# Patient Record
Sex: MALE | Race: ASIAN | NOT HISPANIC OR LATINO | Employment: OTHER | URBAN - METROPOLITAN AREA
[De-identification: names, ages, dates, MRNs, and addresses within clinical notes are randomized per-mention and may not be internally consistent; named-entity substitution may affect disease eponyms.]

---

## 2017-01-09 RX ORDER — CILOSTAZOL 100 MG/1
TABLET ORAL
Qty: 60 TAB | Refills: 2 | OUTPATIENT
Start: 2017-01-09

## 2017-01-09 NOTE — TELEPHONE ENCOUNTER
Last seen: 10/24/16 by Dr. Gtz  Next appt: 2/13/17 with Dr. Gtz    Was the patient seen in the last year in this department? Yes   Does patient have an active prescription for medications requested? No   Received Request Via: Pharmacy

## 2017-01-17 DIAGNOSIS — I10 ESSENTIAL HYPERTENSION: ICD-10-CM

## 2017-01-18 RX ORDER — CILOSTAZOL 100 MG/1
TABLET ORAL
Qty: 60 TAB | Refills: 6 | Status: SHIPPED | OUTPATIENT
Start: 2017-01-18 | End: 2017-10-14 | Stop reason: SDUPTHER

## 2017-02-13 ENCOUNTER — APPOINTMENT (OUTPATIENT)
Dept: INTERNAL MEDICINE | Facility: MEDICAL CENTER | Age: 82
End: 2017-02-13
Payer: MEDICARE

## 2017-02-16 ENCOUNTER — HOSPITAL ENCOUNTER (OUTPATIENT)
Dept: RADIOLOGY | Facility: MEDICAL CENTER | Age: 82
End: 2017-02-16
Attending: INTERNAL MEDICINE
Payer: MEDICARE

## 2017-02-16 ENCOUNTER — OFFICE VISIT (OUTPATIENT)
Dept: INTERNAL MEDICINE | Facility: MEDICAL CENTER | Age: 82
End: 2017-02-16
Payer: MEDICARE

## 2017-02-16 ENCOUNTER — TELEPHONE (OUTPATIENT)
Dept: INTERNAL MEDICINE | Facility: MEDICAL CENTER | Age: 82
End: 2017-02-16

## 2017-02-16 VITALS
BODY MASS INDEX: 29.11 KG/M2 | WEIGHT: 158.2 LBS | OXYGEN SATURATION: 94 % | SYSTOLIC BLOOD PRESSURE: 112 MMHG | DIASTOLIC BLOOD PRESSURE: 59 MMHG | HEART RATE: 51 BPM | HEIGHT: 62 IN | TEMPERATURE: 98.1 F

## 2017-02-16 DIAGNOSIS — S09.90XA HEAD TRAUMA, INITIAL ENCOUNTER: ICD-10-CM
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
## 2017-02-16 DIAGNOSIS — R41.82 ALTERED MENTAL STATUS, UNSPECIFIED: ICD-10-CM

## 2017-02-16 PROCEDURE — 99214 OFFICE O/P EST MOD 30 MIN: CPT | Performed by: INTERNAL MEDICINE

## 2017-02-16 PROCEDURE — G8432 DEP SCR NOT DOC, RNG: HCPCS | Performed by: INTERNAL MEDICINE

## 2017-02-16 PROCEDURE — G8598 ASA/ANTIPLAT THER USED: HCPCS | Performed by: INTERNAL MEDICINE

## 2017-02-16 PROCEDURE — 3288F FALL RISK ASSESSMENT DOCD: CPT | Performed by: INTERNAL MEDICINE

## 2017-02-16 PROCEDURE — 0518F FALL PLAN OF CARE DOCD: CPT | Mod: 8P | Performed by: INTERNAL MEDICINE

## 2017-02-16 PROCEDURE — G8420 CALC BMI NORM PARAMETERS: HCPCS | Performed by: INTERNAL MEDICINE

## 2017-02-16 PROCEDURE — G8482 FLU IMMUNIZE ORDER/ADMIN: HCPCS | Performed by: INTERNAL MEDICINE

## 2017-02-16 PROCEDURE — 70450 CT HEAD/BRAIN W/O DYE: CPT

## 2017-02-16 PROCEDURE — 4040F PNEUMOC VAC/ADMIN/RCVD: CPT | Performed by: INTERNAL MEDICINE

## 2017-02-16 PROCEDURE — 1100F PTFALLS ASSESS-DOCD GE2>/YR: CPT | Performed by: INTERNAL MEDICINE

## 2017-02-16 PROCEDURE — 1036F TOBACCO NON-USER: CPT | Performed by: INTERNAL MEDICINE

## 2017-02-16 ASSESSMENT — PAIN SCALES - GENERAL: PAINLEVEL: NO PAIN

## 2017-02-17 ENCOUNTER — TELEPHONE (OUTPATIENT)
Dept: INTERNAL MEDICINE | Facility: MEDICAL CENTER | Age: 82
End: 2017-02-17

## 2017-02-17 ENCOUNTER — OFFICE VISIT (OUTPATIENT)
Dept: INTERNAL MEDICINE | Facility: MEDICAL CENTER | Age: 82
End: 2017-02-17
Payer: MEDICARE

## 2017-02-17 VITALS
BODY MASS INDEX: 27.46 KG/M2 | TEMPERATURE: 97.6 F | RESPIRATION RATE: 18 BRPM | DIASTOLIC BLOOD PRESSURE: 68 MMHG | SYSTOLIC BLOOD PRESSURE: 118 MMHG | HEIGHT: 63 IN | WEIGHT: 155 LBS | OXYGEN SATURATION: 94 % | HEART RATE: 58 BPM

## 2017-02-17 DIAGNOSIS — Z98.890 RELEVANT PRIOR IMAGING: NORMAL EXAMINATION: ICD-10-CM

## 2017-02-17 DIAGNOSIS — R45.4 DIFFICULTY CONTROLLING ANGER: ICD-10-CM

## 2017-02-17 PROCEDURE — 1036F TOBACCO NON-USER: CPT | Mod: GC | Performed by: INTERNAL MEDICINE

## 2017-02-17 PROCEDURE — G8420 CALC BMI NORM PARAMETERS: HCPCS | Mod: GC | Performed by: INTERNAL MEDICINE

## 2017-02-17 PROCEDURE — 3288F FALL RISK ASSESSMENT DOCD: CPT | Mod: GC | Performed by: INTERNAL MEDICINE

## 2017-02-17 PROCEDURE — 4040F PNEUMOC VAC/ADMIN/RCVD: CPT | Mod: GC | Performed by: INTERNAL MEDICINE

## 2017-02-17 PROCEDURE — 99214 OFFICE O/P EST MOD 30 MIN: CPT | Mod: GC | Performed by: INTERNAL MEDICINE

## 2017-02-17 PROCEDURE — G8432 DEP SCR NOT DOC, RNG: HCPCS | Mod: GC | Performed by: INTERNAL MEDICINE

## 2017-02-17 PROCEDURE — 1100F PTFALLS ASSESS-DOCD GE2>/YR: CPT | Mod: GC | Performed by: INTERNAL MEDICINE

## 2017-02-17 PROCEDURE — G8482 FLU IMMUNIZE ORDER/ADMIN: HCPCS | Mod: GC | Performed by: INTERNAL MEDICINE

## 2017-02-17 PROCEDURE — 0518F FALL PLAN OF CARE DOCD: CPT | Mod: 8P,GC | Performed by: INTERNAL MEDICINE

## 2017-02-17 PROCEDURE — G8598 ASA/ANTIPLAT THER USED: HCPCS | Mod: GC | Performed by: INTERNAL MEDICINE

## 2017-02-17 ASSESSMENT — PAIN SCALES - GENERAL: PAINLEVEL: NO PAIN

## 2017-02-17 NOTE — TELEPHONE ENCOUNTER
----- Message from Tori Andino sent at 2/17/2017  7:41 AM PST -----  Regarding: Dr Gtz Pt;  CT Results  Contact: [phone]  1. Caller Name: Rafia                         2. Call Back Number: [phone]    3. Patient PCP: Dr Gtz    4. What is the patient requesting: Pt was seen by Dr Lynch yesterday. He order a head ct which pt had done the same day. He came in for the results. He would like them today. Please call pt.     5. Does patient need immediate contact: No

## 2017-02-17 NOTE — MR AVS SNAPSHOT
"Orlymarkiejazmyne Shaikh   2017 10:15 AM   Office Visit   MRN: [id number]    Department:  [facility]   Dept Phone:  [phone]    Description:  Male : 1933   Provider:  Juarez Helm M.D.           Reason for Visit     Results imaging results      Allergies as of 2017     No Known Allergies      Vital Signs     Blood Pressure Pulse Temperature Respirations Height Weight    118/68 mmHg 58 36.4 °C (97.6 °F) 18 1.588 m (5' 2.52\") 70.308 kg (155 lb)    Body Mass Index Oxygen Saturation Smoking Status             27.88 kg/m2 94% Never Smoker          Basic Information     Date Of Birth Sex Race Ethnicity Preferred Language    1933 Male  Non- English      Your appointments     2017  1:15 PM   FOLLOW UP with Marcelino Wu M.D.   [facility] (--)    [address], [facility] [address]  [facility] [address]   [phone]              Problem List              ICD-10-CM Priority Class Noted - Resolved    Paroxysmal atrial fibrillation (CMS-HCC) I48.0 High  2010 - Present    CAD (coronary artery disease) I25.10 High  2010 - Present    Benign non-nodular prostatic hyperplasia without lower urinary tract symptoms N40.0 High  2010 - Present    PVD (peripheral vascular disease) (CMS-[facility]) I73.9 High  2010 - Present    Gout of multiple sites M10.9 High  2010 - Present    Hyperlipidemia, unspecified E78.5 High  2010 - Present    Aortic stenosis, moderate I35.0 High  2012 - Present    Carotid artery stenosis (Chronic) I65.29   2011 - Present    Acquired hypothyroidism E03.9   2011 - Present    TIA (transient ischemic attack) (Chronic) G45.9   2011 - Present    Abdominal aortic aneurysm (CMS-HCC) I71.4   2013 - Present    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   2/3/2014 - Present    Abnormal liver function tests R79.89   2014 - Present    Chronic fatigue R53.82   " 10/21/2016 - Present    Hypertension, essential I10   10/21/2016 - Present    Chronic kidney disease, stage III (moderate) N18.3   10/21/2016 - Present      Health Maintenance        Date Due Completion Dates    IMM DTaP/Tdap/Td Vaccine (1 - Tdap) [birth year]/16/1952 ---    IMM PNEUMOCOCCAL 65+ (ADULT) LOW/MEDIUM RISK SERIES (2 of 2 - PPSV23) 6/3/2016 6/3/2015    COLONOSCOPY 9/3/2025 9/3/2015            Current Immunizations     13-VALENT PCV PREVNAR 6/3/2015    Influenza TIV (IM) 4/2/2011  1:30 PM    Influenza Vaccine Adult HD 10/24/2016, 11/10/2015    Pneumococcal Vaccine (UF)Historical Data 4/2/2005    SHINGLES VACCINE 3/14/2013      Below and/or attached are the medications your provider expects you to take. Review all of your home medications and newly ordered medications with your provider and/or pharmacist. Follow medication instructions as directed by your provider and/or pharmacist. Please keep your medication list with you and share with your provider. Update the information when medications are discontinued, doses are changed, or new medications (including over-the-counter products) are added; and carry medication information at all times in the event of emergency situations     Allergies:  No Known Allergies          Medications  Valid as of: February 17, 2017 - 11:14 AM    Generic Name Brand Name Tablet Size Instructions for use    Allopurinol (Tab) ZYLOPRIM 300 MG TAKE 1 TABLET ORALLY DAILY        AmLODIPine Besylate (Tab) NORVASC 10 MG TAKE 1 TAB BY MOUTH EVERY DAY.        Aspirin (Tab) aspirin 81 MG Take 81 mg by mouth every bedtime.        Atorvastatin Calcium (Tab) LIPITOR 20 MG TAKE 1 TABLET ORALLY DAILY        B Complex Vitamins   Take  by mouth every day.        Calcium Carbonate-Vit D-Min   Take  by mouth every day.        Cilostazol (Tab) PLETAL 100 MG TAKE 1 TABLET ORALLY TWICE DAILY        Clopidogrel Bisulfate (Tab) PLAVIX 75 MG TAKE 1 TABLET BY MOUTH EVERY DAY.        Levothyroxine Sodium (Tab)  SYNTHROID 50 MCG TAKE 1 TABLET ORALLY DAILY        Metoprolol Tartrate (Tab) LOPRESSOR 100 MG TAKE 1 TABLET BY MOUTH TWICE A DAY        Multiple Vitamins-Minerals (Tab) CENTRUM SILVER  Take 1 Tab by mouth every day.        Omega-3 Fatty Acids (Cap) OMEGA 3 FA 1000 MG Take 1,000 mg by mouth every day.        Spironolactone (Tab) ALDACTONE 25 MG TAKE 1 TABLET BY MOUTH EVERY DAY        .                 Medicines prescribed today were sent to:     [id number] - [place], [place] - [address] [facility]    [address] [place] [address]    Phone: [phone] Fax: [phone]    Open 24 Hours?: No      Medication refill instructions:       If your prescription bottle indicates you have medication refills left, it is not necessary to call your provider’s office. Please contact your pharmacy and they will refill your medication.    If your prescription bottle indicates you do not have any refills left, you may request refills at any time through one of the following ways: The online [employer] system (except Urgent Care), by calling your provider’s office, or by asking your pharmacy to contact your provider’s office with a refill request. Medication refills are processed only during regular business hours and may not be available until the next business day. Your provider may request additional information or to have a follow-up visit with you prior to refilling your medication.   *Please Note: Medication refills are assigned a new Rx number when refilled electronically. Your pharmacy may indicate that no refills were authorized even though a new prescription for the same medication is available at the pharmacy. Please request the medicine by name with the pharmacy before contacting your provider for a refill.           [employer] Access Code: Activation code not generated  Current [employer] Status: Active

## 2017-02-17 NOTE — TELEPHONE ENCOUNTER
CAT scan shows expected age-related changes. But nothing acute. If patient's mental status changes do not improve or if he worsens in any neurological way he should call ASAP.  (Patient's son is a physician, former resident of ours, and patient may therefore wish a copy of this scan)

## 2017-02-17 NOTE — PROGRESS NOTES
Established Patient    Rafia Shaikh is a 83 y.o. male who presents today with the following:    CC: Follow-up for recent fall    HPI:  1. Fall, initial encounter  He reports that he lost his balance a few days ago and fell. He was outside at the time, and fell onto some gravel and hit his head. He does not describe any overt dizziness lightheadedness or palpitations but rather just lost his balance. He reports that this happens from time to time where he falls. He thinks this happened about 5-7 days ago but cannot remember the specifics.    2. Head trauma, initial encounter  As part of the fall, he fell onto his face and his head. He had bleeding associated with this initially and some bruising over his forehead and his left eye. He describes that this is all resolved and he has not experienced any further issues associated with it other than a mild headache for which he has been taking Advil with good relief.    3. Altered mental status, unspecified  He describes that he has not had any changes in his mental status since the fall however his wife was with him today describes that his very much usual state of having a short temper with her has been much worse over this last week. She describes that he has been overall much harder to live with since the fall.      ROS:   Denies any new chest pain or shortness of breath.  No changes to urinary or bowel function. Headache as per history of present illness    Past Medical History   Diagnosis Date   • Hyperlipidemia    • CAD (coronary artery disease)    • Hypertension    • Heart murmur    • Arrhythmia    • Unspecified disorder of thyroid    • PAD (peripheral artery disease)    • CATARACT      surgery   • Myocardial infarct (CMS-HCC) 2008   • High cholesterol    • Stroke (CMS-HCC)      TIA 4/16/10   • Renal disorder 2000     kidney stone   • Dental disorder      cavities, under current care   • Sleep apnea      no cpap   • Carotid artery stenosis 6/13/2011   •  "Dizziness 6/13/2011   • Hypothyroidism 6/13/2011   • TIA (transient ischemic attack) 6/13/2011   • Preoperative examination, unspecified 4/12/2011   • Insomnia    • Right knee pain    • Gout    • Tendonitis        Social History   Substance Use Topics   • Smoking status: Never Smoker    • Smokeless tobacco: Never Used   • Alcohol Use: No       Current Outpatient Prescriptions   Medication Sig Dispense Refill   • cilostazol (PLETAL) 100 MG Tab TAKE 1 TABLET ORALLY TWICE DAILY 60 Tab 6   • metoprolol (LOPRESSOR) 100 MG Tab TAKE 1 TABLET BY MOUTH TWICE A DAY 60 Tab 6   • amlodipine (NORVASC) 10 MG Tab TAKE 1 TAB BY MOUTH EVERY DAY. 30 Tab 6   • levothyroxine (SYNTHROID) 50 MCG Tab TAKE 1 TABLET ORALLY DAILY 90 Tab 1   • clopidogrel (PLAVIX) 75 MG Tab TAKE 1 TABLET BY MOUTH EVERY DAY. 90 Tab 0   • atorvastatin (LIPITOR) 20 MG Tab TAKE 1 TABLET ORALLY DAILY 90 Tab 0   • allopurinol (ZYLOPRIM) 300 MG Tab TAKE 1 TABLET ORALLY DAILY 90 Tab 1   • spironolactone (ALDACTONE) 25 MG Tab TAKE 1 TABLET BY MOUTH EVERY DAY 90 Tab 1   • Calcium Carbonate-Vit D-Min (CALTRATE PLUS PO) Take  by mouth every day.     • docosahexanoic acid (OMEGA 3 FA) 1000 MG CAPS Take 1,000 mg by mouth every day.     • B Complex Vitamins (VITAMIN B COMPLEX PO) Take  by mouth every day.     • aspirin 81 MG tablet Take 81 mg by mouth every bedtime.     • Multiple Vitamins-Minerals (CENTRUM SILVER) TABS Take 1 Tab by mouth every day.       No current facility-administered medications for this visit.       /59 mmHg  Pulse 51  Temp(Src) 36.7 °C (98.1 °F)  Ht 1.575 m (5' 2.01\")  Wt 71.759 kg (158 lb 3.2 oz)  BMI 28.93 kg/m2  SpO2 94%    Physical Exam  General: Well developed, well nourished male, in no distress.  Eyes: Conjuntiva without any obvious injection or erythema.   Cardiovascular: Heart is regular with no murmurs  Lungs: Clear to auscultation bilaterally. No wheezes, rhonci or crackles heard. Respiratory effort is normal.  Abd: Soft, " non-tender  Ext: No edema  Neuro: Cranial nerves II through XII are intact. Gait is normal. Sensation is intact bilateral upper and lower extremities. Strength is 5 out of 5 in his bilateral upper and lower extremities.  Mild bruising over his forehead is noted    Assessment and Plan    1. Fall, initial encounter  It's unclear what's happening with his falls but it sounds like this is a somewhat common occurrence for him where he will be walking lose his balance slightly and fall. He describes 5-6 falls over the last few months. Of note, his wife was with him today was unaware of all of these falls and was only where the one which resulted in head trauma. He reports that he has a cane at home but does not use it.  Given his increased history of falls, I did think it's reasonable to have him have some sort of mobility device. We talked about the possibility of him using walker such as a 4 wheeled walker with a seat however he was uninterested in that and rather would like to start using his cane more regularly.    2. Head trauma, initial encounter/Altered mental status, unspecified  Given the history of head trauma with this questionable altered mental status with his generalized decreased reaction time, his increased aggressions with his wife, I think is reasonable to CT scan his head. Additionally, with his history of coronary disease he is on Plavix and aspirin. CT scan was ordered to be done later in the day.  Of note, at the time of this dictation that CT scan has come back and shown no acute abnormalities.    The patient should follow-up with his primary care doctor soon.      No Follow-up on file.      Signed by: Dusty Lynch M.D.    This note was created using voice recognition software.  While every attempt is made to ensure accuracy of transcription, occasionally errors occur.

## 2017-02-17 NOTE — TELEPHONE ENCOUNTER
Called by radiology that CT head did not show any acute abnormality. Attempted to call patient to get more clear information of clinical picture. Patient did not answer call and voicemail was left for patient to call back.

## 2017-02-17 NOTE — PROGRESS NOTES
Established Patient    Rafia presents today with the following:    CC: Follow-up on NORMAL CT head results    HPI: Pt is a 84 y/o M with a PMHx of CAD, TIA, PAF, PVD, HLD, Aortic stenosis, HTN who presents s/p GLF roughly 1 week prior to discuss CT head results. The patient is a poor historian, but report he lost his balance and fell with impact to his face/head. He denied LOC/dizziness/lightheadedness or other focal neurological symptoms before falling. He reports being at his usual baseline currently, but does state that he has been more angry with his wife. Otherwise no major complaints at the moment.    Patient Active Problem List    Diagnosis Date Noted   • Aortic stenosis, moderate 06/25/2012     Priority: High   • Paroxysmal atrial fibrillation (CMS-HCC) 09/18/2010     Priority: High   • CAD (coronary artery disease) 09/18/2010     Priority: High   • Benign non-nodular prostatic hyperplasia without lower urinary tract symptoms 09/18/2010     Priority: High   • PVD (peripheral vascular disease) (CMS-HCC) 09/18/2010     Priority: High   • Gout of multiple sites 09/18/2010     Priority: High   • Hyperlipidemia, unspecified 09/18/2010     Priority: High   • Chronic fatigue 10/21/2016   • Hypertension, essential 10/21/2016   • Chronic kidney disease, stage III (moderate) 10/21/2016   • Abnormal liver function tests 05/07/2014   • IHSS (idiopathic hypertrophic subaortic stenosis) (CMS-HCC) 02/03/2014   • Abdominal aortic aneurysm (CMS-HCC) 01/17/2013   • Carotid artery stenosis 06/13/2011   • Acquired hypothyroidism 06/13/2011   • TIA (transient ischemic attack) 06/13/2011       Current Outpatient Prescriptions   Medication Sig Dispense Refill   • cilostazol (PLETAL) 100 MG Tab TAKE 1 TABLET ORALLY TWICE DAILY 60 Tab 6   • metoprolol (LOPRESSOR) 100 MG Tab TAKE 1 TABLET BY MOUTH TWICE A DAY 60 Tab 6   • amlodipine (NORVASC) 10 MG Tab TAKE 1 TAB BY MOUTH EVERY DAY. 30 Tab 6   • levothyroxine (SYNTHROID) 50 MCG Tab  "TAKE 1 TABLET ORALLY DAILY 90 Tab 1   • clopidogrel (PLAVIX) 75 MG Tab TAKE 1 TABLET BY MOUTH EVERY DAY. 90 Tab 0   • atorvastatin (LIPITOR) 20 MG Tab TAKE 1 TABLET ORALLY DAILY 90 Tab 0   • allopurinol (ZYLOPRIM) 300 MG Tab TAKE 1 TABLET ORALLY DAILY 90 Tab 1   • spironolactone (ALDACTONE) 25 MG Tab TAKE 1 TABLET BY MOUTH EVERY DAY 90 Tab 1   • Calcium Carbonate-Vit D-Min (CALTRATE PLUS PO) Take  by mouth every day.     • docosahexanoic acid (OMEGA 3 FA) 1000 MG CAPS Take 1,000 mg by mouth every day.     • B Complex Vitamins (VITAMIN B COMPLEX PO) Take  by mouth every day.     • aspirin 81 MG tablet Take 81 mg by mouth every bedtime.     • Multiple Vitamins-Minerals (CENTRUM SILVER) TABS Take 1 Tab by mouth every day.       No current facility-administered medications for this visit.       ROS: As per HPI. Additional pertinent symptoms as noted below.      General: Denies weight changes, malaise, night sweats or fever  GI: Denies abdominal pain and black stool nausea and vomiting  Genitourinary: Denies suprapubic pain and tenderness burning with urination  Musculoskeletal: Denies any joint pain arthritis swelling  Lung: Denies cough shortness of breath exertional dyspnea  Cardiovascular: Denies chest pain or exertional dyspnea orthopnea PND  Neurology: Denies any focal deficit numbness and tingling  Psychiatric: +Increased anger with wife; denies any suicidal or homicidal ideation, feeling anxious      /68 mmHg  Pulse 58  Temp(Src) 36.4 °C (97.6 °F)  Resp 18  Ht 1.588 m (5' 2.52\")  Wt 70.308 kg (155 lb)  BMI 27.88 kg/m2  SpO2 94%    Physical Exam   Constitutional:  oriented to person, place, and time. No distress.   Eyes: Pupils are equal, round, and reactive to light. No scleral icterus.  Neck: Neck supple. No thyromegaly present.   Cardiovascular: Normal rate, regular rhythm and normal heart sounds.  Exam reveals no gallop and no friction rub.  No murmur heard.  Pulmonary/Chest: Breath sounds normal. " "Chest wall is not dull to percussion.   Musculoskeletal:   no edema.   Lymphadenopathy: no cervical adenopathy  Neurological: alert and oriented to person, place, and time.   Skin: No cyanosis. Nails show no clubbing.      Assessment and Plan    Follow up of CT head results  - CT head WO: No acute intracranial abnormality is identified. Atrophy. There are mild periventricular and subcortical white matter changes present.  This finding is nonspecific and could be from previous small vessel ischemia, demyelination, or gliosis.  - Patient denied any acute/worsening symptoms; denies recurrent fall as well as syncopal episodes; Denies focal neuro symptoms  - Prior fall was likely mechanical in nature and NOT related to cardiac/neuro etiologies    Anger Management  - Pt reports \"always mean to her\" regarding his wife, but does not provide justification for his temperament. Pt ok with being evaluated  - Referral placed to Psychiatry (geriatric if available) to evaluate pt's anger issue with his wife    Signed by: Juarez Helm M.D.      "

## 2017-03-21 ENCOUNTER — RESOLUTE PROFESSIONAL BILLING HOSPITAL PROF FEE (OUTPATIENT)
Dept: HOSPITALIST | Facility: MEDICAL CENTER | Age: 82
End: 2017-03-21
Payer: MEDICARE

## 2017-03-21 ENCOUNTER — APPOINTMENT (OUTPATIENT)
Dept: RADIOLOGY | Facility: MEDICAL CENTER | Age: 82
DRG: 356 | End: 2017-03-21
Attending: EMERGENCY MEDICINE
Payer: MEDICARE

## 2017-03-21 ENCOUNTER — APPOINTMENT (OUTPATIENT)
Dept: RADIOLOGY | Facility: MEDICAL CENTER | Age: 82
DRG: 356 | End: 2017-03-21
Attending: RADIOLOGY
Payer: MEDICARE

## 2017-03-21 ENCOUNTER — HOSPITAL ENCOUNTER (INPATIENT)
Facility: MEDICAL CENTER | Age: 82
LOS: 4 days | DRG: 356 | End: 2017-03-25
Attending: EMERGENCY MEDICINE | Admitting: HOSPITALIST
Payer: MEDICARE

## 2017-03-21 DIAGNOSIS — I95.9 HYPOTENSION, UNSPECIFIED HYPOTENSION TYPE: ICD-10-CM

## 2017-03-21 DIAGNOSIS — K62.5 GASTROINTESTINAL HEMORRHAGE ASSOCIATED WITH ANORECTAL SOURCE: ICD-10-CM

## 2017-03-21 PROBLEM — I35.0 MODERATE AORTIC STENOSIS: Status: ACTIVE | Noted: 2017-03-21

## 2017-03-21 PROBLEM — I48.0 PAF (PAROXYSMAL ATRIAL FIBRILLATION) (HCC): Status: ACTIVE | Noted: 2017-03-21

## 2017-03-21 PROBLEM — I25.810 CAD (CORONARY ARTERY DISEASE) OF ARTERY BYPASS GRAFT: Status: ACTIVE | Noted: 2017-03-21

## 2017-03-21 PROBLEM — K92.2 LOWER GI BLEED: Status: ACTIVE | Noted: 2017-03-21

## 2017-03-21 LAB
ABO GROUP BLD: NORMAL
ALBUMIN SERPL BCP-MCNC: 2.3 G/DL (ref 3.2–4.9)
ALBUMIN/GLOB SERPL: 1.6 G/DL
ALP SERPL-CCNC: 38 U/L (ref 30–99)
ALT SERPL-CCNC: 20 U/L (ref 2–50)
ANION GAP SERPL CALC-SCNC: 5 MMOL/L (ref 0–11.9)
APPEARANCE UR: CLEAR
AST SERPL-CCNC: 17 U/L (ref 12–45)
BARCODED ABORH UBTYP: 5100
BARCODED ABORH UBTYP: 9500
BARCODED PRD CODE UBPRD: NORMAL
BARCODED UNIT NUM UBUNT: NORMAL
BASOPHILS # BLD AUTO: 0.9 % (ref 0–1.8)
BASOPHILS # BLD: 0.1 K/UL (ref 0–0.12)
BILIRUB SERPL-MCNC: 0.7 MG/DL (ref 0.1–1.5)
BILIRUB UR QL STRIP.AUTO: NEGATIVE
BLD GP AB SCN SERPL QL: NORMAL
BUN SERPL-MCNC: 20 MG/DL (ref 8–22)
CALCIUM SERPL-MCNC: 6.8 MG/DL (ref 8.5–10.5)
CFT BLD TEG: 4.2 MIN (ref 5–10)
CHLORIDE SERPL-SCNC: 114 MMOL/L (ref 96–112)
CLOT ANGLE BLD TEG: 72.9 DEGREES (ref 53–72)
CLOT LYSIS 30M P MA LENFR BLD TEG: 0 % (ref 0–8)
CO2 SERPL-SCNC: 17 MMOL/L (ref 20–33)
COLOR UR: COLORLESS
COMPONENT FT 8504FT: NORMAL
COMPONENT P 8504P: NORMAL
COMPONENT R 8504R: NORMAL
CREAT SERPL-MCNC: 1.29 MG/DL (ref 0.5–1.4)
CT.EXTRINSIC BLD ROTEM: 1.2 MIN (ref 1–3)
EOSINOPHIL # BLD AUTO: 0 K/UL (ref 0–0.51)
EOSINOPHIL NFR BLD: 0 % (ref 0–6.9)
ERYTHROCYTE [DISTWIDTH] IN BLOOD BY AUTOMATED COUNT: 52.4 FL (ref 35.9–50)
GFR SERPL CREATININE-BSD FRML MDRD: 53 ML/MIN/1.73 M 2
GLOBULIN SER CALC-MCNC: 1.4 G/DL (ref 1.9–3.5)
GLUCOSE BLD-MCNC: 138 MG/DL (ref 65–99)
GLUCOSE SERPL-MCNC: 166 MG/DL (ref 65–99)
GLUCOSE UR STRIP.AUTO-MCNC: NEGATIVE MG/DL
HCT VFR BLD AUTO: 30.3 % (ref 42–52)
HGB BLD-MCNC: 7.8 G/DL (ref 14–18)
HGB BLD-MCNC: 8.3 G/DL (ref 14–18)
HGB BLD-MCNC: 9.9 G/DL (ref 14–18)
INR PPP: 1.49 (ref 0.87–1.13)
KETONES UR STRIP.AUTO-MCNC: NEGATIVE MG/DL
LEUKOCYTE ESTERASE UR QL STRIP.AUTO: NEGATIVE
LIPASE SERPL-CCNC: 22 U/L (ref 11–82)
LYMPHOCYTES # BLD AUTO: 1.88 K/UL (ref 1–4.8)
LYMPHOCYTES NFR BLD: 16.5 % (ref 22–41)
MAGNESIUM SERPL-MCNC: 1.5 MG/DL (ref 1.5–2.5)
MANUAL DIFF BLD: NORMAL
MCF BLD TEG: 70 MM (ref 50–70)
MCH RBC QN AUTO: 32 PG (ref 27–33)
MCHC RBC AUTO-ENTMCNC: 32.7 G/DL (ref 33.7–35.3)
MCV RBC AUTO: 98.1 FL (ref 81.4–97.8)
MICRO URNS: NORMAL
MONOCYTES # BLD AUTO: 0.79 K/UL (ref 0–0.85)
MONOCYTES NFR BLD AUTO: 6.9 % (ref 0–13.4)
MORPHOLOGY BLD-IMP: NORMAL
NEUTROPHILS # BLD AUTO: 8.63 K/UL (ref 1.82–7.42)
NEUTROPHILS NFR BLD: 74.8 % (ref 44–72)
NEUTS BAND NFR BLD MANUAL: 0.9 % (ref 0–10)
NITRITE UR QL STRIP.AUTO: NEGATIVE
NRBC # BLD AUTO: 0 K/UL
NRBC BLD AUTO-RTO: 0 /100 WBC
OVALOCYTES BLD QL SMEAR: NORMAL
PA AA BLD-ACNC: 94.1 %
PA ADP BLD-ACNC: 43.5 %
PH UR STRIP.AUTO: 6 [PH]
PLATELET # BLD AUTO: 127 K/UL (ref 164–446)
PLATELET BLD QL SMEAR: NORMAL
PMV BLD AUTO: 9.1 FL (ref 9–12.9)
POIKILOCYTOSIS BLD QL SMEAR: NORMAL
POTASSIUM SERPL-SCNC: 3.9 MMOL/L (ref 3.6–5.5)
PRODUCT TYPE UPROD: NORMAL
PROT SERPL-MCNC: 3.7 G/DL (ref 6–8.2)
PROT UR QL STRIP: NEGATIVE MG/DL
PROTHROMBIN TIME: 18.5 SEC (ref 12–14.6)
RBC # BLD AUTO: 3.09 M/UL (ref 4.7–6.1)
RBC BLD AUTO: PRESENT
RBC UR QL AUTO: NEGATIVE
RH BLD: NORMAL
SODIUM SERPL-SCNC: 136 MMOL/L (ref 135–145)
SP GR UR STRIP.AUTO: 1.02
TEG ALGORITHM TGALG: ABNORMAL
TOXIC GRANULES BLD QL SMEAR: SLIGHT
TROPONIN I SERPL-MCNC: 0.05 NG/ML (ref 0–0.04)
UNIT STATUS USTAT: NORMAL
WBC # BLD AUTO: 11.4 K/UL (ref 4.8–10.8)

## 2017-03-21 PROCEDURE — 75736 ARTERY X-RAYS PELVIS: CPT

## 2017-03-21 PROCEDURE — 85018 HEMOGLOBIN: CPT

## 2017-03-21 PROCEDURE — 37221 HCHG STENT PLACE W/WO ANGIO ILIAC ARTERY: CPT

## 2017-03-21 PROCEDURE — 93005 ELECTROCARDIOGRAM TRACING: CPT | Performed by: EMERGENCY MEDICINE

## 2017-03-21 PROCEDURE — 36556 INSERT NON-TUNNEL CV CATH: CPT

## 2017-03-21 PROCEDURE — 36247 INS CATH ABD/L-EXT ART 3RD: CPT

## 2017-03-21 PROCEDURE — 85007 BL SMEAR W/DIFF WBC COUNT: CPT

## 2017-03-21 PROCEDURE — 83735 ASSAY OF MAGNESIUM: CPT

## 2017-03-21 PROCEDURE — 81003 URINALYSIS AUTO W/O SCOPE: CPT

## 2017-03-21 PROCEDURE — 36246 INS CATH ABD/L-EXT ART 2ND: CPT

## 2017-03-21 PROCEDURE — 85576 BLOOD PLATELET AGGREGATION: CPT

## 2017-03-21 PROCEDURE — 84484 ASSAY OF TROPONIN QUANT: CPT

## 2017-03-21 PROCEDURE — 30243R1 TRANSFUSION OF NONAUTOLOGOUS PLATELETS INTO CENTRAL VEIN, PERCUTANEOUS APPROACH: ICD-10-PCS | Performed by: EMERGENCY MEDICINE

## 2017-03-21 PROCEDURE — 02H633Z INSERTION OF INFUSION DEVICE INTO RIGHT ATRIUM, PERCUTANEOUS APPROACH: ICD-10-PCS | Performed by: EMERGENCY MEDICINE

## 2017-03-21 PROCEDURE — 85384 FIBRINOGEN ACTIVITY: CPT

## 2017-03-21 PROCEDURE — P9034 PLATELETS, PHERESIS: HCPCS

## 2017-03-21 PROCEDURE — 76937 US GUIDE VASCULAR ACCESS: CPT

## 2017-03-21 PROCEDURE — 04L53DZ OCCLUSION OF SUPERIOR MESENTERIC ARTERY WITH INTRALUMINAL DEVICE, PERCUTANEOUS APPROACH: ICD-10-PCS | Performed by: RADIOLOGY

## 2017-03-21 PROCEDURE — 85610 PROTHROMBIN TIME: CPT

## 2017-03-21 PROCEDURE — 4410404 IR-VISCERAL ANGIOGRAM - SELECTIVE

## 2017-03-21 PROCEDURE — 700117 HCHG RX CONTRAST REV CODE 255: Performed by: EMERGENCY MEDICINE

## 2017-03-21 PROCEDURE — 36245 INS CATH ABD/L-EXT ART 1ST: CPT

## 2017-03-21 PROCEDURE — 700117 HCHG RX CONTRAST REV CODE 255: Performed by: RADIOLOGY

## 2017-03-21 PROCEDURE — 94760 N-INVAS EAR/PLS OXIMETRY 1: CPT

## 2017-03-21 PROCEDURE — 37244 VASC EMBOLIZE/OCCLUDE BLEED: CPT

## 2017-03-21 PROCEDURE — 74174 CTA ABD&PLVS W/CONTRAST: CPT

## 2017-03-21 PROCEDURE — C1751 CATH, INF, PER/CENT/MIDLINE: HCPCS

## 2017-03-21 PROCEDURE — 36430 TRANSFUSION BLD/BLD COMPNT: CPT

## 2017-03-21 PROCEDURE — 85347 COAGULATION TIME ACTIVATED: CPT

## 2017-03-21 PROCEDURE — 86901 BLOOD TYPING SEROLOGIC RH(D): CPT

## 2017-03-21 PROCEDURE — 700111 HCHG RX REV CODE 636 W/ 250 OVERRIDE (IP): Performed by: HOSPITALIST

## 2017-03-21 PROCEDURE — 86900 BLOOD TYPING SEROLOGIC ABO: CPT

## 2017-03-21 PROCEDURE — 85027 COMPLETE CBC AUTOMATED: CPT

## 2017-03-21 PROCEDURE — 86850 RBC ANTIBODY SCREEN: CPT

## 2017-03-21 PROCEDURE — 770022 HCHG ROOM/CARE - ICU (200)

## 2017-03-21 PROCEDURE — 700105 HCHG RX REV CODE 258: Performed by: HOSPITALIST

## 2017-03-21 PROCEDURE — 86923 COMPATIBILITY TEST ELECTRIC: CPT

## 2017-03-21 PROCEDURE — 30243N1 TRANSFUSION OF NONAUTOLOGOUS RED BLOOD CELLS INTO CENTRAL VEIN, PERCUTANEOUS APPROACH: ICD-10-PCS | Performed by: EMERGENCY MEDICINE

## 2017-03-21 PROCEDURE — C9113 INJ PANTOPRAZOLE SODIUM, VIA: HCPCS | Performed by: HOSPITALIST

## 2017-03-21 PROCEDURE — 80053 COMPREHEN METABOLIC PANEL: CPT

## 2017-03-21 PROCEDURE — 75726 ARTERY X-RAYS ABDOMEN: CPT

## 2017-03-21 PROCEDURE — 83690 ASSAY OF LIPASE: CPT

## 2017-03-21 PROCEDURE — 99291 CRITICAL CARE FIRST HOUR: CPT

## 2017-03-21 PROCEDURE — 96360 HYDRATION IV INFUSION INIT: CPT

## 2017-03-21 PROCEDURE — 700111 HCHG RX REV CODE 636 W/ 250 OVERRIDE (IP)

## 2017-03-21 PROCEDURE — 700105 HCHG RX REV CODE 258: Performed by: EMERGENCY MEDICINE

## 2017-03-21 PROCEDURE — B4141ZZ FLUOROSCOPY OF SUPERIOR MESENTERIC ARTERY USING LOW OSMOLAR CONTRAST: ICD-10-PCS | Performed by: RADIOLOGY

## 2017-03-21 PROCEDURE — 82962 GLUCOSE BLOOD TEST: CPT

## 2017-03-21 PROCEDURE — P9016 RBC LEUKOCYTES REDUCED: HCPCS | Mod: 91

## 2017-03-21 PROCEDURE — 99291 CRITICAL CARE FIRST HOUR: CPT | Performed by: HOSPITALIST

## 2017-03-21 PROCEDURE — 71010 DX-CHEST-PORTABLE (1 VIEW): CPT

## 2017-03-21 RX ORDER — SODIUM CHLORIDE 9 MG/ML
INJECTION, SOLUTION INTRAVENOUS CONTINUOUS
Status: DISCONTINUED | OUTPATIENT
Start: 2017-03-21 | End: 2017-03-23

## 2017-03-21 RX ORDER — SODIUM CHLORIDE 9 MG/ML
1000 INJECTION, SOLUTION INTRAVENOUS ONCE
Status: COMPLETED | OUTPATIENT
Start: 2017-03-21 | End: 2017-03-21

## 2017-03-21 RX ORDER — ATORVASTATIN CALCIUM 20 MG/1
20 TABLET, FILM COATED ORAL
Status: DISCONTINUED | OUTPATIENT
Start: 2017-03-21 | End: 2017-03-25 | Stop reason: HOSPADM

## 2017-03-21 RX ORDER — LEVOTHYROXINE SODIUM 0.05 MG/1
50 TABLET ORAL
Status: DISCONTINUED | OUTPATIENT
Start: 2017-03-22 | End: 2017-03-25 | Stop reason: HOSPADM

## 2017-03-21 RX ORDER — BISACODYL 10 MG
10 SUPPOSITORY, RECTAL RECTAL
Status: DISCONTINUED | OUTPATIENT
Start: 2017-03-21 | End: 2017-03-21 | Stop reason: SINTOL

## 2017-03-21 RX ORDER — CILOSTAZOL 100 MG/1
100 TABLET ORAL 2 TIMES DAILY
Status: DISCONTINUED | OUTPATIENT
Start: 2017-03-21 | End: 2017-03-21

## 2017-03-21 RX ORDER — LORAZEPAM 2 MG/ML
0.5 INJECTION INTRAMUSCULAR EVERY 4 HOURS PRN
Status: DISCONTINUED | OUTPATIENT
Start: 2017-03-21 | End: 2017-03-22

## 2017-03-21 RX ORDER — METOPROLOL TARTRATE 100 MG/1
100 TABLET ORAL TWICE DAILY
Status: DISCONTINUED | OUTPATIENT
Start: 2017-03-22 | End: 2017-03-24

## 2017-03-21 RX ORDER — MIDAZOLAM HYDROCHLORIDE 1 MG/ML
INJECTION INTRAMUSCULAR; INTRAVENOUS
Status: DISPENSED
Start: 2017-03-21 | End: 2017-03-22

## 2017-03-21 RX ORDER — ONDANSETRON 4 MG/1
4 TABLET, ORALLY DISINTEGRATING ORAL EVERY 4 HOURS PRN
Status: DISCONTINUED | OUTPATIENT
Start: 2017-03-21 | End: 2017-03-25 | Stop reason: HOSPADM

## 2017-03-21 RX ORDER — ONDANSETRON 2 MG/ML
4 INJECTION INTRAMUSCULAR; INTRAVENOUS PRN
Status: ACTIVE | OUTPATIENT
Start: 2017-03-21 | End: 2017-03-21

## 2017-03-21 RX ORDER — ONDANSETRON 2 MG/ML
4 INJECTION INTRAMUSCULAR; INTRAVENOUS EVERY 4 HOURS PRN
Status: DISCONTINUED | OUTPATIENT
Start: 2017-03-21 | End: 2017-03-25 | Stop reason: HOSPADM

## 2017-03-21 RX ORDER — MIDAZOLAM HYDROCHLORIDE 1 MG/ML
.5-2 INJECTION INTRAMUSCULAR; INTRAVENOUS PRN
Status: ACTIVE | OUTPATIENT
Start: 2017-03-21 | End: 2017-03-21

## 2017-03-21 RX ORDER — NOREPINEPHRINE BITARTRATE 1 MG/ML
INJECTION, SOLUTION INTRAVENOUS
Status: DISPENSED
Start: 2017-03-21 | End: 2017-03-22

## 2017-03-21 RX ORDER — POLYETHYLENE GLYCOL 3350 17 G/17G
1 POWDER, FOR SOLUTION ORAL
Status: DISCONTINUED | OUTPATIENT
Start: 2017-03-21 | End: 2017-03-21 | Stop reason: SINTOL

## 2017-03-21 RX ORDER — SODIUM CHLORIDE 9 MG/ML
INJECTION, SOLUTION INTRAVENOUS CONTINUOUS
Status: DISCONTINUED | OUTPATIENT
Start: 2017-03-21 | End: 2017-03-21

## 2017-03-21 RX ORDER — AMOXICILLIN 250 MG
2 CAPSULE ORAL 2 TIMES DAILY
Status: DISCONTINUED | OUTPATIENT
Start: 2017-03-21 | End: 2017-03-21 | Stop reason: SINTOL

## 2017-03-21 RX ORDER — SODIUM CHLORIDE 9 MG/ML
500 INJECTION, SOLUTION INTRAVENOUS PRN
Status: DISCONTINUED | OUTPATIENT
Start: 2017-03-21 | End: 2017-03-25 | Stop reason: HOSPADM

## 2017-03-21 RX ORDER — DEXTROSE MONOHYDRATE 25 G/50ML
25 INJECTION, SOLUTION INTRAVENOUS
Status: DISCONTINUED | OUTPATIENT
Start: 2017-03-21 | End: 2017-03-25 | Stop reason: HOSPADM

## 2017-03-21 RX ADMIN — SODIUM CHLORIDE 8 MG/HR: 9 INJECTION, SOLUTION INTRAVENOUS at 15:23

## 2017-03-21 RX ADMIN — IOHEXOL 50 ML: 300 INJECTION, SOLUTION INTRAVENOUS at 14:00

## 2017-03-21 RX ADMIN — SODIUM CHLORIDE: 9 INJECTION, SOLUTION INTRAVENOUS at 12:30

## 2017-03-21 RX ADMIN — SODIUM CHLORIDE: 9 INJECTION, SOLUTION INTRAVENOUS at 14:32

## 2017-03-21 RX ADMIN — CEFTRIAXONE 2 G: 2 INJECTION, POWDER, FOR SOLUTION INTRAMUSCULAR; INTRAVENOUS at 17:02

## 2017-03-21 RX ADMIN — IOHEXOL 100 ML: 350 INJECTION, SOLUTION INTRAVENOUS at 12:38

## 2017-03-21 RX ADMIN — SODIUM CHLORIDE 80 MG: 9 INJECTION, SOLUTION INTRAVENOUS at 14:49

## 2017-03-21 RX ADMIN — SODIUM CHLORIDE 1000 ML: 9 INJECTION, SOLUTION INTRAVENOUS at 11:45

## 2017-03-21 ASSESSMENT — PAIN SCALES - GENERAL
PAINLEVEL_OUTOF10: 0
PAINLEVEL_OUTOF10: 0

## 2017-03-21 ASSESSMENT — LIFESTYLE VARIABLES
ALCOHOL_USE: NO
EVER_SMOKED: NEVER

## 2017-03-21 NOTE — OR SURGEON
Immediate Post- Operative Note        PostOp Diagnosis: GI bleeding      Procedure(s): embolization bleeding right iliocecal artery branch    Estimated Blood Loss: Less than 5 ml        Complications: None            3/21/2017     1:46 PM     Carlos Hart

## 2017-03-21 NOTE — IP AVS SNAPSHOT
3/25/2017          Rafia Shaikh  [address] [place] [address]    Dear Rafia:    [facility] wants to ensure your discharge home is safe and you or your loved ones have had all your questions answered regarding your care after you leave the hospital.    You may receive a telephone call within two days of your discharge.  This call is to make certain you understand your discharge instructions as well as ensure we provided you with the best care possible during your stay with us.     The call will only last approximately 3-5 minutes and will be done by a nurse.    Once again, we want to ensure your discharge home is safe and that you have a clear understanding of any next steps in your care.  If you have any questions or concerns, please do not hesitate to contact us, we are here for you.  Thank you for choosing [facility] for your healthcare needs.    Sincerely,    Skyler Marcus    [facility]

## 2017-03-21 NOTE — ED PROVIDER NOTES
ED Provider Note  CHIEF COMPLAINT  Chief Complaint   Patient presents with   • Rectal Bleeding       HPI  Rafia Shaikh is a 83 y.o. male who presents with some clear bleeding from his rectum. The patient has a history of coronary disease, hypertension, dizziness, thyroid disease, TIA. Medications include aspirin, Plavix. He's also on Aldactone and Norvasc. Patient is awake and cannot give us any significant history. His wife is at the bedside she is nervous and upset crying and cannot give us any significant history other than he started bleeding this morning.    REVIEW OF SYSTEMS  No headache, no chest pain, no abdominal pain. Otherwise difficult to obtain.  ALL OTHER SYSTEMS NEGATIVE    ALLERGIES  No Known Allergies    CURRENT MEDICATIONS  Aspirin and Plavix.    PAST MEDICAL HISTORY  Past Medical History   Diagnosis Date   • Hyperlipidemia    • CAD (coronary artery disease)    • Hypertension    • Heart murmur    • Arrhythmia    • Unspecified disorder of thyroid    • PAD (peripheral artery disease)    • CATARACT      surgery   • Myocardial infarct (CMS-HCC) 2008   • High cholesterol    • Stroke (CMS-HCC)      TIA 4/16/10   • Renal disorder 2000     kidney stone   • Dental disorder      cavities, under current care   • Sleep apnea      no cpap   • Carotid artery stenosis 6/13/2011   • Dizziness 6/13/2011   • Hypothyroidism 6/13/2011   • TIA (transient ischemic attack) 6/13/2011   • Preoperative examination, unspecified 4/12/2011   • Insomnia    • Right knee pain    • Gout    • Tendonitis        SURGICAL HISTORY  Past Surgical History   Procedure Laterality Date   • Multiple coronary artery bypass endo vein harvest  3/20/08     Performed by AMANDA CRYSTAL at SURGERY [facility] ORS   • Other cardiac surgery       3 vessel bypass   • Other       nasal   • Recovery  3/25/2011     Performed by SURGERY, CATH-RECOVERY at SURGERY SAME DAY [facility] ORS   • Recovery  4/1/2011     Performed by SURGERY, CATH-RECOVERY at  "SURGERY SAME DAY [facility] ORS   • Cataract extraction with iol       bilat   • Carotid endarterectomy  5/3/2011     Performed by ERASMO NAVARRETE at SURGERY [facility] ORS   • Recovery  8/22/2012     Performed by MARLON MEZA at SURGERY [facility]       FAMILY HISTORY  Family History   Problem Relation Age of Onset   • Hypertension     • Colon Cancer Brother    • Breast Cancer Sister    • Other       GOUT   • Heart Disease Father    • Other Mother      TB       SOCIAL HISTORY  Negative    PHYSICAL EXAM  GENERAL: A lethargic thin elderly  male  VITAL SIGNS: BP 55/38 mmHg  Pulse 65  Temp(Src) 36.9 °C (98.4 °F)  Resp 18  Ht 1.6 m (5' 3\")  Wt 69.4 kg (153 lb)  BMI 27.11 kg/m2   Constitutional: Pale and lethargic elderly  male adult HENT: Scalp is normal size and nontender.   Eyes: Pupils equal round and reactive to light, extraocular motor fall. There is no scleral icterus.  Neck: Neck is supple and nontender. There is no meningismus. No adenitis. No thyromegaly.  Lymphatic: No adenopathy.   Cardiovascular: Heart regular rhythm without murmurs or gallops   Thorax & Lungs: No chest wall tenderness. Lungs are clear. Patient has good breath sounds bilateral. No rales, no rhonchi, no wheezes.  Abdomen: Abdomen is soft, nontender, not rigid, no guarding, and no organomegaly. There is no palpable hernia   Skin: Pale and a little diaphoretic.  Back: Nontender, no midline bony tenderness, no flank tenderness.  Rectal examination: He has bright red blood in blood clots pouring out of his rectum.  Extremities: Full range of motion  No tenderness to palpation and no deformities noted. No calf or thigh swelling. No calf or thigh tenderness. No clinical DVT.  Neurologic: Alert & oriented . Cranial nerves are grossly intact as tested. Patient moves all 4 extremities well. Patient has good strong flexion and extension of the ankle joints knee joints hip joints and elbow joints. Sensation is normal and " symmetrical in the upper and lower extremities.   Psychiatric: Patient is alert oriented coherent and rational.       COURSE & MEDICAL DECISION MAKING  She arrives with a blood pressure of 40 very pale with severe rectal bleeding most likely lower GI bleeding kids as bright red. Could be from diverticulosis versus some other cause. He is on aspirin and Plavix. Patient arrived by ambulance I discussed case with prehospital personnel.    Plan: #1 IV #2 cardiac monitor, pulse ox monitor, blood pressure monitor. #3 set up for central line #4. Laboratory including CBC, CMP, pro time, type and cross for 2 units of blood, an order of one unit of platelets, etc. #5. Review previous medical records    Review of previous medical records: Medications include Plavix and aspirin. Upper lipidemia, coronary disease, hypertension, cataracts, MI, hypothyroid disease, TIA. Medications include Aldactone, Zyloprim, Plavix, Norvasc, Lipitor.    Procedure: The patient was placed in the Trendelenburg position. The left subclavian area was cleaned and prepped thoroughly. I set up for sterile placement of a central line in the left subclavian area. I wore mask and gown and gloves. Left subclavian area was cleaned and prepped thoroughly. I anesthetized the area with lidocaine. I inserted a needle and had good blood return. I then introduced the guidewire using Seldinger technique. I removed the needle. I made a small incision. Dilator was used. Dilator was removed. Triple lumen catheter was then inserted without difficulty. Good blood return after placement. The central line was then secured in place with sutures. Patient was then given intravenous platelets and blood. Patient's blood pressure went from 40 systolic up to 100 systolic. Chest x-ray was then obtained.    Consultation: I discussed case with Dr. Darren Bronw the hospitalist. Patient be admitted to the intensive care unit.    Consultation #2 I discussed case with the GI on-call and  he will evaluate the patient and follow the patient. Dr. Graves the GI specialist is here talking to the wife and his evaluating the patient.    Consultation #3. I discussed the case and consulted interventional radiology and set up for the patient have a CT angiogram with embolization.    Critical care time 55 minutes    FINAL IMPRESSION  1. Severe lower GI bleed  2. Hypotension with hypovolemic shock          Electronically signed by: Gary Gansert, 3/21/2017 12:30 PM

## 2017-03-21 NOTE — DISCHARGE PLANNING
Received return phone call from daughter, Alondra Shoemaker. Daughter explained that she is not in contact with her parents and DOES NOT want her mother to have access to her phone number. Daughter explained that son (Lopez Shaikh) is more involved with parents. She attempted to reach him and his spouse who are both MDs and are still involved with pt and spouse.     Alondra provided her phone number for hospital staff use only. [phone]. Son is waiting for MD to call back. He has been provided with phone number to ICU and ER SW. Daughter stated spouse stopped taking her own medication the last time pt was hospitalized and ended up being hospitalized herself. Daughter also stated spouse talked about suicide on a daily basis when daughter was a child. Staff should be aware of spouse and request assistance with EPS report if needed.    Contacted CM in ICU for f/u.

## 2017-03-21 NOTE — PROGRESS NOTES
Impression:  1. Significant hematochezia  2. moderate AS with mean gradient in 20s in 12/2014  3. paroxysmal atrial fibrillation  4. Hypotension, hx of hypertension  5. coronary arterial disease,  6. peripheral vascular disease  7. idiopathic hypertrophic subaortic stenosis  8. TIA    Recommendations:  1. Stat CT Angiography  2. IR embolization for bleeding control  3. Will likely need colonoscopy during this hospitalization after acute bleeding addressed  4. Will need ICU level of care    Critically ill patient (hypotension, massive hematochezia) guarded prognosis.    Dictation: [id number]

## 2017-03-21 NOTE — ED NOTES
Med rec complete per S/O and med list at bedside  Allergies reviewed    Patient did not take his medications this morning

## 2017-03-21 NOTE — DIETARY
Nutrition: new admit today.  82 yo female admitted with lower GI bleed and hypotension. She is noted to have had poor po intake PTA without any associated weight change.  She has been started on a Cardiac diet.  BMI 28.01 and albumin 2.3 on admit.  Albumin likely decreased secondary to GI bleed.    Recommendations:  1) encourage intake of meals   2) document intake of all meals and supplements as % taken in ADL's to provide interdisciplinary communication across all shifts   3) monitor daily weights  4) consider ordering supplements with meals if intake is less than 50%

## 2017-03-21 NOTE — IP AVS SNAPSHOT
[employer] Access Code: Activation code not generated  Current [employer] Status: Active    [employer]  A secure, online tool to manage your health information     [employer]® is a secure, online tool that connects you to your personalized health information from the privacy of your home -- day or night - making it very easy for you to manage your healthcare. Once the activation process is completed, you can even access your medical information using the [employer] davidson, which is available for free in the [employer] store or Google Play store.     [employer] provides the following levels of access (as shown below):   My Chart Features   [facility] Primary Care Doctor [facility]  Specialists [facility]  Urgent  Care Non-[facility]  Primary Care  Doctor   Email your healthcare team securely and privately 24/7 X X X X   Manage appointments: schedule your next appointment; view details of past/upcoming appointments X      Request prescription refills. X      View recent personal medical records, including lab and immunizations X X X X   View health record, including health history, allergies, medications X X X X   Read reports about your outpatient visits, procedures, consult and ER notes X X X X   See your discharge summary, which is a recap of your hospital and/or ER visit that includes your diagnosis, lab results, and care plan. X X       How to register for [employer]:  1. Go to  [email or web address].  2. Click on the Sign Up Now box, which takes you to the New Member Sign Up page. You will need to provide the following information:  a. Enter your [employer] Access Code exactly as it appears at the top of this page. (You will not need to use this code after you’ve completed the sign-up process. If you do not sign up before the expiration date, you must request a new code.)   b. Enter your date of birth.   c. Enter your home email address.   d. Click Submit, and follow the next screen’s instructions.  3. Create a [employer] ID. This will  be your [employer] login ID and cannot be changed, so think of one that is secure and easy to remember.  4. Create a [employer] password. You can change your password at any time.  5. Enter your Password Reset Question and Answer. This can be used at a later time if you forget your password.   6. Enter your e-mail address. This allows you to receive e-mail notifications when new information is available in [employer].  7. Click Sign Up. You can now view your health information.    For assistance activating your [employer] account, call [phone]

## 2017-03-21 NOTE — DISCHARGE PLANNING
Received request from RN to speak with spouse. Spouse is Darlyn who is at bedside and is concerned about tearful about pt. Pt arrived with GI Bleed. Pt and spouse have two children but spouse does not have her cell phone. Located phone numbers. Daughter is Alondra Shoemaker. Contacted number listed, which is her work number ([phone]). Left message for her to return call to ER SW. Also located son, Lopez Shaikh who is anesthesiologist in [place], [place]. Work number ([phone]). Provided phone number for son to MD.    Awaiting calls from family for update.

## 2017-03-21 NOTE — ED NOTES
2 untis PRB's a 1 unit platelets reapidly infused, /51.  Pt A&O x4, central line in place.  Xray complete.

## 2017-03-21 NOTE — IP AVS SNAPSHOT
" <p align=\"LEFT\"><IMG SRC=\"//[email or web address]\" alt=\"Image\" WIDTH=\"50%\" HEIGHT=\"200\" BORDER=\"\"></p>                   Name:Rafia Shaikh  Medical Record Number:[id number]  CSN: [id number]    YOB: 1933   Age: 83 y.o.  Sex: male  HT:1.6 m (5' 3\") WT: 68.1 kg (150 lb 2.1 oz)          Admit Date: 3/21/2017     Discharge Date:   Today's Date: 3/25/2017  Attending Doctor:  Mercedez Nolasco M.D.                  Allergies:  Review of patient's allergies indicates no known allergies.          Your appointments     Apr 20, 2017  1:15 PM   FOLLOW UP with Marcelino Wu M.D.   [facility] (--)    [address] [place] [address] [address]  [facility] [id number] [phone]            May 02, 2017  2:20 PM   Established Patient with Yarelis Gtz M.D.   [facility] (--)    [address] [place] [address] [address]  [facility] [id number] [phone]           You will be receiving a confirmation call a few days before your appointment from our automated call confirmation system.                 Medication List      Take these Medications        Instructions    allopurinol 300 MG Tabs   Commonly known as:  ZYLOPRIM    TAKE 1 TABLET ORALLY DAILY       amlodipine 10 MG Tabs   Commonly known as:  NORVASC    TAKE 1 TAB BY MOUTH EVERY DAY.       atorvastatin 20 MG Tabs   Commonly known as:  LIPITOR    TAKE 1 TABLET ORALLY DAILY       CALTRATE PLUS PO    Take 1 Tab by mouth every day.   Dose:  1 Tab       CENTRUM SILVER Tabs    Take 1 Tab by mouth every day.   Dose:  1 Tab       cilostazol 100 MG Tabs   Commonly known as:  PLETAL    TAKE 1 TABLET ORALLY TWICE DAILY       clopidogrel 75 MG Tabs   Commonly known as:  PLAVIX    Doctor's comments:  Future refills from cardiology please.   TAKE 1 TABLET BY MOUTH EVERY DAY.       docosahexanoic acid 1000 MG Caps   Commonly known as:  OMEGA 3 FA    Take 1,000 mg by mouth every day.   Dose:  1000 mg       levothyroxine " 50 MCG Tabs   Commonly known as:  SYNTHROID    TAKE 1 TABLET ORALLY DAILY       metoprolol 100 MG Tabs   Commonly known as:  LOPRESSOR    TAKE 1 TABLET BY MOUTH TWICE A DAY       omeprazole 20 MG delayed-release capsule   Commonly known as:  PRILOSEC    Take 1 Cap by mouth every day.   Dose:  20 mg       spironolactone 25 MG Tabs   Commonly known as:  ALDACTONE    TAKE 1 TABLET BY MOUTH EVERY DAY       VITAMIN B COMPLEX PO    Take 1 Tab by mouth every day.   Dose:  1 Tab

## 2017-03-21 NOTE — ED NOTES
Lab called with critical result of Calcium 6.8 at 1235. Critical lab result read back.   Dr. Gansert notified of critical lab result.

## 2017-03-21 NOTE — IP AVS SNAPSHOT
" Home Care Instructions                                                                                                                  Name:Rafia Shaikh  Medical Record Number:[id number]  CSN: [id number]    YOB: 1933   Age: 83 y.o.  Sex: male  HT:1.6 m (5' 3\") WT: 68.1 kg (150 lb 2.1 oz)          Admit Date: 3/21/2017     Discharge Date:   Today's Date: 3/25/2017  Attending Doctor:  Mercedez Nolasco M.D.                  Allergies:  Review of patient's allergies indicates no known allergies.            Discharge Instructions       Discharge Instructions    Discharged to home by taxi with relative. Discharged via wheelchair, hospital escort: Yes.  Special equipment needed: Not Applicable    Be sure to schedule a follow-up appointment with your primary care doctor or any specialists as instructed.     Discharge Plan:   Diet Plan: Discussed  Activity Level: Discussed  Confirmed Follow up Appointment: Patient to Call and Schedule Appointment  Confirmed Symptoms Management: Discussed  Medication Reconciliation Updated: Yes  Influenza Vaccine Indication: Not indicated: Previously immunized this influenza season and > 8 years of age    I understand that a diet low in cholesterol, fat, and sodium is recommended for good health. Unless I have been given specific instructions below for another diet, I accept this instruction as my diet prescription.   Other diet: Regular    Special Instructions:   Coronary Artery Bypass Grafting  Coronary artery bypass grafting (CABG) is a procedure done to bypass or fix arteries of the heart (coronary arteries) that have become narrow or blocked. This narrowing is usually the result of plaque that has built up in the walls of the vessels. The coronary arteries supply the heart with the oxygen and nutrients it needs to pump blood to your body. In the CABG procedure, a section of blood vessel from another part of the body (usually the leg, arm, or chest wall) is removed and then " inserted where it will allow blood to bypass the damaged part of the coronary artery.   LET YOUR HEALTH CARE PROVIDER KNOW ABOUT:  · Any allergies you have.    · All medicines you are taking, including blood thinners, vitamins, herbs, eye drops, creams, and over-the-counter medicines.    · Use of steroids (by mouth or creams).    · Previous problems you or members of your family have had with the use of anesthetics.    · Any blood disorders you have.    · Previous surgeries you have had.    · Medical conditions you have.    RISKS AND COMPLICATIONS  Generally, this is a safe procedure. However, problems can occur and include:   · Blood loss.    · Stroke.    · Infection.    · Pain at the surgical site.    · Heart attack during or after surgery.    · Kidney failure.    BEFORE THE PROCEDURE  · Take medicines only as directed by your health care provider. You may be asked to start new medicines and stop taking others. Do not stop medicines or adjust dosages on your own.  · Do not eat or drink anything after midnight the night before the procedure or as directed by your health care provider. Ask your health care provider if it is okay to take a sip of water with any needed medicines.  PROCEDURE  The surgeon may use either an open technique or a minimally invasive technique for this surgery.  Traditional open surgery  · You will be given medicine to make you sleep through the procedure (general anesthetic).  · Once you are asleep, a cut (incision) will be made down the front of the chest through the breastbone (sternum). The sternum will be spread open so your heart can be seen.  · You will then be placed on a heart-lung bypass machine. This machine will provide oxygen to your blood while the heart is undergoing surgery.  · Your heart will then be temporarily stopped so that the surgeon can do the next steps.  · A section of vein will likely be taken from your leg and used to bypass the blocked arteries of your heart.  Sometimes parts of an artery from inside your chest wall or from your arm will be used, either alone or in combination with leg veins.  · When the bypasses are done, you will be taken off the machine.  · Your heart will be restarted and will take over again normally.  · The sac around the heart will be closed.  · Your chest will then be closed with stitches or staples.  · Tubes will remain in your chest and will be connected to a suction device in order to help drain fluid and reinflate the lungs.  Minimally invasive surgery  This technique is done from an incision over your left chest area. If appropriate, your surgeon may not have to slow or stop your heart. If your condition allows for this procedure, there will often be less blood loss, less pain, a shorter hospital stay, and faster recovery compared to traditional open surgery.  AFTER THE PROCEDURE  · You will be taken to a recovery area to be monitored.  · You may wake up with a tube in your throat to help your breathing. You may be connected to a breathing machine. You will not be able to talk while the tube is in place. The tube will be taken out as soon as it is safe to do so.  · You will be groggy and may have some pain. You will be given pain medicine to help control the pain.     This information is not intended to replace advice given to you by your health care provider. Make sure you discuss any questions you have with your health care provider.     Document Released: 09/27/2006 Document Revised: 01/08/2016 Document Reviewed: 05/27/2014  [employer] Interactive Patient Education ©2016 [employer]    Blood Transfusion   A blood transfusion is a procedure in which you receive donated blood through an IV tube. You may need a blood transfusion because of illness, surgery, or injury. The blood may come from a donor, or it may be your own blood that you donated previously.  The blood given in a transfusion is made up of different types of cells. You may  receive:  · Red blood cells. These carry oxygen and replace lost blood.  · Platelets. These control bleeding.  · Plasma. This helps blood to clot.  If you have hemophilia or another clotting disorder, you may also receive other types of blood products.  LET YOUR HEALTH CARE PROVIDER KNOW ABOUT:  · Any allergies you have.  · All medicines you are taking, including vitamins, herbs, eye drops, creams, and over-the-counter medicines.  · Previous problems you or members of your family have had with the use of anesthetics.  · Any blood disorders you have.  · Previous surgeries you have had.  · Any medical conditions you may have.  · Any previous reactions you have had during a blood transfusion.    RISKS AND COMPLICATIONS  Generally, this is a safe procedure. However, problems may occur, including:  · Having an allergic reaction to something in the donated blood.  · Fever. This may be a reaction to the white blood cells in the transfused blood.  · Iron overload. This can happen from having many transfusions.  · Transfusion-related acute lung injury (TRALI). This is a rare reaction that causes lung damage. The cause is not known. TRALI can occur within hours of a transfusion or several days later.  · Sudden (acute) or delayed hemolytic reactions. This happens if your blood does not match the cells in your transfusion. Your body's defense system (immune system) may try to attack the new cells. This complication is rare.  · Infection. This is rare.  BEFORE THE PROCEDURE  · You may have a blood test to determine your blood type. This is necessary to know what kind of blood your body will accept.  · If you are going to have a planned surgery, you may donate your own blood. This may be done in case you need to have a transfusion.  · If you have had an allergic reaction to a transfusion in the past, you may be given medicine to help prevent a reaction. Take this medicine only as directed by your health care provider.  · You will  have your temperature, blood pressure, and pulse monitored before the transfusion.  PROCEDURE   · An IV will be started in your hand or arm.  · The bag of donated blood will be attached to your IV tube and given into your vein.  · Your temperature, blood pressure, and pulse will be monitored regularly during the transfusion. This monitoring is done to detect early signs of a transfusion reaction.  · If you have any signs or symptoms of a reaction, your transfusion will be stopped and you may be given medicine.  · When the transfusion is over, your IV will be removed.  · Pressure may be applied to the IV site for a few minutes.  · A bandage (dressing) will be applied.  The procedure may vary among health care providers and hospitals.  AFTER THE PROCEDURE  · Your blood pressure, temperature, and pulse will be monitored regularly.     This information is not intended to replace advice given to you by your health care provider. Make sure you discuss any questions you have with your health care provider.     Document Released: 12/15/2001 Document Revised: 01/08/2016 Document Reviewed: 10/28/2015  [employer] Interactive Patient Education ©2016 [employer]                                                                                               · Is patient discharged on Warfarin / Coumadin?   No     · Is patient Post Blood Transfusion?  · Yes    Depression / Suicide Risk    As you are discharged from this [facility] facility, it is important to learn how to keep safe from harming yourself.    Recognize the warning signs:  · Abrupt changes in personality, positive or negative- including increase in energy   · Giving away possessions  · Change in eating patterns- significant weight changes-  positive or negative  · Change in sleeping patterns- unable to sleep or sleeping all the time   · Unwillingness or inability to communicate  · Depression  · Unusual sadness, discouragement and loneliness  · Talk of wanting to  die  · Neglect of personal appearance   · Rebelliousness- reckless behavior  · Withdrawal from people/activities they love  · Confusion- inability to concentrate     If you or a loved one observes any of these behaviors or has concerns about self-harm, here's what you can do:  · Talk about it- your feelings and reasons for harming yourself  · Remove any means that you might use to hurt yourself (examples: pills, rope, extension cords, firearm)  · Get professional help from the community (Mental Health, Substance Abuse, psychological counseling)  · Do not be alone:Call your Safe Contact- someone whom you trust who will be there for you.  · Call your local CRISIS HOTLINE 391-7599 or 328-938-3782  · Call your local Children's Mobile Crisis Response Team [place] (107) 711-5945 or www.[employer]  · Call the toll free National Suicide Prevention Hotlines   · National Suicide Prevention Lifeline 976-779-LVZW (7685)  · [employer] Line Network 800-SUICIDE (515-3387)        Your appointments     Apr 20, 2017  1:15 PM   FOLLOW UP with Marcelino Wu M.D.   [facility]--[facility]    [address] [place] [address] [address]  [facility] [id number] [phone]            May 02, 2017  2:20 PM   Established Patient with [facility] (--)    [address] [place] [address]  [address]  [facility] [id number] [phone]           You will be receiving a confirmation call a few days before your appointment from our automated call confirmation system.                 Discharge Medication Instructions:    Below are the medications your physician expects you to take upon discharge:    Review all your home medications and newly ordered medications with your doctor and/or pharmacist. Follow medication instructions as directed by your doctor and/or pharmacist.    Please keep your medication list with you and share with your physician.                Medication List      START taking these medications        Instructions    omeprazole 20 MG delayed-release capsule   Commonly known as:  PRILOSEC    Take 1 Cap by mouth every day.   Dose:  20 mg         CONTINUE taking these medications        Instructions    allopurinol 300 MG Tabs   Commonly known as:  ZYLOPRIM    TAKE 1 TABLET ORALLY DAILY       amlodipine 10 MG Tabs   Commonly known as:  NORVASC    TAKE 1 TAB BY MOUTH EVERY DAY.       atorvastatin 20 MG Tabs   Last time this was given:  20 mg on 3/24/2017  9:16 PM   Commonly known as:  LIPITOR    TAKE 1 TABLET ORALLY DAILY       CALTRATE PLUS PO    Take 1 Tab by mouth every day.   Dose:  1 Tab       CENTRUM SILVER Tabs    Take 1 Tab by mouth every day.   Dose:  1 Tab       cilostazol 100 MG Tabs   Commonly known as:  PLETAL    TAKE 1 TABLET ORALLY TWICE DAILY       clopidogrel 75 MG Tabs   Commonly known as:  PLAVIX    Doctor's comments:  Future refills from cardiology please.   TAKE 1 TABLET BY MOUTH EVERY DAY.       docosahexanoic acid 1000 MG Caps   Commonly known as:  OMEGA 3 FA    Take 1,000 mg by mouth every day.   Dose:  1000 mg       levothyroxine 50 MCG Tabs   Last time this was given:  50 mcg on 3/25/2017  7:17 AM   Commonly known as:  SYNTHROID    TAKE 1 TABLET ORALLY DAILY       metoprolol 100 MG Tabs   Last time this was given:  50 mg on 3/25/2017  8:06 AM   Commonly known as:  LOPRESSOR    TAKE 1 TABLET BY MOUTH TWICE A DAY       spironolactone 25 MG Tabs   Commonly known as:  ALDACTONE    TAKE 1 TABLET BY MOUTH EVERY DAY       VITAMIN B COMPLEX PO    Take 1 Tab by mouth every day.   Dose:  1 Tab         STOP taking these medications     aspirin 81 MG tablet               Instructions           Diet / Nutrition:    Follow any diet instructions given to you by your doctor or the dietician, including how much salt (sodium) you are allowed each day.    If you are overweight, talk to your doctor about a weight reduction plan.    Activity:    Remain  physically active following your doctor's instructions about exercise and activity.    Rest often.     Any time you become even a little tired or short of breath, SIT DOWN and rest.    Worsening Symptoms:    Report any of the following signs and symptoms to the doctor's office immediately:    *Pain of jaw, arm, or neck  *Chest pain not relieved by medication                               *Dizziness or loss of consciousness  *Difficulty breathing even when at rest   *More tired than usual                                       *Bleeding drainage or swelling of surgical site  *Swelling of feet, ankles, legs or stomach                 *Fever (>100ºF)  *Pink or blood tinged sputum  *Weight gain (3lbs/day or 5lbs /week)           *Shock from internal defibrillator (if applicable)  *Palpitations or irregular heartbeats                *Cool and/or numb extremities    Stroke Awareness    Common Risk Factors for Stroke include:    Age  Atrial Fibrillation  Carotid Artery Stenosis  Diabetes Mellitus  Excessive alcohol consumption  High blood pressure  Overweight   Physical inactivity  Smoking    Warning signs and symptoms of a stroke include:    *Sudden numbness or weakness of the face, arm or leg (especially on one side of the body).  *Sudden confusion, trouble speaking or understanding.  *Sudden trouble seeing in one or both eyes.  *Sudden trouble walking, dizziness, loss of balance or coordination.Sudden severe headache with no known cause.    It is very important to get treatment quickly when a stroke occurs. If you experience any of the above warning signs, call 911 immediately.                   Disclaimer         Quit Smoking / Tobacco Use:    I understand the use of any tobacco products increases my chance of suffering from future heart disease or stroke and could cause other illnesses which may shorten my life. Quitting the use of tobacco products is the single most important thing I can do to improve my health. For  further information on smoking / tobacco cessation call a Toll Free Quit Line at 1-993.618.1431 (*National [employer]) or 1-516.821.1610 (American Lung Association) or you can access the web based program at www.lungusa.org.    [place] Tobacco Users Help Line:  (816) 883-9372       Toll Free: 1-658.917.5064  Quit Tobacco Program [facility] (119)718-2008    Crisis Hotline:    [place] Crisis Hotline:  8-273-WUUZOGR or 1-802.347.5106    [place] Crisis Hotline:    1-388.431.9965 or 851-453-5617    Discharge Survey:   Thank you for choosing [facility]. We hope we did everything we could to make your hospital stay a pleasant one. You may be receiving a phone survey and we would appreciate your time and participation in answering the questions. Your input is very valuable to us in our efforts to improve our service to our patients and their families.        My signature on this form indicates that:    1. I have reviewed and understand the above information.  2. My questions regarding this information have been answered to my satisfaction.  3. I have formulated a plan with my discharge nurse to obtain my prescribed medications for home.                  Disclaimer         __________________________________                     __________       ________                       Patient Signature                                                 Date                    Time

## 2017-03-21 NOTE — PROGRESS NOTES
IR Progress Note:    Visceral angiogram with coil and gel foam embolization of SMA by Dr. Hart. Pt arrived from ED hypotensive with active bright red rectal bleeding after blood administration in the ED. Administration of NS - 1500, 1 unit of packed red cells, and 1 unit platelets during procedure. No sedation secondary to hypotension. Arterial pressure monitoring through Right femoral access sheath. Pt's BP improving throughout procedure. Levophed ordered not initiated.Pt transport with two nurses to S123. Report to RANDY Abrams.[employer]     Interlock 2D, Fibered IDC Occlusion System, 2 mm X 4 cm, REF # [id number], LOT # [id number]  Interlock 2D, Fibered IDC Occlusion System, 2 mm X 4 cm, REF # [id number], LOT # [id number]

## 2017-03-21 NOTE — ED NOTES
Pt BIB active bleeding, erp at bedside, bp in the 50's/30.  Rapid infuser with uncross matched PRBC at bedside

## 2017-03-22 ENCOUNTER — APPOINTMENT (OUTPATIENT)
Dept: RADIOLOGY | Facility: MEDICAL CENTER | Age: 82
DRG: 356 | End: 2017-03-22
Attending: RADIOLOGY
Payer: MEDICARE

## 2017-03-22 ENCOUNTER — APPOINTMENT (OUTPATIENT)
Dept: RADIOLOGY | Facility: MEDICAL CENTER | Age: 82
DRG: 356 | End: 2017-03-22
Attending: HOSPITALIST
Payer: MEDICARE

## 2017-03-22 LAB
ALBUMIN SERPL BCP-MCNC: 3.1 G/DL (ref 3.2–4.9)
ALBUMIN/GLOB SERPL: 1.7 G/DL
ALP SERPL-CCNC: 42 U/L (ref 30–99)
ALT SERPL-CCNC: 22 U/L (ref 2–50)
ANION GAP SERPL CALC-SCNC: 6 MMOL/L (ref 0–11.9)
AST SERPL-CCNC: 24 U/L (ref 12–45)
BASOPHILS # BLD AUTO: 0.3 % (ref 0–1.8)
BASOPHILS # BLD: 0.03 K/UL (ref 0–0.12)
BILIRUB SERPL-MCNC: 1.4 MG/DL (ref 0.1–1.5)
BUN SERPL-MCNC: 11 MG/DL (ref 8–22)
CALCIUM SERPL-MCNC: 7.9 MG/DL (ref 8.5–10.5)
CHLORIDE SERPL-SCNC: 113 MMOL/L (ref 96–112)
CO2 SERPL-SCNC: 20 MMOL/L (ref 20–33)
CREAT SERPL-MCNC: 0.9 MG/DL (ref 0.5–1.4)
EOSINOPHIL # BLD AUTO: 0.15 K/UL (ref 0–0.51)
EOSINOPHIL NFR BLD: 1.5 % (ref 0–6.9)
ERYTHROCYTE [DISTWIDTH] IN BLOOD BY AUTOMATED COUNT: 55.5 FL (ref 35.9–50)
GFR SERPL CREATININE-BSD FRML MDRD: >60 ML/MIN/1.73 M 2
GLOBULIN SER CALC-MCNC: 1.8 G/DL (ref 1.9–3.5)
GLUCOSE BLD-MCNC: 102 MG/DL (ref 65–99)
GLUCOSE BLD-MCNC: 112 MG/DL (ref 65–99)
GLUCOSE SERPL-MCNC: 110 MG/DL (ref 65–99)
HCT VFR BLD AUTO: 24.3 % (ref 42–52)
HGB BLD-MCNC: 8.2 G/DL (ref 14–18)
HGB BLD-MCNC: 8.4 G/DL (ref 14–18)
HGB BLD-MCNC: 8.7 G/DL (ref 14–18)
IMM GRANULOCYTES # BLD AUTO: 0.2 K/UL (ref 0–0.11)
IMM GRANULOCYTES NFR BLD AUTO: 2 % (ref 0–0.9)
LYMPHOCYTES # BLD AUTO: 1.96 K/UL (ref 1–4.8)
LYMPHOCYTES NFR BLD: 19.7 % (ref 22–41)
MCH RBC QN AUTO: 30.8 PG (ref 27–33)
MCHC RBC AUTO-ENTMCNC: 33.7 G/DL (ref 33.7–35.3)
MCV RBC AUTO: 91.4 FL (ref 81.4–97.8)
MONOCYTES # BLD AUTO: 1.15 K/UL (ref 0–0.85)
MONOCYTES NFR BLD AUTO: 11.5 % (ref 0–13.4)
NEUTROPHILS # BLD AUTO: 6.48 K/UL (ref 1.82–7.42)
NEUTROPHILS NFR BLD: 65 % (ref 44–72)
NRBC # BLD AUTO: 0 K/UL
NRBC BLD AUTO-RTO: 0 /100 WBC
PLATELET # BLD AUTO: 233 K/UL (ref 164–446)
PMV BLD AUTO: 9.7 FL (ref 9–12.9)
POTASSIUM SERPL-SCNC: 3.6 MMOL/L (ref 3.6–5.5)
PROT SERPL-MCNC: 4.9 G/DL (ref 6–8.2)
RBC # BLD AUTO: 2.66 M/UL (ref 4.7–6.1)
SODIUM SERPL-SCNC: 139 MMOL/L (ref 135–145)
WBC # BLD AUTO: 10 K/UL (ref 4.8–10.8)

## 2017-03-22 PROCEDURE — 700111 HCHG RX REV CODE 636 W/ 250 OVERRIDE (IP): Performed by: HOSPITALIST

## 2017-03-22 PROCEDURE — 85018 HEMOGLOBIN: CPT | Mod: 91

## 2017-03-22 PROCEDURE — 99233 SBSQ HOSP IP/OBS HIGH 50: CPT | Performed by: HOSPITALIST

## 2017-03-22 PROCEDURE — 700111 HCHG RX REV CODE 636 W/ 250 OVERRIDE (IP): Performed by: INTERNAL MEDICINE

## 2017-03-22 PROCEDURE — 85025 COMPLETE CBC W/AUTO DIFF WBC: CPT

## 2017-03-22 PROCEDURE — 93926 LOWER EXTREMITY STUDY: CPT | Mod: 26 | Performed by: SURGERY

## 2017-03-22 PROCEDURE — 82962 GLUCOSE BLOOD TEST: CPT | Mod: 91

## 2017-03-22 PROCEDURE — 306565 RIGID MIT RESTRAINT(PAIR): Performed by: HOSPITALIST

## 2017-03-22 PROCEDURE — A9270 NON-COVERED ITEM OR SERVICE: HCPCS | Performed by: HOSPITALIST

## 2017-03-22 PROCEDURE — C9113 INJ PANTOPRAZOLE SODIUM, VIA: HCPCS | Performed by: HOSPITALIST

## 2017-03-22 PROCEDURE — 700102 HCHG RX REV CODE 250 W/ 637 OVERRIDE(OP): Performed by: HOSPITALIST

## 2017-03-22 PROCEDURE — 70450 CT HEAD/BRAIN W/O DYE: CPT

## 2017-03-22 PROCEDURE — 75820 VEIN X-RAY ARM/LEG: CPT

## 2017-03-22 PROCEDURE — 700105 HCHG RX REV CODE 258: Performed by: HOSPITALIST

## 2017-03-22 PROCEDURE — 04UK3JZ SUPPLEMENT RIGHT FEMORAL ARTERY WITH SYNTHETIC SUBSTITUTE, PERCUTANEOUS APPROACH: ICD-10-PCS | Performed by: RADIOLOGY

## 2017-03-22 PROCEDURE — C1760 CLOSURE DEV, VASC: HCPCS

## 2017-03-22 PROCEDURE — 80053 COMPREHEN METABOLIC PANEL: CPT

## 2017-03-22 PROCEDURE — 770022 HCHG ROOM/CARE - ICU (200)

## 2017-03-22 PROCEDURE — 93926 LOWER EXTREMITY STUDY: CPT

## 2017-03-22 PROCEDURE — B41F1ZZ FLUOROSCOPY OF RIGHT LOWER EXTREMITY ARTERIES USING LOW OSMOLAR CONTRAST: ICD-10-PCS | Performed by: RADIOLOGY

## 2017-03-22 RX ORDER — HALOPERIDOL 5 MG/ML
5 INJECTION INTRAMUSCULAR ONCE
Status: COMPLETED | OUTPATIENT
Start: 2017-03-22 | End: 2017-03-22

## 2017-03-22 RX ORDER — HALOPERIDOL 5 MG/ML
2 INJECTION INTRAMUSCULAR
Status: DISCONTINUED | OUTPATIENT
Start: 2017-03-22 | End: 2017-03-25 | Stop reason: HOSPADM

## 2017-03-22 RX ORDER — SODIUM CHLORIDE 9 MG/ML
500 INJECTION, SOLUTION INTRAVENOUS PRN
Status: DISCONTINUED | OUTPATIENT
Start: 2017-03-22 | End: 2017-03-25 | Stop reason: HOSPADM

## 2017-03-22 RX ORDER — MAGNESIUM SULFATE HEPTAHYDRATE 40 MG/ML
4 INJECTION, SOLUTION INTRAVENOUS ONCE
Status: COMPLETED | OUTPATIENT
Start: 2017-03-22 | End: 2017-03-22

## 2017-03-22 RX ADMIN — HALOPERIDOL LACTATE 2 MG: 5 INJECTION, SOLUTION INTRAMUSCULAR at 17:31

## 2017-03-22 RX ADMIN — LORAZEPAM 0.5 MG: 2 INJECTION INTRAMUSCULAR; INTRAVENOUS at 02:53

## 2017-03-22 RX ADMIN — SODIUM CHLORIDE 8 MG/HR: 9 INJECTION, SOLUTION INTRAVENOUS at 07:17

## 2017-03-22 RX ADMIN — SODIUM CHLORIDE: 9 INJECTION, SOLUTION INTRAVENOUS at 11:58

## 2017-03-22 RX ADMIN — LORAZEPAM 0.5 MG: 2 INJECTION INTRAMUSCULAR; INTRAVENOUS at 00:00

## 2017-03-22 RX ADMIN — HALOPERIDOL LACTATE 5 MG: 5 INJECTION, SOLUTION INTRAMUSCULAR at 19:25

## 2017-03-22 RX ADMIN — MAGNESIUM SULFATE IN WATER 4 G: 40 INJECTION, SOLUTION INTRAVENOUS at 11:54

## 2017-03-22 RX ADMIN — SODIUM CHLORIDE 8 MG/HR: 9 INJECTION, SOLUTION INTRAVENOUS at 17:30

## 2017-03-22 RX ADMIN — METOPROLOL TARTRATE 100 MG: 100 TABLET, FILM COATED ORAL at 11:54

## 2017-03-22 ASSESSMENT — LIFESTYLE VARIABLES: EVER_SMOKED: YES

## 2017-03-22 ASSESSMENT — PAIN SCALES - GENERAL
PAINLEVEL_OUTOF10: 0
PAINLEVEL_OUTOF10: 0

## 2017-03-22 NOTE — OR SURGEON
Immediate Post- Operative Note        PostOp Diagnosis: atherosclerosis, confusion      Procedure(s): R CFA arteriogram through sheath, starclose deployment      Estimated Blood Loss: Less than 5 ml        Complications: None            3/22/2017     3:13 PM     Mp [facility]
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
## 2017-03-22 NOTE — PROGRESS NOTES
Late Entry:    1415 Pt brought from IR to room S-123, attached to ICU monitors, hypotensive, labs drawn and levophed started.  Dr. Nolasco made aware pt arrived.  Discussed 2 mesfin red BMs, blood products ordered.     1700 1 unit PRBC, 1 unit platelets given to pt. Wife at bedside and son in [place] updated on POC.  VSS, remains on minimal levophed support.

## 2017-03-22 NOTE — CONSULTS
DATE OF CONSULTATION: 3/22/2017    REASON FOR CONSULTATION:  Left ureteral stone.     HISTORY OF PRESENT ILLNESS: 83 y.o gentleman presented with hematochezia.  CT angiogram was done that shows a 1.1cm proximal left ureteral stone with moderate pelviectasis.   Pt underwent embolization today.  Pt is very confused and history is obtained from his wife.  Pt had gross hematuria 2 days ago, per his wife.  No history of stones.  No urinary infections.       PAST MEDICAL HISTORY: Based on review of charts, moderate aortic stenosis,    paroxysmal atrial fibrillation, hypotension, history of hypertension, coronary   artery disease, peripheral vascular disease, idiopathic hypertrophic    subaortic stenosis, and TIA.      MEDICATIONS: Reviewed.     ALLERGIES: No known drug allergies.    FAMILY HISTORY: not obtainable    SOCIAL HISTORY: not obtainable    REVIEW OF SYSTEMS: unable to perform ROS     PHYSICAL EXAMINATION:   GENERAL:  Well developed male  HEAD:  Normocephalic, atraumatic  NECK: Supple  ABDOMEN: Soft non tender, no distention  NEUROLOGIC: Alert, but confused.    LABORATORY DATA: His recent labs were reviewed.     Imaging:   3/21/2017 12:20 PM    HISTORY/REASON FOR EXAM:  Active bleeding. Evaluate for GI bleed.      TECHNIQUE/EXAM DESCRIPTION:  CT angiogram of the abdomen and pelvis without and with contrast, with reconstructions.    Initial precontrast helical sections were obtained from the diaphragmatic domes to the lesser trochanters. Following this, 100 mL of Omnipaque 350 nonionic contrast was administered at 5.0 mL/sec and helical scanning was obtained from the diaphragmatic   domes through the lesser trochanters. Thin section overlapping reconstruction interval was utilized and multiplanar volume reformatted were generated in the sagittal and coronal planes.    3D angiographic images were reviewed on PACS. Maximum intensity projection (MIP) images were generated and reviewed.    Low dose optimization  technique was utilized for this CT exam including automated exposure control and adjustment of the mA and/or kV according to patient size.    COMPARISON:  None.    FINDINGS:    Aorta and vasculature:  Noncontrast images: There is no intramural hematoma.  Contrast images:  There is acute bleed with layering of IV contrast in the cecum (image 289 series 3)    Moderate atherosclerotic disease of the abdominal aorta and its branches.    Moderate atherosclerotic disease of the celiac trunk, SMA and ROCKY. Moderate atherosclerotic disease of the bilateral renal arteries and bilateral iliac arteries.    Lung bases:    Minimal bibasilar atelectasis.    Abdomen:    Liver: Unremarkable. There are several right hepatic cysts.    Spleen: Unremarkable.    Pancreas: Unremarkable.    Gallbladder: No stones. No cholecystitis.    Adrenal glands: normal in size.    Kidneys: There is a 1.1 cm obstructing stone in the proximal left ureter with moderate pelviectasis.    There is no lymphadenopathy.    There are multiple sigmoid diverticula. Large amount of fluid in the rectum.    No obvious abnormality seen in the pelvic structures but evaluation limited on CT.    No lymph node enlargement, free fluid, or free air in the abdomen or pelvis.    No aggressive osseous lesion.         Impression          1. Acute GI bleed with layering IV contrast in the cecum. The area is supplied by a ileocecal branch of the SMA.    2. A 1.1 cm obstructing ureteral calculus in the proximal left ureter with moderate upstream pelviectasis.      CRITICAL RESULT : Preliminary findings discussed with Dr. JOESPH BINGHAM on 3/21/2017 12:44 PM         Reading Provider Reading Date     Montana Scherer M.D. Mar 21, 2017         Signing Provider Signing Date Signing Time     Montana Scherer M.D. Mar 21, 2017 12:48 PM       [facility]   LUZ IYER  MRN: [id number]  : 1933, Sex: M  Adm: 3/21/2017, D/C:        IMPRESSION:   1.  Significant hematochezia,  likely from cecal bleeding as being seen on CT    Angio, s/p embolization by IR on 3/21/2017  2. Moderate aortic stenosis with mean gradient of 20s in 2014,    3. paroxysmal atrial fibrillation    4. 1.1 cm asymptomatic proximal left ureteral stone with moderate pelviectasis.    PLAN:  1.  Nothing emergent for urology to do at this time.  We will work on getting him scheduled for cysto left stent placement Thursday or Friday.  He will need stone treatment at a later date when he is stable.    Dr Coates is aware of this consult and has directed the POC.

## 2017-03-22 NOTE — CONSULTS
DATE OF SERVICE:  03/21/2017    REFERRING PHYSICIAN:  Gary Gansert, MD    REASON FOR CONSULTATION:  Hematochezia.    HISTORY OF PRESENT ILLNESS:  This is an 83-year-old gentleman with past   medical history significant for moderate aortic stenosis, paroxysmal atrial   fibrillation on chronic Plavix, who is being seen today for significant   hematochezia.  Patient's history was obtained mainly from discussion with   wife.  Patient reports no issues this morning on waking, was making some   breakfast and then had a sudden urgency to go the bathroom with significant   release of bloody stool per rectum.  Patient was thus brought to the emergency   room and upon arrival into the emergency room, was noted to have a blood   pressure of 55/38 with significant amount of bright red blood per rectum.  As   such, GI was consulted.  Patient denies having had a colonoscopy, never had   any aortic stent placement.  No hematemesis, or hemoptysis.  No melena.  Wife   does not report of any constipation issues.    Patient was rapidly infused with 2 units of packed RBC with placement of   central line.    PAST MEDICAL HISTORY:  Based on review of charts, moderate aortic stenosis,   paroxysmal atrial fibrillation, hypotension, history of hypertension, coronary   artery disease, peripheral vascular disease, idiopathic hypertrophic   subaortic stenosis, and TIA.    PAST SURGICAL HISTORY:  The patient reports history of coronary artery disease   and CABG.  No other abdominal surgery per patient.    SOCIAL HISTORY:  At this point noncontributory.    OUTPATIENT MEDICATIONS:  Reviewed.    PHYSICAL EXAMINATION:  VITAL SIGNS:  Blood pressure now 110/31, pulse is 67, pulse ox is 98 on 4   liters, and temperature is 36.9.  GENERAL:  Physical examination shows semi-lethargic male, 83-year-old   appropriate for stated age, lying in bed with blood per rectum.  HEENT:  Pupils equal, round, and reactive to light.  No scleral icterus.  NECK:  Supple.   No lymphadenopathy.  CARDIOVASCULAR:  Systolic murmur left lower quadrant.  Regular rate.  ABDOMEN:  Soft, nontender, and nondistended.  Positive bowel sounds.  RECTAL EXAMINATION:  Deferred as patient has significant blood per rectum   already.  EXTREMITIES:  Strength is 4/5 bilateral upper and lower.  No significant   edema.    IMAGING:  CT angio report pending.    Preliminary report shows acute gastrointestinal bleed with layering IV   contrast in the cecum.  Areas supplied by ileocecal branch of the SMA.    IMPRESSION:  1.  Significant hematochezia, likely from cecal bleeding as being seen on CT   angio.  2.  Moderate aortic stenosis with mean gradient of 20s in 2014, paroxysmal   atrial fibrillation, hypotension, history of hypertension, coronary artery   disease, peripheral vascular disease, idiopathic hypertrophic aortic stenosis,   and transient ischemic attack.    An 83-year-old gentleman with significant hematochezia with hypotension on   arrival, requiring rapid blood transfusion.  CT angio positive for cecal   bleeding, likely diverticular bleed versus other cause.  At this point, I   recommended:  1.  Stat CT angiogram and t IR embolization for bleeding control.  1.  ICU level care.  2.  He will likely need colonoscopy during this hospitalization after acute   bleeding was addressed.       ____________________________________     Pradip North Conklin M.D.    ALEXANDRE GOOD    DD:  03/21/2017 12:54:27  DT:  03/21/2017 17:46:12    D#:  [id number]  Job#:  [id number]

## 2017-03-22 NOTE — PROGRESS NOTES
Gastroenterology Progress Note     Author: Pradip Conklin   Date & Time Created: 3/22/2017 9:34 AM    Interval History:  3/22/2017: no issues overnight in the ICU. No further blood per rectum. Agitation, confusion, pulling at lines. H/H stable.    Review of Systems:  Review of Systems   Unable to perform ROS: mental acuity       Physical Exam:  Physical Exam   Constitutional: He appears well-developed and well-nourished.   HENT:   Head: Normocephalic and atraumatic.   Eyes: Conjunctivae are normal. Pupils are equal, round, and reactive to light.   Neck: Normal range of motion. Neck supple. No JVD present. No tracheal deviation present. No thyromegaly present.   Cardiovascular: Exam reveals no gallop and no friction rub.    No murmur heard.  Pulmonary/Chest: No respiratory distress. He has no wheezes. He has no rales. He exhibits no tenderness.   Abdominal: He exhibits no distension and no mass. There is no tenderness. There is no rebound and no guarding.   Musculoskeletal: He exhibits no tenderness.   Lymphadenopathy:     He has no cervical adenopathy.   Neurological: He is alert.   Skin: Skin is warm and dry.       Labs:        Invalid input(s): [id number]  Recent Labs      03/21/17   1146   TROPONINI  0.05*     Recent Labs      03/21/17   1146  03/22/17   0452   SODIUM  136  139   POTASSIUM  3.9  3.6   CHLORIDE  114*  113*   CO2  17*  20   BUN  20  11   CREATININE  1.29  0.90   MAGNESIUM  1.5   --    CALCIUM  6.8*  7.9*     Recent Labs      03/21/17   1146  03/22/17   0452   ALTSGPT  20  22   ASTSGOT  17  24   ALKPHOSPHAT  38  42   TBILIRUBIN  0.7  1.4   LIPASE  22   --    GLUCOSE  166*  110*     Recent Labs      03/21/17   1146  03/21/17   1437  03/21/17   2107  03/22/17   0452   RBC  3.09*   --    --   2.66*   HEMOGLOBIN  9.9*  7.8*  8.3*  8.2*   HEMATOCRIT  30.3*   --    --   24.3*   PLATELETCT  127*   --    --   233   PROTHROMBTM  18.5*   --    --    --    INR  1.49*   --    --    --      Recent Labs       17   1146  17   0452   WBC  11.4*  10.0   NEUTSPOLYS  74.80*  65.00   LYMPHOCYTES  16.50*  19.70*   MONOCYTES  6.90  11.50   EOSINOPHILS  0.00  1.50   BASOPHILS  0.90  0.30   ASTSGOT  17  24   ALTSGPT  20  22   ALKPHOSPHAT  38  42   TBILIRUBIN  0.7  1.4     Hemodynamics:  Temp (24hrs), Av.5 °C (97.7 °F), Min:35.9 °C (96.7 °F), Max:36.9 °C (98.4 °F)  Temperature: 36.9 °C (98.4 °F)  Pulse  Av.9  Min: 50  Max: 94Heart Rate (Monitored): 73  Blood Pressure : 112/42 mmHg, Arterial BP: 145/58 mmHg, NIBP: 116/58 mmHg  CVP (mm Hg): (!) 291 MM HG  Respiratory:    Respiration: (!) 22, Pulse Oximetry: 97 %, O2 Daily Delivery Respiratory : Silicone Nasal Cannula     Work Of Breathing / Effort: Mild  RUL Breath Sounds: Clear, RML Breath Sounds: Clear, RLL Breath Sounds: Clear, WHIT Breath Sounds: Clear, LLL Breath Sounds: Clear  Fluids:    Intake/Output Summary (Last 24 hours) at 17 0934  Last data filed at 17 0600   Gross per 24 hour   Intake 4970.34 ml   Output   1300 ml   Net 3670.34 ml     Weight: 73.3 kg (161 lb 9.6 oz)  GI/Nutrition:  Orders Placed This Encounter   Procedures   • Diet Order     Standing Status: Standing      Number of Occurrences: 1      Standing Expiration Date:      Order Specific Question:  Diet:     Answer:  Cardiac [6]     Medical Decision Making, by Problem:  Active Hospital Problems    Diagnosis   • PAF (paroxysmal atrial fibrillation) (CMS-HCC) [I48.0]   • Lower GI bleed [K92.2]   • CAD (coronary artery disease) of artery bypass graft [I25.810]   • Moderate aortic stenosis [I35.0]   • Hypotension [I95.9]     IMPRESSION:  1.  Significant hematochezia, likely from cecal bleeding as being seen on CT    Angio, s/p embolization by IR on 3/21/2017  2.  Moderate aortic stenosis with mean gradient of 20s in ,   3. paroxysmal atrial fibrillation  4. history of hypertension  5. coronary artery disease  6. peripheral vascular disease  7. idiopathic hypertrophic aortic  stenosis,  8. transient ischemic attack.      Plan:  1. Continue supportive care as per team  2. Can discontinue ceftriaxone given no liver disease (cirrhosis) per CT scan  3. Would benefit from colonoscopy once mental acuity improved to tolerate prep  4. Dr. Osmany Smart to assume care in AM.    Core Measures

## 2017-03-22 NOTE — PROGRESS NOTES
IR Note: Dr Amaya completed angiogram with removal of right femoral sheath and insertion of starclose closure device.  Manual pressure held for 15 minutes, no bleeding seen.  Patient taken back to S123 and bedside report given to RANDY Kee.  Patient repeatedly reminded to lay flat, patient needs frequent reminding.  Report to bedside RN to monitor access site for signs of bleeding and hematoma formation.      [employer] [address] [place] [id number]  LOT [id number]

## 2017-03-22 NOTE — DISCHARGE PLANNING
Medical Social Work    This  received a call from RANDY Carlos who states that pt's family member is being asked to leave [facility] but needs a cab voucher to do so.  This  wrote cab voucher (#[phone]) and tubed it to tube station #[phone] so that pt's family member can leave [facility] safely.

## 2017-03-22 NOTE — H&P
CHIEF COMPLAINT:  Acute lower gastrointestinal bleeding x1 day.    HISTORY OF PRESENT ILLNESS:  The patient is a pleasant 83-year-old male with   past medical history of coronary arterial disease, history of 3-vessel   coronary bypass in 2008, history of peripheral artery disease with history of   left leg stents, follows with Dr. Parra as an outpatient, hyperlipidemia,   hypertension, essentially presents to the ER after he had significant bright   red blood per rectum.  He says that he has had no discomfort in his abdomen,   no pain upon defecation, has not had any bloody bowel movements in the past or   any dark tarry stools.  Hence, on admission, patient's hemoglobin was noted   to be at 9.9.  At this point, Dr. Conklin, GI was consulted.  In addition, an   emergent CT angio of his abdomen was done, which showed an acute GI bleed in   the area of the ileocecal branch of the SMA, status post embolization per IR.    At this time, patient will be admitted to the ICU for higher level of care.    REVIEW OF SYSTEMS:  Please see HPI, otherwise all review of systems are   negative per AMA/CMS criteria.    ALLERGIES:  No known drug allergies.    PAST MEDICAL HISTORY:  1.  History of coronary artery disease status post 3-vessel bypass in 2008.  2.  Peripheral artery disease, history of leg stent placed, Dr. Parra.  3.  Hypertension.  4.  Hyperlipidemia.  5.  History of paroxysmal atrial fibrillation, not on any anticoagulation at   this time.    SOCIAL HISTORY:  Denies using tobacco, alcohol, or illicit drugs.  The patient   was a ____ smoker with a 40-year pack history.    FAMILY HISTORY:  Brother had colon cancer and sister had breast cancer.    HOME MEDICATIONS:  Include allopurinol 300 mg daily, amlodipine 10 mg daily,   aspirin 81 mg daily, atorvastatin 20 mg daily, B vitamin complex, Pletal 100   mg twice a day, Plavix 75 mg daily, omega-3 fatty acids, Synthroid 50 mcg   daily, metoprolol 100 mg twice a day,  multivitamins, and Aldactone 25 mg   daily.    PHYSICAL EXAMINATION:  VITAL SIGNS:  Temperature 98.4, pulse 65, blood pressure 55/38, respirations   18, SpO2 96% on 2 L nasal cannula.  CONSTITUTIONAL:  Patient appears ill appearing, otherwise nontoxic.  HEENT:  Normocephalic, atraumatic.  Pupils are equal, round and reactive to   light, nonicteric.  Mucous membranes dry.  NECK:  Supple.  No thyromegaly, no JVD, no stridor.  CARDIOVASCULAR:  Tachycardic, normal rhythm.  No gallops, rubs or murmurs   appreciated.  LUNGS:  Clear bilaterally.  No wheezes, rales or rhonchi.  ABDOMEN:  Soft, nontender, nondistended.  Positive bowel sounds.  No   hepatosplenomegaly or pulsatile masses noted.  SKIN:  Warm, dry.  No erythema or rashes.  EXTREMITIES:  Distal pulses intact, +2.  No cyanosis, clubbing or edema.  No   joint deformities.  Range of motion in all 4 extremities normal, capillary   refill intact.  Lower extremities, +2 seconds.  NEUROLOGIC:  Alert and oriented x3.  Cranial nerves II-XII grossly intact.  No   focal deficits.  PSYCHIATRIC:  Affect, judgment, mood is normal.    LABORATORY DATA:  CBC:  WBC count 11.4, hemoglobin 9.9, hematocrit 30.3,   platelet count 127.  Chemistry:  Sodium 136, potassium 3.9, chloride 114, CO2   of 17, BUN 20, creatinine 1.28, magnesium 1.5, lipase 22, troponin 0.05.    IMAGING:  CTA abdomen and pelvis, acute gastrointestinal bleed with layering   IV contrast in the cecum supplied by the ileocecal branch of the SMA, a 1.1 cm   obstructing ureteral calculus in the proximal left ureter with moderate   upstream pelvic stasis.    ASSESSMENT AND PLAN:  1.  Severe acute bleeding noted to be in the ileocecal branch of the SMA,   status post embolization per IR.  At this time, patient is not having any   further GI bleeds.  In addition, Dr. Conklin GI was consulted who will perform a   colonoscopy once his acute GI bleeding has been addressed.  At this time,   hemoglobin and hematocrit will be  performed q. 6 to q. 8 hours.  If his   hemoglobin does drop below 7, we will go ahead and transfuse him.  2.  Hypotension, most likely due to hypovolemia and at this time aggressive IV   fluids will be provided.  Transfusion will be provided as needed.  His blood   pressure did respond to IV fluid challenge, is currently above 95 systolic.    We will continue to monitor his progress closely.  3.  History of coronary arterial disease, history of coronary arterial bypass   in 2008.  At this time, we will go ahead and hold his Plavix and aspirin at   this time.  Of note, there was a note provided by Dr. Wu in an office visit   for which he was instructed to stop the Plavix.  Nevertheless, patient is   unclear and he says he has taken his Plavix. This concern will be addressed at   a later time when patient is clinically stable.  4.  History of peripheral vascular disease, history of stents placed in the   past by Dr. Parra, last one being done in 2012.  We will continue aspirin and   Plavix at a later time when clinically stable.  4.  History of persistent atrial fibrillation.  His heart rate is currently   controlled.  We will continue metoprolol at this time with holding parameters.  5.  Hypertension.  We will proceed with metoprolol.  If his blood pressure is   greater than 95 systolic, we will hold his amlodipine at this time as well as   Aldactone.  6.  Hypothyroidism.  Continue Synthroid.  7.  Hypomagnesemia.  Continue to replenish magnesium with magnesium sulfate.    Recheck magnesium levels in the morning.    At this time, patient will be placed on SCDs for DVT prophylaxis, Protonix for   gastrointestinal prophylaxis.  The patient's code status is a full code.    I have spent a total of 35 minutes providing direct critical care services at the   bedside. There has been no time overlap. The time spent excludes the time   spent performing procedures.     The case has been reviewed with Dr. Conklin, nursing staff,  and respiratory   therapy.         ____________________________________     MD GUSTAVO BRANDON    DD:  03/21/2017 16:58:43  DT:  03/21/2017 18:22:20    D#:  [id number]  Job#:  [id number]

## 2017-03-22 NOTE — PROGRESS NOTES
Dr. Amaya updated to pt's status including unable to lie flat due to agitation and impulsiveness. Repeated reorientation provided, no evidence of learning. Posterior tibial pulse auscultated with doppler on RLE, unable to auscultate dorsalis pedal pulse on RLE. Right foot is cold with capillary refill greater than three seconds. New orders implemented.

## 2017-03-22 NOTE — PROGRESS NOTES
Hospital Medicine ICU Interval Note    Date of Service: 3/22/2017    Chief Complaint  83 y.o.-year-old male admitted 3/21/2017 with acute gi bleed, s/p IR embolization right ileocecal artery branch    Interval Problem Update  Agitated confused today, worse than yesterday  CT head negative    Consultants/Specialty  IR  GI    Disposition  Keep in ICU 2/2 mental status    FC  D/W tx team on rounds, wife at bedside        Review of Systems   Unable to perform ROS: mental acuity      Physical Exam  Laboratory/Imaging   Hemodynamics  Temp (24hrs), Av.5 °C (97.7 °F), Min:35.9 °C (96.7 °F), Max:36.9 °C (98.4 °F)   Temperature: 36.9 °C (98.4 °F)  Pulse  Av.9  Min: 50  Max: 94 Heart Rate (Monitored): 73  Blood Pressure : 112/42 mmHg, Arterial BP: 145/58 mmHg, NIBP: 116/58 mmHg CVP (mm Hg): (!) 291 MM HG    Respiratory      Respiration: (!) 22, Pulse Oximetry: 97 %, O2 Daily Delivery Respiratory : Silicone Nasal Cannula     Work Of Breathing / Effort: Mild  RUL Breath Sounds: Clear, RML Breath Sounds: Clear, RLL Breath Sounds: Clear, WHIT Breath Sounds: Clear, LLL Breath Sounds: Clear    Fluids       Nutrition  Orders Placed This Encounter   Procedures   • Diet Order     Standing Status: Standing      Number of Occurrences: 1      Standing Expiration Date:      Order Specific Question:  Diet:     Answer:  Cardiac [6]     Physical Exam   Constitutional: He appears well-developed and well-nourished.   Cardiovascular: Normal rate, regular rhythm and normal heart sounds.    No murmur heard.  Pulmonary/Chest: Effort normal and breath sounds normal. No respiratory distress. He has no wheezes. He has no rales.   Abdominal: Soft. Bowel sounds are normal. He exhibits no distension. There is no tenderness.   Musculoskeletal: Normal range of motion. He exhibits no edema.   Neurological: He is alert.   Very confused   Skin: Skin is warm and dry. No erythema.   Nursing note and vitals reviewed.      Recent Labs      17   1146   03/21/17   1437  03/21/17   2107  03/22/17   0452   WBC  11.4*   --    --   10.0   RBC  3.09*   --    --   2.66*   HEMOGLOBIN  9.9*  7.8*  8.3*  8.2*   HEMATOCRIT  30.3*   --    --   24.3*   MCV  98.1*   --    --   91.4   MCH  32.0   --    --   30.8   MCHC  32.7*   --    --   33.7   RDW  52.4*   --    --   55.5*   PLATELETCT  127*   --    --   233   MPV  9.1   --    --   9.7     Recent Labs      03/21/17   1146  03/22/17   0452   SODIUM  136  139   POTASSIUM  3.9  3.6   CHLORIDE  114*  113*   CO2  17*  20   GLUCOSE  166*  110*   BUN  20  11   CREATININE  1.29  0.90   CALCIUM  6.8*  7.9*     Recent Labs      03/21/17   1146   INR  1.49*                  Assessment/Plan     Lower GI bleed  Assessment & Plan  -GI following, IR embolized bleeding ileocecal artery branch and has remained stable      Acute encephalopathy  Assessment & Plan  -CT head negative  -?baseline underlying dementia    PAF (paroxysmal atrial fibrillation) (CMS-[facility], located [facility])  Assessment & Plan  -metoprolol    CAD (coronary artery disease) of artery bypass graft  Assessment & Plan  -lipitor, metoprolol    Moderate aortic stenosis  Assessment & Plan  -no issues currently    Hypotension  Assessment & Plan  -resolved with blood, fluid      Labs reviewed, Medications reviewed and Radiology images reviewed  Baldwin catheter: No Baldwin  : femoral sheath.    DVT Prophylaxis: Contraindicated - High bleeding risk  DVT prophylaxis - mechanical: SCDs  Ulcer prophylaxis: Yes

## 2017-03-22 NOTE — PROGRESS NOTES
Dr. Gaurav palafox, updated to pt's ongoing agitation and impulsiveness. Per Dr. Amaya, pt is to remain flat after removal of RLE sheath and placement of angioseal. Pt requires repeated reorientation. Pt repeatedly attempting to get out of bed and bending RLE. RLE posterior tibial pulse auscultated by doppler only, unable to auscultate RLE dorsalis pedis pulse. RLE foot cold, capillary refill greater than three seconds. Dr. Amaya aware.

## 2017-03-22 NOTE — CARE PLAN
Problem: Safety  Goal: Will remain free from falls  Pt will remain free from falls.  Intervention: Assess risk factors for falls  Treaded slipper socks on, bed alarm on, room close to nursing station, bed in lowest position.       Problem: Infection  Goal: Will remain free from infection  Pt will remain free from s/s of infection.  Intervention: Assess signs and symptoms of infection  Ongoing assessment of s/s of infection.  Intervention: Implement standard precautions and perform hand washing before and after patient contact  Standard precautions in place including adequate hand hygiene.

## 2017-03-22 NOTE — CARE PLAN
Problem: Safety  Goal: Free from accidental injury  Bed alarm on, bed rails up x3.     Problem: Hemodynamic Status  Goal: Vital Signs and Fluid Balance Management  Q8H hgb in place.

## 2017-03-23 PROBLEM — G93.40 ACUTE ENCEPHALOPATHY: Status: ACTIVE | Noted: 2017-03-23

## 2017-03-23 LAB
ALBUMIN SERPL BCP-MCNC: 3.3 G/DL (ref 3.2–4.9)
ALBUMIN/GLOB SERPL: 1.6 G/DL
ALP SERPL-CCNC: 49 U/L (ref 30–99)
ALT SERPL-CCNC: 42 U/L (ref 2–50)
ANION GAP SERPL CALC-SCNC: 8 MMOL/L (ref 0–11.9)
AST SERPL-CCNC: 140 U/L (ref 12–45)
BASOPHILS # BLD AUTO: 0.5 % (ref 0–1.8)
BASOPHILS # BLD: 0.05 K/UL (ref 0–0.12)
BILIRUB SERPL-MCNC: 1 MG/DL (ref 0.1–1.5)
BUN SERPL-MCNC: 9 MG/DL (ref 8–22)
CALCIUM SERPL-MCNC: 8.1 MG/DL (ref 8.5–10.5)
CHLORIDE SERPL-SCNC: 109 MMOL/L (ref 96–112)
CO2 SERPL-SCNC: 22 MMOL/L (ref 20–33)
CREAT SERPL-MCNC: 0.8 MG/DL (ref 0.5–1.4)
EOSINOPHIL # BLD AUTO: 0.24 K/UL (ref 0–0.51)
EOSINOPHIL NFR BLD: 2.3 % (ref 0–6.9)
ERYTHROCYTE [DISTWIDTH] IN BLOOD BY AUTOMATED COUNT: 52 FL (ref 35.9–50)
GFR SERPL CREATININE-BSD FRML MDRD: >60 ML/MIN/1.73 M 2
GLOBULIN SER CALC-MCNC: 2.1 G/DL (ref 1.9–3.5)
GLUCOSE BLD-MCNC: 108 MG/DL (ref 65–99)
GLUCOSE BLD-MCNC: 125 MG/DL (ref 65–99)
GLUCOSE SERPL-MCNC: 100 MG/DL (ref 65–99)
HCT VFR BLD AUTO: 25.2 % (ref 42–52)
HGB BLD-MCNC: 8.6 G/DL (ref 14–18)
IMM GRANULOCYTES # BLD AUTO: 0.14 K/UL (ref 0–0.11)
IMM GRANULOCYTES NFR BLD AUTO: 1.4 % (ref 0–0.9)
LYMPHOCYTES # BLD AUTO: 1.41 K/UL (ref 1–4.8)
LYMPHOCYTES NFR BLD: 13.6 % (ref 22–41)
MCH RBC QN AUTO: 30.8 PG (ref 27–33)
MCHC RBC AUTO-ENTMCNC: 34.1 G/DL (ref 33.7–35.3)
MCV RBC AUTO: 90.3 FL (ref 81.4–97.8)
MONOCYTES # BLD AUTO: 0.92 K/UL (ref 0–0.85)
MONOCYTES NFR BLD AUTO: 8.9 % (ref 0–13.4)
NEUTROPHILS # BLD AUTO: 7.58 K/UL (ref 1.82–7.42)
NEUTROPHILS NFR BLD: 73.3 % (ref 44–72)
NRBC # BLD AUTO: 0 K/UL
NRBC BLD AUTO-RTO: 0 /100 WBC
PLATELET # BLD AUTO: 237 K/UL (ref 164–446)
PMV BLD AUTO: 9.4 FL (ref 9–12.9)
POTASSIUM SERPL-SCNC: 3.3 MMOL/L (ref 3.6–5.5)
PROT SERPL-MCNC: 5.4 G/DL (ref 6–8.2)
RBC # BLD AUTO: 2.79 M/UL (ref 4.7–6.1)
SODIUM SERPL-SCNC: 139 MMOL/L (ref 135–145)
WBC # BLD AUTO: 10.3 K/UL (ref 4.8–10.8)

## 2017-03-23 PROCEDURE — 770001 HCHG ROOM/CARE - MED/SURG/GYN PRIV*

## 2017-03-23 PROCEDURE — 700102 HCHG RX REV CODE 250 W/ 637 OVERRIDE(OP): Performed by: HOSPITALIST

## 2017-03-23 PROCEDURE — 85025 COMPLETE CBC W/AUTO DIFF WBC: CPT

## 2017-03-23 PROCEDURE — 700105 HCHG RX REV CODE 258: Performed by: HOSPITALIST

## 2017-03-23 PROCEDURE — C9113 INJ PANTOPRAZOLE SODIUM, VIA: HCPCS | Performed by: HOSPITALIST

## 2017-03-23 PROCEDURE — A9270 NON-COVERED ITEM OR SERVICE: HCPCS | Performed by: HOSPITALIST

## 2017-03-23 PROCEDURE — 700111 HCHG RX REV CODE 636 W/ 250 OVERRIDE (IP): Performed by: HOSPITALIST

## 2017-03-23 PROCEDURE — 82962 GLUCOSE BLOOD TEST: CPT | Mod: 91

## 2017-03-23 PROCEDURE — 80053 COMPREHEN METABOLIC PANEL: CPT

## 2017-03-23 PROCEDURE — 99232 SBSQ HOSP IP/OBS MODERATE 35: CPT | Performed by: HOSPITALIST

## 2017-03-23 RX ORDER — POTASSIUM CHLORIDE 1.5 G/1.58G
40 POWDER, FOR SOLUTION ORAL 2 TIMES DAILY
Status: COMPLETED | OUTPATIENT
Start: 2017-03-23 | End: 2017-03-23

## 2017-03-23 RX ADMIN — SODIUM CHLORIDE: 9 INJECTION, SOLUTION INTRAVENOUS at 00:21

## 2017-03-23 RX ADMIN — METOPROLOL TARTRATE 100 MG: 100 TABLET, FILM COATED ORAL at 09:54

## 2017-03-23 RX ADMIN — SODIUM CHLORIDE 8 MG/HR: 9 INJECTION, SOLUTION INTRAVENOUS at 03:40

## 2017-03-23 RX ADMIN — POTASSIUM CHLORIDE 40 MEQ: 1.5 POWDER, FOR SOLUTION ORAL at 21:41

## 2017-03-23 RX ADMIN — ATORVASTATIN CALCIUM 20 MG: 20 TABLET, FILM COATED ORAL at 21:41

## 2017-03-23 RX ADMIN — POTASSIUM CHLORIDE 40 MEQ: 1.5 POWDER, FOR SOLUTION ORAL at 09:55

## 2017-03-23 RX ADMIN — METOPROLOL TARTRATE 100 MG: 100 TABLET, FILM COATED ORAL at 21:41

## 2017-03-23 RX ADMIN — SODIUM CHLORIDE 8 MG/HR: 9 INJECTION, SOLUTION INTRAVENOUS at 20:08

## 2017-03-23 ASSESSMENT — PAIN SCALES - GENERAL
PAINLEVEL_OUTOF10: 0

## 2017-03-23 NOTE — CARE PLAN
Problem: Psychosocial needs  Goal: Anxiety reduction  Pt very restless in bed.  Not compliant with keeping leg straight.  Minimal usage of sedation due to decrease in LOC and following.  Restraints in place to keep pt safe from accidental harm.    Problem: Skin Integrity  Goal: Skin Integrity is maintained or improved  Pt being turned every 2 hours.  Pt does move frequently in bed and does not stay in turned position.  Pt linens changed every 2 hours due to incontinence.  Condom catheter does not stay on.

## 2017-03-23 NOTE — CARE PLAN
Problem: Safety  Goal: Will remain free from falls  PT will remain free from falls.   Intervention: Implement fall precautions  Fall risk assessed and documented.     Fall prevention precautions in place:   Treaded socks on pt  Personal belongings, call light in place  Report given to other health care providers regarding fall risk           Problem: Infection  Goal: Will remain free from infection  PT will remain free from infection.   Intervention: Implement standard precautions and perform hand washing before and after patient contact  Standard precautions in place including hand hygiene.

## 2017-03-23 NOTE — PROGRESS NOTES
Pt to CT with transport.  Pt aox1, aragon and follows.  Haldol 5 mg given for agitation with no affect.  Some movement seen on Ct scan.

## 2017-03-23 NOTE — PROGRESS NOTES
Son Lopez updated on pt's status.  Made aware of head CT and restlessness.  No other questions at this time.

## 2017-03-23 NOTE — CONSULTS
Received phone calls from ICU nurse concerned about patient's BILATERAL lower extremity pulses after [place]. Pt has severe atherosclerotic disease as seen on our imaging. Per report of patients nurse, this is unchanged from pre-procedural pulses. Per report, pt has dopplerable PT bilaterally. Feet are cool but not miscolored. Has cap refill. Has RIGHT CFA pulse and no e/o hematoma. Ordered US to double check and prelim report indicates flow in vessel and no suggestion of dissection (I can't view these images from home). This makes acute vessel injury extremely unlikely, though continued observation is advised in this agitated patient who is having difficulty remaining still.    Appreciate careful attention by nursing staff. Please contact me if there is further concern.

## 2017-03-23 NOTE — PROGRESS NOTES
Bedside report provided to RANDY Pickard. Pt taken to CT on the monitor with RANDY Pickard and transport.

## 2017-03-23 NOTE — PROGRESS NOTES
Hospital Medicine ICU Interval Note    Date of Service: 3/23/2017    Chief Complaint  83 y.o.-year-old male admitted 3/21/2017 with acute gi bleed, s/p IR embolization right ileocecal artery branch    Interval Problem Update  More awake today; asked for urinal, sitting up in chair    Consultants/Specialty  IR  GI    Disposition  Transfer to surgical floor    FC  D/W tx team on rounds, wife at bedside        Review of Systems   Constitutional: Negative for fever and chills.   Eyes: Negative for blurred vision.   Respiratory: Negative for cough and shortness of breath.    Cardiovascular: Negative for chest pain and palpitations.   Gastrointestinal: Negative for nausea, vomiting and abdominal pain.   Genitourinary: Negative for dysuria.   Musculoskeletal: Negative for myalgias.   Neurological: Negative for dizziness and headaches.      Physical Exam  Laboratory/Imaging   Hemodynamics  Temp (24hrs), Av.9 °C (98.5 °F), Min:36.5 °C (97.7 °F), Max:37.6 °C (99.6 °F)   Temperature: 36.5 °C (97.7 °F)  Pulse  Av.5  Min: 50  Max: 116 Heart Rate (Monitored): 88  Blood Pressure : 118/58 mmHg, Arterial BP: 126/49 mmHg, NIBP: 118/58 mmHg     Respiratory      Respiration: 20, Pulse Oximetry: 96 %     Work Of Breathing / Effort: Mild  RUL Breath Sounds: Diminished, RML Breath Sounds: Diminished, RLL Breath Sounds: Diminished, WHIT Breath Sounds: Diminished, LLL Breath Sounds: Diminished    Fluids       Nutrition  Orders Placed This Encounter   Procedures   • DIET ORDER     Standing Status: Standing      Number of Occurrences: 1      Standing Expiration Date:      Order Specific Question:  Diet:     Answer:  Cardiac [6]     Physical Exam   Constitutional: He is oriented to person, place, and time.   Cardiovascular: Normal rate, regular rhythm and normal heart sounds.    No murmur heard.  Pulmonary/Chest: Effort normal and breath sounds normal. No respiratory distress. He has no wheezes. He has no rales.   Abdominal: Soft.  Bowel sounds are normal. He exhibits no distension. There is no tenderness.   Musculoskeletal: Normal range of motion. He exhibits no edema.   Neurological: He is alert and oriented to person, place, and time.   Knows he's in the ICU, and what meds he is on, much moreawake, alert   Skin: Skin is warm and dry. No erythema.   Nursing note and vitals reviewed.      Recent Labs      03/21/17   1146   03/22/17   0452  03/22/17   1330  03/22/17   2040  03/23/17   0425   WBC  11.4*   --   10.0   --    --   10.3   RBC  3.09*   --   2.66*   --    --   2.79*   HEMOGLOBIN  9.9*   < >  8.2*  8.4*  8.7*  8.6*   HEMATOCRIT  30.3*   --   24.3*   --    --   25.2*   MCV  98.1*   --   91.4   --    --   90.3   MCH  32.0   --   30.8   --    --   30.8   MCHC  32.7*   --   33.7   --    --   34.1   RDW  52.4*   --   55.5*   --    --   52.0*   PLATELETCT  127*   --   233   --    --   237   MPV  9.1   --   9.7   --    --   9.4    < > = values in this interval not displayed.     Recent Labs      03/21/17   1146  03/22/17   0452  03/23/17   0425   SODIUM  136  139  139   POTASSIUM  3.9  3.6  3.3*   CHLORIDE  114*  113*  109   CO2  17*  20  22   GLUCOSE  166*  110*  100*   BUN  20  11  9   CREATININE  1.29  0.90  0.80   CALCIUM  6.8*  7.9*  8.1*     Recent Labs      03/21/17   1146   INR  1.49*                  Assessment/Plan     Lower GI bleed  Assessment & Plan  -GI following, IR embolized bleeding ileocecal artery branch and has remained stable      Acute encephalopathy  Assessment & Plan  -CT head negative  -?baseline underlying dementia; significantly clearer today    Acute blood loss anemia  Assessment & Plan  -s/p transfusions, resolving    Ureteral stone  Assessment & Plan  -incidental finding; no infection, no sx  -urology will see as outpt    PAF (paroxysmal atrial fibrillation) (CMS-[facility])  Assessment & Plan  -metoprolol    CAD (coronary artery disease) of artery bypass graft  Assessment & Plan  -lipitor, metoprolol    Moderate  aortic stenosis  Assessment & Plan  -no issues currently    Hypotension  Assessment & Plan  -resolved with blood, fluid      Labs reviewed, Medications reviewed and Radiology images reviewed  Baldwin catheter: No Baldwin      DVT Prophylaxis: Contraindicated - High bleeding risk  DVT prophylaxis - mechanical: SCDs  Ulcer prophylaxis: Yes

## 2017-03-23 NOTE — PROGRESS NOTES
Pt returned from IR on the monitor with IR RN, Nell. Bedside report received. Unable to palpate RLE pulse, pulse auscultated by doppler, foot cold with capillary refill greater than three seconds. Pt unable to lay flat despite repeated reorientation. Pt's wife at the bedside.

## 2017-03-23 NOTE — PROGRESS NOTES
OSIEL Camejo RN at the bedside to take pt to IR. Bedside report provided. Consents signed with Dr. Amaya by the pt's wife, Louann Lew.

## 2017-03-23 NOTE — PROGRESS NOTES
GI Progress Note:    Osmany Smart  Date & Time note created:    3/23/2017   2:46 PM       Patient ID:   Name:             Rafia Shaikh     YOB: 1933  Age:                 83 y.o.  male   MRN:               [id number]                                                               Subjective:   No further evidence of active bleeding        Past Medical History:   Past Medical History   Diagnosis Date   • Hyperlipidemia    • CAD (coronary artery disease)    • Hypertension    • Heart murmur    • Arrhythmia    • Unspecified disorder of thyroid    • PAD (peripheral artery disease)    • CATARACT      surgery   • Myocardial infarct (CMS-[facility]) 2008   • High cholesterol    • Stroke (CMS-[facility])      TIA 4/16/10   • Renal disorder 2000     kidney stone   • Dental disorder      cavities, under current care   • Sleep apnea      no cpap   • Carotid artery stenosis 6/13/2011   • Dizziness 6/13/2011   • Hypothyroidism 6/13/2011   • TIA (transient ischemic attack) 6/13/2011   • Preoperative examination, unspecified 4/12/2011   • Insomnia    • Right knee pain    • Gout    • Tendonitis        Past Surgical History:  Past Surgical History   Procedure Laterality Date   • Multiple coronary artery bypass endo vein harvest  3/20/08     Performed by AMANDA CRYSTAL at [facility]   • Other cardiac surgery       3 vessel bypass   • Other       nasal   • Recovery  3/25/2011     Performed by SURGERY, CATH-RECOVERY at [facility]   • Recovery  4/1/2011     Performed by SURGERY, CATH-RECOVERY at [facility]   • Cataract extraction with iol       bilat   • Carotid endarterectomy  5/3/2011     Performed by ERASMO NAVARRETE at [facility]   • Recovery  8/22/2012     Performed by SURGERY, IR-RECOVERY at [facility]       Hospital Medications:    Current facility-administered medications:   •  potassium chloride (KLOR-CON) 20 MEQ packet 40 mEq, 40 mEq, Oral, BID, Mercedez  SUMA Nolasco M.D., 40 mEq at 03/23/17 0955  •  haloperidol lactate (HALDOL) injection 2 mg, 2 mg, Intravenous, Q2HRS PRN, Mercedez Nolasco M.D., 2 mg at 03/22/17 1731  •  NS (BOLUS) infusion 500 mL, 500 mL, Intravenous, PRN, Mp Amaya M.D.  •  NS (BOLUS) infusion 500 mL, 500 mL, Intravenous, PRN, Carlos Hart M.D.  •  Respiratory Care per Protocol, , Nebulization, Continuous RT, Elza Ratliff M.D.  •  NS infusion, , Intravenous, Continuous, Elza Ratliff M.D., Stopped at 03/23/17 0900  •  ondansetron (ZOFRAN) syringe/vial injection 4 mg, 4 mg, Intravenous, Q4HRS PRN, Elza Ratliff M.D.  •  ondansetron (ZOFRAN ODT) dispertab 4 mg, 4 mg, Oral, Q4HRS PRN, Elza Ratliff M.D.  •  Action is required: Protocol 1073 Hypoglycemia has been implemented, , , Once **AND** Protocol 1073 Inclusion Criteria, , , CONTINUOUS **AND** Protocol 1073 NOTIFY, , , Once **AND** Protocol 1073 Initiate protocol immediately if FSBG is less than or equal to 70 mg/dL, , , CONTINUOUS **AND** glucose 4 g chewable tablet 16 g, 16 g, Oral, Q15 MIN PRN **AND** dextrose 50% (D50W) injection 25 mL, 25 mL, Intravenous, Q15 MIN PRN, Elza Ratliff M.D.  •  pantoprazole (PROTONIX) 80 mg in  mL Infusion, 8 mg/hr, Intravenous, Continuous, Elza Ratliff M.D., Last Rate: 25 mL/hr at 03/23/17 0340, 8 mg/hr at 03/23/17 0340  •  atorvastatin (LIPITOR) tablet 20 mg, 20 mg, Oral, QHS, Elza Ratliff M.D., Stopped at 03/21/17 2100  •  levothyroxine (SYNTHROID) tablet 50 mcg, 50 mcg, Oral, AM ES, Elza Ratliff M.D., Stopped at 03/22/17 0700  •  metoprolol (LOPRESSOR) tablet 100 mg, 100 mg, Oral, TWICE DAILY, Elza Ratliff M.D., 100 mg at 03/23/17 0954  •  pneumococcal vaccine (PNEUMOVAX-23) injection 25 mcg, 0.5 mL, Intramuscular, Once PRN, Mercedez Nolasco M.D.    Medication Allergy:  No Known Allergies      Physical Exam:  Vitals/ General Appearance:   Weight/BMI: Body mass index is 26.6 kg/(m^2).  Blood  "pressure 118/58, pulse 61, temperature 36.7 °C (98 °F), resp. rate 20, height 1.6 m (5' 3\"), weight 68.1 kg (150 lb 2.1 oz), SpO2 99 %.  Filed Vitals:    03/23/17 1100 03/23/17 1200 03/23/17 1300 03/23/17 1400   BP:       Pulse: 74 64 69 61   Temp:  36.7 °C (98 °F)     Resp: 22 42 23 20   Height:       Weight:       SpO2: 96% 97% 94% 99%       Heart: RRR  Lungs: Clear  Abdomen: non tender      Lab Data Review:  Recent Labs      03/21/17   1146   03/22/17   0452  03/22/17   1330  03/22/17 2040 03/23/17   0425   WBC  11.4*   --   10.0   --    --   10.3   RBC  3.09*   --   2.66*   --    --   2.79*   HEMOGLOBIN  9.9*   < >  8.2*  8.4*  8.7*  8.6*   HEMATOCRIT  30.3*   --   24.3*   --    --   25.2*   MCV  98.1*   --   91.4   --    --   90.3   MCH  32.0   --   30.8   --    --   30.8   MCHC  32.7*   --   33.7   --    --   34.1   RDW  52.4*   --   55.5*   --    --   52.0*   PLATELETCT  127*   --   233   --    --   237   MPV  9.1   --   9.7   --    --   9.4    < > = values in this interval not displayed.     Recent Labs      03/21/17   1146  03/22/17   0452 03/23/17   0425   SODIUM  136  139  139   POTASSIUM  3.9  3.6  3.3*   CHLORIDE  114*  113*  109   CO2  17*  20  22   GLUCOSE  166*  110*  100*   BUN  20  11  9   CREATININE  1.29  0.90  0.80   CALCIUM  6.8*  7.9*  8.1*     Recent Labs      03/21/17   1146   INR  1.49*         Recent Labs      03/21/17   1146   TROPONINI  0.05*     Recent Labs      03/21/17   1146  03/22/17   0452  03/23/17   0425   SODIUM  136  139  139   POTASSIUM  3.9  3.6  3.3*   CHLORIDE  114*  113*  109   CO2  17*  20  22   GLUCOSE  166*  110*  100*   BUN  20  11  9     Recent Labs      03/21/17   1146  03/22/17   0452  03/23/17   0425   SODIUM  136  139  139   POTASSIUM  3.9  3.6  3.3*   CHLORIDE  114*  113*  109   CO2  17*  20  22   BUN  20  11  9   CREATININE  1.29  0.90  0.80   MAGNESIUM  1.5   --    --    CALCIUM  6.8*  7.9*  8.1*     Recent Labs      03/21/17   1146   INR  1.49*      "     Imaging/Procedures Review:        Assessment and plan:    Doing well since embolization done and no recurrence of bleeding. Continue current care. A colonoscopy later is an option but this patient will be high risk to do one.

## 2017-03-23 NOTE — ASSESSMENT & PLAN NOTE
-GI following, IR embolized bleeding ileocecal artery branch and has remained stable  No bleeding today  Continue protonix drip today and if not bleeding will d/c tomorrow

## 2017-03-23 NOTE — DOCUMENTATION QUERY
DOCUMENTATION QUERY    PROVIDERS: Please select “Cosign w/ note”to reply to query.    To better represent the severity of illness of your patient, please review the following information and exercise your independent professional judgment in responding to this query.     Hemoglobin ranges from 9.9-->7.8 with documented rectal bleed and blood transfusions are  noted in the History and Physical, Progress Notes and Lab Results. Based upon the clinical findings, risk factors, and treatment, can a diagnosis be provided to support this finding?    • Acute blood loss anemia  • Chronic blood loss anemia  • Other type of anemia (please specify type)  • Other explanation of clinical findings  • Unable to determine    The medical record reflects the following:   Clinical Findings Lab result ranges noted between 3/21-->3/23:  Hgb 9.9-7.8    3/21 ED MD note: Severe GI bleed, hypotension, hypovolemic shock, patient given IV platelets and blood is documented.   Treatment Blood and platelet transfusions, embolization of arterial bleed, serial labs   Risk Factors Age, ASA, Plavix, GI bleed   Location within medical record  History and Physical, Progress Notes and Lab Results      Thank you,   Lanre Puente RN  Clinical Documentation Improvement  [phone]

## 2017-03-24 PROBLEM — N20.1 URETERAL STONE: Status: ACTIVE | Noted: 2017-03-24

## 2017-03-24 PROBLEM — D62 ACUTE BLOOD LOSS ANEMIA: Status: ACTIVE | Noted: 2017-03-24

## 2017-03-24 LAB
ANION GAP SERPL CALC-SCNC: 9 MMOL/L (ref 0–11.9)
BUN SERPL-MCNC: 14 MG/DL (ref 8–22)
CALCIUM SERPL-MCNC: 8.6 MG/DL (ref 8.5–10.5)
CHLORIDE SERPL-SCNC: 110 MMOL/L (ref 96–112)
CO2 SERPL-SCNC: 20 MMOL/L (ref 20–33)
CREAT SERPL-MCNC: 1.03 MG/DL (ref 0.5–1.4)
ERYTHROCYTE [DISTWIDTH] IN BLOOD BY AUTOMATED COUNT: 54 FL (ref 35.9–50)
GFR SERPL CREATININE-BSD FRML MDRD: >60 ML/MIN/1.73 M 2
GLUCOSE BLD-MCNC: 102 MG/DL (ref 65–99)
GLUCOSE BLD-MCNC: 106 MG/DL (ref 65–99)
GLUCOSE BLD-MCNC: 119 MG/DL (ref 65–99)
GLUCOSE SERPL-MCNC: 110 MG/DL (ref 65–99)
HCT VFR BLD AUTO: 27 % (ref 42–52)
HGB BLD-MCNC: 8.9 G/DL (ref 14–18)
MCH RBC QN AUTO: 31.1 PG (ref 27–33)
MCHC RBC AUTO-ENTMCNC: 33 G/DL (ref 33.7–35.3)
MCV RBC AUTO: 94.4 FL (ref 81.4–97.8)
PLATELET # BLD AUTO: 230 K/UL (ref 164–446)
PMV BLD AUTO: 9.8 FL (ref 9–12.9)
POTASSIUM SERPL-SCNC: 4.2 MMOL/L (ref 3.6–5.5)
RBC # BLD AUTO: 2.86 M/UL (ref 4.7–6.1)
SODIUM SERPL-SCNC: 139 MMOL/L (ref 135–145)
WBC # BLD AUTO: 11.7 K/UL (ref 4.8–10.8)

## 2017-03-24 PROCEDURE — 700111 HCHG RX REV CODE 636 W/ 250 OVERRIDE (IP): Performed by: HOSPITALIST

## 2017-03-24 PROCEDURE — 36415 COLL VENOUS BLD VENIPUNCTURE: CPT

## 2017-03-24 PROCEDURE — 700102 HCHG RX REV CODE 250 W/ 637 OVERRIDE(OP): Performed by: HOSPITALIST

## 2017-03-24 PROCEDURE — 99233 SBSQ HOSP IP/OBS HIGH 50: CPT | Performed by: HOSPITALIST

## 2017-03-24 PROCEDURE — 770001 HCHG ROOM/CARE - MED/SURG/GYN PRIV*

## 2017-03-24 PROCEDURE — A9270 NON-COVERED ITEM OR SERVICE: HCPCS | Performed by: HOSPITALIST

## 2017-03-24 PROCEDURE — 700105 HCHG RX REV CODE 258: Performed by: HOSPITALIST

## 2017-03-24 PROCEDURE — 85027 COMPLETE CBC AUTOMATED: CPT

## 2017-03-24 PROCEDURE — C9113 INJ PANTOPRAZOLE SODIUM, VIA: HCPCS | Performed by: HOSPITALIST

## 2017-03-24 PROCEDURE — 82962 GLUCOSE BLOOD TEST: CPT

## 2017-03-24 PROCEDURE — 80048 BASIC METABOLIC PNL TOTAL CA: CPT

## 2017-03-24 RX ORDER — METOPROLOL TARTRATE 50 MG/1
50 TABLET, FILM COATED ORAL TWICE DAILY
Status: DISCONTINUED | OUTPATIENT
Start: 2017-03-24 | End: 2017-03-25 | Stop reason: HOSPADM

## 2017-03-24 RX ADMIN — SODIUM CHLORIDE 8 MG/HR: 9 INJECTION, SOLUTION INTRAVENOUS at 09:11

## 2017-03-24 RX ADMIN — LEVOTHYROXINE SODIUM 50 MCG: 50 TABLET ORAL at 05:42

## 2017-03-24 RX ADMIN — METOPROLOL TARTRATE 50 MG: 50 TABLET, FILM COATED ORAL at 21:16

## 2017-03-24 RX ADMIN — ATORVASTATIN CALCIUM 20 MG: 20 TABLET, FILM COATED ORAL at 21:16

## 2017-03-24 ASSESSMENT — ENCOUNTER SYMPTOMS
NAUSEA: 0
COUGH: 0
MUSCULOSKELETAL NEGATIVE: 1
VOMITING: 0
MYALGIAS: 0
CHILLS: 0
BLOOD IN STOOL: 0
PALPITATIONS: 0
DIZZINESS: 0
ABDOMINAL PAIN: 0
SHORTNESS OF BREATH: 0
FEVER: 0
TREMORS: 0
BLURRED VISION: 0
TINGLING: 0
HEADACHES: 0

## 2017-03-24 ASSESSMENT — PAIN SCALES - GENERAL
PAINLEVEL_OUTOF10: 0

## 2017-03-24 NOTE — CARE PLAN
Problem: Safety  Goal: Will remain free from injury  Outcome: PROGRESSING AS EXPECTED  Pt instructed to use call light before getting up. Bed alarm is on.

## 2017-03-24 NOTE — PROGRESS NOTES
Called for Brianna  RN taking room T406 twice with no answer.  Transport at bedside to take pt to new room.  ICU charge RN made aware.

## 2017-03-24 NOTE — PROGRESS NOTES
Gave report to Brianna PADILLA.  Attempted to call wife to make her aware of transfer but number was out of service.

## 2017-03-24 NOTE — PROGRESS NOTES
Pt arrived to floor via wheelchair. Pt is alert and oriented, does not c/o pain. Report was called shortly after pt arrived to room. Pt instructed to call for help if needing to get up and call light placed within reach.

## 2017-03-24 NOTE — PROGRESS NOTES
Pt getting out of bed without calling. Urinated and had a bowel movement on the floor. BM is brown with a red tinge. Bed alarm placed. Pt is very unsteady to the bathroom and requires at least a 1 person assist. Pt acts confused about the situation but knows he is in the hospital. Will monitor closely.

## 2017-03-24 NOTE — PROGRESS NOTES
"Urology Progress Note    S: No fevers, chills, nausea or vomiting.  Pt doing better today.  No abdominal pain.  Incontinence resolved.      O:   Blood pressure 118/58, pulse 61, temperature 36.6 °C (97.8 °F), resp. rate 34, height 1.6 m (5' 3\"), weight 68.1 kg (150 lb 2.1 oz), SpO2 100 %.  Recent Labs      03/21/17   1146  03/22/17   0452  03/23/17   0425   SODIUM  136  139  139   POTASSIUM  3.9  3.6  3.3*   CHLORIDE  114*  113*  109   CO2  17*  20  22   GLUCOSE  166*  110*  100*   BUN  20  11  9   CREATININE  1.29  0.90  0.80   CALCIUM  6.8*  7.9*  8.1*     Recent Labs      03/21/17   1146   03/22/17   0452  03/22/17   1330  03/22/17   2040  03/23/17   0425   WBC  11.4*   --   10.0   --    --   10.3   RBC  3.09*   --   2.66*   --    --   2.79*   HEMOGLOBIN  9.9*   < >  8.2*  8.4*  8.7*  8.6*   HEMATOCRIT  30.3*   --   24.3*   --    --   25.2*   MCV  98.1*   --   91.4   --    --   90.3   MCH  32.0   --   30.8   --    --   30.8   MCHC  32.7*   --   33.7   --    --   34.1   RDW  52.4*   --   55.5*   --    --   52.0*   PLATELETCT  127*   --   233   --    --   237   MPV  9.1   --   9.7   --    --   9.4    < > = values in this interval not displayed.         Intake/Output Summary (Last 24 hours) at 03/23/17 1841  Last data filed at 03/23/17 1800   Gross per 24 hour   Intake   3320 ml   Output   1650 ml   Net   1670 ml       Exam:  Consitutional: OOB to chair.  No distress   Abdomen soft, benign.  NTTP   Neurologic: A&Ox3     A/P:    Active Hospital Problems    Diagnosis   • Acute encephalopathy [G93.40]     Priority: High   • Lower GI bleed [K92.2]     Priority: High   • PAF (paroxysmal atrial fibrillation) (CMS-HCC) [I48.0]   • CAD (coronary artery disease) of artery bypass graft [I25.810]   • Moderate aortic stenosis [I35.0]   • Hypotension [I95.9]       PLAN:  Pt is asymptomatic.  No evidence of urosepsis.  Voiding without difficulty.  At this time, noting emergent to do.  Urology will sign off and plan for stone " treatment outpatient with Dr Coates.

## 2017-03-24 NOTE — PROGRESS NOTES
Hospital Medicine ICU Interval Note    Date of Service: 3/24/2017    Chief Complaint  83 y.o.-year-old male admitted 3/21/2017 with acute gi bleed, s/p IR embolization right ileocecal artery branch    Interval Problem Update  Alert and awake  Feels better  No blood in stool    Consultants/Specialty  IR  GI    Disposition  PT/OT evaluation if he does poorly with nursing mobilizing him  Otherwise home with wife        Review of Systems   Constitutional: Negative for fever and chills.   Eyes: Negative for blurred vision.   Respiratory: Negative for cough and shortness of breath.    Cardiovascular: Negative for chest pain and palpitations.   Gastrointestinal: Negative for nausea, vomiting, abdominal pain, blood in stool and melena.   Genitourinary: Negative for dysuria.   Musculoskeletal: Negative.    Neurological: Negative for dizziness, tingling, tremors and headaches.      Physical Exam  Laboratory/Imaging   Hemodynamics  Temp (24hrs), Av.6 °C (97.8 °F), Min:36.2 °C (97.2 °F), Max:36.9 °C (98.4 °F)   Temperature: 36.3 °C (97.3 °F)  Pulse  Av.4  Min: 49  Max: 116 Heart Rate (Monitored): 93  Blood Pressure : 104/51 mmHg, NIBP: 122/56 mmHg     Respiratory      Respiration: 16, Pulse Oximetry: 91 %     Work Of Breathing / Effort: Mild  RUL Breath Sounds: Diminished, RML Breath Sounds: Diminished, RLL Breath Sounds: Diminished, WHIT Breath Sounds: Diminished, LLL Breath Sounds: Diminished    Fluids       Nutrition  Orders Placed This Encounter   Procedures   • DIET ORDER     Standing Status: Standing      Number of Occurrences: 1      Standing Expiration Date:      Order Specific Question:  Diet:     Answer:  Cardiac [6]     Physical Exam   Constitutional: He appears well-developed and well-nourished. No distress.   HENT:   Mouth/Throat: Oropharynx is clear and moist. No oropharyngeal exudate.   Eyes: Conjunctivae are normal. Right eye exhibits no discharge. Left eye exhibits no discharge. No scleral icterus.    Neck: No JVD present.   Cardiovascular: Normal rate and regular rhythm.    Murmur heard.  Pulmonary/Chest: Effort normal and breath sounds normal. No stridor. No respiratory distress. He has no wheezes. He has no rales.   Abdominal: Soft. Bowel sounds are normal. He exhibits no distension. There is no tenderness.   Musculoskeletal: Normal range of motion. He exhibits no edema.   Neurological: He is alert. Coordination normal.   Knows he's in the ICU, and what meds he is on, much moreawake, alert   Skin: Skin is warm and dry. He is not diaphoretic. No erythema.   Psychiatric: He has a normal mood and affect. His behavior is normal.   Nursing note and vitals reviewed.      Recent Labs      03/22/17 0452 03/22/17 2040 03/23/17 0425 03/24/17   0232   WBC  10.0   --    --   10.3  11.7*   RBC  2.66*   --    --   2.79*  2.86*   HEMOGLOBIN  8.2*   < >  8.7*  8.6*  8.9*   HEMATOCRIT  24.3*   --    --   25.2*  27.0*   MCV  91.4   --    --   90.3  94.4   MCH  30.8   --    --   30.8  31.1   MCHC  33.7   --    --   34.1  33.0*   RDW  55.5*   --    --   52.0*  54.0*   PLATELETCT  233   --    --   237  230   MPV  9.7   --    --   9.4  9.8    < > = values in this interval not displayed.     Recent Labs      03/22/17 0452 03/23/17 0425 03/24/17   0232   SODIUM  139  139  139   POTASSIUM  3.6  3.3*  4.2   CHLORIDE  113*  109  110   CO2  20  22  20   GLUCOSE  110*  100*  110*   BUN  11  9  14   CREATININE  0.90  0.80  1.03   CALCIUM  7.9*  8.1*  8.6                      Assessment/Plan     Lower GI bleed  Assessment & Plan  -GI following, IR embolized bleeding ileocecal artery branch and has remained stable  No bleeding today  Continue protonix drip today and if not bleeding will d/c tomorrow      Acute encephalopathy  Assessment & Plan  -CT head negative  -baseline dementia  -improving     Acute blood loss anemia  Assessment & Plan  -s/p transfusions, resolving    Ureteral stone  Assessment & Plan  -incidental  finding; no infection, no sx  -urology will see as outpt    PAF (paroxysmal atrial fibrillation) (CMS-HCC)  Assessment & Plan  -metoprolol    CAD (coronary artery disease) of artery bypass graft  Assessment & Plan  -lipitor, metoprolol    Moderate aortic stenosis  Assessment & Plan  -no issues currently    Hypotension  Assessment & Plan  -resolved with blood, fluid      Labs reviewed, Medications reviewed and Radiology images reviewed  Baldwin catheter: No Baldwin      DVT Prophylaxis: Contraindicated - High bleeding risk  DVT prophylaxis - mechanical: SCDs  Ulcer prophylaxis: Yes

## 2017-03-24 NOTE — PROGRESS NOTES
GI Progress Note:    Osmany Smart  Date & Time note created:    3/24/2017   2:05 PM       Patient ID:   Name:             Rafia Shaikh     YOB: 1933  Age:                 83 y.o.  male   MRN:               [id number]                                                               Subjective:   No evidence of recurrent bleeding        Past Medical History:   Past Medical History   Diagnosis Date   • Hyperlipidemia    • CAD (coronary artery disease)    • Hypertension    • Heart murmur    • Arrhythmia    • Unspecified disorder of thyroid    • PAD (peripheral artery disease)    • CATARACT      surgery   • Myocardial infarct (CMS-HCC) 2008   • High cholesterol    • Stroke (CMS-HCC)      TIA 4/16/10   • Renal disorder 2000     kidney stone   • Dental disorder      cavities, under current care   • Sleep apnea      no cpap   • Carotid artery stenosis 6/13/2011   • Dizziness 6/13/2011   • Hypothyroidism 6/13/2011   • TIA (transient ischemic attack) 6/13/2011   • Preoperative examination, unspecified 4/12/2011   • Insomnia    • Right knee pain    • Gout    • Tendonitis        Past Surgical History:  Past Surgical History   Procedure Laterality Date   • Multiple coronary artery bypass endo vein harvest  3/20/08     Performed by AMANDA CRYSTAL at [facility]   • Other cardiac surgery       3 vessel bypass   • Other       nasal   • Recovery  3/25/2011     Performed by SURGERY, CATH-RECOVERY at [facility]   • Recovery  4/1/2011     Performed by SURGERY, CATH-RECOVERY at [facility]   • Cataract extraction with iol       bilat   • Carotid endarterectomy  5/3/2011     Performed by ERASMO NAVARRETE at [facility]   • Recovery  8/22/2012     Performed by SURGERY, IR-RECOVERY at [facility]       Hospital Medications:    Current facility-administered medications:   •  metoprolol (LOPRESSOR) tablet 50 mg, 50 mg, Oral, TWICE DAILY, Jazmin Pillai  "M.D.  •  haloperidol lactate (HALDOL) injection 2 mg, 2 mg, Intravenous, Q2HRS PRN, Mercedez Nolasco M.D., 2 mg at 03/22/17 1731  •  NS (BOLUS) infusion 500 mL, 500 mL, Intravenous, PRN, Mp Amaya M.D.  •  NS (BOLUS) infusion 500 mL, 500 mL, Intravenous, PRN, Carlos Hart M.D.  •  Respiratory Care per Protocol, , Nebulization, Continuous RT, Elza Ratliff M.D.  •  ondansetron (ZOFRAN) syringe/vial injection 4 mg, 4 mg, Intravenous, Q4HRS PRN, Elza Ratliff M.D.  •  ondansetron (ZOFRAN ODT) dispertab 4 mg, 4 mg, Oral, Q4HRS PRN, Elza Ratliff M.D.  •  Action is required: Protocol 1073 Hypoglycemia has been implemented, , , Once **AND** Protocol 1073 Inclusion Criteria, , , CONTINUOUS **AND** Protocol 1073 NOTIFY, , , Once **AND** Protocol 1073 Initiate protocol immediately if FSBG is less than or equal to 70 mg/dL, , , CONTINUOUS **AND** glucose 4 g chewable tablet 16 g, 16 g, Oral, Q15 MIN PRN **AND** dextrose 50% (D50W) injection 25 mL, 25 mL, Intravenous, Q15 MIN PRN, Elza Ratliff M.D.  •  pantoprazole (PROTONIX) 80 mg in  mL Infusion, 8 mg/hr, Intravenous, Continuous, Elza Ratliff M.D., Last Rate: 25 mL/hr at 03/24/17 0911, 8 mg/hr at 03/24/17 0911  •  atorvastatin (LIPITOR) tablet 20 mg, 20 mg, Oral, QHS, Elza Ratliff M.D., 20 mg at 03/23/17 2141  •  levothyroxine (SYNTHROID) tablet 50 mcg, 50 mcg, Oral, AM ES, Elza Ratliff M.D., 50 mcg at 03/24/17 0542  •  pneumococcal vaccine (PNEUMOVAX-23) injection 25 mcg, 0.5 mL, Intramuscular, Once PRN, Mercedez Nolasco M.D.    Medication Allergy:  No Known Allergies      Physical Exam:  Vitals/ General Appearance:   Weight/BMI: Body mass index is 26.6 kg/(m^2).  Blood pressure 104/51, pulse 55, temperature 36.3 °C (97.3 °F), resp. rate 16, height 1.6 m (5' 3\"), weight 68.1 kg (150 lb 2.1 oz), SpO2 91 %.  Filed Vitals:    03/23/17 2213 03/24/17 0400 03/24/17 0727 03/24/17 1124   BP: 113/58 107/60 127/50 104/51 "   Pulse: 63 98 50 55   Temp: 36.8 °C (98.2 °F) 36.9 °C (98.4 °F) 36.2 °C (97.2 °F) 36.3 °C (97.3 °F)   Resp: 18 16 16 16   Height:       Weight:       SpO2: 92% 94% 91% 91%       Heart: RRR  Lungs: Clear  Abdomen: non tender      Lab Data Review:  Recent Labs      03/22/17 0452 03/22/17 2040 03/23/17 0425 03/24/17 0232   WBC  10.0   --    --   10.3  11.7*   RBC  2.66*   --    --   2.79*  2.86*   HEMOGLOBIN  8.2*   < >  8.7*  8.6*  8.9*   HEMATOCRIT  24.3*   --    --   25.2*  27.0*   MCV  91.4   --    --   90.3  94.4   MCH  30.8   --    --   30.8  31.1   MCHC  33.7   --    --   34.1  33.0*   RDW  55.5*   --    --   52.0*  54.0*   PLATELETCT  233   --    --   237  230   MPV  9.7   --    --   9.4  9.8    < > = values in this interval not displayed.     Recent Labs      03/22/17 0452 03/23/17 0425 03/24/17 0232   SODIUM  139  139  139   POTASSIUM  3.6  3.3*  4.2   CHLORIDE  113*  109  110   CO2  20  22  20   GLUCOSE  110*  100*  110*   BUN  11  9  14   CREATININE  0.90  0.80  1.03   CALCIUM  7.9*  8.1*  8.6                 Recent Labs      03/22/17 0452 03/23/17 0425 03/24/17   0232   SODIUM  139  139  139   POTASSIUM  3.6  3.3*  4.2   CHLORIDE  113*  109  110   CO2  20  22  20   GLUCOSE  110*  100*  110*   BUN  11  9  14     Recent Labs      03/22/17 0452 03/23/17 0425 03/24/17   0232   SODIUM  139  139  139   POTASSIUM  3.6  3.3*  4.2   CHLORIDE  113*  109  110   CO2  20  22  20   BUN  11  9  14   CREATININE  0.90  0.80  1.03   CALCIUM  7.9*  8.1*  8.6              Imaging/Procedures Review:        Assessment and plan:    Blood count is stable and no evidence of recurrent bleeding since embolization.Doing well since transfer from ICU. In discussion with wife and patient they would like to do a colonoscopy and if a cancer was discovered they would want surgical intervention. Just had significant arythmia with tachycardia when in ICU and just had starclose deployment 2 days ago and would  defer colonoscopy at present given patient's high risk status for the procedure as he continues to recover.

## 2017-03-24 NOTE — PROGRESS NOTES
Assumed care @ 0715. Bedside report from RANDY Reed. Pt awake, resting in bed and in no distress. Bed in low position, call light w/in reach. Strip bed alarm on. Pt not on restraints. Calls for assistance.

## 2017-03-24 NOTE — PROGRESS NOTES
C/O bed not comfortable and fatigue. Otherwise no c/o pain. Assisted oob to bathroom sba, strip bed alarm on, pt forgets to call for assistance. Gait slightly undsteady. Sultana po's, consumed >90% of breakfast, voids. Pt had bm before change of shift. Right groin puncture site w/ orig drsg cdi. No hematoma noted. BP 50-55. Dr Pillai aware w/ orders. Will continue to monitor.

## 2017-03-25 VITALS
HEART RATE: 60 BPM | OXYGEN SATURATION: 95 % | SYSTOLIC BLOOD PRESSURE: 123 MMHG | RESPIRATION RATE: 18 BRPM | WEIGHT: 150.13 LBS | DIASTOLIC BLOOD PRESSURE: 43 MMHG | HEIGHT: 63 IN | TEMPERATURE: 97.8 F | BODY MASS INDEX: 26.6 KG/M2

## 2017-03-25 LAB
BASOPHILS # BLD AUTO: 0.6 % (ref 0–1.8)
BASOPHILS # BLD: 0.07 K/UL (ref 0–0.12)
EOSINOPHIL # BLD AUTO: 0.59 K/UL (ref 0–0.51)
EOSINOPHIL NFR BLD: 4.9 % (ref 0–6.9)
ERYTHROCYTE [DISTWIDTH] IN BLOOD BY AUTOMATED COUNT: 52.6 FL (ref 35.9–50)
GLUCOSE BLD-MCNC: 97 MG/DL (ref 65–99)
HCT VFR BLD AUTO: 28.8 % (ref 42–52)
HGB BLD-MCNC: 9.2 G/DL (ref 14–18)
IMM GRANULOCYTES # BLD AUTO: 0.37 K/UL (ref 0–0.11)
IMM GRANULOCYTES NFR BLD AUTO: 3.1 % (ref 0–0.9)
LYMPHOCYTES # BLD AUTO: 1.34 K/UL (ref 1–4.8)
LYMPHOCYTES NFR BLD: 11.2 % (ref 22–41)
MCH RBC QN AUTO: 31.1 PG (ref 27–33)
MCHC RBC AUTO-ENTMCNC: 31.9 G/DL (ref 33.7–35.3)
MCV RBC AUTO: 97.3 FL (ref 81.4–97.8)
MONOCYTES # BLD AUTO: 1.06 K/UL (ref 0–0.85)
MONOCYTES NFR BLD AUTO: 8.8 % (ref 0–13.4)
NEUTROPHILS # BLD AUTO: 8.57 K/UL (ref 1.82–7.42)
NEUTROPHILS NFR BLD: 71.4 % (ref 44–72)
NRBC # BLD AUTO: 0 K/UL
NRBC BLD AUTO-RTO: 0 /100 WBC
PLATELET # BLD AUTO: 226 K/UL (ref 164–446)
PMV BLD AUTO: 9.6 FL (ref 9–12.9)
RBC # BLD AUTO: 2.96 M/UL (ref 4.7–6.1)
WBC # BLD AUTO: 12 K/UL (ref 4.8–10.8)

## 2017-03-25 PROCEDURE — 82962 GLUCOSE BLOOD TEST: CPT

## 2017-03-25 PROCEDURE — 700102 HCHG RX REV CODE 250 W/ 637 OVERRIDE(OP): Performed by: HOSPITALIST

## 2017-03-25 PROCEDURE — A9270 NON-COVERED ITEM OR SERVICE: HCPCS | Performed by: HOSPITALIST

## 2017-03-25 PROCEDURE — 36415 COLL VENOUS BLD VENIPUNCTURE: CPT

## 2017-03-25 PROCEDURE — 99239 HOSP IP/OBS DSCHRG MGMT >30: CPT | Performed by: HOSPITALIST

## 2017-03-25 PROCEDURE — 85025 COMPLETE CBC W/AUTO DIFF WBC: CPT

## 2017-03-25 PROCEDURE — 700111 HCHG RX REV CODE 636 W/ 250 OVERRIDE (IP): Performed by: HOSPITALIST

## 2017-03-25 PROCEDURE — C9113 INJ PANTOPRAZOLE SODIUM, VIA: HCPCS | Performed by: HOSPITALIST

## 2017-03-25 PROCEDURE — 700105 HCHG RX REV CODE 258: Performed by: HOSPITALIST

## 2017-03-25 RX ORDER — OMEPRAZOLE 20 MG/1
20 CAPSULE, DELAYED RELEASE ORAL DAILY
Qty: 30 CAP | Refills: 1 | Status: SHIPPED | OUTPATIENT
Start: 2017-03-25 | End: 2017-05-02

## 2017-03-25 RX ADMIN — SODIUM CHLORIDE 8 MG/HR: 9 INJECTION, SOLUTION INTRAVENOUS at 05:30

## 2017-03-25 RX ADMIN — METOPROLOL TARTRATE 50 MG: 50 TABLET, FILM COATED ORAL at 08:06

## 2017-03-25 RX ADMIN — LEVOTHYROXINE SODIUM 50 MCG: 50 TABLET ORAL at 07:17

## 2017-03-25 ASSESSMENT — PAIN SCALES - GENERAL: PAINLEVEL_OUTOF10: 0

## 2017-03-25 NOTE — PROGRESS NOTES
Received bedside report and assumed care of patient.  Assessment complete; discussed POC with patient.  AO x3, patient not calling for assistance.  Patient appears calm and exhibits no signs of distress.  Patient reports pain of 0/10, declines intervention.  Patient tolerating current diet.  BS normo x 4, LBM 3/24/17, patient voids ambulating with assist to BR.  Patient is 1x assist, gait steady.  Call light within reach, bed in lowest position, SCDs refused, bed alarm in use.  Will continue to monitor pt for changes in status and provide care.

## 2017-03-25 NOTE — CARE PLAN
Problem: Safety  Goal: Will remain free from falls  Outcome: PROGRESSING AS EXPECTED  Reminded patient to use call light when ambulating in order to prevent falls.  Treaded socks on, bed alarm in use, call light within reach, bed in lowest position, upper bed rails up.    Problem: Infection  Goal: Will remain free from infection  Outcome: PROGRESSING AS EXPECTED  Reminded patient of the signs and symptoms of infection and encouraged patient to report any of these findings in order to promote best outcomes.

## 2017-03-25 NOTE — PROGRESS NOTES
"Late entry. Called in to pt's room @ 1620 w/ report of pt found by RANDY Cuellar kneeling on the floor. Per pt needs to go to the bathroom to \"pee\". Strip bed alarm went off but by the time staff arrived pt already oob. Post fall protocol completed.   "

## 2017-03-25 NOTE — PROGRESS NOTES
Assumed care of patient at 1845 on 3/24, patient oriented X3 with intermittent confusion throughout the night, possible owning..patient able to tell me name//place/month and year, does not call for assistance, continually getting oob to bathroom this shift w/o calling first, bed alarm on for safety, refuses to use urinal at bedside..will only ambulate to bathroom, uses staff/furniture for support, demanding to go home today with wife, wife upset because she feels there is nothing we are doing, explained monitoring of labs, protonix gtt, and PT will need to work with patient this AM because he is very unsteady on his feet, had fall yesterday, and would definitely fall if unattended, wife understands, will pass along in AM to make sure pt/ot order in, patient denies pain this shift, WCTM...ramone rn

## 2017-03-25 NOTE — DISCHARGE INSTRUCTIONS
Discharge Instructions    Discharged to home by taxi with relative. Discharged via wheelchair, hospital escort: Yes.  Special equipment needed: Not Applicable    Be sure to schedule a follow-up appointment with your primary care doctor or any specialists as instructed.     Discharge Plan:   Diet Plan: Discussed  Activity Level: Discussed  Confirmed Follow up Appointment: Patient to Call and Schedule Appointment  Confirmed Symptoms Management: Discussed  Medication Reconciliation Updated: Yes  Influenza Vaccine Indication: Not indicated: Previously immunized this influenza season and > 8 years of age    I understand that a diet low in cholesterol, fat, and sodium is recommended for good health. Unless I have been given specific instructions below for another diet, I accept this instruction as my diet prescription.   Other diet: Regular    Special Instructions:   Coronary Artery Bypass Grafting  Coronary artery bypass grafting (CABG) is a procedure done to bypass or fix arteries of the heart (coronary arteries) that have become narrow or blocked. This narrowing is usually the result of plaque that has built up in the walls of the vessels. The coronary arteries supply the heart with the oxygen and nutrients it needs to pump blood to your body. In the CABG procedure, a section of blood vessel from another part of the body (usually the leg, arm, or chest wall) is removed and then inserted where it will allow blood to bypass the damaged part of the coronary artery.   LET YOUR HEALTH CARE PROVIDER KNOW ABOUT:  · Any allergies you have.    · All medicines you are taking, including blood thinners, vitamins, herbs, eye drops, creams, and over-the-counter medicines.    · Use of steroids (by mouth or creams).    · Previous problems you or members of your family have had with the use of anesthetics.    · Any blood disorders you have.    · Previous surgeries you have had.    · Medical conditions you have.    RISKS AND  COMPLICATIONS  Generally, this is a safe procedure. However, problems can occur and include:   · Blood loss.    · Stroke.    · Infection.    · Pain at the surgical site.    · Heart attack during or after surgery.    · Kidney failure.    BEFORE THE PROCEDURE  · Take medicines only as directed by your health care provider. You may be asked to start new medicines and stop taking others. Do not stop medicines or adjust dosages on your own.  · Do not eat or drink anything after midnight the night before the procedure or as directed by your health care provider. Ask your health care provider if it is okay to take a sip of water with any needed medicines.  PROCEDURE  The surgeon may use either an open technique or a minimally invasive technique for this surgery.  Traditional open surgery  · You will be given medicine to make you sleep through the procedure (general anesthetic).  · Once you are asleep, a cut (incision) will be made down the front of the chest through the breastbone (sternum). The sternum will be spread open so your heart can be seen.  · You will then be placed on a heart-lung bypass machine. This machine will provide oxygen to your blood while the heart is undergoing surgery.  · Your heart will then be temporarily stopped so that the surgeon can do the next steps.  · A section of vein will likely be taken from your leg and used to bypass the blocked arteries of your heart. Sometimes parts of an artery from inside your chest wall or from your arm will be used, either alone or in combination with leg veins.  · When the bypasses are done, you will be taken off the machine.  · Your heart will be restarted and will take over again normally.  · The sac around the heart will be closed.  · Your chest will then be closed with stitches or staples.  · Tubes will remain in your chest and will be connected to a suction device in order to help drain fluid and reinflate the lungs.  Minimally invasive surgery  This  technique is done from an incision over your left chest area. If appropriate, your surgeon may not have to slow or stop your heart. If your condition allows for this procedure, there will often be less blood loss, less pain, a shorter hospital stay, and faster recovery compared to traditional open surgery.  AFTER THE PROCEDURE  · You will be taken to a recovery area to be monitored.  · You may wake up with a tube in your throat to help your breathing. You may be connected to a breathing machine. You will not be able to talk while the tube is in place. The tube will be taken out as soon as it is safe to do so.  · You will be groggy and may have some pain. You will be given pain medicine to help control the pain.     This information is not intended to replace advice given to you by your health care provider. Make sure you discuss any questions you have with your health care provider.     Document Released: 09/27/2006 Document Revised: 01/08/2016 Document Reviewed: 05/27/2014  [employer] Interactive Patient Education ©2016 [employer]    Blood Transfusion   A blood transfusion is a procedure in which you receive donated blood through an IV tube. You may need a blood transfusion because of illness, surgery, or injury. The blood may come from a donor, or it may be your own blood that you donated previously.  The blood given in a transfusion is made up of different types of cells. You may receive:  · Red blood cells. These carry oxygen and replace lost blood.  · Platelets. These control bleeding.  · Plasma. This helps blood to clot.  If you have hemophilia or another clotting disorder, you may also receive other types of blood products.  LET YOUR HEALTH CARE PROVIDER KNOW ABOUT:  · Any allergies you have.  · All medicines you are taking, including vitamins, herbs, eye drops, creams, and over-the-counter medicines.  · Previous problems you or members of your family have had with the use of anesthetics.  · Any blood  disorders you have.  · Previous surgeries you have had.  · Any medical conditions you may have.  · Any previous reactions you have had during a blood transfusion.    RISKS AND COMPLICATIONS  Generally, this is a safe procedure. However, problems may occur, including:  · Having an allergic reaction to something in the donated blood.  · Fever. This may be a reaction to the white blood cells in the transfused blood.  · Iron overload. This can happen from having many transfusions.  · Transfusion-related acute lung injury (TRALI). This is a rare reaction that causes lung damage. The cause is not known. TRALI can occur within hours of a transfusion or several days later.  · Sudden (acute) or delayed hemolytic reactions. This happens if your blood does not match the cells in your transfusion. Your body's defense system (immune system) may try to attack the new cells. This complication is rare.  · Infection. This is rare.  BEFORE THE PROCEDURE  · You may have a blood test to determine your blood type. This is necessary to know what kind of blood your body will accept.  · If you are going to have a planned surgery, you may donate your own blood. This may be done in case you need to have a transfusion.  · If you have had an allergic reaction to a transfusion in the past, you may be given medicine to help prevent a reaction. Take this medicine only as directed by your health care provider.  · You will have your temperature, blood pressure, and pulse monitored before the transfusion.  PROCEDURE   · An IV will be started in your hand or arm.  · The bag of donated blood will be attached to your IV tube and given into your vein.  · Your temperature, blood pressure, and pulse will be monitored regularly during the transfusion. This monitoring is done to detect early signs of a transfusion reaction.  · If you have any signs or symptoms of a reaction, your transfusion will be stopped and you may be given medicine.  · When the  transfusion is over, your IV will be removed.  · Pressure may be applied to the IV site for a few minutes.  · A bandage (dressing) will be applied.  The procedure may vary among health care providers and hospitals.  AFTER THE PROCEDURE  · Your blood pressure, temperature, and pulse will be monitored regularly.     This information is not intended to replace advice given to you by your health care provider. Make sure you discuss any questions you have with your health care provider.     Document Released: 12/15/2001 Document Revised: 01/08/2016 Document Reviewed: 10/28/2015  [employer] Interactive Patient Education ©2016 [employer]                                                                                               · Is patient discharged on Warfarin / Coumadin?   No     · Is patient Post Blood Transfusion?  · Yes    Depression / Suicide Risk    As you are discharged from this [facility], it is important to learn how to keep safe from harming yourself.    Recognize the warning signs:  · Abrupt changes in personality, positive or negative- including increase in energy   · Giving away possessions  · Change in eating patterns- significant weight changes-  positive or negative  · Change in sleeping patterns- unable to sleep or sleeping all the time   · Unwillingness or inability to communicate  · Depression  · Unusual sadness, discouragement and loneliness  · Talk of wanting to die  · Neglect of personal appearance   · Rebelliousness- reckless behavior  · Withdrawal from people/activities they love  · Confusion- inability to concentrate     If you or a loved one observes any of these behaviors or has concerns about self-harm, here's what you can do:  · Talk about it- your feelings and reasons for harming yourself  · Remove any means that you might use to hurt yourself (examples: pills, rope, extension cords, firearm)  · Get professional help from the community (Mental Health, Substance Abuse,  psychological counseling)  · Do not be alone:Call your Safe Contact- someone whom you trust who will be there for you.  · Call your local CRISIS HOTLINE 810-6868 or 615-320-1115  · Call your local Children's Mobile Crisis Response Team [place] (293) 888-3829 or www.Logly  · Call the toll free National Suicide Prevention Hotlines   · National Suicide Prevention Lifeline 584-081-RDJC (2339)  · National Hope Line Network 800-SUICIDE (089-1008)

## 2017-03-26 NOTE — DISCHARGE SUMMARY
CHIEF COMPLAINT ON ADMISSION  Chief Complaint   Patient presents with   • Rectal Bleeding       CODE STATUS  FULL    HPI & HOSPITAL COURSE  HISTORY OF PRESENT ILLNESS:  The patient is a pleasant 83-year-old male with    past medical history of coronary arterial disease, history of 3-vessel    coronary bypass in 2008, history of peripheral artery disease with history of    left leg stents, follows with Dr. Parra as an outpatient, hyperlipidemia,    hypertension, essentially presents to the ER after he had significant bright    red blood per rectum.  He says that he has had no discomfort in his abdomen,    no pain upon defecation, has not had any bloody bowel movements in the past or   any dark tarry stools.  Hence, on admission, patient's hemoglobin was noted    to be at 9.9.  At this point, Dr. Conklin, GI was consulted.  In addition, an    emergent CT angio of his abdomen was done, which showed an acute GI bleed in    the area of the ileocecal branch of the SMA, status post embolization per IR.     At this time, patient will be admitted to the ICU for higher level of care.    HOSPITAL COURSE: The patient did well post operatively, he had a small amount of residual bleeding but no further large bleeding. He had some acute metabolic encephalopathy on top of his underlying dementia due to the GI bleeding. He improved back to his baseline of mentation per his wife's estimation. He was observed on the general surgical unit for two days and was completely stable. The case was discussed with Dr. [facility] from [facility] prior to discharge, GI would like to follow up with the patient for an outpatient colonoscopy when patient is clinically stable. Patient and wife are in agreement with this plan. I instructed him to hold his aspirin at discharge but to continue pletal and plavix for his peripheral vascular disease.       Therefore, he is discharged in good and stable condition with close outpatient follow-up.    SPECIFIC  OUTPATIENT FOLLOW-UP  GI, primary care.    DISCHARGE PROBLEM LIST  Active Problems:    Lower GI bleed POA: Yes    Acute encephalopathy, metabolic (acute delirium) POA: Yes    Acute blood loss anemia POA: Yes    Ureteral stone POA: Yes    PAF (paroxysmal atrial fibrillation) (CMS-HCC) POA: Yes    CAD (coronary artery disease) of artery bypass graft POA: Yes    Moderate aortic stenosis POA: Yes    Hypotension POA: Yes        FOLLOW UP  Future Appointments  Date Time Provider Department Center   4/20/2017 1:15 PM Marcelino Wu M.D. [facility] None   5/2/2017 2:20 PM Yarelis Gtz M.D. [facility] None     Yarelis Gtz M.D.  [address]  [facility] [address]  [facility] [address]  [phone]            MEDICATIONS ON DISCHARGE   Rafia Shaikh   Home Medication Instructions MERI:[id number]    Printed on:03/26/17 0737   Medication Information                      allopurinol (ZYLOPRIM) 300 MG Tab  TAKE 1 TABLET ORALLY DAILY             amlodipine (NORVASC) 10 MG Tab  TAKE 1 TAB BY MOUTH EVERY DAY.             atorvastatin (LIPITOR) 20 MG Tab  TAKE 1 TABLET ORALLY DAILY             B Complex Vitamins (VITAMIN B COMPLEX PO)  Take 1 Tab by mouth every day.             Calcium Carbonate-Vit D-Min (CALTRATE PLUS PO)  Take 1 Tab by mouth every day.             cilostazol (PLETAL) 100 MG Tab  TAKE 1 TABLET ORALLY TWICE DAILY             clopidogrel (PLAVIX) 75 MG Tab  TAKE 1 TABLET BY MOUTH EVERY DAY.             docosahexanoic acid (OMEGA 3 FA) 1000 MG CAPS  Take 1,000 mg by mouth every day.             levothyroxine (SYNTHROID) 50 MCG Tab  TAKE 1 TABLET ORALLY DAILY             metoprolol (LOPRESSOR) 100 MG Tab  TAKE 1 TABLET BY MOUTH TWICE A DAY             Multiple Vitamins-Minerals (CENTRUM SILVER) TABS  Take 1 Tab by mouth every day.             omeprazole (PRILOSEC) 20 MG delayed-release capsule  Take 1 Cap by mouth every day.             spironolactone (ALDACTONE) 25 MG Tab  TAKE 1 TABLET BY MOUTH EVERY DAY                  DIET  No orders of the defined types were placed in this encounter.       ACTIVITY  As tolerated.  Class 1 - no symptoms of any kind, and for whom ordinary physical activity does not cause fatigue, palpitations, dyspnea, or anginal pain.    CONSULTATIONS  GI, Dr. [facility].    PROCEDURES  1. PostOp Diagnosis: GI bleeding  Procedure(s): embolization bleeding right iliocecal artery branch  Estimated Blood Loss: Less than 5 ml  Complications: None  3/21/2017     1:46 PM     Carlos Hart      2. PostOp Diagnosis: atherosclerosis, confusion  Procedure(s): R CFA arteriogram through sheath, starclose deployment  Estimated Blood Loss: Less than 5 ml  Complications: None  3/22/2017     3:13 PM     Mp Amaya        LABORATORY  Lab Results   Component Value Date/Time    SODIUM 139 03/24/2017 02:32 AM    POTASSIUM 4.2 03/24/2017 02:32 AM    CHLORIDE 110 03/24/2017 02:32 AM    CO2 20 03/24/2017 02:32 AM    GLUCOSE 110* 03/24/2017 02:32 AM    BUN 14 03/24/2017 02:32 AM    CREATININE 1.03 03/24/2017 02:32 AM        Lab Results   Component Value Date/Time    WBC 12.0* 03/25/2017 09:52 AM    HEMOGLOBIN 9.2* 03/25/2017 09:52 AM    HEMATOCRIT 28.8* 03/25/2017 09:52 AM    PLATELET COUNT 226 03/25/2017 09:52 AM        Total time of the discharge process exceeds 31 minutes.

## 2017-04-18 ENCOUNTER — OFFICE VISIT (OUTPATIENT)
Dept: INTERNAL MEDICINE | Facility: MEDICAL CENTER | Age: 82
End: 2017-04-18
Payer: MEDICARE

## 2017-04-18 VITALS
TEMPERATURE: 98.7 F | RESPIRATION RATE: 14 BRPM | HEIGHT: 63 IN | BODY MASS INDEX: 27.11 KG/M2 | SYSTOLIC BLOOD PRESSURE: 126 MMHG | OXYGEN SATURATION: 92 % | DIASTOLIC BLOOD PRESSURE: 60 MMHG | WEIGHT: 153 LBS | HEART RATE: 58 BPM

## 2017-04-18 DIAGNOSIS — I25.10 CORONARY ARTERY DISEASE INVOLVING NATIVE HEART, ANGINA PRESENCE UNSPECIFIED, UNSPECIFIED VESSEL OR LESION TYPE: ICD-10-CM

## 2017-04-18 DIAGNOSIS — I73.9 PVD (PERIPHERAL VASCULAR DISEASE) (HCC): ICD-10-CM

## 2017-04-18 DIAGNOSIS — K92.2 LOWER GI BLEED: ICD-10-CM

## 2017-04-18 PROCEDURE — 3288F FALL RISK ASSESSMENT DOCD: CPT | Mod: GC | Performed by: INTERNAL MEDICINE

## 2017-04-18 PROCEDURE — 0518F FALL PLAN OF CARE DOCD: CPT | Mod: 8P,GC | Performed by: INTERNAL MEDICINE

## 2017-04-18 PROCEDURE — 4040F PNEUMOC VAC/ADMIN/RCVD: CPT | Mod: GC | Performed by: INTERNAL MEDICINE

## 2017-04-18 PROCEDURE — 99213 OFFICE O/P EST LOW 20 MIN: CPT | Mod: GE | Performed by: INTERNAL MEDICINE

## 2017-04-18 PROCEDURE — G8432 DEP SCR NOT DOC, RNG: HCPCS | Mod: GC | Performed by: INTERNAL MEDICINE

## 2017-04-18 PROCEDURE — G8419 CALC BMI OUT NRM PARAM NOF/U: HCPCS | Mod: GC | Performed by: INTERNAL MEDICINE

## 2017-04-18 PROCEDURE — G8598 ASA/ANTIPLAT THER USED: HCPCS | Mod: GC | Performed by: INTERNAL MEDICINE

## 2017-04-18 PROCEDURE — 1036F TOBACCO NON-USER: CPT | Mod: GC | Performed by: INTERNAL MEDICINE

## 2017-04-18 PROCEDURE — 1100F PTFALLS ASSESS-DOCD GE2>/YR: CPT | Mod: GC | Performed by: INTERNAL MEDICINE

## 2017-04-18 PROCEDURE — 1111F DSCHRG MED/CURRENT MED MERGE: CPT | Mod: GC | Performed by: INTERNAL MEDICINE

## 2017-04-18 NOTE — MR AVS SNAPSHOT
"        Rafia Shaikh   2017 9:00 AM   Office Visit   MRN: [id number]    Department:  [facility] - Internal Med   Dept Phone:  [phone]    Description:  Male : 1933   Provider:  Javier Medellin M.D.           Reason for Visit     Other med check/ emergency discharge questions/blood in stool      Allergies as of 2017     No Known Allergies      You were diagnosed with     Lower GI bleed   [[id number]]         Vital Signs     Blood Pressure Pulse Temperature Respirations Height Weight    126/60 mmHg 58 37.1 °C (98.7 °F) 14 1.588 m (5' 2.5\") 69.4 kg (153 lb)    Body Mass Index Oxygen Saturation Smoking Status             27.52 kg/m2 92% Never Smoker          Basic Information     Date Of Birth Sex Race Ethnicity Preferred Language    1933 Male  Non- English      Your appointments     2017  1:15 PM   FOLLOW UP with Marcelino Wu M.D.   [facility] (--)    [address] [place] [address]  [facility] [address] [phone]            May 02, 2017  2:20 PM   Established Patient with Yarelis Gtz M.D.   [facility] - Internal Medicine (--)    [address] [place] [address]  [facility] [address] [phone]           You will be receiving a confirmation call a few days before your appointment from our automated call confirmation system.              Problem List              ICD-10-CM Priority Class Noted - Resolved    Paroxysmal atrial fibrillation (CMS-HCC) I48.0 High  2010 - Present    CAD (coronary artery disease) I25.10 High  2010 - Present    Benign non-nodular prostatic hyperplasia without lower urinary tract symptoms N40.0 High  2010 - Present    PVD (peripheral vascular disease) (CMS-HCC) I73.9 High  2010 - Present    Gout of multiple sites M10.9 High  2010 - Present    Hyperlipidemia, unspecified E78.5 High  2010 - Present    Aortic stenosis, moderate I35.0 High  2012 - Present   " Carotid artery stenosis (Chronic) I65.29   6/13/2011 - Present    Acquired hypothyroidism E03.9   6/13/2011 - Present    TIA (transient ischemic attack) (Chronic) G45.9   6/13/2011 - Present    Abdominal aortic aneurysm (CMS-HCC) I71.4   1/17/2013 - Present    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   2/3/2014 - Present    Abnormal liver function tests R79.89   5/7/2014 - Present    Chronic fatigue R53.82   10/21/2016 - Present    Hypertension, essential I10   10/21/2016 - Present    Chronic kidney disease, stage III (moderate) N18.3   10/21/2016 - Present    PAF (paroxysmal atrial fibrillation) (CMS-HCC) I48.0   3/21/2017 - Present    Lower GI bleed K92.2 High  3/21/2017 - Present    CAD (coronary artery disease) of artery bypass graft I25.810   3/21/2017 - Present    Moderate aortic stenosis I35.0   3/21/2017 - Present    Hypotension I95.9   3/21/2017 - Present    Acute encephalopathy G93.40 High  3/23/2017 - Present    Ureteral stone N20.1 Medium  3/24/2017 - Present    Acute blood loss anemia D62 High  3/24/2017 - Present      Health Maintenance        Date Due Completion Dates    IMM DTaP/Tdap/Td Vaccine (1 - Tdap) [birth year]/16/1952 ---    IMM PNEUMOCOCCAL 65+ (ADULT) LOW/MEDIUM RISK SERIES (2 of 2 - PPSV23) 6/3/2016 6/3/2015    COLONOSCOPY 9/3/2025 9/3/2015            Current Immunizations     13-VALENT PCV PREVNAR 6/3/2015    Influenza TIV (IM) 4/2/2011  1:30 PM    Influenza Vaccine Adult HD 10/24/2016, 11/10/2015    Pneumococcal Vaccine (UF)Historical Data 4/2/2005    SHINGLES VACCINE 3/14/2013      Below and/or attached are the medications your provider expects you to take. Review all of your home medications and newly ordered medications with your provider and/or pharmacist. Follow medication instructions as directed by your provider and/or pharmacist. Please keep your medication list with you and share with your provider. Update the information when medications are discontinued, doses  are changed, or new medications (including over-the-counter products) are added; and carry medication information at all times in the event of emergency situations     Allergies:  No Known Allergies          Medications  Valid as of: April 18, 2017 -  9:29 AM    Generic Name Brand Name Tablet Size Instructions for use    Allopurinol (Tab) ZYLOPRIM 300 MG TAKE 1 TABLET ORALLY DAILY        AmLODIPine Besylate (Tab) NORVASC 10 MG TAKE 1 TAB BY MOUTH EVERY DAY.        Atorvastatin Calcium (Tab) LIPITOR 20 MG TAKE 1 TABLET ORALLY DAILY        B Complex Vitamins   Take 1 Tab by mouth every day.        Calcium Carbonate-Vit D-Min   Take 1 Tab by mouth every day.        Cilostazol (Tab) PLETAL 100 MG TAKE 1 TABLET ORALLY TWICE DAILY        Clopidogrel Bisulfate (Tab) PLAVIX 75 MG TAKE 1 TABLET BY MOUTH EVERY DAY.        Levothyroxine Sodium (Tab) SYNTHROID 50 MCG TAKE 1 TABLET ORALLY DAILY        Metoprolol Tartrate (Tab) LOPRESSOR 100 MG TAKE 1 TABLET BY MOUTH TWICE A DAY        Multiple Vitamins-Minerals (Tab) CENTRUM SILVER  Take 1 Tab by mouth every day.        Omega-3 Fatty Acids (Cap) OMEGA 3 FA 1000 MG Take 1,000 mg by mouth every day.        Omeprazole (CAPSULE DELAYED RELEASE) PRILOSEC 20 MG Take 1 Cap by mouth every day.        Spironolactone (Tab) ALDACTONE 25 MG TAKE 1 TABLET BY MOUTH EVERY DAY        .                 Medicines prescribed today were sent to:     [id number] - [place] - [address] [facility]    [address] [place] [address]    Phone: [phone] Fax: [phone]    Open 24 Hours?: No      Medication refill instructions:       If your prescription bottle indicates you have medication refills left, it is not necessary to call your provider’s office. Please contact your pharmacy and they will refill your medication.    If your prescription bottle indicates you do not have any refills left, you may request refills at any time through one of the following ways: The online [employer] system  (except Urgent Care), by calling your provider’s office, or by asking your pharmacy to contact your provider’s office with a refill request. Medication refills are processed only during regular business hours and may not be available until the next business day. Your provider may request additional information or to have a follow-up visit with you prior to refilling your medication.   *Please Note: Medication refills are assigned a new Rx number when refilled electronically. Your pharmacy may indicate that no refills were authorized even though a new prescription for the same medication is available at the pharmacy. Please request the medicine by name with the pharmacy before contacting your provider for a refill.        Your To Do List     Future Labs/Procedures Complete By Expires    CBC WITHOUT DIFFERENTIAL  As directed 10/18/2017      Referral     A referral request has been sent to our patient care coordination department. Please allow 3-5 business days for us to process this request and contact you either by phone or mail. If you do not hear from us by the 5th business day, please call us at [phone].           [employer] Access Code: Activation code not generated  Current 140 Proof Status: Active

## 2017-04-18 NOTE — PROGRESS NOTES
Established Patient    Rafia presents today with the following:    CC:   Chief Complaint   Patient presents with   • Other     med check/ emergency discharge questions/blood in stool       ID:  Mr. Shaikh is a 84 y/o M with PMHx significant for PVD, CAD, PAF presents to the clinic for a post-hospital visit.   PCP: Dr. Gtz.     Acute Issues:  1. Lower GI bleed, recent hospitalization   He was recently admitted after presenting to the ED with complaints of BRBPR with CTA showing acute GI bleed with layering of contact in the cecum consistent with bleed in the area of the ileocecal brach of the SMA. He underwent IR guided embolectomy (3/21/2017) and was observed in the hospital for 2 days post OP.  He was seen by GI ([facility], Dr. Smart) with recommendations for outpatient colonoscopy. Today, he reports minimal dark red colored blood in the stool. He denies any chest pain, shortness of breath, lightheadedness, presyncope, syncopal, abdominal pain, nausea, vomiting or black tarry stools.     2. PVD/CAD: Medication change (?stop plavix, asa and fish oil)   He will like to know if he can stop taking aspirin, Plavix and fish oil. He has extensive coronary artery disease status post CABG in 2008. He also has peripheral vascular disease with stent in the left lower extremity proximally 5 years ago. He denies any active symptoms of claudication. He was started on Plavix by vascular surgery after the stent. Review of his last cardiology note (Dr. Wu) in July 2016 shows that he was okay to stop Plavix after the visit. He was discharged from the hospital after GI bleed with recommendations to stop aspirin but continue Plavix--of note, he was not seen by cardiology or vascular surgery while he was in the hospital.    Patient Active Problem List    Diagnosis Date Noted   • Acute blood loss anemia 03/24/2017     Priority: High   • Acute encephalopathy 03/23/2017     Priority: High   • Lower GI bleed 03/21/2017      Priority: High   • Aortic stenosis, moderate 06/25/2012     Priority: High   • Paroxysmal atrial fibrillation (CMS-HCC) 09/18/2010     Priority: High   • CAD (coronary artery disease) 09/18/2010     Priority: High   • Benign non-nodular prostatic hyperplasia without lower urinary tract symptoms 09/18/2010     Priority: High   • PVD (peripheral vascular disease) (CMS-HCC) 09/18/2010     Priority: High   • Gout of multiple sites 09/18/2010     Priority: High   • Hyperlipidemia, unspecified 09/18/2010     Priority: High   • Ureteral stone 03/24/2017     Priority: Medium   • PAF (paroxysmal atrial fibrillation) (CMS-HCC) 03/21/2017   • CAD (coronary artery disease) of artery bypass graft 03/21/2017   • Moderate aortic stenosis 03/21/2017   • Hypotension 03/21/2017   • Chronic fatigue 10/21/2016   • Hypertension, essential 10/21/2016   • Chronic kidney disease, stage III (moderate) 10/21/2016   • Abnormal liver function tests 05/07/2014   • IHSS (idiopathic hypertrophic subaortic stenosis) (CMS-HCC) 02/03/2014   • Abdominal aortic aneurysm (CMS-HCC) 01/17/2013   • Carotid artery stenosis 06/13/2011   • Acquired hypothyroidism 06/13/2011   • TIA (transient ischemic attack) 06/13/2011       Current Outpatient Prescriptions   Medication Sig Dispense Refill   • atorvastatin (LIPITOR) 20 MG Tab TAKE 1 TABLET ORALLY DAILY 90 Tab 1   • cilostazol (PLETAL) 100 MG Tab TAKE 1 TABLET ORALLY TWICE DAILY 60 Tab 6   • metoprolol (LOPRESSOR) 100 MG Tab TAKE 1 TABLET BY MOUTH TWICE A DAY 60 Tab 6   • amlodipine (NORVASC) 10 MG Tab TAKE 1 TAB BY MOUTH EVERY DAY. 30 Tab 6   • levothyroxine (SYNTHROID) 50 MCG Tab TAKE 1 TABLET ORALLY DAILY 90 Tab 1   • allopurinol (ZYLOPRIM) 300 MG Tab TAKE 1 TABLET ORALLY DAILY 90 Tab 1   • spironolactone (ALDACTONE) 25 MG Tab TAKE 1 TABLET BY MOUTH EVERY DAY 90 Tab 1   • Calcium Carbonate-Vit D-Min (CALTRATE PLUS PO) Take 1 Tab by mouth every day.     • B Complex Vitamins (VITAMIN B COMPLEX PO) Take 1  "Tab by mouth every day.     • Multiple Vitamins-Minerals (CENTRUM SILVER) TABS Take 1 Tab by mouth every day.     • omeprazole (PRILOSEC) 20 MG delayed-release capsule Take 1 Cap by mouth every day. 30 Cap 1   • clopidogrel (PLAVIX) 75 MG Tab TAKE 1 TABLET BY MOUTH EVERY DAY. 90 Tab 0   • docosahexanoic acid (OMEGA 3 FA) 1000 MG CAPS Take 1,000 mg by mouth every day.       No current facility-administered medications for this visit.       Allergies:No Known Allergies    ROS  Constitutional: Denies fevers or chills  Ears/Nose/Throat/Mouth: Denies nasal congestion or sore throat   Cardiovascular: Denies chest pain or palpitations   Respiratory: Denies shortness of breath, Denies cough  Gastrointestinal/Hepatic: Denies abd pain, nausea, vomiting   Endocrine: History of thyroid dysfunction  Heme/Oncology/Lymph Nodes: Denies weight changes    /60 mmHg  Pulse 58  Temp(Src) 37.1 °C (98.7 °F)  Resp 14  Ht 1.588 m (5' 2.5\")  Wt 69.4 kg (153 lb)  BMI 27.52 kg/m2  SpO2 92%    Physical Exam  General: non-toxic apeparnce. NAD.   HEENT: EOMI. Scleral clear.  CVS: normal S1, S2. NSR. Grade 3 crescendo decrescendo systolic murmur heard with radiation to the carotids.   PULM: CTABL . No w/r/r. No basilar crackles.   ABD: soft, NT, ND. +BS.   EXT: no LE edema b/l.   Neuro: No focal deficits.   Pysch: AAOx4  Skin: no rashes.     Assessment and Plan  1. Lower GI bleed  Recent hospitalization for lower GI bleed with embolization of the ileocecal branch of the SMA. He was seen in the hospital by Dr. Smart (gastroenterology) with recommendations for outpatient colonoscopy. He reports minimal dark red colored blood with bowel movements but denies any other symptoms. Repeat CBC to ensure stable hemoglobin. [facility] to [facility] for outpatient follow-up for diagnostic colonoscopy.  - REFERRAL TO GASTROENTEROLOGY  - CBC WITHOUT DIFFERENTIAL; Future    2. PVD (peripheral vascular disease) (CMS-HCC)  3. Coronary artery " disease   The patient has coronary artery disease status post CABG and peripheral vascular disease status post stent over 5 years ago. At this time, we do not see an indication for dual antiplatelet therapy especially considering recent GI bleed. In addition, review of the patient's last cardiology note (July 2016, Dr. Wu): Shows that he was okay to stop Plavix after that visit. Regarding the fish oil: The patient is already on statin and there is no significant literature to support beneficial use of fish oil. Okay for the patient to stop taking fish oil.  -Continue aspirin daily  -Stop Plavix and fish oil    Follow-up:Return in about 4 weeks (around 5/16/2017).    Signed by: Javier Medellin M.D.

## 2017-04-20 ENCOUNTER — TELEPHONE (OUTPATIENT)
Dept: INTERNAL MEDICINE | Facility: MEDICAL CENTER | Age: 82
End: 2017-04-20

## 2017-04-20 ENCOUNTER — OFFICE VISIT (OUTPATIENT)
Dept: CARDIOLOGY | Facility: MEDICAL CENTER | Age: 82
End: 2017-04-20
Payer: MEDICARE

## 2017-04-20 VITALS
BODY MASS INDEX: 27.29 KG/M2 | DIASTOLIC BLOOD PRESSURE: 68 MMHG | HEIGHT: 63 IN | WEIGHT: 154 LBS | HEART RATE: 60 BPM | OXYGEN SATURATION: 95 % | SYSTOLIC BLOOD PRESSURE: 140 MMHG

## 2017-04-20 DIAGNOSIS — I48.0 PAROXYSMAL ATRIAL FIBRILLATION (HCC): ICD-10-CM

## 2017-04-20 DIAGNOSIS — G45.9 TRANSIENT CEREBRAL ISCHEMIA, UNSPECIFIED TYPE: Chronic | ICD-10-CM

## 2017-04-20 DIAGNOSIS — I35.0 MODERATE AORTIC STENOSIS: ICD-10-CM

## 2017-04-20 DIAGNOSIS — I42.1 IHSS (IDIOPATHIC HYPERTROPHIC SUBAORTIC STENOSIS) (HCC): ICD-10-CM

## 2017-04-20 DIAGNOSIS — I10 HYPERTENSION, ESSENTIAL: ICD-10-CM

## 2017-04-20 DIAGNOSIS — E78.5 HYPERLIPIDEMIA, UNSPECIFIED: ICD-10-CM

## 2017-04-20 PROCEDURE — G8598 ASA/ANTIPLAT THER USED: HCPCS | Performed by: INTERNAL MEDICINE

## 2017-04-20 PROCEDURE — G8419 CALC BMI OUT NRM PARAM NOF/U: HCPCS | Performed by: INTERNAL MEDICINE

## 2017-04-20 PROCEDURE — 4040F PNEUMOC VAC/ADMIN/RCVD: CPT | Performed by: INTERNAL MEDICINE

## 2017-04-20 PROCEDURE — 99214 OFFICE O/P EST MOD 30 MIN: CPT | Performed by: INTERNAL MEDICINE

## 2017-04-20 PROCEDURE — 1036F TOBACCO NON-USER: CPT | Performed by: INTERNAL MEDICINE

## 2017-04-20 PROCEDURE — G8432 DEP SCR NOT DOC, RNG: HCPCS | Performed by: INTERNAL MEDICINE

## 2017-04-20 PROCEDURE — 1100F PTFALLS ASSESS-DOCD GE2>/YR: CPT | Performed by: INTERNAL MEDICINE

## 2017-04-20 PROCEDURE — 0518F FALL PLAN OF CARE DOCD: CPT | Mod: 8P | Performed by: INTERNAL MEDICINE

## 2017-04-20 PROCEDURE — 3288F FALL RISK ASSESSMENT DOCD: CPT | Performed by: INTERNAL MEDICINE

## 2017-04-20 PROCEDURE — 1111F DSCHRG MED/CURRENT MED MERGE: CPT | Performed by: INTERNAL MEDICINE

## 2017-04-20 RX ORDER — ALLOPURINOL 300 MG/1
TABLET ORAL
Qty: 90 TAB | Refills: 0 | Status: SHIPPED | OUTPATIENT
Start: 2017-04-20 | End: 2017-07-15 | Stop reason: SDUPTHER

## 2017-04-20 RX ORDER — SPIRONOLACTONE 25 MG/1
TABLET ORAL
Qty: 90 TAB | Refills: 0 | Status: SHIPPED | OUTPATIENT
Start: 2017-04-20 | End: 2017-07-15 | Stop reason: SDUPTHER

## 2017-04-20 ASSESSMENT — ENCOUNTER SYMPTOMS
MYALGIAS: 0
FEVER: 0
CHILLS: 0
EYE DISCHARGE: 0
SHORTNESS OF BREATH: 0
FALLS: 0
HALLUCINATIONS: 0
PND: 0
WEIGHT LOSS: 0
BLURRED VISION: 0
BRUISES/BLEEDS EASILY: 0
DEPRESSION: 0
LOSS OF CONSCIOUSNESS: 0
ABDOMINAL PAIN: 0
SENSORY CHANGE: 0
HEADACHES: 0
BLOOD IN STOOL: 0
COUGH: 0
CLAUDICATION: 0
NAUSEA: 0
SPEECH CHANGE: 0
DIZZINESS: 0
PALPITATIONS: 0
ORTHOPNEA: 0
EYE PAIN: 0
VOMITING: 0
DOUBLE VISION: 0

## 2017-04-20 NOTE — TELEPHONE ENCOUNTER
Patient was recently seen in clinic for post-discharge follow up after a gastrointestinal bleed requiring embolization. Repeat CBC was completed, showing a hemoglobin of 12 (discharge hemoglobin: 9). Patient called to provide results but he was not available. Please let him know that his blood count has improved as we had expected (unlikely that he is still actively bleeding). Please remind him to schedule follow up with GI for outpatient diagnostic colonoscopy.

## 2017-04-20 NOTE — Clinical Note
[facility]   [address] [facility] [facility] [address]  [place] [address]  Phone: [phone]  Fax: [phone]              Rafia Shaikh  12/16/1933    Encounter Date: 4/20/2017    Marcelino Wu M.D.          PROGRESS NOTE:  Subjective:   Rafia Sahikh is a 83 y.o. male who presents today cardiac care of moderate AS with mean gradient in 20s in 12/2014, paroxysmal atrial fibrillation, hypertension, coronary arterial disease, peripheral vascular disease, idiopathic hypertrophic subaortic stenosis, TIA.     In the interim, patient was found to have GI bleeding. His anticoagulation and plavix were stopped.     Past Medical History   Diagnosis Date   • Hyperlipidemia    • CAD (coronary artery disease)    • Hypertension    • Heart murmur    • Arrhythmia    • Unspecified disorder of thyroid    • PAD (peripheral artery disease)    • CATARACT      surgery   • Myocardial infarct (CMS-HCC) 2008   • High cholesterol    • Stroke (CMS-HCC)      TIA 4/16/10   • Renal disorder 2000     kidney stone   • Dental disorder      cavities, under current care   • Sleep apnea      no cpap   • Carotid artery stenosis 6/13/2011   • Dizziness 6/13/2011   • Hypothyroidism 6/13/2011   • TIA (transient ischemic attack) 6/13/2011   • Preoperative examination, unspecified 4/12/2011   • Insomnia    • Right knee pain    • Gout    • Tendonitis      Past Surgical History   Procedure Laterality Date   • Multiple coronary artery bypass endo vein harvest  3/20/08     Performed by AMANDA CRYSTAL at SURGERY [facility] ORS   • Other cardiac surgery       3 vessel bypass   • Other       nasal   • Recovery  3/25/2011     [facility]   • Recovery  4/1/2011     Performed by SURGERY, CATH-RECOVERY at SURGERY SAME DAY [facility] ORS   • Cataract extraction with iol       bilat   • Carotid endarterectomy  5/3/2011     Performed by ERASMO NAVARRETE at SURGERY [facility] ORS      • Recovery  8/22/2012     Performed by SURGERY, IR[facility]RECOVERY [facility]     Family History   Problem Relation Age of Onset   • Hypertension     • Colon Cancer Brother    • Breast Cancer Sister    • Other       GOUT   • Heart Disease Father    • Other Mother      TB     History   Smoking status   • Never Smoker    Smokeless tobacco   • Never Used     No Known Allergies  Outpatient Encounter Prescriptions as of 4/20/2017   Medication Sig Dispense Refill   • omeprazole (PRILOSEC) 20 MG delayed-release capsule Take 1 Cap by mouth every day. 30 Cap 1   • atorvastatin (LIPITOR) 20 MG Tab TAKE 1 TABLET ORALLY DAILY 90 Tab 1   • cilostazol (PLETAL) 100 MG Tab TAKE 1 TABLET ORALLY TWICE DAILY 60 Tab 6   • metoprolol (LOPRESSOR) 100 MG Tab TAKE 1 TABLET BY MOUTH TWICE A DAY 60 Tab 6   • amlodipine (NORVASC) 10 MG Tab TAKE 1 TAB BY MOUTH EVERY DAY. 30 Tab 6   • levothyroxine (SYNTHROID) 50 MCG Tab TAKE 1 TABLET ORALLY DAILY 90 Tab 1   • allopurinol (ZYLOPRIM) 300 MG Tab TAKE 1 TABLET ORALLY DAILY 90 Tab 1   • spironolactone (ALDACTONE) 25 MG Tab TAKE 1 TABLET BY MOUTH EVERY DAY 90 Tab 1   • Calcium Carbonate-Vit D-Min (CALTRATE PLUS PO) Take 1 Tab by mouth every day.     • B Complex Vitamins (VITAMIN B COMPLEX PO) Take 1 Tab by mouth every day.     • Multiple Vitamins-Minerals (CENTRUM SILVER) TABS Take 1 Tab by mouth every day.     • docosahexanoic acid (OMEGA 3 FA) 1000 MG CAPS Take 1,000 mg by mouth every day.       No facility-administered encounter medications on file as of 4/20/2017.     Review of Systems   Constitutional: Negative for fever, chills, weight loss and malaise/fatigue.   HENT: Negative for ear discharge, ear pain, hearing loss and nosebleeds.    Eyes: Negative for blurred vision, double vision, pain and discharge.   Respiratory: Negative for cough and shortness of breath.    Cardiovascular: Negative for chest pain, palpitations, orthopnea, claudication, leg swelling and PND.  "  Gastrointestinal: Negative for nausea, vomiting, abdominal pain, blood in stool and melena.   Genitourinary: Negative for dysuria and hematuria.   Musculoskeletal: Negative for myalgias, joint pain and falls.   Skin: Negative for itching and rash.   Neurological: Negative for dizziness, sensory change, speech change, loss of consciousness and headaches.   Endo/Heme/Allergies: Negative for environmental allergies. Does not bruise/bleed easily.   Psychiatric/Behavioral: Negative for depression, suicidal ideas and hallucinations.        Objective:   /68 mmHg  Pulse 60  Ht 1.6 m (5' 2.99\")  Wt 69.854 kg (154 lb)  BMI 27.29 kg/m2  SpO2 95%    Physical Exam   Constitutional: He is oriented to person, place, and time. He appears well-developed and well-nourished.   HENT:   Head: Normocephalic and atraumatic.   Eyes: EOM are normal.   Neck: Normal range of motion. No JVD present.   Cardiovascular: Normal rate, regular rhythm, normal heart sounds and intact distal pulses.  Exam reveals no gallop and no friction rub.    No murmur heard.  Bilateral femoral pulses are 2+, bilateral dorsalis pedis pulses are 2+, bilateral posterior tibialis pulses are 2+.   Pulmonary/Chest: No respiratory distress. He has no wheezes. He has no rales. He exhibits no tenderness.   Abdominal: Soft. Bowel sounds are normal. There is no tenderness. There is no rebound and no guarding.   The is no presence of abdominal bruits   Musculoskeletal: Normal range of motion.   Neurological: He is alert and oriented to person, place, and time.   Skin: Skin is warm and dry.   Psychiatric: He has a normal mood and affect.   Nursing note and vitals reviewed.      Assessment:     1. Hypertension, essential     2. Paroxysmal atrial fibrillation (CMS-[facility])     3. Moderate aortic stenosis     4. Transient cerebral ischemia, unspecified type     5. IHSS (idiopathic hypertrophic subaortic stenosis) (CMS-[facility])     6. Hyperlipidemia, unspecified   "       Medical Decision Making:  Today's Assessment / Status / Plan:     At this time patient is clinically stable in terms of his cardiac standpoint.  No further cardiac work up at this time.    Needs to see GI.    Ok to be off of anticoagulation and DAP for now.    I will see patient back in clinic with lab tests and studies results in 6 months.    I thank you Dr. Gtz for referring patient to our Cardiology Clinic today.        Yarelis Gtz M.D.  [address] [place] [id number]  VIA In Basket

## 2017-04-20 NOTE — MR AVS SNAPSHOT
"        Rafia Kayo   2017 1:15 PM   Office Visit   MRN: [id number]    Department:  [facility]   Dept Phone:  [phone]    Description:  Male : 1933   Provider:  Marcelino Wu M.D.           Reason for Visit     Follow-Up           Allergies as of 2017     No Known Allergies      You were diagnosed with     Hypertension, essential   [262739]       Paroxysmal atrial fibrillation (CMS-HCC)   [520732]       Moderate aortic stenosis   [179744]       Transient cerebral ischemia, unspecified type   [1021296]       IHSS (idiopathic hypertrophic subaortic stenosis) (CMS-[facility])   [837619]       Hyperlipidemia, unspecified   [8015750]         Vital Signs     Blood Pressure Pulse Height Weight Body Mass Index Oxygen Saturation    140/68 mmHg 60 1.6 m (5' 2.99\") 69.854 kg (154 lb) 27.29 kg/m2 95%    Smoking Status                   Never Smoker            Basic Information     Date Of Birth Sex Race Ethnicity Preferred Language    1933 Male  Non- English      Your appointments     2017  1:15 PM   FOLLOW UP with Marcelino Wu M.D.   [facility] (--)    [address] [place] [address] [address]  [facility] [address] [phone]            May 02, 2017  2:20 PM   [facility] Patient with [facility] (--)    [address] [place] [address]  [address]  [facility] [id number] [phone]           You will be receiving a confirmation call a few days before your appointment from our automated call confirmation system.              Problem List              ICD-10-CM Priority Class Noted - Resolved    Paroxysmal atrial fibrillation (CMS-HCC) I48.0 High  2010 - Present    CAD (coronary artery disease) I25.10 High  2010 - Present    Benign non-nodular prostatic hyperplasia without lower urinary tract symptoms N40.0 High  2010 - Present    PVD (peripheral vascular disease) (CMS-HCC) I73.9 " High  9/18/2010 - Present    Gout of multiple sites M10.9 High  9/18/2010 - Present    Hyperlipidemia, unspecified E78.5 High  9/18/2010 - Present    Aortic stenosis, moderate I35.0 High  6/25/2012 - Present    Carotid artery stenosis (Chronic) I65.29   6/13/2011 - Present    Acquired hypothyroidism E03.9   6/13/2011 - Present    TIA (transient ischemic attack) (Chronic) G45.9   6/13/2011 - Present    Abdominal aortic aneurysm (CMS-HCC) I71.4   1/17/2013 - Present    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   2/3/2014 - Present    Abnormal liver function tests R79.89   5/7/2014 - Present    Chronic fatigue R53.82   10/21/2016 - Present    Hypertension, essential I10   10/21/2016 - Present    Chronic kidney disease, stage III (moderate) N18.3   10/21/2016 - Present    PAF (paroxysmal atrial fibrillation) (CMS-HCC) I48.0   3/21/2017 - Present    Lower GI bleed K92.2 High  3/21/2017 - Present    CAD (coronary artery disease) of artery bypass graft I25.810   3/21/2017 - Present    Moderate aortic stenosis I35.0   3/21/2017 - Present    Hypotension I95.9   3/21/2017 - Present    Acute encephalopathy G93.40 High  3/23/2017 - Present    Ureteral stone N20.1 Medium  3/24/2017 - Present    Acute blood loss anemia D62 High  3/24/2017 - Present      Health Maintenance        Date Due Completion Dates    IMM DTaP/Tdap/Td Vaccine (1 - Tdap) 12/16/1952 ---    IMM PNEUMOCOCCAL 65+ (ADULT) LOW/MEDIUM RISK SERIES (2 of 2 - PPSV23) 6/3/2016 6/3/2015    COLONOSCOPY 9/3/2025 9/3/2015            Current Immunizations     13-VALENT PCV PREVNAR 6/3/2015    Influenza TIV (IM) 4/2/2011  1:30 PM    Influenza Vaccine Adult HD 10/24/2016, 11/10/2015    Pneumococcal Vaccine (UF)Historical Data 4/2/2005    SHINGLES VACCINE 3/14/2013      Below and/or attached are the medications your provider expects you to take. Review all of your home medications and newly ordered medications with your provider and/or pharmacist.  Follow medication instructions as directed by your provider and/or pharmacist. Please keep your medication list with you and share with your provider. Update the information when medications are discontinued, doses are changed, or new medications (including over-the-counter products) are added; and carry medication information at all times in the event of emergency situations     Allergies:  No Known Allergies          Medications  Valid as of: April 20, 2017 -  9:18 AM    Generic Name Brand Name Tablet Size Instructions for use    Allopurinol (Tab) ZYLOPRIM 300 MG TAKE 1 TABLET ORALLY DAILY        AmLODIPine Besylate (Tab) NORVASC 10 MG TAKE 1 TAB BY MOUTH EVERY DAY.        Atorvastatin Calcium (Tab) LIPITOR 20 MG TAKE 1 TABLET ORALLY DAILY        B Complex Vitamins   Take 1 Tab by mouth every day.        Calcium Carbonate-Vit D-Min   Take 1 Tab by mouth every day.        Cilostazol (Tab) PLETAL 100 MG TAKE 1 TABLET ORALLY TWICE DAILY        Levothyroxine Sodium (Tab) SYNTHROID 50 MCG TAKE 1 TABLET ORALLY DAILY        Metoprolol Tartrate (Tab) LOPRESSOR 100 MG TAKE 1 TABLET BY MOUTH TWICE A DAY        Multiple Vitamins-Minerals (Tab) CENTRUM SILVER  Take 1 Tab by mouth every day.        Omega-3 Fatty Acids (Cap) OMEGA 3 FA 1000 MG Take 1,000 mg by mouth every day.        Omeprazole (CAPSULE DELAYED RELEASE) PRILOSEC 20 MG Take 1 Cap by mouth every day.        Spironolactone (Tab) ALDACTONE 25 MG TAKE 1 TABLET BY MOUTH EVERY DAY        .                 Medicines prescribed today were sent to:     [id number] - [place] - [address]    [address] [place] [address]    Phone: [phone] Fax: [phone]    Open 24 Hours?: No      Medication refill instructions:       If your prescription bottle indicates you have medication refills left, it is not necessary to call your provider’s office. Please contact your pharmacy and they will refill your medication.    If your prescription bottle  indicates you do not have any refills left, you may request refills at any time through one of the following ways: The online [employer] system (except Urgent Care), by calling your provider’s office, or by asking your pharmacy to contact your provider’s office with a refill request. Medication refills are processed only during regular business hours and may not be available until the next business day. Your provider may request additional information or to have a follow-up visit with you prior to refilling your medication.   *Please Note: Medication refills are assigned a new Rx number when refilled electronically. Your pharmacy may indicate that no refills were authorized even though a new prescription for the same medication is available at the pharmacy. Please request the medicine by name with the pharmacy before contacting your provider for a refill.           [employer] Access Code: Activation code not generated  Current [employer] Status: Active

## 2017-04-20 NOTE — PROGRESS NOTES
Subjective:   Rafia Shaikh is a 83 y.o. male who presents today cardiac care of moderate AS with mean gradient in 20s in 12/2014, paroxysmal atrial fibrillation, hypertension, coronary arterial disease, peripheral vascular disease, idiopathic hypertrophic subaortic stenosis, TIA.     In the interim, patient was found to have GI bleeding. His anticoagulation and plavix were stopped.     Past Medical History   Diagnosis Date   • Hyperlipidemia    • CAD (coronary artery disease)    • Hypertension    • Heart murmur    • Arrhythmia    • Unspecified disorder of thyroid    • PAD (peripheral artery disease)    • CATARACT      surgery   • Myocardial infarct (CMS-HCC) 2008   • High cholesterol    • Stroke (CMS-HCC)      TIA 4/16/10   • Renal disorder 2000     kidney stone   • Dental disorder      cavities, under current care   • Sleep apnea      no cpap   • Carotid artery stenosis 6/13/2011   • Dizziness 6/13/2011   • Hypothyroidism 6/13/2011   • TIA (transient ischemic attack) 6/13/2011   • Preoperative examination, unspecified 4/12/2011   • Insomnia    • Right knee pain    • Gout    • Tendonitis      Past Surgical History   Procedure Laterality Date   • Multiple coronary artery bypass endo vein harvest  3/20/08     Performed by AMADNA CRYSTAL at SURGERY [facility] ORS   • Other cardiac surgery       3 vessel bypass   • Other       nasal   • Recovery  3/25/2011     [facility]   • Recovery  4/1/2011     Performed [facility]   • Cataract extraction with iol       bilat   • Carotid endarterectomy  5/3/2011     Performed by ERASMO NAVARRETE at SURGERY [facility] ORS   • Recovery  8/22/2012     Performed by SURGERY, IR-RECOVERY at SURGERY [facility] ORS     Family History   Problem Relation Age of Onset   • Hypertension     • Colon Cancer Brother    • Breast Cancer Sister    • Other       GOUT   • Heart Disease Father    •  Other Mother      TB     History   Smoking status   • Never Smoker    Smokeless tobacco   • Never Used     No Known Allergies  Outpatient Encounter Prescriptions as of 4/20/2017   Medication Sig Dispense Refill   • omeprazole (PRILOSEC) 20 MG delayed-release capsule Take 1 Cap by mouth every day. 30 Cap 1   • atorvastatin (LIPITOR) 20 MG Tab TAKE 1 TABLET ORALLY DAILY 90 Tab 1   • cilostazol (PLETAL) 100 MG Tab TAKE 1 TABLET ORALLY TWICE DAILY 60 Tab 6   • metoprolol (LOPRESSOR) 100 MG Tab TAKE 1 TABLET BY MOUTH TWICE A DAY 60 Tab 6   • amlodipine (NORVASC) 10 MG Tab TAKE 1 TAB BY MOUTH EVERY DAY. 30 Tab 6   • levothyroxine (SYNTHROID) 50 MCG Tab TAKE 1 TABLET ORALLY DAILY 90 Tab 1   • allopurinol (ZYLOPRIM) 300 MG Tab TAKE 1 TABLET ORALLY DAILY 90 Tab 1   • spironolactone (ALDACTONE) 25 MG Tab TAKE 1 TABLET BY MOUTH EVERY DAY 90 Tab 1   • Calcium Carbonate-Vit D-Min (CALTRATE PLUS PO) Take 1 Tab by mouth every day.     • B Complex Vitamins (VITAMIN B COMPLEX PO) Take 1 Tab by mouth every day.     • Multiple Vitamins-Minerals (CENTRUM SILVER) TABS Take 1 Tab by mouth every day.     • docosahexanoic acid (OMEGA 3 FA) 1000 MG CAPS Take 1,000 mg by mouth every day.       No facility-administered encounter medications on file as of 4/20/2017.     Review of Systems   Constitutional: Negative for fever, chills, weight loss and malaise/fatigue.   HENT: Negative for ear discharge, ear pain, hearing loss and nosebleeds.    Eyes: Negative for blurred vision, double vision, pain and discharge.   Respiratory: Negative for cough and shortness of breath.    Cardiovascular: Negative for chest pain, palpitations, orthopnea, claudication, leg swelling and PND.   Gastrointestinal: Negative for nausea, vomiting, abdominal pain, blood in stool and melena.   Genitourinary: Negative for dysuria and hematuria.   Musculoskeletal: Negative for myalgias, joint pain and falls.   Skin: Negative for itching and rash.   Neurological: Negative  "for dizziness, sensory change, speech change, loss of consciousness and headaches.   Endo/Heme/Allergies: Negative for environmental allergies. Does not bruise/bleed easily.   Psychiatric/Behavioral: Negative for depression, suicidal ideas and hallucinations.        Objective:   /68 mmHg  Pulse 60  Ht 1.6 m (5' 2.99\")  Wt 69.854 kg (154 lb)  BMI 27.29 kg/m2  SpO2 95%    Physical Exam   Constitutional: He is oriented to person, place, and time. He appears well-developed and well-nourished.   HENT:   Head: Normocephalic and atraumatic.   Eyes: EOM are normal.   Neck: Normal range of motion. No JVD present.   Cardiovascular: Normal rate, regular rhythm, normal heart sounds and intact distal pulses.  Exam reveals no gallop and no friction rub.    No murmur heard.  Bilateral femoral pulses are 2+, bilateral dorsalis pedis pulses are 2+, bilateral posterior tibialis pulses are 2+.   Pulmonary/Chest: No respiratory distress. He has no wheezes. He has no rales. He exhibits no tenderness.   Abdominal: Soft. Bowel sounds are normal. There is no tenderness. There is no rebound and no guarding.   The is no presence of abdominal bruits   Musculoskeletal: Normal range of motion.   Neurological: He is alert and oriented to person, place, and time.   Skin: Skin is warm and dry.   Psychiatric: He has a normal mood and affect.   Nursing note and vitals reviewed.      Assessment:     1. Hypertension, essential     2. Paroxysmal atrial fibrillation (CMS-[facility])     3. Moderate aortic stenosis     4. Transient cerebral ischemia, unspecified type     5. IHSS (idiopathic hypertrophic subaortic stenosis) (CMS-[facility])     6. Hyperlipidemia, unspecified         Medical Decision Making:  Today's Assessment / Status / Plan:     At this time patient is clinically stable in terms of his cardiac standpoint.  No further cardiac work up at this time.    Needs to see GI.    Ok to be off of anticoagulation and DAP for now.    I will see patient " back in clinic with lab tests and studies results in 6 months.    I thank you Dr. Gtz for referring patient to our Cardiology Clinic today.

## 2017-04-20 NOTE — TELEPHONE ENCOUNTER
Last seen: 04/18/17 by Dr. Medellin  Next appt: 05/02/17 with Dr. Gtz    Was the patient seen in the last year in this department? Yes   Does patient have an active prescription for medications requested? No   Received Request Via: Pharmacy

## 2017-05-01 PROBLEM — I95.9 HYPOTENSION: Status: RESOLVED | Noted: 2017-03-21 | Resolved: 2017-05-01

## 2017-05-01 PROBLEM — I48.0 PAF (PAROXYSMAL ATRIAL FIBRILLATION) (HCC): Status: RESOLVED | Noted: 2017-03-21 | Resolved: 2017-05-01

## 2017-05-01 PROBLEM — I35.0 MODERATE AORTIC STENOSIS: Status: RESOLVED | Noted: 2017-03-21 | Resolved: 2017-05-01

## 2017-05-01 PROBLEM — I25.810 CAD (CORONARY ARTERY DISEASE) OF ARTERY BYPASS GRAFT: Status: RESOLVED | Noted: 2017-03-21 | Resolved: 2017-05-01

## 2017-05-01 PROBLEM — D62 ACUTE BLOOD LOSS ANEMIA: Status: RESOLVED | Noted: 2017-03-24 | Resolved: 2017-05-01

## 2017-05-01 PROBLEM — G93.40 ACUTE ENCEPHALOPATHY: Status: RESOLVED | Noted: 2017-03-23 | Resolved: 2017-05-01

## 2017-05-02 ENCOUNTER — OFFICE VISIT (OUTPATIENT)
Dept: INTERNAL MEDICINE | Facility: MEDICAL CENTER | Age: 82
End: 2017-05-02
Payer: MEDICARE

## 2017-05-02 VITALS
HEIGHT: 63 IN | DIASTOLIC BLOOD PRESSURE: 74 MMHG | TEMPERATURE: 98.4 F | HEART RATE: 61 BPM | BODY MASS INDEX: 27 KG/M2 | WEIGHT: 152.4 LBS | SYSTOLIC BLOOD PRESSURE: 118 MMHG | OXYGEN SATURATION: 93 %

## 2017-05-02 DIAGNOSIS — G45.9 TRANSIENT CEREBRAL ISCHEMIA, UNSPECIFIED TYPE: Chronic | ICD-10-CM

## 2017-05-02 DIAGNOSIS — N40.0 BENIGN NON-NODULAR PROSTATIC HYPERPLASIA WITHOUT LOWER URINARY TRACT SYMPTOMS: ICD-10-CM

## 2017-05-02 DIAGNOSIS — E78.5 DYSLIPIDEMIA: ICD-10-CM

## 2017-05-02 DIAGNOSIS — N20.1 URETERAL STONE: ICD-10-CM

## 2017-05-02 DIAGNOSIS — Z91.81 RISK FOR FALLS: ICD-10-CM

## 2017-05-02 DIAGNOSIS — I25.10 CORONARY ARTERY DISEASE INVOLVING NATIVE HEART, ANGINA PRESENCE UNSPECIFIED, UNSPECIFIED VESSEL OR LESION TYPE: ICD-10-CM

## 2017-05-02 DIAGNOSIS — M10.9 GOUT OF MULTIPLE SITES, UNSPECIFIED CAUSE, UNSPECIFIED CHRONICITY: ICD-10-CM

## 2017-05-02 DIAGNOSIS — E03.9 ACQUIRED HYPOTHYROIDISM: ICD-10-CM

## 2017-05-02 DIAGNOSIS — I73.9 PVD (PERIPHERAL VASCULAR DISEASE) (HCC): ICD-10-CM

## 2017-05-02 DIAGNOSIS — Z00.00 HEALTHCARE MAINTENANCE: ICD-10-CM

## 2017-05-02 DIAGNOSIS — I10 HYPERTENSION, ESSENTIAL: ICD-10-CM

## 2017-05-02 DIAGNOSIS — I35.0 AORTIC STENOSIS, MODERATE: ICD-10-CM

## 2017-05-02 DIAGNOSIS — N18.30 CHRONIC KIDNEY DISEASE, STAGE III (MODERATE) (HCC): ICD-10-CM

## 2017-05-02 DIAGNOSIS — K92.2 LOWER GI BLEED: ICD-10-CM

## 2017-05-02 DIAGNOSIS — R53.82 CHRONIC FATIGUE: ICD-10-CM

## 2017-05-02 PROCEDURE — 99215 OFFICE O/P EST HI 40 MIN: CPT | Performed by: INTERNAL MEDICINE

## 2017-05-02 ASSESSMENT — PATIENT HEALTH QUESTIONNAIRE - PHQ9
5. POOR APPETITE OR OVEREATING: 2 - MORE THAN HALF THE DAYS
CLINICAL INTERPRETATION OF PHQ2 SCORE: 5
SUM OF ALL RESPONSES TO PHQ QUESTIONS 1-9: 18

## 2017-05-02 NOTE — MR AVS SNAPSHOT
"        Rafia Kayo   2017 2:20 PM   Office Visit   MRN: [id number]    Department:  [facility] - Internal Med   Dept Phone:  [phone]    Description:  Male : 1933   Provider:  Yarelis Gtz M.D.           Reason for Visit     GI Bleeding F/u       Allergies as of 2017     No Known Allergies      You were diagnosed with     Risk for falls   [944003]       Lower GI bleed   [643652]       Transient cerebral ischemia, unspecified type   [7272663]       Coronary artery disease involving native heart, angina presence unspecified, unspecified vessel or lesion type   [8473303]       PVD (peripheral vascular disease) (CMS-[facility])   [630758]       Gout of multiple sites, unspecified cause, unspecified chronicity   [9684917]       Benign non-nodular prostatic hyperplasia without lower urinary tract symptoms   [2068591]       Aortic stenosis, moderate   [854908]       Acquired hypothyroidism   [2137341]       Ureteral stone   [253862]       Hypertension, essential   [144001]       Chronic kidney disease, stage III (moderate)   [585.3.ICD-9-CM]       Dyslipidemia   [820683]       Chronic fatigue   [747694]         Vital Signs     Blood Pressure Pulse Temperature Height Weight Body Mass Index    118/74 mmHg 61 36.9 °C (98.4 °F) 1.588 m (5' 2.52\") 69.128 kg (152 lb 6.4 oz) 27.41 kg/m2    Oxygen Saturation Smoking Status                93% Never Smoker           Basic Information     Date Of Birth Sex Race Ethnicity Preferred Language    1933 Male  Non- English      Your appointments     May 12, 2017  1:15 PM   Scheduled Pre Admission with PREADMIT  1   PREADMIT TESTS [facility] (--)    [address]  [facility] [address]   [phone]            Sep 14, 2017  9:20 AM   Established Patient with Yarelis Gtz M.D.   [facility] - Internal Medicine (--)    [address]  [address]  [facility] [address] [phone]           You will be receiving a confirmation call a few " days before your appointment from our automated call confirmation system.              Problem List              ICD-10-CM Priority Class Noted - Resolved    Paroxysmal atrial fibrillation (CMS-HCC) I48.0 High  9/18/2010 - Present    CAD (coronary artery disease) I25.10 High  9/18/2010 - Present    Benign non-nodular prostatic hyperplasia without lower urinary tract symptoms N40.0 High  9/18/2010 - Present    PVD (peripheral vascular disease) (CMS-HCC) I73.9 High  9/18/2010 - Present    Gout of multiple sites M10.9 High  9/18/2010 - Present    Dyslipidemia E78.5 High  9/18/2010 - Present    Aortic stenosis, moderate I35.0 High  6/25/2012 - Present    Carotid artery stenosis (Chronic) I65.29   6/13/2011 - Present    Acquired hypothyroidism E03.9   6/13/2011 - Present    TIA (transient ischemic attack) (Chronic) G45.9   6/13/2011 - Present    Abdominal aortic aneurysm (CMS-HCC) I71.4   1/17/2013 - Present    Abnormal liver function tests R79.89   5/7/2014 - Present    Chronic fatigue R53.82   10/21/2016 - Present    Hypertension, essential I10   10/21/2016 - Present    Chronic kidney disease, stage III (moderate) N18.3   10/21/2016 - Present    Lower GI bleed K92.2 High  3/21/2017 - Present    Ureteral stone N20.1 Medium  3/24/2017 - Present    Risk for falls Z91.81   5/2/2017 - Present      Health Maintenance        Date Due Completion Dates    IMM DTaP/Tdap/Td Vaccine (1 - Tdap) [birth year]/16/1952 ---    IMM PNEUMOCOCCAL 65+ (ADULT) LOW/MEDIUM RISK SERIES (2 of 2 - PPSV23) 6/3/2016 6/3/2015    COLONOSCOPY 9/3/2025 9/3/2015            Current Immunizations     13-VALENT PCV PREVNAR 6/3/2015    Influenza TIV (IM) 4/2/2011  1:30 PM    Influenza Vaccine Adult HD 10/24/2016, 11/10/2015    Pneumococcal Vaccine (UF)Historical Data 4/2/2005    SHINGLES VACCINE 3/14/2013      Below and/or attached are the medications your provider expects you to take. Review all of your home medications and newly ordered medications with your  provider and/or pharmacist. Follow medication instructions as directed by your provider and/or pharmacist. Please keep your medication list with you and share with your provider. Update the information when medications are discontinued, doses are changed, or new medications (including over-the-counter products) are added; and carry medication information at all times in the event of emergency situations     Allergies:  No Known Allergies          Medications  Valid as of: May 02, 2017 -  3:54 PM    Generic Name Brand Name Tablet Size Instructions for use    Allopurinol (Tab) ZYLOPRIM 300 MG TAKE 1 TABLET ORALLY DAILY        AmLODIPine Besylate (Tab) NORVASC 10 MG TAKE 1 TAB BY MOUTH EVERY DAY.        Aspirin (Tablet Delayed Response) ECOTRIN 81 MG Take 81 mg by mouth every day.        Atorvastatin Calcium (Tab) LIPITOR 20 MG TAKE 1 TABLET ORALLY DAILY        B Complex Vitamins   Take 1 Tab by mouth every day.        Calcium Carbonate-Vit D-Min   Take 1 Tab by mouth every day.        Cilostazol (Tab) PLETAL 100 MG TAKE 1 TABLET ORALLY TWICE DAILY        Levothyroxine Sodium (Tab) SYNTHROID 50 MCG TAKE 1 TABLET ORALLY DAILY        Metoprolol Tartrate (Tab) LOPRESSOR 100 MG TAKE 1 TABLET BY MOUTH TWICE A DAY        Multiple Vitamins-Minerals (Tab) CENTRUM SILVER  Take 1 Tab by mouth every day.        Spironolactone (Tab) ALDACTONE 25 MG TAKE 1 TABLET BY MOUTH EVERY DAY        .                 Medicines prescribed today were sent to:     [id number] - [place] - [address]    [address] [place] [address]    Phone: [phone] Fax: [phone]    Open 24 Hours?: No      Medication refill instructions:       If your prescription bottle indicates you have medication refills left, it is not necessary to call your provider’s office. Please contact your pharmacy and they will refill your medication.    If your prescription bottle indicates you do not have any refills left, you may request refills at  any time through one of the following ways: The online [employer] system (except Urgent Care), by calling your provider’s office, or by asking your pharmacy to contact your provider’s office with a refill request. Medication refills are processed only during regular business hours and may not be available until the next business day. Your provider may request additional information or to have a follow-up visit with you prior to refilling your medication.   *Please Note: Medication refills are assigned a new Rx number when refilled electronically. Your pharmacy may indicate that no refills were authorized even though a new prescription for the same medication is available at the pharmacy. Please request the medicine by name with the pharmacy before contacting your provider for a refill.        Your To Do List     Future Labs/Procedures Complete By Expires    IRON/TOTAL IRON BIND  As directed 5/3/2018    TSH WITH REFLEX TO FT4  As directed 5/2/2018    VITAMIN B12  As directed 5/3/2018      Referral     A referral request has been sent to our patient care coordination department. Please allow 3-5 business days for us to process this request and contact you either by phone or mail. If you do not hear from us by the 5th business day, please call us at [phone].           [employer] Access Code: Activation code not generated  Current [employer] Status: Active

## 2017-05-02 NOTE — ASSESSMENT & PLAN NOTE
Patient denies chest pain/pressure with exertion. Denies palpitations, edema, orthopnea, PND, AYALA. Told OK to be off anticoag for now.   Pt planning eye surgery- will do while off anticoag

## 2017-05-02 NOTE — ASSESSMENT & PLAN NOTE
Frequent falls d/t weak legs and loss of balance. Quit PT- no help. No stairs in home. Except two steps to garage- no rail. R leg stronger. Many chairs at home without arm rests.  Whole L leg is weak. No back pain. X 6 - 12 mths.

## 2017-05-02 NOTE — ASSESSMENT & PLAN NOTE
"Taking FE and B12 bid-taking thyroid as prescribed. Wakes rested. But not \"so fully great\". Tired \"all the time\".  Never emotional- all life- \"wife says I am mean\".   "

## 2017-05-03 ENCOUNTER — HOME HEALTH ADMISSION (OUTPATIENT)
Dept: HOME HEALTH SERVICES | Facility: HOME HEALTHCARE | Age: 82
End: 2017-05-03
Payer: MEDICARE

## 2017-05-03 ENCOUNTER — TELEPHONE (OUTPATIENT)
Dept: CARDIOLOGY | Facility: MEDICAL CENTER | Age: 82
End: 2017-05-03

## 2017-05-03 ENCOUNTER — TELEPHONE (OUTPATIENT)
Dept: INTERNAL MEDICINE | Facility: MEDICAL CENTER | Age: 82
End: 2017-05-03

## 2017-05-03 NOTE — ASSESSMENT & PLAN NOTE
"Patient asks when he will die and what he will die. He is asked this many times over the years. It is more insistent today. He states that his wife says he is mean. When I ask him if he thinks he is he says he does not know. He states that he does not have much emotions and never has been there 15 years together. He does not say things such as \"I love you\". he states that he is close to his daughter but that his wife is not at they are enemies.  A neighbor drove him here today and is concerned for their independence, states they rely on neighbors a great deal  "

## 2017-05-03 NOTE — TELEPHONE ENCOUNTER
Pt was in for appt with Dr. Gtz and had his neighbor with him (she waited in lobby). The neighbor brought the following concerns to my attention: Says Rafia comes over to her house and is confused by date and time, that he sleeps ~ 20 hrs a day and that she is often in the middle of aguements between him and his wife. Feels that he needs to be in assisted living.

## 2017-05-03 NOTE — PROGRESS NOTES
Established Patient    Rafia Shaikh is a 83 y.o. male who presents today with the following:    CC: Here for hospital follow-up. He has lots of questions.    HPI:    Risk for falls  Frequent falls d/t weak legs and loss of balance. Quit PT- no help. No stairs in home. Except two steps to garage- no rail. R leg stronger. Many chairs at home without arm rests.  Whole L leg is weak. No back pain. X 6 - 12 mths.     Lower GI bleed  Colonoscopy scheduled- has not had any blood.    TIA (transient ischemic attack)  Patient denies any new neurological symptoms no numbness, weakness, vision, swallow, or speech problems. And is taking meds as prescribed.      CAD (coronary artery disease)  Patient denies chest pain/pressure with exertion. Denies palpitations, edema, orthopnea, PND, AYALA. Told OK to be off anticoag for now.   Pt planning eye surgery- will do while off anticoag          PVD (peripheral vascular disease) (CMS-[facility])  No pain in legs- just tired-    Gout of multiple sites  No probs on meds.    Benign non-nodular prostatic hyperplasia without lower urinary tract symptoms  Peeing OK    Aortic stenosis, moderate  No pre-syncope    Acquired hypothyroidism  Patient is feeling well on their current thyroid dose. The most recent thyroid function tests were normal. No unusual fatigue, skin changes, depression, constipation. Patient is compliant with medication. Takes medication as directed on an empty stomach.        Ureteral stone  No flank pain, no hematuria.  Hx kidney stone 8-9 yrs ago. S/p lithotrypsy    Hypertension, essential  The patient denies any symptoms referable to her elevated blood pressure. Specifically denies chest pain, palpitations, dyspnea, orthopnea, PND or peripheral edema. Current medication regimen is as listed below. Patient denies any side effects of medication.        Chronic kidney disease, stage III (moderate)  Patient is aware of decreased renal function. And is being cautious about  "NSAIDs.      Dyslipidemia  Patient denies any signs/symptoms of cardiovascular disease. Denies chest pain/pressure; denies numbness/weakness or difficulty with speech/swallowing or sudden change in vision.       Chronic fatigue  Taking FE and B12 bid-taking thyroid as prescribed. Wakes rested. But not \"so fully great\". Tired \"all the time\".  Never emotional- all life- \"wife says I am mean\".     Healthcare maintenance  Patient asks when he will die and what he will die. He is asked this many times over the years. It is more insistent today. He states that his wife says he is mean. When I ask him if he thinks he is he says he does not know. He states that he does not have much emotions and never has been there 15 years together. He does not say things such as \"I love you\". he states that he is close to his daughter but that his wife is not at they are enemies.  A neighbor drove him here today and is concerned for their independence, states they rely on neighbors a great deal      Patient Active Problem List    Diagnosis Date Noted   • Lower GI bleed 03/21/2017     Priority: High   • Aortic stenosis, moderate 06/25/2012     Priority: High   • Paroxysmal atrial fibrillation (CMS-HCC) 09/18/2010     Priority: High   • CAD (coronary artery disease) 09/18/2010     Priority: High   • Benign non-nodular prostatic hyperplasia without lower urinary tract symptoms 09/18/2010     Priority: High   • PVD (peripheral vascular disease) (CMS-HCC) 09/18/2010     Priority: High   • Gout of multiple sites 09/18/2010     Priority: High   • Dyslipidemia 09/18/2010     Priority: High   • Ureteral stone 03/24/2017     Priority: Medium   • Risk for falls 05/02/2017   • Healthcare maintenance 05/02/2017   • Chronic fatigue 10/21/2016   • Hypertension, essential 10/21/2016   • Chronic kidney disease, stage III (moderate) 10/21/2016   • Abnormal liver function tests 05/07/2014   • Abdominal aortic aneurysm (CMS-HCC) 01/17/2013   • Carotid " artery stenosis 06/13/2011   • Acquired hypothyroidism 06/13/2011   • TIA (transient ischemic attack) 06/13/2011       Current Outpatient Prescriptions   Medication Sig Dispense Refill   • aspirin EC (ECOTRIN) 81 MG Tablet Delayed Response Take 81 mg by mouth every day.     • allopurinol (ZYLOPRIM) 300 MG Tab TAKE 1 TABLET ORALLY DAILY 90 Tab 0   • spironolactone (ALDACTONE) 25 MG Tab TAKE 1 TABLET BY MOUTH EVERY DAY 90 Tab 0   • atorvastatin (LIPITOR) 20 MG Tab TAKE 1 TABLET ORALLY DAILY 90 Tab 1   • cilostazol (PLETAL) 100 MG Tab TAKE 1 TABLET ORALLY TWICE DAILY 60 Tab 6   • metoprolol (LOPRESSOR) 100 MG Tab TAKE 1 TABLET BY MOUTH TWICE A DAY 60 Tab 6   • amlodipine (NORVASC) 10 MG Tab TAKE 1 TAB BY MOUTH EVERY DAY. 30 Tab 6   • levothyroxine (SYNTHROID) 50 MCG Tab TAKE 1 TABLET ORALLY DAILY 90 Tab 1   • Calcium Carbonate-Vit D-Min (CALTRATE PLUS PO) Take 1 Tab by mouth every day.     • B Complex Vitamins (VITAMIN B COMPLEX PO) Take 1 Tab by mouth every day.     • Multiple Vitamins-Minerals (CENTRUM SILVER) TABS Take 1 Tab by mouth every day.       No current facility-administered medications for this visit.       Social History     Social History   • Marital Status:      Spouse Name: N/A   • Number of Children: N/A   • Years of Education: N/A     Occupational History   • Not on file.     Social History Main Topics   • Smoking status: Never Smoker    • Smokeless tobacco: Never Used   • Alcohol Use: No   • Drug Use: No   • Sexual Activity: Not on file     Other Topics Concern   • Not on file     Social History Narrative       Family History   Problem Relation Age of Onset   • Hypertension     • Colon Cancer Brother    • Breast Cancer Sister    • Other       GOUT   • Heart Disease Father    • Other Mother      TB       ROS: As per HPI. Additional pertinent symptoms as noted below.    ROS:  Constitutional ROS: No unexpected change in weight, Positive for fatigue , weakness   Eye ROS: No recent significant  "change in vision, Positive for fleshy growths lateral eyes that he wishes have taken off  Pulmonary ROS: No shortness of breath, No recent change in breathing  Cardiovascular ROS: No chest pain, No edema, No palpitations, No syncope  Gastrointestinal ROS: No abdominal pain, No nausea, vomiting, diarrhea, or constipation  Psychiatric ROS: No anxiety, when asked about depression the patient says I don't know    /74 mmHg  Pulse 61  Temp(Src) 36.9 °C (98.4 °F)  Ht 1.588 m (5' 2.52\")  Wt 69.128 kg (152 lb 6.4 oz)  BMI 27.41 kg/m2  SpO2 93%    Physical Exam  General:  Alert and oriented, No apparent distress.    Eyes: Pupils equal and reactive. No scleral icterus.    Throat: Clear no erythema or exudates noted.    Neck: Supple. No lymphadenopathy noted. Thyroid not enlarged.    Lungs: Clear to auscultation and percussion bilaterally.    Cardiovascular: Regular rate and rhythm. No murmurs, rubs or gallops.    Abdomen:  Benign. No rebound or guarding noted.    Extremities: No clubbing, cyanosis, edema.    Skin: Clear. No rash or suspicious skin lesions noted.    Other: Patient in and out of chair without armrests, on and off exam table without problem or assistance.          Assessment and Plan    1. Risk for falls  Patient denies syncope, dizziness, or episodes of stumbling or tripping. He just notes that he is tired and feels weak. He notes this is been going on for years but is progressively getting worse. On exam he is quick getting in and out of chair and on and off exam table  - Patient identified as fall risk.  Appropriate orders and counseling given.  - REFERRAL TO HOME HEALTH    2. Lower GI bleed  Hemoglobin is back to normal. He has a colonoscopy scheduled. He has had some elevated transaminases in the reevaluation as well    3. Transient cerebral ischemia, unspecified type  Patient has no signs of recent neurovascular event. Risk factors are controlled.      - REFERRAL TO HOME HEALTH    4. Coronary " artery disease involving native heart, angina presence unspecified, unspecified vessel or lesion type  No signs/symptoms of CAD. Taking medications as prescribed.   History/seen his cardiologist and was told he had 6 months for follow-up      5. PVD (peripheral vascular disease) (CMS-HCC)  Denies leg pain, or pain with walking. Has regular follow-up with vascular    6. Gout of multiple sites, unspecified cause, unspecified chronicity  Denies any recent symptoms    7. Benign non-nodular prostatic hyperplasia without lower urinary tract symptoms  Denies any difficulty urinating, or urinary retention.    8. Aortic stenosis, moderate  Stable per cardiology note    9. Acquired hypothyroidism  Thyroid labs are within range. Patient is taking meds as prescribed first thing in morning without food. And has no signs or symptoms of lower hypothyroid.  Has been sometime since he's had labs and repeat them at this time      10. Ureteral stone  Patient has had a kidney stone in the past and required lithotripsy. We'll refer to urology for this stone seen on CT scan  - REFERRAL TO UROLOGY    11. Hypertension, essential  Near/at goal No change in treatment. Discussed diet, exercise and salt restriction.          12. Chronic kidney disease, stage III (moderate)  Reinforced avoiding nephrotoxic medications, especially NSAIDs, and ensuring enough daily fluids        13. Dyslipidemia  Lipids are in reasonable range. The patient to continue paying attention to diet and exercise. Patient aware to go to emergency department or call 911 if any signs of cardiovascular disease- chest pain or pressure, sudden numbness or weakness in an arm or leg, sudden loss of ability to talk or swallow.        14. Chronic fatigue  We'll do some routine labs. Patient did not feel stronger with physical therapy. We'll do a home health assessment  - TSH WITH REFLEX TO FT4; Future  - VITAMIN B12; Future  - IRON/TOTAL IRON BIND; Future  - REFERRAL TO HOME  "HEALTH    15. Healthcare maintenance  The overall picture is somewhat confusing to me. Seems there is not much of her relationship with the children, not even the son who is a very successful physician. Patient did say he will give us a sense number so we could contact him but that he did not have that number with him. When he discussed the strain in his marital relationship. I had recommended for some counseling. He notes that he is aware of some family counselors near his home but that he did not think he would want to go there. I described all the services available at the [facility]. Both to discuss some of his end-of-life questions. Consider filling out a POLST form. And then working with both the psychologist and the  in regards to his marriage and home situation. It is not clear the neighbors will be able to continue to help. I have expressed my concern that he and his wife may not be able to live alone in the long-term, they are radiate depending on the neighbors for driving. I spent some time explaining his medical problems to him as well as that I am unable to predict what the cause of death will be or when that will occur. He has just lost his brother a few days ago from \"old age\" and that may be heavy on his mind today as well. He does not feel he is really depressed. States he has always had a flat affect and not been very emotional. He is slow to respond and thoughtful, which has always been his pattern although seems more so at this time.       No Follow-up on file.    Signed by: Yarelis Gtz M.D.      This note was created using voice recognition software. There may be unintended errors in spelling, grammar or content.      "

## 2017-05-03 NOTE — TELEPHONE ENCOUNTER
PROCEDURE CLEARANCE:  [facility]; Dr. Osmany Smart  Planning Colonoscopy on 5/16/2017  Telephone: [phone]   Fax: [phone]  Faxed form to Dr. Wu for clearance and ok off Pletal 2 days prior to procedure.  Pt is not on anticoagulation currently.  Spoke with Estela MIDDLETON and notified that Dr. Wu will be back in the office next week.

## 2017-05-03 NOTE — TELEPHONE ENCOUNTER
Have recommended patient have an assessment at the [facility]. He states that we can talk to his son and will provide us with a phone number for him. Son was a former resident here and is now living out of state. He also agrees to home health. Referral has been placed. Discussed the concern about him living alone at home. He states he and his wife do not wish to make any changes

## 2017-05-05 ENCOUNTER — HOME CARE VISIT (OUTPATIENT)
Dept: HOME HEALTH SERVICES | Facility: HOME HEALTHCARE | Age: 82
End: 2017-05-05
Payer: MEDICARE

## 2017-05-05 PROCEDURE — 665997 HH PPS REVENUE ADJ

## 2017-05-05 PROCEDURE — 665999 HH PPS REVENUE DEBIT

## 2017-05-05 PROCEDURE — G0162 HHC RN E&M PLAN SVS, 15 MIN: HCPCS

## 2017-05-05 PROCEDURE — 665001 SOC-HOME HEALTH

## 2017-05-05 PROCEDURE — 665998 HH PPS REVENUE CREDIT

## 2017-05-05 SDOH — ECONOMIC STABILITY: HOUSING INSECURITY: UNSAFE COOKING RANGE AREA: 0

## 2017-05-05 SDOH — ECONOMIC STABILITY: HOUSING INSECURITY: UNSAFE APPLIANCES: 0

## 2017-05-05 ASSESSMENT — PATIENT HEALTH QUESTIONNAIRE - PHQ9
1. LITTLE INTEREST OR PLEASURE IN DOING THINGS: 00
2. FEELING DOWN, DEPRESSED, IRRITABLE, OR HOPELESS: 01

## 2017-05-05 ASSESSMENT — ENCOUNTER SYMPTOMS
NAUSEA: NO
VOMITING: NO
SHORTNESS OF BREATH: T

## 2017-05-06 ENCOUNTER — HOME CARE VISIT (OUTPATIENT)
Dept: HOME HEALTH SERVICES | Facility: HOME HEALTHCARE | Age: 82
End: 2017-05-06
Payer: MEDICARE

## 2017-05-06 VITALS
TEMPERATURE: 98.6 F | DIASTOLIC BLOOD PRESSURE: 64 MMHG | RESPIRATION RATE: 18 BRPM | SYSTOLIC BLOOD PRESSURE: 134 MMHG | HEIGHT: 62 IN

## 2017-05-06 PROCEDURE — 665999 HH PPS REVENUE DEBIT

## 2017-05-06 PROCEDURE — 665998 HH PPS REVENUE CREDIT

## 2017-05-06 ASSESSMENT — ACTIVITIES OF DAILY LIVING (ADL)
HOME_HEALTH_OASIS: 01
OASIS_M1830: 02
HOME_HEALTH_OASIS: 02

## 2017-05-07 PROCEDURE — 665998 HH PPS REVENUE CREDIT

## 2017-05-07 PROCEDURE — 665999 HH PPS REVENUE DEBIT

## 2017-05-08 PROCEDURE — 665998 HH PPS REVENUE CREDIT

## 2017-05-08 PROCEDURE — 665999 HH PPS REVENUE DEBIT

## 2017-05-08 NOTE — TELEPHONE ENCOUNTER
Clearance faxed back.  Cleared from cardiac standpoint and ok to hold Pletal 2 days prior to procedure  Form to Scan

## 2017-05-09 ENCOUNTER — HOME CARE VISIT (OUTPATIENT)
Dept: HOME HEALTH SERVICES | Facility: HOME HEALTHCARE | Age: 82
End: 2017-05-09
Payer: MEDICARE

## 2017-05-09 PROCEDURE — 665999 HH PPS REVENUE DEBIT

## 2017-05-09 PROCEDURE — 665998 HH PPS REVENUE CREDIT

## 2017-05-10 ENCOUNTER — HOME CARE VISIT (OUTPATIENT)
Dept: HOME HEALTH SERVICES | Facility: HOME HEALTHCARE | Age: 82
End: 2017-05-10
Payer: MEDICARE

## 2017-05-10 ENCOUNTER — TELEPHONE (OUTPATIENT)
Dept: INTERNAL MEDICINE | Facility: MEDICAL CENTER | Age: 82
End: 2017-05-10

## 2017-05-10 PROCEDURE — 665999 HH PPS REVENUE DEBIT

## 2017-05-10 PROCEDURE — 665998 HH PPS REVENUE CREDIT

## 2017-05-10 ASSESSMENT — ACTIVITIES OF DAILY LIVING (ADL)
HOME_HEALTH_OASIS: 01
OASIS_M1830: 02
HOME_HEALTH_OASIS: 02

## 2017-05-11 PROCEDURE — 665999 HH PPS REVENUE DEBIT

## 2017-05-11 PROCEDURE — 665998 HH PPS REVENUE CREDIT

## 2017-05-12 DIAGNOSIS — Z01.810 PRE-OPERATIVE CARDIOVASCULAR EXAMINATION: ICD-10-CM

## 2017-05-12 DIAGNOSIS — Z01.812 PRE-OPERATIVE LABORATORY EXAMINATION: ICD-10-CM

## 2017-05-12 PROCEDURE — 85027 COMPLETE CBC AUTOMATED: CPT

## 2017-05-12 PROCEDURE — 665998 HH PPS REVENUE CREDIT

## 2017-05-12 PROCEDURE — 93005 ELECTROCARDIOGRAM TRACING: CPT

## 2017-05-12 PROCEDURE — 36415 COLL VENOUS BLD VENIPUNCTURE: CPT

## 2017-05-12 PROCEDURE — 80048 BASIC METABOLIC PNL TOTAL CA: CPT

## 2017-05-12 PROCEDURE — 93010 ELECTROCARDIOGRAM REPORT: CPT | Performed by: INTERNAL MEDICINE

## 2017-05-12 PROCEDURE — 665999 HH PPS REVENUE DEBIT

## 2017-05-12 NOTE — OR NURSING
Pre-admit appointment completed. Pt instructed to continue regularly prescribed medications through the day before surgery. Pt instructed to take the following medications the day of surgery with a sip of water, per anesthesia protocol; norvasc, synthroid, lopressor.     Note pt not clear on holding pletal, but states was instructed to continue his aspirin. Call placed to Dr Smart's office because pt states his instructions came from that office.   Note cardiology note from last visit of 4/20/17 in [facility].     Spoke with Kianna at Dr Smrat's office to clarify above. She states she will fax clearance from Dr Wu for procedure, and pt is to hold Pletal starting Sunday 5/14/17. Kianna will verify with the doctors about pt continuing or holding aspirin and then she will contact pt's neighbor(Rani) who is helping pt and wife. Explained such to pt and Rani and wife, and all voice understanding. Pt will ask Dr Smart DOS about when to restart meds.  Long time spent to make sure pt understand instructions.     Rani spoke with Dr Smart office and pt is NOT to stop aspirin.

## 2017-05-13 LAB
ANION GAP SERPL CALC-SCNC: 7 MMOL/L (ref 0–11.9)
BUN SERPL-MCNC: 22 MG/DL (ref 8–22)
CALCIUM SERPL-MCNC: 10.2 MG/DL (ref 8.5–10.5)
CHLORIDE SERPL-SCNC: 107 MMOL/L (ref 96–112)
CO2 SERPL-SCNC: 24 MMOL/L (ref 20–33)
CREAT SERPL-MCNC: 1.31 MG/DL (ref 0.5–1.4)
ERYTHROCYTE [DISTWIDTH] IN BLOOD BY AUTOMATED COUNT: 50.1 FL (ref 35.9–50)
GFR SERPL CREATININE-BSD FRML MDRD: 52 ML/MIN/1.73 M 2
GLUCOSE SERPL-MCNC: 115 MG/DL (ref 65–99)
HCT VFR BLD AUTO: 41.5 % (ref 42–52)
HGB BLD-MCNC: 13.5 G/DL (ref 14–18)
MCH RBC QN AUTO: 31.6 PG (ref 27–33)
MCHC RBC AUTO-ENTMCNC: 32.5 G/DL (ref 33.7–35.3)
MCV RBC AUTO: 97.2 FL (ref 81.4–97.8)
PLATELET # BLD AUTO: 240 K/UL (ref 164–446)
PMV BLD AUTO: 9.1 FL (ref 9–12.9)
POTASSIUM SERPL-SCNC: 3.6 MMOL/L (ref 3.6–5.5)
RBC # BLD AUTO: 4.27 M/UL (ref 4.7–6.1)
SODIUM SERPL-SCNC: 138 MMOL/L (ref 135–145)
WBC # BLD AUTO: 8.7 K/UL (ref 4.8–10.8)

## 2017-05-13 PROCEDURE — 665998 HH PPS REVENUE CREDIT

## 2017-05-13 PROCEDURE — 665999 HH PPS REVENUE DEBIT

## 2017-05-14 LAB — EKG IMPRESSION: NORMAL

## 2017-05-14 PROCEDURE — 665999 HH PPS REVENUE DEBIT

## 2017-05-14 PROCEDURE — 665998 HH PPS REVENUE CREDIT

## 2017-05-15 PROCEDURE — 665998 HH PPS REVENUE CREDIT

## 2017-05-15 PROCEDURE — 665999 HH PPS REVENUE DEBIT

## 2017-05-16 ENCOUNTER — HOSPITAL ENCOUNTER (OUTPATIENT)
Facility: MEDICAL CENTER | Age: 82
End: 2017-05-16
Attending: INTERNAL MEDICINE | Admitting: INTERNAL MEDICINE
Payer: MEDICARE

## 2017-05-16 VITALS
OXYGEN SATURATION: 92 % | RESPIRATION RATE: 12 BRPM | HEIGHT: 62 IN | HEART RATE: 51 BPM | SYSTOLIC BLOOD PRESSURE: 139 MMHG | WEIGHT: 146.16 LBS | BODY MASS INDEX: 26.9 KG/M2 | DIASTOLIC BLOOD PRESSURE: 69 MMHG | TEMPERATURE: 97.7 F

## 2017-05-16 PROBLEM — K92.1 BLOOD IN STOOL: Status: ACTIVE | Noted: 2017-05-16

## 2017-05-16 LAB — PATHOLOGY CONSULT NOTE: NORMAL

## 2017-05-16 PROCEDURE — 700101 HCHG RX REV CODE 250

## 2017-05-16 PROCEDURE — 160025 RECOVERY II MINUTES (STATS): Performed by: INTERNAL MEDICINE

## 2017-05-16 PROCEDURE — 700111 HCHG RX REV CODE 636 W/ 250 OVERRIDE (IP)

## 2017-05-16 PROCEDURE — 160204 HCHG ENDO MINUTES - 1ST 30 MINS LEVEL 5: Performed by: INTERNAL MEDICINE

## 2017-05-16 PROCEDURE — 502240 HCHG MISC OR SUPPLY RC 0272: Performed by: INTERNAL MEDICINE

## 2017-05-16 PROCEDURE — 665999 HH PPS REVENUE DEBIT

## 2017-05-16 PROCEDURE — 503105 HCHG CLIP FIXING DEVICE ENDO: Performed by: INTERNAL MEDICINE

## 2017-05-16 PROCEDURE — 160002 HCHG RECOVERY MINUTES (STAT): Performed by: INTERNAL MEDICINE

## 2017-05-16 PROCEDURE — 160046 HCHG PACU - 1ST 60 MINS PHASE II: Performed by: INTERNAL MEDICINE

## 2017-05-16 PROCEDURE — 160035 HCHG PACU - 1ST 60 MINS PHASE I: Performed by: INTERNAL MEDICINE

## 2017-05-16 PROCEDURE — 665998 HH PPS REVENUE CREDIT

## 2017-05-16 PROCEDURE — 160209 HCHG ENDO MINUTES - EA ADDL 1 MIN LEVEL 5: Performed by: INTERNAL MEDICINE

## 2017-05-16 PROCEDURE — G0180 MD CERTIFICATION HHA PATIENT: HCPCS | Performed by: INTERNAL MEDICINE

## 2017-05-16 PROCEDURE — 160048 HCHG OR STATISTICAL LEVEL 1-5: Performed by: INTERNAL MEDICINE

## 2017-05-16 PROCEDURE — 88305 TISSUE EXAM BY PATHOLOGIST: CPT

## 2017-05-16 PROCEDURE — 160009 HCHG ANES TIME/MIN: Performed by: INTERNAL MEDICINE

## 2017-05-16 PROCEDURE — 700105 HCHG RX REV CODE 258: Performed by: INTERNAL MEDICINE

## 2017-05-16 RX ORDER — SODIUM CHLORIDE, SODIUM LACTATE, POTASSIUM CHLORIDE, CALCIUM CHLORIDE 600; 310; 30; 20 MG/100ML; MG/100ML; MG/100ML; MG/100ML
INJECTION, SOLUTION INTRAVENOUS CONTINUOUS
Status: DISCONTINUED | OUTPATIENT
Start: 2017-05-16 | End: 2017-05-16 | Stop reason: HOSPADM

## 2017-05-16 RX ADMIN — SODIUM CHLORIDE, POTASSIUM CHLORIDE, SODIUM LACTATE AND CALCIUM CHLORIDE: 600; 310; 30; 20 INJECTION, SOLUTION INTRAVENOUS at 11:45

## 2017-05-16 ASSESSMENT — PAIN SCALES - GENERAL
PAINLEVEL_OUTOF10: 0

## 2017-05-16 NOTE — OR SURGEON
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    Anesthesiologist/Type of Anesthesia:  Anesthesiologist: Washington Moncada M.D./JALEN    Surgical Staff:  Circulator: Girma Pathak R.N.  Endoscopy Technician: Edgar Man    Specimen: hepatic flexure and desc colon polyps    Estimated Blood Loss: 4-5cc    Findings: see above    Complications: none        5/16/2017 12:40 PM Osmany Smart

## 2017-05-16 NOTE — IP AVS SNAPSHOT
" Home Care Instructions                                                                                                                Name:Rafia Shaikh  Medical Record Number:[id number]  CSN: [id number]    YOB: 1933   Age: 83 y.o.  Sex: male  HT:1.575 m (5' 2.01\") WT: 66.3 kg (146 lb 2.6 oz)          Admit Date: 5/16/2017     Discharge Date:   Today's Date: 5/16/2017  Attending Doctor:  Osmany Smart M.D.                  Allergies:  Review of patient's allergies indicates no known allergies.                Discharge Instructions         COLONOSCOPY   1. If you received a barium enema, take a mild laxative such as dulcolax to clean out the barium.   2. Drink plenty of fluids. Eat a diet high in fiber; such foods as whole-grain breads, fresh fruit and vegetables, nuts are recommended.  3. You may notice a few drops of blood with your first bowel movement. If you develop any large amount of bleeding, black stools, a fever, or abdominal pain, call your doctor right away.   4. Call your doctor for test results in 10-14 days.  5. Don't drive or drink alcohol for 24 hours. The medication you received will make you too drowsy.   6. No heavy lifting, ASA products or ASA x 5 days   7. Additional instructions: NONE  8. Prescriptions: NONE    Discharge time: ***    You should call 911 if you develop problems with breathing or chest pain.    Nurse's Signature: ___________________________________    I acknowledge receipt and understanding of these Home Care instructions.       Medication List      ASK your doctor about these medications        Instructions    Morning Afternoon Evening Bedtime    allopurinol 300 MG Tabs   Commonly known as:  ZYLOPRIM        TAKE 1 TABLET ORALLY DAILY                        amlodipine 10 MG Tabs   Commonly known as:  NORVASC        TAKE 1 TAB BY MOUTH EVERY DAY.                        aspirin EC 81 MG Tbec   Commonly known as:  ECOTRIN        Take 81 mg by mouth every day.   Dose:  81 mg "                        atorvastatin 20 MG Tabs   Commonly known as:  LIPITOR        TAKE 1 TABLET ORALLY DAILY                        CALTRATE PLUS PO        Take 1 Tab by mouth every day.   Dose:  1 Tab                        CENTRUM SILVER Tabs        Take 1 Tab by mouth every day.   Dose:  1 Tab                        cilostazol 100 MG Tabs   Commonly known as:  PLETAL        TAKE 1 TABLET ORALLY TWICE DAILY                        levothyroxine 50 MCG Tabs   Commonly known as:  SYNTHROID        TAKE 1 TABLET ORALLY DAILY                        metoprolol 100 MG Tabs   Commonly known as:  LOPRESSOR        TAKE 1 TABLET BY MOUTH TWICE A DAY                        spironolactone 25 MG Tabs   Commonly known as:  ALDACTONE        TAKE 1 TABLET BY MOUTH EVERY DAY                        VITAMIN B COMPLEX PO        Take 1 Tab by mouth every day.   Dose:  1 Tab                                Medication Information     Above and/or attached are the medications your physician expects you to take upon discharge. Review all of your home medications and newly ordered medications with your doctor and/or pharmacist. Follow medication instructions as directed by your doctor and/or pharmacist. Please keep your medication list with you and share with your physician. Update the information when medications are discontinued, doses are changed, or new medications (including over-the-counter products) are added; and carry medication information at all times in the event of emergency situations.        Resources     Quit Smoking / Tobacco Use:    I understand the use of any tobacco products increases my chance of suffering from future heart disease or stroke and could cause other illnesses which may shorten my life. Quitting the use of tobacco products is the single most important thing I can do to improve my health. For further information on smoking / tobacco cessation call a Toll Free Quit Line at 1-432.144.5144 (*National Cancer  [place]) or 1-284.352.2673 (American Lung Association) or you can access the web based program at www.lungusa.org.    [place] Tobacco Users Help Line:  (318) 876-5795       Toll Free: 1-419.739.2145  Quit Tobacco Program [facility] (343)246-8980    Crisis Hotline:    [place] Crisis Hotline:  6-983-OBXHGCI or 1-278.825.8746    [place] Crisis Hotline:    1-398.970.8729 or 632-140-2215    Discharge Survey:   Thank you for choosing [facility]. We hope we did everything we could to make your hospital stay a pleasant one. You may be receiving a survey and we would appreciate your time and participation in answering the questions. Your input is very valuable to us in our efforts to improve our service to our patients and their families.            Signatures     My signature on this form indicates that:    1. I acknowledge receipt and understanding of these Home Care Instruction.  2. My questions regarding this information have been answered to my satisfaction.  3. I have formulated a plan with my discharge nurse to obtain my prescribed medications for home.    __________________________________      __________________________________                   Patient Signature                                 Guardian/Responsible Adult Signature      __________________________________                 __________       ________                       Nurse Signature                                               Date                 Time

## 2017-05-16 NOTE — OR NURSING
1327- Pt to stage 2, dressed with assist, up to recliner, tolerated well.  Denies pain/nausea.  VSS.  1354- Pt DC'd home with family via w/c.  Pt had uneventful stay in stage 2.

## 2017-05-16 NOTE — PROCEDURES
DATE OF SERVICE:  05/16/2017    DATE OF PROCEDURE:  05/16/2017    PROCEDURE:  Colonoscopy with biopsy forceps removal of colon polyps,   submucosal injection of epinephrine, and Endoclip placement.    PREOPERATIVE DIAGNOSIS:  Patient with a history of rectal bleeding, which   resolved when interventional radiology placed to coil in the ileo-colic branch   of the superior mesenteric artery approximately 6 weeks ago.    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  Eventually this area was Endoclipped,   moderate left-sided diverticulosis.    ANESTHESIA:  The patient was sedated by anesthesiology, please see their   report.    PROCEDURE IN DETAIL:  After informed consent was obtained, the patient was   placed in the left lateral decubitus position and a forward viewing flexible   pediatric colonoscope was placed in the rectum and was advanced into the cecum   where the typical geographic features of the cecum as well as the light in   the right lower quadrant was noted.  We then intubated the terminal ileum and   approximately 35 cm from the ileocecal valve, a cluster of arteriovenous   malformations were noted.  The scope was then slowly withdrawn.  The patient   had a 3 mm polyp in his hepatic flexure area removed with the biopsy forceps   without any bleeding.  In the descending colon, there was another 3 mm polyp   which was removed with the biopsy forceps and this has bled and continued to   bleed with a total of approximately 4 mL of blood loss.  We then injected   epinephrine in a concentration of 1-10,000 submucosally.  A total of 3 mL  were   injected with resolution of the bleeding.  Given the patient will go back on   oral anticoagulant.  Eventually, we elected to Endoclip this as well and an   Endoclip was successfully deployed.  The patient on further removal of the   endoscope was noted to have moderate left-sided diverticulosis.  I saw no   other significant findings upon retroflexion in the rectum and the procedure   was then terminated.  The patient apparently tolerated the procedure well.       ____________________________________     Osmany Smart MD    JTD / NTS    DD:  05/16/2017 12:51:07  DT:  05/16/2017 16:20:13    D#:  [id number]  Job#:  [id number]

## 2017-05-16 NOTE — OR NURSING
Patient allergies and NPO status verified, home medication reconciliation completed, belongings secured. Patient verbalizes understanding of pain scale, expected course of stay and plan of care. Surgical site verified with patient, IV access established.

## 2017-05-16 NOTE — OR NURSING
1242 PT RECEIVED IN PACU, REPORT RECEIVED.  VSS, RESP SPONT, EVEN, NON LABORED. 1257 PT DENIES PAIN, NAUSEA. TOLERATING PO FLUIDS. VSS. 1321 PT MEETS CRITERIA FOR TRANSFER TO STAGE 2.

## 2017-05-16 NOTE — IP AVS SNAPSHOT
5/16/2017    Rafia Shaikh  [address] [place] [address]    Dear Rafia:    [facility] wants to ensure your discharge home is safe and you or your loved ones have had all of your questions answered regarding your care after you leave the hospital.    Below is a list of resources and contact information should you have any questions regarding your hospital stay, follow-up instructions, or active medical symptoms.    Questions or Concerns Regarding… Contact   Medical Questions Related to Your Discharge  (7 days a week, 8am-5pm) Contact a Nurse Care Coordinator   [phone]   Medical Questions Not Related to Your Discharge  (24 hours a day / 7 days a week)  Contact the Nurse Health Line   [phone]    Medications or Discharge Instructions Refer to your discharge packet   or contact your [facility] Primary Care Provider   [phone]   Follow-up Appointment(s) Schedule your appointment via [employer]   or contact Scheduling [phone]   Billing Review your statement via [employer]  or contact Billing [phone]   Medical Records Review your records via [employer]   or contact Medical Records [phone]     You may receive a telephone call within two days of discharge. This call is to make certain you understand your discharge instructions and have the opportunity to have any questions answered. You can also easily access your medical information, test results and upcoming appointments via the [employer] free online health management tool. You can learn more and sign up at [employer]. For assistance setting up your [employer] account, please call [phone].    Once again, we want to ensure your discharge home is safe and that you have a clear understanding of any next steps in your care. If you have any questions or concerns, please do not hesitate to contact us, we are here for you. Thank you for choosing [facility] for your healthcare needs.    Sincerely,    Your [facility] Healthcare Team

## 2017-05-16 NOTE — OR SURGEON
Immediate Post-Operative Note      PreOp Diagnosis: BRBPR    PostOp Diagnosis: AVM's termonal ileum, 2 small polyps removed, one in left colon bled and injected submucosal epi and placed endoclip    Procedure(s):  COLONOSCOPY - Wound Class: Clean Contaminated    Surgeon(s):  Osmany Smart M.D.    Anesthesiologist/Type of Anesthesia:  Anesthesiologist: Washington Moncada M.D./MAC    Surgical Staff:  Circulator: Girma Pathak R.N.  Endoscopy Technician: Edgar Man    Specimen: #2 polyps    Estimated Blood Loss: 5cc    Findings: see above    Complications: none        5/16/2017 12:12 PM Osmany Smart

## 2017-05-16 NOTE — DISCHARGE INSTRUCTIONS
COLONOSCOPY   1. If you received a barium enema, take a mild laxative such as dulcolax to clean out the barium.   2. Drink plenty of fluids. Eat a diet high in fiber; such foods as whole-grain breads, fresh fruit and vegetables, nuts are recommended.  3. You may notice a few drops of blood with your first bowel movement. If you develop any large amount of bleeding, black stools, a fever, or abdominal pain, call your doctor right away.   4. Call your doctor for test results in 10-14 days.  5. Don't drive or drink alcohol for 24 hours. The medication you received will make you too drowsy.   6. No heavy lifting, ASA products or ASA x 5 days   7. Additional instructions: NONE  8. Prescriptions: NONE    Discharge time: ***    You should call 911 if you develop problems with breathing or chest pain.    Nurse's Signature: ___________________________________    I acknowledge receipt and understanding of these Home Care instructions.

## 2017-05-17 PROCEDURE — 665999 HH PPS REVENUE DEBIT

## 2017-05-17 PROCEDURE — 665998 HH PPS REVENUE CREDIT

## 2017-05-18 PROCEDURE — 665999 HH PPS REVENUE DEBIT

## 2017-05-18 PROCEDURE — 665998 HH PPS REVENUE CREDIT

## 2017-05-19 PROCEDURE — 665999 HH PPS REVENUE DEBIT

## 2017-05-19 PROCEDURE — 665998 HH PPS REVENUE CREDIT

## 2017-05-20 PROCEDURE — 665998 HH PPS REVENUE CREDIT

## 2017-05-20 PROCEDURE — 665999 HH PPS REVENUE DEBIT

## 2017-05-21 PROCEDURE — 665998 HH PPS REVENUE CREDIT

## 2017-05-21 PROCEDURE — 665999 HH PPS REVENUE DEBIT

## 2017-07-12 DIAGNOSIS — I10 ESSENTIAL HYPERTENSION: ICD-10-CM

## 2017-07-12 RX ORDER — METOPROLOL TARTRATE 100 MG/1
100 TABLET ORAL 2 TIMES DAILY
Qty: 60 TAB | Refills: 6 | Status: SHIPPED | OUTPATIENT
Start: 2017-07-12 | End: 2018-01-05 | Stop reason: SDUPTHER

## 2017-07-12 RX ORDER — AMLODIPINE BESYLATE 10 MG/1
10 TABLET ORAL
Qty: 30 TAB | Refills: 6 | Status: SHIPPED | OUTPATIENT
Start: 2017-07-12 | End: 2018-01-05 | Stop reason: SDUPTHER

## 2017-07-17 ENCOUNTER — TELEPHONE (OUTPATIENT)
Dept: INTERNAL MEDICINE | Facility: MEDICAL CENTER | Age: 82
End: 2017-07-17

## 2017-07-17 NOTE — TELEPHONE ENCOUNTER
This patient would be a good candidate to change to our geriatrician. Please explain that I am doing more teaching and have less availability in my private clinics. Also that geriatric physicians are best suited to deal with older patients and the complications that may arise over time.  Please recommend that this patient schedule their next routine appointment with a geriatrician.

## 2017-08-02 ENCOUNTER — HOSPITAL ENCOUNTER (EMERGENCY)
Facility: MEDICAL CENTER | Age: 82
End: 2017-08-02
Attending: EMERGENCY MEDICINE
Payer: MEDICARE

## 2017-08-02 ENCOUNTER — APPOINTMENT (OUTPATIENT)
Dept: RADIOLOGY | Facility: MEDICAL CENTER | Age: 82
End: 2017-08-02
Attending: EMERGENCY MEDICINE
Payer: MEDICARE

## 2017-08-02 VITALS
OXYGEN SATURATION: 96 % | RESPIRATION RATE: 16 BRPM | TEMPERATURE: 98.4 F | BODY MASS INDEX: 27.41 KG/M2 | DIASTOLIC BLOOD PRESSURE: 56 MMHG | WEIGHT: 149.91 LBS | SYSTOLIC BLOOD PRESSURE: 136 MMHG | HEART RATE: 74 BPM

## 2017-08-02 DIAGNOSIS — Z79.01 ANTICOAGULATED: ICD-10-CM

## 2017-08-02 DIAGNOSIS — S60.222A CONTUSION OF LEFT HAND, INITIAL ENCOUNTER: Primary | ICD-10-CM

## 2017-08-02 DIAGNOSIS — S60.222A TRAUMATIC HEMATOMA OF LEFT HAND, INITIAL ENCOUNTER: ICD-10-CM

## 2017-08-02 LAB
APTT PPP: 21.8 SEC (ref 24.7–36)
BASOPHILS # BLD AUTO: 0.8 % (ref 0–1.8)
BASOPHILS # BLD: 0.08 K/UL (ref 0–0.12)
EOSINOPHIL # BLD AUTO: 0.25 K/UL (ref 0–0.51)
EOSINOPHIL NFR BLD: 2.5 % (ref 0–6.9)
ERYTHROCYTE [DISTWIDTH] IN BLOOD BY AUTOMATED COUNT: 50.4 FL (ref 35.9–50)
HCT VFR BLD AUTO: 40.6 % (ref 42–52)
HGB BLD-MCNC: 13.3 G/DL (ref 14–18)
IMM GRANULOCYTES # BLD AUTO: 0.13 K/UL (ref 0–0.11)
IMM GRANULOCYTES NFR BLD AUTO: 1.3 % (ref 0–0.9)
INR PPP: 1 (ref 0.87–1.13)
LYMPHOCYTES # BLD AUTO: 2.95 K/UL (ref 1–4.8)
LYMPHOCYTES NFR BLD: 29.7 % (ref 22–41)
MCH RBC QN AUTO: 31.4 PG (ref 27–33)
MCHC RBC AUTO-ENTMCNC: 32.8 G/DL (ref 33.7–35.3)
MCV RBC AUTO: 96 FL (ref 81.4–97.8)
MONOCYTES # BLD AUTO: 1.01 K/UL (ref 0–0.85)
MONOCYTES NFR BLD AUTO: 10.2 % (ref 0–13.4)
NEUTROPHILS # BLD AUTO: 5.51 K/UL (ref 1.82–7.42)
NEUTROPHILS NFR BLD: 55.5 % (ref 44–72)
NRBC # BLD AUTO: 0 K/UL
NRBC BLD AUTO-RTO: 0 /100 WBC
PLATELET # BLD AUTO: 203 K/UL (ref 164–446)
PMV BLD AUTO: 9.3 FL (ref 9–12.9)
PROTHROMBIN TIME: 13.5 SEC (ref 12–14.6)
RBC # BLD AUTO: 4.23 M/UL (ref 4.7–6.1)
WBC # BLD AUTO: 9.9 K/UL (ref 4.8–10.8)

## 2017-08-02 PROCEDURE — 73130 X-RAY EXAM OF HAND: CPT | Mod: RT

## 2017-08-02 PROCEDURE — 99283 EMERGENCY DEPT VISIT LOW MDM: CPT

## 2017-08-02 PROCEDURE — 85610 PROTHROMBIN TIME: CPT

## 2017-08-02 PROCEDURE — 85730 THROMBOPLASTIN TIME PARTIAL: CPT

## 2017-08-02 PROCEDURE — 85025 COMPLETE CBC W/AUTO DIFF WBC: CPT

## 2017-08-02 NOTE — ED AVS SNAPSHOT
Resource Data Access Code: Activation code not generated  Current Resource Data Status: Active    [employer]  A secure, online tool to manage your health information     [employer]’s Resource Data® is a secure, online tool that connects you to your personalized health information from the privacy of your home -- day or night - making it very easy for you to manage your healthcare. Once the activation process is completed, you can even access your medical information using the Resource Data davidson, which is available for free in the [employer] store or Google Play store.     Resource Data provides the following levels of access (as shown below):   My Chart Features   [facility] Primary Care Doctor [facility]  Specialists [facility]  Urgent  Care Non-[facility]  Primary Care  Doctor   Email your healthcare team securely and privately 24/7 X X X X   Manage appointments: schedule your next appointment; view details of past/upcoming appointments X      Request prescription refills. X      View recent personal medical records, including lab and immunizations X X X X   View health record, including health history, allergies, medications X X X X   Read reports about your outpatient visits, procedures, consult and ER notes X X X X   See your discharge summary, which is a recap of your hospital and/or ER visit that includes your diagnosis, lab results, and care plan. X X       How to register for Resource Data:  1. Go to  [email or web address].  2. Click on the Sign Up Now box, which takes you to the New Member Sign Up page. You will need to provide the following information:  a. Enter your Resource Data Access Code exactly as it appears at the top of this page. (You will not need to use this code after you’ve completed the sign-up process. If you do not sign up before the expiration date, you must request a new code.)   b. Enter your date of birth.   c. Enter your home email address.   d. Click Submit, and follow the next screen’s instructions.  3. Create a Resource Data ID. This will  be your [employer] login ID and cannot be changed, so think of one that is secure and easy to remember.  4. Create a [employer] password. You can change your password at any time.  5. Enter your Password Reset Question and Answer. This can be used at a later time if you forget your password.   6. Enter your e-mail address. This allows you to receive e-mail notifications when new information is available in [employer].  7. Click Sign Up. You can now view your health information.    For assistance activating your [employer] account, call [phone]

## 2017-08-02 NOTE — ED AVS SNAPSHOT
Home Care Instructions                                                                                                                Rafia Shaikh   MRN: [id number]    Department:  [facility], Emergency Dept   Date of Visit:  8/2/2017            [facility], Emergency Dept    [address]    [facility] [address]    Phone:  [phone]      You were seen by     Kaitlynn May D.O.      Your Diagnosis Was     Contusion of left hand, initial encounter     S60.222A       Follow-up Information     1. Follow up with Yarelis Gtz M.D. In 1 day.    Specialty:  Internal Medicine    Contact information    [address]  [address]  [facility] [address] [phone]        Medication Information     Review all of your home medications and newly ordered medications with your primary doctor and/or pharmacist as soon as possible. Follow medication instructions as directed by your doctor and/or pharmacist.     Please keep your complete medication list with you and share with your physician. Update the information when medications are discontinued, doses are changed, or new medications (including over-the-counter products) are added; and carry medication information at all times in the event of emergency situations.               Medication List      ASK your doctor about these medications        Instructions    Morning Afternoon Evening Bedtime    allopurinol 300 MG Tabs   Commonly known as:  ZYLOPRIM        TAKE 1 TABLET ORALLY DAILY                        amlodipine 10 MG Tabs   Commonly known as:  NORVASC        Take 1 Tab by mouth every day.   Dose:  10 mg                        aspirin EC 81 MG Tbec   Commonly known as:  ECOTRIN        Take 81 mg by mouth every day.   Dose:  81 mg                        atorvastatin 20 MG Tabs   Commonly known as:  LIPITOR        TAKE 1 TABLET ORALLY DAILY                        CALTRATE PLUS PO        Take 1 Tab by mouth every day.   Dose:  1 Tab                          CENTRUM SILVER Tabs        Take 1 Tab by mouth every day.   Dose:  1 Tab                        cilostazol 100 MG Tabs   Commonly known as:  PLETAL        TAKE 1 TABLET ORALLY TWICE DAILY                        levothyroxine 50 MCG Tabs   Commonly known as:  SYNTHROID        TAKE 1 TABLET ORALLY DAILY                        metoprolol 100 MG Tabs   Commonly known as:  LOPRESSOR        Take 1 Tab by mouth 2 times a day.   Dose:  100 mg                        spironolactone 25 MG Tabs   Commonly known as:  ALDACTONE        TAKE 1 TABLET BY MOUTH EVERY DAY                        VITAMIN B COMPLEX PO        Take 1 Tab by mouth every day.   Dose:  1 Tab                                Procedures and tests performed during your visit     CBC WITH DIFFERENTIAL    DX-HAND 3+ RIGHT    PT/INR    PTT        Discharge Instructions       Follow-up with primary care tomorrow for reevaluation and medication management.    Tylenol every 6 hours as needed for discomfort.  Rest and elevation as needed for swelling. Apply ice, 20 minutes of every hour, as needed for swelling or discomfort.  Ace wrap, compression dressing as tolerated for swelling. Remove dressing hourly and do range of motion exercises.    Return to the emergency department for increased pain, swelling, paresthesias or other new concerns.    Hand Contusion   A hand contusion is a deep bruise to the hand. Contusions happen when an injury causes bleeding under the skin. Signs of bruising include pain, puffiness (swelling), and discolored skin. The contusion may turn blue, purple, or yellow.  HOME CARE  · Put ice on the injured area.  ¨ Put ice in a plastic bag.  ¨ Place a towel between your skin and the bag.  ¨ Leave the ice on for 15-20 minutes, 03-04 times a day.  · Only take medicines as told by your doctor.  · Use an elastic wrap only as told. You may remove the wrap for sleeping, showering, and bathing. Take the wrap off if you lose feeling  (have numbness) in your fingers, or they turn blue or cold. Put the wrap on more loosely.  · Keep the hand raised (elevated) with pillows.  · Avoid using your hand too much if it is painful.  GET HELP RIGHT AWAY IF:   · You have more redness, puffiness, or pain in your hand.  · Your puffiness or pain does not get better with medicine.  · You lose feeling in your hand, or you cannot move your fingers.  · Your hand turns cold or blue.  · You have pain when you move your fingers.  · Your hand feels warm.  · Your contusion does not get better in 2 days.  MAKE SURE YOU:   · Understand these instructions.  · Will watch this condition.  · Will get help right away if you are not doing well or you get worse.     This information is not intended to replace advice given to you by your health care provider. Make sure you discuss any questions you have with your health care provider.     Document Released: 06/05/2009 Document Revised: 01/08/2016 Document Reviewed: 06/10/2013  [employer] Interactive Patient Education ©2016 [employer]  Hematoma  A hematoma is a collection of blood. The collection of blood can turn into a hard, painful lump under the skin. Your skin may turn blue or yellow if the hematoma is close to the surface of the skin. Most hematomas get better in a few days to weeks. Some hematomas are serious and need medical care. Hematomas can be very small or very big.  HOME CARE  · Apply ice to the injured area:  ¨ Put ice in a plastic bag.  ¨ Place a towel between your skin and the bag.  ¨ Leave the ice on for 20 minutes, 2-3 times a day for the first 1 to 2 days.  · After the first 2 days, switch to using warm packs on the injured area.  · Raise (elevate) the injured area to lessen pain and puffiness (swelling). You may also wrap the area with an elastic bandage. Make sure the bandage is not wrapped too tight.  · If you have a painful hematoma on your leg or foot, you may use crutches for a couple days.  · Only take  medicines as told by your doctor.  GET HELP RIGHT AWAY IF:   · Your pain gets worse.  · Your pain is not controlled with medicine.  · You have a fever.  · Your puffiness gets worse.  · Your skin turns more blue or yellow.  · Your skin over the hematoma breaks or starts bleeding.  · Your hematoma is in your chest or belly (abdomen) and you are short of breath, feel weak, or have a change in consciousness.  · Your hematoma is on your scalp and you have a headache that gets worse or a change in alertness or consciousness.  MAKE SURE YOU:   · Understand these instructions.  · Will watch your condition.  · Will get help right away if you are not doing well or get worse.     This information is not intended to replace advice given to you by your health care provider. Make sure you discuss any questions you have with your health care provider.     Document Released: 01/25/2006 Document Revised: 08/20/2014 Document Reviewed: 05/28/2014  [employer] Interactive Patient Education ©2016 [employer]            Patient Information     Patient Information    Following emergency treatment: all patient requiring follow-up care must return either to a private physician or a clinic if your condition worsens before you are able to obtain further medical attention, please return to the emergency room.     Billing Information    At [facility], we work to make the billing process streamlined for our patients.  Our Representatives are here to answer any questions you may have regarding your hospital bill.  If you have insurance coverage and have supplied your insurance information to us, we will submit a claim to your insurer on your behalf.  Should you have any questions regarding your bill, we can be reached online or by phone as follows:  Online: You are able pay your bills online or live chat with our representatives about any billing questions you may have. We are here to help Monday - Friday from 8:00am to 7:30pm and 9:00am -  12:00pm on Saturdays.  Please visit [email or web address]  for more information.   Phone:  [phone] or [phone]    Please note that your emergency physician, surgeon, pathologist, radiologist, anesthesiologist, and other specialists are not employed by [facility] and will therefore bill separately for their services.  Please contact them directly for any questions concerning their bills at the numbers below:     Emergency Physician Services:  [phone]  [facility]:  [phone]  [facility]:  [phone]  [facility]:  [phone]    1. Your final bill may vary from the amount quoted upon discharge if all procedures are not complete at that time, or if your doctor has additional procedures of which we are not aware. You will receive an additional bill if you return to the Emergency Department at [facility] for suture removal regardless of the facility of which the sutures were placed.     2. Please arrange for settlement of this account at the emergency registration.    3. All self-pay accounts are due in full at the time of treatment.  If you are unable to meet this obligation then payment is expected within 4-5 days.     4. If you have had radiology studies (CT, X-ray, Ultrasound, MRI), you have received a preliminary result during your emergency department visit. Please contact the radiology department [phone] to receive a copy of your final result. Please discuss the Final result with your primary physician or with the follow up physician provided.     Crisis Hotline:  [place] Crisis Hotline:  1-435-JIVJNNA or 1-723.582.6164  [place] Crisis Hotline:    1-491.196.4712 or 736-015-3566         ED Discharge Follow Up Questions    1. In order to provide you with very good care, we would like to follow up with a phone call in the next few days.  May we have your permission to contact you?     YES /  NO    2. What is  the best phone number to call you? (       )_____-__________    3. What is the best time to call you?      Morning  /  Afternoon  /  Evening                   Patient Signature:  ____________________________________________________________    Date:  ____________________________________________________________      Your appointments     Oct 12, 2017  1:15 PM   FOLLOW UP with [facility] (--)    [address] [place] [address] [address]  [place] [address]   [phone]            Nov 07, 2017  1:20 PM   Established Patient with [facility] (--)    [address] [place] [address]  [address]  [place] [address]   [phone]           You will be receiving a confirmation call a few days before your appointment from our automated call confirmation system.

## 2017-08-02 NOTE — ED AVS SNAPSHOT
8/2/2017    Rafia Shaikh  [address] [place] [address]    Dear Rfaia:    [facility] wants to ensure your discharge home is safe and you or your loved ones have had all of your questions answered regarding your care after you leave the hospital.    Below is a list of resources and contact information should you have any questions regarding your hospital stay, follow-up instructions, or active medical symptoms.    Questions or Concerns Regarding… Contact   Medical Questions Related to Your Discharge  (7 days a week, 8am-5pm) Contact a Nurse Care Coordinator   [phone]   Medical Questions Not Related to Your Discharge  (24 hours a day / 7 days a week)  Contact the Nurse Health Line   [phone]    Medications or Discharge Instructions Refer to your discharge packet   or contact your [facility] Primary Care Provider   [phone]   Follow-up Appointment(s) Schedule your appointment via Fingo   or contact Scheduling [phone]   Billing Review your statement via Fingo  or contact Billing [phone]   Medical Records Review your records via Fingo   or contact Medical Records [phone]     You may receive a telephone call within two days of discharge. This call is to make certain you understand your discharge instructions and have the opportunity to have any questions answered. You can also easily access your medical information, test results and upcoming appointments via the Fingo free online health management tool. You can learn more and sign up at [employer]/Fingo. For assistance setting up your Fingo account, please call [phone].    Once again, we want to ensure your discharge home is safe and that you have a clear understanding of any next steps in your care. If you have any questions or concerns, please do not hesitate to contact us, we are here for you. Thank you for choosing [facility] for your healthcare needs.    Sincerely,    Your [facility] Healthcare Team

## 2017-08-03 NOTE — ED NOTES
Pt ambulated to yellow 55.  Agree with triage note.  Pt reports initial fall 2 days ago and swelling and pain has been getting worse.  ERP to see.

## 2017-08-03 NOTE — DISCHARGE INSTRUCTIONS
Follow-up with primary care tomorrow for reevaluation and medication management.    Tylenol every 6 hours as needed for discomfort.  Rest and elevation as needed for swelling. Apply ice, 20 minutes of every hour, as needed for swelling or discomfort.  Ace wrap, compression dressing as tolerated for swelling. Remove dressing hourly and do range of motion exercises.    Return to the emergency department for increased pain, swelling, paresthesias or other new concerns.    Hand Contusion   A hand contusion is a deep bruise to the hand. Contusions happen when an injury causes bleeding under the skin. Signs of bruising include pain, puffiness (swelling), and discolored skin. The contusion may turn blue, purple, or yellow.  HOME CARE  · Put ice on the injured area.  ¨ Put ice in a plastic bag.  ¨ Place a towel between your skin and the bag.  ¨ Leave the ice on for 15-20 minutes, 03-04 times a day.  · Only take medicines as told by your doctor.  · Use an elastic wrap only as told. You may remove the wrap for sleeping, showering, and bathing. Take the wrap off if you lose feeling (have numbness) in your fingers, or they turn blue or cold. Put the wrap on more loosely.  · Keep the hand raised (elevated) with pillows.  · Avoid using your hand too much if it is painful.  GET HELP RIGHT AWAY IF:   · You have more redness, puffiness, or pain in your hand.  · Your puffiness or pain does not get better with medicine.  · You lose feeling in your hand, or you cannot move your fingers.  · Your hand turns cold or blue.  · You have pain when you move your fingers.  · Your hand feels warm.  · Your contusion does not get better in 2 days.  MAKE SURE YOU:   · Understand these instructions.  · Will watch this condition.  · Will get help right away if you are not doing well or you get worse.     This information is not intended to replace advice given to you by your health care provider. Make sure you discuss any questions you have with your  health care provider.     Document Released: 06/05/2009 Document Revised: 01/08/2016 Document Reviewed: 06/10/2013  [employer] Interactive Patient Education ©2016 [employer]  Hematoma  A hematoma is a collection of blood. The collection of blood can turn into a hard, painful lump under the skin. Your skin may turn blue or yellow if the hematoma is close to the surface of the skin. Most hematomas get better in a few days to weeks. Some hematomas are serious and need medical care. Hematomas can be very small or very big.  HOME CARE  · Apply ice to the injured area:  ¨ Put ice in a plastic bag.  ¨ Place a towel between your skin and the bag.  ¨ Leave the ice on for 20 minutes, 2-3 times a day for the first 1 to 2 days.  · After the first 2 days, switch to using warm packs on the injured area.  · Raise (elevate) the injured area to lessen pain and puffiness (swelling). You may also wrap the area with an elastic bandage. Make sure the bandage is not wrapped too tight.  · If you have a painful hematoma on your leg or foot, you may use crutches for a couple days.  · Only take medicines as told by your doctor.  GET HELP RIGHT AWAY IF:   · Your pain gets worse.  · Your pain is not controlled with medicine.  · You have a fever.  · Your puffiness gets worse.  · Your skin turns more blue or yellow.  · Your skin over the hematoma breaks or starts bleeding.  · Your hematoma is in your chest or belly (abdomen) and you are short of breath, feel weak, or have a change in consciousness.  · Your hematoma is on your scalp and you have a headache that gets worse or a change in alertness or consciousness.  MAKE SURE YOU:   · Understand these instructions.  · Will watch your condition.  · Will get help right away if you are not doing well or get worse.     This information is not intended to replace advice given to you by your health care provider. Make sure you discuss any questions you have with your health care provider.     Document  Released: 01/25/2006 Document Revised: 08/20/2014 Document Reviewed: 05/28/2014  [employer] Interactive Patient Education ©2016 [employer] Inc.

## 2017-08-03 NOTE — ED PROVIDER NOTES
ED Provider Note    CHIEF COMPLAINT  Chief Complaint   Patient presents with   • Hand Pain     Pt states he fell and hit his right hand. + swelling. Moves fingers ok, sensation intact. cap refill <3 seconds.        HPI  Rafia Shaikh is a 83 y.o. male ambulates to the emergency department with his wife complaining of right hand pain and swelling. Patient states that he fell yesterday and had a small abrasion over the dorsal aspect of the right thumb and some discomfort after trying to break his fall, but then fell again today doing the same and has had increasing swelling of the dorsal hand as well. No paresthesias. No active bleeding. Patient denies other injury, head injury, loss of consciousness, neck pain, back pain, hip pain. Patient took aspirin for discomfort.    REVIEW OF SYSTEMS  See HPI for further details. All other systems are negative.     PAST MEDICAL HISTORY   has a past medical history of Hyperlipidemia; CAD (coronary artery disease); Hypertension; Unspecified disorder of thyroid; PAD (peripheral artery disease); High cholesterol; Carotid artery stenosis (6/13/2011); Dizziness (6/13/2011); Hypothyroidism (6/13/2011); TIA (transient ischemic attack) (6/13/2011); Preoperative examination, unspecified (4/12/2011); Insomnia; Gout; Tendonitis; Stroke (CMS-HCC) (4/16/2010); Renal disorder (2000); Myocardial infarct (CMS-HCC) (2008); Arrhythmia (5/12/17); Heart murmur; Dental disorder; Dental disorder (5/12/17); Sleep apnea (2014); CATARACT (1990's); Rectal bleed (4/4/2016); and Anemia (4/2016).    SOCIAL HISTORY  Social History     Social History Main Topics   • Smoking status: Never Smoker    • Smokeless tobacco: Never Used   • Alcohol Use: No   • Drug Use: No   • Sexual Activity: Not on file       SURGICAL HISTORY   has past surgical history that includes multiple coronary artery bypass endo vein harvest (3/20/08); septoplasty (1995); recovery (3/25/2011); recovery (4/1/2011); cataract extraction with iol  (Bilateral, early 1990's); carotid endarterectomy (5/3/2011); recovery (8/22/2012); colonoscopy (2015); lithotripsy (2000); laparoscopy (4/2/2016); and colonoscopy (N/A, 5/16/2017).    CURRENT MEDICATIONS  Home Medications     **Home medications have not yet been reviewed for this encounter**          ALLERGIES  No Known Allergies    PHYSICAL EXAM  VITAL SIGNS: /40 mmHg  Pulse 69  Temp(Src) 36.6 °C (97.9 °F) (Temporal)  Resp 18  Wt 68 kg (149 lb 14.6 oz)  SpO2 93%  Pulse ox interpretation:  I interpret this pulse ox as normal.  Constitutional: Alert in no apparent distress.  HENT: Normocephalic, atraumatic, no cephalohematoma. Bilateral external ears normal, Nose normal. Moist mucous membranes.  No oral trauma.  Eyes: Pupils are equal and reactive, Conjunctiva normal. No subconjunctival hemorrhage.   Neck: No midline tenderness palpation, no step-offs. Full range of motion without pain resistance.  Cardiovascular: Normal peripheral perfusion.  Thorax & Lungs: Nonlabored respirations. No chest wall tenderness, hematoma.  Abdomen: Soft, non-distended, non-tender to palpation.   Skin: Warm, Dry.  Musculoskeletal: Significant swelling, hematoma, to the dorsal right hand with superficial abrasion. No crepitus.Fingers without difficulty,  limited by swelling. Thumbs up without difficulty. Less than 2 2nd capillary refill, 2+ radial pulse. Sensation intact to light touch. No tenderness at wrist, elbow, shoulder or clavicle.  Neurologic: Alert , no gross focal deficit noted.  Psychiatric: Affect normal, Judgment normal, Mood normal.       DIAGNOSTIC STUDIES / PROCEDURES    LABS  Results for orders placed or performed during the hospital encounter of 08/02/17   PT/INR   Result Value Ref Range    PT 13.5 12.0 - 14.6 sec    INR 1.00 0.87 - 1.13   PTT   Result Value Ref Range    APTT 21.8 (L) 24.7 - 36.0 sec   CBC WITH DIFFERENTIAL   Result Value Ref Range    WBC 9.9 4.8 - 10.8 K/uL    RBC 4.23 (L) 4.70 - 6.10  M/uL    Hemoglobin 13.3 (L) 14.0 - 18.0 g/dL    Hematocrit 40.6 (L) 42.0 - 52.0 %    MCV 96.0 81.4 - 97.8 fL    MCH 31.4 27.0 - 33.0 pg    MCHC 32.8 (L) 33.7 - 35.3 g/dL    RDW 50.4 (H) 35.9 - 50.0 fL    Platelet Count 203 164 - 446 K/uL    MPV 9.3 9.0 - 12.9 fL    Neutrophils-Polys 55.50 44.00 - 72.00 %    Lymphocytes 29.70 22.00 - 41.00 %    Monocytes 10.20 0.00 - 13.40 %    Eosinophils 2.50 0.00 - 6.90 %    Basophils 0.80 0.00 - 1.80 %    Immature Granulocytes 1.30 (H) 0.00 - 0.90 %    Nucleated RBC 0.00 /100 WBC    Neutrophils (Absolute) 5.51 1.82 - 7.42 K/uL    Lymphs (Absolute) 2.95 1.00 - 4.80 K/uL    Monos (Absolute) 1.01 (H) 0.00 - 0.85 K/uL    Eos (Absolute) 0.25 0.00 - 0.51 K/uL    Baso (Absolute) 0.08 0.00 - 0.12 K/uL    Immature Granulocytes (abs) 0.13 (H) 0.00 - 0.11 K/uL    NRBC (Absolute) 0.00 K/uL     RADIOLOGY  DX-HAND 3+ RIGHT   Final Result      1.  Marked soft tissue swelling over the dorsum of the hand and wrist.   2.  No acute or subacute fracture or dislocation is evident.   3.  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        COURSE & MEDICAL DECISION MAKING  Nursing notes and vital signs were reviewed. (See chart for details)  The patients records were reviewed, history was obtained from the patient and his wife;     ED evaluation was consistent with left hand contusion with a dorsal hematoma. There is no radiographic evidence for fracture or dislocation. CMS is intact distally. Hematoma is not expanding pulsatile in the emergency department. Pain controlled without medications in the emergency department. A light Ace wrap pressure dressing has been applied around the hematoma.    Patient is stable for discharge at this time, anticipatory guidance provided,,continue home medications, Tylenol for discomfort, ice/rest/elevation/Ace wrap for comfort, close follow-up is encouraged, and strict ED return instructions have been detailed. Patient ag and his wife are eeable to the  disposition and plan.    Patient's blood pressure was elevated in the emergency department, and has been referred to primary care for close monitoring.    FINAL IMPRESSION  (S60.222A) Contusion of left hand, initial encounter  (primary encounter diagnosis)  (S60.222A) Traumatic hematoma of left hand, initial encounter  (Z79.01) Anticoagulated, on Pletal      Electronically signed by: Kaitlynn May, 8/2/2017 6:42 PM      This dictation was created using voice recognition software. The accuracy of the dictation is limited to the abilities of the software. I expect there may be some errors of grammar and possibly content. The nursing notes were reviewed and certain aspects of this information were incorporated into this note.

## 2017-08-03 NOTE — ED NOTES
Chief Complaint   Patient presents with   • Hand Pain     Pt states he fell and hit his right hand. + swelling. Moves fingers ok, sensation intact. cap refill <3 seconds.      /40 mmHg  Pulse 69  Temp(Src) 36.6 °C (97.9 °F) (Temporal)  Resp 18  Wt 68 kg (149 lb 14.6 oz)  SpO2 93%  Pt placed back in lobby, educated on triage process, and told to inform staff of any change in condition.

## 2017-08-11 ENCOUNTER — OFFICE VISIT (OUTPATIENT)
Dept: INTERNAL MEDICINE | Facility: MEDICAL CENTER | Age: 82
End: 2017-08-11
Payer: MEDICARE

## 2017-08-11 VITALS
SYSTOLIC BLOOD PRESSURE: 120 MMHG | DIASTOLIC BLOOD PRESSURE: 46 MMHG | BODY MASS INDEX: 27.05 KG/M2 | HEART RATE: 56 BPM | WEIGHT: 147 LBS | HEIGHT: 62 IN | TEMPERATURE: 98.5 F | OXYGEN SATURATION: 95 %

## 2017-08-11 DIAGNOSIS — T14.8XXA HEMATOMA: Primary | ICD-10-CM

## 2017-08-11 PROCEDURE — 99213 OFFICE O/P EST LOW 20 MIN: CPT | Mod: GE | Performed by: INTERNAL MEDICINE

## 2017-08-11 ASSESSMENT — PATIENT HEALTH QUESTIONNAIRE - PHQ9: CLINICAL INTERPRETATION OF PHQ2 SCORE: 0

## 2017-08-11 ASSESSMENT — PAIN SCALES - GENERAL: PAINLEVEL: NO PAIN

## 2017-08-11 NOTE — MR AVS SNAPSHOT
"        Orylmarkiejazmyne Shaikh   2017 2:30 PM   Office Visit   MRN: [id number]    Department:  [facility] Med - Internal Med   Dept Phone:  [phone]    Description:  Male : 1933   Provider:  Naty Lilly M.D.           Reason for Visit     Follow-Up emergency room hand injury      Allergies as of 2017     No Known Allergies      Vital Signs     Blood Pressure Pulse Temperature Height Weight Body Mass Index    120/46 mmHg 56 36.9 °C (98.5 °F) 1.575 m (5' 2.01\") 66.679 kg (147 lb) 26.88 kg/m2    Oxygen Saturation Smoking Status                95% Never Smoker           Basic Information     Date Of Birth Sex Race Ethnicity Preferred Language    1933 Male  Non- English      Your appointments     Oct 12, 2017  1:15 PM   FOLLOW UP with Marcelino Wu M.D.   [facility] (--)    [address] [place] [address] [address]  [facility] [id number] [phone]            2017  1:20 PM   Established Patient with [facility] - Internal Medicine (--)    [address] [place] [address]  [address]  [facility] [id number] [phone]           You will be receiving a confirmation call a few days before your appointment from our automated call confirmation system.              Problem List              ICD-10-CM Priority Class Noted - Resolved    Paroxysmal atrial fibrillation (CMS-HCC) I48.0 High  2010 - Present    CAD (coronary artery disease) I25.10 High  2010 - Present    Benign non-nodular prostatic hyperplasia without lower urinary tract symptoms N40.0 High  2010 - Present    PVD (peripheral vascular disease) (CMS-[facility]) I73.9 High  2010 - Present    Gout of multiple sites M10.9 High  2010 - Present    Dyslipidemia E78.5 High  2010 - Present    Aortic stenosis, moderate I35.0 High  2012 - Present    Carotid artery stenosis (Chronic) I65.29   2011 - Present    Acquired hypothyroidism E03.9   2011 - " Present    TIA (transient ischemic attack) (Chronic) G45.9   6/13/2011 - Present    Abdominal aortic aneurysm (CMS-HCC) I71.4   1/17/2013 - Present    Abnormal liver function tests R79.89   5/7/2014 - Present    Chronic fatigue R53.82   10/21/2016 - Present    Hypertension, essential I10   10/21/2016 - Present    Chronic kidney disease, stage III (moderate) N18.3   10/21/2016 - Present    Lower GI bleed K92.2 High  3/21/2017 - Present    Ureteral stone N20.1 Medium  3/24/2017 - Present    Risk for falls Z91.81   5/2/2017 - Present    Healthcare maintenance Z00.00   5/2/2017 - Present    Blood in stool K92.1   5/16/2017 - Present      Health Maintenance        Date Due Completion Dates    IMM DTaP/Tdap/Td Vaccine (1 - Tdap) 12/16/1952 ---    IMM PNEUMOCOCCAL 65+ (ADULT) LOW/MEDIUM RISK SERIES (2 of 2 - PPSV23) 6/3/2016 6/3/2015    IMM INFLUENZA (1) 9/1/2017 10/24/2016, 11/10/2015, 4/2/2011    COLONOSCOPY 5/16/2027 5/16/2017, 9/3/2015            Current Immunizations     13-VALENT PCV PREVNAR 6/3/2015    Influenza TIV (IM) 4/2/2011  1:30 PM    Influenza Vaccine Adult HD 10/24/2016, 11/10/2015    Pneumococcal Vaccine (UF)Historical Data 4/2/2005    SHINGLES VACCINE 3/14/2013      Below and/or attached are the medications your provider expects you to take. Review all of your home medications and newly ordered medications with your provider and/or pharmacist. Follow medication instructions as directed by your provider and/or pharmacist. Please keep your medication list with you and share with your provider. Update the information when medications are discontinued, doses are changed, or new medications (including over-the-counter products) are added; and carry medication information at all times in the event of emergency situations     Allergies:  No Known Allergies          Medications  Valid as of: August 11, 2017 -  2:30 PM    Generic Name Brand Name Tablet Size Instructions for use    Allopurinol (Tab) ZYLOPRIM 300 MG  TAKE 1 TABLET ORALLY DAILY        AmLODIPine Besylate (Tab) NORVASC 10 MG Take 1 Tab by mouth every day.        Aspirin (Tablet Delayed Response) ECOTRIN 81 MG Take 81 mg by mouth every day.        Atorvastatin Calcium (Tab) LIPITOR 20 MG TAKE 1 TABLET ORALLY DAILY        B Complex Vitamins   Take 1 Tab by mouth every day.        Calcium Carbonate-Vit D-Min   Take 1 Tab by mouth every day.        Cilostazol (Tab) PLETAL 100 MG TAKE 1 TABLET ORALLY TWICE DAILY        Levothyroxine Sodium (Tab) SYNTHROID 50 MCG TAKE 1 TABLET ORALLY DAILY        Metoprolol Tartrate (Tab) LOPRESSOR 100 MG Take 1 Tab by mouth 2 times a day.        Multiple Vitamins-Minerals (Tab) CENTRUM SILVER  Take 1 Tab by mouth every day.        Spironolactone (Tab) ALDACTONE 25 MG TAKE 1 TABLET BY MOUTH EVERY DAY        .                 Medicines prescribed today were sent to:     [id number] - [place], [place] - [address]    [address] [place] [address]    Phone: [phone] Fax: [phone]    Open 24 Hours?: No      Medication refill instructions:       If your prescription bottle indicates you have medication refills left, it is not necessary to call your provider’s office. Please contact your pharmacy and they will refill your medication.    If your prescription bottle indicates you do not have any refills left, you may request refills at any time through one of the following ways: The online [employer] system (except Urgent Care), by calling your provider’s office, or by asking your pharmacy to contact your provider’s office with a refill request. Medication refills are processed only during regular business hours and may not be available until the next business day. Your provider may request additional information or to have a follow-up visit with you prior to refilling your medication.   *Please Note: Medication refills are assigned a new Rx number when refilled electronically. Your pharmacy may indicate that no refills were  authorized even though a new prescription for the same medication is available at the pharmacy. Please request the medicine by name with the pharmacy before contacting your provider for a refill.           [employer] Access Code: Activation code not generated  Current Remind Status: Active

## 2017-08-11 NOTE — PROGRESS NOTES
"      Established Patient    Rafia presents today with the following:    CC: Follow up after fall.    HPI: Pt is a 83 y.o. Male with extensive known medical history (listed below) here for follow up for dorsal hematoma following a fall. The fall occurred on  8/01/2017 and the pt went into the ED the next day with c/o right hand pain and swelling. He was found to have a small abrasion over the dorsal aspect of the right thumb and swelling of the dorsal hand. No fractures or dislocations on XR, no paresthesias, and no active bleeding was discovered. Pt was sent home after conservative management. Currently the pt reports improvement in his hand - states it doesn't feel as \"tight,\" and the bruising is improving. Denies pain, tightness, changes in sensation, other injury.      Patient Active Problem List    Diagnosis Date Noted   • Lower GI bleed 03/21/2017     Priority: High   • Aortic stenosis, moderate 06/25/2012     Priority: High   • Paroxysmal atrial fibrillation (CMS-HCC) 09/18/2010     Priority: High   • CAD (coronary artery disease) 09/18/2010     Priority: High   • Benign non-nodular prostatic hyperplasia without lower urinary tract symptoms 09/18/2010     Priority: High   • PVD (peripheral vascular disease) (CMS-HCC) 09/18/2010     Priority: High   • Gout of multiple sites 09/18/2010     Priority: High   • Dyslipidemia 09/18/2010     Priority: High   • Ureteral stone 03/24/2017     Priority: Medium   • Blood in stool 05/16/2017   • Risk for falls 05/02/2017   • Healthcare maintenance 05/02/2017   • Chronic fatigue 10/21/2016   • Hypertension, essential 10/21/2016   • Chronic kidney disease, stage III (moderate) 10/21/2016   • Abnormal liver function tests 05/07/2014   • Abdominal aortic aneurysm (CMS-HCC) 01/17/2013   • Carotid artery stenosis 06/13/2011   • Acquired hypothyroidism 06/13/2011   • TIA (transient ischemic attack) 06/13/2011       Current Outpatient Prescriptions   Medication Sig Dispense " "Refill   • allopurinol (ZYLOPRIM) 300 MG Tab TAKE 1 TABLET ORALLY DAILY 90 Tab 1   • spironolactone (ALDACTONE) 25 MG Tab TAKE 1 TABLET BY MOUTH EVERY DAY 90 Tab 1   • metoprolol (LOPRESSOR) 100 MG Tab Take 1 Tab by mouth 2 times a day. 60 Tab 6   • amlodipine (NORVASC) 10 MG Tab Take 1 Tab by mouth every day. 30 Tab 6   • aspirin EC (ECOTRIN) 81 MG Tablet Delayed Response Take 81 mg by mouth every day.     • atorvastatin (LIPITOR) 20 MG Tab TAKE 1 TABLET ORALLY DAILY 90 Tab 1   • cilostazol (PLETAL) 100 MG Tab TAKE 1 TABLET ORALLY TWICE DAILY 60 Tab 6   • levothyroxine (SYNTHROID) 50 MCG Tab TAKE 1 TABLET ORALLY DAILY 90 Tab 1   • Calcium Carbonate-Vit D-Min (CALTRATE PLUS PO) Take 1 Tab by mouth every day.     • B Complex Vitamins (VITAMIN B COMPLEX PO) Take 1 Tab by mouth every day.     • Multiple Vitamins-Minerals (CENTRUM SILVER) TABS Take 1 Tab by mouth every day.       No current facility-administered medications for this visit.       ROS: As per HPI. Additional pertinent symptoms as noted below.    All others negative     Physical Exam   /46 mmHg  Pulse 56  Temp(Src) 36.9 °C (98.5 °F)  Ht 1.575 m (5' 2.01\")  Wt 66.679 kg (147 lb)  BMI 26.88 kg/m2  SpO2 95%  Constitutional:  oriented to person, place, and time. No distress.   Cardiovascular: Normal rate, regular rhythm and normal heart sounds.  Exam reveals no gallop and no friction rub.  No murmur heard.  Pulmonary/Chest: Breath sounds normal. Chest wall is not dull to percussion.   Musculoskeletal: moderate swelling, hematoma to the dorsal right hand as well as lateral wrist. No crepitus. Full ROM wrist. Thumbs up without difficulty. 2+ radial pulse. Sensation intact and symmetrical. No tenderness at wrist, elbow, shoulder or clavicle.  Neurological: alert and oriented to person, place, and time.     Note: I have reviewed all pertinent labs and diagnostic tests associated with this visit with specific comments listed under the assessment and " plan below    Assessment and Plan    Traumatic hematoma on dorsal aspect of right hand s/p fall  - improving with conservative management  - no acute intervention necessary at this time  - pt instructed to return or go to ED in case of another fall or if his hand gets worse        Followup: Follow up scheduled in November with Dr. Gtz.    Signed by: Naty Lilly M.D.

## 2017-09-06 ENCOUNTER — TELEPHONE (OUTPATIENT)
Dept: INTERNAL MEDICINE | Facility: MEDICAL CENTER | Age: 82
End: 2017-09-06

## 2017-09-06 DIAGNOSIS — E78.5 DYSLIPIDEMIA: ICD-10-CM

## 2017-09-06 RX ORDER — ATORVASTATIN CALCIUM 20 MG/1
TABLET, FILM COATED ORAL
Qty: 90 TAB | Refills: 0 | Status: SHIPPED | OUTPATIENT
Start: 2017-09-06 | End: 2017-12-03 | Stop reason: SDUPTHER

## 2017-09-06 NOTE — TELEPHONE ENCOUNTER
Last seen: 08/11/17 by Dr. Lilly  Next appt: 11/07/17 with Dr. Gtz    Was the patient seen in the last year in this department? Yes   Does patient have an active prescription for medications requested? No   Received Request Via: Pharmacy

## 2017-09-13 LAB
ALBUMIN SERPL-MCNC: 4.4 G/DL (ref 3.5–4.7)
ALBUMIN/GLOB SERPL: 1.7 {RATIO} (ref 1.2–2.2)
ALP SERPL-CCNC: 83 IU/L (ref 39–117)
ALT SERPL-CCNC: 55 IU/L (ref 0–44)
AST SERPL-CCNC: 50 IU/L (ref 0–40)
BILIRUB SERPL-MCNC: 0.5 MG/DL (ref 0–1.2)
BUN SERPL-MCNC: 18 MG/DL (ref 8–27)
BUN/CREAT SERPL: 15 (ref 10–24)
CALCIUM SERPL-MCNC: 9.8 MG/DL (ref 8.6–10.2)
CHLORIDE SERPL-SCNC: 103 MMOL/L (ref 96–106)
CHOLEST SERPL-MCNC: 140 MG/DL (ref 100–199)
CO2 SERPL-SCNC: 21 MMOL/L (ref 18–29)
COMMENT 011824: ABNORMAL
CREAT SERPL-MCNC: 1.22 MG/DL (ref 0.76–1.27)
GLOBULIN SER CALC-MCNC: 2.6 G/DL (ref 1.5–4.5)
GLUCOSE SERPL-MCNC: 105 MG/DL (ref 65–99)
HDLC SERPL-MCNC: 48 MG/DL
LDLC SERPL CALC-MCNC: 59 MG/DL (ref 0–99)
POTASSIUM SERPL-SCNC: 4.1 MMOL/L (ref 3.5–5.2)
PROT SERPL-MCNC: 7 G/DL (ref 6–8.5)
SODIUM SERPL-SCNC: 140 MMOL/L (ref 134–144)
TRIGL SERPL-MCNC: 165 MG/DL (ref 0–149)
VLDLC SERPL CALC-MCNC: 33 MG/DL (ref 5–40)

## 2017-09-14 DIAGNOSIS — E03.9 ACQUIRED HYPOTHYROIDISM: ICD-10-CM

## 2017-09-26 LAB — TSH SERPL DL<=0.005 MIU/L-ACNC: 1.84 UIU/ML (ref 0.45–4.5)

## 2017-10-12 ENCOUNTER — OFFICE VISIT (OUTPATIENT)
Dept: CARDIOLOGY | Facility: MEDICAL CENTER | Age: 82
End: 2017-10-12
Payer: MEDICARE

## 2017-10-12 VITALS
WEIGHT: 148 LBS | HEART RATE: 50 BPM | SYSTOLIC BLOOD PRESSURE: 130 MMHG | HEIGHT: 63 IN | OXYGEN SATURATION: 95 % | BODY MASS INDEX: 26.22 KG/M2 | DIASTOLIC BLOOD PRESSURE: 60 MMHG

## 2017-10-12 DIAGNOSIS — I35.0 AORTIC STENOSIS, MODERATE: ICD-10-CM

## 2017-10-12 DIAGNOSIS — I48.0 PAROXYSMAL ATRIAL FIBRILLATION (HCC): ICD-10-CM

## 2017-10-12 DIAGNOSIS — R42 LIGHTHEADEDNESS: ICD-10-CM

## 2017-10-12 DIAGNOSIS — G45.9 TRANSIENT CEREBRAL ISCHEMIA, UNSPECIFIED TYPE: Chronic | ICD-10-CM

## 2017-10-12 DIAGNOSIS — E78.5 DYSLIPIDEMIA: ICD-10-CM

## 2017-10-12 PROCEDURE — 99214 OFFICE O/P EST MOD 30 MIN: CPT | Performed by: INTERNAL MEDICINE

## 2017-10-12 ASSESSMENT — ENCOUNTER SYMPTOMS
DEPRESSION: 0
DIZZINESS: 1
CLAUDICATION: 0
HEADACHES: 0
SHORTNESS OF BREATH: 1
PALPITATIONS: 0
SENSORY CHANGE: 0
CHILLS: 0
SPEECH CHANGE: 0
MYALGIAS: 0
ABDOMINAL PAIN: 0
VOMITING: 0
EYE DISCHARGE: 0
DOUBLE VISION: 0
LOSS OF CONSCIOUSNESS: 0
PND: 0
WEIGHT LOSS: 0
NAUSEA: 0
BRUISES/BLEEDS EASILY: 0
EYE PAIN: 0
ORTHOPNEA: 0
BLURRED VISION: 0
FALLS: 0
COUGH: 0
FEVER: 0
BLOOD IN STOOL: 0
HALLUCINATIONS: 0

## 2017-10-12 NOTE — LETTER
[facility]   [address] [facility] [address]  [place] [address]  Phone: [phone]  Fax: [phone]              Rafia Shaikh  12/16/1933    Encounter Date: 10/12/2017    Marcelino Wu M.D.          PROGRESS NOTE:  Subjective:   Rafia Shaikh is a 83 y.o. male who presents today cardiac care of moderate AS with mean gradient in 20s in 12/2014, paroxysmal atrial fibrillation, hypertension, coronary arterial disease, peripheral vascular disease, idiopathic hypertrophic subaortic stenosis, TIA.      In the past, patient was found to have GI bleeding. His anticoagulation and plavix were stopped.     In the interim, patient has been feeling weaker and less energetic.    Past Medical History:   Diagnosis Date   • Arrhythmia 5/12/17    Hx A fib. On only aspirin.    • Dental disorder 5/12/17    permanent bridge lower   • Rectal bleed 4/4/2016    Cauterized and received blood transfusions   • Anemia 4/2016    due to rectal bleed   • Sleep apnea 2014    States recommended CPAP but never did get it.   • Carotid artery stenosis 6/13/2011   • Dizziness 6/13/2011   • Hypothyroidism 6/13/2011   • TIA (transient ischemic attack) 6/13/2011   • Preoperative examination, unspecified 4/12/2011   • Stroke (CMS-HCC) 4/16/2010    TIA    • Myocardial infarct 2008 Stent 2011.  5/12/17-Cardiologist is DR Wu ([place])   • Renal disorder 2000    kidney stone   • CATARACT 1990's    Bilateral phaco with IOL   • CAD (coronary artery disease)    • Dental disorder     cavities, under current care   • Gout    • Heart murmur    • High cholesterol    • Hyperlipidemia    • Hypertension    • Insomnia    • PAD (peripheral artery disease)    • Tendonitis    • Unspecified disorder of thyroid      Past Surgical History:   Procedure Laterality Date   • COLONOSCOPY N/A 5/16/2017    Procedure: COLONOSCOPY;  Surgeon: Osmany Smart M.D.;  Location: SURGERY [facility];  Service:    • LAPAROSCOPY  4/2/2016    exp for rectal bleed and cautery   • COLONOSCOPY  2015   • RECOVERY  8/22/2012    aortogram-Performed by SURGERY, IR-RECOVERY at SURGERY [facility] ORS   • CAROTID ENDARTERECTOMY  5/3/2011    Performed by ERASMO NAVARRETE at SURGERY [facility] ORS   • RECOVERY  4/1/2011    Performed by SURGERY, CATH-RECOVERY at SURGERY SAME DAY [facility] ORS   • RECOVERY  3/25/2011    Performed by SURGERY, CATH-RECOVERY at SURGERY SAME DAY [facility] ORS   • MULTIPLE CORONARY ARTERY BYPASS ENDO VEIN HARVEST  3/20/08    Performed by AMANDA CRYSTAL at SURGERY [facility] ORS   • LITHOTRIPSY  2000   • SEPTOPLASTY  1995   • CATARACT EXTRACTION WITH IOL Bilateral early 1990's     Family History   Problem Relation Age of Onset   • Hypertension     • Colon Cancer Brother    • Breast Cancer Sister    • Other       GOUT   • Heart Disease Father    • Other Mother      TB     History   Smoking Status   • Never Smoker   Smokeless Tobacco   • Never Used     No Known Allergies  Outpatient Encounter Prescriptions as of 10/12/2017   Medication Sig Dispense Refill   • levothyroxine (SYNTHROID) 50 MCG Tab TAKE 1 TABLET BY MOUTH DAILY 30 Tab 0   • atorvastatin (LIPITOR) 20 MG Tab TAKE 1 TABLET ORALLY DAILY 90 Tab 0   • allopurinol (ZYLOPRIM) 300 MG Tab TAKE 1 TABLET ORALLY DAILY 90 Tab 1   • spironolactone (ALDACTONE) 25 MG Tab TAKE 1 TABLET BY MOUTH EVERY DAY 90 Tab 1   • metoprolol (LOPRESSOR) 100 MG Tab Take 1 Tab by mouth 2 times a day. 60 Tab 6   • amlodipine (NORVASC) 10 MG Tab Take 1 Tab by mouth every day. 30 Tab 6   • aspirin EC (ECOTRIN) 81 MG Tablet Delayed Response Take 81 mg by mouth every day.     • cilostazol (PLETAL) 100 MG Tab TAKE 1 TABLET ORALLY TWICE DAILY 60 Tab 6   • Calcium Carbonate-Vit D-Min (CALTRATE PLUS PO) Take 1 Tab by mouth every day.     • B Complex Vitamins (VITAMIN B COMPLEX PO) Take 1 Tab by mouth every day.     • Multiple Vitamins-Minerals (CENTRUM SILVER) TABS Take 1 Tab by mouth every day.       No  "facility-administered encounter medications on file as of 10/12/2017.      Review of Systems   Constitutional: Positive for malaise/fatigue. Negative for chills, fever and weight loss.   HENT: Negative for ear discharge, ear pain, hearing loss and nosebleeds.    Eyes: Negative for blurred vision, double vision, pain and discharge.   Respiratory: Positive for shortness of breath. Negative for cough.    Cardiovascular: Negative for chest pain, palpitations, orthopnea, claudication, leg swelling and PND.   Gastrointestinal: Negative for abdominal pain, blood in stool, melena, nausea and vomiting.   Genitourinary: Negative for dysuria and hematuria.   Musculoskeletal: Negative for falls, joint pain and myalgias.   Skin: Negative for itching and rash.   Neurological: Positive for dizziness. Negative for sensory change, speech change, loss of consciousness and headaches.   Endo/Heme/Allergies: Negative for environmental allergies. Does not bruise/bleed easily.   Psychiatric/Behavioral: Negative for depression, hallucinations and suicidal ideas.        Objective:   /60   Pulse (!) 50   Ht 1.6 m (5' 3\")   Wt 67.1 kg (148 lb)   SpO2 95%   BMI 26.22 kg/m²      Physical Exam   Constitutional: He is oriented to person, place, and time. No distress.   HENT:   Head: Atraumatic.   Eyes: EOM are normal. Right eye exhibits no discharge. Left eye exhibits no discharge.   Neck: Neck supple. No JVD present.   Cardiovascular: Normal rate and regular rhythm.    Murmur heard.  there is 2-3/6 systolic murmur heard best at the right border of the aortic valve area. The murmur does not radiate to the carotids.     Pulmonary/Chest: No respiratory distress. He has no rales.   Abdominal: There is no rebound and no guarding.   Musculoskeletal: He exhibits no edema or tenderness.   Neurological: He is alert and oriented to person, place, and time.   Skin: Skin is warm and dry.   Psychiatric: He has a normal mood and affect.       "       Assessment:     1. Aortic stenosis, moderate     2. Transient cerebral ischemia, unspecified type     3. Paroxysmal atrial fibrillation (CMS-HCC)     4. Dyslipidemia         Medical Decision Making:  Today's Assessment / Status / Plan:   Will obtain TTE to assess the aortic valve. Last one was in 2014.    I will see patient back in clinic with lab tests and studies results in 1 months.    I thank you Dr. Gtz for referring patient to our Cardiology Clinic today.        Yarelis Gtz M.D.  [address] [place] [id number]  VIA In Basket

## 2017-10-12 NOTE — PROGRESS NOTES
Subjective:   Rafia Shaikh is a 83 y.o. male who presents today cardiac care of moderate AS with mean gradient in 20s in 12/2014, paroxysmal atrial fibrillation, hypertension, coronary arterial disease, peripheral vascular disease, idiopathic hypertrophic subaortic stenosis, TIA.      In the past, patient was found to have GI bleeding. His anticoagulation and plavix were stopped.     In the interim, patient has been feeling weaker and less energetic.    Past Medical History:   Diagnosis Date   • Arrhythmia 5/12/17    Hx A fib. On only aspirin.    • Dental disorder 5/12/17    permanent bridge lower   • Rectal bleed 4/4/2016    Cauterized and received blood transfusions   • Anemia 4/2016    due to rectal bleed   • Sleep apnea 2014    States recommended CPAP but never did get it.   • Carotid artery stenosis 6/13/2011   • Dizziness 6/13/2011   • Hypothyroidism 6/13/2011   • TIA (transient ischemic attack) 6/13/2011   • Preoperative examination, unspecified 4/12/2011   • Stroke (CMS-[facility]) 4/16/2010    TIA    • Myocardial infarct 2008 Stent 2011.  5/12/17-Cardiologist is DR Wu ([facility])   • Renal disorder 2000    kidney stone   • CATARACT 1990's    Bilateral phaco with IOL   • CAD (coronary artery disease)    • Dental disorder     cavities, under current care   • Gout    • Heart murmur    • High cholesterol    • Hyperlipidemia    • Hypertension    • Insomnia    • PAD (peripheral artery disease)    • Tendonitis    • Unspecified disorder of thyroid      Past Surgical History:   Procedure Laterality Date   • COLONOSCOPY N/A 5/16/2017    Procedure: COLONOSCOPY;  Surgeon: Osmany Smart M.D.;  Location: SURGERY [facility];  Service:    • LAPAROSCOPY  4/2/2016    exp for rectal bleed and cautery   • COLONOSCOPY  2015   • RECOVERY  8/22/2012    aortogram-Performed by SURGERY, IR-RECOVERY at [facility]   • CAROTID ENDARTERECTOMY  5/3/2011    Performed by ERASMO NAVARRETE at [facility]   • RECOVERY   4/1/2011    [facility] SURG[facility]   • RECOVERY  3/25/2011    Performed by SURGERY[facility]   • MULTIPLE CORONARY ARTERY BYPASS ENDO VEIN HARVEST  3/20/08    Performed by AMANDA CRYSTAL at SURGERY [facility] ORS   • LITHOTRIPSY  2000   • SEPTOPLASTY  1995   • CATARACT EXTRACTION WITH IOL Bilateral early 1990's     Family History   Problem Relation Age of Onset   • Hypertension     • Colon Cancer Brother    • Breast Cancer Sister    • Other       GOUT   • Heart Disease Father    • Other Mother      TB     History   Smoking Status   • Never Smoker   Smokeless Tobacco   • Never Used     No Known Allergies  Outpatient Encounter Prescriptions as of 10/12/2017   Medication Sig Dispense Refill   • levothyroxine (SYNTHROID) 50 MCG Tab TAKE 1 TABLET BY MOUTH DAILY 30 Tab 0   • atorvastatin (LIPITOR) 20 MG Tab TAKE 1 TABLET ORALLY DAILY 90 Tab 0   • allopurinol (ZYLOPRIM) 300 MG Tab TAKE 1 TABLET ORALLY DAILY 90 Tab 1   • spironolactone (ALDACTONE) 25 MG Tab TAKE 1 TABLET BY MOUTH EVERY DAY 90 Tab 1   • metoprolol (LOPRESSOR) 100 MG Tab Take 1 Tab by mouth 2 times a day. 60 Tab 6   • amlodipine (NORVASC) 10 MG Tab Take 1 Tab by mouth every day. 30 Tab 6   • aspirin EC (ECOTRIN) 81 MG Tablet Delayed Response Take 81 mg by mouth every day.     • cilostazol (PLETAL) 100 MG Tab TAKE 1 TABLET ORALLY TWICE DAILY 60 Tab 6   • Calcium Carbonate-Vit D-Min (CALTRATE PLUS PO) Take 1 Tab by mouth every day.     • B Complex Vitamins (VITAMIN B COMPLEX PO) Take 1 Tab by mouth every day.     • Multiple Vitamins-Minerals (CENTRUM SILVER) TABS Take 1 Tab by mouth every day.       No facility-administered encounter medications on file as of 10/12/2017.      Review of Systems   Constitutional: Positive for malaise/fatigue. Negative for chills, fever and weight loss.   HENT: Negative for ear discharge, ear pain, hearing loss and nosebleeds.    Eyes: Negative  "for blurred vision, double vision, pain and discharge.   Respiratory: Positive for shortness of breath. Negative for cough.    Cardiovascular: Negative for chest pain, palpitations, orthopnea, claudication, leg swelling and PND.   Gastrointestinal: Negative for abdominal pain, blood in stool, melena, nausea and vomiting.   Genitourinary: Negative for dysuria and hematuria.   Musculoskeletal: Negative for falls, joint pain and myalgias.   Skin: Negative for itching and rash.   Neurological: Positive for dizziness. Negative for sensory change, speech change, loss of consciousness and headaches.   Endo/Heme/Allergies: Negative for environmental allergies. Does not bruise/bleed easily.   Psychiatric/Behavioral: Negative for depression, hallucinations and suicidal ideas.        Objective:   /60   Pulse (!) 50   Ht 1.6 m (5' 3\")   Wt 67.1 kg (148 lb)   SpO2 95%   BMI 26.22 kg/m²     Physical Exam   Constitutional: He is oriented to person, place, and time. No distress.   HENT:   Head: Atraumatic.   Eyes: EOM are normal. Right eye exhibits no discharge. Left eye exhibits no discharge.   Neck: Neck supple. No JVD present.   Cardiovascular: Normal rate and regular rhythm.    Murmur heard.  there is 2-3/6 systolic murmur heard best at the right border of the aortic valve area. The murmur does not radiate to the carotids.     Pulmonary/Chest: No respiratory distress. He has no rales.   Abdominal: There is no rebound and no guarding.   Musculoskeletal: He exhibits no edema or tenderness.   Neurological: He is alert and oriented to person, place, and time.   Skin: Skin is warm and dry.   Psychiatric: He has a normal mood and affect.       Assessment:     1. Aortic stenosis, moderate     2. Transient cerebral ischemia, unspecified type     3. Paroxysmal atrial fibrillation (CMS-HCC)     4. Dyslipidemia         Medical Decision Making:  Today's Assessment / Status / Plan:   Will obtain TTE to assess the aortic valve. " Last one was in 2014.    I will see patient back in clinic with lab tests and studies results in 1 months.    I thank you Dr. Gtz for referring patient to our Cardiology Clinic today.

## 2017-10-26 ENCOUNTER — HOSPITAL ENCOUNTER (OUTPATIENT)
Dept: CARDIOLOGY | Facility: MEDICAL CENTER | Age: 82
End: 2017-10-26
Attending: INTERNAL MEDICINE
Payer: MEDICARE

## 2017-10-26 DIAGNOSIS — I48.0 PAROXYSMAL ATRIAL FIBRILLATION (HCC): ICD-10-CM

## 2017-10-26 DIAGNOSIS — E78.5 DYSLIPIDEMIA: ICD-10-CM

## 2017-10-26 DIAGNOSIS — R42 LIGHTHEADEDNESS: ICD-10-CM

## 2017-10-26 DIAGNOSIS — I35.0 AORTIC STENOSIS, MODERATE: ICD-10-CM

## 2017-10-26 DIAGNOSIS — G45.9 TRANSIENT CEREBRAL ISCHEMIA, UNSPECIFIED TYPE: Chronic | ICD-10-CM

## 2017-10-26 LAB
LV EJECT FRACT  99904: 70
LV EJECT FRACT MOD 2C 99903: 81.16
LV EJECT FRACT MOD 4C 99902: 60.6
LV EJECT FRACT MOD BP 99901: 75.52

## 2017-10-26 PROCEDURE — 93306 TTE W/DOPPLER COMPLETE: CPT | Mod: 26 | Performed by: INTERNAL MEDICINE

## 2017-10-26 PROCEDURE — 93306 TTE W/DOPPLER COMPLETE: CPT

## 2017-10-30 ENCOUNTER — TELEPHONE (OUTPATIENT)
Dept: CARDIOLOGY | Facility: MEDICAL CENTER | Age: 82
End: 2017-10-30

## 2017-10-30 NOTE — TELEPHONE ENCOUNTER
Message   Received: 4 days ago   Message Contents   BOO Lorenzana R.N. Hi Denise,   Just talked to Dr. Waters and He said he would like to see the pt for a TAVR consult if pt is agreeable. He said he didn't know who Dr. Wu's nurse was so sent it to me. I told him it was the one and only PEÑA :).  LUCIE said he would see her pt on Monday afternoon if he wants. Let me know    Previous Messages      ----- Message -----   From: Jez Waters M.D.   Sent: 10/26/2017   4:26 PM   To: Mercedez Carballo R.N., *     Please call patient with this patient of Dr. Karimi with echocardiogram results showing severe aortic stenosis. Please schedule follow up with me for TAVR.     Thanks.  LUCIE.         FU with TT. Please schedule pt to see JI. Pt called with JANETH results. No answer. [facility] with contact information and date/time 11/17 at 4:20 for possible JI appt available.

## 2017-11-02 PROBLEM — K92.1 BLOOD IN STOOL: Status: RESOLVED | Noted: 2017-05-16 | Resolved: 2017-11-02

## 2017-11-02 PROBLEM — I35.0 MODERATE AORTIC STENOSIS: Status: RESOLVED | Noted: 2017-03-21 | Resolved: 2017-11-02

## 2017-11-02 PROBLEM — T14.8XXA HEMATOMA: Status: RESOLVED | Noted: 2017-08-11 | Resolved: 2017-11-02

## 2017-11-07 ENCOUNTER — OFFICE VISIT (OUTPATIENT)
Dept: INTERNAL MEDICINE | Facility: MEDICAL CENTER | Age: 82
End: 2017-11-07
Payer: MEDICARE

## 2017-11-07 VITALS
HEART RATE: 48 BPM | WEIGHT: 144 LBS | OXYGEN SATURATION: 95 % | TEMPERATURE: 97.8 F | SYSTOLIC BLOOD PRESSURE: 107 MMHG | DIASTOLIC BLOOD PRESSURE: 52 MMHG | HEIGHT: 63 IN | BODY MASS INDEX: 25.52 KG/M2

## 2017-11-07 DIAGNOSIS — R53.82 CHRONIC FATIGUE: ICD-10-CM

## 2017-11-07 DIAGNOSIS — K92.2 LOWER GI BLEED: ICD-10-CM

## 2017-11-07 DIAGNOSIS — Z91.81 RISK FOR FALLS: ICD-10-CM

## 2017-11-07 DIAGNOSIS — R79.89 ABNORMAL LIVER FUNCTION TESTS: ICD-10-CM

## 2017-11-07 DIAGNOSIS — E78.5 DYSLIPIDEMIA: ICD-10-CM

## 2017-11-07 DIAGNOSIS — N18.30 CHRONIC KIDNEY DISEASE, STAGE III (MODERATE) (HCC): ICD-10-CM

## 2017-11-07 DIAGNOSIS — I25.10 CORONARY ARTERY DISEASE INVOLVING NATIVE HEART, ANGINA PRESENCE UNSPECIFIED, UNSPECIFIED VESSEL OR LESION TYPE: ICD-10-CM

## 2017-11-07 DIAGNOSIS — N40.0 BENIGN NON-NODULAR PROSTATIC HYPERPLASIA WITHOUT LOWER URINARY TRACT SYMPTOMS: ICD-10-CM

## 2017-11-07 DIAGNOSIS — M10.9 GOUT OF MULTIPLE SITES, UNSPECIFIED CAUSE, UNSPECIFIED CHRONICITY: ICD-10-CM

## 2017-11-07 DIAGNOSIS — I10 HYPERTENSION, ESSENTIAL: ICD-10-CM

## 2017-11-07 DIAGNOSIS — G45.9 TRANSIENT CEREBRAL ISCHEMIA, UNSPECIFIED TYPE: Chronic | ICD-10-CM

## 2017-11-07 DIAGNOSIS — Z23 NEEDS FLU SHOT: ICD-10-CM

## 2017-11-07 DIAGNOSIS — E03.9 ACQUIRED HYPOTHYROIDISM: ICD-10-CM

## 2017-11-07 DIAGNOSIS — I73.9 PVD (PERIPHERAL VASCULAR DISEASE) (HCC): ICD-10-CM

## 2017-11-07 DIAGNOSIS — Z00.00 HEALTHCARE MAINTENANCE: ICD-10-CM

## 2017-11-07 PROCEDURE — 90662 IIV NO PRSV INCREASED AG IM: CPT | Performed by: INTERNAL MEDICINE

## 2017-11-07 PROCEDURE — 99214 OFFICE O/P EST MOD 30 MIN: CPT | Mod: 25 | Performed by: INTERNAL MEDICINE

## 2017-11-07 PROCEDURE — G0008 ADMIN INFLUENZA VIRUS VAC: HCPCS | Performed by: INTERNAL MEDICINE

## 2017-11-07 NOTE — ASSESSMENT & PLAN NOTE
Patient denies chest pain/pressure with exertion. Denies palpitations, edema, orthopnea, PND, AYALA.

## 2017-11-07 NOTE — ASSESSMENT & PLAN NOTE
Patient denies any new neurological symptoms no numbness, weakness, vision, swallow, or speech problems. And is taking meds as prescribed.

## 2017-11-07 NOTE — ASSESSMENT & PLAN NOTE
Re check 100/60    The patient denies any symptoms referable to her elevated blood pressure. Specifically denies chest pain, palpitations, dyspnea, orthopnea, PND or peripheral edema. Current medication regimen is as listed below. Patient denies any side effects of medication.

## 2017-11-07 NOTE — PROGRESS NOTES
Established Patient    Rafia Shaikh is a 83 y.o. male who presents today with the following:    CC: Routine follow-up visit. Patient complains about being weak. Wishes some help at home for ADLs.    HPI: Seen with his wife history is from both    CAD (coronary artery disease)  Patient denies chest pain/pressure with exertion. Denies palpitations, edema, orthopnea, PND, AYALA.        TIA (transient ischemic attack)  Patient denies any new neurological symptoms no numbness, weakness, vision, swallow, or speech problems. And is taking meds as prescribed.        Dyslipidemia  Patient denies any signs/symptoms of cardiovascular disease. Denies chest pain/pressure; denies numbness/weakness or difficulty with speech/swallowing or sudden change in vision.         Acquired hypothyroidism  Patient is feeling well on their current thyroid dose. The most recent thyroid function tests were normal. No unusual fatigue, skin changes, depression, constipation. Patient is compliant with medication. Takes medication as directed on an empty stomach.          Abnormal liver function tests  Denies jaundice- recent labs down.     Hypertension, essential  Re check 100/60    The patient denies any symptoms referable to her elevated blood pressure. Specifically denies chest pain, palpitations, dyspnea, orthopnea, PND or peripheral edema. Current medication regimen is as listed below. Patient denies any side effects of medication.          Chronic kidney disease, stage III (moderate)  Patient is aware of decreased renal function. And is being cautious about NSAIDs.        Risk for falls  Recent fall- bruise R hand- feels weak in legs. No cane or walker- has- does not use.  Wife helping with ADLs.     Healthcare maintenance  Sees eye doc/dentist  Eats well. No formal exercise.   Requests nursing assistance for ADLs.     Chronic fatigue  ongoing- was worse with GIB- and has no fully recovered.    Benign non-nodular prostatic hyperplasia  without lower urinary tract symptoms  Denies and probs with urinating.    PVD (peripheral vascular disease) (CMS-HCC)  Denies pain in legs/feel.    Gout of multiple sites  Denies probs.         ROS: As per HPI. Additional pertinent symptoms as noted below. All other systems reviewed and are negative.    ROS:  Constitutional ROS: No unexpected change in weight, Positive for fatigue , weakness   Eye ROS: No recent significant change in vision  Pulmonary ROS: No shortness of breath, No recent change in breathing  Cardiovascular ROS: No chest pain, No edema, No palpitations, No syncope  Gastrointestinal ROS: No abdominal pain, No nausea, vomiting, diarrhea, or constipation  Psychiatric ROS: No depression, No anxiety    Patient Active Problem List    Diagnosis Date Noted   • Lower GI bleed 03/21/2017     Priority: High   • Aortic stenosis, severe 06/25/2012     Priority: High   • Paroxysmal atrial fibrillation (CMS-HCC) 09/18/2010     Priority: High   • CAD (coronary artery disease) 09/18/2010     Priority: High   • Benign non-nodular prostatic hyperplasia without lower urinary tract symptoms 09/18/2010     Priority: High   • PVD (peripheral vascular disease) (CMS-HCC) 09/18/2010     Priority: High   • Gout of multiple sites 09/18/2010     Priority: High   • Dyslipidemia 09/18/2010     Priority: High   • Ureteral stone 03/24/2017     Priority: Medium   • Risk for falls 05/02/2017   • Healthcare maintenance 05/02/2017   • Chronic fatigue 10/21/2016   • Hypertension, essential 10/21/2016   • Chronic kidney disease, stage III (moderate) 10/21/2016   • Abnormal liver function tests 05/07/2014   • Abdominal aortic aneurysm (CMS-HCC) 01/17/2013   • Carotid artery stenosis 06/13/2011   • Acquired hypothyroidism 06/13/2011   • TIA (transient ischemic attack) 06/13/2011       Social History   Substance Use Topics   • Smoking status: Never Smoker   • Smokeless tobacco: Never Used   • Alcohol use No       Current Outpatient  "Prescriptions   Medication Sig Dispense Refill   • levothyroxine (SYNTHROID) 50 MCG Tab TAKE 1 TABLET BY MOUTH DAILY 30 Tab 0   • atorvastatin (LIPITOR) 20 MG Tab TAKE 1 TABLET ORALLY DAILY 90 Tab 0   • allopurinol (ZYLOPRIM) 300 MG Tab TAKE 1 TABLET ORALLY DAILY 90 Tab 1   • spironolactone (ALDACTONE) 25 MG Tab TAKE 1 TABLET BY MOUTH EVERY DAY 90 Tab 1   • metoprolol (LOPRESSOR) 100 MG Tab Take 1 Tab by mouth 2 times a day. 60 Tab 6   • amlodipine (NORVASC) 10 MG Tab Take 1 Tab by mouth every day. 30 Tab 6   • aspirin EC (ECOTRIN) 81 MG Tablet Delayed Response Take 81 mg by mouth every day.     • Calcium Carbonate-Vit D-Min (CALTRATE PLUS PO) Take 1 Tab by mouth every day.     • B Complex Vitamins (VITAMIN B COMPLEX PO) Take 1 Tab by mouth every day.     • Multiple Vitamins-Minerals (CENTRUM SILVER) TABS Take 1 Tab by mouth every day.     • cilostazol (PLETAL) 100 MG Tab Take 1 Tab by mouth 2 times a day. 180 Tab 3     No current facility-administered medications for this visit.        /52   Pulse (!) 48   Temp 36.6 °C (97.8 °F)   Ht 1.6 m (5' 3\")   Wt 65.3 kg (144 lb)   SpO2 95%   BMI 25.51 kg/m²     Physical Exam  General: Well developed, well nourished male, in no distress.  Eyes: Conjuntiva without any obvious injection or erythema.   Ears- canals and TMs clear  Neck- no significant adenopathy  Mouth- mucous membranes moist, no erythema  Cardiovascular: Heart is regular withMachinery-like murmur across the precordium especially at the right second intercostal space radiates into the upper chest. There are bilateral mild bruits in the carotids as well  Lungs: Clear to auscultation bilaterally. No wheezes, rhonci or crackles heard. Respiratory effort is normal.  Neuro- A & O, grossly non-focal, CN II-XII grossly in tact   Abd: Soft, non-tender  Ext: No edema  Other:In and out of chair without use of arms without any difficulty      Assessment and Plan    Recent labs were reviewed.  Recent imaging was " reviewed  Consultant notes since last visit here were reviewed.       1. Coronary artery disease involving native heart, angina presence unspecified, unspecified vessel or lesion type  Regular follow-up with Dr. canseco.    2. Transient cerebral ischemia, unspecified type  Patient has no signs of recent neurovascular event. Risk factors are controlled.        3. Dyslipidemia  Lipids are in reasonable range. The patient to continue paying attention to diet and exercise. Patient aware to go to emergency department or call 911 if any signs of cardiovascular disease- chest pain or pressure, sudden numbness or weakness in an arm or leg, sudden loss of ability to talk or swallow.        4. Lower GI bleed  Explained to patient what an AVM is and what was done to control his bleeding. He denies any further blood. And CBC has near normal hemoglobin at this time    5. Acquired hypothyroidism  Thyroid labs are within range. Patient is taking meds as prescribed first thing in morning without food. And has no signs or symptoms of lower hypothyroid.        6. Abnormal liver function tests  LFTs are decreased from previous. Will need to be followed going forward.    7. Hypertension, essential  BP is quite low today recheck 100/60. Patient denies lightheadedness with standing. Encourage fluids    8. Chronic kidney disease, stage III (moderate)  Reinforced avoiding nephrotoxic medications, especially NSAIDs, and ensuring enough daily fluids        9. Risk for falls  No recent falls. But I do worry about his subjective weakness. They do not have bars or other assisted equipment in the home. We'll do a home health eval to see if he could benefit from some such. We'll also recommend PT and see if they can help with gait imbalance and strengthening  - REFERRAL TO HOME HEALTH    10. Healthcare maintenance  Patient reports eye doctor and dentist up-to-date  No formal exercise, eats well    11. Needs flu shot    - INFLUENZA VACCINE, HIGH DOSE  (65+ ONLY)    12. Chronic fatigue  Patient does not sleep well at night but sleeps during the day. Again recommended trying to stay occupied during the day so that he can change his sleep cycle of round    13. Benign non-nodular prostatic hyperplasia without lower urinary tract symptoms  Patient denies problems with urinating    14. PVD (peripheral vascular disease) (CMS-[facility])  Patient has bilateral carotid bruits. He has not seen Dr. Parra in a year he believes. Recommend he make a follow-up visit    15. Gout of multiple sites, unspecified cause, unspecified chronicity  Patient denies any problems    Patient notes that he pays all the bills at home. He does them on the computer often. Y states that there is no problem coping with finances and getting bills paid. She states they do well at home together.      Return in about 3 months (around 2/7/2018), or Kristopher please.      Signed by:       Yarelis Gtz M.D.    This note was created using voice recognition software. There may be unintended errors in spelling, grammar or content.

## 2017-11-07 NOTE — ASSESSMENT & PLAN NOTE
Recent fall- bruise R hand- feels weak in legs. No cane or walker- has- does not use.  Wife helping with ADLs.

## 2017-11-08 ENCOUNTER — HOME HEALTH ADMISSION (OUTPATIENT)
Dept: HOME HEALTH SERVICES | Facility: HOME HEALTHCARE | Age: 82
End: 2017-11-08
Payer: MEDICARE

## 2017-11-12 ENCOUNTER — HOME CARE VISIT (OUTPATIENT)
Dept: HOME HEALTH SERVICES | Facility: HOME HEALTHCARE | Age: 82
End: 2017-11-12
Payer: MEDICARE

## 2017-11-12 ENCOUNTER — HOME CARE VISIT (OUTPATIENT)
Dept: HOME HEALTH SERVICES | Facility: HOME HEALTHCARE | Age: 82
End: 2017-11-12

## 2017-11-12 PROCEDURE — 665999 HH PPS REVENUE DEBIT

## 2017-11-12 PROCEDURE — 665998 HH PPS REVENUE CREDIT

## 2017-11-12 PROCEDURE — 665001 SOC-HOME HEALTH

## 2017-11-12 PROCEDURE — G0162 HHC RN E&M PLAN SVS, 15 MIN: HCPCS

## 2017-11-13 ENCOUNTER — PATIENT OUTREACH (OUTPATIENT)
Dept: HEALTH INFORMATION MANAGEMENT | Facility: OTHER | Age: 82
End: 2017-11-13

## 2017-11-13 ENCOUNTER — TELEPHONE (OUTPATIENT)
Dept: HEALTH INFORMATION MANAGEMENT | Facility: OTHER | Age: 82
End: 2017-11-13

## 2017-11-13 VITALS
BODY MASS INDEX: 25.69 KG/M2 | HEIGHT: 63 IN | DIASTOLIC BLOOD PRESSURE: 64 MMHG | SYSTOLIC BLOOD PRESSURE: 116 MMHG | OXYGEN SATURATION: 93 % | HEART RATE: 64 BPM | RESPIRATION RATE: 20 BRPM | TEMPERATURE: 98.7 F | WEIGHT: 145 LBS

## 2017-11-13 PROCEDURE — 665999 HH PPS REVENUE DEBIT

## 2017-11-13 PROCEDURE — 665998 HH PPS REVENUE CREDIT

## 2017-11-13 RX ORDER — LEVOTHYROXINE SODIUM 0.05 MG/1
TABLET ORAL
Qty: 90 TAB | Refills: 0 | Status: SHIPPED | OUTPATIENT
Start: 2017-11-13 | End: 2018-05-09 | Stop reason: SDUPTHER

## 2017-11-13 SDOH — ECONOMIC STABILITY: HOUSING INSECURITY: UNSAFE COOKING RANGE AREA: 0

## 2017-11-13 SDOH — ECONOMIC STABILITY: HOUSING INSECURITY: UNSAFE APPLIANCES: 0

## 2017-11-13 ASSESSMENT — PATIENT HEALTH QUESTIONNAIRE - PHQ9
1. LITTLE INTEREST OR PLEASURE IN DOING THINGS: 00
2. FEELING DOWN, DEPRESSED, IRRITABLE, OR HOPELESS: 00

## 2017-11-13 ASSESSMENT — ACTIVITIES OF DAILY LIVING (ADL)
HOME_HEALTH_OASIS: 01
TRANSPORTATION COMMENTS: POTENTIAL FOR FALLS

## 2017-11-13 NOTE — TELEPHONE ENCOUNTER
Last seen: 11/07/17 by Dr. Gtz  Next appt: None     Was the patient seen in the last year in this department? Yes   Does patient have an active prescription for medications requested? No   Received Request Via: Pharmacy

## 2017-11-14 PROCEDURE — 665999 HH PPS REVENUE DEBIT

## 2017-11-14 PROCEDURE — 665998 HH PPS REVENUE CREDIT

## 2017-11-14 NOTE — PROGRESS NOTES
Referral from [facility]. [facility] rec completed. No clinically significant medication issues noted.

## 2017-11-15 ENCOUNTER — HOME CARE VISIT (OUTPATIENT)
Dept: HOME HEALTH SERVICES | Facility: HOME HEALTHCARE | Age: 82
End: 2017-11-15
Payer: MEDICARE

## 2017-11-15 VITALS
RESPIRATION RATE: 16 BRPM | HEART RATE: 54 BPM | DIASTOLIC BLOOD PRESSURE: 52 MMHG | OXYGEN SATURATION: 96 % | TEMPERATURE: 99 F | SYSTOLIC BLOOD PRESSURE: 100 MMHG

## 2017-11-15 VITALS
TEMPERATURE: 98.8 F | OXYGEN SATURATION: 96 % | DIASTOLIC BLOOD PRESSURE: 58 MMHG | RESPIRATION RATE: 18 BRPM | HEART RATE: 51 BPM | SYSTOLIC BLOOD PRESSURE: 120 MMHG

## 2017-11-15 PROCEDURE — G0151 HHCP-SERV OF PT,EA 15 MIN: HCPCS

## 2017-11-15 PROCEDURE — G0152 HHCP-SERV OF OT,EA 15 MIN: HCPCS

## 2017-11-15 PROCEDURE — 665998 HH PPS REVENUE CREDIT

## 2017-11-15 PROCEDURE — 665999 HH PPS REVENUE DEBIT

## 2017-11-15 ASSESSMENT — ACTIVITIES OF DAILY LIVING (ADL): IADLS_COMMENTS: <!--EPICS-->SEE OT EVAL<!--EPICE-->

## 2017-11-15 ASSESSMENT — ENCOUNTER SYMPTOMS: DIFFICULTY THINKING: 1

## 2017-11-16 ENCOUNTER — TELEPHONE (OUTPATIENT)
Dept: INTERNAL MEDICINE | Facility: MEDICAL CENTER | Age: 82
End: 2017-11-16

## 2017-11-16 PROCEDURE — 665998 HH PPS REVENUE CREDIT

## 2017-11-16 PROCEDURE — 665999 HH PPS REVENUE DEBIT

## 2017-11-16 ASSESSMENT — ACTIVITIES OF DAILY LIVING (ADL): BATHING_ASSISTANCE: 4

## 2017-11-16 ASSESSMENT — ENCOUNTER SYMPTOMS: POOR JUDGMENT: 1

## 2017-11-16 NOTE — TELEPHONE ENCOUNTER
Please assure patient has and keeps upcoming appointment with cardiology. They are managing his blood pressure medications. Please sure he is hydrated as well. Thank you for continued monitoring

## 2017-11-16 NOTE — TELEPHONE ENCOUNTER
----- Message from Nimco Verma, PT sent at 11/15/2017  7:57 PM PST -----  Pt had somewhat low BP today initiaslly 100/52 and HR 50. He hydrated + moved around and BP became 112/54 but HR remained 50. Pt denies light headed but is sleepy.   125

## 2017-11-17 ENCOUNTER — HOME CARE VISIT (OUTPATIENT)
Dept: HOME HEALTH SERVICES | Facility: HOME HEALTHCARE | Age: 82
End: 2017-11-17
Payer: MEDICARE

## 2017-11-17 PROCEDURE — 665997 HH PPS REVENUE ADJ

## 2017-11-17 PROCEDURE — G0162 HHC RN E&M PLAN SVS, 15 MIN: HCPCS

## 2017-11-17 PROCEDURE — G0151 HHCP-SERV OF PT,EA 15 MIN: HCPCS

## 2017-11-17 PROCEDURE — 665998 HH PPS REVENUE CREDIT

## 2017-11-17 PROCEDURE — 665999 HH PPS REVENUE DEBIT

## 2017-11-18 VITALS
HEART RATE: 46 BPM | SYSTOLIC BLOOD PRESSURE: 120 MMHG | DIASTOLIC BLOOD PRESSURE: 54 MMHG | OXYGEN SATURATION: 97 % | RESPIRATION RATE: 16 BRPM | TEMPERATURE: 99.1 F

## 2017-11-18 PROCEDURE — 665998 HH PPS REVENUE CREDIT

## 2017-11-18 PROCEDURE — 665999 HH PPS REVENUE DEBIT

## 2017-11-18 ASSESSMENT — ENCOUNTER SYMPTOMS
DIFFICULTY THINKING: 1
POOR JUDGMENT: 1

## 2017-11-19 ENCOUNTER — HOME CARE VISIT (OUTPATIENT)
Dept: HOME HEALTH SERVICES | Facility: HOME HEALTHCARE | Age: 82
End: 2017-11-19
Payer: MEDICARE

## 2017-11-19 VITALS
DIASTOLIC BLOOD PRESSURE: 70 MMHG | OXYGEN SATURATION: 97 % | RESPIRATION RATE: 18 BRPM | TEMPERATURE: 98.4 F | SYSTOLIC BLOOD PRESSURE: 130 MMHG | HEART RATE: 50 BPM

## 2017-11-19 PROCEDURE — 665999 HH PPS REVENUE DEBIT

## 2017-11-19 PROCEDURE — 665998 HH PPS REVENUE CREDIT

## 2017-11-19 ASSESSMENT — ENCOUNTER SYMPTOMS
VOMITING: DENIES
NAUSEA: DENIES

## 2017-11-19 ASSESSMENT — ACTIVITIES OF DAILY LIVING (ADL): OASIS_M1830: 03

## 2017-11-20 ENCOUNTER — OFFICE VISIT (OUTPATIENT)
Dept: CARDIOLOGY | Facility: MEDICAL CENTER | Age: 82
End: 2017-11-20
Payer: MEDICARE

## 2017-11-20 VITALS
SYSTOLIC BLOOD PRESSURE: 124 MMHG | DIASTOLIC BLOOD PRESSURE: 64 MMHG | HEIGHT: 63 IN | WEIGHT: 151 LBS | HEART RATE: 59 BPM | BODY MASS INDEX: 26.75 KG/M2 | OXYGEN SATURATION: 96 %

## 2017-11-20 DIAGNOSIS — E78.5 DYSLIPIDEMIA: ICD-10-CM

## 2017-11-20 DIAGNOSIS — I35.0 AORTIC STENOSIS, SEVERE: ICD-10-CM

## 2017-11-20 DIAGNOSIS — I10 ESSENTIAL HYPERTENSION: ICD-10-CM

## 2017-11-20 DIAGNOSIS — G45.9 TRANSIENT CEREBRAL ISCHEMIA, UNSPECIFIED TYPE: ICD-10-CM

## 2017-11-20 PROCEDURE — 665999 HH PPS REVENUE DEBIT

## 2017-11-20 PROCEDURE — 99215 OFFICE O/P EST HI 40 MIN: CPT | Performed by: INTERNAL MEDICINE

## 2017-11-20 PROCEDURE — 665998 HH PPS REVENUE CREDIT

## 2017-11-20 ASSESSMENT — ENCOUNTER SYMPTOMS
LOSS OF CONSCIOUSNESS: 0
COUGH: 0
FEVER: 0
WEIGHT LOSS: 0
VOMITING: 0
SENSORY CHANGE: 0
DOUBLE VISION: 0
EYE DISCHARGE: 0
ABDOMINAL PAIN: 0
DEPRESSION: 0
PALPITATIONS: 0
DIZZINESS: 0
HALLUCINATIONS: 0
NAUSEA: 0
BRUISES/BLEEDS EASILY: 0
HEADACHES: 0
SHORTNESS OF BREATH: 0
CHILLS: 0
BLOOD IN STOOL: 0
PND: 0
EYE PAIN: 0
BLURRED VISION: 0
FALLS: 0
MYALGIAS: 0
SPEECH CHANGE: 0
ORTHOPNEA: 0
CLAUDICATION: 0

## 2017-11-20 NOTE — PROGRESS NOTES
Subjective:   Rafia Shaikh is a 83 y.o. male who presents today cardiac care of severe AS with mean gradient in 50s now, paroxysmal atrial fibrillation, hypertension, coronary arterial disease, peripheral vascular disease, idiopathic hypertrophic subaortic stenosis, TIA.      In the past, patient was found to have GI bleeding. His anticoagulation and plavix were stopped.      In the interim, patient has been feeling weaker and less energetic.    History and gradient across the aortic valve has significantly increased.    Past Medical History:   Diagnosis Date   • Anemia 4/2016    due to rectal bleed   • Arrhythmia 5/12/17    Hx A fib. On only aspirin.    • CAD (coronary artery disease)    • Carotid artery stenosis 6/13/2011   • CATARACT 1990's    Bilateral phaco with IOL   • Dental disorder     cavities, under current care   • Dental disorder 5/12/17    permanent bridge lower   • Dizziness 6/13/2011   • Dyslipidemia 9/18/2010   • Gout    • Heart murmur    • High cholesterol    • Hyperlipidemia    • Hypertension    • Hypothyroidism 6/13/2011   • Insomnia    • Myocardial infarct 2008    Stent 2011.  5/12/17-Cardiologist is DR Wu ([facility])   • PAD (peripheral artery disease)    • Preoperative examination, unspecified 4/12/2011   • Rectal bleed 4/4/2016    Cauterized and received blood transfusions   • Renal disorder 2000    kidney stone   • Sleep apnea 2014    States recommended CPAP but never did get it.   • Stroke (CMS-[facility]) 4/16/2010    TIA    • Tendonitis    • TIA (transient ischemic attack) 6/13/2011   • Unspecified disorder of thyroid      Past Surgical History:   Procedure Laterality Date   • COLONOSCOPY N/A 5/16/2017    Procedure: COLONOSCOPY;  Surgeon: Osmany Smart M.D.;  Location: SURGERY [facility];  Service:    • LAPAROSCOPY  4/2/2016    exp for rectal bleed and cautery   • COLONOSCOPY  2015   • RECOVERY  8/22/2012    aortogram-Performed by SURGERY, IR-RECOVERY at [facility]   • CAROTID  ENDARTERECTOMY  5/3/2011    Performed by ERASMO NAVARRETE at SURGERY [facility] ORS   • RECOVERY  4/1/2011    Performed by SURGERY, CATH-RECOVERY at SURGERY SAME DAY [facility] ORS   • RECOVERY  3/25/2011    Performed by SURGERY, CATH-RECOVERY at SURGERY SAME DAY [facility] ORS   • MULTIPLE CORONARY ARTERY BYPASS ENDO VEIN HARVEST  3/20/08    Performed by AMANDA CRYSTAL at SURGERY [facility] ORS   • LITHOTRIPSY  2000   • SEPTOPLASTY  1995   • CATARACT EXTRACTION WITH IOL Bilateral early 1990's     Family History   Problem Relation Age of Onset   • Heart Disease Father    • Other Mother      TB   • Hypertension     • Colon Cancer Brother    • Breast Cancer Sister    • Other       GOUT     History   Smoking Status   • Never Smoker   Smokeless Tobacco   • Never Used     No Known Allergies  Outpatient Encounter Prescriptions as of 11/20/2017   Medication Sig Dispense Refill   • levothyroxine (SYNTHROID) 50 MCG Tab TAKE 1 TABLET BY MOUTH DAILY 90 Tab 0   • cilostazol (PLETAL) 100 MG Tab Take 1 Tab by mouth 2 times a day. 180 Tab 3   • atorvastatin (LIPITOR) 20 MG Tab TAKE 1 TABLET ORALLY DAILY 90 Tab 0   • allopurinol (ZYLOPRIM) 300 MG Tab TAKE 1 TABLET ORALLY DAILY 90 Tab 1   • spironolactone (ALDACTONE) 25 MG Tab TAKE 1 TABLET BY MOUTH EVERY DAY 90 Tab 1   • metoprolol (LOPRESSOR) 100 MG Tab Take 1 Tab by mouth 2 times a day. 60 Tab 6   • amlodipine (NORVASC) 10 MG Tab Take 1 Tab by mouth every day. 30 Tab 6   • aspirin EC (ECOTRIN) 81 MG Tablet Delayed Response Take 81 mg by mouth every day.     • Calcium Carbonate-Vit D-Min (CALTRATE PLUS PO) Take 1 Tab by mouth every day.     • B Complex Vitamins (VITAMIN B COMPLEX PO) Take 1 Tab by mouth every day.     • Multiple Vitamins-Minerals (CENTRUM SILVER) TABS Take 1 Tab by mouth every day.       No facility-administered encounter medications on file as of 11/20/2017.      Review of Systems   Constitutional: Negative for chills, fever, malaise/fatigue and weight loss.   HENT:  "Negative for ear discharge, ear pain, hearing loss and nosebleeds.    Eyes: Negative for blurred vision, double vision, pain and discharge.   Respiratory: Negative for cough and shortness of breath.    Cardiovascular: Negative for chest pain, palpitations, orthopnea, claudication, leg swelling and PND.   Gastrointestinal: Negative for abdominal pain, blood in stool, melena, nausea and vomiting.   Genitourinary: Negative for dysuria and hematuria.   Musculoskeletal: Negative for falls, joint pain and myalgias.   Skin: Negative for itching and rash.   Neurological: Negative for dizziness, sensory change, speech change, loss of consciousness and headaches.   Endo/Heme/Allergies: Negative for environmental allergies. Does not bruise/bleed easily.   Psychiatric/Behavioral: Negative for depression, hallucinations and suicidal ideas.        Objective:   /64   Pulse (!) 59   Ht 1.6 m (5' 3\")   Wt 68.5 kg (151 lb)   SpO2 96%   BMI 26.75 kg/m²     Physical Exam   Constitutional: He is oriented to person, place, and time. No distress.   HENT:   Head: Atraumatic.   Eyes: EOM are normal. Right eye exhibits no discharge. Left eye exhibits no discharge.   Neck: Neck supple. No JVD present.   Cardiovascular: Normal rate and regular rhythm.    Murmur heard.  there is 4/6 systolic murmur heard best at the right border of the aortic valve area. The murmur does not radiate to the carotids.     Pulmonary/Chest: No respiratory distress. He has no rales.   Abdominal: There is no rebound and no guarding.   Musculoskeletal: He exhibits no edema or tenderness.   Neurological: He is alert and oriented to person, place, and time.   Skin: Skin is warm and dry.   Psychiatric: He has a normal mood and affect.       Assessment:     1. Aortic stenosis, severe  REFERRAL TO CARDIOLOGY   2. Transient cerebral ischemia, unspecified type     3. Dyslipidemia     4. Essential hypertension         Medical Decision Making:  Today's Assessment / " Status / Plan:   His aortic valve is now severe symptomatic.  I will refer patient to valve clinic for further evaluation.  In the meantime, continue current medical therapy without change.

## 2017-11-20 NOTE — LETTER
[facility]   [address]  [place] [address]  Phone: [phone]  Fax: [phone]              Rafia Shaikh  12/16/1933    Encounter Date: 11/20/2017    Marcelino Wu M.D.          PROGRESS NOTE:  Subjective:   Rafia Shaikh is a 83 y.o. male who presents today cardiac care of severe AS with mean gradient in 50s now, paroxysmal atrial fibrillation, hypertension, coronary arterial disease, peripheral vascular disease, idiopathic hypertrophic subaortic stenosis, TIA.      In the past, patient was found to have GI bleeding. His anticoagulation and plavix were stopped.      In the interim, patient has been feeling weaker and less energetic.    History and gradient across the aortic valve has significantly increased.    Past Medical History:   Diagnosis Date   • Anemia 4/2016    due to rectal bleed   • Arrhythmia 5/12/17    Hx A fib. On only aspirin.    • CAD (coronary artery disease)    • Carotid artery stenosis 6/13/2011   • CATARACT 1990's    Bilateral phaco with IOL   • Dental disorder     cavities, under current care   • Dental disorder 5/12/17    permanent bridge lower   • Dizziness 6/13/2011   • Dyslipidemia 9/18/2010   • Gout    • Heart murmur    • High cholesterol    • Hyperlipidemia    • Hypertension    • Hypothyroidism 6/13/2011   • Insomnia    • Myocardial infarct 2008    Stent 2011.  5/12/17-Cardiologist is DR Wu ([facility])   • PAD (peripheral artery disease)    • Preoperative examination, unspecified 4/12/2011   • Rectal bleed 4/4/2016    Cauterized and received blood transfusions   • Renal disorder 2000    kidney stone   • Sleep apnea 2014    States recommended CPAP but never did get it.   • Stroke (CMS-HCC) 4/16/2010    TIA    • Tendonitis    • TIA (transient ischemic attack) 6/13/2011   • Unspecified disorder of thyroid      Past Surgical History:   Procedure Laterality Date   • COLONOSCOPY N/A 5/16/2017    Procedure: COLONOSCOPY;  Surgeon:  Osmany Smart M.D.;  Location: SURGERY [facility];  Service:    • LAPAROSCOPY  4/2/2016    exp for rectal bleed and cautery   • COLONOSCOPY  2015   • RECOVERY  8/22/2012    aortogram-Performed by SURGERY, IR-RECOVERY at SURGERY [facility] ORS   • CAROTID ENDARTERECTOMY  5/3/2011    Performed by ERASMO NAVARRETE at SURGERY [facility] ORS   • RECOVERY  4/1/2011    Performed by SURGERY, CATH-RECOVERY at SURGERY SAME DAY [facility] ORS   • RECOVERY  3/25/2011    Performed by SURGERY, CATH-RECOVERY at SURGERY SAME DAY [facility] ORS   • MULTIPLE CORONARY ARTERY BYPASS ENDO VEIN HARVEST  3/20/08    Performed by AMANDA CRYSTAL at SURGERY [facility]   • LITHOTRIPSY  2000   • SEPTOPLASTY  1995   • CATARACT EXTRACTION WITH IOL Bilateral early 1990's     Family History   Problem Relation Age of Onset   • Heart Disease Father    • Other Mother      TB   • Hypertension     • Colon Cancer Brother    • Breast Cancer Sister    • Other       GOUT     History   Smoking Status   • Never Smoker   Smokeless Tobacco   • Never Used     No Known Allergies  Outpatient Encounter Prescriptions as of 11/20/2017   Medication Sig Dispense Refill   • levothyroxine (SYNTHROID) 50 MCG Tab TAKE 1 TABLET BY MOUTH DAILY 90 Tab 0   • cilostazol (PLETAL) 100 MG Tab Take 1 Tab by mouth 2 times a day. 180 Tab 3   • atorvastatin (LIPITOR) 20 MG Tab TAKE 1 TABLET ORALLY DAILY 90 Tab 0   • allopurinol (ZYLOPRIM) 300 MG Tab TAKE 1 TABLET ORALLY DAILY 90 Tab 1   • spironolactone (ALDACTONE) 25 MG Tab TAKE 1 TABLET BY MOUTH EVERY DAY 90 Tab 1   • metoprolol (LOPRESSOR) 100 MG Tab Take 1 Tab by mouth 2 times a day. 60 Tab 6   • amlodipine (NORVASC) 10 MG Tab Take 1 Tab by mouth every day. 30 Tab 6   • aspirin EC (ECOTRIN) 81 MG Tablet Delayed Response Take 81 mg by mouth every day.     • Calcium Carbonate-Vit D-Min (CALTRATE PLUS PO) Take 1 Tab by mouth every day.     • B Complex Vitamins (VITAMIN B COMPLEX PO) Take 1 Tab by mouth every day.     •  "Multiple Vitamins-Minerals (CENTRUM SILVER) TABS Take 1 Tab by mouth every day.       No facility-administered encounter medications on file as of 11/20/2017.      Review of Systems   Constitutional: Negative for chills, fever, malaise/fatigue and weight loss.   HENT: Negative for ear discharge, ear pain, hearing loss and nosebleeds.    Eyes: Negative for blurred vision, double vision, pain and discharge.   Respiratory: Negative for cough and shortness of breath.    Cardiovascular: Negative for chest pain, palpitations, orthopnea, claudication, leg swelling and PND.   Gastrointestinal: Negative for abdominal pain, blood in stool, melena, nausea and vomiting.   Genitourinary: Negative for dysuria and hematuria.   Musculoskeletal: Negative for falls, joint pain and myalgias.   Skin: Negative for itching and rash.   Neurological: Negative for dizziness, sensory change, speech change, loss of consciousness and headaches.   Endo/Heme/Allergies: Negative for environmental allergies. Does not bruise/bleed easily.   Psychiatric/Behavioral: Negative for depression, hallucinations and suicidal ideas.        Objective:   /64   Pulse (!) 59   Ht 1.6 m (5' 3\")   Wt 68.5 kg (151 lb)   SpO2 96%   BMI 26.75 kg/m²      Physical Exam   Constitutional: He is oriented to person, place, and time. No distress.   HENT:   Head: Atraumatic.   Eyes: EOM are normal. Right eye exhibits no discharge. Left eye exhibits no discharge.   Neck: Neck supple. No JVD present.   Cardiovascular: Normal rate and regular rhythm.    Murmur heard.  there is 4/6 systolic murmur heard best at the right border of the aortic valve area. The murmur does not radiate to the carotids.     Pulmonary/Chest: No respiratory distress. He has no rales.   Abdominal: There is no rebound and no guarding.   Musculoskeletal: He exhibits no edema or tenderness.   Neurological: He is alert and oriented to person, place, and time.   Skin: Skin is warm and dry.   "   Psychiatric: He has a normal mood and affect.       Assessment:     1. Aortic stenosis, severe  REFERRAL TO CARDIOLOGY   2. Transient cerebral ischemia, unspecified type     3. Dyslipidemia     4. Essential hypertension         Medical Decision Making:  Today's Assessment / Status / Plan:   His aortic valve is now severe symptomatic.  I will refer patient to valve clinic for further evaluation.  In the meantime, continue current medical therapy without change.          Yarelis Gtz M.D.  [address] [address]  VIA In Basket

## 2017-11-21 ENCOUNTER — HOME CARE VISIT (OUTPATIENT)
Dept: HOME HEALTH SERVICES | Facility: HOME HEALTHCARE | Age: 82
End: 2017-11-21
Payer: MEDICARE

## 2017-11-21 ENCOUNTER — OFFICE VISIT (OUTPATIENT)
Dept: CARDIOLOGY | Facility: MEDICAL CENTER | Age: 82
End: 2017-11-21
Payer: MEDICARE

## 2017-11-21 ENCOUNTER — TELEPHONE (OUTPATIENT)
Dept: CARDIOLOGY | Facility: MEDICAL CENTER | Age: 82
End: 2017-11-21

## 2017-11-21 VITALS
BODY MASS INDEX: 26.58 KG/M2 | OXYGEN SATURATION: 95 % | SYSTOLIC BLOOD PRESSURE: 122 MMHG | WEIGHT: 150 LBS | HEART RATE: 55 BPM | DIASTOLIC BLOOD PRESSURE: 58 MMHG | HEIGHT: 63 IN

## 2017-11-21 DIAGNOSIS — I65.23 BILATERAL CAROTID ARTERY STENOSIS: Chronic | ICD-10-CM

## 2017-11-21 DIAGNOSIS — I48.0 PAROXYSMAL ATRIAL FIBRILLATION (HCC): ICD-10-CM

## 2017-11-21 DIAGNOSIS — I73.9 PVD (PERIPHERAL VASCULAR DISEASE) (HCC): ICD-10-CM

## 2017-11-21 DIAGNOSIS — I25.10 CORONARY ARTERY DISEASE INVOLVING NATIVE CORONARY ARTERY OF NATIVE HEART WITHOUT ANGINA PECTORIS: ICD-10-CM

## 2017-11-21 DIAGNOSIS — I45.10 RBBB (RIGHT BUNDLE BRANCH BLOCK): ICD-10-CM

## 2017-11-21 DIAGNOSIS — I35.0 AORTIC STENOSIS, SEVERE: ICD-10-CM

## 2017-11-21 DIAGNOSIS — K92.2 LOWER GI BLEED: ICD-10-CM

## 2017-11-21 DIAGNOSIS — Z95.1 HX OF CABG: ICD-10-CM

## 2017-11-21 DIAGNOSIS — G45.8 OTHER SPECIFIED TRANSIENT CEREBRAL ISCHEMIAS: Chronic | ICD-10-CM

## 2017-11-21 DIAGNOSIS — I65.29 STENOSIS OF CAROTID ARTERY, UNSPECIFIED LATERALITY: Chronic | ICD-10-CM

## 2017-11-21 DIAGNOSIS — R42 DIZZINESS: ICD-10-CM

## 2017-11-21 DIAGNOSIS — I71.40 ABDOMINAL AORTIC ANEURYSM (AAA) WITHOUT RUPTURE (HCC): ICD-10-CM

## 2017-11-21 PROCEDURE — 99205 OFFICE O/P NEW HI 60 MIN: CPT | Performed by: INTERNAL MEDICINE

## 2017-11-21 PROCEDURE — 665999 HH PPS REVENUE DEBIT

## 2017-11-21 PROCEDURE — 665998 HH PPS REVENUE CREDIT

## 2017-11-21 ASSESSMENT — ENCOUNTER SYMPTOMS
EYES NEGATIVE: 1
WEAKNESS: 1
GASTROINTESTINAL NEGATIVE: 1
NERVOUS/ANXIOUS: 0
SHORTNESS OF BREATH: 0
PSYCHIATRIC NEGATIVE: 1
WEIGHT LOSS: 0
DIZZINESS: 0
FEVER: 0
CARDIOVASCULAR NEGATIVE: 1
FOCAL WEAKNESS: 0
BRUISES/BLEEDS EASILY: 0
CHILLS: 0
HEADACHES: 0
VOMITING: 0
BLURRED VISION: 0
MUSCULOSKELETAL NEGATIVE: 1
PALPITATIONS: 0
CLAUDICATION: 0
COUGH: 0
RESPIRATORY NEGATIVE: 1
DOUBLE VISION: 0
DEPRESSION: 0
NAUSEA: 0
ABDOMINAL PAIN: 0
MYALGIAS: 0

## 2017-11-21 NOTE — LETTER
2017        Patient: Rafia Shaikh  : 1933          Dear Dr. Smart,         Dr. Jez Waters is requesting clearance to use of high dose heparin and DAPT or warfarin during and after transcatheter aortic valve replacement. Patient has history of gastrointestinal bleeding with embolization of ileocecal branch by Dr. Hart (17) and AVM (managed by Osmany Pat).    Please send clearance to: [phone]  If questions call: [phone]        Thank you,    Manuelito PADILLA

## 2017-11-21 NOTE — PROGRESS NOTES
Subjective:   Rafia Shaikh is a 83 y.o. male who presents today for interventional consult/TAVR evaluation requested by José Taylor for severe symptomatic aortic stenosis.    Thank you for allowing me to evaluate Mr. Shaikh, who as you know is a 83 year old male with complex cardiovascular and medical  History including severe aortic stenosis, coronary artery disease with prior coronary artery bypass graft surgery, atrial fibrillation, peripheral vascular disease, carotid artery stenosis, transient ischemic attack and history of gastrointestinal bleeding. He was well until his hospitalization in March when he began to experience progression to severe fatigue. He shortness of breath, dyspnea on exertion, chest pain, dizziness or syncope. He denies recurrence of hematochezia since the embolization.    Past Medical History:   Diagnosis Date   • Anemia 4/2016    due to rectal bleed   • Arrhythmia 5/12/17    Hx A fib. On only aspirin.    • CAD (coronary artery disease)    • Carotid artery stenosis 6/13/2011   • CATARACT 1990's    Bilateral phaco with IOL   • Dental disorder     cavities, under current care   • Dental disorder 5/12/17    permanent bridge lower   • Dizziness 6/13/2011   • Dyslipidemia 9/18/2010   • Gout    • Heart murmur    • High cholesterol    • Hyperlipidemia    • Hypertension    • Hypothyroidism 6/13/2011   • Insomnia    • Myocardial infarct 2008    Stent 2011.  5/12/17-Cardiologist is DR Wu ([facility])   • PAD (peripheral artery disease)    • Preoperative examination, unspecified 4/12/2011   • Rectal bleed 4/4/2016    Cauterized and received blood transfusions   • Renal disorder 2000    kidney stone   • Sleep apnea 2014    States recommended CPAP but never did get it.   • Stroke (CMS-HCC) 4/16/2010    TIA    • Tendonitis    • TIA (transient ischemic attack) 6/13/2011   • Unspecified disorder of thyroid      Past Surgical History:   Procedure Laterality Date   • COLONOSCOPY N/A 5/16/2017    Procedure:  COLONOSCOPY;  Surgeon: Osmany Smart M.D.;  Location: SURGERY [facility] ORS;  Service:    • LAPAROSCOPY  4/2/2016    exp for rectal bleed and cautery   • COLONOSCOPY  2015   • RECOVERY  8/22/2012    aortogram-Performed by SURGERY, IR-RECOVERY at SURGERY [facility] ORS   • CAROTID ENDARTERECTOMY  5/3/2011    Performed by ERASMO NAVARRETE at SURGERY [facility] ORS   • RECOVERY  4/1/2011    Performed by SURGERY, CATH-RECOVERY at SURGERY SAME DAY [facility] ORS   • RECOVERY  3/25/2011    Performed by SURGERY, CATH-RECOVERY at SURGERY SAME DAY [facility] ORS   • MULTIPLE CORONARY ARTERY BYPASS ENDO VEIN HARVEST  3/20/08    Performed by AMANDA CRYSTAL at SURGERY [facility] ORS   • LITHOTRIPSY  2000   • SEPTOPLASTY  1995   • CATARACT EXTRACTION WITH IOL Bilateral early 1990's     Family History   Problem Relation Age of Onset   • Heart Disease Father    • Other Mother      TB   • Hypertension     • Colon Cancer Brother    • Breast Cancer Sister    • Other       GOUT     History   Smoking Status   • Never Smoker   Smokeless Tobacco   • Never Used     No Known Allergies     Medications reviewed.    Outpatient Encounter Prescriptions as of 11/21/2017   Medication Sig Dispense Refill   • levothyroxine (SYNTHROID) 50 MCG Tab TAKE 1 TABLET BY MOUTH DAILY 90 Tab 0   • cilostazol (PLETAL) 100 MG Tab Take 1 Tab by mouth 2 times a day. 180 Tab 3   • atorvastatin (LIPITOR) 20 MG Tab TAKE 1 TABLET ORALLY DAILY 90 Tab 0   • allopurinol (ZYLOPRIM) 300 MG Tab TAKE 1 TABLET ORALLY DAILY 90 Tab 1   • spironolactone (ALDACTONE) 25 MG Tab TAKE 1 TABLET BY MOUTH EVERY DAY 90 Tab 1   • metoprolol (LOPRESSOR) 100 MG Tab Take 1 Tab by mouth 2 times a day. 60 Tab 6   • amlodipine (NORVASC) 10 MG Tab Take 1 Tab by mouth every day. 30 Tab 6   • aspirin EC (ECOTRIN) 81 MG Tablet Delayed Response Take 81 mg by mouth every day.     • Calcium Carbonate-Vit D-Min (CALTRATE PLUS PO) Take 1 Tab by mouth every day.     • B Complex Vitamins (VITAMIN B  "COMPLEX PO) Take 1 Tab by mouth every day.     • Multiple Vitamins-Minerals (CENTRUM SILVER) TABS Take 1 Tab by mouth every day.       No facility-administered encounter medications on file as of 11/21/2017.      Review of Systems   Constitutional: Positive for malaise/fatigue. Negative for chills, fever and weight loss.   HENT: Negative.  Negative for hearing loss.    Eyes: Negative.  Negative for blurred vision and double vision.   Respiratory: Negative.  Negative for cough and shortness of breath.    Cardiovascular: Negative.  Negative for chest pain, palpitations, claudication and leg swelling.   Gastrointestinal: Negative.  Negative for abdominal pain, nausea and vomiting.   Genitourinary: Negative.  Negative for dysuria and urgency.   Musculoskeletal: Negative.  Negative for joint pain and myalgias.   Skin: Negative.  Negative for itching and rash.   Neurological: Positive for weakness. Negative for dizziness, focal weakness and headaches.   Endo/Heme/Allergies: Negative.  Does not bruise/bleed easily.   Psychiatric/Behavioral: Negative.  Negative for depression. The patient is not nervous/anxious.         Objective:   /58   Pulse (!) 55   Ht 1.6 m (5' 3\")   Wt 68 kg (150 lb)   SpO2 95%   BMI 26.57 kg/m²     Physical Exam   Constitutional: He is oriented to person, place, and time. He appears well-developed and well-nourished.   HENT:   Mouth/Throat: Oropharynx is clear and moist.   Eyes: Conjunctivae and EOM are normal.   Neck: No JVD present. No thyroid mass present.   Cardiovascular: Normal rate, regular rhythm, S1 normal, S2 normal and normal pulses.  PMI is not displaced.  Exam reveals no gallop.    No murmur heard.  Pulses:       Carotid pulses are 2+ on the right side, and 2+ on the left side.       Radial pulses are 2+ on the right side, and 2+ on the left side.        Femoral pulses are 2+ on the right side, and 2+ on the left side.       Dorsalis pedis pulses are 2+ on the right side, and " 2+ on the left side.   No peripheral edema.   Pulmonary/Chest: Effort normal and breath sounds normal.   Abdominal: Soft. Normal appearance. He exhibits no abdominal bruit. There is no hepatosplenomegaly. There is no tenderness.   Musculoskeletal: Normal range of motion. He exhibits no edema.        Thoracic back: He exhibits no tenderness and no spasm.   Neurological: He is alert and oriented to person, place, and time.   Skin: No rash noted. No cyanosis. Nails show no clubbing.   Psychiatric: He has a normal mood and affect.     CARDIAC STUDIES/PROCEDURES:    COLONOSCOPE by Dr. 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  Eventually this area was Endoclipped,   moderate left-sided diverticulosis.    ECHOCARDIOGRAM CONCLUSIONS (10/026/17)  Prior echo 12-23-14. Compared to the images of the study done - there   has been progression of aortic stenosis and regurgitation.   Normal left ventricular systolic function.  Left ventricular ejection fraction is visually estimated to be 70%.  Severe aortic stenosis.  AV Area Cont Eq vti 0.93 cm²  Vmax is 5.0 m/s.  Transvalvular gradients are - Peak: 100 mmHg, Mean: 53 mmHg.   Moderate aortic insufficiency.  Mild tricuspid regurgitation.  Estimated right ventricular systolic pressure is 45 mmHg.  Moderate pulmonic insufficiency.    EKG performed on (05/12/17) was reviewed: EKG shows sinus rhythm with right bundle branch block.    Laboratory results of (09/12/17) were reviewed. Bun of 13 mg/dl, creatinine levels of 40 mg/dl noted.    MRI OF BRAIN (02/13/11)  1. Moderate cerebral atrophy.  2. Minimal supratentorial  white matter disease with two or 3 rare   punctate foci of bright FLAIR signal in the deep white matter consistent   with small vessel ischemic change versus demyelination or gliosis.  3. No evidence of acute cerebral infarction, hemorrhage, or mass lesion.    PERIPHERAL INTERVENTION by Dr. Amaya (03/22/17)  1.  RIGHT common femoral sheath arteriogram demonstrates atherosclerosis with estimated 50-75% stenosis of the proximal femoral artery  2.  Deployment Starclose SE vascular closure device; due to the patient's agitation and altered mental status I recommend a further 6 hours of groin precautions    PERIPHERAL INTERVENTION by Dr. Hart (03/21/17)  1.  Active extravasation from the ileocecal branch of the superior mesenteric artery.  2.  Successful embolization of bleeding cecal branch.    PERIPHERAL INTERVENTION by Dr. Parra (08/22/12)  1.  Right superficial femoral artery occlusion with occlusion of the tibial peroneal trunk.    2.  Widely patent left superficial femoral artery stent with some disease in   the tibial vessels as well.  Although, the superficial femoral artery is   amenable to percutaneous intervention, I am reluctant to open the artery to a   distal occlusion.  The tibioperoneal trunk is heavily diseased and   intervention in the tibioperoneal trunk for a patient with claudication is not   advisable due to the risk of failure and complication as well as the poor   durability of below knee tibial work.  I will follow up with the patient in   the office and discuss with him the findings of the angiogram.  If he   progresses to rest pain or tissue loss, will consider percutaneous   intervention of both lesions.    Assessment:     1. Aortic stenosis, severe     2. Coronary artery disease involving native coronary artery of native heart without angina pectoris     3. Hx of CABG     4. Paroxysmal atrial fibrillation (CMS-[facility])     5. PVD (peripheral vascular disease) (CMS-[facility])     6. Abdominal  aortic aneurysm (AAA) without rupture (CMS-[facility])     7. Bilateral carotid artery stenosis     8. Other specified transient cerebral ischemias     9. Lower GI bleed     10. RBBB (right bundle branch block)       Medical Decision Making:  Today's Assessment / Status / Plan:     1. Aortic stenosis: His aortic stenosis has reached severe status on his recent echocardiogram and he is symptomatic, NYHA class II. We will schedule CTA to asses for his peripheral vascular disease status. He understands the risks and benefits and agrees with plan.  2. Coronary artery disease with prior 3 vessel coronary bypass graft with left internal mammary artery graft to left anterior descending artery, saphenous vein graft to obtuse marginal branch and saphenous vein graft to posterior descending artery by Dr. Coates (03/20/08): He is clinically doing well. We will continue with current medical care.  3. Peripheral vascular disease with prior left superficial femoral artery stent, known high grade stenosis of right common femoral artery closed with Starclose closure by Dr. Amaya on (03/22/17): Stable on medical therapy. We will perform CTA for evaluation.  4. Carotid artery stenosis with left carotid enterectomy by Dr. Parra (05/03/11) (managed by Dr. Parra): Clinically stable on medical therapy. We will repeat a carotid ultrasound.  5. History of trans-ischemic attack: He remains clinically stable without any new neurological symptoms.  6. History of gastrointestinal bleeding with embolization of ileocecal branch by Dr. Hart (03/21/17) and AVM (managed by Osmany Pat): He remains clinically stable without recurrence of gastrointestinal bleeding. He has been off of Plavix since March.  7. Right bundle branch block  8. Additional information: He and his wife is from [place], however, they speaks Greek.  More than 45 minutes of time was spent to review all above information, discuss the option of cardiac catheterization and  TAVR including, alternative options, risk and benefits.    We will follow up after his tests (CTA of chest and abdomen for access assessment and carotid ultrasound) andclearance from gastroenterology to reassess his symptoms.    Thank you for this consult.    CC Yarelis Lee

## 2017-11-21 NOTE — TELEPHONE ENCOUNTER
Dr. Waters verbally ordered carotid duplex and CT scans for TAVR. Dr. Waters would like these tests done and the clearance and then f/u with the pt. Orders placed and pt scheduled. Pt needs CMP/CBC as well. Called pt to notify but no answer and no voicemail.     Regarding [facility] clearance with [facility], letter composed and sent to the nurses fax at: [phone] ATN: nursing

## 2017-11-21 NOTE — TELEPHONE ENCOUNTER
----- Message from Jez Waters M.D. sent at 11/21/2017  9:02 AM PST -----  Please get clearance from his gastroenterologist Dr. Osmany Smart for use of high dose heparin and DAPT or warfarin.    Thanks.  JESENIA

## 2017-11-21 NOTE — LETTER
[facility]   [address], [address]  [place] [address]  Phone: [phone]  Fax: [phone]              Rafia Shaikh  12/16/1933    Encounter Date: 11/21/2017    Jez Waters M.D.          PROGRESS NOTE:  Subjective:   Rafia Shaikh is a 83 y.o. male who presents today for interventional consult/TAVR evaluation requested by José Taylor for severe symptomatic aortic stenosis.    Thank you for allowing me to evaluate Mr. Shaikh, who as you know is a 83 year old male with complex cardiovascular and medical  History including severe aortic stenosis, coronary artery disease with prior coronary artery bypass graft surgery, atrial fibrillation, peripheral vascular disease, carotid artery stenosis, transient ischemic attack and history of gastrointestinal bleeding.    Past Medical History:   Diagnosis Date   • Anemia 4/2016    due to rectal bleed   • Arrhythmia 5/12/17    Hx A fib. On only aspirin.    • CAD (coronary artery disease)    • Carotid artery stenosis 6/13/2011   • CATARACT 1990's    Bilateral phaco with IOL   • Dental disorder     cavities, under current care   • Dental disorder 5/12/17    permanent bridge lower   • Dizziness 6/13/2011   • Dyslipidemia 9/18/2010   • Gout    • Heart murmur    • High cholesterol    • Hyperlipidemia    • Hypertension    • Hypothyroidism 6/13/2011   • Insomnia    • Myocardial infarct 2008    Stent 2011.  5/12/17-Cardiologist is DR Wu ([facility])   • PAD (peripheral artery disease)    • Preoperative examination, unspecified 4/12/2011   • Rectal bleed 4/4/2016    Cauterized and received blood transfusions   • Renal disorder 2000    kidney stone   • Sleep apnea 2014    States recommended CPAP but never did get it.   • Stroke (CMS-[facility]) 4/16/2010    TIA    • Tendonitis    • TIA (transient ischemic attack) 6/13/2011   • Unspecified disorder of thyroid      Past Surgical History:   Procedure Laterality Date   • COLONOSCOPY N/A 5/16/2017    Procedure: COLONOSCOPY;  Surgeon: Osmany Smart M.D.;  Location: SURGERY [facility];  Service:    • LAPAROSCOPY  4/2/2016    exp for rectal bleed and cautery   • COLONOSCOPY  2015   • RECOVERY  8/22/2012    aortogram-Performed by SURGERY, IR-RECOVERY at SURGERY [facility] ORS   • CAROTID ENDARTERECTOMY  5/3/2011    Performed by ERASMO NAVARRETE at SURGERY [facility] ORS   • RECOVERY  4/1/2011    Performed by SURGERY, CATH-RECOVERY at SURGERY SAME DAY [facility] ORS   • RECOVERY  3/25/2011    Performed by SURGERY, CATH-RECOVERY at SURGERY SAME DAY [facility] ORS   • MULTIPLE CORONARY ARTERY BYPASS ENDO VEIN HARVEST  3/20/08    Performed by AMANDA CRYSTAL at SURGERY [facility] ORS   • LITHOTRIPSY  2000   • SEPTOPLASTY  1995   • CATARACT EXTRACTION WITH IOL Bilateral early 1990's     Family History   Problem Relation Age of Onset   • Heart Disease Father    • Other Mother      TB   • Hypertension     • Colon Cancer Brother    • Breast Cancer Sister    • Other       GOUT     History   Smoking Status   • Never Smoker   Smokeless Tobacco   • Never Used     No Known Allergies     Medications reviewed.    Outpatient Encounter Prescriptions as of 11/21/2017   Medication Sig Dispense Refill   • levothyroxine (SYNTHROID) 50 MCG Tab TAKE 1 TABLET BY MOUTH DAILY 90 Tab 0   • cilostazol (PLETAL) 100 MG Tab Take 1 Tab by mouth 2 times a day. 180 Tab 3   • atorvastatin (LIPITOR) 20 MG Tab TAKE 1 TABLET ORALLY DAILY 90 Tab 0   • allopurinol (ZYLOPRIM) 300 MG Tab TAKE 1 TABLET ORALLY DAILY 90 Tab 1   • spironolactone (ALDACTONE) 25 MG Tab TAKE 1 TABLET BY MOUTH EVERY DAY 90 Tab 1   • metoprolol (LOPRESSOR) 100 MG Tab Take 1 Tab by mouth 2 times a day. 60 Tab 6   • amlodipine (NORVASC) 10 MG Tab Take 1 Tab by mouth every day. 30 Tab 6   • aspirin EC (ECOTRIN) 81 MG Tablet Delayed Response Take 81 mg by mouth every day.     • Calcium Carbonate-Vit D-Min (CALTRATE PLUS PO) Take 1 Tab by mouth every day.     • B Complex Vitamins  "(VITAMIN B COMPLEX PO) Take 1 Tab by mouth every day.     • Multiple Vitamins-Minerals (CENTRUM SILVER) TABS Take 1 Tab by mouth every day.       No facility-administered encounter medications on file as of 11/21/2017.      Review of Systems   Constitutional: Negative.  Negative for chills, fever, malaise/fatigue and weight loss.   HENT: Negative.  Negative for hearing loss.    Eyes: Negative.  Negative for blurred vision and double vision.   Respiratory: Negative.  Negative for cough and shortness of breath.    Cardiovascular: Negative.  Negative for chest pain, palpitations, claudication and leg swelling.   Gastrointestinal: Negative.  Negative for abdominal pain, nausea and vomiting.   Genitourinary: Negative.  Negative for dysuria and urgency.   Musculoskeletal: Negative.  Negative for joint pain and myalgias.   Skin: Negative.  Negative for itching and rash.   Neurological: Negative.  Negative for dizziness, focal weakness, weakness and headaches.   Endo/Heme/Allergies: Negative.  Does not bruise/bleed easily.   Psychiatric/Behavioral: Negative.  Negative for depression. The patient is not nervous/anxious.         Objective:   /58   Pulse (!) 55   Ht 1.6 m (5' 3\")   Wt 68 kg (150 lb)   SpO2 95%   BMI 26.57 kg/m²      Physical Exam   Constitutional: He is oriented to person, place, and time. He appears well-developed and well-nourished.   HENT:   Mouth/Throat: Oropharynx is clear and moist.   Eyes: Conjunctivae and EOM are normal.   Neck: No JVD present. No thyroid mass present.   Cardiovascular: Normal rate, regular rhythm, S1 normal, S2 normal and normal pulses.  PMI is not displaced.  Exam reveals no gallop.    No murmur heard.  Pulses:       Carotid pulses are 2+ on the right side, and 2+ on the left side.       Radial pulses are 2+ on the right side, and 2+ on the left side.        Femoral pulses are 2+ on the right side, and 2+ on the left side.       Dorsalis pedis pulses are 2+ on the right " side, and 2+ on the left side.   No peripheral edema.   Pulmonary/Chest: Effort normal and breath sounds normal.   Abdominal: Soft. Normal appearance. He exhibits no abdominal bruit. There is no hepatosplenomegaly. There is no tenderness.   Musculoskeletal: Normal range of motion. He exhibits no edema.        Thoracic back: He exhibits no tenderness and no spasm.   Neurological: He is alert and oriented to person, place, and time.   Skin: No rash noted. No cyanosis. Nails show no clubbing.   Psychiatric: He has a normal mood and affect.     CARDIAC STUDIES/PROCEDURES:    ECHOCARDIOGRAM CONCLUSIONS (10/026/17)  Prior echo 12-23-14. Compared to the images of the study done - there   has been progression of aortic stenosis and regurgitation.   Normal left ventricular systolic function.  Left ventricular ejection fraction is visually estimated to be 70%.  Severe aortic stenosis.  AV Area Cont Eq vti 0.93 cm²  Vmax is 5.0 m/s.  Transvalvular gradients are - Peak: 100 mmHg, Mean: 53 mmHg.   Moderate aortic insufficiency.  Mild tricuspid regurgitation.  Estimated right ventricular systolic pressure is 45 mmHg.  Moderate pulmonic insufficiency.    EKG performed on (05/12/17) was reviewed: EKG shows sinus rhythm with right bundle branch block.    Laboratory results of (09/12/17) were reviewed. Bun of 13 mg/dl, creatinine levels of 40 mg/dl noted.    MRI OF BRAIN (02/13/11)  1. Moderate cerebral atrophy.  2. Minimal supratentorial white matter disease with two or 3 rare   punctate foci of bright FLAIR signal in the deep white matter consistent   with small vessel ischemic change versus demyelination or gliosis.  3. No evidence of acute cerebral infarction, hemorrhage, or mass lesion.    PERIPHERAL INTERVENTION by Dr. Amaya (03/22/17)  1.  RIGHT common femoral sheath arteriogram demonstrates atherosclerosis with estimated 50-75% stenosis of the proximal femoral artery  2.  Deployment Starclose SE vascular closure device; due  to the patient's agitation and altered mental status I recommend a further 6 hours of groin precautions    PERIPHERAL INTERVENTION by Dr. Hart (03/21/17)  1.  Active extravasation from the ileocecal branch of the superior mesenteric artery.  2.  Successful embolization of bleeding cecal branch.    PERIPHERAL INTERVENTION by Dr. Parra (08/22/12)  1.  Right superficial femoral artery occlusion with occlusion of the tibial peroneal trunk.    2.  Widely patent left superficial femoral artery stent with some disease in   the tibial vessels as well.  Although, the superficial femoral artery is   amenable to percutaneous intervention, I am reluctant to open the artery to a   distal occlusion.  The tibioperoneal trunk is heavily diseased and   intervention in the tibioperoneal trunk for a patient with claudication is not   advisable due to the risk of failure and complication as well as the poor   durability of below knee tibial work.  I will follow up with the patient in   the office and discuss with him the findings of the angiogram.  If he   progresses to rest pain or tissue loss, will consider percutaneous   intervention of both lesions.    Assessment:     1. Aortic stenosis, severe     2. Coronary artery disease involving native coronary artery of native heart without angina pectoris     3. Hx of CABG     4. Paroxysmal atrial fibrillation (CMS-[facility])     5. PVD (peripheral vascular disease) (CMS-[facility])     6. Abdominal aortic aneurysm (AAA) without rupture (CMS-[facility])     7. Bilateral carotid artery stenosis     8. Other specified transient cerebral ischemias     9. Lower GI bleed     10. RBBB (right bundle branch block)         Medical Decision Making:  Today's Assessment / Status / Plan:     1.   2. Coronary artery disease with prior 3V coronary bypass graft with left internal mammary artery graft to left anterior descending artery, saphenous vein graft to obtuse marginal branch and saphenous vein graft to posterior  descending artery by Dr. Coates (03/20/08): He is clinically doing well. We will continue with current medical care.  3. Peripheral vascular disease with prior left superficial femoral artery stent, known high grade stenosis of right common femoral artery closed with Starclose closure by Dr. Amaya on (03/22/17): Stable on medical therapy. We will perform CTA for evaluation.  4. Carotid artery stenosis with left carotid enterectomy by Dr. Parra (05/03/11): Clinically stable on medical therapy. We will repeat a carotid ultrasound.  5. History of trans-ischemic attack: He remains clinically stable without any new neurological symptoms.  6. History of gastrointestinal bleeding with embolization of ileocecal branch by Dr. Hart (03/21/17): He remains clinically stable without recurrence of gastrointestinal bleeding.  7. Right bundle branch block  More than 45 minutes of time was spent to review all above information, discuss the option of cardiac catheterization and TAVR including, alternative options, risk and benefits.    We will follow up after his tests to reassess his symptoms.    Thank you for this consult.    CC Yarelis Lee         No Recipients

## 2017-11-22 PROCEDURE — 665998 HH PPS REVENUE CREDIT

## 2017-11-22 PROCEDURE — 665999 HH PPS REVENUE DEBIT

## 2017-11-22 SDOH — ECONOMIC STABILITY: HOUSING INSECURITY: UNSAFE APPLIANCES: 0

## 2017-11-22 SDOH — ECONOMIC STABILITY: HOUSING INSECURITY: UNSAFE COOKING RANGE AREA: 0

## 2017-11-22 ASSESSMENT — ACTIVITIES OF DAILY LIVING (ADL)
HOME_HEALTH_OASIS: 01
OASIS_M1830: 03

## 2017-11-23 PROCEDURE — 665998 HH PPS REVENUE CREDIT

## 2017-11-23 PROCEDURE — 665999 HH PPS REVENUE DEBIT

## 2017-11-24 PROCEDURE — 665998 HH PPS REVENUE CREDIT

## 2017-11-24 PROCEDURE — 665999 HH PPS REVENUE DEBIT

## 2017-11-25 PROCEDURE — 665999 HH PPS REVENUE DEBIT

## 2017-11-25 PROCEDURE — 665998 HH PPS REVENUE CREDIT

## 2017-11-26 PROCEDURE — 665998 HH PPS REVENUE CREDIT

## 2017-11-26 PROCEDURE — 665999 HH PPS REVENUE DEBIT

## 2017-11-27 ENCOUNTER — TELEPHONE (OUTPATIENT)
Dept: CARDIOLOGY | Facility: MEDICAL CENTER | Age: 82
End: 2017-11-27

## 2017-11-27 ENCOUNTER — HOSPITAL ENCOUNTER (OUTPATIENT)
Dept: RADIOLOGY | Facility: MEDICAL CENTER | Age: 82
End: 2017-11-27
Attending: INTERNAL MEDICINE
Payer: MEDICARE

## 2017-11-27 DIAGNOSIS — R42 DIZZINESS: ICD-10-CM

## 2017-11-27 DIAGNOSIS — I48.0 PAROXYSMAL ATRIAL FIBRILLATION (HCC): ICD-10-CM

## 2017-11-27 DIAGNOSIS — I73.9 PVD (PERIPHERAL VASCULAR DISEASE) (HCC): ICD-10-CM

## 2017-11-27 DIAGNOSIS — I65.29 STENOSIS OF CAROTID ARTERY, UNSPECIFIED LATERALITY: Chronic | ICD-10-CM

## 2017-11-27 DIAGNOSIS — I35.0 AORTIC STENOSIS, SEVERE: ICD-10-CM

## 2017-11-27 PROCEDURE — 665998 HH PPS REVENUE CREDIT

## 2017-11-27 PROCEDURE — 93880 EXTRACRANIAL BILAT STUDY: CPT

## 2017-11-27 PROCEDURE — 93880 EXTRACRANIAL BILAT STUDY: CPT | Mod: 26 | Performed by: INTERNAL MEDICINE

## 2017-11-27 PROCEDURE — 665999 HH PPS REVENUE DEBIT

## 2017-11-27 NOTE — TELEPHONE ENCOUNTER
Left voicemail explaining that per Dr. Waters patient will need to complete some blood work ( CMP & CBC). Encouraged patient to return call to confirm which laboratory he prefers to use so that our office can assure orders are sent to correct lab for such. Awaiting return call. Will continue attempts to contact patient regarding such.

## 2017-11-27 NOTE — TELEPHONE ENCOUNTER
Spoke with patient informing that Dr. Waters will like him to complete some blood work prior to his appointments (for CTAs) scheduled 12/14/17. Patient states that he will be here on campus today and can complete the labs after his carotid ultrasound. Patient thankful for call.

## 2017-11-28 PROCEDURE — 665999 HH PPS REVENUE DEBIT

## 2017-11-28 PROCEDURE — 665998 HH PPS REVENUE CREDIT

## 2017-11-29 PROCEDURE — 665999 HH PPS REVENUE DEBIT

## 2017-11-29 PROCEDURE — 665998 HH PPS REVENUE CREDIT

## 2017-11-30 PROCEDURE — 665999 HH PPS REVENUE DEBIT

## 2017-11-30 PROCEDURE — G0180 MD CERTIFICATION HHA PATIENT: HCPCS | Performed by: INTERNAL MEDICINE

## 2017-11-30 PROCEDURE — 665998 HH PPS REVENUE CREDIT

## 2017-12-01 PROCEDURE — 665998 HH PPS REVENUE CREDIT

## 2017-12-01 PROCEDURE — 665999 HH PPS REVENUE DEBIT

## 2017-12-02 PROCEDURE — 665999 HH PPS REVENUE DEBIT

## 2017-12-02 PROCEDURE — 665998 HH PPS REVENUE CREDIT

## 2017-12-03 PROCEDURE — 665999 HH PPS REVENUE DEBIT

## 2017-12-03 PROCEDURE — 665998 HH PPS REVENUE CREDIT

## 2017-12-04 PROCEDURE — 665998 HH PPS REVENUE CREDIT

## 2017-12-04 PROCEDURE — 665999 HH PPS REVENUE DEBIT

## 2017-12-04 NOTE — TELEPHONE ENCOUNTER
Clearance received from [facility]. He is cleared for TAVR from a GI prospective. Clearance letter received and placed in scan.     POWER Newsome RN

## 2017-12-05 PROCEDURE — 665998 HH PPS REVENUE CREDIT

## 2017-12-05 PROCEDURE — 665999 HH PPS REVENUE DEBIT

## 2017-12-08 ENCOUNTER — HOSPITAL ENCOUNTER (OUTPATIENT)
Dept: LAB | Facility: MEDICAL CENTER | Age: 82
End: 2017-12-08
Attending: INTERNAL MEDICINE
Payer: MEDICARE

## 2017-12-08 DIAGNOSIS — I35.0 AORTIC STENOSIS, SEVERE: ICD-10-CM

## 2017-12-08 LAB
ALBUMIN SERPL BCP-MCNC: 4.3 G/DL (ref 3.2–4.9)
ALBUMIN/GLOB SERPL: 1.7 G/DL
ALP SERPL-CCNC: 67 U/L (ref 30–99)
ALT SERPL-CCNC: 49 U/L (ref 2–50)
ANION GAP SERPL CALC-SCNC: 10 MMOL/L (ref 0–11.9)
AST SERPL-CCNC: 38 U/L (ref 12–45)
BILIRUB SERPL-MCNC: 0.7 MG/DL (ref 0.1–1.5)
BUN SERPL-MCNC: 15 MG/DL (ref 8–22)
CALCIUM SERPL-MCNC: 9.9 MG/DL (ref 8.5–10.5)
CHLORIDE SERPL-SCNC: 104 MMOL/L (ref 96–112)
CO2 SERPL-SCNC: 24 MMOL/L (ref 20–33)
CREAT SERPL-MCNC: 1.09 MG/DL (ref 0.5–1.4)
ERYTHROCYTE [DISTWIDTH] IN BLOOD BY AUTOMATED COUNT: 50.5 FL (ref 35.9–50)
GFR SERPL CREATININE-BSD FRML MDRD: >60 ML/MIN/1.73 M 2
GLOBULIN SER CALC-MCNC: 2.6 G/DL (ref 1.9–3.5)
GLUCOSE SERPL-MCNC: 94 MG/DL (ref 65–99)
HCT VFR BLD AUTO: 43.5 % (ref 42–52)
HGB BLD-MCNC: 14.4 G/DL (ref 14–18)
MCH RBC QN AUTO: 33.3 PG (ref 27–33)
MCHC RBC AUTO-ENTMCNC: 33.1 G/DL (ref 33.7–35.3)
MCV RBC AUTO: 100.5 FL (ref 81.4–97.8)
PLATELET # BLD AUTO: 223 K/UL (ref 164–446)
PMV BLD AUTO: 9.3 FL (ref 9–12.9)
POTASSIUM SERPL-SCNC: 3.6 MMOL/L (ref 3.6–5.5)
PROT SERPL-MCNC: 6.9 G/DL (ref 6–8.2)
RBC # BLD AUTO: 4.33 M/UL (ref 4.7–6.1)
SODIUM SERPL-SCNC: 138 MMOL/L (ref 135–145)
WBC # BLD AUTO: 10.3 K/UL (ref 4.8–10.8)

## 2017-12-08 PROCEDURE — 85027 COMPLETE CBC AUTOMATED: CPT

## 2017-12-08 PROCEDURE — 36415 COLL VENOUS BLD VENIPUNCTURE: CPT

## 2017-12-08 PROCEDURE — 80053 COMPREHEN METABOLIC PANEL: CPT

## 2017-12-09 LAB
ERYTHROCYTE [DISTWIDTH] IN BLOOD BY AUTOMATED COUNT: 14.2 % (ref 12.3–15.4)
HCT VFR BLD AUTO: 40.3 % (ref 37.5–51)
HGB BLD-MCNC: 13.6 G/DL (ref 13–17.7)
MCH RBC QN AUTO: 32.5 PG (ref 26.6–33)
MCHC RBC AUTO-ENTMCNC: 33.7 G/DL (ref 31.5–35.7)
MCV RBC AUTO: 96 FL (ref 79–97)
NRBC BLD AUTO-RTO: NORMAL %
PLATELET # BLD AUTO: 213 X10E3/UL (ref 150–379)
RBC # BLD AUTO: 4.18 X10E6/UL (ref 4.14–5.8)
WBC # BLD AUTO: 8.7 X10E3/UL (ref 3.4–10.8)

## 2017-12-14 ENCOUNTER — HOSPITAL ENCOUNTER (OUTPATIENT)
Dept: RADIOLOGY | Facility: MEDICAL CENTER | Age: 82
End: 2017-12-14
Attending: INTERNAL MEDICINE
Payer: MEDICARE

## 2017-12-14 ENCOUNTER — TELEPHONE (OUTPATIENT)
Dept: CARDIOLOGY | Facility: MEDICAL CENTER | Age: 82
End: 2017-12-14

## 2017-12-14 DIAGNOSIS — I65.29 STENOSIS OF CAROTID ARTERY, UNSPECIFIED LATERALITY: Chronic | ICD-10-CM

## 2017-12-14 DIAGNOSIS — I48.0 PAROXYSMAL ATRIAL FIBRILLATION (HCC): ICD-10-CM

## 2017-12-14 DIAGNOSIS — I73.9 PVD (PERIPHERAL VASCULAR DISEASE) (HCC): ICD-10-CM

## 2017-12-14 DIAGNOSIS — I35.0 AORTIC STENOSIS, SEVERE: ICD-10-CM

## 2017-12-14 DIAGNOSIS — R42 DIZZINESS: ICD-10-CM

## 2017-12-14 PROCEDURE — 71275 CT ANGIOGRAPHY CHEST: CPT

## 2017-12-14 PROCEDURE — 700117 HCHG RX CONTRAST REV CODE 255: Performed by: INTERNAL MEDICINE

## 2017-12-14 PROCEDURE — 74174 CTA ABD&PLVS W/CONTRAST: CPT

## 2017-12-14 RX ADMIN — IOHEXOL 100 ML: 350 INJECTION, SOLUTION INTRAVENOUS at 11:06

## 2017-12-14 NOTE — TELEPHONE ENCOUNTER
Received call from patient who is wanting to know results of CTA completed this morning. Patient having difficulty with language barrier. Pt's wife takes over phone call explaining that she has tried to explain to the patient that results will not yet be available since he only just completed CTAs this AM. Confirmed with wife that is correct, results are not yet available. Explained that as soon as results are available it will be sent to myself and Dr. Waters, and we will assure that Mr. Morataya is made aware of results as well. Mrs. Morataya states understanding and is very thankful for assistance.

## 2017-12-19 ENCOUNTER — TELEPHONE (OUTPATIENT)
Dept: CARDIOLOGY | Facility: MEDICAL CENTER | Age: 82
End: 2017-12-19

## 2017-12-21 ENCOUNTER — TELEPHONE (OUTPATIENT)
Dept: CARDIOLOGY | Facility: MEDICAL CENTER | Age: 82
End: 2017-12-21

## 2017-12-21 PROBLEM — N20.0 KIDNEY STONE: Status: ACTIVE | Noted: 2017-12-21

## 2017-12-21 NOTE — TELEPHONE ENCOUNTER
Pt can discuss results with Dr. Waters at follow up 12/29.     Message sent to PCP, Dr. Gaston.     ELZA Gregg R.N.             Asymptomatic kidney stone does not need to be treated. We will eval at visit- thanks          ----- Message from CLIFFORD Guerrero sent at 12/19/2017  1:04 PM PST -----  Annulus 326. Kidney stone on L side-FU with patient and PCP on this. FU in conference for valve. SC

## 2017-12-26 ENCOUNTER — TELEPHONE (OUTPATIENT)
Dept: CARDIOLOGY | Facility: MEDICAL CENTER | Age: 82
End: 2017-12-26

## 2017-12-29 ENCOUNTER — TELEPHONE (OUTPATIENT)
Dept: CARDIOLOGY | Facility: MEDICAL CENTER | Age: 82
End: 2017-12-29

## 2017-12-29 ENCOUNTER — OFFICE VISIT (OUTPATIENT)
Dept: CARDIOLOGY | Facility: MEDICAL CENTER | Age: 82
End: 2017-12-29
Payer: MEDICARE

## 2017-12-29 VITALS
WEIGHT: 145 LBS | HEART RATE: 48 BPM | DIASTOLIC BLOOD PRESSURE: 52 MMHG | BODY MASS INDEX: 25.69 KG/M2 | SYSTOLIC BLOOD PRESSURE: 110 MMHG | OXYGEN SATURATION: 91 % | HEIGHT: 63 IN

## 2017-12-29 DIAGNOSIS — G45.8 OTHER SPECIFIED TRANSIENT CEREBRAL ISCHEMIAS: Chronic | ICD-10-CM

## 2017-12-29 DIAGNOSIS — I48.0 PAROXYSMAL ATRIAL FIBRILLATION (HCC): ICD-10-CM

## 2017-12-29 DIAGNOSIS — I35.0 AORTIC STENOSIS, SEVERE: ICD-10-CM

## 2017-12-29 DIAGNOSIS — Z95.1 HX OF CABG: ICD-10-CM

## 2017-12-29 DIAGNOSIS — I73.9 PVD (PERIPHERAL VASCULAR DISEASE) (HCC): ICD-10-CM

## 2017-12-29 DIAGNOSIS — I25.10 CORONARY ARTERY DISEASE INVOLVING NATIVE CORONARY ARTERY OF NATIVE HEART WITHOUT ANGINA PECTORIS: ICD-10-CM

## 2017-12-29 DIAGNOSIS — I65.23 BILATERAL CAROTID ARTERY STENOSIS: Chronic | ICD-10-CM

## 2017-12-29 DIAGNOSIS — K92.2 LOWER GI BLEED: ICD-10-CM

## 2017-12-29 DIAGNOSIS — I45.10 RBBB (RIGHT BUNDLE BRANCH BLOCK): ICD-10-CM

## 2017-12-29 PROCEDURE — 99215 OFFICE O/P EST HI 40 MIN: CPT | Performed by: INTERNAL MEDICINE

## 2017-12-29 ASSESSMENT — ENCOUNTER SYMPTOMS
WEAKNESS: 1
ABDOMINAL PAIN: 0
BRUISES/BLEEDS EASILY: 0
MYALGIAS: 0
HEADACHES: 0
GASTROINTESTINAL NEGATIVE: 1
MUSCULOSKELETAL NEGATIVE: 1
NERVOUS/ANXIOUS: 0
NAUSEA: 0
SHORTNESS OF BREATH: 0
CARDIOVASCULAR NEGATIVE: 1
CHILLS: 0
RESPIRATORY NEGATIVE: 1
PSYCHIATRIC NEGATIVE: 1
PALPITATIONS: 0
DEPRESSION: 0
EYES NEGATIVE: 1
WEIGHT LOSS: 0
VOMITING: 0
DOUBLE VISION: 0
CLAUDICATION: 0
COUGH: 0
FEVER: 0
BLURRED VISION: 0
DIZZINESS: 0
FOCAL WEAKNESS: 0

## 2017-12-29 NOTE — TELEPHONE ENCOUNTER
Patient scheduled for C abd C w/poss (pre TAVR) on 1-4-18 at [facility] with Dr. Nieves. POWER to Mercedez Carballo.

## 2017-12-29 NOTE — LETTER
[facility]   [address]  [place] [address]  Phone: [phone]  Fax: [phone]              Rafia Shaikh  12/16/1933    Encounter Date: 12/29/2017    Jez Waters M.D.          PROGRESS NOTE:  Subjective:   Rafia Shaikh is a 83 y.o. male who presents today for follow up of severe symptomatic aortic stenosis.    Since the patient's last visit on 11/21/17, he has continued to experience progression to severe fatigue. He shortness of breath, dyspnea on exertion, chest pain, dizziness or syncope. He denies recurrence of hematochezia since the embolization.    Past Medical History:   Diagnosis Date   • Anemia 4/2016    due to rectal bleed   • Arrhythmia 5/12/17    Hx A fib. On only aspirin.    • CAD (coronary artery disease)    • Carotid artery stenosis 6/13/2011   • CATARACT 1990's    Bilateral phaco with IOL   • Dental disorder     cavities, under current care   • Dental disorder 5/12/17    permanent bridge lower   • Dizziness 6/13/2011   • Dyslipidemia 9/18/2010   • Gout    • Heart murmur    • High cholesterol    • Hyperlipidemia    • Hypertension    • Hypothyroidism 6/13/2011   • Insomnia    • Myocardial infarct 2008    Stent 2011.  5/12/17-Cardiologist is DR Wu ([facility])   • PAD (peripheral artery disease)    • Preoperative examination, unspecified 4/12/2011   • Rectal bleed 4/4/2016    Cauterized and received blood transfusions   • Renal disorder 2000    kidney stone   • Sleep apnea 2014    States recommended CPAP but never did get it.   • Stroke (CMS-HCC) 4/16/2010    TIA    • Tendonitis    • TIA (transient ischemic attack) 6/13/2011   • Unspecified disorder of thyroid      Past Surgical History:   Procedure Laterality Date   • COLONOSCOPY N/A 5/16/2017    Procedure: COLONOSCOPY;  Surgeon: Osmany Smart M.D.;  Location: SURGERY [facility];  Service:    • LAPAROSCOPY  4/2/2016    exp for rectal bleed and cautery   • COLONOSCOPY  2015   •  RECOVERY  8/22/2012    aortogram-Performed by SURGERY, IR-RECOVERY at SURGERY [facility] ORS   • CAROTID ENDARTERECTOMY  5/3/2011    Performed by ERASMO NAVARRETE at SURGERY [facility] ORS   • RECOVERY  4/1/2011    Performed by SURGERY, CATH-RECOVERY at SURGERY SAME DAY [facility] ORS   • RECOVERY  3/25/2011    Performed by SURGERY, CATH-RECOVERY at SURGERY SAME DAY [facility] ORS   • MULTIPLE CORONARY ARTERY BYPASS ENDO VEIN HARVEST  3/20/08    Performed by AMANDA CRYSTAL at SURGERY [facility] ORS   • LITHOTRIPSY  2000   • SEPTOPLASTY  1995   • CATARACT EXTRACTION WITH IOL Bilateral early 1990's     Family History   Problem Relation Age of Onset   • Heart Disease Father    • Other Mother      TB   • Hypertension     • Colon Cancer Brother    • Breast Cancer Sister    • Other       GOUT     History   Smoking Status   • Never Smoker   Smokeless Tobacco   • Never Used     No Known Allergies     Medications reviewed.    Outpatient Encounter Prescriptions as of 12/29/2017   Medication Sig Dispense Refill   • atorvastatin (LIPITOR) 20 MG Tab TAKE 1 TABLET ORALLY DAILY 90 Tab 1   • levothyroxine (SYNTHROID) 50 MCG Tab TAKE 1 TABLET BY MOUTH DAILY 90 Tab 0   • cilostazol (PLETAL) 100 MG Tab Take 1 Tab by mouth 2 times a day. 180 Tab 3   • allopurinol (ZYLOPRIM) 300 MG Tab TAKE 1 TABLET ORALLY DAILY 90 Tab 1   • spironolactone (ALDACTONE) 25 MG Tab TAKE 1 TABLET BY MOUTH EVERY DAY 90 Tab 1   • metoprolol (LOPRESSOR) 100 MG Tab Take 1 Tab by mouth 2 times a day. 60 Tab 6   • amlodipine (NORVASC) 10 MG Tab Take 1 Tab by mouth every day. 30 Tab 6   • aspirin EC (ECOTRIN) 81 MG Tablet Delayed Response Take 81 mg by mouth every day.     • Calcium Carbonate-Vit D-Min (CALTRATE PLUS PO) Take 1 Tab by mouth every day.     • B Complex Vitamins (VITAMIN B COMPLEX PO) Take 1 Tab by mouth every day.     • Multiple Vitamins-Minerals (CENTRUM SILVER) TABS Take 1 Tab by mouth every day.       No facility-administered encounter medications  on file as of 12/29/2017.      Review of Systems   Constitutional: Positive for malaise/fatigue. Negative for chills, fever and weight loss.   HENT: Negative.  Negative for hearing loss.    Eyes: Negative.  Negative for blurred vision and double vision.   Respiratory: Negative.  Negative for cough and shortness of breath.    Cardiovascular: Negative.  Negative for chest pain, palpitations, claudication and leg swelling.   Gastrointestinal: Negative.  Negative for abdominal pain, nausea and vomiting.   Genitourinary: Negative.  Negative for dysuria and urgency.   Musculoskeletal: Negative.  Negative for joint pain and myalgias.   Skin: Negative.  Negative for itching and rash.   Neurological: Positive for weakness. Negative for dizziness, focal weakness and headaches.   Endo/Heme/Allergies: Negative.  Does not bruise/bleed easily.   Psychiatric/Behavioral: Negative.  Negative for depression. The patient is not nervous/anxious.         Objective:   There were no vitals taken for this visit.    Physical Exam   Constitutional: He is oriented to person, place, and time. He appears well-developed and well-nourished.   HENT:   Mouth/Throat: Oropharynx is clear and moist.   Eyes: Conjunctivae and EOM are normal.   Neck: No JVD present. No thyroid mass present.   Cardiovascular: Normal rate, regular rhythm, S1 normal, S2 normal and normal pulses.  PMI is not displaced.  Exam reveals no gallop.    No murmur heard.  Pulses:       Carotid pulses are 2+ on the right side, and 2+ on the left side.       Radial pulses are 2+ on the right side, and 2+ on the left side.        Femoral pulses are 2+ on the right side, and 2+ on the left side.       Dorsalis pedis pulses are 2+ on the right side, and 2+ on the left side.   No peripheral edema.   Pulmonary/Chest: Effort normal and breath sounds normal.   Abdominal: Soft. Normal appearance. He exhibits no abdominal bruit. There is no hepatosplenomegaly. There is no tenderness.      Musculoskeletal: Normal range of motion. He exhibits no edema.        Thoracic back: He exhibits no tenderness and no spasm.   Neurological: He is alert and oriented to person, place, and time.   Skin: No rash noted. No cyanosis. Nails show no clubbing.   Psychiatric: He has a normal mood and affect.     CARDIAC STUDIES/PROCEDURES:    CAROTID ULTRASOUND (11/27/17)   Moderate stenosis of the right internal carotid (50-69%).    Left carotid.    CEA is widely patent without evidence of restinosis.     Flow within both subclavian arteries appears to be within normal limits.    Antegrade flow, bilateral vertebral arteries.     CT OF CHEST AND ABDOMEN (12/14/17)  1.  Aortic annulus area of 326 sq mm.  2.  Coronary ostia are both greater than 10 mm from the annulus.  3.  Minimum diameter of the iliofemoral arterial system of 5.9 mm on the RIGHT and 8 mm on the LEFT, with moderate atherosclerotic calcification and tortuosity bilaterally.  4.  Interval distal migration of large LEFT kidney stone to the distal ureter, without significant obstructive changes.    COLONOSCOPE by Dr. 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  Eventually this area was Endoclipped,   moderate left-sided diverticulosis.    ECHOCARDIOGRAM CONCLUSIONS (10/026/17)  Prior echo 12-23-14. Compared to the images of the study done - there   has been progression of aortic stenosis and regurgitation.   Normal left ventricular systolic function.  Left ventricular ejection fraction is visually estimated to be 70%.  Severe aortic stenosis.  AV Area Cont Eq vti 0.93 cm²  Vmax is 5.0  m/s.  Transvalvular gradients are - Peak: 100 mmHg, Mean: 53 mmHg.   Moderate aortic insufficiency.  Mild tricuspid regurgitation.  Estimated right ventricular systolic pressure is 45 mmHg.  Moderate pulmonic insufficiency.    EKG performed on (05/12/17) was reviewed: EKG shows sinus rhythm with right bundle branch block.    Laboratory results of (12/08/17) were reviewed. Bun of 15 mg/dl, creatinine levels of 1.09 mg/dl noted.    MRI OF BRAIN (02/13/11)  1. Moderate cerebral atrophy.  2. Minimal supratentorial white matter disease with two or 3 rare   punctate foci of bright FLAIR signal in the deep white matter consistent   with small vessel ischemic change versus demyelination or gliosis.  3. No evidence of acute cerebral infarction, hemorrhage, or mass lesion.    PERIPHERAL INTERVENTION by Dr. Amaya (03/22/17)  1.  RIGHT common femoral sheath arteriogram demonstrates atherosclerosis with estimated 50-75% stenosis of the proximal femoral artery  2.  Deployment Starclose SE vascular closure device; due to the patient's agitation and altered mental status I recommend a further 6 hours of groin precautions    PERIPHERAL INTERVENTION by Dr. Hart (03/21/17)  1.  Active extravasation from the ileocecal branch of the superior mesenteric artery.  2.  Successful embolization of bleeding cecal branch.    PERIPHERAL INTERVENTION by Dr. Parra (08/22/12)  1.  Right superficial femoral artery occlusion with occlusion of the tibial peroneal trunk.    2.  Widely patent left superficial femoral artery stent with some disease in   the tibial vessels as well.  Although, the superficial femoral artery is   amenable to percutaneous intervention, I am reluctant to open the artery to a   distal occlusion.  The tibioperoneal trunk is heavily diseased and   intervention in the tibioperoneal trunk for a patient with claudication is not   advisable due to the risk of failure and complication as well as the poor   durability of below  knee tibial work.  I will follow up with the patient in   the office and discuss with him the findings of the angiogram.  If he   progresses to rest pain or tissue loss, will consider percutaneous   intervention of both lesions.    Assessment:     1. Aortic stenosis, severe     2. Coronary artery disease involving native coronary artery of native heart without angina pectoris     3. Hx of CABG     4. Paroxysmal atrial fibrillation (CMS-[facility])       Medical Decision Making:  Today's Assessment / Status / Plan:     1. Aortic stenosis: His aortic stenosis has reached severe status on his recent echocardiogram and he is symptomatic, NYHA class II. We will schedule CTA to asses for his peripheral vascular disease status. He understands the risks and benefits and agrees with plan.  2. Coronary artery disease with prior 3 vessel coronary bypass graft with left internal mammary artery graft to left anterior descending artery, saphenous vein graft to obtuse marginal branch and saphenous vein graft to posterior descending artery by Dr. Coates (03/20/08): He is clinically doing well. We will continue with current medical care.  3. Peripheral vascular disease with prior left superficial femoral artery stent, known high grade stenosis of right common femoral artery closed with Starclose closure by Dr. Amaya on (03/22/17): Stable on medical therapy. We will perform CTA for evaluation.  4. Carotid artery stenosis with left carotid enterectomy by Dr. Parra (05/03/11) (managed by Dr. Parra): Clinically stable on medical therapy.  5. History of trans-ischemic attack: He remains clinically stable without any new neurological symptoms.  6. History of gastrointestinal bleeding with embolization of ileocecal branch by Dr. Hart (03/21/17) and AVM (managed by Osmany Pat): He remains clinically stable without recurrence of gastrointestinal bleeding. He has been off of Plavix since March. He has been cleared by GI.  7. Right  bundle branch block  8. Additional information: He and his wife is from [place], however, they speaks [place].  More than 45 minutes of time was spent to review all above information, discuss the option of cardiac catheterization and TAVR including, alternative options, risk and benefits.    We will follow up after his tests (CTA of chest and abdomen for access assessment and carotid ultrasound) andclearance from gastroenterology to reassess his symptoms.    Thank you for this consult.    CC Yarelis Lee         No Recipients

## 2017-12-29 NOTE — PROGRESS NOTES
Subjective:   Rafia Shaikh is a 84 y.o. male who presents today for follow up of severe symptomatic aortic stenosis.    Since the patient's last visit on 11/21/17, he has continued to experience progression to severe fatigue. He shortness of breath, dyspnea on exertion, chest pain, dizziness or syncope. He denies recurrence of hematochezia since the embolization.    Past Medical History:   Diagnosis Date   • Anemia 4/2016    due to rectal bleed   • Arrhythmia 5/12/17    Hx A fib. On only aspirin.    • CAD (coronary artery disease)    • Carotid artery stenosis 6/13/2011   • CATARACT 1990's    Bilateral phaco with IOL   • Dental disorder     cavities, under current care   • Dental disorder 5/12/17    permanent bridge lower   • Dizziness 6/13/2011   • Dyslipidemia 9/18/2010   • Gout    • Heart murmur    • High cholesterol    • Hyperlipidemia    • Hypertension    • Hypothyroidism 6/13/2011   • Insomnia    • Myocardial infarct 2008    Stent 2011.  5/12/17-Cardiologist is DR Wu ([facility])   • PAD (peripheral artery disease)    • Preoperative examination, unspecified 4/12/2011   • Rectal bleed 4/4/2016    Cauterized and received blood transfusions   • Renal disorder 2000    kidney stone   • Sleep apnea 2014    States recommended CPAP but never did get it.   • Stroke (CMS-HCC) 4/16/2010    TIA    • Tendonitis    • TIA (transient ischemic attack) 6/13/2011   • Unspecified disorder of thyroid      Past Surgical History:   Procedure Laterality Date   • COLONOSCOPY N/A 5/16/2017    Procedure: COLONOSCOPY;  Surgeon: Osmany Smart M.D.;  Location: SURGERY [facility];  Service:    • LAPAROSCOPY  4/2/2016    exp for rectal bleed and cautery   • COLONOSCOPY  2015   • RECOVERY  8/22/2012    aortogram-Performed by SURGERY, IR-RECOVERY at [facility]   • CAROTID ENDARTERECTOMY  5/3/2011    Performed by ERASMO NAVARRETE at [facility]   • RECOVERY  4/1/2011    Performed by SURGERY, CATH-RECOVERY at SURGERY  [facility]AME [facility]   • RECOVERY  3/25/2011    Performed by SURGERY, CATH-RECOVERY at SURGERY [facility]   • MULTIPLE CORONARY ARTERY BYPASS ENDO VEIN HARVEST  3/20/08    Performed by AMANDA CRYSTAL at SURGERY [facility]S   • LITHOTRIPSY  2000   • SEPTOPLASTY  1995   • CATARACT EXTRACTION WITH IOL Bilateral early 1990's     Family History   Problem Relation Age of Onset   • Heart Disease Father    • Other Mother      TB   • Hypertension     • Colon Cancer Brother    • Breast Cancer Sister    • Other       GOUT     History   Smoking Status   • Never Smoker   Smokeless Tobacco   • Never Used     No Known Allergies     Medications reviewed.    Outpatient Encounter Prescriptions as of 12/29/2017   Medication Sig Dispense Refill   • levothyroxine (SYNTHROID) 50 MCG Tab TAKE 1 TABLET BY MOUTH DAILY 90 Tab 0   • cilostazol (PLETAL) 100 MG Tab Take 1 Tab by mouth 2 times a day. 180 Tab 3   • allopurinol (ZYLOPRIM) 300 MG Tab TAKE 1 TABLET ORALLY DAILY 90 Tab 1   • spironolactone (ALDACTONE) 25 MG Tab TAKE 1 TABLET BY MOUTH EVERY DAY 90 Tab 1   • metoprolol (LOPRESSOR) 100 MG Tab Take 1 Tab by mouth 2 times a day. 60 Tab 6   • amlodipine (NORVASC) 10 MG Tab Take 1 Tab by mouth every day. 30 Tab 6   • aspirin EC (ECOTRIN) 81 MG Tablet Delayed Response Take 81 mg by mouth every day.     • Calcium Carbonate-Vit D-Min (CALTRATE PLUS PO) Take 1 Tab by mouth every day.     • B Complex Vitamins (VITAMIN B COMPLEX PO) Take 1 Tab by mouth every day.     • Multiple Vitamins-Minerals (CENTRUM SILVER) TABS Take 1 Tab by mouth every day.     • [DISCONTINUED] atorvastatin (LIPITOR) 20 MG Tab TAKE 1 TABLET ORALLY DAILY 90 Tab 1     No facility-administered encounter medications on file as of 12/29/2017.      Review of Systems   Constitutional: Positive for malaise/fatigue. Negative for chills, fever and weight loss.   HENT: Negative.  Negative for hearing loss.    Eyes: Negative.  Negative for blurred vision and double  "vision.   Respiratory: Negative.  Negative for cough and shortness of breath.    Cardiovascular: Negative.  Negative for chest pain, palpitations, claudication and leg swelling.   Gastrointestinal: Negative.  Negative for abdominal pain, nausea and vomiting.   Genitourinary: Negative.  Negative for dysuria and urgency.   Musculoskeletal: Negative.  Negative for joint pain and myalgias.   Skin: Negative.  Negative for itching and rash.   Neurological: Positive for weakness. Negative for dizziness, focal weakness and headaches.   Endo/Heme/Allergies: Negative.  Does not bruise/bleed easily.   Psychiatric/Behavioral: Negative.  Negative for depression. The patient is not nervous/anxious.         Objective:   /52   Pulse (!) 48   Ht 1.6 m (5' 3\")   Wt 65.8 kg (145 lb)   SpO2 91%   BMI 25.69 kg/m²     Physical Exam   Constitutional: He is oriented to person, place, and time. He appears well-developed and well-nourished.   HENT:   Mouth/Throat: Oropharynx is clear and moist.   Eyes: Conjunctivae and EOM are normal.   Neck: No JVD present. No thyroid mass present.   Cardiovascular: Normal rate, regular rhythm, S1 normal, S2 normal and normal pulses.  PMI is not displaced.  Exam reveals no gallop.    No murmur heard.  Pulses:       Carotid pulses are 2+ on the right side, and 2+ on the left side.       Radial pulses are 2+ on the right side, and 2+ on the left side.        Femoral pulses are 2+ on the right side, and 2+ on the left side.       Dorsalis pedis pulses are 2+ on the right side, and 2+ on the left side.   No peripheral edema.   Pulmonary/Chest: Effort normal and breath sounds normal.   Abdominal: Soft. Normal appearance. He exhibits no abdominal bruit. There is no hepatosplenomegaly. There is no tenderness.   Musculoskeletal: Normal range of motion. He exhibits no edema.        Thoracic back: He exhibits no tenderness and no spasm.   Neurological: He is alert and oriented to person, place, and time. "   Skin: No rash noted. No cyanosis. Nails show no clubbing.   Psychiatric: He has a normal mood and affect.     CARDIAC STUDIES/PROCEDURES:    CAROTID ULTRASOUND (11/27/17)   Moderate stenosis of the right internal carotid (50-69%).    Left carotid.    CEA is widely patent without evidence of restinosis.     Flow within both subclavian arteries appears to be within normal limits.    Antegrade flow, bilateral vertebral arteries.     CT OF CHEST AND ABDOMEN (12/14/17)  1.  Aortic annulus area of 326 sq mm.  2.  Coronary ostia are both greater than 10 mm from the annulus.  3.  Minimum diameter of the iliofemoral arterial system of 5.9 mm on the RIGHT and 8 mm on the LEFT, with moderate atherosclerotic calcification and tortuosity bilaterally.  4.  Interval distal migration of large LEFT kidney stone to the distal ureter, without significant obstructive changes.    COLONOSCOPE by Dr. 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  Eventually this area was Endoclipped,   moderate left-sided diverticulosis.    ECHOCARDIOGRAM CONCLUSIONS (10/026/17)  Prior echo 12-23-14. Compared to the images of the study done - there   has been progression of aortic stenosis and regurgitation.   Normal left ventricular systolic function.  Left ventricular ejection fraction is visually estimated to be 70%.  Severe aortic stenosis.  AV Area Cont Eq vti 0.93 cm²  Vmax is 5.0 m/s.  Transvalvular gradients are - Peak: 100 mmHg, Mean: 53 mmHg.   Moderate aortic insufficiency.  Mild tricuspid regurgitation.  Estimated right ventricular systolic pressure is 45 mmHg.  Moderate  pulmonic insufficiency.    EKG performed on (05/12/17) was reviewed: EKG shows sinus rhythm with right bundle branch block.    Laboratory results of (12/08/17) were reviewed. Bun of 15 mg/dl, creatinine levels of 1.09 mg/dl noted.    MRI OF BRAIN (02/13/11)  1. Moderate cerebral atrophy.  2. Minimal supratentorial white matter disease with two or 3 rare   punctate foci of bright FLAIR signal in the deep white matter consistent   with small vessel ischemic change versus demyelination or gliosis.  3. No evidence of acute cerebral infarction, hemorrhage, or mass lesion.    PERIPHERAL INTERVENTION by Dr. Amaya (03/22/17)  1.  RIGHT common femoral sheath arteriogram demonstrates atherosclerosis with estimated 50-75% stenosis of the proximal femoral artery  2.  Deployment Starclose SE vascular closure device; due to the patient's agitation and altered mental status I recommend a further 6 hours of groin precautions    PERIPHERAL INTERVENTION by Dr. Hart (03/21/17)  1.  Active extravasation from the ileocecal branch of the superior mesenteric artery.  2.  Successful embolization of bleeding cecal branch.    PERIPHERAL INTERVENTION by Dr. Parra (08/22/12)  1.  Right superficial femoral artery occlusion with occlusion of the tibial peroneal trunk.    2.  Widely patent left superficial femoral artery stent with some disease in   the tibial vessels as well.  Although, the superficial femoral artery is   amenable to percutaneous intervention, I am reluctant to open the artery to a   distal occlusion.  The tibioperoneal trunk is heavily diseased and   intervention in the tibioperoneal trunk for a patient with claudication is not   advisable due to the risk of failure and complication as well as the poor   durability of below knee tibial work.  I will follow up with the patient in   the office and discuss with him the findings of the angiogram.  If he   progresses to rest pain or tissue loss, will consider percutaneous    intervention of both lesions.    Assessment:     1. Aortic stenosis, severe     2. Coronary artery disease involving native coronary artery of native heart without angina pectoris     3. Hx of CABG     4. Paroxysmal atrial fibrillation (CMS-HCC)     5. PVD (peripheral vascular disease) (CMS-[facility])     6. Bilateral carotid artery stenosis     7. Other specified transient cerebral ischemias     8. Lower GI bleed     9. RBBB (right bundle branch block)       Medical Decision Making:  Today's Assessment / Status / Plan:     1. Aortic stenosis: His aortic stenosis has reached severe status on his recent echocardiogram and he is symptomatic, NYHA class II. He is not a surgical candidate due to excessive risk. We will refer him to Dr. James for TAVR. He understands the risks and benefits and agrees with plan.  2. Coronary artery disease with prior 3 vessel coronary bypass graft with left internal mammary artery graft to left anterior descending artery, saphenous vein graft to obtuse marginal branch and saphenous vein graft to posterior descending artery by Dr. Coates (03/20/08): He is clinically doing well. We will continue with current medical care.  3. Peripheral vascular disease with prior left superficial femoral artery stent, known high grade stenosis of right common femoral artery closed with Starclose closure by Dr. Amaya on (03/22/17): Stable on medical therapy.   4. Carotid artery stenosis with left carotid enterectomy by Dr. Parra (05/03/11) (managed by Dr. Parra): Clinically stable on medical therapy.  5. History of trans-ischemic attack: He remains clinically stable without any new neurological symptoms.  6. History of gastrointestinal bleeding with embolization of ileocecal branch by Dr. Hart (03/21/17) and AVM (managed by Osmany Pat): He remains clinically stable without recurrence of gastrointestinal bleeding. He has been off of Plavix since March. He has been cleared by GI for TAVR.  7.  Right bundle branch block  8. Additional information: He and his wife is from [place], however, they speaks Thai.  More than 45 minutes of time was spent to review all above information, discuss the option of cardiac catheterization and TAVR including, alternative options, risk and benefits.    The risks, benefits, and alternatives to coronary angiography with IV sedation were discussed in great detail. Specific risks mentioned include bleeding, infection, kidney damage, allergic reaction, cardiac perforation with possible tamponade requiring nathan-cardiocentesis or possible open heart surgery. In addition, we discussed that 10% of patients will experience small to moderate bruising at the side of the arterial puncture. Lastly the risks of heart attack, stroke, and death were discussed; the risks of major complications such as heart attack or stroke caused by the angiogram is less than 1%; the risk of death is approximately 1 in 1000. The patient verbalized understanding of these potential complications and wishes to proceed with this procedure.    The risks, benefits, and alternatives to TAVR, general anesthesia and transesophageal echocardiogram were discussed in great detail. Specific risks mentioned   include bleeding, infection, kidney damage, allergic reaction, cardiac perforation with possible tamponade requiring nathan-cardiocentesis or possible open heart surgery.   Lastly the risks of heart attack, stroke, and death were discussed; the risks of major complications includingall cause mortality of 2.2%, disabling stroke of 1.1%, the risk on new pacemaker of 12% and the risk of vascular complications of 4.1%. The patient verbalized understanding of these potential complications and wishes to proceed with this procedure. ([facility]).  The procedure will be performed completely with cardiac surgery.    CC José Taylor and Yarelis Lee

## 2018-01-02 DIAGNOSIS — Z01.812 PRE-OPERATIVE LABORATORY EXAMINATION: ICD-10-CM

## 2018-01-02 LAB
ANION GAP SERPL CALC-SCNC: 6 MMOL/L (ref 0–11.9)
BUN SERPL-MCNC: 21 MG/DL (ref 8–22)
CALCIUM SERPL-MCNC: 9.8 MG/DL (ref 8.5–10.5)
CHLORIDE SERPL-SCNC: 107 MMOL/L (ref 96–112)
CO2 SERPL-SCNC: 24 MMOL/L (ref 20–33)
CREAT SERPL-MCNC: 1.16 MG/DL (ref 0.5–1.4)
ERYTHROCYTE [DISTWIDTH] IN BLOOD BY AUTOMATED COUNT: 49.3 FL (ref 35.9–50)
GFR SERPL CREATININE-BSD FRML MDRD: 60 ML/MIN/1.73 M 2
GLUCOSE SERPL-MCNC: 136 MG/DL (ref 65–99)
HCT VFR BLD AUTO: 40.1 % (ref 42–52)
HGB BLD-MCNC: 13.2 G/DL (ref 14–18)
INR PPP: 1.13 (ref 0.87–1.13)
MCH RBC QN AUTO: 32.9 PG (ref 27–33)
MCHC RBC AUTO-ENTMCNC: 32.9 G/DL (ref 33.7–35.3)
MCV RBC AUTO: 100 FL (ref 81.4–97.8)
PLATELET # BLD AUTO: 199 K/UL (ref 164–446)
PMV BLD AUTO: 9.6 FL (ref 9–12.9)
POTASSIUM SERPL-SCNC: 3.5 MMOL/L (ref 3.6–5.5)
PROTHROMBIN TIME: 14.2 SEC (ref 12–14.6)
RBC # BLD AUTO: 4.01 M/UL (ref 4.7–6.1)
SODIUM SERPL-SCNC: 137 MMOL/L (ref 135–145)
WBC # BLD AUTO: 7.9 K/UL (ref 4.8–10.8)

## 2018-01-02 PROCEDURE — 36415 COLL VENOUS BLD VENIPUNCTURE: CPT

## 2018-01-02 PROCEDURE — 85610 PROTHROMBIN TIME: CPT

## 2018-01-02 PROCEDURE — 80048 BASIC METABOLIC PNL TOTAL CA: CPT

## 2018-01-02 PROCEDURE — 85027 COMPLETE CBC AUTOMATED: CPT

## 2018-01-02 RX ORDER — ATORVASTATIN CALCIUM 20 MG/1
20 TABLET, FILM COATED ORAL DAILY
COMMUNITY
End: 2018-08-31 | Stop reason: SDUPTHER

## 2018-01-04 ENCOUNTER — HOSPITAL ENCOUNTER (OUTPATIENT)
Facility: MEDICAL CENTER | Age: 83
End: 2018-01-04
Attending: INTERNAL MEDICINE | Admitting: INTERNAL MEDICINE
Payer: MEDICARE

## 2018-01-04 VITALS
HEART RATE: 51 BPM | HEIGHT: 63 IN | RESPIRATION RATE: 22 BRPM | WEIGHT: 148.81 LBS | DIASTOLIC BLOOD PRESSURE: 56 MMHG | OXYGEN SATURATION: 87 % | TEMPERATURE: 97.2 F | SYSTOLIC BLOOD PRESSURE: 122 MMHG | BODY MASS INDEX: 26.37 KG/M2

## 2018-01-04 PROCEDURE — 93567 NJX CAR CTH SPRVLV AORTGRPHY: CPT

## 2018-01-04 PROCEDURE — 99152 MOD SED SAME PHYS/QHP 5/>YRS: CPT

## 2018-01-04 PROCEDURE — 700101 HCHG RX REV CODE 250

## 2018-01-04 PROCEDURE — C1894 INTRO/SHEATH, NON-LASER: HCPCS

## 2018-01-04 PROCEDURE — 360979 HCHG DIAGNOSTIC CATH

## 2018-01-04 PROCEDURE — C1769 GUIDE WIRE: HCPCS

## 2018-01-04 PROCEDURE — 305478 HCHG 7.5FR EDWARDS SWAN/THERMO

## 2018-01-04 PROCEDURE — 93457 R HRT ART/GRFT ANGIO: CPT

## 2018-01-04 PROCEDURE — 99153 MOD SED SAME PHYS/QHP EA: CPT

## 2018-01-04 PROCEDURE — 160002 HCHG RECOVERY MINUTES (STAT)

## 2018-01-04 PROCEDURE — 304952 HCHG R 2 PADS

## 2018-01-04 PROCEDURE — 700111 HCHG RX REV CODE 636 W/ 250 OVERRIDE (IP)

## 2018-01-04 PROCEDURE — G0278 ILIAC ART ANGIO,CARDIAC CATH: HCPCS

## 2018-01-04 RX ORDER — MIDAZOLAM HYDROCHLORIDE 1 MG/ML
INJECTION INTRAMUSCULAR; INTRAVENOUS
Status: COMPLETED
Start: 2018-01-04 | End: 2018-01-04

## 2018-01-04 RX ORDER — HEPARIN SODIUM,PORCINE 1000/ML
VIAL (ML) INJECTION
Status: COMPLETED
Start: 2018-01-04 | End: 2018-01-04

## 2018-01-04 RX ORDER — VERAPAMIL HYDROCHLORIDE 2.5 MG/ML
INJECTION, SOLUTION INTRAVENOUS
Status: COMPLETED
Start: 2018-01-04 | End: 2018-01-04

## 2018-01-04 RX ORDER — ONDANSETRON 2 MG/ML
4 INJECTION INTRAMUSCULAR; INTRAVENOUS EVERY 4 HOURS PRN
Status: DISCONTINUED | OUTPATIENT
Start: 2018-01-04 | End: 2018-01-04 | Stop reason: HOSPADM

## 2018-01-04 RX ORDER — LIDOCAINE HYDROCHLORIDE 20 MG/ML
INJECTION, SOLUTION INFILTRATION; PERINEURAL
Status: COMPLETED
Start: 2018-01-04 | End: 2018-01-04

## 2018-01-04 RX ADMIN — HEPARIN SODIUM 2000 UNITS: 200 INJECTION, SOLUTION INTRAVENOUS at 09:51

## 2018-01-04 RX ADMIN — NITROGLYCERIN 10 ML: 20 INJECTION INTRAVENOUS at 09:50

## 2018-01-04 RX ADMIN — MIDAZOLAM 2 MG: 1 INJECTION INTRAMUSCULAR; INTRAVENOUS at 10:17

## 2018-01-04 RX ADMIN — FENTANYL CITRATE 50 MCG: 50 INJECTION, SOLUTION INTRAMUSCULAR; INTRAVENOUS at 10:19

## 2018-01-04 RX ADMIN — MIDAZOLAM 0.5 MG: 1 INJECTION INTRAMUSCULAR; INTRAVENOUS at 10:19

## 2018-01-04 RX ADMIN — LIDOCAINE HYDROCHLORIDE: 20 INJECTION, SOLUTION INFILTRATION; PERINEURAL at 09:50

## 2018-01-04 RX ADMIN — HEPARIN SODIUM: 1000 INJECTION, SOLUTION INTRAVENOUS; SUBCUTANEOUS at 09:50

## 2018-01-04 ASSESSMENT — PAIN SCALES - GENERAL
PAINLEVEL_OUTOF10: 0

## 2018-01-04 NOTE — DISCHARGE INSTRUCTIONS
ACTIVITY: Rest and take it easy for the first 24 hours.  A responsible adult is recommended to remain with you during that time.  It is normal to feel sleepy.  We encourage you to not do anything that requires balance, judgment or coordination.    MILD FLU-LIKE SYMPTOMS ARE NORMAL. YOU MAY EXPERIENCE GENERALIZED MUSCLE ACHES, THROAT IRRITATION, HEADACHE AND/OR SOME NAUSEA.    FOR 24 HOURS DO NOT:  Drive, operate machinery or run household appliances.  Drink beer or alcoholic beverages.   Make important decisions or sign legal documents.    SPECIAL INSTRUCTIONS: Discharge the patient on their pre admission medications.   Follow up with their cardiologist. Call today to find out when to follow up. [phone]  Follow up with their PCP. Call today to find out when to follow up.  If you experience chest pain, s.o.b, call 911 return to ER       DIET: To avoid nausea, slowly advance diet as tolerated, avoiding spicy or greasy foods for the first day.  Add more substantial food to your diet according to your physician's instructions.  Babies can be fed formula or breast milk as soon as they are hungry.  INCREASE FLUIDS AND FIBER TO AVOID CONSTIPATION.    SURGICAL DRESSING/BATHING: keep dressing clean dry intact for 24 hours, you may remove dressing after 24 hours.    FOLLOW-UP APPOINTMENT:  A follow-up appointment should be arranged with your doctor in [phone]; call to schedule.    You should CALL YOUR PHYSICIAN if you develop:  Fever greater than 101 degrees F.  Pain not relieved by medication, or persistent nausea or vomiting.  Excessive bleeding (blood soaking through dressing) or unexpected drainage from the wound.  Extreme redness or swelling around the incision site, drainage of pus or foul smelling drainage.  Inability to urinate or empty your bladder within 8 hours.  Problems with breathing or chest pain.    You should call 911 if you develop problems with breathing or chest pain.  If you are unable to contact your  doctor or surgical center, you should go to the nearest emergency room or urgent care center.  Physician's telephone #: [phone]    If any questions arise, call your doctor.  If your doctor is not available, please feel free to call the Surgical Center at [phone].  The Center is open Monday through Friday from 7AM to 7PM.  You can also call the HEALTH HOTLINE open 24 hours/day, 7 days/week and speak to a nurse at [phone], or toll free at [phone].    A registered nurse may call you a few days after your surgery to see how you are doing after your procedure.    MEDICATIONS: Resume taking daily medication.  Take prescribed pain medication with food.  If no medication is prescribed, you may take non-aspirin pain medication if needed.  PAIN MEDICATION CAN BE VERY CONSTIPATING.  Take a stool softener or laxative such as senokot, pericolace, or milk of magnesia if needed.    If your physician has prescribed pain medication that includes Acetaminophen (Tylenol), do not take additional Acetaminophen (Tylenol) while taking the prescribed medication.    Depression / Suicide Risk    As you are discharged from this [facility] facility, it is important to learn how to keep safe from harming yourself.    Recognize the warning signs:  · Abrupt changes in personality, positive or negative- including increase in energy   · Giving away possessions  · Change in eating patterns- significant weight changes-  positive or negative  · Change in sleeping patterns- unable to sleep or sleeping all the time   · Unwillingness or inability to communicate  · Depression  · Unusual sadness, discouragement and loneliness  · Talk of wanting to die  · Neglect of personal appearance   · Rebelliousness- reckless behavior  · Withdrawal from people/activities they love  · Confusion- inability to concentrate     If you or a loved one observes any of these behaviors or has concerns about self-harm, here's what you can do:  · Talk about  "it- your feelings and reasons for harming yourself  · Remove any means that you might use to hurt yourself (examples: pills, rope, extension cords, firearm)  · Get professional help from the community (Mental Health, Substance Abuse, psychological counseling)  · Do not be alone:Call your Safe Contact- someone whom you trust who will be there for you.  · Call your local CRISIS HOTLINE 300-7530 or 735-433-9390  · Call your local Children's Mobile Crisis Response Team [place] (246) 898-6490 or wwwKongZhong  · Call the toll free National Suicide Prevention Hotlines   · National Suicide Prevention Lifeline 597-431-OMQV (4349)  National Hope Line Network 800-SUICIDE (654-3453)  Post Angiogram Groin Care Instructions     INSTRUCTIONS  2. Examine (look and feel) the site of your incision site TODAY so you can recognize changes that should be called to your doctor (see below).  3. Avoid straining either by lifting or pulling objects for 4-5 days. Avoid lifting over 5 pounds.   4. For at least 72 hours, if you should sneeze or cough, please hold pressure over your groin area.  5. If you should begin to have oozing from the catheterization site, please hold firm pressure and call your doctor's office immediately.  6. If profuse bleeding occurs from the catheterization site, hold firm pressure and call \"[phone]\" immediately for assistance.  7. Remove bandage after 24 hours.     ACTIVITY  2. Limit activity as instructed by your doctor.  3. No driving or very limited driving with frequent stops for one week.   4. If you must take a long car ride, stop every hour and walk around the car.   5. Warm showers or baths are permitted after the bandage is removed. Avoid hot showers, baths, hot tubs, and swimming for one week.    PLEASE CALL YOUR DOCTOR IF:  1. Temperature elevation occurs.  2. Catheterization site becomes reddened or begins to drain.   3. Bruising appears to be new or not resolving. The bruise may move down " your leg. This is normal.  4. The small round lump in the groin increases in size.  5. Any leg numbness, aching, or discomfort (immediately).  6. Increasing discomfort in the leg at the insertion site.  7. Chest pains, even if relieved by Nitroglycerin.    MISCELLANEOUS INSTRUCTIONS  1. Bruising may occur as a result of heart catheterization. Some of the discoloration may travel down the leg, going from blue to green in color.  2. A small round lump under the catheterization site will remain for up to six weeks.  3. If any questions arise call your physician's office. You can also call the [place] HOTLINE open 24 hours/day, 7 days/week and speak to a nurse at [phone], or toll free at [phone].   4. You should call 911 if you develop problems with breathing or chest pain.    FOR PROBLEMS CALL DR: *** AT: ***    I acknowledge receipt and understanding of these Home Care instructions.  ·

## 2018-01-04 NOTE — PROCEDURES
DATE OF PROCEDURE:  01/04/2018    PROCEDURE:  Cardiac catheterization.  A.  Right heart catheterization.  B.  Selective coronary angiography.  C.  Left internal mammary artery bypass graft angiography.  D.  Saphenous vein bypass graft angiography.  E.  Ascending aortography.  F.  Distal abdominal aortogram with bilateral iliofemoral artery angiography.  G.  Left femoral vein and artery approach.  H.  Right femoral vein approach.    PREPROCEDURE DIAGNOSES:  1.  Severe aortic stenosis.  2.  Coronary artery bypass surgery on 03/18/2008 with LIMA bypass graft to the left   anterior descending artery and separate saphenous vein grafts to the circumflex marginal   branch and to the posterior descending artery of the right coronary artery.  3.  Coronary stent placement 04/01/2011, ostium and proximal first circumflex obtuse   marginal branch, 2.5 x 24 mm [place] drug-eluting stent  4.  Peripheral vascular disease with reported 50-75% proximal right femoral artery and   previous left superficial femoral artery stent.    POSTPROCEDURE DIAGNOSIS:  1.  Coronary artery disease, three-vessel including left anterior descending artery ostial 100%   occlusion, mid right coronary artery 100% occlusion, a distal left anterior descending artery   95% stenosis, second circumflex marginal branch 30% stenosis  2.  Patent coronary bypass grafts to the left anterior descending artery, first circumflex marginal  branch and distal right coronary artery.  3.  Patent coronary stent of the ostium and proximal first circumflex marginal branch.  4.  Normal ascending aorta.  5.  Aortic regurgitation, mild to moderate.  6.  Patent distal abdominal aortogram and bilateral iliofemoral arteries.  7.  Essentially normal right heart pressures and left ventricular filling pressure.    PHYSICIAN:  Wayne Nieves MD    REFERRING PHYSICIAN:  Jez Waters MD    COMPLICATIONS:  None.    MEDICATIONS:  1.  Versed 2.5 mg IV.  2.  Fentanyl 50 mcg IV.  3.   Lidocaine 2% subcutaneous.  4.  Heparin 2000 units IV.    INDICATIONS: The patient is an 84-year-old [place] male with history of coronary artery   disease, myocardial infarction, three-vessel coronary artery bypass grafting 2008, coronary   stent placement 2011, ostium and proximal first circumflex marginal with a single 2.5 x 24 mm   drug-eluting stent, right bundle branch block, peripheral vascular disease with previous left superficial   femoral artery stent placement, paroxysmal atrial fibrillation, history of stroke 2010 and TIA,   hypertension, hyperlipidemia, sleep apnea, carotid artery disease with left carotid endarterectomy   2011 and lower GI bleed requiring catheter-based intervention with right ileocecal artery branch   embolization who has developed severe aortic stenosis with an echocardiogram on 10/26/2017   showed a left ventricular ejection fraction of 70%, a peak aortic valve gradient of 100 mmHg,   mean gradient of 53 mmHg and moderate aortic regurgitation, and a right ventricular pressure   of 45 mmHg.   The patient is referred for a pre-TAVR cardiac catheterization.    DESCRIPTION OF PROCEDURE:  After informed consent was obtained, the patient   was brought to the cardiac catheterization laboratory.    The patient was prepped, draped and anesthetized in the usual manner.    Using ultrasound guidance and a modified Seldinger technique, 8 and 4-Icelandic   introducer sheaths were inserted in the left femoral vein and artery   respectively.  Aspiration of the left femoral vein catheter could not be   performed and therefore the left femoral vein approach was abandoned for the   right heart catheterization portion of the procedure.    Next, a 4-Icelandic JL 4.0 left coronary catheter was inserted in the ostium of   the left coronary artery and left coronary angiograms were obtained in various   projections.    Next, a 4-Icelandic 3D right coronary catheter was inserted in the ostium of the   right  coronary artery and right coronary angiograms were obtained in various   projections.    Next, using the same catheter, the saphenous vein graft to the circumflex   obtuse marginal branch was cannulated and angiograms were obtained.    Next, using the same catheter, the saphenous vein graft to the right coronary   artery was cannulated and angiograms were obtained.    Next, using an exchange length wire, a 4-Qatari LIMA catheter was used to   cannulate the left internal mammary artery bypass graft and angiograms were   obtained in various projections.    Next, a 4-Qatari pigtail catheter was inserted into the ascending aorta and an   ascending aortogram was obtained.    Next, the pigtail catheter was retracted back to the distal abdominal aorta   and distal abdominal aortogram and bilateral femoral artery angiography was   obtained.  There was some suggestion of possible narrowing of the left   external iliac artery and therefore the 4-Qatari 3D catheter was inserted into the left   common iliac artery and pullback pressures were obtained, which showed   no pressure gradient change across the area of concern.    Next, using modified Seldinger technique, an 8-Qatari introducer sheath was   inserted into the right femoral vein.    Next, a 7.5-Qatari balloon tip [place]-Yocasta catheter was inserted into the   pulmonary artery and pulmonary capillary wedge position.  Right heart   pressures and thermodilution cardiac outputs were obtained.    At the end of the procedure, catheters were removed.  Hemostasis was achieved   with manual compression at both the left and right groin sites.  The patient tolerated the procedure well.    FINDINGS:  HEMODYNAMICS:  Right heart pressures:  1.  Mean right atrial pressure 3 mmHg.  2.  Right ventricular pressure 32/8.  3.  Pulmonary artery pressure 32/9, mean of 17 mmHg.  4.  Pulmonary capillary wedge pressure 9 mmHg.    Cardiac output thermodilution method 4.0 liters per minute.  Cardiac  index 2.4   liters per minute per meter squared.     Pulmonary vascular resistance 2.0 Wood units.    Left heart pressures:  Central aortic pressure systolic 107, diastolic 36,   mean of 56.    CORONARY ARTERIOGRAPHY:  1.  Left main artery:  The left main artery has a distal eccentric 30-40%   stenosis related to the previous stent.  The left main artery bifurcates to   the left anterior descending artery and circumflex artery. The previous  stent is identified extending from thedistal left main artery into the proximal  segment of the first circumflex marginalbranch.  2.  Left anterior descending artery:  The left anterior descending artery has   a 100% ostial occlusion.  3.  Circumflex artery:  The circumflex gives rise to 2 marginal branches.    The previous stent covering the ostium and proximal segment of the first   marginal branch is patent and the remainder of the vessel is patent with   some retrograde filling of the distal saphenous vein graft.  The second obtuse   marginal branch, which is jailed by the previous stent has an estimated proximal   smooth 30% stenosis.  4.  Right coronary artery:  The right coronary artery has a 100% mid vessel   occlusion after the right ventricular branch.    CORONARY BYPASS GRAFT ANGIOGRAPHY:  1.  Left internal mammary artery bypass graft to the left anterior descending   artery is widely patent.  The distal anastomosis is patent.  There is   antegrade and retrograde filling of the native left anterior descending   artery.  The terminal native left anterior descending artery has a focal 95%   stenosis at the apex.  2.  Saphenous vein bypass graft to the circumflex marginal branch is widely   patent.  The distal anastomosis is patent.  There is antegrade and retrograde   filling of the marginal branch with no additional stenotic lesions.  3.  Saphenous vein bypass graft to the right coronary artery:  The saphenous   vein is widely patent.  The distal anastomosis is patent.   There is antegrade   and retrograde filling of the native right coronary artery, posterior   descending artery with no additional significant stenosis.    ASCENDING AORTOGRAPHY:  The ascending aorta is not dilated measuring  3.2 cm.  The aortic valve is trileaflet. There is calcification of the aortic valve leaflets  with some restricted motion.  There is mild to moderate aortic regurgitation.    DISTAL ABDOMINAL AORTOGRAPHY with bilateral iliofemoral angiography   demonstrates a patent nondilated distal aorta and patent bilateral common   iliac, external iliac and bilateral femoral arteries.    PLAN:  Proceed with further TAVR evaluation.       ____________________________________     MD JUAN LUIS ERWIN / NTS    DD:  01/04/2018 14:33:19  DT:  01/04/2018 15:16:41    D#:  [id number]  Job#:  [id number]

## 2018-01-05 ENCOUNTER — TELEPHONE (OUTPATIENT)
Dept: CARDIOLOGY | Facility: MEDICAL CENTER | Age: 83
End: 2018-01-05

## 2018-01-05 DIAGNOSIS — I10 ESSENTIAL HYPERTENSION: ICD-10-CM

## 2018-01-05 RX ORDER — SPIRONOLACTONE 25 MG/1
25 TABLET ORAL
Qty: 90 TAB | Refills: 0 | Status: SHIPPED | OUTPATIENT
Start: 2018-01-05 | End: 2018-01-08

## 2018-01-05 RX ORDER — METOPROLOL TARTRATE 100 MG/1
100 TABLET ORAL 2 TIMES DAILY
Qty: 180 TAB | Refills: 0 | Status: ON HOLD | OUTPATIENT
Start: 2018-01-05 | End: 2018-02-13

## 2018-01-05 RX ORDER — ALLOPURINOL 300 MG/1
300 TABLET ORAL DAILY
Qty: 90 TAB | Refills: 0 | Status: SHIPPED | OUTPATIENT
Start: 2018-01-05 | End: 2018-01-08

## 2018-01-05 RX ORDER — AMLODIPINE BESYLATE 10 MG/1
10 TABLET ORAL
Qty: 90 TAB | Refills: 0 | Status: SHIPPED | OUTPATIENT
Start: 2018-01-05 | End: 2018-01-08

## 2018-01-05 NOTE — TELEPHONE ENCOUNTER
"Pt walked in the office with wife and wanted to know the \"next step for his heart surgery\". Consulted with Lilliam about his case and she said the next step would be an appt with . Pt and wife updated and agreeable with plan.   Chelsea SOMMER RN   "

## 2018-01-08 ENCOUNTER — OFFICE VISIT (OUTPATIENT)
Dept: CARDIOLOGY | Facility: MEDICAL CENTER | Age: 83
End: 2018-01-08
Payer: MEDICARE

## 2018-01-08 VITALS
DIASTOLIC BLOOD PRESSURE: 70 MMHG | HEART RATE: 64 BPM | OXYGEN SATURATION: 95 % | BODY MASS INDEX: 24.98 KG/M2 | WEIGHT: 141 LBS | HEIGHT: 63 IN | SYSTOLIC BLOOD PRESSURE: 130 MMHG

## 2018-01-08 DIAGNOSIS — G45.8 OTHER SPECIFIED TRANSIENT CEREBRAL ISCHEMIAS: Chronic | ICD-10-CM

## 2018-01-08 DIAGNOSIS — I73.9 PVD (PERIPHERAL VASCULAR DISEASE) (HCC): ICD-10-CM

## 2018-01-08 DIAGNOSIS — I25.10 CORONARY ARTERY DISEASE INVOLVING NATIVE CORONARY ARTERY OF NATIVE HEART WITHOUT ANGINA PECTORIS: ICD-10-CM

## 2018-01-08 DIAGNOSIS — I45.10 RBBB (RIGHT BUNDLE BRANCH BLOCK): ICD-10-CM

## 2018-01-08 DIAGNOSIS — I65.23 BILATERAL CAROTID ARTERY STENOSIS: Chronic | ICD-10-CM

## 2018-01-08 DIAGNOSIS — I35.0 AORTIC STENOSIS, SEVERE: ICD-10-CM

## 2018-01-08 DIAGNOSIS — Z95.1 HX OF CABG: ICD-10-CM

## 2018-01-08 PROCEDURE — 99215 OFFICE O/P EST HI 40 MIN: CPT | Performed by: INTERNAL MEDICINE

## 2018-01-08 ASSESSMENT — ENCOUNTER SYMPTOMS
FEVER: 0
FOCAL WEAKNESS: 0
DEPRESSION: 0
NAUSEA: 0
RESPIRATORY NEGATIVE: 1
COUGH: 0
CARDIOVASCULAR NEGATIVE: 1
BLURRED VISION: 0
VOMITING: 0
SHORTNESS OF BREATH: 0
EYES NEGATIVE: 1
HEADACHES: 0
CHILLS: 0
CLAUDICATION: 0
PALPITATIONS: 0
BRUISES/BLEEDS EASILY: 0
DIZZINESS: 0
WEIGHT LOSS: 0
PSYCHIATRIC NEGATIVE: 1
NERVOUS/ANXIOUS: 0
WEAKNESS: 1
ABDOMINAL PAIN: 0
DOUBLE VISION: 0
MYALGIAS: 0
MUSCULOSKELETAL NEGATIVE: 1
GASTROINTESTINAL NEGATIVE: 1

## 2018-01-08 NOTE — PROGRESS NOTES
Subjective:   Rafia Shaikh is a 84 y.o. male who presents today for hospital follow up of severe symptomatic aortic stenosis.    Since the discharge from [facility] on 01/04/17 status post cardiac catheterization, she has continued to experience to severe fatigue. He shortness of breath, dyspnea on exertion, chest pain, dizziness or syncope. He denies recurrence of hematochezia since the embolization.    Past Medical History:   Diagnosis Date   • Anemia 4/2016    due to rectal bleed   • Arrhythmia 5/12/17    Hx A fib. On only aspirin.    • CAD (coronary artery disease)    • Carotid artery stenosis 6/13/2011   • CATARACT 1990's    Bilateral phaco with IOL   • Dental disorder     cavities, under current care   • Dental disorder 5/12/17    permanent bridge lower   • Dizziness 6/13/2011   • Dyslipidemia 9/18/2010   • Gout    • Heart murmur    • High cholesterol    • Hyperlipidemia    • Hypertension    • Hypothyroidism 6/13/2011   • Insomnia    • Myocardial infarct 2008    Stent 2011.  5/12/17-Cardiologist is DR Wu ([facility])   • PAD (peripheral artery disease)    • Preoperative examination, unspecified 4/12/2011   • Rectal bleed 4/4/2016    Cauterized and received blood transfusions   • Renal disorder 2000    kidney stone   • Sleep apnea 2014    States recommended CPAP but never did get it.   • Stroke (CMS-HCC) 4/16/2010    TIA    • Tendonitis    • TIA (transient ischemic attack) 6/13/2011   • Unspecified disorder of thyroid    • Urinary incontinence      Past Surgical History:   Procedure Laterality Date   • COLONOSCOPY N/A 5/16/2017    Procedure: COLONOSCOPY;  Surgeon: Osmany Smart M.D.;  Location: SURGERY [facility];  Service:    • LAPAROSCOPY  4/2/2016    exp for rectal bleed and cautery   • COLONOSCOPY  2015   • RECOVERY  8/22/2012    aortogram-Performed by SURGERY, IR-RECOVERY at [facility]   • CAROTID ENDARTERECTOMY  5/3/2011    Performed by ERASMO NAVARRETE at [facility] ORS    • RECOVERY  4/1/2011    Performed by SURGERY, CATH-RECOVERY at [facility]   • RECOVERY  3/25/2011    Performed by SURGERY, CATH-RECOVERY at [facility]   • MULTIPLE CORONARY ARTERY BYPASS ENDO VEIN HARVEST  3/20/08    Performed by AMANDA CRYSTAL at [facility]GERY [facility] ORS   • LITHOTRIPSY  2000   • SEPTOPLASTY  1995   • CATARACT EXTRACTION WITH IOL Bilateral early 1990's     Family History   Problem Relation Age of Onset   • Heart Disease Father    • Other Mother      TB   • Hypertension     • Colon Cancer Brother    • Breast Cancer Sister    • Other       GOUT     History   Smoking Status   • Never Smoker   Smokeless Tobacco   • Never Used     No Known Allergies     Medications reviewed.    Outpatient Encounter Prescriptions as of 1/8/2018   Medication Sig Dispense Refill   • atorvastatin (LIPITOR) 20 MG Tab Take 20 mg by mouth every day.     • levothyroxine (SYNTHROID) 50 MCG Tab TAKE 1 TABLET BY MOUTH DAILY 90 Tab 0   • cilostazol (PLETAL) 100 MG Tab Take 1 Tab by mouth 2 times a day. 180 Tab 3   • aspirin EC (ECOTRIN) 81 MG Tablet Delayed Response Take 81 mg by mouth every evening.     • Calcium Carbonate-Vit D-Min (CALTRATE PLUS PO) Take 1 Tab by mouth every day.     • B Complex Vitamins (VITAMIN B COMPLEX PO) Take 1 Tab by mouth every day.     • Multiple Vitamins-Minerals (CENTRUM SILVER) TABS Take 1 Tab by mouth every day.     • allopurinol (ZYLOPRIM) 300 MG Tab Take 1 Tab by mouth every day. 90 Tab 0   • spironolactone (ALDACTONE) 25 MG Tab Take 1 Tab by mouth every day. 90 Tab 0   • amlodipine (NORVASC) 10 MG Tab Take 1 Tab by mouth every day. 90 Tab 0   • metoprolol (LOPRESSOR) 100 MG Tab Take 1 Tab by mouth 2 times a day. 180 Tab 0     No facility-administered encounter medications on file as of 1/8/2018.      Review of Systems   Constitutional: Positive for malaise/fatigue. Negative for chills, fever and weight loss.   HENT: Negative.  Negative for hearing loss.   "  Eyes: Negative.  Negative for blurred vision and double vision.   Respiratory: Negative.  Negative for cough and shortness of breath.    Cardiovascular: Negative.  Negative for chest pain, palpitations, claudication and leg swelling.   Gastrointestinal: Negative.  Negative for abdominal pain, nausea and vomiting.   Genitourinary: Negative.  Negative for dysuria and urgency.   Musculoskeletal: Negative.  Negative for joint pain and myalgias.   Skin: Negative.  Negative for itching and rash.   Neurological: Positive for weakness. Negative for dizziness, focal weakness and headaches.   Endo/Heme/Allergies: Negative.  Does not bruise/bleed easily.   Psychiatric/Behavioral: Negative.  Negative for depression. The patient is not nervous/anxious.         Objective:   /70   Pulse 64   Ht 1.6 m (5' 3\")   Wt 64 kg (141 lb)   SpO2 95%   BMI 24.98 kg/m²     Physical Exam   Constitutional: He is oriented to person, place, and time. He appears well-developed and well-nourished.   HENT:   Mouth/Throat: Oropharynx is clear and moist.   Eyes: Conjunctivae and EOM are normal.   Neck: No JVD present. No thyroid mass present.   Cardiovascular: Normal rate, regular rhythm, S1 normal, S2 normal and normal pulses.  PMI is not displaced.  Exam reveals no gallop.    No murmur heard.  Pulses:       Carotid pulses are 2+ on the right side, and 2+ on the left side.       Radial pulses are 2+ on the right side, and 2+ on the left side.        Femoral pulses are 2+ on the right side, and 2+ on the left side.       Dorsalis pedis pulses are 2+ on the right side, and 2+ on the left side.   No peripheral edema.   Pulmonary/Chest: Effort normal and breath sounds normal.   Abdominal: Soft. Normal appearance. He exhibits no abdominal bruit. There is no hepatosplenomegaly. There is no tenderness.   Musculoskeletal: Normal range of motion. He exhibits no edema.        Thoracic back: He exhibits no tenderness and no spasm.   Neurological: He " is alert and oriented to person, place, and time.   Skin: No rash noted. No cyanosis. Nails show no clubbing.   Psychiatric: He has a normal mood and affect.     CARDIAC STUDIES/PROCEDURES:    CARDIAC CATHETERIZATION CONCLUSIONS by Dr. Nieves (01/04/18)  1.  Coronary artery disease, three-vessel including left anterior descending artery ostial 100%   occlusion, mid right coronary artery 100% occlusion, a distal left anterior descending artery   95% stenosis, second circumflex marginal branch 30% stenosis  2.  Patent coronary bypass grafts to the left anterior descending artery, first circumflex marginal  branch and distal right coronary artery.  3.  Patent coronary stent of the ostium and proximal first circumflex marginal branch.  4.  Normal ascending aorta.  5.  Aortic regurgitation, mild to moderate.  6.  Patent distal abdominal aortogram and bilateral iliofemoral arteries.  7.  Essentially normal right heart pressures and left ventricular filling pressure.    CAROTID ULTRASOUND (11/27/17)  Moderate stenosis of the right internal carotid (50-69%).   Left carotid.   CEA is widely patent without evidence of restinosis.    Flow within both subclavian arteries appears to be within normal limits.   Antegrade flow, bilateral vertebral arteries.     CT OF CHEST AND ABDOMEN (12/14/17)  1.  Aortic annulus area of 326 sq mm.  2.  Coronary ostia are both greater than 10 mm from the annulus.  3.  Minimum diameter of the iliofemoral arterial system of 5.9 mm on the RIGHT and 8 mm on the LEFT, with moderate atherosclerotic calcification and tortuosity bilaterally.  4.  Interval distal migration of large LEFT kidney stone to the distal ureter, without significant obstructive changes.    COLONOSCOPE by Dr. 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  Eventually this area was Endoclipped,   moderate left-sided diverticulosis.    ECHOCARDIOGRAM CONCLUSIONS (10/026/17)  Prior echo 12-23-14. Compared to the images of the study done - there   has been progression of aortic stenosis and regurgitation.   Normal left ventricular systolic function.  Left ventricular ejection fraction is visually estimated to be 70%.  Severe aortic stenosis.  AV Area Cont Eq vti 0.93 cm²  Vmax is 5.0 m/s.  Transvalvular gradients are - Peak: 100 mmHg, Mean: 53 mmHg.   Moderate aortic insufficiency.  Mild tricuspid regurgitation.  Estimated right ventricular systolic pressure is 45 mmHg.  Moderate pulmonic insufficiency.    EKG performed on (05/12/17) was reviewed: EKG shows sinus rhythm with right bundle branch block.    Laboratory results of (01/08/18) were reviewed. Bun of 21 mg/dl, creatinine levels of 1.16 mg/dl noted.    MRI OF BRAIN (02/13/11)  1. Moderate cerebral atrophy.  2. Minimal supratentorial white matter disease with two or 3 rare   punctate foci of bright FLAIR signal in the deep white matter consistent   with small vessel ischemic change versus demyelination or gliosis.  3. No evidence of acute cerebral infarction, hemorrhage, or mass lesion.    PERIPHERAL INTERVENTION by Dr. Amaya (03/22/17)  1.  RIGHT common femoral sheath arteriogram demonstrates atherosclerosis with estimated 50-75% stenosis of the proximal femoral artery  2.  Deployment Starclose SE vascular closure device; due to the patient's agitation and altered mental status I recommend a further 6 hours of groin precautions    PERIPHERAL INTERVENTION by Dr. Hart (03/21/17)  1.  Active extravasation from the ileocecal branch of the superior mesenteric artery.  2.  Successful embolization of bleeding cecal  branch.    PERIPHERAL INTERVENTION by Dr. Parra (08/22/12)  1.  Right superficial femoral artery occlusion with occlusion of the tibial peroneal trunk.    2.  Widely patent left superficial femoral artery stent with some disease in   the tibial vessels as well.  Although, the superficial femoral artery is   amenable to percutaneous intervention, I am reluctant to open the artery to a   distal occlusion.  The tibioperoneal trunk is heavily diseased and   intervention in the tibioperoneal trunk for a patient with claudication is not   advisable due to the risk of failure and complication as well as the poor   durability of below knee tibial work.  I will follow up with the patient in   the office and discuss with him the findings of the angiogram.  If he   progresses to rest pain or tissue loss, will consider percutaneous   intervention of both lesions.    Assessment:     1. Aortic stenosis, severe     2. Coronary artery disease involving native coronary artery of native heart without angina pectoris     3. Hx of CABG     4. PVD (peripheral vascular disease) (CMS-[facility])     5. Bilateral carotid artery stenosis     6. Other specified transient cerebral ischemias     7. RBBB (right bundle branch block)       Medical Decision Making:  Today's Assessment / Status / Plan:     1. Aortic stenosis: His aortic stenosis has reached severe status on his recent echocardiogram and he is symptomatic, NYHA class II. He is not a surgical candidate due to excessive risk. We will refer him to Dr. James for TAVR. He understands the risks and benefits and agrees with plan.  2. Coronary artery disease with prior 3 vessel coronary bypass graft with left internal mammary artery graft to left anterior descending artery, saphenous vein graft to obtuse marginal branch and saphenous vein graft to posterior descending artery by Dr. Coates (03/20/08): He is clinically doing well. We will continue with current medical care.  3. Peripheral  vascular disease with prior left superficial femoral artery stent, known high grade stenosis of right common femoral artery closed with Starclose closure by Dr. Amaya on (03/22/17): Stable on medical therapy.   4. Carotid artery stenosis with left carotid enterectomy by Dr. Parra (05/03/11) (managed by Dr. Parra): Clinically stable on medical therapy.  5. History of trans-ischemic attack: He remains clinically stable without any new neurological symptoms.  6. History of gastrointestinal bleeding with embolization of ileocecal branch by Dr. Hart (03/21/17) and AVM (managed by Osmany Pat): He remains clinically stable without recurrence of gastrointestinal bleeding. He has been off of Plavix since March. He has been cleared by GI for TAVR.  7. Right bundle branch block  8. Additional information: He and his wife is from [facility], however, they speaks Slovak.  More than 45 minutes of time was spent to review all above information, discuss the option of TAVR including, alternative options, risk and benefits.    The risks, benefits, and alternatives to TAVR, general anesthesia and transesophageal echocardiogram were discussed in great detail. Specific risks mentioned include bleeding, infection, kidney damage, allergic reaction, cardiac perforation with possible tamponade requiring nathan-cardiocentesis or possible open heart surgery.   Lastly the risks of heart attack, stroke, and death were discussed; the risks of major complications includingall cause mortality of 2.2%, disabling stroke of 1.1%, the risk on new pacemaker of 12% and the risk of vascular complications of 4.1%. The patient verbalized understanding of these potential complications and wishes to proceed with this procedure. ([facility]).  The procedure will be performed completely with cardiac surgery.    CC José Taylor and Yarelis Lee

## 2018-01-08 NOTE — LETTER
[facility]   [address]  [place] [address]  Phone: [phone]  Fax: [phone]              Rafia Shaikh  12/16/1933    Encounter Date: 1/8/2018    Jez Waters M.D.          PROGRESS NOTE:  Subjective:   Rafia Shaikh is a 84 y.o. male who presents today for hospital follow up of severe symptomatic aortic stenosis.    Since the discharge from [facility] on 01/04/17 status post cardiac catheterization, she has continued to experience to severe fatigue. He shortness of breath, dyspnea on exertion, chest pain, dizziness or syncope. He denies recurrence of hematochezia since the embolization.    Past Medical History:   Diagnosis Date   • Anemia 4/2016    due to rectal bleed   • Arrhythmia 5/12/17    Hx A fib. On only aspirin.    • CAD (coronary artery disease)    • Carotid artery stenosis 6/13/2011   • CATARACT 1990's    Bilateral phaco with IOL   • Dental disorder     cavities, under current care   • Dental disorder 5/12/17    permanent bridge lower   • Dizziness 6/13/2011   • Dyslipidemia 9/18/2010   • Gout    • Heart murmur    • High cholesterol    • Hyperlipidemia    • Hypertension    • Hypothyroidism 6/13/2011   • Insomnia    • Myocardial infarct 2008    Stent 2011.  5/12/17-Cardiologist is DR Wu ([facility])   • PAD (peripheral artery disease)    • Preoperative examination, unspecified 4/12/2011   • Rectal bleed 4/4/2016    Cauterized and received blood transfusions   • Renal disorder 2000    kidney stone   • Sleep apnea 2014    States recommended CPAP but never did get it.   • Stroke (CMS-HCC) 4/16/2010    TIA    • Tendonitis    • TIA (transient ischemic attack) 6/13/2011   • Unspecified disorder of thyroid    • Urinary incontinence      Past Surgical History:   Procedure Laterality Date   • COLONOSCOPY N/A 5/16/2017    Procedure: COLONOSCOPY;  Surgeon: Osmany Smart M.D.;  Location: SURGERY [facility];  Service:    • LAPAROSCOPY   4/2/2016    exp for rectal bleed and cautery   • COLONOSCOPY  2015   • RECOVERY  8/22/2012    aortogram-Performed by SURGERY, IR-RECOVERY at SURGERY [facility] ORS   • CAROTID ENDARTERECTOMY  5/3/2011    Performed by ERASMO NAVARRETE at SURGERY [facility] ORS   • RECOVERY  4/1/2011    Performed by SURGERY, CATH-RECOVERY at SURGERY [facility]   • RECOVERY  3/25/2011    Performed by SURGERY, CATH-RECOVERY at SURGERY [facility]   • MULTIPLE CORONARY ARTERY BYPASS ENDO VEIN HARVEST  3/20/08    Performed by AMANDA CRYSTAL at SURGERY [facility] ORS   • LITHOTRIPSY  2000   • SEPTOPLASTY  1995   • CATARACT EXTRACTION WITH IOL Bilateral early 1990's     Family History   Problem Relation Age of Onset   • Heart Disease Father    • Other Mother      TB   • Hypertension     • Colon Cancer Brother    • Breast Cancer Sister    • Other       GOUT     History   Smoking Status   • Never Smoker   Smokeless Tobacco   • Never Used     No Known Allergies     Medications reviewed.    Outpatient Encounter Prescriptions as of 1/8/2018   Medication Sig Dispense Refill   • atorvastatin (LIPITOR) 20 MG Tab Take 20 mg by mouth every day.     • levothyroxine (SYNTHROID) 50 MCG Tab TAKE 1 TABLET BY MOUTH DAILY 90 Tab 0   • cilostazol (PLETAL) 100 MG Tab Take 1 Tab by mouth 2 times a day. 180 Tab 3   • aspirin EC (ECOTRIN) 81 MG Tablet Delayed Response Take 81 mg by mouth every evening.     • Calcium Carbonate-Vit D-Min (CALTRATE PLUS PO) Take 1 Tab by mouth every day.     • B Complex Vitamins (VITAMIN B COMPLEX PO) Take 1 Tab by mouth every day.     • Multiple Vitamins-Minerals (CENTRUM SILVER) TABS Take 1 Tab by mouth every day.     • allopurinol (ZYLOPRIM) 300 MG Tab Take 1 Tab by mouth every day. 90 Tab 0   • spironolactone (ALDACTONE) 25 MG Tab Take 1 Tab by mouth every day. 90 Tab 0   • amlodipine (NORVASC) 10 MG Tab Take 1 Tab by mouth every day. 90 Tab 0   • metoprolol (LOPRESSOR) 100 MG Tab Take 1 Tab by mouth 2  "times a day. 180 Tab 0     No facility-administered encounter medications on file as of 1/8/2018.      Review of Systems   Constitutional: Positive for malaise/fatigue. Negative for chills, fever and weight loss.   HENT: Negative.  Negative for hearing loss.    Eyes: Negative.  Negative for blurred vision and double vision.   Respiratory: Negative.  Negative for cough and shortness of breath.    Cardiovascular: Negative.  Negative for chest pain, palpitations, claudication and leg swelling.   Gastrointestinal: Negative.  Negative for abdominal pain, nausea and vomiting.   Genitourinary: Negative.  Negative for dysuria and urgency.   Musculoskeletal: Negative.  Negative for joint pain and myalgias.   Skin: Negative.  Negative for itching and rash.   Neurological: Positive for weakness. Negative for dizziness, focal weakness and headaches.   Endo/Heme/Allergies: Negative.  Does not bruise/bleed easily.   Psychiatric/Behavioral: Negative.  Negative for depression. The patient is not nervous/anxious.         Objective:   /70   Pulse 64   Ht 1.6 m (5' 3\")   Wt 64 kg (141 lb)   SpO2 95%   BMI 24.98 kg/m²      Physical Exam   Constitutional: He is oriented to person, place, and time. He appears well-developed and well-nourished.   HENT:   Mouth/Throat: Oropharynx is clear and moist.   Eyes: Conjunctivae and EOM are normal.   Neck: No JVD present. No thyroid mass present.   Cardiovascular: Normal rate, regular rhythm, S1 normal, S2 normal and normal pulses.  PMI is not displaced.  Exam reveals no gallop.    No murmur heard.  Pulses:       Carotid pulses are 2+ on the right side, and 2+ on the left side.       Radial pulses are 2+ on the right side, and 2+ on the left side.        Femoral pulses are 2+ on the right side, and 2+ on the left side.       Dorsalis pedis pulses are 2+ on the right side, and 2+ on the left side.   No peripheral edema.   Pulmonary/Chest: Effort normal and breath sounds normal.   "   Abdominal: Soft. Normal appearance. He exhibits no abdominal bruit. There is no hepatosplenomegaly. There is no tenderness.   Musculoskeletal: Normal range of motion. He exhibits no edema.        Thoracic back: He exhibits no tenderness and no spasm.   Neurological: He is alert and oriented to person, place, and time.   Skin: No rash noted. No cyanosis. Nails show no clubbing.   Psychiatric: He has a normal mood and affect.     CARDIAC STUDIES/PROCEDURES:    CARDIAC CATHETERIZATION CONCLUSIONS by Dr. Nieves (01/04/18)  1.  Coronary artery disease, three-vessel including left anterior descending artery ostial 100%   occlusion, mid right coronary artery 100% occlusion, a distal left anterior descending artery   95% stenosis, second circumflex marginal branch 30% stenosis  2.  Patent coronary bypass grafts to the left anterior descending artery, first circumflex marginal  branch and distal right coronary artery.  3.  Patent coronary stent of the ostium and proximal first circumflex marginal branch.  4.  Normal ascending aorta.  5.  Aortic regurgitation, mild to moderate.  6.  Patent distal abdominal aortogram and bilateral iliofemoral arteries.  7.  Essentially normal right heart pressures and left ventricular filling pressure.    CAROTID ULTRASOUND (11/27/17)  Moderate stenosis of the right internal carotid (50-69%).   Left carotid.   CEA is widely patent without evidence of restinosis.    Flow within both subclavian arteries appears to be within normal limits.   Antegrade flow, bilateral vertebral arteries.     CT OF CHEST AND ABDOMEN (12/14/17)  1.  Aortic annulus area of 326 sq mm.  2.  Coronary ostia are both greater than 10 mm from the annulus.  3.  Minimum diameter of the iliofemoral arterial system of 5.9 mm on the RIGHT and 8 mm on the LEFT, with moderate atherosclerotic calcification and tortuosity bilaterally.  4.  Interval distal migration of large LEFT kidney stone to the distal ureter, without  significant obstructive changes.    COLONOSCOPE by Dr. 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  Eventually this area was Endoclipped,   moderate left-sided diverticulosis.    ECHOCARDIOGRAM CONCLUSIONS (10/026/17)  Prior echo 12-23-14. Compared to the images of the study done - there   has been progression of aortic stenosis and regurgitation.   Normal left ventricular systolic function.  Left ventricular ejection fraction is visually estimated to be 70%.  Severe aortic stenosis.  AV Area Cont Eq vti 0.93 cm²  Vmax is 5.0 m/s.  Transvalvular gradients are - Peak: 100 mmHg, Mean: 53 mmHg.   Moderate aortic insufficiency.  Mild tricuspid regurgitation.  Estimated right ventricular systolic pressure is 45 mmHg.  Moderate pulmonic insufficiency.    EKG performed on (05/12/17) was reviewed: EKG shows sinus rhythm with right bundle branch block.    Laboratory results of (01/08/18) were reviewed. Bun of 21 mg/dl, creatinine levels of 1.16 mg/dl noted.    MRI OF BRAIN (02/13/11)  1. Moderate cerebral atrophy.  2. Minimal supratentorial white matter disease with two or 3 rare   punctate foci of bright FLAIR signal in the deep white matter consistent   with small vessel ischemic change versus demyelination or gliosis.  3. No evidence of acute cerebral infarction, hemorrhage, or mass lesion.    PERIPHERAL INTERVENTION by Dr. Amaya (03/22/17)  1.  RIGHT common femoral sheath arteriogram demonstrates atherosclerosis with estimated 50-75% stenosis of the proximal femoral artery  2.  Deployment Starclose SE vascular closure device; due to the patient's agitation  and altered mental status I recommend a further 6 hours of groin precautions    PERIPHERAL INTERVENTION by Dr. Hart (03/21/17)  1.  Active extravasation from the ileocecal branch of the superior mesenteric artery.  2.  Successful embolization of bleeding cecal branch.    PERIPHERAL INTERVENTION by Dr. Parra (08/22/12)  1.  Right superficial femoral artery occlusion with occlusion of the tibial peroneal trunk.    2.  Widely patent left superficial femoral artery stent with some disease in   the tibial vessels as well.  Although, the superficial femoral artery is   amenable to percutaneous intervention, I am reluctant to open the artery to a   distal occlusion.  The tibioperoneal trunk is heavily diseased and   intervention in the tibioperoneal trunk for a patient with claudication is not   advisable due to the risk of failure and complication as well as the poor   durability of below knee tibial work.  I will follow up with the patient in   the office and discuss with him the findings of the angiogram.  If he   progresses to rest pain or tissue loss, will consider percutaneous   intervention of both lesions.    Assessment:     1. Aortic stenosis, severe     2. Coronary artery disease involving native coronary artery of native heart without angina pectoris     3. Hx of CABG     4. PVD (peripheral vascular disease) (CMS-HCC)     5. Bilateral carotid artery stenosis     6. Other specified transient cerebral ischemias     7. RBBB (right bundle branch block)       Medical Decision Making:  Today's Assessment / Status / Plan:     1. Aortic stenosis: His aortic stenosis has reached severe status on his recent echocardiogram and he is symptomatic, NYHA class II. He is not a surgical candidate due to excessive risk. We will refer him to Dr. James for TAVR. He understands the risks and benefits and agrees with plan.  2. Coronary artery disease with prior 3 vessel coronary bypass graft with left internal mammary  artery graft to left anterior descending artery, saphenous vein graft to obtuse marginal branch and saphenous vein graft to posterior descending artery by Dr. Coates (03/20/08): He is clinically doing well. We will continue with current medical care.  3. Peripheral vascular disease with prior left superficial femoral artery stent, known high grade stenosis of right common femoral artery closed with Starclose closure by Dr. Amaya on (03/22/17): Stable on medical therapy.   4. Carotid artery stenosis with left carotid enterectomy by Dr. Parra (05/03/11) (managed by Dr. Parra): Clinically stable on medical therapy.  5. History of trans-ischemic attack: He remains clinically stable without any new neurological symptoms.  6. History of gastrointestinal bleeding with embolization of ileocecal branch by Dr. Hart (03/21/17) and AVM (managed by Osmany Pat): He remains clinically stable without recurrence of gastrointestinal bleeding. He has been off of Plavix since March. He has been cleared by GI for TAVR.  7. Right bundle branch block  8. Additional information: He and his wife is from [place], however, they speaks [place].  More than 45 minutes of time was spent to review all above information, discuss the option of TAVR including, alternative options, risk and benefits.    The risks, benefits, and alternatives to TAVR, general anesthesia and transesophageal echocardiogram were discussed in great detail. Specific risks mentioned include bleeding, infection, kidney damage, allergic reaction, cardiac perforation with possible tamponade requiring nathan-cardiocentesis or possible open heart surgery.   Lastly the risks of heart attack, stroke, and death were discussed; the risks of major complications includingall cause mortality of 2.2%, disabling stroke of 1.1%, the risk on new pacemaker of 12% and the risk of vascular complications of 4.1%. The patient verbalized understanding of these potential complications  and wishes to proceed with this procedure. ([facility]).  The procedure will be performed completely with cardiac surgery.    CC José Taylor and Yarelis Lee      No Recipients

## 2018-01-31 ENCOUNTER — TELEPHONE (OUTPATIENT)
Dept: CARDIOLOGY | Facility: MEDICAL CENTER | Age: 83
End: 2018-01-31

## 2018-01-31 NOTE — TELEPHONE ENCOUNTER
Pt came into the office with wife asking if he would need surgery to remove the kidney stone prior to TAVR. Discussed with JI and okay to wait until after TAVR to address kidney stone.     Pt and wife notified. Appt w/ Dr. Thayer for 2nd option is tomorrow 2/1.

## 2018-02-02 NOTE — TELEPHONE ENCOUNTER
Pt came into office again this morning asking about the date of his TAVR. Pt states he would like to have the procedure as soon as possible. When explaining to him that Mercedez would call him next week he states he cannot hear well on the phone. Explained to pt that his possible surgery date is 2/12 but that there will be a conference on 2/7 to discuss his case and know for sure. Pt stated understanding. Pt will come in at 9am on Wednesday morning to see if he will have procedeure on 2/12.     POWER Newsome

## 2018-02-05 ENCOUNTER — TELEPHONE (OUTPATIENT)
Dept: CARDIOLOGY | Facility: MEDICAL CENTER | Age: 83
End: 2018-02-05

## 2018-02-05 NOTE — TELEPHONE ENCOUNTER
Pt was in the office this morning as well as Friday morning 2/02. Repeated the same information regarding treating the Kidney stone after TAVR and coming in on Wednesday 2/7 for updates. Pt appears to have some memory issues as evident by the frequent visits asking the same questions. .

## 2018-02-07 ENCOUNTER — TELEPHONE (OUTPATIENT)
Dept: CARDIOLOGY | Facility: MEDICAL CENTER | Age: 83
End: 2018-02-07

## 2018-02-07 ENCOUNTER — HOSPITAL ENCOUNTER (OUTPATIENT)
Dept: CARDIOLOGY | Facility: MEDICAL CENTER | Age: 83
DRG: 267 | End: 2018-02-07
Attending: THORACIC SURGERY (CARDIOTHORACIC VASCULAR SURGERY)
Payer: MEDICARE

## 2018-02-07 ENCOUNTER — HOSPITAL ENCOUNTER (OUTPATIENT)
Dept: RADIOLOGY | Facility: MEDICAL CENTER | Age: 83
DRG: 267 | End: 2018-02-07
Attending: INTERNAL MEDICINE | Admitting: INTERNAL MEDICINE
Payer: MEDICARE

## 2018-02-07 LAB
ABO GROUP BLD: NORMAL
ALBUMIN SERPL BCP-MCNC: 4.6 G/DL (ref 3.2–4.9)
ALBUMIN/GLOB SERPL: 1.6 G/DL
ALP SERPL-CCNC: 72 U/L (ref 30–99)
ALT SERPL-CCNC: 58 U/L (ref 2–50)
ANION GAP SERPL CALC-SCNC: 8 MMOL/L (ref 0–11.9)
APPEARANCE UR: CLEAR
AST SERPL-CCNC: 46 U/L (ref 12–45)
BILIRUB SERPL-MCNC: 0.8 MG/DL (ref 0.1–1.5)
BILIRUB UR QL STRIP.AUTO: NEGATIVE
BLD GP AB SCN SERPL QL: NORMAL
BNP SERPL-MCNC: 70 PG/ML (ref 0–100)
BUN SERPL-MCNC: 28 MG/DL (ref 8–22)
CALCIUM SERPL-MCNC: 10.6 MG/DL (ref 8.5–10.5)
CHLORIDE SERPL-SCNC: 104 MMOL/L (ref 96–112)
CO2 SERPL-SCNC: 23 MMOL/L (ref 20–33)
COLOR UR: NORMAL
CREAT SERPL-MCNC: 1.39 MG/DL (ref 0.5–1.4)
EKG IMPRESSION: NORMAL
ERYTHROCYTE [DISTWIDTH] IN BLOOD BY AUTOMATED COUNT: 47.6 FL (ref 35.9–50)
GLOBULIN SER CALC-MCNC: 2.9 G/DL (ref 1.9–3.5)
GLUCOSE SERPL-MCNC: 119 MG/DL (ref 65–99)
GLUCOSE UR STRIP.AUTO-MCNC: NEGATIVE MG/DL
HCT VFR BLD AUTO: 45.4 % (ref 42–52)
HGB BLD-MCNC: 14.9 G/DL (ref 14–18)
INR PPP: 1.03 (ref 0.87–1.13)
KETONES UR STRIP.AUTO-MCNC: NEGATIVE MG/DL
LEUKOCYTE ESTERASE UR QL STRIP.AUTO: NEGATIVE
MCH RBC QN AUTO: 32.4 PG (ref 27–33)
MCHC RBC AUTO-ENTMCNC: 32.8 G/DL (ref 33.7–35.3)
MCV RBC AUTO: 98.7 FL (ref 81.4–97.8)
MICRO URNS: NORMAL
NITRITE UR QL STRIP.AUTO: NEGATIVE
PH UR STRIP.AUTO: 6.5 [PH]
PLATELET # BLD AUTO: 227 K/UL (ref 164–446)
PMV BLD AUTO: 9.2 FL (ref 9–12.9)
POTASSIUM SERPL-SCNC: 4 MMOL/L (ref 3.6–5.5)
PROT SERPL-MCNC: 7.5 G/DL (ref 6–8.2)
PROT UR QL STRIP: NEGATIVE MG/DL
PROTHROMBIN TIME: 13.2 SEC (ref 12–14.6)
RBC # BLD AUTO: 4.6 M/UL (ref 4.7–6.1)
RBC UR QL AUTO: NEGATIVE
RH BLD: NORMAL
SODIUM SERPL-SCNC: 135 MMOL/L (ref 135–145)
SP GR UR STRIP.AUTO: 1.02
UROBILINOGEN UR STRIP.AUTO-MCNC: 0.2 MG/DL
WBC # BLD AUTO: 7.5 K/UL (ref 4.8–10.8)

## 2018-02-07 PROCEDURE — 36415 COLL VENOUS BLD VENIPUNCTURE: CPT

## 2018-02-07 PROCEDURE — 93010 ELECTROCARDIOGRAM REPORT: CPT | Performed by: INTERNAL MEDICINE

## 2018-02-07 PROCEDURE — 81003 URINALYSIS AUTO W/O SCOPE: CPT

## 2018-02-07 PROCEDURE — 93005 ELECTROCARDIOGRAM TRACING: CPT | Performed by: INTERNAL MEDICINE

## 2018-02-07 PROCEDURE — 71048 X-RAY EXAM CHEST 4+ VIEWS: CPT

## 2018-02-07 PROCEDURE — 86900 BLOOD TYPING SEROLOGIC ABO: CPT

## 2018-02-07 PROCEDURE — 86850 RBC ANTIBODY SCREEN: CPT

## 2018-02-07 PROCEDURE — 80053 COMPREHEN METABOLIC PANEL: CPT

## 2018-02-07 PROCEDURE — 86901 BLOOD TYPING SEROLOGIC RH(D): CPT

## 2018-02-07 PROCEDURE — 83880 ASSAY OF NATRIURETIC PEPTIDE: CPT

## 2018-02-07 PROCEDURE — 85027 COMPLETE CBC AUTOMATED: CPT

## 2018-02-07 PROCEDURE — 85610 PROTHROMBIN TIME: CPT

## 2018-02-07 NOTE — TELEPHONE ENCOUNTER
Met with patient for preadmission appointment in preparation for TAVR. Provided patient education regarding use of CHG wipes Sunday night prior to bedtime, stressing the importance not rinsing or drying, allowing skin to air dry naturally. Explained that patient should not eat or drink anything past midnight Sunday. Specific medication instructions per Dr. Waters include taking only Levothyroxine 2/12/18 @ 5am with just enough water to take pill.Encouraged patient to wear something clean and comfortable, easy to get on/off to check in Monday morning at 0530. Patient may have friends and family in the pre-operational area until patient is taken to the operating room, at which time family and friends will be asked to wait on floor 1 [facility] surgical waiting area. On completion of TAVR Dr. James and Dr. Waters will update family. Once update is given there is approximately 45 minutes before family/friends may visit patient in their assigned room. Length of stay on average is one night, however patient may stay a couple days depending on specific needs at that time Patient given printed instructions sheet with all above stated instructions.Patient also given campus map with printed location address, date, and time for procedure. Patient states understanding and no further questions at this time.

## 2018-02-08 ENCOUNTER — TELEPHONE (OUTPATIENT)
Dept: CARDIOLOGY | Facility: MEDICAL CENTER | Age: 83
End: 2018-02-08

## 2018-02-09 NOTE — TELEPHONE ENCOUNTER
Spoke with patient who has multiple questions regarding his procedure 2/12/18. Had patient get his printed instructions sheet from yesterday's pre-admit to refer back to during our conversation. Re-educated patient regarding the use of CHG wipes. Reiterated that patient will be NPO at midnight, and will need to check in to [facility] surgery check in at 0530 Monday 2/12/18. Reminded patient that I walked him to the surgery check in yesterday to see the location of surgery check in and that is where he will go. Also discussed that he will NOT take any medication on Monday morning. Explained he will be staying at least one night in hospital, possibly longer determined by medical necessity. Patient states understanding and thankful for re-discussing this information with him.

## 2018-02-12 ENCOUNTER — TELEPHONE (OUTPATIENT)
Dept: CARDIOLOGY | Facility: MEDICAL CENTER | Age: 83
End: 2018-02-12

## 2018-02-12 ENCOUNTER — HOSPITAL ENCOUNTER (INPATIENT)
Facility: MEDICAL CENTER | Age: 83
LOS: 1 days | DRG: 267 | End: 2018-02-13
Attending: INTERNAL MEDICINE | Admitting: INTERNAL MEDICINE
Payer: MEDICARE

## 2018-02-12 ENCOUNTER — APPOINTMENT (OUTPATIENT)
Dept: RADIOLOGY | Facility: MEDICAL CENTER | Age: 83
DRG: 267 | End: 2018-02-12
Attending: NURSE PRACTITIONER
Payer: MEDICARE

## 2018-02-12 PROBLEM — Z95.2 S/P TAVR (TRANSCATHETER AORTIC VALVE REPLACEMENT): Status: ACTIVE | Noted: 2018-02-12

## 2018-02-12 LAB
ABO GROUP BLD: NORMAL
ACT BLD: 114 SEC (ref 74–137)
ACT BLD: 197 SEC (ref 74–137)
ACTION RANGE TRIGGERED IACRT: YES
ALBUMIN SERPL BCP-MCNC: 3.9 G/DL (ref 3.2–4.9)
ALBUMIN/GLOB SERPL: 1.4 G/DL
ALP SERPL-CCNC: 82 U/L (ref 30–99)
ALT SERPL-CCNC: 42 U/L (ref 2–50)
ANION GAP SERPL CALC-SCNC: 10 MMOL/L (ref 0–11.9)
AST SERPL-CCNC: 35 U/L (ref 12–45)
BASE EXCESS BLDA CALC-SCNC: -2 MMOL/L (ref -4–3)
BILIRUB SERPL-MCNC: 1.1 MG/DL (ref 0.1–1.5)
BLD GP AB SCN SERPL QL: NORMAL
BNP SERPL-MCNC: 126 PG/ML (ref 0–100)
BODY TEMPERATURE: ABNORMAL DEGREES
BUN SERPL-MCNC: 17 MG/DL (ref 8–22)
CA-I BLD ISE-SCNC: 1.34 MMOL/L (ref 1.1–1.3)
CALCIUM SERPL-MCNC: 10.2 MG/DL (ref 8.5–10.5)
CHLORIDE SERPL-SCNC: 103 MMOL/L (ref 96–112)
CO2 BLDA-SCNC: 27 MMOL/L (ref 20–33)
CO2 SERPL-SCNC: 23 MMOL/L (ref 20–33)
CREAT SERPL-MCNC: 1.14 MG/DL (ref 0.5–1.4)
EKG IMPRESSION: NORMAL
ERYTHROCYTE [DISTWIDTH] IN BLOOD BY AUTOMATED COUNT: 45.6 FL (ref 35.9–50)
GLOBULIN SER CALC-MCNC: 2.8 G/DL (ref 1.9–3.5)
GLUCOSE BLD-MCNC: 112 MG/DL (ref 65–99)
GLUCOSE SERPL-MCNC: 122 MG/DL (ref 65–99)
HCO3 BLDA-SCNC: 25.1 MMOL/L (ref 17–25)
HCT VFR BLD AUTO: 41.1 % (ref 42–52)
HCT VFR BLD CALC: 39 % (ref 42–52)
HGB BLD-MCNC: 13.3 G/DL (ref 14–18)
HGB BLD-MCNC: 14.2 G/DL (ref 14–18)
INST. QUALIFIED PATIENT IIQPT: YES
MCH RBC QN AUTO: 33.5 PG (ref 27–33)
MCHC RBC AUTO-ENTMCNC: 34.5 G/DL (ref 33.7–35.3)
MCV RBC AUTO: 96.9 FL (ref 81.4–97.8)
O2/TOTAL GAS SETTING VFR VENT: 100 %
PCO2 BLDA: 50.1 MMHG (ref 26–37)
PCO2 TEMP ADJ BLDA: 50.1 MMHG (ref 26–37)
PH BLDA: 7.31 [PH] (ref 7.4–7.5)
PH TEMP ADJ BLDA: 7.31 [PH] (ref 7.4–7.5)
PLATELET # BLD AUTO: 193 K/UL (ref 164–446)
PMV BLD AUTO: 9.1 FL (ref 9–12.9)
PO2 BLDA: 105 MMHG (ref 64–87)
PO2 TEMP ADJ BLDA: 105 MMHG (ref 64–87)
POTASSIUM BLD-SCNC: 4.1 MMOL/L (ref 3.6–5.5)
POTASSIUM SERPL-SCNC: 4.2 MMOL/L (ref 3.6–5.5)
PROT SERPL-MCNC: 6.7 G/DL (ref 6–8.2)
RBC # BLD AUTO: 4.24 M/UL (ref 4.7–6.1)
RH BLD: NORMAL
SAO2 % BLDA: 97 % (ref 93–99)
SODIUM BLD-SCNC: 137 MMOL/L (ref 135–145)
SODIUM SERPL-SCNC: 136 MMOL/L (ref 135–145)
SPECIMEN DRAWN FROM PATIENT: ABNORMAL
WBC # BLD AUTO: 14.2 K/UL (ref 4.8–10.8)

## 2018-02-12 PROCEDURE — 86901 BLOOD TYPING SEROLOGIC RH(D): CPT

## 2018-02-12 PROCEDURE — C1760 CLOSURE DEV, VASC: HCPCS | Performed by: INTERNAL MEDICINE

## 2018-02-12 PROCEDURE — 80053 COMPREHEN METABOLIC PANEL: CPT

## 2018-02-12 PROCEDURE — C1725 CATH, TRANSLUMIN NON-LASER: HCPCS | Performed by: INTERNAL MEDICINE

## 2018-02-12 PROCEDURE — 82803 BLOOD GASES ANY COMBINATION: CPT

## 2018-02-12 PROCEDURE — 84132 ASSAY OF SERUM POTASSIUM: CPT

## 2018-02-12 PROCEDURE — 82330 ASSAY OF CALCIUM: CPT

## 2018-02-12 PROCEDURE — 82947 ASSAY GLUCOSE BLOOD QUANT: CPT

## 2018-02-12 PROCEDURE — 700105 HCHG RX REV CODE 258: Performed by: NURSE PRACTITIONER

## 2018-02-12 PROCEDURE — 160042 HCHG SURGERY MINUTES - EA ADDL 1 MIN LEVEL 5: Performed by: INTERNAL MEDICINE

## 2018-02-12 PROCEDURE — 502240 HCHG MISC OR SUPPLY RC 0272: Performed by: INTERNAL MEDICINE

## 2018-02-12 PROCEDURE — 160009 HCHG ANES TIME/MIN: Performed by: INTERNAL MEDICINE

## 2018-02-12 PROCEDURE — 84295 ASSAY OF SERUM SODIUM: CPT

## 2018-02-12 PROCEDURE — 5A1223Z PERFORMANCE OF CARDIAC PACING, CONTINUOUS: ICD-10-PCS | Performed by: ANESTHESIOLOGY

## 2018-02-12 PROCEDURE — 700102 HCHG RX REV CODE 250 W/ 637 OVERRIDE(OP): Performed by: NURSE PRACTITIONER

## 2018-02-12 PROCEDURE — 503000 HCHG SUTURE, OHS: Performed by: INTERNAL MEDICINE

## 2018-02-12 PROCEDURE — 160048 HCHG OR STATISTICAL LEVEL 1-5: Performed by: INTERNAL MEDICINE

## 2018-02-12 PROCEDURE — C1769 GUIDE WIRE: HCPCS | Performed by: INTERNAL MEDICINE

## 2018-02-12 PROCEDURE — 160031 HCHG SURGERY MINUTES - 1ST 30 MINS LEVEL 5: Performed by: INTERNAL MEDICINE

## 2018-02-12 PROCEDURE — A6402 STERILE GAUZE <= 16 SQ IN: HCPCS | Performed by: INTERNAL MEDICINE

## 2018-02-12 PROCEDURE — 700101 HCHG RX REV CODE 250

## 2018-02-12 PROCEDURE — 86850 RBC ANTIBODY SCREEN: CPT

## 2018-02-12 PROCEDURE — 503001 HCHG PERFUSION: Performed by: INTERNAL MEDICINE

## 2018-02-12 PROCEDURE — 5A1221Z PERFORMANCE OF CARDIAC OUTPUT, CONTINUOUS: ICD-10-PCS | Performed by: ANESTHESIOLOGY

## 2018-02-12 PROCEDURE — 770022 HCHG ROOM/CARE - ICU (200)

## 2018-02-12 PROCEDURE — B24BZZ4 ULTRASONOGRAPHY OF HEART WITH AORTA, TRANSESOPHAGEAL: ICD-10-PCS | Performed by: ANESTHESIOLOGY

## 2018-02-12 PROCEDURE — 85027 COMPLETE CBC AUTOMATED: CPT

## 2018-02-12 PROCEDURE — 71045 X-RAY EXAM CHEST 1 VIEW: CPT

## 2018-02-12 PROCEDURE — 85347 COAGULATION TIME ACTIVATED: CPT | Mod: 91

## 2018-02-12 PROCEDURE — 93321 DOPPLER ECHO F-UP/LMTD STD: CPT

## 2018-02-12 PROCEDURE — 83880 ASSAY OF NATRIURETIC PEPTIDE: CPT

## 2018-02-12 PROCEDURE — 85014 HEMATOCRIT: CPT

## 2018-02-12 PROCEDURE — 93010 ELECTROCARDIOGRAM REPORT: CPT | Performed by: INTERNAL MEDICINE

## 2018-02-12 PROCEDURE — 86900 BLOOD TYPING SEROLOGIC ABO: CPT

## 2018-02-12 PROCEDURE — 500002 HCHG ADHESIVE, DERMABOND: Performed by: INTERNAL MEDICINE

## 2018-02-12 PROCEDURE — C1883 ADAPT/EXT, PACING/NEURO LEAD: HCPCS | Performed by: INTERNAL MEDICINE

## 2018-02-12 PROCEDURE — 02RF38Z REPLACEMENT OF AORTIC VALVE WITH ZOOPLASTIC TISSUE, PERCUTANEOUS APPROACH: ICD-10-PCS | Performed by: INTERNAL MEDICINE

## 2018-02-12 PROCEDURE — 93312 ECHO TRANSESOPHAGEAL: CPT

## 2018-02-12 PROCEDURE — 93005 ELECTROCARDIOGRAM TRACING: CPT | Performed by: NURSE PRACTITIONER

## 2018-02-12 PROCEDURE — A9270 NON-COVERED ITEM OR SERVICE: HCPCS | Performed by: NURSE PRACTITIONER

## 2018-02-12 PROCEDURE — 700105 HCHG RX REV CODE 258

## 2018-02-12 PROCEDURE — 110372 HCHG SHELL REV 278: Performed by: INTERNAL MEDICINE

## 2018-02-12 PROCEDURE — 93325 DOPPLER ECHO COLOR FLOW MAPG: CPT

## 2018-02-12 PROCEDURE — 700111 HCHG RX REV CODE 636 W/ 250 OVERRIDE (IP)

## 2018-02-12 DEVICE — KIT TRANSCATHETER HEART VALVE SAPIEN-3 23MM: Type: IMPLANTABLE DEVICE | Status: FUNCTIONAL

## 2018-02-12 DEVICE — DEVICE CLSR 6FR HMST IMPL SLF STS PLUS ANGIOSEAL (10EA/CA): Type: IMPLANTABLE DEVICE | Site: GROIN | Status: FUNCTIONAL

## 2018-02-12 RX ORDER — LIDOCAINE HYDROCHLORIDE 10 MG/ML
INJECTION, SOLUTION INFILTRATION; PERINEURAL
Status: COMPLETED
Start: 2018-02-12 | End: 2018-02-12

## 2018-02-12 RX ORDER — SODIUM CHLORIDE 9 MG/ML
INJECTION, SOLUTION INTRAVENOUS
Status: ACTIVE
Start: 2018-02-12 | End: 2018-02-12

## 2018-02-12 RX ORDER — LIDOCAINE HYDROCHLORIDE 20 MG/ML
INJECTION, SOLUTION INFILTRATION; PERINEURAL
Status: DISCONTINUED | OUTPATIENT
Start: 2018-02-12 | End: 2018-02-12 | Stop reason: HOSPADM

## 2018-02-12 RX ORDER — ATORVASTATIN CALCIUM 20 MG/1
20 TABLET, FILM COATED ORAL
Status: DISCONTINUED | OUTPATIENT
Start: 2018-02-12 | End: 2018-02-13 | Stop reason: HOSPADM

## 2018-02-12 RX ORDER — ACETAMINOPHEN 325 MG/1
650 TABLET ORAL EVERY 6 HOURS PRN
Status: DISCONTINUED | OUTPATIENT
Start: 2018-02-12 | End: 2018-02-13 | Stop reason: HOSPADM

## 2018-02-12 RX ORDER — BUPIVACAINE HYDROCHLORIDE 2.5 MG/ML
INJECTION, SOLUTION EPIDURAL; INFILTRATION; INTRACAUDAL
Status: DISCONTINUED | OUTPATIENT
Start: 2018-02-12 | End: 2018-02-12 | Stop reason: HOSPADM

## 2018-02-12 RX ORDER — DIPHENHYDRAMINE HCL 25 MG
25 TABLET ORAL NIGHTLY PRN
Status: DISCONTINUED | OUTPATIENT
Start: 2018-02-12 | End: 2018-02-13 | Stop reason: HOSPADM

## 2018-02-12 RX ORDER — HYDRALAZINE HYDROCHLORIDE 20 MG/ML
10 INJECTION INTRAMUSCULAR; INTRAVENOUS
Status: DISCONTINUED | OUTPATIENT
Start: 2018-02-12 | End: 2018-02-13 | Stop reason: HOSPADM

## 2018-02-12 RX ORDER — CLOPIDOGREL BISULFATE 75 MG/1
300 TABLET ORAL ONCE
Status: COMPLETED | OUTPATIENT
Start: 2018-02-12 | End: 2018-02-12

## 2018-02-12 RX ORDER — CLOPIDOGREL BISULFATE 75 MG/1
75 TABLET ORAL DAILY
Status: DISCONTINUED | OUTPATIENT
Start: 2018-02-13 | End: 2018-02-13 | Stop reason: HOSPADM

## 2018-02-12 RX ORDER — SODIUM CHLORIDE 9 MG/ML
INJECTION, SOLUTION INTRAVENOUS CONTINUOUS
Status: DISCONTINUED | OUTPATIENT
Start: 2018-02-12 | End: 2018-02-13 | Stop reason: HOSPADM

## 2018-02-12 RX ORDER — ONDANSETRON 2 MG/ML
4 INJECTION INTRAMUSCULAR; INTRAVENOUS EVERY 4 HOURS PRN
Status: DISCONTINUED | OUTPATIENT
Start: 2018-02-12 | End: 2018-02-13 | Stop reason: HOSPADM

## 2018-02-12 RX ORDER — DIPHENHYDRAMINE HYDROCHLORIDE 50 MG/ML
25 INJECTION INTRAMUSCULAR; INTRAVENOUS EVERY 6 HOURS PRN
Status: DISCONTINUED | OUTPATIENT
Start: 2018-02-12 | End: 2018-02-13 | Stop reason: HOSPADM

## 2018-02-12 RX ORDER — LEVOTHYROXINE SODIUM 0.05 MG/1
50 TABLET ORAL
Status: DISCONTINUED | OUTPATIENT
Start: 2018-02-13 | End: 2018-02-13 | Stop reason: HOSPADM

## 2018-02-12 RX ORDER — ATORVASTATIN CALCIUM 20 MG/1
20 TABLET, FILM COATED ORAL DAILY
Status: DISCONTINUED | OUTPATIENT
Start: 2018-02-12 | End: 2018-02-12

## 2018-02-12 RX ADMIN — CLOPIDOGREL 300 MG: 75 TABLET, FILM COATED ORAL at 21:05

## 2018-02-12 RX ADMIN — LIDOCAINE HYDROCHLORIDE: 10 INJECTION, SOLUTION INFILTRATION; PERINEURAL at 06:35

## 2018-02-12 RX ADMIN — SODIUM CHLORIDE: 9 INJECTION, SOLUTION INTRAVENOUS at 11:04

## 2018-02-12 RX ADMIN — ATORVASTATIN CALCIUM 20 MG: 20 TABLET, FILM COATED ORAL at 21:05

## 2018-02-12 ASSESSMENT — ENCOUNTER SYMPTOMS
SHORTNESS OF BREATH: 1
PSYCHIATRIC NEGATIVE: 1
COUGH: 0
DOUBLE VISION: 0
CONSTITUTIONAL NEGATIVE: 1
MUSCULOSKELETAL NEGATIVE: 1
WEIGHT LOSS: 0
CLAUDICATION: 0
NAUSEA: 0
ABDOMINAL PAIN: 0
VOMITING: 0
WEAKNESS: 1
CHILLS: 0
FEVER: 0
BRUISES/BLEEDS EASILY: 0
GASTROINTESTINAL NEGATIVE: 1
MYALGIAS: 0
FOCAL WEAKNESS: 0
CARDIOVASCULAR NEGATIVE: 1
WEAKNESS: 0
NEUROLOGICAL NEGATIVE: 1
NERVOUS/ANXIOUS: 0
RESPIRATORY NEGATIVE: 1
DIZZINESS: 0
PALPITATIONS: 0
DEPRESSION: 0
EYES NEGATIVE: 1
SHORTNESS OF BREATH: 0
BLURRED VISION: 0
HEADACHES: 0

## 2018-02-12 ASSESSMENT — PAIN SCALES - GENERAL
PAINLEVEL_OUTOF10: 0

## 2018-02-12 ASSESSMENT — COPD QUESTIONNAIRES
DO YOU EVER COUGH UP ANY MUCUS OR PHLEGM?: NO/ONLY WITH OCCASIONAL COLDS OR INFECTIONS
HAVE YOU SMOKED AT LEAST 100 CIGARETTES IN YOUR ENTIRE LIFE: NO/DON'T KNOW
COPD SCREENING SCORE: 2
DURING THE PAST 4 WEEKS HOW MUCH DID YOU FEEL SHORT OF BREATH: NONE/LITTLE OF THE TIME

## 2018-02-12 ASSESSMENT — LIFESTYLE VARIABLES: EVER_SMOKED: NEVER

## 2018-02-12 NOTE — OP REPORT
DATE OF SERVICE:  2/12/18     REFERRING PHYSICIAN:  José Wu MD     PREOPERATIVE DIAGNOSES:  Severe aortic stenosis (calcific or degenerative),      POSTOPERATIVE DIAGNOSES:  Severe aortic stenosis (calcific or degenerative),      PROCEDURES:  Transcatheter aortic valve replacement (TAVR 23 mm S3 Muñiz   pericardial valve), and intraoperative transesophageal echocardiography.     SURGEON:  Sergey Thayer MD and eJz Waters MD     ANESTHESIOLOGIST:  Homer Leslie MD.     ANESTHESIA:  General endotracheal.     ESTIMATED BLOOD LOSS:  Minimal.     COMPLICATIONS:  None.     INDICATIONS:  The patient is a pleasant 84-year-old Irish man with severe   symptomatic aortic stenosis.  Echocardiography showed a peak and mean   transvalvular gradients of 100 and 53 mmHg respectively.    The patient was thus referred for transcatheter aortic valve replacement.     DESCRIPTION OF PROCEDURE:  The patient was brought to the operating room and   placed on the operating room table in the supine position.  After successful   induction of general anesthesia and endotracheal intubation, the patient was   prepped and draped in the usual sterile fashion.  Dr. Leslie positioned a   temporary transvenous pacemaker via the right internal jugular vein due to the presence of a right bundle branch block.  In collaboration with Dr. Waters, bilateral femoral   ultrasound-guided arterial access was obtained.    The deployment   angle was determined.  A small 1 cm incision was made in the left groin.  Two   Perclose sutures were deployed.  A 14-Portuguese eSheath was then placed in the   left common femoral artery and its tip was positioned in the abdominal aorta.    The aortic valve was crossed with a wire.  Aortic balloon valvuloplasty was   performed first.  I positioned the balloon across the aortic valve   and Dr. Waters inflated it during valvuloplasty.  This catheter was exchanged for a   valve deployment catheter with a 23 mm S3  Muñiz pericardial valve at its   tip.  I positioned the valve across the annulus and Dr. Waters inflated the   balloon during valve implantation.  Wires and catheters were removed across the valve.    Intraoperative transesophageal echocardiography demonstrated a moderate perivalvular leak along the right coronary cusp. This was confirmed by a root aortogram. Therefore the deployment system was passed back across the valve and a balloon valvuloplasty was performed with a 23 mm balloon with 2 additional cc of volume. JANETH then confirmed resolution of the perivalvular leak. The left femoral eSheath was removed and the Perclose   sutures were tightened down.  When adequate hemostasis had been obtained, the   small left groin incision using a single vicryl sututre.  The   remainder of the operation will be dictated by Dr. Waters.  There were no   apparent complications.  The patient tolerated the procedure well and left the   operating room in stable condition.        ____________________________________  ANASTACIA MCDERMOTT MD

## 2018-02-12 NOTE — H&P
Physician H&P    Patient ID:  Rafia Shaikh  [id number]  84 y.o. male  12/16/1933    History:  Primary Diagnosis: Outpatient TAVR.    HPI:  84 with complex cardiovascular and medical  History including severe aortic stenosis, coronary artery disease with prior coronary artery bypass graft surgery, atrial fibrillation, peripheral vascular disease, carotid artery stenosis, transient ischemic attack and history of gastrointestinal bleeding.    Past Medical History:  has a past medical history of Anemia (4/2016); Arrhythmia (5/12/17); CAD (coronary artery disease); Carotid artery stenosis (6/13/2011); CATARACT (1990's); Dental disorder; Dental disorder (5/12/17); Dizziness (6/13/2011); Dyslipidemia (9/18/2010); Gout; Heart murmur; High cholesterol; Hyperlipidemia; Hypertension; Hypothyroidism (6/13/2011); Insomnia; Myocardial infarct (2008); PAD (peripheral artery disease); Preoperative examination, unspecified (4/12/2011); Rectal bleed (4/4/2016); Renal disorder (2000); Sleep apnea (2014); Stroke (CMS-HCC) (4/16/2010); Tendonitis; TIA (transient ischemic attack) (6/13/2011); Unspecified disorder of thyroid; and Urinary incontinence.  Past Surgical History:  has a past surgical history that includes multiple coronary artery bypass endo vein harvest (3/20/08); septoplasty (1995); recovery (3/25/2011); recovery (4/1/2011); cataract extraction with iol (Bilateral, early 1990's); carotid endarterectomy (5/3/2011); recovery (8/22/2012); colonoscopy (2015); lithotripsy (2000); laparoscopy (4/2/2016); and colonoscopy (N/A, 5/16/2017).  Past Social History:  reports that he has never smoked. He has never used smokeless tobacco. He reports that he does not drink alcohol or use drugs.  Past Family History:   Family History   Problem Relation Age of Onset   • Heart Disease Father    • Other Mother      TB   • Hypertension     • Colon Cancer Brother    • Breast Cancer Sister    • Other       GOUT     Allergies: Patient has no known  allergies.  Medications reviewed.    Current Medications:  Prior to Admission medications    Medication Sig Start Date End Date Taking? Authorizing Provider   metoprolol (LOPRESSOR) 100 MG Tab Take 1 Tab by mouth 2 times a day. 1/5/18   Yarelis Gtz M.D.   atorvastatin (LIPITOR) 20 MG Tab Take 20 mg by mouth every day.    Physician Outpatient   levothyroxine (SYNTHROID) 50 MCG Tab TAKE 1 TABLET BY MOUTH DAILY 11/13/17   Yarelis Gtz M.D.   cilostazol (PLETAL) 100 MG Tab Take 1 Tab by mouth 2 times a day. 10/16/17   Marcelino Wu M.D.   aspirin EC (ECOTRIN) 81 MG Tablet Delayed Response Take 81 mg by mouth every evening.    Physician Outpatient   Calcium Carbonate-Vit D-Min (CALTRATE PLUS PO) Take 1 Tab by mouth every day.    Physician Outpatient   B Complex Vitamins (VITAMIN B COMPLEX PO) Take 1 Tab by mouth 2 Times a Day. 5/2/11   Srg Physician   Multiple Vitamins-Minerals (CENTRUM SILVER) TABS Take 2 Tabs by mouth 2 Times a Day. 9/17/10   Nn Emergency Md Per Protocol [id number]       Review of Systems:  Review of Systems   Constitutional: Positive for malaise/fatigue. Negative for chills, fever and weight loss.   HENT: Negative.  Negative for hearing loss.    Eyes: Negative.  Negative for blurred vision and double vision.   Respiratory: Positive for shortness of breath. Negative for cough.    Cardiovascular: Negative.  Negative for chest pain, palpitations, claudication and leg swelling.   Gastrointestinal: Negative.  Negative for abdominal pain, nausea and vomiting.   Genitourinary: Negative.  Negative for dysuria and urgency.   Musculoskeletal: Negative.  Negative for joint pain and myalgias.   Skin: Negative.  Negative for itching and rash.   Neurological: Positive for weakness. Negative for dizziness, focal weakness and headaches.   Endo/Heme/Allergies: Negative.  Does not bruise/bleed easily.   Psychiatric/Behavioral: Negative.  Negative for depression. The patient is not nervous/anxious.   "    Blood pressure 104/43, pulse (!) 51, temperature 36.7 °C (98 °F), resp. rate 16, height 1.6 m (5' 3\"), weight 64.3 kg (141 lb 12.1 oz), SpO2 95 %.    Physical Examination:  Physical Exam   Constitutional: He is oriented to person, place, and time. He appears well-developed and well-nourished.   HENT:   Head: Normocephalic and atraumatic.   Eyes: Conjunctivae are normal. Pupils are equal, round, and reactive to light.   Neck: Normal range of motion. Neck supple.   Cardiovascular: Normal rate and regular rhythm.    Murmur heard.  Pulmonary/Chest: Effort normal and breath sounds normal.   Abdominal: Soft. Bowel sounds are normal.   Musculoskeletal: Normal range of motion. He exhibits no edema.   Neurological: He is alert and oriented to person, place, and time.   Skin: Skin is warm and dry.   Psychiatric: He has a normal mood and affect.     CARDIAC STUDIES/PROCEDURES:     CARDIAC CATHETERIZATION CONCLUSIONS by Dr. Nieves (01/04/18)  1.  Coronary artery disease, three-vessel including left anterior descending artery ostial 100%   occlusion, mid right coronary artery 100% occlusion, a distal left anterior descending artery   95% stenosis, second circumflex marginal branch 30% stenosis  2.  Patent coronary bypass grafts to the left anterior descending artery, first circumflex marginal  branch and distal right coronary artery.  3.  Patent coronary stent of the ostium and proximal first circumflex marginal branch.  4.  Normal ascending aorta.  5.  Aortic regurgitation, mild to moderate.  6.  Patent distal abdominal aortogram and bilateral iliofemoral arteries.  7.  Essentially normal right heart pressures and left ventricular filling pressure.     CAROTID ULTRASOUND (11/27/17)  Moderate stenosis of the right internal carotid (50-69%).   Left carotid.   CEA is widely patent without evidence of restinosis.    Flow within both subclavian arteries appears to be within normal limits.   Antegrade flow, bilateral " vertebral arteries.      CT OF CHEST AND ABDOMEN (12/14/17)  1.  Aortic annulus area of 326 sq mm.  2.  Coronary ostia are both greater than 10 mm from the annulus.  3.  Minimum diameter of the iliofemoral arterial system of 5.9 mm on the RIGHT and 8 mm on the LEFT, with moderate atherosclerotic calcification and tortuosity bilaterally.  4.  Interval distal migration of large LEFT kidney stone to the distal ureter, without significant obstructive changes.     COLONOSCOPE by Dr. 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  Eventually this area was Endoclipped,   moderate left-sided diverticulosis.     ECHOCARDIOGRAM CONCLUSIONS (10/026/17)  Prior echo 12-23-14. Compared to the images of the study done - there   has been progression of aortic stenosis and regurgitation.   Normal left ventricular systolic function.  Left ventricular ejection fraction is visually estimated to be 70%.  Severe aortic stenosis.  AV Area Cont Eq vti 0.93 cm²  Vmax is 5.0 m/s.  Transvalvular gradients are - Peak: 100 mmHg, Mean: 53 mmHg.   Moderate aortic insufficiency.  Mild tricuspid regurgitation.  Estimated right ventricular systolic pressure is 45 mmHg.  Moderate pulmonic insufficiency.     EKG performed on (02/07/18) was reviewed: EKG shows sinus patricia with right bundle branch block.  EKG performed on (05/12/17) EKG shows sinus rhythm with right bundle branch block.     Laboratory results of (02/07/18) were reviewed. Bun of 28 mg/dl, creatinine levels of 1.39 mg/dl noted.  Laboratory results of (01/08/18) Bun of 21 mg/dl, creatinine levels of 1.16 mg/dl noted.     MRI OF  BRAIN (02/13/11)  1. Moderate cerebral atrophy.  2. Minimal supratentorial white matter disease with two or 3 rare   punctate foci of bright FLAIR signal in the deep white matter consistent   with small vessel ischemic change versus demyelination or gliosis.  3. No evidence of acute cerebral infarction, hemorrhage, or mass lesion.     PERIPHERAL INTERVENTION by Dr. Amaya (03/22/17)  1.  RIGHT common femoral sheath arteriogram demonstrates atherosclerosis with estimated 50-75% stenosis of the proximal femoral artery  2.  Deployment Starclose SE vascular closure device; due to the patient's agitation and altered mental status I recommend a further 6 hours of groin precautions     PERIPHERAL INTERVENTION by Dr. Hart (03/21/17)  1.  Active extravasation from the ileocecal branch of the superior mesenteric artery.  2.  Successful embolization of bleeding cecal branch.     PERIPHERAL INTERVENTION by Dr. Parra (08/22/12)  1.  Right superficial femoral artery occlusion with occlusion of the tibial peroneal trunk.    2.  Widely patent left superficial femoral artery stent with some disease in   the tibial vessels as well.  Although, the superficial femoral artery is   amenable to percutaneous intervention, I am reluctant to open the artery to a   distal occlusion.  The tibioperoneal trunk is heavily diseased and   intervention in the tibioperoneal trunk for a patient with claudication is not   advisable due to the risk of failure and complication as well as the poor   durability of below knee tibial work.  I will follow up with the patient in   the office and discuss with him the findings of the angiogram.  If he   progresses to rest pain or tissue loss, will consider percutaneous   intervention of both lesions.    Impression/Plan:    1. Aortic stenosis: He is symptomatic with his severe aortic stenosis, NYHA class II. We will proceed with TAVR. He understands the risks and benefits and agrees with plan.  2. Coronary  artery disease with prior 3 vessel coronary bypass graft with left internal mammary artery graft to left anterior descending artery, saphenous vein graft to obtuse marginal branch and saphenous vein graft to posterior descending artery by Dr. Coates (03/20/08): He is clinically doing well. We will continue with current medical care.  3. Peripheral vascular disease with prior left superficial femoral artery stent, known high grade stenosis of right common femoral artery closed with Starclose closure by Dr. Amaya on (03/22/17): Stable on medical therapy.   4. Carotid artery stenosis with left carotid enterectomy by Dr. Parra (05/03/11) (managed by Dr. Parra): Clinically stable on medical therapy.  5. History of trans-ischemic attack: He remains clinically stable without any new neurological symptoms.  6. History of gastrointestinal bleeding with embolization of ileocecal branch by Dr. Hart (03/21/17) and AVM (managed by Osmany Pat): He remains clinically stable without recurrence of gastrointestinal bleeding. He has been off of Plavix since March. He has been cleared by GI for TAVR.  7. Right bundle branch block  8. Additional information: He and his wife is from [place], however, they speaks Russian.    CC José Taylor and Yarelis Lee    Pre Procedure Assessment:  <EXAMSHORT2>      Pre Procedure update:   No changes.    Jez Waters  2/12/2018

## 2018-02-12 NOTE — LETTER
PROCEDURE/SURGERY CLEARANCE FORM      Encounter Date: 2/12/2018    Patient: Rafia Shaikh  YOB: 1933    CARDIOLOGIST:  Jez Waters MD    REFERRING DOCTOR: Dr. Coates      The above patient is NOT cleared to have the following urology procedure at this time as he had a transcather aortic valve replacement today, 2/12 and will need to be on DAPT after procedure                                           Additional comments: This surgery clearance will be reassessed at pts 1 month follow-up appointment with Dr. Waters.

## 2018-02-12 NOTE — PROGRESS NOTES
Pt to [facility]. Pt placed on monitor showing SB. 's Pt is awake and nodding. Pt has +PP and equal  bilaterally. Bilateral groin sites are clean, dry and intact. No hematoma noted at this time. Bed alarm on and instructed patient how to use call light. All needs met at this time. Educated on POC and unit routine.

## 2018-02-12 NOTE — OP REPORT
DATE OF PROCEDURE: 02/12/18    REFERRING PHYSICIAN: José Taylor    PROCEDURES:  1. Transcatheter aortic valve replacement.  2. Preclose closure.  3. Angio-Seal closure.  4. Ultrasound guided femoral artery and femoral vein access.    PRE PROCEDURAL DIAGNOSIS:  1. Severe symptomatic aortic stenosis, NYHA II.    POST PROCEDURAL DIAGNOSIS:  1. Successful transcatheter aortic valve replacement (TAVR) with # 23 with 2 additional mls of volume Muñiz Denia S3 valve, transfemoral approach, under general anesthesia.  2. Successful Preclose closure.    INDICATION:    84 with complex cardiovascular and medical  History including severe aortic stenosis, coronary artery disease with prior coronary artery bypass graft surgery, peripheral vascular disease, carotid artery stenosis, transient ischemic attack and history of gastrointestinal bleeding. Due to his symptoms he was scheduled for TAVR.    DESCRIPTION OF PROCEDURE:  After informed consent was signed by the   patient, the patient was brought into the OR 19.  He was prepped and draped in   usual sterile manner.  Prior to the procedure, right radial arterial insertion, intubation,   temporary pacemaker was positioned at the right ventricle and transesophageal  echocardiogram were performed by Homer Redmond.    Pacing was confirmed.   The initial timeout was performed.     A 6-Sammarinese sheath was placed in the right femoral artery by myself. A 6-Sammarinese arterial  sheath was placed in the right femoral artery by Sergey Figueroa.  Of note, all sheaths were placed under ultrasound guidance.     Through the right arterial sheath, a pigtail catheter was positioned in the distal aorta and   aortogram was performed. This catheter was advanced to the ascending aorta at the   alve level and aortogram was repeated. Over the left femoral sheath PreClose closure   was performed with 2 devices. An 8 -Sammarinese Sheath was placed. Through this 8-Sammarinese   sheath, an AL1 was positioned  into the ascending aorta. This catheter and sheath were   removed after the insertion of a Lunderquist wire.  Over the Lunderquist wire a 14-Albanian   expandable-sheath was advanced by Dr. Thayer.  IV heparin was given. Through the   expandable sheath,  an AL1 catheter was positioned at the ascending aorta.  The   Lunderquist wire was removed and exchanged for a straightwire. The AL1 catheter was   used to cross the aortic valve with this wire.  The AL1 catheter was exchanged for an   exchange length J wire.  Over this wire, a 6-Albanian pigtail catheter was positioned into   the left ventricle. Through the pigtail catheter an Amplatz extra stiff wire was positioned   across  the aortic valve. Over the Amplatz wire a 20x40 mm balloon was positioned   across the aortic valve. Rapid pacing was initiated and valvuloplasty was performed.   The balloon was removed and the Commander delivery system was inserted.  The   Denia S3 valve stent was loaded onto the balloon and positioned across the aortic   valve.  Rapid pacing wasperformed again and the stent was deployed. Due to mild   paravalvular leak 2 additional mls of volume was added and post dilation was performed.   The delivery system was removedThe delivery system was removed.   The valve was deployed by Dr. Thayer.    The patient tolerated the procedure well. At the end of procedure pigtail catheters and   sheaths were removed. The left femoral arteriotomy site was closed via the PreClose   system.  The right femoral arteriotomy site was closed via Angio-Seal. The pace maker   was secured into place.  The patient was then extubated and transferred to CICU in stable condition.    IMPRESSION:  1. Successful transcatheter aortic valve replacement (TAVR) with # 23 with 2 additional mls of volume Muñiz Denia S3 valve, transfemoral approach, under general anesthesia.  2. Successful Preclose closure.    RECOMMENDATION: Medical therapy with addition of Plavix for 3  months.

## 2018-02-12 NOTE — TELEPHONE ENCOUNTER
Received surgery clearance request from [facility]rology [place] for cystoscopy, left ureteroscopy with ureteral stent. Pt will need to hold anticoagulation for 7 days.     Since pt had TAVR procedure TODAY sending note to urology [place] notifying them of this and this clearance will pend the 1 month TAVR f/u with Dr. Waters.

## 2018-02-13 ENCOUNTER — TELEPHONE (OUTPATIENT)
Dept: CARDIOLOGY | Facility: MEDICAL CENTER | Age: 83
End: 2018-02-13

## 2018-02-13 ENCOUNTER — APPOINTMENT (OUTPATIENT)
Dept: RADIOLOGY | Facility: MEDICAL CENTER | Age: 83
DRG: 267 | End: 2018-02-13
Attending: NURSE PRACTITIONER
Payer: MEDICARE

## 2018-02-13 VITALS
OXYGEN SATURATION: 98 % | SYSTOLIC BLOOD PRESSURE: 104 MMHG | HEIGHT: 63 IN | WEIGHT: 141.76 LBS | RESPIRATION RATE: 17 BRPM | TEMPERATURE: 98.5 F | DIASTOLIC BLOOD PRESSURE: 43 MMHG | BODY MASS INDEX: 25.12 KG/M2 | HEART RATE: 72 BPM

## 2018-02-13 PROBLEM — K92.2 LOWER GI BLEED: Status: RESOLVED | Noted: 2017-03-21 | Resolved: 2018-02-13

## 2018-02-13 PROBLEM — N20.0 KIDNEY STONE: Status: RESOLVED | Noted: 2017-12-21 | Resolved: 2018-02-13

## 2018-02-13 LAB
ALBUMIN SERPL BCP-MCNC: 3.7 G/DL (ref 3.2–4.9)
ALBUMIN/GLOB SERPL: 1.2 G/DL
ALP SERPL-CCNC: 80 U/L (ref 30–99)
ALT SERPL-CCNC: 30 U/L (ref 2–50)
ANION GAP SERPL CALC-SCNC: 2 MMOL/L (ref 0–11.9)
AST SERPL-CCNC: 33 U/L (ref 12–45)
BILIRUB SERPL-MCNC: 1.3 MG/DL (ref 0.1–1.5)
BNP SERPL-MCNC: 164 PG/ML (ref 0–100)
BUN SERPL-MCNC: 21 MG/DL (ref 8–22)
CALCIUM SERPL-MCNC: 9.8 MG/DL (ref 8.5–10.5)
CHLORIDE SERPL-SCNC: 103 MMOL/L (ref 96–112)
CO2 SERPL-SCNC: 29 MMOL/L (ref 20–33)
CREAT SERPL-MCNC: 1.1 MG/DL (ref 0.5–1.4)
EKG IMPRESSION: NORMAL
ERYTHROCYTE [DISTWIDTH] IN BLOOD BY AUTOMATED COUNT: 45.3 FL (ref 35.9–50)
GLOBULIN SER CALC-MCNC: 3 G/DL (ref 1.9–3.5)
GLUCOSE SERPL-MCNC: 129 MG/DL (ref 65–99)
HCT VFR BLD AUTO: 41.3 % (ref 42–52)
HGB BLD-MCNC: 13.9 G/DL (ref 14–18)
LV EJECT FRACT  99904: 65
LV EJECT FRACT MOD 2C 99903: 68.68
LV EJECT FRACT MOD 4C 99902: 61.61
LV EJECT FRACT MOD BP 99901: 66.46
MCH RBC QN AUTO: 32.8 PG (ref 27–33)
MCHC RBC AUTO-ENTMCNC: 33.7 G/DL (ref 33.7–35.3)
MCV RBC AUTO: 97.4 FL (ref 81.4–97.8)
PLATELET # BLD AUTO: 158 K/UL (ref 164–446)
PMV BLD AUTO: 9 FL (ref 9–12.9)
POTASSIUM SERPL-SCNC: 3.9 MMOL/L (ref 3.6–5.5)
PROT SERPL-MCNC: 6.7 G/DL (ref 6–8.2)
RBC # BLD AUTO: 4.24 M/UL (ref 4.7–6.1)
SODIUM SERPL-SCNC: 134 MMOL/L (ref 135–145)
WBC # BLD AUTO: 16 K/UL (ref 4.8–10.8)

## 2018-02-13 PROCEDURE — 83880 ASSAY OF NATRIURETIC PEPTIDE: CPT

## 2018-02-13 PROCEDURE — 93306 TTE W/DOPPLER COMPLETE: CPT | Mod: 26 | Performed by: INTERNAL MEDICINE

## 2018-02-13 PROCEDURE — A9270 NON-COVERED ITEM OR SERVICE: HCPCS | Performed by: NURSE PRACTITIONER

## 2018-02-13 PROCEDURE — 80053 COMPREHEN METABOLIC PANEL: CPT

## 2018-02-13 PROCEDURE — 93005 ELECTROCARDIOGRAM TRACING: CPT | Performed by: NURSE PRACTITIONER

## 2018-02-13 PROCEDURE — 93306 TTE W/DOPPLER COMPLETE: CPT

## 2018-02-13 PROCEDURE — 85027 COMPLETE CBC AUTOMATED: CPT

## 2018-02-13 PROCEDURE — 71045 X-RAY EXAM CHEST 1 VIEW: CPT

## 2018-02-13 PROCEDURE — 93010 ELECTROCARDIOGRAM REPORT: CPT | Performed by: INTERNAL MEDICINE

## 2018-02-13 PROCEDURE — 700102 HCHG RX REV CODE 250 W/ 637 OVERRIDE(OP): Performed by: NURSE PRACTITIONER

## 2018-02-13 RX ORDER — CLOPIDOGREL BISULFATE 75 MG/1
75 TABLET ORAL DAILY
Qty: 90 TAB | Refills: 0 | Status: SHIPPED | OUTPATIENT
Start: 2018-02-14 | End: 2018-02-14

## 2018-02-13 RX ADMIN — ASPIRIN 81 MG: 81 TABLET, COATED ORAL at 08:01

## 2018-02-13 RX ADMIN — CLOPIDOGREL 75 MG: 75 TABLET, FILM COATED ORAL at 08:01

## 2018-02-13 RX ADMIN — LEVOTHYROXINE SODIUM 50 MCG: 50 TABLET ORAL at 08:01

## 2018-02-13 ASSESSMENT — ENCOUNTER SYMPTOMS
FOCAL WEAKNESS: 0
BRUISES/BLEEDS EASILY: 0
CHILLS: 0
DEPRESSION: 0
GASTROINTESTINAL NEGATIVE: 1
VOMITING: 0
COUGH: 0
EYES NEGATIVE: 1
RESPIRATORY NEGATIVE: 1
SHORTNESS OF BREATH: 0
NERVOUS/ANXIOUS: 0
WEAKNESS: 0
PALPITATIONS: 0
NEUROLOGICAL NEGATIVE: 1
PSYCHIATRIC NEGATIVE: 1
MUSCULOSKELETAL NEGATIVE: 1
DOUBLE VISION: 0
DIZZINESS: 0
FEVER: 0
CONSTITUTIONAL NEGATIVE: 1
CARDIOVASCULAR NEGATIVE: 1
CLAUDICATION: 0
WEIGHT LOSS: 0
ABDOMINAL PAIN: 0
BLURRED VISION: 0
NAUSEA: 0
MYALGIAS: 0
HEADACHES: 0

## 2018-02-13 ASSESSMENT — PAIN SCALES - GENERAL
PAINLEVEL_OUTOF10: 0

## 2018-02-13 NOTE — PROGRESS NOTES
Cardiology Progress Note               Author: Jezadelia Blakepaulette Date & Time created: 2018  4:23 PM     Interval History/Chief Complaint:    .84 year old male status post TAVR.    Review of Systems   Constitutional: Negative.  Negative for chills, fever, malaise/fatigue and weight loss.   HENT: Negative.  Negative for hearing loss.    Eyes: Negative.  Negative for blurred vision and double vision.   Respiratory: Negative.  Negative for cough and shortness of breath.    Cardiovascular: Negative.  Negative for chest pain, palpitations, claudication and leg swelling.   Gastrointestinal: Negative.  Negative for abdominal pain, nausea and vomiting.   Genitourinary: Negative.  Negative for dysuria and urgency.   Musculoskeletal: Negative.  Negative for joint pain and myalgias.   Skin: Negative.  Negative for itching and rash.   Neurological: Negative.  Negative for dizziness, focal weakness, weakness and headaches.   Endo/Heme/Allergies: Negative.  Does not bruise/bleed easily.   Psychiatric/Behavioral: Negative.  Negative for depression. The patient is not nervous/anxious.        Physical Exam   Constitutional: He is oriented to person, place, and time. He appears well-developed and well-nourished.   HENT:   Head: Normocephalic and atraumatic.   Eyes: Conjunctivae are normal. Pupils are equal, round, and reactive to light.   Neck: Normal range of motion. Neck supple.   Temporary pacemaker in place.   Cardiovascular: Normal rate and regular rhythm.    Pulmonary/Chest: Effort normal and breath sounds normal.   Abdominal: Soft. Bowel sounds are normal.   Musculoskeletal: Normal range of motion. He exhibits no edema.   Neurological: He is alert and oriented to person, place, and time.   Skin: Skin is warm and dry.   Psychiatric: He has a normal mood and affect.       Hemodynamics:  Temp (24hrs), Av.3 °C (97.4 °F), Min:35.8 °C (96.4 °F), Max:37 °C (98.6 °F)  Temperature: 37 °C (98.6 °F)  Pulse  Av.1  Min: 44  Max:  57Heart Rate (Monitored): (!) 55  Blood Pressure : 104/43, Arterial BP: 122/43, NIBP: 115/45     Respiratory:    Respiration: 20, Pulse Oximetry: 96 %     Work Of Breathing / Effort: Mild  RUL Breath Sounds: Diminished, RML Breath Sounds: Diminished, RLL Breath Sounds: Diminished, WHIT Breath Sounds: Diminished, LLL Breath Sounds: Diminished  Fluids:  Date 02/12/18 0700 - 02/13/18 0659   Shift 6729-6986 [id number] 0164-2277 24 Hour Total   I  N  T  A  K  E   P.O.  50  50    Shift Total  50  50   O  U  T  P  U  T   Urine  300  300    Shift Total  300  300   Weight (kg) 64.3 64.3 64.3 64.3       Weight: 64.3 kg (141 lb 12.1 oz)  GI/Nutrition:  Orders Placed This Encounter   Procedures   • DIET ORDER     Standing Status:   Standing     Number of Occurrences:   1     Order Specific Question:   Diet:     Answer:   Cardiac [6]     Lab Results:  Recent Labs      02/12/18   0955   WBC  14.2*   RBC  4.24*   HEMOGLOBIN  14.2   HEMATOCRIT  41.1*   MCV  96.9   MCH  33.5*   MCHC  34.5   RDW  45.6   PLATELETCT  193   MPV  9.1     Recent Labs      02/12/18   0955   SODIUM  136   POTASSIUM  4.2   CHLORIDE  103   CO2  23   GLUCOSE  122*   BUN  17   CREATININE  1.14   CALCIUM  10.2         Recent Labs      02/12/18   0955   BNPBTYPENAT  126*     Recent Labs      02/12/18   0955   BNPBTYPENAT  126*         Medications reviewed.      Current Facility-Administered Medications:   •  SODIUM CHLORIDE 0.9 % IV SOLN, , , ,   •  [START ON 2/13/2018] levothyroxine (SYNTHROID) tablet 50 mcg, 50 mcg, Oral, AM ES, Christine Powers, A.P.R.N.  •  Respiratory Care per Protocol, , Nebulization, Continuous RT, Christine Powers, A.P.R.N.  •  NS infusion, , Intravenous, Continuous, Christine Powers, A.P.R.N., Last Rate: 75 mL/hr at 02/12/18 1104  •  hydrALAZINE (APRESOLINE) injection 10 mg, 10 mg, Intravenous, Q30 MIN PRN, Christine Powers A.P.R.N.  •  acetaminophen (TYLENOL) tablet 650 mg, 650 mg, Oral, Q6HRS PRN, Christine Powers,  A.P.R.N.  •  diphenhydrAMINE (BENADRYL) tablet/capsule 25 mg, 25 mg, Oral, HS PRN, LILA GuerreroP.R.N.  •  ondansetron (ZOFRAN) syringe/vial injection 4 mg, 4 mg, Intravenous, Q4HRS PRN, CHINO Guerrero.P.R.N.  •  diphenhydrAMINE (BENADRYL) injection 25 mg, 25 mg, Intravenous, Q6HRS PRN, CHINO Guerrero.P.R.N.  •  [START ON 2/13/2018] aspirin EC (ECOTRIN) tablet 81 mg, 81 mg, Oral, DAILY, CHINO Guerrero.P.R.N.  •  clopidogrel (PLAVIX) tablet 300 mg, 300 mg, Oral, Once **AND** [START ON 2/13/2018] clopidogrel (PLAVIX) tablet 75 mg, 75 mg, Oral, DAILY, CHINO Guerrero.P.R.N.  •  atorvastatin (LIPITOR) tablet 20 mg, 20 mg, Oral, QHS, Christine Powers A.P.R.N.    CARDIAC STUDIES/PROCEDURES:     CARDIAC CATHETERIZATION CONCLUSIONS by Dr. Nieves (01/04/18)  1.  Coronary artery disease, three-vessel including left anterior descending artery ostial 100%   occlusion, mid right coronary artery 100% occlusion, a distal left anterior descending artery   95% stenosis, second circumflex marginal branch 30% stenosis  2.  Patent coronary bypass grafts to the left anterior descending artery, first circumflex marginal  branch and distal right coronary artery.  3.  Patent coronary stent of the ostium and proximal first circumflex marginal branch.  4.  Normal ascending aorta.  5.  Aortic regurgitation, mild to moderate.  6.  Patent distal abdominal aortogram and bilateral iliofemoral arteries.  7.  Essentially normal right heart pressures and left ventricular filling pressure.     CAROTID ULTRASOUND (11/27/17)  Moderate stenosis of the right internal carotid (50-69%).   Left carotid.   CEA is widely patent without evidence of restinosis.    Flow within both subclavian arteries appears to be within normal limits.   Antegrade flow, bilateral vertebral arteries.      CT OF CHEST AND ABDOMEN (12/14/17)  1.  Aortic annulus area of 326 sq mm.  2.  Coronary ostia are both greater than 10 mm from  the annulus.  3.  Minimum diameter of the iliofemoral arterial system of 5.9 mm on the RIGHT and 8 mm on the LEFT, with moderate atherosclerotic calcification and tortuosity bilaterally.  4.  Interval distal migration of large LEFT kidney stone to the distal ureter, without significant obstructive changes.     COLONOSCOPE by Dr. 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  Eventually this area was Endoclipped,   moderate left-sided diverticulosis.     ECHOCARDIOGRAM CONCLUSIONS (10/026/17)  Prior echo 12-23-14. Compared to the images of the study done - there   has been progression of aortic stenosis and regurgitation.   Normal left ventricular systolic function.  Left ventricular ejection fraction is visually estimated to be 70%.  Severe aortic stenosis.  AV Area Cont Eq vti 0.93 cm²  Vmax is 5.0 m/s.  Transvalvular gradients are - Peak: 100 mmHg, Mean: 53 mmHg.   Moderate aortic insufficiency.  Mild tricuspid regurgitation.  Estimated right ventricular systolic pressure is 45 mmHg.  Moderate pulmonic insufficiency.     EKG performed on (02/12/18) was reviewed: EKG shows sinus patricia with right bundle branch block.  EKG performed on (02/07/18) EKG shows sinus patricia with right bundle branch block.  EKG performed on (05/12/17) EKG shows sinus rhythm with right bundle branch block.     Laboratory results of (02/12/18) were reviewed. Bun of 17 mg/dl, creatinine levels of 1.14 mg/dl noted.  Laboratory results of (02/07/18) Bun of 28 mg/dl, creatinine levels of 1.39 mg/dl noted.     MRI OF BRAIN (02/13/11)  1. Moderate cerebral atrophy.  2. Minimal  supratentorial white matter disease with two or 3 rare   punctate foci of bright FLAIR signal in the deep white matter consistent   with small vessel ischemic change versus demyelination or gliosis.  3. No evidence of acute cerebral infarction, hemorrhage, or mass lesion.     PERIPHERAL INTERVENTION by Dr. Amaya (03/22/17)  1.  RIGHT common femoral sheath arteriogram demonstrates atherosclerosis with estimated 50-75% stenosis of the proximal femoral artery  2.  Deployment Starclose SE vascular closure device; due to the patient's agitation and altered mental status I recommend a further 6 hours of groin precautions     PERIPHERAL INTERVENTION by Dr. Hart (03/21/17)  1.  Active extravasation from the ileocecal branch of the superior mesenteric artery.  2.  Successful embolization of bleeding cecal branch.     PERIPHERAL INTERVENTION by Dr. Parra (08/22/12)  1.  Right superficial femoral artery occlusion with occlusion of the tibial peroneal trunk.    2.  Widely patent left superficial femoral artery stent with some disease in   the tibial vessels as well.  Although, the superficial femoral artery is   amenable to percutaneous intervention, I am reluctant to open the artery to a   distal occlusion.  The tibioperoneal trunk is heavily diseased and   intervention in the tibioperoneal trunk for a patient with claudication is not   advisable due to the risk of failure and complication as well as the poor   durability of below knee tibial work.  I will follow up with the patient in   the office and discuss with him the findings of the angiogram.  If he   progresses to rest pain or tissue loss, will consider percutaneous   intervention of both lesions.    TRANSESOPHAGEAL ECHOCARDIOGRAM CONCLUSIONS by Dr. Leslie (02/12/18)  Results pending.    Medical Decision Making, by Problem:  Active Hospital Problems    Diagnosis   • *S/P TAVR (transcatheter aortic valve replacement) [Z95.2]   • Hx of CABG [Z95.1]   • CAD  (coronary artery disease) [I25.10]   • RBBB (right bundle branch block) [I45.10]   • Paroxysmal atrial fibrillation (CMS-HCC) [I48.0]   • TIA (transient ischemic attack) [G45.9]   • PVD (peripheral vascular disease) (CMS-HCC) [I73.9]       Plan:    1. Successful transcatheter aortic valve replacement (TAVR) with # 23 with 2 additional mls of volume Muñiz Denia S3 valve, transfemoral approach, under general anesthesia (02/12/18): He is clinically doing well.  2. Coronary artery disease with prior 3 vessel coronary bypass graft with left internal mammary artery graft to left anterior descending artery, saphenous vein graft to obtuse marginal branch and saphenous vein graft to posterior descending artery by Dr. Coates (03/20/08): He is clinically doing well. We will continue with current medical care.  3. Peripheral vascular disease with prior left superficial femoral artery stent, known high grade stenosis of right common femoral artery closed with Starclose closure by Dr. Amaya on (03/22/17): Stable on medical therapy.   4. Carotid artery stenosis with left carotid enterectomy by Dr. Prara (05/03/11) (managed by Dr. Parra): Clinically stable on medical therapy.  5. History of trans-ischemic attack: He remains clinically stable without any new neurological symptoms.  6. History of gastrointestinal bleeding with embolization of ileocecal branch by Dr. Hart (03/21/17) and AVM (managed by Osmany Pat): He remains clinically stable without recurrence of gastrointestinal bleeding. He has been off of Plavix since March. He was cleared by GI for TAVR.  7. Right bundle branch block: Temporary pacemaker in place. We will observe him overnight on telemetry.  8. Additional information: He and his wife is from [place], however, they speaks Occitan.     CC José Taylor and Yarelis Lee      Quality-Core Measures

## 2018-02-13 NOTE — DISCHARGE INSTRUCTIONS
Discharge Instructions    Discharged to home by taxi with relative. Discharged via wheelchair, hospital escort: Yes.  Special equipment needed: Not Applicable    Be sure to schedule a follow-up appointment with your primary care doctor or any specialists as instructed.     Discharge Plan:        I understand that a diet low in cholesterol, fat, and sodium is recommended for good health. Unless I have been given specific instructions below for another diet, I accept this instruction as my diet prescription.     Special Instructions: None    · Is patient discharged on Warfarin / Coumadin?   No   Transcatheter Aortic Valve Replacement (TAVR)    General Instructions:  1. Take Medication as directed.  You will likely need to take aspirin and another blood thinner (antiplatelet medication) for a period.  You'll need to take the aspirin for the rest of your life.  Be sure your doctor knows about all other medicines you take, including over-the-counter medicines and dietary supplements.    Incision Care Instructions:  1. Look and feel the site(s) of your TAVR TODAY so you can recognize changes that should be called to your doctor (see below).  2. It's normal for your incision to be bruised, itchy, or sore while it's healing.  Your incision may take a week or more to heal.  3. Bruising may occur.  Some of the discoloration may travel down the leg.  As it resolves, the color should change from blue to green to yellow-brown.  4. A small, round lump under the TAVR site may remain for up to 6 weeks.  5. Wash your incision site every day with warm water and soap.  Gently pat it dry.  Don't put powder, lotion, or ointment on the incision until it's healed.    Activity:  1. No driving for one week  2. If you must take a long car ride (not as a ), stop every hour and walk around the car.  3. No lifting or pulling objects over 5 pounds for 4-5 days.  4. Warm showers or baths are permitted after the bandage is removed.  5. Walk  regularly.  One of the best ways to get stronger is to walk.  Walk a little more each day.  Take someone with you until you feel OK to walk alone.    When to call your health care provider:  1. You develop a fever.  2. Redness, swelling, bleeding, warmth, or fluid draining at the incision site.  3. Bruising appears to be new or does not look like it is getting better.  4. The small, round lump in the groin in the area of the TAVR site increases in size.    Seek immediate medical help if you have any of the following symptoms:  1. You experience any leg numbness, aching, or discomfort  2. Chest pain or trouble breathing (call 911)  3. Sudden numbness or weakness in your face, arms, or legs (call 911)  4. Bowel movement that is bright red  5. Dizziness or fainting  6. Weight gain of more than 2 pounds in 24 hours or more than 5 pounds in 1 week  7. Swelling in your hands, feet, or ankles  8. Shortness of breath that doesn't get better when you rest  9. Pain that gets worse or doesn't go away  10. Fast or irregular pulse       Depression / Suicide Risk    As you are discharged from this [facility] facility, it is important to learn how to keep safe from harming yourself.    Recognize the warning signs:  · Abrupt changes in personality, positive or negative- including increase in energy   · Giving away possessions  · Change in eating patterns- significant weight changes-  positive or negative  · Change in sleeping patterns- unable to sleep or sleeping all the time   · Unwillingness or inability to communicate  · Depression  · Unusual sadness, discouragement and loneliness  · Talk of wanting to die  · Neglect of personal appearance   · Rebelliousness- reckless behavior  · Withdrawal from people/activities they love  · Confusion- inability to concentrate     If you or a loved one observes any of these behaviors or has concerns about self-harm, here's what you can do:  · Talk about it- your feelings and reasons for  harming yourself  · Remove any means that you might use to hurt yourself (examples: pills, rope, extension cords, firearm)  · Get professional help from the community (Mental Health, Substance Abuse, psychological counseling)  · Do not be alone:Call your Safe Contact- someone whom you trust who will be there for you.  · Call your local CRISIS HOTLINE 481-9948 or 403-869-8132  · Call your local Children's Mobile Crisis Response Team [place] (058) 189-7037 or www.[employer]ze  · Call the toll free National Suicide Prevention Hotlines   · National Suicide Prevention Lifeline 333-609-MDFI (8453)  · National Hope Line Network 800-SUICIDE (138-2710)

## 2018-02-13 NOTE — PROGRESS NOTES
Monitor Summary: SB/SR 40-66 with intermittent pacing noted.       Monitor strips placed in the chart.     .14/.12/.44

## 2018-02-13 NOTE — PROGRESS NOTES
Cardiology Progress Note               Author: Jez Waters Date & Time created: 2018  7:32 AM     Interval History/Chief Complaint:    .84 year old male status post TAVR. He is clinically doing well.    Review of Systems   Constitutional: Negative.  Negative for chills, fever, malaise/fatigue and weight loss.   HENT: Negative.  Negative for hearing loss.    Eyes: Negative.  Negative for blurred vision and double vision.   Respiratory: Negative.  Negative for cough and shortness of breath.    Cardiovascular: Negative.  Negative for chest pain, palpitations, claudication and leg swelling.   Gastrointestinal: Negative.  Negative for abdominal pain, nausea and vomiting.   Genitourinary: Negative.  Negative for dysuria and urgency.   Musculoskeletal: Negative.  Negative for joint pain and myalgias.   Skin: Negative.  Negative for itching and rash.   Neurological: Negative.  Negative for dizziness, focal weakness, weakness and headaches.   Endo/Heme/Allergies: Negative.  Does not bruise/bleed easily.   Psychiatric/Behavioral: Negative.  Negative for depression. The patient is not nervous/anxious.        Physical Exam   Constitutional: He is oriented to person, place, and time. He appears well-developed and well-nourished.   HENT:   Head: Normocephalic and atraumatic.   Eyes: Conjunctivae are normal. Pupils are equal, round, and reactive to light.   Neck: Normal range of motion. Neck supple.   Temporary pacemaker in place.   Cardiovascular: Normal rate and regular rhythm.    Pulmonary/Chest: Effort normal and breath sounds normal.   Abdominal: Soft. Bowel sounds are normal.   Musculoskeletal: Normal range of motion. He exhibits no edema.   Neurological: He is alert and oriented to person, place, and time.   Skin: Skin is warm and dry.   Psychiatric: He has a normal mood and affect.       Hemodynamics:  Temp (24hrs), Av.4 °C (97.5 °F), Min:35.8 °C (96.4 °F), Max:37 °C (98.6 °F)  Temperature: 36.7 °C (98  °F)  Pulse  Av  Min: 44  Max: 74Heart Rate (Monitored): (!) 54  Arterial BP: 122/43, NIBP: 134/47     Respiratory:    Respiration: (!) 21, Pulse Oximetry: 98 %     Work Of Breathing / Effort: Mild  RUL Breath Sounds: Diminished, RML Breath Sounds: Diminished, RLL Breath Sounds: Diminished, WHIT Breath Sounds: Diminished, LLL Breath Sounds: Diminished  Fluids:  Date 18 0700 - 18 0659   Shift 6524-0790 3444-9706 7644-2655 24 Hour Total   I  N  T  A  K  E   P.O.  50  50    Shift Total  50  50   O  U  T  P  U  T   Urine  300  300    Shift Total  300  300   Weight (kg) 64.3 64.3 64.3 64.3          GI/Nutrition:  Orders Placed This Encounter   Procedures   • DIET ORDER     Standing Status:   Standing     Number of Occurrences:   1     Order Specific Question:   Diet:     Answer:   Cardiac [6]     Lab Results:  Recent Labs      18   0955  18   0400   WBC  14.2*  16.0*   RBC  4.24*  4.24*   HEMOGLOBIN  14.2  13.9*   HEMATOCRIT  41.1*  41.3*   MCV  96.9  97.4   MCH  33.5*  32.8   MCHC  34.5  33.7   RDW  45.6  45.3   PLATELETCT  193  158*   MPV  9.1  9.0     Recent Labs      18   0955  18   0400   SODIUM  136  134*   POTASSIUM  4.2  3.9   CHLORIDE  103  103   CO2  23  29   GLUCOSE  122*  129*   BUN  17  21   CREATININE  1.14  1.10   CALCIUM  10.2  9.8         Recent Labs      18   0955  18   0400   BNPBTYPENAT  126*  164*     Recent Labs      18   0955  18   0400   BNPBTYPENAT  126*  164*         Medications reviewed.      Current Facility-Administered Medications:   •  levothyroxine (SYNTHROID) tablet 50 mcg, 50 mcg, Oral, AM ES, Christine Powers, A.P.R.N.  •  Respiratory Care per Protocol, , Nebulization, Continuous RT, Christine Powers, A.P.R.N.  •  NS infusion, , Intravenous, Continuous, Christine Powers, A.P.R.N., Stopped at 18 909  •  hydrALAZINE (APRESOLINE) injection 10 mg, 10 mg, Intravenous, Q30 MIN PRN, CHINO Guerrero.P.R.N.  •   acetaminophen (TYLENOL) tablet 650 mg, 650 mg, Oral, Q6HRS PRN, Christine Powers, A.P.R.N.  •  diphenhydrAMINE (BENADRYL) tablet/capsule 25 mg, 25 mg, Oral, HS PRN, Christine Powers, A.P.R.N.  •  ondansetron (ZOFRAN) syringe/vial injection 4 mg, 4 mg, Intravenous, Q4HRS PRN, Christine Powers, A.P.R.N.  •  diphenhydrAMINE (BENADRYL) injection 25 mg, 25 mg, Intravenous, Q6HRS PRN, Christine Powers, A.P.R.N.  •  aspirin EC (ECOTRIN) tablet 81 mg, 81 mg, Oral, DAILY, Christine Powers, A.P.R.N.  •  [COMPLETED] clopidogrel (PLAVIX) tablet 300 mg, 300 mg, Oral, Once, 300 mg at 02/12/18 2105 **AND** clopidogrel (PLAVIX) tablet 75 mg, 75 mg, Oral, DAILY, Christine Powers, A.P.R.N.  •  atorvastatin (LIPITOR) tablet 20 mg, 20 mg, Oral, QHS, Christine Powers, A.P.R.N., 20 mg at 02/12/18 2105    CARDIAC STUDIES/PROCEDURES:     CARDIAC CATHETERIZATION CONCLUSIONS by Dr. Nieves (01/04/18)  1.  Coronary artery disease, three-vessel including left anterior descending artery ostial 100%   occlusion, mid right coronary artery 100% occlusion, a distal left anterior descending artery   95% stenosis, second circumflex marginal branch 30% stenosis  2.  Patent coronary bypass grafts to the left anterior descending artery, first circumflex marginal  branch and distal right coronary artery.  3.  Patent coronary stent of the ostium and proximal first circumflex marginal branch.  4.  Normal ascending aorta.  5.  Aortic regurgitation, mild to moderate.  6.  Patent distal abdominal aortogram and bilateral iliofemoral arteries.  7.  Essentially normal right heart pressures and left ventricular filling pressure.     CAROTID ULTRASOUND (11/27/17)  Moderate stenosis of the right internal carotid (50-69%).   Left carotid.   CEA is widely patent without evidence of restinosis.    Flow within both subclavian arteries appears to be within normal limits.   Antegrade flow, bilateral vertebral arteries.      CT OF CHEST AND ABDOMEN  (12/14/17)  1.  Aortic annulus area of 326 sq mm.  2.  Coronary ostia are both greater than 10 mm from the annulus.  3.  Minimum diameter of the iliofemoral arterial system of 5.9 mm on the RIGHT and 8 mm on the LEFT, with moderate atherosclerotic calcification and tortuosity bilaterally.  4.  Interval distal migration of large LEFT kidney stone to the distal ureter, without significant obstructive changes.     COLONOSCOPE by Dr. 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  Eventually this area was Endoclipped,   moderate left-sided diverticulosis.     ECHOCARDIOGRAM CONCLUSIONS (10/026/17)  Prior echo 12-23-14. Compared to the images of the study done - there   has been progression of aortic stenosis and regurgitation.   Normal left ventricular systolic function.  Left ventricular ejection fraction is visually estimated to be 70%.  Severe aortic stenosis.  AV Area Cont Eq vti 0.93 cm²  Vmax is 5.0 m/s. Transvalvular gradients are - Peak: 100 mmHg, Mean: 53 mmHg.   Moderate aortic insufficiency.  Mild tricuspid regurgitation.  Estimated right ventricular systolic pressure is 45 mmHg.  Moderate pulmonic insufficiency.     EKG performed on (02/12/18) was reviewed: EKG shows sinus patricia with right bundle branch block.  EKG performed on (02/12/18) EKG shows sinus patricia with right bundle branch block.  EKG performed on (02/07/18) EKG shows sinus patricia with right bundle branch block.  EKG performed on (05/12/17) EKG shows sinus rhythm with right bundle branch block.     Laboratory results of (02/12/18) were reviewed. Bun of 17 mg/dl, creatinine levels of  1.14 mg/dl noted.  Laboratory results of (02/07/18) Bun of 28 mg/dl, creatinine levels of 1.39 mg/dl noted.     MRI OF BRAIN (02/13/11)  1. Moderate cerebral atrophy.  2. Minimal supratentorial white matter disease with two or 3 rare   punctate foci of bright FLAIR signal in the deep white matter consistent   with small vessel ischemic change versus demyelination or gliosis.  3. No evidence of acute cerebral infarction, hemorrhage, or mass lesion.     PERIPHERAL INTERVENTION by Dr. Amaya (03/22/17)  1.  RIGHT common femoral sheath arteriogram demonstrates atherosclerosis with estimated 50-75% stenosis of the proximal femoral artery  2.  Deployment Starclose SE vascular closure device; due to the patient's agitation and altered mental status I recommend a further 6 hours of groin precautions     PERIPHERAL INTERVENTION by Dr. Hart (03/21/17)  1.  Active extravasation from the ileocecal branch of the superior mesenteric artery.  2.  Successful embolization of bleeding cecal branch.     PERIPHERAL INTERVENTION by Dr. Parra (08/22/12)  1.  Right superficial femoral artery occlusion with occlusion of the tibial peroneal trunk.    2.  Widely patent left superficial femoral artery stent with some disease in   the tibial vessels as well.  Although, the superficial femoral artery is   amenable to percutaneous intervention, I am reluctant to open the artery to a   distal occlusion.  The tibioperoneal trunk is heavily diseased and   intervention in the tibioperoneal trunk for a patient with claudication is not   advisable due to the risk of failure and complication as well as the poor   durability of below knee tibial work.  I will follow up with the patient in   the office and discuss with him the findings of the angiogram.  If he   progresses to rest pain or tissue loss, will consider percutaneous   intervention of both lesions.    TRANSESOPHAGEAL ECHOCARDIOGRAM CONCLUSIONS by Dr. Leslie (02/12/18)  Results  pending.    Medical Decision Making, by Problem:  Active Hospital Problems    Diagnosis   • *S/P TAVR (transcatheter aortic valve replacement) [Z95.2]   • Hx of CABG [Z95.1]   • CAD (coronary artery disease) [I25.10]   • RBBB (right bundle branch block) [I45.10]   • Paroxysmal atrial fibrillation (CMS-HCC) [I48.0]   • TIA (transient ischemic attack) [G45.9]   • PVD (peripheral vascular disease) (CMS-[facility]) [I73.9]       Plan:    1. Successful transcatheter aortic valve replacement (TAVR) with # 23 with 2 additional mls of volume Muñiz Denia S3 valve, transfemoral approach, under general anesthesia (02/12/18): He is clinically doing well.  2. Coronary artery disease with prior 3 vessel coronary bypass graft with left internal mammary artery graft to left anterior descending artery, saphenous vein graft to obtuse marginal branch and saphenous vein graft to posterior descending artery by Dr. Coates (03/20/08): He is clinically doing well. We will continue with current medical care.  3. Peripheral vascular disease with prior left superficial femoral artery stent, known high grade stenosis of right common femoral artery closed with Starclose closure by Dr. Amaya on (03/22/17): Stable on medical therapy.   4. Carotid artery stenosis with left carotid enterectomy by Dr. Parra (05/03/11) (managed by Dr. Parra): Clinically stable on medical therapy.  5. History of trans-ischemic attack: He remains clinically stable without any new neurological symptoms.  6. History of gastrointestinal bleeding with embolization of ileocecal branch by Dr. Hart (03/21/17) and AVM (managed by Osmany Pat): He remains clinically stable without recurrence of gastrointestinal bleeding. He has been off of Plavix since March. He was cleared by GI for TAVR.  7. Right bundle branch block: Temporary pacemaker in place. No significant bradycardia noted within the past 24 hours. We will remove temporary pacemaker.  8. Additional  information: He and his wife is from [place], however, they speaks [place].  9. Disposition: We will plan for discharge today.     CC José Taylor and Yarelis Lee      Quality-Core Measures

## 2018-02-13 NOTE — PROGRESS NOTES
Per night nurse patient acting confused since around midnight, possible history of dementia. Chair alarm on, lap belt on, patient demonstrates how to unclip, patient demonstrates how to use call light. Day RN confirmed dementia with Dr Waters and Christine FLOWER.

## 2018-02-13 NOTE — CARE PLAN
Problem: Fluid Volume:  Goal: Will maintain balanced intake and output    Intervention: Monitor, educate, and encourage compliance with therapeutic intake of liquids  Pt tolerating is TAVR procedure.    Pt resting comfortably.    All needs met.      Problem: Skin Integrity  Goal: Risk for impaired skin integrity will decrease    Intervention: Assess and monitor skin integrity, appearance and/or temperature  Pt turned q2 hours while in bed.

## 2018-02-13 NOTE — PROGRESS NOTES
Social work cleared patient to go home with wife via taxi. Christine thakur updated, pending results of echo to discharge.

## 2018-02-13 NOTE — DISCHARGE SUMMARY
PRIMARY DISCHARGE DIAGNOSIS: Status post transcatheter aortic valve replacement.    PROCEDURES:    1. Successful transcatheter aortic valve replacement (TAVR) with #23+2 Edward Denia 3 valve, transfemoral approach under general anesthesia on 2/12/18  2. Intraoperative transesophageal echocardiogram showing results pending  3. Echocardiogram on 2/13/18 showing CONCLUSIONS  Left ventricular ejection fraction is visually estimated to be 65%.  Known TAVR aortic valve that is functioning normally with normal   transvalvular gradients.  Mean gradient 18 mmHg.  Mild paravalvular   leak is noted.  4. CXR on 2/13/18 showing   1. No significant interval change.  5. EKG on 2/13/18 showing:  SINUS BRADYCARDIA   RIGHT BUNDLE BRANCH BLOCK   ANTEROLATERAL INFARCT, AGE INDETERMINATE   Compared to ECG 02/12/2018 09:51:31   No significant changes     HOSPITAL COURSE: The patient is a pleasant 84 year old male with severe symptomatic aortic stenosis, coronary artery disease with prior CABG X3, PVD with prior left superficial femoral artery stent, carotid artery disease with L CEA, TIA, GIB with embolization, RBBB, and mild-moderate confusion. Due to the patient's symptoms, the patient underwent successful TAVR described as above. Post-procedure, the patient did well. They didn't require IV diuresis during their stay. He was fairly confused pre-operatively and post-operatively with wanting to get up frequently. His wife was in the room with him during his stay, which helped with his confusion. He had moderate amount of confusion prior to TAVR with coming in to our office frequently to ask questions that were already previously discussed in great detail. He was able to ambulate without difficulty. Physical therapy is to see the patient prior to discharge for evaluation. He is now off oxygen and  is to be discharged to home with his wife by taxi, as they both don't drive.  consulted and found no needs for the patient or  family prior to discharge.    DISCHARGE MEDICATIONS: The patient is to be discharged on new medications including plavix 75 mg QD for 3 month period and then transition back to pletal. They will continue their prior home medications of ASA 81 mg, lipitor 20 mg QPM, multivitamin 1 tab QD, vitamin B, D, and C as previously prescribed, and levothyroxine 5 0 mcg QD. They are to stop metoprolol 100 mg QD.     DISCHARGE INSTRUCTIONS: They are given discharge instructions on potential post-operative complications and symptoms to watch out for. Their groin sites were checked and were clean, dry, and intact. Patient or family to notify us for any complications noted on the discharge instructions. They will follow up with myself, Christine FLOWER, on Tuesday in our cardiology office. They will not get labs before their follow up appointment. They will then follow up with Dr. Waters with a repeat echocardiogram in one month for post TAVR assessment.      FOLLOW UP  Future Appointments  Date Time Provider Department Center   2/20/2018 1:20 PM KEVIN Guerrero. [facility] None   2/21/2018 1:15 PM Shlomo Muñiz M.D. [facility]  None

## 2018-02-13 NOTE — CARE PLAN
Problem: Safety  Goal: Will remain free from falls    Intervention: Implement fall precautions  Bed alarm on, wife at bedside, hourly rounding in place, treaded slipper socks on      Problem: Urinary Elimination:  Goal: Ability to reestablish a normal urinary elimination pattern will improve    Intervention: Assist patient to sit on commode or toilet for voiding  Assisted patient to standing position to use urinal

## 2018-02-13 NOTE — PROGRESS NOTES
Bedside report received from Echo RN. Patient connected to monitor with transvenous pacer in place but not pacing at this time. Wife is bedside providing assistance with ADLs. Bilateral groin sites are clean, dry and intact with tegaderm. No hematomas noted. Bed alarm on.

## 2018-02-13 NOTE — DISCHARGE PLANNING
Per RANDY Hall, she has concerns on whether or not the spouse wants to take the pt home.  AMADOU spoke to pt's spouse, Prisca she stated that she does want to take the pt home.  She does not have concerns about not being able to meet his needs. Prisca stated that they do not have any caregivers or HH.  Prisca stated that they are financially capable to hire help if needed, but at this time are fine without.  Prisca stated that the two have a lot of friends in the area.  They have a son and dtr, however they do not live locally.  AMADOU offered to provide them with resource, however Prisca declined.  Pt will dc home with no needs. Per Prisca, she does not drive and they take taxis for transportation. AMADOU updated RANDY Hall.

## 2018-02-14 ENCOUNTER — APPOINTMENT (OUTPATIENT)
Dept: RADIOLOGY | Facility: MEDICAL CENTER | Age: 83
DRG: 242 | End: 2018-02-14
Attending: EMERGENCY MEDICINE
Payer: MEDICARE

## 2018-02-14 ENCOUNTER — PATIENT OUTREACH (OUTPATIENT)
Dept: HEALTH INFORMATION MANAGEMENT | Facility: OTHER | Age: 83
End: 2018-02-14

## 2018-02-14 ENCOUNTER — RESOLUTE PROFESSIONAL BILLING HOSPITAL PROF FEE (OUTPATIENT)
Dept: HOSPITALIST | Facility: MEDICAL CENTER | Age: 83
End: 2018-02-14
Payer: MEDICARE

## 2018-02-14 ENCOUNTER — HOSPITAL ENCOUNTER (INPATIENT)
Facility: MEDICAL CENTER | Age: 83
LOS: 27 days | DRG: 242 | End: 2018-03-13
Attending: EMERGENCY MEDICINE | Admitting: INTERNAL MEDICINE
Payer: MEDICARE

## 2018-02-14 ENCOUNTER — APPOINTMENT (OUTPATIENT)
Dept: RADIOLOGY | Facility: MEDICAL CENTER | Age: 83
DRG: 242 | End: 2018-02-14
Attending: INTERNAL MEDICINE
Payer: MEDICARE

## 2018-02-14 DIAGNOSIS — J96.01 ACUTE RESPIRATORY FAILURE WITH HYPOXIA (HCC): ICD-10-CM

## 2018-02-14 DIAGNOSIS — I49.5 SSS (SICK SINUS SYNDROME) (HCC): ICD-10-CM

## 2018-02-14 PROBLEM — R00.1 BRADYCARDIA: Status: ACTIVE | Noted: 2018-02-14

## 2018-02-14 PROBLEM — R41.82 ALTERED MENTAL STATUS: Status: ACTIVE | Noted: 2018-02-14

## 2018-02-14 PROBLEM — R57.0 CARDIOGENIC SHOCK (HCC): Status: ACTIVE | Noted: 2018-02-14

## 2018-02-14 PROBLEM — I46.9 CARDIAC ARREST (HCC): Status: ACTIVE | Noted: 2018-02-14

## 2018-02-14 LAB
ACTION RANGE TRIGGERED IACRT: NO
ALBUMIN SERPL BCP-MCNC: 2.6 G/DL (ref 3.2–4.9)
ALBUMIN/GLOB SERPL: 1.3 G/DL
ALP SERPL-CCNC: 48 U/L (ref 30–99)
ALT SERPL-CCNC: 18 U/L (ref 2–50)
ANION GAP SERPL CALC-SCNC: 4 MMOL/L (ref 0–11.9)
AST SERPL-CCNC: 45 U/L (ref 12–45)
BASE EXCESS BLDA CALC-SCNC: -3 MMOL/L (ref -4–3)
BASOPHILS # BLD AUTO: 0.4 % (ref 0–1.8)
BASOPHILS # BLD: 0.04 K/UL (ref 0–0.12)
BILIRUB SERPL-MCNC: 0.8 MG/DL (ref 0.1–1.5)
BNP SERPL-MCNC: 88 PG/ML (ref 0–100)
BODY TEMPERATURE: ABNORMAL DEGREES
BUN SERPL-MCNC: 18 MG/DL (ref 8–22)
CALCIUM SERPL-MCNC: 6.1 MG/DL (ref 8.5–10.5)
CHLORIDE SERPL-SCNC: 116 MMOL/L (ref 96–112)
CO2 BLDA-SCNC: 23 MMOL/L (ref 20–33)
CO2 SERPL-SCNC: 19 MMOL/L (ref 20–33)
COMMENT 1642: NORMAL
CREAT SERPL-MCNC: 0.82 MG/DL (ref 0.5–1.4)
EKG IMPRESSION: NORMAL
EOSINOPHIL # BLD AUTO: 0 K/UL (ref 0–0.51)
EOSINOPHIL NFR BLD: 0 % (ref 0–6.9)
ERYTHROCYTE [DISTWIDTH] IN BLOOD BY AUTOMATED COUNT: 49.1 FL (ref 35.9–50)
GLOBULIN SER CALC-MCNC: 2 G/DL (ref 1.9–3.5)
GLUCOSE BLD-MCNC: 135 MG/DL (ref 65–99)
GLUCOSE SERPL-MCNC: 158 MG/DL (ref 65–99)
HCO3 BLDA-SCNC: 22.2 MMOL/L (ref 17–25)
HCT VFR BLD AUTO: 36.1 % (ref 42–52)
HGB BLD-MCNC: 12.1 G/DL (ref 14–18)
IMM GRANULOCYTES # BLD AUTO: 0.09 K/UL (ref 0–0.11)
IMM GRANULOCYTES NFR BLD AUTO: 0.9 % (ref 0–0.9)
INST. QUALIFIED PATIENT IIQPT: YES
LACTATE BLD-SCNC: 2.3 MMOL/L (ref 0.5–2)
LV EJECT FRACT  99904: 40
LYMPHOCYTES # BLD AUTO: 0.39 K/UL (ref 1–4.8)
LYMPHOCYTES NFR BLD: 3.9 % (ref 22–41)
MAGNESIUM SERPL-MCNC: 1.3 MG/DL (ref 1.5–2.5)
MCH RBC QN AUTO: 34 PG (ref 27–33)
MCHC RBC AUTO-ENTMCNC: 33.5 G/DL (ref 33.7–35.3)
MCV RBC AUTO: 101.4 FL (ref 81.4–97.8)
MONOCYTES # BLD AUTO: 0.76 K/UL (ref 0–0.85)
MONOCYTES NFR BLD AUTO: 7.6 % (ref 0–13.4)
MORPHOLOGY BLD-IMP: NORMAL
NEUTROPHILS # BLD AUTO: 8.67 K/UL (ref 1.82–7.42)
NEUTROPHILS NFR BLD: 87.2 % (ref 44–72)
NRBC # BLD AUTO: 0 K/UL
NRBC BLD-RTO: 0 /100 WBC
O2/TOTAL GAS SETTING VFR VENT: 100 %
PCO2 BLDA: 37.7 MMHG (ref 26–37)
PH BLDA: 7.38 [PH] (ref 7.4–7.5)
PLATELET # BLD AUTO: 80 K/UL (ref 164–446)
PLATELET BLD QL SMEAR: NORMAL
PMV BLD AUTO: 10.1 FL (ref 9–12.9)
PO2 BLDA: 179 MMHG (ref 64–87)
POTASSIUM SERPL-SCNC: 4.1 MMOL/L (ref 3.6–5.5)
PROT SERPL-MCNC: 4.6 G/DL (ref 6–8.2)
RBC # BLD AUTO: 3.56 M/UL (ref 4.7–6.1)
RBC BLD AUTO: NORMAL
SAO2 % BLDA: 100 % (ref 93–99)
SODIUM SERPL-SCNC: 139 MMOL/L (ref 135–145)
SPECIMEN DRAWN FROM PATIENT: ABNORMAL
TRIGL SERPL-MCNC: 58 MG/DL (ref 0–149)
TROPONIN I SERPL-MCNC: 0.28 NG/ML (ref 0–0.04)
TSH SERPL DL<=0.005 MIU/L-ACNC: 0.74 UIU/ML (ref 0.38–5.33)
WBC # BLD AUTO: 10 K/UL (ref 4.8–10.8)

## 2018-02-14 PROCEDURE — 80053 COMPREHEN METABOLIC PANEL: CPT

## 2018-02-14 PROCEDURE — 5A1945Z RESPIRATORY VENTILATION, 24-96 CONSECUTIVE HOURS: ICD-10-PCS | Performed by: EMERGENCY MEDICINE

## 2018-02-14 PROCEDURE — 83880 ASSAY OF NATRIURETIC PEPTIDE: CPT

## 2018-02-14 PROCEDURE — 700105 HCHG RX REV CODE 258: Performed by: EMERGENCY MEDICINE

## 2018-02-14 PROCEDURE — 304853 HCHG PPM TEST CABLE

## 2018-02-14 PROCEDURE — 36620 INSERTION CATHETER ARTERY: CPT

## 2018-02-14 PROCEDURE — 700111 HCHG RX REV CODE 636 W/ 250 OVERRIDE (IP): Performed by: INTERNAL MEDICINE

## 2018-02-14 PROCEDURE — A9270 NON-COVERED ITEM OR SERVICE: HCPCS | Performed by: INTERNAL MEDICINE

## 2018-02-14 PROCEDURE — 700101 HCHG RX REV CODE 250

## 2018-02-14 PROCEDURE — 93325 DOPPLER ECHO COLOR FLOW MAPG: CPT

## 2018-02-14 PROCEDURE — 700105 HCHG RX REV CODE 258: Performed by: INTERNAL MEDICINE

## 2018-02-14 PROCEDURE — 302214 INTUBATION BOX: Performed by: EMERGENCY MEDICINE

## 2018-02-14 PROCEDURE — C1894 INTRO/SHEATH, NON-LASER: HCPCS

## 2018-02-14 PROCEDURE — 82803 BLOOD GASES ANY COMBINATION: CPT

## 2018-02-14 PROCEDURE — 700111 HCHG RX REV CODE 636 W/ 250 OVERRIDE (IP): Performed by: EMERGENCY MEDICINE

## 2018-02-14 PROCEDURE — 83605 ASSAY OF LACTIC ACID: CPT

## 2018-02-14 PROCEDURE — 93308 TTE F-UP OR LMTD: CPT

## 2018-02-14 PROCEDURE — 82962 GLUCOSE BLOOD TEST: CPT

## 2018-02-14 PROCEDURE — 33210 INSERT ELECTRD/PM CATH SNGL: CPT

## 2018-02-14 PROCEDURE — 84478 ASSAY OF TRIGLYCERIDES: CPT

## 2018-02-14 PROCEDURE — 31500 INSERT EMERGENCY AIRWAY: CPT

## 2018-02-14 PROCEDURE — 36600 WITHDRAWAL OF ARTERIAL BLOOD: CPT

## 2018-02-14 PROCEDURE — 5A12012 PERFORMANCE OF CARDIAC OUTPUT, SINGLE, MANUAL: ICD-10-PCS | Performed by: EMERGENCY MEDICINE

## 2018-02-14 PROCEDURE — 70498 CT ANGIOGRAPHY NECK: CPT

## 2018-02-14 PROCEDURE — 84443 ASSAY THYROID STIM HORMONE: CPT

## 2018-02-14 PROCEDURE — 99152 MOD SED SAME PHYS/QHP 5/>YRS: CPT

## 2018-02-14 PROCEDURE — G0378 HOSPITAL OBSERVATION PER HR: HCPCS

## 2018-02-14 PROCEDURE — 700111 HCHG RX REV CODE 636 W/ 250 OVERRIDE (IP)

## 2018-02-14 PROCEDURE — 0BH17EZ INSERTION OF ENDOTRACHEAL AIRWAY INTO TRACHEA, VIA NATURAL OR ARTIFICIAL OPENING: ICD-10-PCS | Performed by: EMERGENCY MEDICINE

## 2018-02-14 PROCEDURE — 99223 1ST HOSP IP/OBS HIGH 75: CPT | Mod: AI | Performed by: INTERNAL MEDICINE

## 2018-02-14 PROCEDURE — 94002 VENT MGMT INPAT INIT DAY: CPT

## 2018-02-14 PROCEDURE — 99291 CRITICAL CARE FIRST HOUR: CPT

## 2018-02-14 PROCEDURE — 305387 HCHG SUTURES

## 2018-02-14 PROCEDURE — 36556 INSERT NON-TUNNEL CV CATH: CPT

## 2018-02-14 PROCEDURE — 71045 X-RAY EXAM CHEST 1 VIEW: CPT

## 2018-02-14 PROCEDURE — 85025 COMPLETE CBC W/AUTO DIFF WBC: CPT

## 2018-02-14 PROCEDURE — 5A2204Z RESTORATION OF CARDIAC RHYTHM, SINGLE: ICD-10-PCS | Performed by: INTERNAL MEDICINE

## 2018-02-14 PROCEDURE — 84484 ASSAY OF TROPONIN QUANT: CPT

## 2018-02-14 PROCEDURE — 96368 THER/DIAG CONCURRENT INF: CPT

## 2018-02-14 PROCEDURE — 96366 THER/PROPH/DIAG IV INF ADDON: CPT

## 2018-02-14 PROCEDURE — 302128 INFUSION PUMP: Performed by: EMERGENCY MEDICINE

## 2018-02-14 PROCEDURE — B548ZZA ULTRASONOGRAPHY OF SUPERIOR VENA CAVA, GUIDANCE: ICD-10-PCS | Performed by: EMERGENCY MEDICINE

## 2018-02-14 PROCEDURE — 03HY32Z INSERTION OF MONITORING DEVICE INTO UPPER ARTERY, PERCUTANEOUS APPROACH: ICD-10-PCS | Performed by: INTERNAL MEDICINE

## 2018-02-14 PROCEDURE — 83735 ASSAY OF MAGNESIUM: CPT

## 2018-02-14 PROCEDURE — C1898 LEAD, PMKR, OTHER THAN TRANS: HCPCS

## 2018-02-14 PROCEDURE — C1751 CATH, INF, PER/CENT/MIDLINE: HCPCS

## 2018-02-14 PROCEDURE — 93005 ELECTROCARDIOGRAM TRACING: CPT | Performed by: EMERGENCY MEDICINE

## 2018-02-14 PROCEDURE — 770022 HCHG ROOM/CARE - ICU (200)

## 2018-02-14 PROCEDURE — 93308 TTE F-UP OR LMTD: CPT | Mod: 26 | Performed by: INTERNAL MEDICINE

## 2018-02-14 PROCEDURE — 96365 THER/PROPH/DIAG IV INF INIT: CPT

## 2018-02-14 PROCEDURE — 700117 HCHG RX CONTRAST REV CODE 255: Performed by: EMERGENCY MEDICINE

## 2018-02-14 PROCEDURE — 5A1223Z PERFORMANCE OF CARDIAC PACING, CONTINUOUS: ICD-10-PCS | Performed by: INTERNAL MEDICINE

## 2018-02-14 PROCEDURE — 70496 CT ANGIOGRAPHY HEAD: CPT

## 2018-02-14 PROCEDURE — 93321 DOPPLER ECHO F-UP/LMTD STD: CPT

## 2018-02-14 PROCEDURE — 02HV33Z INSERTION OF INFUSION DEVICE INTO SUPERIOR VENA CAVA, PERCUTANEOUS APPROACH: ICD-10-PCS | Performed by: EMERGENCY MEDICINE

## 2018-02-14 PROCEDURE — 700101 HCHG RX REV CODE 250: Performed by: EMERGENCY MEDICINE

## 2018-02-14 PROCEDURE — 700102 HCHG RX REV CODE 250 W/ 637 OVERRIDE(OP): Performed by: INTERNAL MEDICINE

## 2018-02-14 PROCEDURE — 96375 TX/PRO/DX INJ NEW DRUG ADDON: CPT

## 2018-02-14 RX ORDER — FAMOTIDINE 20 MG/1
20 TABLET, FILM COATED ORAL EVERY 12 HOURS
Status: DISCONTINUED | OUTPATIENT
Start: 2018-02-14 | End: 2018-02-16

## 2018-02-14 RX ORDER — POLYETHYLENE GLYCOL 3350 17 G/17G
1 POWDER, FOR SOLUTION ORAL
Status: DISCONTINUED | OUTPATIENT
Start: 2018-02-14 | End: 2018-03-13 | Stop reason: HOSPADM

## 2018-02-14 RX ORDER — AMIODARONE HYDROCHLORIDE 150 MG/3ML
INJECTION, SOLUTION INTRAVENOUS
Status: COMPLETED | OUTPATIENT
Start: 2018-02-14 | End: 2018-02-14

## 2018-02-14 RX ORDER — ATORVASTATIN CALCIUM 20 MG/1
20 TABLET, FILM COATED ORAL
Status: DISCONTINUED | OUTPATIENT
Start: 2018-02-14 | End: 2018-03-13 | Stop reason: HOSPADM

## 2018-02-14 RX ORDER — SODIUM CHLORIDE 9 MG/ML
INJECTION, SOLUTION INTRAVENOUS ONCE
Status: COMPLETED | OUTPATIENT
Start: 2018-02-14 | End: 2018-02-14

## 2018-02-14 RX ORDER — SUCCINYLCHOLINE CHLORIDE 20 MG/ML
10 INJECTION INTRAMUSCULAR; INTRAVENOUS ONCE
Status: DISCONTINUED | OUTPATIENT
Start: 2018-02-14 | End: 2018-02-14

## 2018-02-14 RX ORDER — ONDANSETRON 2 MG/ML
4 INJECTION INTRAMUSCULAR; INTRAVENOUS EVERY 4 HOURS PRN
Status: DISCONTINUED | OUTPATIENT
Start: 2018-02-14 | End: 2018-02-14

## 2018-02-14 RX ORDER — NOREPINEPHRINE BITARTRATE 1 MG/ML
INJECTION, SOLUTION INTRAVENOUS
Status: DISCONTINUED
Start: 2018-02-14 | End: 2018-02-14

## 2018-02-14 RX ORDER — IPRATROPIUM BROMIDE AND ALBUTEROL SULFATE 2.5; .5 MG/3ML; MG/3ML
3 SOLUTION RESPIRATORY (INHALATION)
Status: DISCONTINUED | OUTPATIENT
Start: 2018-02-14 | End: 2018-03-13 | Stop reason: HOSPADM

## 2018-02-14 RX ORDER — EPINEPHRINE 0.1 MG/ML
SYRINGE (ML) INJECTION
Status: COMPLETED | OUTPATIENT
Start: 2018-02-14 | End: 2018-02-14

## 2018-02-14 RX ORDER — LEVOTHYROXINE SODIUM 0.05 MG/1
50 TABLET ORAL
Status: DISCONTINUED | OUTPATIENT
Start: 2018-02-15 | End: 2018-03-13 | Stop reason: HOSPADM

## 2018-02-14 RX ORDER — LIDOCAINE HYDROCHLORIDE 20 MG/ML
INJECTION, SOLUTION INFILTRATION; PERINEURAL
Status: COMPLETED
Start: 2018-02-14 | End: 2018-02-14

## 2018-02-14 RX ORDER — BISACODYL 10 MG
10 SUPPOSITORY, RECTAL RECTAL
Status: DISCONTINUED | OUTPATIENT
Start: 2018-02-14 | End: 2018-03-13 | Stop reason: HOSPADM

## 2018-02-14 RX ORDER — SODIUM CHLORIDE 9 MG/ML
INJECTION, SOLUTION INTRAVENOUS CONTINUOUS
Status: DISCONTINUED | OUTPATIENT
Start: 2018-02-14 | End: 2018-02-17

## 2018-02-14 RX ORDER — SUCCINYLCHOLINE CHLORIDE 20 MG/ML
100 INJECTION INTRAMUSCULAR; INTRAVENOUS ONCE
Status: COMPLETED | OUTPATIENT
Start: 2018-02-14 | End: 2018-02-14

## 2018-02-14 RX ORDER — ONDANSETRON 4 MG/1
4 TABLET, ORALLY DISINTEGRATING ORAL EVERY 4 HOURS PRN
Status: DISCONTINUED | OUTPATIENT
Start: 2018-02-14 | End: 2018-02-14

## 2018-02-14 RX ORDER — ETOMIDATE 2 MG/ML
10 INJECTION INTRAVENOUS ONCE
Status: COMPLETED | OUTPATIENT
Start: 2018-02-14 | End: 2018-02-14

## 2018-02-14 RX ORDER — DEXTROSE MONOHYDRATE 25 G/50ML
25 INJECTION, SOLUTION INTRAVENOUS
Status: DISCONTINUED | OUTPATIENT
Start: 2018-02-14 | End: 2018-02-21

## 2018-02-14 RX ORDER — METOPROLOL TARTRATE 1 MG/ML
5 INJECTION, SOLUTION INTRAVENOUS ONCE
Status: COMPLETED | OUTPATIENT
Start: 2018-02-14 | End: 2018-02-14

## 2018-02-14 RX ORDER — LIDOCAINE HYDROCHLORIDE 10 MG/ML
1-2 INJECTION, SOLUTION INFILTRATION; PERINEURAL
Status: DISCONTINUED | OUTPATIENT
Start: 2018-02-14 | End: 2018-02-17

## 2018-02-14 RX ORDER — AMOXICILLIN 250 MG
2 CAPSULE ORAL 2 TIMES DAILY
Status: DISCONTINUED | OUTPATIENT
Start: 2018-02-14 | End: 2018-02-27

## 2018-02-14 RX ORDER — CHLORHEXIDINE GLUCONATE ORAL RINSE 1.2 MG/ML
15 SOLUTION DENTAL 2 TIMES DAILY
Status: DISCONTINUED | OUTPATIENT
Start: 2018-02-14 | End: 2018-02-16

## 2018-02-14 RX ORDER — CLOPIDOGREL BISULFATE 75 MG/1
75 TABLET ORAL DAILY
Status: ON HOLD | COMMUNITY
End: 2018-03-13

## 2018-02-14 RX ORDER — ACETAMINOPHEN 325 MG/1
650 TABLET ORAL EVERY 6 HOURS PRN
Status: DISCONTINUED | OUTPATIENT
Start: 2018-02-14 | End: 2018-03-13 | Stop reason: HOSPADM

## 2018-02-14 RX ADMIN — LIDOCAINE HYDROCHLORIDE: 20 INJECTION, SOLUTION INFILTRATION; PERINEURAL at 17:00

## 2018-02-14 RX ADMIN — SODIUM CHLORIDE: 9 INJECTION, SOLUTION INTRAVENOUS at 16:40

## 2018-02-14 RX ADMIN — SUCCINYLCHOLINE CHLORIDE 100 MG: 20 INJECTION, SOLUTION INTRAMUSCULAR; INTRAVENOUS at 14:39

## 2018-02-14 RX ADMIN — NOREPINEPHRINE BITARTRATE 10 MCG/MIN: 1 INJECTION INTRAVENOUS at 16:15

## 2018-02-14 RX ADMIN — IOHEXOL 100 ML: 350 INJECTION, SOLUTION INTRAVENOUS at 15:21

## 2018-02-14 RX ADMIN — ATROPINE SULFATE 0.5 MG: 0.1 INJECTION, SOLUTION INTRAVENOUS at 16:06

## 2018-02-14 RX ADMIN — EPINEPHRINE 20 MCG: 0.1 INJECTION, SOLUTION ENDOTRACHEAL; INTRACARDIAC; INTRAVENOUS at 16:27

## 2018-02-14 RX ADMIN — PROPOFOL 60 MCG/KG/MIN: 10 INJECTION, EMULSION INTRAVENOUS at 20:00

## 2018-02-14 RX ADMIN — IOHEXOL 100 ML: 350 INJECTION, SOLUTION INTRAVENOUS at 15:31

## 2018-02-14 RX ADMIN — ETOMIDATE 10 MG: 2 INJECTION INTRAVENOUS at 14:39

## 2018-02-14 RX ADMIN — CHLORHEXIDINE GLUCONATE 15 ML: 1.2 RINSE ORAL at 20:04

## 2018-02-14 RX ADMIN — AMIODARONE HYDROCHLORIDE 300 MG: 50 INJECTION, SOLUTION INTRAVENOUS at 16:30

## 2018-02-14 RX ADMIN — METOPROLOL TARTRATE 5 MG: 5 INJECTION INTRAVENOUS at 15:46

## 2018-02-14 RX ADMIN — PROPOFOL 5 MCG/KG/MIN: 10 INJECTION, EMULSION INTRAVENOUS at 15:00

## 2018-02-14 RX ADMIN — SODIUM CHLORIDE: 9 INJECTION, SOLUTION INTRAVENOUS at 22:32

## 2018-02-14 RX ADMIN — FAMOTIDINE 20 MG: 10 INJECTION, SOLUTION INTRAVENOUS at 20:04

## 2018-02-14 RX ADMIN — AMIODARONE HYDROCHLORIDE 1 MG/MIN: 50 INJECTION, SOLUTION INTRAVENOUS at 18:22

## 2018-02-14 RX ADMIN — EPINEPHRINE 10 MCG: 0.1 INJECTION, SOLUTION ENDOTRACHEAL; INTRACARDIAC; INTRAVENOUS at 16:04

## 2018-02-14 RX ADMIN — FENTANYL CITRATE 50 MCG: 50 INJECTION, SOLUTION INTRAMUSCULAR; INTRAVENOUS at 20:00

## 2018-02-14 NOTE — ED PROVIDER NOTES
ED Provider Note    CHIEF COMPLAINT  Chief Complaint   Patient presents with   • Unresponsive       HPI  Rafia Shaikh is a 84 y.o. male who presents for evaluation of altered mental status possible seizure activity. The patient was just discharged yesterday from the facility. He underwent T aVR with Dr. Franz. He was actually went home and his wife reported that he has some confusion and slurred speech and then became obtunded. Paramedics arrived and they noted that he had some shaking-like behavior possible seizure-like activity. Blood sugar was normal. They brought the monitor and he had atrial fibrillation with rapid ventricular response but then had a long sinus polyps for about 10-12 seconds with no pulse and then had return of spontaneous circulation. No interventions such as epinephrine or chest compressions were performed he is full code. History is obtained from his wife, paramedics and chart review    REVIEW OF SYSTEMS  See HPI for further details. Unavailable All other systems are negative.     PAST MEDICAL HISTORY  Past Medical History:   Diagnosis Date   • Arrhythmia 5/12/17    Hx A fib. On only aspirin.    • Dental disorder 5/12/17    permanent bridge lower   • Rectal bleed 4/4/2016    Cauterized and received blood transfusions   • Anemia 4/2016    due to rectal bleed   • Sleep apnea 2014    States recommended CPAP but never did get it.   • Carotid artery stenosis 6/13/2011   • Dizziness 6/13/2011   • Hypothyroidism 6/13/2011   • TIA (transient ischemic attack) 6/13/2011   • Preoperative examination, unspecified 4/12/2011   • Dyslipidemia 9/18/2010   • Stroke ([facility]) 4/16/2010    TIA    • Myocardial infarct 2008    Stent 2011.  5/12/17-Cardiologist is DR Wu ([facility])   • Renal disorder 2000    kidney stone   • CAD (coronary artery disease)    • CATARACT 1990's    Bilateral phaco with IOL   • Dental disorder     cavities, under current care   • Gout    • Heart murmur    • High cholesterol    •  Hyperlipidemia    • Hypertension    • Insomnia    • PAD (peripheral artery disease)    • Tendonitis    • Unspecified disorder of thyroid    • Urinary incontinence        FAMILY HISTORY  Unknown    SOCIAL HISTORY  Social History     Social History   • Marital status:      Spouse name: N/A   • Number of children: N/A   • Years of education: N/A     Social History Main Topics   • Smoking status: Never Smoker   • Smokeless tobacco: Never Used   • Alcohol use No   • Drug use: No   • Sexual activity: Not on file     Other Topics Concern   • Not on file     Social History Narrative   • No narrative on file      no drugs or alcohol  SURGICAL HISTORY  Past Surgical History:   Procedure Laterality Date   • TRANSCATHETER AORTIC VALVE REPLACEMENT N/A 2/12/2018    Procedure: TRANSCATHETER AORTIC VALVE REPLACEMENT;  Surgeon: Jez Waters M.D.;  Location: SURGERY [facility];  Service: Cardiac   • JANETH  2/12/2018    Procedure: JANETH;  Surgeon: Jez Waters M.D.;  Location: SURGERY [facility];  Service: Cardiac   • COLONOSCOPY N/A 5/16/2017    Procedure: COLONOSCOPY;  Surgeon: Osmany Smart M.D.;  Location: SURGERY [facility];  Service:    • LAPAROSCOPY  4/2/2016    exp for rectal bleed and cautery   • COLONOSCOPY  2015   • RECOVERY  8/22/2012    aortogram-Performed by SURGERY, IR-RECOVERY at SURGERY [facility]   • CAROTID ENDARTERECTOMY  5/3/2011    Performed by ERASMO NAVARRETE at [facility]   • RECOVERY  4/1/2011    Performed by SURGERY, CATH-RECOVERY at SURGERY [facility]   • RECOVERY  3/25/2011    Performed by SURGERY, CATH-RECOVERY at [facility]ERY [facility]   • MULTIPLE CORONARY ARTERY BYPASS ENDO VEIN HARVEST  3/20/08    Performed by AMANDA CRYSTAL at SURGERY [facility]   • LITHOTRIPSY  2000   • SEPTOPLASTY  1995   • CATARACT EXTRACTION WITH IOL Bilateral early 1990's       CURRENT MEDICATIONS    Current Facility-Administered Medications:   •  Action is  required: Protocol 452 Propofol Critical Care has been implemented, , , CONTINUOUS **AND** propofol (DIPRIVAN) injection, 0-80 mcg/kg/min, Intravenous, Continuous, Last Rate: 3.8 mL/hr at 02/14/18 1600, 10 mcg/kg/min at 02/14/18 1600 **AND** Propofol Critical Care Protocol - Patient Must Have an Advanced Airway with Mechanical Ventilation in Use., , , CONTINUOUS **AND** Propofol Critical Care Protocol - Spontaneous breathing trials may be attempted while receiving propofol, , , CONTINUOUS **AND** Propofol Critical Care Protocol - If patient meets extubation criteria, propofol MUST be discontinued prior to extubation, , , CONTINUOUS **AND** Propofol Critical Care Protocol - No allergy to propofol, soybean oil, or eggs, , , CONTINUOUS **AND** Propofol Critical Care Protocol - Do not administer this medication to children under the age of 12, , , CONTINUOUS **AND** Propofol Critical Care Protocol - Dedicated IV line for administration (vehicle supports rapid growth of microorganisms and incompatibilities cannot be detected), , , CONTINUOUS **AND** Propofol Critical Care Protocol - Administration tubing and any unused emulsion must be changed out and discarded after 12 hours from spiking vial, , , CONTINUOUS **AND** Propofol Critical Care Protocol - RASS guildelines, , , CONTINUOUS **AND** Propofol Critical Care Protocol - Wake-up assessment as ordered by MD, , , CONTINUOUS **AND** Propofol Critical Care Protocol - Propofol is not an analgesic, concurrent analgesia (if patient experiencing pain) should continue while patient is on propofol, , , CONTINUOUS **AND** Triglycerides Starting now and then Every 3 Days, , , Every 3 Days (0300) **AND** Propofol Critical Care Protocol - Notify MD prior to exceeding 80 mcg/kg/min and obtain new order for higher limit, , , CONTINUOUS, Danish Cui M.D.  •  norepinephrine (LEVOPHED) 8 mg in  mL Infusion, 0-30 mcg/min, Intravenous, Continuous, Danish Cui M.D., Stopped  at 02/14/18 1700  •  NOREPINEPHRINE BITARTRATE 1 MG/ML IV SOLN, , , ,   •  LIDOCAINE HCL 2 % INJ SOLN, , , ,   •  HEPARIN (PORCINE) IN NACL 2-0.9 UNIT/ML-% INJ SOLN, , , ,   •  NS infusion, , Intravenous, Once, Alan Deng M.D.    Current Outpatient Prescriptions:   •  atorvastatin (LIPITOR) 20 MG Tab, Take 20 mg by mouth every day., Disp: , Rfl:   •  levothyroxine (SYNTHROID) 50 MCG Tab, TAKE 1 TABLET BY MOUTH DAILY, Disp: 90 Tab, Rfl: 0  •  aspirin EC (ECOTRIN) 81 MG Tablet Delayed Response, Take 81 mg by mouth every evening., Disp: , Rfl:   •  Calcium Carbonate-Vit D-Min (CALTRATE PLUS PO), Take 1 Tab by mouth every day., Disp: , Rfl:   •  B Complex Vitamins (VITAMIN B COMPLEX PO), Take 1 Tab by mouth 2 Times a Day., Disp: , Rfl:   •  Multiple Vitamins-Minerals (CENTRUM SILVER) TABS, Take 2 Tabs by mouth 2 Times a Day., Disp: , Rfl:       ALLERGIES  No Known Allergies    PHYSICAL EXAM  VITAL SIGNS: /88   Pulse (!) 101   Resp 20   Wt 63.5 kg (140 lb)   SpO2 100%   BMI 24.80 kg/m²                    BP Method  Manual           Pulse  101    52  58     Resp  20    19  21     SpO2  100 %    99 %  100 %     Monitored Temp      37.3 °C (99.1 °F)  37.3 °C (99.1 °F)     Heart Rate (Monitored)      159  128     NIBP      101/59  118/56     NIBP MAP (Calculated)      74  63     NIBP MAP (Calculated)      70  72     Acuity     Acuity    1         Row Name  02/14/18  1527  02/14/18  1524           Constitutional: Ill-appearing obtunded  HENT: Normocephalic, Atraumatic, Bilateral external ears normal, Oropharynx moist, No oral exudates, Nose normal.   Eyes: PERRLA, pupils 2 mm and sluggish EOMI, Conjunctiva normal, No discharge.   Neck: Normal range of motion, No tenderness, Supple, No stridor. No JVD  Cardiovascular: Tachycardic irregularly irregular No murmurs, No rubs, No gallops.   Thorax & Lungs: Agonal respirations  Abdomen: Bowel sounds normal, Soft, No tenderness, No masses, No pulsatile masses.    Skin: Mottled skin   Extremities: Intact distal pulses, No edema, No tenderness, No cyanosis, No clubbing.   Musculoskeletal: Good range of motion in all major joints. No tenderness to palpation or major deformities noted.   Neurologic: Minimally responsive to pain, no seizure activity obtunded    EKG  Interpretation by me rate 130 atrial fibrillation with rapid ventricular response right bundle branch block is noted. This is a new finding compared to EKG yesterday. No evidence of ischemia    RADIOLOGY/PROCEDURES  ECHOCARDIOGRAM LTD W/O CONT   Final Result      CT-CTA NECK WITH & W/O-POST PROCESSING   Final Result      Marked partially calcified and ulcerated plaques located within the right carotid artery bifurcation. Between 50% to 70% stenosis at the origin right internal carotid artery is demonstrated.   Mild partially calcified plaque left carotid artery bifurcation. No significant stenosis.   Moderate/marked partially calcified plaque origin right vertebral artery resulting in between 50% and 70% stenosis.   Focal plaque or thrombus within the right vertebral artery at the C1 level.   Moderate partially calcified plaque within the proximal segment of the left vertebral artery resulting in 50% stenosis.      DX-CHEST-PORTABLE (1 VIEW)   Final Result      1.  Nasogastric tube tip located just above the gastroesophageal junction. Advancement is recommended.      2.  Endotracheal tube tip located below the level of the clavicles.      3.  Cardiomegaly.      CT-CTA HEAD WITH & W/O-POST PROCESS   Final Result      1.  No evidence of intracranial vascular occlusion or aneurysm.      2.  Extensive atherosclerotic plaque involving the intracranial vascular structures with multiple areas of atherosclerotic narrowing.      3.  Periventricular chronic small vessel ischemic change.        Results for orders placed or performed during the hospital encounter of 02/14/18   TROPONIN   Result Value Ref Range    Troponin I  0.28 (H) 0.00 - 0.04 ng/mL   LACTIC ACID   Result Value Ref Range    Lactic Acid 2.3 (H) 0.5 - 2.0 mmol/L   CBC WITH DIFFERENTIAL   Result Value Ref Range    WBC 10.0 4.8 - 10.8 K/uL    RBC 3.56 (L) 4.70 - 6.10 M/uL    Hemoglobin 12.1 (L) 14.0 - 18.0 g/dL    Hematocrit 36.1 (L) 42.0 - 52.0 %    .4 (H) 81.4 - 97.8 fL    MCH 34.0 (H) 27.0 - 33.0 pg    MCHC 33.5 (L) 33.7 - 35.3 g/dL    RDW 49.1 35.9 - 50.0 fL    Platelet Count 80 (L) 164 - 446 K/uL    MPV 10.1 9.0 - 12.9 fL    Neutrophils-Polys 87.20 (H) 44.00 - 72.00 %    Lymphocytes 3.90 (L) 22.00 - 41.00 %    Monocytes 7.60 0.00 - 13.40 %    Eosinophils 0.00 0.00 - 6.90 %    Basophils 0.40 0.00 - 1.80 %    Immature Granulocytes 0.90 0.00 - 0.90 %    Nucleated RBC 0.00 /100 WBC    Lymphs (Absolute) 0.39 (L) 1.00 - 4.80 K/uL    Monos (Absolute) 0.76 0.00 - 0.85 K/uL    Eos (Absolute) 0.00 0.00 - 0.51 K/uL    Baso (Absolute) 0.04 0.00 - 0.12 K/uL    Immature Granulocytes (abs) 0.09 0.00 - 0.11 K/uL    NRBC (Absolute) 0.00 K/uL    Neutrophils (Absolute) 8.67 (H) 1.82 - 7.42 K/uL   BTYPE NATRIURETIC PEPTIDE   Result Value Ref Range    B Natriuretic Peptide 88 0 - 100 pg/mL   PERIPHERAL SMEAR REVIEW   Result Value Ref Range    Peripheral Smear Review see below    PLATELET ESTIMATE   Result Value Ref Range    Plt Estimation Decreased    MORPHOLOGY   Result Value Ref Range    RBC Morphology Normal    DIFFERENTIAL COMMENT   Result Value Ref Range    Comments-Diff see below    EKG (ER)   Result Value Ref Range    Report       [facility] Emergency [facility].    Test Date:  2018  Pt Name:    LUZ IYER                  Department: ER  MRN:        [id number]                      Room:       [facility]  Gender:     Male                         Technician: [id number]  :        1933                   Requested By:QUINTIN DE LA CURZ  Order #:    [id number]                    Reading MD:    Measurements  Intervals                                Axis  Rate:        130                          P:  OR:                                      QRS:        74  QRSD:       140                          T:          33  QT:         372  QTc:        547    Interpretive Statements  ATRIAL FIBRILLATION  RIGHT BUNDLE BRANCH BLOCK  Compared to ECG 02/13/2018 04:17:59  Sinus bradycardia no longer present  Myocardial infarct finding no longer present     ECHOCARDIOGRAM LTD W/O CONT   Result Value Ref Range    Left Ventrical Ejection Fraction 40    ISTAT ARTERIAL BLOOD GAS   Result Value Ref Range    Ph 7.378 (L) 7.400 - 7.500    Pco2 37.7 (H) 26.0 - 37.0 mmHg    Po2 179 (H) 64 - 87 mmHg    Tco2 23 20 - 33 mmol/L    S02 100 (H) 93 - 99 %    Hco3 22.2 17.0 - 25.0 mmol/L    BE -3 -4 - 3 mmol/L    Body Temp see below degrees    O2 Therapy 100 %    Specimen Arterial     Action Range Triggered NO     Inst. Qualified Patient YES       Physician procedure: Endotracheal intubation indication patient is obtunded and requires airway management. The patient was placed on continuous pulse oximetry some low oxygen. He was given 100 mg of succinylcholine and 10 mg of etomidate. A curved #4 blade was inserted into the oropharynx and 8.0 cuffed tube was passed to 24 cm at the lips. Good CO2 return if cuff was inflated and chest x-ray confirmed placement no complications    Physician procedure: Central venous catheter indication patient is obtunded and hypotensive and requires vasopressors. Emergent consent was obtained. The right anterior neck was prepped with hexedine 3 times. Sterile gown and gloves and universal precautions were used. Dynamic real-time vascular ultrasounds used to visualize the right internal jugular vein. 5 mL of lidocaine without epinephrine was infiltrated into the skin for anesthesia. Final needle was inserted on the 1st attempt under direct ultrasound guidance start nonpulsatile blood is aspirated. A Seldinger wire was advanced without resistance, the tract was dilated and the catheter  was placed over the wire. All ports were flushed the line was sewn in place sterile dressing is applied. No complications    COURSE & MEDICAL DECISION MAKING  Pertinent Labs & Imaging studies reviewed. (See chart for details)  Patient was critically ill on arrival. He was immediately intubated. He had symptoms or concerning for possible cerebrovascular accident and therefore was intubated and sedated and went immediately down to CAT scan. His CTA of the head and neck did not demonstrate a large embolic stroke. The stat echocardiogram did not demonstrate any acute abnormality with his new aortic valve. He was quite tachycardic with rapid A. fib. He is given a small push of metoprolol and the patient subsequently he came profoundly bradycardic. He had there been runs of atrial fibrillation with long sinus positive. He had a prolonged sinus pause around 15 seconds and we administered an ampule of epinephrine and atropine and started chest compressions. He had immediate return of spontaneous circulation back to a rapid A. fib but he did have intermittent and sustained sinus positive. We subsequently gave several pushes of micro-doses of epinephrine as well as an additional dose of atropine. He was started on Levaquin for drip. Cardiology came to the bedside at this point and we elected to cardiovert him. He was cardioverted twice initially with 150 J and then with 200 J. This never consistently restored sinus rhythm but he became stable with a perfusing rapid A. fib rhythm. At that point cardiology and I agreed that he will require emergent temporary pacemaker.    FINAL IMPRESSION  1. Respiratory arrest  2. Symptomatic tachybradycardia syndrome with cardiogenic shock    CRITICAL CARE TIME:    The patient required approximately 60 minutes worth of critical care time. This excludes any procedures. This includes time spent directly at caring for the patient, making critical medical decisions, involving consultants and  speaking with the family.      Electronically signed by: Danish Cui, 2/14/2018 3:05 PM

## 2018-02-14 NOTE — CONSULTS
Cardiology New Consult Note    Date of note:    2/14/2018      Consulting Physician: Danish Colón M.D.    Patient ID:    Name:   Rafia Shaikh     YOB: 1933  Age:   84 y.o.  male   MRN:   [id number]      Consult question: Evaluation of new rapid atrial fibrillation    HPI:  Rafia Shaikh is an 84-year-old man with a history of severe aortic stenosis status post transcatheter aortic valve replacement done on 2/12/2018 who presents with altered mental status (obtunded), and rapid atrial fibrillation.    The patient is intubated and sedated at the time of my examination. In speaking with his wife and staff in the emergency room he had become obtunded acutely at home about 2 hours prior to presentation. She called 911 immediately. While in the emergency room, he was in very rapid atrial fibrillation at around 180 bpm at which time he was given 5 mg of IV metoprolol. He is down and had several pauses. He was having about 15 second pauses when I saw him several minutes after that metoprolol bolus. Noting that he had a very low blood pressure not obtainable by cuff high discussed with the emergency room physician electrical cardioversion given his rapid atrial fibrillation and sick sinus syndrome. He had cardioversion ×1 with 150 J, and returns to rapid atrial fibrillation. I then gave him 300 mg of IV amiodarone followed by an additional 200 J ×1 cardioversion. He remained in rapid atrial fibrillation with periodic longer pauses. At that time, I called electrophysiology for assistance.    ROS: Per the wife prior to becoming obtunded he was doing well. Between his discharge and readmission all others reviewed and negative.    PMH (problem list reviewed, pertinent to this consultation):     1. Severe aortic stenosis: Status post bioprosthetic transcatheter aortic valve (23 mm Muñiz Denia 3)  2. Coronary artery disease: Status post three-vessel bypass on 3/18/2008 (LIMA-LAD, SVG-OM, SVG-RCA). Also with PCI to  the circumflex on 4/1/2011. Grafts were patent by recent cardiac catheterization 1/4/2018 at which time the patient had repeat PCI to the circumflex with a 2.5 x 24 mm drug-eluting stent.  3. Peripheral artery disease: 50-75% stenosis of the right femoral artery at the time of cardiac catheterization on again 1/4/2018  4. History of GI bleed: Bright red blood per rectum secondary to distal ileal AVMs noted on colonoscopy 5/16/2017  5. History of nephrolithiasis  6. Paroxysmal atrial fibrillation: Not on anticoagulation related to bleeding complications as above  7. Hypertension  8. Dyslipidemia  9. Hypothyroidism    Outpatient Medications:     Aspirin 81 mg by mouth daily  Atorvastatin 20 mg by mouth daily at bedtime  Vitamin B  Calcium/vitamin D  Plavix 75 mg by mouth daily  Levothyroxine 50 µg by mouth daily  Multivitamin    Allergies: Patient has no known allergies.    Family History: family history includes Breast Cancer in his sister; Colon Cancer in his brother; Heart Disease in his father; Other in his mother.    Social History:  reports that he has never smoked. He has never used smokeless tobacco. He reports that he does not drink alcohol or use drugs.    Physical Exam    Body mass index is 24.8 kg/m².  Weight 63.5 kg (140 lb).  Vitals:    02/14/18 1446   Weight: 63.5 kg (140 lb)     Oxygen Therapy:       General: Critically ill, thin, frail, elderly man in the emergency room  HEENT: Endotracheal tube noted  Heart: Tachycardic rate, irregularly irregular rhythm, grade 2-3/6 systolic ejection murmur, no rubs or gallops  Lungs: Coarse bilaterally, mechanically ventilated  Abdomen: Scaphoid, Bowel sounds positive, non distended   Extremities: 1+ bilateral lower extremity edema, no clubbing, no cyanosis  Neurological: Intubated, and sedated. Full exam deferred  Skin: Small left groin hematoma noted, otherwise no rashes    Labs (reviewed and notable for):     Labs from today are still pending. Lactate is back at  "2.3, elevated. Troponin is 0.28 ng/mL.  EKG (tracings reviewed, my interpretation, 2018): Atrial fibrillation with rapid ventricular response, right bundle branch block, no ischemic changes    Echocardiogram, 2/3/2018:  \"CONCLUSIONS  Left ventricular ejection fraction is visually estimated to be 65%.  Known TAVR aortic valve that is functioning normally with normal   transvalvular gradients.  Mean gradient 18 mmHg.  Mild paravalvular   leak is noted\"    CXR (images reviewed, my interpretation): Admission chest x-ray shows sternotomy wires, bioprosthetic aortic valve, and no other acute cardiopulmonary process    CT head, 2018:  \"1.  No evidence of intracranial vascular occlusion or aneurysm.  2.  Extensive atherosclerotic plaque involving the intracranial vascular structures with multiple areas of atherosclerotic narrowing.  3.  Periventricular chronic small vessel ischemic change\"    Impression and Medical Decision Makin. Bradycardic arrest: I have arranged for temporary pacemaker with electrophysiology as assistance I greatly appreciate.    2. Sick sinus syndrome: After placement of a temporary pacemaker I will plan for IV amiodarone to maintain sinus rhythm. I will also plan IV heparin drip for stroke prevention.    3. Multivessel coronary artery disease: Continue statin. Resume aspirin probably tomorrow. ACE inhibitor, and beta blocker contraindicated in the setting of shock.    4. Severe aortic stenosis status post transcatheter aortic valve: Repeat echocardiogram ordered      This note was dictated using Dragon speech recognition software.    Thank you for allowing me to participate in the care of this patient.  Please call if any clarification will be helpful.      Alan Deng MD  Cardiologist, [facility] [place]     Cardiology Critical Care Documentation    This patient is critically ill.  I have spent a total of 30 minutes examining this patient, all lab data, x-ray, and " discussion with nursing and respiratory therapy as well as performing procedures.    Alan Deng MD  Cardiologist, [facility] [place]

## 2018-02-14 NOTE — TELEPHONE ENCOUNTER
"Patient walked in with wife after being discharged from TAVR procedure yesterday. Patient wanting to know if medications are okay.  Patient presents with historical confusion and hard to understand.    Printed out medication list and discussed the following from discharge summary note:    DISCHARGE MEDICATIONS: The patient is to be discharged on new medications including plavix 75 mg QD for 3 month period and then transition back to pletal. They will continue their prior home medications of ASA 81 mg, lipitor 20 mg QPM, multivitamin 1 tab QD, vitamin B, D, and C as previously prescribed, and levothyroxine 5 0 mcg QD. They are to stop metoprolol 100 mg QD.     Confirmed patient's follow up appointments.    Patient still seem confused asking, \"So this procedure was done for nothing\".  Explained he still needs to take medications post procedure.    Patient's wife was trying to help him understand.      Patient agreeable to wait until F/V on 2/20.       "

## 2018-02-14 NOTE — ED TRIAGE NOTES
1430: pt arrived er, bib ems. Per report pt became unresponsive and couple episode of seizure like activity. He was given Versed 2mg pta .  1439: erp at bedside for intubation

## 2018-02-14 NOTE — ED NOTES
Late entry -   After metoprolol, HR decreased to 18-20s. ERP bedside.  1mg atropine 1552   1 mg epi 1552

## 2018-02-15 ENCOUNTER — APPOINTMENT (OUTPATIENT)
Dept: RADIOLOGY | Facility: MEDICAL CENTER | Age: 83
DRG: 242 | End: 2018-02-15
Attending: INTERNAL MEDICINE
Payer: MEDICARE

## 2018-02-15 PROBLEM — J96.01 ACUTE RESPIRATORY FAILURE WITH HYPOXIA (HCC): Status: ACTIVE | Noted: 2018-02-15

## 2018-02-15 LAB
ACTION RANGE TRIGGERED IACRT: NO
ANION GAP SERPL CALC-SCNC: 10 MMOL/L (ref 0–11.9)
APTT PPP: 203.2 SEC (ref 24.7–36)
APTT PPP: 28.1 SEC (ref 24.7–36)
BASE EXCESS BLDA CALC-SCNC: -3 MMOL/L (ref -4–3)
BASOPHILS # BLD AUTO: 0.6 % (ref 0–1.8)
BASOPHILS # BLD: 0.07 K/UL (ref 0–0.12)
BODY TEMPERATURE: ABNORMAL DEGREES
BUN SERPL-MCNC: 16 MG/DL (ref 8–22)
CALCIUM SERPL-MCNC: 8.8 MG/DL (ref 8.5–10.5)
CHLORIDE SERPL-SCNC: 111 MMOL/L (ref 96–112)
CO2 BLDA-SCNC: 23 MMOL/L (ref 20–33)
CO2 SERPL-SCNC: 19 MMOL/L (ref 20–33)
CREAT SERPL-MCNC: 0.85 MG/DL (ref 0.5–1.4)
EOSINOPHIL # BLD AUTO: 0.09 K/UL (ref 0–0.51)
EOSINOPHIL NFR BLD: 0.8 % (ref 0–6.9)
ERYTHROCYTE [DISTWIDTH] IN BLOOD BY AUTOMATED COUNT: 48.1 FL (ref 35.9–50)
GLUCOSE BLD-MCNC: 122 MG/DL (ref 65–99)
GLUCOSE BLD-MCNC: 122 MG/DL (ref 65–99)
GLUCOSE BLD-MCNC: 136 MG/DL (ref 65–99)
GLUCOSE BLD-MCNC: 139 MG/DL (ref 65–99)
GLUCOSE BLD-MCNC: 148 MG/DL (ref 65–99)
GLUCOSE SERPL-MCNC: 143 MG/DL (ref 65–99)
HCO3 BLDA-SCNC: 21.7 MMOL/L (ref 17–25)
HCT VFR BLD AUTO: 39.8 % (ref 42–52)
HGB BLD-MCNC: 13 G/DL (ref 14–18)
IMM GRANULOCYTES # BLD AUTO: 0.13 K/UL (ref 0–0.11)
IMM GRANULOCYTES NFR BLD AUTO: 1.1 % (ref 0–0.9)
INR PPP: 1.12 (ref 0.87–1.13)
INST. QUALIFIED PATIENT IIQPT: YES
LYMPHOCYTES # BLD AUTO: 1.81 K/UL (ref 1–4.8)
LYMPHOCYTES NFR BLD: 15.8 % (ref 22–41)
MAGNESIUM SERPL-MCNC: 2.1 MG/DL (ref 1.5–2.5)
MCH RBC QN AUTO: 32.5 PG (ref 27–33)
MCHC RBC AUTO-ENTMCNC: 32.7 G/DL (ref 33.7–35.3)
MCV RBC AUTO: 99.5 FL (ref 81.4–97.8)
MONOCYTES # BLD AUTO: 1.44 K/UL (ref 0–0.85)
MONOCYTES NFR BLD AUTO: 12.6 % (ref 0–13.4)
NEUTROPHILS # BLD AUTO: 7.93 K/UL (ref 1.82–7.42)
NEUTROPHILS NFR BLD: 69.1 % (ref 44–72)
NRBC # BLD AUTO: 0 K/UL
NRBC BLD-RTO: 0 /100 WBC
O2/TOTAL GAS SETTING VFR VENT: 60 %
PCO2 BLDA: 37.4 MMHG (ref 26–37)
PCO2 TEMP ADJ BLDA: 35.9 MMHG (ref 26–37)
PH BLDA: 7.37 [PH] (ref 7.4–7.5)
PH TEMP ADJ BLDA: 7.38 [PH] (ref 7.4–7.5)
PHOSPHATE SERPL-MCNC: 1.9 MG/DL (ref 2.5–4.5)
PLATELET # BLD AUTO: 84 K/UL (ref 164–446)
PMV BLD AUTO: 10 FL (ref 9–12.9)
PO2 BLDA: 193 MMHG (ref 64–87)
PO2 TEMP ADJ BLDA: 189 MMHG (ref 64–87)
POTASSIUM SERPL-SCNC: 3.4 MMOL/L (ref 3.6–5.5)
PROTHROMBIN TIME: 14.1 SEC (ref 12–14.6)
RBC # BLD AUTO: 4 M/UL (ref 4.7–6.1)
SAO2 % BLDA: 100 % (ref 93–99)
SODIUM SERPL-SCNC: 140 MMOL/L (ref 135–145)
SPECIMEN DRAWN FROM PATIENT: ABNORMAL
WBC # BLD AUTO: 11.5 K/UL (ref 4.8–10.8)

## 2018-02-15 PROCEDURE — A9270 NON-COVERED ITEM OR SERVICE: HCPCS | Performed by: INTERNAL MEDICINE

## 2018-02-15 PROCEDURE — 4A10X4Z MONITORING OF CENTRAL NERVOUS ELECTRICAL ACTIVITY, EXTERNAL APPROACH: ICD-10-PCS | Performed by: PSYCHIATRY & NEUROLOGY

## 2018-02-15 PROCEDURE — 82962 GLUCOSE BLOOD TEST: CPT | Mod: 91

## 2018-02-15 PROCEDURE — 700105 HCHG RX REV CODE 258: Performed by: INTERNAL MEDICINE

## 2018-02-15 PROCEDURE — 94003 VENT MGMT INPAT SUBQ DAY: CPT

## 2018-02-15 PROCEDURE — 700102 HCHG RX REV CODE 250 W/ 637 OVERRIDE(OP): Performed by: INTERNAL MEDICINE

## 2018-02-15 PROCEDURE — 700111 HCHG RX REV CODE 636 W/ 250 OVERRIDE (IP): Performed by: NURSE PRACTITIONER

## 2018-02-15 PROCEDURE — 700101 HCHG RX REV CODE 250: Performed by: INTERNAL MEDICINE

## 2018-02-15 PROCEDURE — 85025 COMPLETE CBC W/AUTO DIFF WBC: CPT

## 2018-02-15 PROCEDURE — 700111 HCHG RX REV CODE 636 W/ 250 OVERRIDE (IP): Performed by: INTERNAL MEDICINE

## 2018-02-15 PROCEDURE — 99233 SBSQ HOSP IP/OBS HIGH 50: CPT | Performed by: HOSPITALIST

## 2018-02-15 PROCEDURE — 80048 BASIC METABOLIC PNL TOTAL CA: CPT

## 2018-02-15 PROCEDURE — 82803 BLOOD GASES ANY COMBINATION: CPT

## 2018-02-15 PROCEDURE — 85730 THROMBOPLASTIN TIME PARTIAL: CPT

## 2018-02-15 PROCEDURE — 85610 PROTHROMBIN TIME: CPT

## 2018-02-15 PROCEDURE — 71045 X-RAY EXAM CHEST 1 VIEW: CPT

## 2018-02-15 PROCEDURE — 37799 UNLISTED PX VASCULAR SURGERY: CPT

## 2018-02-15 PROCEDURE — 95951 EEG: CPT | Mod: 52

## 2018-02-15 PROCEDURE — 83735 ASSAY OF MAGNESIUM: CPT

## 2018-02-15 PROCEDURE — 770022 HCHG ROOM/CARE - ICU (200)

## 2018-02-15 PROCEDURE — 84100 ASSAY OF PHOSPHORUS: CPT

## 2018-02-15 RX ORDER — HEPARIN SODIUM 1000 [USP'U]/ML
5000 INJECTION, SOLUTION INTRAVENOUS; SUBCUTANEOUS ONCE
Status: COMPLETED | OUTPATIENT
Start: 2018-02-15 | End: 2018-02-15

## 2018-02-15 RX ORDER — HEPARIN SODIUM 1000 [USP'U]/ML
2600 INJECTION, SOLUTION INTRAVENOUS; SUBCUTANEOUS PRN
Status: DISCONTINUED | OUTPATIENT
Start: 2018-02-15 | End: 2018-02-19

## 2018-02-15 RX ADMIN — FENTANYL CITRATE 50 MCG: 50 INJECTION, SOLUTION INTRAMUSCULAR; INTRAVENOUS at 03:30

## 2018-02-15 RX ADMIN — CHLORHEXIDINE GLUCONATE 15 ML: 1.2 RINSE ORAL at 20:41

## 2018-02-15 RX ADMIN — FENTANYL CITRATE 50 MCG: 50 INJECTION, SOLUTION INTRAMUSCULAR; INTRAVENOUS at 21:00

## 2018-02-15 RX ADMIN — ATORVASTATIN CALCIUM 20 MG: 20 TABLET, FILM COATED ORAL at 20:41

## 2018-02-15 RX ADMIN — FAMOTIDINE 20 MG: 20 TABLET, FILM COATED ORAL at 20:41

## 2018-02-15 RX ADMIN — NOREPINEPHRINE BITARTRATE 8 MCG/MIN: 1 INJECTION INTRAVENOUS at 04:51

## 2018-02-15 RX ADMIN — HEPARIN SODIUM 1050 UNITS/HR: 5000 INJECTION, SOLUTION INTRAVENOUS at 10:27

## 2018-02-15 RX ADMIN — STANDARDIZED SENNA CONCENTRATE AND DOCUSATE SODIUM 2 TABLET: 8.6; 5 TABLET, FILM COATED ORAL at 20:41

## 2018-02-15 RX ADMIN — ACETAMINOPHEN 650 MG: 325 TABLET, FILM COATED ORAL at 16:31

## 2018-02-15 RX ADMIN — POTASSIUM PHOSPHATE, MONOBASIC AND POTASSIUM PHOSPHATE, DIBASIC 30 MMOL: 224; 236 INJECTION, SOLUTION INTRAVENOUS at 11:39

## 2018-02-15 RX ADMIN — PROPOFOL 20 MCG/KG/MIN: 10 INJECTION, EMULSION INTRAVENOUS at 20:41

## 2018-02-15 RX ADMIN — CHLORHEXIDINE GLUCONATE 15 ML: 1.2 RINSE ORAL at 09:44

## 2018-02-15 RX ADMIN — ASPIRIN 81 MG: 81 TABLET, COATED ORAL at 20:41

## 2018-02-15 RX ADMIN — HEPARIN SODIUM 5000 UNITS: 1000 INJECTION, SOLUTION INTRAVENOUS; SUBCUTANEOUS at 10:27

## 2018-02-15 RX ADMIN — FENTANYL CITRATE 50 MCG: 50 INJECTION, SOLUTION INTRAMUSCULAR; INTRAVENOUS at 00:00

## 2018-02-15 RX ADMIN — AMIODARONE HYDROCHLORIDE 0.5 MG/MIN: 50 INJECTION, SOLUTION INTRAVENOUS at 02:49

## 2018-02-15 RX ADMIN — SODIUM CHLORIDE: 9 INJECTION, SOLUTION INTRAVENOUS at 16:29

## 2018-02-15 RX ADMIN — FAMOTIDINE 20 MG: 10 INJECTION, SOLUTION INTRAVENOUS at 09:44

## 2018-02-15 RX ADMIN — PROPOFOL 50 MCG/KG/MIN: 10 INJECTION, EMULSION INTRAVENOUS at 06:12

## 2018-02-15 RX ADMIN — PROPOFOL 50 MCG/KG/MIN: 10 INJECTION, EMULSION INTRAVENOUS at 01:40

## 2018-02-15 RX ADMIN — AMIODARONE HYDROCHLORIDE 0.5 MG/MIN: 50 INJECTION, SOLUTION INTRAVENOUS at 17:57

## 2018-02-15 RX ADMIN — PROPOFOL 20 MCG/KG/MIN: 10 INJECTION, EMULSION INTRAVENOUS at 16:29

## 2018-02-15 ASSESSMENT — LIFESTYLE VARIABLES: DO YOU DRINK ALCOHOL: NO

## 2018-02-15 NOTE — OR SURGEON
Immediate Post-Operative Note      PreOp Diagnosis: sinus arrest    PostOp Diagnosis: same    Procedure(s) :  Temporary pacemaker    Surgeon(s):  Usman Stephens M.D.    Type of Anesthesia: Moderate Sedation    Specimen: None    Estimated Blood Loss: 5 cc's    Contrast Media:  0 cc's    Fluoro Time: 1 min        Findings: r subclavian line with temp pacer    Complications: none apparent      Usman Stephens M.D.  2/14/2018 5:40 PM

## 2018-02-15 NOTE — DISCHARGE PLANNING
"Medical Social Work     MSW responded to critical pt in [address]06. MSW met with pt's  Darlyn. Per pt's wife they were just discharged from [facility]. Pt's wife stated their children live in [place]. Her son is a doctor. Pt's wife would like them contacted but doesn't know their contact information.     MSW did chart reivew and found pt's daughter Ray ([phone]). Ray she stated she will contact her brother Lopez Shaikh who is an anesthesiologist in [place]. MSW to await call from Lopez.     MSW met with pt's wife Ulices again. Pt's wife stated that her children \"abondonded\" them and she doesn't want to involve them at this time. MSW asked again if she would like pt's children involved, she stated no.    MSW to remain available for support.   "

## 2018-02-15 NOTE — EEG PROGRESS NOTE
EEG 02/15/18 12:50 PM    ROUTINE ELECTROENCEPHALOGRAM REPORT      NAME: Rafia Shaikh    REFERRING Dr:    INDICATION: Unresponsive      TECHNIQUE: 30 channel routine electroencephalogram (EEG) was performed in accordance with the international 10-20 system. The study was reviewed in bipolar and referential montages.       DESCRIPTION OF THE RECORD:        Background rhythm during awake stage shows average voltage 6 to 7 hertz alpha activity in the posterior regions. No spike-and-wave discharges or any lateralizing delta abnormalities are seen. There are short runs of nonspecific non-localizing delta over both hemisphere. Stage I sleep was achieved.      ACTIVATION PROCEDURES:          ICTAL AND/OR INTERICTAL FINDINGS:    No focal or generalized epileptiform activity noted. No regional delta slowing was seen during this routine study.  No clinical events or seizures were reported or recorded during the study.      EKG: sampling of the EKG recording demonstrated sinus rhythm.        INTERPRETATION:      ________________________________________________________________________    This scalp EEG is consistent with diffuse nonspecific cortical dysfunction - moderate    This nonspecific abnormalities could be secondary to metabolic, toxic, polypharmacy or even post-ictal    There are no epileptiform discharges in this record    Of note, unremarkable EEG does not completely exclude the diagnosis  of seizures since seizure is an episodic phenomena.  Clinical correlation may help     If clinical suspicion of seizure remains high.  Prolonged outpatient EEG   monitoring may be of help.    EEG 02/15/18 1:17 PM    ________________________________________________________________________

## 2018-02-15 NOTE — CARE PLAN
Problem: Infection  Goal: Will remain free from infection    Intervention: Assess signs and symptoms of infection  Patients individual infection risk assessed. Monitored for signs and symptoms of infection throughout shift. Washed hands or foamed in and out every encounter. Utilized universal precautions. Scrubbed hub before access to IV lines per protocol.         Problem: Skin Integrity  Goal: Risk for impaired skin integrity will decrease    Intervention: Assess risk factors for impaired skin integrity and/or pressure ulcers  Skin assessed at start of shift. Nutrition, moisture, sensory, activity, mobility, friction and shear assessed and addressed with patient and family. Lalit fiore.

## 2018-02-15 NOTE — PROGRESS NOTES
Cardiology Progress Note               Author: Lotus CHINO Giancarlo Date & Time created: 2/15/2018  7:53 AM     Interval History:  Patient seen on EPS rounds:  HPI:  Per DR Deng:  Rafia Shaikh is an 84-year-old man with a history of severe aortic stenosis status post transcatheter aortic valve replacement done on 2018 who presents with altered mental status (obtunded), and rapid atrial fibrillation.     The patient is intubated and sedated at the time of my examination. In speaking with his wife and staff in the emergency room he had become obtunded acutely at home about 2 hours prior to presentation. She called 911 immediately. While in the emergency room, he was in very rapid atrial fibrillation at around 180 bpm at which time he was given 5 mg of IV metoprolol. He is down and had several pauses. He was having about 15 second pauses when I saw him several minutes after that metoprolol bolus. Noting that he had a very low blood pressure not obtainable by cuff high discussed with the emergency room physician electrical cardioversion given his rapid atrial fibrillation and sick sinus syndrome. He had cardioversion ×1 with 150 J, and returns to rapid atrial fibrillation. I then gave him 300 mg of IV amiodarone followed by an additional 200 J ×1 cardioversion. He remained in rapid atrial fibrillation with periodic longer pauses. At that time, I called electrophysiology for assistance.     Impression and Medical Decision Makin. Bradycardic arrest: I have arranged for temporary pacemaker with electrophysiology as assistance I greatly appreciate.   2. Sick sinus syndrome: After placement of a temporary pacemaker I will plan for IV amiodarone to maintain sinus rhythm. I will also plan IV heparin drip for stroke prevention.   3. Multivessel coronary artery disease: Continue statin. Resume aspirin probably tomorrow. ACE inhibitor, and beta blocker contraindicated in the setting of shock.   4. Severe aortic stenosis  status post transcatheter aortic valve: Repeat echocardiogram ordered        Temp pacemaker insertion 2/14/18 Dr Stephens:  Immediate Post-Operative Note   PreOp Diagnosis: sinus arrest   PostOp Diagnosis: same   Procedure(s) :  Temporary pacemaker   Surgeon(s):  Usman Stephens M.D.   Type of Anesthesia: Moderate Sedation   Specimen: None   Estimated Blood Loss: 5 cc's   Contrast Media:  0 cc's   Fluoro Time: 1 min    Findings: r subclavian line with temp pacer   Complications: none apparent    CXR FINDINGS:  Cardiomediastinal contour is unchanged.  Previously described pulmonary parenchymal opacities persist.  No pneumothorax identified.  Supportive tubing is unchanged.  Postoperative change from prior open heart surgery.  CT Head   Impression     1.  No evidence of intracranial vascular occlusion or aneurysm.  2.  Extensive atherosclerotic plaque involving the intracranial vascular structures with multiple areas of atherosclerotic narrowing.  3.  Periventricular chronic small vessel ischemic change.   CT Neck  Impression     Marked partially calcified and ulcerated plaques located within the right carotid artery bifurcation. Between 50% to 70% stenosis at the origin right internal carotid artery is demonstrated.  Mild partially calcified plaque left carotid artery bifurcation. No significant stenosis.  Moderate/marked partially calcified plaque origin right vertebral artery resulting in between 50% and 70% stenosis.  Focal plaque or thrombus within the right vertebral artery at the C1 level.  Moderate partially calcified plaque within the proximal segment of the left vertebral artery resulting in 50% stenosis       Chief Complaint:  Obtundation  AF with RVR  Sinus arrest    Review of Systems   Unable to perform ROS: Critical illness       Physical Exam   Constitutional: He appears well-developed.   thin   HENT:   Head: Normocephalic.   Cardiovascular: Normal rate.    Paced with occasional VPC   Pulmonary/Chest:    Intubated   Abdominal: Soft.   Genitourinary:   Genitourinary Comments: ty   Neurological:   Sedated   Skin: Skin is warm and dry.       Hemodynamics:  Temp (24hrs), Av.5 °C (97.7 °F), Min:35.9 °C (96.6 °F), Max:37.5 °C (99.5 °F)  Temperature: 35.9 °C (96.6 °F), Monitored Temp: 35.9 °C (96.6 °F)  Pulse  Av.7  Min: 44  Max: 153Heart Rate (Monitored): (!) 59  Blood Pressure : 130/88, Arterial BP: 123/46, NIBP: 111/50     Respiratory:  Courtney Vent Mode: APVCMV, Rate (breaths/min): 18, PEEP/CPAP: 8, FiO2: 40, P Peak (PIP): 17, P MEAN: 10 Respiration: 14, Pulse Oximetry: 100 %     Work Of Breathing / Effort: Vented  RUL Breath Sounds: Diminished, RML Breath Sounds: Diminished, RLL Breath Sounds: Diminished, WHIT Breath Sounds: Diminished, LLL Breath Sounds: Diminished  Fluids:     Weight: 63.2 kg (139 lb 5.3 oz)  GI/Nutrition:  Orders Placed This Encounter   Procedures   • Diet NPO     Standing Status:   Standing     Number of Occurrences:   1     Order Specific Question:   Type:     Answer:   Now [1]     Order Specific Question:   Restrict to:     Answer:   Strict [1]     Lab Results:  Recent Labs      18   0400  18   1519  02/15/18   0520   WBC  16.0*  10.0  11.5*   RBC  4.24*  3.56*  4.00*   HEMOGLOBIN  13.9*  12.1*  13.0*   HEMATOCRIT  41.3*  36.1*  39.8*   MCV  97.4  101.4*  99.5*   MCH  32.8  34.0*  32.5   MCHC  33.7  33.5*  32.7*   RDW  45.3  49.1  48.1   PLATELETCT  158*  80*  84*   MPV  9.0  10.1  10.0     Recent Labs      18   0400  18   1444  02/15/18   0520   SODIUM  134*  139  140   POTASSIUM  3.9  4.1  3.4*   CHLORIDE  103  116*  111   CO2  29  19*  19*   GLUCOSE  129*  158*  143*   BUN  21  18  16   CREATININE  1.10  0.82  0.85   CALCIUM  9.8  6.1*  8.8         Recent Labs      18   0955  18   0400  18   1519   BNPBTYPENAT  126*  164*  88     Recent Labs      18   0955  18   0400  18   1444  18   1519   TROPONINI   --    --    0.28*   --    BNPBTYPENAT  126*  164*   --   88     Recent Labs      02/14/18   1444   TRIGLYCERIDE  58         Medical Decision Making, by Problem:  Active Hospital Problems    Diagnosis   • *Cardiac arrest (CMS-HCC) [I46.9]   • Acute respiratory failure with hypoxia (CMS-HCC) [J96.01]   • Cardiogenic shock (CMS-HCC) [R57.0]   • Bradycardia [R00.1]   • S/P TAVR (transcatheter aortic valve replacement) [Z95.2]   • Hx of CABG [Z95.1]   • Paroxysmal atrial fibrillation (CMS-HCC) [I48.0]   • Carotid artery stenosis [I65.29]   • TIA (transient ischemic attack) [G45.9]   • CAD (coronary artery disease) [I25.10]       Plan:  Continue to monitor.  Temp pacemaker appropriate function.  Site right SC uncomplicated.  Waiting to see neurological progress before implantation of permanent pacemaker.    Quality-Core Measures

## 2018-02-15 NOTE — DIETARY
"Nutrition Support Assessment      Nutrition services: Consult metabolic cart study, TF  Day 1 of admit.  83 yo male with admitting diagnosis: Bradycardia     Current problem list:  1. VDRF, intubated in ED 2/14.  2. Recent admit for TAVR, D/c'd home 2/13, and his wife reported that he has some confusion and slurred speech and then became obtunded. Paramedics arrived and they noted that he had some shaking-like behavior possible seizure-like activity.   3. Temporary PM placed. ?PPM.    Assessment:  Estimated Nutritional Needs based on: height 63\", weight 63.2 kg,  lb (56.4 kg), BMI 24.68     Calculation/Equation: REE per MSJ x1.2 = 1462 kcal/day; RMR per [facility] (vent L/min 6.8, T max/24 hours 37.8) = 1480 kcal/day  Calories/day: 1400 - 1600 (22-25 kcal/kg)  Grams Protein/day: 76 - 95 (1.2-1.5 grams/kg)     Evaluation:   1. Pt is intubated and unable to take PO diet.  2. Cortrak placed, await confirmation.  3. PMH, labs, and meds reviewed. Levophed @ 3 mcg/min. Propofol @ 30 mcg/kg/min = 11.4 ml/hr = 301 kcal/day.  4. Standard TF formula is appropriate.   5. Metabolic cart study not needed @ this time.      Recommendations/Plan:   1. Start Replete with Fiber @ 25 ml/hr and advance per protocol to goal rate with propofol 50 ml/hr to provide 1200 kcal (+Kcal from propofol), 75 grams protein, and 1008 ml free water per day.  2. When propofol D/c'd, increase TF to final goal rate 60 ml/hr to provide 1440 kcal, 90 grams protein, and 1210 ml free water per day.    RD following.           "

## 2018-02-15 NOTE — PROCEDURES
DATE OF SERVICE:  02/15/2018    This is an inpatient EEG, was done on 02/015/2018.    ROUTINE ELECTROENCEPHALOGRAM REPORT        NAME: Rafia Shaikh     REFERRING Dr:     INDICATION: Unresponsive        TECHNIQUE: 30 channel routine electroencephalogram (EEG) was performed in accordance with the international 10-20 system. The study was reviewed in bipolar and referential montages.         DESCRIPTION OF THE RECORD:           Background rhythm during awake stage shows average voltage 6 to 7 hertz alpha activity in the posterior regions. No spike-and-wave discharges or any lateralizing delta abnormalities are seen. There are short runs of nonspecific non-localizing delta over both hemisphere. Stage I sleep was achieved.        ACTIVATION PROCEDURES:             ICTAL AND/OR INTERICTAL FINDINGS:    No focal or generalized epileptiform activity noted. No regional delta slowing was seen during this routine study.  No clinical events or seizures were reported or recorded during the study.       EKG: sampling of the EKG recording demonstrated sinus rhythm.          INTERPRETATION:        ________________________________________________________________________     This scalp EEG is consistent with diffuse nonspecific cortical dysfunction - moderate     This nonspecific abnormalities could be secondary to metabolic, toxic, polypharmacy or even post-ictal     There are no epileptiform discharges in this record     Of note, unremarkable EEG does not completely exclude the diagnosis  of seizures since seizure is an episodic phenomena.  Clinical correlation may help     If clinical suspicion of seizure remains high.  Prolonged outpatient EEG   monitoring may be of help.     EEG 02/15/18 1:17 PM     ________________________________________________________________________                           ____________________________________     MD DANIELE PONCE / LATISHA    DD:  02/15/2018 13:19:27  DT:  02/15/2018 13:50:54    D#:   [id number]  Job#:  [id number]

## 2018-02-15 NOTE — H&P
Hospital Medicine History and Physical    Date of Service  2/14/2018    Chief Complaint  Chief Complaint   Patient presents with   • Unresponsive       History of Presenting Illness  84 y.o. male who presented 2/14/2018 with a PMH of severe symptomatic aortic stenosis s/p TAVR on 2/12/18, coronary artery disease with prior CABG X3, PVD with prior left superficial femoral artery stent, carotid artery disease with L CEA, paroxysmal atrial fibrillation, TIA, GIB with embolization, RBBB who was discharged yesterday after undergoing TAVR. The wife states that the patient had been acting confused today with slurring of his speech then developed sudden onset of difficulty and became obtunded. She also reported some shaking and seizure-like activity. EMS was called and he was found to be in atrial fibrillation with RVR and developed a 10 2nd sinus pause with no pulse with return of spontaneous circulation. The patient was emergently intubated in the ER to protect his airways. A stat CTA of the head and neck did not reveal any bleed or large embolic stroke. Stat echo revealed normal functioning TAVR aortic valve, normal pericardium without effusion and LVEF of 40% with global hypokinesis. In the ER the patient was found to be in atrial fibrillation with rapid ventricular response and received IV metoprolol after which the patient came bradycardic and had long sinus pauses. Patient had a sinus pause of 15 seconds after which he was given epinephrine, atropine and CPR was started. He had immediate return of spontaneous circulation. He was started on IV levophed drip. Cardiology was consulted and patient underwent electrical cardioversion ×2. An emergent temporary pacemaker was also placed and patient was admitted to the ICU. The wife denies any report of chest pain, fever, abdominal pain, nausea, vomiting or diarrhea prior to this episode.      Primary Care Physician  Yarelis Gtz M.D.    Consultants  Cardiology   Ish Quinones  Critical care Dr Seth     Code Status  Full Code    Review of Systems  Review of Systems   Unable to perform ROS: Mental acuity        Past Medical History  Past Medical History:   Diagnosis Date   • Arrhythmia 5/12/17    Hx A fib. On only aspirin.    • Dental disorder 5/12/17    permanent bridge lower   • Rectal bleed 4/4/2016    Cauterized and received blood transfusions   • Anemia 4/2016    due to rectal bleed   • Sleep apnea 2014    States recommended CPAP but never did get it.   • Carotid artery stenosis 6/13/2011   • Dizziness 6/13/2011   • Hypothyroidism 6/13/2011   • TIA (transient ischemic attack) 6/13/2011   • Preoperative examination, unspecified 4/12/2011   • Dyslipidemia 9/18/2010   • Stroke (CMS-HCC) 4/16/2010    TIA    • Myocardial infarct 2008 Stent 2011.  5/12/17-Cardiologist is DR Wu ([facility])   • Renal disorder 2000    kidney stone   • CAD (coronary artery disease)    • CATARACT 1990's    Bilateral phaco with IOL   • Dental disorder     cavities, under current care   • Gout    • Heart murmur    • High cholesterol    • Hyperlipidemia    • Hypertension    • Insomnia    • PAD (peripheral artery disease)    • Tendonitis    • Unspecified disorder of thyroid    • Urinary incontinence        Surgical History  Past Surgical History:   Procedure Laterality Date   • TRANSCATHETER AORTIC VALVE REPLACEMENT N/A 2/12/2018    Procedure: TRANSCATHETER AORTIC VALVE REPLACEMENT;  Surgeon: Jez Waters M.D.;  Location: [facility];  Service: Cardiac   • JANETH  2/12/2018    Procedure: JANETH;  Surgeon: Jez Waters M.D.;  Location: [facility];  Service: Cardiac   • COLONOSCOPY N/A 5/16/2017    Procedure: COLONOSCOPY;  Surgeon: Osmany Smart M.D.;  Location: [facility];  Service:    • LAPAROSCOPY  4/2/2016    exp for rectal bleed and cautery   • COLONOSCOPY  2015   • RECOVERY  8/22/2012    aortogram-Performed by SURGERY, IR-RECOVERY at [facility]  [place]   • CAROTID ENDARTERECTOMY  5/3/2011    Performed by ERASMO NAVARRETE at SURGERY [facility] ORS   • RECOVERY  2011    Performed by SURGERY, CATH-RECOVERY at SURGERY SAME DAY [facility] ORS   • RECOVERY  3/25/2011    Performed by SURGERY, CATH-RECOVERY at SURGERY SAME DAY [facility] ORS   • MULTIPLE CORONARY ARTERY BYPASS ENDO VEIN HARVEST  3/20/08    Performed by AMANDA CRYSTAL at SURGERY [facility] ORS   • LITHOTRIPSY     • SEPTOPLASTY     • CATARACT EXTRACTION WITH IOL Bilateral early        Medications  No current facility-administered medications on file prior to encounter.      Current Outpatient Prescriptions on File Prior to Encounter   Medication Sig Dispense Refill   • atorvastatin (LIPITOR) 20 MG Tab Take 20 mg by mouth every day.     • levothyroxine (SYNTHROID) 50 MCG Tab TAKE 1 TABLET BY MOUTH DAILY 90 Tab 0   • aspirin EC (ECOTRIN) 81 MG Tablet Delayed Response Take 81 mg by mouth every evening.     • Calcium Carbonate-Vit D-Min (CALTRATE PLUS PO) Take 1 Tab by mouth every day.     • B Complex Vitamins (VITAMIN B COMPLEX PO) Take 1 Tab by mouth 2 Times a Day.     • Multiple Vitamins-Minerals (CENTRUM SILVER) TABS Take 2 Tabs by mouth 2 Times a Day.         Family History  Family History   Problem Relation Age of Onset   • Heart Disease Father    • Other Mother      TB   • Hypertension     • Colon Cancer Brother    • Breast Cancer Sister    • Other       GOUT       Social History  Social History   Substance Use Topics   • Smoking status: Never Smoker   • Smokeless tobacco: Never Used   • Alcohol use No       Allergies  No Known Allergies     Physical Exam  Laboratory   Hemodynamics  Temp (24hrs), Av.3 °C (99.2 °F), Min:37.1 °C (98.8 °F), Max:37.5 °C (99.5 °F)   Temperature: 37.1 °C (98.8 °F), Monitored Temp: 37.3 °C (99.1 °F)  Pulse  Av.8  Min: 46  Max: 153 Heart Rate (Monitored): (!) 59  Blood Pressure : 130/88, Arterial BP: (!) 93/42, NIBP: 112/47       Respiratory  Courtney Vent Mode: APVCMV, Rate (breaths/min): 18, PEEP/CPAP: 8, PEEP/CPAP: 8, FiO2: 60, P Peak (PIP): 12, P MEAN: 10   Respiration: (!) 25, Pulse Oximetry: 92 %     Work Of Breathing / Effort: Vented  RUL Breath Sounds: Diminished, RML Breath Sounds: Diminished, RLL Breath Sounds: Diminished, WHIT Breath Sounds: Diminished, LLL Breath Sounds: Diminished    Physical Exam   Constitutional: He appears well-developed and well-nourished.   HENT:   Head: Normocephalic and atraumatic.   Eyes: Conjunctivae are normal.   Neck: Neck supple.   Cardiovascular:   bradycardic   Pulmonary/Chest: Effort normal and breath sounds normal. He has no wheezes. He has no rales.   Abdominal: Soft. Bowel sounds are normal. He exhibits no distension. There is no tenderness.   Musculoskeletal: He exhibits no edema.   Neurological:   Intubated and sedated   Skin: Skin is warm.   Nursing note and vitals reviewed.      Recent Labs      02/12/18 0955 02/13/18   0400  02/14/18   1519   WBC  14.2*  16.0*  10.0   RBC  4.24*  4.24*  3.56*   HEMOGLOBIN  14.2  13.9*  12.1*   HEMATOCRIT  41.1*  41.3*  36.1*   MCV  96.9  97.4  101.4*   MCH  33.5*  32.8  34.0*   MCHC  34.5  33.7  33.5*   RDW  45.6  45.3  49.1   PLATELETCT  193  158*  80*   MPV  9.1  9.0  10.1     Recent Labs      02/12/18 0955 02/13/18   0400  02/14/18   1444   SODIUM  136  134*  139   POTASSIUM  4.2  3.9  4.1   CHLORIDE  103  103  116*   CO2  23  29  19*   GLUCOSE  122*  129*  158*   BUN  17  21  18   CREATININE  1.14  1.10  0.82   CALCIUM  10.2  9.8  6.1*     Recent Labs      02/12/18   0955  02/13/18   0400  02/14/18   1444   ALTSGPT  42  30  18   ASTSGOT  35  33  45   ALKPHOSPHAT  82  80  48   TBILIRUBIN  1.1  1.3  0.8   GLUCOSE  122*  129*  158*         Recent Labs      02/12/18   0955  02/13/18   0400  02/14/18   1519   BNPBTYPENAT  126*  164*  88     Recent Labs      02/14/18   1444   TRIGLYCERIDE  58     Lab Results   Component Value Date    TROPONINI 0.28  (H) 02/14/2018     Urinalysis:    Lab Results  Component Value Date/Time   SPECGRAVITY 1.022 02/07/2018 1123   GLUCOSEUR Negative 02/07/2018 1123   KETONES Negative 02/07/2018 1123   NITRITE Negative 02/07/2018 1123   WBCURINE 0-2 (A) 03/24/2008 2015   RBCURINE Rare 03/24/2008 2015   BACTERIA Rare (A) 03/24/2008 2015   EPITHELCELL Rare 03/24/2008 2015        Imaging  DX-CHEST-PORTABLE (1 VIEW)   Final Result    Central line placement with tips superimposed on the expected location of superior vena cava.   Left lower lobe opacities which likely represent subsegmental atelectasis.      ECHOCARDIOGRAM LTD W/O CONT   Final Result      CT-CTA NECK WITH & W/O-POST PROCESSING   Final Result      Marked partially calcified and ulcerated plaques located within the right carotid artery bifurcation. Between 50% to 70% stenosis at the origin right internal carotid artery is demonstrated.   Mild partially calcified plaque left carotid artery bifurcation. No significant stenosis.   Moderate/marked partially calcified plaque origin right vertebral artery resulting in between 50% and 70% stenosis.   Focal plaque or thrombus within the right vertebral artery at the C1 level.   Moderate partially calcified plaque within the proximal segment of the left vertebral artery resulting in 50% stenosis.      DX-CHEST-PORTABLE (1 VIEW)   Final Result      1.  Nasogastric tube tip located just above the gastroesophageal junction. Advancement is recommended.      2.  Endotracheal tube tip located below the level of the clavicles.      3.  Cardiomegaly.      CT-CTA HEAD WITH & W/O-POST PROCESS   Final Result      1.  No evidence of intracranial vascular occlusion or aneurysm.      2.  Extensive atherosclerotic plaque involving the intracranial vascular structures with multiple areas of atherosclerotic narrowing.      3.  Periventricular chronic small vessel ischemic change.      DX-CHEST-PORTABLE (1 VIEW)    (Results Pending)   MR-BRAIN-W/O     (Results Pending)   MR-MRA HEAD-W/O    (Results Pending)        Assessment/Plan     I anticipate this patient will require at least two midnights for appropriate medical management, necessitating inpatient admission.    Cardiac arrest (CMS-[facility])- (present on admission)   Assessment & Plan    Sinus pause for 15 seconds, given epi, atropine and CPR with immediate ROSC  Continue IV levophed and titrate to MAP of 65  S/p temporary pacemaker placement         S/P TAVR (transcatheter aortic valve replacement)- (present on admission)   Assessment & Plan    2D Echo reveals normal functioning TAVR aortic valve, normal pericardium without effusion and LVEF of 40% with global hypokinesis.          Cardiogenic shock (CMS-HCC)- (present on admission)   Assessment & Plan    Continue IV levophed drip titrate to MAP of 65  Continue IVF  S/p temporary pacemaker placement           Altered mental status- (present on admission)   Assessment & Plan    Concern for stroke and seizure   Will order MRI brain and MRA  Order EEG   Seizure precautions           Bradycardia- (present on admission)   Assessment & Plan    S/p epi, atropine and levophed drip with persistent bradycardia  Status post temporary pacemaker placement  Cardiology and EP on board  Continue cardiac monitoring          Carotid artery stenosis- (present on admission)   Assessment & Plan    S/p L CEA, follows   Continue statin and aspirin           CAD (coronary artery disease)- (present on admission)   Assessment & Plan    Status post CABG ×3  Continues statin  Continue aspirin         Acquired hypothyroidism- (present on admission)   Assessment & Plan    Check TSH  Continue synthroid         Paroxysmal atrial fibrillation (CMS-HCC)- (present on admission)   Assessment & Plan    Started on IV amiodarone  Defer anticoagulation to cardiology with history of GI bleed                VTE prophylaxis: SCD.

## 2018-02-15 NOTE — PROGRESS NOTES
Pulmonary Critical Care Progress Note        Date of admission: 2/14/2018  Date of service: 2/15/2018    Chief Complaint: AMS    History of Present Illness: 84 y.o. male pmhx of Paroxysmal A-fib, CAD s/p CABG, AS s/p TAVR 2/12/18, PAD, Carotid artery disease, TIA, GIB. Presents to the ER today with AMS (obtundation) 1 day after discharge from hospital for TAVR, he was found by EMS in A-fib with RVR. In the ER he was intubated for airway protection and given metoprolol 5 mg for RVR. He then began to have significant pauses and severe hypotension during pauses. Cards was consulted and attempted cardioversion x1 then 300mg of amiodarone followed by another attempted electrocardioversion. EP saw the patient for placement of TPM for SSS and A-fib, he was taken emergently to the cath lab for TPM.    ROS:  Respiratory: unable to perform due to the patient's inability to effectively communicate, Cardiac: unable to perform due to the patient's inability to effectively communicate, GI: unable to perform due to the patient's inability to effectively communicate.  All other systems negative.    Interval Events:  24 hour interval history reviewed    - patricia with 15 s pause --> temp pacer placed and now 100% paced (mA2)   - follows on sedation vacation   - NE gtt @ 8   - change to coretrak, start TFs   - amio 0.5 --> start heparin gtt per cards   - low K/phos    PFSH:  No change.    Respiratory:  Courtney Vent Mode: APVCMV, Rate (breaths/min): 18, Vt Target (mL): 340, PEEP/CPAP: 8, FiO2: 40, Static Compliance (ml / cm H2O): 47, Control VTE (exp VT): 340  Pulse Oximetry: 100 % PIP 18          Exam: unlabored respirations, no intercostal retractions or accessory muscle use and rales bibasilar  ImagingAvailable data reviewed (personally reviewed and compared to prior film): No acute cardiopulmonary process with endotracheal tube/gastric tube/right IJ CVL/right subclavian CVL through which transvenous pacing wire in good  position  Recent Labs      02/12/18   0926  02/14/18   1526  02/15/18   0424   ISTATAPH  7.308*  7.378*  7.371*   ISTATAPCO2  50.1*  37.7*  37.4*   ISTATAPO2  105*  179*  193*   ISTATATCO2  27  23  23   [id number]  97  100*  100*   ISTATARTHCO3  25.1*  22.2  21.7   ISTATARTBE  -2  -3  -3   ISTATTEMP  37.0 C  see below  36.1 C   ISTATFIO2  100  100  60   ISTATSPEC  Arterial  Arterial  Arterial   ISTATAPHTC  7.308*   --   7.384*   [id number]  105*   --   189*   ABG: Partially compensated metabolic acidosis with adequate oxygenation    HemoDynamics:  Pulse: (!) 51, Heart Rate (Monitored): (!) 59  Blood Pressure : 130/88, Arterial BP: 123/46, NIBP: 111/50   the Levophed, amiodarone, heparin drips    Exam: 100% paced at 3 mAmp, (+) murmur, distant heart sounds, lat displaced PMI  Imaging: Available data reviewed    Echocardiogram 2/14:    This was a limited study. Technically difficult study incomplete   information is obtained.    1. Known TAVR aortic valve that is functioning normally with normal   transvalvular gradients. Transvalvular gradients are - Peak: 15  mmHg,    Mean: 9  mmHg. Trivial paravalvular leak is noted.  2. Mitral regurgitation was not evaluated during this study, however   there appeared to be no impingment or restriction of the mitral valve   leaflets by the bioprosthetic aortic valve.  3. Normal pericardium without effusion.  4. Mild to moderately decreased left ventricular systolic function.   Estimated LVEF 40%, however this estimate was difficult due to severe   tachycardia.  5. Global hypokinesis.    Compared to the previous study performed on 2/13/2018:  There appears   to be less perivalvular leak.  The TAVR valve might be slightly farther   into the ventricle, however there does not appear to be any   obstruction.  Recent Labs      02/12/18   0955  02/13/18   0400  02/14/18   1444  02/14/18   1519   TROPONINI   --    --   0.28*   --    BNPBTYPENAT  126*  164*   --   88       Neuro:  GCS  Total Sin Coma Score: 8 propofol drip, when necessary fentanyl       Exam: Heavily sedated but per RN staff follows commands moving all extremities with sedation medication  Imaging: Available data reviewed    EEG 2/15: This scalp EEG is consistent with diffuse nonspecific cortical dysfunction - moderate     This nonspecific abnormalities could be secondary to metabolic, toxic, polypharmacy or even post-ictal     There are no epileptiform discharges in this record    Fluids:  Intake/Output       02/13/18 0700 - 02/14/18 0659 (Not Admitted) 02/14/18 0700 - 02/15/18 0659 02/15/18 0700 - 02/16/18 0659      [id number] 4558-2449 Total 6709-5328 5669-3042 Total 3686-1628 5558-9175 Total       Intake    P.O.  --  -- --  --  0 0  --  -- --    P.O. -- -- -- -- 0 0 -- -- --    I.V.  --  -- --  50  680.3 730.3  --  -- --    Amiodarone Volume -- -- -- -- 207.9 207.9 -- -- --    Propofol Volume -- -- -- 13.4 241.8 255.2 -- -- --    Norepinephrine Volume -- -- -- 36.6 230.6 267.2 -- -- --    Total Intake -- -- -- 50 680.3 730.3 -- -- --       Output    Urine  --  -- --  1000  1397 2397  --  -- --    Indwelling Cathether -- -- -- 1000 1397 2397 -- -- --    Stool  --  -- --  --  -- --  --  -- --    Number of Times Stooled -- -- -- -- 0 x 0 x -- -- --    Total Output -- -- -- 1000 1397 2397 -- -- --       Net I/O     -- -- -- -950.1 -716.7 -1666.7 -- -- --        Weight: 63.2 kg (139 lb 5.3 oz)  Recent Labs      02/13/18   0400  02/14/18   1444  02/15/18   0520   SODIUM  134*  139  140   POTASSIUM  3.9  4.1  3.4*   CHLORIDE  103  116*  111   CO2  29  19*  19*   BUN  21  18  16   CREATININE  1.10  0.82  0.85   MAGNESIUM   --   1.3*  2.1   PHOSPHORUS   --    --   1.9*   CALCIUM  9.8  6.1*  8.8       GI/Nutrition:  Exam: abdomen is soft and non-tender, normal active bowel sounds  Imaging: Available data reviewed  NPO  Liver Function  Recent Labs      02/12/18   0955  02/13/18   0400  02/14/18   1444  02/15/18   0520   ALTSGPT  42   30  18   --    ASTSGOT  35  33  45   --    ALKPHOSPHAT  82  80  48   --    TBILIRUBIN  1.1  1.3  0.8   --    GLUCOSE  122*  129*  158*  143*       Heme:  Recent Labs      18   0400  18   1519  02/15/18   0520   RBC  4.24*  3.56*  4.00*   HEMOGLOBIN  13.9*  12.1*  13.0*   HEMATOCRIT  41.3*  36.1*  39.8*   PLATELETCT  158*  80*  84*       Infectious Disease:  Monitored Temp  Av.6 °C (97.8 °F)  Min: 35.8 °C (96.4 °F)  Max: 37.5 °C (99.5 °F)  Temp  Av.5 °C (97.7 °F)  Min: 35.9 °C (96.6 °F)  Max: 37.5 °C (99.5 °F)  Micro: reviewed  Recent Labs      18   0955  180  18   1444  02/14/18   1519  02/15/18   0520   WBC  14.2*  16.0*   --   10.0  11.5*   NEUTSPOLYS   --    --    --   87.20*  69.10   LYMPHOCYTES   --    --    --   3.90*  15.80*   MONOCYTES   --    --    --   7.60  12.60   EOSINOPHILS   --    --    --   0.00  0.80   BASOPHILS   --    --    --   0.40  0.60   ASTSGOT  35  33  45   --    --    ALTSGPT  42  30  18   --    --    ALKPHOSPHAT  82  80  48   --    --    TBILIRUBIN  1.1  1.3  0.8   --    --      Current Facility-Administered Medications   Medication Dose Frequency Provider Last Rate Last Dose   • MD ALERT...HEPARIN WEIGHT BASED PROTOCOL Pharmacist to implement   PRN Christine Powers, CHINO.P.R.N.       • norepinephrine (LEVOPHED) 8 mg in  mL Infusion  0-30 mcg/min Continuous Kraig Seth Jr., D.O. 15 mL/hr at 02/15/18 0451 8 mcg/min at 02/15/18 0451   • amiodarone (CORDARONE) 450 mg in D5W 250 mL Infusion  0.5 mg/min Continuous Godwin Wilson M.D. 17 mL/hr at 02/15/18 0249 0.5 mg/min at 02/15/18 0249   • Respiratory Care per Protocol   Continuous RT Kraig Seth Jr., DODIE.O.       • senna-docusate (PERICOLACE or SENOKOT S) 8.6-50 MG per tablet 2 Tab  2 Tab BID Kraig Seth Jr., D.O.   Stopped at 18 2100    And   • polyethylene glycol/lytes (MIRALAX) PACKET 1 Packet  1 Packet QDAY PRN Kraig Seth Jr., D.O.        And   • magnesium  hydroxide (MILK OF MAGNESIA) suspension 30 mL  30 mL QDAY PRN LAURA Watters Jr.OPawel        And   • bisacodyl (DULCOLAX) suppository 10 mg  10 mg QDAY PRN DODIE Watters Jr..O.       • chlorhexidine (PERIDEX) 0.12 % solution 15 mL  15 mL BID DODIE Watters Jr..OPawel   15 mL at 02/14/18 2004   • lidocaine (XYLOCAINE) 1%  injection  1-2 mL Q30 MIN PRN DODIE Watters Jr..KEITH.       • MD ALERT...Adult ICU Electrolyte Replacement per Pharmacy Protocol   pharmacy to dose ODDIE Watters Jr..KEITH.       • fentaNYL (SUBLIMAZE) injection 25 mcg  25 mcg Q HOUR PRN Kraig Seth Jr., D.O.        Or   • fentaNYL (SUBLIMAZE) injection 50 mcg  50 mcg Q HOUR PRN DODIE Watters Jr..OPawel   50 mcg at 02/15/18 0330    Or   • fentaNYL (SUBLIMAZE) injection 100 mcg  100 mcg Q HOUR PRN DODIE Watters Jr..O.       • insulin regular (HUMULIN R) injection 1-6 Units  1-6 Units Q6HRS DODIE Watters Jr..O.   Stopped at 02/14/18 1915    And   • glucose 4 g chewable tablet 16 g  16 g Q15 MIN PRN DODIE Watters Jr..OPawel        And   • dextrose 50% (D50W) injection 25 mL  25 mL Q15 MIN PRN DODIE Watters Jr..O.       • famotidine (PEPCID) tablet 20 mg  20 mg Q12HRS DODIE Watters Jr..O.        Or   • famotidine (PEPCID) injection 20 mg  20 mg Q12HRS DODIE Watters Jr..O.   20 mg at 02/14/18 2004   • ipratropium-albuterol (DUONEB) nebulizer solution 3 mL  3 mL Q2HRS PRN (RT) DODIE Watters Jr..O.       • phenylephrine (JOHN-SYNEPHRINE) 40,000 mcg in  mL Infusion  0-300 mcg/min Continuous DODIE Watters Jr..O.   Stopped at 02/14/18 1915   • propofol (DIPRIVAN) injection  0-80 mcg/kg/min Continuous Kraig Seth Jr., D.O. 19.1 mL/hr at 02/15/18 0612 50 mcg/kg/min at 02/15/18 0612   • acetaminophen (TYLENOL) tablet 650 mg  650 mg Q6HRS PRN Gavin Mackenzie M.D.       • atorvastatin (LIPITOR) tablet 20 mg  20 mg QHS Gavin Mackenzie M.D.   Stopped at 02/14/18 2100   • levothyroxine (SYNTHROID) tablet 50 mcg   50 mcg AM ES Gavin Mackenzie M.D.   Stopped at 02/15/18 0700   • NS infusion   Continuous Gavin Mackenzie M.D. 75 mL/hr at 02/14/18 2232     • aspirin EC (ECOTRIN) tablet 81 mg  81 mg Q EVENING Gavin Mackenzie M.D.   Stopped at 02/14/18 2130     Last reviewed on 2/14/2018  5:15 PM by Luh Kirkland, [facility]    Quality  Measures:  Radiology images reviewed, Labs reviewed and Medications reviewed  Baldwin catheter: Critically Ill - Requiring Accurate Measurement of Urinary Output and Unconscious / Sedated Patient on a Ventilator  Central line in place: Shock and Vasopressors    DVT Prophylaxis: Heparin  DVT prophylaxis - mechanical: SCDs  Ulcer prophylaxis: Yes    Assessed for rehab: Patient unable to tolerate rehabilitation therapeutic regimen      Assessment/Plan:  Acute hypoxic respiratory failure - intubated 2/14/18              - continue full vent support, O2 protocol   - start SBT but no extubation yet pending possible pacemaker placement   - Sedation with propofol drip, sedation vacations  Bradycardic arrest followed by evidence of sick sinus syndrome/PAF requiring 100% TV pacer              - continue pacing for now, EP and cardiology following   - Optimize electrolytes    - Probable permanent pacemaker placement in near future    - Continue IV amiodarone, heparin drip    Cardiogenic shock              - continue norepinephrine, goal MAP >65    - monitor CVP with goal 8-10  S/P TAVR   HFrEF   - Diuresis, holding beta-blockade  Acute undifferentiated encephalopathy - improving, likely secondary to cardiogenic shock              - monitor, avoid sedatives, frequent neurologic checks  CAD              - ASA, statin  Hypothyroidism              - synthroid  Hypophosphatemia/kalemia - repletion  Prophylaxis, start enteral nutrition via core tract      Discussed patient condition and risk of morbidity and/or mortality with RN, RT, Pharmacy, Charge nurse / hot rounds and cardiology and hospitalist.    The patient remains  critically ill.  Critical care time = 34 minutes in directly providing and coordinating critical care and extensive data review.  No time overlap and excludes procedures.

## 2018-02-15 NOTE — CONSULTS
Pulmonary & Critical Care Consult Note    DATE OF CONSULTATION:  2/14/2018     REFERRING PHYSICIAN:  Danish Colón M.D.     CONSULTANT:  Kraig Seth DO     REASON FOR CONSULTATION:  VDRF     HISTORY OF PRESENT ILLNESS:  84 yom pmhx of Paroxysmal A-fib, CAD s/p CABG, AS s/p TAVR 2/12/18, PAD, Carotid artery disease, TIA, GIB. Presents to the ER today with AMS (obtundation) 1 day after discharge from hospital for TAVR, he was found by EMS in A-fib with RVR. In the ER he was intubated for airway protection and given metoprolol 5 mg for RVR. He then began to have significant pauses and severe hypotension during pauses. Cards was consulted and attempted cardioversion x1 then 300mg of amiodarone followed by another attempted electrocardioversion. EP saw the patient for placement of TPM for SSS and A-fib, he was taken emergently to the cath lab for TPM.     PAST MEDICAL HISTORY:  Past Medical History:   Diagnosis Date   • Anemia 4/2016    due to rectal bleed   • Arrhythmia 5/12/17    Hx A fib. On only aspirin.    • CAD (coronary artery disease)    • Carotid artery stenosis 6/13/2011   • CATARACT 1990's    Bilateral phaco with IOL   • Dental disorder     cavities, under current care   • Dental disorder 5/12/17    permanent bridge lower   • Dizziness 6/13/2011   • Dyslipidemia 9/18/2010   • Gout    • Heart murmur    • High cholesterol    • Hyperlipidemia    • Hypertension    • Hypothyroidism 6/13/2011   • Insomnia    • Myocardial infarct 2008    Stent 2011.  5/12/17-Cardiologist is DR Wu ([facility])   • PAD (peripheral artery disease)    • Preoperative examination, unspecified 4/12/2011   • Rectal bleed 4/4/2016    Cauterized and received blood transfusions   • Renal disorder 2000    kidney stone   • Sleep apnea 2014    States recommended CPAP but never did get it.   • Stroke (CMS-HCC) 4/16/2010    TIA    • Tendonitis    • TIA (transient ischemic attack) 6/13/2011   • Unspecified disorder of thyroid    • Urinary incontinence          PAST SURGICAL HISTORY:   Past Surgical History:   Procedure Laterality Date   • TRANSCATHETER AORTIC VALVE REPLACEMENT N/A 2/12/2018    Procedure: TRANSCATHETER AORTIC VALVE REPLACEMENT;  Surgeon: Jez Waters M.D.;  Location: SURGERY [facility];  Service: Cardiac   • JANETH  2/12/2018    Procedure: JANETH;  Surgeon: Jez Waters M.D.;  Location: [facility];  Service: Cardiac   • COLONOSCOPY N/A 5/16/2017    Procedure: COLONOSCOPY;  Surgeon: Osmany Smart M.D.;  Location: [facility];  Service:    • LAPAROSCOPY  4/2/2016    exp for rectal bleed and cautery   • COLONOSCOPY  2015   • RECOVERY  8/22/2012    aortogram-Performed by SURGERY, IR-RECOVERY at [facility]   • CAROTID ENDARTERECTOMY  5/3/2011    Performed by ERASMO NAVARRETE at [facility]   • RECOVERY  4/1/2011    Performed by SURGERY, CATH-RECOVERY at SURGERY SAME DAY [facility] ORS   • RECOVERY  3/25/2011    Performed by SURGERY, CATH-RECOVERY at SURGERY SAME DAY [facility] ORS   • MULTIPLE CORONARY ARTERY BYPASS ENDO VEIN HARVEST  3/20/08    Performed by AMANDA CRYSTAL at SURGERY [facility]   • LITHOTRIPSY  2000   • SEPTOPLASTY  1995   • CATARACT EXTRACTION WITH IOL Bilateral early 1990's        ALLERGIES:   Patient has no known allergies.     MEDICATIONS PRIOR TO ADMISSION:  No current facility-administered medications on file prior to encounter.      Current Outpatient Prescriptions on File Prior to Encounter   Medication Sig Dispense Refill   • clopidogrel (PLAVIX) 75 MG Tab Take 1 Tab by mouth every day. 90 Tab 0   • atorvastatin (LIPITOR) 20 MG Tab Take 20 mg by mouth every day.     • levothyroxine (SYNTHROID) 50 MCG Tab TAKE 1 TABLET BY MOUTH DAILY 90 Tab 0   • aspirin EC (ECOTRIN) 81 MG Tablet Delayed Response Take 81 mg by mouth every evening.     • Calcium Carbonate-Vit D-Min (CALTRATE PLUS PO) Take 1 Tab by mouth every day.     • B Complex Vitamins (VITAMIN B COMPLEX PO) Take 1 Tab by  mouth 2 Times a Day.     • Multiple Vitamins-Minerals (CENTRUM SILVER) TABS Take 2 Tabs by mouth 2 Times a Day.         SOCIAL HISTORY:   Social History     Social History   • Marital status:      Spouse name: N/A   • Number of children: N/A   • Years of education: N/A     Occupational History   • Not on file.     Social History Main Topics   • Smoking status: Never Smoker   • Smokeless tobacco: Never Used   • Alcohol use No   • Drug use: No   • Sexual activity: Not on file     Other Topics Concern   • Not on file     Social History Narrative   • No narrative on file       FAMILY HISTORY:  Family History   Problem Relation Age of Onset   • Heart Disease Father    • Other Mother      TB   • Hypertension     • Colon Cancer Brother    • Breast Cancer Sister    • Other       GOUT        REVIEW OF SYSTEMS:  Unable to assess secondary to intubated and sedated status    PHYSICAL EXAMINATION:  /88   Pulse (!) 101   Resp 20   Wt 63.5 kg (140 lb)   SpO2 100%   BMI 24.80 kg/m²   GENERAL:  Frail, chronically ill appearing, in severe distress  HEENT:  NC, AT, PERRL, EOMI, external ears and nose normal, ETT in place  NECK:  Supple, CVC in place  PULM:   CTAB   CVS:  Irregularly, irregular rhythm, tachycardia  ABDOMEN:  Soft, NT, ND, BS present  EXTREMITIES:  Right thigh ecchymosis 2/2 catheterization  SKIN:  Warm, pink, dry  NEURO: sedated, GCS 8 (E2, V1, M5)    LABORATORY DATA:    Lab Results   Component Value Date/Time    WBC 10.0 02/14/2018 03:19 PM    WBC 7.2 04/05/2013 10:05 AM    RBC 3.56 (L) 02/14/2018 03:19 PM    RBC 3.96 (L) 04/05/2013 10:05 AM    HEMOGLOBIN 12.1 (L) 02/14/2018 03:19 PM    HEMATOCRIT 36.1 (L) 02/14/2018 03:19 PM    .4 (H) 02/14/2018 03:19 PM     (H) 04/05/2013 10:05 AM    MCH 34.0 (H) 02/14/2018 03:19 PM    MCH 33.3 (H) 04/05/2013 10:05 AM    MCHC 33.5 (L) 02/14/2018 03:19 PM    MPV 10.1 02/14/2018 03:19 PM    NEUTSPOLYS 87.20 (H) 02/14/2018 03:19 PM    LYMPHOCYTES 3.90  (L) 02/14/2018 03:19 PM    MONOCYTES 7.60 02/14/2018 03:19 PM    EOSINOPHILS 0.00 02/14/2018 03:19 PM    BASOPHILS 0.40 02/14/2018 03:19 PM      Lab Results   Component Value Date/Time    SODIUM 134 (L) 02/13/2018 04:00 AM    POTASSIUM 3.9 02/13/2018 04:00 AM    CHLORIDE 103 02/13/2018 04:00 AM    CO2 29 02/13/2018 04:00 AM    GLUCOSE 129 (H) 02/13/2018 04:00 AM    BUN 21 02/13/2018 04:00 AM    CREATININE 1.10 02/13/2018 04:00 AM    CREATININE 1.80 (H) 04/05/2013 10:05 AM    BUNCREATRAT 15 09/12/2017 11:47 AM    BUNCREATRAT 16 04/05/2013 10:05 AM      Lab Results   Component Value Date/Time    PROTHROMBTM 13.2 02/07/2018 11:19 AM    INR 1.03 02/07/2018 11:19 AM         IMAGING:   CXR (personally reviewed)  ECHOCARDIOGRAM LTD W/O CONT   Final Result      CT-CTA NECK WITH & W/O-POST PROCESSING   Final Result      Marked partially calcified and ulcerated plaques located within the right carotid artery bifurcation. Between 50% to 70% stenosis at the origin right internal carotid artery is demonstrated.   Mild partially calcified plaque left carotid artery bifurcation. No significant stenosis.   Moderate/marked partially calcified plaque origin right vertebral artery resulting in between 50% and 70% stenosis.   Focal plaque or thrombus within the right vertebral artery at the C1 level.   Moderate partially calcified plaque within the proximal segment of the left vertebral artery resulting in 50% stenosis.      DX-CHEST-PORTABLE (1 VIEW)   Final Result      1.  Nasogastric tube tip located just above the gastroesophageal junction. Advancement is recommended.      2.  Endotracheal tube tip located below the level of the clavicles.      3.  Cardiomegaly.      CT-CTA HEAD WITH & W/O-POST PROCESS   Final Result      1.  No evidence of intracranial vascular occlusion or aneurysm.      2.  Extensive atherosclerotic plaque involving the intracranial vascular structures with multiple areas of atherosclerotic narrowing.      3.   Periventricular chronic small vessel ischemic change.           ASSESSMENT/PLAN:  Acute hypoxic respiratory failure   - intubated 2/14/18   - RT/O2 protocols   - Daily and PRN ABGs   - Titration of ventilator therapy based on ABGs and patient's status   - Sedation as tolerated/indicated   - Daily CXR   - HOB >30 degrees and peridex for VAP prevention   - Pepcid for GI prophylaxis   - SAT/SBT when able (ABCDEF Bundle)   - Early mobility    Bradycardic arrest   - transient, GCS 8-10   - TPM   - EP consult    Sick sinus syndrome/PAF   - TPM   - heparin gtt   - EP consult   - amiodarone    Cardiogenic shock   - levophed, norepinephrine, goal MAP >65, SBP >90   - Echo    S/P TAVR   - Echo    HFrEF   - LVEF 40% on post TAVR echo    Acute undifferentiated encephalopathy   - likely secondary to cardiogenic shock   - MRI, EEG ordered    CAD   - ASA, statin    Hypothyroidism   - TSH, synthroid    Prophylaxis: pepcid, SCDs - heparin per cards    Patient is critically ill at this time.  I have spent 55 minutes examining this patient, all lab data, x-ray, and discussion with RN, RT, ED physician, cardiology. Critical care time: 55 min. No time overlap. Procedures not included in time. Thank you for asking me to consult on the patient.  I appreciate the opportunity to assist in their care and will follow along closely with you.    Kraig Seth, DO  Critical Care Medicine    This dictation was created using voice recognition software. The accuracy of the dictation is limited to the abilities of the software. Errors of grammar and possibly content are to be expected.

## 2018-02-15 NOTE — PROGRESS NOTES
Cortrak Placement    Tube Team verified patient name and medical record number prior to tube placement.  Cortrak tube (43 inches, 10 Slovenian) placed at 85 cm in right nare.  Per Cortrak picture, tube appears to be in the stomach.  Nursing Instructions: Awaiting KUB to confirm placement before use for medications or feeding. Once placement confirmed, flush tube with 30 ml of water, and then remove and save stylet, in patient medication drawer.

## 2018-02-15 NOTE — ED NOTES
from Lab called with critical result of ca 6.1 at 1717. Critical lab result read back to .   Dr. French notified of critical lab result at 1717.  Critical lab result read back by Dr. french.

## 2018-02-15 NOTE — ED NOTES
Med rec updated and complete, per historical history  Allergies reviewed per historical history  Pt is intubated  Pts wife is not sure if when or what he takes.  Pts wife is not sure if pt took his PLAVIX 75MG, pt was give an Rx yesterday 2/13/2018.   Pt was just here on 2/12/2018

## 2018-02-15 NOTE — DISCHARGE PLANNING
MSW received call from pt's son Lopez Monzon. Lopez stated this is relationship is strained with his parents. MSW explained that pt's mother would not like him involved at this time.  Lopez will respect his mother's wishes unless he would like him involved. If he is needed he can be paged at ([phone]).

## 2018-02-15 NOTE — PROGRESS NOTES
[facility] Hospitalist Progress Note    Date of Service: 2/15/2018    Chief Complaint  84 y.o. male admitted 2018 with altered mental status.    Interval Problem Update  Mr. Shaikh has a history of CABG with recent TAVR on  that was found to be altered thus was brought to the ER where he was found to be in atrial fibrillation with RVR requiring cardioversion as well as sinus pauses up to 15 seconds. He was given a bolus of amiodarone, intubated, and a temporary pacemaker was placed.   He is in a paced rhythm today. His family has visited.  Consultants/Specialty  Cardiology  Critical Care. I discussed his condition with Dr. Gonda on ICU Hot Rounds    Disposition  ICU        Review of Systems   Unable to perform ROS: Intubated      Physical Exam  Laboratory/Imaging   Hemodynamics  Temp (24hrs), Av.5 °C (97.7 °F), Min:35.9 °C (96.6 °F), Max:37.5 °C (99.5 °F)   Temperature: 35.9 °C (96.6 °F), Monitored Temp: 35.9 °C (96.6 °F)  Pulse  Av.6  Min: 46  Max: 153 Heart Rate (Monitored): (!) 59  Blood Pressure : 130/88, Arterial BP: 123/46, NIBP: 111/50      Respiratory  Courtney Vent Mode: APVCMV, Rate (breaths/min): 18, PEEP/CPAP: 8, PEEP/CPAP: 8, FiO2: 40, P Peak (PIP): 17, P MEAN: 10   Respiration: 14, Pulse Oximetry: 100 %     Work Of Breathing / Effort: Vented  RUL Breath Sounds: Diminished, RML Breath Sounds: Diminished, RLL Breath Sounds: Diminished, WHIT Breath Sounds: Diminished, LLL Breath Sounds: Diminished    Fluids    Intake/Output Summary (Last 24 hours) at 02/15/18 0705  Last data filed at 02/15/18 0600   Gross per 24 hour   Intake           730.28 ml   Output             2397 ml   Net         -1666.72 ml       Nutrition  Orders Placed This Encounter   Procedures   • Diet NPO     Standing Status:   Standing     Number of Occurrences:   1     Order Specific Question:   Type:     Answer:   Now [1]     Order Specific Question:   Restrict to:     Answer:   Strict [1]     Physical Exam   Constitutional: No  distress.   HENT:   Head: Normocephalic and atraumatic.   cortrak right nares.   Neck:   Right IJ central line   Cardiovascular:   Paced rhythm  Systolic murmur.   Pulmonary/Chest:   Right upper pacemaker  Few crackles, good air movement on the vent.  No wheezing.   Abdominal: Soft. He exhibits no distension.   Genitourinary:   Genitourinary Comments: Baldwin catheter   Musculoskeletal: He exhibits no edema or tenderness.   Neurological:   Sedated   He does not open his eyes nor follow commands.    Skin: Skin is warm and dry. He is not diaphoretic.   Nursing note and vitals reviewed.      Recent Labs      02/13/18   0400  02/14/18   1519  02/15/18   0520   WBC  16.0*  10.0  11.5*   RBC  4.24*  3.56*  4.00*   HEMOGLOBIN  13.9*  12.1*  13.0*   HEMATOCRIT  41.3*  36.1*  39.8*   MCV  97.4  101.4*  99.5*   MCH  32.8  34.0*  32.5   MCHC  33.7  33.5*  32.7*   RDW  45.3  49.1  48.1   PLATELETCT  158*  80*  84*   MPV  9.0  10.1  10.0     Recent Labs      02/13/18   0400  02/14/18   1444  02/15/18   0520   SODIUM  134*  139  140   POTASSIUM  3.9  4.1  3.4*   CHLORIDE  103  116*  111   CO2  29  19*  19*   GLUCOSE  129*  158*  143*   BUN  21  18  16   CREATININE  1.10  0.82  0.85   CALCIUM  9.8  6.1*  8.8         Recent Labs      02/12/18   0955  02/13/18   0400  02/14/18   1519   BNPBTYPENAT  126*  164*  88     Recent Labs      02/14/18   1444   TRIGLYCERIDE  58          Assessment/Plan     * Cardiac arrest (CMS-[facility])- (present on admission)   Assessment & Plan    Sinus pauses up to 15 seconds with ROSC  Continue IV levophed   S/p temporary pacemaker placement         Acute respiratory failure with hypoxia (CMS-[facility])- (present on admission)   Assessment & Plan    Intubated on 2/14  Critical Care consulted.        Cardiogenic shock (CMS-[facility])- (present on admission)   Assessment & Plan    Requiring IV pressors.           Altered mental status   Assessment & Plan    CT head negative  EEG ordered  Seizure precautions            Bradycardia- (present on admission)   Assessment & Plan    With cardiac pauses up to 15 seconds.  S/p epinephrine, atropine, and levophed drip with persistent bradycardia  Status post temporary pacemaker placement by Dr. Stephens on 2/14  ICU admission.  Continue continuous cardiac monitoring          S/P TAVR (transcatheter aortic valve replacement)- (present on admission)   Assessment & Plan    S/p TAVR on 2/12  2D Echo reveals normal functioning TAVR aortic valve, normal pericardium without effusion and LVEF of 40% with global hypokinesis.          Carotid artery stenosis- (present on admission)   Assessment & Plan    S/p L CEA, follows   Continue statin and aspirin           CAD (coronary artery disease)- (present on admission)   Assessment & Plan    History of CABG ×3  Continue statin and aspirin         Acquired hypothyroidism   Assessment & Plan    TSH 0.74  Continue synthroid         Paroxysmal atrial fibrillation (CMS-HCC)- (present on admission)   Assessment & Plan    Started on IV amiodarone  Consider anticoagulation when stable              Quality-Core Measures   Reviewed items::  Labs reviewed and Medications reviewed  Baldwin catheter::  Unconscious / Sedated Patient on a Ventilator  DVT prophylaxis pharmacological::  Heparin

## 2018-02-15 NOTE — PROGRESS NOTES
Cardiology Progress Note               Author: Jez Blakepaulette Date & Time created: 2/15/2018  7:37 AM     Interval History/Chief Complaint:    84 year old male status post patricia arrest following TAVR, respiratory failure on ventilator support. He remains sedated.  Review of Systems   Unable to perform ROS: Intubated   All other systems reviewed and are negative.    Physical Exam   Constitutional: He appears well-developed and well-nourished. He is sedated and intubated.   HENT:   Head: Normocephalic and atraumatic.   Eyes:   Closed.   Neck: Normal range of motion. Neck supple.   Cardiovascular: Normal rate and regular rhythm.    Pulmonary/Chest: Effort normal and breath sounds normal. He is intubated.   Abdominal: Soft. Bowel sounds are normal.   Musculoskeletal: Normal range of motion. He exhibits no edema.   Skin: Skin is warm and dry.       Hemodynamics:  Temp (24hrs), Av.5 °C (97.7 °F), Min:35.9 °C (96.6 °F), Max:37.5 °C (99.5 °F)  Temperature: 35.9 °C (96.6 °F), Monitored Temp: 35.9 °C (96.6 °F)  Pulse  Av.7  Min: 44  Max: 153Heart Rate (Monitored): (!) 59  Blood Pressure : 130/88, Arterial BP: 123/46, NIBP: 111/50     Respiratory:  Courtney Vent Mode: APVCMV, Rate (breaths/min): 18, PEEP/CPAP: 8, FiO2: 40, P Peak (PIP): 17, P MEAN: 10 Respiration: 14, Pulse Oximetry: 100 %     Work Of Breathing / Effort: Vented  RUL Breath Sounds: Diminished, RML Breath Sounds: Diminished, RLL Breath Sounds: Diminished, WHIT Breath Sounds: Diminished, LLL Breath Sounds: Diminished  Fluids:     Weight: 63.2 kg (139 lb 5.3 oz)  GI/Nutrition:  Orders Placed This Encounter   Procedures   • Diet NPO     Standing Status:   Standing     Number of Occurrences:   1     Order Specific Question:   Type:     Answer:   Now [1]     Order Specific Question:   Restrict to:     Answer:   Strict [1]     Lab Results:  Recent Labs      18   0400  18   1519  02/15/18   0520   WBC  16.0*  10.0  11.5*   RBC  4.24*  3.56*  4.00*    HEMOGLOBIN  13.9*  12.1*  13.0*   HEMATOCRIT  41.3*  36.1*  39.8*   MCV  97.4  101.4*  99.5*   MCH  32.8  34.0*  32.5   MCHC  33.7  33.5*  32.7*   RDW  45.3  49.1  48.1   PLATELETCT  158*  80*  84*   MPV  9.0  10.1  10.0     Recent Labs      02/13/18   0400  02/14/18   1444  02/15/18   0520   SODIUM  134*  139  140   POTASSIUM  3.9  4.1  3.4*   CHLORIDE  103  116*  111   CO2  29  19*  19*   GLUCOSE  129*  158*  143*   BUN  21  18  16   CREATININE  1.10  0.82  0.85   CALCIUM  9.8  6.1*  8.8         Recent Labs      02/12/18   0955  02/13/18   0400  02/14/18   1519   BNPBTYPENAT  126*  164*  88     Recent Labs      02/12/18   0955  02/13/18   0400  02/14/18   1444  02/14/18   1519   TROPONINI   --    --   0.28*   --    BNPBTYPENAT  126*  164*   --   88     Recent Labs      02/14/18   1444   TRIGLYCERIDE  58     CARDIAC STUDIES/PROCEDURES:     CARDIAC CATHETERIZATION CONCLUSIONS by Dr. Nieves (01/04/18)  1.  Coronary artery disease, three-vessel including left anterior descending artery ostial 100%   occlusion, mid right coronary artery 100% occlusion, a distal left anterior descending artery   95% stenosis, second circumflex marginal branch 30% stenosis  2.  Patent coronary bypass grafts to the left anterior descending artery, first circumflex marginal  branch and distal right coronary artery.  3.  Patent coronary stent of the ostium and proximal first circumflex marginal branch.  4.  Normal ascending aorta.  5.  Aortic regurgitation, mild to moderate.  6.  Patent distal abdominal aortogram and bilateral iliofemoral arteries.  7.  Essentially normal right heart pressures and left ventricular filling pressure.     CAROTID ULTRASOUND (11/27/17)  Moderate stenosis of the right internal carotid (50-69%).   Left carotid.   CEA is widely patent without evidence of restinosis.    Flow within both subclavian arteries appears to be within normal limits.   Antegrade flow, bilateral vertebral arteries.     CTA OF HEAD/NECK  (02/14/18)  1.  No evidence of intracranial vascular occlusion or aneurysm.  2.  Extensive atherosclerotic plaque involving the intracranial vascular structures with multiple areas of atherosclerotic narrowing.  3.  Periventricular chronic small vessel ischemic change.  4.  Marked partially calcified and ulcerated plaques located within the right carotid artery bifurcation. Between 50% to 70% stenosis at the origin right internal carotid artery is demonstrated.  5.  Mild partially calcified plaque left carotid artery bifurcation. No significant stenosis.  6.  Moderate/marked partially calcified plaque origin right vertebral artery resulting in between 50% and 70% stenosis.  7.  Focal plaque or thrombus within the right vertebral artery at the C1 level.  8.  Moderate partially calcified plaque within the proximal segment of the left vertebral artery resulting in 50% stenosis.    CT OF CHEST AND ABDOMEN (12/14/17)  1.  Aortic annulus area of 326 sq mm.  2.  Coronary ostia are both greater than 10 mm from the annulus.  3.  Minimum diameter of the iliofemoral arterial system of 5.9 mm on the RIGHT and 8 mm on the LEFT, with moderate atherosclerotic calcification and tortuosity bilaterally.  4.  Interval distal migration of large LEFT kidney stone to the distal ureter, without significant obstructive changes.     COLONOSCOPE by Dr. 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  Eventually this area was Endoclipped,   moderate left-sided diverticulosis.     ECHOCARDIOGRAM CONCLUSIONS (02/14/18)  1. Known TAVR aortic valve that is functioning normally with  normal   transvalvular gradients. Transvalvular gradients are - Peak: 15  mmHg,    Mean: 9  mmHg. Trivial paravalvular leak is noted.  2. Mitral regurgitation was not evaluated during this study, however   there appeared to be no impingment or restriction of the mitral valve   leaflets by the bioprosthetic aortic valve.  3. Normal pericardium without effusion.  4. Mild to moderately decreased left ventricular systolic function.   Estimated LVEF 40%, however this estimate was difficult due to severe tachycardia.  5. Global hypokinesis.  Compared to the previous study performed on 2/13/2018:  There appears   to be less perivalvular leak.  The TAVR valve might be slightly farther   into the ventricle, however there does not appear to be any obstruction.    ECHOCARDIOGRAM CONCLUSIONS (10/026/17)  Prior echo 12-23-14. Compared to the images of the study done - there   has been progression of aortic stenosis and regurgitation.   Normal left ventricular systolic function.  Left ventricular ejection fraction is visually estimated to be 70%.  Severe aortic stenosis.  AV Area Cont Eq vti 0.93 cm²  Vmax is 5.0 m/s. Transvalvular gradients are - Peak: 100 mmHg, Mean: 53 mmHg.   Moderate aortic insufficiency.  Mild tricuspid regurgitation.  Estimated right ventricular systolic pressure is 45 mmHg.  Moderate pulmonic insufficiency.     EKG performed on (02/12/18) was reviewed: EKG shows sinus patricia with right bundle branch block.  EKG performed on (02/12/18) EKG shows sinus patricia with right bundle branch block.  EKG performed on (02/07/18) EKG shows sinus patricia with right bundle branch block.  EKG performed on (05/12/17) EKG shows sinus rhythm with right bundle branch block.     Laboratory results of (02/12/18) were reviewed. Bun of 17 mg/dl, creatinine levels of 1.14 mg/dl noted.  Laboratory results of (02/07/18) Bun of 28 mg/dl, creatinine levels of 1.39 mg/dl noted.     MRI OF BRAIN (02/13/11)  1. Moderate cerebral  atrophy.  2. Minimal supratentorial white matter disease with two or 3 rare   punctate foci of bright FLAIR signal in the deep white matter consistent   with small vessel ischemic change versus demyelination or gliosis.  3. No evidence of acute cerebral infarction, hemorrhage, or mass lesion.     PERIPHERAL INTERVENTION by Dr. Amaya (03/22/17)  1.  RIGHT common femoral sheath arteriogram demonstrates atherosclerosis with estimated 50-75% stenosis of the proximal femoral artery  2.  Deployment Starclose SE vascular closure device; due to the patient's agitation and altered mental status I recommend a further 6 hours of groin precautions     PERIPHERAL INTERVENTION by Dr. Hart (03/21/17)  1.  Active extravasation from the ileocecal branch of the superior mesenteric artery.  2.  Successful embolization of bleeding cecal branch.     PERIPHERAL INTERVENTION by Dr. Parra (08/22/12)  1.  Right superficial femoral artery occlusion with occlusion of the tibial peroneal trunk.    2.  Widely patent left superficial femoral artery stent with some disease in   the tibial vessels as well.  Although, the superficial femoral artery is   amenable to percutaneous intervention, I am reluctant to open the artery to a   distal occlusion.  The tibioperoneal trunk is heavily diseased and   intervention in the tibioperoneal trunk for a patient with claudication is not   advisable due to the risk of failure and complication as well as the poor   durability of below knee tibial work.  I will follow up with the patient in   the office and discuss with him the findings of the angiogram.  If he   progresses to rest pain or tissue loss, will consider percutaneous   intervention of both lesions.     TRANSESOPHAGEAL ECHOCARDIOGRAM CONCLUSIONS by Dr. Leslie (02/12/18)  Results pending.    Medical Decision Making, by Problem:  Active Hospital Problems    Diagnosis   • *Cardiac arrest (CMS-[facility]) [I46.9]   • Acute respiratory failure with  hypoxia (CMS-[facility]) [J96.01]   • Cardiogenic shock (CMS-[facility]) [R57.0]   • Bradycardia [R00.1]   • S/P TAVR (transcatheter aortic valve replacement) [Z95.2]   • Hx of CABG [Z95.1]   • Paroxysmal atrial fibrillation (CMS-[facility]) [I48.0]   • Carotid artery stenosis [I65.29]   • TIA (transient ischemic attack) [G45.9]   • CAD (coronary artery disease) [I25.10]       Plan:    1. Bradycardia, status post temporary pacemaker placement, atrial fibrillation with rapid ventricular response, status post arrest, respiratory failure on ventilator support: We will continue amiodarone, start IV heparin and follow him clinically.  2. Successful transcatheter aortic valve replacement (TAVR) with # 23 with 2 additional mls of volume Muñiz Denia S3 valve, transfemoral approach, under general anesthesia (02/12/18)  3. Coronary artery disease with prior 3 vessel coronary bypass graft with left internal mammary artery graft to left anterior descending artery, saphenous vein graft to obtuse marginal branch and saphenous vein graft to posterior descending artery by Dr. Coates (03/20/08)  4. Peripheral vascular disease with prior left superficial femoral artery stent, known high grade stenosis of right common femoral artery closed with Starclose closure by Dr. Amaya on (03/22/17): Stable.  5. Carotid artery stenosis with left carotid enterectomy by Dr. Parra (05/03/11) (managed by Dr. Parra)  6. History of trans-ischemic attack  7. History of gastrointestinal bleeding with embolization of ileocecal branch by Dr. Hart (03/21/17) and AVM (managed by Osmany Pat): He remains clinically stable without recurrence of gastrointestinal bleeding. He was cleared by GI for TAVR.  7. Right bundle branch block: As above.  9. Additional information: He and his wife is from [place], however, they speaks [place]ian.     CC José Taylor and Yarelis Lee         Quality-Core Measures

## 2018-02-15 NOTE — CARE PLAN
Problem: Ventilation Defect:  Goal: Ability to achieve and maintain unassisted ventilation or tolerate decreased levels of ventilator support  Outcome: PROGRESSING AS EXPECTED  Adult Ventilation Update    Total Vent Days: 2    Patient Lines/Drains/Airways Status    Active Airway     Name: Placement date: Placement time: Site: Days:    Airway Group ET Tube Oral 8.0 02/14/18   1442   Oral   2              Vent settings: 18 340 +8 40%    Sputum/Suction   Cough: Non Productive (02/15/18 0400)  Sputum Amount: Small (02/14/18 2226)        Events/Summary/Plan: Decreased fio2 from 60 to 40% (02/15/18 1841)

## 2018-02-15 NOTE — PROCEDURES
Procedure Note    Date: 2/14/2018  Time: 4:30 PM    Procedure: Arterial Line placement  Site: left radial artery    Indication: Shock requiring continuous BP monitoring, frequent ABG monitoring  Consent: Informed consent obtained from patient or designated decision maker after explaining the benefits/risks of the procedure including but not limited to bleeding, infection, nerve or other deep structure injury, or failure of placement. Patient or surrogate expressed understanding and agreement and signed consent which can be found in the patient's chart.    Procedure: After obtaining consent, a time-out was performed. An Gabe's test was performed to test for adequate collateral circulation. Patient positioned, prepped, and draped in sterile fashion.  0 mL of local anesthetic injected (1% lidocaine without epinephrine) achieving appropriate comfort level for patient. Artery was located using physical exam. A 20 gauge catheter was placed using Seldinger technique. Appropriate arterial blood visualized from the catheter and the arterial waveform confirmed on the monitor. Line secured and dressed in place. Patient tolerated procedure well without any difficulties and left in care of bedside nurse.     EBL: minimal  Complications: none    Kraig Seth DO  Critical Care Medicine

## 2018-02-15 NOTE — CARE PLAN
Problem: Safety  Goal: Will remain free from falls    Intervention: Assess risk factors for falls  Rene George Fall scale used.       Problem: Venous Thromboembolism (VTW)/Deep Vein Thrombosis (DVT) Prevention:  Goal: Patient will participate in Venous Thrombosis (VTE)/Deep Vein Thrombosis (DVT)Prevention Measures    Intervention: Assess and monitor for anticoagulation complications  Progressing as expected.

## 2018-02-16 ENCOUNTER — APPOINTMENT (OUTPATIENT)
Dept: RADIOLOGY | Facility: MEDICAL CENTER | Age: 83
DRG: 242 | End: 2018-02-16
Attending: INTERNAL MEDICINE
Payer: MEDICARE

## 2018-02-16 LAB
ACTION RANGE TRIGGERED IACRT: NO
ANION GAP SERPL CALC-SCNC: 6 MMOL/L (ref 0–11.9)
APTT PPP: 76.4 SEC (ref 24.7–36)
APTT PPP: 82.3 SEC (ref 24.7–36)
APTT PPP: >240 SEC (ref 24.7–36)
BASE EXCESS BLDA CALC-SCNC: -4 MMOL/L (ref -4–3)
BASOPHILS # BLD AUTO: 0.7 % (ref 0–1.8)
BASOPHILS # BLD: 0.08 K/UL (ref 0–0.12)
BODY TEMPERATURE: ABNORMAL DEGREES
BUN SERPL-MCNC: 11 MG/DL (ref 8–22)
CALCIUM SERPL-MCNC: 8.2 MG/DL (ref 8.5–10.5)
CHLORIDE SERPL-SCNC: 110 MMOL/L (ref 96–112)
CO2 BLDA-SCNC: 21 MMOL/L (ref 20–33)
CO2 SERPL-SCNC: 21 MMOL/L (ref 20–33)
CREAT SERPL-MCNC: 0.92 MG/DL (ref 0.5–1.4)
EOSINOPHIL # BLD AUTO: 0.2 K/UL (ref 0–0.51)
EOSINOPHIL NFR BLD: 1.7 % (ref 0–6.9)
ERYTHROCYTE [DISTWIDTH] IN BLOOD BY AUTOMATED COUNT: 48.8 FL (ref 35.9–50)
GLUCOSE BLD-MCNC: 121 MG/DL (ref 65–99)
GLUCOSE BLD-MCNC: 129 MG/DL (ref 65–99)
GLUCOSE BLD-MCNC: 144 MG/DL (ref 65–99)
GLUCOSE SERPL-MCNC: 152 MG/DL (ref 65–99)
HCO3 BLDA-SCNC: 20.2 MMOL/L (ref 17–25)
HCT VFR BLD AUTO: 37.8 % (ref 42–52)
HGB BLD-MCNC: 12.6 G/DL (ref 14–18)
IMM GRANULOCYTES # BLD AUTO: 0.13 K/UL (ref 0–0.11)
IMM GRANULOCYTES NFR BLD AUTO: 1.1 % (ref 0–0.9)
INST. QUALIFIED PATIENT IIQPT: YES
LV EJECT FRACT  99904: 60
LYMPHOCYTES # BLD AUTO: 1.47 K/UL (ref 1–4.8)
LYMPHOCYTES NFR BLD: 12.7 % (ref 22–41)
MAGNESIUM SERPL-MCNC: 1.8 MG/DL (ref 1.5–2.5)
MCH RBC QN AUTO: 33.3 PG (ref 27–33)
MCHC RBC AUTO-ENTMCNC: 33.3 G/DL (ref 33.7–35.3)
MCV RBC AUTO: 100 FL (ref 81.4–97.8)
MONOCYTES # BLD AUTO: 1.13 K/UL (ref 0–0.85)
MONOCYTES NFR BLD AUTO: 9.8 % (ref 0–13.4)
NEUTROPHILS # BLD AUTO: 8.55 K/UL (ref 1.82–7.42)
NEUTROPHILS NFR BLD: 74 % (ref 44–72)
NRBC # BLD AUTO: 0 K/UL
NRBC BLD-RTO: 0 /100 WBC
O2/TOTAL GAS SETTING VFR VENT: 40 %
PCO2 BLDA: 34.7 MMHG (ref 26–37)
PCO2 TEMP ADJ BLDA: 35.3 MMHG (ref 26–37)
PH BLDA: 7.37 [PH] (ref 7.4–7.5)
PH TEMP ADJ BLDA: 7.37 [PH] (ref 7.4–7.5)
PHOSPHATE SERPL-MCNC: 2 MG/DL (ref 2.5–4.5)
PLATELET # BLD AUTO: 72 K/UL (ref 164–446)
PMV BLD AUTO: 10.9 FL (ref 9–12.9)
PO2 BLDA: 93 MMHG (ref 64–87)
PO2 TEMP ADJ BLDA: 95 MMHG (ref 64–87)
POTASSIUM SERPL-SCNC: 3.2 MMOL/L (ref 3.6–5.5)
RBC # BLD AUTO: 3.78 M/UL (ref 4.7–6.1)
SAO2 % BLDA: 97 % (ref 93–99)
SODIUM SERPL-SCNC: 137 MMOL/L (ref 135–145)
SPECIMEN DRAWN FROM PATIENT: ABNORMAL
WBC # BLD AUTO: 11.6 K/UL (ref 4.8–10.8)

## 2018-02-16 PROCEDURE — 700102 HCHG RX REV CODE 250 W/ 637 OVERRIDE(OP): Performed by: INTERNAL MEDICINE

## 2018-02-16 PROCEDURE — 700105 HCHG RX REV CODE 258: Performed by: INTERNAL MEDICINE

## 2018-02-16 PROCEDURE — 700111 HCHG RX REV CODE 636 W/ 250 OVERRIDE (IP): Performed by: INTERNAL MEDICINE

## 2018-02-16 PROCEDURE — 700111 HCHG RX REV CODE 636 W/ 250 OVERRIDE (IP): Performed by: NURSE PRACTITIONER

## 2018-02-16 PROCEDURE — 82803 BLOOD GASES ANY COMBINATION: CPT

## 2018-02-16 PROCEDURE — 85025 COMPLETE CBC W/AUTO DIFF WBC: CPT

## 2018-02-16 PROCEDURE — 700101 HCHG RX REV CODE 250: Performed by: INTERNAL MEDICINE

## 2018-02-16 PROCEDURE — 770022 HCHG ROOM/CARE - ICU (200)

## 2018-02-16 PROCEDURE — 83735 ASSAY OF MAGNESIUM: CPT

## 2018-02-16 PROCEDURE — 84100 ASSAY OF PHOSPHORUS: CPT

## 2018-02-16 PROCEDURE — 94150 VITAL CAPACITY TEST: CPT

## 2018-02-16 PROCEDURE — 82962 GLUCOSE BLOOD TEST: CPT | Mod: 91

## 2018-02-16 PROCEDURE — 81001 URINALYSIS AUTO W/SCOPE: CPT

## 2018-02-16 PROCEDURE — A9270 NON-COVERED ITEM OR SERVICE: HCPCS | Performed by: INTERNAL MEDICINE

## 2018-02-16 PROCEDURE — 94003 VENT MGMT INPAT SUBQ DAY: CPT

## 2018-02-16 PROCEDURE — 87040 BLOOD CULTURE FOR BACTERIA: CPT

## 2018-02-16 PROCEDURE — 71045 X-RAY EXAM CHEST 1 VIEW: CPT

## 2018-02-16 PROCEDURE — 85730 THROMBOPLASTIN TIME PARTIAL: CPT

## 2018-02-16 PROCEDURE — 80048 BASIC METABOLIC PNL TOTAL CA: CPT

## 2018-02-16 PROCEDURE — 99233 SBSQ HOSP IP/OBS HIGH 50: CPT | Performed by: HOSPITALIST

## 2018-02-16 PROCEDURE — 37799 UNLISTED PX VASCULAR SURGERY: CPT

## 2018-02-16 RX ORDER — MAGNESIUM SULFATE HEPTAHYDRATE 40 MG/ML
2 INJECTION, SOLUTION INTRAVENOUS ONCE
Status: COMPLETED | OUTPATIENT
Start: 2018-02-16 | End: 2018-02-16

## 2018-02-16 RX ORDER — SODIUM CHLORIDE 9 MG/ML
INJECTION, SOLUTION INTRAVENOUS
Status: ACTIVE
Start: 2018-02-16 | End: 2018-02-17

## 2018-02-16 RX ORDER — CEFAZOLIN SODIUM 2 G/100ML
2 INJECTION, SOLUTION INTRAVENOUS EVERY 8 HOURS
Status: COMPLETED | OUTPATIENT
Start: 2018-02-16 | End: 2018-02-19

## 2018-02-16 RX ORDER — FAMOTIDINE 20 MG/1
20 TABLET, FILM COATED ORAL DAILY
Status: DISCONTINUED | OUTPATIENT
Start: 2018-02-16 | End: 2018-02-17

## 2018-02-16 RX ADMIN — AMIODARONE HYDROCHLORIDE 0.5 MG/MIN: 50 INJECTION, SOLUTION INTRAVENOUS at 07:15

## 2018-02-16 RX ADMIN — HEPARIN SODIUM 850 UNITS/HR: 5000 INJECTION, SOLUTION INTRAVENOUS at 16:43

## 2018-02-16 RX ADMIN — AMIODARONE HYDROCHLORIDE 0.5 MG/MIN: 50 INJECTION, SOLUTION INTRAVENOUS at 20:26

## 2018-02-16 RX ADMIN — CHLORHEXIDINE GLUCONATE 15 ML: 1.2 RINSE ORAL at 10:10

## 2018-02-16 RX ADMIN — FENTANYL CITRATE 50 MCG: 50 INJECTION, SOLUTION INTRAMUSCULAR; INTRAVENOUS at 02:43

## 2018-02-16 RX ADMIN — ATORVASTATIN CALCIUM 20 MG: 20 TABLET, FILM COATED ORAL at 20:26

## 2018-02-16 RX ADMIN — CEFAZOLIN SODIUM 2 G: 2 INJECTION, SOLUTION INTRAVENOUS at 23:50

## 2018-02-16 RX ADMIN — LEVOTHYROXINE SODIUM 50 MCG: 50 TABLET ORAL at 06:00

## 2018-02-16 RX ADMIN — FAMOTIDINE 20 MG: 20 TABLET, FILM COATED ORAL at 10:10

## 2018-02-16 RX ADMIN — POTASSIUM PHOSPHATE, MONOBASIC AND POTASSIUM PHOSPHATE, DIBASIC 30 MMOL: 224; 236 INJECTION, SOLUTION INTRAVENOUS at 13:15

## 2018-02-16 RX ADMIN — ACETAMINOPHEN 650 MG: 325 TABLET, FILM COATED ORAL at 16:43

## 2018-02-16 RX ADMIN — SODIUM CHLORIDE: 9 INJECTION, SOLUTION INTRAVENOUS at 10:10

## 2018-02-16 RX ADMIN — STANDARDIZED SENNA CONCENTRATE AND DOCUSATE SODIUM 2 TABLET: 8.6; 5 TABLET, FILM COATED ORAL at 20:26

## 2018-02-16 RX ADMIN — MAGNESIUM SULFATE IN WATER 2 G: 40 INJECTION, SOLUTION INTRAVENOUS at 10:10

## 2018-02-16 RX ADMIN — ASPIRIN 81 MG: 81 TABLET, COATED ORAL at 20:26

## 2018-02-16 ASSESSMENT — PAIN SCALES - GENERAL
PAINLEVEL_OUTOF10: 0

## 2018-02-16 ASSESSMENT — PULMONARY FUNCTION TESTS
FVC: 1.1
FVC: 1.1

## 2018-02-16 NOTE — DISCHARGE PLANNING
AMADOU met with pt's spouse, Prisca and pt's neighbor, Jens Carroll Back #[phone].  Prisca appeared to be confused and did not remember SW from earlier.  Pt appears to not really understand what is going on.  Per Jens, Prisca has not eaten or slept very much in the past few days.     AMADOU attempted to page pt's son, Lopez #[phone] and is waiting for a call back.

## 2018-02-16 NOTE — RESPIRATORY CARE
Extubation    Events/Summary/Plan: pt extubated per Dr. Gonda, cuff leak present, no stridor noted, pt placed on 3LNC and tolerating well  (02/16/18 3411)

## 2018-02-16 NOTE — CARE PLAN
Problem: Communication  Goal: The ability to communicate needs accurately and effectively will improve    Intervention: Mullen patient and significant other/support system to call light to alert staff of needs  POC discussed at bedside. Patient verbalizes understanding. Education on medications provided. White board updated, patient encouraged to call for all needs. Calls appropriately. No immediate concerns at this time.        Problem: Pain Management  Goal: Pain level will decrease to patient's comfort goal    Intervention: Follow pain managment plan developed in collaboration with patient and Interdisciplinary Team  Pain assessed and documented per unit protocol and appropriate pharmacological and non-pharmacological interventions implemented. Reviewed pain goals with patient and family.

## 2018-02-16 NOTE — PROGRESS NOTES
"Pulmonary Critical Care Progress Note        Date of admission: 2/14/2018  Date of service: 2/16/2018    Chief Complaint: AMS    History of Present Illness: 84 y.o. male pmhx of Paroxysmal A-fib, CAD s/p CABG, AS s/p TAVR 2/12/18, PAD, Carotid artery disease, TIA, GIB. Presents to the ER today with AMS (obtundation) 1 day after discharge from hospital for TAVR, he was found by EMS in A-fib with RVR. In the ER he was intubated for airway protection and given metoprolol 5 mg for RVR. He then began to have significant pauses and severe hypotension during pauses. Cards was consulted and attempted cardioversion x1 then 300mg of amiodarone followed by another attempted electrocardioversion. EP saw the patient for placement of TPM for SSS and A-fib, he was taken emergently to the cath lab for TPM.    ROS:  Respiratory: unable to perform due to the patient's inability to effectively communicate, Cardiac: unable to perform due to the patient's inability to effectively communicate, GI: unable to perform due to the patient's inability to effectively communicate.  All other systems negative. Unchanged today    Interval Events:  24 hour interval history reviewed    - NE @ 4, amio @0.5   - pacing at 60 bpm on 2 mAmp   - awakens and follows briskly   - wife very demented and \"attempted CPR on him\" last night requiring security call   - Tm 38.3 overnight, WBC remains elevated   - no BM yet   - low K/phos/Mag   - RSBI 66, FVC 1.1, NIF -15, minimal secretions    PFSH:  No change.    Respiratory:  Courtney Vent Mode: Spont, Rate (breaths/min): 18, Vt Target (mL): 340, PEEP/CPAP: 8, FiO2: 30, P Support: 5, Static Compliance (ml / cm H2O): 77, Control VTE (exp VT): 373  Pulse Oximetry: 97 % PIP 18          Exam: unlabored respirations, no intercostal retractions or accessory muscle use and clear to auscultation without rales or wheezes  ImagingAvailable data reviewed (personally reviewed and compared to prior film): unchanged No acute " cardiopulmonary process with endotracheal tube/gastric tube/right IJ CVL/right subclavian CVL through which transvenous pacing wire in good position  Recent Labs      02/14/18   1526  02/15/18   0424  02/16/18   0512   ISTATAPH  7.378*  7.371*  7.373*   ISTATAPCO2  37.7*  37.4*  34.7   ISTATAPO2  179*  193*  93*   ISTATATCO2  23  23  21   [id number]  100*  100*  97   ISTATARTHCO3  22.2  21.7  20.2   ISTATARTBE  -3  -3  -4   ISTATTEMP  see below  36.1 C  37.4 C   ISTATFIO2  100  60  40   ISTATSPEC  Arterial  Arterial  Arterial   ISTATAPHTC   --   7.384*  7.368*   [id number]   --   189*  95*   ABG: Improving partially compensated metabolic acidosis with adequate oxygenation    HemoDynamics:  Pulse: 62, Heart Rate (Monitored): 60  Arterial BP: 133/39, NIBP: 110/49   Levophed @ 4, amiodarone, heparin drips    Exam: 100% paced at 2 mAmp, (+) murmur, distant heart sounds, lat displaced PMI  Imaging: Available data reviewed    Echocardiogram 2/14:    This was a limited study. Technically difficult study incomplete   information is obtained.    1. Known TAVR aortic valve that is functioning normally with normal   transvalvular gradients. Transvalvular gradients are - Peak: 15  mmHg,    Mean: 9  mmHg. Trivial paravalvular leak is noted.  2. Mitral regurgitation was not evaluated during this study, however   there appeared to be no impingment or restriction of the mitral valve   leaflets by the bioprosthetic aortic valve.  3. Normal pericardium without effusion.  4. Mild to moderately decreased left ventricular systolic function.   Estimated LVEF 40%, however this estimate was difficult due to severe   tachycardia.  5. Global hypokinesis.    Compared to the previous study performed on 2/13/2018:  There appears   to be less perivalvular leak.  The TAVR valve might be slightly farther   into the ventricle, however there does not appear to be any   obstruction.  Recent Labs      02/14/18   1444  02/14/18   1519   TROPONINI   0.28*   --    BNPBTYPENAT   --   88       Neuro:  GCS Total Sin Coma Score: 10 propofol drip, when necessary fentanyl       Exam: no focal deficits noted Motor and sensory exam grossly intact follows commands, raises two fingers  Imaging: Available data reviewed    EEG 2/15: This scalp EEG is consistent with diffuse nonspecific cortical dysfunction - moderate     This nonspecific abnormalities could be secondary to metabolic, toxic, polypharmacy or even post-ictal     There are no epileptiform discharges in this record    Fluids:  Intake/Output       02/14/18 0700 - 02/15/18 0659 02/15/18 0700 - 02/16/18 0659 02/16/18 0700 - 02/17/18 0659      [id number] 4074-7641 Total [id number] 1900-0659 Total [id number] 1900-0659 Total       Intake    P.O.  --  0 0  --  -- --  --  -- --    P.O. -- 0 0 -- -- -- -- -- --    I.V.  50  680.3 730.3  1843.9  1469.4 3313.3  --  -- --    Amiodarone Volume -- 207.9 207.9 204 204 408 -- -- --    Propofol Volume 13.4 241.8 255.2 118.9 102.7 221.6 -- -- --    Norepinephrine Volume 36.6 230.6 267.2 107.5 67.2 174.7 -- -- --    Heparin Volume -- -- -- 163.6 195.5 359.1 -- -- --    IV Piggyback Volume (IV Piggyback) -- -- -- 500 -- 500 -- -- --    IV Volume (NS) -- -- --  -- -- --    Enteral  --  -- --  50  440 490  --  -- --    Enteral Volume -- -- -- 50 350 400 -- -- --    Free Water / Tube Flush -- -- -- -- 90 90 -- -- --    Total Intake 50 680.3 730.3 1893.9 1909.4 3803.3 -- -- --       Output    Urine  1000  1397 2397  850  500 1350  --  -- --    Indwelling Cathether 1000 1397 2397  -- -- --    Stool  --  -- --  --  -- --  --  -- --    Number of Times Stooled -- 0 x 0 x -- 0 x 0 x -- -- --    Total Output 1000 1397 2397  -- -- --       Net I/O     -950.1 -716.7 -1666.7 1043.9 1409.4 2453.3 -- -- --        Weight: 64 kg (141 lb 1.5 oz)  Recent Labs      02/14/18   1444  02/15/18   0520  02/16/18   0350   SODIUM  139  140  137   POTASSIUM  4.1  3.4*   3.2*   CHLORIDE  116*  111  110   CO2  19*  19*  21   BUN  18  16  11   CREATININE  0.82  0.85  0.92   MAGNESIUM  1.3*  2.1  1.8   PHOSPHORUS   --   1.9*  2.0*   CALCIUM  6.1*  8.8  8.2*       GI/Nutrition:  Exam: abdomen is soft and non-tender, normal active bowel sounds  Imaging: Available data reviewed  NPO and tube feed Tolerated  Liver Function  Recent Labs      18   1444  02/15/18   0520  18   0350   ALTSGPT  18   --    --    ASTSGOT  45   --    --    ALKPHOSPHAT  48   --    --    TBILIRUBIN  0.8   --    --    GLUCOSE  158*  143*  152*       Heme:  Recent Labs      18   1519  02/15/18   0520  02/15/18   0943  02/15/18   1638  02/15/18   2330  18   0350   RBC  3.56*  4.00*   --    --    --   3.78*   HEMOGLOBIN  12.1*  13.0*   --    --    --   12.6*   HEMATOCRIT  36.1*  39.8*   --    --    --   37.8*   PLATELETCT  80*  84*   --    --    --   72*   PROTHROMBTM   --    --   14.1   --    --    --    APTT   --    --   28.1  203.2*  >240.0*   --    INR   --    --   1.12   --    --    --        Infectious Disease:  Monitored Temp  Av.6 °C (99.6 °F)  Min: 36.1 °C (97 °F)  Max: 38.3 °C (100.9 °F)  Temp  Av.5 °C (99.5 °F)  Min: 36.1 °C (97 °F)  Max: 38.2 °C (100.8 °F)  Micro: reviewed. None  Recent Labs      18   14418   1519  02/15/18   0518   0350   WBC   --   10.0  11.5*  11.6*   NEUTSPOLYS   --   87.20*  69.10  74.00*   LYMPHOCYTES   --   3.90*  15.80*  12.70*   MONOCYTES   --   7.60  12.60  9.80   EOSINOPHILS   --   0.00  0.80  1.70   BASOPHILS   --   0.40  0.60  0.70   ASTSGOT  45   --    --    --    ALTSGPT  18   --    --    --    ALKPHOSPHAT  48   --    --    --    TBILIRUBIN  0.8   --    --    --      Current Facility-Administered Medications   Medication Dose Frequency Provider Last Rate Last Dose   • heparin injection 2,600 Units  2,600 Units PRN Christine Powers, A.P.R.N.        And   • heparin infusion 25,000 units in 500 ml 0.45% nacl    Continuous Christine Powers A.P.R.N. 17 mL/hr at 02/16/18 0207 850 Units/hr at 02/16/18 0207   • norepinephrine (LEVOPHED) 8 mg in  mL Infusion  0-30 mcg/min Continuous DODIE Watters Jr..OPawel 7.5 mL/hr at 02/16/18 0602 4 mcg/min at 02/16/18 0602   • amiodarone (CORDARONE) 450 mg in D5W 250 mL Infusion  0.5 mg/min Continuous Godwin Wilson M.D. 17 mL/hr at 02/15/18 1757 0.5 mg/min at 02/15/18 1757   • Respiratory Care per Protocol   Continuous RT DODIE Watters Jr..O.       • senna-docusate (PERICOLACE or SENOKOT S) 8.6-50 MG per tablet 2 Tab  2 Tab BID DODIE Watters Jr..O.   2 Tab at 02/15/18 2041    And   • polyethylene glycol/lytes (MIRALAX) PACKET 1 Packet  1 Packet QDAY PRN DODIE Watters Jr..O.        And   • magnesium hydroxide (MILK OF MAGNESIA) suspension 30 mL  30 mL QDAY PRN DODIE Watters Jr..O.        And   • bisacodyl (DULCOLAX) suppository 10 mg  10 mg QDAY PRN DODIE Watters Jr..O.       • chlorhexidine (PERIDEX) 0.12 % solution 15 mL  15 mL BID DODIE Watters Jr..O.   15 mL at 02/15/18 2041   • lidocaine (XYLOCAINE) 1%  injection  1-2 mL Q30 MIN PRN DODIE Watters Jr..O.       • fentaNYL (SUBLIMAZE) injection 25 mcg  25 mcg Q HOUR PRN DODIE Watters Jr..O.        Or   • fentaNYL (SUBLIMAZE) injection 50 mcg  50 mcg Q HOUR PRN DODIE Watters Jr..O.   50 mcg at 02/16/18 0243    Or   • fentaNYL (SUBLIMAZE) injection 100 mcg  100 mcg Q HOUR PRN DODIE Watters Jr..O.       • insulin regular (HUMULIN R) injection 1-6 Units  1-6 Units Q6HRS Kraig Seth Jr., D.O.   Stopped at 02/14/18 1915    And   • glucose 4 g chewable tablet 16 g  16 g Q15 MIN PRN Kraig Seth Jr., D.O.        And   • dextrose 50% (D50W) injection 25 mL  25 mL Q15 MIN PRN Kraig Seth Jr., D.O.       • famotidine (PEPCID) tablet 20 mg  20 mg Q12HRS Kraig Seth Jr., D.O.   20 mg at 02/15/18 2041    Or   • famotidine (PEPCID) injection 20 mg  20 mg Q12HRS Kraig MOISE  LAURA Seth Jr.OPawel   20 mg at 02/15/18 0944   • ipratropium-albuterol (DUONEB) nebulizer solution 3 mL  3 mL Q2HRS PRN (RT) Kraig Seth Jr., D.O.       • phenylephrine (JOHN-SYNEPHRINE) 40,000 mcg in  mL Infusion  0-300 mcg/min Continuous DODIE Watters Jr..O.   Stopped at 02/14/18 1915   • propofol (DIPRIVAN) injection  0-80 mcg/kg/min Continuous DODIE Watters Jr..OPawel 7.6 mL/hr at 02/16/18 0602 20 mcg/kg/min at 02/16/18 0602   • acetaminophen (TYLENOL) tablet 650 mg  650 mg Q6HRS PRN Gavin Mackenzie M.D.   650 mg at 02/15/18 1631   • atorvastatin (LIPITOR) tablet 20 mg  20 mg QHS Gavin Mackenzie M.D.   20 mg at 02/15/18 2041   • levothyroxine (SYNTHROID) tablet 50 mcg  50 mcg AM ES Gavin Mackenzie M.D.   50 mcg at 02/16/18 0600   • NS infusion   Continuous Gavin Mackenzie M.D. 75 mL/hr at 02/15/18 1629     • aspirin EC (ECOTRIN) tablet 81 mg  81 mg Q EVENING Gavin Mackenzie M.D.   81 mg at 02/15/18 2041     Last reviewed on 2/14/2018  5:15 PM by Luh [facility]    Quality  Measures:   Radiology images reviewed, Labs reviewed and Medications reviewed   Baldwin catheter: Critically Ill - Requiring Accurate Measurement of Urinary Output and Unconscious / Sedated Patient on a Ventilator   Central line in place: Shock and Vasopressors     DVT Prophylaxis: Heparin   DVT prophylaxis - mechanical: SCDs   Ulcer prophylaxis: Yes     Assessed for rehab: Patient unable to tolerate rehabilitation therapeutic regimen      Assessment/Plan:  Acute hypoxic respiratory failure - intubated 2/14/18              - extubation trial, encourage IS, aspiration precautions   - limit sedatives   - RT/O2 protocols  Bradycardic arrest followed by evidence of sick sinus syndrome/PAF requiring 100% TV pacer              - continue pacing for now, EP and cardiology following   - Optimize electrolytes    - Probable permanent pacemaker placement in near future once AF/improved leukocytosis    - Continue IV amiodarone, heparin drip     Cardiogenic shock              - continue norepinephrine, goal MAP >65    - monitor CVP with goal 8-10  S/P TAVR   HFrEF   - Diuresis, holding beta-blockade given shock  Acute undifferentiated encephalopathy - improved              - monitor, avoid sedatives, frequent neurologic checks  CAD              - ASA, statin  Hypothyroidism              - synthroid  Hypophosphatemia/kalemia - repletion  Fevers and leukocytosis - ?etiology   - Bcx x 2, Ua, start ancef to prophylaxis given indwelling lines   - check LFTs/lipase  Prophylaxis, enteral nutrition    Patient is critically ill requiring my active management of his VDRF, shock, fever work-up and treatment.    Discussed patient condition and risk of morbidity and/or mortality with RN, RT, Pharmacy, Charge nurse / hot rounds and cardiology and hospitalist, EP    The patient remains critically ill.  Critical care time = 38 minutes in directly providing and coordinating critical care and extensive data review.  No time overlap and excludes procedures.

## 2018-02-16 NOTE — PROGRESS NOTES
[facility] Hospitalist Progress Note    Date of Service: 2018    Chief Complaint  84 y.o. male admitted 2018 with altered mental status.    Interval Problem Update  Mr. Shaikh has a history of CABG with recent TAVR on  that was found to be altered thus was brought to the ER where he was found to be in atrial fibrillation with RVR requiring cardioversion as well as sinus pauses up to 15 seconds. He was given a bolus of amiodarone, intubated, and a temporary pacemaker was placed.   He is in a paced rhythm again today. His wife is at bedside and she remains confused. He was successfully extubated on  though remains quite encephalopathic.  Consultants/Specialty  Cardiology  Critical Care. I discussed his condition with Dr. Gonda on ICU Hot Rounds    Disposition  ICU        Review of Systems   Unable to perform ROS: Mental acuity      Physical Exam  Laboratory/Imaging   Hemodynamics  Temp (24hrs), Av.7 °C (99.8 °F), Min:36.9 °C (98.4 °F), Max:38.2 °C (100.8 °F)   Temperature: 37.6 °C (99.7 °F), Monitored Temp: 37.7 °C (99.9 °F)  Pulse  Av.3  Min: 45  Max: 153 Heart Rate (Monitored): 60  Arterial BP: 133/39, NIBP: 110/49      Respiratory  Courtney Vent Mode: Spont, Rate (breaths/min): 18, PEEP/CPAP: 8, PEEP/CPAP: 8, FiO2: 30, P Peak (PIP): 15, P MEAN: 10   Respiration: 20, Pulse Oximetry: 97 %     Work Of Breathing / Effort: Vented  RUL Breath Sounds: Clear, RML Breath Sounds: Diminished, RLL Breath Sounds: Diminished, WHIT Breath Sounds: Clear, LLL Breath Sounds: Diminished    Fluids    Intake/Output Summary (Last 24 hours) at 18 0837  Last data filed at 18 0600   Gross per 24 hour   Intake          3551.07 ml   Output             1150 ml   Net          2401.07 ml       Nutrition  Orders Placed This Encounter   Procedures   • Diet NPO     Standing Status:   Standing     Number of Occurrences:   1     Order Specific Question:   Type:     Answer:   Now [1]     Order Specific Question:    Restrict to:     Answer:   Strict [1]     Physical Exam   Constitutional: No distress.   HENT:   Head: Normocephalic and atraumatic.   cortrak right nares.   Neck:   Right IJ central line   Cardiovascular:   Paced rhythm  Systolic murmur.   Pulmonary/Chest:   Right upper pacemaker  Few crackles, moderate air movement on nasal cannula oxygen  No wheezing.   Abdominal: Soft. He exhibits no distension.   Genitourinary:   Genitourinary Comments: Baldwin catheter   Musculoskeletal: He exhibits no edema or tenderness.   Left radial arterial line.   Neurological:   Awake, very encephalopathic.  He moves extremities.    Skin: Skin is warm and dry. He is not diaphoretic.   Nursing note and vitals reviewed.      Recent Labs      02/14/18   1519  02/15/18   0520  02/16/18   0350   WBC  10.0  11.5*  11.6*   RBC  3.56*  4.00*  3.78*   HEMOGLOBIN  12.1*  13.0*  12.6*   HEMATOCRIT  36.1*  39.8*  37.8*   MCV  101.4*  99.5*  100.0*   MCH  34.0*  32.5  33.3*   MCHC  33.5*  32.7*  33.3*   RDW  49.1  48.1  48.8   PLATELETCT  80*  84*  72*   MPV  10.1  10.0  10.9     Recent Labs      02/14/18   1444  02/15/18   0520  02/16/18   0350   SODIUM  139  140  137   POTASSIUM  4.1  3.4*  3.2*   CHLORIDE  116*  111  110   CO2  19*  19*  21   GLUCOSE  158*  143*  152*   BUN  18  16  11   CREATININE  0.82  0.85  0.92   CALCIUM  6.1*  8.8  8.2*     Recent Labs      02/15/18   0943  02/15/18   1638  02/15/18   2330   APTT  28.1  203.2*  >240.0*   INR  1.12   --    --      Recent Labs      02/14/18   1519   BNPBTYPENAT  88     Recent Labs      02/14/18   1444   TRIGLYCERIDE  58          Assessment/Plan     * Cardiac arrest (CMS-HCC)- (present on admission)   Assessment & Plan    Sinus pauses up to 15 seconds with ROSC  Continue IV levophed   S/p temporary pacemaker placement         Acute respiratory failure with hypoxia (CMS-HCC)- (present on admission)   Assessment & Plan    Intubated on 2/14 and extubated on 2/16.  Critical Care consulted.         Cardiogenic shock (CMS-HCC)- (present on admission)   Assessment & Plan    Requiring IV pressors.           Bradycardia- (present on admission)   Assessment & Plan    With cardiac pauses up to 15 seconds.  S/p epinephrine, atropine, and levophed drip with persistent bradycardia  Status post temporary pacemaker placement by Dr. Stephens on 2/14  ICU admission.  Continue continuous cardiac monitoring          Altered mental status   Assessment & Plan    CT head negative  EEG ordered  Seizure precautions           S/P TAVR (transcatheter aortic valve replacement)- (present on admission)   Assessment & Plan    S/p TAVR on 2/12  2D Echo reveals normal functioning TAVR aortic valve, normal pericardium without effusion and LVEF of 40% with global hypokinesis.          Hx of CABG   Assessment & Plan    History of        Carotid artery stenosis- (present on admission)   Assessment & Plan    S/p L CEA, follows   Continue statin and aspirin           CAD (coronary artery disease)- (present on admission)   Assessment & Plan    History of CABG ×3  Continue statin and aspirin         Acquired hypothyroidism   Assessment & Plan    TSH 0.74  Continue synthroid         Paroxysmal atrial fibrillation (CMS-[facility])- (present on admission)   Assessment & Plan    Started on IV amiodarone  IV heparin and transition to oral anticoagulation when stable              Quality-Core Measures   Reviewed items::  Labs reviewed and Medications reviewed  Baldwin catheter::  Critically Ill - Requiring Accurate Measurement of Urinary Output  DVT prophylaxis pharmacological::  Heparin

## 2018-02-16 NOTE — DISCHARGE PLANNING
"Pt was discussed in IDT rounds re: wife being inappropriate at bedside.  SW paged son, Lopez, who responded back with a TC.  Per Lopez, his relationship with his parents is estranged and he does not plan on becoming involved with them at this time.  Lopez reports his mother is \"stubborn\" and continues to decline to provide support.     SW contacted EPS regarding possible confusion of pt's wife as well as concerns stated above.  SW to continue to monitor.    "

## 2018-02-16 NOTE — PROGRESS NOTES
Cardiology Progress Note               Author: Lotus Mondragon Date & Time created: 2018  9:32 AM     Interval History:  HPI:  Rafia Shaikh is an 84-year-old man with a history of severe aortic stenosis status post transcatheter aortic valve replacement done on 2018 who presents with altered mental status (obtunded), and rapid atrial fibrillation.     The patient is intubated and sedated at the time of my examination. In speaking with his wife and staff in the emergency room he had become obtunded acutely at home about 2 hours prior to presentation. She called 911 immediately. While in the emergency room, he was in very rapid atrial fibrillation at around 180 bpm at which time he was given 5 mg of IV metoprolol. He is down and had several pauses. He was having about 15 second pauses when I saw him several minutes after that metoprolol bolus. Noting that he had a very low blood pressure not obtainable by cuff high discussed with the emergency room physician electrical cardioversion given his rapid atrial fibrillation and sick sinus syndrome. He had cardioversion ×1 with 150 J, and returns to rapid atrial fibrillation. I then gave him 300 mg of IV amiodarone followed by an additional 200 J ×1 cardioversion. He remained in rapid atrial fibrillation with periodic longer pauses. At that time, I called electrophysiology for assistance.     Impression and Medical Decision Makin. Bradycardic arrest: I have arranged for temporary pacemaker with electrophysiology as assistance I greatly appreciate.     2. Sick sinus syndrome: After placement of a temporary pacemaker I will plan for IV amiodarone to maintain sinus rhythm. I will also plan IV heparin drip for stroke prevention.     3. Multivessel coronary artery disease: Continue statin. Resume aspirin probably tomorrow. ACE inhibitor, and beta blocker contraindicated in the setting of shock.     4. Severe aortic stenosis status post transcatheter aortic valve:  Repeat echocardiogram ordered        This note was dictated using Dragon speech recognition software.     Thank you for allowing me to participate in the care of this patient.  Please call if any clarification will be helpful.     Immediate Post-Operative Note        PreOp Diagnosis: sinus arrest     PostOp Diagnosis: same     Procedure(s) :  Temporary pacemaker     Surgeon(s):  Usman Stephens M.D.     Type of Anesthesia: Moderate Sedation     Specimen: None     Estimated Blood Loss: 5 cc's     Contrast Media:  0 cc's     Fluoro Time: 1 min           Findings: r subclavian line with temp pacer     Complications: none apparent  FINDINGS:  Cardiomediastinal contour is unchanged.  Previously described pulmonary parenchymal opacities persist.  No pneumothorax identified.  Supportive tubing is unchanged.  Postoperative change from prior open heart surgery.  Impression     1.  No evidence of intracranial vascular occlusion or aneurysm.    2.  Extensive atherosclerotic plaque involving the intracranial vascular structures with multiple areas of atherosclerotic narrowing.    3.  Periventricular chronic small vessel ischemic change.     Impression     Marked partially calcified and ulcerated plaques located within the right carotid artery bifurcation. Between 50% to 70% stenosis at the origin right internal carotid artery is demonstrated.  Mild partially calcified plaque left carotid artery bifurcation. No significant stenosis.  Moderate/marked partially calcified plaque origin right vertebral artery resulting in between 50% and 70% stenosis.  Focal plaque or thrombus within the right vertebral artery at the C1 level.  Moderate partially calcified plaque within the proximal segment of the left vertebral artery resulting in 50% stenosis     Temporary pacemaker some non-capture intermittent after wife was shaking patient.    Chief Complaint:  Obtundation  Asystole    Review of Systems   Unable to perform ROS: Critical illness        Physical Exam   Constitutional: He appears well-developed.   thin   Cardiovascular:   Murmur heard.  Paced rhythm with isolated VPC   Pulmonary/Chest:   On ventilator.  Temporary device in right SC position. MA 2 HR 60 bpm.   Abdominal: Soft.   Genitourinary:   Genitourinary Comments: crawford   Neurological:   sedated   Skin: Skin is warm and dry.       Hemodynamics:  Temp (24hrs), Av.7 °C (99.8 °F), Min:36.9 °C (98.4 °F), Max:38.2 °C (100.8 °F)  Temperature: 37.6 °C (99.7 °F), Monitored Temp: 37.7 °C (99.9 °F)  Pulse  Av  Min: 44  Max: 153Heart Rate (Monitored): 64  Arterial BP: 133/39, NIBP: 110/49     Respiratory:  Courtney Vent Mode: APVCMV, Rate (breaths/min): 18, PEEP/CPAP: 8, FiO2: 30, P Peak (PIP): 21, P MEAN: 12 Respiration: 20, Pulse Oximetry: 96 %     Work Of Breathing / Effort: Vented  RUL Breath Sounds: Clear, RML Breath Sounds: Diminished, RLL Breath Sounds: Diminished, WHIT Breath Sounds: Clear, LLL Breath Sounds: Diminished  Fluids:     Weight: 64 kg (141 lb 1.5 oz)  GI/Nutrition:  Orders Placed This Encounter   Procedures   • Diet NPO     Standing Status:   Standing     Number of Occurrences:   1     Order Specific Question:   Type:     Answer:   Now [1]     Order Specific Question:   Restrict to:     Answer:   Strict [1]     Lab Results:  Recent Labs      18   1519  02/15/18   0520  18   0350   WBC  10.0  11.5*  11.6*   RBC  3.56*  4.00*  3.78*   HEMOGLOBIN  12.1*  13.0*  12.6*   HEMATOCRIT  36.1*  39.8*  37.8*   MCV  101.4*  99.5*  100.0*   MCH  34.0*  32.5  33.3*   MCHC  33.5*  32.7*  33.3*   RDW  49.1  48.1  48.8   PLATELETCT  80*  84*  72*   MPV  10.1  10.0  10.9     Recent Labs      18   1444  02/15/18   0520  18   0350   SODIUM  139  140  137   POTASSIUM  4.1  3.4*  3.2*   CHLORIDE  116*  111  110   CO2  19*  19*  21   GLUCOSE  158*  143*  152*   BUN  18  16  11   CREATININE  0.82  0.85  0.92   CALCIUM  6.1*  8.8  8.2*     Recent Labs      02/15/18   0943   02/15/18   1638  02/15/18   2330   APTT  28.1  203.2*  >240.0*   INR  1.12   --    --      Recent Labs      02/14/18   1519   BNPBTYPENAT  88     Recent Labs      02/14/18   1444  02/14/18   1519   TROPONINI  0.28*   --    BNPBTYPENAT   --   88     Recent Labs      02/14/18   1444   TRIGLYCERIDE  58         Medical Decision Making, by Problem:  Active Hospital Problems    Diagnosis   • *Cardiac arrest (CMS-HCC) [I46.9]   • Acute respiratory failure with hypoxia (CMS-HCC) [J96.01]   • Cardiogenic shock (CMS-HCC) [R57.0]   • Bradycardia [R00.1]   • S/P TAVR (transcatheter aortic valve replacement) [Z95.2]   • Hx of CABG [Z95.1]   • Paroxysmal atrial fibrillation (CMS-HCC) [I48.0]   • Carotid artery stenosis [I65.29]   • TIA (transient ischemic attack) [G45.9]   • CAD (coronary artery disease) [I25.10]       Plan:  Per nursing staff pulmonary requesting device in before extubation. Risk of lead dislodgement with coughing and suctioning increased.  Will discuss with Dr Stephens who is on call this weekend.  Fever and WBC increased today.  Remains pacemaker dependent.  Will need to wait until infection risk reduced to implant permanent device.    Quality-Core Measures

## 2018-02-16 NOTE — CARE PLAN
Problem: Ventilation Defect:  Goal: Ability to achieve and maintain unassisted ventilation or tolerate decreased levels of ventilator support    Intervention: Support and monitor invasive and noninvasive mechanical ventilation  Adult Ventilation Update    Total Vent Days: 2    Patient Lines/Drains/Airways Status    Active Airway     Name: Placement date: Placement time: Site: Days:    Airway Group ET Tube Oral 8.0 02/14/18   1442   Oral   1              In the last 24 hours, the patient tolerated SBT for 1 on settings of 5/8.    #FVC / Vital Capacity (liters) :  (Unable to perform) (02/15/18 1511)  NIF (cm H2O) :  (Unable perform) (02/15/18 1511)  Rapid Shallow Breathing Index (RR/VT): 86 (02/15/18 1511)     Static Compliance (ml / cm H2O): 46 (02/15/18 1511)    Patient failed trials because of    Barriers to SBT Weaning Trial Stopped due to:: Pt weaned for 1 hour and returned to rest settings per protocol (02/15/18 1511)  Length of Weaning Trial Length of Weaning Trial (Hours): 1 (02/15/18 1511)      Cough: Productive (02/15/18 1200)  Sputum Amount: Small (02/15/18 1200)  Sputum Color: White (02/15/18 1200)  Sputum Consistency: Thin (02/15/18 1200)    Mobility Group  Activity Performed: Unable to mobilize (02/14/18 2000)  Reason Not Mobilized: Bed rest;Unstable condition (02/15/18 0800)  Mobilization Comments:  (levo@8, Temp pacer) (02/15/18 0800)    Events/Summary/Plan: SBT Ended (02/15/18 1511)

## 2018-02-17 ENCOUNTER — APPOINTMENT (OUTPATIENT)
Dept: RADIOLOGY | Facility: MEDICAL CENTER | Age: 83
DRG: 242 | End: 2018-02-17
Attending: INTERNAL MEDICINE
Payer: MEDICARE

## 2018-02-17 LAB
ALBUMIN SERPL BCP-MCNC: 2.9 G/DL (ref 3.2–4.9)
ALBUMIN/GLOB SERPL: 1.1 G/DL
ALP SERPL-CCNC: 69 U/L (ref 30–99)
ALT SERPL-CCNC: 16 U/L (ref 2–50)
ANION GAP SERPL CALC-SCNC: 5 MMOL/L (ref 0–11.9)
APPEARANCE UR: CLEAR
APTT PPP: 71.1 SEC (ref 24.7–36)
AST SERPL-CCNC: 25 U/L (ref 12–45)
BACTERIA #/AREA URNS HPF: NEGATIVE /HPF
BASOPHILS # BLD AUTO: 0.2 % (ref 0–1.8)
BASOPHILS # BLD: 0.03 K/UL (ref 0–0.12)
BILIRUB SERPL-MCNC: 0.9 MG/DL (ref 0.1–1.5)
BILIRUB UR QL STRIP.AUTO: NEGATIVE
BUN SERPL-MCNC: 10 MG/DL (ref 8–22)
CALCIUM SERPL-MCNC: 8.1 MG/DL (ref 8.5–10.5)
CHLORIDE SERPL-SCNC: 108 MMOL/L (ref 96–112)
CO2 SERPL-SCNC: 26 MMOL/L (ref 20–33)
COLOR UR: YELLOW
CREAT SERPL-MCNC: 0.82 MG/DL (ref 0.5–1.4)
EOSINOPHIL # BLD AUTO: 0.26 K/UL (ref 0–0.51)
EOSINOPHIL NFR BLD: 2.1 % (ref 0–6.9)
EPI CELLS #/AREA URNS HPF: NEGATIVE /HPF
ERYTHROCYTE [DISTWIDTH] IN BLOOD BY AUTOMATED COUNT: 47.1 FL (ref 35.9–50)
GLOBULIN SER CALC-MCNC: 2.7 G/DL (ref 1.9–3.5)
GLUCOSE BLD-MCNC: 119 MG/DL (ref 65–99)
GLUCOSE BLD-MCNC: 120 MG/DL (ref 65–99)
GLUCOSE BLD-MCNC: 121 MG/DL (ref 65–99)
GLUCOSE BLD-MCNC: 149 MG/DL (ref 65–99)
GLUCOSE SERPL-MCNC: 142 MG/DL (ref 65–99)
GLUCOSE UR STRIP.AUTO-MCNC: NEGATIVE MG/DL
HCT VFR BLD AUTO: 37.9 % (ref 42–52)
HGB BLD-MCNC: 12.6 G/DL (ref 14–18)
HYALINE CASTS #/AREA URNS LPF: ABNORMAL /LPF
IMM GRANULOCYTES # BLD AUTO: 0.16 K/UL (ref 0–0.11)
IMM GRANULOCYTES NFR BLD AUTO: 1.3 % (ref 0–0.9)
KETONES UR STRIP.AUTO-MCNC: NEGATIVE MG/DL
LEUKOCYTE ESTERASE UR QL STRIP.AUTO: ABNORMAL
LIPASE SERPL-CCNC: 59 U/L (ref 11–82)
LYMPHOCYTES # BLD AUTO: 1.43 K/UL (ref 1–4.8)
LYMPHOCYTES NFR BLD: 11.6 % (ref 22–41)
MAGNESIUM SERPL-MCNC: 1.8 MG/DL (ref 1.5–2.5)
MCH RBC QN AUTO: 32.8 PG (ref 27–33)
MCHC RBC AUTO-ENTMCNC: 33.2 G/DL (ref 33.7–35.3)
MCV RBC AUTO: 98.7 FL (ref 81.4–97.8)
MICRO URNS: ABNORMAL
MONOCYTES # BLD AUTO: 1.09 K/UL (ref 0–0.85)
MONOCYTES NFR BLD AUTO: 8.8 % (ref 0–13.4)
NEUTROPHILS # BLD AUTO: 9.36 K/UL (ref 1.82–7.42)
NEUTROPHILS NFR BLD: 76 % (ref 44–72)
NITRITE UR QL STRIP.AUTO: NEGATIVE
NRBC # BLD AUTO: 0 K/UL
NRBC BLD-RTO: 0 /100 WBC
PH UR STRIP.AUTO: 7.5 [PH]
PHOSPHATE SERPL-MCNC: 1.5 MG/DL (ref 2.5–4.5)
PLATELET # BLD AUTO: 66 K/UL (ref 164–446)
PMV BLD AUTO: 10.7 FL (ref 9–12.9)
POTASSIUM SERPL-SCNC: 3 MMOL/L (ref 3.6–5.5)
PROT SERPL-MCNC: 5.6 G/DL (ref 6–8.2)
PROT UR QL STRIP: NEGATIVE MG/DL
RBC # BLD AUTO: 3.84 M/UL (ref 4.7–6.1)
RBC # URNS HPF: ABNORMAL /HPF
RBC UR QL AUTO: ABNORMAL
SODIUM SERPL-SCNC: 139 MMOL/L (ref 135–145)
SP GR UR STRIP.AUTO: 1.01
UROBILINOGEN UR STRIP.AUTO-MCNC: 1 MG/DL
WBC # BLD AUTO: 12.3 K/UL (ref 4.8–10.8)
WBC #/AREA URNS HPF: ABNORMAL /HPF

## 2018-02-17 PROCEDURE — 700102 HCHG RX REV CODE 250 W/ 637 OVERRIDE(OP): Performed by: INTERNAL MEDICINE

## 2018-02-17 PROCEDURE — 83690 ASSAY OF LIPASE: CPT

## 2018-02-17 PROCEDURE — 700105 HCHG RX REV CODE 258: Performed by: INTERNAL MEDICINE

## 2018-02-17 PROCEDURE — 71045 X-RAY EXAM CHEST 1 VIEW: CPT

## 2018-02-17 PROCEDURE — 82962 GLUCOSE BLOOD TEST: CPT

## 2018-02-17 PROCEDURE — 700111 HCHG RX REV CODE 636 W/ 250 OVERRIDE (IP): Performed by: INTERNAL MEDICINE

## 2018-02-17 PROCEDURE — A9270 NON-COVERED ITEM OR SERVICE: HCPCS | Performed by: INTERNAL MEDICINE

## 2018-02-17 PROCEDURE — 99233 SBSQ HOSP IP/OBS HIGH 50: CPT | Performed by: HOSPITALIST

## 2018-02-17 PROCEDURE — 80053 COMPREHEN METABOLIC PANEL: CPT

## 2018-02-17 PROCEDURE — 84100 ASSAY OF PHOSPHORUS: CPT

## 2018-02-17 PROCEDURE — 85730 THROMBOPLASTIN TIME PARTIAL: CPT

## 2018-02-17 PROCEDURE — 770022 HCHG ROOM/CARE - ICU (200)

## 2018-02-17 PROCEDURE — 700111 HCHG RX REV CODE 636 W/ 250 OVERRIDE (IP)

## 2018-02-17 PROCEDURE — 700111 HCHG RX REV CODE 636 W/ 250 OVERRIDE (IP): Performed by: NURSE PRACTITIONER

## 2018-02-17 PROCEDURE — 85025 COMPLETE CBC W/AUTO DIFF WBC: CPT

## 2018-02-17 PROCEDURE — 83735 ASSAY OF MAGNESIUM: CPT

## 2018-02-17 PROCEDURE — 700101 HCHG RX REV CODE 250: Performed by: INTERNAL MEDICINE

## 2018-02-17 RX ORDER — FAMOTIDINE 20 MG/1
20 TABLET, FILM COATED ORAL 2 TIMES DAILY
Status: DISCONTINUED | OUTPATIENT
Start: 2018-02-17 | End: 2018-02-18

## 2018-02-17 RX ORDER — BUPIVACAINE HYDROCHLORIDE 2.5 MG/ML
INJECTION, SOLUTION EPIDURAL; INFILTRATION; INTRACAUDAL
Status: COMPLETED
Start: 2018-02-17 | End: 2018-02-17

## 2018-02-17 RX ORDER — MAGNESIUM SULFATE HEPTAHYDRATE 40 MG/ML
2 INJECTION, SOLUTION INTRAVENOUS ONCE
Status: COMPLETED | OUTPATIENT
Start: 2018-02-17 | End: 2018-02-17

## 2018-02-17 RX ORDER — FAMOTIDINE 20 MG/1
20 TABLET, FILM COATED ORAL 2 TIMES DAILY
Status: DISCONTINUED | OUTPATIENT
Start: 2018-02-17 | End: 2018-02-17

## 2018-02-17 RX ADMIN — POTASSIUM PHOSPHATE, MONOBASIC AND POTASSIUM PHOSPHATE, DIBASIC 30 MMOL: 224; 236 INJECTION, SOLUTION INTRAVENOUS at 11:05

## 2018-02-17 RX ADMIN — AMIODARONE HYDROCHLORIDE 0.5 MG/MIN: 50 INJECTION, SOLUTION INTRAVENOUS at 09:01

## 2018-02-17 RX ADMIN — ASPIRIN 81 MG: 81 TABLET, COATED ORAL at 21:13

## 2018-02-17 RX ADMIN — CEFAZOLIN SODIUM 2 G: 2 INJECTION, SOLUTION INTRAVENOUS at 05:37

## 2018-02-17 RX ADMIN — STANDARDIZED SENNA CONCENTRATE AND DOCUSATE SODIUM 2 TABLET: 8.6; 5 TABLET, FILM COATED ORAL at 21:13

## 2018-02-17 RX ADMIN — ATORVASTATIN CALCIUM 20 MG: 20 TABLET, FILM COATED ORAL at 21:13

## 2018-02-17 RX ADMIN — MAGNESIUM SULFATE IN WATER 2 G: 40 INJECTION, SOLUTION INTRAVENOUS at 11:05

## 2018-02-17 RX ADMIN — CEFAZOLIN SODIUM 2 G: 2 INJECTION, SOLUTION INTRAVENOUS at 14:09

## 2018-02-17 RX ADMIN — SODIUM CHLORIDE: 9 INJECTION, SOLUTION INTRAVENOUS at 05:39

## 2018-02-17 RX ADMIN — HEPARIN SODIUM 850 UNITS/HR: 5000 INJECTION, SOLUTION INTRAVENOUS at 22:50

## 2018-02-17 RX ADMIN — CEFAZOLIN SODIUM 2 G: 2 INJECTION, SOLUTION INTRAVENOUS at 21:13

## 2018-02-17 RX ADMIN — LEVOTHYROXINE SODIUM 50 MCG: 50 TABLET ORAL at 06:00

## 2018-02-17 RX ADMIN — FENTANYL CITRATE 50 MCG: 50 INJECTION, SOLUTION INTRAMUSCULAR; INTRAVENOUS at 18:00

## 2018-02-17 RX ADMIN — FAMOTIDINE 20 MG: 20 TABLET ORAL at 09:00

## 2018-02-17 RX ADMIN — FAMOTIDINE 20 MG: 20 TABLET, FILM COATED ORAL at 21:13

## 2018-02-17 ASSESSMENT — PAIN SCALES - GENERAL
PAINLEVEL_OUTOF10: 0
PAINLEVEL_OUTOF10: 0
PAINLEVEL_OUTOF10: 5
PAINLEVEL_OUTOF10: 0
PAINLEVEL_OUTOF10: 3
PAINLEVEL_OUTOF10: 0

## 2018-02-17 ASSESSMENT — ENCOUNTER SYMPTOMS
FEVER: 1
CHILLS: 0

## 2018-02-17 NOTE — PROGRESS NOTES
Monitor Summary:    Partially Paced at rate of 60, otherwise periods of SR 70 to 90 and A. Fib 90 to 120    .20/.16/.44

## 2018-02-17 NOTE — PROGRESS NOTES
[facility] Hospitalist Progress Note    Date of Service: 2018    Chief Complaint  84 y.o. male admitted 2018 with altered mental status.    Interval Problem Update  Mr. Shaikh has a history of CABG with recent TAVR on  that was found to be altered thus was brought to the ER where he was found to be in atrial fibrillation with RVR requiring cardioversion as well as sinus pauses up to 15 seconds. He was given a bolus of amiodarone and remains on an amiodarone drip, intubated, and a temporary pacemaker was placed.   He is in a paced rhythm again today though not 100% capture with episodes of afib. His wife is at bedside and she appears more lucid today with better insight as to her 's condition. He was successfully extubated on  though remains quite encephalopathic. On nasal cannula oxygen. RT notes 1,500 on incentive spirometry.  Consultants/Specialty  Cardiology. I discussed with Dr. Waters, cardiology, this morning.  Critical Care. I discussed his condition with Dr. Gonda on ICU Hot Rounds    Disposition  ICU        Review of Systems   Unable to perform ROS: Mental acuity      Physical Exam  Laboratory/Imaging   Hemodynamics  Temp (24hrs), Av.1 °C (100.5 °F), Min:37.6 °C (99.7 °F), Max:38.6 °C (101.5 °F)   Temperature: 37.6 °C (99.7 °F), Monitored Temp: 37.7 °C (99.9 °F)  Pulse  Av.7  Min: 45  Max: 153 Heart Rate (Monitored): (!) 101  Arterial BP: 135/49, NIBP: (!) 129/37      Respiratory  Courtney Vent Mode: Spont, Rate (breaths/min): 18, PEEP/CPAP: 8, PEEP/CPAP: 8, FiO2: 30, P Peak (PIP): 16, P MEAN: 6   Respiration: (!) 25, Pulse Oximetry: 95 %, O2 Daily Delivery Respiratory : Silicone Nasal Cannula     Work Of Breathing / Effort: Mild  RUL Breath Sounds: Clear, RML Breath Sounds: Diminished, RLL Breath Sounds: Diminished, WHIT Breath Sounds: Clear, LLL Breath Sounds: Diminished    Fluids    Intake/Output Summary (Last 24 hours) at 18 1021  Last data filed at 18 1000   Gross  per 24 hour   Intake          3937.41 ml   Output             5250 ml   Net         -1312.59 ml       Nutrition  Orders Placed This Encounter   Procedures   • DIET NPO     Standing Status:   Standing     Number of Occurrences:   1     Order Specific Question:   Restrict to:     Answer:   Sips with Medications [3]     Physical Exam   Constitutional: No distress.   HENT:   Head: Atraumatic.   cortrak right nares.  Dry mucous membranes.    Neck:   Right IJ central line   Cardiovascular:   Paced rhythm  Systolic murmur.   Pulmonary/Chest:   Right upper temporary pacemaker  Good air movement on nasal cannula oxygen  No rhonchi   Abdominal: Soft. He exhibits no distension.   Genitourinary:   Genitourinary Comments: Baldwin catheter   Musculoskeletal: He exhibits no edema or tenderness.   Left radial arterial line.   Neurological:   Awake, quite encephalopathic.  He moves extremities equally and follows commands with a delay.    Skin: Skin is warm and dry. He is not diaphoretic. There is pallor.   Nursing note and vitals reviewed.      Recent Labs      02/15/18   0520  02/16/18   0350  02/17/18   0515   WBC  11.5*  11.6*  12.3*   RBC  4.00*  3.78*  3.84*   HEMOGLOBIN  13.0*  12.6*  12.6*   HEMATOCRIT  39.8*  37.8*  37.9*   MCV  99.5*  100.0*  98.7*   MCH  32.5  33.3*  32.8   MCHC  32.7*  33.3*  33.2*   RDW  48.1  48.8  47.1   PLATELETCT  84*  72*  66*   MPV  10.0  10.9  10.7     Recent Labs      02/15/18   0520  02/16/18   0350  02/17/18   0515   SODIUM  140  137  139   POTASSIUM  3.4*  3.2*  3.0*   CHLORIDE  111  110  108   CO2  19*  21  26   GLUCOSE  143*  152*  142*   BUN  16  11  10   CREATININE  0.85  0.92  0.82   CALCIUM  8.8  8.2*  8.1*     Recent Labs      02/15/18   0943   02/16/18   0930  02/16/18   1622  02/17/18   0515   APTT  28.1   < >  76.4*  82.3*  71.1*   INR  1.12   --    --    --    --     < > = values in this interval not displayed.     Recent Labs      02/14/18   1519   BNPBTYPENAT  88     Recent Labs       02/14/18   1444   TRIGLYCERIDE  58          Assessment/Plan     * Cardiac arrest (CMS-[facility])- (present on admission)   Assessment & Plan    Sinus pauses up to 15 seconds with ROSC  Requiring IV pressors.  S/p temporary pacemaker placement   He will have a permanent pacemaker on 2/18.        Acute respiratory failure with hypoxia (CMS-[facility])- (present on admission)   Assessment & Plan    Intubated on 2/14 and extubated on 2/16.  Critical Care consulted.        Cardiogenic shock (CMS-[facility])- (present on admission)   Assessment & Plan    Requiring IV pressors.           Bradycardia- (present on admission)   Assessment & Plan    With cardiac pauses up to 15 seconds.  S/p epinephrine, atropine, and levophed drip with persistent bradycardia  Status post temporary pacemaker placement by Dr. Stephens on 2/14  ICU admission.  Continue continuous cardiac monitoring          Altered mental status   Assessment & Plan    CT head negative  EEG ordered  Seizure precautions           S/P TAVR (transcatheter aortic valve replacement)- (present on admission)   Assessment & Plan    S/p TAVR on 2/12  2D Echo reveals normal functioning TAVR aortic valve, normal pericardium without effusion and LVEF of 40% with global hypokinesis.  Dr. Waters is aware.        Hx of CABG   Assessment & Plan    History of        CAD (coronary artery disease)- (present on admission)   Assessment & Plan    History of CABG ×3  Continue statin and aspirin         Encephalopathy acute- (present on admission)   Assessment & Plan    Consistent with acute delirium in the setting of post-cardiac arrest.         Acquired hypothyroidism   Assessment & Plan    TSH 0.74  Continue synthroid         Carotid artery stenosis- (present on admission)   Assessment & Plan    S/p L CEA, follows   Continue statin and aspirin           Paroxysmal atrial fibrillation (CMS-HCC)- (present on admission)   Assessment & Plan    Started on IV amiodarone  IV heparin and transition to  oral anticoagulation when stable              Quality-Core Measures   Reviewed items::  Labs reviewed and Medications reviewed  Baldwin catheter::  Critically Ill - Requiring Accurate Measurement of Urinary Output  DVT prophylaxis pharmacological::  Heparin

## 2018-02-17 NOTE — CARE PLAN
Problem: Bowel/Gastric:  Goal: Will not experience complications related to bowel motility    Intervention: Assess baseline bowel pattern  Progressing as expected.      Problem: Skin Integrity  Goal: Risk for impaired skin integrity will decrease    Intervention: Assess risk factors for impaired skin integrity and/or pressure ulcers  Progressing as expected

## 2018-02-17 NOTE — PROGRESS NOTES
Patient awake/alert/following commands.  Temporary pacer in place.  Paced in the 60s.  Wife and sitter at bedside.  No distress noted.

## 2018-02-17 NOTE — CARE PLAN
Problem: Infection  Goal: Will remain free from infection    Intervention: Assess signs and symptoms of infection  Patients individual infection risk assessed. Monitored for signs and symptoms of infection throughout shift. Washed hands or foamed in and out every encounter. Utilized universal precautions. Scrubbed hub before access to IV lines per protocol. Blood cultures x 2 drawn, UA sent to lab, CBC completed in AM. Fever up to 100.0           Problem: Skin Integrity  Goal: Risk for impaired skin integrity will decrease    Intervention: Assess risk factors for impaired skin integrity and/or pressure ulcers  Skin assessed at start of shift. Nutrition, moisture, sensory, activity, mobility, friction and shear assessed and addressed with patient and family. Lalit documented.

## 2018-02-17 NOTE — PROGRESS NOTES
Cardiology Progress Note               Author: Jez Waters Date & Time created: 2018  6:05 PM     Interval History/Chief Complaint:    84 year old male status post patricia arrest following TAVR, status post respiratory failure off of ventilator support. He is more awake.    Review of Systems   Unable to perform ROS: Other   All other systems reviewed and are negative.    Physical Exam   Constitutional: He appears well-developed and well-nourished.   HENT:   Head: Normocephalic and atraumatic.   Eyes: Conjunctivae are normal. Pupils are equal, round, and reactive to light.   Neck: Normal range of motion. Neck supple.   Cardiovascular: Normal rate and regular rhythm.    Pulmonary/Chest: Effort normal and breath sounds normal.   Temporary pacemaker in place.   Abdominal: Soft. Bowel sounds are normal.   Musculoskeletal: Normal range of motion. He exhibits no edema.   Skin: Skin is warm and dry.       Hemodynamics:  Temp (24hrs), Av.7 °C (99.8 °F), Min:37.2 °C (99 °F), Max:38 °C (100.4 °F)  Temperature: 38 °C (100.4 °F), Monitored Temp: 38 °C (100.4 °F)  Pulse  Av  Min: 44  Max: 153Heart Rate (Monitored): 64  Arterial BP: (!) 125/38, NIBP: 105/44     Respiratory:  [place] Vent Mode: Spont, Rate (breaths/min): 18, PEEP/CPAP: 8, FiO2: 30, P Peak (PIP): 16, P MEAN: 6 Respiration: (!) 24, Pulse Oximetry: 95 %, O2 Daily Delivery Respiratory : Silicone Nasal Cannula     Work Of Breathing / Effort: Mild  RUL Breath Sounds: Diminished, RML Breath Sounds: Diminished, RLL Breath Sounds: Diminished, WHIT Breath Sounds: Diminished, LLL Breath Sounds: Diminished  Fluids:     Weight: 64 kg (141 lb 1.5 oz)  GI/Nutrition:  Orders Placed This Encounter   Procedures   • Diet NPO     Standing Status:   Standing     Number of Occurrences:   1     Order Specific Question:   Type:     Answer:   Now [1]     Order Specific Question:   Restrict to:     Answer:   Strict [1]     Lab Results:  Recent Labs      18   7826   02/15/18   0520  02/16/18   0350   WBC  10.0  11.5*  11.6*   RBC  3.56*  4.00*  3.78*   HEMOGLOBIN  12.1*  13.0*  12.6*   HEMATOCRIT  36.1*  39.8*  37.8*   MCV  101.4*  99.5*  100.0*   MCH  34.0*  32.5  33.3*   MCHC  33.5*  32.7*  33.3*   RDW  49.1  48.1  48.8   PLATELETCT  80*  84*  72*   MPV  10.1  10.0  10.9     Recent Labs      02/14/18   1444  02/15/18   0520  02/16/18   0350   SODIUM  139  140  137   POTASSIUM  4.1  3.4*  3.2*   CHLORIDE  116*  111  110   CO2  19*  19*  21   GLUCOSE  158*  143*  152*   BUN  18  16  11   CREATININE  0.82  0.85  0.92   CALCIUM  6.1*  8.8  8.2*     Recent Labs      02/15/18   0943   02/15/18   2330  02/16/18   0930  02/16/18   1622   APTT  28.1   < >  >240.0*  76.4*  82.3*   INR  1.12   --    --    --    --     < > = values in this interval not displayed.     Recent Labs      02/14/18   1519   BNPBTYPENAT  88     Recent Labs      02/14/18   1444  02/14/18   1519   TROPONINI  0.28*   --    BNPBTYPENAT   --   88     Recent Labs      02/14/18   1444   TRIGLYCERIDE  58     CARDIAC STUDIES/PROCEDURES:     CARDIAC CATHETERIZATION CONCLUSIONS by Dr. Nieves (01/04/18)  1.  Coronary artery disease, three-vessel including left anterior descending artery ostial 100%   occlusion, mid right coronary artery 100% occlusion, a distal left anterior descending artery   95% stenosis, second circumflex marginal branch 30% stenosis  2.  Patent coronary bypass grafts to the left anterior descending artery, first circumflex marginal  branch and distal right coronary artery.  3.  Patent coronary stent of the ostium and proximal first circumflex marginal branch.  4.  Normal ascending aorta.  5.  Aortic regurgitation, mild to moderate.  6.  Patent distal abdominal aortogram and bilateral iliofemoral arteries.  7.  Essentially normal right heart pressures and left ventricular filling pressure.     CAROTID ULTRASOUND (11/27/17)  Moderate stenosis of the right internal carotid (50-69%).   Left carotid.    CEA is widely patent without evidence of restinosis.    Flow within both subclavian arteries appears to be within normal limits.   Antegrade flow, bilateral vertebral arteries.     CTA OF HEAD/NECK (02/14/18)  1.  No evidence of intracranial vascular occlusion or aneurysm.  2.  Extensive atherosclerotic plaque involving the intracranial vascular structures with multiple areas of atherosclerotic narrowing.  3.  Periventricular chronic small vessel ischemic change.  4.  Marked partially calcified and ulcerated plaques located within the right carotid artery bifurcation. Between 50% to 70% stenosis at the origin right internal carotid artery is demonstrated.  5.  Mild partially calcified plaque left carotid artery bifurcation. No significant stenosis.  6.  Moderate/marked partially calcified plaque origin right vertebral artery resulting in between 50% and 70% stenosis.  7.  Focal plaque or thrombus within the right vertebral artery at the C1 level.  8.  Moderate partially calcified plaque within the proximal segment of the left vertebral artery resulting in 50% stenosis.    CT OF CHEST AND ABDOMEN (12/14/17)  1.  Aortic annulus area of 326 sq mm.  2.  Coronary ostia are both greater than 10 mm from the annulus.  3.  Minimum diameter of the iliofemoral arterial system of 5.9 mm on the RIGHT and 8 mm on the LEFT, with moderate atherosclerotic calcification and tortuosity bilaterally.  4.  Interval distal migration of large LEFT kidney stone to the distal ureter, without significant obstructive changes.     COLONOSCOPE by Dr. 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  Eventually this area was Endoclipped,   moderate left-sided diverticulosis.     ECHOCARDIOGRAM CONCLUSIONS (02/14/18)  1. Known TAVR aortic valve that is functioning normally with normal   transvalvular gradients. Transvalvular gradients are - Peak: 15  mmHg,    Mean: 9  mmHg. Trivial paravalvular leak is noted.  2. Mitral regurgitation was not evaluated during this study, however   there appeared to be no impingment or restriction of the mitral valve   leaflets by the bioprosthetic aortic valve.  3. Normal pericardium without effusion.  4. Mild to moderately decreased left ventricular systolic function.   Estimated LVEF 40%, however this estimate was difficult due to severe tachycardia.  5. Global hypokinesis.  Compared to the previous study performed on 2/13/2018:  There appears   to be less perivalvular leak.  The TAVR valve might be slightly farther   into the ventricle, however there does not appear to be any obstruction.    ECHOCARDIOGRAM CONCLUSIONS (10/026/17)  Prior echo 12-23-14. Compared to the images of the study done - there   has been progression of aortic stenosis and regurgitation.   Normal left ventricular systolic function.  Left ventricular ejection fraction is visually estimated to be 70%.  Severe aortic stenosis.  AV Area Cont Eq vti 0.93 cm²  Vmax is 5.0 m/s. Transvalvular gradients are - Peak: 100 mmHg, Mean: 53 mmHg.   Moderate aortic insufficiency.  Mild tricuspid regurgitation.  Estimated right ventricular systolic pressure is 45 mmHg.  Moderate pulmonic insufficiency.     EKG performed on (02/12/18) was reviewed: EKG shows sinus patricia with right bundle branch block.  EKG performed on (02/12/18) EKG shows sinus patricia with right bundle branch block.  EKG performed on (02/07/18) EKG shows sinus patricia with right bundle branch block.  EKG performed on (05/12/17) EKG shows sinus rhythm with right bundle branch block.     Laboratory results of (02/12/18) were reviewed. Bun of 17  mg/dl, creatinine levels of 1.14 mg/dl noted.  Laboratory results of (02/07/18) Bun of 28 mg/dl, creatinine levels of 1.39 mg/dl noted.     MRI OF BRAIN (02/13/11)  1. Moderate cerebral atrophy.  2. Minimal supratentorial white matter disease with two or 3 rare   punctate foci of bright FLAIR signal in the deep white matter consistent   with small vessel ischemic change versus demyelination or gliosis.  3. No evidence of acute cerebral infarction, hemorrhage, or mass lesion.     PERIPHERAL INTERVENTION by Dr. Amaya (03/22/17)  1.  RIGHT common femoral sheath arteriogram demonstrates atherosclerosis with estimated 50-75% stenosis of the proximal femoral artery  2.  Deployment Starclose SE vascular closure device; due to the patient's agitation and altered mental status I recommend a further 6 hours of groin precautions     PERIPHERAL INTERVENTION by Dr. Hart (03/21/17)  1.  Active extravasation from the ileocecal branch of the superior mesenteric artery.  2.  Successful embolization of bleeding cecal branch.     PERIPHERAL INTERVENTION by Dr. Parra (08/22/12)  1.  Right superficial femoral artery occlusion with occlusion of the tibial peroneal trunk.    2.  Widely patent left superficial femoral artery stent with some disease in   the tibial vessels as well.  Although, the superficial femoral artery is   amenable to percutaneous intervention, I am reluctant to open the artery to a   distal occlusion.  The tibioperoneal trunk is heavily diseased and   intervention in the tibioperoneal trunk for a patient with claudication is not   advisable due to the risk of failure and complication as well as the poor   durability of below knee tibial work.  I will follow up with the patient in   the office and discuss with him the findings of the angiogram.  If he   progresses to rest pain or tissue loss, will consider percutaneous   intervention of both lesions.     TRANSESOPHAGEAL ECHOCARDIOGRAM CONCLUSIONS by Dr. 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 Bradycardia with right bundle branch block, temporary pacemaker placement, atrial fibrillation with rapid ventricular response, status post arrest, status post respiratory failure off ventilator support: He is clinically improving.  2. Successful transcatheter aortic valve replacement (TAVR) with # 23 with 2 additional mls of volume Muñiz Denia S3 valve, transfemoral approach, under general anesthesia (02/12/18)  3. Coronary artery disease with prior 3 vessel coronary bypass graft with left internal mammary artery graft to left anterior descending artery, saphenous vein graft to obtuse marginal branch and saphenous vein graft to posterior descending artery by Dr. Coates (03/20/08)  4. Peripheral vascular disease with prior left superficial femoral artery stent, known high grade stenosis of right common femoral artery closed with Starclose closure by Dr. Amaya on (03/22/17): Stable.  5. Carotid artery stenosis with left carotid enterectomy by Dr. Parra (05/03/11) (managed by Dr. Parra)  6. History of trans-ischemic attack  7. History of gastrointestinal bleeding with embolization of ileocecal branch by Dr. Hart (03/21/17) and AVM (managed by Osmany Pat): He remains clinically stable without recurrence of gastrointestinal bleeding. He was cleared by GI for TAVR.  8. Additional  information: He and his wife is from [place], however, they speaks [place].     CC José Taylor and Yarelis Lee         Quality-Core Measures

## 2018-02-17 NOTE — PROGRESS NOTES
Cardiology Progress Note               Author: Jez Waters Date & Time created: 2018  7:36 AM     Interval History/Chief Complaint:    84 year old male status post patricia arrest following TAVR, status post respiratory failure off of ventilator support. He is more awake.    Review of Systems   Unable to perform ROS: Other   All other systems reviewed and are negative.    Physical Exam   Constitutional: He appears well-developed and well-nourished.   HENT:   Head: Normocephalic and atraumatic.   Eyes: Conjunctivae are normal. Pupils are equal, round, and reactive to light.   Neck: Normal range of motion. Neck supple.   Cardiovascular: Normal rate and regular rhythm.    Pulmonary/Chest: Effort normal and breath sounds normal.   Temporary pacemaker in place.   Abdominal: Soft. Bowel sounds are normal.   Musculoskeletal: Normal range of motion. He exhibits no edema.   Skin: Skin is warm and dry.       Hemodynamics:  Temp (24hrs), Av.1 °C (100.5 °F), Min:37.6 °C (99.7 °F), Max:38.6 °C (101.5 °F)  Temperature: 37.6 °C (99.7 °F), Monitored Temp: 37.7 °C (99.9 °F)  Pulse  Av.7  Min: 44  Max: 153Heart Rate (Monitored): (!) 129  Arterial BP: 159/54, NIBP: 123/49     Respiratory:  [place] Vent Mode: Spont, Rate (breaths/min): 18, PEEP/CPAP: 8, FiO2: 30, P Peak (PIP): 16, P MEAN: 6 Respiration: (!) 21, Pulse Oximetry: 95 %, O2 Daily Delivery Respiratory : Silicone Nasal Cannula     Work Of Breathing / Effort: Mild  RUL Breath Sounds: Clear, RML Breath Sounds: Diminished, RLL Breath Sounds: Diminished, WHIT Breath Sounds: Clear, LLL Breath Sounds: Diminished  Fluids:     Weight: 63.2 kg (139 lb 5.3 oz)  GI/Nutrition:  Orders Placed This Encounter   Procedures   • DIET NPO     Standing Status:   Standing     Number of Occurrences:   1     Order Specific Question:   Restrict to:     Answer:   Sips with Medications [3]     Lab Results:  Recent Labs      02/15/18   0520  18   0350  18   0515   WBC  11.5*   11.6*  12.3*   RBC  4.00*  3.78*  3.84*   HEMOGLOBIN  13.0*  12.6*  12.6*   HEMATOCRIT  39.8*  37.8*  37.9*   MCV  99.5*  100.0*  98.7*   MCH  32.5  33.3*  32.8   MCHC  32.7*  33.3*  33.2*   RDW  48.1  48.8  47.1   PLATELETCT  84*  72*  66*   MPV  10.0  10.9  10.7     Recent Labs      02/15/18   0520  02/16/18   0350  02/17/18   0515   SODIUM  140  137  139   POTASSIUM  3.4*  3.2*  3.0*   CHLORIDE  111  110  108   CO2  19*  21  26   GLUCOSE  143*  152*  142*   BUN  16  11  10   CREATININE  0.85  0.92  0.82   CALCIUM  8.8  8.2*  8.1*     Recent Labs      02/15/18   0943   02/16/18   0930  02/16/18   1622  02/17/18   0515   APTT  28.1   < >  76.4*  82.3*  71.1*   INR  1.12   --    --    --    --     < > = values in this interval not displayed.     Recent Labs      02/14/18   1519   BNPBTYPENAT  88     Recent Labs      02/14/18   1444  02/14/18   1519   TROPONINI  0.28*   --    BNPBTYPENAT   --   88     Recent Labs      02/14/18   1444   TRIGLYCERIDE  58     CARDIAC STUDIES/PROCEDURES:     CARDIAC CATHETERIZATION CONCLUSIONS by Dr. Nieves (01/04/18)  1.  Coronary artery disease, three-vessel including left anterior descending artery ostial 100%   occlusion, mid right coronary artery 100% occlusion, a distal left anterior descending artery   95% stenosis, second circumflex marginal branch 30% stenosis  2.  Patent coronary bypass grafts to the left anterior descending artery, first circumflex marginal  branch and distal right coronary artery.  3.  Patent coronary stent of the ostium and proximal first circumflex marginal branch.  4.  Normal ascending aorta.  5.  Aortic regurgitation, mild to moderate.  6.  Patent distal abdominal aortogram and bilateral iliofemoral arteries.  7.  Essentially normal right heart pressures and left ventricular filling pressure.     CAROTID ULTRASOUND (11/27/17)  Moderate stenosis of the right internal carotid (50-69%).   Left carotid.   CEA is widely patent without evidence of  restinosis.    Flow within both subclavian arteries appears to be within normal limits.   Antegrade flow, bilateral vertebral arteries.     CTA OF HEAD/NECK (02/14/18)  1.  No evidence of intracranial vascular occlusion or aneurysm.  2.  Extensive atherosclerotic plaque involving the intracranial vascular structures with multiple areas of atherosclerotic narrowing.  3.  Periventricular chronic small vessel ischemic change.  4.  Marked partially calcified and ulcerated plaques located within the right carotid artery bifurcation. Between 50% to 70% stenosis at the origin right internal carotid artery is demonstrated.  5.  Mild partially calcified plaque left carotid artery bifurcation. No significant stenosis.  6.  Moderate/marked partially calcified plaque origin right vertebral artery resulting in between 50% and 70% stenosis.  7.  Focal plaque or thrombus within the right vertebral artery at the C1 level.  8.  Moderate partially calcified plaque within the proximal segment of the left vertebral artery resulting in 50% stenosis.    CT OF CHEST AND ABDOMEN (12/14/17)  1.  Aortic annulus area of 326 sq mm.  2.  Coronary ostia are both greater than 10 mm from the annulus.  3.  Minimum diameter of the iliofemoral arterial system of 5.9 mm on the RIGHT and 8 mm on the LEFT, with moderate atherosclerotic calcification and tortuosity bilaterally.  4.  Interval distal migration of large LEFT kidney stone to the distal ureter, without significant obstructive changes.     COLONOSCOPE by Dr. 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  Eventually this area was  Endoclipped,   moderate left-sided diverticulosis.     ECHOCARDIOGRAM CONCLUSIONS (02/14/18)  1. Known TAVR aortic valve that is functioning normally with normal   transvalvular gradients. Transvalvular gradients are - Peak: 15  mmHg,    Mean: 9  mmHg. Trivial paravalvular leak is noted.  2. Mitral regurgitation was not evaluated during this study, however   there appeared to be no impingment or restriction of the mitral valve   leaflets by the bioprosthetic aortic valve.  3. Normal pericardium without effusion.  4. Mild to moderately decreased left ventricular systolic function.   Estimated LVEF 40%, however this estimate was difficult due to severe tachycardia.  5. Global hypokinesis.  Compared to the previous study performed on 2/13/2018:  There appears   to be less perivalvular leak.  The TAVR valve might be slightly farther   into the ventricle, however there does not appear to be any obstruction.    ECHOCARDIOGRAM CONCLUSIONS (10/026/17)  Prior echo 12-23-14. Compared to the images of the study done - there   has been progression of aortic stenosis and regurgitation.   Normal left ventricular systolic function.  Left ventricular ejection fraction is visually estimated to be 70%.  Severe aortic stenosis.  AV Area Cont Eq vti 0.93 cm²  Vmax is 5.0 m/s. Transvalvular gradients are - Peak: 100 mmHg, Mean: 53 mmHg.   Moderate aortic insufficiency.  Mild tricuspid regurgitation.  Estimated right ventricular systolic pressure is 45 mmHg.  Moderate pulmonic insufficiency.     EKG performed on (02/12/18) was reviewed: EKG shows sinus patricia with right bundle branch block.  EKG performed on (02/12/18) EKG shows sinus patricia with right bundle branch block.  EKG performed on (02/07/18) EKG shows sinus patricia with right bundle branch block.  EKG performed on (05/12/17) EKG shows sinus rhythm with right bundle branch block.     Laboratory results of (02/12/18) were reviewed. Bun of 17 mg/dl, creatinine levels of 1.14 mg/dl  noted.  Laboratory results of (02/07/18) Bun of 28 mg/dl, creatinine levels of 1.39 mg/dl noted.     MRI OF BRAIN (02/13/11)  1. Moderate cerebral atrophy.  2. Minimal supratentorial white matter disease with two or 3 rare   punctate foci of bright FLAIR signal in the deep white matter consistent   with small vessel ischemic change versus demyelination or gliosis.  3. No evidence of acute cerebral infarction, hemorrhage, or mass lesion.     PERIPHERAL INTERVENTION by Dr. Amaya (03/22/17)  1.  RIGHT common femoral sheath arteriogram demonstrates atherosclerosis with estimated 50-75% stenosis of the proximal femoral artery  2.  Deployment Starclose SE vascular closure device; due to the patient's agitation and altered mental status I recommend a further 6 hours of groin precautions     PERIPHERAL INTERVENTION by Dr. Hart (03/21/17)  1.  Active extravasation from the ileocecal branch of the superior mesenteric artery.  2.  Successful embolization of bleeding cecal branch.     PERIPHERAL INTERVENTION by Dr. Parra (08/22/12)  1.  Right superficial femoral artery occlusion with occlusion of the tibial peroneal trunk.    2.  Widely patent left superficial femoral artery stent with some disease in   the tibial vessels as well.  Although, the superficial femoral artery is   amenable to percutaneous intervention, I am reluctant to open the artery to a   distal occlusion.  The tibioperoneal trunk is heavily diseased and   intervention in the tibioperoneal trunk for a patient with claudication is not   advisable due to the risk of failure and complication as well as the poor   durability of below knee tibial work.  I will follow up with the patient in   the office and discuss with him the findings of the angiogram.  If he   progresses to rest pain or tissue loss, will consider percutaneous   intervention of both lesions.     TRANSESOPHAGEAL ECHOCARDIOGRAM CONCLUSIONS by Dr. 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 Bradycardia with right bundle branch block, temporary pacemaker placement, atrial fibrillation with rapid ventricular response, status post arrest, status post respiratory failure off ventilator support: He is clinically improving. He is likely scheduled for pacemaker placement today.  2. Successful transcatheter aortic valve replacement (TAVR) with # 23 with 2 additional mls of volume Muñiz Denia S3 valve, transfemoral approach, under general anesthesia (02/12/18)  3. Coronary artery disease with prior 3 vessel coronary bypass graft with left internal mammary artery graft to left anterior descending artery, saphenous vein graft to obtuse marginal branch and saphenous vein graft to posterior descending artery by Dr. Coates (03/20/08)  4. Peripheral vascular disease with prior left superficial femoral artery stent, known high grade stenosis of right common femoral artery closed with Starclose closure by Dr. Amaya on (03/22/17): Stable.  5. Carotid artery stenosis with left carotid enterectomy by Dr. Parra (05/03/11) (managed by Dr. Parra)  6. History of trans-ischemic attack  7. History of gastrointestinal bleeding with embolization of ileocecal branch by Dr. Hart (03/21/17) and AVM (managed by Osmany Pat): He remains clinically stable without recurrence of gastrointestinal bleeding. He was cleared by GI for TAVR.  8.  Additional information: He and his wife is from [place], however, they speaks [place]ian.     CC José Taylor and Yarelis Lee         Quality-Core Measures

## 2018-02-17 NOTE — PROGRESS NOTES
Pulmonary Critical Care Progress Note        Date of admission: 2/14/2018  Date of service: 2/17/2018    Chief Complaint: AMS    History of Present Illness: 84 y.o. male pmhx of Paroxysmal A-fib, CAD s/p CABG, AS s/p TAVR 2/12/18, PAD, Carotid artery disease, TIA, GIB. Presents to the ER today with AMS (obtundation) 1 day after discharge from hospital for TAVR, he was found by EMS in A-fib with RVR. In the ER he was intubated for airway protection and given metoprolol 5 mg for RVR. He then began to have significant pauses and severe hypotension during pauses. Cards was consulted and attempted cardioversion x1 then 300mg of amiodarone followed by another attempted electrocardioversion. EP saw the patient for placement of TPM for SSS and A-fib, he was taken emergently to the cath lab for TPM.    ROS:  Respiratory: positive cough, negative shortness of breath and negative sputum production, Cardiac: negative chest pain, positive palpitations and positive leg swelling, GI: negative nausea, negative vomiting and negative abdominal pain.  All other systems negative.     Interval Events:  24 hour interval history reviewed    - confused but calm and follows   - no PPM yet due to temps    - paced in 60s then increases to 130s on amio gtt   - amio @ 0.5, off levophed gtt   - increased WBC, TM 38.6, UA ok   - low plts  on heparin gtt   - low K/phos/Mag   - ancef day 2    PFSH:  No change.    Respiratory:   1 lpm n/c, IS 1500mL/PEP  Pulse Oximetry: 93 %          Exam: unlabored respirations, no intercostal retractions or accessory muscle use and rhonchi bibasilar  ImagingAvailable data reviewed (personally reviewed CXR and compared to prior film): Mild increase in bibasilar atelectasis, low lung volumes, enlarged cardiac silhouette, right IJ/right subclavian CVL with transvenous pacer seen, gastric tube in good position  Recent Labs      02/14/18   1526  02/15/18   0424  02/16/18   0512   ISTATAPH  7.378*  7.371*  7.373*    ISTATAPCO2  37.7*  37.4*  34.7   ISTATAPO2  179*  193*  93*   ISTATATCO2  23  23  21   [id number]  100*  100*  97   ISTATARTHCO3  22.2  21.7  20.2   ISTATARTBE  -3  -3  -4   ISTATTEMP  see below  36.1 C  37.4 C   ISTATFIO2  100  60  40   ISTATSPEC  Arterial  Arterial  Arterial   ISTATAPHTC   --   7.384*  7.368*   [id number]   --   189*  95*   ABG: None today    HemoDynamics:  Pulse: 82, Heart Rate (Monitored): 82  Arterial BP: 137/43, NIBP: 116/42   Levophed gtt of, amiodarone at 0.5, heparin drips    Exam: 100% paced at 2 mAmp, (+) murmur, distant heart sounds, lat displaced PMI, asystolic underlying pacemaker  Imaging: Available data reviewed    Echocardiogram 2/14:    This was a limited study. Technically difficult study incomplete   information is obtained.    1. Known TAVR aortic valve that is functioning normally with normal   transvalvular gradients. Transvalvular gradients are - Peak: 15  mmHg,    Mean: 9  mmHg. Trivial paravalvular leak is noted.  2. Mitral regurgitation was not evaluated during this study, however   there appeared to be no impingment or restriction of the mitral valve   leaflets by the bioprosthetic aortic valve.  3. Normal pericardium without effusion.  4. Mild to moderately decreased left ventricular systolic function.   Estimated LVEF 40%, however this estimate was difficult due to severe   tachycardia.  5. Global hypokinesis.    Compared to the previous study performed on 2/13/2018:  There appears   to be less perivalvular leak.  The TAVR valve might be slightly farther   into the ventricle, however there does not appear to be any   obstruction.  Recent Labs      02/14/18   1444  02/14/18   1519   TROPONINI  0.28*   --    BNPBTYPENAT   --   88       Neuro:  GCS Total Sin Coma Score: 14       Exam: some memory deficits, generalized weakness no focal deficits noted oriented for age x3 Motor and sensory exam grossly intact  Imaging: Available data reviewed    EEG 2/15: This scalp  EEG is consistent with diffuse nonspecific cortical dysfunction - moderate     This nonspecific abnormalities could be secondary to metabolic, toxic, polypharmacy or even post-ictal     There are no epileptiform discharges in this record    Fluids:  Intake/Output       02/15/18 0700 - 02/16/18 0659 02/16/18 0700 - 02/17/18 0659 02/17/18 0700 - 02/18/18 0659      0700-1859 1900-0659 Total 0700-1859 1900-0659 Total 0700-1859 1900-0659 Total       Intake    I.V.  1843.9  1469.4 3313.3  945.8  1542 2487.8  218  -- 218    Magnesium Sulfate Volume -- -- -- 50 0 50 -- -- --    Amiodarone Volume 204 204 408 170 204 374 34 -- 34    Propofol Volume 118.9 102.7 221.6 51.9 0 51.9 -- -- --    Norepinephrine Volume 107.5 67.2 174.7 53.9 0 53.9 -- -- --    Heparin Volume 163.6 195.5 359.1 170 238 408 34 -- 34    IV Piggyback Volume (IV Piggyback) 500 -- 500 -- 200 200 -- -- --    IV Volume (NS)   150 -- 150    Other  --  -- --  60  60 120  --  -- --    Medications (P.O./ Enteral Liquids) -- -- -- 60 60 120 -- -- --    Enteral  50  440 490  630  840 1470  60  -- 60    Enteral Volume 50 350 400  60 -- 60    Free Water / Tube Flush -- 90 90 30 120 150 -- -- --    Total Intake 1893.9 1909.4 3803.3 1635.8 2442 4077.8 278 -- 278       Output    Urine  850  500 1350  1950  2450 4400  600  -- 600    Indwelling Cathether  1950 2450 4400 600 -- 600    Drains  --  -- --  --  -- --  0  -- 0    Residual Amount (ml) (Discarded) -- -- -- -- -- -- 0 -- 0    Stool  --  -- --  --  -- --  --  -- --    Number of Times Stooled -- 0 x 0 x 0 x 1 x 1 x 1 x -- 1 x    Total Output  1950 2450 4400 600 -- 600       Net I/O     1043.9 1409.4 2453.3 -314.2 -8 -322.2 -322 -- -322        Weight: 63.2 kg (139 lb 5.3 oz)  Recent Labs      02/15/18   0520  02/16/18   0350  02/17/18   0515   SODIUM  140  137  139   POTASSIUM  3.4*  3.2*  3.0*   CHLORIDE  111  110  108   CO2  19*  21  26   BUN  16  11  10    CREATININE  0.85  0.92  0.82   MAGNESIUM  2.1  1.8  1.8   PHOSPHORUS  1.9*  2.0*  1.5*   CALCIUM  8.8  8.2*  8.1*       GI/Nutrition:  Exam: abdomen is soft and non-tender, normal active bowel sounds  Imaging: Available data reviewed  NPO and tube feed Tolerated  Liver Function  Recent Labs      18   1444  02/15/18   0520  18   0350  18   0515   ALTSGPT  18   --    --   16   ASTSGOT  45   --    --   25   ALKPHOSPHAT  48   --    --   69   TBILIRUBIN  0.8   --    --   0.9   LIPASE   --    --    --   59   GLUCOSE  158*  143*  152*  142*       Heme:  Recent Labs      02/15/18   0520  02/15/18   0943   18   0930  18   1622  18   0515   RBC  4.00*   --    --   3.78*   --    --   3.84*   HEMOGLOBIN  13.0*   --    --   12.6*   --    --   12.6*   HEMATOCRIT  39.8*   --    --   37.8*   --    --   37.9*   PLATELETCT  84*   --    --   72*   --    --   66*   PROTHROMBTM   --   14.1   --    --    --    --    --    APTT   --   28.1   < >   --   76.4*  82.3*  71.1*   INR   --   1.12   --    --    --    --    --     < > = values in this interval not displayed.       Infectious Disease:  Monitored Temp  Av.9 °C (100.2 °F)  Min: 37.5 °C (99.5 °F)  Max: 38.6 °C (101.5 °F)  Temp  Av.1 °C (100.5 °F)  Min: 37.6 °C (99.7 °F)  Max: 38.6 °C (101.5 °F)  Micro: reviewed. None  Recent Labs      18   1444   02/15/18   0520  18   0350  18   0515   WBC   --    < >  11.5*  11.6*  12.3*   NEUTSPOLYS   --    < >  69.10  74.00*  76.00*   LYMPHOCYTES   --    < >  15.80*  12.70*  11.60*   MONOCYTES   --    < >  12.60  9.80  8.80   EOSINOPHILS   --    < >  0.80  1.70  2.10   BASOPHILS   --    < >  0.60  0.70  0.20   ASTSGOT  45   --    --    --   25   ALTSGPT  18   --    --    --   16   ALKPHOSPHAT  48   --    --    --   69   TBILIRUBIN  0.8   --    --    --   0.9    < > = values in this interval not displayed.     Current Facility-Administered Medications   Medication  Dose Frequency Provider Last Rate Last Dose   • famotidine (PEPCID) tablet 20 mg  20 mg BID Jeremy M Gonda, M.D.       • ceFAZolin (ANCEF) IVPB 2 g  2 g Q8HRS Jeremy M Gonda, M.D.   2 g at 02/17/18 0537   • heparin injection 2,600 Units  2,600 Units PRN Christine Powers, A.P.R.N.        And   • heparin infusion 25,000 units in 500 ml 0.45% nacl   Continuous Christine Powers, A.P.R.N. 17 mL/hr at 02/17/18 0711 850 Units/hr at 02/17/18 0711   • norepinephrine (LEVOPHED) 8 mg in  mL Infusion  0-30 mcg/min Continuous Kraig Seth Jr., D.O.   Stopped at 02/16/18 1312   • amiodarone (CORDARONE) 450 mg in D5W 250 mL Infusion  0.5 mg/min Continuous Godwin Wilson M.D. 17 mL/hr at 02/16/18 2026 0.5 mg/min at 02/16/18 2026   • Respiratory Care per Protocol   Continuous RT DODIE Watters Jr..O.       • senna-docusate (PERICOLACE or SENOKOT S) 8.6-50 MG per tablet 2 Tab  2 Tab BID DODIE Watters Jr..O.   Stopped at 02/17/18 0900    And   • polyethylene glycol/lytes (MIRALAX) PACKET 1 Packet  1 Packet QDAY PRN DODIE Watters Jr..O.        And   • magnesium hydroxide (MILK OF MAGNESIA) suspension 30 mL  30 mL QDAY PRN Kraig Seth Jr., DODIE.O.        And   • bisacodyl (DULCOLAX) suppository 10 mg  10 mg QDAY PRN Kraig Seth Jr., DODIE.O.       • lidocaine (XYLOCAINE) 1%  injection  1-2 mL Q30 MIN PRN Kraig Seth Jr., DODIE.O.       • fentaNYL (SUBLIMAZE) injection 25 mcg  25 mcg Q HOUR PRN DODIE Watters Jr..O.        Or   • fentaNYL (SUBLIMAZE) injection 50 mcg  50 mcg Q HOUR PRN Kraig Seth Jr., D.O.   50 mcg at 02/16/18 0243    Or   • fentaNYL (SUBLIMAZE) injection 100 mcg  100 mcg Q HOUR PRN DODIE Watters Jr..O.       • insulin regular (HUMULIN R) injection 1-6 Units  1-6 Units Q6HRS DODIE Watters Jr..O.   Stopped at 02/14/18 1915    And   • glucose 4 g chewable tablet 16 g  16 g Q15 MIN PRN DODIE Watters Jr..O.        And   • dextrose 50% (D50W) injection 25 mL  25 mL  Q15 MIN PRN DODIE Watters Jr..YUDITH       • ipratropium-albuterol (DUONEB) nebulizer solution 3 mL  3 mL Q2HRS PRN (RT) Kraig Seth Jr., D.O.       • phenylephrine (JOHN-SYNEPHRINE) 40,000 mcg in  mL Infusion  0-300 mcg/min Continuous DODIE Watters Jr..OPawel   Stopped at 02/14/18 1915   • propofol (DIPRIVAN) injection  0-80 mcg/kg/min Continuous DODIE Watters Jr..OPawel   Stopped at 02/16/18 1230   • acetaminophen (TYLENOL) tablet 650 mg  650 mg Q6HRS PRN Gavin Mackenzie M.D.   650 mg at 02/16/18 1643   • atorvastatin (LIPITOR) tablet 20 mg  20 mg QHS Gavin Mackenzie M.D.   20 mg at 02/16/18 2026   • levothyroxine (SYNTHROID) tablet 50 mcg  50 mcg AM ES Gavin Mackenzie M.D.   50 mcg at 02/17/18 0600   • NS infusion   Continuous Gavin Mackenzie M.D. 75 mL/hr at 02/17/18 0539     • aspirin EC (ECOTRIN) tablet 81 mg  81 mg Q EVENING Gavin Mackenzie M.D.   81 mg at 02/16/18 2026     Last reviewed on 2/14/2018  5:15 PM by Shanna Nash    Quality  Measures:  Radiology images reviewed, Labs reviewed and Medications reviewed  Baldwin catheter: Critically Ill - Requiring Accurate Measurement of Urinary Output  Central line in place: Shock and Need for access    DVT Prophylaxis: Heparin  DVT prophylaxis - mechanical: SCDs  Ulcer prophylaxis: Yes  Antibiotics: Treating active infection/contamination beyond 24 hours perioperative coverage  Assessed for rehab: Patient unable to tolerate rehabilitation therapeutic regimen      Assessment/Plan:  Acute hypoxic respiratory failure - intubated 2/14-2/16              - strongly encourage IS/PEP, aspiration precautions   - limit sedatives   - RT/O2 protocols   - Limited mobilization for now given temporary pacemaker  Bradycardic arrest followed by evidence of sick sinus syndrome/PAF requiring 100% TV pacer              - continue pacing for now, EP and cardiology following   - Optimize electrolytes    - Probable permanent pacemaker placement tomorrow if remains AF/improved  leukocytosis    - Continue IV amiodarone, heparin drip    Cardiogenic shock - improved              - goal MAP >65  S/P TAVR   HFrEF   - Diuresis, holding beta-blockade given shock  Acute undifferentiated encephalopathy - improved              - monitor, avoid sedatives, frequent neurologic checks  CAD              - ASA, statin  Hypothyroidism              - synthroid  Hypophosphatemia/kalemia/Mag - repletion with daily monitoring  Fevers and leukocytosis - ?etiology   - Follow-up on cultures,    - continue Ancef for now although can likely discontinue in 1-2 days if improves  Thrombocytopenia - monitor  Prophylaxis, enteral nutrition (nothing by mouth at midnight for possible procedure)    Patient remains critically ill today requiring my active management of his hypoxia, infection rule out, tenuous cardiac/respiratory status.    Discussed patient condition and risk of morbidity and/or mortality with RN, RT, Pharmacy, Charge nurse / hot rounds and cardiology and hospitalist, EP    The patient remains critically ill.  Critical care time = 32 minutes in directly providing and coordinating critical care and extensive data review.  No time overlap and excludes procedures.

## 2018-02-18 ENCOUNTER — APPOINTMENT (OUTPATIENT)
Dept: RADIOLOGY | Facility: MEDICAL CENTER | Age: 83
DRG: 242 | End: 2018-02-18
Attending: INTERNAL MEDICINE
Payer: MEDICARE

## 2018-02-18 LAB
ALBUMIN SERPL BCP-MCNC: 3.3 G/DL (ref 3.2–4.9)
ALBUMIN/GLOB SERPL: 1.2 G/DL
ALP SERPL-CCNC: 84 U/L (ref 30–99)
ALT SERPL-CCNC: 17 U/L (ref 2–50)
ANION GAP SERPL CALC-SCNC: 5 MMOL/L (ref 0–11.9)
APTT PPP: 81.6 SEC (ref 24.7–36)
AST SERPL-CCNC: 32 U/L (ref 12–45)
BASOPHILS # BLD AUTO: 0.4 % (ref 0–1.8)
BASOPHILS # BLD: 0.05 K/UL (ref 0–0.12)
BILIRUB SERPL-MCNC: 0.9 MG/DL (ref 0.1–1.5)
BUN SERPL-MCNC: 12 MG/DL (ref 8–22)
CALCIUM SERPL-MCNC: 9 MG/DL (ref 8.5–10.5)
CHLORIDE SERPL-SCNC: 103 MMOL/L (ref 96–112)
CO2 SERPL-SCNC: 29 MMOL/L (ref 20–33)
CREAT SERPL-MCNC: 0.77 MG/DL (ref 0.5–1.4)
EOSINOPHIL # BLD AUTO: 0.4 K/UL (ref 0–0.51)
EOSINOPHIL NFR BLD: 3.5 % (ref 0–6.9)
ERYTHROCYTE [DISTWIDTH] IN BLOOD BY AUTOMATED COUNT: 46.5 FL (ref 35.9–50)
GLOBULIN SER CALC-MCNC: 2.7 G/DL (ref 1.9–3.5)
GLUCOSE BLD-MCNC: 110 MG/DL (ref 65–99)
GLUCOSE BLD-MCNC: 123 MG/DL (ref 65–99)
GLUCOSE BLD-MCNC: 134 MG/DL (ref 65–99)
GLUCOSE BLD-MCNC: 93 MG/DL (ref 65–99)
GLUCOSE SERPL-MCNC: 143 MG/DL (ref 65–99)
HCT VFR BLD AUTO: 38.5 % (ref 42–52)
HGB BLD-MCNC: 13.1 G/DL (ref 14–18)
IMM GRANULOCYTES # BLD AUTO: 0.17 K/UL (ref 0–0.11)
IMM GRANULOCYTES NFR BLD AUTO: 1.5 % (ref 0–0.9)
LYMPHOCYTES # BLD AUTO: 1.56 K/UL (ref 1–4.8)
LYMPHOCYTES NFR BLD: 13.8 % (ref 22–41)
MCH RBC QN AUTO: 33.3 PG (ref 27–33)
MCHC RBC AUTO-ENTMCNC: 34 G/DL (ref 33.7–35.3)
MCV RBC AUTO: 98 FL (ref 81.4–97.8)
MONOCYTES # BLD AUTO: 1.18 K/UL (ref 0–0.85)
MONOCYTES NFR BLD AUTO: 10.5 % (ref 0–13.4)
NEUTROPHILS # BLD AUTO: 7.92 K/UL (ref 1.82–7.42)
NEUTROPHILS NFR BLD: 70.3 % (ref 44–72)
NRBC # BLD AUTO: 0 K/UL
NRBC BLD-RTO: 0 /100 WBC
PLATELET # BLD AUTO: 66 K/UL (ref 164–446)
PMV BLD AUTO: 11.2 FL (ref 9–12.9)
POTASSIUM SERPL-SCNC: 3.3 MMOL/L (ref 3.6–5.5)
PROT SERPL-MCNC: 6 G/DL (ref 6–8.2)
RBC # BLD AUTO: 3.93 M/UL (ref 4.7–6.1)
SODIUM SERPL-SCNC: 137 MMOL/L (ref 135–145)
WBC # BLD AUTO: 11.3 K/UL (ref 4.8–10.8)

## 2018-02-18 PROCEDURE — 700102 HCHG RX REV CODE 250 W/ 637 OVERRIDE(OP): Performed by: INTERNAL MEDICINE

## 2018-02-18 PROCEDURE — 02H63JZ INSERTION OF PACEMAKER LEAD INTO RIGHT ATRIUM, PERCUTANEOUS APPROACH: ICD-10-PCS | Performed by: INTERNAL MEDICINE

## 2018-02-18 PROCEDURE — 305387 HCHG SUTURES

## 2018-02-18 PROCEDURE — 770022 HCHG ROOM/CARE - ICU (200)

## 2018-02-18 PROCEDURE — A9270 NON-COVERED ITEM OR SERVICE: HCPCS | Performed by: INTERNAL MEDICINE

## 2018-02-18 PROCEDURE — 85730 THROMBOPLASTIN TIME PARTIAL: CPT

## 2018-02-18 PROCEDURE — C1892 INTRO/SHEATH,FIXED,PEEL-AWAY: HCPCS

## 2018-02-18 PROCEDURE — 700111 HCHG RX REV CODE 636 W/ 250 OVERRIDE (IP)

## 2018-02-18 PROCEDURE — 93010 ELECTROCARDIOGRAM REPORT: CPT | Performed by: INTERNAL MEDICINE

## 2018-02-18 PROCEDURE — 02HK3JZ INSERTION OF PACEMAKER LEAD INTO RIGHT VENTRICLE, PERCUTANEOUS APPROACH: ICD-10-PCS | Performed by: INTERNAL MEDICINE

## 2018-02-18 PROCEDURE — 33208 INSRT HEART PM ATRIAL & VENT: CPT

## 2018-02-18 PROCEDURE — 99152 MOD SED SAME PHYS/QHP 5/>YRS: CPT

## 2018-02-18 PROCEDURE — 700111 HCHG RX REV CODE 636 W/ 250 OVERRIDE (IP): Performed by: INTERNAL MEDICINE

## 2018-02-18 PROCEDURE — C1894 INTRO/SHEATH, NON-LASER: HCPCS

## 2018-02-18 PROCEDURE — 82962 GLUCOSE BLOOD TEST: CPT | Mod: 91

## 2018-02-18 PROCEDURE — 70450 CT HEAD/BRAIN W/O DYE: CPT

## 2018-02-18 PROCEDURE — 99153 MOD SED SAME PHYS/QHP EA: CPT

## 2018-02-18 PROCEDURE — 700105 HCHG RX REV CODE 258

## 2018-02-18 PROCEDURE — 80053 COMPREHEN METABOLIC PANEL: CPT

## 2018-02-18 PROCEDURE — C1785 PMKR, DUAL, RATE-RESP: HCPCS

## 2018-02-18 PROCEDURE — 93005 ELECTROCARDIOGRAM TRACING: CPT | Performed by: INTERNAL MEDICINE

## 2018-02-18 PROCEDURE — 71045 X-RAY EXAM CHEST 1 VIEW: CPT

## 2018-02-18 PROCEDURE — 700105 HCHG RX REV CODE 258: Performed by: INTERNAL MEDICINE

## 2018-02-18 PROCEDURE — 304952 HCHG R 2 PADS

## 2018-02-18 PROCEDURE — C1898 LEAD, PMKR, OTHER THAN TRANS: HCPCS

## 2018-02-18 PROCEDURE — 99233 SBSQ HOSP IP/OBS HIGH 50: CPT | Performed by: HOSPITALIST

## 2018-02-18 PROCEDURE — 700101 HCHG RX REV CODE 250

## 2018-02-18 PROCEDURE — 0JH606Z INSERTION OF PACEMAKER, DUAL CHAMBER INTO CHEST SUBCUTANEOUS TISSUE AND FASCIA, OPEN APPROACH: ICD-10-PCS | Performed by: INTERNAL MEDICINE

## 2018-02-18 PROCEDURE — 85025 COMPLETE CBC W/AUTO DIFF WBC: CPT

## 2018-02-18 PROCEDURE — 304853 HCHG PPM TEST CABLE

## 2018-02-18 RX ORDER — DEXTROSE MONOHYDRATE 50 MG/ML
INJECTION, SOLUTION INTRAVENOUS
Status: COMPLETED
Start: 2018-02-18 | End: 2018-02-18

## 2018-02-18 RX ORDER — OXYCODONE HYDROCHLORIDE 10 MG/1
10 TABLET ORAL EVERY 4 HOURS PRN
Status: DISCONTINUED | OUTPATIENT
Start: 2018-02-18 | End: 2018-02-22

## 2018-02-18 RX ORDER — LIDOCAINE HYDROCHLORIDE 20 MG/ML
INJECTION, SOLUTION INFILTRATION; PERINEURAL
Status: COMPLETED
Start: 2018-02-18 | End: 2018-02-18

## 2018-02-18 RX ORDER — HYDRALAZINE HYDROCHLORIDE 20 MG/ML
10-20 INJECTION INTRAMUSCULAR; INTRAVENOUS EVERY 4 HOURS PRN
Status: DISCONTINUED | OUTPATIENT
Start: 2018-02-18 | End: 2018-03-13 | Stop reason: HOSPADM

## 2018-02-18 RX ORDER — BUPIVACAINE HYDROCHLORIDE 2.5 MG/ML
INJECTION, SOLUTION EPIDURAL; INFILTRATION; INTRACAUDAL
Status: COMPLETED
Start: 2018-02-18 | End: 2018-02-18

## 2018-02-18 RX ORDER — OXYCODONE HYDROCHLORIDE 5 MG/1
5 TABLET ORAL EVERY 4 HOURS PRN
Status: DISCONTINUED | OUTPATIENT
Start: 2018-02-18 | End: 2018-02-22

## 2018-02-18 RX ORDER — MIDAZOLAM HYDROCHLORIDE 1 MG/ML
INJECTION INTRAMUSCULAR; INTRAVENOUS
Status: COMPLETED
Start: 2018-02-18 | End: 2018-02-18

## 2018-02-18 RX ORDER — AMIODARONE HYDROCHLORIDE 200 MG/1
400 TABLET ORAL 3 TIMES DAILY
Status: DISCONTINUED | OUTPATIENT
Start: 2018-02-18 | End: 2018-02-22

## 2018-02-18 RX ADMIN — CEFAZOLIN SODIUM 2 G: 2 INJECTION, SOLUTION INTRAVENOUS at 14:36

## 2018-02-18 RX ADMIN — AMIODARONE HYDROCHLORIDE 400 MG: 200 TABLET ORAL at 20:55

## 2018-02-18 RX ADMIN — FENTANYL CITRATE 50 MCG: 50 INJECTION, SOLUTION INTRAMUSCULAR; INTRAVENOUS at 08:00

## 2018-02-18 RX ADMIN — OXYCODONE HYDROCHLORIDE 10 MG: 10 TABLET ORAL at 12:30

## 2018-02-18 RX ADMIN — LIDOCAINE HYDROCHLORIDE: 20 INJECTION, SOLUTION INFILTRATION; PERINEURAL at 15:30

## 2018-02-18 RX ADMIN — LEVOTHYROXINE SODIUM 50 MCG: 50 TABLET ORAL at 06:17

## 2018-02-18 RX ADMIN — DEXTROSE MONOHYDRATE 500 ML: 50 INJECTION, SOLUTION INTRAVENOUS at 20:00

## 2018-02-18 RX ADMIN — CEFAZOLIN SODIUM 2 G: 2 INJECTION, SOLUTION INTRAVENOUS at 06:17

## 2018-02-18 RX ADMIN — STANDARDIZED SENNA CONCENTRATE AND DOCUSATE SODIUM 2 TABLET: 8.6; 5 TABLET, FILM COATED ORAL at 20:19

## 2018-02-18 RX ADMIN — HYDRALAZINE HYDROCHLORIDE 20 MG: 20 INJECTION INTRAMUSCULAR; INTRAVENOUS at 14:03

## 2018-02-18 RX ADMIN — OXYCODONE HYDROCHLORIDE 10 MG: 10 TABLET ORAL at 20:19

## 2018-02-18 RX ADMIN — AMIODARONE HYDROCHLORIDE 0.5 MG/MIN: 50 INJECTION, SOLUTION INTRAVENOUS at 14:03

## 2018-02-18 RX ADMIN — MIDAZOLAM 2 MG: 1 INJECTION INTRAMUSCULAR; INTRAVENOUS at 15:30

## 2018-02-18 RX ADMIN — POTASSIUM BICARBONATE 50 MEQ: 25 TABLET, EFFERVESCENT ORAL at 11:04

## 2018-02-18 RX ADMIN — ASPIRIN 81 MG: 81 TABLET, COATED ORAL at 20:19

## 2018-02-18 RX ADMIN — ATORVASTATIN CALCIUM 20 MG: 20 TABLET, FILM COATED ORAL at 20:19

## 2018-02-18 RX ADMIN — FAMOTIDINE 20 MG: 20 TABLET, FILM COATED ORAL at 07:49

## 2018-02-18 RX ADMIN — AMIODARONE HYDROCHLORIDE 0.5 MG/MIN: 50 INJECTION, SOLUTION INTRAVENOUS at 00:28

## 2018-02-18 RX ADMIN — FENTANYL CITRATE 75 MCG: 50 INJECTION, SOLUTION INTRAMUSCULAR; INTRAVENOUS at 15:30

## 2018-02-18 RX ADMIN — VANCOMYCIN HYDROCHLORIDE 800 MG: 1 INJECTION, POWDER, LYOPHILIZED, FOR SOLUTION INTRAVENOUS at 16:11

## 2018-02-18 RX ADMIN — BUPIVACAINE HYDROCHLORIDE: 2.5 INJECTION, SOLUTION EPIDURAL; INFILTRATION; INTRACAUDAL; PERINEURAL at 15:30

## 2018-02-18 RX ADMIN — CEFAZOLIN SODIUM 2 G: 2 INJECTION, SOLUTION INTRAVENOUS at 22:47

## 2018-02-18 ASSESSMENT — PAIN SCALES - GENERAL
PAINLEVEL_OUTOF10: 0
PAINLEVEL_OUTOF10: 4
PAINLEVEL_OUTOF10: 0
PAINLEVEL_OUTOF10: 5
PAINLEVEL_OUTOF10: 0
PAINLEVEL_OUTOF10: 0
PAINLEVEL_OUTOF10: 5
PAINLEVEL_OUTOF10: 4
PAINLEVEL_OUTOF10: 0
PAINLEVEL_OUTOF10: 0
PAINLEVEL_OUTOF10: 5
PAINLEVEL_OUTOF10: 0

## 2018-02-18 ASSESSMENT — ENCOUNTER SYMPTOMS
COUGH: 1
WEAKNESS: 1
MEMORY LOSS: 1
CARDIOVASCULAR NEGATIVE: 1
GASTROINTESTINAL NEGATIVE: 1
BRUISES/BLEEDS EASILY: 1

## 2018-02-18 NOTE — PROGRESS NOTES
Cardiology Progress Note               Author: Usman Stephens Date & Time created: 2018  11:08 PM     Interval History:  Appears temp pacer captures whe slower  Max temp 100F and wbc still up    Chief Complaint:  Waiting for ppm    Review of Systems   Constitutional: Positive for fever. Negative for chills.   Cardiovascular: Negative for chest pain.       Physical Exam   Constitutional: He is oriented to person, place, and time. He appears well-developed and well-nourished. No distress.   HENT:   Head: Normocephalic and atraumatic.   Right Ear: External ear normal.   Left Ear: External ear normal.   Nose: Nose normal.   Mouth/Throat: Oropharynx is clear and moist.   Eyes: Conjunctivae and EOM are normal. Pupils are equal, round, and reactive to light. Right eye exhibits no discharge. Left eye exhibits no discharge. No scleral icterus.   Neck: Normal range of motion. Neck supple. No JVD present. No tracheal deviation present.   Cardiovascular: Intact distal pulses.  An irregularly irregular rhythm present. Tachycardia present.  Exam reveals no gallop and no friction rub.    Murmur heard.  Subclavian access site clean   Pulmonary/Chest: Effort normal and breath sounds normal. No stridor. No respiratory distress. He has no wheezes. He has no rales. He exhibits no tenderness.   Abdominal: Soft. He exhibits no distension. There is no tenderness.   Musculoskeletal: He exhibits no edema or tenderness.   Neurological: He is alert and oriented to person, place, and time. No cranial nerve deficit.   Skin: Skin is warm and dry. No rash noted. He is not diaphoretic. No erythema. No pallor.   Vitals reviewed.      Hemodynamics:  Temp (24hrs), Av.7 °C (99.8 °F), Min:37.6 °C (99.7 °F), Max:37.7 °C (99.9 °F)  Temperature: 37.6 °C (99.7 °F), Monitored Temp: 37.9 °C (100.2 °F)  Pulse  Av.7  Min: 44  Max: 153Heart Rate (Monitored): 74  Arterial BP: 153/46, NIBP: (!) 161/50     Respiratory:    Respiration: (!) 24, Pulse  Oximetry: 93 %     Work Of Breathing / Effort: Mild  RUL Breath Sounds: Clear, RML Breath Sounds: Diminished, RLL Breath Sounds: Diminished, WHIT Breath Sounds: Clear, LLL Breath Sounds: Diminished  Fluids:  Date 02/17/18 0700 - 02/18/18 0659   Shift 1669-6018 6259-3185 4912-0166 24 Hour Total   I  N  T  A  K  E   I.V. 1088 202.8  1290.8    Other  60  60    Enteral 360 500  860    Shift Total 1448 762.8  2210.8   O  U  T  P  U  T   Urine 1700 875  2575    Drains 0   0    Shift Total 1700 875  2575   Weight (kg) 63.2 63.2 63.2 63.2       Weight: 63.2 kg (139 lb 5.3 oz)  GI/Nutrition:  Orders Placed This Encounter   Procedures   • DIET NPO     Standing Status:   Standing     Number of Occurrences:   1     Order Specific Question:   Restrict to:     Answer:   Sips with Medications [3]     Lab Results:  Recent Labs      02/15/18   0520  02/16/18   0350  02/17/18   0515   WBC  11.5*  11.6*  12.3*   RBC  4.00*  3.78*  3.84*   HEMOGLOBIN  13.0*  12.6*  12.6*   HEMATOCRIT  39.8*  37.8*  37.9*   MCV  99.5*  100.0*  98.7*   MCH  32.5  33.3*  32.8   MCHC  32.7*  33.3*  33.2*   RDW  48.1  48.8  47.1   PLATELETCT  84*  72*  66*   MPV  10.0  10.9  10.7     Recent Labs      02/15/18   0520  02/16/18   0350  02/17/18   0515   SODIUM  140  137  139   POTASSIUM  3.4*  3.2*  3.0*   CHLORIDE  111  110  108   CO2  19*  21  26   GLUCOSE  143*  152*  142*   BUN  16  11  10   CREATININE  0.85  0.92  0.82   CALCIUM  8.8  8.2*  8.1*     Recent Labs      02/15/18   0943   02/16/18   0930  02/16/18   1622  02/17/18   0515   APTT  28.1   < >  76.4*  82.3*  71.1*   INR  1.12   --    --    --    --     < > = values in this interval not displayed.                     Medical Decision Making, by Problem:  Active Hospital Problems    Diagnosis   • *Cardiac arrest (CMS-[facility]) [I46.9]   • Acute respiratory failure with hypoxia (CMS-[facility]) [J96.01]   • Bradycardia [R00.1]   • Cardiogenic shock (CMS-[facility]) [R57.0]   • S/P TAVR (transcatheter aortic valve  replacement) [Z95.2]   • Encephalopathy acute [G93.40]   • Carotid artery stenosis [I65.29]   • Paroxysmal atrial fibrillation (CMS-HCC) [I48.0]   • TIA (transient ischemic attack) [G45.9]   • CAD (coronary artery disease) [I25.10]       Plan:  Will place ppm when wbc trending down and fevers resolved.  Likely tomorrow    Quality-Core Measures   Reviewed items::  EKG reviewed, Labs reviewed, Medications reviewed and Radiology images reviewed

## 2018-02-18 NOTE — PROGRESS NOTES
Cardiology Progress Note               Author: Lotus Mondragon Date & Time created: 2018  10:13 AM     Interval History:  HPI:  Rafia Shaikh is an 84-year-old man with a history of severe aortic stenosis status post transcatheter aortic valve replacement done on 2018 who presents with altered mental status (obtunded), and rapid atrial fibrillation.     The patient is intubated and sedated at the time of my examination. In speaking with his wife and staff in the emergency room he had become obtunded acutely at home about 2 hours prior to presentation. She called 911 immediately. While in the emergency room, he was in very rapid atrial fibrillation at around 180 bpm at which time he was given 5 mg of IV metoprolol. He is down and had several pauses. He was having about 15 second pauses when I saw him several minutes after that metoprolol bolus. Noting that he had a very low blood pressure not obtainable by cuff high discussed with the emergency room physician electrical cardioversion given his rapid atrial fibrillation and sick sinus syndrome. He had cardioversion ×1 with 150 J, and returns to rapid atrial fibrillation. I then gave him 300 mg of IV amiodarone followed by an additional 200 J ×1 cardioversion. He remained in rapid atrial fibrillation with periodic longer pauses. At that time, I called electrophysiology for assistance.     Impression and Medical Decision Makin. Bradycardic arrest: I have arranged for temporary pacemaker with electrophysiology as assistance I greatly appreciate.     2. Sick sinus syndrome: After placement of a temporary pacemaker I will plan for IV amiodarone to maintain sinus rhythm. I will also plan IV heparin drip for stroke prevention.     3. Multivessel coronary artery disease: Continue statin. Resume aspirin probably tomorrow. ACE inhibitor, and beta blocker contraindicated in the setting of shock.     4. Severe aortic stenosis status post transcatheter aortic valve:  Repeat echocardiogram ordered        This note was dictated using Dragon speech recognition software.     Thank you for allowing me to participate in the care of this patient.  Please call if any clarification will be helpful.     Immediate Post-Operative Note        PreOp Diagnosis: sinus arrest     PostOp Diagnosis: same     Procedure(s) :  Temporary pacemaker     Surgeon(s):  Usman Stephens M.D.     Type of Anesthesia: Moderate Sedation     Specimen: None     Estimated Blood Loss: 5 cc's     Contrast Media:  0 cc's     Fluoro Time: 1 min           Findings: r subclavian line with temp pacer     Complications: none apparent  FINDINGS:  Cardiomediastinal contour is unchanged.  Previously described pulmonary parenchymal opacities persist.  No pneumothorax identified.  Supportive tubing is unchanged.  Postoperative change from prior open heart surgery.  Impression     1.  No evidence of intracranial vascular occlusion or aneurysm.    2.  Extensive atherosclerotic plaque involving the intracranial vascular structures with multiple areas of atherosclerotic narrowing.    3.  Periventricular chronic small vessel ischemic change.     Impression     Marked partially calcified and ulcerated plaques located within the right carotid artery bifurcation. Between 50% to 70% stenosis at the origin right internal carotid artery is demonstrated.  Mild partially calcified plaque left carotid artery bifurcation. No significant stenosis.  Moderate/marked partially calcified plaque origin right vertebral artery resulting in between 50% and 70% stenosis.  Focal plaque or thrombus within the right vertebral artery at the C1 level.  Moderate partially calcified plaque within the proximal segment of the left vertebral artery resulting in 50% stenosis     WBC down to 11.3  Cultures negative  On AB.  Low grade temp.  Temp pacer right SC   Permanent pacemaker insertion today or tomorrow with Angelo.    Chief Complaint:      Review of Systems    Respiratory: Positive for cough.    Cardiovascular: Negative.    Gastrointestinal: Negative.    Genitourinary: Negative.    Neurological: Positive for weakness.   Endo/Heme/Allergies: Bruises/bleeds easily.   Psychiatric/Behavioral: Positive for memory loss.       Physical Exam   Constitutional: He appears well-developed and well-nourished.   HENT:   Head: Normocephalic and atraumatic.   Eyes: Pupils are equal, round, and reactive to light.   Neck: Normal range of motion. Neck supple. No thyromegaly present.   Cardiovascular: Normal rate and regular rhythm.  Exam reveals no gallop and no friction rub.    Murmur heard.  paced   Pulmonary/Chest: Effort normal and breath sounds normal. No respiratory distress. He has no wheezes. He has no rales.   Abdominal: Soft. Bowel sounds are normal. He exhibits no distension. There is no tenderness. There is no guarding.   Musculoskeletal: Normal range of motion. He exhibits no edema.   Neurological: He is alert.   Skin: Skin is warm and dry.   Psychiatric: He has a normal mood and affect.       Hemodynamics:  No data recorded.  Monitored Temp: 37.6 °C (99.7 °F)  Pulse  Av.6  Min: 44  Max: 153Heart Rate (Monitored): 62  Arterial BP: 150/48, NIBP: 137/48     Respiratory:    Respiration: (!) 23, Pulse Oximetry: 92 %     Work Of Breathing / Effort: Mild  RUL Breath Sounds: Clear, RML Breath Sounds: Diminished, RLL Breath Sounds: Diminished, WHIT Breath Sounds: Clear, LLL Breath Sounds: Diminished  Fluids:  Date 18 0700 - 18 0659   Shift 3036-1714 2562-0202 7306-7276 24 Hour Total   I  N  T  A  K  E   I.V. 136   136    Enteral 240   240    Shift Total 376   376   O  U  T  P  U  T   Urine 200   200    Drains 0   0    Shift Total 200   200   Weight (kg) 64 64 64 64       Weight: 64 kg (141 lb 1.5 oz)  GI/Nutrition:  Orders Placed This Encounter   Procedures   • DIET NPO     Standing Status:   Standing     Number of Occurrences:   1     Order Specific Question:    Restrict to:     Answer:   Sips with Medications [3]     Lab Results:  Recent Labs      02/16/18   0350  02/17/18   0515  02/18/18   0415   WBC  11.6*  12.3*  11.3*   RBC  3.78*  3.84*  3.93*   HEMOGLOBIN  12.6*  12.6*  13.1*   HEMATOCRIT  37.8*  37.9*  38.5*   MCV  100.0*  98.7*  98.0*   MCH  33.3*  32.8  33.3*   MCHC  33.3*  33.2*  34.0   RDW  48.8  47.1  46.5   PLATELETCT  72*  66*  66*   MPV  10.9  10.7  11.2     Recent Labs      02/16/18   0350  02/17/18   0515  02/18/18   0415   SODIUM  137  139  137   POTASSIUM  3.2*  3.0*  3.3*   CHLORIDE  110  108  103   CO2  21  26  29   GLUCOSE  152*  142*  143*   BUN  11  10  12   CREATININE  0.92  0.82  0.77   CALCIUM  8.2*  8.1*  9.0     Recent Labs      02/16/18   0930  02/16/18   1622  02/17/18   0515   APTT  76.4*  82.3*  71.1*                     Medical Decision Making, by Problem:  Active Hospital Problems    Diagnosis   • *Cardiac arrest (CMS-HCC) [I46.9]   • Acute respiratory failure with hypoxia (CMS-HCC) [J96.01]   • Bradycardia [R00.1]   • Cardiogenic shock (CMS-HCC) [R57.0]   • S/P TAVR (transcatheter aortic valve replacement) [Z95.2]   • Encephalopathy acute [G93.40]   • Carotid artery stenosis [I65.29]   • Paroxysmal atrial fibrillation (CMS-[facility]) [I48.0]   • TIA (transient ischemic attack) [G45.9]   • CAD (coronary artery disease) [I25.10]       Plan:  Permanent pacemaker today or tomorrow.  Temporary device in place and functioning.  WBC slowly coming down 11.3.  Low grade temp  Cultures negative.    Quality-Core Measures

## 2018-02-18 NOTE — PROGRESS NOTES
Patient taken for a stat head CT at 1415.  Patient became agitated and blood pressure increased to 170-180 systolic.  Pt appeared confused and to be having neuro changes.  Dr. Gonda notified.  Hydralazine given per md order.     Pt back to room at 1430.  Resting quietly.  Blood pressure 112/38 HR 63.  No distress noted.  Patient able to answer all questions appropriately.  Wife present at bedside.  Sitter at bedside.

## 2018-02-18 NOTE — PROGRESS NOTES
Pulmonary Critical Care Progress Note        Date of admission: 2/14/2018  Date of service: 2/18/2018    Chief Complaint: AMS    History of Present Illness: 84 y.o. male pmhx of Paroxysmal A-fib, CAD s/p CABG, AS s/p TAVR 2/12/18, PAD, Carotid artery disease, TIA, GIB. Presents to the ER today with AMS (obtundation) 1 day after discharge from hospital for TAVR, he was found by EMS in A-fib with RVR. In the ER he was intubated for airway protection and given metoprolol 5 mg for RVR. He then began to have significant pauses and severe hypotension during pauses. Cards was consulted and attempted cardioversion x1 then 300mg of amiodarone followed by another attempted electrocardioversion. EP saw the patient for placement of TPM for SSS and A-fib, he was taken emergently to the cath lab for TPM.    ROS:  Respiratory: unable to perform due to the patient's inability to effectively communicate, Cardiac: unable to perform due to the patient's inability to effectively communicate, GI: unable to perform due to the patient's inability to effectively communicate.  All other systems negative.     Interval Events:  24 hour interval history reviewed    - Very confused at the time of my evaluation, trying to climb out of bed, arguing with wife, nystagmus on eye exam --> stat head CT without acute abnormalities    - Worsening HTN   - TV PM in place and working well   - crawford with good UOP   - heparin gtts and amio @ 0.5   - plts remain low   - WBC improving, temp improving   - low K    PFSH:  No change.    Respiratory:   2 lpm n/c, IS 1500mL/PEP  Pulse Oximetry: 96 %          Exam: unlabored respirations, no intercostal retractions or accessory muscle use and rhonchi bibasilar - unchanged  ImagingAvailable data reviewed (personally reviewed CXR and compared to prior film): No film today  Recent Labs      02/16/18   0512   ISTATAPH  7.373*   ISTATAPCO2  34.7   ISTATAPO2  93*   ISTATATCO2  21   [id number]  97   ISTATARTHCO3  20.2    ISTATARTBE  -4   ISTATTEMP  37.4 C   ISTATFIO2  40   ISTATSPEC  Arterial   ISTATAPHTC  7.368*   [id number]  95*       HemoDynamics:  Pulse: (!) 57, Heart Rate (Monitored): 60  Arterial BP: 128/41, NIBP: 129/47    amiodarone at 0.5, heparin drips    Exam: 100% paced at 2 mAmp, (+) murmur, distant heart sounds, lat displaced PMI, junctional escape underlying pacemaker  Imaging: Available data reviewed    Echocardiogram 2/14:    This was a limited study. Technically difficult study incomplete   information is obtained.    1. Known TAVR aortic valve that is functioning normally with normal   transvalvular gradients. Transvalvular gradients are - Peak: 15  mmHg,    Mean: 9  mmHg. Trivial paravalvular leak is noted.  2. Mitral regurgitation was not evaluated during this study, however   there appeared to be no impingment or restriction of the mitral valve   leaflets by the bioprosthetic aortic valve.  3. Normal pericardium without effusion.  4. Mild to moderately decreased left ventricular systolic function.   Estimated LVEF 40%, however this estimate was difficult due to severe   tachycardia.  5. Global hypokinesis.    Compared to the previous study performed on 2/13/2018:  There appears   to be less perivalvular leak.  The TAVR valve might be slightly farther   into the ventricle, however there does not appear to be any   obstruction.        Neuro:  GCS Total Sin Coma Score: 14       Exam: occasional blank stares, confusion and attempting to climb out of bed while at other times is oriented and answering questions appropriately other than some memory difficulties  Imaging: Available data reviewed    EEG 2/15: This scalp EEG is consistent with diffuse nonspecific cortical dysfunction - moderate     This nonspecific abnormalities could be secondary to metabolic, toxic, polypharmacy or even post-ictal     There are no epileptiform discharges in this record   Head CT 2/18: No acute intracranial  process    Fluids:  Intake/Output       02/16/18 0700 - 02/17/18 0659 02/17/18 0700 - 02/18/18 0659 02/18/18 0700 - 02/19/18 0659      0700-1859 1900-0659 Total 0700-1859 1900-0659 Total 0700-1859 1900-0659 Total       Intake    I.V.  945.8  1542 2487.8  1156  404.4 1560.4  --  -- --    Magnesium Sulfate Volume 50 0 50 50 -- 50 -- -- --    Amiodarone Volume 170 204 374 102 200.4 302.4 -- -- --    Propofol Volume 51.9 0 51.9 -- -- -- -- -- --    Norepinephrine Volume 53.9 0 53.9 -- -- -- -- -- --    Heparin Volume 170 238 408 204 204 408 -- -- --    IV Piggyback Volume (IV Piggyback) -- 200 200 500 -- 500 -- -- --    IV Volume (NS)  300 -- 300 -- -- --    Other  60  60 120  --  60 60  --  -- --    Medications (P.O./ Enteral Liquids) 60 60 120 -- 60 60 -- -- --    Enteral  630  840 1470  600  740 1340  --  -- --    Enteral Volume   -- -- --    Free Water / Tube Flush 30 120 150 -- 20 20 -- -- --    Total Intake 1635.8 2442 4077.8 1756 1204.4 2960.4 -- -- --       Output    Urine  1950  2450 4400  2200  1350 3550  --  -- --    Indwelling Cathether 1950 2450 4400 2200 1350 3550 -- -- --    Drains  --  -- --  0  -- 0  --  -- --    Residual Amount (ml) (Discarded) -- -- -- 0 -- 0 -- -- --    Stool  --  -- --  --  -- --  --  -- --    Number of Times Stooled 0 x 1 x 1 x 1 x -- 1 x -- -- --    Total Output 1950 2450 4400 2200 1350 3550 -- -- --       Net I/O     -314.2 -8 -322.2 -444 -145.6 -589.6 -- -- --        Weight: 64 kg (141 lb 1.5 oz)  Recent Labs      02/16/18   0350  02/17/18   0515  02/18/18   0415   SODIUM  137  139  137   POTASSIUM  3.2*  3.0*  3.3*   CHLORIDE  110  108  103   CO2  21  26  29   BUN  11  10  12   CREATININE  0.92  0.82  0.77   MAGNESIUM  1.8  1.8   --    PHOSPHORUS  2.0*  1.5*   --    CALCIUM  8.2*  8.1*  9.0       GI/Nutrition:  Exam: abdomen is soft and non-tender, normal active bowel sounds  Imaging: Available data reviewed  NPO and tube feed  Tolerated  Liver Function  Recent Labs      18   0350  18   0515  18   0415   ALTSGPT   --   16  17   ASTSGOT   --   25  32   ALKPHOSPHAT   --   69  84   TBILIRUBIN   --   0.9  0.9   LIPASE   --   59   --    GLUCOSE  152*  142*  143*       Heme:  Recent Labs      02/15/18   0943   18   0350  18   0930  18   1622  18   0515  18   0415   RBC   --    --   3.78*   --    --   3.84*  3.93*   HEMOGLOBIN   --    --   12.6*   --    --   12.6*  13.1*   HEMATOCRIT   --    --   37.8*   --    --   37.9*  38.5*   PLATELETCT   --    --   72*   --    --   66*  66*   PROTHROMBTM  14.1   --    --    --    --    --    --    APTT  28.1   < >   --   76.4*  82.3*  71.1*   --    INR  1.12   --    --    --    --    --    --     < > = values in this interval not displayed.       Infectious Disease:  Monitored Temp  Av.8 °C (100 °F)  Min: 37.7 °C (99.9 °F)  Max: 38 °C (100.4 °F)  Micro: reviewed. None  Recent Labs      18   0350  18   0515  185   WBC  11.6*  12.3*  11.3*   NEUTSPOLYS  74.00*  76.00*  70.30   LYMPHOCYTES  12.70*  11.60*  13.80*   MONOCYTES  9.80  8.80  10.50   EOSINOPHILS  1.70  2.10  3.50   BASOPHILS  0.70  0.20  0.40   ASTSGOT   --   25  32   ALTSGPT   --   16  17   ALKPHOSPHAT   --   69  84   TBILIRUBIN   --   0.9  0.9     Current Facility-Administered Medications   Medication Dose Frequency Provider Last Rate Last Dose   • famotidine (PEPCID) tablet 20 mg  20 mg BID Jeremy M Gonda, M.D.   20 mg at 18 0749   • ceFAZolin (ANCEF) IVPB 2 g  2 g Q8HRS Jeremy M Gonda, M.D.   2 g at 18 0617   • heparin injection 2,600 Units  2,600 Units PRN Christine Powers, A.P.R.N.        And   • heparin infusion 25,000 units in 500 ml 0.45% nacl   Continuous Christine Powers, A.P.R.N. 17 mL/hr at 18 2250 850 Units/hr at 18 2250   • amiodarone (CORDARONE) 450 mg in D5W 250 mL Infusion  0.5 mg/min Continuous Godwin Wilson M.D. 17  mL/hr at 02/18/18 0028 0.5 mg/min at 02/18/18 0028   • Respiratory Care per Protocol   Continuous RT DODIE Watters Jr..O.       • senna-docusate (PERICOLACE or SENOKOT S) 8.6-50 MG per tablet 2 Tab  2 Tab BID DODIE Watters Jr..OPawel   Stopped at 02/18/18 0900    And   • polyethylene glycol/lytes (MIRALAX) PACKET 1 Packet  1 Packet QDAY PRN DODIE Watters Jr..O.        And   • magnesium hydroxide (MILK OF MAGNESIA) suspension 30 mL  30 mL QDAY PRN DODIE Watters Jr..O.        And   • bisacodyl (DULCOLAX) suppository 10 mg  10 mg QDAY PRN DODIE Watters Jr..O.       • fentaNYL (SUBLIMAZE) injection 25 mcg  25 mcg Q HOUR PRN DODIE Watters Jr..O.        Or   • fentaNYL (SUBLIMAZE) injection 50 mcg  50 mcg Q HOUR PRN DODIE Watters Jr..O.   50 mcg at 02/18/18 0800    Or   • fentaNYL (SUBLIMAZE) injection 100 mcg  100 mcg Q HOUR PRN DODIE Watters Jr..O.       • insulin regular (HUMULIN R) injection 1-6 Units  1-6 Units Q6HRS DODIE Watters Jr..O.   Stopped at 02/14/18 1915    And   • glucose 4 g chewable tablet 16 g  16 g Q15 MIN PRN DODIE Watters Jr..OPawel        And   • dextrose 50% (D50W) injection 25 mL  25 mL Q15 MIN PRN DODIE Watters Jr..O.       • ipratropium-albuterol (DUONEB) nebulizer solution 3 mL  3 mL Q2HRS PRN (RT) DODIE Watters Jr..O.       • acetaminophen (TYLENOL) tablet 650 mg  650 mg Q6HRS PRN Gavin Mackenzie M.D.   650 mg at 02/16/18 1643   • atorvastatin (LIPITOR) tablet 20 mg  20 mg QHS Gavin Mackenzie M.D.   20 mg at 02/17/18 2113   • levothyroxine (SYNTHROID) tablet 50 mcg  50 mcg AM ES Gavin Mackenzie M.D.   50 mcg at 02/18/18 0617   • aspirin EC (ECOTRIN) tablet 81 mg  81 mg Q EVENING Gavin Mackenzie M.D.   81 mg at 02/17/18 2113     Last reviewed on 2/14/2018  5:15 PM by Luh Kirkland JACQUELYN    Quality  Measures:   Radiology images reviewed, Labs reviewed and Medications reviewed   Baldwin catheter: Critically Ill - Requiring Accurate Measurement of Urinary  Output   Central line in place: Shock and Need for access     DVT Prophylaxis: Heparin   DVT prophylaxis - mechanical: SCDs   Ulcer prophylaxis: Yes   Antibiotics: Treating active infection/contamination beyond 24 hours perioperative coverage   Assessed for rehab: Patient unable to tolerate rehabilitation therapeutic regimen      Assessment/Plan:  Acute hypoxic respiratory failure - intubated 2/14-2/16              - continue to encourage IS/PEP, aspiration precautions   - RT/O2 protocols   - Limited mobilization for now given temporary pacemaker  Bradycardic arrest followed by evidence of sick sinus syndrome/PAF requiring 100% TV pacer              - continue transvenous pacing for now, EP and cardiology following   - Optimize electrolytes    - Hopeful permanent pacemaker placement today    - Continue IV amiodarone, heparin drip    Cardiogenic shock - improved              - goal MAP >65  S/P TAVR   HFrEF   - holding beta-blockade given shock/heart block  Acute undifferentiated encephalopathy - fluctuates, negative head CT and EEG, query hypertension related              - monitor, avoid sedatives, frequent neurologic checks  CAD              - ASA, statin  Hypothyroidism              - synthroid  Hypophosphatemia/kalemia/Mag - repletion with daily monitoring  Fevers and leukocytosis - doubt infection at this time   - Follow-up on final cultures   - continue Ancef for now empirically until placement of pacemaker  Thrombocytopenia - monitor  Systemic arterial hypertension - when necessary hydralazine for systolic blood pressure goal < 160  Prophylaxis, nothing by mouth for possible procedure    Patient is critically ill today requiring my active management of his persistent cardiac arrhythmia requiring transvenous pacemaker, acute encephalopathy, monitoring leukocytosis.    Discussed patient condition and risk of morbidity and/or mortality with RN, RT, Pharmacy, Charge nurse / hot rounds and cardiology and  hospitalist, NINFA    The patient remains critically ill.  Critical care time = 33 minutes in directly providing and coordinating critical care and extensive data review.  No time overlap and excludes procedures.

## 2018-02-18 NOTE — PROGRESS NOTES
Cardiology Progress Note               Author: Jez Waters Date & Time created: 2018  8:57 AM     Interval History/Chief Complaint:    84 year old male status post patricia arrest following TAVR, status post respiratory failure off of ventilator support. He is more awake.    Review of Systems   Unable to perform ROS: Other   All other systems reviewed and are negative.    Physical Exam   Constitutional: He appears well-developed and well-nourished.   HENT:   Head: Normocephalic and atraumatic.   Eyes: Conjunctivae are normal. Pupils are equal, round, and reactive to light.   Neck: Normal range of motion. Neck supple.   Cardiovascular: Normal rate and regular rhythm.    Pulmonary/Chest: Effort normal and breath sounds normal.   Temporary pacemaker in place.   Abdominal: Soft. Bowel sounds are normal.   Musculoskeletal: Normal range of motion. He exhibits no edema.   Skin: Skin is warm and dry.       Hemodynamics:  No data recorded.  Monitored Temp: 37.9 °C (100.2 °F)  Pulse  Av.6  Min: 44  Max: 153Heart Rate (Monitored): 62  Arterial BP: 150/42, NIBP: 128/57     Respiratory:    Respiration: 19, Pulse Oximetry: 96 %     Work Of Breathing / Effort: Mild  RUL Breath Sounds: Clear, RML Breath Sounds: Diminished, RLL Breath Sounds: Diminished, WHIT Breath Sounds: Clear, LLL Breath Sounds: Diminished  Fluids:     Weight: 64 kg (141 lb 1.5 oz)  GI/Nutrition:  Orders Placed This Encounter   Procedures   • DIET NPO     Standing Status:   Standing     Number of Occurrences:   1     Order Specific Question:   Restrict to:     Answer:   Sips with Medications [3]     Lab Results:  Recent Labs      18   0350  18   0515  18   0415   WBC  11.6*  12.3*  11.3*   RBC  3.78*  3.84*  3.93*   HEMOGLOBIN  12.6*  12.6*  13.1*   HEMATOCRIT  37.8*  37.9*  38.5*   MCV  100.0*  98.7*  98.0*   MCH  33.3*  32.8  33.3*   MCHC  33.3*  33.2*  34.0   RDW  48.8  47.1  46.5   PLATELETCT  72*  66*  66*   MPV  10.9  10.7  11.2      Recent Labs      02/16/18   0350  02/17/18   0515  02/18/18   0415   SODIUM  137  139  137   POTASSIUM  3.2*  3.0*  3.3*   CHLORIDE  110  108  103   CO2  21  26  29   GLUCOSE  152*  142*  143*   BUN  11  10  12   CREATININE  0.92  0.82  0.77   CALCIUM  8.2*  8.1*  9.0     Recent Labs      02/15/18   0943   02/16/18   0930  02/16/18   1622  02/17/18   0515   APTT  28.1   < >  76.4*  82.3*  71.1*   INR  1.12   --    --    --    --     < > = values in this interval not displayed.                 CARDIAC STUDIES/PROCEDURES:     CARDIAC CATHETERIZATION CONCLUSIONS by Dr. Nieves (01/04/18)  1.  Coronary artery disease, three-vessel including left anterior descending artery ostial 100%   occlusion, mid right coronary artery 100% occlusion, a distal left anterior descending artery   95% stenosis, second circumflex marginal branch 30% stenosis  2.  Patent coronary bypass grafts to the left anterior descending artery, first circumflex marginal  branch and distal right coronary artery.  3.  Patent coronary stent of the ostium and proximal first circumflex marginal branch.  4.  Normal ascending aorta.  5.  Aortic regurgitation, mild to moderate.  6.  Patent distal abdominal aortogram and bilateral iliofemoral arteries.  7.  Essentially normal right heart pressures and left ventricular filling pressure.     CAROTID ULTRASOUND (11/27/17)  Moderate stenosis of the right internal carotid (50-69%).   Left carotid.   CEA is widely patent without evidence of restinosis.    Flow within both subclavian arteries appears to be within normal limits.   Antegrade flow, bilateral vertebral arteries.     CTA OF HEAD/NECK (02/14/18)  1.  No evidence of intracranial vascular occlusion or aneurysm.  2.  Extensive atherosclerotic plaque involving the intracranial vascular structures with multiple areas of atherosclerotic narrowing.  3.  Periventricular chronic small vessel ischemic change.  4.  Marked partially calcified and ulcerated  plaques located within the right carotid artery bifurcation. Between 50% to 70% stenosis at the origin right internal carotid artery is demonstrated.  5.  Mild partially calcified plaque left carotid artery bifurcation. No significant stenosis.  6.  Moderate/marked partially calcified plaque origin right vertebral artery resulting in between 50% and 70% stenosis.  7.  Focal plaque or thrombus within the right vertebral artery at the C1 level.  8.  Moderate partially calcified plaque within the proximal segment of the left vertebral artery resulting in 50% stenosis.    CT OF CHEST AND ABDOMEN (12/14/17)  1.  Aortic annulus area of 326 sq mm.  2.  Coronary ostia are both greater than 10 mm from the annulus.  3.  Minimum diameter of the iliofemoral arterial system of 5.9 mm on the RIGHT and 8 mm on the LEFT, with moderate atherosclerotic calcification and tortuosity bilaterally.  4.  Interval distal migration of large LEFT kidney stone to the distal ureter, without significant obstructive changes.     COLONOSCOPE by Dr. 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  Eventually this area was Endoclipped,   moderate left-sided diverticulosis.     ECHOCARDIOGRAM CONCLUSIONS (02/14/18)  1. Known TAVR aortic valve that is functioning normally with normal   transvalvular gradients. Transvalvular gradients are - Peak: 15  mmHg,    Mean: 9  mmHg. Trivial paravalvular leak is noted.  2. Mitral regurgitation was not evaluated during this study, however   there appeared to be no impingment or restriction of the mitral valve   leaflets by the bioprosthetic aortic  valve.  3. Normal pericardium without effusion.  4. Mild to moderately decreased left ventricular systolic function.   Estimated LVEF 40%, however this estimate was difficult due to severe tachycardia.  5. Global hypokinesis.  Compared to the previous study performed on 2/13/2018:  There appears   to be less perivalvular leak.  The TAVR valve might be slightly farther   into the ventricle, however there does not appear to be any obstruction.    ECHOCARDIOGRAM CONCLUSIONS (10/026/17)  Prior echo 12-23-14. Compared to the images of the study done - there   has been progression of aortic stenosis and regurgitation.   Normal left ventricular systolic function.  Left ventricular ejection fraction is visually estimated to be 70%.  Severe aortic stenosis.  AV Area Cont Eq vti 0.93 cm²  Vmax is 5.0 m/s. Transvalvular gradients are - Peak: 100 mmHg, Mean: 53 mmHg.   Moderate aortic insufficiency.  Mild tricuspid regurgitation.  Estimated right ventricular systolic pressure is 45 mmHg.  Moderate pulmonic insufficiency.     EKG performed on (02/12/18) was reviewed: EKG shows sinus patricia with right bundle branch block.  EKG performed on (02/12/18) EKG shows sinus patricia with right bundle branch block.  EKG performed on (02/07/18) EKG shows sinus patricia with right bundle branch block.  EKG performed on (05/12/17) EKG shows sinus rhythm with right bundle branch block.     Laboratory results of (02/12/18) were reviewed. Bun of 17 mg/dl, creatinine levels of 1.14 mg/dl noted.  Laboratory results of (02/07/18) Bun of 28 mg/dl, creatinine levels of 1.39 mg/dl noted.     MRI OF BRAIN (02/13/11)  1. Moderate cerebral atrophy.  2. Minimal supratentorial white matter disease with two or 3 rare   punctate foci of bright FLAIR signal in the deep white matter consistent   with small vessel ischemic change versus demyelination or gliosis.  3. No evidence of acute cerebral infarction, hemorrhage, or mass lesion.     PERIPHERAL INTERVENTION by  Dr. Amaya (03/22/17)  1.  RIGHT common femoral sheath arteriogram demonstrates atherosclerosis with estimated 50-75% stenosis of the proximal femoral artery  2.  Deployment Starclose SE vascular closure device; due to the patient's agitation and altered mental status I recommend a further 6 hours of groin precautions     PERIPHERAL INTERVENTION by Dr. Hart (03/21/17)  1.  Active extravasation from the ileocecal branch of the superior mesenteric artery.  2.  Successful embolization of bleeding cecal branch.     PERIPHERAL INTERVENTION by Dr. Parra (08/22/12)  1.  Right superficial femoral artery occlusion with occlusion of the tibial peroneal trunk.    2.  Widely patent left superficial femoral artery stent with some disease in   the tibial vessels as well.  Although, the superficial femoral artery is   amenable to percutaneous intervention, I am reluctant to open the artery to a   distal occlusion.  The tibioperoneal trunk is heavily diseased and   intervention in the tibioperoneal trunk for a patient with claudication is not   advisable due to the risk of failure and complication as well as the poor   durability of below knee tibial work.  I will follow up with the patient in   the office and discuss with him the findings of the angiogram.  If he   progresses to rest pain or tissue loss, will consider percutaneous   intervention of both lesions.     TRANSESOPHAGEAL ECHOCARDIOGRAM CONCLUSIONS by Dr. Leslie (02/12/18)  1)  Severe AS/AI  2)  Denia Valve:  EOA:  1.94  cm ^2  Mean Gradiet:  6 mmhg  3)  No PVL    Medical Decision Making, by Problem:  Active Hospital Problems    Diagnosis   • *Cardiac arrest (CMS-[facility]) [I46.9]   • Acute respiratory failure with hypoxia (CMS-[facility]) [J96.01]   • Cardiogenic shock (CMS-[facility]) [R57.0]   • Bradycardia [R00.1]   • S/P TAVR (transcatheter aortic valve replacement) [Z95.2]   • Hx of CABG [Z95.1]   • Paroxysmal atrial fibrillation (CMS-[facility]) [I48.0]   • Carotid artery stenosis  [I65.29]   • TIA (transient ischemic attack) [G45.9]   • CAD (coronary artery disease) [I25.10]       Plan:    1. Bradycardia with right bundle branch block, temporary pacemaker placement, atrial fibrillation with rapid ventricular response, status post arrest, status post respiratory failure off ventilator support: He is clinically improving. He is likely scheduled for pacemaker placement today.  2. Successful transcatheter aortic valve replacement (TAVR) with # 23 with 2 additional mls of volume Muñiz Denia S3 valve, transfemoral approach, under general anesthesia (02/12/18)  3. Coronary artery disease with prior 3 vessel coronary bypass graft with left internal mammary artery graft to left anterior descending artery, saphenous vein graft to obtuse marginal branch and saphenous vein graft to posterior descending artery by Dr. Coates (03/20/08)  4. Peripheral vascular disease with prior left superficial femoral artery stent, known high grade stenosis of right common femoral artery closed with Starclose closure by Dr. Amaya on (03/22/17): Stable.  5. Carotid artery stenosis with left carotid enterectomy by Dr. Parra (05/03/11) (managed by Dr. Parra)  6. History of trans-ischemic attack  7. History of gastrointestinal bleeding with embolization of ileocecal branch by Dr. Hart (03/21/17) and AVM (managed by Osmany Pat): He remains clinically stable without recurrence of gastrointestinal bleeding. He was cleared by GI for TAVR.  8. Additional information: He and his wife is from [place], however, they speaks [place]uvian.     CC José Taylor and Yarelis Lee         Quality-Core Measures

## 2018-02-18 NOTE — PROGRESS NOTES
[facility] Hospitalist Progress Note    Date of Service: 2018    Chief Complaint  84 y.o. male admitted 2018 with altered mental status.    Interval Problem Update  Mr. Shaikh has a history of CABG with recent TAVR on  that was found to be altered thus was brought to the ER where he was found to be in atrial fibrillation with RVR requiring cardioversion as well as sinus pauses up to 15 seconds. He was given a bolus of amiodarone and placed on an amiodarone drip, intubated, and a temporary pacemaker was placed.   He is on 2 liters nasal cannula oxygen. Platelets are low at 66. He is had a permanent  Pacemaker today. He remains confused and cannot give a history. His wife is at bedside and there is a sitter at bedside to keep an eye on her.  Consultants/Specialty  Cardiology. I discussed with Dr. Waters, cardiology, this morning.  Critical Care. I discussed his condition with Dr. Gonda on ICU Hot Rounds    Disposition  ICU        Review of Systems   Unable to perform ROS: Mental acuity      Physical Exam  Laboratory/Imaging   Hemodynamics  No data recorded.   Monitored Temp: 37.6 °C (99.7 °F)  Pulse  Av.5  Min: 45  Max: 153 Heart Rate (Monitored): 62  Arterial BP: 150/48, NIBP: 137/48      Respiratory      Respiration: (!) 23, Pulse Oximetry: 92 %     Work Of Breathing / Effort: Mild  RUL Breath Sounds: Clear, RML Breath Sounds: Diminished, RLL Breath Sounds: Diminished, WHIT Breath Sounds: Clear, LLL Breath Sounds: Diminished    Fluids    Intake/Output Summary (Last 24 hours) at 18 1009  Last data filed at 18 1000   Gross per 24 hour   Intake           2780.4 ml   Output             2550 ml   Net            230.4 ml       Nutrition  Orders Placed This Encounter   Procedures   • DIET NPO     Standing Status:   Standing     Number of Occurrences:   1     Order Specific Question:   Restrict to:     Answer:   Sips with Medications [3]     Physical Exam   Constitutional: No distress.   HENT:    cortrak right nares.      Eyes: No scleral icterus.   Neck:   Right IJ central line   Cardiovascular:   Distant heart sounds  Systolic murmur.   Pulmonary/Chest:   Left upper chest pacemaker  Normal work of breathing   Abdominal: Soft. He exhibits no distension. There is no tenderness.   Genitourinary:   Genitourinary Comments: Baldwin catheter   Musculoskeletal: He exhibits no edema.   Left radial arterial line.   Neurological:   Awake, he is a non-historian   Skin: Skin is warm and dry. He is not diaphoretic. There is pallor.   Nursing note and vitals reviewed.      Recent Labs      02/16/18   0350  02/17/18   0515  02/18/18   0415   WBC  11.6*  12.3*  11.3*   RBC  3.78*  3.84*  3.93*   HEMOGLOBIN  12.6*  12.6*  13.1*   HEMATOCRIT  37.8*  37.9*  38.5*   MCV  100.0*  98.7*  98.0*   MCH  33.3*  32.8  33.3*   MCHC  33.3*  33.2*  34.0   RDW  48.8  47.1  46.5   PLATELETCT  72*  66*  66*   MPV  10.9  10.7  11.2     Recent Labs      02/16/18   0350  02/17/18   0515  02/18/18   0415   SODIUM  137  139  137   POTASSIUM  3.2*  3.0*  3.3*   CHLORIDE  110  108  103   CO2  21  26  29   GLUCOSE  152*  142*  143*   BUN  11  10  12   CREATININE  0.92  0.82  0.77   CALCIUM  8.2*  8.1*  9.0     Recent Labs      02/16/18   0930  02/16/18   1622  02/17/18   0515   APTT  76.4*  82.3*  71.1*                  Assessment/Plan     * Cardiac arrest (CMS-HCC)- (present on admission)   Assessment & Plan    Sinus pauses up to 15 seconds with ROSC  s/p IV pressors.  S/p temporary pacemaker placement followed by a permanent pacemaker on 2/18.        Acute respiratory failure with hypoxia (CMS-[facility])- (present on admission)   Assessment & Plan    Intubated on 2/14 and extubated on 2/16.  Critical Care consulted.        Cardiogenic shock (CMS-[facility])- (present on admission)   Assessment & Plan    Required IV pressors.           Bradycardia- (present on admission)   Assessment & Plan    With cardiac pauses up to 15 seconds.  S/p epinephrine, atropine,  and levophed drip with persistent bradycardia  Status post temporary pacemaker placement by Dr. Stephens on 2/14 followed by permanent pacemaker on 2/18  ICU admission.  Continue continuous cardiac monitoring          Altered mental status   Assessment & Plan    CT head negative  EEG ordered  Seizure precautions           S/P TAVR (transcatheter aortic valve replacement)- (present on admission)   Assessment & Plan    S/p TAVR on 2/12  2D Echo reveals normal functioning TAVR aortic valve, normal pericardium without effusion and LVEF of 40% with global hypokinesis.  Dr. Waters has consulted        Hx of CABG   Assessment & Plan    History of        CAD (coronary artery disease)- (present on admission)   Assessment & Plan    History of CABG ×3  Continue statin and aspirin         Encephalopathy acute- (present on admission)   Assessment & Plan    Consistent with acute delirium in the setting of post-cardiac arrest and persists.         Acquired hypothyroidism   Assessment & Plan    TSH 0.74  Continue synthroid         Carotid artery stenosis- (present on admission)   Assessment & Plan    S/p L CEA, follows   Continue statin and aspirin           Paroxysmal atrial fibrillation (CMS-HCC)- (present on admission)   Assessment & Plan    oral anticoagulation when okay s/p pacemaker  Oral amiodarone              Quality-Core Measures   Reviewed items::  Labs reviewed and Medications reviewed  Baldwin catheter::  Critically Ill - Requiring Accurate Measurement of Urinary Output  DVT prophylaxis pharmacological::  Heparin

## 2018-02-18 NOTE — ASSESSMENT & PLAN NOTE
- improving; Geriatrics recs appreciated  -Evaluated by psychiatry earlier, found to be incapacitated for decisions  - cont qHS prn Depakote per recs    For ethics consult, follow up with recommendations  Encourage activity    3/7: Last seen by therapy on March 5, max assistance  Accepted at [facility], to transfer to skilled nursing facility if patient has a medical decision maker  Discussed with ethics consult, Wayne, will reach out to the patient's son, who has an estranged for  relationship with parents  Patient's wife does have a history of dementia however appears to be alert and oriented ×3  Appears to understand her underlying condition and reports adequate support with neighbors and friends in the community  Appears to be making appropriate decisions regarding daily life activities  At this point appears to have medical decision making capacity, will follow up with ethics    3/8:  Poor short term memory, giving items to recall  Using a writing aid, will cont to eval  D/w psychiatry to eval Monday    At this point, not capacitated  D/w ethics, f/u with son    3/9: No new events  Will need skilled placing    3/10: Encourage activity  Need skilled nursing facility placement    3/11:  No new events

## 2018-02-18 NOTE — PROGRESS NOTES
Monitor summary: paced 60s, NSR with frequent PVCs 12/08/36, afib rate 110-120s,  Temp pacer in  Place    Sitter at the bedside    12 hour chart check

## 2018-02-19 ENCOUNTER — APPOINTMENT (OUTPATIENT)
Dept: RADIOLOGY | Facility: MEDICAL CENTER | Age: 83
DRG: 242 | End: 2018-02-19
Attending: INTERNAL MEDICINE
Payer: MEDICARE

## 2018-02-19 LAB
ALBUMIN SERPL BCP-MCNC: 3 G/DL (ref 3.2–4.9)
ALBUMIN/GLOB SERPL: 1.2 G/DL
ALP SERPL-CCNC: 71 U/L (ref 30–99)
ALT SERPL-CCNC: 15 U/L (ref 2–50)
ANION GAP SERPL CALC-SCNC: 6 MMOL/L (ref 0–11.9)
APTT PPP: 68.4 SEC (ref 24.7–36)
AST SERPL-CCNC: 32 U/L (ref 12–45)
BASOPHILS # BLD AUTO: 0.5 % (ref 0–1.8)
BASOPHILS # BLD: 0.05 K/UL (ref 0–0.12)
BILIRUB SERPL-MCNC: 0.7 MG/DL (ref 0.1–1.5)
BUN SERPL-MCNC: 17 MG/DL (ref 8–22)
CALCIUM SERPL-MCNC: 9 MG/DL (ref 8.5–10.5)
CHLORIDE SERPL-SCNC: 102 MMOL/L (ref 96–112)
CO2 SERPL-SCNC: 27 MMOL/L (ref 20–33)
CREAT SERPL-MCNC: 0.79 MG/DL (ref 0.5–1.4)
CRP SERPL HS-MCNC: 5.83 MG/DL (ref 0–0.75)
EKG IMPRESSION: NORMAL
EKG IMPRESSION: NORMAL
EOSINOPHIL # BLD AUTO: 0.45 K/UL (ref 0–0.51)
EOSINOPHIL NFR BLD: 4.9 % (ref 0–6.9)
ERYTHROCYTE [DISTWIDTH] IN BLOOD BY AUTOMATED COUNT: 47.3 FL (ref 35.9–50)
GLOBULIN SER CALC-MCNC: 2.6 G/DL (ref 1.9–3.5)
GLUCOSE BLD-MCNC: 114 MG/DL (ref 65–99)
GLUCOSE BLD-MCNC: 120 MG/DL (ref 65–99)
GLUCOSE BLD-MCNC: 126 MG/DL (ref 65–99)
GLUCOSE BLD-MCNC: 130 MG/DL (ref 65–99)
GLUCOSE SERPL-MCNC: 123 MG/DL (ref 65–99)
HCT VFR BLD AUTO: 35.2 % (ref 42–52)
HGB BLD-MCNC: 11.9 G/DL (ref 14–18)
IMM GRANULOCYTES # BLD AUTO: 0.21 K/UL (ref 0–0.11)
IMM GRANULOCYTES NFR BLD AUTO: 2.3 % (ref 0–0.9)
LYMPHOCYTES # BLD AUTO: 1.37 K/UL (ref 1–4.8)
LYMPHOCYTES NFR BLD: 14.8 % (ref 22–41)
MCH RBC QN AUTO: 33.6 PG (ref 27–33)
MCHC RBC AUTO-ENTMCNC: 33.8 G/DL (ref 33.7–35.3)
MCV RBC AUTO: 99.4 FL (ref 81.4–97.8)
MONOCYTES # BLD AUTO: 1.02 K/UL (ref 0–0.85)
MONOCYTES NFR BLD AUTO: 11 % (ref 0–13.4)
NEUTROPHILS # BLD AUTO: 6.16 K/UL (ref 1.82–7.42)
NEUTROPHILS NFR BLD: 66.5 % (ref 44–72)
NRBC # BLD AUTO: 0 K/UL
NRBC BLD-RTO: 0 /100 WBC
PLATELET # BLD AUTO: 71 K/UL (ref 164–446)
PMV BLD AUTO: 11.2 FL (ref 9–12.9)
POTASSIUM SERPL-SCNC: 4.1 MMOL/L (ref 3.6–5.5)
PREALB SERPL-MCNC: 13 MG/DL (ref 18–38)
PROT SERPL-MCNC: 5.6 G/DL (ref 6–8.2)
RBC # BLD AUTO: 3.54 M/UL (ref 4.7–6.1)
SODIUM SERPL-SCNC: 135 MMOL/L (ref 135–145)
WBC # BLD AUTO: 9.3 K/UL (ref 4.8–10.8)

## 2018-02-19 PROCEDURE — 700111 HCHG RX REV CODE 636 W/ 250 OVERRIDE (IP): Performed by: NURSE PRACTITIONER

## 2018-02-19 PROCEDURE — A9270 NON-COVERED ITEM OR SERVICE: HCPCS | Performed by: INTERNAL MEDICINE

## 2018-02-19 PROCEDURE — 71045 X-RAY EXAM CHEST 1 VIEW: CPT

## 2018-02-19 PROCEDURE — 700102 HCHG RX REV CODE 250 W/ 637 OVERRIDE(OP): Performed by: HOSPITALIST

## 2018-02-19 PROCEDURE — 99232 SBSQ HOSP IP/OBS MODERATE 35: CPT | Performed by: HOSPITALIST

## 2018-02-19 PROCEDURE — 93005 ELECTROCARDIOGRAM TRACING: CPT | Performed by: INTERNAL MEDICINE

## 2018-02-19 PROCEDURE — 80053 COMPREHEN METABOLIC PANEL: CPT

## 2018-02-19 PROCEDURE — 700102 HCHG RX REV CODE 250 W/ 637 OVERRIDE(OP): Performed by: INTERNAL MEDICINE

## 2018-02-19 PROCEDURE — 82962 GLUCOSE BLOOD TEST: CPT | Mod: 91

## 2018-02-19 PROCEDURE — 84134 ASSAY OF PREALBUMIN: CPT

## 2018-02-19 PROCEDURE — A9270 NON-COVERED ITEM OR SERVICE: HCPCS | Performed by: HOSPITALIST

## 2018-02-19 PROCEDURE — 700111 HCHG RX REV CODE 636 W/ 250 OVERRIDE (IP): Performed by: INTERNAL MEDICINE

## 2018-02-19 PROCEDURE — 85025 COMPLETE CBC W/AUTO DIFF WBC: CPT

## 2018-02-19 PROCEDURE — 86140 C-REACTIVE PROTEIN: CPT

## 2018-02-19 PROCEDURE — 93010 ELECTROCARDIOGRAM REPORT: CPT | Performed by: INTERNAL MEDICINE

## 2018-02-19 PROCEDURE — 770020 HCHG ROOM/CARE - TELE (206)

## 2018-02-19 PROCEDURE — 85730 THROMBOPLASTIN TIME PARTIAL: CPT

## 2018-02-19 RX ADMIN — AMIODARONE HYDROCHLORIDE 400 MG: 200 TABLET ORAL at 21:20

## 2018-02-19 RX ADMIN — STANDARDIZED SENNA CONCENTRATE AND DOCUSATE SODIUM 2 TABLET: 8.6; 5 TABLET, FILM COATED ORAL at 08:27

## 2018-02-19 RX ADMIN — RIVAROXABAN 20 MG: 20 TABLET, FILM COATED ORAL at 17:59

## 2018-02-19 RX ADMIN — AMIODARONE HYDROCHLORIDE 400 MG: 200 TABLET ORAL at 15:05

## 2018-02-19 RX ADMIN — ATORVASTATIN CALCIUM 20 MG: 20 TABLET, FILM COATED ORAL at 21:20

## 2018-02-19 RX ADMIN — STANDARDIZED SENNA CONCENTRATE AND DOCUSATE SODIUM 2 TABLET: 8.6; 5 TABLET, FILM COATED ORAL at 21:20

## 2018-02-19 RX ADMIN — LEVOTHYROXINE SODIUM 50 MCG: 50 TABLET ORAL at 06:14

## 2018-02-19 RX ADMIN — AMIODARONE HYDROCHLORIDE 400 MG: 200 TABLET ORAL at 08:27

## 2018-02-19 RX ADMIN — CEFAZOLIN SODIUM 2 G: 2 INJECTION, SOLUTION INTRAVENOUS at 12:59

## 2018-02-19 RX ADMIN — HEPARIN SODIUM 850 UNITS/HR: 5000 INJECTION, SOLUTION INTRAVENOUS at 08:26

## 2018-02-19 RX ADMIN — CEFAZOLIN SODIUM 2 G: 2 INJECTION, SOLUTION INTRAVENOUS at 06:15

## 2018-02-19 ASSESSMENT — PAIN SCALES - GENERAL
PAINLEVEL_OUTOF10: 0

## 2018-02-19 ASSESSMENT — ENCOUNTER SYMPTOMS
CARDIOVASCULAR NEGATIVE: 1
COUGH: 1
EYES NEGATIVE: 1
CONSTIPATION: 1

## 2018-02-19 NOTE — PROGRESS NOTES
Cardiology Progress Note                Date & Time created: 2/19/2018  9:01 AM     Interval History/Chief Complaint:    84 year old male status post patricia arrest following TAVR, status post respiratory failure off of ventilator support. Now s/p PPM.   2/19: PPM uncomplicated. AMS improving slightly. Amiodarone.    Review of Systems   Unable to perform ROS: Mental status change     Physical Exam   Constitutional: He is oriented to person, place, and time. He appears well-developed and well-nourished. No distress.   HENT:   Head: Normocephalic and atraumatic.   Mouth/Throat: Oropharynx is clear and moist.   Eyes: Conjunctivae are normal. Pupils are equal, round, and reactive to light. No scleral icterus.   Neck: Normal range of motion. Neck supple. No JVD present. No tracheal deviation present. No thyromegaly present.   Cardiovascular: Normal rate, regular rhythm, S1 normal, normal heart sounds and intact distal pulses.  PMI is not displaced.  Exam reveals no gallop and no friction rub.    No murmur heard.  Pulses:       Carotid pulses are 2+ on the right side, and 2+ on the left side.       Radial pulses are 2+ on the right side, and 2+ on the left side.        Dorsalis pedis pulses are 2+ on the right side, and 2+ on the left side.        Posterior tibial pulses are 2+ on the right side, and 2+ on the left side.   Pulmonary/Chest: Effort normal and breath sounds normal. He has no wheezes. He has no rales.   PPM site c/d/i   Abdominal: Soft. Bowel sounds are normal. He exhibits no distension and no pulsatile midline mass. There is no tenderness. There is no guarding.   Musculoskeletal: Normal range of motion. He exhibits no edema.   Neurological: He is alert and oriented to person, place, and time. No cranial nerve deficit (cranial nerves II through XII grossly intact).   Skin: Skin is warm and dry. No rash noted. He is not diaphoretic. No erythema.   Psychiatric: He has a normal mood and affect. His behavior is  normal. Thought content normal.       Hemodynamics:  Temp (24hrs), Av.4 °C (99.4 °F), Min:37.3 °C (99.1 °F), Max:37.6 °C (99.7 °F)  Temperature: 37.3 °C (99.1 °F), Monitored Temp: 37.1 °C (98.8 °F)  Pulse  Av.8  Min: 44  Max: 153Heart Rate (Monitored): 61  Arterial BP: 143/42, NIBP: 125/39     Respiratory:    Respiration: 19, Pulse Oximetry: 94 %     Work Of Breathing / Effort: Mild  RUL Breath Sounds: Clear, RML Breath Sounds: Diminished, RLL Breath Sounds: Diminished, WHIT Breath Sounds: Clear, LLL Breath Sounds: Diminished  Fluids:        GI/Nutrition:  Orders Placed This Encounter   Procedures   • Diet Order     Standing Status:   Standing     Number of Occurrences:   1     Order Specific Question:   Diet:     Answer:   Cardiac [6]     Lab Results:  Recent Labs      18   0515  18   0415  18   034   WBC  12.3*  11.3*  9.3   RBC  3.84*  3.93*  3.54*   HEMOGLOBIN  12.6*  13.1*  11.9*   HEMATOCRIT  37.9*  38.5*  35.2*   MCV  98.7*  98.0*  99.4*   MCH  32.8  33.3*  33.6*   MCHC  33.2*  34.0  33.8   RDW  47.1  46.5  47.3   PLATELETCT  66*  66*  71*   MPV  10.7  11.2  11.2     Recent Labs      18   0515  18   0415  18   0347   SODIUM  139  137  135   POTASSIUM  3.0*  3.3*  4.1   CHLORIDE  108  103  102   CO2  26  29  27   GLUCOSE  142*  143*  123*   BUN  10  12  17   CREATININE  0.82  0.77  0.79   CALCIUM  8.1*  9.0  9.0     Recent Labs      18   0515  18   1130  18   0347   APTT  71.1*  81.6*  68.4*                 CARDIAC STUDIES/PROCEDURES:     CARDIAC CATHETERIZATION CONCLUSIONS by Dr. Nieves (18)  1.  Coronary artery disease, three-vessel including left anterior descending artery ostial 100%   occlusion, mid right coronary artery 100% occlusion, a distal left anterior descending artery   95% stenosis, second circumflex marginal branch 30% stenosis  2.  Patent coronary bypass grafts to the left anterior descending artery, first circumflex  marginal  branch and distal right coronary artery.  3.  Patent coronary stent of the ostium and proximal first circumflex marginal branch.  4.  Normal ascending aorta.  5.  Aortic regurgitation, mild to moderate.  6.  Patent distal abdominal aortogram and bilateral iliofemoral arteries.  7.  Essentially normal right heart pressures and left ventricular filling pressure.     CAROTID ULTRASOUND (11/27/17)  Moderate stenosis of the right internal carotid (50-69%).   Left carotid.   CEA is widely patent without evidence of restinosis.    Flow within both subclavian arteries appears to be within normal limits.   Antegrade flow, bilateral vertebral arteries.     CTA OF HEAD/NECK (02/14/18)  1.  No evidence of intracranial vascular occlusion or aneurysm.  2.  Extensive atherosclerotic plaque involving the intracranial vascular structures with multiple areas of atherosclerotic narrowing.  3.  Periventricular chronic small vessel ischemic change.  4.  Marked partially calcified and ulcerated plaques located within the right carotid artery bifurcation. Between 50% to 70% stenosis at the origin right internal carotid artery is demonstrated.  5.  Mild partially calcified plaque left carotid artery bifurcation. No significant stenosis.  6.  Moderate/marked partially calcified plaque origin right vertebral artery resulting in between 50% and 70% stenosis.  7.  Focal plaque or thrombus within the right vertebral artery at the C1 level.  8.  Moderate partially calcified plaque within the proximal segment of the left vertebral artery resulting in 50% stenosis.    CT OF CHEST AND ABDOMEN (12/14/17)  1.  Aortic annulus area of 326 sq mm.  2.  Coronary ostia are both greater than 10 mm from the annulus.  3.  Minimum diameter of the iliofemoral arterial system of 5.9 mm on the RIGHT and 8 mm on the LEFT, with moderate atherosclerotic calcification and tortuosity bilaterally.  4.  Interval distal migration of large LEFT kidney stone to  the distal ureter, without significant obstructive changes.     COLONOSCOPE by Dr. 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  Eventually this area was Endoclipped,   moderate left-sided diverticulosis.     ECHOCARDIOGRAM CONCLUSIONS (02/14/18)  1. Known TAVR aortic valve that is functioning normally with normal   transvalvular gradients. Transvalvular gradients are - Peak: 15  mmHg,    Mean: 9  mmHg. Trivial paravalvular leak is noted.  2. Mitral regurgitation was not evaluated during this study, however   there appeared to be no impingment or restriction of the mitral valve   leaflets by the bioprosthetic aortic valve.  3. Normal pericardium without effusion.  4. Mild to moderately decreased left ventricular systolic function.   Estimated LVEF 40%, however this estimate was difficult due to severe tachycardia.  5. Global hypokinesis.  Compared to the previous study performed on 2/13/2018:  There appears   to be less perivalvular leak.  The TAVR valve might be slightly farther   into the ventricle, however there does not appear to be any obstruction.    ECHOCARDIOGRAM CONCLUSIONS (10/026/17)  Prior echo 12-23-14. Compared to the images of the study done - there   has been progression of aortic stenosis and regurgitation.   Normal left ventricular systolic function.  Left ventricular ejection fraction is visually estimated to be 70%.  Severe aortic stenosis.  AV Area Cont Eq vti 0.93 cm²  Vmax is 5.0 m/s. Transvalvular gradients are - Peak: 100 mmHg, Mean: 53 mmHg.   Moderate aortic insufficiency.  Mild tricuspid regurgitation.  Estimated right  ventricular systolic pressure is 45 mmHg.  Moderate pulmonic insufficiency.     EKG performed on (02/12/18) was reviewed: EKG shows sinus patricia with right bundle branch block.  EKG performed on (02/12/18) EKG shows sinus patricia with right bundle branch block.  EKG performed on (02/07/18) EKG shows sinus patricia with right bundle branch block.  EKG performed on (05/12/17) EKG shows sinus rhythm with right bundle branch block.     Laboratory results of (02/12/18) were reviewed. Bun of 17 mg/dl, creatinine levels of 1.14 mg/dl noted.  Laboratory results of (02/07/18) Bun of 28 mg/dl, creatinine levels of 1.39 mg/dl noted.     MRI OF BRAIN (02/13/11)  1. Moderate cerebral atrophy.  2. Minimal supratentorial white matter disease with two or 3 rare   punctate foci of bright FLAIR signal in the deep white matter consistent   with small vessel ischemic change versus demyelination or gliosis.  3. No evidence of acute cerebral infarction, hemorrhage, or mass lesion.     PERIPHERAL INTERVENTION by Dr. Amaya (03/22/17)  1.  RIGHT common femoral sheath arteriogram demonstrates atherosclerosis with estimated 50-75% stenosis of the proximal femoral artery  2.  Deployment Starclose SE vascular closure device; due to the patient's agitation and altered mental status I recommend a further 6 hours of groin precautions     PERIPHERAL INTERVENTION by Dr. Hart (03/21/17)  1.  Active extravasation from the ileocecal branch of the superior mesenteric artery.  2.  Successful embolization of bleeding cecal branch.     PERIPHERAL INTERVENTION by Dr. Parra (08/22/12)  1.  Right superficial femoral artery occlusion with occlusion of the tibial peroneal trunk.    2.  Widely patent left superficial femoral artery stent with some disease in   the tibial vessels as well.  Although, the superficial femoral artery is   amenable to percutaneous intervention, I am reluctant to open the artery to a   distal occlusion.  The tibioperoneal trunk is  heavily diseased and   intervention in the tibioperoneal trunk for a patient with claudication is not   advisable due to the risk of failure and complication as well as the poor   durability of below knee tibial work.  I will follow up with the patient in   the office and discuss with him the findings of the angiogram.  If he   progresses to rest pain or tissue loss, will consider percutaneous   intervention of both lesions.     TRANSESOPHAGEAL ECHOCARDIOGRAM CONCLUSIONS by Dr. Leslie (02/12/18)  1)  Severe AS/AI  2)  Denia Valve:  EOA:  1.94  cm ^2  Mean Gradiet:  6 mmhg  3)  No PVL    Medical Decision Making, by Problem:  Active Hospital Problems    Diagnosis   • *Cardiac arrest (CMS-[facility]) [I46.9]   • Acute respiratory failure with hypoxia (CMS-[facility]) [J96.01]   • Cardiogenic shock (CMS-[facility]) [R57.0]   • Bradycardia [R00.1]   • S/P TAVR (transcatheter aortic valve replacement) [Z95.2]   • Hx of CABG [Z95.1]   • Paroxysmal atrial fibrillation (CMS-[facility]) [I48.0]   • Carotid artery stenosis [I65.29]   • TIA (transient ischemic attack) [G45.9]   • CAD (coronary artery disease) [I25.10]       Plan:    1. Bradycardic arrest s/p PPM  2. Successful transcatheter aortic valve replacement (TAVR) with # 23 with 2 additional mls of volume Muñiz Denia S3 valve, transfemoral approach, under general anesthesia (02/12/18)  3. Coronary artery disease with prior 3 vessel coronary bypass graft with left internal mammary artery graft to left anterior descending artery, saphenous vein graft to obtuse marginal branch and saphenous vein graft to posterior descending artery by Dr. Coates (03/20/08)  4. Peripheral vascular disease with prior left superficial femoral artery stent, known high grade stenosis of right common femoral artery closed with Starclose closure by Dr. Amaya on (03/22/17): Stable.  5. Carotid artery stenosis with left carotid enterectomy by Dr. Parra (05/03/11) (managed by Dr. Parra)  6. History of trans-ischemic  attack  7. SSS, now paced on amiodarone, no recurrent AF, transition to coumadin  8. Encephalopathy, not at baseline but slow improvement per wife      Quality-Core Measures

## 2018-02-19 NOTE — DISCHARGE PLANNING
Medical SW    Referral: Sw to f/u w/ pt and any services which can be put into place since his wife is demented and appears unable to leave pt's bedside.       Intervention: Later charge indicates pt's neighbor took pt's wife home.    RN indicates pt/ was taking care of wife. Wife not able to take care of self. Neighbor Jair Carroll [phone] p/u pt today. He stated he helps because the kids are not involved. He reported he will bring pt's wife home to sleep. RN has seen wife sleep 20 mins max since they have both been in the hospital. Jair believes wife will most likley clean the house first. He will have his dtr help wife in the home.    Sw called Jair and left .      Sw called other neighbor Jens ([phone]). He is not aware pt was brought home today and will go check her home.    Sw called EPS. Sw left VM w/ sw contact information. Sw requested both elderly individuals receive EPS referrals and Sw f/u w/ EPS report for wife completed Friday by previous Sw, Carmina.    Sw completed difficult d/c criteria in flowsheet as we are not aware if pt is at baseline and is sick, vented, elderly, and sometimes forgetful/confused per bedside RN. Sw requested SLP conduct mini mental w/ swallow evaluation to see if pt is at baseline.    Sw emailed supervisor to advise of difficult d/c and safety concerns for both pt and wife. Sw will f/u and case manage the situation. Sw will wait for supervisor in put regarding d/c plan etc.              Plan: Sw to assist w/ d/c planning as needed.

## 2018-02-19 NOTE — PROGRESS NOTES
Patient back from cath lab at 1715.  Drowsy but follows commands and answers questions appropriately.  Permanent pacer to left chest wall.  Sheath to right subclavian removed.  Pressure held, no bleeding or hematoma noted.  Dressing placed.  HR mid 60s-70s.  No distress noted. Wife at bedside.

## 2018-02-19 NOTE — PROGRESS NOTES
Cardiology Progress Note               Author: Lotus Mondragon Date & Time created: 2018  10:31 AM     Interval History:  Patient seen on EPS rounds.  HPI:  Rafia Shaikh is an 84-year-old man with a history of severe aortic stenosis status post transcatheter aortic valve replacement done on 2018 who presents with altered mental status (obtunded), and rapid atrial fibrillation.     The patient is intubated and sedated at the time of my examination. In speaking with his wife and staff in the emergency room he had become obtunded acutely at home about 2 hours prior to presentation. She called 911 immediately. While in the emergency room, he was in very rapid atrial fibrillation at around 180 bpm at which time he was given 5 mg of IV metoprolol. He is down and had several pauses. He was having about 15 second pauses when I saw him several minutes after that metoprolol bolus. Noting that he had a very low blood pressure not obtainable by cuff high discussed with the emergency room physician electrical cardioversion given his rapid atrial fibrillation and sick sinus syndrome. He had cardioversion ×1 with 150 J, and returns to rapid atrial fibrillation. I then gave him 300 mg of IV amiodarone followed by an additional 200 J ×1 cardioversion. He remained in rapid atrial fibrillation with periodic longer pauses. At that time, I called electrophysiology for assistance.     Impression and Medical Decision Makin. Bradycardic arrest: I have arranged for temporary pacemaker with electrophysiology as assistance I greatly appreciate.     2. Sick sinus syndrome: After placement of a temporary pacemaker I will plan for IV amiodarone to maintain sinus rhythm. I will also plan IV heparin drip for stroke prevention.     3. Multivessel coronary artery disease: Continue statin. Resume aspirin probably tomorrow. ACE inhibitor, and beta blocker contraindicated in the setting of shock.     4. Severe aortic stenosis status  post transcatheter aortic valve: Repeat echocardiogram ordered        This note was dictated using Dragon speech recognition software.     Thank you for allowing me to participate in the care of this patient.  Please call if any clarification will be helpful.     Immediate Post-Operative Note        PreOp Diagnosis: sinus arrest     PostOp Diagnosis: same     Procedure(s) :  Temporary pacemaker     Surgeon(s):  Usman Stephens M.D.     Type of Anesthesia: Moderate Sedation     Specimen: None     Estimated Blood Loss: 5 cc's     Contrast Media:  0 cc's     Fluoro Time: 1 min           Findings: r subclavian line with temp pacer     Complications: none apparent  FINDINGS:  Cardiomediastinal contour is unchanged.  Previously described pulmonary parenchymal opacities persist.  No pneumothorax identified.  Supportive tubing is unchanged.  Postoperative change from prior open heart surgery.  Impression     1.  No evidence of intracranial vascular occlusion or aneurysm.    2.  Extensive atherosclerotic plaque involving the intracranial vascular structures with multiple areas of atherosclerotic narrowing.    3.  Periventricular chronic small vessel ischemic change.     Impression     Marked partially calcified and ulcerated plaques located within the right carotid artery bifurcation. Between 50% to 70% stenosis at the origin right internal carotid artery is demonstrated.  Mild partially calcified plaque left carotid artery bifurcation. No significant stenosis.  Moderate/marked partially calcified plaque origin right vertebral artery resulting in between 50% and 70% stenosis.  Focal plaque or thrombus within the right vertebral artery at the C1 level.  Moderate partially calcified plaque within the proximal segment of the left vertebral artery resulting in 50% stenosis   CXR Impression     1.  Hypoinflation with bilateral atelectasis.  2.  There is mild vascular congestion.       Chief Complaint:  syncope    Review of Systems    Eyes: Negative.    Respiratory: Positive for cough.    Cardiovascular: Negative.    Gastrointestinal: Positive for constipation.       Physical Exam   Constitutional: He appears well-developed and well-nourished.   HENT:   Head: Normocephalic and atraumatic.   Eyes: Pupils are equal, round, and reactive to light.   Neck: Normal range of motion. Neck supple. No thyromegaly present.   Cardiovascular: Normal rate and regular rhythm.  Exam reveals no gallop and no friction rub.    No murmur heard.  Pulmonary/Chest: Effort normal and breath sounds normal. No respiratory distress. He has no wheezes. He has no rales.   Device site uncomplicated.   Abdominal: Soft. Bowel sounds are normal. He exhibits no distension. There is no tenderness. There is no guarding.   Musculoskeletal: Normal range of motion. He exhibits no edema.   Neurological: He is alert.   Sleepy this AM   Skin: Skin is warm and dry.   Psychiatric: He has a normal mood and affect.       Hemodynamics:  Temp (24hrs), Av.4 °C (99.4 °F), Min:37.3 °C (99.1 °F), Max:37.6 °C (99.7 °F)  Temperature: 37.3 °C (99.1 °F), Monitored Temp: 37.3 °C (99.1 °F)  Pulse  Av.7  Min: 44  Max: 153Heart Rate (Monitored): 60  Arterial BP: 143/42, NIBP: 139/52     Respiratory:    Respiration: 19, Pulse Oximetry: 94 %     Work Of Breathing / Effort: Mild  RUL Breath Sounds: Clear, RML Breath Sounds: Diminished, RLL Breath Sounds: Diminished, WHIT Breath Sounds: Clear, LLL Breath Sounds: Diminished  Fluids:  Date 18 0700 - 18 0659   Shift 4964-0576 7401-7018 6442-2665 24 Hour Total   I  N  T  A  K  E   I.V. 68   68    Enteral 240   240    Shift Total 308   308   O  U  T  P  U  T   Urine 200   200    Drains 0   0    Shift Total 200   200   Weight (kg) 64 64 64 64          GI/Nutrition:  Orders Placed This Encounter   Procedures   • Diet Order     Standing Status:   Standing     Number of Occurrences:   1     Order Specific Question:   Diet:     Answer:   Cardiac  [6]     Lab Results:  Recent Labs      02/17/18   0515  02/18/18   0415  02/19/18   0347   WBC  12.3*  11.3*  9.3   RBC  3.84*  3.93*  3.54*   HEMOGLOBIN  12.6*  13.1*  11.9*   HEMATOCRIT  37.9*  38.5*  35.2*   MCV  98.7*  98.0*  99.4*   MCH  32.8  33.3*  33.6*   MCHC  33.2*  34.0  33.8   RDW  47.1  46.5  47.3   PLATELETCT  66*  66*  71*   MPV  10.7  11.2  11.2     Recent Labs      02/17/18   0515  02/18/18   0415  02/19/18   0347   SODIUM  139  137  135   POTASSIUM  3.0*  3.3*  4.1   CHLORIDE  108  103  102   CO2  26  29  27   GLUCOSE  142*  143*  123*   BUN  10  12  17   CREATININE  0.82  0.77  0.79   CALCIUM  8.1*  9.0  9.0     Recent Labs      02/17/18   0515  02/18/18   1130  02/19/18   0347   APTT  71.1*  81.6*  68.4*                     Medical Decision Making, by Problem:  Active Hospital Problems    Diagnosis   • *Cardiac arrest (CMS-[facility]) [I46.9]   • Acute respiratory failure with hypoxia (CMS-[facility]) [J96.01]   • Bradycardia [R00.1]   • Cardiogenic shock (CMS-[facility]) [R57.0]   • S/P TAVR (transcatheter aortic valve replacement) [Z95.2]   • Encephalopathy acute [G93.40]   • Carotid artery stenosis [I65.29]   • Paroxysmal atrial fibrillation (CMS-[facility]) [I48.0]   • TIA (transient ischemic attack) [G45.9]   • CAD (coronary artery disease) [I25.10]       Plan:  Device interrogation intact.  CXR no late Ptx.  Will arrange for one week follow up.  Arm immobilizer ordered.  EPS will sign off thank you for this consultation.    Quality-Core Measures

## 2018-02-19 NOTE — OR SURGEON
Immediate Post-Operative Note      PreOp Diagnosis: intermittent heart block with no escape    PostOp Diagnosis: heart blcok    Procedure(s) :  Dual chamber ppm  Surgeon(s):  Usman Stephens M.D.    Type of Anesthesia: Moderate Sedation    Specimen: None    Estimated Blood Loss: 20 cc's    Contrast Media:  0 cc's        Findings: [employer] dual chamber ppm  Difficult access for second wire so rv placed via cephalic cutdown    Complications: none apparent      Usman Stephens M.D.  2/18/2018 5:57 PM

## 2018-02-19 NOTE — CARE PLAN
Problem: Nutritional:  Goal: Nutrition support tolerated and meeting greater than 85% of estimated needs  Outcome: MET Date Met: 02/19/18  Replete w/Fiber @ goal rate of 60 mL/hr.

## 2018-02-19 NOTE — RESPIRATORY CARE
COPD EDUCATION by COPD CLINICAL EDUCATOR  2/19/2018 at 3:42 PM by Chandni Hawkins     Patient reviewed by COPD education team. Patient does not qualify for COPD program.

## 2018-02-19 NOTE — PROGRESS NOTES
Neighbor, Jair came and took the pt's wife home for the night.  She will return tomorrow during the day.

## 2018-02-19 NOTE — PROGRESS NOTES
Pulmonary Critical Care Progress Note        Date of admission: 2/14/2018  Date of service: 2/19/2018    Chief Complaint: AMS    History of Present Illness: 84 y.o. male pmhx of Paroxysmal A-fib, CAD s/p CABG, AS s/p TAVR 2/12/18, PAD, Carotid artery disease, TIA, GIB. Presents to the ER today with AMS (obtundation) 1 day after discharge from hospital for TAVR, he was found by EMS in A-fib with RVR. In the ER he was intubated for airway protection and given metoprolol 5 mg for RVR. He then began to have significant pauses and severe hypotension during pauses. Cards was consulted and attempted cardioversion x1 then 300mg of amiodarone followed by another attempted electrocardioversion. EP saw the patient for placement of TPM for SSS and A-fib, he was taken emergently to the cath lab for TPM.    ROS: no complaints this morning.     Interval Events:  24 hour interval history reviewed    - s/p pacer yesterday   - following commands, but very sleepy   - 100% paced in 60s   - -130s   - heparin drip   - amio drip at 0.5   - central line removed   - adequate UOP with crawford   - CXR(reviewed): low lung fields, bilateral pulm edema   - day #3/3 on ancef    Yesterday's events:   - Very confused at the time of my evaluation, trying to climb out of bed, arguing with wife, nystagmus on eye exam --> stat head CT without acute abnormalities    - Worsening HTN   - TV PM in place and working well   - crawford with good UOP   - heparin gtts and amio @ 0.5   - plts remain low   - WBC improving, temp improving   - low K    PFSH:  No change.    Respiratory:   2 lpm n/c, IS 1500mL/PEP  Pulse Oximetry: 97 %          Exam: breathing comfortably, diminished throughout, no wheezing  ImagingAvailable data reviewed    HemoDynamics:  Pulse: 62, Heart Rate (Monitored): 60  Arterial BP: (!) 119/37, NIBP: 107/44       Exam: 100% paced, no obvious murmur, distant heart sounds, good cap refill, no pedal edema  Imaging: Available data reviewed     Echocardiogram 2/14:    This was a limited study. Technically difficult study incomplete   information is obtained.    1. Known TAVR aortic valve that is functioning normally with normal   transvalvular gradients. Transvalvular gradients are - Peak: 15  mmHg,    Mean: 9  mmHg. Trivial paravalvular leak is noted.  2. Mitral regurgitation was not evaluated during this study, however   there appeared to be no impingment or restriction of the mitral valve   leaflets by the bioprosthetic aortic valve.  3. Normal pericardium without effusion.  4. Mild to moderately decreased left ventricular systolic function.   Estimated LVEF 40%, however this estimate was difficult due to severe   tachycardia.  5. Global hypokinesis.    Compared to the previous study performed on 2/13/2018:  There appears   to be less perivalvular leak.  The TAVR valve might be slightly farther   into the ventricle, however there does not appear to be any   obstruction.        Neuro:  GCS Total Sin Coma Score: 14       Exam:sleepy this morning, but not agitated/restless, answering questions calmly, cooperative, symmetrical facial expressions, motor intact  Imaging: Available data reviewed    EEG 2/15: This scalp EEG is consistent with diffuse nonspecific cortical dysfunction - moderate     This nonspecific abnormalities could be secondary to metabolic, toxic, polypharmacy or even post-ictal     There are no epileptiform discharges in this record   Head CT 2/18: No acute intracranial process    Fluids:  Intake/Output       02/17/18 0700 - 02/18/18 0659 02/18/18 0700 - 02/19/18 0659 02/19/18 0700 - 02/20/18 0659      1775-4694 5462-3486 Total 9898-8915 2940-7066 Total 1186-2177 7750-9592 Total       Intake    I.V.  1156  404.4 1560.4  408  102 510  --  -- --    Magnesium Sulfate Volume 50 -- 50 -- -- -- -- -- --    Amiodarone Volume 102 200.4 302.4 204 -- 204 -- -- --    Heparin Volume 204 204 408 204 102 306 -- -- --    IV Piggyback Volume (IV  Piggyback) 500 -- 500 -- -- -- -- -- --    IV Volume (NS) 300 -- 300 -- -- -- -- -- --    Other  --  60 60  --  -- --  --  -- --    Medications (P.O./ Enteral Liquids) -- 60 60 -- -- -- -- -- --    Enteral  600  740 1340  360  -- 360  --  -- --    Enteral Volume  360 -- 360 -- -- --    Free Water / Tube Flush -- 20 20 -- -- -- -- -- --    Total Intake 1756 1204.4 2960.4 768 102 870 -- -- --       Output    Urine  2200  1350 3550  520  160 680  --  -- --    Indwelling Cathether 2200 1350 3550 520 160 680 -- -- --    Drains  0  -- 0  0  -- 0  --  -- --    Residual Amount (ml) (Discarded) 0 -- 0 0 -- 0 -- -- --    Stool  --  -- --  --  -- --  --  -- --    Number of Times Stooled 1 x -- 1 x 1 x -- 1 x -- -- --    Total Output 2200 1350 3550 520 160 680 -- -- --       Net I/O     -444 -145.6 -589.6 248 -58 190 -- -- --           Recent Labs      18   0347   SODIUM  139  137  135   POTASSIUM  3.0*  3.3*  4.1   CHLORIDE  108  103  102   CO2  26  29  27   BUN  10  12  17   CREATININE  0.82  0.77  0.79   MAGNESIUM  1.8   --    --    PHOSPHORUS  1.5*   --    --    CALCIUM  8.1*  9.0  9.0       GI/Nutrition:  Exam: abdomen is soft and non-tender, normal active bowel sounds (no changes)  Imaging: Available data reviewed  NPO and tube feed Tolerated  Liver Function  Recent Labs      18   0347   ALTSGPT  16  17  15   ASTSGOT  25  32  32   ALKPHOSPHAT  69  84  71   TBILIRUBIN  0.9  0.9  0.7   LIPASE  59   --    --    PREALBUMIN   --    --   13.0*   GLUCOSE  142*  143*  123*       Heme:  Recent Labs      18   0515  18   0415  18   1130  18   0347   RBC  3.84*  3.93*   --   3.54*   HEMOGLOBIN  12.6*  13.1*   --   11.9*   HEMATOCRIT  37.9*  38.5*   --   35.2*   PLATELETCT  66*  66*   --   71*   APTT  71.1*   --   81.6*  68.4*       Infectious Disease:  Monitored Temp  Av.6 °C (99.6 °F)  Min: 37.2 °C (98.96 °F)   Max: 38 °C (100.4 °F)  Temp  Av.4 °C (99.4 °F)  Min: 37.3 °C (99.1 °F)  Max: 37.6 °C (99.7 °F)  Micro: reviewed. None  Recent Labs      18   0515  18   0415  18   0347   WBC  12.3*  11.3*  9.3   NEUTSPOLYS  76.00*  70.30  66.50   LYMPHOCYTES  11.60*  13.80*  14.80*   MONOCYTES  8.80  10.50  11.00   EOSINOPHILS  2.10  3.50  4.90   BASOPHILS  0.20  0.40  0.50   ASTSGOT  25  32  32   ALTSGPT  16  17  15   ALKPHOSPHAT  69  84  71   TBILIRUBIN  0.9  0.9  0.7     Current Facility-Administered Medications   Medication Dose Frequency Provider Last Rate Last Dose   • oxyCODONE immediate-release (ROXICODONE) tablet 5 mg  5 mg Q4HRS PRN Jeremy M Gonda, M.D.        Or   • oxyCODONE immediate release (ROXICODONE) tablet 10 mg  10 mg Q4HRS PRN Jeremy M Gonda, M.D.   10 mg at 18 2019   • hydrALAZINE (APRESOLINE) injection 10-20 mg  10-20 mg Q4HRS PRN Jeremy M Gonda, M.D.   20 mg at 18 1403   • Pharmacy Consult Request ...Pain Management Review 1 Each  1 Each PRN Usman Stephens M.D.       • amiodarone (CORDARONE) tablet 400 mg  400 mg TID Usman Stephens M.D.   400 mg at 18 205   • ceFAZolin (ANCEF) IVPB 2 g  2 g Q8HRS Jeremy M Gonda, M.D.   2 g at 18 2247   • heparin injection 2,600 Units  2,600 Units PRN Christine Powers, A.P.R.N.        And   • heparin infusion 25,000 units in 500 ml 0.45% nacl   Continuous Christine Powers, A.P.R.N. 17 mL/hr at 18 1220 850 Units/hr at 18 1220   • Respiratory Care per Protocol   Continuous RT DODIE Watters Jr..O.       • senna-docusate (PERICOLACE or SENOKOT S) 8.6-50 MG per tablet 2 Tab  2 Tab BID DODIE Watters Jr..O.   2 Tab at 18    And   • polyethylene glycol/lytes (MIRALAX) PACKET 1 Packet  1 Packet QDAY PRN Kraig Seth Jr., D.O.        And   • magnesium hydroxide (MILK OF MAGNESIA) suspension 30 mL  30 mL QDAY PRN DODIE Watters Jr..O.        And   • bisacodyl (DULCOLAX) suppository 10 mg  10 mg  QDAY PRN DODIE Watters Jr..O.       • insulin regular (HUMULIN R) injection 1-6 Units  1-6 Units Q6HRS DODIE Watters Jr..O.   Stopped at 02/14/18 1915    And   • glucose 4 g chewable tablet 16 g  16 g Q15 MIN PRN DODIE Watters Jr..OPawel        And   • dextrose 50% (D50W) injection 25 mL  25 mL Q15 MIN PRN DODIE Watters Jr..O.       • ipratropium-albuterol (DUONEB) nebulizer solution 3 mL  3 mL Q2HRS PRN (RT) DODIE Watters Jr..O.       • acetaminophen (TYLENOL) tablet 650 mg  650 mg Q6HRS PRN Gavin Mackenzie M.D.   650 mg at 02/16/18 1643   • atorvastatin (LIPITOR) tablet 20 mg  20 mg QHS Gavin Mackenzie M.D.   20 mg at 02/18/18 2019   • levothyroxine (SYNTHROID) tablet 50 mcg  50 mcg AM ES Gavin Mackenzie M.D.   50 mcg at 02/18/18 0617   • aspirin EC (ECOTRIN) tablet 81 mg  81 mg Q EVENING Gavin Mackenzie M.D.   81 mg at 02/18/18 2019     Last reviewed on 2/14/2018  5:15 PM by Shanna Nash    Quality  Measures:  Radiology images reviewed, Labs reviewed and Medications reviewed  Baldwin catheter: Critically Ill - Requiring Accurate Measurement of Urinary Output  Central line in place: Shock and Need for access    DVT Prophylaxis: Heparin  DVT prophylaxis - mechanical: SCDs  Ulcer prophylaxis: Yes  Antibiotics: Treating active infection/contamination beyond 24 hours perioperative coverage  Assessed for rehab: Patient unable to tolerate rehabilitation therapeutic regimen      Assessment/Plan:  Acute hypoxic respiratory failure - intubated 2/14-2/16              - continue to encourage IS/PEP, aspiration precautions   - RT/O2 protocols   - arm in sling s/p permanent pacemaker   - will start chair sitting later today  Bradycardic arrest followed by evidence of sick sinus syndrome/PAF requiring 100% TV pacer              - EP and cardiology following   - Optimize electrolytes    - s/p permanent pacemaker placement 2/18   - Continue IV amiodarone, heparin drip    Cardiogenic shock - improved               - goal MAP >65  S/P TAVR   HFrEF   - holding beta-blockade given shock/heart block  Acute undifferentiated encephalopathy - fluctuates, negative head CT and EEG, query hypertension related              - monitor, avoid sedatives, frequent neurologic checks  CAD              - ASA, statin  Hypothyroidism              - synthroid  Hypophosphatemia/kalemia/Mag - repletion with daily monitoring  Fevers and leukocytosis - doubt infection at this time   - Follow-up on final cultures   - continue Ancef for now empirically until placement of pacemaker  Thrombocytopenia - monitor  Systemic arterial hypertension - when necessary hydralazine for systolic blood pressure goal < 160  Prophylaxis, nothing by mouth for possible procedure    Pt remains critically ill with unstable hemodynamics still requiring full dose heparin drip and amiodarone drip.  His mental status continues to wax and wane which makes him high risk for clinical deterioration requiring intubation.  He still remains at high risk of overall clinical deterioration, worsening vital organ dysfunction, and death without the above critical care interventions.    Discussed patient condition and risk of morbidity and/or mortality with RN, RT, Pharmacy, Charge nurse / hot rounds and cardiology and hospitalist, EP    The patient remains critically ill.  Critical care time = 38 minutes in directly providing and coordinating critical care and extensive data review.  No time overlap and excludes procedures.

## 2018-02-19 NOTE — DISCHARGE PLANNING
Medical SW    Referral: Sw attended IDT rounds.     Intervention: pace maker placed yesterday, BM on 17th, crawford in place, wife at bedside w/ sitter, will mobilize up to chair today,        Plan: Sw to assist w/ d/c planning as needed.

## 2018-02-19 NOTE — PROGRESS NOTES
[facility] Hospitalist Progress Note    Date of Service: 2018    Chief Complaint  84 y.o. male admitted 2018 with altered mental status.    Interval Problem Update  Mr. Shaikh has a history of CABG with recent TAVR on  that was found to be altered thus was brought to the ER where he was found to be in atrial fibrillation with RVR requiring cardioversion as well as sinus pauses up to 15 seconds. He was given a bolus of amiodarone and placed on an amiodarone drip, intubated, and a temporary pacemaker was placed.   He is on 2 liters nasal cannula oxygen. Platelets are low at 66. He is had a permanent  Pacemaker  by Dr. Stephens. He is more lucid today. He is paced rhythm. His wife is at bedside and there is a sitter at bedside to keep an eye on her. 750 ml urine over night.   Consultants/Specialty  Cardiology.   Critical Care. I discussed his condition with Dr. Deng on ICU Hot Rounds    Disposition  Tele  PT/OT/ST with SNF referral placed.        Review of Systems   Unable to perform ROS: Mental acuity      Physical Exam  Laboratory/Imaging   Hemodynamics  Temp (24hrs), Av.4 °C (99.4 °F), Min:37.3 °C (99.1 °F), Max:37.6 °C (99.7 °F)   Temperature: 37.3 °C (99.1 °F), Monitored Temp: 37.1 °C (98.8 °F)  Pulse  Av.6  Min: 45  Max: 153 Heart Rate (Monitored): 61  Arterial BP: 143/42, NIBP: 125/39      Respiratory      Respiration: 19, Pulse Oximetry: 97 %     Work Of Breathing / Effort: Mild  RUL Breath Sounds: Clear, RML Breath Sounds: Diminished, RLL Breath Sounds: Diminished, WHIT Breath Sounds: Clear, LLL Breath Sounds: Diminished    Fluids    Intake/Output Summary (Last 24 hours) at 18 0826  Last data filed at 18 0600   Gross per 24 hour   Intake              784 ml   Output             1105 ml   Net             -321 ml       Nutrition  Orders Placed This Encounter   Procedures   • Diet Order     Standing Status:   Standing     Number of Occurrences:   1     Order Specific Question:    Diet:     Answer:   Cardiac [6]     Physical Exam   Constitutional: No distress.   HENT:   cortrak right nares.      Eyes: No scleral icterus.   Neck:   Right IJ central line   Cardiovascular:   Distant heart sounds  Systolic murmur.   Pulmonary/Chest: Effort normal and breath sounds normal.   Left upper chest pacemaker  Right upper chest site without bleeding   Abdominal: Soft. He exhibits no distension.   Genitourinary:   Genitourinary Comments: Baldwin catheter   Musculoskeletal: He exhibits no edema.   Neurological:   Awake, he is oriented to year and place   Skin: Skin is warm and dry. He is not diaphoretic. There is pallor.   Nursing note and vitals reviewed.      Recent Labs      02/17/18   0515  02/18/18   0415  02/19/18   0347   WBC  12.3*  11.3*  9.3   RBC  3.84*  3.93*  3.54*   HEMOGLOBIN  12.6*  13.1*  11.9*   HEMATOCRIT  37.9*  38.5*  35.2*   MCV  98.7*  98.0*  99.4*   MCH  32.8  33.3*  33.6*   MCHC  33.2*  34.0  33.8   RDW  47.1  46.5  47.3   PLATELETCT  66*  66*  71*   MPV  10.7  11.2  11.2     Recent Labs      02/17/18   0515  02/18/18   0415  02/19/18   0347   SODIUM  139  137  135   POTASSIUM  3.0*  3.3*  4.1   CHLORIDE  108  103  102   CO2  26  29  27   GLUCOSE  142*  143*  123*   BUN  10  12  17   CREATININE  0.82  0.77  0.79   CALCIUM  8.1*  9.0  9.0     Recent Labs      02/17/18   0515  02/18/18   1130  02/19/18   0347   APTT  71.1*  81.6*  68.4*                  Assessment/Plan     * Cardiac arrest (CMS-HCC)- (present on admission)   Assessment & Plan    Sinus pauses up to 15 seconds with ROSC  s/p IV pressors.  S/p temporary pacemaker placement followed by a permanent pacemaker on 2/18.        Acute respiratory failure with hypoxia (CMS-[facility])- (present on admission)   Assessment & Plan    Intubated on 2/14 and extubated on 2/16.  Critical Care consulted.        Cardiogenic shock (CMS-[facility])- (present on admission)   Assessment & Plan    Required IV pressors.           Bradycardia- (present on  admission)   Assessment & Plan    With cardiac pauses up to 15 seconds.  S/p epinephrine, atropine, and levophed drip with persistent bradycardia  Status post temporary pacemaker placement by Dr. Stephens on 2/14 followed by permanent pacemaker on 2/18  ICU admission.  Continue continuous cardiac monitoring          Altered mental status   Assessment & Plan    CT head negative  EEG ordered  Seizure precautions           S/P TAVR (transcatheter aortic valve replacement)- (present on admission)   Assessment & Plan    S/p TAVR on 2/12  2D Echo reveals normal functioning TAVR aortic valve, normal pericardium without effusion and LVEF of 40% with global hypokinesis.  Dr. Waters has consulted        Hx of CABG   Assessment & Plan    History of        CAD (coronary artery disease)- (present on admission)   Assessment & Plan    History of CABG ×3  Continue statin and aspirin         Encephalopathy acute- (present on admission)   Assessment & Plan    Consistent with acute delirium in the setting of post-cardiac arrest   This is improving.  PT/OT/ST for SNF eval placed        Acquired hypothyroidism   Assessment & Plan    TSH 0.74  Continue synthroid         Carotid artery stenosis- (present on admission)   Assessment & Plan    S/p L CEA, follows   Continue statin and aspirin           Paroxysmal atrial fibrillation (CMS-HCC)- (present on admission)   Assessment & Plan    Xarelto will be started   Oral amiodarone              Quality-Core Measures   Reviewed items::  Labs reviewed and Medications reviewed  Baldwin catheter::  Critically Ill - Requiring Accurate Measurement of Urinary Output  DVT: Xarelto.

## 2018-02-20 LAB
ALBUMIN SERPL BCP-MCNC: 3.6 G/DL (ref 3.2–4.9)
ALBUMIN/GLOB SERPL: 1.2 G/DL
ALP SERPL-CCNC: 97 U/L (ref 30–99)
ALT SERPL-CCNC: 20 U/L (ref 2–50)
ANION GAP SERPL CALC-SCNC: 8 MMOL/L (ref 0–11.9)
AST SERPL-CCNC: 45 U/L (ref 12–45)
BILIRUB SERPL-MCNC: 1 MG/DL (ref 0.1–1.5)
BUN SERPL-MCNC: 17 MG/DL (ref 8–22)
CALCIUM SERPL-MCNC: 9.7 MG/DL (ref 8.5–10.5)
CHLORIDE SERPL-SCNC: 100 MMOL/L (ref 96–112)
CO2 SERPL-SCNC: 26 MMOL/L (ref 20–33)
CREAT SERPL-MCNC: 0.74 MG/DL (ref 0.5–1.4)
GLOBULIN SER CALC-MCNC: 2.9 G/DL (ref 1.9–3.5)
GLUCOSE BLD-MCNC: 117 MG/DL (ref 65–99)
GLUCOSE BLD-MCNC: 129 MG/DL (ref 65–99)
GLUCOSE BLD-MCNC: 134 MG/DL (ref 65–99)
GLUCOSE BLD-MCNC: 149 MG/DL (ref 65–99)
GLUCOSE SERPL-MCNC: 133 MG/DL (ref 65–99)
POTASSIUM SERPL-SCNC: 3.9 MMOL/L (ref 3.6–5.5)
PROT SERPL-MCNC: 6.5 G/DL (ref 6–8.2)
SODIUM SERPL-SCNC: 134 MMOL/L (ref 135–145)

## 2018-02-20 PROCEDURE — 82962 GLUCOSE BLOOD TEST: CPT

## 2018-02-20 PROCEDURE — 700102 HCHG RX REV CODE 250 W/ 637 OVERRIDE(OP): Performed by: HOSPITALIST

## 2018-02-20 PROCEDURE — 92610 EVALUATE SWALLOWING FUNCTION: CPT

## 2018-02-20 PROCEDURE — 700102 HCHG RX REV CODE 250 W/ 637 OVERRIDE(OP): Performed by: INTERNAL MEDICINE

## 2018-02-20 PROCEDURE — 770020 HCHG ROOM/CARE - TELE (206)

## 2018-02-20 PROCEDURE — G8978 MOBILITY CURRENT STATUS: HCPCS | Mod: CJ

## 2018-02-20 PROCEDURE — 80053 COMPREHEN METABOLIC PANEL: CPT

## 2018-02-20 PROCEDURE — G8979 MOBILITY GOAL STATUS: HCPCS | Mod: CI

## 2018-02-20 PROCEDURE — A9270 NON-COVERED ITEM OR SERVICE: HCPCS | Performed by: INTERNAL MEDICINE

## 2018-02-20 PROCEDURE — A9270 NON-COVERED ITEM OR SERVICE: HCPCS | Performed by: HOSPITALIST

## 2018-02-20 PROCEDURE — 99233 SBSQ HOSP IP/OBS HIGH 50: CPT | Performed by: HOSPITALIST

## 2018-02-20 PROCEDURE — 700111 HCHG RX REV CODE 636 W/ 250 OVERRIDE (IP): Performed by: INTERNAL MEDICINE

## 2018-02-20 PROCEDURE — 97162 PT EVAL MOD COMPLEX 30 MIN: CPT

## 2018-02-20 PROCEDURE — 700111 HCHG RX REV CODE 636 W/ 250 OVERRIDE (IP): Performed by: HOSPITALIST

## 2018-02-20 PROCEDURE — G8997 SWALLOW GOAL STATUS: HCPCS | Mod: CH

## 2018-02-20 PROCEDURE — G8996 SWALLOW CURRENT STATUS: HCPCS | Mod: CJ

## 2018-02-20 RX ORDER — ONDANSETRON 2 MG/ML
4 INJECTION INTRAMUSCULAR; INTRAVENOUS EVERY 4 HOURS PRN
Status: DISCONTINUED | OUTPATIENT
Start: 2018-02-20 | End: 2018-03-13 | Stop reason: HOSPADM

## 2018-02-20 RX ORDER — AMIODARONE HYDROCHLORIDE 200 MG/1
200 TABLET ORAL ONCE
Status: DISPENSED | OUTPATIENT
Start: 2018-02-20 | End: 2018-02-21

## 2018-02-20 RX ORDER — ASPIRIN 81 MG/1
81 TABLET, CHEWABLE ORAL DAILY
Status: DISCONTINUED | OUTPATIENT
Start: 2018-02-20 | End: 2018-03-13 | Stop reason: HOSPADM

## 2018-02-20 RX ORDER — LISINOPRIL 5 MG/1
5 TABLET ORAL
Status: DISCONTINUED | OUTPATIENT
Start: 2018-02-20 | End: 2018-02-22

## 2018-02-20 RX ADMIN — AMIODARONE HYDROCHLORIDE 400 MG: 200 TABLET ORAL at 15:33

## 2018-02-20 RX ADMIN — STANDARDIZED SENNA CONCENTRATE AND DOCUSATE SODIUM 2 TABLET: 8.6; 5 TABLET, FILM COATED ORAL at 22:10

## 2018-02-20 RX ADMIN — ASPIRIN 81 MG: 81 TABLET, CHEWABLE ORAL at 17:53

## 2018-02-20 RX ADMIN — ONDANSETRON 4 MG: 2 INJECTION INTRAMUSCULAR; INTRAVENOUS at 15:33

## 2018-02-20 RX ADMIN — HYDRALAZINE HYDROCHLORIDE 10 MG: 20 INJECTION INTRAMUSCULAR; INTRAVENOUS at 12:19

## 2018-02-20 RX ADMIN — LEVOTHYROXINE SODIUM 50 MCG: 50 TABLET ORAL at 09:01

## 2018-02-20 RX ADMIN — RIVAROXABAN 20 MG: 20 TABLET, FILM COATED ORAL at 17:53

## 2018-02-20 RX ADMIN — AMIODARONE HYDROCHLORIDE 400 MG: 200 TABLET ORAL at 22:11

## 2018-02-20 RX ADMIN — AMIODARONE HYDROCHLORIDE 400 MG: 200 TABLET ORAL at 09:01

## 2018-02-20 RX ADMIN — STANDARDIZED SENNA CONCENTRATE AND DOCUSATE SODIUM 2 TABLET: 8.6; 5 TABLET, FILM COATED ORAL at 09:02

## 2018-02-20 RX ADMIN — ATORVASTATIN CALCIUM 20 MG: 20 TABLET, FILM COATED ORAL at 22:11

## 2018-02-20 RX ADMIN — LISINOPRIL 5 MG: 5 TABLET ORAL at 17:53

## 2018-02-20 ASSESSMENT — COGNITIVE AND FUNCTIONAL STATUS - GENERAL
STANDING UP FROM CHAIR USING ARMS: A LITTLE
SUGGESTED CMS G CODE MODIFIER MOBILITY: CK
CLIMB 3 TO 5 STEPS WITH RAILING: A LITTLE
STANDING UP FROM CHAIR USING ARMS: A LITTLE
MOBILITY SCORE: 18
MOVING TO AND FROM BED TO CHAIR: A LITTLE
MOVING TO AND FROM BED TO CHAIR: A LITTLE
SUGGESTED CMS G CODE MODIFIER MOBILITY: CK
WALKING IN HOSPITAL ROOM: A LITTLE
SUGGESTED CMS G CODE MODIFIER MOBILITY: CK
MOVING FROM LYING ON BACK TO SITTING ON SIDE OF FLAT BED: A LITTLE
TURNING FROM BACK TO SIDE WHILE IN FLAT BAD: A LITTLE
MOVING TO AND FROM BED TO CHAIR: A LITTLE
MOVING FROM LYING ON BACK TO SITTING ON SIDE OF FLAT BED: A LITTLE
STANDING UP FROM CHAIR USING ARMS: A LITTLE
MOBILITY SCORE: 18
MOVING FROM LYING ON BACK TO SITTING ON SIDE OF FLAT BED: A LITTLE
WALKING IN HOSPITAL ROOM: A LITTLE
CLIMB 3 TO 5 STEPS WITH RAILING: A LITTLE
CLIMB 3 TO 5 STEPS WITH RAILING: A LITTLE
WALKING IN HOSPITAL ROOM: A LITTLE
TURNING FROM BACK TO SIDE WHILE IN FLAT BAD: A LITTLE
TURNING FROM BACK TO SIDE WHILE IN FLAT BAD: A LITTLE
MOBILITY SCORE: 18

## 2018-02-20 ASSESSMENT — PAIN SCALES - GENERAL
PAINLEVEL_OUTOF10: 0

## 2018-02-20 ASSESSMENT — GAIT ASSESSMENTS
GAIT LEVEL OF ASSIST: MINIMAL ASSIST
DISTANCE (FEET): 5
ASSISTIVE DEVICE: FRONT WHEEL WALKER

## 2018-02-20 ASSESSMENT — LIFESTYLE VARIABLES: DO YOU DRINK ALCOHOL: NO

## 2018-02-20 NOTE — PROGRESS NOTES
"At approximately 0130 RN heard patient's wife yelling in room, found wife performing one-handed CPR weakly on patient.  Patient awake and alert, no s/sx of distress noted, VS stable.  RN instructed wife to cease attempting to perform compressions, explained that the patient was in stable condition.  Over next half hour the wife's agitation escalated, wife yelling at staff members, threatening to kill herself, stating that she will call 911 to get her  to the hospital.  States, \"only my Qigong can save him, I will kill myself if you don't let me save him\".  Charge RN notified of events, sitter to remain in room for rest of shift.    "

## 2018-02-20 NOTE — THERAPY
"Physical Therapy Evaluation completed.   Bed Mobility:  Supine to Sit: Minimal Assist  Transfers: Sit to Stand: Minimal Assist  Gait: Level Of Assist: Minimal Assist with Front-Wheel Walker       Plan of Care: Will benefit from Physical Therapy 3 times per week  Discharge Recommendations: Equipment: Will Continue to Assess for Equipment Needs. Post-acute therapy Discharge to a transitional care facility for continued skilled therapy services.    See \"Rehab Therapy-Acute\" Patient Summary Report for complete documentation.     "

## 2018-02-20 NOTE — PROGRESS NOTES
Monitor summary: paced, rate 60s    12 hour chart check    Neighbor dropped patient's wife back off at hospital.  Wife at the bedside.  Observation maintained.

## 2018-02-20 NOTE — PROGRESS NOTES
Pulmonary Critical Care Progress Note        Date of admission: 2/14/2018  Date of service: 2/20/2018    Chief Complaint: AMS    History of Present Illness: 84 y.o. male pmhx of Paroxysmal A-fib, CAD s/p CABG, AS s/p TAVR 2/12/18, PAD, Carotid artery disease, TIA, GIB. Presents to the ER today with AMS (obtundation) 1 day after discharge from hospital for TAVR, he was found by EMS in A-fib with RVR. In the ER he was intubated for airway protection and given metoprolol 5 mg for RVR. He then began to have significant pauses and severe hypotension during pauses. Cards was consulted and attempted cardioversion x1 then 300mg of amiodarone followed by another attempted electrocardioversion. EP saw the patient for placement of TPM for SSS and A-fib, he was taken emergently to the cath lab for TPM.    ROS: feeling better today.  No pain or shortness of breath     Interval Events:  24 hour interval history reviewed    - no events overnight   - more wake/alert this morning   - 100% in 60s   - SBPs 120s   - last BM this morning   - tolerating diet   - edge of bed/chair   - no CXR today   - O2 at 2 lpm NC   - IS around 1000cc    Yesterday's Events:   - s/p pacer yesterday   - following commands, but very sleepy   - 100% paced in 60s   - -130s   - heparin drip   - amio drip at 0.5   - central line removed   - adequate UOP with crawford   - CXR(reviewed): low lung fields, bilateral pulm edema   - day #3/3 on ancef      PFSH:  No change.  Gen:  Younger than stated age, no acute distress noted, answering all appropriately, alert, pleasant  Skin: warm/dry, no rashes    Respiratory:    Pulse Oximetry: 90 %          Exam:breathing comfortably, diminished bases, but clear.  No wheezing  ImagingAvailable data reviewed    HemoDynamics:  Pulse: 82, Heart Rate (Monitored): 87  Arterial BP: 143/42, NIBP: (!) 171/60       Exam: 100% paced, no obvious murmur, distant heart sounds, good cap refill, no pedal edema (no changes)  Imaging:  Available data reviewed    Echocardiogram 2/14:    This was a limited study. Technically difficult study incomplete   information is obtained.    1. Known TAVR aortic valve that is functioning normally with normal   transvalvular gradients. Transvalvular gradients are - Peak: 15  mmHg,    Mean: 9  mmHg. Trivial paravalvular leak is noted.  2. Mitral regurgitation was not evaluated during this study, however   there appeared to be no impingment or restriction of the mitral valve   leaflets by the bioprosthetic aortic valve.  3. Normal pericardium without effusion.  4. Mild to moderately decreased left ventricular systolic function.   Estimated LVEF 40%, however this estimate was difficult due to severe   tachycardia.  5. Global hypokinesis.    Compared to the previous study performed on 2/13/2018:  There appears   to be less perivalvular leak.  The TAVR valve might be slightly farther   into the ventricle, however there does not appear to be any   obstruction.        Neuro:  GCS Total Sin Coma Score: 14       Exam: awake/alert, answering all questions appropriately, calm, symmetrical facial expressions, motor/sensory intact  Imaging: Available data reviewed    EEG 2/15: This scalp EEG is consistent with diffuse nonspecific cortical dysfunction - moderate     This nonspecific abnormalities could be secondary to metabolic, toxic, polypharmacy or even post-ictal     There are no epileptiform discharges in this record   Head CT 2/18: No acute intracranial process    Fluids:  Intake/Output       02/18/18 0700 - 02/19/18 0659 02/19/18 0700 - 02/20/18 0659 02/20/18 0700 - 02/21/18 0659      9852-1977 5784-9440 Total 2693-8041 1239-4670 Total 4998-9381 3277-5976 Total       Intake    I.V.  408  204 612  187  -- 187  --  -- --    Amiodarone Volume 204 -- 204 -- -- -- -- -- --    Heparin Volume 204 204 408 187 -- 187 -- -- --    Enteral  360  -- 360  720  -- 720  --  -- --    Enteral Volume 360 -- 360 720 -- 720 -- -- --     Total Intake 768 204 972 907 -- 907 -- -- --       Output    Urine  520  685 1205  1200  -- 1200  --  -- --    Indwelling Cathether  1200 -- 1200 -- -- --    Drains  0  -- 0  0  -- 0  --  -- --    Residual Amount (ml) (Discarded) 0 -- 0 0 -- 0 -- -- --    Stool  --  -- --  --  -- --  --  -- --    Number of Times Stooled 1 x -- 1 x -- -- -- -- -- --    Total Output  1200 -- 1200 -- -- --       Net I/O     248 -481 -233 -293 -- -293 -- -- --           Recent Labs      18   SODIUM  139  137  135   POTASSIUM  3.0*  3.3*  4.1   CHLORIDE  108  103  102   CO2  26  29  27   BUN  10  12  17   CREATININE  0.82  0.77  0.79   MAGNESIUM  1.8   --    --    PHOSPHORUS  1.5*   --    --    CALCIUM  8.1*  9.0  9.0       GI/Nutrition:  Exam: (+)bs, soft, NT/ND, no masses  Imaging: Available data reviewed  Tolerating TFs  Liver Function  Recent Labs      18   ALTSGPT  16  17  15   ASTSGOT  25  32  32   ALKPHOSPHAT  69  84  71   TBILIRUBIN  0.9  0.9  0.7   LIPASE  59   --    --    PREALBUMIN   --    --   13.0*   GLUCOSE  142*  143*  123*       Heme:  Recent Labs      18   0515  02/18/18   0415  18   1130  18   034   RBC  3.84*  3.93*   --   3.54*   HEMOGLOBIN  12.6*  13.1*   --   11.9*   HEMATOCRIT  37.9*  38.5*   --   35.2*   PLATELETCT  66*  66*   --   71*   APTT  71.1*   --   81.6*  68.4*       Infectious Disease:  Monitored Temp  Av.2 °C (99 °F)  Min: 37 °C (98.6 °F)  Max: 37.4 °C (99.32 °F)  Temp  Av.2 °C (99 °F)  Min: 37.2 °C (98.9 °F)  Max: 37.3 °C (99.1 °F)  Micro: reviewed. None  Recent Labs      18   0515  18   0415  18   0347   WBC  12.3*  11.3*  9.3   NEUTSPOLYS  76.00*  70.30  66.50   LYMPHOCYTES  11.60*  13.80*  14.80*   MONOCYTES  8.80  10.50  11.00   EOSINOPHILS  2.10  3.50  4.90   BASOPHILS  0.20  0.40  0.50   ASTSGOT  25  32  32   ALTSGPT  16  17  15    ALKPHOSPHAT  69  84  71   TBILIRUBIN  0.9  0.9  0.7     Current Facility-Administered Medications   Medication Dose Frequency Provider Last Rate Last Dose   • rivaroxaban (XARELTO) tablet 20 mg  20 mg PM MEAL Pradip Charlton M.D.   20 mg at 02/19/18 1759   • oxyCODONE immediate-release (ROXICODONE) tablet 5 mg  5 mg Q4HRS PRN Jeremy M Gonda, M.D.        Or   • oxyCODONE immediate release (ROXICODONE) tablet 10 mg  10 mg Q4HRS PRN Jeremy M Gonda, M.D.   10 mg at 02/18/18 2019   • hydrALAZINE (APRESOLINE) injection 10-20 mg  10-20 mg Q4HRS PRN Jeremy M Gonda, M.D.   20 mg at 02/18/18 1403   • Pharmacy Consult Request ...Pain Management Review 1 Each  1 Each PRN Usman Stephens M.D.       • amiodarone (CORDARONE) tablet 400 mg  400 mg TID Usman Stephens M.D.   400 mg at 02/19/18 2120   • Respiratory Care per Protocol   Continuous RT Kraig Seth Jr., D.O.       • senna-docusate (PERICOLACE or SENOKOT S) 8.6-50 MG per tablet 2 Tab  2 Tab BID Kraig Seth Jr., D.O.   2 Tab at 02/19/18 2120    And   • polyethylene glycol/lytes (MIRALAX) PACKET 1 Packet  1 Packet QDAY PRN Kraig Seth Jr., D.O.        And   • magnesium hydroxide (MILK OF MAGNESIA) suspension 30 mL  30 mL QDAY PRN Kraig Seth Jr., D.O.        And   • bisacodyl (DULCOLAX) suppository 10 mg  10 mg QDAY PRN Kraig Seth Jr. D.O.       • insulin regular (HUMULIN R) injection 1-6 Units  1-6 Units Q6HRS Kraig Seth Jr., D.O.   Stopped at 02/14/18 1915    And   • glucose 4 g chewable tablet 16 g  16 g Q15 MIN PRN Kraig Seth Jr., D.O.        And   • dextrose 50% (D50W) injection 25 mL  25 mL Q15 MIN PRN DODIE Watters Jr..O.       • ipratropium-albuterol (DUONEB) nebulizer solution 3 mL  3 mL Q2HRS PRN (RT) LAURA Watters Jr.O.       • acetaminophen (TYLENOL) tablet 650 mg  650 mg Q6HRS PRN Gavin Mackenzie M.D.   650 mg at 02/16/18 1643   • atorvastatin (LIPITOR) tablet 20 mg  20 mg QHS Gavin Mackenzie M.D.   20 mg at 02/19/18 2120   •  levothyroxine (SYNTHROID) tablet 50 mcg  50 mcg AM EARLE Mackenzie M.D.   50 mcg at 02/19/18 0614     Last reviewed on 2/14/2018  5:15 PM by Shanna Nash    Quality  Measures:  Radiology images reviewed, Labs reviewed and Medications reviewed  Baldwin catheter: Critically Ill - Requiring Accurate Measurement of Urinary Output  Central line in place: Shock and Need for access    DVT Prophylaxis: Heparin  DVT prophylaxis - mechanical: SCDs  Ulcer prophylaxis: Yes  Antibiotics: Treating active infection/contamination beyond 24 hours perioperative coverage  Assessed for rehab: Patient unable to tolerate rehabilitation therapeutic regimen      Assessment/Plan:  Acute hypoxic respiratory failure - intubated 2/14-2/16              - continue to encourage IS/PEP, aspiration precautions   - RT/O2 protocols   - arm in sling s/p permanent pacemaker   - start mobilizing  Bradycardic arrest followed by evidence of sick sinus syndrome/PAF requiring 100% TV pacer              - EP and cardiology following   - Optimize electrolytes    - s/p permanent pacemaker placement 2/18   - amio po   - xarelto  Cardiogenic shock   - resolved  S/P TAVR   HFrEF   - holding beta-blockade given shock/heart block  Acute undifferentiated encephalopathy   - resolving    - negative head CT and EEG, query hypertension related              - monitor, avoid sedatives, frequent neurologic checks  CAD              - ASA, statin  Hypothyroidism              - synthroid  Hypophosphatemia/kalemia/Mag - repletion with daily monitoring  Fevers and leukocytosis - doubt infection at this time   - improving   - cultures negative   - s/p ANcef for 3 days  Thrombocytopenia - monitor  Systemic arterial hypertension - when necessary hydralazine for systolic blood pressure goal < 160  Prophylaxis    Discussed patient condition and risk of morbidity and/or mortality with RN, RT, Pharmacy, Charge nurse / hot rounds and cardiology and hospitalist, EP    [id number]

## 2018-02-20 NOTE — PROCEDURES
DATE OF SERVICE:  02/18/2018    INDICATION FOR PROCEDURE:  Sick sinus syndrome and intermittent heart block.    PROCEDURE:  Dual chamber pacemaker implantation with conscious sedation.    PROCEDURE IN DETAIL:  After obtaining informed consent, the patient was   brought to the cardiac catheterization laboratory in the fasted state.  He was   prepped and draped in the usual sterile fashion.  He received IV antibiotic   prior to any incision.  He received conscious sedation with midazolam and   fentanyl administered by Marli Pimentel RN and supervised by me with the   start time of 1614 hours and an end time of 1651 hours.  After the patient was   prepped and draped in the usual sterile fashion, we began by anesthetizing   the left deltopectoral area with a mix of lidocaine and bupivacaine; we made a   4 cm incision, dissected down the prepectoral fascia.  On the prepectoral   fascia, I used a micropuncture to access the medial left axillary vein and a   wire was placed down the IVC.  We tried getting a second access, but had   difficulty, so I then turned my attention to the cephalic vein and   deltopectoral groove and I placed a wire directly in the cephalic vein by cut   down the cephalic vein, wire was used for a sheath for right ventricular lead,   this lead was manufactured by [employer], serial number is [id number].  This   was placed in the RV apex where there were R waves of 5.7 and a pacing   impedance of 1382 and threshold of 0.9 volts at 0.5 msec.  The wire in the   axillary vein was used for a sheath for a [employer] lead serial #[id number].    This is a 4076 lead.  This was placed in the right atrial appendage where   there were P waves of 3.9 and a pacing impedance of 881 and a threshold of 1.5   volts at 0.5 milliseconds with good pacing and sensing parameters.  Both   leads were sutured down to the prepectoral fascia.  A pocket was made and   rinsed with copious amount of antibiotic solution.  Two leads  were then   connected to a [employer] pacemaker generator, serial #[id number].  This   generator was placed in the pocket with the leads underneath.  The pocket was   closed in 3 layers.  Device is programmed DDD .    CONCLUSION:  Successful implantation of a dual chamber pacemaker with   uneventful conscious sedation.       ____________________________________     MD ОЛЬГА Frazier / LATISHA    DD:  02/19/2018 21:40:21  DT:  02/19/2018 22:44:52    D#:  [id number]  Job#:  [id number]

## 2018-02-20 NOTE — PROCEDURES
DATE OF PROCEDURE:  2/14/2018.     INDICATION FOR PROCEDURE:  heart block and sick sinus   PROCEDURE:  Temproray ventricular pacmeaker.     PROCEDURE IN DETAIL:  After 2 physician consent, the patient brought to   cardiac catheterization laboratory in a fasted state.  He was prepped and    draped in the usual sterile fashion about the chest.  I used fluoroscopy for    guidance to use a micropuncture to access the medial right axillary vein and a   wire was placed on the IVC.  This wire was used for a sheath and I placed a    Temporary balloon tip ventricualr lead This    had good pacing parameters.  It    was sutured to the skin and then the lead was placed with a sterile dressing.       CONCLUSION:  Successful implantation of permanent ventricular pacemaker lead      DISCUSSION: will need permanent device when more stable and demonstrates good change of long term survival

## 2018-02-20 NOTE — PROGRESS NOTES
Pt placed on diet, TF stopped at 1400. Residual check 0cc. Pt encouraged to eat, CNA active in helping feed pt. No issues chewing or swallowing observed. NG tube left in place at this time.

## 2018-02-20 NOTE — THERAPY
"Speech Language Therapy Clinical Swallow Evaluation completed.  Functional Status: Patient was seen for a clinical swallow examination on this day. Patient was in bed with wife at bedside. Both were pleasantly confused. Patient followed directives to the oral [facility] exam with no gross oral motor deficits noted. PO trials consisted of ice chips, nectars, purees, soft solids, mixed consistencies, thin liquids, and dry solids. Patient demonstrated no overt s/sx of aspiration with the ice chips, nectars, purees, soft solids or thin liquids. He coughed x1 each with nectars via straw, mixed consistencies and the dry solid which is concerning for penetration/aspiration. Moderate oral residue was noted with the dry solid which he attempted to clear with a tongue sweep, but was eventually cleared with multiple liquid washes. Patient was impulsive and took large/multiple bites, therefore, feeding was completed by SLP.  At this time, the patient appears to be at the level for a Dysphagia II/Thin liquid diet. OK to pull the Cortrak once the patient has consumed a meal without difficulty or s/sx of aspiration.  SLP following.  Per MD RANDY to cancel cognitive evaluation.    Recommendations - Diet:                            Strategies: Direct supervision during meals, feeding assistance as needed, No Straws and Head of Bed at 90 Degrees                          Medication Administration:  Float whole in puree  Plan of Care: Will benefit from Speech Therapy 3 times per week  Post-Acute Therapy: Discharge to a transitional care facility for continued skilled therapy services.     See \"Rehab Therapy-Acute\" Patient Summary Report for complete documentation.   "

## 2018-02-20 NOTE — PROGRESS NOTES
Monitor Summary    Occasionally paced, sinus rhythm with a bundle branch block when not paced    0.10 / 0.12 / 0.44

## 2018-02-20 NOTE — PSYCHIATRY
PSYCHOLOGICAL CONSULTATION:  Reason for admission: Bradycardia  Cardiac arrest (CMS-HCC)  Reason for consult: Medical decision making capacity evaluation   Requesting Physician: John Ray MD  Supervising Physician: Rosa Marvin MD      Legal status: Voluntary    Chief Complaint: Capacity evaluation    HPI: Met with the patient to assess capacity to engage in medical decision making. Patient was interviewed by psychiatry resident Dr. Chang and this writer. Patient was only oriented to the city and his name. He was not oriented to the date, place, or reason for being in the hospital. He stated his belief that he was home even after he was reminded that he was currently in a hospital. The patient was unable to state why he was in the hospital and stated that there was nothing wrong with him when asked what his medical conditions are. He also was unable to explain what precipitated his current hospital stay.      Medical Hx: Chart reviewed. Only those findings of potential interest to psychiatry are noted below:  Past Medical History:   Diagnosis Date   • Anemia 4/2016    due to rectal bleed   • Arrhythmia 5/12/17    Hx A fib. On only aspirin.    • CAD (coronary artery disease)    • Carotid artery stenosis 6/13/2011   • CATARACT 1990's    Bilateral phaco with IOL   • Dental disorder     cavities, under current care   • Dental disorder 5/12/17    permanent bridge lower   • Dizziness 6/13/2011   • Dyslipidemia 9/18/2010   • Gout    • Heart murmur    • High cholesterol    • Hyperlipidemia    • Hypertension    • Hypothyroidism 6/13/2011   • Insomnia    • Myocardial infarct 2008    Stent 2011.  5/12/17-Cardiologist is DR Wu ([facility])   • PAD (peripheral artery disease)    • Preoperative examination, unspecified 4/12/2011   • Rectal bleed 4/4/2016    Cauterized and received blood transfusions   • Renal disorder 2000    kidney stone   • Sleep apnea 2014    States recommended CPAP but never did get it.   • Stroke  (CMS-[facility]) 4/16/2010    TIA    • Tendonitis    • TIA (transient ischemic attack) 6/13/2011   • Unspecified disorder of thyroid    • Urinary incontinence      Medical Conditions:     Allergies: Patient has no known allergies.  Medications (currently prescribed at [facility]):    Current Facility-Administered Medications:   •  amiodarone (CORDARONE) tablet 200 mg, 200 mg, Per NG Tube, Once, Usman Stephens M.D.  •  rivaroxaban (XARELTO) tablet 20 mg, 20 mg, Oral, PM MEAL, Pradip Charlton M.D., 20 mg at 02/19/18 1759  •  oxyCODONE immediate-release (ROXICODONE) tablet 5 mg, 5 mg, Oral, Q4HRS PRN **OR** oxyCODONE immediate release (ROXICODONE) tablet 10 mg, 10 mg, Oral, Q4HRS PRN, Jeremy M Gonda, M.D., 10 mg at 02/18/18 2019  •  hydrALAZINE (APRESOLINE) injection 10-20 mg, 10-20 mg, Intravenous, Q4HRS PRN, Jeremy M Gonda, M.D., 10 mg at 02/20/18 1219  •  Pharmacy Consult Request ...Pain Management Review 1 Each, 1 Each, Other, PRN, Usman Stephens M.D.  •  amiodarone (CORDARONE) tablet 400 mg, 400 mg, Per NG Tube, TID, Usman Stephens M.D., 400 mg at 02/20/18 0901  •  Respiratory Care per Protocol, , Nebulization, Continuous RT, Kraig Seth Jr., D.O.  •  senna-docusate (PERICOLACE or SENOKOT S) 8.6-50 MG per tablet 2 Tab, 2 Tab, Oral, BID, 2 Tab at 02/20/18 0902 **AND** polyethylene glycol/lytes (MIRALAX) PACKET 1 Packet, 1 Packet, Oral, QDAY PRN **AND** magnesium hydroxide (MILK OF MAGNESIA) suspension 30 mL, 30 mL, Oral, QDAY PRN **AND** bisacodyl (DULCOLAX) suppository 10 mg, 10 mg, Rectal, QDAY PRN, Kraig Seth Jr., D.O.  •  insulin regular (HUMULIN R) injection 1-6 Units, 1-6 Units, Subcutaneous, Q6HRS, Stopped at 02/14/18 1915 **AND** Accu-Chek Q6 if NPO, , , Q6H **AND** NOTIFY MD and PharmD, , , Once **AND** glucose 4 g chewable tablet 16 g, 16 g, Oral, Q15 MIN PRN **AND** dextrose 50% (D50W) injection 25 mL, 25 mL, Intravenous, Q15 MIN PRN, Kraig Seth Jr., D.O.  •  ipratropium-albuterol (DUONEB) nebulizer  "solution 3 mL, 3 mL, Nebulization, Q2HRS PRN (RT), Kraig Seth Jr., D.O.  •  acetaminophen (TYLENOL) tablet 650 mg, 650 mg, Oral, Q6HRS PRN, Gavin Mackenzie M.D., 650 mg at 02/16/18 1643  •  atorvastatin (LIPITOR) tablet 20 mg, 20 mg, Oral, QHS, Gavin Mackenzie M.D., 20 mg at 02/19/18 2120  •  levothyroxine (SYNTHROID) tablet 50 mcg, 50 mcg, Oral, AM ES, Gavin Mackenzie M.D., 50 mcg at 02/20/18 0901  Labs:  Recent Results (from the past 24 hour(s))   ACCU-CHEK GLUCOSE    Collection Time: 02/19/18  5:46 PM   Result Value Ref Range    Glucose - Accu-Ck 130 (H) 65 - 99 mg/dL   ACCU-CHEK GLUCOSE    Collection Time: 02/20/18 12:20 AM   Result Value Ref Range    Glucose - Accu-Ck 134 (H) 65 - 99 mg/dL   Comp Metabolic Panel (CMP) - Every Monday    Collection Time: 02/20/18  5:37 AM   Result Value Ref Range    Sodium 134 (L) 135 - 145 mmol/L    Potassium 3.9 3.6 - 5.5 mmol/L    Chloride 100 96 - 112 mmol/L    Co2 26 20 - 33 mmol/L    Anion Gap 8.0 0.0 - 11.9    Glucose 133 (H) 65 - 99 mg/dL    Bun 17 8 - 22 mg/dL    Creatinine 0.74 0.50 - 1.40 mg/dL    Calcium 9.7 8.5 - 10.5 mg/dL    AST(SGOT) 45 12 - 45 U/L    ALT(SGPT) 20 2 - 50 U/L    Alkaline Phosphatase 97 30 - 99 U/L    Total Bilirubin 1.0 0.1 - 1.5 mg/dL    Albumin 3.6 3.2 - 4.9 g/dL    Total Protein 6.5 6.0 - 8.2 g/dL    Globulin 2.9 1.9 - 3.5 g/dL    A-G Ratio 1.2 g/dL   ESTIMATED GFR    Collection Time: 02/20/18  5:37 AM   Result Value Ref Range    GFR If African American >60 >60 mL/min/1.73 m 2    GFR If Non African American >60 >60 mL/min/1.73 m 2   ACCU-CHEK GLUCOSE    Collection Time: 02/20/18  5:40 AM   Result Value Ref Range    Glucose - Accu-Ck 129 (H) 65 - 99 mg/dL   ACCU-CHEK GLUCOSE    Collection Time: 02/20/18 12:08 PM   Result Value Ref Range    Glucose - Accu-Ck 149 (H) 65 - 99 mg/dL          Cranial Imaging Hx: CT scan of the head on 2/18/18 showed:     \"FINDINGS:    There is no evidence of extra-axial hemorrhage. No intra-axial hemorrhage is noted. " "There is no brain swelling or edema. No mass effect or midline shift is noted.  Ventricles and sulci appear prominent.  There is decreased attenuation in the periventricular white matter. Mucosal thickening in the paranasal sinuses is again noted.\"    Psychiatric Examination:  Vitals: Blood pressure 130/88, pulse 95, temperature 37.3 °C (99.1 °F), resp. rate (!) 35, height 1.6 m (5' 3\"), weight 64 kg (141 lb 1.5 oz), SpO2 90 %.  Musculoskeletal: normal psychomotor activity, no tics or unusual mannerisms noted  Appearance and Eye Contact: appropriate dress and grooming. Behavior is calm, cooperative,  appropriate eye contact  Attention/Alertness: Alert  Thought Process: Linear    Thought Content: Not significant for psychotic processes  Speech: Clear with normal rate and rhythm  Mood: \"ok\"            Affect: flat         SI/HI: Did not report either    Memory: Recent and remote memory appear impaired   Orientation: alert, only oriented to:, person, and city  Insight into symptoms: Poor  Judgement into symptoms:Poor    Neuropsychological Testing:   Not formally tested.          ASSESSMENT: Pt is NOT capacitated to make medical decisions and/or leave AMA. In order to hold an  incapacitated pt on a medical hold, there must ALSO be an imminent  risk of death and/or disability as well. The final decision to hold or not hold is up to the treatment team.     If there are any questions please contact Risk Management [phone].    DSM5 Diagnostic Considerations:   Unspecified Delirium     Rule Out:  Unspecified Neurocognitive Disorder      PLAN:  Legal status: Voluntary  Capacity: Incapacitated   Anticipate F/U within 48 hours.   Records reviewed: yes  Discussed patient with other provider: yes  Signing off  Thank you for the consult.    Cyndi Jovel PhD  "

## 2018-02-20 NOTE — PROGRESS NOTES
Pt had no issues chewing or swallowing while eating PO lunch. However c/o nausea post meal. No antiemetics on MAR. MD made aware, awaiting orders to medicate pt.

## 2018-02-20 NOTE — CARE PLAN
Problem: Safety  Goal: Will remain free from injury  Outcome: PROGRESSING AS EXPECTED  Patient has remained free from injury during this admission.     Problem: Psychosocial Needs:  Goal: Level of anxiety will decrease  Outcome: PROGRESSING SLOWER THAN EXPECTED  Patient's level of anxiety remains high, family interaction is a factor.

## 2018-02-20 NOTE — PROGRESS NOTES
Cardiology Progress Note                Date & Time created: 2/20/2018  8:22 AM     Interval History/Chief Complaint:    84 year old male status post patricia arrest following TAVR, status post respiratory failure off of ventilator support. Now s/p PPM.   2/19: PPM uncomplicated. AMS improving slightly. Amiodarone.  2/20: No CV events. Family agitated overnight.    Review of Systems   Unable to perform ROS: Mental status change     Physical Exam   Constitutional: He is oriented to person, place, and time. He appears well-developed and well-nourished. No distress.   HENT:   Head: Normocephalic and atraumatic.   Mouth/Throat: Oropharynx is clear and moist.   Eyes: Conjunctivae are normal. Pupils are equal, round, and reactive to light. No scleral icterus.   Neck: Normal range of motion. Neck supple. No JVD present. No tracheal deviation present. No thyromegaly present.   Cardiovascular: Normal rate, regular rhythm, S1 normal, normal heart sounds and intact distal pulses.  PMI is not displaced.  Exam reveals no gallop and no friction rub.    No murmur heard.  Pulses:       Carotid pulses are 2+ on the right side, and 2+ on the left side.       Radial pulses are 2+ on the right side, and 2+ on the left side.        Dorsalis pedis pulses are 2+ on the right side, and 2+ on the left side.        Posterior tibial pulses are 2+ on the right side, and 2+ on the left side.   Pulmonary/Chest: Effort normal and breath sounds normal. He has no wheezes. He has no rales.   PPM site c/d/i   Abdominal: Soft. Bowel sounds are normal. He exhibits no distension and no pulsatile midline mass. There is no tenderness. There is no guarding.   Musculoskeletal: Normal range of motion. He exhibits no edema.   Neurological: He is alert and oriented to person, place, and time. No cranial nerve deficit (cranial nerves II through XII grossly intact).   Skin: Skin is warm and dry. No rash noted. He is not diaphoretic. No erythema.   Psychiatric: He  has a normal mood and affect. His behavior is normal. Thought content normal.       Hemodynamics:  Temp (24hrs), Av.2 °C (99 °F), Min:37.2 °C (98.9 °F), Max:37.3 °C (99.1 °F)  Temperature: 37.3 °C (99.1 °F), Monitored Temp: 37.4 °C (99.32 °F)  Pulse  Av.9  Min: 44  Max: 153Heart Rate (Monitored): 87  NIBP: (!) 171/60     Respiratory:    Respiration: (!) 26, Pulse Oximetry: 90 %     Work Of Breathing / Effort: Mild  RUL Breath Sounds: Clear, RML Breath Sounds: Diminished, RLL Breath Sounds: Diminished, WHIT Breath Sounds: Clear, LLL Breath Sounds: Diminished  Fluids:        GI/Nutrition:  Orders Placed This Encounter   Procedures   • Diet Order     Standing Status:   Standing     Number of Occurrences:   1     Order Specific Question:   Diet:     Answer:   Cardiac [6]     Lab Results:  Recent Labs      18   0415  18   0347   WBC  11.3*  9.3   RBC  3.93*  3.54*   HEMOGLOBIN  13.1*  11.9*   HEMATOCRIT  38.5*  35.2*   MCV  98.0*  99.4*   MCH  33.3*  33.6*   MCHC  34.0  33.8   RDW  46.5  47.3   PLATELETCT  66*  71*   MPV  11.2  11.2     Recent Labs      18   0415  18   0347  18   0537   SODIUM  137  135  134*   POTASSIUM  3.3*  4.1  3.9   CHLORIDE  103  102  100   CO2  29  27  26   GLUCOSE  143*  123*  133*   BUN  12  17  17   CREATININE  0.77  0.79  0.74   CALCIUM  9.0  9.0  9.7     Recent Labs      18   1130  18   0347   APTT  81.6*  68.4*                 CARDIAC STUDIES/PROCEDURES:     CARDIAC CATHETERIZATION CONCLUSIONS by Dr. Nieves (18)  1.  Coronary artery disease, three-vessel including left anterior descending artery ostial 100%   occlusion, mid right coronary artery 100% occlusion, a distal left anterior descending artery   95% stenosis, second circumflex marginal branch 30% stenosis  2.  Patent coronary bypass grafts to the left anterior descending artery, first circumflex marginal  branch and distal right coronary artery.  3.  Patent coronary stent  of the ostium and proximal first circumflex marginal branch.  4.  Normal ascending aorta.  5.  Aortic regurgitation, mild to moderate.  6.  Patent distal abdominal aortogram and bilateral iliofemoral arteries.  7.  Essentially normal right heart pressures and left ventricular filling pressure.     CAROTID ULTRASOUND (11/27/17)  Moderate stenosis of the right internal carotid (50-69%).   Left carotid.   CEA is widely patent without evidence of restinosis.    Flow within both subclavian arteries appears to be within normal limits.   Antegrade flow, bilateral vertebral arteries.     CTA OF HEAD/NECK (02/14/18)  1.  No evidence of intracranial vascular occlusion or aneurysm.  2.  Extensive atherosclerotic plaque involving the intracranial vascular structures with multiple areas of atherosclerotic narrowing.  3.  Periventricular chronic small vessel ischemic change.  4.  Marked partially calcified and ulcerated plaques located within the right carotid artery bifurcation. Between 50% to 70% stenosis at the origin right internal carotid artery is demonstrated.  5.  Mild partially calcified plaque left carotid artery bifurcation. No significant stenosis.  6.  Moderate/marked partially calcified plaque origin right vertebral artery resulting in between 50% and 70% stenosis.  7.  Focal plaque or thrombus within the right vertebral artery at the C1 level.  8.  Moderate partially calcified plaque within the proximal segment of the left vertebral artery resulting in 50% stenosis.    CT OF CHEST AND ABDOMEN (12/14/17)  1.  Aortic annulus area of 326 sq mm.  2.  Coronary ostia are both greater than 10 mm from the annulus.  3.  Minimum diameter of the iliofemoral arterial system of 5.9 mm on the RIGHT and 8 mm on the LEFT, with moderate atherosclerotic calcification and tortuosity bilaterally.  4.  Interval distal migration of large LEFT kidney stone to the distal ureter, without significant obstructive changes.     COLONOSCOPE by  Dr. 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  Eventually this area was Endoclipped,   moderate left-sided diverticulosis.     ECHOCARDIOGRAM CONCLUSIONS (02/14/18)  1. Known TAVR aortic valve that is functioning normally with normal   transvalvular gradients. Transvalvular gradients are - Peak: 15  mmHg,    Mean: 9  mmHg. Trivial paravalvular leak is noted.  2. Mitral regurgitation was not evaluated during this study, however   there appeared to be no impingment or restriction of the mitral valve   leaflets by the bioprosthetic aortic valve.  3. Normal pericardium without effusion.  4. Mild to moderately decreased left ventricular systolic function.   Estimated LVEF 40%, however this estimate was difficult due to severe tachycardia.  5. Global hypokinesis.  Compared to the previous study performed on 2/13/2018:  There appears   to be less perivalvular leak.  The TAVR valve might be slightly farther   into the ventricle, however there does not appear to be any obstruction.    ECHOCARDIOGRAM CONCLUSIONS (10/026/17)  Prior echo 12-23-14. Compared to the images of the study done - there   has been progression of aortic stenosis and regurgitation.   Normal left ventricular systolic function.  Left ventricular ejection fraction is visually estimated to be 70%.  Severe aortic stenosis.  AV Area Cont Eq vti 0.93 cm²  Vmax is 5.0 m/s. Transvalvular gradients are - Peak: 100 mmHg, Mean: 53 mmHg.   Moderate aortic insufficiency.  Mild tricuspid regurgitation.  Estimated right ventricular systolic pressure is 45 mmHg.  Moderate pulmonic insufficiency.     EKG  performed on (02/12/18) was reviewed: EKG shows sinus patricia with right bundle branch block.  EKG performed on (02/12/18) EKG shows sinus patricia with right bundle branch block.  EKG performed on (02/07/18) EKG shows sinus patricai with right bundle branch block.  EKG performed on (05/12/17) EKG shows sinus rhythm with right bundle branch block.     Laboratory results of (02/12/18) were reviewed. Bun of 17 mg/dl, creatinine levels of 1.14 mg/dl noted.  Laboratory results of (02/07/18) Bun of 28 mg/dl, creatinine levels of 1.39 mg/dl noted.     MRI OF BRAIN (02/13/11)  1. Moderate cerebral atrophy.  2. Minimal supratentorial white matter disease with two or 3 rare   punctate foci of bright FLAIR signal in the deep white matter consistent   with small vessel ischemic change versus demyelination or gliosis.  3. No evidence of acute cerebral infarction, hemorrhage, or mass lesion.     PERIPHERAL INTERVENTION by Dr. Amaya (03/22/17)  1.  RIGHT common femoral sheath arteriogram demonstrates atherosclerosis with estimated 50-75% stenosis of the proximal femoral artery  2.  Deployment Starclose SE vascular closure device; due to the patient's agitation and altered mental status I recommend a further 6 hours of groin precautions     PERIPHERAL INTERVENTION by Dr. Hart (03/21/17)  1.  Active extravasation from the ileocecal branch of the superior mesenteric artery.  2.  Successful embolization of bleeding cecal branch.     PERIPHERAL INTERVENTION by Dr. Parra (08/22/12)  1.  Right superficial femoral artery occlusion with occlusion of the tibial peroneal trunk.    2.  Widely patent left superficial femoral artery stent with some disease in   the tibial vessels as well.  Although, the superficial femoral artery is   amenable to percutaneous intervention, I am reluctant to open the artery to a   distal occlusion.  The tibioperoneal trunk is heavily diseased and   intervention in the tibioperoneal trunk for a patient with  claudication is not   advisable due to the risk of failure and complication as well as the poor   durability of below knee tibial work.  I will follow up with the patient in   the office and discuss with him the findings of the angiogram.  If he   progresses to rest pain or tissue loss, will consider percutaneous   intervention of both lesions.     TRANSESOPHAGEAL ECHOCARDIOGRAM CONCLUSIONS by Dr. Leslie (02/12/18)  1)  Severe AS/AI  2)  Denia Valve:  EOA:  1.94  cm ^2  Mean Gradiet:  6 mmhg  3)  No PVL    Medical Decision Making, by Problem:  Active Hospital Problems    Diagnosis   • *Cardiac arrest (CMS-[facility]) [I46.9]   • Acute respiratory failure with hypoxia (CMS-[facility]) [J96.01]   • Cardiogenic shock (CMS-[facility]) [R57.0]   • Bradycardia [R00.1]   • S/P TAVR (transcatheter aortic valve replacement) [Z95.2]   • Hx of CABG [Z95.1]   • Paroxysmal atrial fibrillation (CMS-[facility]) [I48.0]   • Carotid artery stenosis [I65.29]   • TIA (transient ischemic attack) [G45.9]   • CAD (coronary artery disease) [I25.10]       Plan:    1. Bradycardic arrest s/p PPM  2. Successful transcatheter aortic valve replacement (TAVR) with # 23 with 2 additional mls of volume Muñiz Denia S3 valve, transfemoral approach, under general anesthesia (02/12/18)  3. Coronary artery disease with prior 3 vessel coronary bypass graft with left internal mammary artery graft to left anterior descending artery, saphenous vein graft to obtuse marginal branch and saphenous vein graft to posterior descending artery by Dr. Coates (03/20/08)  4. Peripheral vascular disease with prior left superficial femoral artery stent, known high grade stenosis of right common femoral artery closed with Starclose closure by Dr. Amaya on (03/22/17): Stable.  5. Carotid artery stenosis with left carotid enterectomy by Dr. Parra (05/03/11) (managed by Dr. Parra)  6. History of trans-ischemic attack  7. SSS, now paced on amiodarone taper per Leigh Mondragon/Usman Stephens EPS, no  recurrent AF, transition to coumadin once able to swallow  8. Encephalopathy, not at baseline but slow improvement per wife    Thank you for the consultation. Will sign off. Please call with any questions.    Bill Cortez MD, FACC, [facility]  Division of Interventional Cardiology  [facility]      Quality-Core Measures

## 2018-02-20 NOTE — DISCHARGE PLANNING
Medical Social Work    Referral: F/U     Intervention: Discussed pt's case in MDT rounds, pt has been extubated and doing a little better. cortrack placed for tube feeds, SLP ordered for swallow eval. Psych eval ordered and MD ordered SNF placement. Concern for pt's wife Darlyn (possible dementia and unsafe at home alone). Pt's wife is currently at bedside and EPS report has been made by prior SW.     Attempted to meet with pt at bedside, pt needing to go to the bathroom. Attempted to meet with pt again at bedside but psych is currently at bedside evaluating.     Called [facility], spoke to EPS worker and was able to obtain assigned EPS worker Kimberli Abrams #[phone] but she is currently out in the field. EPS worker will take down this writer's contact information and have assigned worker call back when she is back in the office.    Needs:   Psych currently evaluating pt.     Discuss SNF placement.      Awaiting EPS worker to call back to discuss concerns about pt and wife.

## 2018-02-21 ENCOUNTER — APPOINTMENT (OUTPATIENT)
Dept: RADIOLOGY | Facility: MEDICAL CENTER | Age: 83
DRG: 242 | End: 2018-02-21
Attending: INTERNAL MEDICINE
Payer: MEDICARE

## 2018-02-21 ENCOUNTER — APPOINTMENT (OUTPATIENT)
Dept: INTERNAL MEDICINE | Facility: MEDICAL CENTER | Age: 83
End: 2018-02-21
Payer: MEDICARE

## 2018-02-21 LAB
ALBUMIN SERPL BCP-MCNC: 4 G/DL (ref 3.2–4.9)
ALBUMIN/GLOB SERPL: 1.3 G/DL
ALP SERPL-CCNC: 91 U/L (ref 30–99)
ALT SERPL-CCNC: 34 U/L (ref 2–50)
ANION GAP SERPL CALC-SCNC: 9 MMOL/L (ref 0–11.9)
ANISOCYTOSIS BLD QL SMEAR: ABNORMAL
AST SERPL-CCNC: 64 U/L (ref 12–45)
BASOPHILS # BLD AUTO: 0 % (ref 0–1.8)
BASOPHILS # BLD: 0 K/UL (ref 0–0.12)
BILIRUB SERPL-MCNC: 1.6 MG/DL (ref 0.1–1.5)
BUN SERPL-MCNC: 24 MG/DL (ref 8–22)
CALCIUM SERPL-MCNC: 10.2 MG/DL (ref 8.5–10.5)
CHLORIDE SERPL-SCNC: 100 MMOL/L (ref 96–112)
CO2 SERPL-SCNC: 28 MMOL/L (ref 20–33)
CREAT SERPL-MCNC: 1.17 MG/DL (ref 0.5–1.4)
EOSINOPHIL # BLD AUTO: 0 K/UL (ref 0–0.51)
EOSINOPHIL NFR BLD: 0 % (ref 0–6.9)
ERYTHROCYTE [DISTWIDTH] IN BLOOD BY AUTOMATED COUNT: 47.8 FL (ref 35.9–50)
GLOBULIN SER CALC-MCNC: 3.2 G/DL (ref 1.9–3.5)
GLUCOSE SERPL-MCNC: 109 MG/DL (ref 65–99)
HCT VFR BLD AUTO: 41.8 % (ref 42–52)
HGB BLD-MCNC: 13.9 G/DL (ref 14–18)
LYMPHOCYTES # BLD AUTO: 1.24 K/UL (ref 1–4.8)
LYMPHOCYTES NFR BLD: 10.6 % (ref 22–41)
MACROCYTES BLD QL SMEAR: ABNORMAL
MANUAL DIFF BLD: NORMAL
MCH RBC QN AUTO: 33.4 PG (ref 27–33)
MCHC RBC AUTO-ENTMCNC: 33.3 G/DL (ref 33.7–35.3)
MCV RBC AUTO: 100.5 FL (ref 81.4–97.8)
METAMYELOCYTES NFR BLD MANUAL: 0.9 %
MONOCYTES # BLD AUTO: 0.73 K/UL (ref 0–0.85)
MONOCYTES NFR BLD AUTO: 6.2 % (ref 0–13.4)
MORPHOLOGY BLD-IMP: NORMAL
MYELOCYTES NFR BLD MANUAL: 0.9 %
NEUTROPHILS # BLD AUTO: 9.52 K/UL (ref 1.82–7.42)
NEUTROPHILS NFR BLD: 79.6 % (ref 44–72)
NEUTS BAND NFR BLD MANUAL: 1.8 % (ref 0–10)
NRBC # BLD AUTO: 0 K/UL
NRBC BLD-RTO: 0 /100 WBC
PLATELET # BLD AUTO: 83 K/UL (ref 164–446)
PLATELET BLD QL SMEAR: NORMAL
PMV BLD AUTO: 11 FL (ref 9–12.9)
POTASSIUM SERPL-SCNC: 4.8 MMOL/L (ref 3.6–5.5)
PROT SERPL-MCNC: 7.2 G/DL (ref 6–8.2)
RBC # BLD AUTO: 4.16 M/UL (ref 4.7–6.1)
RBC BLD AUTO: PRESENT
SODIUM SERPL-SCNC: 137 MMOL/L (ref 135–145)
WBC # BLD AUTO: 11.7 K/UL (ref 4.8–10.8)

## 2018-02-21 PROCEDURE — 700102 HCHG RX REV CODE 250 W/ 637 OVERRIDE(OP): Performed by: HOSPITALIST

## 2018-02-21 PROCEDURE — A9270 NON-COVERED ITEM OR SERVICE: HCPCS | Performed by: HOSPITALIST

## 2018-02-21 PROCEDURE — 700102 HCHG RX REV CODE 250 W/ 637 OVERRIDE(OP): Performed by: INTERNAL MEDICINE

## 2018-02-21 PROCEDURE — A9270 NON-COVERED ITEM OR SERVICE: HCPCS | Performed by: INTERNAL MEDICINE

## 2018-02-21 PROCEDURE — 97167 OT EVAL HIGH COMPLEX 60 MIN: CPT

## 2018-02-21 PROCEDURE — 85027 COMPLETE CBC AUTOMATED: CPT

## 2018-02-21 PROCEDURE — 80053 COMPREHEN METABOLIC PANEL: CPT

## 2018-02-21 PROCEDURE — G8987 SELF CARE CURRENT STATUS: HCPCS | Mod: CM

## 2018-02-21 PROCEDURE — G8988 SELF CARE GOAL STATUS: HCPCS | Mod: CK

## 2018-02-21 PROCEDURE — 700105 HCHG RX REV CODE 258: Performed by: HOSPITALIST

## 2018-02-21 PROCEDURE — 85007 BL SMEAR W/DIFF WBC COUNT: CPT

## 2018-02-21 PROCEDURE — 770020 HCHG ROOM/CARE - TELE (206)

## 2018-02-21 PROCEDURE — 71045 X-RAY EXAM CHEST 1 VIEW: CPT

## 2018-02-21 PROCEDURE — 99233 SBSQ HOSP IP/OBS HIGH 50: CPT | Performed by: HOSPITALIST

## 2018-02-21 RX ORDER — SODIUM CHLORIDE 9 MG/ML
500 INJECTION, SOLUTION INTRAVENOUS ONCE
Status: COMPLETED | OUTPATIENT
Start: 2018-02-21 | End: 2018-02-21

## 2018-02-21 RX ORDER — QUETIAPINE FUMARATE 25 MG/1
25 TABLET, FILM COATED ORAL NIGHTLY PRN
Status: DISCONTINUED | OUTPATIENT
Start: 2018-02-21 | End: 2018-02-24

## 2018-02-21 RX ADMIN — LISINOPRIL 5 MG: 5 TABLET ORAL at 08:29

## 2018-02-21 RX ADMIN — LEVOTHYROXINE SODIUM 50 MCG: 50 TABLET ORAL at 08:29

## 2018-02-21 RX ADMIN — AMIODARONE HYDROCHLORIDE 400 MG: 200 TABLET ORAL at 08:29

## 2018-02-21 RX ADMIN — SODIUM CHLORIDE 500 ML: 9 INJECTION, SOLUTION INTRAVENOUS at 10:42

## 2018-02-21 RX ADMIN — ASPIRIN 81 MG: 81 TABLET, CHEWABLE ORAL at 08:29

## 2018-02-21 RX ADMIN — AMIODARONE HYDROCHLORIDE 400 MG: 200 TABLET ORAL at 21:09

## 2018-02-21 RX ADMIN — RIVAROXABAN 15 MG: 15 TABLET, FILM COATED ORAL at 19:42

## 2018-02-21 RX ADMIN — AMIODARONE HYDROCHLORIDE 400 MG: 200 TABLET ORAL at 15:00

## 2018-02-21 RX ADMIN — ATORVASTATIN CALCIUM 20 MG: 20 TABLET, FILM COATED ORAL at 21:09

## 2018-02-21 ASSESSMENT — PAIN SCALES - GENERAL
PAINLEVEL_OUTOF10: 0

## 2018-02-21 ASSESSMENT — COGNITIVE AND FUNCTIONAL STATUS - GENERAL
DRESSING REGULAR UPPER BODY CLOTHING: A LOT
DRESSING REGULAR LOWER BODY CLOTHING: TOTAL
TOILETING: TOTAL
HELP NEEDED FOR BATHING: TOTAL
DAILY ACTIVITIY SCORE: 8
EATING MEALS: TOTAL
SUGGESTED CMS G CODE MODIFIER DAILY ACTIVITY: CM
PERSONAL GROOMING: A LOT

## 2018-02-21 ASSESSMENT — ACTIVITIES OF DAILY LIVING (ADL): TOILETING: INDEPENDENT

## 2018-02-21 NOTE — DISCHARGE PLANNING
Medical SW    Referral: Sw spoke to ER Sw.    Intervention: Pt's wife has now been admitted w/ MRN [id number].    Plan: Sw to assist w/ d/c planning as needed.

## 2018-02-21 NOTE — PSYCHIATRY
"PSYCHIATRIC CONSULTATION:  Reason for admission: Cardiac arrest; acute respiratory failure; altered mental status   Reason for consult: capacity   Requesting Physician:  John Ray MD  Supervising Physician:  Rosa Marvin MD   Legal status:  Voluntary   Chief Complaint:  None     HPI:   Pt was hospitalized after a cardiac arrest.  He received electrical cardioversion and chest compression in Ed.  He continues to present with altered mental status after event, and psychiatry was consulted for capacity.    Pt was seen with Dr. Jovel.  He was clearly cognitively impaired and unable to carry a coherent conversation.  He was oriented to person and the [facility].  He repeatedly stated he was at \"home\" despite being reminded he was in the hospital.  He believes the date is 1918.  When asked how he came to the hospital he stated \"I was at the [facility] ER.  He believes he has no medical illness and requires no medications.      Psychiatric Review of Systems:current symptoms as reported by pt.  Depression:  Unable to obtain   Vanessa:  Unable to obtain   Anxiety/Panic Attacks   Unable to obtain   PTSD symptom:  Unable to obtain   Psychosis:  Unable to obtain     Medical Review of Systems: as reported by pt. All systems reviewed. Only those found to be + are noted below. All others are negative.   Neurological:    TBIs:  Unable to obtain       Szs:  Unable to obtain    Strokes:  Positive per chart    Other medical symptoms:   Thyroid:  Positive per chart    Diabetes:  Unable to obtain     Cardiovascular disease:  Extensive per chart     Psychiatric Examination:  Vitals: Blood pressure 130/88, pulse 95, temperature 37.3 °C (99.1 °F), resp. rate (!) 35, height 1.6 m (5' 3\"), weight 64 kg (141 lb 1.5 oz), SpO2 90 %.  Musculoskeletal: normal psychomotor activity, no tics or unusual mannerisms noted  Appearance: WDWN, appropriate dress and grooming. Behavior is calm, cooperative,  appropriate eye " contact  Thoughts:  Disconnected from reality and unable to carry a coherent conversation; not obviously responding to internal stimuli.  Speech:  Terse responses; normal rate and alfa  Mood:  Did not obtain            Affect:  Flat        SI/HI: Did not obtain        Attention/Alertness:  Impaired   Memory:  Impaired     Orientation: AOx1  Fund of Knowledge:  Impaired   Insight/Judgement into symptoms:  Impaired   Neurological Testing: Not formally tested    Past Psychiatric Hx:   Unable to obtain        Family Psychiatric Hx:  Unable to obtain        Social Hx:  Pt is  with 2 grown children.  His wife has dementia.      Drug/Alcohol/Tobacco Hx:  Unable to obtain      Medical Hx: labs, MARS, medications, etc were reviewed. Only those findings of potential interest to psychiatry are noted below:  Past Medical History:   Diagnosis Date   • Anemia 4/2016    due to rectal bleed   • Arrhythmia 5/12/17    Hx A fib. On only aspirin.    • CAD (coronary artery disease)    • Carotid artery stenosis 6/13/2011   • CATARACT 1990's    Bilateral phaco with IOL   • Dental disorder     cavities, under current care   • Dental disorder 5/12/17    permanent bridge lower   • Dizziness 6/13/2011   • Dyslipidemia 9/18/2010   • Gout    • Heart murmur    • High cholesterol    • Hyperlipidemia    • Hypertension    • Hypothyroidism 6/13/2011   • Insomnia    • Myocardial infarct 2008    Stent 2011.  5/12/17-Cardiologist is DR Wu ([facility])   • PAD (peripheral artery disease)    • Preoperative examination, unspecified 4/12/2011   • Rectal bleed 4/4/2016    Cauterized and received blood transfusions   • Renal disorder 2000    kidney stone   • Sleep apnea 2014    States recommended CPAP but never did get it.   • Stroke (CMS-[facility]) 4/16/2010    TIA    • Tendonitis    • TIA (transient ischemic attack) 6/13/2011   • Unspecified disorder of thyroid    • Urinary incontinence      Allergies: Patient has no known allergies.     Medications  (currently prescribed at [facility]):  Reviewed     Labs:   Sodium 134 (L) mmol/L     Potassium 3.9 mmol/L     Chloride 100 mmol/L     Co2 26 mmol/L     Anion Gap 8.0    Glucose 133 (H) mg/dL     Bun 17 mg/dL     Creatinine 0.74 mg/dL     Calcium 9.7 mg/dL     AST(SGOT) 45 U/L     ALT(SGPT) 20 U/L     Alkaline Phosphatase 97 U/L     Total Bilirubin 1.0 mg/dL     Albumin 3.6 g/dL     Total Protein 6.5 g/dL     Globulin 2.9 g/dL     A-G Ratio 1.2 g/dL       WBC 9.3 K/uL     RBC 3.54 (L) M/uL     Hemoglobin 11.9 (L) g/dL     Hematocrit 35.2 (L) %     MCV 99.4 (H) fL     MCH 33.6 (H) pg     MCHC 33.8 g/dL     RDW 47.3 fL     Platelet Count 71 (L) K/uL     MPV 11.2 fL     Neutrophils-Polys 66.50 %     Lymphocytes 14.80 (L) %     Monocytes 11.00 %     Eosinophils 4.90 %     Basophils 0.50 %     Immature Granulocytes 2.30 (H) %     Nucleated RBC 0.00 /100 WBC     Neutrophils (Absolute) 6.16 K/uL     Lymphs (Absolute) 1.37 K/uL     Monos (Absolute) 1.02 (H) K/uL     Eos (Absolute) 0.45 K/uL     Baso (Absolute) 0.05 K/uL     Immature Granulocytes (abs) 0.21 (H) K/uL     NRBC (Absolute) 0.00 K/uL      ECG:   Qtc 504    Cranial Imaging:   NO ACUTE ABNORMALITIES ARE NOTED ON CT SCAN OF THE HEAD.  Findings are consistent with atrophy.  Decreased attenuation in the periventricular white matter likely indicates microvascular ischemic disease.    ASSESSMENT:  Mr. Shaikh is an 83 y/o who was hospitalized after cardiac arrest.  His cognition is impaired and he has little to no insight into his current situation.  For example, he believes he is at home and has no medical problems.  He cannot communicate an understanding of his medical circumstances or consequences, and is incapacitated to make decisions.      #encephalopathy     PLAN:   Pt is encephalopathic and incapacitated to make decisions.     Legal status: voluntary   Signing off   Thank you for the consult.

## 2018-02-21 NOTE — DISCHARGE PLANNING
Medical SW    Referral: Sw left  w/ ER Sw.     Intervention: This Sw indicated pt's wife was wheeled to [facility] in ER, and we are requesting a cognitive assessment.     Plan: Sw to assist w/ d/c planning as needed.

## 2018-02-21 NOTE — PROGRESS NOTES
Patient's wife expressed desire to return home.  Concern over wife returning home alone at night given severity of dementia.  Charge RN contacted,  contacted.  SW obtained taxi voucher to transport wife home and contacted patient's neighbor.  Patient's neighbor will check on wife when she arrives home.  RN transported patient's wife to ER lobby, informed ER  regarding situation and wife's dementia,  to contact taxi service and ensure that wife utilizes taxi service to return home.  Wife left sitting in chair in front of ER .

## 2018-02-21 NOTE — THERAPY
"Occupational Therapy Evaluation completed.   Functional Status:  Pt s/p pacemaker placement, recently d/c following OHS 2/12?, pt unable to provide PLOF and is pleasantly confused during session, able to follow 1-step commands intermittently with delays. Pt required max a for bed mobility, max a for handwashing sitting, physical and verbal cues to initiate, assist some but max a required for thoroughness, F- sitting eob balance, STS from eob with min a and max a to side step. Pt with impaired cognition, balance, endurance and limited knowledge of post op precautions. Pt would benefit from acute skilled services and likely anticipate the need for post acute therapy prior to session.   Plan of Care: Will benefit from Occupational Therapy 3 times per week  Discharge Recommendations:  Equipment: Will Continue to Assess for Equipment Needs. Post-acute therapy Discharge to a transitional care facility for continued skilled therapy services.    See \"Rehab Therapy-Acute\" Patient Summary Report for complete documentation.    "

## 2018-02-21 NOTE — DISCHARGE PLANNING
Updated pts RN, she stated another neighbor was not happy about pts wife going home and did not want to  pts wife. RN stated it is possible that a neighbor will send them back. SW's impression of Jens was not a disagreeable position about pts wife returning home. Asked RN what the plan was otherwise, had floor been housing and taking care of pts wife? Unsure.

## 2018-02-21 NOTE — DISCHARGE PLANNING
Received call from pts RN, concern over pts wife returning home. SW offered cab voucher to get wife home safely. Pts wife confirmed home address on facesheet.    Cab voucher #[id number] tubed to [phone]. RN will bring pts wife to ER entrance to wait for cab.

## 2018-02-21 NOTE — DISCHARGE PLANNING
Medical SW    Referral: Sw received report from Antoine Saravia.    Intervention: Pt has been assessed by psych and not able to make own medical decisions at this time.    Sw spoke to CONCHIS Bowden. Sw to call pt's son, Lopez, and update the medical sx. Pt will be assessed again and most likely need SNF therapy post d/c.    Sw paged pt's son, Lopez. He states he agrees it is important both pt and wife are assessed cognitively. He states he understands sometimes mom acts helpless to get control over a situation. Sw explained EPS has been referred to and this Sw hopes they will assess pt's wife as hospital does not have ability to do this. Son will step in and support the couple if it is determined they both lack capacity and need additional community supports.       Plan: Sw to assist w/ d/c planning as needed.

## 2018-02-21 NOTE — DISCHARGE PLANNING
Medical SW    Referral: Sw spoke to charge RN.    Intervention: Pt's wife brought back by neighbor, Melvi (name?). Wife reported pt had been hauled away in handcuffs and neighbor went to D w/ wife. They came to hospital as pt was reported to have been transported by [facility].    Wife is at bedside and pulling on pt's cords. A sitter will be placed w/ the wife.    Sw spoke to EPS assigned investigator, Kimberli Abrams. She has cases open for both the pt and his wife. Sw shared all contact names and numbers for all people collected including neighbors and pt's son and dtr.  EPS will meet w/ this Sw this afternoon or tomorrow AM. She needs to see pt's wife in the home so she can collect medication bottles and wife's PCP information.    Sw spoke to ER SW who suggests [facility] charge discuss pt's wife w/ ER charge. Possibly wife can be assessed in ER by [facility] which will help support the next step in the process of keeping everyone safe. Wife will be taken to [facility] in ER.      Plan: Sw to assist w/ d/c planning as needed.

## 2018-02-21 NOTE — PROGRESS NOTES
Pulmonary Critical Care Progress Note        Date of admission: 2/14/2018  Date of service: 2/21/2018    Chief Complaint: AMS    History of Present Illness: 84 y.o. male pmhx of Paroxysmal A-fib, CAD s/p CABG, AS s/p TAVR 2/12/18, PAD, Carotid artery disease, TIA, GIB. Presents to the ER today with AMS (obtundation) 1 day after discharge from hospital for TAVR, he was found by EMS in A-fib with RVR. In the ER he was intubated for airway protection and given metoprolol 5 mg for RVR. He then began to have significant pauses and severe hypotension during pauses. Cards was consulted and attempted cardioversion x1 then 300mg of amiodarone followed by another attempted electrocardioversion. EP saw the patient for placement of TPM for SSS and A-fib, he was taken emergently to the cath lab for TPM.    ROS: feeling better today.  No pain or shortness of breath     Interval Events:  24 hour interval history reviewed    - got agitated last night and pulled cortrak and IVs   - now sleepy this morning and did not receive anything   - 100% paced at 60s   - SBPs at 100s   - not eating or drinking due to sleeping   - PO amiodarone   - CXR(reviewed): clear lung fields, no pulmonary edema   - BUN/cr doubled today    Yesterday's Events:   - no events overnight   - more wake/alert this morning   - 100% in 60s   - SBPs 120s   - last BM this morning   - tolerating diet   - edge of bed/chair   - no CXR today   - O2 at 2 lpm NC   - IS around 1000cc    PFSH:  No change.  Gen: lethargic today, difficult to arouse, no distress, younger than stated age  Skin: warm/dry, no rashes (no changes)    Respiratory:    Pulse Oximetry: 98 %          Exam:breathing comfortably, diminished bases, but clear.  No wheezing (no changes)  ImagingAvailable data reviewed    HemoDynamics:  Pulse: 65, Heart Rate (Monitored): 65  NIBP: (!) 92/45       Exam: 100% paced, no obvious murmur, distant heart sounds, good cap refill, no pedal edema (no change)  Imaging:  Available data reviewed    Echocardiogram 2/14:    This was a limited study. Technically difficult study incomplete   information is obtained.    1. Known TAVR aortic valve that is functioning normally with normal   transvalvular gradients. Transvalvular gradients are - Peak: 15  mmHg,    Mean: 9  mmHg. Trivial paravalvular leak is noted.  2. Mitral regurgitation was not evaluated during this study, however   there appeared to be no impingment or restriction of the mitral valve   leaflets by the bioprosthetic aortic valve.  3. Normal pericardium without effusion.  4. Mild to moderately decreased left ventricular systolic function.   Estimated LVEF 40%, however this estimate was difficult due to severe   tachycardia.  5. Global hypokinesis.    Compared to the previous study performed on 2/13/2018:  There appears   to be less perivalvular leak.  The TAVR valve might be slightly farther   into the ventricle, however there does not appear to be any   obstruction.        Neuro:  GCS Total Sin Coma Score: 14       Exam:lethargic today, difficulty arousing, following commands intermittently, symmetrical facial expressions, moving all x 4  Imaging: Available data reviewed    EEG 2/15: This scalp EEG is consistent with diffuse nonspecific cortical dysfunction - moderate     This nonspecific abnormalities could be secondary to metabolic, toxic, polypharmacy or even post-ictal     There are no epileptiform discharges in this record   Head CT 2/18: No acute intracranial process    Fluids:  Intake/Output       02/19/18 0700 - 02/20/18 0659 02/20/18 0700 - 02/21/18 0659 02/21/18 0700 - 02/22/18 0659      2341-8004 1647-5397 Total 2098-6322 3668-1600 Total 4753-1553 0831-9532 Total       Intake    I.V.  187  -- 187  --  -- --  --  -- --    Heparin Volume 187 -- 187 -- -- -- -- -- --    Enteral  720  -- 720  380  60 440  --  -- --    Enteral Volume 720 -- 720 300 -- 300 -- -- --    Free Water / Tube Flush -- -- -- 80 60 140 --  -- --    Total Intake 907 -- 907 380 60 440 -- -- --       Output    Urine  1200  1200 2400  1250  -- 1250  --  -- --    Indwelling Cathether 1200 1200 2400 1250 -- 1250 -- -- --    Drains  0  -- 0  --  -- --  --  -- --    Residual Amount (ml) (Discarded) 0 -- 0 -- -- -- -- -- --    Stool  --  -- --  --  -- --  --  -- --    Number of Times Stooled -- -- -- -- 2 x 2 x -- -- --    Total Output 1200 1200 2400 1250 -- 1250 -- -- --       Net I/O     -293 -1200 -1493 -240 60 -810 -- -- --           Recent Labs      18   0537   SODIUM  135  134*   POTASSIUM  4.1  3.9   CHLORIDE  102  100   CO2  27  26   BUN  17  17   CREATININE  0.79  0.74   CALCIUM  9.0  9.7       GI/Nutrition:  Exam: (+)bs, soft, NT/ND, no masses (no changes)  Imaging: Available data reviewed  Tolerating TFs  Liver Function  Recent Labs      18   0537   ALTSGPT  15  20   ASTSGOT  32  45   ALKPHOSPHAT  71  97   TBILIRUBIN  0.7  1.0   PREALBUMIN  13.0*   --    GLUCOSE  123*  133*       Heme:  Recent Labs      18   1130  18   034   RBC   --   3.54*   HEMOGLOBIN   --   11.9*   HEMATOCRIT   --   35.2*   PLATELETCT   --   71*   APTT  81.6*  68.4*       Infectious Disease:  Monitored Temp  Av.8 °C (100 °F)  Min: 37.3 °C (99.14 °F)  Max: 38.3 °C (100.9 °F)  Temp  Av.7 °C (99.8 °F)  Min: 37.5 °C (99.5 °F)  Max: 37.8 °C (100 °F)  Micro: reviewed. None  Recent Labs      18   03418   0537   WBC  9.3   --    NEUTSPOLYS  66.50   --    LYMPHOCYTES  14.80*   --    MONOCYTES  11.00   --    EOSINOPHILS  4.90   --    BASOPHILS  0.50   --    ASTSGOT  32  45   ALTSGPT  15  20   ALKPHOSPHAT  71  97   TBILIRUBIN  0.7  1.0     Current Facility-Administered Medications   Medication Dose Frequency Provider Last Rate Last Dose   • amiodarone (CORDARONE) tablet 200 mg  200 mg Once Usman Stephens M.D.   Stopped at 18 1500   • ondansetron (ZOFRAN) syringe/vial injection 4 mg  4 mg Q4HRS PRN  John Ray M.D.   4 mg at 02/20/18 1533   • aspirin (ASA) chewable tab 81 mg  81 mg DAILY John Ray M.D.   81 mg at 02/20/18 1753   • lisinopril (PRINIVIL) tablet 5 mg  5 mg Q DAY John Ray M.D.   5 mg at 02/20/18 1753   • rivaroxaban (XARELTO) tablet 20 mg  20 mg PM MEAL Prdaip Charlton M.D.   20 mg at 02/20/18 1753   • oxyCODONE immediate-release (ROXICODONE) tablet 5 mg  5 mg Q4HRS PRN Jeremy M Gonda, M.D.        Or   • oxyCODONE immediate release (ROXICODONE) tablet 10 mg  10 mg Q4HRS PRN Jeremy M Gonda, M.D.   10 mg at 02/18/18 2019   • hydrALAZINE (APRESOLINE) injection 10-20 mg  10-20 mg Q4HRS PRN Jeremy M Gonda, M.D.   10 mg at 02/20/18 1219   • Pharmacy Consult Request ...Pain Management Review 1 Each  1 Each PRN Usman Stephens M.D.       • amiodarone (CORDARONE) tablet 400 mg  400 mg TID Usman Stephens M.D.   400 mg at 02/20/18 2211   • Respiratory Care per Protocol   Continuous RT DODIE Watters Jr..O.       • senna-docusate (PERICOLACE or SENOKOT S) 8.6-50 MG per tablet 2 Tab  2 Tab BID Kraig Seth Jr. D.O.   2 Tab at 02/20/18 2210    And   • polyethylene glycol/lytes (MIRALAX) PACKET 1 Packet  1 Packet QDAY PRN Kraig Seth Jr., D.O.        And   • magnesium hydroxide (MILK OF MAGNESIA) suspension 30 mL  30 mL QDAY PRN Kraig Seth Jr., D.O.        And   • bisacodyl (DULCOLAX) suppository 10 mg  10 mg QDAY PRN Kraig Seth Jr., D.O.       • ipratropium-albuterol (DUONEB) nebulizer solution 3 mL  3 mL Q2HRS PRN (RT) Kraig Seth Jr., D.O.       • acetaminophen (TYLENOL) tablet 650 mg  650 mg Q6HRS PRN Gavin Mackenzie M.D.   650 mg at 02/16/18 1643   • atorvastatin (LIPITOR) tablet 20 mg  20 mg QHS Gavin Mackenzie M.D.   20 mg at 02/20/18 2211   • levothyroxine (SYNTHROID) tablet 50 mcg  50 mcg AM ES Gavin Mackenzie M.D.   50 mcg at 02/20/18 0901     Last reviewed on 2/14/2018  5:15 PM by Luh Kirkland PhT    Quality  Measures:  Radiology images reviewed, Labs  reviewed and Medications reviewed  Baldwin catheter: Critically Ill - Requiring Accurate Measurement of Urinary Output  Central line in place: Shock and Need for access    DVT Prophylaxis: Heparin  DVT prophylaxis - mechanical: SCDs  Ulcer prophylaxis: Yes  Antibiotics: Treating active infection/contamination beyond 24 hours perioperative coverage  Assessed for rehab: Patient unable to tolerate rehabilitation therapeutic regimen      Assessment/Plan:  Acute hypoxic respiratory failure - intubated 2/14-2/16              - continue to encourage IS/PEP, aspiration precautions   - RT/O2 protocols   - arm in sling s/p permanent pacemaker   - mobilizing to chair  Bradycardic arrest followed by evidence of sick sinus syndrome/PAF requiring 100% TV pacer              - EP and cardiology following   - Optimize electrolytes    - s/p permanent pacemaker placement 2/18   - amio po   - xarelto  Cardiogenic shock   - resolved  S/P TAVR   HFrEF   - holding beta-blockade given shock/heart block  Acute undifferentiated encephalopathy   - worsened today: following    - negative head CT and EEG, query hypertension related              - monitor, avoid sedatives, frequent neurologic checks  CAD              - ASA, statin  Hypothyroidism              - synthroid  Hypophosphatemia/kalemia/Mag - repletion with daily monitoring  Fevers and leukocytosis - doubt infection at this time   - improving   - cultures negative   - s/p Ancef for 3 days  Thrombocytopenia - monitor  Systemic arterial hypertension - when necessary hydralazine for systolic blood pressure goal < 160  Prophylaxis: PT/OT, SW consult  Psych:  Pt incompetent to care for self and will need SNF    Pt is stable from both a pulmonary and critical care perspective.  The [facility] team will sign off.      Discussed patient condition and risk of morbidity and/or mortality with RN, RT, Pharmacy, Charge nurse / hot rounds and cardiology and hospitalist, EP    [phone]

## 2018-02-21 NOTE — DISCHARGE PLANNING
Medical SW    Referral: Sw received call from EPS Antoine Ag.    Intervention: She is aware pt's wife will most likely be in [facility] ER for some time today.    Plan: Sw to assist w/ d/c planning as needed.

## 2018-02-21 NOTE — PROGRESS NOTES
Pt O2 saturations down to mid 80's. Pt placed on 6L NC. Pt has been very lethargic this shift. Per night shift pt has not been sleeping since confused wife has been at bedside. Dr. Deng notified of changes. Chest x-ray ordered. Will hold off on feeding lunch until chest x-ray resulted.

## 2018-02-21 NOTE — PROGRESS NOTES
Late entry (22:30 2/21):    This RN (Charge Nurse) was contacted by patient's primary RN, Bebeto, regarding the patient's wife (Prisca) wanting to go home. There are concerns of Prisca being able to safely care for herself if she returns home. Spoke with , Kaitlynn, who states that we are not able to hold the patient's wife since she is not a patient. An EPS consult has already been placed. Kaitlynn arranged for a taxi service to take Prisca home. Prisca was taken down to the ER to await a taxi. A few minutes later Kaitlynn called this RN back to state that Prisca's neighbor will be waiting for her when she arrives home. Per patient's primary RN, Bebeto, he had spoken to a neighbor earlier (not sure of the name) and the neighbor did not want the responsibility of Prisca and would not pick her up from the hospital. Discussed this with Kaitlynn. It sounds like she spoke with another neighbor who will be waiting for Prisca when she arrives home.

## 2018-02-21 NOTE — PROGRESS NOTES
[facility] Hospitalist Progress Note    Date of Service: 2018    Chief Complaint  84 y.o. male admitted 2018 with altered mental status.  Mr. Shaikh has a history of CABG with recent TAVR on  that was found to be altered thus was brought to the ER where he was found to be in atrial fibrillation with RVR requiring cardioversion as well as sinus pauses up to 15 seconds. He was given a bolus of amiodarone and placed on an amiodarone drip, intubated, and a temporary pacemaker was placed.       Interval Problem Update    PPM placed good urine output  Passed swallow eval  Wife at bedside with poor insight    Consultants/Specialty  Cardiology.   Critical Care. I discussed his condition with Dr. Deng on ICU Hot Rounds    Disposition  SNF referral        Review of Systems   Unable to perform ROS: Mental acuity      Physical Exam  Laboratory/Imaging   Hemodynamics  Temp (24hrs), Av.2 °C (99 °F), Min:37.2 °C (98.9 °F), Max:37.3 °C (99.1 °F)   Temperature: 37.3 °C (99.1 °F), Monitored Temp: 38.3 °C (100.9 °F)  Pulse  Av.8  Min: 45  Max: 153 Heart Rate (Monitored): 96  NIBP: 141/56      Respiratory      Respiration: (!) 35, Pulse Oximetry: 90 %     Work Of Breathing / Effort: Mild  RUL Breath Sounds: Clear, RML Breath Sounds: Diminished, RLL Breath Sounds: Diminished, WHIT Breath Sounds: Clear, LLL Breath Sounds: Diminished    Fluids    Intake/Output Summary (Last 24 hours) at 18 1625  Last data filed at 18 1400   Gross per 24 hour   Intake              517 ml   Output             3270 ml   Net            -2753 ml       Nutrition  Orders Placed This Encounter   Procedures   • Diet Order     Standing Status:   Standing     Number of Occurrences:   1     Order Specific Question:   Diet:     Answer:   Cardiac [6]     Order Specific Question:   Texture/Fiber modifications:     Answer:   Dysphagia 2(Pureed/Chopped)specify fluid consistency(question 6) [2]     Order Specific Question:   Consistency/Fluid  modifications:     Answer:   Thin Liquids [3]     Physical Exam   Constitutional: He appears well-developed. No distress.   HENT:   Head: Normocephalic.   Right Ear: External ear normal.   Left Ear: External ear normal.   cortrak right nares.      Eyes: Conjunctivae are normal. Right eye exhibits no discharge. Left eye exhibits no discharge. No scleral icterus.   Neck: No tracheal deviation present.   Cardiovascular: Normal rate and regular rhythm.  Exam reveals no gallop and no friction rub.    Murmur heard.  Pulmonary/Chest: Effort normal. He has decreased breath sounds. He has rales. He exhibits no tenderness.   Pacemaker site dressed with clean dressing   Abdominal: Soft. He exhibits no distension. There is no tenderness. There is no rebound.   Musculoskeletal: He exhibits edema.   Neurological: He is alert.   Oriented x2  Dysarthria  Generalized weakness   Skin: Skin is warm and dry. He is not diaphoretic. No cyanosis. There is pallor. Nails show no clubbing.   Psychiatric: His speech is slurred. He exhibits abnormal recent memory.   Nursing note and vitals reviewed.      Recent Labs      02/18/18   0415  02/19/18   0347   WBC  11.3*  9.3   RBC  3.93*  3.54*   HEMOGLOBIN  13.1*  11.9*   HEMATOCRIT  38.5*  35.2*   MCV  98.0*  99.4*   MCH  33.3*  33.6*   MCHC  34.0  33.8   RDW  46.5  47.3   PLATELETCT  66*  71*   MPV  11.2  11.2     Recent Labs      02/18/18   0415  02/19/18   0347  02/20/18   0537   SODIUM  137  135  134*   POTASSIUM  3.3*  4.1  3.9   CHLORIDE  103  102  100   CO2  29  27  26   GLUCOSE  143*  123*  133*   BUN  12  17  17   CREATININE  0.77  0.79  0.74   CALCIUM  9.0  9.0  9.7     Recent Labs      02/18/18   1130  02/19/18   0347   APTT  81.6*  68.4*                  Assessment/Plan     * Cardiac arrest (CMS-[facility])- (present on admission)   Assessment & Plan    Sinus pauses up to 15 seconds with ROSC  s/p IV pressors.  S/p temporary pacemaker placement followed by a permanent pacemaker on 2/18.         Acute respiratory failure with hypoxia (CMS-HCC)- (present on admission)   Assessment & Plan    Intubated on 2/14 and extubated on 2/16.  Improved encourage IS use and mobilize as tolerated   discussed with Dr Deng        Cardiogenic shock (CMS-HCC)- (present on admission)   Assessment & Plan    resolved        Bradycardia- (present on admission)   Assessment & Plan    With cardiac pauses up to 15 seconds.  Status post temporary pacemaker placement by Dr. Stephens on 2/14 followed by permanent pacemaker on 2/18  Discussed with cardiology          Altered mental status   Assessment & Plan    CT head negative  EEG ordered  Seizure precautions           S/P TAVR (transcatheter aortic valve replacement)- (present on admission)   Assessment & Plan    S/p TAVR on 2/12  2D Echo reveals normal functioning TAVR aortic valve, normal pericardium without effusion and LVEF of 40% with global hypokinesis.  Start  low dose ACEI          Hx of CABG   Assessment & Plan    History of        CAD (coronary artery disease)- (present on admission)   Assessment & Plan    History of CABG ×3  Continue lipitor        Encephalopathy acute- (present on admission)   Assessment & Plan    Consistent with acute delirium in the setting of post-cardiac arrest   Improving  Minimize sedating agents  Frequent orientation  Psych eval for capacity        Acquired hypothyroidism   Assessment & Plan    TSH 0.74  Continue synthroid         Carotid artery stenosis- (present on admission)   Assessment & Plan    S/p L CEA, follows   Continue statin  Low dose  aspirin           Paroxysmal atrial fibrillation (CMS-HCC)- (present on admission)   Assessment & Plan    Xarelto and  amiodarone              Quality-Core Measures   Reviewed items::  Labs reviewed and Medications reviewed  Baldwin catheter::  Critically Ill - Requiring Accurate Measurement of Urinary Output  DVT: Xarelto.

## 2018-02-21 NOTE — CARE PLAN
Problem: Safety  Goal: Will remain free from injury  Outcome: PROGRESSING AS EXPECTED  Pt has remained free from injury this shift. Bed locked and in low position with bed alarm on. Pt confused at this time and is in restraints r/t pulling at lines. (see flowsheet charting)     Problem: Pain Management  Goal: Pain level will decrease to patient's comfort goal  Outcome: PROGRESSING AS EXPECTED  Pt has denied pain this shift.

## 2018-02-21 NOTE — DISCHARGE PLANNING
TC to pts neighbor Jens ([phone]), he will be waiting for pts wife at the home when she gets there via taxi.

## 2018-02-22 LAB
ALBUMIN SERPL BCP-MCNC: 3.9 G/DL (ref 3.2–4.9)
ALBUMIN/GLOB SERPL: 1.3 G/DL
ALP SERPL-CCNC: 90 U/L (ref 30–99)
ALT SERPL-CCNC: 41 U/L (ref 2–50)
ANION GAP SERPL CALC-SCNC: 6 MMOL/L (ref 0–11.9)
ANISOCYTOSIS BLD QL SMEAR: ABNORMAL
AST SERPL-CCNC: 54 U/L (ref 12–45)
BACTERIA BLD CULT: NORMAL
BACTERIA BLD CULT: NORMAL
BASOPHILS # BLD AUTO: 0 % (ref 0–1.8)
BASOPHILS # BLD: 0 K/UL (ref 0–0.12)
BILIRUB SERPL-MCNC: 1.7 MG/DL (ref 0.1–1.5)
BUN SERPL-MCNC: 24 MG/DL (ref 8–22)
CALCIUM SERPL-MCNC: 9.6 MG/DL (ref 8.5–10.5)
CHLORIDE SERPL-SCNC: 102 MMOL/L (ref 96–112)
CO2 SERPL-SCNC: 27 MMOL/L (ref 20–33)
CREAT SERPL-MCNC: 1.02 MG/DL (ref 0.5–1.4)
EOSINOPHIL # BLD AUTO: 0.11 K/UL (ref 0–0.51)
EOSINOPHIL NFR BLD: 0.9 % (ref 0–6.9)
ERYTHROCYTE [DISTWIDTH] IN BLOOD BY AUTOMATED COUNT: 48.5 FL (ref 35.9–50)
GLOBULIN SER CALC-MCNC: 3.1 G/DL (ref 1.9–3.5)
GLUCOSE SERPL-MCNC: 100 MG/DL (ref 65–99)
HCT VFR BLD AUTO: 40.4 % (ref 42–52)
HGB BLD-MCNC: 13.3 G/DL (ref 14–18)
LYMPHOCYTES # BLD AUTO: 1.3 K/UL (ref 1–4.8)
LYMPHOCYTES NFR BLD: 10.6 % (ref 22–41)
MACROCYTES BLD QL SMEAR: ABNORMAL
MANUAL DIFF BLD: NORMAL
MCH RBC QN AUTO: 33.3 PG (ref 27–33)
MCHC RBC AUTO-ENTMCNC: 32.9 G/DL (ref 33.7–35.3)
MCV RBC AUTO: 101.3 FL (ref 81.4–97.8)
METAMYELOCYTES NFR BLD MANUAL: 1.8 %
MONOCYTES # BLD AUTO: 1.41 K/UL (ref 0–0.85)
MONOCYTES NFR BLD AUTO: 11.5 % (ref 0–13.4)
MORPHOLOGY BLD-IMP: NORMAL
MYELOCYTES NFR BLD MANUAL: 2.6 %
NEUTROPHILS # BLD AUTO: 8.93 K/UL (ref 1.82–7.42)
NEUTROPHILS NFR BLD: 72.6 % (ref 44–72)
NRBC # BLD AUTO: 0 K/UL
NRBC BLD-RTO: 0 /100 WBC
PLATELET # BLD AUTO: 99 K/UL (ref 164–446)
PLATELET BLD QL SMEAR: NORMAL
PMV BLD AUTO: 10.7 FL (ref 9–12.9)
POTASSIUM SERPL-SCNC: 4.3 MMOL/L (ref 3.6–5.5)
PROT SERPL-MCNC: 7 G/DL (ref 6–8.2)
RBC # BLD AUTO: 3.99 M/UL (ref 4.7–6.1)
RBC BLD AUTO: PRESENT
SIGNIFICANT IND 70042: NORMAL
SIGNIFICANT IND 70042: NORMAL
SITE SITE: NORMAL
SITE SITE: NORMAL
SODIUM SERPL-SCNC: 135 MMOL/L (ref 135–145)
SOURCE SOURCE: NORMAL
SOURCE SOURCE: NORMAL
WBC # BLD AUTO: 12.3 K/UL (ref 4.8–10.8)

## 2018-02-22 PROCEDURE — 770022 HCHG ROOM/CARE - ICU (200)

## 2018-02-22 PROCEDURE — 700102 HCHG RX REV CODE 250 W/ 637 OVERRIDE(OP): Performed by: INTERNAL MEDICINE

## 2018-02-22 PROCEDURE — A9270 NON-COVERED ITEM OR SERVICE: HCPCS | Performed by: INTERNAL MEDICINE

## 2018-02-22 PROCEDURE — A9270 NON-COVERED ITEM OR SERVICE: HCPCS | Performed by: HOSPITALIST

## 2018-02-22 PROCEDURE — 99232 SBSQ HOSP IP/OBS MODERATE 35: CPT | Performed by: HOSPITALIST

## 2018-02-22 PROCEDURE — 700102 HCHG RX REV CODE 250 W/ 637 OVERRIDE(OP): Performed by: HOSPITALIST

## 2018-02-22 PROCEDURE — 85007 BL SMEAR W/DIFF WBC COUNT: CPT

## 2018-02-22 PROCEDURE — 85027 COMPLETE CBC AUTOMATED: CPT

## 2018-02-22 PROCEDURE — 80053 COMPREHEN METABOLIC PANEL: CPT

## 2018-02-22 RX ORDER — LISINOPRIL 10 MG/1
10 TABLET ORAL
Status: DISCONTINUED | OUTPATIENT
Start: 2018-02-23 | End: 2018-02-27

## 2018-02-22 RX ORDER — AMIODARONE HYDROCHLORIDE 200 MG/1
400 TABLET ORAL TWICE DAILY
Status: COMPLETED | OUTPATIENT
Start: 2018-02-22 | End: 2018-02-23

## 2018-02-22 RX ADMIN — RIVAROXABAN 15 MG: 15 TABLET, FILM COATED ORAL at 17:18

## 2018-02-22 RX ADMIN — AMIODARONE HYDROCHLORIDE 400 MG: 200 TABLET ORAL at 08:54

## 2018-02-22 RX ADMIN — ATORVASTATIN CALCIUM 20 MG: 20 TABLET, FILM COATED ORAL at 21:37

## 2018-02-22 RX ADMIN — ASPIRIN 81 MG: 81 TABLET, CHEWABLE ORAL at 08:54

## 2018-02-22 RX ADMIN — STANDARDIZED SENNA CONCENTRATE AND DOCUSATE SODIUM 2 TABLET: 8.6; 5 TABLET, FILM COATED ORAL at 08:54

## 2018-02-22 RX ADMIN — LEVOTHYROXINE SODIUM 50 MCG: 50 TABLET ORAL at 06:35

## 2018-02-22 RX ADMIN — AMIODARONE HYDROCHLORIDE 400 MG: 200 TABLET ORAL at 21:00

## 2018-02-22 RX ADMIN — LISINOPRIL 5 MG: 5 TABLET ORAL at 08:54

## 2018-02-22 RX ADMIN — QUETIAPINE FUMARATE 25 MG: 25 TABLET ORAL at 21:37

## 2018-02-22 ASSESSMENT — PAIN SCALES - GENERAL
PAINLEVEL_OUTOF10: 0

## 2018-02-22 NOTE — DISCHARGE PLANNING
Medical SW    Referral: Sw received call from ER Sw.    Intervention: Pt's wife has been admitted to S519.01. She will have a cognitive evaluation today w/ psych team. ER Sw will advise [facility] of wife's hx at [facility]. , who is pt's caregiver at home, is admitted to [facility].    Plan: Sw to assist w/ d/c planning as needed.

## 2018-02-22 NOTE — DIETARY
Nutrition support weekly update:  Day 8 of admit.  Rafia Shaikh is a 84 y.o. male with admitting DX of Bradycardia, Cardiac arrest (CMS-HCC)    Tube feeding initiated on 2/15. Cortrak in place (distal stomach/proximal duodenum); current TF is Replete with Fiber @ 25 ml/hr. Goal rate for TF is 60 ml/hr, which was last documented as running on 2/20. TF held since pt started PO diet per SLP (cardiac, dysphagia 2, thin liquids). Per discussion in Rounds, pt has not been taking adequate PO, so TF resumed today.     Assessment:  Weight 64 kg on 2/18, not recorded since that date. Weight consistent with weight on admit. Estimated needs are unchanged, ~1400 kcal/day.       Evaluation:   1. Pt is A/O to self/hsopital but remains confused; pt has dementia @ baseline.   2. Pre-albumin 13.0, CRP 5.83.  3. Current TF meets 100% of pt's estimated needs.     Recommendations/Plan:  1. Advance TF to goal: Replete with Fiber @ 60 ml/hr to provide 1440 kcal, 90 grams protein, and 1210 ml free water per day.  2. Encourage PO intake.  3. When PO intake is consistently >/=50% of meals, then run TF @ nighttime only (60 ml/hr x12 hours).  4. When PO intake is consistently ~100% of meals, then D/c TF.     RD following.

## 2018-02-22 NOTE — PROGRESS NOTES
[facility] Hospitalist Progress Note    Date of Service: 2018    Chief Complaint  84 y.o. male admitted 2018 with altered mental status.  Mr. Shaikh has a history of CABG with recent TAVR on  that was found to be altered thus was brought to the ER where he was found to be in atrial fibrillation with RVR requiring cardioversion as well as sinus pauses up to 15 seconds. He was given a bolus of amiodarone and placed on an amiodarone drip, intubated, and a temporary pacemaker was placed.       Interval Problem Update    Patient confused and agitated overnight requiring restraints  Evaluated by psychiatry and incapacitated to make decision at this time  Consultants/Specialty  Cardiology.   Critical Care. I discussed his condition with Dr. Deng on ICU Hot Rounds    Disposition  SNF referral        Review of Systems   Unable to perform ROS: Mental acuity      Physical Exam  Laboratory/Imaging   Hemodynamics  Temp (24hrs), Av.4 °C (99.3 °F), Min:36.4 °C (97.5 °F), Max:37.8 °C (100 °F)   Temperature: 36.4 °C (97.5 °F), Monitored Temp: 37.2 °C (98.96 °F)  Pulse  Av.8  Min: 45  Max: 153 Heart Rate (Monitored): 62  NIBP: (!) 99/43      Respiratory      Respiration: (!) 24, Pulse Oximetry: 100 %     Work Of Breathing / Effort: Mild  RUL Breath Sounds: Clear, RML Breath Sounds: Diminished, RLL Breath Sounds: Diminished, WHIT Breath Sounds: Clear, LLL Breath Sounds: Diminished    Fluids    Intake/Output Summary (Last 24 hours) at 18 1602  Last data filed at 18 1600   Gross per 24 hour   Intake              570 ml   Output              450 ml   Net              120 ml       Nutrition  Orders Placed This Encounter   Procedures   • Diet Order     Standing Status:   Standing     Number of Occurrences:   1     Order Specific Question:   Diet:     Answer:   Cardiac [6]     Order Specific Question:   Texture/Fiber modifications:     Answer:   Dysphagia 2(Pureed/Chopped)specify fluid consistency(question 6)  [2]     Order Specific Question:   Consistency/Fluid modifications:     Answer:   Thin Liquids [3]     Physical Exam   Constitutional: He appears well-developed. No distress.   HENT:   Head: Normocephalic.   Mouth/Throat: No oropharyngeal exudate.   cortrak right nares.      Eyes: Conjunctivae are normal. Right eye exhibits no discharge. Left eye exhibits no discharge. No scleral icterus.   Neck: No JVD present. No tracheal deviation present.   Cardiovascular: Normal rate and regular rhythm.  Exam reveals no gallop and no friction rub.    Murmur heard.  Pulmonary/Chest: Effort normal. No stridor. He has decreased breath sounds. He has no wheezes. He has rales. He exhibits no tenderness.   Pacemaker site left upper chest dressed with hematoma noted   Abdominal: Soft. Bowel sounds are normal. He exhibits no distension. There is no tenderness. There is no rebound and no guarding.   Musculoskeletal: He exhibits edema. He exhibits no tenderness.   Neurological: He is alert.   Oriented to self  Dysarthria  Generalized weakness   Skin: Skin is warm and dry. He is not diaphoretic. No cyanosis or erythema. There is pallor. Nails show no clubbing.   Psychiatric: His speech is slurred. Cognition and memory are impaired. He expresses impulsivity. He exhibits abnormal recent memory.   Nursing note and vitals reviewed.      Recent Labs      02/19/18   0347  02/21/18   0525   WBC  9.3  11.7*   RBC  3.54*  4.16*   HEMOGLOBIN  11.9*  13.9*   HEMATOCRIT  35.2*  41.8*   MCV  99.4*  100.5*   MCH  33.6*  33.4*   MCHC  33.8  33.3*   RDW  47.3  47.8   PLATELETCT  71*  83*   MPV  11.2  11.0     Recent Labs      02/19/18   0347  02/20/18   0537  02/21/18   0525   SODIUM  135  134*  137   POTASSIUM  4.1  3.9  4.8   CHLORIDE  102  100  100   CO2  27  26  28   GLUCOSE  123*  133*  109*   BUN  17  17  24*   CREATININE  0.79  0.74  1.17   CALCIUM  9.0  9.7  10.2     Recent Labs      02/19/18   0347   APTT  68.4*                  Assessment/Plan      * Cardiac arrest (CMS-[facility])- (present on admission)   Assessment & Plan    Sinus pauses up to 15 seconds with ROSC  s/p IV pressors.  S/p temporary pacemaker placement followed by a permanent pacemaker on 2/18.          Acute respiratory failure with hypoxia (CMS-[facility])- (present on admission)   Assessment & Plan    Intubated on 2/14 and extubated on 2/16.  encourage IS use and mobilize as tolerated  RT protcol   discussed with Dr Deng        Cardiogenic shock (CMS-[facility])- (present on admission)   Assessment & Plan    resolved        Bradycardia- (present on admission)   Assessment & Plan    With cardiac pauses up to 15 seconds.  Status post temporary pacemaker placement by Dr. Stephens on 2/14 followed by permanent pacemaker on 2/18  Continue telemetry monitoring and monitor pacemaker site          Altered mental status   Assessment & Plan    CT head negative  EEG ordered  Seizure precautions           S/P TAVR (transcatheter aortic valve replacement)- (present on admission)   Assessment & Plan    S/p TAVR on 2/12  2D Echo reveals normal functioning TAVR aortic valve, normal pericardium without effusion and LVEF of 40% with global hypokinesis.  Continue lisinopril hold off on beta blocker at this time given soft blood pressure          Hx of CABG   Assessment & Plan    History of        CAD (coronary artery disease)- (present on admission)   Assessment & Plan    History of CABG ×3  Continue lipitor and lisinopril        Encephalopathy acute- (present on admission)   Assessment & Plan    Consistent with acute delirium in the setting of post-cardiac arrest   Given nighttime agitation will add low-dose Seroquel as needed at bedtime  Minimize sedating agents  Frequent orientation  CT head negative for acute pathology  Psych eval done on 2/20/2018 and patient noted to be lacking capacity   Would ask for  to assist with discharge planning as patient's wife has dementia and he would likely need SNF after  discharge        Acquired hypothyroidism   Assessment & Plan    TSH 0.74  Continue synthroid         Carotid artery stenosis- (present on admission)   Assessment & Plan    S/p L CEA, follows   Continue statin  Low dose  aspirin           Paroxysmal atrial fibrillation (CMS-HCC)- (present on admission)   Assessment & Plan    Continue Xarelto and  amiodarone              Quality-Core Measures   Reviewed items::  Labs reviewed and Medications reviewed  Baldwin catheter::  Critically Ill - Requiring Accurate Measurement of Urinary Output  DVT: Xarelto.

## 2018-02-23 ENCOUNTER — PATIENT OUTREACH (OUTPATIENT)
Dept: HEALTH INFORMATION MANAGEMENT | Facility: OTHER | Age: 83
End: 2018-02-23

## 2018-02-23 PROBLEM — R57.0 CARDIOGENIC SHOCK (HCC): Status: RESOLVED | Noted: 2018-02-14 | Resolved: 2018-02-23

## 2018-02-23 LAB
ALBUMIN SERPL BCP-MCNC: 3.7 G/DL (ref 3.2–4.9)
ALBUMIN/GLOB SERPL: 1.2 G/DL
ALP SERPL-CCNC: 90 U/L (ref 30–99)
ALT SERPL-CCNC: 40 U/L (ref 2–50)
ANION GAP SERPL CALC-SCNC: 5 MMOL/L (ref 0–11.9)
AST SERPL-CCNC: 53 U/L (ref 12–45)
BILIRUB SERPL-MCNC: 1.4 MG/DL (ref 0.1–1.5)
BUN SERPL-MCNC: 28 MG/DL (ref 8–22)
CALCIUM SERPL-MCNC: 9.6 MG/DL (ref 8.5–10.5)
CHLORIDE SERPL-SCNC: 102 MMOL/L (ref 96–112)
CO2 SERPL-SCNC: 27 MMOL/L (ref 20–33)
CREAT SERPL-MCNC: 0.87 MG/DL (ref 0.5–1.4)
ERYTHROCYTE [DISTWIDTH] IN BLOOD BY AUTOMATED COUNT: 48.6 FL (ref 35.9–50)
GLOBULIN SER CALC-MCNC: 3.1 G/DL (ref 1.9–3.5)
GLUCOSE SERPL-MCNC: 120 MG/DL (ref 65–99)
HCT VFR BLD AUTO: 41.2 % (ref 42–52)
HGB BLD-MCNC: 13.2 G/DL (ref 14–18)
MCH RBC QN AUTO: 32.9 PG (ref 27–33)
MCHC RBC AUTO-ENTMCNC: 32 G/DL (ref 33.7–35.3)
MCV RBC AUTO: 102.7 FL (ref 81.4–97.8)
PLATELET # BLD AUTO: 128 K/UL (ref 164–446)
PMV BLD AUTO: 10.4 FL (ref 9–12.9)
POTASSIUM SERPL-SCNC: 4.3 MMOL/L (ref 3.6–5.5)
PROT SERPL-MCNC: 6.8 G/DL (ref 6–8.2)
RBC # BLD AUTO: 4.01 M/UL (ref 4.7–6.1)
SODIUM SERPL-SCNC: 134 MMOL/L (ref 135–145)
WBC # BLD AUTO: 13.9 K/UL (ref 4.8–10.8)

## 2018-02-23 PROCEDURE — 51798 US URINE CAPACITY MEASURE: CPT

## 2018-02-23 PROCEDURE — 700102 HCHG RX REV CODE 250 W/ 637 OVERRIDE(OP): Performed by: HOSPITALIST

## 2018-02-23 PROCEDURE — 85027 COMPLETE CBC AUTOMATED: CPT

## 2018-02-23 PROCEDURE — A9270 NON-COVERED ITEM OR SERVICE: HCPCS | Performed by: INTERNAL MEDICINE

## 2018-02-23 PROCEDURE — 700102 HCHG RX REV CODE 250 W/ 637 OVERRIDE(OP): Performed by: INTERNAL MEDICINE

## 2018-02-23 PROCEDURE — 770022 HCHG ROOM/CARE - ICU (200)

## 2018-02-23 PROCEDURE — 99232 SBSQ HOSP IP/OBS MODERATE 35: CPT | Performed by: HOSPITALIST

## 2018-02-23 PROCEDURE — 80053 COMPREHEN METABOLIC PANEL: CPT

## 2018-02-23 PROCEDURE — A9270 NON-COVERED ITEM OR SERVICE: HCPCS | Performed by: HOSPITALIST

## 2018-02-23 RX ORDER — AMIODARONE HYDROCHLORIDE 200 MG/1
200 TABLET ORAL
Status: DISCONTINUED | OUTPATIENT
Start: 2018-02-24 | End: 2018-03-13 | Stop reason: HOSPADM

## 2018-02-23 RX ADMIN — STANDARDIZED SENNA CONCENTRATE AND DOCUSATE SODIUM 2 TABLET: 8.6; 5 TABLET, FILM COATED ORAL at 21:08

## 2018-02-23 RX ADMIN — AMIODARONE HYDROCHLORIDE 400 MG: 200 TABLET ORAL at 21:08

## 2018-02-23 RX ADMIN — ATORVASTATIN CALCIUM 20 MG: 20 TABLET, FILM COATED ORAL at 21:08

## 2018-02-23 RX ADMIN — RIVAROXABAN 15 MG: 15 TABLET, FILM COATED ORAL at 18:12

## 2018-02-23 RX ADMIN — STANDARDIZED SENNA CONCENTRATE AND DOCUSATE SODIUM 2 TABLET: 8.6; 5 TABLET, FILM COATED ORAL at 09:42

## 2018-02-23 RX ADMIN — QUETIAPINE FUMARATE 25 MG: 25 TABLET ORAL at 21:09

## 2018-02-23 RX ADMIN — LEVOTHYROXINE SODIUM 50 MCG: 50 TABLET ORAL at 05:37

## 2018-02-23 RX ADMIN — LISINOPRIL 10 MG: 10 TABLET ORAL at 09:41

## 2018-02-23 RX ADMIN — AMIODARONE HYDROCHLORIDE 400 MG: 200 TABLET ORAL at 09:41

## 2018-02-23 RX ADMIN — ASPIRIN 81 MG: 81 TABLET, CHEWABLE ORAL at 09:41

## 2018-02-23 ASSESSMENT — PAIN SCALES - GENERAL
PAINLEVEL_OUTOF10: 0

## 2018-02-23 ASSESSMENT — LIFESTYLE VARIABLES: DO YOU DRINK ALCOHOL: NO

## 2018-02-23 NOTE — PROGRESS NOTES
[facility] Hospitalist Progress Note    Date of Service: 2018    Chief Complaint  84 y.o. male admitted 2018 with altered mental status.  Mr. Shaikh has a history of CABG with recent TAVR on  that was found to be altered thus was brought to the ER where he was found to be in atrial fibrillation with RVR requiring cardioversion as well as sinus pauses up to 15 seconds. He was given a bolus of amiodarone and placed on an amiodarone drip, intubated, and a temporary pacemaker was placed.       Interval Problem Update  Confused, lethargic  Paced on 2l NC    Consultants/Specialty  Cardiology.   Critical Care.      Disposition  SNF referral        Review of Systems   Unable to perform ROS: Mental acuity      Physical Exam  Laboratory/Imaging   Hemodynamics  Temp (24hrs), Av.2 °C (97.1 °F), Min:36 °C (96.8 °F), Max:36.3 °C (97.3 °F)   Temperature: 36.2 °C (97.2 °F)  Pulse  Av.7  Min: 45  Max: 153 Heart Rate (Monitored): 60  NIBP: 115/43      Respiratory      Respiration: 18, Pulse Oximetry: 96 %     Work Of Breathing / Effort: Mild  RUL Breath Sounds: Clear, RML Breath Sounds: Diminished, RLL Breath Sounds: Diminished, WHIT Breath Sounds: Clear, LLL Breath Sounds: Diminished    Fluids    Intake/Output Summary (Last 24 hours) at 18 1656  Last data filed at 18 1200   Gross per 24 hour   Intake            257.5 ml   Output              350 ml   Net            -92.5 ml       Nutrition  Orders Placed This Encounter   Procedures   • Diet Order     Standing Status:   Standing     Number of Occurrences:   1     Order Specific Question:   Diet:     Answer:   Cardiac [6]     Order Specific Question:   Texture/Fiber modifications:     Answer:   Dysphagia 2(Pureed/Chopped)specify fluid consistency(question 6) [2]     Order Specific Question:   Consistency/Fluid modifications:     Answer:   Thin Liquids [3]     Physical Exam   Constitutional: He appears well-developed.   HENT:   Head: Normocephalic.   Right  Ear: External ear normal.   Left Ear: External ear normal.   Mouth/Throat: No oropharyngeal exudate.   cortrak in place      Eyes: Conjunctivae are normal. Right eye exhibits no discharge. Left eye exhibits no discharge. No scleral icterus.   Neck: Neck supple. No JVD present. No tracheal deviation present.   Cardiovascular: Normal rate and regular rhythm.    Murmur heard.  Pulmonary/Chest: Effort normal. No stridor. He has decreased breath sounds. He has no wheezes. He has no rhonchi. He exhibits no tenderness.   Pacemaker site left upper chest dressed with stable hematoma    Abdominal: Soft. Bowel sounds are normal. He exhibits no distension. There is no tenderness. There is no rebound.   Musculoskeletal: He exhibits edema. He exhibits no tenderness.   Neurological: He is alert.   Oriented to self and place   Generalized weakness   Skin: Skin is warm and dry. No rash noted. He is not diaphoretic. No cyanosis or erythema. Nails show no clubbing.   Psychiatric: His speech is slurred. He is slowed. Cognition and memory are impaired. He expresses impulsivity.   Nursing note and vitals reviewed.      Recent Labs      02/21/18   0525  02/22/18   0418   WBC  11.7*  12.3*   RBC  4.16*  3.99*   HEMOGLOBIN  13.9*  13.3*   HEMATOCRIT  41.8*  40.4*   MCV  100.5*  101.3*   MCH  33.4*  33.3*   MCHC  33.3*  32.9*   RDW  47.8  48.5   PLATELETCT  83*  99*   MPV  11.0  10.7     Recent Labs      02/20/18   0537  02/21/18   0525  02/22/18   0418   SODIUM  134*  137  135   POTASSIUM  3.9  4.8  4.3   CHLORIDE  100  100  102   CO2  26  28  27   GLUCOSE  133*  109*  100*   BUN  17  24*  24*   CREATININE  0.74  1.17  1.02   CALCIUM  9.7  10.2  9.6                      Assessment/Plan     * Cardiac arrest (CMS-[facility])- (present on admission)   Assessment & Plan    Sinus pauses up to 15 seconds with ROSC  s/p IV pressors.  S/p temporary pacemaker placement followed by a permanent pacemaker on 2/18.          Acute respiratory failure with  hypoxia (CMS-HCC)- (present on admission)   Assessment & Plan    Intubated on 2/14 and extubated on 2/16.  RT protcol mobilized as tolerated  Discussed with critical care        Cardiogenic shock (CMS-HCC)- (present on admission)   Assessment & Plan    resolved        Bradycardia- (present on admission)   Assessment & Plan    With cardiac pauses up to 15 seconds.  Status post temporary pacemaker placement by Dr. Stephens on 2/14 followed by permanent pacemaker on 2/18  Continue telemetry monitoring and monitor pacemaker site          Altered mental status   Assessment & Plan    CT head negative  EEG ordered  Seizure precautions           S/P TAVR (transcatheter aortic valve replacement)- (present on admission)   Assessment & Plan    S/p TAVR on 2/12  2D Echo reveals normal functioning TAVR aortic valve, normal pericardium without effusion and LVEF of 40% with global hypokinesis.  Titrated lisinopril   hold off on beta blocker at this time given soft blood pressure          Hx of CABG   Assessment & Plan    History of        CAD (coronary artery disease)- (present on admission)   Assessment & Plan    History of CABG ×3  Continue lipitor and lisinopril        Encephalopathy acute- (present on admission)   Assessment & Plan    Consistent with acute delirium in the setting of post-cardiac arrest   low-dose Seroquel as needed at bedtime otherwise avoid any other sedating agents    Frequent orientation  CT head negative for acute pathology  Psych eval done on 2/20/2018 and patient noted to be lacking capacity       The patient's wife was admitted to the hospital  to assist with discharge planning as patient's wife has dementia and he would likely need SNF after discharge  Will also ask for palliative care evaluation        Acquired hypothyroidism   Assessment & Plan    TSH 0.74  Continue synthroid         Carotid artery stenosis- (present on admission)   Assessment & Plan    S/p L CEA, follows    Continue statin             Paroxysmal atrial fibrillation (CMS-[facility])- (present on admission)   Assessment & Plan    Continue Xarelto and  amiodarone              Quality-Core Measures   Reviewed items::  Labs reviewed and Medications reviewed  Baldwin catheter::  Critically Ill - Requiring Accurate Measurement of Urinary Output  DVT: Xarelto.

## 2018-02-23 NOTE — DISCHARGE PLANNING
Medical SW    Referral: Sw attended IDT rounds.    Intervention: Sw left report for Sw on weekend. Sw requested son be contacted to acquire SNF choice as pt is A/O but not sure why in hospital and repeatedly asking for his wife. Pt needs SNF d/c per hospitalist.    Plan: Sw to assist w/ d/c planning as needed.

## 2018-02-23 NOTE — CARE PLAN
Problem: Respiratory:  Goal: Respiratory status will improve  Outcome: PROGRESSING AS EXPECTED  bilat lung sounds assessed. O2 titrated down.     Problem: Mobility  Goal: Risk for activity intolerance will decrease  Outcome: PROGRESSING AS EXPECTED  Pt edge of bed  For 10 mins. Pt stand at edge of bed.

## 2018-02-23 NOTE — CARE PLAN
Problem: Nutritional:  Goal: Achieve adequate nutritional intake  Patient will consume 50% of meals and supplements.   Outcome: NOT MET

## 2018-02-23 NOTE — PALLIATIVE CARE
Palliative Care   PC RN discussed with bedside RN, Dr. Darrell Ray and Shayla TOBAR. No current need for PC consult, will cancel order. Pt lacks capacity, pt has son who is willing to participate in decision making. Please re-consult if any further needs arise.         Thank you for allowing Palliative Care to participate in this patient's care. Please feel free to call [phone] with any questions or concerns.  
No

## 2018-02-23 NOTE — DIETARY
Nutrition Services: Bren now pulled. Pt to meet nutritional needs via adequate PO intake. Nutrition supplements added. Pt must consume ~50% of meals and supplements to meet estimated caloric needs. RD will monitor.

## 2018-02-24 PROCEDURE — 700102 HCHG RX REV CODE 250 W/ 637 OVERRIDE(OP): Performed by: INTERNAL MEDICINE

## 2018-02-24 PROCEDURE — 99232 SBSQ HOSP IP/OBS MODERATE 35: CPT | Performed by: HOSPITALIST

## 2018-02-24 PROCEDURE — A9270 NON-COVERED ITEM OR SERVICE: HCPCS | Performed by: INTERNAL MEDICINE

## 2018-02-24 PROCEDURE — 770020 HCHG ROOM/CARE - TELE (206)

## 2018-02-24 PROCEDURE — A9270 NON-COVERED ITEM OR SERVICE: HCPCS | Performed by: HOSPITALIST

## 2018-02-24 PROCEDURE — 700102 HCHG RX REV CODE 250 W/ 637 OVERRIDE(OP): Performed by: HOSPITALIST

## 2018-02-24 RX ORDER — RISPERIDONE 1 MG/1
0.5 TABLET ORAL EVERY EVENING
Status: DISCONTINUED | OUTPATIENT
Start: 2018-02-24 | End: 2018-02-25

## 2018-02-24 RX ADMIN — ATORVASTATIN CALCIUM 20 MG: 20 TABLET, FILM COATED ORAL at 20:20

## 2018-02-24 RX ADMIN — STANDARDIZED SENNA CONCENTRATE AND DOCUSATE SODIUM 2 TABLET: 8.6; 5 TABLET, FILM COATED ORAL at 20:20

## 2018-02-24 RX ADMIN — LISINOPRIL 10 MG: 10 TABLET ORAL at 08:56

## 2018-02-24 RX ADMIN — RISPERIDONE 0.5 MG: 1 TABLET, FILM COATED ORAL at 20:20

## 2018-02-24 RX ADMIN — ASPIRIN 81 MG: 81 TABLET, CHEWABLE ORAL at 08:56

## 2018-02-24 RX ADMIN — AMIODARONE HYDROCHLORIDE 200 MG: 200 TABLET ORAL at 08:56

## 2018-02-24 RX ADMIN — STANDARDIZED SENNA CONCENTRATE AND DOCUSATE SODIUM 2 TABLET: 8.6; 5 TABLET, FILM COATED ORAL at 08:56

## 2018-02-24 RX ADMIN — RIVAROXABAN 15 MG: 15 TABLET, FILM COATED ORAL at 18:16

## 2018-02-24 RX ADMIN — LEVOTHYROXINE SODIUM 50 MCG: 50 TABLET ORAL at 06:26

## 2018-02-24 ASSESSMENT — PAIN SCALES - GENERAL
PAINLEVEL_OUTOF10: 0

## 2018-02-24 NOTE — PROGRESS NOTES
Pt is restless and pulling off leads and taking off BP cuff and trying to remove his gown. IV site covered with kerlix so pt will not take out. Pt has been up to the bathroom twice and denies any needs at this time. Pt is very confused. VS stable.

## 2018-02-24 NOTE — PROGRESS NOTES
Received report from Day shift RN   Plan discussed. Pt A/O x 2, confused but pleasant. Bed alarm on. Call light within reach of pt. Instructions given to use call light. VS stable.

## 2018-02-24 NOTE — PROGRESS NOTES
[facility] Hospitalist Progress Note    Date of Service: 2018    Chief Complaint  84 y.o. male admitted 2018 with altered mental status.  Mr. Shaikh has a history of CABG with recent TAVR on  that was found to be altered thus was brought to the ER where he was found to be in atrial fibrillation with RVR requiring cardioversion as well as sinus pauses up to 15 seconds. He was given a bolus of amiodarone and placed on an amiodarone drip, intubated, and a temporary pacemaker was placed.       Interval Problem Update  Remains confused more alert this morning  Pulled adam    Consultants/Specialty  Cardiology.   Critical Care.      Disposition  SNF referral        Review of Systems   Unable to perform ROS: Mental acuity      Physical Exam  Laboratory/Imaging   Hemodynamics  Temp (24hrs), Av.4 °C (97.5 °F), Min:36.2 °C (97.1 °F), Max:36.5 °C (97.7 °F)   Temperature: 36.3 °C (97.4 °F)  Pulse  Av.7  Min: 45  Max: 153 Heart Rate (Monitored): 60  NIBP: 106/42      Respiratory      Respiration: 20, Pulse Oximetry: 95 %        RUL Breath Sounds: Clear, RML Breath Sounds: Clear, RLL Breath Sounds: Diminished, WHIT Breath Sounds: Clear, LLL Breath Sounds: Diminished    Fluids    Intake/Output Summary (Last 24 hours) at 18 1705  Last data filed at 18 1000   Gross per 24 hour   Intake              670 ml   Output              940 ml   Net             -270 ml       Nutrition  Orders Placed This Encounter   Procedures   • Diet Order     Standing Status:   Standing     Number of Occurrences:   1     Order Specific Question:   Diet:     Answer:   Cardiac [6]     Order Specific Question:   Texture/Fiber modifications:     Answer:   Dysphagia 2(Pureed/Chopped)specify fluid consistency(question 6) [2]     Order Specific Question:   Consistency/Fluid modifications:     Answer:   Thin Liquids [3]     Physical Exam   Constitutional: He appears well-developed.   HENT:   Head: Normocephalic.   Mouth/Throat: No  oropharyngeal exudate.         Eyes: Right eye exhibits no discharge. Left eye exhibits no discharge. No scleral icterus.   Neck: Neck supple. No JVD present. No tracheal deviation present.   Cardiovascular: Normal rate and regular rhythm.  Exam reveals no gallop and no friction rub.    Murmur heard.  Pulmonary/Chest: Effort normal. He has decreased breath sounds. He has no wheezes. He has rhonchi. He has no rales. He exhibits no tenderness.    left upper chest pacemaker with stable overlying hematoma    Abdominal: Soft. Bowel sounds are normal. He exhibits no distension. There is no tenderness. There is no rebound and no guarding.   Musculoskeletal: He exhibits edema. He exhibits no tenderness.   Neurological: He is alert.   Oriented x2  No focal deficits noted   Skin: Skin is warm and dry. No rash noted. He is not diaphoretic. No cyanosis or erythema. Nails show no clubbing.   Psychiatric: He is slowed. He expresses impulsivity. He exhibits abnormal recent memory.   Nursing note and vitals reviewed.      Recent Labs      02/21/18   0525 02/22/18 0418 02/23/18   0529   WBC  11.7*  12.3*  13.9*   RBC  4.16*  3.99*  4.01*   HEMOGLOBIN  13.9*  13.3*  13.2*   HEMATOCRIT  41.8*  40.4*  41.2*   MCV  100.5*  101.3*  102.7*   MCH  33.4*  33.3*  32.9   MCHC  33.3*  32.9*  32.0*   RDW  47.8  48.5  48.6   PLATELETCT  83*  99*  128*   MPV  11.0  10.7  10.4     Recent Labs      02/21/18   0525  02/22/18   0418  02/23/18   0529   SODIUM  137  135  134*   POTASSIUM  4.8  4.3  4.3   CHLORIDE  100  102  102   CO2  28  27  27   GLUCOSE  109*  100*  120*   BUN  24*  24*  28*   CREATININE  1.17  1.02  0.87   CALCIUM  10.2  9.6  9.6                      Assessment/Plan     * Cardiac arrest (CMS-[facility])- (present on admission)   Assessment & Plan    Sinus pauses up to 15 seconds with ROSC  s/p IV pressors.  S/p temporary pacemaker placement followed by a permanent pacemaker on 2/18.          Acute respiratory failure with hypoxia  (CMS-[facility])- (present on admission)   Assessment & Plan    Intubated on 2/14 and extubated on 2/16.  RT protcol mobilized as tolerated          Bradycardia- (present on admission)   Assessment & Plan    With cardiac pauses up to 15 seconds.  Status post temporary pacemaker placement by Dr. Stephens on 2/14 followed by permanent pacemaker on 2/18  Continue telemetry monitoring and monitor pacemaker site with hematoma stable at this time and no signs of infection          Altered mental status   Assessment & Plan    CT head negative  EEG ordered  Seizure precautions           S/P TAVR (transcatheter aortic valve replacement)- (present on admission)   Assessment & Plan    S/p TAVR on 2/12  2D Echo reveals normal functioning TAVR aortic valve, normal pericardium without effusion and LVEF of 40% with global hypokinesis.  Continue lisinopril marginal blood pressure hold off on beta blocker            Hx of CABG   Assessment & Plan    History of        CAD (coronary artery disease)- (present on admission)   Assessment & Plan    History of CABG ×3  Stable continue medical therapy        Encephalopathy acute- (present on admission)   Assessment & Plan    Consistent with acute delirium in the setting of post-cardiac arrest   Continue low-dose Seroquel as needed at bedtime   Minimize  sedating agents  DC Baldwin  Encourage oral intake and do not replace feeding tube as long as tolerating oral intake    Frequent orientation  CT head negative for acute pathology  Psych eval done on 2/20/2018 and patient noted to be lacking capacity       Discussed with  she'll be in touch with patient's son for SNF referral          Acquired hypothyroidism   Assessment & Plan    TSH 0.74  Continue synthroid         Carotid artery stenosis- (present on admission)   Assessment & Plan    S/p L CEA, follows   Continue statin             Paroxysmal atrial fibrillation (CMS-[facility])- (present on admission)   Assessment & Plan    Continue  Xarelto and  amiodarone will decrease dose discussed with pharmacist              Quality-Core Measures   Reviewed items::  Labs reviewed and Medications reviewed  Baldwin catheter::  Critically Ill - Requiring Accurate Measurement of Urinary Output  DVT: Xarelto.

## 2018-02-24 NOTE — PROGRESS NOTES
Reported bladder scan results to Dr Darrell Yen of 100mL. Orders received that if no urine output in 6 hours, redo bladder scan. Will continue to monitor.

## 2018-02-25 PROBLEM — E83.39 HYPOPHOSPHATEMIA: Status: ACTIVE | Noted: 2018-02-25

## 2018-02-25 PROBLEM — I49.5 SSS (SICK SINUS SYNDROME) (HCC): Status: ACTIVE | Noted: 2018-02-25

## 2018-02-25 PROBLEM — D72.829 LEUKOCYTOSIS: Status: ACTIVE | Noted: 2018-02-25

## 2018-02-25 PROBLEM — D75.89 MACROCYTOSIS: Status: ACTIVE | Noted: 2018-02-25

## 2018-02-25 PROBLEM — E83.42 HYPOMAGNESEMIA: Status: ACTIVE | Noted: 2018-02-25

## 2018-02-25 LAB
ALBUMIN SERPL BCP-MCNC: 4 G/DL (ref 3.2–4.9)
ALBUMIN/GLOB SERPL: 1.5 G/DL
ALP SERPL-CCNC: 88 U/L (ref 30–99)
ALT SERPL-CCNC: 38 U/L (ref 2–50)
ANION GAP SERPL CALC-SCNC: 8 MMOL/L (ref 0–11.9)
AST SERPL-CCNC: 42 U/L (ref 12–45)
BILIRUB SERPL-MCNC: 1.3 MG/DL (ref 0.1–1.5)
BUN SERPL-MCNC: 27 MG/DL (ref 8–22)
CALCIUM SERPL-MCNC: 10.1 MG/DL (ref 8.5–10.5)
CHLORIDE SERPL-SCNC: 103 MMOL/L (ref 96–112)
CO2 SERPL-SCNC: 23 MMOL/L (ref 20–33)
CREAT SERPL-MCNC: 0.84 MG/DL (ref 0.5–1.4)
GLOBULIN SER CALC-MCNC: 2.6 G/DL (ref 1.9–3.5)
GLUCOSE SERPL-MCNC: 87 MG/DL (ref 65–99)
POTASSIUM SERPL-SCNC: 4.5 MMOL/L (ref 3.6–5.5)
PROT SERPL-MCNC: 6.6 G/DL (ref 6–8.2)
SODIUM SERPL-SCNC: 134 MMOL/L (ref 135–145)

## 2018-02-25 PROCEDURE — 700102 HCHG RX REV CODE 250 W/ 637 OVERRIDE(OP): Performed by: INTERNAL MEDICINE

## 2018-02-25 PROCEDURE — A9270 NON-COVERED ITEM OR SERVICE: HCPCS | Performed by: INTERNAL MEDICINE

## 2018-02-25 PROCEDURE — 99233 SBSQ HOSP IP/OBS HIGH 50: CPT | Performed by: FAMILY MEDICINE

## 2018-02-25 PROCEDURE — 36415 COLL VENOUS BLD VENIPUNCTURE: CPT

## 2018-02-25 PROCEDURE — 770020 HCHG ROOM/CARE - TELE (206)

## 2018-02-25 PROCEDURE — A9270 NON-COVERED ITEM OR SERVICE: HCPCS | Performed by: FAMILY MEDICINE

## 2018-02-25 PROCEDURE — 80053 COMPREHEN METABOLIC PANEL: CPT

## 2018-02-25 PROCEDURE — 700102 HCHG RX REV CODE 250 W/ 637 OVERRIDE(OP): Performed by: HOSPITALIST

## 2018-02-25 PROCEDURE — 700102 HCHG RX REV CODE 250 W/ 637 OVERRIDE(OP): Performed by: FAMILY MEDICINE

## 2018-02-25 PROCEDURE — A9270 NON-COVERED ITEM OR SERVICE: HCPCS | Performed by: HOSPITALIST

## 2018-02-25 RX ORDER — RISPERIDONE 1 MG/1
0.5 TABLET ORAL
Status: DISCONTINUED | OUTPATIENT
Start: 2018-02-25 | End: 2018-02-27

## 2018-02-25 RX ADMIN — ACETAMINOPHEN 650 MG: 325 TABLET, FILM COATED ORAL at 09:15

## 2018-02-25 RX ADMIN — STANDARDIZED SENNA CONCENTRATE AND DOCUSATE SODIUM 2 TABLET: 8.6; 5 TABLET, FILM COATED ORAL at 09:14

## 2018-02-25 RX ADMIN — RISPERIDONE 0.5 MG: 1 TABLET, FILM COATED ORAL at 17:44

## 2018-02-25 RX ADMIN — ATORVASTATIN CALCIUM 20 MG: 20 TABLET, FILM COATED ORAL at 20:19

## 2018-02-25 RX ADMIN — AMIODARONE HYDROCHLORIDE 200 MG: 200 TABLET ORAL at 09:15

## 2018-02-25 RX ADMIN — ASPIRIN 81 MG: 81 TABLET, CHEWABLE ORAL at 09:14

## 2018-02-25 RX ADMIN — LEVOTHYROXINE SODIUM 50 MCG: 50 TABLET ORAL at 07:00

## 2018-02-25 RX ADMIN — LISINOPRIL 10 MG: 10 TABLET ORAL at 09:15

## 2018-02-25 RX ADMIN — RIVAROXABAN 20 MG: 15 TABLET, FILM COATED ORAL at 17:44

## 2018-02-25 ASSESSMENT — ENCOUNTER SYMPTOMS
NAUSEA: 0
FOCAL WEAKNESS: 0
SEIZURES: 0
HEADACHES: 0
ABDOMINAL PAIN: 0
COUGH: 0
BLURRED VISION: 0
FEVER: 0
VOMITING: 0
WHEEZING: 0
HEARTBURN: 0
SORE THROAT: 0
DIZZINESS: 0
CHILLS: 0
DIARRHEA: 0
SENSORY CHANGE: 0
SHORTNESS OF BREATH: 0

## 2018-02-25 ASSESSMENT — PAIN SCALES - GENERAL
PAINLEVEL_OUTOF10: 10
PAINLEVEL_OUTOF10: 10
PAINLEVEL_OUTOF10: 4
PAINLEVEL_OUTOF10: 4
PAINLEVEL_OUTOF10: 0
PAINLEVEL_OUTOF10: 4
PAINLEVEL_OUTOF10: 0

## 2018-02-25 ASSESSMENT — LIFESTYLE VARIABLES: DO YOU DRINK ALCOHOL: NO

## 2018-02-25 NOTE — CARE PLAN
Problem: Safety  Goal: Will remain free from injury  Outcome: PROGRESSING AS EXPECTED  Patient educated on importance of calling nurses before getting out of bed, verbalizes understanding. Fall precautions in place. Treaded socks on pt. Appropriate signs on doorway. Bed in lowest position and locked.  Call light and phone within reach.  Bed alarm on.     Problem: Knowledge Deficit  Goal: Knowledge of disease process/condition, treatment plan, diagnostic tests, and medications will improve  Outcome: PROGRESSING AS EXPECTED  Patient educated about POC.  All questions answered in regards to disease process, and treatment plan during hospital stay.  Patient verbalized understanding and voiced no further questions at this time.

## 2018-02-25 NOTE — PROGRESS NOTES
Report received by NOC RN. Assumed care of pt. Assessment complete. Pt A&Ox3- to time. Pt in no apparent signs of distress. Plan of care discussed. Call light within reach, bed in lowest position, and pt has no further questions at this time. Will continue to monitor.

## 2018-02-25 NOTE — PROGRESS NOTES
2 RN skin check completed with RANDY Vee   Left subclavian/chest and right subclavian/chest pacemaker site dressing clean dry intact with busing around dressing. Scattered busing noted. Red blanchable bottom with small skin tear. Picture uploaded on EPIC.

## 2018-02-25 NOTE — DISCHARGE PLANNING
Medical Social Work    SW attempted to contact pt's son, Lopez, for SNF choice. No answer and VM has not been set up. SW will attempt to call son again later.

## 2018-02-25 NOTE — PROGRESS NOTES
Received report from Christine PADILLA from [facility]. Pt arrived on floor. Pt resting in bed. A&Ox2, disoriented to time, and situation. Tele monitor on, and, pacing 100% HR 60. Bed in lowest position, call light in place, bed alarm on. Hourly rounding in place.

## 2018-02-25 NOTE — PROGRESS NOTES
Bedside report with Vineet RN and Girma, student RN .  Patient sitting up in bed. A+Ox3, per day RN patient has been A+Ox2-3 since ICU transfer. Per ICU nurse report patient is confused and violent at night. Patient calm and pleasant currently. Room air. Paced on monitor. Patient requesting to have BM and urinate. Place don bedpan and urinal within reach. Plan of care discussed. Patient reoriented. Patient verbalizes understanding of situation. Educated on how to use call light. Verbalizes understanding. Call bell and personal belongings within reach. Bed alarm in place. Will continue to monitor.

## 2018-02-25 NOTE — PROGRESS NOTES
[facility]ist Progress Note    Date of Service: 2018    Chief Complaint  84 y.o. male admitted 2018 with Encephalopathy, Atrial fibrillation with RVR, with sinus pause, Respiratory failure.    Interval Problem Update  Afib - currently paced  SSS - pacemaker placed  HTN - controlled  Enceph - more alert and oriented, still with periods of confusion mostly early evening and at night  Resp failure - off O2    Consultants/Specialty  Cardio - signed off    Disposition  SNF        Review of Systems   Constitutional: Negative for chills and fever.   HENT: Negative for hearing loss and sore throat.    Eyes: Negative for blurred vision.   Respiratory: Negative for cough, shortness of breath and wheezing.    Cardiovascular: Negative for chest pain and leg swelling.   Gastrointestinal: Negative for abdominal pain, diarrhea, heartburn, nausea and vomiting.   Genitourinary: Negative for dysuria.   Musculoskeletal: Positive for joint pain.   Neurological: Negative for dizziness, sensory change, focal weakness, seizures and headaches.      Physical Exam  Laboratory/Imaging   Hemodynamics  Temp (24hrs), Av.4 °C (97.6 °F), Min:36.1 °C (97 °F), Max:36.8 °C (98.2 °F)   Temperature: 36.1 °C (97 °F)  Pulse  Av.8  Min: 45  Max: 153 Heart Rate (Monitored): 60  Blood Pressure : 142/58, NIBP: (!) 95/43      Respiratory      Respiration: 18, Pulse Oximetry: 95 %     Work Of Breathing / Effort: Mild  RUL Breath Sounds: Clear, RML Breath Sounds: Diminished, RLL Breath Sounds: Diminished, WHIT Breath Sounds: Clear, LLL Breath Sounds: Diminished    Fluids    Intake/Output Summary (Last 24 hours) at 18 1142  Last data filed at 18 0400   Gross per 24 hour   Intake              300 ml   Output              500 ml   Net             -200 ml       Nutrition  Orders Placed This Encounter   Procedures   • Diet Order     Standing Status:   Standing     Number of Occurrences:   1     Order Specific Question:   Diet:      Answer:   Cardiac [6]     Order Specific Question:   Texture/Fiber modifications:     Answer:   Dysphagia 2(Pureed/Chopped)specify fluid consistency(question 6) [2]     Order Specific Question:   Consistency/Fluid modifications:     Answer:   Thin Liquids [3]     Physical Exam   Constitutional: He is oriented to person, place, and time. He appears well-developed and well-nourished.   HENT:   Head: Normocephalic and atraumatic.   Eyes: Conjunctivae are normal. Pupils are equal, round, and reactive to light.   Neck: No tracheal deviation present. No thyromegaly present.   Cardiovascular: Normal rate and regular rhythm.    Pulmonary/Chest: Effort normal and breath sounds normal.   Abdominal: Soft. Bowel sounds are normal. He exhibits no distension. There is no tenderness.   Musculoskeletal: He exhibits no edema.   Lymphadenopathy:     He has no cervical adenopathy.   Neurological: He is alert and oriented to person, place, and time. No cranial nerve deficit.   MMT 5/5   Skin: Skin is warm and dry.   Nursing note and vitals reviewed.      Recent Labs      02/23/18   0529   WBC  13.9*   RBC  4.01*   HEMOGLOBIN  13.2*   HEMATOCRIT  41.2*   MCV  102.7*   MCH  32.9   MCHC  32.0*   RDW  48.6   PLATELETCT  128*   MPV  10.4     Recent Labs      02/23/18   0529  02/25/18   0501   SODIUM  134*  134*   POTASSIUM  4.3  4.5   CHLORIDE  102  103   CO2  27  23   GLUCOSE  120*  87   BUN  28*  27*   CREATININE  0.87  0.84   CALCIUM  9.6  10.1                      Assessment/Plan     * Cardiac arrest (CMS-[facility])- (present on admission)   Assessment & Plan    Secondary to SSS          SSS (sick sinus syndrome) (CMS-[facility])- (present on admission)   Assessment & Plan    Temporary pacemaker initially, permanent pacemaker placement         Acute respiratory failure with hypoxia (CMS-[facility])- (present on admission)   Assessment & Plan    Intubated 2/14, Extubated 2/16.  Encourage IS, RT protocol        Bradycardia- (present on admission)    Assessment & Plan    secondary to SSS        Encephalopathy acute- (present on admission)   Assessment & Plan    with acute delirium  Change Risperdal to 3 pm dosing            Leukocytosis- (present on admission)   Assessment & Plan    Reactive?  follow cbc        Hypomagnesemia- (present on admission)   Assessment & Plan    Check level        Hypophosphatemia- (present on admission)   Assessment & Plan    Check level        Macrocytosis- (present on admission)   Assessment & Plan    Check b12, folic        S/P TAVR (transcatheter aortic valve replacement)- (present on admission)   Assessment & Plan    TAVR on 2/12  stable            Hypertension, essential- (present on admission)   Assessment & Plan    Lisinopril        Hypokalemia- (present on admission)   Assessment & Plan    Follow bmp        CAD (coronary artery disease)- (present on admission)   Assessment & Plan    History of CABG ×3  ASA, Lipitor        Paroxysmal atrial fibrillation (CMS-HCC)- (present on admission)   Assessment & Plan    Amiodarone, Xarelto          Acquired hypothyroidism- (present on admission)   Assessment & Plan    Synthroid         Carotid artery stenosis- (present on admission)   Assessment & Plan    s/p L CEA  ASA, Lipitor               Quality-Core Measures   Reviewed items::  Radiology images reviewed, EKG reviewed, Labs reviewed and Medications reviewed  Baldwin catheter::  No Baldwin  DVT: Xarelto.  Ulcer Prophylaxis::  No

## 2018-02-25 NOTE — PROGRESS NOTES
[facility] Hospitalist Progress Note    Date of Service: 2018    Chief Complaint  84 y.o. male admitted 2018 with altered mental status.  Mr. Shaikh has a history of CABG with recent TAVR on  that was found to be altered thus was brought to the ER where he was found to be in atrial fibrillation with RVR requiring cardioversion as well as sinus pauses up to 15 seconds. He was given a bolus of amiodarone and placed on an amiodarone drip, intubated, and a temporary pacemaker was placed.       Interval Problem Update    Agitated last night did not respond to Seroquel  Calm this morning oriented to self and place    Consultants/Specialty  Cardiology.   Critical Care.      Disposition  SNF referral        Review of Systems   Unable to perform ROS: Mental acuity      Physical Exam  Laboratory/Imaging   Hemodynamics  Temp (24hrs), Av.7 °C (98 °F), Min:36.3 °C (97.4 °F), Max:37.1 °C (98.8 °F)   Temperature: 36.8 °C (98.2 °F)  Pulse  Av.7  Min: 45  Max: 153 Heart Rate (Monitored): 62  Blood Pressure : 142/49, NIBP: (!) 115/36      Respiratory      Respiration: (!) 23, Pulse Oximetry: 100 %     Work Of Breathing / Effort: Mild  RUL Breath Sounds: Clear, RML Breath Sounds: Clear, RLL Breath Sounds: Diminished, WHIT Breath Sounds: Clear, LLL Breath Sounds: Diminished    Fluids    Intake/Output Summary (Last 24 hours) at 18 1656  Last data filed at 18 0900   Gross per 24 hour   Intake              310 ml   Output                0 ml   Net              310 ml       Nutrition  Orders Placed This Encounter   Procedures   • Diet Order     Standing Status:   Standing     Number of Occurrences:   1     Order Specific Question:   Diet:     Answer:   Cardiac [6]     Order Specific Question:   Texture/Fiber modifications:     Answer:   Dysphagia 2(Pureed/Chopped)specify fluid consistency(question 6) [2]     Order Specific Question:   Consistency/Fluid modifications:     Answer:   Thin Liquids [3]     Physical  Exam   Constitutional: He appears well-developed.   HENT:   Head: Normocephalic.   Right Ear: External ear normal.   Left Ear: External ear normal.   Mouth/Throat: No oropharyngeal exudate.         Eyes: Conjunctivae are normal. Right eye exhibits no discharge. Left eye exhibits no discharge. No scleral icterus.   Neck: Neck supple. No JVD present. No tracheal deviation present.   Cardiovascular: Normal rate and regular rhythm.  Exam reveals no gallop and no friction rub.    Murmur heard.  Pulmonary/Chest: Effort normal. No stridor. He has decreased breath sounds. He has no wheezes. He has rhonchi. He exhibits no tenderness.    left upper chest pacemaker in place with stable overlying hematoma no surrounding erythema   Abdominal: Soft. Bowel sounds are normal. He exhibits no distension. There is no tenderness. There is no rebound.   Musculoskeletal: He exhibits edema. He exhibits no tenderness.   Neurological: He is alert.   Oriented to self and place  No focal deficits noted  Following commands   Skin: Skin is warm and dry. No rash noted. He is not diaphoretic. No cyanosis or erythema. Nails show no clubbing.   Psychiatric: His speech is delayed. He is slowed. He expresses impulsivity. He exhibits abnormal recent memory.   Nursing note and vitals reviewed.      Recent Labs      02/22/18   0418  02/23/18   0529   WBC  12.3*  13.9*   RBC  3.99*  4.01*   HEMOGLOBIN  13.3*  13.2*   HEMATOCRIT  40.4*  41.2*   MCV  101.3*  102.7*   MCH  33.3*  32.9   MCHC  32.9*  32.0*   RDW  48.5  48.6   PLATELETCT  99*  128*   MPV  10.7  10.4     Recent Labs      02/22/18   0418  02/23/18   0529   SODIUM  135  134*   POTASSIUM  4.3  4.3   CHLORIDE  102  102   CO2  27  27   GLUCOSE  100*  120*   BUN  24*  28*   CREATININE  1.02  0.87   CALCIUM  9.6  9.6                      Assessment/Plan     * Cardiac arrest (CMS-[facility])- (present on admission)   Assessment & Plan    Sinus pauses up to 15 seconds with ROSC  s/p IV pressors.  S/p  temporary pacemaker placement followed by a permanent pacemaker on 2/18.          Acute respiratory failure with hypoxia (CMS-[facility])- (present on admission)   Assessment & Plan    Intubated on 2/14 and extubated on 2/16.  RT protcol mobilized as tolerated          Bradycardia- (present on admission)   Assessment & Plan    With cardiac pauses up to 15 seconds.  Status post temporary pacemaker placement by Dr. Stephens on 2/14 followed by permanent pacemaker on 2/18  Stable,  monitor pacemaker site with hematoma         Altered mental status   Assessment & Plan    CT head negative  EEG ordered  Seizure precautions           S/P TAVR (transcatheter aortic valve replacement)- (present on admission)   Assessment & Plan    S/p TAVR on 2/12  2D Echo reveals normal functioning TAVR aortic valve, normal pericardium without effusion and LVEF of 40% with global hypokinesis.  Continue lisinopril given low blood pressure hold off on adding beta blocker            Hx of CABG   Assessment & Plan    History of        CAD (coronary artery disease)- (present on admission)   Assessment & Plan    History of CABG ×3  Stable continue medical therapy        Encephalopathy acute- (present on admission)   Assessment & Plan    Consistent with acute delirium in the setting of post-cardiac arrest   Will change Seroquel to low-dose Risperdal  at bedtime   Minimize  sedating agents, frequent orientation    Encourage oral intake and do not replace feeding tube as long as tolerating oral intake    CT head negative for acute pathology  Psych eval done on 2/20/2018 and patient noted to be lacking capacity       Discussed with  she'll be in touch with patient's son for SNF referral          Acquired hypothyroidism   Assessment & Plan    TSH 0.74  Continue synthroid         Carotid artery stenosis- (present on admission)   Assessment & Plan    S/p L CEA, follows   Continue statin             Paroxysmal atrial fibrillation (CMS-HCC)-  (present on admission)   Assessment & Plan    Continue Xarelto and  amiodarone             Quality-Core Measures   Reviewed items::  Labs reviewed and Medications reviewed  Baldwin catheter::  Critically Ill - Requiring Accurate Measurement of Urinary Output  DVT: Xarelto.

## 2018-02-25 NOTE — PROGRESS NOTES
Report called to Girma HIGH on T7, all questions answered. Belongings gathered and sent with pt via wheelchair with transport and RN to T701.

## 2018-02-26 LAB
ANION GAP SERPL CALC-SCNC: 7 MMOL/L (ref 0–11.9)
ANISOCYTOSIS BLD QL SMEAR: ABNORMAL
BASOPHILS # BLD AUTO: 0 % (ref 0–1.8)
BASOPHILS # BLD: 0 K/UL (ref 0–0.12)
BUN SERPL-MCNC: 28 MG/DL (ref 8–22)
CALCIUM SERPL-MCNC: 9.8 MG/DL (ref 8.5–10.5)
CHLORIDE SERPL-SCNC: 103 MMOL/L (ref 96–112)
CO2 SERPL-SCNC: 24 MMOL/L (ref 20–33)
CREAT SERPL-MCNC: 1.18 MG/DL (ref 0.5–1.4)
CRP SERPL HS-MCNC: 0.89 MG/DL (ref 0–0.75)
EOSINOPHIL # BLD AUTO: 0.37 K/UL (ref 0–0.51)
EOSINOPHIL NFR BLD: 3.5 % (ref 0–6.9)
ERYTHROCYTE [DISTWIDTH] IN BLOOD BY AUTOMATED COUNT: 47.3 FL (ref 35.9–50)
FOLATE SERPL-MCNC: >23.5 NG/ML
GLUCOSE SERPL-MCNC: 108 MG/DL (ref 65–99)
HCT VFR BLD AUTO: 34.5 % (ref 42–52)
HGB BLD-MCNC: 11.4 G/DL (ref 14–18)
LYMPHOCYTES # BLD AUTO: 1.95 K/UL (ref 1–4.8)
LYMPHOCYTES NFR BLD: 18.6 % (ref 22–41)
MACROCYTES BLD QL SMEAR: ABNORMAL
MAGNESIUM SERPL-MCNC: 2.3 MG/DL (ref 1.5–2.5)
MANUAL DIFF BLD: NORMAL
MCH RBC QN AUTO: 33.1 PG (ref 27–33)
MCHC RBC AUTO-ENTMCNC: 33 G/DL (ref 33.7–35.3)
MCV RBC AUTO: 100.3 FL (ref 81.4–97.8)
METAMYELOCYTES NFR BLD MANUAL: 0.9 %
MONOCYTES # BLD AUTO: 0.56 K/UL (ref 0–0.85)
MONOCYTES NFR BLD AUTO: 5.3 % (ref 0–13.4)
MORPHOLOGY BLD-IMP: NORMAL
MYELOCYTES NFR BLD MANUAL: 0.9 %
NEUTROPHILS # BLD AUTO: 7.43 K/UL (ref 1.82–7.42)
NEUTROPHILS NFR BLD: 70.8 % (ref 44–72)
NRBC # BLD AUTO: 0 K/UL
NRBC BLD-RTO: 0 /100 WBC
PHOSPHATE SERPL-MCNC: 3.3 MG/DL (ref 2.5–4.5)
PLATELET # BLD AUTO: 174 K/UL (ref 164–446)
PLATELET BLD QL SMEAR: NORMAL
PMV BLD AUTO: 10 FL (ref 9–12.9)
POTASSIUM SERPL-SCNC: 4.3 MMOL/L (ref 3.6–5.5)
PREALB SERPL-MCNC: 27 MG/DL (ref 18–38)
RBC # BLD AUTO: 3.44 M/UL (ref 4.7–6.1)
RBC BLD AUTO: PRESENT
SODIUM SERPL-SCNC: 134 MMOL/L (ref 135–145)
VIT B12 SERPL-MCNC: 1327 PG/ML (ref 211–911)
WBC # BLD AUTO: 10.5 K/UL (ref 4.8–10.8)

## 2018-02-26 PROCEDURE — 84100 ASSAY OF PHOSPHORUS: CPT

## 2018-02-26 PROCEDURE — A9270 NON-COVERED ITEM OR SERVICE: HCPCS | Performed by: INTERNAL MEDICINE

## 2018-02-26 PROCEDURE — 700102 HCHG RX REV CODE 250 W/ 637 OVERRIDE(OP): Performed by: HOSPITALIST

## 2018-02-26 PROCEDURE — 770020 HCHG ROOM/CARE - TELE (206)

## 2018-02-26 PROCEDURE — 99232 SBSQ HOSP IP/OBS MODERATE 35: CPT | Performed by: FAMILY MEDICINE

## 2018-02-26 PROCEDURE — 85027 COMPLETE CBC AUTOMATED: CPT

## 2018-02-26 PROCEDURE — 83735 ASSAY OF MAGNESIUM: CPT

## 2018-02-26 PROCEDURE — 80048 BASIC METABOLIC PNL TOTAL CA: CPT

## 2018-02-26 PROCEDURE — 700102 HCHG RX REV CODE 250 W/ 637 OVERRIDE(OP): Performed by: INTERNAL MEDICINE

## 2018-02-26 PROCEDURE — 82607 VITAMIN B-12: CPT

## 2018-02-26 PROCEDURE — 36415 COLL VENOUS BLD VENIPUNCTURE: CPT

## 2018-02-26 PROCEDURE — 86140 C-REACTIVE PROTEIN: CPT

## 2018-02-26 PROCEDURE — A9270 NON-COVERED ITEM OR SERVICE: HCPCS | Performed by: FAMILY MEDICINE

## 2018-02-26 PROCEDURE — 82746 ASSAY OF FOLIC ACID SERUM: CPT

## 2018-02-26 PROCEDURE — 700102 HCHG RX REV CODE 250 W/ 637 OVERRIDE(OP): Performed by: FAMILY MEDICINE

## 2018-02-26 PROCEDURE — A9270 NON-COVERED ITEM OR SERVICE: HCPCS | Performed by: HOSPITALIST

## 2018-02-26 PROCEDURE — 85007 BL SMEAR W/DIFF WBC COUNT: CPT

## 2018-02-26 PROCEDURE — 84134 ASSAY OF PREALBUMIN: CPT

## 2018-02-26 RX ADMIN — ATORVASTATIN CALCIUM 20 MG: 20 TABLET, FILM COATED ORAL at 20:13

## 2018-02-26 RX ADMIN — STANDARDIZED SENNA CONCENTRATE AND DOCUSATE SODIUM 2 TABLET: 8.6; 5 TABLET, FILM COATED ORAL at 09:00

## 2018-02-26 RX ADMIN — LISINOPRIL 10 MG: 10 TABLET ORAL at 08:59

## 2018-02-26 RX ADMIN — AMIODARONE HYDROCHLORIDE 200 MG: 200 TABLET ORAL at 08:59

## 2018-02-26 RX ADMIN — ASPIRIN 81 MG: 81 TABLET, CHEWABLE ORAL at 08:59

## 2018-02-26 RX ADMIN — RISPERIDONE 0.5 MG: 1 TABLET, FILM COATED ORAL at 15:22

## 2018-02-26 RX ADMIN — LEVOTHYROXINE SODIUM 50 MCG: 50 TABLET ORAL at 06:47

## 2018-02-26 ASSESSMENT — ENCOUNTER SYMPTOMS
ABDOMINAL PAIN: 0
NAUSEA: 0
SEIZURES: 0
COUGH: 0
HEARTBURN: 0
DIARRHEA: 0
DIZZINESS: 0
FEVER: 0
BLURRED VISION: 0
SENSORY CHANGE: 0
WHEEZING: 0
CHILLS: 0
VOMITING: 0
HEADACHES: 0
SORE THROAT: 0
SHORTNESS OF BREATH: 0
FOCAL WEAKNESS: 0

## 2018-02-26 ASSESSMENT — PAIN SCALES - GENERAL
PAINLEVEL_OUTOF10: 0

## 2018-02-26 ASSESSMENT — LIFESTYLE VARIABLES
DO YOU DRINK ALCOHOL: NO
DO YOU DRINK ALCOHOL: NO

## 2018-02-26 NOTE — DISCHARGE PLANNING
Medical AMADOU TOBAR met with pt and pt wife bedside. Pt wife concerned that she can not drive and not being able to stay at facility. Pt agreeable to SNF but pt wife not. Pt requested SNF choice be sent to [facility] and the one closes to his home, which is [facility]. SW faxed choice to [facility].    AMADOU also spoke with Kalie from [facility] who will do evaluation of pt for caregiver services. Pt and family feel they can afford this but SW will f/u after assessment.     Plan; f/u for SNF acceptance. Possible home with caregiver/HH.

## 2018-02-26 NOTE — CARE PLAN
Problem: Communication  Goal: The ability to communicate needs accurately and effectively will improve  Outcome: PROGRESSING AS EXPECTED  Patient and wife educated about POC.  All questions answered with MD and . Patient and wife verbalized understanding and voiced no further questions at this time.    Problem: Safety  Goal: Will remain free from injury  Outcome: PROGRESSING AS EXPECTED  Educated patient and wife about using call light to alert nursing staff when needing assistance with using the commode. Pt and wife both verbalized understanding.

## 2018-02-26 NOTE — PROGRESS NOTES
Report received by NOC RN. Assumed care of pt. Assessment complete. Wife at bedside. Pt A&Ox3- to time. Pt in no apparent signs of distress. Plan of care discussed. Call light within reach, bed in lowest position, and pt has no further questions at this time.

## 2018-02-26 NOTE — DISCHARGE PLANNING
[facility] received a  SNF choice form. Per the choice form the referral has been sent to [facility] and [facility]

## 2018-02-26 NOTE — PROGRESS NOTES
[facility] Hospitalist Progress Note    Date of Service: 2018    Chief Complaint  84 y.o. male admitted 2018 with Encephalopathy, Atrial fibrillation with RVR, with sinus pause, Respiratory failure.    Interval Problem Update  Afib - currently paced  SSS - pacemaker placed  HTN - controlled  Enceph - more alert and oriented, less periods of confusion   Resp failure - off O2    Consultants/Specialty  Cardio - signed off    Disposition  SNF vs Home health        Review of Systems   Constitutional: Negative for chills and fever.   HENT: Negative for hearing loss and sore throat.    Eyes: Negative for blurred vision.   Respiratory: Negative for cough, shortness of breath and wheezing.    Cardiovascular: Negative for chest pain and leg swelling.   Gastrointestinal: Negative for abdominal pain, diarrhea, heartburn, nausea and vomiting.   Genitourinary: Negative for dysuria.   Musculoskeletal: Positive for joint pain.   Neurological: Negative for dizziness, sensory change, focal weakness, seizures and headaches.      Physical Exam  Laboratory/Imaging   Hemodynamics  Temp (24hrs), Av.2 °C (97.2 °F), Min:35.7 °C (96.3 °F), Max:36.7 °C (98 °F)   Temperature: 36.2 °C (97.2 °F)  Pulse  Av.8  Min: 45  Max: 153    Blood Pressure : 100/42      Respiratory      Respiration: 16, Pulse Oximetry: 93 %     Work Of Breathing / Effort: Mild  RUL Breath Sounds: Clear, RML Breath Sounds: Diminished, RLL Breath Sounds: Diminished, WHIT Breath Sounds: Clear, LLL Breath Sounds: Diminished    Fluids    Intake/Output Summary (Last 24 hours) at 18 1407  Last data filed at 18 1300   Gross per 24 hour   Intake              420 ml   Output             1000 ml   Net             -580 ml       Nutrition  Orders Placed This Encounter   Procedures   • Diet Order     Standing Status:   Standing     Number of Occurrences:   1     Order Specific Question:   Diet:     Answer:   Cardiac [6]     Order Specific Question:    Texture/Fiber modifications:     Answer:   Dysphagia 2(Pureed/Chopped)specify fluid consistency(question 6) [2]     Order Specific Question:   Consistency/Fluid modifications:     Answer:   Thin Liquids [3]     Physical Exam   Constitutional: He is oriented to person, place, and time. He appears well-developed and well-nourished.   HENT:   Head: Normocephalic and atraumatic.   Eyes: Conjunctivae are normal. Pupils are equal, round, and reactive to light.   Neck: No tracheal deviation present. No thyromegaly present.   Cardiovascular: Normal rate and regular rhythm.    Pulmonary/Chest: Effort normal and breath sounds normal.   Abdominal: Soft. Bowel sounds are normal. He exhibits no distension. There is no tenderness.   Musculoskeletal: He exhibits no edema.   Lymphadenopathy:     He has no cervical adenopathy.   Neurological: He is alert and oriented to person, place, and time. No cranial nerve deficit.   MMT 5/5   Skin: Skin is warm and dry.   Nursing note and vitals reviewed.      Recent Labs      02/26/18   0303   WBC  10.5   RBC  3.44*   HEMOGLOBIN  11.4*   HEMATOCRIT  34.5*   MCV  100.3*   MCH  33.1*   MCHC  33.0*   RDW  47.3   PLATELETCT  174   MPV  10.0     Recent Labs      02/25/18   0501  02/26/18   0303   SODIUM  134*  134*   POTASSIUM  4.5  4.3   CHLORIDE  103  103   CO2  23  24   GLUCOSE  87  108*   BUN  27*  28*   CREATININE  0.84  1.18   CALCIUM  10.1  9.8                      Assessment/Plan     * Cardiac arrest (CMS-HCC)- (present on admission)   Assessment & Plan    Secondary to SSS          SSS (sick sinus syndrome) (CMS-HCC)- (present on admission)   Assessment & Plan    Temporary pacemaker initially, permanent pacemaker placement         Acute respiratory failure with hypoxia (CMS-HCC)- (present on admission)   Assessment & Plan    Intubated 2/14, Extubated 2/16.  Encourage IS, RT protocol        Bradycardia- (present on admission)   Assessment & Plan    secondary to SSS        Encephalopathy  acute- (present on admission)   Assessment & Plan    with acute delirium  Risperdal            Leukocytosis- (present on admission)   Assessment & Plan    Reactive?  follow cbc        Hypomagnesemia- (present on admission)   Assessment & Plan    resolved        Hypophosphatemia- (present on admission)   Assessment & Plan    resolved        Macrocytosis- (present on admission)   Assessment & Plan    b12, folic wnl        S/P TAVR (transcatheter aortic valve replacement)- (present on admission)   Assessment & Plan    TAVR on 2/12  stable            Hypertension, essential- (present on admission)   Assessment & Plan    Lisinopril        Hypokalemia- (present on admission)   Assessment & Plan    Follow bmp        CAD (coronary artery disease)- (present on admission)   Assessment & Plan    History of CABG ×3  ASA, Lipitor        Paroxysmal atrial fibrillation (CMS-HCC)- (present on admission)   Assessment & Plan    Amiodarone, Xarelto          Acquired hypothyroidism- (present on admission)   Assessment & Plan    Synthroid         Carotid artery stenosis- (present on admission)   Assessment & Plan    s/p L CEA  ASA, Lipitor               Quality-Core Measures   Reviewed items::  Radiology images reviewed, EKG reviewed, Labs reviewed and Medications reviewed  Baldwin catheter::  No Baldwin  DVT: Xarelto.  Ulcer Prophylaxis::  No

## 2018-02-26 NOTE — FACE TO FACE
Face to Face Supporting Documentation - Home Health    The encounter with this patient was in whole or in part the primary reason for home health admission.    Date of encounter:   Patient:                    MRN:                       YOB: 2018  Rafia Shaikh  [id number]  12/16/1933     Home health to see patient for:  Skilled Nursing care for assessment, interventions & education    Skilled need for:  New Onset Medical Diagnosis Sick Sinus syndrome    Skilled nursing interventions to include:  Comment: PT/OT/Nursing    Homebound status evidenced by:  Needs the assistance of another person in order to leave the home. Leaving home requires a considerable and taxing effort. There is a normal inability to leave the home.    Community Physician to provide follow up care: Yarelis Gtz M.D.     Optional Interventions? No      I certify the face to face encounter for this home health care referral meets the CMS requirements and the encounter/clinical assessment with the patient was, in whole, or in part, for the medical condition(s) listed above, which is the primary reason for home health care. Based on my clinical findings: the service(s) are medically necessary, support the need for home health care, and the homebound criteria are met.  I certify that this patient has had a face to face encounter by myself.  Alexys Gray M.D. - NPI: [id number]

## 2018-02-26 NOTE — PROGRESS NOTES
Bedside report with RANDY Manley   Patient laying in bed resting. A+Ox3, RA , paced on tele. No s/s of distress. Plan of care discussed. Plan for SNF, awaiting placement. Social work on the case. Call bell and personal belongings within reach. Bed alarm in place. Will continue to monitor.

## 2018-02-26 NOTE — CARE PLAN
Problem: Nutritional:  Goal: Achieve adequate nutritional intake  Patient will consume 50% of meals and supplements.    Outcome: MET Date Met: 02/26/18  PO variable, but avg 50%.

## 2018-02-27 LAB
ANION GAP SERPL CALC-SCNC: 9 MMOL/L (ref 0–11.9)
ANISOCYTOSIS BLD QL SMEAR: ABNORMAL
BASOPHILS # BLD AUTO: 0 % (ref 0–1.8)
BASOPHILS # BLD: 0 K/UL (ref 0–0.12)
BUN SERPL-MCNC: 26 MG/DL (ref 8–22)
CALCIUM SERPL-MCNC: 10 MG/DL (ref 8.5–10.5)
CHLORIDE SERPL-SCNC: 105 MMOL/L (ref 96–112)
CO2 SERPL-SCNC: 22 MMOL/L (ref 20–33)
CREAT SERPL-MCNC: 1.23 MG/DL (ref 0.5–1.4)
EOSINOPHIL # BLD AUTO: 0 K/UL (ref 0–0.51)
EOSINOPHIL NFR BLD: 0 % (ref 0–6.9)
ERYTHROCYTE [DISTWIDTH] IN BLOOD BY AUTOMATED COUNT: 50.5 FL (ref 35.9–50)
GLUCOSE SERPL-MCNC: 106 MG/DL (ref 65–99)
HCT VFR BLD AUTO: 36.7 % (ref 42–52)
HGB BLD-MCNC: 11.8 G/DL (ref 14–18)
LYMPHOCYTES # BLD AUTO: 1.95 K/UL (ref 1–4.8)
LYMPHOCYTES NFR BLD: 18.9 % (ref 22–41)
MACROCYTES BLD QL SMEAR: ABNORMAL
MANUAL DIFF BLD: NORMAL
MCH RBC QN AUTO: 33.2 PG (ref 27–33)
MCHC RBC AUTO-ENTMCNC: 32.2 G/DL (ref 33.7–35.3)
MCV RBC AUTO: 103.4 FL (ref 81.4–97.8)
METAMYELOCYTES NFR BLD MANUAL: 1.8 %
MONOCYTES # BLD AUTO: 0.56 K/UL (ref 0–0.85)
MONOCYTES NFR BLD AUTO: 5.4 % (ref 0–13.4)
MORPHOLOGY BLD-IMP: NORMAL
MYELOCYTES NFR BLD MANUAL: 1.8 %
NEUTROPHILS # BLD AUTO: 7.43 K/UL (ref 1.82–7.42)
NEUTROPHILS NFR BLD: 71.2 % (ref 44–72)
NEUTS BAND NFR BLD MANUAL: 0.9 % (ref 0–10)
NRBC # BLD AUTO: 0 K/UL
NRBC BLD-RTO: 0 /100 WBC
PLATELET # BLD AUTO: 162 K/UL (ref 164–446)
PLATELET BLD QL SMEAR: NORMAL
PMV BLD AUTO: 9.7 FL (ref 9–12.9)
POTASSIUM SERPL-SCNC: 4.3 MMOL/L (ref 3.6–5.5)
RBC # BLD AUTO: 3.55 M/UL (ref 4.7–6.1)
RBC BLD AUTO: PRESENT
SODIUM SERPL-SCNC: 136 MMOL/L (ref 135–145)
WBC # BLD AUTO: 10.3 K/UL (ref 4.8–10.8)

## 2018-02-27 PROCEDURE — 97116 GAIT TRAINING THERAPY: CPT

## 2018-02-27 PROCEDURE — 85007 BL SMEAR W/DIFF WBC COUNT: CPT

## 2018-02-27 PROCEDURE — A9270 NON-COVERED ITEM OR SERVICE: HCPCS | Performed by: INTERNAL MEDICINE

## 2018-02-27 PROCEDURE — 700102 HCHG RX REV CODE 250 W/ 637 OVERRIDE(OP): Performed by: INTERNAL MEDICINE

## 2018-02-27 PROCEDURE — 700102 HCHG RX REV CODE 250 W/ 637 OVERRIDE(OP): Performed by: FAMILY MEDICINE

## 2018-02-27 PROCEDURE — 80048 BASIC METABOLIC PNL TOTAL CA: CPT

## 2018-02-27 PROCEDURE — A9270 NON-COVERED ITEM OR SERVICE: HCPCS | Performed by: HOSPITALIST

## 2018-02-27 PROCEDURE — 97530 THERAPEUTIC ACTIVITIES: CPT

## 2018-02-27 PROCEDURE — 770020 HCHG ROOM/CARE - TELE (206)

## 2018-02-27 PROCEDURE — 700102 HCHG RX REV CODE 250 W/ 637 OVERRIDE(OP): Performed by: HOSPITALIST

## 2018-02-27 PROCEDURE — A9270 NON-COVERED ITEM OR SERVICE: HCPCS | Performed by: FAMILY MEDICINE

## 2018-02-27 PROCEDURE — 36415 COLL VENOUS BLD VENIPUNCTURE: CPT

## 2018-02-27 PROCEDURE — 97535 SELF CARE MNGMENT TRAINING: CPT

## 2018-02-27 PROCEDURE — 85027 COMPLETE CBC AUTOMATED: CPT

## 2018-02-27 PROCEDURE — 99233 SBSQ HOSP IP/OBS HIGH 50: CPT | Performed by: INTERNAL MEDICINE

## 2018-02-27 PROCEDURE — 99223 1ST HOSP IP/OBS HIGH 75: CPT | Performed by: INTERNAL MEDICINE

## 2018-02-27 RX ORDER — POLYETHYLENE GLYCOL 3350 17 G/17G
1 POWDER, FOR SOLUTION ORAL DAILY
Status: DISCONTINUED | OUTPATIENT
Start: 2018-02-27 | End: 2018-03-13 | Stop reason: HOSPADM

## 2018-02-27 RX ORDER — DIVALPROEX SODIUM 125 MG/1
125 CAPSULE, COATED PELLETS ORAL NIGHTLY
Status: DISCONTINUED | OUTPATIENT
Start: 2018-02-27 | End: 2018-03-02

## 2018-02-27 RX ORDER — LISINOPRIL 5 MG/1
5 TABLET ORAL
Status: DISCONTINUED | OUTPATIENT
Start: 2018-02-28 | End: 2018-02-28

## 2018-02-27 RX ADMIN — LISINOPRIL 10 MG: 10 TABLET ORAL at 09:18

## 2018-02-27 RX ADMIN — STANDARDIZED SENNA CONCENTRATE AND DOCUSATE SODIUM 2 TABLET: 8.6; 5 TABLET, FILM COATED ORAL at 09:18

## 2018-02-27 RX ADMIN — ATORVASTATIN CALCIUM 20 MG: 20 TABLET, FILM COATED ORAL at 20:34

## 2018-02-27 RX ADMIN — DIVALPROEX SODIUM 125 MG: 125 CAPSULE, COATED PELLETS ORAL at 20:34

## 2018-02-27 RX ADMIN — AMIODARONE HYDROCHLORIDE 200 MG: 200 TABLET ORAL at 09:19

## 2018-02-27 RX ADMIN — ASPIRIN 81 MG: 81 TABLET, CHEWABLE ORAL at 09:15

## 2018-02-27 RX ADMIN — LEVOTHYROXINE SODIUM 50 MCG: 50 TABLET ORAL at 05:49

## 2018-02-27 RX ADMIN — RIVAROXABAN 15 MG: 15 TABLET, FILM COATED ORAL at 17:51

## 2018-02-27 ASSESSMENT — PAIN SCALES - GENERAL
PAINLEVEL_OUTOF10: 0

## 2018-02-27 ASSESSMENT — ENCOUNTER SYMPTOMS
FEVER: 0
DIZZINESS: 0
SEIZURES: 0
CHILLS: 0
HEADACHES: 0
VOMITING: 0
WHEEZING: 0
ABDOMINAL PAIN: 0
NAUSEA: 0
FOCAL WEAKNESS: 0
SENSORY CHANGE: 0
HEARTBURN: 0
SORE THROAT: 0
BLURRED VISION: 0
SHORTNESS OF BREATH: 0
DIARRHEA: 0
COUGH: 0

## 2018-02-27 ASSESSMENT — GAIT ASSESSMENTS
GAIT LEVEL OF ASSIST: MINIMAL ASSIST
DISTANCE (FEET): 30
ASSISTIVE DEVICE: FRONT WHEEL WALKER
DEVIATION: BRADYKINETIC;SHUFFLED GAIT

## 2018-02-27 ASSESSMENT — COGNITIVE AND FUNCTIONAL STATUS - GENERAL
DRESSING REGULAR LOWER BODY CLOTHING: A LOT
SUGGESTED CMS G CODE MODIFIER DAILY ACTIVITY: CL
PERSONAL GROOMING: A LOT
MOVING TO AND FROM BED TO CHAIR: UNABLE
EATING MEALS: TOTAL
TOILETING: TOTAL
CLIMB 3 TO 5 STEPS WITH RAILING: TOTAL
WALKING IN HOSPITAL ROOM: A LITTLE
TURNING FROM BACK TO SIDE WHILE IN FLAT BAD: UNABLE
MOBILITY SCORE: 10
STANDING UP FROM CHAIR USING ARMS: A LITTLE
MOVING FROM LYING ON BACK TO SITTING ON SIDE OF FLAT BED: UNABLE
DRESSING REGULAR UPPER BODY CLOTHING: A LOT
HELP NEEDED FOR BATHING: A LOT
SUGGESTED CMS G CODE MODIFIER MOBILITY: CL
DAILY ACTIVITIY SCORE: 10

## 2018-02-27 NOTE — CONSULTS
[facility] Consult.   Patient Name: Rafia Shaikh  Patient Age, Gender: 84 y.o., male  Date of Consult:2/27/2018      Name of Requesting Consultant(s): Keven Adrian M.D.  Consultant: Jayce Gavin M.D.  Reason for Consult: Cognitive Impairment, med management    Chief Complaint:   Chief Complaint   Patient presents with   • Unresponsive       History of Present Illness:  Rafia is a 84 y.o. male here awaiting transfer to SNF after a prolong hospital stay. PMH Aortic stenosis, CAD s/p CABG, PVD, TIA.   History provided by the patient, medical staff and chart. The patient is a limited historian.     The patient initially had a TAVR on 2/12 and discharged shortly thereafter. He was readmitted 2/14 with AMS with concern for seizure based on EMS report. He was found to be in a fib with RVR, after treatment with metoprolol he had multiple sinus pauses, cardiology attempted to cardiovert without improvemetn in his symptoms so a temporary pacer was initially placed on admission. He continued to receive ICU care, for temporarily pacing, ventilator support and pressor support. A dual chamber ppm was placed on 2/19/19 for sick sinus syndrome. Since that time it is reported that he is getting more confused. Per MAR history it appears low dose Seroquel was attempted and a few days ago Risperdal 0.5 mg BID was started, without obvious effect.     Neurology, cardiology and psychiatry have consulted on the patient. The patient had an EEG preformed with diffuse cortical dysfunction. MRI of the brain and MRA of the head and neck suggest significant vascular disease, but no acute processes.   Patient with previous cortrak that has been removed. Per RN intake has been subsequently removed and is oral intake is good.   Condom cath is in place due to incontinence overnight per RN report.    The RN remarks he is impulsive and likes to get up, but is otherwise pleasant.     I spoke with the patient and has very delayed responses  and at times the response does not match the question. He is able to tell me some of what brought him here. He has no complaints, but did use the bedside commode during our visit. We stood together and upon standing he got very light headed and was unstable on his feet, especially with any forward movement. He says he lives with his wife and that his wife needs help. We discussed how he may need to some help in the future and that his wife would not be able to help. He said his family (son and daughter) could help, but when asked about their relationship he acknowledged that they did not communicate much. Chart review suggests the children feel abandoned by their parents.     ROS:A 14 sytem ROS is negative other than as stated above in HPI, as was able to be completed based on patient's mental status.     Past Medical History:   Diagnosis Date   • Anemia 4/2016    due to rectal bleed   • Arrhythmia 5/12/17    Hx A fib. On only aspirin.    • CAD (coronary artery disease)    • Carotid artery stenosis 6/13/2011   • CATARACT 1990's    Bilateral phaco with IOL   • Dental disorder     cavities, under current care   • Dental disorder 5/12/17    permanent bridge lower   • Dizziness 6/13/2011   • Dyslipidemia 9/18/2010   • Gout    • Heart murmur    • High cholesterol    • Hyperlipidemia    • Hypertension    • Hypothyroidism 6/13/2011   • Insomnia    • Myocardial infarct 2008    Stent 2011.  5/12/17-Cardiologist is DR Wu ([facility])   • PAD (peripheral artery disease)    • Preoperative examination, unspecified 4/12/2011   • Rectal bleed 4/4/2016    Cauterized and received blood transfusions   • Renal disorder 2000    kidney stone   • Sleep apnea 2014    States recommended CPAP but never did get it.   • Stroke (CMS-HCC) 4/16/2010    TIA    • Tendonitis    • TIA (transient ischemic attack) 6/13/2011   • Unspecified disorder of thyroid    • Urinary incontinence        Current Facility-Administered Medications:   •  rivaroxaban  (XARELTO) tablet 15 mg, 15 mg, Oral, PM MEAL, Alexys Gray M.D.  •  risperiDONE (RISPERDAL) tablet 0.5 mg, 0.5 mg, Oral, Daily-1500, Alexys Gray M.D., 0.5 mg at 02/26/18 1522  •  pneumococcal vaccine (PNEUMOVAX-23) injection 25 mcg, 0.5 mL, Intramuscular, Once PRN, John Ray M.D.  •  amiodarone (CORDARONE) tablet 200 mg, 200 mg, Oral, Q DAY, John Ray M.D., 200 mg at 02/27/18 0919  •  lisinopril (PRINIVIL) 10 MG tablet 10 mg, 10 mg, Oral, Q DAY, John Ray M.D., 10 mg at 02/27/18 0918  •  ondansetron (ZOFRAN) syringe/vial injection 4 mg, 4 mg, Intravenous, Q4HRS PRN, John Ray M.D., 4 mg at 02/20/18 1533  •  aspirin (ASA) chewable tab 81 mg, 81 mg, Oral, DAILY, John Ray M.D., 81 mg at 02/27/18 0915  •  hydrALAZINE (APRESOLINE) injection 10-20 mg, 10-20 mg, Intravenous, Q4HRS PRN, Jeremy M Gonda, M.D., 10 mg at 02/20/18 1219  •  Pharmacy Consult Request ...Pain Management Review 1 Each, 1 Each, Other, PRN, Usman Stephens M.D.  •  Respiratory Care per Protocol, , Nebulization, Continuous RT, Kraig Seth Jr., D.O.  •  senna-docusate (PERICOLACE or SENOKOT S) 8.6-50 MG per tablet 2 Tab, 2 Tab, Oral, BID, 2 Tab at 02/27/18 0918 **AND** polyethylene glycol/lytes (MIRALAX) PACKET 1 Packet, 1 Packet, Oral, QDAY PRN **AND** magnesium hydroxide (MILK OF MAGNESIA) suspension 30 mL, 30 mL, Oral, QDAY PRN **AND** bisacodyl (DULCOLAX) suppository 10 mg, 10 mg, Rectal, QDAY PRN, Kraig Seth Jr., D.O.  •  ipratropium-albuterol (DUONEB) nebulizer solution 3 mL, 3 mL, Nebulization, Q2HRS PRN (RT), Kraig Seth Jr., D.O.  •  acetaminophen (TYLENOL) tablet 650 mg, 650 mg, Oral, Q6HRS PRN, Gavin Mackenzie M.D., 650 mg at 02/25/18 0915  •  atorvastatin (LIPITOR) tablet 20 mg, 20 mg, Oral, QHS, Gavin Mackenzie M.D., 20 mg at 02/26/18 2013  •  levothyroxine (SYNTHROID) tablet 50 mcg, 50 mcg, Oral, AM ES, Gavin Mackenzie M.D., 50 mcg at 02/27/18 0549  Social History  "    Social History   • Marital status:      Spouse name: N/A   • Number of children: N/A   • Years of education: N/A     Occupational History   • Not on file.     Social History Main Topics   • Smoking status: Never Smoker   • Smokeless tobacco: Never Used   • Alcohol use No   • Drug use: No   • Sexual activity: Not on file     Other Topics Concern   • Not on file     Social History Narrative   • No narrative on file     Family History   Problem Relation Age of Onset   • Heart Disease Father    • Other Mother      TB   • Hypertension     • Colon Cancer Brother    • Breast Cancer Sister    • Other       GOUT     Past Surgical History:   Procedure Laterality Date   • TRANSCATHETER AORTIC VALVE REPLACEMENT N/A 2/12/2018    Procedure: TRANSCATHETER AORTIC VALVE REPLACEMENT;  Surgeon: Jez Waters M.D.;  Location: SURGERY [facility];  Service: Cardiac   • JANETH  2/12/2018    Procedure: JANETH;  Surgeon: Jez Waters M.D.;  Location: [facility];  Service: Cardiac   • COLONOSCOPY N/A 5/16/2017    Procedure: COLONOSCOPY;  Surgeon: Osmany Smart M.D.;  Location: [facility];  Service:    • LAPAROSCOPY  4/2/2016    exp for rectal bleed and cautery   • COLONOSCOPY  2015   • RECOVERY  8/22/2012    aortogram-Performed by SURGERY, IR-RECOVERY at [facility]   • CAROTID ENDARTERECTOMY  5/3/2011    Performed by ERASMO NAVARRETE at [facility]   • RECOVERY  4/1/2011    Performed by SURGERY, CATH-RECOVERY at SURGERY SAME DAY [facility] ORS   • RECOVERY  3/25/2011    Performed by SURGERY, CATH-RECOVERY at SURGERY SAME DAY [facility]   • MULTIPLE CORONARY ARTERY BYPASS ENDO VEIN HARVEST  3/20/08    Performed by AMANDA CRYSTAL at SURGERY [facility]   • LITHOTRIPSY  2000   • SEPTOPLASTY  1995   • CATARACT EXTRACTION WITH IOL Bilateral early 1990's         Physical Exam:  BP (!) 99/57   Pulse 74   Temp 36.4 °C (97.5 °F)   Resp 17   Ht 1.6 m (5' 2.99\")   Wt 61.8 kg " (136 lb 3.9 oz)   SpO2 97%   BMI 24.14 kg/m²   Gen: NAD, sitting pleasantly in bed, alert, oriented to hospital, situation and person. Thought it was 2020.  Neck: Supple  Chest: Left upper chest with pacer in place, some surrounding bruising.   Lungs: Decrease air movement throughout all lung fields  CV: 1+ right radial pulses 2+ DP pulses bilaterally and 2+ left radial pulses, Loud s2  Abd: s/nt/nd  Back: kyphosis, no CVA tenderness  Feet: well groomed, no deformity  Gait: unable to ambulate due to unsteadiness upon standing. And prefers to hold onto something to keep balance. (described as lightheadedness upon standing, denied vertigo)  Delirium Screen: positive, able to say days of week forward (but would often skip Tuesday) unable to say days of week backwards    Labs/Imaging/EEG/EKG reviewed    Assessment: 85 y/o male w/ a PMH of AS and PVD s/p TAVR on 2/12 and subsequent SSS requiring pacer placement. Course complicated by delirium.       Plan:  Delirium  -no current signs of infection based on labs, vitals and exam.  -having multiple BM and urinating well  -no pain noted  -is having some issues with polypharmacy due to multiple new medical problems. See HTN/bowel movements below  -started on Risperdal a few days ago without obvious improvement and on exam he has delayed responses which could be a result of this  -given his impulsiveness I recommend to d/c Risperdal and start Depakote sprinkles at night 125 mg QHS. Could repeat dose X1 if needed. May also schedule during the day if needed for impulsiveness, if needed.   -no need for telemetry, recommend to d/c  -prompted toileting- ask patient if he needs to use the commode - avoid condom cath as that increases fall risk due to environmental hazards.   -up for meals, as nursing is already doing  -stand up as much as possible, decrease HTN med as below.   -follow below precautions  -if patient does not improve in next few days or BP remains low consider  rechecking UA to see if condom cath has introduced an infection.     Interventions to be considered in all patients in order to minimize the risk of delirium.   -do not disturb patient (vitals or lab draws) between the hours of 10 PM and 6 AM.  -ideally the patient should not sleep during the day and we should avoid day time naps.   -up in chair for meals  -ambulate at least three times daily, as able  -watch for constipation  -timed voiding - ask patient is she would like to go to the bathroom q 2-3 hours, except during the do not disturb hours.   -remove all necessary lines (central lines, peripheral IVs, feeding tubes, crawford catheters)  -unless patient has shown harm to self or others I would recommend against use of restraints - either chemical or physical (antipsychotics)   -minimize polypharmacy, do not dose medication during sleep hours   Recommendations with regard to using pharmacological restraints:   -please assess for effectiveness of antipsychotic/chemical restraint and use alternate therapy if not effective.   -Please try and use one antipsychotic/chemical restraint at a time.   -Please give it either to PO or IM route avoid IV.   -Please use the lowest effective dose for the shortest period of time.    HTN - low BP, symptomatic when standing. Recommend to half dose of lisinopril and follow. Not on BP meds PTA.    Bowel Movements - having two per day, not on narcotic, Recommend to schedule PEG and make other stool softners PRN.     SSS s/p pacer  AS s/p TAVR  -rate controlled. On amiodarone. Now decreased to 200 mg per day. Unclear indication to continue now that he is paced. Consider to d/c, but defer to primary team and cardiology.   -on xarelto    Macrocytic anemia  -anemia can play a role in fatigue.  -b12, b9 and TSH WNL  -recommend ferritin and iron studies with the infusion of IV iron if he is found to be deplete given better tolerability compared to oral iron. (ordered for tomorrow AM)    Fall  risk  -recommend to minimize environmental and pharmacological hazards  -recommend to check vitamin D level with next set of labs. (ordered for tomorrow AM)    CKD III  -some increase in creatinine since starting ACE-I. Stable K thus far, decreasing ACE-I as above, monitor.      Disposition - I had the opportunity to consult on patient's wife earlier in the week. She was discharged home, but is now readmitted as she was confused when seeing her . Given patient's current incapacitated state and lack of social supports, I recommend they need a higher level of care, Location TBDby patient's improvement with rehab, however a group home is likley the best way to keep them together.   This patient was the dominant force in the relationship, per PCP report, and now that he is debilitated his wife can no longer rely on him. Much more he does not have th insight that he cannot rely on her as she is physically she is much smaller than him and will not likely be able to care for any ADL needs. He also lacks insight into his children's willingness to care for him.     Discussed with SW, this couple has a very challenging disposition and may require ethics consult to help decide where the responsibility of safe discharge lays as neither patient has the ability to obtain and retain information at this point and both would like to d/c home, at last discussion.     Thank you for this interesting consult. We will continue to follow this patient along with you.       Jayce Gavin M.D.  Geriatric Attending  [facility]  Available Monday-Friday 8:00-5:00 (excudling holidays)        Direct Links to Patient Handouts:    CDC Healthy Aging  NIH National [place] on Aging  [facility] Patient Resources    Links that can be Cut and Paste into your browser:    Healthy Aging  www.healthinaging.org  Staying Active  www.activeandhealthy.n[place].gov.au  Geriatrics Online    [email or web address]

## 2018-02-27 NOTE — PROGRESS NOTES
[facility] Hospitalist Progress Note    Date of Service: 2018    Chief Complaint  84 y.o. male admitted 2018 with Encephalopathy, Atrial fibrillation with RVR, with sinus pause, Respiratory failure.    Interval Problem Update  Afib - currently paced  SSS - pacemaker placed  HTN - borderline low and clinically orthostatic positive  Enceph - still with delirious episodes  Resp failure - off O2    Consultants/Specialty  Cardio - signed off  Geriatrics    Disposition  SNF vs Home health      Review of Systems   Constitutional: Negative for chills and fever.   HENT: Negative for hearing loss and sore throat.    Eyes: Negative for blurred vision.   Respiratory: Negative for cough, shortness of breath and wheezing.    Cardiovascular: Negative for chest pain and leg swelling.   Gastrointestinal: Negative for abdominal pain, diarrhea, heartburn, nausea and vomiting.   Genitourinary: Negative for dysuria.   Musculoskeletal: Positive for joint pain.   Neurological: Negative for dizziness, sensory change, focal weakness, seizures and headaches.      Physical Exam  Laboratory/Imaging   Hemodynamics  Temp (24hrs), Av.4 °C (97.5 °F), Min:36 °C (96.8 °F), Max:36.6 °C (97.8 °F)   Temperature: 36.4 °C (97.5 °F)  Pulse  Av.9  Min: 45  Max: 153    Blood Pressure : (!) 99/57      Respiratory      Respiration: 17, Pulse Oximetry: 97 %     Work Of Breathing / Effort: Mild  RUL Breath Sounds: Clear, RML Breath Sounds: Diminished, RLL Breath Sounds: Diminished, WHIT Breath Sounds: Clear, LLL Breath Sounds: Diminished    Fluids    Intake/Output Summary (Last 24 hours) at 18 1452  Last data filed at 18 1200   Gross per 24 hour   Intake              340 ml   Output              200 ml   Net              140 ml       Nutrition  Orders Placed This Encounter   Procedures   • Diet Order     Standing Status:   Standing     Number of Occurrences:   1     Order Specific Question:   Diet:     Answer:   Cardiac [6]     Order  Specific Question:   Texture/Fiber modifications:     Answer:   Dysphagia 2(Pureed/Chopped)specify fluid consistency(question 6) [2]     Order Specific Question:   Consistency/Fluid modifications:     Answer:   Thin Liquids [3]     Physical Exam   Constitutional: He is oriented to person, place, and time. He appears well-developed and well-nourished.   HENT:   Head: Normocephalic and atraumatic.   Eyes: Conjunctivae are normal. Pupils are equal, round, and reactive to light.   Neck: No tracheal deviation present. No thyromegaly present.   Cardiovascular: Normal rate and regular rhythm.    Pulmonary/Chest: Effort normal and breath sounds normal.   Abdominal: Soft. Bowel sounds are normal. He exhibits no distension. There is no tenderness.   Musculoskeletal: He exhibits no edema.   Lymphadenopathy:     He has no cervical adenopathy.   Neurological: He is alert and oriented to person, place, and time. No cranial nerve deficit.   MMT 5/5   Skin: Skin is warm and dry.   Nursing note and vitals reviewed.      Recent Labs      02/26/18   0303  02/27/18   0328   WBC  10.5  10.3   RBC  3.44*  3.55*   HEMOGLOBIN  11.4*  11.8*   HEMATOCRIT  34.5*  36.7*   MCV  100.3*  103.4*   MCH  33.1*  33.2*   MCHC  33.0*  32.2*   RDW  47.3  50.5*   PLATELETCT  174  162*   MPV  10.0  9.7     Recent Labs      02/25/18   0501  02/26/18   0303  02/27/18   0328   SODIUM  134*  134*  136   POTASSIUM  4.5  4.3  4.3   CHLORIDE  103  103  105   CO2  23  24  22   GLUCOSE  87  108*  106*   BUN  27*  28*  26*   CREATININE  0.84  1.18  1.23   CALCIUM  10.1  9.8  10.0                      Assessment/Plan     * Cardiac arrest (CMS-HCC)- (present on admission)   Assessment & Plan    Secondary to SSS        SSS (sick sinus syndrome) (CMS-[facility])- (present on admission)   Assessment & Plan    Temporary pacemaker initially, permanent pacemaker placement   - cont Amiodarone per cards recs        Acute respiratory failure with hypoxia (CMS-[facility])- (present on  admission)   Assessment & Plan    Intubated 2/14, Extubated 2/16.  Encourage IS, RT protocol        Bradycardia- (present on admission)   Assessment & Plan    secondary to SSS        Encephalopathy acute- (present on admission)   Assessment & Plan    - still with delirium; Geriatrics consulted and recs appreciated  - d/c'ed Risperdal and starting qHS Depakote per recs        Leukocytosis- (present on admission)   Assessment & Plan    Most likely reactive  - now resolved        Hypomagnesemia- (present on admission)   Assessment & Plan    resolved        Hypophosphatemia- (present on admission)   Assessment & Plan    resolved        Macrocytosis- (present on admission)   Assessment & Plan    b12, folic wnl        S/P TAVR (transcatheter aortic valve replacement)- (present on admission)   Assessment & Plan    TAVR on 2/12  stable        Hypertension, essential- (present on admission)   Assessment & Plan    - BP borderline low and clinically orthostatic per Geriatrics  - decreasing Lisinopril; monitoring  - d/c'ing sched Docu-senna due to frequent BMs; cont prns        Hypokalemia- (present on admission)   Assessment & Plan    Stable; monitoring        CAD (coronary artery disease)- (present on admission)   Assessment & Plan    History of CABG ×3  ASA, Lipitor        Paroxysmal atrial fibrillation (CMS-HCC)- (present on admission)   Assessment & Plan    Amiodarone, Xarelto        Acquired hypothyroidism- (present on admission)   Assessment & Plan    Synthroid         Carotid artery stenosis- (present on admission)   Assessment & Plan    s/p L CEA  ASA, Lipitor          Quality-Core Measures   Reviewed items::  Radiology images reviewed, EKG reviewed, Labs reviewed and Medications reviewed  Baldwin catheter::  No Baldwin  DVT: Xarelto.  Ulcer Prophylaxis::  No

## 2018-02-27 NOTE — DISCHARGE PLANNING
"Medical SW    Pt wife in [place]. Not being admitted but down there hanging out. SW had Kalie from [facility] come evaluate for 24 hour care giving services. Pt and wife took prices and services from [facility] Pt and wife report they can afford 600$ a week in care giving services. This SW unsure if this is true. Pt wife kept asking  about funds but he kept replying , \"ya, we can\". Pt son and daughter report not wanting anything to do with family. No family support at this time. Pt appears to be disoriented and agreeing with SW but wife seemed more with it. Wife has dementia and it doesn't appear she can take care of pt at home without support. Pt would like to go to SNF to get stronger. This SW unsure if pt is aware of what SNF is after multiple times explaining. Pt speaks, chinese, japenese and mandarin. When this SW asked about a  pt wife insisted no that they speak english with there speaking better than the pt.     *SW to ask for consult with geriatrics.     At this time SW feels that SNF would be  A safer option for pt considering social situation but SW will consult hospital team.   "

## 2018-02-27 NOTE — THERAPY
"Occupational Therapy Treatment completed with focus on ADLs, ADL transfers, patient education and upper extremity function.  Functional Status:  Pt seen for OT tx today, making steady progress with POC. Pt requires max a for bed mobility, STS from eob with min a, min/mod a for h/g in standing sinkside; cues needed for sequencing, task progression, at times perseverating with certain movements, performed functional mobility with min a and requires physical assist during transitional movements. Pt would continue to benefit from acute skilled OT services while in house.   Plan of Care: Will benefit from Occupational Therapy 3 times per week  Discharge Recommendations:  Equipment Will Continue to Assess for Equipment Needs. Post-acute therapy Discharge to a transitional care facility for continued skilled therapy services.    See \"Rehab Therapy-Acute\" Patient Summary Report for complete documentation.   "

## 2018-02-27 NOTE — CARE PLAN
Problem: Safety  Goal: Will remain free from injury  Outcome: PROGRESSING AS EXPECTED  RN provided assistance with mobility and collaborated with other members of the Interdisciplinary Team for safe transfer techniques.     Problem: Mobility  Goal: Risk for activity intolerance will decrease  Outcome: PROGRESSING AS EXPECTED  Pt assessed and monitored for signs of activity intolerance. RN encouraged and provided rest periods for the patient when getting up to go to the bathroom and up to chair.

## 2018-02-27 NOTE — CARE PLAN
Problem: Safety  Goal: Will remain free from injury    Intervention: Provide assistance with mobility   02/27/18 0030   OTHER   Assistance / Tolerance Assistance of One;Tires quickly;General Weakness     RN educated patient regarding the importance of calling for assistance.  Patient was unable to verbalize understanding of education.  Reinforcement needed.        Problem: Infection  Goal: Will remain free from infection    Intervention: Assess signs and symptoms of infection  RN educated patient regarding the signs and symptoms of infection.  Patient was unable to recount information provided.  Reinforcement needed.

## 2018-02-27 NOTE — THERAPY
"Physical Therapy Treatment completed.   Bed Mobility:  Supine to Sit: Maximal Assist  Transfers: Sit to Stand: Minimal Assist  Gait: Level Of Assist: Minimal Assist with Front-Wheel Walker       Plan of Care: Will benefit from Physical Therapy 3 times per week  Discharge Recommendations: Equipment: Will Continue to Assess for Equipment Needs. Post-acute therapy Discharge to a transitional care facility for continued skilled therapy services.     See \"Rehab Therapy-Acute\" Patient Summary Report for complete documentation.       "

## 2018-02-27 NOTE — PROGRESS NOTES
Bedside report completed, assumed pt care w/ RN Helena. Pt lying in bed comfortably. A/O x 2, pain 0/10, bed alarm is on. Safety precautions in place. Call light and personal belongings within reach. Educated patient on calling for assistance when needed. Pt has lap belt and can demonstrate that he can remove it. All pt needs are met at this time.

## 2018-02-27 NOTE — PROGRESS NOTES
Bedside report completed.  Assumed pt care.  Pt AAOx3, resting in bed comfortably with no signs of labored breathing.  Pt tele monitor in place, cardiac rhythm being monitored.  Pt call light within reach, bed in low position, non skid socks in place.  Pt denies any pain or other distress at this time.

## 2018-02-27 NOTE — PROGRESS NOTES
Monitor Summary: 0.20/0.12/0.42 SR/Paced with BBB 65-74 with Rare PVCs and 2nd degree type 2- always paced out

## 2018-02-27 NOTE — DISCHARGE PLANNING
Medical SW    [facility] will accept pt  for SNF and pt wife private pay. Currently both pt and pt wife have been deemed unable to care for self or make decisions. AMADOU left  for pt son Lopez at [phone]. AMADOU attempted to call phone listed for pt daughter Ray ([phone]) but phone number went to a home care place and no one answered.     AMADOU spoke with ER AMADOU ortega who reports she has spoke to Lopez today and he reports that himself and sister want nothing to do with his mother and father and have continually tried to help for years but they will not listen. Lopez reports himself and sister have washed their hands of them at this time. AMADOU reached out to supervisor howard for Support.     Plan: AMADOU will continue to attempt to reach out to family for support on placement for pt.

## 2018-02-27 NOTE — DISCHARGE PLANNING
[facility] received an E-mail from [facility]. The referral has been denied due to no DC Plan and sitter at bed side.

## 2018-02-27 NOTE — PROGRESS NOTES
Monitor summary:  % 61-70   Rare couplets  Down to 40, unsustained  1.8 second pause   Periodic beats of second degree type two that pace out.

## 2018-02-28 LAB
25(OH)D3 SERPL-MCNC: 32 NG/ML (ref 30–100)
ANION GAP SERPL CALC-SCNC: 3 MMOL/L (ref 0–11.9)
ANISOCYTOSIS BLD QL SMEAR: ABNORMAL
BASOPHILS # BLD AUTO: 0 % (ref 0–1.8)
BASOPHILS # BLD: 0 K/UL (ref 0–0.12)
BUN SERPL-MCNC: 39 MG/DL (ref 8–22)
BURR CELLS BLD QL SMEAR: NORMAL
CALCIUM SERPL-MCNC: 10.2 MG/DL (ref 8.5–10.5)
CHLORIDE SERPL-SCNC: 106 MMOL/L (ref 96–112)
CO2 SERPL-SCNC: 25 MMOL/L (ref 20–33)
CREAT SERPL-MCNC: 1.42 MG/DL (ref 0.5–1.4)
EOSINOPHIL # BLD AUTO: 0.19 K/UL (ref 0–0.51)
EOSINOPHIL NFR BLD: 1.7 % (ref 0–6.9)
ERYTHROCYTE [DISTWIDTH] IN BLOOD BY AUTOMATED COUNT: 50.8 FL (ref 35.9–50)
FERRITIN SERPL-MCNC: 354.8 NG/ML (ref 22–322)
GLUCOSE SERPL-MCNC: 98 MG/DL (ref 65–99)
HCT VFR BLD AUTO: 40.6 % (ref 42–52)
HGB BLD-MCNC: 12.9 G/DL (ref 14–18)
IRON SATN MFR SERPL: 31 % (ref 15–55)
IRON SERPL-MCNC: 105 UG/DL (ref 50–180)
LYMPHOCYTES # BLD AUTO: 1.66 K/UL (ref 1–4.8)
LYMPHOCYTES NFR BLD: 14.8 % (ref 22–41)
MACROCYTES BLD QL SMEAR: ABNORMAL
MANUAL DIFF BLD: NORMAL
MCH RBC QN AUTO: 32.6 PG (ref 27–33)
MCHC RBC AUTO-ENTMCNC: 31.8 G/DL (ref 33.7–35.3)
MCV RBC AUTO: 102.5 FL (ref 81.4–97.8)
MONOCYTES # BLD AUTO: 0.49 K/UL (ref 0–0.85)
MONOCYTES NFR BLD AUTO: 4.4 % (ref 0–13.4)
MORPHOLOGY BLD-IMP: NORMAL
MYELOCYTES NFR BLD MANUAL: 1.7 %
NEUTROPHILS # BLD AUTO: 8.67 K/UL (ref 1.82–7.42)
NEUTROPHILS NFR BLD: 77.4 % (ref 44–72)
NRBC # BLD AUTO: 0 K/UL
NRBC BLD-RTO: 0 /100 WBC
PLATELET # BLD AUTO: 187 K/UL (ref 164–446)
PLATELET BLD QL SMEAR: NORMAL
PMV BLD AUTO: 9.8 FL (ref 9–12.9)
POIKILOCYTOSIS BLD QL SMEAR: NORMAL
POTASSIUM SERPL-SCNC: 4.6 MMOL/L (ref 3.6–5.5)
RBC # BLD AUTO: 3.96 M/UL (ref 4.7–6.1)
RBC BLD AUTO: PRESENT
SODIUM SERPL-SCNC: 134 MMOL/L (ref 135–145)
TIBC SERPL-MCNC: 344 UG/DL (ref 250–450)
WBC # BLD AUTO: 11.2 K/UL (ref 4.8–10.8)

## 2018-02-28 PROCEDURE — 83550 IRON BINDING TEST: CPT

## 2018-02-28 PROCEDURE — 36415 COLL VENOUS BLD VENIPUNCTURE: CPT

## 2018-02-28 PROCEDURE — 700102 HCHG RX REV CODE 250 W/ 637 OVERRIDE(OP): Performed by: INTERNAL MEDICINE

## 2018-02-28 PROCEDURE — 700102 HCHG RX REV CODE 250 W/ 637 OVERRIDE(OP): Performed by: HOSPITALIST

## 2018-02-28 PROCEDURE — 83540 ASSAY OF IRON: CPT

## 2018-02-28 PROCEDURE — 80048 BASIC METABOLIC PNL TOTAL CA: CPT

## 2018-02-28 PROCEDURE — 700102 HCHG RX REV CODE 250 W/ 637 OVERRIDE(OP): Performed by: FAMILY MEDICINE

## 2018-02-28 PROCEDURE — 99233 SBSQ HOSP IP/OBS HIGH 50: CPT | Performed by: INTERNAL MEDICINE

## 2018-02-28 PROCEDURE — 770006 HCHG ROOM/CARE - MED/SURG/GYN SEMI*

## 2018-02-28 PROCEDURE — 99232 SBSQ HOSP IP/OBS MODERATE 35: CPT | Performed by: INTERNAL MEDICINE

## 2018-02-28 PROCEDURE — A9270 NON-COVERED ITEM OR SERVICE: HCPCS | Performed by: INTERNAL MEDICINE

## 2018-02-28 PROCEDURE — A9270 NON-COVERED ITEM OR SERVICE: HCPCS | Performed by: HOSPITALIST

## 2018-02-28 PROCEDURE — 82306 VITAMIN D 25 HYDROXY: CPT

## 2018-02-28 PROCEDURE — 82728 ASSAY OF FERRITIN: CPT

## 2018-02-28 PROCEDURE — 85027 COMPLETE CBC AUTOMATED: CPT

## 2018-02-28 PROCEDURE — 85007 BL SMEAR W/DIFF WBC COUNT: CPT

## 2018-02-28 PROCEDURE — A9270 NON-COVERED ITEM OR SERVICE: HCPCS | Performed by: FAMILY MEDICINE

## 2018-02-28 RX ADMIN — POLYETHYLENE GLYCOL 3350 1 PACKET: 17 POWDER, FOR SOLUTION ORAL at 08:23

## 2018-02-28 RX ADMIN — LEVOTHYROXINE SODIUM 50 MCG: 50 TABLET ORAL at 06:36

## 2018-02-28 RX ADMIN — ATORVASTATIN CALCIUM 20 MG: 20 TABLET, FILM COATED ORAL at 22:40

## 2018-02-28 RX ADMIN — LISINOPRIL 5 MG: 5 TABLET ORAL at 08:23

## 2018-02-28 RX ADMIN — AMIODARONE HYDROCHLORIDE 200 MG: 200 TABLET ORAL at 08:23

## 2018-02-28 RX ADMIN — DIVALPROEX SODIUM 125 MG: 125 CAPSULE, COATED PELLETS ORAL at 22:40

## 2018-02-28 RX ADMIN — RIVAROXABAN 15 MG: 15 TABLET, FILM COATED ORAL at 18:46

## 2018-02-28 RX ADMIN — ASPIRIN 81 MG: 81 TABLET, CHEWABLE ORAL at 08:23

## 2018-02-28 ASSESSMENT — COGNITIVE AND FUNCTIONAL STATUS - GENERAL
DRESSING REGULAR LOWER BODY CLOTHING: A LITTLE
WALKING IN HOSPITAL ROOM: A LITTLE
TOILETING: A LITTLE
DAILY ACTIVITIY SCORE: 20
STANDING UP FROM CHAIR USING ARMS: A LITTLE
MOVING TO AND FROM BED TO CHAIR: A LITTLE
SUGGESTED CMS G CODE MODIFIER MOBILITY: CK
CLIMB 3 TO 5 STEPS WITH RAILING: A LITTLE
MOBILITY SCORE: 19
DRESSING REGULAR UPPER BODY CLOTHING: A LITTLE
HELP NEEDED FOR BATHING: A LITTLE
MOVING FROM LYING ON BACK TO SITTING ON SIDE OF FLAT BED: A LITTLE
SUGGESTED CMS G CODE MODIFIER DAILY ACTIVITY: CJ

## 2018-02-28 ASSESSMENT — PAIN SCALES - GENERAL
PAINLEVEL_OUTOF10: 0

## 2018-02-28 ASSESSMENT — ENCOUNTER SYMPTOMS
NAUSEA: 0
WHEEZING: 0
DIARRHEA: 0
BLURRED VISION: 0
FOCAL WEAKNESS: 0
SHORTNESS OF BREATH: 0
SORE THROAT: 0
HEADACHES: 0
ABDOMINAL PAIN: 0
CHILLS: 0
DIZZINESS: 0
COUGH: 0
VOMITING: 0
FEVER: 0
HEARTBURN: 0
SEIZURES: 0
SENSORY CHANGE: 0

## 2018-02-28 NOTE — DISCHARGE PLANNING
Medical SW    AMADOU spoke with pt son dom by phone. Pt son reports he will be involved if both of his parents can be placed in the same facility. AMADOU reported that [facility] can take both pt but his mother does not have a SNF need at this time and it would be private pay. Dom reported he did not have any idea of his parents finances and can not pay. Dom reports his wife does not want him to be involved but if this SW can fax paperwork to [phone] . AMADOU reached out to supervision about transferring pt and wife today with JACE. Supervisor reported for SW to reach out to [facility] to find out financial resources for pt and wife.     SW left VM for eps  Kimberli Abrasm at [phone]    Plan:AMADOU to reach out to [facility] for financial resources.

## 2018-02-28 NOTE — PROGRESS NOTES
[facility] Hospitalist Progress Note    Date of Service: 2018    Chief Complaint  84 y.o. male admitted 2018 with Encephalopathy, Atrial fibrillation with RVR, with sinus pause, Respiratory failure.    Interval Problem Update  Delirium improving after med adjustments made.      Consultants/Specialty  Cardio - signed off  Geriatrics    Disposition  SNF vs Home health      Review of Systems   Constitutional: Negative for chills and fever.   HENT: Negative for hearing loss and sore throat.    Eyes: Negative for blurred vision.   Respiratory: Negative for cough, shortness of breath and wheezing.    Cardiovascular: Negative for chest pain and leg swelling.   Gastrointestinal: Negative for abdominal pain, diarrhea, heartburn, nausea and vomiting.   Genitourinary: Negative for dysuria.   Musculoskeletal: Positive for joint pain.   Neurological: Negative for dizziness, sensory change, focal weakness, seizures and headaches.      Physical Exam  Laboratory/Imaging   Hemodynamics  Temp (24hrs), Av.4 °C (97.6 °F), Min:35.6 °C (96 °F), Max:37.3 °C (99.2 °F)   Temperature: (!) 35.6 °C (96.1 °F)  Pulse  Av.9  Min: 45  Max: 153    Blood Pressure : (!) 97/44      Respiratory      Respiration: 17, Pulse Oximetry: 96 %     Work Of Breathing / Effort: Mild  RUL Breath Sounds: Clear, RML Breath Sounds: Diminished, RLL Breath Sounds: Diminished, WHIT Breath Sounds: Clear, LLL Breath Sounds: Diminished    Fluids    Intake/Output Summary (Last 24 hours) at 18 1443  Last data filed at 18   Gross per 24 hour   Intake              240 ml   Output                0 ml   Net              240 ml       Nutrition  Orders Placed This Encounter   Procedures   • Diet Order     Standing Status:   Standing     Number of Occurrences:   1     Order Specific Question:   Diet:     Answer:   Cardiac [6]     Order Specific Question:   Texture/Fiber modifications:     Answer:   Dysphagia 2(Pureed/Chopped)specify fluid  consistency(question 6) [2]     Order Specific Question:   Consistency/Fluid modifications:     Answer:   Thin Liquids [3]     Physical Exam   Constitutional: He is oriented to person, place, and time. He appears well-developed and well-nourished.   HENT:   Head: Normocephalic and atraumatic.   Eyes: Conjunctivae are normal. Pupils are equal, round, and reactive to light.   Neck: No tracheal deviation present. No thyromegaly present.   Cardiovascular: Normal rate and regular rhythm.    Pulmonary/Chest: Effort normal and breath sounds normal.   Abdominal: Soft. Bowel sounds are normal. He exhibits no distension. There is no tenderness.   Musculoskeletal: He exhibits no edema.   Lymphadenopathy:     He has no cervical adenopathy.   Neurological: He is alert and oriented to person, place, and time. No cranial nerve deficit.   MMT 5/5   Skin: Skin is warm and dry.   Nursing note and vitals reviewed.      Recent Labs      02/26/18   0303  02/27/18 0328  02/28/18   0321   WBC  10.5  10.3  11.2*   RBC  3.44*  3.55*  3.96*   HEMOGLOBIN  11.4*  11.8*  12.9*   HEMATOCRIT  34.5*  36.7*  40.6*   MCV  100.3*  103.4*  102.5*   MCH  33.1*  33.2*  32.6   MCHC  33.0*  32.2*  31.8*   RDW  47.3  50.5*  50.8*   PLATELETCT  174  162*  187   MPV  10.0  9.7  9.8     Recent Labs      02/26/18   0303  02/27/18 0328  02/28/18   0321   SODIUM  134*  136  134*   POTASSIUM  4.3  4.3  4.6   CHLORIDE  103  105  106   CO2  24  22  25   GLUCOSE  108*  106*  98   BUN  28*  26*  39*   CREATININE  1.18  1.23  1.42*   CALCIUM  9.8  10.0  10.2                      Assessment/Plan     * Cardiac arrest (CMS-HCC)- (present on admission)   Assessment & Plan    Secondary to SSS        SSS (sick sinus syndrome) (CMS-HCC)- (present on admission)   Assessment & Plan    Temporary pacemaker initially, permanent pacemaker placement   - cont Amiodarone per cards recs        Acute respiratory failure with hypoxia (CMS-HCC)- (present on admission)   Assessment &  Plan    Intubated 2/14, Extubated 2/16.  Encourage IS, RT protocol        Bradycardia- (present on admission)   Assessment & Plan    secondary to SSS        Encephalopathy acute- (present on admission)   Assessment & Plan    - still with delirium but improving; Geriatrics recs appreciated  - cont qHS Depakote per recs        Leukocytosis- (present on admission)   Assessment & Plan    Most likely reactive  - now resolved        Hypomagnesemia- (present on admission)   Assessment & Plan    resolved        Hypophosphatemia- (present on admission)   Assessment & Plan    resolved        Macrocytosis- (present on admission)   Assessment & Plan    b12, folic wnl        S/P TAVR (transcatheter aortic valve replacement)- (present on admission)   Assessment & Plan    TAVR on 2/12  stable        Hypertension, essential- (present on admission)   Assessment & Plan    - BP still borderline low despite decreasing Lisinopril dose and worsening Cr noted  - will d/c Lisinopril and observe        Hypokalemia- (present on admission)   Assessment & Plan    Stable; monitoring        CAD (coronary artery disease)- (present on admission)   Assessment & Plan    History of CABG ×3  ASA, Lipitor        Paroxysmal atrial fibrillation (CMS-HCC)- (present on admission)   Assessment & Plan    Amiodarone, Xarelto        Acquired hypothyroidism- (present on admission)   Assessment & Plan    Synthroid         Carotid artery stenosis- (present on admission)   Assessment & Plan    s/p L CEA  ASA, Lipitor          Quality-Core Measures   Reviewed items::  Radiology images reviewed, EKG reviewed, Labs reviewed and Medications reviewed  Baldwin catheter::  No Baldwin  DVT: Xarelto.  Ulcer Prophylaxis::  No

## 2018-02-28 NOTE — CONSULTS
ETHICS CASE DISCUSSION  Patient Name: Rafia hSaikh  MRN: [id number]  DATE OF SERVICE: 2/28/18    ETHICAL ISSUE:   An ethics consultation was requested for this patient.  The ethical issues are around discharge planning for a potentially unrepresented patient.      DISCUSSIONS WITH MEDICAL/CARE TEAM:   Ethics spoke with Nanette Hinkle, Social Work, on 2/28/18.  Per Social Work, the medical team believes the patient requires discharge to SNF and plan of care following SNF, likely group home.  The patient has been deemed to lack capacity by the medical team, and the patient’s wife, who was recently admitted to [facility], also likely lacks capacity due to advanced dementia.  The patient has children, however, the children have previously stated they would not be involved in medical decision making.  Per Social Work, even if children agree to sign patient into SNF, the SNF may not accept as there will not be a discharge plan after SNF.  Given these facts, per Social Work, it appears likely guardianship will have to be pursued.       PATIENT’S DECISION MAKING CAPACITY:  Per the medical team, the patient lacks decision making capacity.    RECOMMENDATIONS:   1) Recommend contacting the patient’s children to clarify their willingness to be involved, including signing the patient into SNF, completing a POLST form, or identifying other family members who may be willing to act as surrogate.    2) Recommend, if the patient’s children refuse to be involved in the patient’s care and provide no additional family members who may be willing to act as surrogate, pursuing Guardianship.    Thank you for involving us in the care of this patient. Please let me know if you have any other questions or concerns.      Respectfully submitted,    Wayne Frye JD, MA  Bioethicist  [phone]

## 2018-02-28 NOTE — DISCHARGE PLANNING
Medical SW    AMADOU faxed bioethics referral for pt. SW to meet with supervisor about plan of care for pt.    Plan: await bioethics recommendations.

## 2018-02-28 NOTE — PROGRESS NOTES
Received report on patient. He is sleeping in bed no indications of pain or distress. Bed alarm is on, bed in the lowest position and call light and personal belongings within reach.

## 2018-02-28 NOTE — CARE PLAN
Problem: Infection  Goal: Will remain free from infection  Outcome: PROGRESSING AS EXPECTED  Patient has no signs or symptoms of an infection and WBCs are within normal limits.    Problem: Venous Thromboembolism (VTW)/Deep Vein Thrombosis (DVT) Prevention:  Goal: Patient will participate in Venous Thrombosis (VTE)/Deep Vein Thrombosis (DVT)Prevention Measures  Outcome: PROGRESSING AS EXPECTED  Patient is able to ambulate and receives xarelto.

## 2018-03-01 LAB
ANION GAP SERPL CALC-SCNC: 7 MMOL/L (ref 0–11.9)
ANISOCYTOSIS BLD QL SMEAR: ABNORMAL
APPEARANCE UR: CLEAR
BACTERIA #/AREA URNS HPF: NEGATIVE /HPF
BASOPHILS # BLD AUTO: 0 % (ref 0–1.8)
BASOPHILS # BLD: 0 K/UL (ref 0–0.12)
BILIRUB UR QL STRIP.AUTO: NEGATIVE
BUN SERPL-MCNC: 42 MG/DL (ref 8–22)
CALCIUM SERPL-MCNC: 10.4 MG/DL (ref 8.5–10.5)
CHLORIDE SERPL-SCNC: 103 MMOL/L (ref 96–112)
CHLORIDE UR-SCNC: 70 MMOL/L
CO2 SERPL-SCNC: 24 MMOL/L (ref 20–33)
COLOR UR: YELLOW
CREAT SERPL-MCNC: 1.45 MG/DL (ref 0.5–1.4)
CREAT UR-MCNC: 124.8 MG/DL
EOSINOPHIL # BLD AUTO: 0 K/UL (ref 0–0.51)
EOSINOPHIL NFR BLD: 0 % (ref 0–6.9)
EPI CELLS #/AREA URNS HPF: NEGATIVE /HPF
ERYTHROCYTE [DISTWIDTH] IN BLOOD BY AUTOMATED COUNT: 49.2 FL (ref 35.9–50)
GLUCOSE SERPL-MCNC: 103 MG/DL (ref 65–99)
GLUCOSE UR STRIP.AUTO-MCNC: NEGATIVE MG/DL
HCT VFR BLD AUTO: 35.7 % (ref 42–52)
HGB BLD-MCNC: 11.8 G/DL (ref 14–18)
HYALINE CASTS #/AREA URNS LPF: ABNORMAL /LPF
KETONES UR STRIP.AUTO-MCNC: NEGATIVE MG/DL
LEUKOCYTE ESTERASE UR QL STRIP.AUTO: NEGATIVE
LYMPHOCYTES # BLD AUTO: 1.25 K/UL (ref 1–4.8)
LYMPHOCYTES NFR BLD: 10.4 % (ref 22–41)
MACROCYTES BLD QL SMEAR: ABNORMAL
MANUAL DIFF BLD: NORMAL
MCH RBC QN AUTO: 33.5 PG (ref 27–33)
MCHC RBC AUTO-ENTMCNC: 33.1 G/DL (ref 33.7–35.3)
MCV RBC AUTO: 101.4 FL (ref 81.4–97.8)
MICRO URNS: ABNORMAL
MONOCYTES # BLD AUTO: 0.84 K/UL (ref 0–0.85)
MONOCYTES NFR BLD AUTO: 7 % (ref 0–13.4)
MORPHOLOGY BLD-IMP: NORMAL
MYELOCYTES NFR BLD MANUAL: 3.5 %
NEUTROPHILS # BLD AUTO: 9.49 K/UL (ref 1.82–7.42)
NEUTROPHILS NFR BLD: 79.1 % (ref 44–72)
NITRITE UR QL STRIP.AUTO: NEGATIVE
NRBC # BLD AUTO: 0 K/UL
NRBC BLD-RTO: 0 /100 WBC
PH UR STRIP.AUTO: 6 [PH]
PLATELET # BLD AUTO: 171 K/UL (ref 164–446)
PLATELET BLD QL SMEAR: NORMAL
PMV BLD AUTO: 9.7 FL (ref 9–12.9)
POTASSIUM SERPL-SCNC: 4.6 MMOL/L (ref 3.6–5.5)
POTASSIUM UR-SCNC: 66.7 MMOL/L
PROT UR QL STRIP: NEGATIVE MG/DL
PROT UR-MCNC: 15.8 MG/DL (ref 0–15)
RBC # BLD AUTO: 3.52 M/UL (ref 4.7–6.1)
RBC # URNS HPF: ABNORMAL /HPF
RBC BLD AUTO: PRESENT
RBC UR QL AUTO: ABNORMAL
SODIUM SERPL-SCNC: 134 MMOL/L (ref 135–145)
SODIUM UR-SCNC: 51 MMOL/L
SP GR UR STRIP.AUTO: 1.02
UROBILINOGEN UR STRIP.AUTO-MCNC: 0.2 MG/DL
WBC # BLD AUTO: 12 K/UL (ref 4.8–10.8)
WBC #/AREA URNS HPF: ABNORMAL /HPF

## 2018-03-01 PROCEDURE — A9270 NON-COVERED ITEM OR SERVICE: HCPCS | Performed by: INTERNAL MEDICINE

## 2018-03-01 PROCEDURE — 700102 HCHG RX REV CODE 250 W/ 637 OVERRIDE(OP): Performed by: INTERNAL MEDICINE

## 2018-03-01 PROCEDURE — 770020 HCHG ROOM/CARE - TELE (206)

## 2018-03-01 PROCEDURE — A9270 NON-COVERED ITEM OR SERVICE: HCPCS | Performed by: FAMILY MEDICINE

## 2018-03-01 PROCEDURE — 84300 ASSAY OF URINE SODIUM: CPT

## 2018-03-01 PROCEDURE — 84156 ASSAY OF PROTEIN URINE: CPT

## 2018-03-01 PROCEDURE — 99232 SBSQ HOSP IP/OBS MODERATE 35: CPT | Performed by: INTERNAL MEDICINE

## 2018-03-01 PROCEDURE — 84133 ASSAY OF URINE POTASSIUM: CPT

## 2018-03-01 PROCEDURE — 81001 URINALYSIS AUTO W/SCOPE: CPT

## 2018-03-01 PROCEDURE — 36415 COLL VENOUS BLD VENIPUNCTURE: CPT

## 2018-03-01 PROCEDURE — A9270 NON-COVERED ITEM OR SERVICE: HCPCS | Performed by: HOSPITALIST

## 2018-03-01 PROCEDURE — 85007 BL SMEAR W/DIFF WBC COUNT: CPT

## 2018-03-01 PROCEDURE — 85027 COMPLETE CBC AUTOMATED: CPT

## 2018-03-01 PROCEDURE — 82570 ASSAY OF URINE CREATININE: CPT

## 2018-03-01 PROCEDURE — 700102 HCHG RX REV CODE 250 W/ 637 OVERRIDE(OP): Performed by: HOSPITALIST

## 2018-03-01 PROCEDURE — 700102 HCHG RX REV CODE 250 W/ 637 OVERRIDE(OP): Performed by: FAMILY MEDICINE

## 2018-03-01 PROCEDURE — 82436 ASSAY OF URINE CHLORIDE: CPT

## 2018-03-01 PROCEDURE — 99231 SBSQ HOSP IP/OBS SF/LOW 25: CPT | Performed by: INTERNAL MEDICINE

## 2018-03-01 PROCEDURE — 80048 BASIC METABOLIC PNL TOTAL CA: CPT

## 2018-03-01 RX ADMIN — POLYETHYLENE GLYCOL 3350 1 PACKET: 17 POWDER, FOR SOLUTION ORAL at 14:40

## 2018-03-01 RX ADMIN — RIVAROXABAN 15 MG: 15 TABLET, FILM COATED ORAL at 17:36

## 2018-03-01 RX ADMIN — ATORVASTATIN CALCIUM 20 MG: 20 TABLET, FILM COATED ORAL at 21:14

## 2018-03-01 RX ADMIN — LEVOTHYROXINE SODIUM 50 MCG: 50 TABLET ORAL at 05:52

## 2018-03-01 RX ADMIN — ASPIRIN 81 MG: 81 TABLET, CHEWABLE ORAL at 08:16

## 2018-03-01 RX ADMIN — AMIODARONE HYDROCHLORIDE 200 MG: 200 TABLET ORAL at 08:16

## 2018-03-01 RX ADMIN — DIVALPROEX SODIUM 125 MG: 125 CAPSULE, COATED PELLETS ORAL at 21:14

## 2018-03-01 ASSESSMENT — ENCOUNTER SYMPTOMS
NAUSEA: 0
CHILLS: 0
DIARRHEA: 0
BLURRED VISION: 0
SHORTNESS OF BREATH: 0
HEARTBURN: 0
WHEEZING: 0
SENSORY CHANGE: 0
SEIZURES: 0
ABDOMINAL PAIN: 0
DIZZINESS: 0
FOCAL WEAKNESS: 0
VOMITING: 0
HEADACHES: 0
FEVER: 0
SORE THROAT: 0
COUGH: 0

## 2018-03-01 ASSESSMENT — PAIN SCALES - GENERAL
PAINLEVEL_OUTOF10: 0
PAINLEVEL_OUTOF10: 0

## 2018-03-01 NOTE — PROGRESS NOTES
Bedside report completed with noc RN. Pt resting, denies pain. Bed locked in low position, call light within reach. Bed alarm on. Reviewed plan of care with noc RN and pt.

## 2018-03-01 NOTE — PROGRESS NOTES
"Geriatric Progress Note    Chief Complaint   Patient presents with   • Unresponsive     Today's Date: 2/28/2018  Patient Name: Rafia Shaikh  Current Attending: Keven Adrian M.D.    Subjective:  24 hour Events: not iron deficient. Increase in creatinine, decreased polypharmacy. Ethics consulted. It appears family may be open to helping.     Nursing Report: calling appropriately, eating okay, no BM today. Is orientation is stable. She will assess pacer site and change strips to see if helps his discomfort.     Patient Report: he tells me more about his family with relation to dynamics with his daughter in law and children. He says he has plenty of money. He says he would like to stay with his wife as he does not think she can make it without him. He was eating lunch while I visited with him. He is itchy by his pacer site    Team Discussion: discussed with MD and AMADOU.     ROS: The items below were reported to be negative, unless otherwise noted above or is in bold type.   Constipation  Diarrhea  Urination  Pain  SOB  Fluctuation of mental status   Vision   Hearing  Nausea  Vomiting  Motor issues  Sensory issues  Memory issues      Objective:  Physical Exam:   BP (!) 97/42   Pulse 61   Temp 36.2 °C (97.2 °F)   Resp 19   Ht 1.6 m (5' 2.99\")   Wt 58.7 kg (129 lb 6.6 oz)   SpO2 96%   BMI 22.93 kg/m²     Intake/Output Summary (Last 24 hours) at 02/28/18 1607  Last data filed at 02/27/18 2000   Gross per 24 hour   Intake              240 ml   Output                0 ml   Net              240 ml     Gen: NAD, sitting pleasantly in bed, alert, oriented to hospital, situation and person, year and month.  Neck: Supple, did not appear to choke on food.   Chest: Left upper chest with pacer in place, some surrounding bruising and steri stirps on top. So obvious redness or swelling or warmth to the site.   Lungs: normal effort  CV: 1+ right radial pulses 2+ DP pulses bilaterally and 2+ left radial pulses, Loud s2  Abd: " s/nt/nd  Back: kyphosis, no CVA tenderness  Gait: not assessed as patient was eating.   Delirium Screen: positive, had difficulty with days of week forward today      Labs: not iron def, small increase in WBC (his CBC appears hemoconcentrated compared to previous days), creatinine is increasing.       Assessment: 83 y/o male w/ a PMH of AS and PVD s/p TAVR on 2/12 and subsequent SSS requiring pacer placement. Course complicated by delirium.         Plan:  Delirium - less fluctuation, but still with inattention, suggesting the delirium is improving. It can take months for delirium to completely clear.   -his WBC is mildly elevated, his creatinine is going up and his BP is low, Will stop ACE and monitor as he is clinically having less fluctuation in his mental status. He has no other signs that he is clinically infected and the started of the ACE_I correlates with increase in creatinine.   -having BM and urinating well, CTM  -no pain noted  -continue to address polypharmacy, will consider making depakote PRN tomorrow if he continues to improve  -no need for telemetry, recommend to d/c  -prompted toileting- ask patient if he needs to use the commode - avoid condom cath as that increases fall risk due to environmental hazards.   -up for meals, as nursing is already doing  -stand up as much as possible, decrease HTN med as below.   -follow below precautions  -if patient does not improve in next few days or BP remains low consider rechecking UA to see if condom cath has introduced an infection.      Interventions to be considered in all patients in order to minimize the risk of delirium.   -do not disturb patient (vitals or lab draws) between the hours of 10 PM and 6 AM.  -ideally the patient should not sleep during the day and we should avoid day time naps.   -up in chair for meals  -ambulate at least three times daily, as able  -watch for constipation  -timed voiding - ask patient is she would like to go to the bathroom  q 2-3 hours, except during the do not disturb hours.   -remove all necessary lines (central lines, peripheral IVs, feeding tubes, crawford catheters)  -unless patient has shown harm to self or others I would recommend against use of restraints - either chemical or physical (antipsychotics)   -minimize polypharmacy, do not dose medication during sleep hours   Recommendations with regard to using pharmacological restraints:   -please assess for effectiveness of antipsychotic/chemical restraint and use alternate therapy if not effective.   -Please try and use one antipsychotic/chemical restraint at a time.   -Please give it either to PO or IM route avoid IV.   -Please use the lowest effective dose for the shortest period of time.     HTN - low BP, stopping ACE-I today.      Bowel Movements - Recommend to schedule PEG and make other stool softners PRN to modify as needed.      AS s/p TAVR  SSS s/p pacer - possible complication of TAVR  -rate controlled. On amiodarone. Now decreased to 200 mg per day. Unclear indication to continue now that he is paced. Consider to d/c, but defer to primary team and cardiology.   -on xarelto     Macrocytic anemia  -anemia can play a role in fatigue.  -b12, b9, iron studies and TSH WNL  -monitor  -minimize blood draws    Fall risk  -recommend to minimize environmental and pharmacological hazards  -vitamin D level of 32, no clear indication to supplement and avoiding polypharmacy     BETTY on CKD II  -some increase in creatinine since starting ACE-I. Stable K thus far, stopping ACE-I as above, monitor.       Disposition - see previous notes, but he appears to be improving and his family may start to participate in their parent's care, ethics has weighed in and has suggested a very clear plan of care involving family or guardianship if capacity is not regained by the . I am hopeful for his continued improvement, but given continued delirium he still does not have capacity. Thankfully the  patient appears open to SNF and new living situation, as long as he is with his wife.     We will continue to follow that patient along with you  Jayce Gavin M.D.  Geriatrics- [employer]  Please feel free to contact me with any questions.  Extension [phone]   8:00-5:00 Monday - Friday (excluding holidays)

## 2018-03-01 NOTE — PROGRESS NOTES
2 RN skin check completed on patient's transfer from telemetry. Extensive bruising to left chest and arm, left chest incision salvador with steri strips intact, no drainage. Scattered bruises bilateral upper extremities. Small red skin tear noted to coccyx, SALVADOR. Skin intact over bony prominences, skin folds, ears.

## 2018-03-01 NOTE — PROGRESS NOTES
Alert and cooperative. Follows some commands, but confused on his birth year and current year. Wife is at bedside and both pt and wife educated on using the call bell for assistance. Bed alarms are activated, call reinforced and within reach.

## 2018-03-01 NOTE — PROGRESS NOTES
Received report on patient. He is sitting up in bed eating dinner, has no complaints of pain and no indications of distress. Bed alarm is on, bed in the lowest position, and call light and personal belongings within reach.

## 2018-03-01 NOTE — PROGRESS NOTES
Patient transferred from telemetry. Resting in bed, denies pain. Alert, oriented to person place situation and month. Wife is patient in the same room. Patient oriented to room and equipment. Call light is in reach. Bed strip alarm in place. Transfers with one person assist. VSS.

## 2018-03-01 NOTE — PROGRESS NOTES
Geriatric Progress Note    Chief Complaint   Patient presents with   • Unresponsive     Today's Date: 2/28/2018  Patient Name: Rafia Shaikh  Current Attending: Keven Adrian M.D.    Brief History: Mr. Shaikh is an 83 y/o male who presented to ED on 02/14/2018, the day after his discharge post TAVR with altered mental status, SSS, required emergent pacemaker placement, was placed on vasopressor, intubated for airway protection and successfully extubated on 02/26/2018.    He has been evaluated by psychiatry and neurology team for confusion, an EEG showed diffuse cortical dysfunction and brain MRI and MRA suggested no acute pathologies.Geriatrics medicine was consulted for progressive confusion and delirium which did not respond to Seroquel and Risperdal.  At baseline per his pre&post-op notes on his admission for TAVR, it appears that he has had mild confusion even prior to this admission. He lived with his wife who also has cognitive impairment. In his PCP visit in Nov/2017 he asked for more help with ADLs but felt comfortable taking care of their bills using a computer.     Subjective:  24 hour Events: Patient and wife have been moved to a new room that they now share.  His creatinine has increased only slightly; continues to have leukocytosis with WBC: 12,000 which has increased slightly, [facility] in [place] accepts both patient and his wife but wife needs to privately pay since she does not meet criteria for SNF; psych has been consulted to evaluate the wife for decision making capacity.    Nursing Report: He has been pleasant. He has had a bowel movement this morning which was not documented in flowsheet; he is continent of bowel and bladder and has reported no dysuria or urinary symptoms; there has been no fever. He has been ambulating with supervision with a slow but steady gait.    Patient Report: His wife is at his bedside. They seem happy to be in the same room. He is oriented to self and city but not to  "year or month. He denies any pain. The pacemaker site itch is his only complaint.   He was able to stand up and walk using his walker; he walked slowly but with steady gait and denies any SOB or dizziness with ambulating.      Team Discussion: discussed with MD and AMADOU.     ROS: The items below were reported to be negative, unless otherwise noted above or is in bold type.   Constipation  Diarrhea  Urination  Pain  SOB  Fluctuation of mental status   Vision   Hearing  Nausea  Vomiting  Motor issues  Sensory issues  Memory issues  Itching on pacemaker site      Objective:  Physical Exam:   /53   Pulse 65   Temp 36.4 °C (97.5 °F)   Resp 16   Ht 1.6 m (5' 2.99\")   Wt 58.7 kg (129 lb 6.6 oz)   SpO2 96%   BMI 22.93 kg/m²       Gen: NAD, laying pleasantly in bed, alert, oriented to hospital, situation and person,  Not to year and month.  Neck: Supple, did not appear to choke on apple juice.   Chest: Left upper chest with pacer in place, no tenderness.  Lungs: normal effort, clear breath sounds  CV: regular rhythm, normal rate  Abd: Soft, BS+, no tenderness, no gaurding  Back: kyphosis, no CVA tenderness  Gait: Patient was able to stand slowly, he lost balanced with resting his arms to his sides; he was able to walk slowly with his walker, his gait was slow  Delirium Screen: positive, had difficulty with days of week forward today      Labs: Creatinine slightly increased to 1.45; leukocytosis also slightly increased and now is 12,000      Assessment: 83 y/o male w/ a PMH of AS and PVD s/p TAVR on 2/12 and subsequent SSS requiring pacer placement. Course complicated by delirium.         Plan:  Delirium -  He still demonstrate inattention; he has had no behavioral issues and was able to sleep. The above suggest slowly improving delirium. It can take months for delirium to completely clear.   -his WBC is mildly elevated and if his confusion gets worse or fluctuates more, may consider an infectious source, most " likely a UTI ( With h/oprevious condom cath) ; a UA maybe helpful if he does not improve or develops new symptoms.  His creatinine has slightly gone up but close to yesterday.   -Will continue to hold off ACE-I   -having BM and urinating well, CTM  -no pain noted  -continue to address polypharmacy, will continue scheduled Depakote for now due to continued delirium and, will consider making depakote PRN if he remains stable in the next few days.  -prompted toileting- ask patient if he needs to use the commode - avoid condom cath as that increases fall risk due to environmental hazards.   -up for meals, as nursing is already doing  -stand up as much as possible  -follow below precautions     Interventions to be considered in all patients in order to minimize the risk of delirium.   -do not disturb patient (vitals or lab draws) between the hours of 10 PM and 6 AM.  -ideally the patient should not sleep during the day and we should avoid day time naps.   -up in chair for meals  -ambulate at least three times daily, as able  -watch for constipation  -timed voiding - ask patient is she would like to go to the bathroom q 2-3 hours, except during the do not disturb hours.   -remove all necessary lines (central lines, peripheral IVs, feeding tubes, crawford catheters)  -unless patient has shown harm to self or others I would recommend against use of restraints - either chemical or physical (antipsychotics)   -minimize polypharmacy, do not dose medication during sleep hours   Recommendations with regard to using pharmacological restraints:   -please assess for effectiveness of antipsychotic/chemical restraint and use alternate therapy if not effective.   -Please try and use one antipsychotic/chemical restraint at a time.   -Please give it either to PO or IM route avoid IV.   -Please use the lowest effective dose for the shortest period of time.     HTN - Stable BP after discontinuation of ACE-I     Bowel Movements - He had a  bowel movement today which is not noted in flow sheet; he refused his PEG today; would recommend to encourage to take bowel regimen to avoid constipation.     AS s/p TAVR  SSS s/p pacer - possible complication of TAVR  -rate controlled. On amiodarone. Now decreased to 200 mg per day. Unclear indication to continue now that he is paced. Consider to d/c, but defer to primary team and cardiology.   -on xarelto     Macrocytic anemia  -anemia can play a role in fatigue.  -b12, b9, iron studies and TSH WNL  -monitor  -minimize blood draws    Fall risk  -recommend to minimize environmental and pharmacological hazards  -vitamin D level of 32, no clear indication to supplement and avoiding polypharmacy     BETTY on CKD II  - Only mildly increased to 1.45 since yesterday; CTM while off ACEI       Disposition -  is working on patient's placement in SNF. [facility] has accepted the patient and his wife but wife needs to privately pay since she does not meet criteria for SNF; psych has been consulted to evaluate the wife for decision making capacity.  After nursing home they will need placement in a .    We will continue to follow that patient along with you  Coby Francis M.D.  Geriatrics fellow- [employer]  Please feel free to contact geriatrics team with any questions.  Extension [phone]   8:00-5:00 Monday - Friday (excluding holidays)

## 2018-03-02 ENCOUNTER — APPOINTMENT (OUTPATIENT)
Dept: RADIOLOGY | Facility: MEDICAL CENTER | Age: 83
DRG: 242 | End: 2018-03-02
Attending: INTERNAL MEDICINE
Payer: MEDICARE

## 2018-03-02 LAB
ANION GAP SERPL CALC-SCNC: 9 MMOL/L (ref 0–11.9)
BASOPHILS # BLD AUTO: 0.5 % (ref 0–1.8)
BASOPHILS # BLD: 0.07 K/UL (ref 0–0.12)
BUN SERPL-MCNC: 31 MG/DL (ref 8–22)
CALCIUM SERPL-MCNC: 9.8 MG/DL (ref 8.5–10.5)
CHLORIDE SERPL-SCNC: 102 MMOL/L (ref 96–112)
CO2 SERPL-SCNC: 24 MMOL/L (ref 20–33)
CREAT SERPL-MCNC: 1.16 MG/DL (ref 0.5–1.4)
EOSINOPHIL # BLD AUTO: 0.33 K/UL (ref 0–0.51)
EOSINOPHIL NFR BLD: 2.5 % (ref 0–6.9)
ERYTHROCYTE [DISTWIDTH] IN BLOOD BY AUTOMATED COUNT: 50.2 FL (ref 35.9–50)
GLUCOSE SERPL-MCNC: 92 MG/DL (ref 65–99)
HCT VFR BLD AUTO: 37.2 % (ref 42–52)
HGB BLD-MCNC: 12 G/DL (ref 14–18)
IMM GRANULOCYTES # BLD AUTO: 0.48 K/UL (ref 0–0.11)
IMM GRANULOCYTES NFR BLD AUTO: 3.6 % (ref 0–0.9)
LYMPHOCYTES # BLD AUTO: 1.8 K/UL (ref 1–4.8)
LYMPHOCYTES NFR BLD: 13.6 % (ref 22–41)
MCH RBC QN AUTO: 32.7 PG (ref 27–33)
MCHC RBC AUTO-ENTMCNC: 32.3 G/DL (ref 33.7–35.3)
MCV RBC AUTO: 101.4 FL (ref 81.4–97.8)
MONOCYTES # BLD AUTO: 1.11 K/UL (ref 0–0.85)
MONOCYTES NFR BLD AUTO: 8.4 % (ref 0–13.4)
NEUTROPHILS # BLD AUTO: 9.42 K/UL (ref 1.82–7.42)
NEUTROPHILS NFR BLD: 71.4 % (ref 44–72)
NRBC # BLD AUTO: 0 K/UL
NRBC BLD-RTO: 0 /100 WBC
PLATELET # BLD AUTO: 166 K/UL (ref 164–446)
PMV BLD AUTO: 9.7 FL (ref 9–12.9)
POTASSIUM SERPL-SCNC: 4.4 MMOL/L (ref 3.6–5.5)
RBC # BLD AUTO: 3.67 M/UL (ref 4.7–6.1)
SODIUM SERPL-SCNC: 135 MMOL/L (ref 135–145)
WBC # BLD AUTO: 13.2 K/UL (ref 4.8–10.8)

## 2018-03-02 PROCEDURE — 770006 HCHG ROOM/CARE - MED/SURG/GYN SEMI*

## 2018-03-02 PROCEDURE — 87040 BLOOD CULTURE FOR BACTERIA: CPT

## 2018-03-02 PROCEDURE — A9270 NON-COVERED ITEM OR SERVICE: HCPCS | Performed by: INTERNAL MEDICINE

## 2018-03-02 PROCEDURE — 71045 X-RAY EXAM CHEST 1 VIEW: CPT

## 2018-03-02 PROCEDURE — 51798 US URINE CAPACITY MEASURE: CPT

## 2018-03-02 PROCEDURE — 80048 BASIC METABOLIC PNL TOTAL CA: CPT

## 2018-03-02 PROCEDURE — 700102 HCHG RX REV CODE 250 W/ 637 OVERRIDE(OP): Performed by: INTERNAL MEDICINE

## 2018-03-02 PROCEDURE — 700102 HCHG RX REV CODE 250 W/ 637 OVERRIDE(OP): Performed by: HOSPITALIST

## 2018-03-02 PROCEDURE — A9270 NON-COVERED ITEM OR SERVICE: HCPCS | Performed by: HOSPITALIST

## 2018-03-02 PROCEDURE — A9270 NON-COVERED ITEM OR SERVICE: HCPCS | Performed by: FAMILY MEDICINE

## 2018-03-02 PROCEDURE — 700102 HCHG RX REV CODE 250 W/ 637 OVERRIDE(OP): Performed by: FAMILY MEDICINE

## 2018-03-02 PROCEDURE — 99232 SBSQ HOSP IP/OBS MODERATE 35: CPT | Performed by: INTERNAL MEDICINE

## 2018-03-02 PROCEDURE — 85025 COMPLETE CBC W/AUTO DIFF WBC: CPT

## 2018-03-02 PROCEDURE — 99231 SBSQ HOSP IP/OBS SF/LOW 25: CPT | Performed by: INTERNAL MEDICINE

## 2018-03-02 PROCEDURE — 36415 COLL VENOUS BLD VENIPUNCTURE: CPT

## 2018-03-02 RX ORDER — DIVALPROEX SODIUM 125 MG/1
125 CAPSULE, COATED PELLETS ORAL NIGHTLY PRN
Status: DISCONTINUED | OUTPATIENT
Start: 2018-03-02 | End: 2018-03-11

## 2018-03-02 RX ADMIN — LEVOTHYROXINE SODIUM 50 MCG: 50 TABLET ORAL at 05:15

## 2018-03-02 RX ADMIN — RIVAROXABAN 15 MG: 15 TABLET, FILM COATED ORAL at 17:09

## 2018-03-02 RX ADMIN — ASPIRIN 81 MG: 81 TABLET, CHEWABLE ORAL at 08:09

## 2018-03-02 RX ADMIN — AMIODARONE HYDROCHLORIDE 200 MG: 200 TABLET ORAL at 08:09

## 2018-03-02 RX ADMIN — ATORVASTATIN CALCIUM 20 MG: 20 TABLET, FILM COATED ORAL at 19:15

## 2018-03-02 RX ADMIN — POLYETHYLENE GLYCOL 3350 1 PACKET: 17 POWDER, FOR SOLUTION ORAL at 08:09

## 2018-03-02 ASSESSMENT — ENCOUNTER SYMPTOMS
FOCAL WEAKNESS: 0
FEVER: 0
CHILLS: 0
DIZZINESS: 0
ABDOMINAL PAIN: 0
SEIZURES: 0
SHORTNESS OF BREATH: 0
DIARRHEA: 0
WHEEZING: 0
HEADACHES: 0
SORE THROAT: 0
NAUSEA: 0
VOMITING: 0
BLURRED VISION: 0
SENSORY CHANGE: 0
COUGH: 0
HEARTBURN: 0

## 2018-03-02 ASSESSMENT — PAIN SCALES - GENERAL
PAINLEVEL_OUTOF10: 0

## 2018-03-02 NOTE — PROGRESS NOTES
Geriatric progress note     Today's Date: 3/2/2018  Patient Name: Rafia Shaikh  Current Attending: Keven Adrian M.D.    Mr. Shaikh is an 85 y/o male who presented to ED on 02/14/2018, the day after his discharge post TAVR with altered mental status, SSS, required emergent pacemaker placement, was placed on vasopressor, intubated for airway protection and successfully extubated on 02/26/2018.      Psychiatry and Neurology teams were consulted for confusion, an EEG showed diffuse cortical dysfunction and brain MRI and MRA suggested no acute pathologies.    Geriatrics medicine was consulted for progressive confusion and delirium which did not respond to Seroquel and Risperdal.  At baseline per his pre&post-op notes on his admission for TAVR, it appears that he has had mild confusion even prior to this admission.    Patient does live with his wife who also may have cognitive impairment. He visited his PCP on Nov 2017 asking for more help with ADLs,     Subjective:  24 hour Events:  Patient WBCs count has gone up to 13.2 from 12.0 yesterday, Absolute Neutrophils count has increased from 8.6 to 9.4 however still having no localizing source of  Infection. BUN still high but trending down. UA on 3/1/2018 negative for LE and Nitrites. We walked the patient today around his room, he has slow but steady gait     Nursing Report: He has been pleasant had bowel movement yesterday. He is continent of bowel and bladder and has reported no dysuria or urinary symptoms; there has been no fever. No impulsivity issues.     Patient Report:  He is oriented to self and city but not to year or month. He denies any pain. The pacemaker site itch is his only complaint. We walked the patient today around his room using the 2 wheels walker, he has slow but steady gait     Team Discussion: discussed with MD and AMADOU.      ROS: The items below were reported to be negative, unless otherwise noted above or is in bold type.  "  Constipation  Diarrhea  Urination  Pain  SOB  Fluctuation of mental status   Vision   Hearing  Nausea  Vomiting  Motor issues  Sensory issues  Memory issues  Itching on pacemaker site, better then yesterday         Objective:  Physical Exam:   BP 11/44   Pulse 60   Temp 36.6 °C (97.5 °F)   Resp 12   Ht 1.6 m (5' 2.99\")   Wt 58.7 kg (129 lb 6.6 oz)   SpO2 90% room air  BMI 22.93 kg/m²         Gen: NAD, laying pleasantly in bed, alert, oriented to hospital, situation and person,  Not to year and month.  Neck: Supple, did not appear to choke on apple juice.   Chest: Left upper chest with pacer in place, no tenderness.  Lungs: normal effort, clear breath sounds b/l  CV: regular rhythm, normal rate  Abd: Soft, BS+, no tenderness, no gaurding  Back: kyphosis, no CVA tenderness  Gait: Patient was able to stand slowly, he was able to walk slowly with his walker, his gait was slow, no dizziness or unsteadiness   Delirium Screen: positive, had difficulty with days of week forward today        Labs: Creatinine normalized down to 1.16 ; leukocytosis also slightly increased and now is 13.2        Assessment: 85 y/o male w/ a PMH of AS and PVD s/p TAVR on 2/12 and subsequent SSS requiring pacer placement. Course complicated by delirium.         Plan:  Delirium -  He still demonstrate inattention; he has had no behavioral issues and was able to sleep. The above suggest slowly improving delirium. It can take months for delirium to completely clear.   -his WBC is mildly elevated and continue to elevate, Urine analysis negative LE and Nitrites, not hemoconcentration given other cell lines. If his delirium worsens or fluctuates more, consider to repeat UA.and treat for UTI if positive. Also avoid Condom catheter as increase fall risk and UTI  -Will continue to hold off ACE-I, improved serum creatinine, continue to monitor   -having BM and urinating well, CTM  -no pain noted  -continue to address polypharmacy, Change Depakote " to PRN as patient delirium is stable   -prompted toileting  - ask patient if he needs to use the commode - avoid condom cath as that increases fall risk due to environmental hazards.   -up for meals, as nursing is already doing  -stand up as much as possible  -follow below precautions     Interventions to be considered in all patients in order to minimize the risk of delirium.   -do not disturb patient (vitals or lab draws) between the hours of 10 PM and 6 AM.  -ideally the patient should not sleep during the day and we should avoid day time naps.   -up in chair for meals  -ambulate at least three times daily, as able  -watch for constipation  -timed voiding - ask patient is she would like to go to the bathroom q 2-3 hours, except during the do not disturb hours.   -remove all necessary lines (central lines, peripheral IVs, feeding tubes, crawford catheters)  -unless patient has shown harm to self or others I would recommend against use of restraints - either chemical or physical (antipsychotics)   -minimize polypharmacy, do not dose medication during sleep hours   Recommendations with regard to using pharmacological restraints:   -please assess for effectiveness of antipsychotic/chemical restraint and use alternate therapy if not effective.   -Please try and use one antipsychotic/chemical restraint at a time.   -Please give it either to PO or IM route avoid IV.   -Please use the lowest effective dose for the shortest period of time.     HTN - Stable BP after discontinuation of ACE-I     Bowel Movements - He had a bowel movement yesterday which is not noted in flow sheet; Please encourage bowel regimen to avoid constipation as it may worsen Delirium    AS s/p TAVR  SSS s/p pacer - possible complication of TAVR  -rate controlled. On amiodarone. Now decreased to 200 mg per day. Unclear indication to continue now that he is paced. Consider to d/c, but defer to primary team and cardiology.   -on xarelto     Macrocytic  anemia  -anemia can play a role in fatigue.  -b12, b9, iron studies and TSH WNL  - Stable, continue to monitor  -minimize blood draws     Fall risk  -recommend to minimize environmental and pharmacological hazards  -vitamin D level of 32, no clear indication to supplement and avoiding polypharmacy     BETTY on CKD II  - Normalized after initial mild increase,  CTM while off ACEI   - Continue to monitor       Disposition -  is working on patient's placement in SNF. [facility] in [place] has accepted the patient and his wife but wife needs to privately pay since she does not meet criteria for SNF; psych has been consulted to evaluate the wife for decision making capacity. After placement in SNF and according to his level of function, he may need higher level of care like GH if needed.     We will continue to follow that patient along with you  Bay Richter.  Geriatrics fellow- [employer]  Please feel free to contact geriatrics team with any questions.  Extension [phone]   8:00-5:00 Monday - Friday (excluding holidays)

## 2018-03-02 NOTE — PROGRESS NOTES
Received pt report from night RN.  Assumed pt care.  A/Ox3- however confused at times.  Denies pain, n/v, n/t  Patient is SBA.  Medications given in MAR.  VSS, No s/sx of infection noted on assessment.  Updated pt on plan of care.  Answered all questions at this time.

## 2018-03-02 NOTE — CARE PLAN
Problem: Safety  Goal: Will remain free from injury  Safety precautions in place.  Bed locked and in low position. Call light within reach.     Problem: Infection  Goal: Will remain free from infection  No s/sx of infection noted upon assessment.

## 2018-03-02 NOTE — PROGRESS NOTES
aaox2-3.confused but pleasant.needs frequent reorientation.oob with assist.denies any pain.on telemonitor.patient able to sleep.continuing hourly rounding.

## 2018-03-02 NOTE — PROGRESS NOTES
[facility] Hospitalist Progress Note    Date of Service: 3/1/2018    Chief Complaint  84 y.o. male admitted 2018 with Encephalopathy, Atrial fibrillation with RVR, with sinus pause, Respiratory failure.    Interval Problem Update  Patient does admit being confused. He is disoriented to time. Currently, his confusion is improving as days go.  Creatinine is slightly worse, will check bladder scan.  White blood cells 12.0, compare to  11.2. Yesterday. Will check urinalysis  Plan for discharge for him and his wife is likely SNF, followed by a group home. Currently, the decision capacity of his wife is being evaluated.    Consultants/Specialty  Cardio - signed off  Geriatrics    Disposition  SNF vs Home health      Review of Systems   Constitutional: Negative for chills and fever.   HENT: Negative for hearing loss and sore throat.    Eyes: Negative for blurred vision.   Respiratory: Negative for cough, shortness of breath and wheezing.    Cardiovascular: Negative for chest pain and leg swelling.   Gastrointestinal: Negative for abdominal pain, diarrhea, heartburn, nausea and vomiting.   Genitourinary: Negative for dysuria.   Musculoskeletal: Positive for joint pain.   Neurological: Negative for dizziness, sensory change, focal weakness, seizures and headaches.      Physical Exam  Laboratory/Imaging   Hemodynamics  Temp (24hrs), Av.8 °C (98.2 °F), Min:36.1 °C (97 °F), Max:37.6 °C (99.6 °F)   Temperature: 36.6 °C (97.9 °F)  Pulse  Av.9  Min: 45  Max: 153    Blood Pressure : 110/57      Respiratory      Respiration: 16, Pulse Oximetry: 95 %     Work Of Breathing / Effort: Mild  RUL Breath Sounds: Clear, RML Breath Sounds: Diminished, RLL Breath Sounds: Diminished, WHIT Breath Sounds: Clear, LLL Breath Sounds: Diminished    Fluids    Intake/Output Summary (Last 24 hours) at 18 1700  Last data filed at 18 0900   Gross per 24 hour   Intake              480 ml   Output              450 ml   Net                30 ml       Nutrition  Orders Placed This Encounter   Procedures   • Diet Order     Standing Status:   Standing     Number of Occurrences:   1     Order Specific Question:   Diet:     Answer:   Cardiac [6]     Order Specific Question:   Texture/Fiber modifications:     Answer:   Dysphagia 2(Pureed/Chopped)specify fluid consistency(question 6) [2]     Order Specific Question:   Consistency/Fluid modifications:     Answer:   Thin Liquids [3]     Physical Exam   Constitutional: He is oriented to person, place, and time. He appears well-developed and well-nourished.   HENT:   Head: Normocephalic and atraumatic.   Eyes: Conjunctivae are normal. Pupils are equal, round, and reactive to light.   Neck: No tracheal deviation present. No thyromegaly present.   Cardiovascular: Normal rate and regular rhythm.    Pulmonary/Chest: Effort normal and breath sounds normal.   Abdominal: Soft. Bowel sounds are normal. He exhibits no distension. There is no tenderness.   Musculoskeletal: He exhibits no edema.   Lymphadenopathy:     He has no cervical adenopathy.   Neurological: He is alert and oriented to person, place, and time. No cranial nerve deficit.   MMT 5/5   Skin: Skin is warm and dry.   Nursing note and vitals reviewed.      Recent Labs      02/27/18   0328  02/28/18   0321  03/01/18   0322   WBC  10.3  11.2*  12.0*   RBC  3.55*  3.96*  3.52*   HEMOGLOBIN  11.8*  12.9*  11.8*   HEMATOCRIT  36.7*  40.6*  35.7*   MCV  103.4*  102.5*  101.4*   MCH  33.2*  32.6  33.5*   MCHC  32.2*  31.8*  33.1*   RDW  50.5*  50.8*  49.2   PLATELETCT  162*  187  171   MPV  9.7  9.8  9.7     Recent Labs      02/27/18   0328  02/28/18   0321  03/01/18   0322   SODIUM  136  134*  134*   POTASSIUM  4.3  4.6  4.6   CHLORIDE  105  106  103   CO2  22  25  24   GLUCOSE  106*  98  103*   BUN  26*  39*  42*   CREATININE  1.23  1.42*  1.45*   CALCIUM  10.0  10.2  10.4                      Assessment/Plan     * Cardiac arrest (CMS-[facility])- (present on  admission)   Assessment & Plan    History of. Secondary to SSS. S/P pacemaker        SSS (sick sinus syndrome) (CMS-Former[facility])- (present on admission)   Assessment & Plan    Temporary pacemaker initially, permanent pacemaker placement   - cont Amiodarone per cards recs.        Acute respiratory failure with hypoxia (CMS-[facility])- (present on admission)   Assessment & Plan    Intubated 2/14, Extubated 2/16.  No issue, on RA  Encourage IS, RT protocol        Bradycardia- (present on admission)   Assessment & Plan    secondary to SSS        Encephalopathy acute- (present on admission)   Assessment & Plan    - improving; Geriatrics recs appreciated  -Evaluated by psychiatry earlier, found to be incapacitated for decisions  - cont qHS Depakote per recs        Leukocytosis- (present on admission)   Assessment & Plan    ? Reactive  Will check UA        Hypomagnesemia- (present on admission)   Assessment & Plan    resolved        Hypophosphatemia- (present on admission)   Assessment & Plan    resolved        Macrocytosis- (present on admission)   Assessment & Plan    b12, folic wnl        S/P TAVR (transcatheter aortic valve replacement)- (present on admission)   Assessment & Plan    TAVR on 2/12  stable        Hypertension, essential- (present on admission)   Assessment & Plan    Discontinued Lisinopril   Creatinine is still worsening. Will check bladder scan.        Hypokalemia- (present on admission)   Assessment & Plan    Stable; monitoring        CAD (coronary artery disease)- (present on admission)   Assessment & Plan    History of CABG ×3  ASA, Lipitor.  Stable, no chest pain        Paroxysmal atrial fibrillation (CMS-[facility])- (present on admission)   Assessment & Plan    Amiodarone, Xarelto        Acquired hypothyroidism- (present on admission)   Assessment & Plan    Continue Synthroid   TSH 0.74      2 weeks ago.        Carotid artery stenosis- (present on admission)   Assessment & Plan    s/p L CEA  ASA, Lipitor           Quality-Core Measures   Reviewed items::  Radiology images reviewed, EKG reviewed, Labs reviewed and Medications reviewed  Baldwin catheter::  No Baldwin  DVT: Xarelto.  Ulcer Prophylaxis::  No

## 2018-03-02 NOTE — DISCHARGE PLANNING
Medical Social Worker    Pt has been accepted by [facility]. Pt is to DC there once pt's wife is also cleared to DC to [facility].     Add:    Pt discussed in rounds. Pt is not medically cleared today.

## 2018-03-03 LAB
ANION GAP SERPL CALC-SCNC: 8 MMOL/L (ref 0–11.9)
BASOPHILS # BLD AUTO: 0.6 % (ref 0–1.8)
BASOPHILS # BLD: 0.07 K/UL (ref 0–0.12)
BUN SERPL-MCNC: 29 MG/DL (ref 8–22)
CALCIUM SERPL-MCNC: 10 MG/DL (ref 8.5–10.5)
CHLORIDE SERPL-SCNC: 104 MMOL/L (ref 96–112)
CO2 SERPL-SCNC: 23 MMOL/L (ref 20–33)
CREAT SERPL-MCNC: 1.26 MG/DL (ref 0.5–1.4)
EOSINOPHIL # BLD AUTO: 0.33 K/UL (ref 0–0.51)
EOSINOPHIL NFR BLD: 3 % (ref 0–6.9)
ERYTHROCYTE [DISTWIDTH] IN BLOOD BY AUTOMATED COUNT: 49.5 FL (ref 35.9–50)
GLUCOSE SERPL-MCNC: 92 MG/DL (ref 65–99)
HCT VFR BLD AUTO: 38.8 % (ref 42–52)
HGB BLD-MCNC: 12.4 G/DL (ref 14–18)
IMM GRANULOCYTES # BLD AUTO: 0.37 K/UL (ref 0–0.11)
IMM GRANULOCYTES NFR BLD AUTO: 3.3 % (ref 0–0.9)
LYMPHOCYTES # BLD AUTO: 2.32 K/UL (ref 1–4.8)
LYMPHOCYTES NFR BLD: 20.8 % (ref 22–41)
MCH RBC QN AUTO: 32.2 PG (ref 27–33)
MCHC RBC AUTO-ENTMCNC: 32 G/DL (ref 33.7–35.3)
MCV RBC AUTO: 100.8 FL (ref 81.4–97.8)
MONOCYTES # BLD AUTO: 1.04 K/UL (ref 0–0.85)
MONOCYTES NFR BLD AUTO: 9.3 % (ref 0–13.4)
NEUTROPHILS # BLD AUTO: 7.01 K/UL (ref 1.82–7.42)
NEUTROPHILS NFR BLD: 63 % (ref 44–72)
NRBC # BLD AUTO: 0 K/UL
NRBC BLD-RTO: 0 /100 WBC
PLATELET # BLD AUTO: 172 K/UL (ref 164–446)
PMV BLD AUTO: 9.9 FL (ref 9–12.9)
POTASSIUM SERPL-SCNC: 4.3 MMOL/L (ref 3.6–5.5)
PROCALCITONIN SERPL-MCNC: 0.06 NG/ML
RBC # BLD AUTO: 3.85 M/UL (ref 4.7–6.1)
SODIUM SERPL-SCNC: 135 MMOL/L (ref 135–145)
WBC # BLD AUTO: 11.1 K/UL (ref 4.8–10.8)

## 2018-03-03 PROCEDURE — A9270 NON-COVERED ITEM OR SERVICE: HCPCS | Performed by: INTERNAL MEDICINE

## 2018-03-03 PROCEDURE — A9270 NON-COVERED ITEM OR SERVICE: HCPCS | Performed by: HOSPITALIST

## 2018-03-03 PROCEDURE — A9270 NON-COVERED ITEM OR SERVICE: HCPCS | Performed by: FAMILY MEDICINE

## 2018-03-03 PROCEDURE — 99232 SBSQ HOSP IP/OBS MODERATE 35: CPT | Performed by: INTERNAL MEDICINE

## 2018-03-03 PROCEDURE — 80048 BASIC METABOLIC PNL TOTAL CA: CPT

## 2018-03-03 PROCEDURE — 700102 HCHG RX REV CODE 250 W/ 637 OVERRIDE(OP): Performed by: INTERNAL MEDICINE

## 2018-03-03 PROCEDURE — 84145 PROCALCITONIN (PCT): CPT

## 2018-03-03 PROCEDURE — 770006 HCHG ROOM/CARE - MED/SURG/GYN SEMI*

## 2018-03-03 PROCEDURE — 700102 HCHG RX REV CODE 250 W/ 637 OVERRIDE(OP): Performed by: HOSPITALIST

## 2018-03-03 PROCEDURE — 36415 COLL VENOUS BLD VENIPUNCTURE: CPT

## 2018-03-03 PROCEDURE — 85025 COMPLETE CBC W/AUTO DIFF WBC: CPT

## 2018-03-03 PROCEDURE — 700102 HCHG RX REV CODE 250 W/ 637 OVERRIDE(OP): Performed by: FAMILY MEDICINE

## 2018-03-03 RX ADMIN — ASPIRIN 81 MG: 81 TABLET, CHEWABLE ORAL at 09:13

## 2018-03-03 RX ADMIN — LEVOTHYROXINE SODIUM 50 MCG: 50 TABLET ORAL at 05:26

## 2018-03-03 RX ADMIN — ATORVASTATIN CALCIUM 20 MG: 20 TABLET, FILM COATED ORAL at 19:20

## 2018-03-03 RX ADMIN — AMIODARONE HYDROCHLORIDE 200 MG: 200 TABLET ORAL at 09:13

## 2018-03-03 RX ADMIN — RIVAROXABAN 15 MG: 15 TABLET, FILM COATED ORAL at 17:19

## 2018-03-03 RX ADMIN — POLYETHYLENE GLYCOL 3350 1 PACKET: 17 POWDER, FOR SOLUTION ORAL at 09:16

## 2018-03-03 ASSESSMENT — ENCOUNTER SYMPTOMS
SEIZURES: 0
FEVER: 0
CHILLS: 0
COUGH: 0
SENSORY CHANGE: 0
FOCAL WEAKNESS: 0
DIZZINESS: 0
VOMITING: 0
HEADACHES: 0
SHORTNESS OF BREATH: 0
WHEEZING: 0
ABDOMINAL PAIN: 0
SORE THROAT: 0
DIARRHEA: 0
NAUSEA: 0
BLURRED VISION: 0
HEARTBURN: 0

## 2018-03-03 ASSESSMENT — PAIN SCALES - GENERAL
PAINLEVEL_OUTOF10: 0

## 2018-03-03 NOTE — PROGRESS NOTES
[facility] Hospitalist Progress Note    Date of Service: 3/2/2018    Chief Complaint  84 y.o. male admitted 2018 with Encephalopathy, Atrial fibrillation with RVR, with sinus pause, Respiratory failure.    Interval Problem Update  Patient was ambulating with a walker in AM. Currently, he is taking a nap, looks sleepy. Oriented ×2. Denies physical pain. His white blood cells is 13 today. He is afebrile. There is no signs of infection. He does have cough, dry. However, his lungs sound clear in his chest x-ray yesterday did not show infiltrates. Urinalysis is not concerning for an infection. Pacemaker site does not look infected at all. No fluctuance. She does have flourishing yellowish hematomas in the upper part of the chest left and right, that might because of white blood cells evaluation. Will check blood culture, but defer antibiotic for now.  Recheck CBC, pro-calcitonin tomorrow.    Consultants/Specialty  Cardio - signed off  Geriatrics    Disposition  SNF vs Home health      Review of Systems   Constitutional: Negative for chills and fever.   HENT: Negative for hearing loss and sore throat.    Eyes: Negative for blurred vision.   Respiratory: Negative for cough, shortness of breath and wheezing.    Cardiovascular: Negative for chest pain and leg swelling.   Gastrointestinal: Negative for abdominal pain, diarrhea, heartburn, nausea and vomiting.   Genitourinary: Negative for dysuria.   Musculoskeletal: Positive for joint pain.   Neurological: Negative for dizziness, sensory change, focal weakness, seizures and headaches.      Physical Exam  Laboratory/Imaging   Hemodynamics  Temp (24hrs), Av.9 °C (98.5 °F), Min:36.5 °C (97.7 °F), Max:37.7 °C (99.8 °F)   Temperature: 36.5 °C (97.7 °F)  Pulse  Av.9  Min: 45  Max: 153    Blood Pressure : 148/68      Respiratory      Respiration: 12, Pulse Oximetry: 94 %     Work Of Breathing / Effort: Mild  RUL Breath Sounds: Clear, RML Breath Sounds: Diminished, RLL  Breath Sounds: Diminished, WHIT Breath Sounds: Clear, LLL Breath Sounds: Diminished    Fluids    Intake/Output Summary (Last 24 hours) at 03/02/18 1737  Last data filed at 03/02/18 0400   Gross per 24 hour   Intake                0 ml   Output              520 ml   Net             -520 ml       Nutrition  Orders Placed This Encounter   Procedures   • Diet Order     Standing Status:   Standing     Number of Occurrences:   1     Order Specific Question:   Diet:     Answer:   Cardiac [6]     Order Specific Question:   Texture/Fiber modifications:     Answer:   Dysphagia 2(Pureed/Chopped)specify fluid consistency(question 6) [2]     Order Specific Question:   Consistency/Fluid modifications:     Answer:   Thin Liquids [3]     Physical Exam   Constitutional: He is oriented to person, place, and time. He appears well-developed and well-nourished.   HENT:   Head: Normocephalic and atraumatic.   Eyes: Conjunctivae are normal. Pupils are equal, round, and reactive to light.   Neck: No tracheal deviation present. No thyromegaly present.   Cardiovascular: Normal rate and regular rhythm.    Pacemaker site in the upper left chest looks noninfected, without erythema, edema, fluctuance , or tenderness to palpation   Pulmonary/Chest: Effort normal and breath sounds normal.   Abdominal: Soft. Bowel sounds are normal. He exhibits no distension. There is no tenderness.   Musculoskeletal: He exhibits no edema.   Lymphadenopathy:     He has no cervical adenopathy.   Neurological: He is alert and oriented to person, place, and time. No cranial nerve deficit.   MMT 5/5   Skin: Skin is warm and dry.   Old sq Hematomas in the upper chest and right shoulder   Nursing note and vitals reviewed.      Recent Labs      02/28/18   0321  03/01/18   0322  03/02/18   0226   WBC  11.2*  12.0*  13.2*   RBC  3.96*  3.52*  3.67*   HEMOGLOBIN  12.9*  11.8*  12.0*   HEMATOCRIT  40.6*  35.7*  37.2*   MCV  102.5*  101.4*  101.4*   MCH  32.6  33.5*  32.7    MCHC  31.8*  33.1*  32.3*   RDW  50.8*  49.2  50.2*   PLATELETCT  187  171  166   MPV  9.8  9.7  9.7     Recent Labs      02/28/18   0321  03/01/18 0322 03/02/18   0226   SODIUM  134*  134*  135   POTASSIUM  4.6  4.6  4.4   CHLORIDE  106  103  102   CO2  25  24  24   GLUCOSE  98  103*  92   BUN  39*  42*  31*   CREATININE  1.42*  1.45*  1.16   CALCIUM  10.2  10.4  9.8                      Assessment/Plan     * Cardiac arrest (CMS-[facility])- (present on admission)   Assessment & Plan    History of. Secondary to SSS. S/P pacemaker        SSS (sick sinus syndrome) (CMS-[facility])- (present on admission)   Assessment & Plan    Temporary pacemaker initially, permanent pacemaker placement   - cont Amiodarone per cards recs.        Acute respiratory failure with hypoxia (CMS-[facility])- (present on admission)   Assessment & Plan    Intubated 2/14, Extubated 2/16.  No issue, on RA  Encourage IS, RT protocol        Bradycardia- (present on admission)   Assessment & Plan    secondary to SSS        Encephalopathy acute- (present on admission)   Assessment & Plan    - improving; Geriatrics recs appreciated  -Evaluated by psychiatry earlier, found to be incapacitated for decisions  - cont qHS prn Depakote per recs        Leukocytosis- (present on admission)   Assessment & Plan    No signs of infection. UA is negative for infection. Chest x-ray did not show infiltrates. Pacemaker site does not look infected. We'll check blood culture. Monitor for now.        Hypomagnesemia- (present on admission)   Assessment & Plan    resolved        Hypophosphatemia- (present on admission)   Assessment & Plan    resolved        Macrocytosis- (present on admission)   Assessment & Plan    b12, folic wnl        S/P TAVR (transcatheter aortic valve replacement)- (present on admission)   Assessment & Plan    TAVR on 2/12  stable        Hypertension, essential- (present on admission)   Assessment & Plan    Discontinued Lisinopril   Creatinine is still worsening.  Will check bladder scan.        Hypokalemia- (present on admission)   Assessment & Plan    Stable; monitoring        CAD (coronary artery disease)- (present on admission)   Assessment & Plan    History of CABG ×3  ASA, Lipitor.  Stable, no chest pain        Paroxysmal atrial fibrillation (CMS-HCC)- (present on admission)   Assessment & Plan    Amiodarone, Xarelto        Acquired hypothyroidism- (present on admission)   Assessment & Plan    Continue Synthroid   TSH 0.74      2 weeks ago.        Carotid artery stenosis- (present on admission)   Assessment & Plan    s/p L CEA  ASA, Lipitor          Quality-Core Measures   Reviewed items::  Radiology images reviewed, EKG reviewed, Labs reviewed and Medications reviewed  Baldwin catheter::  No Baldwin  DVT: Xarelto.  Ulcer Prophylaxis::  No

## 2018-03-03 NOTE — PROGRESS NOTES
aaox2-3.confused.needs reorientation and education.denies any pain.pacemaker site CDI and no sign and symptom of infection.contuing hourly rounding.

## 2018-03-04 LAB
ANION GAP SERPL CALC-SCNC: 5 MMOL/L (ref 0–11.9)
BASOPHILS # BLD AUTO: 1 % (ref 0–1.8)
BASOPHILS # BLD: 0.08 K/UL (ref 0–0.12)
BUN SERPL-MCNC: 26 MG/DL (ref 8–22)
CALCIUM SERPL-MCNC: 9.5 MG/DL (ref 8.5–10.5)
CHLORIDE SERPL-SCNC: 102 MMOL/L (ref 96–112)
CO2 SERPL-SCNC: 24 MMOL/L (ref 20–33)
CREAT SERPL-MCNC: 1.21 MG/DL (ref 0.5–1.4)
EOSINOPHIL # BLD AUTO: 0.36 K/UL (ref 0–0.51)
EOSINOPHIL NFR BLD: 4.5 % (ref 0–6.9)
ERYTHROCYTE [DISTWIDTH] IN BLOOD BY AUTOMATED COUNT: 47.8 FL (ref 35.9–50)
GLUCOSE SERPL-MCNC: 96 MG/DL (ref 65–99)
HCT VFR BLD AUTO: 35.9 % (ref 42–52)
HGB BLD-MCNC: 12 G/DL (ref 14–18)
IMM GRANULOCYTES # BLD AUTO: 0.29 K/UL (ref 0–0.11)
IMM GRANULOCYTES NFR BLD AUTO: 3.6 % (ref 0–0.9)
LYMPHOCYTES # BLD AUTO: 1.57 K/UL (ref 1–4.8)
LYMPHOCYTES NFR BLD: 19.6 % (ref 22–41)
MCH RBC QN AUTO: 33.2 PG (ref 27–33)
MCHC RBC AUTO-ENTMCNC: 33.4 G/DL (ref 33.7–35.3)
MCV RBC AUTO: 99.4 FL (ref 81.4–97.8)
MONOCYTES # BLD AUTO: 0.89 K/UL (ref 0–0.85)
MONOCYTES NFR BLD AUTO: 11.1 % (ref 0–13.4)
NEUTROPHILS # BLD AUTO: 4.82 K/UL (ref 1.82–7.42)
NEUTROPHILS NFR BLD: 60.2 % (ref 44–72)
NRBC # BLD AUTO: 0 K/UL
NRBC BLD-RTO: 0 /100 WBC
PLATELET # BLD AUTO: 154 K/UL (ref 164–446)
PMV BLD AUTO: 10.1 FL (ref 9–12.9)
POTASSIUM SERPL-SCNC: 4.1 MMOL/L (ref 3.6–5.5)
RBC # BLD AUTO: 3.61 M/UL (ref 4.7–6.1)
SODIUM SERPL-SCNC: 131 MMOL/L (ref 135–145)
WBC # BLD AUTO: 8 K/UL (ref 4.8–10.8)

## 2018-03-04 PROCEDURE — 80048 BASIC METABOLIC PNL TOTAL CA: CPT

## 2018-03-04 PROCEDURE — A9270 NON-COVERED ITEM OR SERVICE: HCPCS | Performed by: FAMILY MEDICINE

## 2018-03-04 PROCEDURE — A9270 NON-COVERED ITEM OR SERVICE: HCPCS | Performed by: INTERNAL MEDICINE

## 2018-03-04 PROCEDURE — 770006 HCHG ROOM/CARE - MED/SURG/GYN SEMI*

## 2018-03-04 PROCEDURE — 700102 HCHG RX REV CODE 250 W/ 637 OVERRIDE(OP): Performed by: HOSPITALIST

## 2018-03-04 PROCEDURE — 700102 HCHG RX REV CODE 250 W/ 637 OVERRIDE(OP): Performed by: INTERNAL MEDICINE

## 2018-03-04 PROCEDURE — A9270 NON-COVERED ITEM OR SERVICE: HCPCS | Performed by: HOSPITALIST

## 2018-03-04 PROCEDURE — 700102 HCHG RX REV CODE 250 W/ 637 OVERRIDE(OP): Performed by: FAMILY MEDICINE

## 2018-03-04 PROCEDURE — 99232 SBSQ HOSP IP/OBS MODERATE 35: CPT | Performed by: INTERNAL MEDICINE

## 2018-03-04 PROCEDURE — 36415 COLL VENOUS BLD VENIPUNCTURE: CPT

## 2018-03-04 PROCEDURE — 85025 COMPLETE CBC W/AUTO DIFF WBC: CPT

## 2018-03-04 RX ADMIN — LEVOTHYROXINE SODIUM 50 MCG: 50 TABLET ORAL at 05:21

## 2018-03-04 RX ADMIN — POLYETHYLENE GLYCOL 3350 1 PACKET: 17 POWDER, FOR SOLUTION ORAL at 09:05

## 2018-03-04 RX ADMIN — ATORVASTATIN CALCIUM 20 MG: 20 TABLET, FILM COATED ORAL at 19:18

## 2018-03-04 RX ADMIN — ASPIRIN 81 MG: 81 TABLET, CHEWABLE ORAL at 09:04

## 2018-03-04 RX ADMIN — RIVAROXABAN 15 MG: 15 TABLET, FILM COATED ORAL at 17:49

## 2018-03-04 RX ADMIN — AMIODARONE HYDROCHLORIDE 200 MG: 200 TABLET ORAL at 09:04

## 2018-03-04 ASSESSMENT — ENCOUNTER SYMPTOMS
DIZZINESS: 0
HEADACHES: 0
FEVER: 0
DIARRHEA: 0
CHILLS: 0
FOCAL WEAKNESS: 0
SHORTNESS OF BREATH: 0
WHEEZING: 0
SORE THROAT: 0
BLURRED VISION: 0
HEARTBURN: 0
SEIZURES: 0
ABDOMINAL PAIN: 0
VOMITING: 0
COUGH: 0
SENSORY CHANGE: 0
NAUSEA: 0

## 2018-03-04 ASSESSMENT — PAIN SCALES - GENERAL
PAINLEVEL_OUTOF10: 0
PAINLEVEL_OUTOF10: 0

## 2018-03-04 NOTE — PROGRESS NOTES
[facility] Hospitalist Progress Note    Date of Service: 3/3/2018    Chief Complaint  84 y.o. male admitted 2018 with Encephalopathy, Atrial fibrillation with RVR, with sinus pause, Respiratory failure.    Interval Problem Update  Patient denies pain. Mild itchiness in the area of upper left chest, at the pacemaker placement site.  Alert oriented ×2. White blood cells went down. Afebrile. No Signs of infection    Consultants/Specialty  Cardio - signed off  Geriatrics    Disposition  SNF vs Home health      Review of Systems   Constitutional: Negative for chills and fever.   HENT: Negative for hearing loss and sore throat.    Eyes: Negative for blurred vision.   Respiratory: Negative for cough, shortness of breath and wheezing.    Cardiovascular: Negative for chest pain and leg swelling.   Gastrointestinal: Negative for abdominal pain, diarrhea, heartburn, nausea and vomiting.   Genitourinary: Negative for dysuria.   Musculoskeletal: Positive for joint pain.   Neurological: Negative for dizziness, sensory change, focal weakness, seizures and headaches.      Physical Exam  Laboratory/Imaging   Hemodynamics  Temp (24hrs), Av.5 °C (97.7 °F), Min:36.3 °C (97.4 °F), Max:36.7 °C (98 °F)   Temperature: 36.3 °C (97.4 °F)  Pulse  Av.9  Min: 45  Max: 153    Blood Pressure : 122/62      Respiratory      Respiration: 12, Pulse Oximetry: 94 %             Fluids    Intake/Output Summary (Last 24 hours) at 18 1907  Last data filed at 18 2115   Gross per 24 hour   Intake                0 ml   Output              150 ml   Net             -150 ml       Nutrition  Orders Placed This Encounter   Procedures   • Diet Order     Standing Status:   Standing     Number of Occurrences:   1     Order Specific Question:   Diet:     Answer:   Cardiac [6]     Order Specific Question:   Texture/Fiber modifications:     Answer:   Dysphagia 2(Pureed/Chopped)specify fluid consistency(question 6) [2]     Order Specific Question:    Consistency/Fluid modifications:     Answer:   Thin Liquids [3]     Physical Exam   Constitutional: He is oriented to person, place, and time. He appears well-developed and well-nourished.   HENT:   Head: Normocephalic and atraumatic.   Eyes: Conjunctivae are normal. Pupils are equal, round, and reactive to light.   Neck: No tracheal deviation present. No thyromegaly present.   Cardiovascular: Normal rate and regular rhythm.    Pacemaker site in the upper left chest looks noninfected, without erythema, edema, fluctuance , or tenderness to palpation   Pulmonary/Chest: Effort normal and breath sounds normal.   Abdominal: Soft. Bowel sounds are normal. He exhibits no distension. There is no tenderness.   Musculoskeletal: He exhibits no edema.   Lymphadenopathy:     He has no cervical adenopathy.   Neurological: He is alert and oriented to person, place, and time. No cranial nerve deficit.   MMT 5/5   Skin: Skin is warm and dry.   Old sq Hematomas in the upper chest and right shoulder   Nursing note and vitals reviewed.      Recent Labs      03/01/18 0322 03/02/18 0226 03/03/18   0307   WBC  12.0*  13.2*  11.1*   RBC  3.52*  3.67*  3.85*   HEMOGLOBIN  11.8*  12.0*  12.4*   HEMATOCRIT  35.7*  37.2*  38.8*   MCV  101.4*  101.4*  100.8*   MCH  33.5*  32.7  32.2   MCHC  33.1*  32.3*  32.0*   RDW  49.2  50.2*  49.5   PLATELETCT  171  166  172   MPV  9.7  9.7  9.9     Recent Labs      03/01/18 0322  03/02/18 0226 03/03/18   0307   SODIUM  134*  135  135   POTASSIUM  4.6  4.4  4.3   CHLORIDE  103  102  104   CO2  24  24  23   GLUCOSE  103*  92  92   BUN  42*  31*  29*   CREATININE  1.45*  1.16  1.26   CALCIUM  10.4  9.8  10.0                      Assessment/Plan     * Cardiac arrest (CMS-HCC)- (present on admission)   Assessment & Plan    History of. Secondary to SSS. S/P pacemaker        SSS (sick sinus syndrome) (CMS-HCC)- (present on admission)   Assessment & Plan    Temporary pacemaker initially, permanent  pacemaker placement   - cont Amiodarone per cards recs.        Acute respiratory failure with hypoxia (CMS-HCC)- (present on admission)   Assessment & Plan    Intubated 2/14, Extubated 2/16.  No issue, on RA  Encourage IS, RT protocol        Bradycardia- (present on admission)   Assessment & Plan    secondary to SSS        Encephalopathy acute- (present on admission)   Assessment & Plan    - improving; Geriatrics recs appreciated  -Evaluated by psychiatry earlier, found to be incapacitated for decisions  - cont qHS prn Depakote per recs        Leukocytosis- (present on admission)   Assessment & Plan    No signs of infection. UA is negative for infection. Chest x-ray did not show infiltrates. Pacemaker site does not look infected. We'll check blood culture. Monitor for now.        Hypomagnesemia- (present on admission)   Assessment & Plan    resolved        Hypophosphatemia- (present on admission)   Assessment & Plan    resolved        Macrocytosis- (present on admission)   Assessment & Plan    b12, folic wnl        S/P TAVR (transcatheter aortic valve replacement)- (present on admission)   Assessment & Plan    TAVR on 2/12  stable        Hypertension, essential- (present on admission)   Assessment & Plan    Discontinued Lisinopril   Creatinine is still worsening. Will check bladder scan.        Hypokalemia- (present on admission)   Assessment & Plan    Stable; monitoring        CAD (coronary artery disease)- (present on admission)   Assessment & Plan    History of CABG ×3  ASA, Lipitor.  Stable, no chest pain        Paroxysmal atrial fibrillation (CMS-HCC)- (present on admission)   Assessment & Plan    Amiodarone, Xarelto        Acquired hypothyroidism- (present on admission)   Assessment & Plan    Continue Synthroid   TSH 0.74      2 weeks ago.        Carotid artery stenosis- (present on admission)   Assessment & Plan    s/p L CEA  ASA, Lipitor          Quality-Core Measures   Reviewed items::  Radiology images  reviewed, EKG reviewed, Labs reviewed and Medications reviewed  Baldwin catheter::  No Baldwin  DVT: Xarelto.  Ulcer Prophylaxis::  No

## 2018-03-04 NOTE — PROGRESS NOTES
Neuroscience Shift Summary: 8031-3441  Orientation- Alert and oriented to self and place  Incision- NA  Lines/Drains- NA  Telemetry- NA  Pain- NA  Diet-  Dysphagia two, thin liquids  Discharge Plan- Long term care facility    Concerns-  No acute concerns.

## 2018-03-04 NOTE — PROGRESS NOTES
Aaox2.confused.needs constant reorientation and education.generalized weakness.oob with assist.continuing hourly rounding.

## 2018-03-05 PROCEDURE — A9270 NON-COVERED ITEM OR SERVICE: HCPCS | Performed by: INTERNAL MEDICINE

## 2018-03-05 PROCEDURE — 770006 HCHG ROOM/CARE - MED/SURG/GYN SEMI*

## 2018-03-05 PROCEDURE — 700102 HCHG RX REV CODE 250 W/ 637 OVERRIDE(OP): Performed by: INTERNAL MEDICINE

## 2018-03-05 PROCEDURE — 700102 HCHG RX REV CODE 250 W/ 637 OVERRIDE(OP): Performed by: FAMILY MEDICINE

## 2018-03-05 PROCEDURE — A9270 NON-COVERED ITEM OR SERVICE: HCPCS | Performed by: HOSPITALIST

## 2018-03-05 PROCEDURE — 700102 HCHG RX REV CODE 250 W/ 637 OVERRIDE(OP): Performed by: HOSPITALIST

## 2018-03-05 PROCEDURE — 97116 GAIT TRAINING THERAPY: CPT

## 2018-03-05 PROCEDURE — 99231 SBSQ HOSP IP/OBS SF/LOW 25: CPT | Performed by: INTERNAL MEDICINE

## 2018-03-05 PROCEDURE — A9270 NON-COVERED ITEM OR SERVICE: HCPCS | Performed by: FAMILY MEDICINE

## 2018-03-05 PROCEDURE — 97535 SELF CARE MNGMENT TRAINING: CPT

## 2018-03-05 PROCEDURE — 97110 THERAPEUTIC EXERCISES: CPT

## 2018-03-05 RX ADMIN — LEVOTHYROXINE SODIUM 50 MCG: 50 TABLET ORAL at 05:19

## 2018-03-05 RX ADMIN — ATORVASTATIN CALCIUM 20 MG: 20 TABLET, FILM COATED ORAL at 23:13

## 2018-03-05 RX ADMIN — ASPIRIN 81 MG: 81 TABLET, CHEWABLE ORAL at 08:20

## 2018-03-05 RX ADMIN — AMIODARONE HYDROCHLORIDE 200 MG: 200 TABLET ORAL at 08:20

## 2018-03-05 RX ADMIN — POLYETHYLENE GLYCOL 3350 1 PACKET: 17 POWDER, FOR SOLUTION ORAL at 08:20

## 2018-03-05 RX ADMIN — RIVAROXABAN 15 MG: 15 TABLET, FILM COATED ORAL at 18:12

## 2018-03-05 ASSESSMENT — COGNITIVE AND FUNCTIONAL STATUS - GENERAL
DAILY ACTIVITIY SCORE: 20
STANDING UP FROM CHAIR USING ARMS: A LITTLE
HELP NEEDED FOR BATHING: A LITTLE
DRESSING REGULAR UPPER BODY CLOTHING: A LITTLE
WALKING IN HOSPITAL ROOM: A LITTLE
TOILETING: A LITTLE
SUGGESTED CMS G CODE MODIFIER MOBILITY: CK
TURNING FROM BACK TO SIDE WHILE IN FLAT BAD: A LITTLE
MOVING FROM LYING ON BACK TO SITTING ON SIDE OF FLAT BED: A LITTLE
CLIMB 3 TO 5 STEPS WITH RAILING: A LOT
MOBILITY SCORE: 17
MOVING TO AND FROM BED TO CHAIR: A LITTLE
DRESSING REGULAR LOWER BODY CLOTHING: A LITTLE
SUGGESTED CMS G CODE MODIFIER DAILY ACTIVITY: CJ

## 2018-03-05 ASSESSMENT — ENCOUNTER SYMPTOMS
CHILLS: 0
HEARTBURN: 0
FEVER: 0
VOMITING: 0
SENSORY CHANGE: 0
NAUSEA: 0
HEADACHES: 0
SORE THROAT: 0
WHEEZING: 0
FOCAL WEAKNESS: 0
SEIZURES: 0
COUGH: 0
BLURRED VISION: 0
DIARRHEA: 0
SHORTNESS OF BREATH: 0
ABDOMINAL PAIN: 0
DIZZINESS: 0
PSYCHIATRIC NEGATIVE: 1
MUSCULOSKELETAL NEGATIVE: 1

## 2018-03-05 ASSESSMENT — PAIN SCALES - GENERAL
PAINLEVEL_OUTOF10: 0

## 2018-03-05 ASSESSMENT — GAIT ASSESSMENTS
DISTANCE (FEET): 40
GAIT LEVEL OF ASSIST: CONTACT GUARD ASSIST
ASSISTIVE DEVICE: FRONT WHEEL WALKER
DEVIATION: BRADYKINETIC;SHUFFLED GAIT

## 2018-03-05 NOTE — PROGRESS NOTES
Geriatric progress note     Today's Date: 3/2/2018  Patient Name: Rafia Shaikh  Current Attending: Keven Adrian M.D.    Mr. Shaikh is an 85 y/o male who presented to ED on 02/14/2018, the day after his discharge post TAVR with altered mental status, SSS, required emergent pacemaker placement, was placed on vasopressor, intubated for airway protection and successfully extubated on 02/26/2018.      Psychiatry and Neurology teams were consulted for confusion, an EEG showed diffuse cortical dysfunction and brain MRI and MRA suggested no acute pathologies.    Geriatrics medicine was consulted for progressive confusion and delirium which did not respond to Seroquel and Risperdal.  At baseline per his pre&post-op notes on his admission for TAVR, it appears that he has had mild confusion even prior to this admission.    Patient does live with his wife who also may have cognitive impairment. He visited his PCP on Nov 2017 asking for more help with ADLs,     Subjective:  24 hour Events:  His leukocytosis has now resolved and he has had no fever or any other signs or symptoms of localized infection. His creatinine has remained stable. He has not required any Depakote since it was switched to PRN on 03/01/2018.       Nursing Report: He has been pleasant. He insist on being on his personal clothes and would wear the gown only if insisted by the staff. This morning his chair alarm was off, he walked to the bathroom and back to the chair by himself by holding on to objects. Fortunately he did not sustain any falls or injury.      Patient Report:  He is oriented to person, place and situation but not to year or month. He denies any pain. The pacemaker site itch hs improved and almost resolvedt. He told the geriatrics team about his independent walk to the bathroom this morning with a grin on his face but pleasantly accepted to ask for help in future due to the risk of fall.   He denies any hallucination and is aware of his  "disorientation to time.     Team Discussion: discussed with MD.      ROS: The items below were reported to be negative, unless otherwise noted above or is in bold type.   Constipation  Diarrhea  Urination  Pain  SOB  Fluctuation of mental status   Vision   Hearing  Nausea  Vomiting  Motor issues  Sensory issues  Memory issues  Itching on pacemaker site, better then yesterday         Objective:  Physical Exam:   BP 11/44   Pulse 60   Temp 36.6 °C (97.5 °F)   Resp 12   Ht 1.6 m (5' 2.99\")   Wt 58.7 kg (129 lb 6.6 oz)   SpO2 90% room air  BMI 22.93 kg/m²         Gen: NAD, laying pleasantly in bed, alert, oriented to hospital, situation and person,  Not to year and month.  Neck: Supple, did not appear to choke on apple juice.   Chest: Left upper chest with pacer in place, the small area of bruising is resolving, no tenderness.  Lungs: normal effort, clear breath sounds b/l  CV: regular rhythm, normal rate  Abd: Soft, BS+, no tenderness, no gaurding  Back: kyphosis, no CVA tenderness  Gait: Patient was assisted by nurse to walk from a chair close to the door to a chair at his bedside without any assistive device. He walked slowly but with steady gait.  Delirium Screen: Improvement is evident by better speech speed and less pausing; Screen is stilll positive, he was able to state the days of the week forward with cues but had difficulty with days of week backward         Labs: Creatinine has remained stable 1.21; mild hyponatremia: 131 ; leukocytosis has resolved; bands still present only 3.6%        Assessment: 83 y/o male w/ a PMH of AS and PVD s/p TAVR on 2/12 and subsequent SSS requiring pacer placement. Course complicated by delirium.         Plan:  Delirium - Improvement is evident by better speech speed and less pausing. He still demonstrate inattention; he has had no behavioral issues over the weekend and has not required any of the PRN Depakote dose; He has been able to sleep. The above suggest slowly " improving delirium. It can take months for delirium to completely clear.   -his WBC count is normal now and he has no signs or symptoms of localized infection.  -Will continue to hold off ACE-I, improved serum creatinine, continue to monitor   -having BM and urinating well, CTM  -no pain noted  -continue to address polypharmacy, Change Depakote to PRN as patient delirium is stable   -prompted toileting  - ask patient if he needs to use the commode - avoid condom cath as that increases fall risk due to environmental hazards.   -up for meals, as nursing is already doing  -stand up as much as possible  -follow below precautions     Interventions to be considered in all patients in order to minimize the risk of delirium.   -do not disturb patient (vitals or lab draws) between the hours of 10 PM and 6 AM.  -ideally the patient should not sleep during the day and we should avoid day time naps.   -up in chair for meals  -ambulate at least three times daily, as able  -watch for constipation  -timed voiding - ask patient is she would like to go to the bathroom q 2-3 hours, except during the do not disturb hours.   -remove all necessary lines (central lines, peripheral IVs, feeding tubes, crawford catheters)  -unless patient has shown harm to self or others I would recommend against use of restraints - either chemical or physical (antipsychotics)   -minimize polypharmacy, do not dose medication during sleep hours   Recommendations with regard to using pharmacological restraints:   -please assess for effectiveness of antipsychotic/chemical restraint and use alternate therapy if not effective.   -Please try and use one antipsychotic/chemical restraint at a time.   -Please give it either to PO or IM route avoid IV.   -Please use the lowest effective dose for the shortest period of time.     HTN - It has been elevated today; continue to monitor and if it remains elevated consider treatment with agents other than ACE-I or ARB due to  his h/o BETTY.      Bowel Movements - He had a bowel movement yesterday; Please encourage bowel regimen to avoid constipation as it may worsen Delirium    AS s/p TAVR  SSS s/p pacer - possible complication of TAVR  -rate controlled. On amiodarone. Now decreased to 200 mg per day. Unclear indication to continue now that he is paced. Consider to d/c, but defer to primary team and cardiology.   -on xarelto     Macrocytic anemia  -anemia can play a role in fatigue.  -b12, b9, iron studies and TSH WNL  - Stable, continue to monitor  -minimize blood draws     Fall risk  -recommend to minimize environmental and pharmacological hazards.  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 He will be discharged when cleared by primary team. His wife also needs to be accepted in [facility]. His children need to get involved since he lacks capacity making decisions.      We will continue to follow that patient along with you  Coby Francis  Geriatrics fellow- [employer]  Please feel free to contact geriatrics team with any questions.  Extension [phone]   8:00-5:00 Monday - Friday (excluding holidays)

## 2018-03-05 NOTE — THERAPY
"Occupational Therapy Treatment completed with focus on ADLs, ADL transfers and patient education.  Functional Status:  Pt seen for OT tx today.  Pt was pleasant and cooperative during the session.  Continues to be limited by poor insight, weakness, endurance, and self care.  Pt completed LB dressing with CGA needing safety cues and standing gr/hy with CGA due to x2 LOB pt was unable to self recover.  Needing frequent to safety cues, initiate, follow through, and problem solve during ADL tasks.  During direction changes and when attention is divided during functional ambulation pt will have LOB and scissoring gait.  Pt completed sit to stand with CGA and room ambulation using FWW from bed to sink with CGA during LOB.  Pt would benefit from continued inpt OT as they are continuing to need assistance with functional endurance, transfers, and functional balance during self care.  Plan of Care: Will benefit from Occupational Therapy 3 times per week  Discharge Recommendations:  Equipment Will Continue to Assess for Equipment Needs. Post-acute therapy Discharge to a transitional care facility for continued skilled therapy services.    See \"Rehab Therapy-Acute\" Patient Summary Report for complete documentation.   "

## 2018-03-05 NOTE — PROGRESS NOTES
Aaox2.pleasantly confused.needs frequent reorientation and education.oob with assist.denies any pain.patient able to sleep.continuing hourly rounding.

## 2018-03-05 NOTE — PROGRESS NOTES
Neuroscience Shift Summary: [id number]  Orientation- Alert and oriented x4, occ. forgetful  Incision- Left chest pacer site  Lines/Drains- NA  Telemetry- 100% paced  Pain- Denies  Diet- Regular   Discharge Plan- ?    Concerns-  No acute concerns.

## 2018-03-05 NOTE — PROGRESS NOTES
[facility] Hospitalist Progress Note    Date of Service: 3/4/2018    Chief Complaint  84 y.o. male admitted 2018 with Encephalopathy, Atrial fibrillation with RVR, with sinus pause, Respiratory failure.    Interval Problem Update  Patient denies pain. More or less confused intermittently. Alert oriented ×2. White blood cells went down. Afebrile. No Signs of infection    Consultants/Specialty  Cardio - signed off  Geriatrics    Disposition  SNF vs Home health      Review of Systems   Constitutional: Negative for chills and fever.   HENT: Negative for hearing loss and sore throat.    Eyes: Negative for blurred vision.   Respiratory: Negative for cough, shortness of breath and wheezing.    Cardiovascular: Negative for chest pain and leg swelling.   Gastrointestinal: Negative for abdominal pain, diarrhea, heartburn, nausea and vomiting.   Genitourinary: Negative for dysuria.   Musculoskeletal: Positive for joint pain.   Neurological: Negative for dizziness, sensory change, focal weakness, seizures and headaches.      Physical Exam  Laboratory/Imaging   Hemodynamics  Temp (24hrs), Av.9 °C (98.5 °F), Min:36.6 °C (97.9 °F), Max:37.2 °C (99 °F)   Temperature: 36.7 °C (98.1 °F)  Pulse  Av.9  Min: 45  Max: 153    Blood Pressure : 144/56      Respiratory      Respiration: 16, Pulse Oximetry: 95 %        RUL Breath Sounds: Clear, RML Breath Sounds: Clear, RLL Breath Sounds: Clear, WHIT Breath Sounds: Clear, LLL Breath Sounds: Clear    Fluids    Intake/Output Summary (Last 24 hours) at 18 1921  Last data filed at 18 2314   Gross per 24 hour   Intake                0 ml   Output              400 ml   Net             -400 ml       Nutrition  Orders Placed This Encounter   Procedures   • Diet Order     Standing Status:   Standing     Number of Occurrences:   1     Order Specific Question:   Diet:     Answer:   Cardiac [6]     Order Specific Question:   Texture/Fiber modifications:     Answer:   Dysphagia  2(Pureed/Chopped)specify fluid consistency(question 6) [2]     Order Specific Question:   Consistency/Fluid modifications:     Answer:   Thin Liquids [3]     Physical Exam   Constitutional: He is oriented to person, place, and time. He appears well-developed and well-nourished.   HENT:   Head: Normocephalic and atraumatic.   Eyes: Conjunctivae are normal. Pupils are equal, round, and reactive to light.   Neck: No tracheal deviation present. No thyromegaly present.   Cardiovascular: Normal rate and regular rhythm.    Pacemaker site in the upper left chest looks noninfected, without erythema, edema, fluctuance , or tenderness to palpation   Pulmonary/Chest: Effort normal and breath sounds normal.   Abdominal: Soft. Bowel sounds are normal. He exhibits no distension. There is no tenderness.   Musculoskeletal: He exhibits no edema.   Lymphadenopathy:     He has no cervical adenopathy.   Neurological: He is alert and oriented to person, place, and time. No cranial nerve deficit.   MMT 5/5   Skin: Skin is warm and dry.   Old sq Hematomas in the upper chest and right shoulder   Nursing note and vitals reviewed.      Recent Labs      03/02/18 0226 03/03/18   0307 03/04/18   0356   WBC  13.2*  11.1*  8.0   RBC  3.67*  3.85*  3.61*   HEMOGLOBIN  12.0*  12.4*  12.0*   HEMATOCRIT  37.2*  38.8*  35.9*   MCV  101.4*  100.8*  99.4*   MCH  32.7  32.2  33.2*   MCHC  32.3*  32.0*  33.4*   RDW  50.2*  49.5  47.8   PLATELETCT  166  172  154*   MPV  9.7  9.9  10.1     Recent Labs      03/02/18 0226 03/03/18   0307  03/04/18   0356   SODIUM  135  135  131*   POTASSIUM  4.4  4.3  4.1   CHLORIDE  102  104  102   CO2  24  23  24   GLUCOSE  92  92  96   BUN  31*  29*  26*   CREATININE  1.16  1.26  1.21   CALCIUM  9.8  10.0  9.5                      Assessment/Plan     * Cardiac arrest (CMS-HCC)- (present on admission)   Assessment & Plan    History of. Secondary to SSS. S/P pacemaker        SSS (sick sinus syndrome) (CMS-HCC)-  (present on admission)   Assessment & Plan    Temporary pacemaker initially, permanent pacemaker placement   - cont Amiodarone per cards recs.        Acute respiratory failure with hypoxia (CMS-HCC)- (present on admission)   Assessment & Plan    Intubated 2/14, Extubated 2/16.  No issue, on RA  Encourage IS, RT protocol        Bradycardia- (present on admission)   Assessment & Plan    secondary to SSS        Encephalopathy acute- (present on admission)   Assessment & Plan    - improving; Geriatrics recs appreciated  -Evaluated by psychiatry earlier, found to be incapacitated for decisions  - cont qHS prn Depakote per recs        Leukocytosis- (present on admission)   Assessment & Plan    Resolved        Hypomagnesemia- (present on admission)   Assessment & Plan    resolved        Hypophosphatemia- (present on admission)   Assessment & Plan    resolved        Macrocytosis- (present on admission)   Assessment & Plan    b12, folic wnl        S/P TAVR (transcatheter aortic valve replacement)- (present on admission)   Assessment & Plan    TAVR on 2/12  stable        Hypertension, essential- (present on admission)   Assessment & Plan    Discontinued Lisinopril   Creatinine is still worsening. Will check bladder scan.        Hypokalemia- (present on admission)   Assessment & Plan    Stable; monitoring        CAD (coronary artery disease)- (present on admission)   Assessment & Plan    History of CABG ×3  ASA, Lipitor.  Stable, no chest pain        Paroxysmal atrial fibrillation (CMS-HCC)- (present on admission)   Assessment & Plan    Amiodarone, Xarelto        Acquired hypothyroidism- (present on admission)   Assessment & Plan    Continue Synthroid   TSH 0.74      2 weeks ago.        Carotid artery stenosis- (present on admission)   Assessment & Plan    s/p L CEA  ASA, Lipitor          Quality-Core Measures   Reviewed items::  Radiology images reviewed, EKG reviewed, Labs reviewed and Medications reviewed  Baldwin catheter::   No Baldwin  DVT: Xarelto.  Ulcer Prophylaxis::  No

## 2018-03-05 NOTE — THERAPY
"Physical Therapy Treatment completed.   Bed Mobility:  Supine to Sit: Maximal Assist  Transfers: Sit to Stand: Contact Guard Assist  Gait: Level Of Assist: Contact Guard Assist with Front-Wheel Walker       Plan of Care: Will benefit from Physical Therapy 3 times per week  Discharge Recommendations: Equipment: Front-Wheel Walker.     See \"Rehab Therapy-Acute\" Patient Summary Report for complete documentation.     Pt. is progressing as expected demonstrating improved strength, balance, and increased activity tolerance. Pt. was able to demonstrate CGA to Min A for all functional mobility with a FWW. Pt. was educated on LUE precautions secondary to s/p pacemaker placement. Pt. needs occasional reminder to not use LUE on nustep. Pt. was able to tolerate 10 mins on nustep and reported of mild fatigue. Pt. needs Min to Mod VC's for saftey and guidane with FWW. Pt. will benefit from post acute therapy services prior to d/c home given current objective findings.     "

## 2018-03-06 PROCEDURE — 700102 HCHG RX REV CODE 250 W/ 637 OVERRIDE(OP): Performed by: INTERNAL MEDICINE

## 2018-03-06 PROCEDURE — A9270 NON-COVERED ITEM OR SERVICE: HCPCS | Performed by: INTERNAL MEDICINE

## 2018-03-06 PROCEDURE — 99233 SBSQ HOSP IP/OBS HIGH 50: CPT | Performed by: INTERNAL MEDICINE

## 2018-03-06 PROCEDURE — A9270 NON-COVERED ITEM OR SERVICE: HCPCS | Performed by: FAMILY MEDICINE

## 2018-03-06 PROCEDURE — 99231 SBSQ HOSP IP/OBS SF/LOW 25: CPT | Performed by: INTERNAL MEDICINE

## 2018-03-06 PROCEDURE — 700102 HCHG RX REV CODE 250 W/ 637 OVERRIDE(OP): Performed by: FAMILY MEDICINE

## 2018-03-06 PROCEDURE — A9270 NON-COVERED ITEM OR SERVICE: HCPCS | Performed by: HOSPITALIST

## 2018-03-06 PROCEDURE — 770006 HCHG ROOM/CARE - MED/SURG/GYN SEMI*

## 2018-03-06 PROCEDURE — 700102 HCHG RX REV CODE 250 W/ 637 OVERRIDE(OP): Performed by: HOSPITALIST

## 2018-03-06 RX ADMIN — POLYETHYLENE GLYCOL 3350 1 PACKET: 17 POWDER, FOR SOLUTION ORAL at 08:12

## 2018-03-06 RX ADMIN — RIVAROXABAN 15 MG: 15 TABLET, FILM COATED ORAL at 16:52

## 2018-03-06 RX ADMIN — AMIODARONE HYDROCHLORIDE 200 MG: 200 TABLET ORAL at 08:12

## 2018-03-06 RX ADMIN — ASPIRIN 81 MG: 81 TABLET, CHEWABLE ORAL at 08:12

## 2018-03-06 RX ADMIN — ATORVASTATIN CALCIUM 20 MG: 20 TABLET, FILM COATED ORAL at 20:12

## 2018-03-06 RX ADMIN — LEVOTHYROXINE SODIUM 50 MCG: 50 TABLET ORAL at 05:57

## 2018-03-06 ASSESSMENT — ENCOUNTER SYMPTOMS
SHORTNESS OF BREATH: 0
BLURRED VISION: 0
NERVOUS/ANXIOUS: 0
DIAPHORESIS: 0
COUGH: 0
MEMORY LOSS: 1
NAUSEA: 0
DIARRHEA: 0
FEVER: 0
WHEEZING: 0
HEADACHES: 0
DEPRESSION: 0
HEARTBURN: 0
SEIZURES: 0
CHILLS: 0
WEAKNESS: 0
SORE THROAT: 0
SENSORY CHANGE: 0
ABDOMINAL PAIN: 0
MYALGIAS: 0
DOUBLE VISION: 0

## 2018-03-06 ASSESSMENT — COGNITIVE AND FUNCTIONAL STATUS - GENERAL
CLIMB 3 TO 5 STEPS WITH RAILING: A LOT
MOVING FROM LYING ON BACK TO SITTING ON SIDE OF FLAT BED: A LITTLE
TOILETING: A LITTLE
SUGGESTED CMS G CODE MODIFIER DAILY ACTIVITY: CJ
DRESSING REGULAR UPPER BODY CLOTHING: A LITTLE
SUGGESTED CMS G CODE MODIFIER MOBILITY: CK
TURNING FROM BACK TO SIDE WHILE IN FLAT BAD: A LITTLE
MOBILITY SCORE: 17
WALKING IN HOSPITAL ROOM: A LITTLE
DAILY ACTIVITIY SCORE: 20
STANDING UP FROM CHAIR USING ARMS: A LITTLE
HELP NEEDED FOR BATHING: A LITTLE
MOVING TO AND FROM BED TO CHAIR: A LITTLE
DRESSING REGULAR LOWER BODY CLOTHING: A LITTLE

## 2018-03-06 ASSESSMENT — PATIENT HEALTH QUESTIONNAIRE - PHQ9
2. FEELING DOWN, DEPRESSED, IRRITABLE, OR HOPELESS: NOT AT ALL
SUM OF ALL RESPONSES TO PHQ QUESTIONS 1-9: 0
1. LITTLE INTEREST OR PLEASURE IN DOING THINGS: NOT AT ALL
SUM OF ALL RESPONSES TO PHQ9 QUESTIONS 1 AND 2: 0

## 2018-03-06 ASSESSMENT — PAIN SCALES - GENERAL
PAINLEVEL_OUTOF10: 0

## 2018-03-06 NOTE — CARE PLAN
Problem: Safety  Goal: Will remain free from injury  Outcome: PROGRESSING AS EXPECTED  Patient education provided on importance of using call light. Bed in low position.

## 2018-03-06 NOTE — PROGRESS NOTES
[facility] Hospitalist Progress Note    Date of Service: 3/5/2018    Chief Complaint  84 y.o. male admitted 2018 with Encephalopathy, Atrial fibrillation with RVR, with sinus pause, Respiratory failure.    Interval Problem Update  Patient denies pain. He also denies cough, nausea, vomiting, diarrhea, dysuria, shortness of breath, chest pain. Pleasant. Alert oriented ×2.   Guardianship pending  Consultants/Specialty  Cardio - signed off  Geriatrics    Disposition  ? Group home after guardianship      Review of Systems   Constitutional: Negative for chills and fever.   HENT: Negative for hearing loss and sore throat.    Eyes: Negative for blurred vision.   Respiratory: Negative for cough, shortness of breath and wheezing.    Cardiovascular: Negative for chest pain and leg swelling.   Gastrointestinal: Negative for abdominal pain, diarrhea, heartburn, nausea and vomiting.   Genitourinary: Negative for dysuria.   Musculoskeletal: Negative.    Skin: Negative.    Neurological: Negative for dizziness, sensory change, focal weakness, seizures and headaches.   Psychiatric/Behavioral: Negative.       Physical Exam  Laboratory/Imaging   Hemodynamics  Temp (24hrs), Av.9 °C (98.4 °F), Min:36.6 °C (97.9 °F), Max:37.2 °C (98.9 °F)   Temperature: 36.8 °C (98.3 °F)  Pulse  Av.9  Min: 45  Max: 153    Blood Pressure : 132/61      Respiratory      Respiration: 16, Pulse Oximetry: 95 %             Fluids  No intake or output data in the 24 hours ending 18 1859    Nutrition  Orders Placed This Encounter   Procedures   • Diet Order     Standing Status:   Standing     Number of Occurrences:   1     Order Specific Question:   Diet:     Answer:   Cardiac [6]     Order Specific Question:   Texture/Fiber modifications:     Answer:   Dysphagia 2(Pureed/Chopped)specify fluid consistency(question 6) [2]     Order Specific Question:   Consistency/Fluid modifications:     Answer:   Thin Liquids [3]     Physical Exam    Constitutional: He is oriented to person, place, and time. He appears well-developed and well-nourished.   HENT:   Head: Normocephalic and atraumatic.   Eyes: Conjunctivae are normal. Pupils are equal, round, and reactive to light.   Neck: No tracheal deviation present. No thyromegaly present.   Cardiovascular: Normal rate and regular rhythm.    Pacemaker site in the upper left chest looks noninfected, without erythema, edema, fluctuance , or tenderness to palpation   Pulmonary/Chest: Effort normal and breath sounds normal.   Abdominal: Soft. Bowel sounds are normal. He exhibits no distension. There is no tenderness.   Musculoskeletal: He exhibits no edema.   Lymphadenopathy:     He has no cervical adenopathy.   Neurological: He is alert and oriented to person, place, and time. No cranial nerve deficit.   MMT 5/5   Skin: Skin is warm and dry.   Old sq Hematomas in the upper chest and right shoulder   Nursing note and vitals reviewed.      Recent Labs      03/03/18   0307  03/04/18   0356   WBC  11.1*  8.0   RBC  3.85*  3.61*   HEMOGLOBIN  12.4*  12.0*   HEMATOCRIT  38.8*  35.9*   MCV  100.8*  99.4*   MCH  32.2  33.2*   MCHC  32.0*  33.4*   RDW  49.5  47.8   PLATELETCT  172  154*   MPV  9.9  10.1     Recent Labs      03/03/18   0307  03/04/18   0356   SODIUM  135  131*   POTASSIUM  4.3  4.1   CHLORIDE  104  102   CO2  23  24   GLUCOSE  92  96   BUN  29*  26*   CREATININE  1.26  1.21   CALCIUM  10.0  9.5                      Assessment/Plan     * Cardiac arrest (CMS-formerly [facility])- (present on admission)   Assessment & Plan    History of. Secondary to SSS. S/P pacemaker        SSS (sick sinus syndrome) (CMS-formerly [facility])- (present on admission)   Assessment & Plan    Temporary pacemaker initially, permanent pacemaker placement   - cont Amiodarone per cards recs.        Acute respiratory failure with hypoxia (CMS-formerly [facility])- (present on admission)   Assessment & Plan    Intubated 2/14, Extubated 2/16.  No issue, on RA  Encourage IS, RT  protocol        Bradycardia- (present on admission)   Assessment & Plan    secondary to SSS        Encephalopathy acute- (present on admission)   Assessment & Plan    - improving; Geriatrics recs appreciated  -Evaluated by psychiatry earlier, found to be incapacitated for decisions  - cont qHS prn Depakote per recs        Leukocytosis- (present on admission)   Assessment & Plan    Resolved        Hypomagnesemia- (present on admission)   Assessment & Plan    resolved        Hypophosphatemia- (present on admission)   Assessment & Plan    resolved        Macrocytosis- (present on admission)   Assessment & Plan    b12, folic wnl        S/P TAVR (transcatheter aortic valve replacement)- (present on admission)   Assessment & Plan    TAVR on 2/12  stable        Hypertension, essential- (present on admission)   Assessment & Plan    Discontinued Lisinopril   Creatinine is still worsening. Will check bladder scan.        Hypokalemia- (present on admission)   Assessment & Plan    Stable; monitoring        CAD (coronary artery disease)- (present on admission)   Assessment & Plan    History of CABG ×3  ASA, Lipitor.  Stable, no chest pain        Paroxysmal atrial fibrillation (CMS-HCC)- (present on admission)   Assessment & Plan    Amiodarone, Xarelto        Acquired hypothyroidism- (present on admission)   Assessment & Plan    Continue Synthroid   TSH 0.74      2 weeks ago.        Carotid artery stenosis- (present on admission)   Assessment & Plan    s/p L CEA  ASA, Lipitor          Quality-Core Measures   Reviewed items::  Radiology images reviewed, EKG reviewed, Labs reviewed and Medications reviewed  Baldwin catheter::  No Baldwin  DVT: Xarelto.  Ulcer Prophylaxis::  No

## 2018-03-06 NOTE — PROGRESS NOTES
Assumed care of patient at 1900.  Pt is A/O x 2-3, forgetful occasionally.  Pt is up to the bathroom with x 1 assist and FWW.  Pt denies pain at this time.  Due meds given.  Call light within reach.  Hourly rounding in place.

## 2018-03-06 NOTE — CARE PLAN
Problem: Communication  Goal: The ability to communicate needs accurately and effectively will improve  Outcome: PROGRESSING AS EXPECTED      Problem: Safety  Goal: Will remain free from injury  Outcome: PROGRESSING AS EXPECTED  Bed alarm in place, fall risk precautions, education reinforced with patient and spouse.

## 2018-03-06 NOTE — PROGRESS NOTES
Neuroscience Shift Summary: [id number]  Orientation- Alert and oriented x3, occ. forgetful  Incision- Left chest pacer site- CDI with bruising  Lines/Drains- NA  Telemetry- 100% paced  Pain- Denies  Diet- Dysphagia two with thin liquids  Discharge Plan- [facility]?     Concerns-  No acute concerns.  Patient ambulating safely with 2ww and SBA

## 2018-03-06 NOTE — PROGRESS NOTES
Geriatric progress note     Today's Date: 3/6/2018  Patient Name: Rafia Shaikh  Current Attending: Keven Adrian M.D.     CC: confusion     Mr. Shaikh is an 85 y/o male who presented to ED on 02/14/2018, the day after his discharge post TAVR with altered mental status, SSS, required emergent pacemaker placement, was placed on vasopressor, intubated for airway protection and successfully extubated on 02/26/2018.       Psychiatry and Neurology teams were consulted for confusion, an EEG showed diffuse cortical dysfunction and brain MRI and MRA suggested no acute pathologies.     Geriatrics medicine was consulted for progressive confusion and delirium which did not respond to Seroquel and Risperdal.  At baseline per his pre&post-op notes on his admission for TAVR, it appears that he has had mild confusion even prior to this admission.     Patient does live with his wife who also may have cognitive impairment. He visited his PCP on Nov 2017 asking for more help with ADLs,      Subjective:  24 hour Events:  His leukocytosis has now resolved and he has had no fever or any other signs or symptoms of localized infection. His creatinine has remained stable. He has not required any Depakote since it was switched to PRN on 03/01/2018.          Nursing Report: He has been pleasant. Pt denies pain at this time.Due meds given.Hourly rounding in place. No over night events     Patient Report:  He is oriented to person, place and situation year but not the day or month . He denies any pain. The pacemaker site itch has been improving .He denies any hallucination and is aware of his disorientation to time.     Team Discussion: discussed with Nurses, MD and AMADOU     ROS: The items below were reported to be negative, unless otherwise noted above or is in bold type.   Constipation  Diarrhea  Urination  Pain  SOB  Fluctuation of mental status   Vision   Hearing  Nausea  Vomiting  Motor issues  Sensory issues  Memory issues  Itching on  "pacemaker site, better then yesterday         Objective:  Physical Exam:   /58   Pulse 60   Temp 36.3°C (97.5 °F)   Resp 12   Ht 1.6 m (5' 2.99\")   Wt 58.7 kg (129 lb 6.6 oz)   SpO2 90% room air  BMI 22.93 kg/m²  R.R 16        Gen: NAD, laying pleasantly in bed, alert, oriented to hospital, situation and person, to year but not the month or day yet  Neck: Supple, did not appear to choke on apple juice.   Chest: Left upper chest with pacer in place, the small area of bruising is resolving, no tenderness.  Lungs: normal effort, clear breath sounds b/l  CV: regular rhythm, normal rate  Abd: Soft, BS+, no tenderness, no gaurding  Back: kyphosis, no CVA tenderness  Gait: He walked slowly but with steady gait.  Delirium Screen: Improvement is evident by better speech speed and less pausing; Screen is stilll positive, he was able to state the days of the week forward and backward with cues         Labs: Creatinine has remained stable 1.21; mild hyponatremia: 131 ; leukocytosis has resolved; bands still present only 3.6%        Assessment: 83 y/o male w/ a PMH of AS and PVD s/p TAVR on 2/12 and subsequent SSS requiring pacer placement. Course complicated by delirium.         Plan:  Delirium - Improvement is evident by better speech speed and less pausing. He still demonstrate inattention; he has had no behavioral issues has not required any of the PRN Depakote dose; He has been able to sleep. The above suggest slowly improving delirium. It can take months for delirium to completely clear.   -his WBC count is normal now and he has no signs or symptoms of localized infection.  -Will continue to hold off ACE-I, improved serum creatinine, continue to monitor  -having BM and urinating well, CTM  -no pain noted  -continue to address polypharmacy, Change Depakote to PRN as patient delirium is stable   -prompted toileting  - ask patient if he needs to use the commode - avoid condom cath as that increases fall risk due " to environmental hazards.   -up for meals, as nursing is already doing  -stand up as much as possible  -follow below precautions     Interventions to be considered in all patients in order to minimize the risk of delirium.   -do not disturb patient (vitals or lab draws) between the hours of 10 PM and 6 AM.  -ideally the patient should not sleep during the day and we should avoid day time naps.   -up in chair for meals  -ambulate at least three times daily, as able  -watch for constipation  -timed voiding - ask patient is she would like to go to the bathroom q 2-3 hours, except during the do not disturb hours.   -remove all necessary lines (central lines, peripheral IVs, feeding tubes, crawford catheters)  -unless patient has shown harm to self or others I would recommend against use of restraints - either chemical or physical (antipsychotics)   -minimize polypharmacy, do not dose medication during sleep hours   Recommendations with regard to using pharmacological restraints:   -please assess for effectiveness of antipsychotic/chemical restraint and use alternate therapy if not effective.   -Please try and use one antipsychotic/chemical restraint at a time.   -Please give it either to PO or IM route avoid IV.   -Please use the lowest effective dose for the shortest period of time.     HTN - It has been elevated today, rising; continue to monitor and if it remains elevated consider treatment with agents other than ACE-I or ARB due to his h/o BETTY.      Bowel Movements - He had a bowel movement yesterday; Please encourage bowel regimen to avoid constipation as it may worsen Delirium      AS s/p TAVR  SSS s/p pacer - possible complication of TAVR  -rate controlled. On amiodarone. Now decreased to 200 mg per day. Unclear indication to continue now that he is paced. Consider to d/c, but defer to primary team and cardiology.   -on xarelto     Macrocytic anemia  -anemia can play a role in fatigue.  -b12, b9, iron studies and  TSH WNL  - Stable, continue to monitor  -minimize blood draws     Fall risk  -recommend to minimize environmental and pharmacological hazards.  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 He will be discharged when cleared by primary team. His wife also needs to be accepted in [place] care. Based on the input our team got today from , his son doesn't want to get involved in his care, so there is a need to proceed for Guardianship. AMADOU is currently working on that.     We are signing off his care, please re-consult our team if there any further assessment needed.     Bay Galicia MD   Geriatrics fellow- [employer]  Please feel free to contact geriatrics team with any questions.  Extension [phone]   8:00-5:00 Monday - Friday (excluding holidays)    At the time of the visit, I personally examined the patient and evaluated the patient's medical history, physical examination, laboratory results/studies, and assessment and I discussed the findings and formulated the care plan documented with the resident or fellow physician.    Shannon Boone M.D.

## 2018-03-07 LAB
BACTERIA BLD CULT: NORMAL
BACTERIA BLD CULT: NORMAL
SIGNIFICANT IND 70042: NORMAL
SIGNIFICANT IND 70042: NORMAL
SITE SITE: NORMAL
SITE SITE: NORMAL
SOURCE SOURCE: NORMAL
SOURCE SOURCE: NORMAL

## 2018-03-07 PROCEDURE — 700102 HCHG RX REV CODE 250 W/ 637 OVERRIDE(OP): Performed by: FAMILY MEDICINE

## 2018-03-07 PROCEDURE — A9270 NON-COVERED ITEM OR SERVICE: HCPCS | Performed by: INTERNAL MEDICINE

## 2018-03-07 PROCEDURE — A9270 NON-COVERED ITEM OR SERVICE: HCPCS | Performed by: HOSPITALIST

## 2018-03-07 PROCEDURE — 770006 HCHG ROOM/CARE - MED/SURG/GYN SEMI*

## 2018-03-07 PROCEDURE — A9270 NON-COVERED ITEM OR SERVICE: HCPCS | Performed by: FAMILY MEDICINE

## 2018-03-07 PROCEDURE — 700102 HCHG RX REV CODE 250 W/ 637 OVERRIDE(OP): Performed by: INTERNAL MEDICINE

## 2018-03-07 PROCEDURE — 99232 SBSQ HOSP IP/OBS MODERATE 35: CPT | Performed by: INTERNAL MEDICINE

## 2018-03-07 PROCEDURE — 700102 HCHG RX REV CODE 250 W/ 637 OVERRIDE(OP): Performed by: HOSPITALIST

## 2018-03-07 RX ADMIN — POLYETHYLENE GLYCOL 3350 1 PACKET: 17 POWDER, FOR SOLUTION ORAL at 07:57

## 2018-03-07 RX ADMIN — ATORVASTATIN CALCIUM 20 MG: 20 TABLET, FILM COATED ORAL at 23:20

## 2018-03-07 RX ADMIN — AMIODARONE HYDROCHLORIDE 200 MG: 200 TABLET ORAL at 07:56

## 2018-03-07 RX ADMIN — RIVAROXABAN 15 MG: 15 TABLET, FILM COATED ORAL at 18:20

## 2018-03-07 RX ADMIN — ASPIRIN 81 MG: 81 TABLET, CHEWABLE ORAL at 07:56

## 2018-03-07 RX ADMIN — LEVOTHYROXINE SODIUM 50 MCG: 50 TABLET ORAL at 05:12

## 2018-03-07 ASSESSMENT — ENCOUNTER SYMPTOMS
SORE THROAT: 0
NERVOUS/ANXIOUS: 0
WEAKNESS: 0
SHORTNESS OF BREATH: 0
DIARRHEA: 0
SEIZURES: 0
HEADACHES: 0
PALPITATIONS: 0
DIZZINESS: 0
BACK PAIN: 0
MEMORY LOSS: 1
MYALGIAS: 0
COUGH: 0
DOUBLE VISION: 0
DIAPHORESIS: 0
NAUSEA: 0
ABDOMINAL PAIN: 0

## 2018-03-07 ASSESSMENT — COGNITIVE AND FUNCTIONAL STATUS - GENERAL
MOVING TO AND FROM BED TO CHAIR: A LITTLE
SUGGESTED CMS G CODE MODIFIER DAILY ACTIVITY: CJ
HELP NEEDED FOR BATHING: A LITTLE
TOILETING: A LITTLE
TURNING FROM BACK TO SIDE WHILE IN FLAT BAD: A LITTLE
MOBILITY SCORE: 17
CLIMB 3 TO 5 STEPS WITH RAILING: A LOT
SUGGESTED CMS G CODE MODIFIER MOBILITY: CK
MOVING FROM LYING ON BACK TO SITTING ON SIDE OF FLAT BED: A LITTLE
DRESSING REGULAR UPPER BODY CLOTHING: A LITTLE
DRESSING REGULAR LOWER BODY CLOTHING: A LITTLE
STANDING UP FROM CHAIR USING ARMS: A LITTLE
DAILY ACTIVITIY SCORE: 20
WALKING IN HOSPITAL ROOM: A LITTLE

## 2018-03-07 ASSESSMENT — PAIN SCALES - GENERAL
PAINLEVEL_OUTOF10: 0
PAINLEVEL_OUTOF10: 0

## 2018-03-07 NOTE — PROGRESS NOTES
Patient AO x 2-3 this shift. Pleasant and cooperative. Up with 1 assist, FWW, does not call appropriately. VSS, room air. Good appetite, takes medications as administered. Fall precautions in place. Will continue to monitor.

## 2018-03-07 NOTE — PROGRESS NOTES
[facility] Hospitalist Progress Note    Date of Service: 3/6/2018    Chief Complaint  84 y.o. male admitted 2018 with Encephalopathy, Atrial fibrillation with RVR, with sinus pause, Respiratory failure.    Interval Problem Update  Patient denies pain.    - Pleasant, conversant  States that he ambulates to the bathroom on his own  Encourage activity    Guardianship pending  Consultants/Specialty  Cardio - signed off  Geriatrics    Disposition  ? Group home after guardianship  Ethics consult pending       Review of Systems   Constitutional: Negative for chills, diaphoresis and fever.   HENT: Negative for sore throat.    Eyes: Negative for blurred vision and double vision.   Respiratory: Negative for cough, shortness of breath and wheezing.    Cardiovascular: Negative for chest pain and leg swelling.   Gastrointestinal: Negative for abdominal pain, diarrhea, heartburn and nausea.   Genitourinary: Negative for dysuria.   Musculoskeletal: Negative for myalgias.   Neurological: Negative for sensory change, seizures, weakness and headaches.   Psychiatric/Behavioral: Positive for memory loss. Negative for depression. The patient is not nervous/anxious.       Physical Exam  Laboratory/Imaging   Hemodynamics  Temp (24hrs), Av.7 °C (98 °F), Min:36.3 °C (97.3 °F), Max:37.4 °C (99.4 °F)   Temperature: 36.3 °C (97.3 °F)  Pulse  Av.9  Min: 45  Max: 153    Blood Pressure : 147/64      Respiratory      Respiration: 16, Pulse Oximetry: 92 %             Fluids    Intake/Output Summary (Last 24 hours) at 18 1720  Last data filed at 18 2000   Gross per 24 hour   Intake              240 ml   Output                0 ml   Net              240 ml       Nutrition  Orders Placed This Encounter   Procedures   • Diet Order     Standing Status:   Standing     Number of Occurrences:   1     Order Specific Question:   Diet:     Answer:   Cardiac [6]     Order Specific Question:   Texture/Fiber modifications:     Answer:    Dysphagia 2(Pureed/Chopped)specify fluid consistency(question 6) [2]     Order Specific Question:   Consistency/Fluid modifications:     Answer:   Thin Liquids [3]     Physical Exam   Constitutional: He is oriented to person, place, and time. He appears well-developed and well-nourished.   HENT:   Head: Normocephalic and atraumatic.   Mouth/Throat: No oropharyngeal exudate.   Eyes: Conjunctivae and EOM are normal. Pupils are equal, round, and reactive to light. Right eye exhibits no discharge. Left eye exhibits no discharge.   Neck: No thyromegaly present.   Cardiovascular: Normal rate, regular rhythm and intact distal pulses.    Pacemaker site in the upper left chest looks noninfected, without erythema, edema, fluctuance , or tenderness to palpation   Pulmonary/Chest: Effort normal and breath sounds normal. No respiratory distress. He has no wheezes.   Abdominal: Soft. Bowel sounds are normal. He exhibits no distension. There is no tenderness.   Musculoskeletal: He exhibits no edema or tenderness.   Neurological: He is alert and oriented to person, place, and time. No cranial nerve deficit. Coordination normal.   MMT 5/5   Skin: Skin is warm and dry. He is not diaphoretic. No erythema.   Old sq Hematomas in the upper chest and right shoulder   Psychiatric: His behavior is normal.   Nursing note and vitals reviewed.      Recent Labs      03/04/18   0356   WBC  8.0   RBC  3.61*   HEMOGLOBIN  12.0*   HEMATOCRIT  35.9*   MCV  99.4*   MCH  33.2*   MCHC  33.4*   RDW  47.8   PLATELETCT  154*   MPV  10.1     Recent Labs      03/04/18   0356   SODIUM  131*   POTASSIUM  4.1   CHLORIDE  102   CO2  24   GLUCOSE  96   BUN  26*   CREATININE  1.21   CALCIUM  9.5                      Assessment/Plan     * Cardiac arrest (CMS-[facility])- (present on admission)   Assessment & Plan    History of. Secondary to SSS. S/P pacemaker        SSS (sick sinus syndrome) (CMS-[facility])- (present on admission)   Assessment & Plan    Temporary pacemaker  initially, permanent pacemaker placement   - cont Amiodarone per cards recs.        Acute respiratory failure with hypoxia (CMS-HCC)- (present on admission)   Assessment & Plan    Intubated 2/14, Extubated 2/16.  No issue, on RA  Encourage IS, RT protocol        Bradycardia- (present on admission)   Assessment & Plan    secondary to SSS        Encephalopathy acute- (present on admission)   Assessment & Plan    - improving; Geriatrics recs appreciated  -Evaluated by psychiatry earlier, found to be incapacitated for decisions  - cont qHS prn Depakote per recs    For ethics consult, follow up with recommendations  Encourage activity        Leukocytosis- (present on admission)   Assessment & Plan    Resolved        Hypomagnesemia- (present on admission)   Assessment & Plan    resolved        Hypophosphatemia- (present on admission)   Assessment & Plan    resolved        Macrocytosis- (present on admission)   Assessment & Plan    b12, folic wnl        S/P TAVR (transcatheter aortic valve replacement)- (present on admission)   Assessment & Plan    TAVR on 2/12  stable        Hypertension, essential- (present on admission)   Assessment & Plan    Discontinued Lisinopril   Creatinine is still worsening. Will check bladder scan.        Hypokalemia- (present on admission)   Assessment & Plan    Stable; monitoring        CAD (coronary artery disease)- (present on admission)   Assessment & Plan    History of CABG ×3  ASA, Lipitor.  Stable, no chest pain        Paroxysmal atrial fibrillation (CMS-HCC)- (present on admission)   Assessment & Plan    Amiodarone, Xarelto        Acquired hypothyroidism- (present on admission)   Assessment & Plan    Continue Synthroid   TSH 0.74      2 weeks ago.        Carotid artery stenosis- (present on admission)   Assessment & Plan    s/p L CEA  ASA, Lipitor          Quality-Core Measures   Reviewed items::  Radiology images reviewed, EKG reviewed, Labs reviewed and Medications reviewed  Denny  catheter::  No Baldwin  DVT: Xarelto.  Ulcer Prophylaxis::  No  Assessed for rehabilitation services:  Patient was assess for and/or received rehabilitation services during this hospitalization

## 2018-03-07 NOTE — CARE PLAN
Problem: Infection  Goal: Will remain free from infection  Outcome: PROGRESSING AS EXPECTED      Problem: Skin Integrity  Goal: Risk for impaired skin integrity will decrease  Outcome: PROGRESSING AS EXPECTED

## 2018-03-07 NOTE — CARE PLAN
Problem: Communication  Goal: The ability to communicate needs accurately and effectively will improve    Intervention: Reorient patient to environment as needed  Reoriented patient to room, situation and how to use the call light. Patient verbalized understanding, but is very forgetful. Will continue to round hourly and as needed to assess for patient's needs.       Problem: Safety  Goal: Will remain free from injury    Intervention: Provide assistance with mobility  Educated patient to use call light and wait for assistance from staff before getting up out of bed. Patient verbalized understanding, but is very forgetful and needs constant reminders. Will round hourly and as needed to assess for needs.

## 2018-03-07 NOTE — PROGRESS NOTES
Assumed care of patient at 1900 from dayshift Rn. Patient was laying in bed. Introduced self to patient and explained POC for the evening. Patient was pleasant and seemed agreeable to this plan and had no further questions, concerns, or requests from staff at this time. Addressed any additional needs while still in room. All fall risk precautions in place and call light was placed within patient's reach.

## 2018-03-07 NOTE — PROGRESS NOTES
[facility] Hospitalist Progress Note    Date of Service: 3/7/2018    Chief Complaint  84 y.o. male admitted 2018 with Encephalopathy, Atrial fibrillation with RVR, with sinus pause, Respiratory failure.    Interval Problem Update  Patient denies pain.    3/7: Sitting up at the side of the bed, eating breakfast  No difficulty with oral intake  Last seen by PT, still requiring max assistance    Guardianship pending, discussed with ethics    Consultants/Specialty  Cardio - signed off  Geriatrics    Disposition  ? Group home versus assisted living after guardianship  Ethics evaluation  Wife may be able to be deemed incapacitated from assistance with medical decision-making  Accepted at [facility],  to assist      Review of Systems   Constitutional: Negative for diaphoresis.   HENT: Negative for sore throat.    Eyes: Negative for double vision.   Respiratory: Negative for cough and shortness of breath.    Cardiovascular: Negative for chest pain, palpitations and leg swelling.   Gastrointestinal: Negative for abdominal pain, diarrhea and nausea.   Musculoskeletal: Negative for back pain and myalgias.   Neurological: Negative for dizziness, seizures, weakness and headaches.   Psychiatric/Behavioral: Positive for memory loss. The patient is not nervous/anxious.       Physical Exam  Laboratory/Imaging   Hemodynamics  Temp (24hrs), Av.6 °C (97.9 °F), Min:36.4 °C (97.6 °F), Max:36.8 °C (98.3 °F)   Temperature: 36.4 °C (97.6 °F)  Pulse  Av.9  Min: 45  Max: 153    Blood Pressure : 160/54      Respiratory      Respiration: 18, Pulse Oximetry: 94 %     Work Of Breathing / Effort: Mild       Fluids  No intake or output data in the 24 hours ending 18 1404    Nutrition  Orders Placed This Encounter   Procedures   • Diet Order     Standing Status:   Standing     Number of Occurrences:   1     Order Specific Question:   Diet:     Answer:   Cardiac [6]     Order Specific Question:   Texture/Fiber  modifications:     Answer:   Dysphagia 2(Pureed/Chopped)specify fluid consistency(question 6) [2]     Order Specific Question:   Consistency/Fluid modifications:     Answer:   Thin Liquids [3]     Physical Exam   Constitutional: He is oriented to person, place, and time. He appears well-developed and well-nourished. No distress.   HENT:   Head: Normocephalic and atraumatic.   Eyes: EOM are normal. Pupils are equal, round, and reactive to light. Left eye exhibits discharge.   Neck: Normal range of motion.   Cardiovascular: Normal rate, regular rhythm and intact distal pulses.    Pacemaker site in the upper left chest looks noninfected, without erythema, edema, fluctuance , or tenderness to palpation   Pulmonary/Chest: Effort normal and breath sounds normal. No respiratory distress.   Abdominal: Soft. Bowel sounds are normal. He exhibits no distension.   Musculoskeletal: He exhibits no edema or tenderness.   Neurological: He is alert and oriented to person, place, and time. No cranial nerve deficit. He exhibits normal muscle tone. Coordination normal.   MMT 5/5   Skin: Skin is warm and dry. He is not diaphoretic.   Old sq Hematomas in the upper chest and right shoulder   Psychiatric: His behavior is normal. Thought content normal.   Nursing note and vitals reviewed.                               Assessment/Plan     * Cardiac arrest (CMS-HCC)- (present on admission)   Assessment & Plan    History of. Secondary to SSS. S/P pacemaker        SSS (sick sinus syndrome) (CMS-[facility])- (present on admission)   Assessment & Plan    Temporary pacemaker initially, permanent pacemaker placement   - cont Amiodarone per cards recs.        Acute respiratory failure with hypoxia (CMS-[facility])- (present on admission)   Assessment & Plan    Intubated 2/14, Extubated 2/16.  No issue, on RA  Encourage IS, RT protocol        Bradycardia- (present on admission)   Assessment & Plan    secondary to SSS        Encephalopathy acute- (present on  admission)   Assessment & Plan    - improving; Geriatrics recs appreciated  -Evaluated by psychiatry earlier, found to be incapacitated for decisions  - cont qHS prn Depakote per recs    For ethics consult, follow up with recommendations  Encourage activity    3/7: Last seen by therapy on March 5, max assistance  Accepted at [facility], to transfer to skilled nursing facility if patient has a medical decision maker  Discussed with ethics consult, Wayne, will reach out to the patient's son, who has an estranged for  relationship with parents  Patient's wife does have a history of dementia however appears to be alert and oriented ×3  Appears to understand her underlying condition and reports adequate support with neighbors and friends in the community  Appears to be making appropriate decisions regarding daily life activities  At this point appears to have medical decision making capacity, will follow up with ethics          Leukocytosis- (present on admission)   Assessment & Plan    Resolved        Hypomagnesemia- (present on admission)   Assessment & Plan    resolved        Hypophosphatemia- (present on admission)   Assessment & Plan    resolved        Macrocytosis- (present on admission)   Assessment & Plan    b12, folic wnl        S/P TAVR (transcatheter aortic valve replacement)- (present on admission)   Assessment & Plan    TAVR on 2/12  stable        Hypertension, essential- (present on admission)   Assessment & Plan    Discontinued Lisinopril   Creatinine is still worsening. Will check bladder scan.        Hypokalemia- (present on admission)   Assessment & Plan    Stable; monitoring        CAD (coronary artery disease)- (present on admission)   Assessment & Plan    History of CABG ×3  ASA, Lipitor.  Stable, no chest pain        Paroxysmal atrial fibrillation (CMS-HCC)- (present on admission)   Assessment & Plan    Amiodarone, Xarelto        Acquired hypothyroidism- (present on admission)   Assessment & Plan     Continue Synthroid   TSH 0.74      2 weeks ago.        Carotid artery stenosis- (present on admission)   Assessment & Plan    s/p L CEA  ASA, Lipitor          Quality-Core Measures   Reviewed items::  Radiology images reviewed, EKG reviewed, Labs reviewed and Medications reviewed  Baldwin catheter::  No Baldwin  DVT: Xarelto.  Ulcer Prophylaxis::  No  Assessed for rehabilitation services:  Patient was assess for and/or received rehabilitation services during this hospitalization

## 2018-03-07 NOTE — DISCHARGE PLANNING
Per rounds yesterday, SW to place Ethics Consult to see if pt's son would be agreeable to make decisions for his parents till they are transferred to a more permament setting. Goal is to get pt and spouse to a long term living arrangement such as a group home or long term SNF bed.

## 2018-03-08 PROCEDURE — 97110 THERAPEUTIC EXERCISES: CPT

## 2018-03-08 PROCEDURE — 97116 GAIT TRAINING THERAPY: CPT

## 2018-03-08 PROCEDURE — 99233 SBSQ HOSP IP/OBS HIGH 50: CPT | Performed by: INTERNAL MEDICINE

## 2018-03-08 PROCEDURE — 700102 HCHG RX REV CODE 250 W/ 637 OVERRIDE(OP): Performed by: FAMILY MEDICINE

## 2018-03-08 PROCEDURE — 700102 HCHG RX REV CODE 250 W/ 637 OVERRIDE(OP): Performed by: HOSPITALIST

## 2018-03-08 PROCEDURE — A9270 NON-COVERED ITEM OR SERVICE: HCPCS | Performed by: INTERNAL MEDICINE

## 2018-03-08 PROCEDURE — 770006 HCHG ROOM/CARE - MED/SURG/GYN SEMI*

## 2018-03-08 PROCEDURE — 700102 HCHG RX REV CODE 250 W/ 637 OVERRIDE(OP): Performed by: INTERNAL MEDICINE

## 2018-03-08 PROCEDURE — A9270 NON-COVERED ITEM OR SERVICE: HCPCS | Performed by: HOSPITALIST

## 2018-03-08 PROCEDURE — A9270 NON-COVERED ITEM OR SERVICE: HCPCS | Performed by: FAMILY MEDICINE

## 2018-03-08 RX ADMIN — POLYETHYLENE GLYCOL 3350 1 PACKET: 17 POWDER, FOR SOLUTION ORAL at 08:54

## 2018-03-08 RX ADMIN — AMIODARONE HYDROCHLORIDE 200 MG: 200 TABLET ORAL at 08:54

## 2018-03-08 RX ADMIN — ATORVASTATIN CALCIUM 20 MG: 20 TABLET, FILM COATED ORAL at 20:41

## 2018-03-08 RX ADMIN — RIVAROXABAN 15 MG: 15 TABLET, FILM COATED ORAL at 16:42

## 2018-03-08 RX ADMIN — LEVOTHYROXINE SODIUM 50 MCG: 50 TABLET ORAL at 05:25

## 2018-03-08 RX ADMIN — ASPIRIN 81 MG: 81 TABLET, CHEWABLE ORAL at 08:54

## 2018-03-08 ASSESSMENT — COGNITIVE AND FUNCTIONAL STATUS - GENERAL
MOBILITY SCORE: 17
DAILY ACTIVITIY SCORE: 20
MOVING FROM LYING ON BACK TO SITTING ON SIDE OF FLAT BED: A LITTLE
CLIMB 3 TO 5 STEPS WITH RAILING: A LOT
SUGGESTED CMS G CODE MODIFIER MOBILITY: CK
DAILY ACTIVITIY SCORE: 20
SUGGESTED CMS G CODE MODIFIER MOBILITY: CJ
MOVING TO AND FROM BED TO CHAIR: A LITTLE
CLIMB 3 TO 5 STEPS WITH RAILING: A LOT
MOVING TO AND FROM BED TO CHAIR: A LITTLE
SUGGESTED CMS G CODE MODIFIER DAILY ACTIVITY: CJ
MOBILITY SCORE: 17
DRESSING REGULAR UPPER BODY CLOTHING: A LITTLE
TURNING FROM BACK TO SIDE WHILE IN FLAT BAD: A LITTLE
DRESSING REGULAR UPPER BODY CLOTHING: A LITTLE
STANDING UP FROM CHAIR USING ARMS: A LITTLE
HELP NEEDED FOR BATHING: A LITTLE
STANDING UP FROM CHAIR USING ARMS: A LITTLE
STANDING UP FROM CHAIR USING ARMS: A LITTLE
HELP NEEDED FOR BATHING: A LITTLE
DRESSING REGULAR LOWER BODY CLOTHING: A LITTLE
TOILETING: A LITTLE
TOILETING: A LITTLE
SUGGESTED CMS G CODE MODIFIER MOBILITY: CK
MOBILITY SCORE: 21
DRESSING REGULAR LOWER BODY CLOTHING: A LITTLE
MOVING FROM LYING ON BACK TO SITTING ON SIDE OF FLAT BED: A LITTLE
WALKING IN HOSPITAL ROOM: A LITTLE
SUGGESTED CMS G CODE MODIFIER DAILY ACTIVITY: CJ
CLIMB 3 TO 5 STEPS WITH RAILING: A LOT
WALKING IN HOSPITAL ROOM: A LITTLE
TURNING FROM BACK TO SIDE WHILE IN FLAT BAD: A LITTLE

## 2018-03-08 ASSESSMENT — ENCOUNTER SYMPTOMS
WEAKNESS: 0
SHORTNESS OF BREATH: 0
COUGH: 0
DOUBLE VISION: 0
ABDOMINAL PAIN: 0
HEADACHES: 0
MEMORY LOSS: 1
BACK PAIN: 0
SEIZURES: 0
MYALGIAS: 0
DIARRHEA: 0
SORE THROAT: 0
FEVER: 0

## 2018-03-08 ASSESSMENT — PAIN SCALES - GENERAL
PAINLEVEL_OUTOF10: 0

## 2018-03-08 ASSESSMENT — GAIT ASSESSMENTS
GAIT LEVEL OF ASSIST: STAND BY ASSIST
DEVIATION: BRADYKINETIC
DISTANCE (FEET): 200
ASSISTIVE DEVICE: FRONT WHEEL WALKER

## 2018-03-08 NOTE — PROGRESS NOTES
[facility] Hospitalist Progress Note    Date of Service: 3/8/2018    Chief Complaint  84 y.o. male admitted 2018 with Encephalopathy, Atrial fibrillation with RVR, with sinus pause, Respiratory failure.    Interval Problem Update  Patient denies pain.    3/7: Sitting up at the side of the bed, eating breakfast  No difficulty with oral intake  Last seen by PT, still requiring max assistance    Guardianship pending, discussed with ethics    3/8: improving memory, still slow to respond    Consultants/Specialty  Cardio - signed off  Geriatrics    Disposition  ? Group home versus assisted living after guardianship  Ethics evaluation  - seen by psych, not capacitated 3/8  Accepted at [facility],  to assist      Review of Systems   Constitutional: Negative for fever.   HENT: Negative for hearing loss and sore throat.    Eyes: Negative for double vision.   Respiratory: Negative for cough and shortness of breath.    Cardiovascular: Negative for leg swelling.   Gastrointestinal: Negative for abdominal pain and diarrhea.   Musculoskeletal: Negative for back pain and myalgias.   Neurological: Negative for seizures, weakness and headaches.   Psychiatric/Behavioral: Positive for memory loss.      Physical Exam  Laboratory/Imaging   Hemodynamics  Temp (24hrs), Av.8 °C (98.2 °F), Min:36.4 °C (97.5 °F), Max:37.3 °C (99.2 °F)   Temperature: 36.4 °C (97.5 °F)  Pulse  Av.9  Min: 45  Max: 153    Blood Pressure : 137/74      Respiratory      Respiration: 16, Pulse Oximetry: 95 %     Work Of Breathing / Effort: Mild  RUL Breath Sounds: Clear, RML Breath Sounds: Diminished, RLL Breath Sounds: Diminished, WHIT Breath Sounds: Clear, LLL Breath Sounds: Diminished    Fluids    Intake/Output Summary (Last 24 hours) at 18 1207  Last data filed at 18 0400   Gross per 24 hour   Intake              140 ml   Output              400 ml   Net             -260 ml       Nutrition  Orders Placed This Encounter    Procedures   • Diet Order     Standing Status:   Standing     Number of Occurrences:   1     Order Specific Question:   Diet:     Answer:   Cardiac [6]     Order Specific Question:   Texture/Fiber modifications:     Answer:   Dysphagia 2(Pureed/Chopped)specify fluid consistency(question 6) [2]     Order Specific Question:   Consistency/Fluid modifications:     Answer:   Thin Liquids [3]     Physical Exam   Constitutional: He is oriented to person, place, and time. He appears well-developed and well-nourished. No distress.   HENT:   Head: Normocephalic and atraumatic.   Mouth/Throat: No oropharyngeal exudate.   Eyes: EOM are normal. Pupils are equal, round, and reactive to light. Left eye exhibits no discharge.   Neck: Normal range of motion.   Cardiovascular: Normal rate, regular rhythm and intact distal pulses.    Pacemaker site in the upper left chest looks noninfected, without erythema, edema, fluctuance , or tenderness to palpation   Pulmonary/Chest: Effort normal. No respiratory distress.   Abdominal: Soft. Bowel sounds are normal. He exhibits no distension.   Musculoskeletal: He exhibits no edema or tenderness.   Neurological: He is alert and oriented to person, place, and time.   MMT 5/5   Skin: Skin is warm and dry.   Old sq Hematomas in the upper chest and right shoulder   Psychiatric: Judgment and thought content normal.   Nursing note and vitals reviewed.                               Assessment/Plan     * Cardiac arrest (CMS-HCC)- (present on admission)   Assessment & Plan    History of. Secondary to SSS. S/P pacemaker        SSS (sick sinus syndrome) (CMS-HCC)- (present on admission)   Assessment & Plan    Temporary pacemaker initially, permanent pacemaker placement   - cont Amiodarone per cards recs.        Acute respiratory failure with hypoxia (CMS-HCC)- (present on admission)   Assessment & Plan    Intubated 2/14, Extubated 2/16.  No issue, on RA  Encourage IS, RT protocol        Bradycardia-  (present on admission)   Assessment & Plan    secondary to SSS        Encephalopathy acute- (present on admission)   Assessment & Plan    - improving; Geriatrics recs appreciated  -Evaluated by psychiatry earlier, found to be incapacitated for decisions  - cont qHS prn Depakote per recs    For ethics consult, follow up with recommendations  Encourage activity    3/7: Last seen by therapy on March 5, max assistance  Accepted at [facility], to transfer to skilled nursing facility if patient has a medical decision maker  Discussed with ethics consult, Wayne, will reach out to the patient's son, who has an estranged for  relationship with parents  Patient's wife does have a history of dementia however appears to be alert and oriented ×3  Appears to understand her underlying condition and reports adequate support with neighbors and friends in the community  Appears to be making appropriate decisions regarding daily life activities  At this point appears to have medical decision making capacity, will follow up with ethics    3/8:  Poor short term memory, giving items to recall  Using a writing aid, will cont to eval  D/w psychiatry to eval Monday    At this point, not capacitated  D/w ethics, f/u with son        Leukocytosis- (present on admission)   Assessment & Plan    Resolved        Hypomagnesemia- (present on admission)   Assessment & Plan    resolved        Hypophosphatemia- (present on admission)   Assessment & Plan    resolved        Macrocytosis- (present on admission)   Assessment & Plan    b12, folic wnl        S/P TAVR (transcatheter aortic valve replacement)- (present on admission)   Assessment & Plan    TAVR on 2/12  stable        Hypertension, essential- (present on admission)   Assessment & Plan    Discontinued Lisinopril   Creatinine is still worsening. Will check bladder scan.        Hypokalemia- (present on admission)   Assessment & Plan    Stable; monitoring        CAD (coronary artery disease)-  (present on admission)   Assessment & Plan    History of CABG ×3  ASA, Lipitor.  Stable, no chest pain        Paroxysmal atrial fibrillation (CMS-HCC)- (present on admission)   Assessment & Plan    Amiodarone, Xarelto        Acquired hypothyroidism- (present on admission)   Assessment & Plan    Continue Synthroid   TSH 0.74      2 weeks ago.        Carotid artery stenosis- (present on admission)   Assessment & Plan    s/p L CEA  ASA, Lipitor          Quality-Core Measures   Reviewed items::  Radiology images reviewed, EKG reviewed, Labs reviewed and Medications reviewed  Baldwin catheter::  No Baldwin  DVT: Xarelto.  Ulcer Prophylaxis::  No  Assessed for rehabilitation services:  Patient was assess for and/or received rehabilitation services during this hospitalization

## 2018-03-08 NOTE — PSYCHIATRY
"PSYCHOLOGICAL CONSULTATION:  Reason for admission: Bradycardia  Cardiac arrest (CMS-[facility])  Reason for consult: Capacity  Requesting Physician: Rubi Monson D.O.  Supervising Physician: Rosa Marvin MD      Legal status: Voluntary    Chief Complaint: \"i'm fine.\"    HPI: Met with the patient to assess capacity to engage in medical decision making. This writer also assessed the patient's capacity on 2/20/2018 after which the patient was deemed incapacitated. Most recent consult was placed to reevaluate patient's current capacity. Please see psychiatry notes for past history.    Today, the patient was oriented to his name, birth date (however it took him a long time to state it), place, and month. He was not oriented to the date and it is unclear if he was oriented to the year since his first answer was \"1918.\" When this writer clarified if the patient meant 2018, he stated \"oh yes.\" He did not remember his home address. The patient was somewhat oriented to why he is in the hospital however he stated that he came to the hospital to get a \"valve replaced for my heart.\" He also remembered that he has a pacemaker. However, he was surprised when this writer told him that the reason he was in the hospital was because he had experienced a heart attack.     The patient stated his belief that he needs someone in his home to help him to make sure \"I dont fall.\" He was agreeable to having a caregiver/nurse live with him and his wife. When asked how he would get food since he cannot drive, he stated he would have a friend drive him. When asked how he would get money, he stated that he gets it from the bank in the grocery store and that he would have his friend drive him. Important to note, while the patient and his wife state that their neighbors will help them with ADLs, this writer cannot find in the chart that the neighbors have recently visited the patient while he is in the hospital. While this does not necessarily mean that " "they wont help the patient, I would urge the treatment team to verify that these friends would be agreeable to helping the patient and his wife once they are home (if they dont end up having live in care.)    This writer attempted to give the patient a MOCA. The patient was unable to complete the first three tests and remember any of the five words he was asked to remember. As a result, this writer did not complete the MOCA since it appeared the patient was having immense difficulty with the test.     Near the end of the meeting, the patient stated his belief that \"everyone thinks we are useless.\" When this writer tried to clarify what the patient meant, he pointed to his head and tried to explain how it was due to his memory and age that people think he is useless.       Psychiatric Review of Systems:current symptoms as reported by pt.  Depression: Did not report  Vanessa: Did not report  Anxiety/Panic Attacks: Did not report  PTSD symptom: Did not report  Psychosis: Did not report  Other: Patient endorsed memory problems since his heart surgery       Medical Review of Systems: as reported by pt. All systems reviewed. Only those found to be + are noted below. All others are negative.   Neurological:    TBIs: Did not report   SZs:Did not report   Strokes:Did not report  Other medical symptoms:   Thyroid:Did not report   Diabetes: Did not report   Cardiovascular disease: Endorses needing a \"valve\" replaced. Per chart review, he is in the hospital after experiencing a significant heart attack    Past Psychiatric Hx: Did not report    Family Psychiatric Hx: Did not report    Social Hx: Patient and his wife are originally from [facility] and [place] the [place] around [place]. They have two adult children who they do not have contact with. Patient's children have been contacted by this hospital and they have stated that they do not wish to be involved in their parent's care.     Drug/Alcohol/Tobacco Hx:   Drugs: Did not " report   Prescription Medication Abuse: Did not report   Alcohol: Did not report   Tobacco: Did not report    Medical Hx: Chart reviewed. Only those findings of potential interest to psychiatry are noted below:  Past Medical History:   Diagnosis Date   • Anemia 4/2016    due to rectal bleed   • Arrhythmia 5/12/17    Hx A fib. On only aspirin.    • CAD (coronary artery disease)    • Carotid artery stenosis 6/13/2011   • CATARACT 1990's    Bilateral phaco with IOL   • Dental disorder     cavities, under current care   • Dental disorder 5/12/17    permanent bridge lower   • Dizziness 6/13/2011   • Dyslipidemia 9/18/2010   • Gout    • Heart murmur    • High cholesterol    • Hyperlipidemia    • Hypertension    • Hypothyroidism 6/13/2011   • Insomnia    • Myocardial infarct 2008    Stent 2011.  5/12/17-Cardiologist is DR Wu ([facility])   • PAD (peripheral artery disease)    • Preoperative examination, unspecified 4/12/2011   • Rectal bleed 4/4/2016    Cauterized and received blood transfusions   • Renal disorder 2000    kidney stone   • Sleep apnea 2014    States recommended CPAP but never did get it.   • Stroke (CMS-[facility]) 4/16/2010    TIA    • Tendonitis    • TIA (transient ischemic attack) 6/13/2011   • Unspecified disorder of thyroid    • Urinary incontinence      Medical Conditions:     Allergies: Patient has no known allergies.  Medications (currently prescribed at [facility]):    Current Facility-Administered Medications:   •  Divalproex Sodium (DEPAKOTE) capsule 125 mg, 125 mg, Oral, HS PRN, Jayce Gavin M.D.  •  polyethylene glycol/lytes (MIRALAX) PACKET 1 Packet, 1 Packet, Oral, DAILY, Keven Adrian M.D., 1 Packet at 03/08/18 0854  •  rivaroxaban (XARELTO) tablet 15 mg, 15 mg, Oral, PM MEAL, Alexys Gray M.D., 15 mg at 03/07/18 1820  •  amiodarone (CORDARONE) tablet 200 mg, 200 mg, Oral, Q DAY, John Ray M.D., 200 mg at 03/08/18 0854  •  ondansetron (ZOFRAN) syringe/vial injection 4 mg, 4 mg,  "Intravenous, Q4HRS PRN, John Ray M.D., 4 mg at 02/20/18 1533  •  aspirin (ASA) chewable tab 81 mg, 81 mg, Oral, DAILY, John Ray M.D., 81 mg at 03/08/18 0854  •  hydrALAZINE (APRESOLINE) injection 10-20 mg, 10-20 mg, Intravenous, Q4HRS PRN, Jeremy M Gonda, M.D., 10 mg at 02/20/18 1219  •  Pharmacy Consult Request ...Pain Management Review 1 Each, 1 Each, Other, PRN, Usman Stephens M.D.  •  Respiratory Care per Protocol, , Nebulization, Continuous RT, Kraig Seth Jr., D.O.  •  [DISCONTINUED] senna-docusate (PERICOLACE or SENOKOT S) 8.6-50 MG per tablet 2 Tab, 2 Tab, Oral, BID, 2 Tab at 02/27/18 0918 **AND** polyethylene glycol/lytes (MIRALAX) PACKET 1 Packet, 1 Packet, Oral, QDAY PRN, 1 Packet at 03/01/18 1440 **AND** magnesium hydroxide (MILK OF MAGNESIA) suspension 30 mL, 30 mL, Oral, QDAY PRN **AND** bisacodyl (DULCOLAX) suppository 10 mg, 10 mg, Rectal, QDAY PRN, Kraig Seth Jr., D.O.  •  ipratropium-albuterol (DUONEB) nebulizer solution 3 mL, 3 mL, Nebulization, Q2HRS PRN (RT), Kragi Seth Jr., D.O.  •  acetaminophen (TYLENOL) tablet 650 mg, 650 mg, Oral, Q6HRS PRN, Gavin Mackenzie M.D., 650 mg at 02/25/18 0915  •  atorvastatin (LIPITOR) tablet 20 mg, 20 mg, Oral, QHS, Gavin Mackenzie M.D., 20 mg at 03/07/18 2320  •  levothyroxine (SYNTHROID) tablet 50 mcg, 50 mcg, Oral, AM ES, Gavin Mackenzie M.D., 50 mcg at 03/08/18 0525  Labs:  No results found for this or any previous visit (from the past 24 hour(s)).       Cranial Imaging Hx: CT of the head on 2/18/18:     \"FINDINGS:    There is no evidence of extra-axial hemorrhage. No intra-axial hemorrhage is noted. There is no brain swelling or edema. No mass effect or midline shift is noted.  Ventricles and sulci appear prominent.  There is decreased attenuation in the periventricular white matter. Mucosal thickening in the paranasal sinuses is again noted.\"    Other:    Psychiatric Examination:  Vitals: Blood pressure 137/74, pulse 60, " "temperature 36.4 °C (97.5 °F), resp. rate 16, height 1.6 m (5' 2.99\"), weight 60.1 kg (132 lb 7.9 oz), SpO2 95 %.  Musculoskeletal: normal psychomotor activity, no tics or unusual mannerisms noted  Appearance and Eye Contact:appropriate dress and grooming. Behavior is calm, cooperative,  appropriate eye contact  Attention/Alertness: Alert  Thought Process: Linear    Thought Content: Not significant for psychotic processess  Speech: Slow. He found it difficult to form complete sentences.  Mood: \"fine\"            Affect: euthymic         SI/HI: Did not report    Memory: Recent and remote memory appear impaired    Orientation: alert, only oriented to:, name, birth date, place, month, and year.   Insight into symptoms: Fair  Judgement into symptoms:Fair    Neuropsychological Testing:   Not formally tested.          ASSESSMENT: While the patient's mentation has improved since he was last seen by this writer, he continues to have significant memory impairment and it is unclear if he would even remember the decisions he makes. At this time, the patient remains INCAPACITATED to make medical decisions and/or leave AMA. In order to hold an incapacitated pt on a medical hold, there must ALSO be an imminent  risk of death and/or disability as well. The final decision to hold or not hold is up to the treatment team.      If there are any questions please contact Risk Management [phone].    The patient and his wife both state their desire to have assistance in their home via a caretaker/nurse.    DSM5 Diagnostic Considerations:   Unspecified Neurocognitive Disorder      PLAN:  Legal status: Voluntary  Capacity: INCAPACITATED  Records reviewed: Yes  Discussed patient with other provider: yes  Signing off  Thank you for the consult.    Cyndi Jovel PhD  "

## 2018-03-08 NOTE — PSYCHIATRY
"Patient seen and assessed. At this time, the patient remains INCAPACITATED to make medical decisions. Patient remains agreeable to having a live in nurse in his home to care for him and his wife. He states that this would be to make sure \"I don't fall.\"    Pt is NOT capacitated to make the decision to leave AMA. In order to hold an  incapacitated pt on a medical hold, there must ALSO be an imminent  risk of death and/or disability as well. The final decision to hold or not hold is up to the treatment team.     If there are any questions please contact Risk Management [phone].    Full note to follow.    "

## 2018-03-08 NOTE — THERAPY
"Physical Therapy Treatment completed.   Bed Mobility:  Supine to Sit: Contact Guard Assist  Transfers: Sit to Stand: Stand by Assist  Gait: Level Of Assist: Stand by Assist with Front-Wheel Walker       Plan of Care: Will benefit from Physical Therapy 1 times per week  Discharge Recommendations: Equipment: Front-Wheel Walker.     See \"Rehab Therapy-Acute\" Patient Summary Report for complete documentation.     Pt. is progressing as expected demonstrating improved balance, strength, and activty tolerance. Pt. was able to complete 10 mins of nustep today on resistance level of 2 with no complaints of fatigue. Pt. was able to ambulate hallways w/FWW w/SBA. Presented with 1 mesfin LOB during ambulation as pt. attempted to correct posture and look up and presented with a posterior lean and stepped backwards with FWW, required CGA from PT to maintain center of gravity. Continues to require min VC's for saftey with FWW and during transfers. Pt. is primarily limited by poor cognition and poor saftey awareness at this time. Will decrease frequency of therapy visits to 1 time per week and for d/c planning needs at this time. Pt appears to be a good canidate for a group home at this time with 24/7 supervision.   "

## 2018-03-08 NOTE — DISCHARGE PLANNING
Per rounds, Psych evaluated pt and pt was determined to still not have capacity for decision making.

## 2018-03-08 NOTE — CONSULTS
ETHICS CONSULTATION  Patient Name: Prisca Shaikh & Rafia Shaikh  MRN: [id number] & [id number]  DATE OF SERVICE: 3/7/18    ETHICAL ISSUE:   An ethics consult was requested regarding these two patients.  The patients are  and wife and currently share a room at [facility].  The ethical issues are around the patients’ decision making capacity and surrogate decision making.    DISCUSSIONS WITH MEDICAL TEAM:   Ethics spoke with Rubi Monson MD, Hospitalist, who is covering both patients.      RE: Prisca Shaikh: 82 year old female  Per Dr. Monson, there is documentation in the patient’s medical records that indicate the patient has a history of dementia.  However, the patient’s mentation appears to have improved, she is able to articulate her underlying condition and reports adequate support with neighbors and friends in the community.  In addition, the patient appears to be making appropriate decision making with respect to plan of care for herself and her , has a plan following discharge, and may be agreeable to SNF discharge with her  if necessary.  Dr. Monson may consider [facility] consultation for their input.  At this point in time, Dr. Monson does not have any concerns with respect to the patient’s current plan of care, rather, questions are surrounding discharge and, if necessary, appropriate decision-maker (self, children, or guardianship).      RE: Rafia Shaikh, 84 year old male  Per Dr. Monson, Mr. Morataya has greater need for ongoing care at this time compared to his wife. The patient may benefit from discharge to SNF, or, if the patient continues to demonstrate clinical improvement, may be appropriate for home discharge as long as there is sufficient support established at home.  Dr. Monson does not have any concerns with respect to the patient’s current plan of care, rather, questions are surrounding discharge and, if necessary, appropriate decision-maker (likely spouse,  children, or guardianship).      CODE STATUS AT THE TIME OF ETHICS CONSULTATION:  RE: Prisca Shaikh: 82 year old female  Full Code    RE: Rafia Shaikh, 84 year old male  Full Code    PATIENT’S DECISION MAKING CAPACITY:  RE: Prisca Shaikh: 82 year old female  Per Attending, patient appears to have some level of decision making, if not ability to make all medical decisions.  The patient appears likely to make less complex decisions, for example, discharge to SNF, appointment of surrogate or agent, or daily life activities.      RE: Rafia Shaikh, 84 year old male  Per Attending, patient appears less likely (than his wife) to be able to make decisions at this point in time, however, the patient’s cognitive function appears to be improving.     ADVANCE DIRECTIVE/POLST FORM:  Neither patient has an advance directive or POLST form in their medical records.      HEALTH CARE DECISION MAKER:  If the Attending determines Prisca Shaikh (82 year old female and wife) to have decision making capacity, Prisca Shaikh will likely act as decision maker for herself and her .      If the Attending determines Prisca Shaikh and Rafia Shaikh (84 year old male and ) both lack decision making authority, the patient’s children may be able to act as surrogate decision makers, however, up to this point (3/7/18), the patient’s children have been reluctant to act as surrogate decision maker for their parents.    DISCUSSIONS WITH PATIENT/PATIENT PREFERENCES:  Ethics spoke with both patients on 3/7/18, though Ms. Shaikh (Prisca Shaikh, 82 year old female and wife) was more engaged in the conversation and appeared to have greater understanding of her condition, her husbands, condition, and their current options with respect to discharge planning.  Mr. Shaikh engaged in the conversation at times.    Ms. Shaikh described her plan for returning home, support provided by nearby friends and neighbors, and desire to be at home with her .  Ms. Shaikh  also described plans to get assistance if her  needed medical treatment (call neighbors or 911 if needed).  Both patients were agreeable to assistance at home if feesible (home health support), at one point Ms. Shaikh stated they had room for a nurse to stay with them.  Ms. Shaikh and Mr. Shaikh were both agreeable to transfer to an appropriate health care facility if recommended by the medical team, though on the condition that they would be going to the same facility.      Ms. Shaikh and Mr. Shaikh were both agreeable to the medical team contacting their children, but specifically their son as he is a doctor, if they were unable to make their own decisions.  Ms. Shaikh explained that both her children were raised to be independent, and they are, however, the family still supports each other if needed, for example, if her children need money, or the patient’s need money, all it takes is one call and money is sent to take care who needs the money.  They said their children were raised to be honest and could be trusted.  They said their son did want them to move to [place] to be close to them but it is too cold in [place].            SOCIAL HISTORY/LIVING SITUATION PRIOR TO HOSPITALIZATION:  The patients are  and wife and live together in the community.      DISCUSSIONS WITH SURROGATE/FAMILY/PATIENT REPRESENTATIVE:  Ethics tried to speak with the patient’s son but was not able to reach the son or leave a message.  Ethics emailed the son on 3/7/18.      CONTEXTUAL ISSUES:  There have been some challenges with regard to disposition due to previous concerns about both patient’s decision making ability and the children’s willingness to act as surrogate decision maker (or file for private guardianship).      RECOMMENDATIONS:   1) Recommend, if Attending Physician determines either patient, most likely Prisca Boogie: 82 year old female) given current presentation, to have decision making capacity to the question at  hand, for example, discharge, allowing patient with decision making capacity to act as their own decision maker as well as surrogate for their spouse.  a. Recommend medical team speak with patient’s PCP regarding cognitive function if there is uncertainty.  Ms. Shaikh mentioned having a close relationship with their PCP who lives near the patients.  2) Recommend, if Attending Physician determines both patients lack decision making capacity to the question at hand, for example, discharge, communicating with the patient’s children, specifically, the patient’s son, about acting as surrogate decision maker for the patients.    a. The patients both agreed to allow medical team to contact their children, specifically the son, and stated their children, though busy and independent, were honest and could be trusted to make decisions if they (patients) are not able to make decisions.  b. Ethics emailed the patient’s son on 3/7/18.  Ethics will try to contact again on 3/8/18.  3) Recommend pursuing guardianship if the Attending Physician determines both patients lack decision making capacity and the patients cognitive function is not likely to improve and the patient’s children are not willing or able to act as surrogate decision maker.  4) Recommend, if the medical team determines home discharge is a safe discharge, ensuring patients have sufficient support at home, for example, home health.      Thank you for involving us in the care of this patient. Please let me know if you have any other questions or concerns.      Respectfully submitted,    Wayne Frye JD, MA  Bioethicist  [phone]

## 2018-03-08 NOTE — PROGRESS NOTES
Assumed pt care at 1900 and received bedside report. Pt alert and oriented X 3. Pt denies chest pain, sob, numbness and tingling, nausea and vomiting, headache, and blurry or double vision. Pt denies pain. Pt ambulating with a walker and 1 x assist to the restroom. Education provided to both pt and pt wife on medical equipment and necessity. Pt wife removing  and oxygen. Explained to pt wife the importance of the equipment with reinforcement needed. Pt wife also giving pt water with a straw. Education provided. POC discussed and education provided on administered medications. All questions and concerns addressed. Fall precautions, hourly rounding and Q4 hour neuro checks in place.

## 2018-03-08 NOTE — CARE PLAN
Problem: Respiratory:  Goal: Respiratory status will improve  Outcome: PROGRESSING AS EXPECTED  Education provided to pt and wife.  placed on pt and oxygen placed on pt to maintain oxygen saturation greater then 90 per order. Wife removing  and oxygen. Education provided with reinforcement needed   Intervention: Administer and titrate oxygen therapy  Oxygen administered, pt wife removing   Intervention: Educate and encourage incentive spirometry usage  Assistance needed

## 2018-03-08 NOTE — CARE PLAN
Problem: Fluid Volume:  Goal: Will maintain balanced intake and output  Outcome: PROGRESSING AS EXPECTED  Encouraging pt to drink fluids.

## 2018-03-09 LAB
ANION GAP SERPL CALC-SCNC: 8 MMOL/L (ref 0–11.9)
BASOPHILS # BLD AUTO: 1 % (ref 0–1.8)
BASOPHILS # BLD: 0.08 K/UL (ref 0–0.12)
BUN SERPL-MCNC: 23 MG/DL (ref 8–22)
CALCIUM SERPL-MCNC: 9.3 MG/DL (ref 8.5–10.5)
CHLORIDE SERPL-SCNC: 106 MMOL/L (ref 96–112)
CO2 SERPL-SCNC: 24 MMOL/L (ref 20–33)
CREAT SERPL-MCNC: 1.13 MG/DL (ref 0.5–1.4)
EOSINOPHIL # BLD AUTO: 0.31 K/UL (ref 0–0.51)
EOSINOPHIL NFR BLD: 3.9 % (ref 0–6.9)
ERYTHROCYTE [DISTWIDTH] IN BLOOD BY AUTOMATED COUNT: 49.3 FL (ref 35.9–50)
GLUCOSE SERPL-MCNC: 92 MG/DL (ref 65–99)
HCT VFR BLD AUTO: 36.5 % (ref 42–52)
HGB BLD-MCNC: 11.8 G/DL (ref 14–18)
IMM GRANULOCYTES # BLD AUTO: 0.33 K/UL (ref 0–0.11)
IMM GRANULOCYTES NFR BLD AUTO: 4.2 % (ref 0–0.9)
LYMPHOCYTES # BLD AUTO: 1.73 K/UL (ref 1–4.8)
LYMPHOCYTES NFR BLD: 21.9 % (ref 22–41)
MCH RBC QN AUTO: 32.3 PG (ref 27–33)
MCHC RBC AUTO-ENTMCNC: 32.3 G/DL (ref 33.7–35.3)
MCV RBC AUTO: 100 FL (ref 81.4–97.8)
MONOCYTES # BLD AUTO: 0.87 K/UL (ref 0–0.85)
MONOCYTES NFR BLD AUTO: 11 % (ref 0–13.4)
NEUTROPHILS # BLD AUTO: 4.59 K/UL (ref 1.82–7.42)
NEUTROPHILS NFR BLD: 58 % (ref 44–72)
NRBC # BLD AUTO: 0 K/UL
NRBC BLD-RTO: 0 /100 WBC
PLATELET # BLD AUTO: 137 K/UL (ref 164–446)
PMV BLD AUTO: 9.9 FL (ref 9–12.9)
POTASSIUM SERPL-SCNC: 3.7 MMOL/L (ref 3.6–5.5)
RBC # BLD AUTO: 3.65 M/UL (ref 4.7–6.1)
SODIUM SERPL-SCNC: 138 MMOL/L (ref 135–145)
WBC # BLD AUTO: 7.9 K/UL (ref 4.8–10.8)

## 2018-03-09 PROCEDURE — A9270 NON-COVERED ITEM OR SERVICE: HCPCS | Performed by: HOSPITALIST

## 2018-03-09 PROCEDURE — A9270 NON-COVERED ITEM OR SERVICE: HCPCS | Performed by: INTERNAL MEDICINE

## 2018-03-09 PROCEDURE — 92526 ORAL FUNCTION THERAPY: CPT

## 2018-03-09 PROCEDURE — 700102 HCHG RX REV CODE 250 W/ 637 OVERRIDE(OP): Performed by: INTERNAL MEDICINE

## 2018-03-09 PROCEDURE — 700102 HCHG RX REV CODE 250 W/ 637 OVERRIDE(OP): Performed by: HOSPITALIST

## 2018-03-09 PROCEDURE — 770006 HCHG ROOM/CARE - MED/SURG/GYN SEMI*

## 2018-03-09 PROCEDURE — 80048 BASIC METABOLIC PNL TOTAL CA: CPT

## 2018-03-09 PROCEDURE — 85025 COMPLETE CBC W/AUTO DIFF WBC: CPT

## 2018-03-09 PROCEDURE — A9270 NON-COVERED ITEM OR SERVICE: HCPCS | Performed by: FAMILY MEDICINE

## 2018-03-09 PROCEDURE — 99232 SBSQ HOSP IP/OBS MODERATE 35: CPT | Performed by: INTERNAL MEDICINE

## 2018-03-09 PROCEDURE — 36415 COLL VENOUS BLD VENIPUNCTURE: CPT

## 2018-03-09 PROCEDURE — 700102 HCHG RX REV CODE 250 W/ 637 OVERRIDE(OP): Performed by: FAMILY MEDICINE

## 2018-03-09 PROCEDURE — 97535 SELF CARE MNGMENT TRAINING: CPT

## 2018-03-09 RX ADMIN — RIVAROXABAN 15 MG: 15 TABLET, FILM COATED ORAL at 17:57

## 2018-03-09 RX ADMIN — ATORVASTATIN CALCIUM 20 MG: 20 TABLET, FILM COATED ORAL at 19:40

## 2018-03-09 RX ADMIN — ASPIRIN 81 MG: 81 TABLET, CHEWABLE ORAL at 09:10

## 2018-03-09 RX ADMIN — AMIODARONE HYDROCHLORIDE 200 MG: 200 TABLET ORAL at 09:10

## 2018-03-09 RX ADMIN — POLYETHYLENE GLYCOL 3350 1 PACKET: 17 POWDER, FOR SOLUTION ORAL at 09:10

## 2018-03-09 RX ADMIN — LEVOTHYROXINE SODIUM 50 MCG: 50 TABLET ORAL at 09:10

## 2018-03-09 ASSESSMENT — PAIN SCALES - GENERAL
PAINLEVEL_OUTOF10: 0
PAINLEVEL_OUTOF10: 0

## 2018-03-09 ASSESSMENT — ENCOUNTER SYMPTOMS
SHORTNESS OF BREATH: 0
NAUSEA: 0
MYALGIAS: 0
FEVER: 0
DOUBLE VISION: 0
MEMORY LOSS: 1
HEADACHES: 0
SORE THROAT: 0
ABDOMINAL PAIN: 0
DIARRHEA: 0
DIZZINESS: 0
BACK PAIN: 0
HEMOPTYSIS: 0
DEPRESSION: 0
WEAKNESS: 0

## 2018-03-09 ASSESSMENT — COGNITIVE AND FUNCTIONAL STATUS - GENERAL
DAILY ACTIVITIY SCORE: 20
DRESSING REGULAR LOWER BODY CLOTHING: A LITTLE
HELP NEEDED FOR BATHING: A LITTLE
DRESSING REGULAR UPPER BODY CLOTHING: A LITTLE
SUGGESTED CMS G CODE MODIFIER DAILY ACTIVITY: CJ
TOILETING: A LITTLE

## 2018-03-09 ASSESSMENT — LIFESTYLE VARIABLES: EVER_SMOKED: NEVER

## 2018-03-09 NOTE — THERAPY
"Occupational Therapy Treatment completed with focus on ADLs, ADL transfers and patient education.  Functional Status:  Pt seen for OT tx today.  Pt was pleasant but educated on the importance of getting up throughout the day.  Continues to be limited by bed seeking bx, endurance, functional balance during dynamic tasks when attention is divided, and self care.  Pt completed LB dressing with min A attempting to stand to complete the task with x3 LOB pt was unable to recover, UB dressing with CGA with x1 LOB forward pt was unable to self recover.  Needing frequent cues to initiate, follow through, and problem solve during ADL tasks however unwilling to change patterns despite frequent LOB.  Pt completed supine to sit, sit to stand, room ambulation using FWW from bed to toilet with functional endurance, weakness, balance, and self care.  Pt would benefit from continued inpt OT as they are continuing to need assistance with functional endurance, transfers, and self care.     Plan of Care: Will benefit from Occupational Therapy 3 times per week  Discharge Recommendations:  Equipment Will Continue to Assess for Equipment Needs. Post-acute therapy Discharge to a transitional care facility for continued skilled therapy services.    See \"Rehab Therapy-Acute\" Patient Summary Report for complete documentation.   "

## 2018-03-09 NOTE — PROGRESS NOTES
[facility] Hospitalist Progress Note    Date of Service: 3/9/2018    Chief Complaint  84 y.o. male admitted 2018 with Encephalopathy, Atrial fibrillation with RVR, with sinus pause, Respiratory failure.    Interval Problem Update  Patient denies pain.    3/7: Sitting up at the side of the bed, eating breakfast  No difficulty with oral intake  Last seen by PT, still requiring max assistance    Guardianship pending, discussed with ethics    3/8: improving memory, still slow to respond    3/9: no new events    Consultants/Specialty  Cardio - signed off  Geriatrics    Disposition  ? Group home versus assisted living after guardianship  Ethics evaluation  - seen by psych, not capacitated 3/8  Accepted at [facility],  to assist      Review of Systems   Constitutional: Negative for fever.   HENT: Negative for hearing loss and sore throat.    Eyes: Negative for double vision.   Respiratory: Negative for hemoptysis and shortness of breath.    Cardiovascular: Negative for chest pain and leg swelling.   Gastrointestinal: Negative for abdominal pain, diarrhea and nausea.   Genitourinary: Negative for urgency.   Musculoskeletal: Negative for back pain and myalgias.   Neurological: Negative for dizziness, weakness and headaches.   Psychiatric/Behavioral: Positive for memory loss. Negative for depression.      Physical Exam  Laboratory/Imaging   Hemodynamics  Temp (24hrs), Av.2 °C (97.2 °F), Min:36.1 °C (96.9 °F), Max:36.6 °C (97.8 °F)   Temperature: 36.6 °C (97.8 °F)  Pulse  Av.9  Min: 45  Max: 153    Blood Pressure : 157/70      Respiratory      Respiration: 16, Pulse Oximetry: 95 %        RUL Breath Sounds: Clear, RML Breath Sounds: Clear, RLL Breath Sounds: Clear, WHIT Breath Sounds: Clear, LLL Breath Sounds: Clear    Fluids    Intake/Output Summary (Last 24 hours) at 18 1447  Last data filed at 18 0400   Gross per 24 hour   Intake               60 ml   Output                0 ml   Net                60 ml       Nutrition  Orders Placed This Encounter   Procedures   • Diet Order     Standing Status:   Standing     Number of Occurrences:   1     Order Specific Question:   Diet:     Answer:   Cardiac [6]     Order Specific Question:   Texture/Fiber modifications:     Answer:   Dysphagia 2(Pureed/Chopped)specify fluid consistency(question 6) [2]     Order Specific Question:   Consistency/Fluid modifications:     Answer:   Thin Liquids [3]     Physical Exam   Constitutional: He is oriented to person, place, and time. He appears well-developed and well-nourished. No distress.   HENT:   Head: Normocephalic and atraumatic.   Eyes: EOM are normal. Pupils are equal, round, and reactive to light. Right eye exhibits no discharge. Left eye exhibits no discharge.   Neck: Normal range of motion. No JVD present.   Cardiovascular: Normal rate, regular rhythm and intact distal pulses.    Pacemaker site in the upper left chest looks noninfected, without erythema, edema, fluctuance , or tenderness to palpation   Pulmonary/Chest: Effort normal. No respiratory distress.   Abdominal: Soft. Bowel sounds are normal. He exhibits no distension. There is no tenderness.   Musculoskeletal: He exhibits no edema.   Neurological: He is alert and oriented to person, place, and time. No cranial nerve deficit. Coordination normal.   MMT 5/5   Skin: Skin is warm and dry. No erythema.   Old sq Hematomas in the upper chest and right shoulder   Psychiatric: Judgment and thought content normal.   Nursing note and vitals reviewed.      Recent Labs      03/09/18   0152   WBC  7.9   RBC  3.65*   HEMOGLOBIN  11.8*   HEMATOCRIT  36.5*   MCV  100.0*   MCH  32.3   MCHC  32.3*   RDW  49.3   PLATELETCT  137*   MPV  9.9     Recent Labs      03/09/18   0152   SODIUM  138   POTASSIUM  3.7   CHLORIDE  106   CO2  24   GLUCOSE  92   BUN  23*   CREATININE  1.13   CALCIUM  9.3                      Assessment/Plan     * Cardiac arrest (CMS-[facility])- (present on  admission)   Assessment & Plan    History of. Secondary to SSS. S/P pacemaker        SSS (sick sinus syndrome) (CMS-HCC)- (present on admission)   Assessment & Plan    Temporary pacemaker initially, permanent pacemaker placement   - cont Amiodarone per cards recs.        Acute respiratory failure with hypoxia (CMS-HCC)- (present on admission)   Assessment & Plan    Intubated 2/14, Extubated 2/16.  No issue, on RA  Encourage IS, RT protocol        Bradycardia- (present on admission)   Assessment & Plan    secondary to SSS        Encephalopathy acute- (present on admission)   Assessment & Plan    - improving; Geriatrics recs appreciated  -Evaluated by psychiatry earlier, found to be incapacitated for decisions  - cont qHS prn Depakote per recs    For ethics consult, follow up with recommendations  Encourage activity    3/7: Last seen by therapy on March 5, max assistance  Accepted at [facility], to transfer to skilled nursing facility if patient has a medical decision maker  Discussed with ethics consult, Wayne, will reach out to the patient's son, who has an estranged for  relationship with parents  Patient's wife does have a history of dementia however appears to be alert and oriented ×3  Appears to understand her underlying condition and reports adequate support with neighbors and friends in the community  Appears to be making appropriate decisions regarding daily life activities  At this point appears to have medical decision making capacity, will follow up with ethics    3/8:  Poor short term memory, giving items to recall  Using a writing aid, will cont to eval  D/w psychiatry to eval Monday    At this point, not capacitated  D/w ethics, f/u with son    3/9: No new events  Will need skilled placing        Leukocytosis- (present on admission)   Assessment & Plan    Resolved        Hypomagnesemia- (present on admission)   Assessment & Plan    resolved        Hypophosphatemia- (present on admission)   Assessment &  Plan    resolved        Macrocytosis- (present on admission)   Assessment & Plan    b12, folic wnl        S/P TAVR (transcatheter aortic valve replacement)- (present on admission)   Assessment & Plan    TAVR on 2/12  stable        Hypertension, essential- (present on admission)   Assessment & Plan    Discontinued Lisinopril   Creatinine is still worsening. Will check bladder scan.        Hypokalemia- (present on admission)   Assessment & Plan    Stable; monitoring        CAD (coronary artery disease)- (present on admission)   Assessment & Plan    History of CABG ×3  ASA, Lipitor.  Stable, no chest pain        Paroxysmal atrial fibrillation (CMS-HCC)- (present on admission)   Assessment & Plan    Amiodarone, Xarelto        Acquired hypothyroidism- (present on admission)   Assessment & Plan    Continue Synthroid   TSH 0.74      2 weeks ago.        Carotid artery stenosis- (present on admission)   Assessment & Plan    s/p L CEA  ASA, Lipitor          Quality-Core Measures   Reviewed items::  Radiology images reviewed, EKG reviewed, Labs reviewed and Medications reviewed  Baldwin catheter::  No Baldwin  DVT: Xarelto.  Ulcer Prophylaxis::  No  Assessed for rehabilitation services:  Patient was assess for and/or received rehabilitation services during this hospitalization

## 2018-03-09 NOTE — PROGRESS NOTES
PT care assumed and assessment completed.  PT is aao to self and place.  Up with one assist and fww to bathroom.  Pt has urinary frequency.  Does not call for assistance to get oob.  Bed alarm on for safety.  Incision sites are salvador and cdi.  Charting close to pt room for additional safety.

## 2018-03-10 PROCEDURE — 99232 SBSQ HOSP IP/OBS MODERATE 35: CPT | Performed by: INTERNAL MEDICINE

## 2018-03-10 PROCEDURE — A9270 NON-COVERED ITEM OR SERVICE: HCPCS | Performed by: FAMILY MEDICINE

## 2018-03-10 PROCEDURE — A9270 NON-COVERED ITEM OR SERVICE: HCPCS | Performed by: HOSPITALIST

## 2018-03-10 PROCEDURE — 700102 HCHG RX REV CODE 250 W/ 637 OVERRIDE(OP): Performed by: HOSPITALIST

## 2018-03-10 PROCEDURE — A9270 NON-COVERED ITEM OR SERVICE: HCPCS | Performed by: INTERNAL MEDICINE

## 2018-03-10 PROCEDURE — 700102 HCHG RX REV CODE 250 W/ 637 OVERRIDE(OP): Performed by: INTERNAL MEDICINE

## 2018-03-10 PROCEDURE — 700102 HCHG RX REV CODE 250 W/ 637 OVERRIDE(OP): Performed by: FAMILY MEDICINE

## 2018-03-10 PROCEDURE — 770006 HCHG ROOM/CARE - MED/SURG/GYN SEMI*

## 2018-03-10 RX ADMIN — AMIODARONE HYDROCHLORIDE 200 MG: 200 TABLET ORAL at 08:13

## 2018-03-10 RX ADMIN — LEVOTHYROXINE SODIUM 50 MCG: 50 TABLET ORAL at 06:22

## 2018-03-10 RX ADMIN — ASPIRIN 81 MG: 81 TABLET, CHEWABLE ORAL at 08:13

## 2018-03-10 RX ADMIN — RIVAROXABAN 15 MG: 15 TABLET, FILM COATED ORAL at 17:35

## 2018-03-10 RX ADMIN — ATORVASTATIN CALCIUM 20 MG: 20 TABLET, FILM COATED ORAL at 20:56

## 2018-03-10 ASSESSMENT — ENCOUNTER SYMPTOMS
BACK PAIN: 0
SHORTNESS OF BREATH: 0
BLURRED VISION: 0
ABDOMINAL PAIN: 0
NAUSEA: 0
HEMOPTYSIS: 0
FEVER: 0
MEMORY LOSS: 1
DOUBLE VISION: 0
WEAKNESS: 0
MYALGIAS: 0
HEARTBURN: 0
SORE THROAT: 0

## 2018-03-10 ASSESSMENT — PAIN SCALES - GENERAL
PAINLEVEL_OUTOF10: 0

## 2018-03-10 NOTE — THERAPY
"Speech Language Therapy dysphagia treatment completed.   Functional Status:  Patient required assistance to sit upright to 90 degrees in bed. His spouse was unable to help him do this. He then consumed a mixed fruit cup independently without any signs of aspiration. No oral residue was noted. His bite sizes were appropriate. He declined trials of dry solids. RN reported patient has been consuming pills whole with thin water without any difficulty.   Recommendations: Recommend upgrade to Dysphagia 3, thin liquid diet. He will need assistance with repositioning HOB upright.    Plan of Care: Will benefit from Speech Therapy 3 times per week  Post-Acute Therapy: Discharge to a transitional care facility for continued skilled therapy services.    See \"Rehab Therapy-Acute\" Patient Summary Report for complete documentation.     "

## 2018-03-10 NOTE — CARE PLAN
Problem: Communication  Goal: The ability to communicate needs accurately and effectively will improve  Outcome: PROGRESSING AS EXPECTED  Reoriented patient to room, situation and how to use the call light. Patient verbalized understanding, but is very forgetful. Will continue to round hourly and as needed to assess for patient's needs.     Problem: Safety  Goal: Will remain free from injury  Outcome: PROGRESSING AS EXPECTED  Educated patient to use call light and wait for assistance from staff before getting up out of bed. Patient verbalized understanding, but is very forgetful and needs constant reminders. Will round hourly and as needed to assess for needs.

## 2018-03-10 NOTE — PROGRESS NOTES
[facility] Hospitalist Progress Note    Date of Service: 3/10/2018    Chief Complaint  84 y.o. male admitted 2018 with Encephalopathy, Atrial fibrillation with RVR, with sinus pause, Respiratory failure.    Interval Problem Update  Patient denies pain.    3/7: Sitting up at the side of the bed, eating breakfast  No difficulty with oral intake  Last seen by PT, still requiring max assistance    Guardianship pending, discussed with ethics    3/8: improving memory, still slow to respond    3/9: no new events    3/10: Following commands  Pleasant conversant    Consultants/Specialty  Cardio - signed off  Geriatrics    Disposition  ? Group home versus assisted living after guardianship  Ethics evaluation  - seen by psych, not capacitated 3/8  Accepted at [facility],  to assist      Review of Systems   Constitutional: Negative for fever and malaise/fatigue.   HENT: Negative for hearing loss and sore throat.    Eyes: Negative for blurred vision and double vision.   Respiratory: Negative for hemoptysis and shortness of breath.    Cardiovascular: Negative for chest pain and leg swelling.   Gastrointestinal: Negative for abdominal pain, heartburn and nausea.   Genitourinary: Negative for urgency.   Musculoskeletal: Negative for back pain and myalgias.   Neurological: Negative for weakness.   Psychiatric/Behavioral: Positive for memory loss.      Physical Exam  Laboratory/Imaging   Hemodynamics  Temp (24hrs), Av.7 °C (98 °F), Min:36.4 °C (97.5 °F), Max:36.9 °C (98.4 °F)   Temperature: 36.4 °C (97.5 °F)  Pulse  Av.9  Min: 45  Max: 153    Blood Pressure : 134/68      Respiratory      Respiration: 16, Pulse Oximetry: 95 %        RUL Breath Sounds: Clear, RML Breath Sounds: Clear, RLL Breath Sounds: Clear, WHIT Breath Sounds: Clear, LLL Breath Sounds: Clear    Fluids  No intake or output data in the 24 hours ending 03/10/18 1111    Nutrition  Orders Placed This Encounter   Procedures   • Diet Order      Standing Status:   Standing     Number of Occurrences:   1     Order Specific Question:   Diet:     Answer:   Cardiac [6]     Order Specific Question:   Texture/Fiber modifications:     Answer:   Dysphagia 3(Mechanical Soft)specify fluid consistency(question 6) [3]     Order Specific Question:   Consistency/Fluid modifications:     Answer:   Thin Liquids [3]     Order Specific Question:   Miscellaneous modifications:     Answer:   SLP - Deliver to Nursing Station [22]     Comments:   patient needs help getting HOB to 90 degrees or into chair for meals     Physical Exam   Constitutional: He is oriented to person, place, and time. No distress.   HENT:   Head: Normocephalic and atraumatic.   Eyes: EOM are normal. Pupils are equal, round, and reactive to light.   Neck: Normal range of motion.   Cardiovascular: Normal rate, regular rhythm and intact distal pulses.    Pacemaker site in the upper left chest looks noninfected, without erythema, edema, fluctuance , or tenderness to palpation   Pulmonary/Chest: Effort normal.   Abdominal: Soft. Bowel sounds are normal. He exhibits no distension. There is no tenderness.   Musculoskeletal: He exhibits no edema or tenderness.   Neurological: He is alert and oriented to person, place, and time. No cranial nerve deficit. He exhibits normal muscle tone.   MMT 5/5   Skin: Skin is warm and dry. He is not diaphoretic. No erythema.   Old sq Hematomas in the upper chest and right shoulder   Psychiatric: Judgment and thought content normal.   Nursing note and vitals reviewed.      Recent Labs      03/09/18   0152   WBC  7.9   RBC  3.65*   HEMOGLOBIN  11.8*   HEMATOCRIT  36.5*   MCV  100.0*   MCH  32.3   MCHC  32.3*   RDW  49.3   PLATELETCT  137*   MPV  9.9     Recent Labs      03/09/18   0152   SODIUM  138   POTASSIUM  3.7   CHLORIDE  106   CO2  24   GLUCOSE  92   BUN  23*   CREATININE  1.13   CALCIUM  9.3                      Assessment/Plan     * Cardiac arrest (CMS-[facility])- (present on  admission)   Assessment & Plan    History of. Secondary to SSS. S/P pacemaker        SSS (sick sinus syndrome) (CMS-HCC)- (present on admission)   Assessment & Plan    Temporary pacemaker initially, permanent pacemaker placement   - cont Amiodarone per cards recs.        Acute respiratory failure with hypoxia (CMS-HCC)- (present on admission)   Assessment & Plan    Intubated 2/14, Extubated 2/16.  No issue, on RA  Encourage IS, RT protocol        Bradycardia- (present on admission)   Assessment & Plan    secondary to SSS        Encephalopathy acute- (present on admission)   Assessment & Plan    - improving; Geriatrics recs appreciated  -Evaluated by psychiatry earlier, found to be incapacitated for decisions  - cont qHS prn Depakote per recs    For ethics consult, follow up with recommendations  Encourage activity    3/7: Last seen by therapy on March 5, max assistance  Accepted at [facility], to transfer to skilled nursing facility if patient has a medical decision maker  Discussed with ethics consult, Wayne, will reach out to the patient's son, who has an estranged for  relationship with parents  Patient's wife does have a history of dementia however appears to be alert and oriented ×3  Appears to understand her underlying condition and reports adequate support with neighbors and friends in the community  Appears to be making appropriate decisions regarding daily life activities  At this point appears to have medical decision making capacity, will follow up with ethics    3/8:  Poor short term memory, giving items to recall  Using a writing aid, will cont to eval  D/w psychiatry to eval Monday    At this point, not capacitated  D/w ethics, f/u with son    3/9: No new events  Will need skilled placing    3/10: Encourage activity  Need skilled nursing facility placement        Leukocytosis- (present on admission)   Assessment & Plan    Resolved        Hypomagnesemia- (present on admission)   Assessment & Plan     resolved        Hypophosphatemia- (present on admission)   Assessment & Plan    resolved        Macrocytosis- (present on admission)   Assessment & Plan    b12, folic wnl        S/P TAVR (transcatheter aortic valve replacement)- (present on admission)   Assessment & Plan    TAVR on 2/12  stable        Hypertension, essential- (present on admission)   Assessment & Plan    Discontinued Lisinopril   Creatinine is still worsening. Will check bladder scan.        Hypokalemia- (present on admission)   Assessment & Plan    Stable; monitoring        CAD (coronary artery disease)- (present on admission)   Assessment & Plan    History of CABG ×3  ASA, Lipitor.  Stable, no chest pain        Paroxysmal atrial fibrillation (CMS-HCC)- (present on admission)   Assessment & Plan    Amiodarone, Xarelto        Acquired hypothyroidism- (present on admission)   Assessment & Plan    Continue Synthroid   TSH 0.74      2 weeks ago.        Carotid artery stenosis- (present on admission)   Assessment & Plan    s/p L CEA  ASA, Lipitor          Quality-Core Measures   Reviewed items::  Radiology images reviewed, EKG reviewed, Labs reviewed and Medications reviewed  Baldwin catheter::  No Baldwin  DVT: Xarelto.  Ulcer Prophylaxis::  No  Assessed for rehabilitation services:  Patient was assess for and/or received rehabilitation services during this hospitalization

## 2018-03-10 NOTE — PROGRESS NOTES
5:51 PM         [x]Hide copied text  [x]Hover for attribution information  Neuroscience Shift Summary: [phone]  Orientation- Alert and oriented x2, patient forgets situation and time.  Incision- Left chest pacer site- ARELY and edges well approximated, healing  Lines/Drains- NA  Telemetry- 100% paced (not on tele)  Pain- Denies  Diet- Dysphagia 3 with thin liquids  Discharge Plan- ?     Concerns-  No acute concerns.

## 2018-03-11 PROCEDURE — 770006 HCHG ROOM/CARE - MED/SURG/GYN SEMI*

## 2018-03-11 PROCEDURE — A9270 NON-COVERED ITEM OR SERVICE: HCPCS | Performed by: INTERNAL MEDICINE

## 2018-03-11 PROCEDURE — A9270 NON-COVERED ITEM OR SERVICE: HCPCS | Performed by: HOSPITALIST

## 2018-03-11 PROCEDURE — 700102 HCHG RX REV CODE 250 W/ 637 OVERRIDE(OP): Performed by: HOSPITALIST

## 2018-03-11 PROCEDURE — 700102 HCHG RX REV CODE 250 W/ 637 OVERRIDE(OP): Performed by: FAMILY MEDICINE

## 2018-03-11 PROCEDURE — 99232 SBSQ HOSP IP/OBS MODERATE 35: CPT | Performed by: INTERNAL MEDICINE

## 2018-03-11 PROCEDURE — 700102 HCHG RX REV CODE 250 W/ 637 OVERRIDE(OP): Performed by: INTERNAL MEDICINE

## 2018-03-11 PROCEDURE — A9270 NON-COVERED ITEM OR SERVICE: HCPCS | Performed by: FAMILY MEDICINE

## 2018-03-11 RX ORDER — HYDRALAZINE HYDROCHLORIDE 25 MG/1
25 TABLET, FILM COATED ORAL EVERY 6 HOURS PRN
Status: DISCONTINUED | OUTPATIENT
Start: 2018-03-11 | End: 2018-03-13 | Stop reason: HOSPADM

## 2018-03-11 RX ADMIN — ATORVASTATIN CALCIUM 20 MG: 20 TABLET, FILM COATED ORAL at 19:13

## 2018-03-11 RX ADMIN — HYDRALAZINE HYDROCHLORIDE 25 MG: 25 TABLET, FILM COATED ORAL at 21:17

## 2018-03-11 RX ADMIN — RIVAROXABAN 15 MG: 15 TABLET, FILM COATED ORAL at 16:59

## 2018-03-11 RX ADMIN — POLYETHYLENE GLYCOL 3350 1 PACKET: 17 POWDER, FOR SOLUTION ORAL at 08:39

## 2018-03-11 RX ADMIN — LEVOTHYROXINE SODIUM 50 MCG: 50 TABLET ORAL at 06:41

## 2018-03-11 RX ADMIN — ASPIRIN 81 MG: 81 TABLET, CHEWABLE ORAL at 08:39

## 2018-03-11 RX ADMIN — AMIODARONE HYDROCHLORIDE 200 MG: 200 TABLET ORAL at 08:39

## 2018-03-11 ASSESSMENT — ENCOUNTER SYMPTOMS
MEMORY LOSS: 1
MYALGIAS: 0
NAUSEA: 0
BACK PAIN: 0
SHORTNESS OF BREATH: 0
WEAKNESS: 0
HEARTBURN: 0

## 2018-03-11 ASSESSMENT — PAIN SCALES - GENERAL
PAINLEVEL_OUTOF10: 0

## 2018-03-11 NOTE — PROGRESS NOTES
Pt is alert to self. On RA. Diet is dysphagia 3 with thin liquids, tolerating well. Ambulates with  Contact guard or FWW. No IV, Md aware. Chair/bed alarm on, call light w/in reach, hourly rounding in place.

## 2018-03-11 NOTE — PROGRESS NOTES
Patient A/OX2 self and place. Up with one assist and FWW or urinal. Reminded to use call light for help. Side rails up x 2 and bed alarm on. Rested good during the night.

## 2018-03-11 NOTE — PROGRESS NOTES
[facility] Hospitalist Progress Note    Date of Service: 3/11/2018    Chief Complaint  84 y.o. male admitted 2018 with Encephalopathy, Atrial fibrillation with RVR, with sinus pause, Respiratory failure.    Interval Problem Update  Patient denies pain.    3/7: Sitting up at the side of the bed, eating breakfast  No difficulty with oral intake  Last seen by PT, still requiring max assistance    Guardianship pending, discussed with ethics    3/8: improving memory, still slow to respond    3/9: no new events    3/10: Following commands  Pleasant conversant    3/11: For transfer to [facility], once accepted  Guardianship pending    Consultants/Specialty  Cardio - signed off  Geriatrics    Disposition  ? Group home versus assisted living after guardianship  Ethics evaluation  - seen by psych, not capacitated 3/8  Accepted at [facility],  to assist      Review of Systems   Constitutional: Negative for malaise/fatigue.   HENT: Negative for hearing loss.    Respiratory: Negative for shortness of breath.    Cardiovascular: Negative for chest pain and leg swelling.   Gastrointestinal: Negative for heartburn and nausea.   Genitourinary: Negative for urgency.   Musculoskeletal: Negative for back pain and myalgias.   Neurological: Negative for weakness.   Psychiatric/Behavioral: Positive for memory loss.      Physical Exam  Laboratory/Imaging   Hemodynamics  Temp (24hrs), Av.9 °C (98.5 °F), Min:36.8 °C (98.2 °F), Max:37.1 °C (98.8 °F)   Temperature: 36.8 °C (98.2 °F)  Pulse  Av.8  Min: 45  Max: 153    Blood Pressure : 152/64      Respiratory      Respiration: 17, Pulse Oximetry: 98 %     Work Of Breathing / Effort: Mild  RUL Breath Sounds: Clear, RML Breath Sounds: Clear, RLL Breath Sounds: Clear, WHIT Breath Sounds: Clear, LLL Breath Sounds: Clear    Fluids    Intake/Output Summary (Last 24 hours) at 18 1049  Last data filed at 18 0900   Gross per 24 hour   Intake              100 ml   Output               150 ml   Net              -50 ml       Nutrition  Orders Placed This Encounter   Procedures   • Diet Order     Standing Status:   Standing     Number of Occurrences:   1     Order Specific Question:   Diet:     Answer:   Cardiac [6]     Order Specific Question:   Texture/Fiber modifications:     Answer:   Dysphagia 3(Mechanical Soft)specify fluid consistency(question 6) [3]     Order Specific Question:   Consistency/Fluid modifications:     Answer:   Thin Liquids [3]     Order Specific Question:   Miscellaneous modifications:     Answer:   SLP - Deliver to Nursing Station [22]     Comments:   patient needs help getting HOB to 90 degrees or into chair for meals     Physical Exam   Constitutional: He is oriented to person, place, and time. No distress.   HENT:   Head: Normocephalic and atraumatic.   Mouth/Throat: Oropharynx is clear and moist.   Eyes: EOM are normal. Pupils are equal, round, and reactive to light. Right eye exhibits no discharge. Left eye exhibits no discharge.   Neck: Normal range of motion.   Cardiovascular: Normal rate, regular rhythm and intact distal pulses.    Pacemaker site in the upper left chest looks noninfected, without erythema, edema, fluctuance , or tenderness to palpation   Pulmonary/Chest: Effort normal. No respiratory distress. He has no wheezes.   Abdominal: Soft. Bowel sounds are normal. He exhibits no distension. There is no tenderness.   Musculoskeletal: He exhibits no edema.   Neurological: He is alert and oriented to person, place, and time. No cranial nerve deficit. Coordination normal.   MMT 5/5   Skin: Skin is warm and dry.   Old sq Hematomas in the upper chest and right shoulder   Psychiatric: His behavior is normal. Judgment and thought content normal.   Nursing note and vitals reviewed.      Recent Labs      03/09/18   0152   WBC  7.9   RBC  3.65*   HEMOGLOBIN  11.8*   HEMATOCRIT  36.5*   MCV  100.0*   MCH  32.3   MCHC  32.3*   RDW  49.3   PLATELETCT  137*   MPV   9.9     Recent Labs      03/09/18   0152   SODIUM  138   POTASSIUM  3.7   CHLORIDE  106   CO2  24   GLUCOSE  92   BUN  23*   CREATININE  1.13   CALCIUM  9.3                      Assessment/Plan     * Cardiac arrest (CMS-[facility])- (present on admission)   Assessment & Plan    History of. Secondary to SSS. S/P pacemaker        SSS (sick sinus syndrome) (CMS-HCC)- (present on admission)   Assessment & Plan    Temporary pacemaker initially, permanent pacemaker placement   - cont Amiodarone per cards recs.        Acute respiratory failure with hypoxia (CMS-[facility])- (present on admission)   Assessment & Plan    Intubated 2/14, Extubated 2/16.  No issue, on RA  Encourage IS, RT protocol        Bradycardia- (present on admission)   Assessment & Plan    secondary to SSS        Encephalopathy acute- (present on admission)   Assessment & Plan    - improving; Geriatrics recs appreciated  -Evaluated by psychiatry earlier, found to be incapacitated for decisions  - cont qHS prn Depakote per recs    For ethics consult, follow up with recommendations  Encourage activity    3/7: Last seen by therapy on March 5, max assistance  Accepted at [facility], to transfer to skilled nursing facility if patient has a medical decision maker  Discussed with ethics consult, Wayne, will reach out to the patient's son, who has an estranged for  relationship with parents  Patient's wife does have a history of dementia however appears to be alert and oriented ×3  Appears to understand her underlying condition and reports adequate support with neighbors and friends in the community  Appears to be making appropriate decisions regarding daily life activities  At this point appears to have medical decision making capacity, will follow up with ethics    3/8:  Poor short term memory, giving items to recall  Using a writing aid, will cont to eval  D/w psychiatry to eval Monday    At this point, not capacitated  D/w ethics, f/u with son    3/9: No new  events  Will need skilled placing    3/10: Encourage activity  Need skilled nursing facility placement    3/11:  No new events        Leukocytosis- (present on admission)   Assessment & Plan    Resolved        Hypomagnesemia- (present on admission)   Assessment & Plan    resolved        Hypophosphatemia- (present on admission)   Assessment & Plan    resolved        Macrocytosis- (present on admission)   Assessment & Plan    b12, folic wnl        S/P TAVR (transcatheter aortic valve replacement)- (present on admission)   Assessment & Plan    TAVR on 2/12  stable        Hypertension, essential- (present on admission)   Assessment & Plan    Discontinued Lisinopril   Creatinine is still worsening. Will check bladder scan.        Hypokalemia- (present on admission)   Assessment & Plan    Stable; monitoring        CAD (coronary artery disease)- (present on admission)   Assessment & Plan    History of CABG ×3  ASA, Lipitor.  Stable, no chest pain        Paroxysmal atrial fibrillation (CMS-HCC)- (present on admission)   Assessment & Plan    Amiodarone, Xarelto        Acquired hypothyroidism- (present on admission)   Assessment & Plan    Continue Synthroid   TSH 0.74      2 weeks ago.        Carotid artery stenosis- (present on admission)   Assessment & Plan    s/p L CEA  ASA, Lipitor          Quality-Core Measures   Reviewed items::  Radiology images reviewed, EKG reviewed, Labs reviewed and Medications reviewed  Baldwin catheter::  No Baldwin  DVT: Xarelto.  Ulcer Prophylaxis::  No  Assessed for rehabilitation services:  Patient was assess for and/or received rehabilitation services during this hospitalization

## 2018-03-11 NOTE — CARE PLAN
Problem: Safety  Goal: Will remain free from injury  Outcome: PROGRESSING AS EXPECTED  Patient side rails up x 2 call light within reach and hourly rounding done.

## 2018-03-11 NOTE — PROGRESS NOTES
Pt had Depakote nightly PRN for acute encephalopathy which had not been given since this pt's admission. Hospitalist paged to ask about order. Medication DC with MD order.

## 2018-03-11 NOTE — CARE PLAN
Problem: Safety  Goal: Will remain free from falls    Intervention: Implement fall precautions  Bed alarm on, call light w/in reach, hourly rounding in place.      Problem: Mobility  Goal: Risk for activity intolerance will decrease    Intervention: Encourage patient to increase activity level in collaboration with Interdisciplinary Team  Pt ambulating wit 1 assist. Ut to chair for meals.

## 2018-03-12 PROCEDURE — 700102 HCHG RX REV CODE 250 W/ 637 OVERRIDE(OP): Performed by: INTERNAL MEDICINE

## 2018-03-12 PROCEDURE — 99232 SBSQ HOSP IP/OBS MODERATE 35: CPT | Performed by: INTERNAL MEDICINE

## 2018-03-12 PROCEDURE — A9270 NON-COVERED ITEM OR SERVICE: HCPCS | Performed by: HOSPITALIST

## 2018-03-12 PROCEDURE — 770006 HCHG ROOM/CARE - MED/SURG/GYN SEMI*

## 2018-03-12 PROCEDURE — 92526 ORAL FUNCTION THERAPY: CPT

## 2018-03-12 PROCEDURE — A9270 NON-COVERED ITEM OR SERVICE: HCPCS | Performed by: INTERNAL MEDICINE

## 2018-03-12 PROCEDURE — 700102 HCHG RX REV CODE 250 W/ 637 OVERRIDE(OP): Performed by: FAMILY MEDICINE

## 2018-03-12 PROCEDURE — 700102 HCHG RX REV CODE 250 W/ 637 OVERRIDE(OP): Performed by: HOSPITALIST

## 2018-03-12 PROCEDURE — A9270 NON-COVERED ITEM OR SERVICE: HCPCS | Performed by: FAMILY MEDICINE

## 2018-03-12 RX ORDER — LISINOPRIL 10 MG/1
10 TABLET ORAL
Status: DISCONTINUED | OUTPATIENT
Start: 2018-03-12 | End: 2018-03-13 | Stop reason: HOSPADM

## 2018-03-12 RX ADMIN — LEVOTHYROXINE SODIUM 50 MCG: 50 TABLET ORAL at 05:10

## 2018-03-12 RX ADMIN — RIVAROXABAN 15 MG: 15 TABLET, FILM COATED ORAL at 17:28

## 2018-03-12 RX ADMIN — POLYETHYLENE GLYCOL 3350 1 PACKET: 17 POWDER, FOR SOLUTION ORAL at 07:57

## 2018-03-12 RX ADMIN — AMIODARONE HYDROCHLORIDE 200 MG: 200 TABLET ORAL at 07:57

## 2018-03-12 RX ADMIN — LISINOPRIL 10 MG: 10 TABLET ORAL at 11:13

## 2018-03-12 RX ADMIN — ATORVASTATIN CALCIUM 20 MG: 20 TABLET, FILM COATED ORAL at 19:25

## 2018-03-12 RX ADMIN — HYDRALAZINE HYDROCHLORIDE 25 MG: 25 TABLET, FILM COATED ORAL at 05:10

## 2018-03-12 RX ADMIN — ASPIRIN 81 MG: 81 TABLET, CHEWABLE ORAL at 07:57

## 2018-03-12 ASSESSMENT — ENCOUNTER SYMPTOMS
COUGH: 0
MEMORY LOSS: 1
ABDOMINAL PAIN: 0
FEVER: 0
CHILLS: 0
BACK PAIN: 0
DIZZINESS: 0
SHORTNESS OF BREATH: 0
WEAKNESS: 0

## 2018-03-12 ASSESSMENT — PAIN SCALES - GENERAL
PAINLEVEL_OUTOF10: 0

## 2018-03-12 NOTE — CARE PLAN
Progressing as expected:    Problem: Safety  Goal: Will remain free from falls    Intervention: Implement fall precautions  Bed alarm on, call light w/in reach, hourly rounding in place.      Problem: Mobility  Goal: Risk for activity intolerance will decrease    Intervention: Encourage patient to increase activity level in collaboration with Interdisciplinary Team  Pt ambulating wit 1 assist. Ut to chair for meals.

## 2018-03-12 NOTE — CARE PLAN
Problem: Pain Management  Goal: Pain level will decrease to patient's comfort goal  Outcome: PROGRESSING AS EXPECTED  No complaints of pain at this time. Will continue to monitor pain during hourly rounding.     Problem: Mobility  Goal: Risk for activity intolerance will decrease  Outcome: PROGRESSING AS EXPECTED  Patient ambulatory with standby assist and use of FWW. Patient instructed to call prior to ambulation.

## 2018-03-12 NOTE — DISCHARGE PLANNING
Medical Social Worker    AMADOU was informed by Psychologist Cyndi Jovel, PhD that pt's wife is capacitated to make the decision to have pt and herself DC to SNF. SW informed attending MD. Attending MD stated that pt is medically cleared.     Add:  SW left message for [facility] Admissions requesting if they are able to accept pt and pt's wife today.     [facility] admissions stated they would inform this SW of administrators decision.    Add:  [facility] admissions requesting updated referrals. SW faxed referral to [facility].

## 2018-03-12 NOTE — DISCHARGE PLANNING
Medical Social Worker    AMADOU faxed transport form to [facility].     Add:  AMADOU informed by [facility] that [facility] will accept pt tomorrow morning. AMADOU updated pt.

## 2018-03-12 NOTE — THERAPY
"Speech Language Therapy dysphagia treatment completed.   Functional Status:  fxnl swallow and toleration of slightly modified diet w/ strategies.  Recommendations: Continue D3/thin liquid diet with monitor during meals.   Plan of Care: Will benefit from Speech Therapy 2 times per week  Post-Acute Therapy: Discharge to a transitional care facility for continued skilled therapy services.    See \"Rehab Therapy-Acute\" Patient Summary Report for complete documentation.     "

## 2018-03-12 NOTE — PROGRESS NOTES
Patient seen Aox2 and resting comfortably in bed.  Plan of care updated.  Patient denying pain and numbness/tingling.  ESCAMILLA without difficulty and in ambulatory with one person assist and use of FWW.  BP elevated this evening (BP: 176/58)  Hospitalist paged regarding prn BP medication. Orders received for PO Hydralazine 25 mg Q6 prn.  All needs addressed at this time.  Call light and personal belongings within reach, bed locked and in lowest position, room near nurses station, bed alarm on and hourly rounding in place.

## 2018-03-12 NOTE — PROGRESS NOTES
[facility] Hospitalist Progress Note    Date of Service: 3/12/2018    Chief Complaint  84 y.o. male admitted 2018 with Encephalopathy, Atrial fibrillation with RVR, with sinus pause, Respiratory failure.    Interval Problem Update  Patient denies pain.    3/7: Sitting up at the side of the bed, eating breakfast  No difficulty with oral intake  Last seen by PT, still requiring max assistance    Guardianship pending, discussed with ethics    3/8: improving memory, still slow to respond    3/9: no new events    3/10: Following commands  Pleasant conversant    3/11: For transfer to [facility], once accepted  Guardianship pending    3/12:  No new events, SW assisting with transfer when accepted.    Consultants/Specialty  Cardio - signed off  Geriatrics    Disposition  ? Group home versus assisted living after guardianship  Ethics evaluation  - seen by psych, not capacitated 3/8  Accepted at [facility],  to assist      Review of Systems   Constitutional: Negative for chills and fever.   HENT: Negative for hearing loss.    Respiratory: Negative for cough and shortness of breath.    Cardiovascular: Negative for leg swelling.   Gastrointestinal: Negative for abdominal pain.   Genitourinary: Negative for urgency.   Musculoskeletal: Negative for back pain.   Neurological: Negative for dizziness and weakness.   Psychiatric/Behavioral: Positive for memory loss.      Physical Exam  Laboratory/Imaging   Hemodynamics  Temp (24hrs), Av.7 °C (98 °F), Min:36.3 °C (97.3 °F), Max:36.9 °C (98.4 °F)   Temperature: 36.3 °C (97.3 °F)  Pulse  Av.8  Min: 45  Max: 153    Blood Pressure : 145/65      Respiratory      Respiration: 16, Pulse Oximetry: 97 %        RUL Breath Sounds: Clear, RML Breath Sounds: Clear, RLL Breath Sounds: Diminished, WHIT Breath Sounds: Clear, LLL Breath Sounds: Diminished    Fluids    Intake/Output Summary (Last 24 hours) at 18 0841  Last data filed at 18 1744   Gross per 24 hour    Intake              580 ml   Output             1000 ml   Net             -420 ml       Nutrition  Orders Placed This Encounter   Procedures   • Diet Order     Standing Status:   Standing     Number of Occurrences:   1     Order Specific Question:   Diet:     Answer:   Cardiac [6]     Order Specific Question:   Texture/Fiber modifications:     Answer:   Dysphagia 3(Mechanical Soft)specify fluid consistency(question 6) [3]     Order Specific Question:   Consistency/Fluid modifications:     Answer:   Thin Liquids [3]     Order Specific Question:   Miscellaneous modifications:     Answer:   SLP - Deliver to Nursing Station [22]     Comments:   patient needs help getting HOB to 90 degrees or into chair for meals     Physical Exam   Constitutional: He is oriented to person, place, and time. No distress.   HENT:   Head: Normocephalic and atraumatic.   Mouth/Throat: Oropharynx is clear and moist.   Eyes: EOM are normal. Pupils are equal, round, and reactive to light.   Neck: Normal range of motion.   Cardiovascular: Normal rate, regular rhythm and intact distal pulses.    Pacemaker site in the upper left chest looks noninfected, without erythema, edema, fluctuance , or tenderness to palpation   Pulmonary/Chest: Effort normal. No respiratory distress.   Abdominal: Soft. Bowel sounds are normal. He exhibits no distension. There is no tenderness.   Musculoskeletal: He exhibits no edema or tenderness.   Neurological: He is alert and oriented to person, place, and time. No cranial nerve deficit.   MMT 5/5   Skin: Skin is warm and dry. He is not diaphoretic.   Old sq Hematomas in the upper chest and right shoulder   Psychiatric: His behavior is normal. Thought content normal.   Nursing note and vitals reviewed.                               Assessment/Plan     * Cardiac arrest (CMS-[facility])- (present on admission)   Assessment & Plan    History of. Secondary to SSS. S/P pacemaker        SSS (sick sinus syndrome) (CMS-[facility])-  (present on admission)   Assessment & Plan    Temporary pacemaker initially, permanent pacemaker placement   - cont Amiodarone per cards recs.        Acute respiratory failure with hypoxia (CMS-HCC)- (present on admission)   Assessment & Plan    Intubated 2/14, Extubated 2/16.  No issue, on RA  Encourage IS, RT protocol        Bradycardia- (present on admission)   Assessment & Plan    secondary to SSS        Encephalopathy acute- (present on admission)   Assessment & Plan    - improving; Geriatrics recs appreciated  -Evaluated by psychiatry earlier, found to be incapacitated for decisions  - cont qHS prn Depakote per recs    For ethics consult, follow up with recommendations  Encourage activity    3/7: Last seen by therapy on March 5, max assistance  Accepted at [facility], to transfer to skilled nursing facility if patient has a medical decision maker  Discussed with ethics consult, Wayne, will reach out to the patient's son, who has an estranged for  relationship with parents  Patient's wife does have a history of dementia however appears to be alert and oriented ×3  Appears to understand her underlying condition and reports adequate support with neighbors and friends in the community  Appears to be making appropriate decisions regarding daily life activities  At this point appears to have medical decision making capacity, will follow up with ethics    3/8:  Poor short term memory, giving items to recall  Using a writing aid, will cont to eval  D/w psychiatry to eval Monday    At this point, not capacitated  D/w ethics, f/u with son    3/9: No new events  Will need skilled placing    3/10: Encourage activity  Need skilled nursing facility placement    3/11:  No new events        Leukocytosis- (present on admission)   Assessment & Plan    Resolved        Hypomagnesemia- (present on admission)   Assessment & Plan    resolved        Hypophosphatemia- (present on admission)   Assessment & Plan    resolved         Macrocytosis- (present on admission)   Assessment & Plan    b12, folic wnl        S/P TAVR (transcatheter aortic valve replacement)- (present on admission)   Assessment & Plan    TAVR on 2/12  stable        Hypertension, essential- (present on admission)   Assessment & Plan    Uncontrolled  Renal function normalized  Ok to restart lisinopril, f/u am bmp        Hypokalemia- (present on admission)   Assessment & Plan    Stable; monitoring        CAD (coronary artery disease)- (present on admission)   Assessment & Plan    History of CABG ×3  ASA, Lipitor.  Stable, no chest pain        Paroxysmal atrial fibrillation (CMS-HCC)- (present on admission)   Assessment & Plan    Amiodarone, Xarelto        Acquired hypothyroidism- (present on admission)   Assessment & Plan    Continue Synthroid   TSH 0.74      2 weeks ago.        Carotid artery stenosis- (present on admission)   Assessment & Plan    s/p L CEA  ASA, Lipitor          Quality-Core Measures   Reviewed items::  Radiology images reviewed, EKG reviewed, Labs reviewed and Medications reviewed  Baldwin catheter::  No Baldwin  DVT: Xarelto.  Ulcer Prophylaxis::  No  Assessed for rehabilitation services:  Patient was assess for and/or received rehabilitation services during this hospitalization

## 2018-03-12 NOTE — DISCHARGE PLANNING
Call placed to [facility] with [facility] [place], patient can be accepted/transferred to SNF services on 3/13 tomorrow.   Patients spouse will also be sent to [facility] on a agreeable self pay status.     AMADOU Barber has been notified.

## 2018-03-13 VITALS
OXYGEN SATURATION: 96 % | HEIGHT: 63 IN | WEIGHT: 132.5 LBS | RESPIRATION RATE: 16 BRPM | SYSTOLIC BLOOD PRESSURE: 127 MMHG | BODY MASS INDEX: 23.48 KG/M2 | TEMPERATURE: 97.1 F | HEART RATE: 60 BPM | DIASTOLIC BLOOD PRESSURE: 56 MMHG

## 2018-03-13 DIAGNOSIS — Z95.2 S/P TAVR (TRANSCATHETER AORTIC VALVE REPLACEMENT): ICD-10-CM

## 2018-03-13 LAB
ANION GAP SERPL CALC-SCNC: 8 MMOL/L (ref 0–11.9)
BUN SERPL-MCNC: 19 MG/DL (ref 8–22)
CALCIUM SERPL-MCNC: 9.1 MG/DL (ref 8.5–10.5)
CHLORIDE SERPL-SCNC: 106 MMOL/L (ref 96–112)
CO2 SERPL-SCNC: 22 MMOL/L (ref 20–33)
CREAT SERPL-MCNC: 1.06 MG/DL (ref 0.5–1.4)
GLUCOSE SERPL-MCNC: 88 MG/DL (ref 65–99)
POTASSIUM SERPL-SCNC: 4.1 MMOL/L (ref 3.6–5.5)
SODIUM SERPL-SCNC: 136 MMOL/L (ref 135–145)

## 2018-03-13 PROCEDURE — 700102 HCHG RX REV CODE 250 W/ 637 OVERRIDE(OP): Performed by: INTERNAL MEDICINE

## 2018-03-13 PROCEDURE — 36415 COLL VENOUS BLD VENIPUNCTURE: CPT

## 2018-03-13 PROCEDURE — A9270 NON-COVERED ITEM OR SERVICE: HCPCS | Performed by: INTERNAL MEDICINE

## 2018-03-13 PROCEDURE — 80048 BASIC METABOLIC PNL TOTAL CA: CPT

## 2018-03-13 PROCEDURE — 99239 HOSP IP/OBS DSCHRG MGMT >30: CPT | Performed by: FAMILY MEDICINE

## 2018-03-13 PROCEDURE — A9270 NON-COVERED ITEM OR SERVICE: HCPCS | Performed by: HOSPITALIST

## 2018-03-13 PROCEDURE — 700102 HCHG RX REV CODE 250 W/ 637 OVERRIDE(OP): Performed by: HOSPITALIST

## 2018-03-13 RX ORDER — IPRATROPIUM BROMIDE AND ALBUTEROL SULFATE 2.5; .5 MG/3ML; MG/3ML
3 SOLUTION RESPIRATORY (INHALATION) EVERY 4 HOURS PRN
Qty: 30 BULLET | Refills: 0
Start: 2018-03-13 | End: 2018-03-22

## 2018-03-13 RX ORDER — AMIODARONE HYDROCHLORIDE 200 MG/1
200 TABLET ORAL DAILY
Qty: 30 TAB | Refills: 0
Start: 2018-03-14 | End: 2018-03-22

## 2018-03-13 RX ADMIN — ASPIRIN 81 MG: 81 TABLET, CHEWABLE ORAL at 08:12

## 2018-03-13 RX ADMIN — AMIODARONE HYDROCHLORIDE 200 MG: 200 TABLET ORAL at 08:12

## 2018-03-13 RX ADMIN — LEVOTHYROXINE SODIUM 50 MCG: 50 TABLET ORAL at 06:00

## 2018-03-13 RX ADMIN — LISINOPRIL 10 MG: 10 TABLET ORAL at 08:12

## 2018-03-13 RX ADMIN — POLYETHYLENE GLYCOL 3350 1 PACKET: 17 POWDER, FOR SOLUTION ORAL at 08:13

## 2018-03-13 ASSESSMENT — PAIN SCALES - GENERAL
PAINLEVEL_OUTOF10: 0
PAINLEVEL_OUTOF10: 0

## 2018-03-13 NOTE — DISCHARGE SUMMARY
CHIEF COMPLAINT ON ADMISSION  Chief Complaint   Patient presents with   • Unresponsive       CODE STATUS  Full Code    HPI & HOSPITAL COURSE  This is a 84 y.o. male here with  Cardiac arrest which was attributed to sick sinus syndrome,he initially received temporary pace maker and then was changed to permanent pace maker.he want into resp failure and was intubated on 2/14/18 and then extubated on 2/16/18.pt was also noted to have acute encephalopathy and was seen by geriatric and psych.psych stated that he is not capacitated to make decisions.his acute encepjhalopathy has gotten better and his head ct showed:.NO ACUTE ABNORMALITIES ARE NOTED ON CT SCAN OF THE HEAD.  His head and neck cta showed:No evidence of intracranial vascular occlusion or aneurysm.    2.  Extensive atherosclerotic plaque involving the intracranial vascular structures with multiple areas of atherosclerotic narrowing.    3.  Periventricular chronic small vessel ischemic     Findings are consistent with atrophy.  Decreased attenuation in the periventricular white matter likely indicates microvascular ischemic disease.  For p.a.fib he is on xarelto and amiodarone and the rate is controlled    Marked partially calcified and ulcerated plaques located within the right carotid artery bifurcation. Between 50% to 70% stenosis at the origin right internal carotid artery is demonstrated.  Mild partially calcified plaque left carotid artery bifurcation. No significant stenosis.  Moderate/marked partially calcified plaque origin right vertebral artery resulting in between 50% and 70% stenosis.  Focal plaque or thrombus within the right vertebral artery at the C1 level.  Moderate partially calcified plaque within the proximal segment of the left vertebral artery resulting in 50% stenosis.    His cardiac echo showed:CONCLUSIONS  This was a limited study. Technically difficult study incomplete   information is obtained.    1. Known TAVR aortic valve that is  functioning normally with normal   transvalvular gradients. Transvalvular gradients are - Peak: 15  mmHg,    Mean: 9  mmHg. Trivial paravalvular leak is noted.  2. Mitral regurgitation was not evaluated during this study, however   there appeared to be no impingment or restriction of the mitral valve   leaflets by the bioprosthetic aortic valve.  3. Normal pericardium without effusion.  4. Mild to moderately decreased left ventricular systolic function.   Estimated LVEF 40%, however this estimate was difficult due to severe   tachycardia.  5. Global hypokinesis.    Compared to the previous study performed on 2/13/2018:  There appears   to be less perivalvular leak.  The TAVR valve might be slightly farther   into the ventricle, however there does not appear to be any   obstruction.      Ethics committie also saw the pt and stated:Recommend, if Attending Physician determines either patient, most likely Ms. Shaikh (Prisca Shaikh: 82 year old female) given current presentation, to have decision making capacity to the question at hand, for example, discharge, allowing patient with decision making capacity to act as their own decision maker as well as surrogate for their spouse.  a. Recommend medical team speak with patient’s PCP regarding cognitive function if there is uncertainty.  Ms. Shaikh mentioned having a close relationship with their PCP who lives near the patients.  2. Recommend, if Attending Physician determines both patients lack decision making capacity to the question at hand, for example, discharge, communicating with the patient’s children, specifically, the patient’s son, about acting as surrogate decision maker for the patients.    a. The patients both agreed to allow medical team to contact their children, specifically the son, and stated their children, though busy and independent, were honest and could be trusted to make decisions if they (patients) are not able to make decisions.  b. Ethics emailed the  patient’s son on 3/7/18.  Ethics will try to contact again on 3/8/18.  3. Recommend pursuing guardianship if the Attending Physician determines both patients lack decision making capacity and the patients cognitive function is not likely to improve and the patient’s children are not willing or able to act as surrogate decision maker.  Recommend, if the medical team determines home discharge is a safe discharge, ensuring patients have sufficient support at home, for example, home health.    INR   Date Value Ref Range Status   02/15/2018 1.12 0.87 - 1.13 Final     Comment:     INR - Non-therapeutic Reference Range: 0.87-1.13  INR - Therapeutic Reference Range: 2.0-4.0       No results found for: POCINR    The patient met 2-midnight criteria for an inpatient stay at the time of discharge.    Therefore, he is discharged in fair and stable condition with close outpatient follow-up.    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  [facility] None     Yarelis Gtz M.D.  [address] [facility] [address]  [facility] [id number] [phone]    Schedule an appointment as soon as possible for a visit in 1 week  Follow up appointment    [facility] ([facility])  [address] [facility] [address]  [phone]        Yarelis Gtz M.D.  [address] [facility] [address]  [facility] [id number] [phone]      As needed      MEDICATIONS ON DISCHARGE   Rafia Shaikh   Home Medication Instructions MERI:[id number]    Printed on:03/13/18 1423   Medication Information                      amiodarone (CORDARONE) 200 MG Tab  Take 1 Tab by mouth every day.             aspirin EC (ECOTRIN) 81 MG Tablet Delayed Response  Take 81 mg by mouth every evening.             atorvastatin (LIPITOR) 20 MG Tab  Take 20 mg by mouth every day.             Calcium Carbonate-Vit D-Min (CALTRATE PLUS PO)  Take 1 Tab by mouth every day.             ipratropium-albuterol (DUONEB) 0.5-2.5 (3) MG/3ML nebulizer solution  3 mL by Nebulization route every four hours as needed for Shortness of Breath.             levothyroxine (SYNTHROID) 50 MCG Tab  TAKE 1 TABLET BY MOUTH DAILY             Multiple Vitamins-Minerals (CENTRUM SILVER) TABS  Take 2 Tabs by mouth 2 Times a Day.             rivaroxaban (XARELTO) 15 MG Tab tablet  Take 1 Tab by mouth with dinner.                 DIET  Orders Placed This Encounter   Procedures   • Diet Order     Standing Status:   Standing     Number of Occurrences:   1     Order Specific Question:   Diet:     Answer:   Cardiac [6]     Order Specific Question:   Texture/Fiber modifications:     Answer:   Dysphagia 3(Mechanical Soft)specify fluid consistency(question 6) [3]     Order Specific Question:   Consistency/Fluid modifications:     Answer:   Thin Liquids [3]     Order Specific Question:   Miscellaneous modifications:     Answer:   SLP - Deliver to Nursing Station [22]     Comments:   patient needs help getting HOB to 90 degrees or into chair for meals        ACTIVITY  As tolerated and directed by skilled nursing.  Weight bearing as tolerated      CONSULTATIONS  Cardiology  Psych  Pulmonology  ethics  Geriatrics    PROCEDURES  Dual chamber pacemaker implantation with conscious sedation.       LABORATORY  Lab Results   Component Value Date/Time    SODIUM 136 03/13/2018 01:19 AM    POTASSIUM 4.1 03/13/2018 01:19 AM    CHLORIDE 106 03/13/2018 01:19 AM    CO2 22 03/13/2018 01:19 AM    GLUCOSE 88 03/13/2018 01:19 AM    BUN 19 03/13/2018 01:19 AM    CREATININE 1.06 03/13/2018 01:19 AM    CREATININE 1.80 (H) 04/05/2013 10:05 AM        Lab Results   Component Value Date/Time    WBC 7.9 03/09/2018 01:52 AM    WBC 7.2 04/05/2013 10:05 AM    HEMOGLOBIN 11.8 (L) 03/09/2018 01:52 AM    HEMATOCRIT 36.5 (L) 03/09/2018 01:52 AM    PLATELETCT 137 (L) 03/09/2018 01:52 AM        Total time of the discharge process exceeds 40 minutes

## 2018-03-13 NOTE — DISCHARGE PLANNING
Received transportation request from AMADOU Barber for patient to transfer to [facility]. Call placed to Nanda with [facility], and transportation has been arranged for [address].     AMADOU Barber has been notified via voicemail.

## 2018-03-13 NOTE — PROGRESS NOTES
Patient seen Aox2 and resting comfortably in bed.  Plan of care updated.  Patient denying pain and numbness/tingling.  ESCAMILLA without difficulty and is ambulatory with one person assist. Gait slightly unsteady.   Patient able to take medications without difficulty.   All needs addressed at this time.  Call light and personal belongings within reach, bed locked and in lowest position, room near nurses station, bed alarm on and hourly rounding in place.

## 2018-03-13 NOTE — PROGRESS NOTES
Received report from night nurse at the bedside. Assume care of Pt at 0700. Assessment completed Pt A&O X 2 to self and place. Pt deneis numbness and tingling, Pt denies complaints of pain,  Assist of one. Pt in bed, bed in lowest position, call light in place, bed alarm is on, treaded slipper socks on.

## 2018-03-13 NOTE — CARE PLAN
Problem: Discharge Barriers/Planning  Goal: Patient's continuum of care needs will be met  Outcome: PROGRESSING AS EXPECTED  Pending SNF discharge. Possible discharge to SNF tomorrow 3/13 per  note.     Problem: Mobility  Goal: Risk for activity intolerance will decrease  Outcome: PROGRESSING AS EXPECTED  Patient encouraged to ambulate throughout shift. Patient up with standby assist and use of FWW.

## 2018-03-13 NOTE — PROGRESS NOTES
Pt discharged to [facility]. Discharge instructions received pt and wife , verbalized understanding. Explanation of medications received. Wife verbalized understanding. Pt tolerated well dressing applied. Pt has all belongings. All questions are answered.

## 2018-03-22 ENCOUNTER — OFFICE VISIT (OUTPATIENT)
Dept: CARDIOLOGY | Facility: MEDICAL CENTER | Age: 83
End: 2018-03-22
Payer: MEDICARE

## 2018-03-22 ENCOUNTER — HOSPITAL ENCOUNTER (OUTPATIENT)
Dept: CARDIOLOGY | Facility: MEDICAL CENTER | Age: 83
End: 2018-03-22
Attending: INTERNAL MEDICINE
Payer: OTHER GOVERNMENT

## 2018-03-22 VITALS
OXYGEN SATURATION: 98 % | HEART RATE: 60 BPM | SYSTOLIC BLOOD PRESSURE: 110 MMHG | HEIGHT: 63 IN | DIASTOLIC BLOOD PRESSURE: 50 MMHG | WEIGHT: 131 LBS | BODY MASS INDEX: 23.21 KG/M2

## 2018-03-22 DIAGNOSIS — I48.0 PAROXYSMAL ATRIAL FIBRILLATION (HCC): ICD-10-CM

## 2018-03-22 DIAGNOSIS — I73.9 PVD (PERIPHERAL VASCULAR DISEASE) (HCC): ICD-10-CM

## 2018-03-22 DIAGNOSIS — Z95.1 HX OF CABG: ICD-10-CM

## 2018-03-22 DIAGNOSIS — Z79.01 CHRONIC ANTICOAGULATION: ICD-10-CM

## 2018-03-22 DIAGNOSIS — Z95.2 S/P TAVR (TRANSCATHETER AORTIC VALVE REPLACEMENT): ICD-10-CM

## 2018-03-22 DIAGNOSIS — Z95.0 CARDIAC PACEMAKER IN SITU: ICD-10-CM

## 2018-03-22 DIAGNOSIS — I25.10 CORONARY ARTERY DISEASE INVOLVING NATIVE CORONARY ARTERY OF NATIVE HEART WITHOUT ANGINA PECTORIS: ICD-10-CM

## 2018-03-22 LAB
LV EJECT FRACT  99904: 65
LV EJECT FRACT MOD 2C 99903: 62.67
LV EJECT FRACT MOD 4C 99902: 63.89
LV EJECT FRACT MOD BP 99901: 64.74

## 2018-03-22 PROCEDURE — 93306 TTE W/DOPPLER COMPLETE: CPT

## 2018-03-22 PROCEDURE — 99214 OFFICE O/P EST MOD 30 MIN: CPT | Performed by: INTERNAL MEDICINE

## 2018-03-22 PROCEDURE — 93306 TTE W/DOPPLER COMPLETE: CPT | Mod: 26 | Performed by: INTERNAL MEDICINE

## 2018-03-22 RX ORDER — LISINOPRIL 10 MG/1
10 TABLET ORAL DAILY
COMMUNITY
End: 2018-04-06 | Stop reason: SDUPTHER

## 2018-03-22 ASSESSMENT — ENCOUNTER SYMPTOMS
HEADACHES: 0
COUGH: 0
FEVER: 0
BLURRED VISION: 0
PALPITATIONS: 0
MUSCULOSKELETAL NEGATIVE: 1
WEIGHT LOSS: 0
PSYCHIATRIC NEGATIVE: 1
RESPIRATORY NEGATIVE: 1
BRUISES/BLEEDS EASILY: 0
DOUBLE VISION: 0
FOCAL WEAKNESS: 0
ABDOMINAL PAIN: 0
CHILLS: 0
CONSTITUTIONAL NEGATIVE: 1
DEPRESSION: 0
MYALGIAS: 0
NEUROLOGICAL NEGATIVE: 1
VOMITING: 0
CARDIOVASCULAR NEGATIVE: 1
EYES NEGATIVE: 1
CLAUDICATION: 0
SHORTNESS OF BREATH: 0
NERVOUS/ANXIOUS: 0
GASTROINTESTINAL NEGATIVE: 1
DIZZINESS: 0
NAUSEA: 0
WEAKNESS: 0

## 2018-03-22 NOTE — PROGRESS NOTES
Chief Complaint   Patient presents with   • Follow-Up     tavr       Subjective:   Rafia Shaikh is a 84 y.o. male who presents today for hospital follow up.    Since the discharge from [facility] on 03/13/18 status post patricia arrest treated with pacemaker placementfollowing TAVR, he has been doing well. He admits to fatigue, exacerbated by rehabilitation. He denies shortness of breath, dyspnea on exertion, chest pain, dizziness or syncope. He and his wife are at [facility].    Past Medical History:   Diagnosis Date   • Anemia 4/2016    due to rectal bleed   • Arrhythmia 5/12/17    Hx A fib. On only aspirin.    • CAD (coronary artery disease)    • Carotid artery stenosis 6/13/2011   • CATARACT 1990's    Bilateral phaco with IOL   • Dental disorder     cavities, under current care   • Dental disorder 5/12/17    permanent bridge lower   • Dizziness 6/13/2011   • Dyslipidemia 9/18/2010   • Gout    • Heart murmur    • High cholesterol    • Hyperlipidemia    • Hypertension    • Hypothyroidism 6/13/2011   • Insomnia    • Myocardial infarct 2008    Stent 2011.  5/12/17-Cardiologist is DR Wu ([facility])   • PAD (peripheral artery disease)    • Preoperative examination, unspecified 4/12/2011   • Rectal bleed 4/4/2016    Cauterized and received blood transfusions   • Renal disorder 2000    kidney stone   • Sleep apnea 2014    States recommended CPAP but never did get it.   • Stroke (CMS-HCC) 4/16/2010    TIA    • Tendonitis    • TIA (transient ischemic attack) 6/13/2011   • Unspecified disorder of thyroid    • Urinary incontinence      Past Surgical History:   Procedure Laterality Date   • TRANSCATHETER AORTIC VALVE REPLACEMENT N/A 2/12/2018    Procedure: TRANSCATHETER AORTIC VALVE REPLACEMENT;  Surgeon: Jez Waters M.D.;  Location: SURGERY [facility];  Service: Cardiac   • JANETH  2/12/2018    Procedure: JANETH;  Surgeon: Jez Waters M.D.;  Location: SURGERY [facility];  Service: Cardiac   • COLONOSCOPY N/A  5/16/2017    Procedure: COLONOSCOPY;  Surgeon: Osmany Smart M.D.;  Location: SURGERY [facility] ORS;  Service:    • LAPAROSCOPY  4/2/2016    exp for rectal bleed and cautery   • COLONOSCOPY  2015   • RECOVERY  8/22/2012    aortogram-Performed by SURGERY, IR-RECOVERY at SURGERY [facility] ORS   • CAROTID ENDARTERECTOMY  5/3/2011    Performed by ERASMO NAVARRETE at SURGERY [facility] ORS   • RECOVERY  4/1/2011    Performed by SURGERY, CATH-RECOVERY at SURGERY SAME DAY [facility] ORS   • RECOVERY  3/25/2011    Performed by SURGERY, CATH-RECOVERY at SURGERY SAME DAY [facility] ORS   • MULTIPLE CORONARY ARTERY BYPASS ENDO VEIN HARVEST  3/20/08    Performed by AMANDA CRYSTAL at SURGERY [facility] ORS   • LITHOTRIPSY  2000   • SEPTOPLASTY  1995   • CATARACT EXTRACTION WITH IOL Bilateral early 1990's     Family History   Problem Relation Age of Onset   • Heart Disease Father    • Other Mother      TB   • Hypertension     • Colon Cancer Brother    • Breast Cancer Sister    • Other       GOUT     Social History     Social History   • Marital status:      Spouse name: N/A   • Number of children: N/A   • Years of education: N/A     Occupational History   • Not on file.     Social History Main Topics   • Smoking status: Never Smoker   • Smokeless tobacco: Never Used   • Alcohol use No   • Drug use: No   • Sexual activity: Not on file     Other Topics Concern   • Not on file     Social History Narrative   • No narrative on file     No Known Allergies     Medications reviewed.    Outpatient Encounter Prescriptions as of 3/22/2018   Medication Sig Dispense Refill   • lisinopril (PRINIVIL) 10 MG Tab Take 10 mg by mouth every day.     • amiodarone (CORDARONE) 200 MG Tab Take 1 Tab by mouth every day. 30 Tab 0   • rivaroxaban (XARELTO) 15 MG Tab tablet Take 1 Tab by mouth with dinner. 42 Tab 0   • atorvastatin (LIPITOR) 20 MG Tab Take 20 mg by mouth every day.     • levothyroxine (SYNTHROID) 50 MCG Tab TAKE 1 TABLET BY MOUTH  "DAILY 90 Tab 0   • aspirin EC (ECOTRIN) 81 MG Tablet Delayed Response Take 81 mg by mouth every evening.     • Calcium Carbonate-Vit D-Min (CALTRATE PLUS PO) Take 1 Tab by mouth every day.     • Multiple Vitamins-Minerals (CENTRUM SILVER) TABS Take 2 Tabs by mouth 2 Times a Day.     • [DISCONTINUED] ipratropium-albuterol (DUONEB) 0.5-2.5 (3) MG/3ML nebulizer solution 3 mL by Nebulization route every four hours as needed for Shortness of Breath. 30 Bullet 0     No facility-administered encounter medications on file as of 3/22/2018.      Review of Systems   Constitutional: Negative.  Negative for chills, fever, malaise/fatigue and weight loss.   HENT: Negative.  Negative for hearing loss.    Eyes: Negative.  Negative for blurred vision and double vision.   Respiratory: Negative.  Negative for cough and shortness of breath.    Cardiovascular: Negative.  Negative for chest pain, palpitations, claudication and leg swelling.   Gastrointestinal: Negative.  Negative for abdominal pain, nausea and vomiting.   Genitourinary: Negative.  Negative for dysuria and urgency.   Musculoskeletal: Negative.  Negative for joint pain and myalgias.   Skin: Negative.  Negative for itching and rash.   Neurological: Negative.  Negative for dizziness, focal weakness, weakness and headaches.   Endo/Heme/Allergies: Negative.  Does not bruise/bleed easily.   Psychiatric/Behavioral: Negative.  Negative for depression. The patient is not nervous/anxious.         Objective:   /50   Pulse 60   Ht 1.6 m (5' 3\")   Wt 59.4 kg (131 lb)   SpO2 98%   BMI 23.21 kg/m²     Physical Exam   Constitutional: He is oriented to person, place, and time. He appears well-developed and well-nourished.   HENT:   Head: Normocephalic and atraumatic.   Eyes: Conjunctivae are normal. Pupils are equal, round, and reactive to light.   Neck: Normal range of motion. Neck supple.   Cardiovascular: Normal rate and regular rhythm.    Pulmonary/Chest: Effort normal and " breath sounds normal.   Abdominal: Soft. Bowel sounds are normal.   Musculoskeletal: Normal range of motion.   Neurological: He is alert and oriented to person, place, and time.   Skin: Skin is warm and dry.   Psychiatric: He has a normal mood and affect.     CARDIAC STUDIES/PROCEDURES:     CARDIAC CATHETERIZATION CONCLUSIONS by Dr. Nieves (01/04/18)  1.  Coronary artery disease, three-vessel including left anterior descending artery ostial 100%   occlusion, mid right coronary artery 100% occlusion, a distal left anterior descending artery   95% stenosis, second circumflex marginal branch 30% stenosis  2.  Patent coronary bypass grafts to the left anterior descending artery, first circumflex marginal  branch and distal right coronary artery.  3.  Patent coronary stent of the ostium and proximal first circumflex marginal branch.  4.  Normal ascending aorta.  5.  Aortic regurgitation, mild to moderate.  6.  Patent distal abdominal aortogram and bilateral iliofemoral arteries.  7.  Essentially normal right heart pressures and left ventricular filling pressure.     CAROTID ULTRASOUND (11/27/17)  Moderate stenosis of the right internal carotid (50-69%).   Left carotid.   CEA is widely patent without evidence of restinosis.    Flow within both subclavian arteries appears to be within normal limits.   Antegrade flow, bilateral vertebral arteries.      CTA OF HEAD/NECK (02/14/18)  1.  No evidence of intracranial vascular occlusion or aneurysm.  2.  Extensive atherosclerotic plaque involving the intracranial vascular structures with multiple areas of atherosclerotic narrowing.  3.  Periventricular chronic small vessel ischemic change.  4.  Marked partially calcified and ulcerated plaques located within the right carotid artery bifurcation. Between 50% to 70% stenosis at the origin right internal carotid artery is demonstrated.  5.  Mild partially calcified plaque left carotid artery bifurcation. No significant  stenosis.  6.  Moderate/marked partially calcified plaque origin right vertebral artery resulting in between 50% and 70% stenosis.  7.  Focal plaque or thrombus within the right vertebral artery at the C1 level.  8.  Moderate partially calcified plaque within the proximal segment of the left vertebral artery resulting in 50% stenosis.     CT OF CHEST AND ABDOMEN (12/14/17)  1.  Aortic annulus area of 326 sq mm.  2.  Coronary ostia are both greater than 10 mm from the annulus.  3.  Minimum diameter of the iliofemoral arterial system of 5.9 mm on the RIGHT and 8 mm on the LEFT, with moderate atherosclerotic calcification and tortuosity bilaterally.  4.  Interval distal migration of large LEFT kidney stone to the distal ureter, without significant obstructive changes.     COLONOSCOPE by Dr. 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  Eventually this area was Endoclipped,   moderate left-sided diverticulosis.    ECHOCARDIOGRAM CONCLUSIONS (03/22/18)  Prior echo 02/14/18; compared to the report of the study done - there   has been recovery of LVSF  Normal left ventricular systolic function.  Left ventricular ejection fraction is visually estimated to be 65%.  Grade I diastolic dysfunction.  Known TAVR aortic valve that is functioning normally with normal   transvalvular gradients.  Vmax is 2.3  m/s. Transvalvular gradients are - Peak: 21 mmHg, Mean: 12 mmHg.  No evidence of paravalvular leak is noted.  Mild tricuspid regurgitation.  Estimated right ventricular systolic pressure is 35 mmHg.     ECHOCARDIOGRAM CONCLUSIONS (02/14/18)  1. Known TAVR aortic  valve that is functioning normally with normal   transvalvular gradients. Transvalvular gradients are - Peak: 15  mmHg,    Mean: 9  mmHg. Trivial paravalvular leak is noted.  2. Mitral regurgitation was not evaluated during this study, however   there appeared to be no impingment or restriction of the mitral valve   leaflets by the bioprosthetic aortic valve.  3. Normal pericardium without effusion.  4. Mild to moderately decreased left ventricular systolic function.   Estimated LVEF 40%, however this estimate was difficult due to severe tachycardia.  5. Global hypokinesis.  Compared to the previous study performed on 2/13/2018:  There appears   to be less perivalvular leak.  The TAVR valve might be slightly farther   into the ventricle, however there does not appear to be any obstruction.     ECHOCARDIOGRAM CONCLUSIONS (10/026/17)  Prior echo 12-23-14. Compared to the images of the study done - there   has been progression of aortic stenosis and regurgitation.   Normal left ventricular systolic function.  Left ventricular ejection fraction is visually estimated to be 70%.  Severe aortic stenosis.  AV Area Cont Eq vti 0.93 cm²  Vmax is 5.0 m/s. Transvalvular gradients are - Peak: 100 mmHg, Mean: 53 mmHg.   Moderate aortic insufficiency.  Mild tricuspid regurgitation.  Estimated right ventricular systolic pressure is 45 mmHg.  Moderate pulmonic insufficiency.     EKG performed on (02/19/18) was reviewed: EKG shows sinus rhythm with right bundle branch block  EKG performed on (02/12/18) EKG shows sinus patricia with right bundle branch block.  EKG performed on (02/12/18) EKG shows sinus patricia with right bundle branch block.  EKG performed on (02/07/18) EKG shows sinus patriica with right bundle branch block.  EKG performed on (05/12/17) EKG shows sinus rhythm with right bundle branch block.     Laboratory results of (03/13/18) were reviewed. Bun of 19 mg/dl, creatinine levels of 1.06 mg/dl noted.    MRI OF BRAIN  (02/13/11)  1. Moderate cerebral atrophy.  2. Minimal supratentorial white matter disease with two or 3 rare   punctate foci of bright FLAIR signal in the deep white matter consistent   with small vessel ischemic change versus demyelination or gliosis.  3. No evidence of acute cerebral infarction, hemorrhage, or mass lesion.     PERIPHERAL INTERVENTION by Dr. Amaya (03/22/17)  1.  RIGHT common femoral sheath arteriogram demonstrates atherosclerosis with estimated 50-75% stenosis of the proximal femoral artery  2.  Deployment Starclose SE vascular closure device; due to the patient's agitation and altered mental status I recommend a further 6 hours of groin precautions     PERIPHERAL INTERVENTION by Dr. Hart (03/21/17)  1.  Active extravasation from the ileocecal branch of the superior mesenteric artery.  2.  Successful embolization of bleeding cecal branch.     PERIPHERAL INTERVENTION by Dr. Parra (08/22/12)  1.  Right superficial femoral artery occlusion with occlusion of the tibial peroneal trunk.    2.  Widely patent left superficial femoral artery stent with some disease in   the tibial vessels as well.  Although, the superficial femoral artery is   amenable to percutaneous intervention, I am reluctant to open the artery to a   distal occlusion.  The tibioperoneal trunk is heavily diseased and   intervention in the tibioperoneal trunk for a patient with claudication is not   advisable due to the risk of failure and complication as well as the poor   durability of below knee tibial work.  I will follow up with the patient in   the office and discuss with him the findings of the angiogram.  If he   progresses to rest pain or tissue loss, will consider percutaneous   intervention of both lesions.     TRANSESOPHAGEAL ECHOCARDIOGRAM CONCLUSIONS by Dr. Leslie (02/12/18)  1)  Severe AS/AI  2)  Denia Valve:  EOA:  1.94  cm ^2  Mean Gradiet:  6 mmhg  3)  No PVL     Assessment:     1. S/P TAVR (transcatheter  aortic valve replacement)     2. Paroxysmal atrial fibrillation (CMS-[facility])     3. Chronic anticoagulation     4. Cardiac pacemaker in situ     5. Coronary artery disease involving native coronary artery of native heart without angina pectoris     6. Hx of CABG     7. PVD (peripheral vascular disease) (CMS-[facility])         Medical Decision Making:  Today's Assessment / Status / Plan:     1. Successful transcatheter aortic valve replacement (TAVR) with # 23 with 2 additional mls of volume Muñiz Denia S3 valve, transfemoral approach, under general anesthesia (02/12/18): He is clinically doing well. His valve is functioning well. His left ventricular systolic function has recovered. We will continue with medical therapy.  2. Atrial fibrillation on anticoagulation therapy with Medtronic dual chamber pacemaker by Dr. Stephens (02/18/18): He is doing well on anticoagulation (Xarelto). We will discontinue amiodarone.  3. Coronary artery disease with prior 3 vessel coronary bypass graft with left internal mammary artery graft to left anterior descending artery, saphenous vein graft to obtuse marginal branch and saphenous vein graft to posterior descending artery by Dr. Coates (03/20/08): We will restart beta-blockade therapy with metoprolol 25 mg BID.  4. Peripheral vascular disease with prior left superficial femoral artery stent, known high grade stenosis of right common femoral artery closed with Starclose closure by Dr. Amaya on (03/22/17): Stable.  5. Carotid artery stenosis with left carotid enterectomy by Dr. Parra (05/03/11) (managed by Dr. Parra)  6. History of trans-ischemic attack  7. History of gastrointestinal bleeding with embolization of ileocecal branch by Dr. Hart (03/21/17) and AVM (managed by Osmany Pat): He remains clinically stable without recurrence of gastrointestinal bleeding. He was cleared by GI for TAVR.  8. Additional information: He and his wife is from [place], however, they speaks  Citizen of [place].    We will follow up in six months.     CC José Taylor and Yarelis Lee

## 2018-04-02 ENCOUNTER — OFFICE VISIT (OUTPATIENT)
Dept: INTERNAL MEDICINE | Facility: MEDICAL CENTER | Age: 83
End: 2018-04-02
Payer: MEDICARE

## 2018-04-02 VITALS
TEMPERATURE: 97.9 F | HEIGHT: 62 IN | DIASTOLIC BLOOD PRESSURE: 80 MMHG | HEART RATE: 64 BPM | SYSTOLIC BLOOD PRESSURE: 138 MMHG | OXYGEN SATURATION: 94 % | WEIGHT: 130.13 LBS | BODY MASS INDEX: 23.95 KG/M2

## 2018-04-02 DIAGNOSIS — R41.89 COGNITIVE IMPAIRMENT: ICD-10-CM

## 2018-04-02 DIAGNOSIS — R53.81 DEBILITY: ICD-10-CM

## 2018-04-02 DIAGNOSIS — R54 FRAILTY: ICD-10-CM

## 2018-04-02 DIAGNOSIS — I10 BENIGN ESSENTIAL HTN: ICD-10-CM

## 2018-04-02 DIAGNOSIS — Z71.89 ADVANCE CARE PLANNING: ICD-10-CM

## 2018-04-02 DIAGNOSIS — D64.9 ANEMIA, UNSPECIFIED TYPE: ICD-10-CM

## 2018-04-02 DIAGNOSIS — R05.9 COUGH: ICD-10-CM

## 2018-04-02 PROBLEM — D75.89 MACROCYTOSIS: Status: RESOLVED | Noted: 2018-02-25 | Resolved: 2018-04-02

## 2018-04-02 PROBLEM — G93.40 ENCEPHALOPATHY ACUTE: Status: RESOLVED | Noted: 2017-03-23 | Resolved: 2018-04-02

## 2018-04-02 PROBLEM — J96.01 ACUTE RESPIRATORY FAILURE WITH HYPOXIA (HCC): Status: RESOLVED | Noted: 2018-02-15 | Resolved: 2018-04-02

## 2018-04-02 PROBLEM — E83.42 HYPOMAGNESEMIA: Status: RESOLVED | Noted: 2018-02-25 | Resolved: 2018-04-02

## 2018-04-02 PROBLEM — D72.829 LEUKOCYTOSIS: Status: RESOLVED | Noted: 2018-02-25 | Resolved: 2018-04-02

## 2018-04-02 PROBLEM — N20.1 URETERAL STONE: Status: RESOLVED | Noted: 2017-03-24 | Resolved: 2018-04-02

## 2018-04-02 PROBLEM — E83.39 HYPOPHOSPHATEMIA: Status: RESOLVED | Noted: 2018-02-25 | Resolved: 2018-04-02

## 2018-04-02 PROCEDURE — 99214 OFFICE O/P EST MOD 30 MIN: CPT | Mod: GC | Performed by: INTERNAL MEDICINE

## 2018-04-02 RX ORDER — METOPROLOL TARTRATE 100 MG/1
100 TABLET ORAL DAILY
COMMUNITY
Start: 2018-01-23 | End: 2018-04-02

## 2018-04-02 RX ORDER — AMLODIPINE BESYLATE 10 MG/1
10 TABLET ORAL DAILY
COMMUNITY
Start: 2018-01-05 | End: 2018-08-31

## 2018-04-02 RX ORDER — CILOSTAZOL 100 MG/1
100 TABLET ORAL 2 TIMES DAILY
COMMUNITY
Start: 2018-01-10 | End: 2019-01-01 | Stop reason: CLARIF

## 2018-04-02 RX ORDER — ALLOPURINOL 300 MG/1
300 TABLET ORAL DAILY
COMMUNITY
Start: 2018-01-05 | End: 2018-08-31 | Stop reason: SDUPTHER

## 2018-04-02 ASSESSMENT — PATIENT HEALTH QUESTIONNAIRE - PHQ9
SUM OF ALL RESPONSES TO PHQ QUESTIONS 1-9: 9
5. POOR APPETITE OR OVEREATING: 1 - SEVERAL DAYS
CLINICAL INTERPRETATION OF PHQ2 SCORE: 2

## 2018-04-02 NOTE — LETTER
[facility]  Yarelis Gtz M.D.  [address] [facility] [address]  [place] [address]  Fax: [phone]   Authorization for Release/Disclosure of   Protected Health Information   Name: LUZ SHAIKH : 1933 SSN: [id number]   Address: [address] [place] [address] Phone:    [phone] (home)    I authorize the entity listed below to release/disclose the PHI below to:   [facility]/Yarelis Gtz M.D. and [facility]   Provider or Entity Name:     Address   City, State, Zip   Phone:      Fax:     Reason for request: continuity of care   Information to be released:    [  ] LAST COLONOSCOPY,  including any PATH REPORT and follow-up  [  ] LAST FIT/COLOGUARD RESULT [  ] LAST DEXA  [  ] LAST MAMMOGRAM  [  ] LAST PAP  [  ] LAST LABS [  ] RETINA EXAM REPORT  [  ] IMMUNIZATION RECORDS  [  ] Release all info      [  ] Check here and initial the line next to each item to release ALL health information INCLUDING  _____ Care and treatment for drug and / or alcohol abuse  _____ HIV testing, infection status, or AIDS  _____ Genetic Testing    DATES OF SERVICE OR TIME PERIOD TO BE DISCLOSED: _____________  I understand and acknowledge that:  * This Authorization may be revoked at any time by you in writing, except if your health information has already been used or disclosed.  * Your health information that will be used or disclosed as a result of you signing this authorization could be re-disclosed by the recipient. If this occurs, your re-disclosed health information may no longer be protected by State or Federal laws.  * You may refuse to sign this Authorization. Your refusal will not affect your ability to obtain treatment.  * This Authorization becomes effective upon signing and will  on (date) __________.      If no date is indicated, this Authorization will  one (1) year from the signature date.    Name: Luz Shaikh    Signature:   Date:     2018       PLEASE FAX REQUESTED  RECORDS BACK TO: [phone]

## 2018-04-02 NOTE — LETTER
[facility]  Dr Catina M.D.  [address] [facility] [address]  [place] [address]  Fax: [phone]   Authorization for Release/Disclosure of   Protected Health Information   Name: RAFIA IYER : 1933 SSN: [id number]   Address: [address] [place] [address] Phone:    [phone] (home)    I authorize the entity listed below to release/disclose the PHI below to:   [facility]/Yarelis Gtz M.D. and [facility]   Provider or Entity Name: [facility]     Address   City, State, Zip   Phone:      Fax:     Reason for request: continuity of care   Information to be released:    [  ] LAST COLONOSCOPY,  including any PATH REPORT and follow-up  [  ] LAST FIT/COLOGUARD RESULT [  ] LAST DEXA  [  ] LAST MAMMOGRAM  [  ] LAST PAP  [  ] LAST LABS [  ] RETINA EXAM REPORT  [  ] IMMUNIZATION RECORDS  [ X ] Release all info      [  ] Check here and initial the line next to each item to release ALL health information INCLUDING  _____ Care and treatment for drug and / or alcohol abuse  _____ HIV testing, infection status, or AIDS  _____ Genetic Testing    DATES OF SERVICE OR TIME PERIOD TO BE DISCLOSED: _____________  I understand and acknowledge that:  * This Authorization may be revoked at any time by you in writing, except if your health information has already been used or disclosed.  * Your health information that will be used or disclosed as a result of you signing this authorization could be re-disclosed by the recipient. If this occurs, your re-disclosed health information may no longer be protected by State or Federal laws.  * You may refuse to sign this Authorization. Your refusal will not affect your ability to obtain treatment.  * This Authorization becomes effective upon signing and will  on (date) __________.      If no date is indicated, this Authorization will  one (1) year from the signature date.    Name: Rafia Iyer    Signature:   Date:     2018       PLEASE FAX REQUESTED  RECORDS BACK TO: [phone]

## 2018-04-03 NOTE — PROGRESS NOTES
Established Patient    Rafia presents today with the following:    CC:   Chief Complaint   Patient presents with   • Establish Care   • Cough     Dry. x1 week       HPI:   The patient is a 84 year old male with PMHx of sick sinus syndrome s/p Pacemaker, Aortic stenosis s/p TAVR, Gout, CKD stage III, Hypertension, Dyslipidemia, CAD s/p CABG presented to the clinic to re-establish care.  -History was obtained form the patient, patients wife and medical records.  -The patient was recently hospitalized on 2/12 and had a TAVR procedure and was discharged shortly thereafter.he was readmitted on 2/14/18 with AMS, cardiac arrest and was in respiratory failure which required intubation which was complicated by acute encephalopathy.  -Neurology, cardiology,Pyschiatry was consulted, EEG showed diffuse cortical dysfunction. He was discharged to [facility], [facility] on 3/23 from [facility] secondary to cost issue.  Cough:  -Today he is complaining of non productive cough over the past one week which is not improving this past week.Denies any fevers, chills, shortness of breath , chest pain , loss of appetite,headache.  -ADL and IADL :  Patient is dependent on his wife for cooking, dressing,bathing.He takes care of finances for both him and his wife.He stated he would prefer to stay at home.  He has two kids son and daughter but does not communicate with them much.He lives with his wife in a house with no stairs.He has friends in town that help them both.  -He was told by his son in the past to go to nursing home for round the clock care.He wants to stay independently at home with his wife and does not think he needs to stay in assisted living facility. He has home health nurses visit him for three times a week.  -He has bowel movements once every two or three days.He attributes this to decreased consumption of vegetables. Denies changes in bladder habits, blood in stools,abdominal pain,nauea,vomiting.  -He follows  with  cardiologist and most recent appointment was one month ago.Denies any symptoms of chest pain,Dizziness, shortness of breath,synopal episodes.No history of recent falls.  -He has increased day time sleepiness and has no trouble falling asleep in the night, sleeps for 8-10 hours in the night daily.Denies any choking episodes in the night, waking up with shortness of breath.  -Denies alcohol, smoking or illicit drug use.    Chronic conditions- CAD, Dyslipidemia,Hypertension,hypothyroidism stable.    Patient Active Problem List    Diagnosis Date Noted   • SSS (sick sinus syndrome) (CMS-HCC) 02/25/2018     Priority: High   • Bradycardia 02/14/2018     Priority: High   • Cardiac arrest (CMS-HCC) 02/14/2018     Priority: High   • S/P TAVR (transcatheter aortic valve replacement) 02/12/2018     Priority: Medium   • Hypertension, essential 10/21/2016     Priority: Medium   • Abdominal aortic aneurysm (CMS-HCC) 01/17/2013     Priority: Medium   • Paroxysmal atrial fibrillation (CMS-HCC) 09/18/2010     Priority: Medium   • CAD (coronary artery disease) 09/18/2010     Priority: Medium   • Dyslipidemia 09/18/2010     Priority: Medium   • RBBB (right bundle branch block) 11/21/2017     Priority: Low   • Risk for falls 05/02/2017     Priority: Low   • Healthcare maintenance 05/02/2017     Priority: Low   • Chronic fatigue 10/21/2016     Priority: Low   • Chronic kidney disease, stage III (moderate) 10/21/2016     Priority: Low   • Carotid artery stenosis 06/13/2011     Priority: Low   • Acquired hypothyroidism 06/13/2011     Priority: Low   • Benign non-nodular prostatic hyperplasia without lower urinary tract symptoms 09/18/2010     Priority: Low   • PVD (peripheral vascular disease) (CMS-HCC) 09/18/2010     Priority: Low   • Gout of multiple sites 09/18/2010     Priority: Low   • Cardiac pacemaker in situ 03/22/2018   • Chronic anticoagulation 03/22/2018   • Hx of CABG 03/22/2018       Current Outpatient  "Prescriptions   Medication Sig Dispense Refill   • B Complex Vitamins (VITAMIN B COMPLEX PO) Take  by mouth every day.     • lisinopril (PRINIVIL) 10 MG Tab Take 10 mg by mouth every day.     • rivaroxaban (XARELTO) 15 MG Tab tablet Take 1 Tab by mouth with dinner. 42 Tab 0   • atorvastatin (LIPITOR) 20 MG Tab Take 20 mg by mouth every day.     • levothyroxine (SYNTHROID) 50 MCG Tab TAKE 1 TABLET BY MOUTH DAILY 90 Tab 0   • aspirin EC (ECOTRIN) 81 MG Tablet Delayed Response Take 81 mg by mouth every evening.     • allopurinol (ZYLOPRIM) 300 MG Tab Take 300 mg by mouth every day.     • amLODIPine (NORVASC) 10 MG Tab Take 10 mg by mouth every day.     • cilostazol (PLETAL) 100 MG Tab Take 100 mg by mouth 2 Times a Day.     • metoprolol (LOPRESSOR) 25 MG Tab Take 1 Tab by mouth 2 times a day. 60 Tab 11     No current facility-administered medications for this visit.        ROS: As per HPI. Additional pertinent symptoms as noted below.  A complete 14-system review of systems was obtained and is otherwise negative except as stated in the history of present illness, below, and/or the pre-visit questionnaire.      All others negative    /80   Pulse 64   Temp 36.6 °C (97.9 °F)   Ht 1.581 m (5' 2.25\")   Wt 59 kg (130 lb 2 oz)   SpO2 94%   BMI 23.61 kg/m²     Physical Exam   Constitutional:  oriented to person, place, and time. No distress.   Eyes: Pupils are equal, round, and reactive to light. No scleral icterus.  Oropharynx clear with no exudates or erythema.  Neck: Neck supple. No thyromegaly present.   Cardiovascular: Normal rate, regular rhythm and normal heart sounds.  Exam reveals no gallop and no friction rub.  No murmur heard.  Pulmonary/Chest: Breath sounds normal. Chest wall is not dull to percussion.   Musculoskeletal:   no edema.   Lymphadenopathy: no cervical adenopathy  Neurological: alert and oriented to person, place, and time.   Skin: No cyanosis. Nails show no clubbing.  Gait : ambulates " without assisted device.Normal gait with back bending forward.  For short distances does not take help of his wife.  3/3 word recall after 5 minutes.    Note: I have reviewed all pertinent labs and diagnostic tests associated with this visit with specific comments listed under the assessment and plan below    Assessment and Plan    1. Cognitive impairment  2. Debility  3. Frailty  We are concerned about patient's ability to life independently at home given frailty, debility and possible cogn impairment.   Per pt, he is able to do ADL/IADLs but we are not certain  Pt was recently hosp'd for confusion and delirium   - B12, folate, TSH were fine then  -CTA brain showed narrowing of blood vessels  Transferred to SNF for rehab  Per SW at SNF, pt left to return home despite recommendations to stay  Today, pt and wife saying they are doing ok at home with help of HH and friends  Rec'd that pt go to a group home; wife acknowledges that we think he needs a higher level of care for  safety but declines CGs or group home  -Will call patient's son who lives [place] in regard to the patient care and safety at home  - Will have HH  make a visit  - will refer to [facility] for comprehensive geriatrics assessment    5. Advance care planning  rec AD and POLST    - REFERRAL TO GERIATRICS  - REFERRAL TO HOME HEALTH    6. Benign essential HTN  Ok on meds  - amLODIPine (NORVASC) 10 MG Tab; Take 10 mg by mouth every day.    7. Anemia, unspecified type  acd in the hosp  Watch for now    4.  cough:  -Patient presented with cough non productive over the past one to two weeks with no associated symptoms or signs.  -HEENT, chest exam is benign.  -Patient is currently on lisinopril for hypertension management.Will find out from the pharmacy regarding to when this medication was started and will consider to discontinue this med and start losartan if this is the cause.  -will follow up with the patient in a couple of weeks.  -Patient is advised  to use warm water and honey to soothen his pharynx. Humidifier in the night and symptomatic care.  -Unlikely lower respiratory tract infection at this time.          Followup: Return in about 4 weeks (around 4/30/2018).      Signed by: JOSE L NunezSPawel

## 2018-04-04 ENCOUNTER — HOME HEALTH ADMISSION (OUTPATIENT)
Dept: HOME HEALTH SERVICES | Facility: HOME HEALTHCARE | Age: 83
End: 2018-04-04
Payer: MEDICARE

## 2018-04-06 ENCOUNTER — TELEPHONE (OUTPATIENT)
Dept: INTERNAL MEDICINE | Facility: MEDICAL CENTER | Age: 83
End: 2018-04-06

## 2018-04-06 DIAGNOSIS — I25.10 CORONARY ARTERY DISEASE INVOLVING NATIVE CORONARY ARTERY OF NATIVE HEART WITHOUT ANGINA PECTORIS: ICD-10-CM

## 2018-04-06 RX ORDER — LISINOPRIL 10 MG/1
10 TABLET ORAL DAILY
Qty: 90 TAB | Refills: 3 | Status: SHIPPED | OUTPATIENT
Start: 2018-04-06 | End: 2018-08-31

## 2018-04-06 NOTE — TELEPHONE ENCOUNTER
1. Caller Name: Patricia from [facility]                       Call Back Number: [phone]    2. Message: Patricia called and l/m. They received our order for HH. Pt doesn't not have service with the, .    3. Patient approves office to leave a detailed voicemail/MyChart message: N\A    I called [facility]. Pt is with them. Pt established care with them on 03/25/18.    I faxed over order for SW visit to them    Fax#[phone]

## 2018-04-06 NOTE — TELEPHONE ENCOUNTER
PT SEEN: 4/2/18 WITH Dr. Dominique NEXT APPT 5/7/18  WITH Dr. Wellington  Was the patient seen in the last year in this department? Yes     Does patient have an active prescription for medications requested? No     Received Request Via: Patient

## 2018-04-18 ENCOUNTER — TELEPHONE (OUTPATIENT)
Dept: INTERNAL MEDICINE | Facility: MEDICAL CENTER | Age: 83
End: 2018-04-18

## 2018-04-18 NOTE — TELEPHONE ENCOUNTER
Melly from [facility] [phone] for pt stating that he's had multiple falls in the past couple weeks he is feeling a little bit weak, having a hard time sleeping at night, also having a little bit more confusion. No respiratory issues, BP has been up a little bit but that's because pt has nt been taking his BP meds and she is trying to get pt back on taking them. She called to see if an appointment can be set up to be seen.     I called pt but no answer, asked them to call us back to set up an appointment.

## 2018-04-30 NOTE — TELEPHONE ENCOUNTER
Called Melly mcmanus Cornelius explained that the covering physician wants the pt to come in today or tomorrow for an appt Melly understood transferred to scheduling to schedule appt

## 2018-05-07 ENCOUNTER — OFFICE VISIT (OUTPATIENT)
Dept: INTERNAL MEDICINE | Facility: MEDICAL CENTER | Age: 83
End: 2018-05-07
Payer: MEDICARE

## 2018-05-07 VITALS
WEIGHT: 135 LBS | TEMPERATURE: 98 F | SYSTOLIC BLOOD PRESSURE: 147 MMHG | HEIGHT: 62 IN | HEART RATE: 60 BPM | BODY MASS INDEX: 24.84 KG/M2 | DIASTOLIC BLOOD PRESSURE: 66 MMHG | OXYGEN SATURATION: 95 %

## 2018-05-07 DIAGNOSIS — R41.89 COGNITIVE IMPAIRMENT: ICD-10-CM
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
## 2018-05-07 DIAGNOSIS — R53.1 WEAKNESS: ICD-10-CM
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
## 2018-05-07 LAB
APPEARANCE UR: NORMAL
BILIRUB UR STRIP-MCNC: NEGATIVE MG/DL
COLOR UR AUTO: NORMAL
GLUCOSE UR STRIP.AUTO-MCNC: NEGATIVE MG/DL
KETONES UR STRIP.AUTO-MCNC: NEGATIVE MG/DL
LEUKOCYTE ESTERASE UR QL STRIP.AUTO: NORMAL
NITRITE UR QL STRIP.AUTO: NEGATIVE
PH UR STRIP.AUTO: 6.5 [PH] (ref 5–8)
PROT UR QL STRIP: NORMAL MG/DL
RBC UR QL AUTO: NORMAL
SP GR UR STRIP.AUTO: 1.01
UROBILINOGEN UR STRIP-MCNC: NEGATIVE MG/DL

## 2018-05-07 PROCEDURE — 99214 OFFICE O/P EST MOD 30 MIN: CPT | Mod: GC | Performed by: INTERNAL MEDICINE

## 2018-05-07 PROCEDURE — 81002 URINALYSIS NONAUTO W/O SCOPE: CPT | Mod: GC | Performed by: INTERNAL MEDICINE

## 2018-05-07 PROCEDURE — 81002 URINALYSIS NONAUTO W/O SCOPE: CPT | Performed by: INTERNAL MEDICINE

## 2018-05-07 RX ORDER — CEPHALEXIN 500 MG/1
500 CAPSULE ORAL 2 TIMES DAILY
Qty: 10 CAP | Refills: 0 | Status: SHIPPED | OUTPATIENT
Start: 2018-05-07 | End: 2019-01-01 | Stop reason: CLARIF

## 2018-05-07 ASSESSMENT — PAIN SCALES - GENERAL: PAINLEVEL: NO PAIN

## 2018-05-07 NOTE — PROGRESS NOTES
Established Patient    Rafia presents today with the following:    CC: *  Chief Complaint   Patient presents with   • Follow-Up     weakness in legs       limited Poor hx as patient has slow speech and difficulty remembering events    HPI:   Patient is 84 year old male with PMHx of sick sinus syndrome s/p Pacemaker, Aortic stenosis s/p TAVR, Gout, CKD stage III, Hypertension, Dyslipidemia, CAD s/p CABG presented today with complaint of frequent falls at home. Patient had called clinic earlier to complain of frequent falls and weakness in lower extremity, and was asked to report for follow up. Wife also noticed slowness in speech but no dysarthria. There is non fever, chills, nausea or vomiting. No dysuria.   Patient was seen the lats time by geriatrician and referred to [facility], and home health care was also requested. He currently being seen 3x/week by home health. The medications are arranged by home care nurse, and she and her wife are able to follow the plan.   He has had SEVERAL  falls over past few weeks, at ground level and hence wife has asked him not to use stairs. He states he has not hit his head against any objects but fell mostly on his buttocks.   He is currently dependent on wife and Home care nurse. The wife also has memory problems.       Patient Active Problem List    Diagnosis Date Noted   • SSS (sick sinus syndrome) ([facility]) 02/25/2018     Priority: High   • Bradycardia 02/14/2018     Priority: High   • Cardiac arrest ([facility]) 02/14/2018     Priority: High   • S/P TAVR (transcatheter aortic valve replacement) 02/12/2018     Priority: Medium   • Hypertension, essential 10/21/2016     Priority: Medium   • Abdominal aortic aneurysm ([facility]) 01/17/2013     Priority: Medium   • Paroxysmal atrial fibrillation (CMS-[facility]) 09/18/2010     Priority: Medium   • CAD (coronary artery disease) 09/18/2010     Priority: Medium   • Dyslipidemia 09/18/2010     Priority: Medium   • RBBB (right bundle branch  "block) 11/21/2017     Priority: Low   • Risk for falls 05/02/2017     Priority: Low   • Healthcare maintenance 05/02/2017     Priority: Low   • Chronic fatigue 10/21/2016     Priority: Low   • Chronic kidney disease, stage III (moderate) 10/21/2016     Priority: Low   • Carotid artery stenosis 06/13/2011     Priority: Low   • Acquired hypothyroidism 06/13/2011     Priority: Low   • Benign non-nodular prostatic hyperplasia without lower urinary tract symptoms 09/18/2010     Priority: Low   • PVD (peripheral vascular disease) (CMS-[facility]) 09/18/2010     Priority: Low   • Gout of multiple sites 09/18/2010     Priority: Low   • Weakness 05/07/2018   • Falls 05/07/2018   • Cognitive impairment 05/07/2018   • Cardiac pacemaker in situ 03/22/2018   • Chronic anticoagulation 03/22/2018   • Hx of CABG 03/22/2018       Current Outpatient Prescriptions   Medication Sig Dispense Refill   • rivaroxaban (XARELTO) 15 MG Tab tablet Take 1 Tab by mouth with dinner. 42 Tab 3   • lisinopril (PRINIVIL) 10 MG Tab Take 1 Tab by mouth every day. 90 Tab 3   • B Complex Vitamins (VITAMIN B COMPLEX PO) Take  by mouth every day.     • allopurinol (ZYLOPRIM) 300 MG Tab Take 300 mg by mouth every day.     • amLODIPine (NORVASC) 10 MG Tab Take 10 mg by mouth every day.     • cilostazol (PLETAL) 100 MG Tab Take 100 mg by mouth 2 Times a Day.     • metoprolol (LOPRESSOR) 25 MG Tab Take 1 Tab by mouth 2 times a day. 60 Tab 11   • atorvastatin (LIPITOR) 20 MG Tab Take 20 mg by mouth every day.     • levothyroxine (SYNTHROID) 50 MCG Tab TAKE 1 TABLET BY MOUTH DAILY 90 Tab 0   • aspirin EC (ECOTRIN) 81 MG Tablet Delayed Response Take 81 mg by mouth every evening.       No current facility-administered medications for this visit.        ROS: As per HPI.     /66   Pulse 60   Temp 36.7 °C (98 °F)   Ht 1.575 m (5' 2\")   Wt 61.2 kg (135 lb)   SpO2 95%   BMI 24.69 kg/m²     Physical Exam   General:Elderly male,  not in distress,   HEENT: " Normocephalic, atraumatic, no jaundice or scleral icterus, no pallor,   No cervical lymphadenopathy, No tonsillar exudate, no throat erythyema,   Respiratory decreased air entry at lung basis, not crackles, or wheezing .  Cardiac:Regular, rate and rhythm, S1/S2 present, no M/R/G  GI: abdomen non distended, soft, non tender, no organomegaly, bowel sounds normoactive  Neuro:alert, and oriented to person, place, but not time  Festinant type of gait. Patient about to fall on turning.slow MOVEMENT   No blinking of face. Slow speech.   Glabellar reflex +  No muscle rigidity  DTR 2+ B/L normal babinski   Msk/Extremities: radial, dorsalis pedis pulses 2+b/l, no joint swelling or redness, ROM intact, no lower  extremity edema  Skin: No rash    Note: I have reviewed all pertinent labs and diagnostic tests associated with this visit with specific comments listed under the assessment and plan below    Assessment and Plan  84 year old male with PMHx of sick sinus syndrome s/p Pacemaker, Aortic stenosis s/p TAVR, Gout, CKD stage III, Hypertension, Dyslipidemia, CAD s/p CABG presented today with complaint of frequent falls at home.     1. Multiple falls likely 2/2 Parkinson's diseases  f frequent falls and weakness in lower extremity, slowness in speech but no dysarthria.  no fever, chills, nausea or vomiting. No dysuria.   Was  referred to [facility], and and now seen by home health nurse, 3x/week   ground level falls, no head trauma.  Labs reviewed, and normal  Bmp   EXAMS show  alert, and oriented to person, place, but not time, Festinant type of gait. Patient about to fall on turning.slow MOVEMENT, No blinking of face. Slow speech.   Glabellar reflex +, No muscle rigidity  Patient multiple falls and above findings suggestive of early parkinson's features. He states he was weak, but currently feels better. Medications reviewed.   -will refer to neurology  -patient will be called by [facility] tomorrow to schedule  appointment  -Will  Continue to need home health care nurse.   -POCT UA shows some trace leukocytes and hematuria. I have decided to treat (keflex 500 mg bid x5) because pt is poor hx cannot rely on wether he has symptoms or not.  -WILL continue anticoagulations because of risk of blood clots in setting of P-afib and s/p TAVR    3. Cognitive impairment  Currently not INDEPENDENT  of all ADLS   B12, folate, TSH normal , CTA brain showed narrowing of blood vessels  -REFERRED TO [facility]  -CONTINUE HOME HEALTH CARE NURSE        Followup: Return follow pcp. follow pcp      Signed by: Hesham Wellington M.D.

## 2018-05-09 NOTE — TELEPHONE ENCOUNTER
Was the patient seen in the last year in this department? Yes     Does patient have an active prescription for medications requested? No     Received Request Via: Patient's home health nurse

## 2018-05-10 RX ORDER — LEVOTHYROXINE SODIUM 0.05 MG/1
TABLET ORAL
Qty: 90 TAB | Refills: 0 | Status: SHIPPED | OUTPATIENT
Start: 2018-05-10 | End: 2018-08-31 | Stop reason: SDUPTHER

## 2018-06-21 ENCOUNTER — TELEPHONE (OUTPATIENT)
Dept: INTERNAL MEDICINE | Facility: MEDICAL CENTER | Age: 83
End: 2018-06-21

## 2018-06-21 NOTE — TELEPHONE ENCOUNTER
Melly from [facility] called letting us know that pt fell a couple days ago. He's complaining of some pain along his right rib or abdomen area. She's encouraging pt to go to [facility] or ER for further Evaluation. Melly's contact number is [phone].

## 2018-06-22 NOTE — TELEPHONE ENCOUNTER
Spoke to Melly Home health Nurse on 6/21/18 around 6 pm , Discussed regarding the Patients health condition.Melly stated she is working with  and will speak to the patients son regarding the long term placement to a group home as the patient is falling, had multiple falls.  I Will be in contact with Melly.

## 2018-06-28 ENCOUNTER — TELEPHONE (OUTPATIENT)
Dept: INTERNAL MEDICINE | Facility: MEDICAL CENTER | Age: 83
End: 2018-06-28
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
## 2018-06-28 NOTE — TELEPHONE ENCOUNTER
Melly from [facility] called stating she is caring for pt and shes just giving us an update letting us know that he didn't make it to Urgent Care because the ride he had showed up late, but Melly saw him yesterday and he's doing better and is not have any pain, he is requesting for a stool softener. Melly will be looking in to switching his meds to bubble packs, they might have to change pharmacies as well which she will be looking into as well. There will be a  coming in from the state as well as their own looking in to whether they're safe to care for themselves in the home.

## 2018-07-18 ENCOUNTER — TELEPHONE (OUTPATIENT)
Dept: INTERNAL MEDICINE | Facility: MEDICAL CENTER | Age: 83
End: 2018-07-18

## 2018-07-18 NOTE — TELEPHONE ENCOUNTER
1. Caller Name: Melly ([facility])                                         Call Back Number: [phone]      Patient approves a detailed voicemail message: yes    Melly called and left vm stating that pt has fell twice in the last week since she seen him, luckily no injuries. Pt has agreed to potentially sell the house and maybe go in to Assisted Living. She's trying to get a hold of pt's son so that he can participate in this since she cannot do this on her own. Melly is requesting PT in the meantime, she just needs a verbal okay for the order and she can just write it and fax us the form to sign it.

## 2018-07-18 NOTE — TELEPHONE ENCOUNTER
Please let Melly at [phone] know that she can order the PT  and fax us the physical therapy order.

## 2018-07-23 ENCOUNTER — OFFICE VISIT (OUTPATIENT)
Dept: INTERNAL MEDICINE | Facility: MEDICAL CENTER | Age: 83
End: 2018-07-23
Payer: MEDICARE

## 2018-07-23 VITALS
HEIGHT: 62 IN | WEIGHT: 123 LBS | BODY MASS INDEX: 22.63 KG/M2 | TEMPERATURE: 98.2 F | HEART RATE: 69 BPM | DIASTOLIC BLOOD PRESSURE: 64 MMHG | SYSTOLIC BLOOD PRESSURE: 128 MMHG | OXYGEN SATURATION: 94 %

## 2018-07-23 DIAGNOSIS — R53.1 WEAKNESS: ICD-10-CM
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
## 2018-07-23 PROCEDURE — 99214 OFFICE O/P EST MOD 30 MIN: CPT | Mod: GC | Performed by: INTERNAL MEDICINE

## 2018-07-24 NOTE — PROGRESS NOTES
Established Patient    Rafia presents today with the following:    CC:   Chief Complaint   Patient presents with   • Dizziness       HPI:   The patient is a 84 year old male with multiple  morbidities Advanced dementia, sick sinus syndrome status post pacemaker, Frailty, Aortic valve  Replacement on chronic anticoagulation,history of TIA presented to the clinic for follow up visit of Falls.  -Patient and patients wife provided the history.  -Wife stated that the patient is having frequent falls and weakness in his lower extremity. Last fall was yesterday and had bruised his right knee. Patient denies any pain or weakness of lower extremities. He stated he does not use front-wheeled walker that is supposed to use and keeps forgetting to use the device.  -He had a ground-level multiple falls over the past month and attributes this to being weak.  -They don't have good social support from family members and the patient is currently dependent on his wife, home care nurse for ADLs and IADLs.  -He is not using stairs and tries to stay living room area.  -Denies any head trauma, dizziness, chest pain, palpitations, dysuria, fever, chills. No recent intercurrent illnesses or ER visits.  -[facility] denies any referral from [facility], they have been getting multiple phone calls but Can't reach the phone.    Patient Active Problem List    Diagnosis Date Noted   • SSS (sick sinus syndrome) ([facility]) 02/25/2018     Priority: High   • Bradycardia 02/14/2018     Priority: High   • Cardiac arrest ([facility]) 02/14/2018     Priority: High   • S/P TAVR (transcatheter aortic valve replacement) 02/12/2018     Priority: Medium   • Hypertension, essential 10/21/2016     Priority: Medium   • Abdominal aortic aneurysm ([facility]) 01/17/2013     Priority: Medium   • Paroxysmal atrial fibrillation (CMS-[facility]) 09/18/2010     Priority: Medium   • CAD (coronary artery disease) 09/18/2010     Priority: Medium   • Dyslipidemia 09/18/2010     Priority:  Medium   • RBBB (right bundle branch block) 11/21/2017     Priority: Low   • Risk for falls 05/02/2017     Priority: Low   • Healthcare maintenance 05/02/2017     Priority: Low   • Chronic fatigue 10/21/2016     Priority: Low   • Chronic kidney disease, stage III (moderate) 10/21/2016     Priority: Low   • Carotid artery stenosis 06/13/2011     Priority: Low   • Acquired hypothyroidism 06/13/2011     Priority: Low   • Benign non-nodular prostatic hyperplasia without lower urinary tract symptoms 09/18/2010     Priority: Low   • PVD (peripheral vascular disease) (CMS-HCC) 09/18/2010     Priority: Low   • Gout of multiple sites 09/18/2010     Priority: Low   • Weakness 05/07/2018   • Falls 05/07/2018   • Cognitive impairment 05/07/2018   • Cardiac pacemaker in situ 03/22/2018   • Chronic anticoagulation 03/22/2018   • Hx of CABG 03/22/2018       Current Outpatient Prescriptions   Medication Sig Dispense Refill   • levothyroxine (SYNTHROID) 50 MCG Tab TAKE 1 TABLET BY MOUTH DAILY 90 Tab 0   • cephALEXin (KEFLEX) 500 MG Cap Take 1 Cap by mouth 2 times a day. 10 Cap 0   • rivaroxaban (XARELTO) 15 MG Tab tablet Take 1 Tab by mouth with dinner. 42 Tab 3   • lisinopril (PRINIVIL) 10 MG Tab Take 1 Tab by mouth every day. 90 Tab 3   • B Complex Vitamins (VITAMIN B COMPLEX PO) Take  by mouth every day.     • allopurinol (ZYLOPRIM) 300 MG Tab Take 300 mg by mouth every day.     • amLODIPine (NORVASC) 10 MG Tab Take 10 mg by mouth every day.     • cilostazol (PLETAL) 100 MG Tab Take 100 mg by mouth 2 Times a Day.     • metoprolol (LOPRESSOR) 25 MG Tab Take 1 Tab by mouth 2 times a day. 60 Tab 11   • atorvastatin (LIPITOR) 20 MG Tab Take 20 mg by mouth every day.     • aspirin EC (ECOTRIN) 81 MG Tablet Delayed Response Take 81 mg by mouth every evening.       No current facility-administered medications for this visit.        ROS: As per HPI. Additional pertinent symptoms as noted below.    All others negative    /64    "Pulse 69   Temp 36.8 °C (98.2 °F)   Ht 1.575 m (5' 2\")   Wt 55.8 kg (123 lb)   SpO2 94%   BMI 22.50 kg/m²     Physical Exam   Constitutional:  oriented to person, place, and time. No distress.   Eyes: Pupils are equal, round, and reactive to light. No scleral icterus.  Neck: Neck supple. No thyromegaly present.   Cardiovascular: Normal rate, regular rhythm and normal heart sounds.  Exam reveals no gallop and no friction rub.  No murmur heard.  Pulmonary/Chest: Breath sounds normal. Chest wall is not dull to percussion.   Musculoskeletal:   no edema.   Lymphadenopathy: no cervical adenopathy  Neurological: alert and oriented to person, place, and time.   Skin: No cyanosis. Nails show no clubbing.    Note: I have reviewed all pertinent labs and diagnostic tests associated with this visit with specific comments listed under the assessment and plan below    Assessment and Plan    1. Recurrent Falls  2. Generalized Weakness  -Patient presented with history of recurrent falls and last fall was yesterday , fell on his knee on the rug in his living room.  -He uses a front wheel walker for ambulation and did not bring the walker today to his appointment.When asked why he didn't get his walker he said I don't know.He forgets he to get his walker and use it whenever he ambulates.  -He stated he has difficulty initiating gait, Gait instability. His wife is assisting for ADL and IADL's but she has advanced dementia which causes difficulty for both of them.  -No head trauma recently.  -Discussed with the patient and wife regarding long term plans - Group home or assisted living facility but they deny these services, wants to stay at home.They would like a nurse or caregiver to stay with them.  -Voicemail left to the home care nurse to call me back and will consult  for home safety assessment.  -[facility] regarding referral as the patient does not know about the appointment, They have " attempted 6 times to contact the patient. Appointment will be made and the patients home care nurse will be contacted to let the patient know.  -Advised the patient to use Assisted devices on ambulation.      Followup: Return in about 3 months (around 10/23/2018).      Signed by: Halie Dominique M.D.

## 2018-07-30 ENCOUNTER — TELEPHONE (OUTPATIENT)
Dept: INTERNAL MEDICINE | Facility: MEDICAL CENTER | Age: 83
End: 2018-07-30

## 2018-07-30 NOTE — TELEPHONE ENCOUNTER
Victoria from [facility] called to return my call to set up an appointment for pt. An appointment was set up for August 15th at 2:30pm. She states a letter will be sent to them via mail for location and other forms for them. I called Melly who is the [place] Nurse for pt and left vm to inform her about this.

## 2018-08-02 ENCOUNTER — OFFICE VISIT (OUTPATIENT)
Dept: CARDIOLOGY | Facility: MEDICAL CENTER | Age: 83
End: 2018-08-02
Payer: MEDICARE

## 2018-08-02 ENCOUNTER — TELEPHONE (OUTPATIENT)
Dept: CARDIOLOGY | Facility: MEDICAL CENTER | Age: 83
End: 2018-08-02

## 2018-08-02 VITALS
HEART RATE: 60 BPM | DIASTOLIC BLOOD PRESSURE: 60 MMHG | BODY MASS INDEX: 22.63 KG/M2 | OXYGEN SATURATION: 97 % | SYSTOLIC BLOOD PRESSURE: 90 MMHG | WEIGHT: 123 LBS | HEIGHT: 62 IN

## 2018-08-02 DIAGNOSIS — I25.10 CORONARY ARTERY DISEASE INVOLVING NATIVE CORONARY ARTERY OF NATIVE HEART WITHOUT ANGINA PECTORIS: ICD-10-CM

## 2018-08-02 DIAGNOSIS — I48.0 PAROXYSMAL ATRIAL FIBRILLATION (HCC): ICD-10-CM

## 2018-08-02 DIAGNOSIS — Z95.2 S/P TAVR (TRANSCATHETER AORTIC VALVE REPLACEMENT): ICD-10-CM

## 2018-08-02 DIAGNOSIS — Z95.1 HX OF CABG: ICD-10-CM

## 2018-08-02 DIAGNOSIS — I49.5 SSS (SICK SINUS SYNDROME) (HCC): ICD-10-CM

## 2018-08-02 DIAGNOSIS — Z79.01 CHRONIC ANTICOAGULATION: ICD-10-CM

## 2018-08-02 DIAGNOSIS — Z95.0 CARDIAC PACEMAKER IN SITU: ICD-10-CM

## 2018-08-02 PROCEDURE — 99213 OFFICE O/P EST LOW 20 MIN: CPT | Performed by: INTERNAL MEDICINE

## 2018-08-02 ASSESSMENT — ENCOUNTER SYMPTOMS
ABDOMINAL PAIN: 0
DIZZINESS: 0
NERVOUS/ANXIOUS: 0
VOMITING: 0
CARDIOVASCULAR NEGATIVE: 1
BRUISES/BLEEDS EASILY: 0
GASTROINTESTINAL NEGATIVE: 1
NAUSEA: 0
RESPIRATORY NEGATIVE: 1
EYES NEGATIVE: 1
COUGH: 0
CHILLS: 0
MYALGIAS: 0
PALPITATIONS: 0
CONSTITUTIONAL NEGATIVE: 1
FEVER: 0
WEIGHT LOSS: 0
FOCAL WEAKNESS: 0
BLURRED VISION: 0
DEPRESSION: 0
MUSCULOSKELETAL NEGATIVE: 1
SHORTNESS OF BREATH: 0
CLAUDICATION: 0
HEADACHES: 0
NEUROLOGICAL NEGATIVE: 1
DOUBLE VISION: 0
PSYCHIATRIC NEGATIVE: 1
WEAKNESS: 0

## 2018-08-02 NOTE — PROGRESS NOTES
No chief complaint on file.      Subjective:   Rafia Shaikh is a 84 y.o. male who presents today for TAVR follow up    Since the patient's last visit on 03/22/18, he has been progressively weakening. He has fallen several times. He denies fatigue, shortness of breath, dyspnea on exertion, chest pain, dizziness or syncope.    Past Medical History:   Diagnosis Date   • Anemia 4/2016    due to rectal bleed   • Arrhythmia 5/12/17    Hx A fib. On only aspirin.    • CAD (coronary artery disease)    • Carotid artery stenosis 6/13/2011   • CATARACT 1990's    Bilateral phaco with IOL   • Dental disorder     cavities, under current care   • Dental disorder 5/12/17    permanent bridge lower   • Dizziness 6/13/2011   • Dyslipidemia 9/18/2010   • Gout    • Heart murmur    • High cholesterol    • Hyperlipidemia    • Hypertension    • Hypothyroidism 6/13/2011   • Insomnia    • Myocardial infarct (HCC) 2008    Stent 2011.  5/12/17-Cardiologist is DR Wu ([facility])   • PAD (peripheral artery disease)    • Preoperative examination, unspecified 4/12/2011   • Rectal bleed 4/4/2016    Cauterized and received blood transfusions   • Renal disorder 2000    kidney stone   • Sleep apnea 2014    States recommended CPAP but never did get it.   • Stroke (HCC) 4/16/2010    TIA    • Tendonitis    • TIA (transient ischemic attack) 6/13/2011   • Unspecified disorder of thyroid    • Urinary incontinence      Past Surgical History:   Procedure Laterality Date   • TRANSCATHETER AORTIC VALVE REPLACEMENT N/A 2/12/2018    Procedure: TRANSCATHETER AORTIC VALVE REPLACEMENT;  Surgeon: Jez Waters M.D.;  Location: SURGERY [facility];  Service: Cardiac   • JANETH  2/12/2018    Procedure: JANETH;  Surgeon: Jez Waters M.D.;  Location: SURGERY [facility];  Service: Cardiac   • COLONOSCOPY N/A 5/16/2017    Procedure: COLONOSCOPY;  Surgeon: Osmany Smart M.D.;  Location: [facility];  Service:    • LAPAROSCOPY  4/2/2016    exp for rectal  bleed and cautery   • COLONOSCOPY  2015   • RECOVERY  8/22/2012    aortogram-Performed by SURGERY, IR-RECOVERY at SURGERY [facility] ORS   • CAROTID ENDARTERECTOMY  5/3/2011    Performed by ERASMO NAVARRETE at SURGERY [facility] ORS   • RECOVERY  4/1/2011    Performed [facility] SURGERY[facility]   • RECOVERY  3/25/2011    Performed by SURGERY[facility]   • MULTIPLE CORONARY ARTERY BYPASS ENDO VEIN HARVEST  3/20/08    Performed by AMANDA CRYSTAL at SURGERY [facility] ORS   • LITHOTRIPSY  2000   • SEPTOPLASTY  1995   • CATARACT EXTRACTION WITH IOL Bilateral early 1990's     Family History   Problem Relation Age of Onset   • Heart Disease Father    • Other Mother         TB   • Hypertension Unknown    • Colon Cancer Brother    • Breast Cancer Sister    • Other Unknown         GOUT     Social History     Social History   • Marital status:      Spouse name: N/A   • Number of children: N/A   • Years of education: N/A     Occupational History   • Not on file.     Social History Main Topics   • Smoking status: Never Smoker   • Smokeless tobacco: Never Used   • Alcohol use No   • Drug use: No   • Sexual activity: Not on file     Other Topics Concern   • Not on file     Social History Narrative   • No narrative on file     No Known Allergies     Medications reviewed.    Outpatient Encounter Prescriptions as of 8/2/2018   Medication Sig Dispense Refill   • levothyroxine (SYNTHROID) 50 MCG Tab TAKE 1 TABLET BY MOUTH DAILY 90 Tab 0   • rivaroxaban (XARELTO) 15 MG Tab tablet Take 1 Tab by mouth with dinner. 42 Tab 3   • lisinopril (PRINIVIL) 10 MG Tab Take 1 Tab by mouth every day. 90 Tab 3   • B Complex Vitamins (VITAMIN B COMPLEX PO) Take  by mouth every day.     • allopurinol (ZYLOPRIM) 300 MG Tab Take 300 mg by mouth every day.     • amLODIPine (NORVASC) 10 MG Tab Take 10 mg by mouth every day.     • metoprolol (LOPRESSOR) 25 MG Tab Take 1 Tab by  "mouth 2 times a day. 60 Tab 11   • atorvastatin (LIPITOR) 20 MG Tab Take 20 mg by mouth every day.     • aspirin EC (ECOTRIN) 81 MG Tablet Delayed Response Take 81 mg by mouth every evening.     • cephALEXin (KEFLEX) 500 MG Cap Take 1 Cap by mouth 2 times a day. 10 Cap 0   • cilostazol (PLETAL) 100 MG Tab Take 100 mg by mouth 2 Times a Day.       No facility-administered encounter medications on file as of 8/2/2018.      Review of Systems   Constitutional: Negative.  Negative for chills, fever, malaise/fatigue and weight loss.   HENT: Negative.  Negative for hearing loss.    Eyes: Negative.  Negative for blurred vision and double vision.   Respiratory: Negative.  Negative for cough and shortness of breath.    Cardiovascular: Negative.  Negative for chest pain, palpitations, claudication and leg swelling.   Gastrointestinal: Negative.  Negative for abdominal pain, nausea and vomiting.   Genitourinary: Negative.  Negative for dysuria and urgency.   Musculoskeletal: Negative.  Negative for joint pain and myalgias.   Skin: Negative.  Negative for itching and rash.   Neurological: Negative.  Negative for dizziness, focal weakness, weakness and headaches.   Endo/Heme/Allergies: Negative.  Does not bruise/bleed easily.   Psychiatric/Behavioral: Negative.  Negative for depression. The patient is not nervous/anxious.         Objective:   BP (!) 90/60   Pulse 60   Ht 1.575 m (5' 2\")   Wt 55.8 kg (123 lb)   SpO2 97%   BMI 22.50 kg/m²     Physical Exam   Constitutional: He is oriented to person, place, and time. He appears well-developed and well-nourished.   HENT:   Head: Normocephalic and atraumatic.   Eyes: Pupils are equal, round, and reactive to light. Conjunctivae are normal.   Neck: Normal range of motion. Neck supple.   Cardiovascular: Normal rate and regular rhythm.    Pulmonary/Chest: Effort normal and breath sounds normal.   Abdominal: Soft. Bowel sounds are normal.   Musculoskeletal: Normal range of motion. "   Neurological: He is alert and oriented to person, place, and time.   Skin: Skin is warm and dry.   Psychiatric: He has a normal mood and affect.     CARDIAC STUDIES/PROCEDURES:     CARDIAC CATHETERIZATION CONCLUSIONS by Dr. Nieves (01/04/18)  1.  Coronary artery disease, three-vessel including left anterior descending artery ostial 100%   occlusion, mid right coronary artery 100% occlusion, a distal left anterior descending artery   95% stenosis, second circumflex marginal branch 30% stenosis  2.  Patent coronary bypass grafts to the left anterior descending artery, first circumflex marginal  branch and distal right coronary artery.  3.  Patent coronary stent of the ostium and proximal first circumflex marginal branch.  4.  Normal ascending aorta.  5.  Aortic regurgitation, mild to moderate.  6.  Patent distal abdominal aortogram and bilateral iliofemoral arteries.  7.  Essentially normal right heart pressures and left ventricular filling pressure.     CAROTID ULTRASOUND (11/27/17)  Moderate stenosis of the right internal carotid (50-69%).   Left carotid.   CEA is widely patent without evidence of restinosis.    Flow within both subclavian arteries appears to be within normal limits.   Antegrade flow, bilateral vertebral arteries.      CTA OF HEAD/NECK (02/14/18)  1.  No evidence of intracranial vascular occlusion or aneurysm.  2.  Extensive atherosclerotic plaque involving the intracranial vascular structures with multiple areas of atherosclerotic narrowing.  3.  Periventricular chronic small vessel ischemic change.  4.  Marked partially calcified and ulcerated plaques located within the right carotid artery bifurcation. Between 50% to 70% stenosis at the origin right internal carotid artery is demonstrated.  5.  Mild partially calcified plaque left carotid artery bifurcation. No significant stenosis.  6.  Moderate/marked partially calcified plaque origin right vertebral artery resulting in between 50% and  70% stenosis.  7.  Focal plaque or thrombus within the right vertebral artery at the C1 level.  8.  Moderate partially calcified plaque within the proximal segment of the left vertebral artery resulting in 50% stenosis.     CT OF CHEST AND ABDOMEN (12/14/17)  1.  Aortic annulus area of 326 sq mm.  2.  Coronary ostia are both greater than 10 mm from the annulus.  3.  Minimum diameter of the iliofemoral arterial system of 5.9 mm on the RIGHT and 8 mm on the LEFT, with moderate atherosclerotic calcification and tortuosity bilaterally.  4.  Interval distal migration of large LEFT kidney stone to the distal ureter, without significant obstructive changes.     COLONOSCOPE by Dr. 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  Eventually this area was Endoclipped,   moderate left-sided diverticulosis.     ECHOCARDIOGRAM CONCLUSIONS (03/22/18)  Prior echo 02/14/18; compared to the report of the study done - there   has been recovery of LVSF  Normal left ventricular systolic function.  Left ventricular ejection fraction is visually estimated to be 65%.  Grade I diastolic dysfunction.  Known TAVR aortic valve that is functioning normally with normal   transvalvular gradients.  Vmax is 2.3  m/s. Transvalvular gradients are - Peak: 21 mmHg, Mean: 12 mmHg.  No evidence of paravalvular leak is noted.  Mild tricuspid regurgitation.  Estimated right ventricular systolic pressure is 35 mmHg.     ECHOCARDIOGRAM CONCLUSIONS (02/14/18)  1. Known TAVR aortic valve that is functioning normally with normal   transvalvular gradients. Transvalvular gradients are - Peak: 15   mmHg,    Mean: 9  mmHg. Trivial paravalvular leak is noted.  2. Mitral regurgitation was not evaluated during this study, however   there appeared to be no impingment or restriction of the mitral valve   leaflets by the bioprosthetic aortic valve.  3. Normal pericardium without effusion.  4. Mild to moderately decreased left ventricular systolic function.   Estimated LVEF 40%, however this estimate was difficult due to severe tachycardia.  5. Global hypokinesis.  Compared to the previous study performed on 2/13/2018:  There appears   to be less perivalvular leak.  The TAVR valve might be slightly farther   into the ventricle, however there does not appear to be any obstruction.     ECHOCARDIOGRAM CONCLUSIONS (10/026/17)  Prior echo 12-23-14. Compared to the images of the study done - there   has been progression of aortic stenosis and regurgitation.   Normal left ventricular systolic function.  Left ventricular ejection fraction is visually estimated to be 70%.  Severe aortic stenosis.  AV Area Cont Eq vti 0.93 cm²  Vmax is 5.0 m/s. Transvalvular gradients are - Peak: 100 mmHg, Mean: 53 mmHg.   Moderate aortic insufficiency.  Mild tricuspid regurgitation.  Estimated right ventricular systolic pressure is 45 mmHg.  Moderate pulmonic insufficiency.     EKG performed on (02/19/18) EKG shows sinus rhythm with right bundle branch block  EKG performed on (02/12/18) EKG shows sinus patricia with right bundle branch block.  EKG performed on (02/12/18) EKG shows sinus patricia with right bundle branch block.  EKG performed on (02/07/18) EKG shows sinus patricia with right bundle branch block.  EKG performed on (05/12/17) EKG shows sinus rhythm with right bundle branch block.     Laboratory results of (03/13/18) Bun of 19 mg/dl, creatinine levels of 1.06 mg/dl noted.     MRI OF BRAIN (02/13/11)  1. Moderate cerebral atrophy.  2. Minimal supratentorial white matter disease with two or 3 rare   punctate foci of bright FLAIR signal in  the deep white matter consistent   with small vessel ischemic change versus demyelination or gliosis.  3. No evidence of acute cerebral infarction, hemorrhage, or mass lesion.     PERIPHERAL INTERVENTION by Dr. Amaya (03/22/17)  1.  RIGHT common femoral sheath arteriogram demonstrates atherosclerosis with estimated 50-75% stenosis of the proximal femoral artery  2.  Deployment Starclose SE vascular closure device; due to the patient's agitation and altered mental status I recommend a further 6 hours of groin precautions     PERIPHERAL INTERVENTION by Dr. Hart (03/21/17)  1.  Active extravasation from the ileocecal branch of the superior mesenteric artery.  2.  Successful embolization of bleeding cecal branch.     PERIPHERAL INTERVENTION by Dr. Parra (08/22/12)  1.  Right superficial femoral artery occlusion with occlusion of the tibial peroneal trunk.    2.  Widely patent left superficial femoral artery stent with some disease in   the tibial vessels as well.  Although, the superficial femoral artery is   amenable to percutaneous intervention, I am reluctant to open the artery to a   distal occlusion.  The tibioperoneal trunk is heavily diseased and   intervention in the tibioperoneal trunk for a patient with claudication is not   advisable due to the risk of failure and complication as well as the poor   durability of below knee tibial work.  I will follow up with the patient in   the office and discuss with him the findings of the angiogram.  If he   progresses to rest pain or tissue loss, will consider percutaneous   intervention of both lesions.     TRANSESOPHAGEAL ECHOCARDIOGRAM CONCLUSIONS by Dr. Leslie (02/12/18)  1)  Severe AS/AI  2)  Denia Valve:  EOA:  1.94  cm ^2  Mean Gradiet:  6 mmhg  3)  No PVL    Assessment:     1. S/P TAVR (transcatheter aortic valve replacement)     2. Coronary artery disease involving native coronary artery of native heart without angina pectoris     3. Hx of CABG     4.  Paroxysmal atrial fibrillation (CMS-[facility])     5. Chronic anticoagulation     6. SSS (sick sinus syndrome) ([facility])     7. Cardiac pacemaker in situ         Medical Decision Making:  Today's Assessment / Status / Plan:     1. Successful transcatheter aortic valve replacement (TAVR) with # 23 with 2 additional mls of volume Muñiz Denia S3 valve, transfemoral approach, under general anesthesia (02/12/18): His valve is functioning well, NYHA class II, however, he continues to become weaker and is falling frequently. We will discontinue Xarelto.  2.  Coronary artery disease with prior 3 vessel coronary bypass graft with left internal mammary artery graft to left anterior descending artery, saphenous vein graft to obtuse marginal branch and saphenous vein graft to posterior descending artery by Dr. Coates (03/20/08): .He is clinically doing well from coronary standpoint.  3. History of atrial fibrillation on anticoagulation therapy (Xarelto) with [employer]tronic dual chamber pacemaker by Dr. Stephens (02/18/18): Plan as above.  4. Peripheral vascular disease with prior left superficial femoral artery stent, known high grade stenosis of right common femoral artery closed with Starclose closure by Dr. Amaya on (03/22/17): Stable.  5. Carotid artery stenosis with left carotid enterectomy by Dr. Parra (05/03/11) (managed by Dr. Parra)  6. History of trans-ischemic attack  7. History of gastrointestinal bleeding with embolization of ileocecal branch by Dr. Hart (03/21/17) and AVM (managed by Osmany Pta): He remains clinically stable without recurrence of gastrointestinal bleeding.   8. Additional information: He and his wife is from [place], however, they speaks [place]i.     We will follow up in six months with echocardiogram.     CC José Taylor and Yarelis Lee

## 2018-08-16 ENCOUNTER — TELEPHONE (OUTPATIENT)
Dept: INTERNAL MEDICINE | Facility: MEDICAL CENTER | Age: 83
End: 2018-08-16

## 2018-08-16 NOTE — LETTER
2018        Rafia Shaikh (: 1933)    Current Outpatient Prescriptions on File Prior to Visit   Medication Sig Dispense Refill   • levothyroxine (SYNTHROID) 50 MCG Tab TAKE 1 TABLET BY MOUTH DAILY 90 Tab 0   • cephALEXin (KEFLEX) 500 MG Cap Take 1 Cap by mouth 2 times a day. 10 Cap 0   • lisinopril (PRINIVIL) 10 MG Tab Take 1 Tab by mouth every day. 90 Tab 3   • B Complex Vitamins (VITAMIN B COMPLEX PO) Take  by mouth every day.     • allopurinol (ZYLOPRIM) 300 MG Tab Take 300 mg by mouth every day.     • amLODIPine (NORVASC) 10 MG Tab Take 10 mg by mouth every day.     • cilostazol (PLETAL) 100 MG Tab Take 100 mg by mouth 2 Times a Day.     • metoprolol (LOPRESSOR) 25 MG Tab Take 1 Tab by mouth 2 times a day. 60 Tab 11   • atorvastatin (LIPITOR) 20 MG Tab Take 20 mg by mouth every day.     • aspirin EC (ECOTRIN) 81 MG Tablet Delayed Response Take 81 mg by mouth every evening.       No current facility-administered medications on file prior to visit.                                  Eliseo Hernandez

## 2018-08-17 NOTE — TELEPHONE ENCOUNTER
1. Caller Name: Melly ([facility] Nurse)                                         Call Back Number: [phone]      Patient approves a detailed voicemail message: yes    Melly called and left a voice message stating that patient did go to the [facility] yesterday and were a little concerned about his blood pressure being low, but today she is with patient and its at 100/50, and Melly is encouraging him to drink fluids, seems like he is not drinking enough, also just wanted to kind of work out medications and would like to fax over to her the patient's list of medications to verify if the patient is on all of the right medications because she thinks what they are going to do is to try to look into getting the bubble packs set up because the current medication management is not really working really well.    Current medication list have been faxed to Melly at [phone]

## 2018-08-23 ENCOUNTER — TELEPHONE (OUTPATIENT)
Dept: INTERNAL MEDICINE | Facility: MEDICAL CENTER | Age: 83
End: 2018-08-23

## 2018-08-23 NOTE — TELEPHONE ENCOUNTER
She stated that she is willing to wait, she is no rush, but she will need all meds to be sent over to [facility] in order to coordinated this.

## 2018-08-23 NOTE — TELEPHONE ENCOUNTER
1. Caller Name: Melly ([facility] Nurse)                                         Call Back Number: [phone]          Patient approves a detailed voicemail message: yes    Melly called and left vm stating that she received the medication list that was sent by mail, and she states that she is going to try to see if it can be filled by [facility] pharmacy.  Pharmacy would like all prescriptions to be sent to them. Melly wants to go over some details far as how to get him set up because if they are not ordered right then they wont bubble pack them right, specifically she wants them bubble packed in like the weekly packs not the singles so all the meds will go into one pack, if it is twice a day then they will each go into one little bubble pack for twice a day, Melly would like a call back by PCP in order to coordinate this correctly. Please Advise.    New pharm. Has been added to pts chart, please remember to change pharm when sending meds.

## 2018-08-23 NOTE — TELEPHONE ENCOUNTER
Please let Melly know that I will call within the next 48 hours as I am not available in the clinic currently to discuss about the medications,if she needs me to call her today please let me know.

## 2018-08-31 DIAGNOSIS — I10 BENIGN ESSENTIAL HTN: ICD-10-CM

## 2018-08-31 DIAGNOSIS — I25.10 CORONARY ARTERY DISEASE INVOLVING NATIVE CORONARY ARTERY OF NATIVE HEART WITHOUT ANGINA PECTORIS: ICD-10-CM

## 2018-08-31 RX ORDER — LISINOPRIL 5 MG/1
5 TABLET ORAL DAILY
Qty: 90 TAB | Refills: 0 | Status: SHIPPED | OUTPATIENT
Start: 2018-08-31 | End: 2019-01-01 | Stop reason: CLARIF

## 2018-08-31 RX ORDER — ALLOPURINOL 300 MG/1
300 TABLET ORAL DAILY
Qty: 90 TAB | Refills: 0 | Status: SHIPPED | OUTPATIENT
Start: 2018-08-31 | End: 2019-01-01 | Stop reason: CLARIF

## 2018-08-31 RX ORDER — ATORVASTATIN CALCIUM 20 MG/1
20 TABLET, FILM COATED ORAL DAILY
Qty: 90 TAB | Refills: 0 | Status: SHIPPED | OUTPATIENT
Start: 2018-08-31 | End: 2019-01-01 | Stop reason: CLARIF

## 2018-08-31 RX ORDER — LEVOTHYROXINE SODIUM 0.05 MG/1
TABLET ORAL
Qty: 90 TAB | Refills: 0 | Status: SHIPPED | OUTPATIENT
Start: 2018-08-31 | End: 2018-09-05 | Stop reason: SDUPTHER

## 2018-08-31 RX ORDER — AMLODIPINE BESYLATE 5 MG/1
5 TABLET ORAL DAILY
Qty: 90 TAB | Refills: 0 | Status: SHIPPED | OUTPATIENT
Start: 2018-08-31 | End: 2019-01-01 | Stop reason: CLARIF

## 2018-08-31 NOTE — TELEPHONE ENCOUNTER
Spoke to home health nurse Melly, Discussed regarding the medications and new dose changes. Patient had no episodes of falls since the past couple of weeks and he also agreed to go to assisted living facility.They are currently working on transfer to a assisted living facility

## 2018-09-20 ENCOUNTER — TELEPHONE (OUTPATIENT)
Dept: INTERNAL MEDICINE | Facility: MEDICAL CENTER | Age: 83
End: 2018-09-20

## 2018-09-21 NOTE — TELEPHONE ENCOUNTER
1. Caller Name: Melly ([facility] Nurse)                                         Call Back Number: [phone]      Patient approves a detailed voicemail message: yes    Nurse called and left vm stating that she continue to see the patient to get the medication set up been managed.  She had 2 more visits for the last 2 weeks and she will be sending the order for PCP to sign.  Patient 60 day period has come up in the have to do a reevaluation and she will continue to see the patient until she gets her medications managed appropriately.  The PCP is not okay with this and with the orders that have been placed, she would like call back.

## 2018-09-24 NOTE — TELEPHONE ENCOUNTER
Spoke to [facility] Nurse , notified that we did not receive any orders for Papers to be signed for home health yet. Bubble packs from the pharmacy have not been delivered yet.She is working on the medications. Patient is improving with Physical therapy, did not have any episodes of falls recently. The patient and his wife are still refusing Assisted living facility or group home persistently.

## 2018-10-01 ENCOUNTER — TELEPHONE (OUTPATIENT)
Dept: INTERNAL MEDICINE | Facility: MEDICAL CENTER | Age: 83
End: 2018-10-01

## 2018-10-01 NOTE — TELEPHONE ENCOUNTER
1. Caller Name: Melly ([facility])                                         Call Back Number: [phone]      Patient approves a detailed voicemail message: yes    Melly nurse called and left vm stating that [facility] finally got back to her about the medications and they said they havent received the Rx orders from PCP for both pt and spouse.    Called pharm. It has been resolved, and have faxed med list and allergy list and face sheet to pharmacist requested by him.

## 2018-11-16 DIAGNOSIS — I25.10 CORONARY ARTERY DISEASE INVOLVING NATIVE CORONARY ARTERY OF NATIVE HEART WITHOUT ANGINA PECTORIS: ICD-10-CM

## 2018-11-16 NOTE — TELEPHONE ENCOUNTER
Last seen: 07/23/18 by Dr. Dominique  Next appt: None     Was the patient seen in the last year in this department? Yes   Does patient have an active prescription for medications requested? No   Received Request Via: Pharmacy

## 2019-01-01 ENCOUNTER — APPOINTMENT (OUTPATIENT)
Dept: RADIOLOGY | Facility: MEDICAL CENTER | Age: 84
DRG: 871 | End: 2019-01-01
Attending: EMERGENCY MEDICINE
Payer: MEDICARE

## 2019-01-01 ENCOUNTER — APPOINTMENT (OUTPATIENT)
Dept: HOSPICE | Facility: HOSPICE | Age: 84
End: 2019-01-01
Payer: MEDICARE

## 2019-01-01 ENCOUNTER — HOME CARE VISIT (OUTPATIENT)
Dept: HOSPICE | Facility: HOSPICE | Age: 84
End: 2019-01-01
Payer: MEDICARE

## 2019-01-01 ENCOUNTER — APPOINTMENT (OUTPATIENT)
Dept: RADIOLOGY | Facility: MEDICAL CENTER | Age: 84
DRG: 683 | End: 2019-01-01
Attending: STUDENT IN AN ORGANIZED HEALTH CARE EDUCATION/TRAINING PROGRAM
Payer: MEDICARE

## 2019-01-01 ENCOUNTER — APPOINTMENT (OUTPATIENT)
Dept: RADIOLOGY | Facility: MEDICAL CENTER | Age: 84
DRG: 871 | End: 2019-01-01
Attending: STUDENT IN AN ORGANIZED HEALTH CARE EDUCATION/TRAINING PROGRAM
Payer: MEDICARE

## 2019-01-01 ENCOUNTER — TELEPHONE (OUTPATIENT)
Dept: INTERNAL MEDICINE | Facility: MEDICAL CENTER | Age: 84
End: 2019-01-01

## 2019-01-01 ENCOUNTER — HOSPICE ADMISSION (OUTPATIENT)
Dept: HOSPICE | Facility: HOSPICE | Age: 84
End: 2019-01-01
Payer: MEDICARE

## 2019-01-01 ENCOUNTER — APPOINTMENT (OUTPATIENT)
Dept: INTERNAL MEDICINE | Facility: MEDICAL CENTER | Age: 84
End: 2019-01-01
Payer: MEDICARE

## 2019-01-01 ENCOUNTER — APPOINTMENT (OUTPATIENT)
Dept: RADIOLOGY | Facility: MEDICAL CENTER | Age: 84
DRG: 683 | End: 2019-01-01
Attending: NURSE PRACTITIONER
Payer: MEDICARE

## 2019-01-01 ENCOUNTER — HOSPITAL ENCOUNTER (INPATIENT)
Facility: MEDICAL CENTER | Age: 84
LOS: 8 days | DRG: 871 | End: 2019-03-18
Attending: EMERGENCY MEDICINE | Admitting: INTERNAL MEDICINE
Payer: MEDICARE

## 2019-01-01 ENCOUNTER — PATIENT OUTREACH (OUTPATIENT)
Dept: HEALTH INFORMATION MANAGEMENT | Facility: OTHER | Age: 84
End: 2019-01-01

## 2019-01-01 ENCOUNTER — APPOINTMENT (OUTPATIENT)
Dept: CARDIOLOGY | Facility: MEDICAL CENTER | Age: 84
DRG: 683 | End: 2019-01-01
Attending: STUDENT IN AN ORGANIZED HEALTH CARE EDUCATION/TRAINING PROGRAM
Payer: MEDICARE

## 2019-01-01 ENCOUNTER — HOSPITAL ENCOUNTER (INPATIENT)
Facility: MEDICAL CENTER | Age: 84
LOS: 181 days | DRG: 683 | End: 2019-09-16
Attending: EMERGENCY MEDICINE | Admitting: INTERNAL MEDICINE
Payer: MEDICARE

## 2019-01-01 VITALS
RESPIRATION RATE: 16 BRPM | DIASTOLIC BLOOD PRESSURE: 68 MMHG | OXYGEN SATURATION: 97 % | SYSTOLIC BLOOD PRESSURE: 114 MMHG | WEIGHT: 113.98 LBS | BODY MASS INDEX: 22.38 KG/M2 | HEART RATE: 82 BPM | TEMPERATURE: 97.5 F | HEIGHT: 60 IN

## 2019-01-01 VITALS — DIASTOLIC BLOOD PRESSURE: 64 MMHG | HEART RATE: 62 BPM | RESPIRATION RATE: 16 BRPM | SYSTOLIC BLOOD PRESSURE: 140 MMHG

## 2019-01-01 VITALS — HEART RATE: 84 BPM | DIASTOLIC BLOOD PRESSURE: 72 MMHG | SYSTOLIC BLOOD PRESSURE: 128 MMHG

## 2019-01-01 VITALS — RESPIRATION RATE: 20 BRPM | DIASTOLIC BLOOD PRESSURE: 66 MMHG | SYSTOLIC BLOOD PRESSURE: 150 MMHG | HEART RATE: 60 BPM

## 2019-01-01 VITALS
BODY MASS INDEX: 23.37 KG/M2 | WEIGHT: 119.05 LBS | RESPIRATION RATE: 15 BRPM | SYSTOLIC BLOOD PRESSURE: 124 MMHG | HEIGHT: 60 IN | DIASTOLIC BLOOD PRESSURE: 75 MMHG | HEART RATE: 68 BPM | OXYGEN SATURATION: 96 % | TEMPERATURE: 97.4 F

## 2019-01-01 VITALS — RESPIRATION RATE: 20 BRPM | HEART RATE: 62 BPM | SYSTOLIC BLOOD PRESSURE: 116 MMHG | DIASTOLIC BLOOD PRESSURE: 64 MMHG

## 2019-01-01 VITALS — SYSTOLIC BLOOD PRESSURE: 122 MMHG | DIASTOLIC BLOOD PRESSURE: 64 MMHG | HEART RATE: 70 BPM | RESPIRATION RATE: 14 BRPM

## 2019-01-01 VITALS — DIASTOLIC BLOOD PRESSURE: 70 MMHG | RESPIRATION RATE: 16 BRPM | HEART RATE: 62 BPM | SYSTOLIC BLOOD PRESSURE: 148 MMHG

## 2019-01-01 VITALS — DIASTOLIC BLOOD PRESSURE: 68 MMHG | SYSTOLIC BLOOD PRESSURE: 130 MMHG | HEART RATE: 60 BPM | RESPIRATION RATE: 20 BRPM

## 2019-01-01 DIAGNOSIS — Z51.5 COMFORT MEASURES ONLY STATUS: ICD-10-CM

## 2019-01-01 DIAGNOSIS — E86.0 DEHYDRATION: ICD-10-CM

## 2019-01-01 DIAGNOSIS — M62.82 NON-TRAUMATIC RHABDOMYOLYSIS: ICD-10-CM

## 2019-01-01 DIAGNOSIS — R53.1 WEAKNESS: ICD-10-CM

## 2019-01-01 DIAGNOSIS — R53.81 DEBILITY: ICD-10-CM

## 2019-01-01 DIAGNOSIS — K55.9 ISCHEMIC COLITIS (HCC): ICD-10-CM

## 2019-01-01 DIAGNOSIS — Z91.81 RISK FOR FALLS: ICD-10-CM

## 2019-01-01 DIAGNOSIS — R41.89 COGNITIVE IMPAIRMENT: ICD-10-CM

## 2019-01-01 DIAGNOSIS — K92.2 LOWER GI BLEED: ICD-10-CM

## 2019-01-01 DIAGNOSIS — K57.92 DIVERTICULITIS: ICD-10-CM

## 2019-01-01 DIAGNOSIS — R31.9 HEMATURIA, UNSPECIFIED TYPE: ICD-10-CM

## 2019-01-01 DIAGNOSIS — N28.9 RENAL INSUFFICIENCY: ICD-10-CM

## 2019-01-01 DIAGNOSIS — R53.81 MALAISE: ICD-10-CM

## 2019-01-01 DIAGNOSIS — R40.1 STUPOR: Primary | ICD-10-CM

## 2019-01-01 LAB
25(OH)D3 SERPL-MCNC: 33 NG/ML (ref 30–100)
ABO GROUP BLD: NORMAL
ALBUMIN SERPL BCP-MCNC: 3.1 G/DL (ref 3.2–4.9)
ALBUMIN SERPL BCP-MCNC: 3.3 G/DL (ref 3.2–4.9)
ALBUMIN SERPL BCP-MCNC: 3.3 G/DL (ref 3.2–4.9)
ALBUMIN SERPL BCP-MCNC: 3.4 G/DL (ref 3.2–4.9)
ALBUMIN SERPL BCP-MCNC: 3.5 G/DL (ref 3.2–4.9)
ALBUMIN SERPL BCP-MCNC: 3.5 G/DL (ref 3.2–4.9)
ALBUMIN SERPL BCP-MCNC: 3.7 G/DL (ref 3.2–4.9)
ALBUMIN SERPL BCP-MCNC: 3.8 G/DL (ref 3.2–4.9)
ALBUMIN SERPL BCP-MCNC: 3.8 G/DL (ref 3.2–4.9)
ALBUMIN SERPL BCP-MCNC: 4 G/DL (ref 3.2–4.9)
ALBUMIN SERPL BCP-MCNC: 4.1 G/DL (ref 3.2–4.9)
ALBUMIN/GLOB SERPL: 1.2 G/DL
ALBUMIN/GLOB SERPL: 1.3 G/DL
ALBUMIN/GLOB SERPL: 1.3 G/DL
ALBUMIN/GLOB SERPL: 1.4 G/DL
ALBUMIN/GLOB SERPL: 1.5 G/DL
ALBUMIN/GLOB SERPL: 1.7 G/DL
ALP SERPL-CCNC: 51 U/L (ref 30–99)
ALP SERPL-CCNC: 56 U/L (ref 30–99)
ALP SERPL-CCNC: 56 U/L (ref 30–99)
ALP SERPL-CCNC: 60 U/L (ref 30–99)
ALP SERPL-CCNC: 65 U/L (ref 30–99)
ALP SERPL-CCNC: 67 U/L (ref 30–99)
ALP SERPL-CCNC: 68 U/L (ref 30–99)
ALP SERPL-CCNC: 73 U/L (ref 30–99)
ALP SERPL-CCNC: 73 U/L (ref 30–99)
ALP SERPL-CCNC: 77 U/L (ref 30–99)
ALP SERPL-CCNC: 79 U/L (ref 30–99)
ALP SERPL-CCNC: 81 U/L (ref 30–99)
ALP SERPL-CCNC: 90 U/L (ref 30–99)
ALT SERPL-CCNC: 10 U/L (ref 2–50)
ALT SERPL-CCNC: 11 U/L (ref 2–50)
ALT SERPL-CCNC: 11 U/L (ref 2–50)
ALT SERPL-CCNC: 12 U/L (ref 2–50)
ALT SERPL-CCNC: 17 U/L (ref 2–50)
ALT SERPL-CCNC: 17 U/L (ref 2–50)
ALT SERPL-CCNC: 18 U/L (ref 2–50)
ALT SERPL-CCNC: 20 U/L (ref 2–50)
ALT SERPL-CCNC: 21 U/L (ref 2–50)
ALT SERPL-CCNC: 24 U/L (ref 2–50)
ALT SERPL-CCNC: 35 U/L (ref 2–50)
ALT SERPL-CCNC: 7 U/L (ref 2–50)
ALT SERPL-CCNC: 8 U/L (ref 2–50)
AMMONIA PLAS-SCNC: 34 UMOL/L (ref 11–45)
AMMONIA PLAS-SCNC: 36 UMOL/L (ref 11–45)
AMYLASE SERPL-CCNC: 36 U/L (ref 20–103)
ANION GAP SERPL CALC-SCNC: 10 MMOL/L (ref 0–11.9)
ANION GAP SERPL CALC-SCNC: 12 MMOL/L (ref 0–11.9)
ANION GAP SERPL CALC-SCNC: 22 MMOL/L (ref 0–11.9)
ANION GAP SERPL CALC-SCNC: 3 MMOL/L (ref 0–11.9)
ANION GAP SERPL CALC-SCNC: 4 MMOL/L (ref 0–11.9)
ANION GAP SERPL CALC-SCNC: 5 MMOL/L (ref 0–11.9)
ANION GAP SERPL CALC-SCNC: 5 MMOL/L (ref 0–11.9)
ANION GAP SERPL CALC-SCNC: 6 MMOL/L (ref 0–11.9)
ANION GAP SERPL CALC-SCNC: 6 MMOL/L (ref 0–11.9)
ANION GAP SERPL CALC-SCNC: 7 MMOL/L (ref 0–11.9)
ANION GAP SERPL CALC-SCNC: 7 MMOL/L (ref 0–11.9)
ANION GAP SERPL CALC-SCNC: 8 MMOL/L (ref 0–11.9)
ANION GAP SERPL CALC-SCNC: 8 MMOL/L (ref 0–11.9)
ANION GAP SERPL CALC-SCNC: 9 MMOL/L (ref 0–11.9)
ANION GAP SERPL CALC-SCNC: 9 MMOL/L (ref 0–11.9)
ANISOCYTOSIS BLD QL SMEAR: ABNORMAL
APPEARANCE UR: CLEAR
APTT PPP: 43.5 SEC (ref 24.7–36)
AST SERPL-CCNC: 15 U/L (ref 12–45)
AST SERPL-CCNC: 17 U/L (ref 12–45)
AST SERPL-CCNC: 18 U/L (ref 12–45)
AST SERPL-CCNC: 19 U/L (ref 12–45)
AST SERPL-CCNC: 19 U/L (ref 12–45)
AST SERPL-CCNC: 20 U/L (ref 12–45)
AST SERPL-CCNC: 21 U/L (ref 12–45)
AST SERPL-CCNC: 38 U/L (ref 12–45)
AST SERPL-CCNC: 44 U/L (ref 12–45)
AST SERPL-CCNC: 47 U/L (ref 12–45)
AST SERPL-CCNC: 48 U/L (ref 12–45)
AST SERPL-CCNC: 51 U/L (ref 12–45)
AST SERPL-CCNC: 55 U/L (ref 12–45)
BACTERIA #/AREA URNS HPF: NEGATIVE /HPF
BACTERIA #/AREA URNS HPF: NEGATIVE /HPF
BASOPHILS # BLD AUTO: 0 % (ref 0–1.8)
BASOPHILS # BLD AUTO: 0.1 % (ref 0–1.8)
BASOPHILS # BLD AUTO: 0.4 % (ref 0–1.8)
BASOPHILS # BLD AUTO: 0.4 % (ref 0–1.8)
BASOPHILS # BLD AUTO: 0.5 % (ref 0–1.8)
BASOPHILS # BLD AUTO: 0.6 % (ref 0–1.8)
BASOPHILS # BLD AUTO: 0.7 % (ref 0–1.8)
BASOPHILS # BLD AUTO: 0.7 % (ref 0–1.8)
BASOPHILS # BLD AUTO: 0.9 % (ref 0–1.8)
BASOPHILS # BLD AUTO: 1 % (ref 0–1.8)
BASOPHILS # BLD AUTO: 1.1 % (ref 0–1.8)
BASOPHILS # BLD AUTO: 1.3 % (ref 0–1.8)
BASOPHILS # BLD: 0 K/UL (ref 0–0.12)
BASOPHILS # BLD: 0.02 K/UL (ref 0–0.12)
BASOPHILS # BLD: 0.04 K/UL (ref 0–0.12)
BASOPHILS # BLD: 0.05 K/UL (ref 0–0.12)
BASOPHILS # BLD: 0.06 K/UL (ref 0–0.12)
BASOPHILS # BLD: 0.07 K/UL (ref 0–0.12)
BASOPHILS # BLD: 0.07 K/UL (ref 0–0.12)
BASOPHILS # BLD: 0.08 K/UL (ref 0–0.12)
BASOPHILS # BLD: 0.09 K/UL (ref 0–0.12)
BILIRUB SERPL-MCNC: 0.3 MG/DL (ref 0.1–1.5)
BILIRUB SERPL-MCNC: 0.5 MG/DL (ref 0.1–1.5)
BILIRUB SERPL-MCNC: 0.6 MG/DL (ref 0.1–1.5)
BILIRUB SERPL-MCNC: 0.7 MG/DL (ref 0.1–1.5)
BILIRUB SERPL-MCNC: 0.8 MG/DL (ref 0.1–1.5)
BILIRUB SERPL-MCNC: 0.8 MG/DL (ref 0.1–1.5)
BILIRUB SERPL-MCNC: 0.9 MG/DL (ref 0.1–1.5)
BILIRUB SERPL-MCNC: 1 MG/DL (ref 0.1–1.5)
BILIRUB SERPL-MCNC: 1.6 MG/DL (ref 0.1–1.5)
BILIRUB UR QL STRIP.AUTO: NEGATIVE
BLD GP AB SCN SERPL QL: NORMAL
BUN SERPL-MCNC: 10 MG/DL (ref 8–22)
BUN SERPL-MCNC: 10 MG/DL (ref 8–22)
BUN SERPL-MCNC: 12 MG/DL (ref 8–22)
BUN SERPL-MCNC: 13 MG/DL (ref 8–22)
BUN SERPL-MCNC: 13 MG/DL (ref 8–22)
BUN SERPL-MCNC: 16 MG/DL (ref 8–22)
BUN SERPL-MCNC: 16 MG/DL (ref 8–22)
BUN SERPL-MCNC: 18 MG/DL (ref 8–22)
BUN SERPL-MCNC: 20 MG/DL (ref 8–22)
BUN SERPL-MCNC: 21 MG/DL (ref 8–22)
BUN SERPL-MCNC: 23 MG/DL (ref 8–22)
BUN SERPL-MCNC: 27 MG/DL (ref 8–22)
BUN SERPL-MCNC: 27 MG/DL (ref 8–22)
BUN SERPL-MCNC: 28 MG/DL (ref 8–22)
BUN SERPL-MCNC: 30 MG/DL (ref 8–22)
BUN SERPL-MCNC: 36 MG/DL (ref 8–22)
BUN SERPL-MCNC: 40 MG/DL (ref 8–22)
BUN SERPL-MCNC: 42 MG/DL (ref 8–22)
C DIFF DNA SPEC QL NAA+PROBE: NEGATIVE
C DIFF TOX A+B STL QL IA: NEGATIVE
C DIFF TOX GENS STL QL NAA+PROBE: NORMAL
CALCIUM SERPL-MCNC: 10 MG/DL (ref 8.5–10.5)
CALCIUM SERPL-MCNC: 10.2 MG/DL (ref 8.5–10.5)
CALCIUM SERPL-MCNC: 10.5 MG/DL (ref 8.5–10.5)
CALCIUM SERPL-MCNC: 9.1 MG/DL (ref 8.5–10.5)
CALCIUM SERPL-MCNC: 9.2 MG/DL (ref 8.5–10.5)
CALCIUM SERPL-MCNC: 9.2 MG/DL (ref 8.5–10.5)
CALCIUM SERPL-MCNC: 9.3 MG/DL (ref 8.5–10.5)
CALCIUM SERPL-MCNC: 9.3 MG/DL (ref 8.5–10.5)
CALCIUM SERPL-MCNC: 9.5 MG/DL (ref 8.5–10.5)
CALCIUM SERPL-MCNC: 9.6 MG/DL (ref 8.5–10.5)
CALCIUM SERPL-MCNC: 9.8 MG/DL (ref 8.5–10.5)
CALCIUM SERPL-MCNC: 9.8 MG/DL (ref 8.5–10.5)
CALCIUM SERPL-MCNC: 9.9 MG/DL (ref 8.5–10.5)
CALCIUM SERPL-MCNC: 9.9 MG/DL (ref 8.5–10.5)
CHLORIDE SERPL-SCNC: 102 MMOL/L (ref 96–112)
CHLORIDE SERPL-SCNC: 103 MMOL/L (ref 96–112)
CHLORIDE SERPL-SCNC: 104 MMOL/L (ref 96–112)
CHLORIDE SERPL-SCNC: 105 MMOL/L (ref 96–112)
CHLORIDE SERPL-SCNC: 106 MMOL/L (ref 96–112)
CHLORIDE SERPL-SCNC: 108 MMOL/L (ref 96–112)
CHLORIDE SERPL-SCNC: 108 MMOL/L (ref 96–112)
CHLORIDE SERPL-SCNC: 109 MMOL/L (ref 96–112)
CHLORIDE SERPL-SCNC: 110 MMOL/L (ref 96–112)
CHLORIDE SERPL-SCNC: 110 MMOL/L (ref 96–112)
CHLORIDE SERPL-SCNC: 112 MMOL/L (ref 96–112)
CHLORIDE SERPL-SCNC: 113 MMOL/L (ref 96–112)
CK SERPL-CCNC: 1832 U/L (ref 0–154)
CK SERPL-CCNC: 729 U/L (ref 0–154)
CK SERPL-CCNC: 97 U/L (ref 0–154)
CO2 SERPL-SCNC: 16 MMOL/L (ref 20–33)
CO2 SERPL-SCNC: 23 MMOL/L (ref 20–33)
CO2 SERPL-SCNC: 24 MMOL/L (ref 20–33)
CO2 SERPL-SCNC: 24 MMOL/L (ref 20–33)
CO2 SERPL-SCNC: 25 MMOL/L (ref 20–33)
CO2 SERPL-SCNC: 26 MMOL/L (ref 20–33)
CO2 SERPL-SCNC: 27 MMOL/L (ref 20–33)
CO2 SERPL-SCNC: 28 MMOL/L (ref 20–33)
CO2 SERPL-SCNC: 29 MMOL/L (ref 20–33)
CO2 SERPL-SCNC: 31 MMOL/L (ref 20–33)
COLOR UR: YELLOW
COMMENT 1642: NORMAL
CREAT SERPL-MCNC: 0.76 MG/DL (ref 0.5–1.4)
CREAT SERPL-MCNC: 0.79 MG/DL (ref 0.5–1.4)
CREAT SERPL-MCNC: 0.83 MG/DL (ref 0.5–1.4)
CREAT SERPL-MCNC: 0.84 MG/DL (ref 0.5–1.4)
CREAT SERPL-MCNC: 0.85 MG/DL (ref 0.5–1.4)
CREAT SERPL-MCNC: 0.87 MG/DL (ref 0.5–1.4)
CREAT SERPL-MCNC: 0.88 MG/DL (ref 0.5–1.4)
CREAT SERPL-MCNC: 0.91 MG/DL (ref 0.5–1.4)
CREAT SERPL-MCNC: 0.92 MG/DL (ref 0.5–1.4)
CREAT SERPL-MCNC: 0.95 MG/DL (ref 0.5–1.4)
CREAT SERPL-MCNC: 0.97 MG/DL (ref 0.5–1.4)
CREAT SERPL-MCNC: 1.01 MG/DL (ref 0.5–1.4)
CREAT SERPL-MCNC: 1.01 MG/DL (ref 0.5–1.4)
CREAT SERPL-MCNC: 1.11 MG/DL (ref 0.5–1.4)
CREAT SERPL-MCNC: 1.13 MG/DL (ref 0.5–1.4)
CREAT SERPL-MCNC: 1.13 MG/DL (ref 0.5–1.4)
CREAT SERPL-MCNC: 1.15 MG/DL (ref 0.5–1.4)
CREAT SERPL-MCNC: 1.72 MG/DL (ref 0.5–1.4)
CRP SERPL HS-MCNC: 0.05 MG/DL (ref 0–0.75)
CRP SERPL HS-MCNC: 0.1 MG/DL (ref 0–0.75)
EKG IMPRESSION: NORMAL
EKG IMPRESSION: NORMAL
EOSINOPHIL # BLD AUTO: 0 K/UL (ref 0–0.51)
EOSINOPHIL # BLD AUTO: 0.01 K/UL (ref 0–0.51)
EOSINOPHIL # BLD AUTO: 0.02 K/UL (ref 0–0.51)
EOSINOPHIL # BLD AUTO: 0.03 K/UL (ref 0–0.51)
EOSINOPHIL # BLD AUTO: 0.1 K/UL (ref 0–0.51)
EOSINOPHIL # BLD AUTO: 0.16 K/UL (ref 0–0.51)
EOSINOPHIL # BLD AUTO: 0.19 K/UL (ref 0–0.51)
EOSINOPHIL # BLD AUTO: 0.21 K/UL (ref 0–0.51)
EOSINOPHIL # BLD AUTO: 0.21 K/UL (ref 0–0.51)
EOSINOPHIL # BLD AUTO: 0.24 K/UL (ref 0–0.51)
EOSINOPHIL # BLD AUTO: 0.24 K/UL (ref 0–0.51)
EOSINOPHIL # BLD AUTO: 0.3 K/UL (ref 0–0.51)
EOSINOPHIL # BLD AUTO: 0.35 K/UL (ref 0–0.51)
EOSINOPHIL # BLD AUTO: 0.38 K/UL (ref 0–0.51)
EOSINOPHIL NFR BLD: 0 % (ref 0–6.9)
EOSINOPHIL NFR BLD: 0.1 % (ref 0–6.9)
EOSINOPHIL NFR BLD: 1 % (ref 0–6.9)
EOSINOPHIL NFR BLD: 1.8 % (ref 0–6.9)
EOSINOPHIL NFR BLD: 1.9 % (ref 0–6.9)
EOSINOPHIL NFR BLD: 2.2 % (ref 0–6.9)
EOSINOPHIL NFR BLD: 3.3 % (ref 0–6.9)
EOSINOPHIL NFR BLD: 3.5 % (ref 0–6.9)
EOSINOPHIL NFR BLD: 3.6 % (ref 0–6.9)
EOSINOPHIL NFR BLD: 4 % (ref 0–6.9)
EOSINOPHIL NFR BLD: 4 % (ref 0–6.9)
EOSINOPHIL NFR BLD: 5.3 % (ref 0–6.9)
EPI CELLS #/AREA URNS HPF: NEGATIVE /HPF
EPI CELLS #/AREA URNS HPF: NEGATIVE /HPF
ERYTHROCYTE [DISTWIDTH] IN BLOOD BY AUTOMATED COUNT: 46.3 FL (ref 35.9–50)
ERYTHROCYTE [DISTWIDTH] IN BLOOD BY AUTOMATED COUNT: 46.4 FL (ref 35.9–50)
ERYTHROCYTE [DISTWIDTH] IN BLOOD BY AUTOMATED COUNT: 46.5 FL (ref 35.9–50)
ERYTHROCYTE [DISTWIDTH] IN BLOOD BY AUTOMATED COUNT: 46.9 FL (ref 35.9–50)
ERYTHROCYTE [DISTWIDTH] IN BLOOD BY AUTOMATED COUNT: 47.4 FL (ref 35.9–50)
ERYTHROCYTE [DISTWIDTH] IN BLOOD BY AUTOMATED COUNT: 47.8 FL (ref 35.9–50)
ERYTHROCYTE [DISTWIDTH] IN BLOOD BY AUTOMATED COUNT: 48.1 FL (ref 35.9–50)
ERYTHROCYTE [DISTWIDTH] IN BLOOD BY AUTOMATED COUNT: 48.4 FL (ref 35.9–50)
ERYTHROCYTE [DISTWIDTH] IN BLOOD BY AUTOMATED COUNT: 48.5 FL (ref 35.9–50)
ERYTHROCYTE [DISTWIDTH] IN BLOOD BY AUTOMATED COUNT: 48.8 FL (ref 35.9–50)
ERYTHROCYTE [DISTWIDTH] IN BLOOD BY AUTOMATED COUNT: 49.1 FL (ref 35.9–50)
ERYTHROCYTE [DISTWIDTH] IN BLOOD BY AUTOMATED COUNT: 49.1 FL (ref 35.9–50)
ERYTHROCYTE [DISTWIDTH] IN BLOOD BY AUTOMATED COUNT: 49.3 FL (ref 35.9–50)
ERYTHROCYTE [DISTWIDTH] IN BLOOD BY AUTOMATED COUNT: 49.9 FL (ref 35.9–50)
ERYTHROCYTE [DISTWIDTH] IN BLOOD BY AUTOMATED COUNT: 50.2 FL (ref 35.9–50)
ERYTHROCYTE [DISTWIDTH] IN BLOOD BY AUTOMATED COUNT: 50.3 FL (ref 35.9–50)
ERYTHROCYTE [DISTWIDTH] IN BLOOD BY AUTOMATED COUNT: 51.9 FL (ref 35.9–50)
FOLATE SERPL-MCNC: 12 NG/ML
G LAMBLIA+C PARVUM AG STL QL RAPID: NORMAL
GAMMA INTERFERON BACKGROUND BLD IA-ACNC: 0.13 IU/ML
GLOBULIN SER CALC-MCNC: 1.9 G/DL (ref 1.9–3.5)
GLOBULIN SER CALC-MCNC: 2.3 G/DL (ref 1.9–3.5)
GLOBULIN SER CALC-MCNC: 2.3 G/DL (ref 1.9–3.5)
GLOBULIN SER CALC-MCNC: 2.5 G/DL (ref 1.9–3.5)
GLOBULIN SER CALC-MCNC: 2.5 G/DL (ref 1.9–3.5)
GLOBULIN SER CALC-MCNC: 2.6 G/DL (ref 1.9–3.5)
GLOBULIN SER CALC-MCNC: 2.8 G/DL (ref 1.9–3.5)
GLUCOSE BLD-MCNC: 99 MG/DL (ref 65–99)
GLUCOSE SERPL-MCNC: 103 MG/DL (ref 65–99)
GLUCOSE SERPL-MCNC: 104 MG/DL (ref 65–99)
GLUCOSE SERPL-MCNC: 106 MG/DL (ref 65–99)
GLUCOSE SERPL-MCNC: 108 MG/DL (ref 65–99)
GLUCOSE SERPL-MCNC: 108 MG/DL (ref 65–99)
GLUCOSE SERPL-MCNC: 112 MG/DL (ref 65–99)
GLUCOSE SERPL-MCNC: 118 MG/DL (ref 65–99)
GLUCOSE SERPL-MCNC: 127 MG/DL (ref 65–99)
GLUCOSE SERPL-MCNC: 135 MG/DL (ref 65–99)
GLUCOSE SERPL-MCNC: 83 MG/DL (ref 65–99)
GLUCOSE SERPL-MCNC: 86 MG/DL (ref 65–99)
GLUCOSE SERPL-MCNC: 87 MG/DL (ref 65–99)
GLUCOSE SERPL-MCNC: 89 MG/DL (ref 65–99)
GLUCOSE SERPL-MCNC: 91 MG/DL (ref 65–99)
GLUCOSE SERPL-MCNC: 94 MG/DL (ref 65–99)
GLUCOSE SERPL-MCNC: 98 MG/DL (ref 65–99)
GLUCOSE SERPL-MCNC: 99 MG/DL (ref 65–99)
GLUCOSE SERPL-MCNC: 99 MG/DL (ref 65–99)
GLUCOSE UR STRIP.AUTO-MCNC: NEGATIVE MG/DL
HCT VFR BLD AUTO: 39 % (ref 42–52)
HCT VFR BLD AUTO: 39.2 % (ref 42–52)
HCT VFR BLD AUTO: 40.5 % (ref 42–52)
HCT VFR BLD AUTO: 40.8 % (ref 42–52)
HCT VFR BLD AUTO: 40.8 % (ref 42–52)
HCT VFR BLD AUTO: 41 % (ref 42–52)
HCT VFR BLD AUTO: 41.6 % (ref 42–52)
HCT VFR BLD AUTO: 41.7 % (ref 42–52)
HCT VFR BLD AUTO: 42.8 % (ref 42–52)
HCT VFR BLD AUTO: 43.7 % (ref 42–52)
HCT VFR BLD AUTO: 43.8 % (ref 42–52)
HCT VFR BLD AUTO: 44.6 % (ref 42–52)
HCT VFR BLD AUTO: 45 % (ref 42–52)
HCT VFR BLD AUTO: 45.2 % (ref 42–52)
HCT VFR BLD AUTO: 45.6 % (ref 42–52)
HCT VFR BLD AUTO: 45.7 % (ref 42–52)
HCT VFR BLD AUTO: 45.9 % (ref 42–52)
HCT VFR BLD AUTO: 46.2 % (ref 42–52)
HCT VFR BLD AUTO: 46.3 % (ref 42–52)
HCT VFR BLD AUTO: 46.6 % (ref 42–52)
HGB BLD-MCNC: 12.6 G/DL (ref 14–18)
HGB BLD-MCNC: 12.6 G/DL (ref 14–18)
HGB BLD-MCNC: 12.8 G/DL (ref 14–18)
HGB BLD-MCNC: 12.9 G/DL (ref 14–18)
HGB BLD-MCNC: 13 G/DL (ref 14–18)
HGB BLD-MCNC: 13.1 G/DL (ref 14–18)
HGB BLD-MCNC: 13.5 G/DL (ref 14–18)
HGB BLD-MCNC: 13.6 G/DL (ref 14–18)
HGB BLD-MCNC: 13.7 G/DL (ref 14–18)
HGB BLD-MCNC: 13.8 G/DL (ref 14–18)
HGB BLD-MCNC: 14.2 G/DL (ref 14–18)
HGB BLD-MCNC: 14.2 G/DL (ref 14–18)
HGB BLD-MCNC: 14.3 G/DL (ref 14–18)
HGB BLD-MCNC: 14.4 G/DL (ref 14–18)
HGB BLD-MCNC: 14.6 G/DL (ref 14–18)
HGB BLD-MCNC: 14.7 G/DL (ref 14–18)
HGB BLD-MCNC: 14.7 G/DL (ref 14–18)
HGB BLD-MCNC: 14.8 G/DL (ref 14–18)
HGB BLD-MCNC: 14.8 G/DL (ref 14–18)
HGB BLD-MCNC: 15 G/DL (ref 14–18)
HYALINE CASTS #/AREA URNS LPF: ABNORMAL /LPF
HYALINE CASTS #/AREA URNS LPF: ABNORMAL /LPF
IMM GRANULOCYTES # BLD AUTO: 0.05 K/UL (ref 0–0.11)
IMM GRANULOCYTES # BLD AUTO: 0.06 K/UL (ref 0–0.11)
IMM GRANULOCYTES # BLD AUTO: 0.07 K/UL (ref 0–0.11)
IMM GRANULOCYTES # BLD AUTO: 0.08 K/UL (ref 0–0.11)
IMM GRANULOCYTES # BLD AUTO: 0.08 K/UL (ref 0–0.11)
IMM GRANULOCYTES # BLD AUTO: 0.09 K/UL (ref 0–0.11)
IMM GRANULOCYTES # BLD AUTO: 0.13 K/UL (ref 0–0.11)
IMM GRANULOCYTES # BLD AUTO: 0.16 K/UL (ref 0–0.11)
IMM GRANULOCYTES # BLD AUTO: 0.16 K/UL (ref 0–0.11)
IMM GRANULOCYTES NFR BLD AUTO: 0.4 % (ref 0–0.9)
IMM GRANULOCYTES NFR BLD AUTO: 0.4 % (ref 0–0.9)
IMM GRANULOCYTES NFR BLD AUTO: 0.5 % (ref 0–0.9)
IMM GRANULOCYTES NFR BLD AUTO: 0.6 % (ref 0–0.9)
IMM GRANULOCYTES NFR BLD AUTO: 0.7 % (ref 0–0.9)
IMM GRANULOCYTES NFR BLD AUTO: 0.8 % (ref 0–0.9)
IMM GRANULOCYTES NFR BLD AUTO: 1.1 % (ref 0–0.9)
IMM GRANULOCYTES NFR BLD AUTO: 1.3 % (ref 0–0.9)
IMM GRANULOCYTES NFR BLD AUTO: 2 % (ref 0–0.9)
INR PPP: 1.01 (ref 0.87–1.13)
INR PPP: 1.16 (ref 0.87–1.13)
KETONES UR STRIP.AUTO-MCNC: NEGATIVE MG/DL
LACTATE BLD-SCNC: 1.9 MMOL/L (ref 0.5–2)
LACTATE BLD-SCNC: 2 MMOL/L (ref 0.5–2)
LACTATE BLD-SCNC: 2 MMOL/L (ref 0.5–2)
LACTATE BLD-SCNC: 3.2 MMOL/L (ref 0.5–2)
LACTATE BLD-SCNC: 3.4 MMOL/L (ref 0.5–2)
LACTATE BLD-SCNC: 4 MMOL/L (ref 0.5–2)
LACTATE BLD-SCNC: 6.4 MMOL/L (ref 0.5–2)
LDH SERPL L TO P-CCNC: 259 U/L (ref 107–266)
LEUKOCYTE ESTERASE UR QL STRIP.AUTO: ABNORMAL
LEUKOCYTE ESTERASE UR QL STRIP.AUTO: ABNORMAL
LEUKOCYTE ESTERASE UR QL STRIP.AUTO: NEGATIVE
LV EJECT FRACT  99904: 65
LV EJECT FRACT MOD 2C 99903: 66.52
LV EJECT FRACT MOD 4C 99902: 67.28
LV EJECT FRACT MOD BP 99901: 67.09
LYMPHOCYTES # BLD AUTO: 1.25 K/UL (ref 1–4.8)
LYMPHOCYTES # BLD AUTO: 1.29 K/UL (ref 1–4.8)
LYMPHOCYTES # BLD AUTO: 1.43 K/UL (ref 1–4.8)
LYMPHOCYTES # BLD AUTO: 1.48 K/UL (ref 1–4.8)
LYMPHOCYTES # BLD AUTO: 1.49 K/UL (ref 1–4.8)
LYMPHOCYTES # BLD AUTO: 1.57 K/UL (ref 1–4.8)
LYMPHOCYTES # BLD AUTO: 1.69 K/UL (ref 1–4.8)
LYMPHOCYTES # BLD AUTO: 1.71 K/UL (ref 1–4.8)
LYMPHOCYTES # BLD AUTO: 1.74 K/UL (ref 1–4.8)
LYMPHOCYTES # BLD AUTO: 1.8 K/UL (ref 1–4.8)
LYMPHOCYTES # BLD AUTO: 1.83 K/UL (ref 1–4.8)
LYMPHOCYTES # BLD AUTO: 1.89 K/UL (ref 1–4.8)
LYMPHOCYTES # BLD AUTO: 1.91 K/UL (ref 1–4.8)
LYMPHOCYTES # BLD AUTO: 1.98 K/UL (ref 1–4.8)
LYMPHOCYTES NFR BLD: 14.9 % (ref 22–41)
LYMPHOCYTES NFR BLD: 15.2 % (ref 22–41)
LYMPHOCYTES NFR BLD: 15.9 % (ref 22–41)
LYMPHOCYTES NFR BLD: 20.9 % (ref 22–41)
LYMPHOCYTES NFR BLD: 21.2 % (ref 22–41)
LYMPHOCYTES NFR BLD: 22.8 % (ref 22–41)
LYMPHOCYTES NFR BLD: 23.1 % (ref 22–41)
LYMPHOCYTES NFR BLD: 23.7 % (ref 22–41)
LYMPHOCYTES NFR BLD: 24.1 % (ref 22–41)
LYMPHOCYTES NFR BLD: 28.4 % (ref 22–41)
LYMPHOCYTES NFR BLD: 8.3 % (ref 22–41)
LYMPHOCYTES NFR BLD: 8.9 % (ref 22–41)
LYMPHOCYTES NFR BLD: 9 % (ref 22–41)
LYMPHOCYTES NFR BLD: 9.8 % (ref 22–41)
M TB IFN-G BLD-IMP: NEGATIVE
M TB IFN-G CD4+ BCKGRND COR BLD-ACNC: 0.04 IU/ML
MACROCYTES BLD QL SMEAR: ABNORMAL
MAGNESIUM SERPL-MCNC: 1.8 MG/DL (ref 1.5–2.5)
MAGNESIUM SERPL-MCNC: 2 MG/DL (ref 1.5–2.5)
MAGNESIUM SERPL-MCNC: 2.1 MG/DL (ref 1.5–2.5)
MAGNESIUM SERPL-MCNC: 2.2 MG/DL (ref 1.5–2.5)
MAGNESIUM SERPL-MCNC: 2.3 MG/DL (ref 1.5–2.5)
MAGNESIUM SERPL-MCNC: 2.3 MG/DL (ref 1.5–2.5)
MANUAL DIFF BLD: NORMAL
MCH RBC QN AUTO: 31 PG (ref 27–33)
MCH RBC QN AUTO: 31.4 PG (ref 27–33)
MCH RBC QN AUTO: 31.7 PG (ref 27–33)
MCH RBC QN AUTO: 31.8 PG (ref 27–33)
MCH RBC QN AUTO: 31.9 PG (ref 27–33)
MCH RBC QN AUTO: 32 PG (ref 27–33)
MCH RBC QN AUTO: 32.2 PG (ref 27–33)
MCH RBC QN AUTO: 32.2 PG (ref 27–33)
MCH RBC QN AUTO: 32.3 PG (ref 27–33)
MCH RBC QN AUTO: 32.3 PG (ref 27–33)
MCH RBC QN AUTO: 32.6 PG (ref 27–33)
MCH RBC QN AUTO: 32.8 PG (ref 27–33)
MCHC RBC AUTO-ENTMCNC: 31.4 G/DL (ref 33.7–35.3)
MCHC RBC AUTO-ENTMCNC: 31.5 G/DL (ref 33.7–35.3)
MCHC RBC AUTO-ENTMCNC: 31.6 G/DL (ref 33.7–35.3)
MCHC RBC AUTO-ENTMCNC: 31.6 G/DL (ref 33.7–35.3)
MCHC RBC AUTO-ENTMCNC: 31.7 G/DL (ref 33.7–35.3)
MCHC RBC AUTO-ENTMCNC: 31.8 G/DL (ref 33.7–35.3)
MCHC RBC AUTO-ENTMCNC: 31.9 G/DL (ref 33.7–35.3)
MCHC RBC AUTO-ENTMCNC: 32 G/DL (ref 33.7–35.3)
MCHC RBC AUTO-ENTMCNC: 32.1 G/DL (ref 33.7–35.3)
MCHC RBC AUTO-ENTMCNC: 32.2 G/DL (ref 33.7–35.3)
MCHC RBC AUTO-ENTMCNC: 32.3 G/DL (ref 33.7–35.3)
MCHC RBC AUTO-ENTMCNC: 32.4 G/DL (ref 33.7–35.3)
MCHC RBC AUTO-ENTMCNC: 32.7 G/DL (ref 33.7–35.3)
MCHC RBC AUTO-ENTMCNC: 32.9 G/DL (ref 33.7–35.3)
MCHC RBC AUTO-ENTMCNC: 33.1 G/DL (ref 33.7–35.3)
MCV RBC AUTO: 100.4 FL (ref 81.4–97.8)
MCV RBC AUTO: 100.9 FL (ref 81.4–97.8)
MCV RBC AUTO: 102 FL (ref 81.4–97.8)
MCV RBC AUTO: 102.3 FL (ref 81.4–97.8)
MCV RBC AUTO: 102.7 FL (ref 81.4–97.8)
MCV RBC AUTO: 104.3 FL (ref 81.4–97.8)
MCV RBC AUTO: 97.4 FL (ref 81.4–97.8)
MCV RBC AUTO: 97.8 FL (ref 81.4–97.8)
MCV RBC AUTO: 97.8 FL (ref 81.4–97.8)
MCV RBC AUTO: 98.1 FL (ref 81.4–97.8)
MCV RBC AUTO: 98.2 FL (ref 81.4–97.8)
MCV RBC AUTO: 98.5 FL (ref 81.4–97.8)
MCV RBC AUTO: 99 FL (ref 81.4–97.8)
MCV RBC AUTO: 99 FL (ref 81.4–97.8)
MCV RBC AUTO: 99.5 FL (ref 81.4–97.8)
MCV RBC AUTO: 99.6 FL (ref 81.4–97.8)
MCV RBC AUTO: 99.6 FL (ref 81.4–97.8)
METAMYELOCYTES NFR BLD MANUAL: 0.9 %
MICRO URNS: ABNORMAL
MICRO URNS: ABNORMAL
MICRO URNS: NORMAL
MITOGEN IGNF BCKGRD COR BLD-ACNC: >10 IU/ML
MONOCYTES # BLD AUTO: 0.53 K/UL (ref 0–0.85)
MONOCYTES # BLD AUTO: 0.58 K/UL (ref 0–0.85)
MONOCYTES # BLD AUTO: 0.6 K/UL (ref 0–0.85)
MONOCYTES # BLD AUTO: 0.63 K/UL (ref 0–0.85)
MONOCYTES # BLD AUTO: 0.7 K/UL (ref 0–0.85)
MONOCYTES # BLD AUTO: 0.74 K/UL (ref 0–0.85)
MONOCYTES # BLD AUTO: 0.75 K/UL (ref 0–0.85)
MONOCYTES # BLD AUTO: 0.8 K/UL (ref 0–0.85)
MONOCYTES # BLD AUTO: 0.8 K/UL (ref 0–0.85)
MONOCYTES # BLD AUTO: 0.85 K/UL (ref 0–0.85)
MONOCYTES # BLD AUTO: 0.91 K/UL (ref 0–0.85)
MONOCYTES # BLD AUTO: 1 K/UL (ref 0–0.85)
MONOCYTES # BLD AUTO: 1.2 K/UL (ref 0–0.85)
MONOCYTES # BLD AUTO: 1.26 K/UL (ref 0–0.85)
MONOCYTES NFR BLD AUTO: 10.4 % (ref 0–13.4)
MONOCYTES NFR BLD AUTO: 10.9 % (ref 0–13.4)
MONOCYTES NFR BLD AUTO: 11 % (ref 0–13.4)
MONOCYTES NFR BLD AUTO: 11.3 % (ref 0–13.4)
MONOCYTES NFR BLD AUTO: 5.6 % (ref 0–13.4)
MONOCYTES NFR BLD AUTO: 5.9 % (ref 0–13.4)
MONOCYTES NFR BLD AUTO: 5.9 % (ref 0–13.4)
MONOCYTES NFR BLD AUTO: 6.1 % (ref 0–13.4)
MONOCYTES NFR BLD AUTO: 6.3 % (ref 0–13.4)
MONOCYTES NFR BLD AUTO: 6.8 % (ref 0–13.4)
MONOCYTES NFR BLD AUTO: 7.4 % (ref 0–13.4)
MONOCYTES NFR BLD AUTO: 8.1 % (ref 0–13.4)
MONOCYTES NFR BLD AUTO: 8.5 % (ref 0–13.4)
MONOCYTES NFR BLD AUTO: 8.7 % (ref 0–13.4)
MORPHOLOGY BLD-IMP: NORMAL
MORPHOLOGY BLD-IMP: NORMAL
NEUTROPHILS # BLD AUTO: 12.05 K/UL (ref 1.82–7.42)
NEUTROPHILS # BLD AUTO: 12.11 K/UL (ref 1.82–7.42)
NEUTROPHILS # BLD AUTO: 16.88 K/UL (ref 1.82–7.42)
NEUTROPHILS # BLD AUTO: 17.32 K/UL (ref 1.82–7.42)
NEUTROPHILS # BLD AUTO: 3.12 K/UL (ref 1.82–7.42)
NEUTROPHILS # BLD AUTO: 3.59 K/UL (ref 1.82–7.42)
NEUTROPHILS # BLD AUTO: 3.86 K/UL (ref 1.82–7.42)
NEUTROPHILS # BLD AUTO: 4.33 K/UL (ref 1.82–7.42)
NEUTROPHILS # BLD AUTO: 4.95 K/UL (ref 1.82–7.42)
NEUTROPHILS # BLD AUTO: 5.55 K/UL (ref 1.82–7.42)
NEUTROPHILS # BLD AUTO: 5.8 K/UL (ref 1.82–7.42)
NEUTROPHILS # BLD AUTO: 7.42 K/UL (ref 1.82–7.42)
NEUTROPHILS # BLD AUTO: 7.95 K/UL (ref 1.82–7.42)
NEUTROPHILS # BLD AUTO: 9.44 K/UL (ref 1.82–7.42)
NEUTROPHILS NFR BLD: 53.8 % (ref 44–72)
NEUTROPHILS NFR BLD: 59.1 % (ref 44–72)
NEUTROPHILS NFR BLD: 59.8 % (ref 44–72)
NEUTROPHILS NFR BLD: 60.3 % (ref 44–72)
NEUTROPHILS NFR BLD: 63.4 % (ref 44–72)
NEUTROPHILS NFR BLD: 66.7 % (ref 44–72)
NEUTROPHILS NFR BLD: 67 % (ref 44–72)
NEUTROPHILS NFR BLD: 73.8 % (ref 44–72)
NEUTROPHILS NFR BLD: 74.3 % (ref 44–72)
NEUTROPHILS NFR BLD: 75 % (ref 44–72)
NEUTROPHILS NFR BLD: 82.8 % (ref 44–72)
NEUTROPHILS NFR BLD: 83.9 % (ref 44–72)
NEUTROPHILS NFR BLD: 84.2 % (ref 44–72)
NEUTROPHILS NFR BLD: 84.5 % (ref 44–72)
NITRITE UR QL STRIP.AUTO: NEGATIVE
NRBC # BLD AUTO: 0 K/UL
NRBC BLD-RTO: 0 /100 WBC
PH UR STRIP.AUTO: 6 [PH]
PH UR STRIP.AUTO: 6.5 [PH]
PH UR STRIP.AUTO: 7 [PH] (ref 5–8)
PHOSPHATE SERPL-MCNC: 2.3 MG/DL (ref 2.5–4.5)
PHOSPHATE SERPL-MCNC: 2.9 MG/DL (ref 2.5–4.5)
PHOSPHATE SERPL-MCNC: 3.7 MG/DL (ref 2.5–4.5)
PLATELET # BLD AUTO: 110 K/UL (ref 164–446)
PLATELET # BLD AUTO: 110 K/UL (ref 164–446)
PLATELET # BLD AUTO: 117 K/UL (ref 164–446)
PLATELET # BLD AUTO: 117 K/UL (ref 164–446)
PLATELET # BLD AUTO: 118 K/UL (ref 164–446)
PLATELET # BLD AUTO: 124 K/UL (ref 164–446)
PLATELET # BLD AUTO: 124 K/UL (ref 164–446)
PLATELET # BLD AUTO: 134 K/UL (ref 164–446)
PLATELET # BLD AUTO: 138 K/UL (ref 164–446)
PLATELET # BLD AUTO: 146 K/UL (ref 164–446)
PLATELET # BLD AUTO: 150 K/UL (ref 164–446)
PLATELET # BLD AUTO: 154 K/UL (ref 164–446)
PLATELET # BLD AUTO: 163 K/UL (ref 164–446)
PLATELET # BLD AUTO: 167 K/UL (ref 164–446)
PLATELET # BLD AUTO: 168 K/UL (ref 164–446)
PLATELET # BLD AUTO: 177 K/UL (ref 164–446)
PLATELET # BLD AUTO: 186 K/UL (ref 164–446)
PLATELET BLD QL SMEAR: NORMAL
PMV BLD AUTO: 10 FL (ref 9–12.9)
PMV BLD AUTO: 10.1 FL (ref 9–12.9)
PMV BLD AUTO: 10.2 FL (ref 9–12.9)
PMV BLD AUTO: 10.2 FL (ref 9–12.9)
PMV BLD AUTO: 10.3 FL (ref 9–12.9)
PMV BLD AUTO: 10.4 FL (ref 9–12.9)
PMV BLD AUTO: 10.6 FL (ref 9–12.9)
PMV BLD AUTO: 10.7 FL (ref 9–12.9)
PMV BLD AUTO: 10.7 FL (ref 9–12.9)
PMV BLD AUTO: 10.8 FL (ref 9–12.9)
PMV BLD AUTO: 9.5 FL (ref 9–12.9)
PMV BLD AUTO: 9.6 FL (ref 9–12.9)
PMV BLD AUTO: 9.6 FL (ref 9–12.9)
PMV BLD AUTO: 9.7 FL (ref 9–12.9)
PMV BLD AUTO: 9.9 FL (ref 9–12.9)
POTASSIUM SERPL-SCNC: 2.8 MMOL/L (ref 3.6–5.5)
POTASSIUM SERPL-SCNC: 3.4 MMOL/L (ref 3.6–5.5)
POTASSIUM SERPL-SCNC: 3.6 MMOL/L (ref 3.6–5.5)
POTASSIUM SERPL-SCNC: 3.7 MMOL/L (ref 3.6–5.5)
POTASSIUM SERPL-SCNC: 4 MMOL/L (ref 3.6–5.5)
POTASSIUM SERPL-SCNC: 4.1 MMOL/L (ref 3.6–5.5)
POTASSIUM SERPL-SCNC: 4.2 MMOL/L (ref 3.6–5.5)
POTASSIUM SERPL-SCNC: 4.3 MMOL/L (ref 3.6–5.5)
POTASSIUM SERPL-SCNC: 4.3 MMOL/L (ref 3.6–5.5)
POTASSIUM SERPL-SCNC: 4.4 MMOL/L (ref 3.6–5.5)
POTASSIUM SERPL-SCNC: 4.5 MMOL/L (ref 3.6–5.5)
POTASSIUM SERPL-SCNC: 4.5 MMOL/L (ref 3.6–5.5)
POTASSIUM SERPL-SCNC: 4.8 MMOL/L (ref 3.6–5.5)
PREALB SERPL-MCNC: 20 MG/DL (ref 18–38)
PREALB SERPL-MCNC: 23 MG/DL (ref 18–38)
PREALB SERPL-MCNC: 25 MG/DL (ref 18–38)
PROT SERPL-MCNC: 5.2 G/DL (ref 6–8.2)
PROT SERPL-MCNC: 5.6 G/DL (ref 6–8.2)
PROT SERPL-MCNC: 5.7 G/DL (ref 6–8.2)
PROT SERPL-MCNC: 5.8 G/DL (ref 6–8.2)
PROT SERPL-MCNC: 6 G/DL (ref 6–8.2)
PROT SERPL-MCNC: 6.1 G/DL (ref 6–8.2)
PROT SERPL-MCNC: 6.2 G/DL (ref 6–8.2)
PROT SERPL-MCNC: 6.2 G/DL (ref 6–8.2)
PROT SERPL-MCNC: 6.3 G/DL (ref 6–8.2)
PROT SERPL-MCNC: 6.4 G/DL (ref 6–8.2)
PROT SERPL-MCNC: 6.6 G/DL (ref 6–8.2)
PROT SERPL-MCNC: 6.8 G/DL (ref 6–8.2)
PROT SERPL-MCNC: 6.9 G/DL (ref 6–8.2)
PROT UR QL STRIP: 30 MG/DL
PROT UR QL STRIP: NEGATIVE MG/DL
PROT UR QL STRIP: NEGATIVE MG/DL
PROTHROMBIN TIME: 13.4 SEC (ref 12–14.6)
PROTHROMBIN TIME: 15 SEC (ref 12–14.6)
QFT TB2 - NIL TBQ2: 0.02 IU/ML
RBC # BLD AUTO: 3.94 M/UL (ref 4.7–6.1)
RBC # BLD AUTO: 3.96 M/UL (ref 4.7–6.1)
RBC # BLD AUTO: 4 M/UL (ref 4.7–6.1)
RBC # BLD AUTO: 4 M/UL (ref 4.7–6.1)
RBC # BLD AUTO: 4.01 M/UL (ref 4.7–6.1)
RBC # BLD AUTO: 4.14 M/UL (ref 4.7–6.1)
RBC # BLD AUTO: 4.19 M/UL (ref 4.7–6.1)
RBC # BLD AUTO: 4.24 M/UL (ref 4.7–6.1)
RBC # BLD AUTO: 4.4 M/UL (ref 4.7–6.1)
RBC # BLD AUTO: 4.44 M/UL (ref 4.7–6.1)
RBC # BLD AUTO: 4.45 M/UL (ref 4.7–6.1)
RBC # BLD AUTO: 4.52 M/UL (ref 4.7–6.1)
RBC # BLD AUTO: 4.59 M/UL (ref 4.7–6.1)
RBC # BLD AUTO: 4.6 M/UL (ref 4.7–6.1)
RBC # BLD AUTO: 4.61 M/UL (ref 4.7–6.1)
RBC # BLD AUTO: 4.62 M/UL (ref 4.7–6.1)
RBC # BLD AUTO: 4.73 M/UL (ref 4.7–6.1)
RBC # URNS HPF: ABNORMAL /HPF
RBC # URNS HPF: ABNORMAL /HPF
RBC BLD AUTO: PRESENT
RBC UR QL AUTO: ABNORMAL
RBC UR QL AUTO: ABNORMAL
RBC UR QL AUTO: NEGATIVE
RH BLD: NORMAL
SIGNIFICANT IND 70042: NORMAL
SITE SITE: NORMAL
SODIUM SERPL-SCNC: 134 MMOL/L (ref 135–145)
SODIUM SERPL-SCNC: 135 MMOL/L (ref 135–145)
SODIUM SERPL-SCNC: 136 MMOL/L (ref 135–145)
SODIUM SERPL-SCNC: 137 MMOL/L (ref 135–145)
SODIUM SERPL-SCNC: 138 MMOL/L (ref 135–145)
SODIUM SERPL-SCNC: 139 MMOL/L (ref 135–145)
SODIUM SERPL-SCNC: 140 MMOL/L (ref 135–145)
SODIUM SERPL-SCNC: 142 MMOL/L (ref 135–145)
SODIUM SERPL-SCNC: 143 MMOL/L (ref 135–145)
SODIUM SERPL-SCNC: 144 MMOL/L (ref 135–145)
SODIUM SERPL-SCNC: 144 MMOL/L (ref 135–145)
SODIUM SERPL-SCNC: 145 MMOL/L (ref 135–145)
SODIUM SERPL-SCNC: 146 MMOL/L (ref 135–145)
SOURCE SOURCE: NORMAL
SP GR UR STRIP.AUTO: 1.01
SP GR UR STRIP.AUTO: 1.02
SP GR UR STRIP.AUTO: 1.02
TREPONEMA PALLIDUM IGG+IGM AB [PRESENCE] IN SERUM OR PLASMA BY IMMUNOASSAY: NON REACTIVE
TROPONIN I SERPL-MCNC: 0.06 NG/ML (ref 0–0.04)
TROPONIN I SERPL-MCNC: 0.16 NG/ML (ref 0–0.04)
TROPONIN I SERPL-MCNC: 0.37 NG/ML (ref 0–0.04)
TSH SERPL DL<=0.005 MIU/L-ACNC: 0.54 UIU/ML (ref 0.38–5.33)
TSH SERPL DL<=0.005 MIU/L-ACNC: 1.16 UIU/ML (ref 0.38–5.33)
URATE SERPL-MCNC: 3.9 MG/DL (ref 2.5–8.3)
UROBILINOGEN UR STRIP.AUTO-MCNC: 0.2 MG/DL
VIT B12 SERPL-MCNC: 527 PG/ML (ref 211–911)
WBC # BLD AUTO: 10 K/UL (ref 4.8–10.8)
WBC # BLD AUTO: 10.8 K/UL (ref 4.8–10.8)
WBC # BLD AUTO: 12.6 K/UL (ref 4.8–10.8)
WBC # BLD AUTO: 13.9 K/UL (ref 4.8–10.8)
WBC # BLD AUTO: 14.3 K/UL (ref 4.8–10.8)
WBC # BLD AUTO: 14.6 K/UL (ref 4.8–10.8)
WBC # BLD AUTO: 17.7 K/UL (ref 4.8–10.8)
WBC # BLD AUTO: 18.6 K/UL (ref 4.8–10.8)
WBC # BLD AUTO: 20.1 K/UL (ref 4.8–10.8)
WBC # BLD AUTO: 20.5 K/UL (ref 4.8–10.8)
WBC # BLD AUTO: 5.3 K/UL (ref 4.8–10.8)
WBC # BLD AUTO: 6.4 K/UL (ref 4.8–10.8)
WBC # BLD AUTO: 6.7 K/UL (ref 4.8–10.8)
WBC # BLD AUTO: 7.2 K/UL (ref 4.8–10.8)
WBC # BLD AUTO: 7.4 K/UL (ref 4.8–10.8)
WBC # BLD AUTO: 8.7 K/UL (ref 4.8–10.8)
WBC # BLD AUTO: 8.8 K/UL (ref 4.8–10.8)
WBC #/AREA URNS HPF: ABNORMAL /HPF
WBC #/AREA URNS HPF: ABNORMAL /HPF

## 2019-01-01 PROCEDURE — 99232 SBSQ HOSP IP/OBS MODERATE 35: CPT | Mod: GC | Performed by: INTERNAL MEDICINE

## 2019-01-01 PROCEDURE — 700102 HCHG RX REV CODE 250 W/ 637 OVERRIDE(OP): Performed by: STUDENT IN AN ORGANIZED HEALTH CARE EDUCATION/TRAINING PROGRAM

## 2019-01-01 PROCEDURE — A9270 NON-COVERED ITEM OR SERVICE: HCPCS | Performed by: STUDENT IN AN ORGANIZED HEALTH CARE EDUCATION/TRAINING PROGRAM

## 2019-01-01 PROCEDURE — A9270 NON-COVERED ITEM OR SERVICE: HCPCS | Performed by: NURSE PRACTITIONER

## 2019-01-01 PROCEDURE — 770006 HCHG ROOM/CARE - MED/SURG/GYN SEMI*

## 2019-01-01 PROCEDURE — A9270 NON-COVERED ITEM OR SERVICE: HCPCS | Performed by: PHYSICAL MEDICINE & REHABILITATION

## 2019-01-01 PROCEDURE — 700111 HCHG RX REV CODE 636 W/ 250 OVERRIDE (IP): Performed by: STUDENT IN AN ORGANIZED HEALTH CARE EDUCATION/TRAINING PROGRAM

## 2019-01-01 PROCEDURE — 700111 HCHG RX REV CODE 636 W/ 250 OVERRIDE (IP): Performed by: NURSE PRACTITIONER

## 2019-01-01 PROCEDURE — 84484 ASSAY OF TROPONIN QUANT: CPT

## 2019-01-01 PROCEDURE — 700102 HCHG RX REV CODE 250 W/ 637 OVERRIDE(OP): Performed by: PHYSICAL MEDICINE & REHABILITATION

## 2019-01-01 PROCEDURE — A9270 NON-COVERED ITEM OR SERVICE: HCPCS | Performed by: INTERNAL MEDICINE

## 2019-01-01 PROCEDURE — 99231 SBSQ HOSP IP/OBS SF/LOW 25: CPT | Mod: GC | Performed by: INTERNAL MEDICINE

## 2019-01-01 PROCEDURE — G0156 HHCP-SVS OF AIDE,EA 15 MIN: HCPCS

## 2019-01-01 PROCEDURE — 99231 SBSQ HOSP IP/OBS SF/LOW 25: CPT | Mod: GC | Performed by: HOSPITALIST

## 2019-01-01 PROCEDURE — 87328 CRYPTOSPORIDIUM AG IA: CPT

## 2019-01-01 PROCEDURE — 700102 HCHG RX REV CODE 250 W/ 637 OVERRIDE(OP): Performed by: NURSE PRACTITIONER

## 2019-01-01 PROCEDURE — 83735 ASSAY OF MAGNESIUM: CPT

## 2019-01-01 PROCEDURE — A9270 NON-COVERED ITEM OR SERVICE: HCPCS | Performed by: HOSPITALIST

## 2019-01-01 PROCEDURE — S9126 HOSPICE CARE, IN THE HOME, P: HCPCS

## 2019-01-01 PROCEDURE — 90471 IMMUNIZATION ADMIN: CPT

## 2019-01-01 PROCEDURE — 97530 THERAPEUTIC ACTIVITIES: CPT

## 2019-01-01 PROCEDURE — 92526 ORAL FUNCTION THERAPY: CPT

## 2019-01-01 PROCEDURE — 83605 ASSAY OF LACTIC ACID: CPT

## 2019-01-01 PROCEDURE — 700105 HCHG RX REV CODE 258: Performed by: STUDENT IN AN ORGANIZED HEALTH CARE EDUCATION/TRAINING PROGRAM

## 2019-01-01 PROCEDURE — 700102 HCHG RX REV CODE 250 W/ 637 OVERRIDE(OP): Performed by: INTERNAL MEDICINE

## 2019-01-01 PROCEDURE — 99285 EMERGENCY DEPT VISIT HI MDM: CPT

## 2019-01-01 PROCEDURE — 700102 HCHG RX REV CODE 250 W/ 637 OVERRIDE(OP): Performed by: HOSPITALIST

## 2019-01-01 PROCEDURE — 700105 HCHG RX REV CODE 258: Performed by: HOSPITALIST

## 2019-01-01 PROCEDURE — 81001 URINALYSIS AUTO W/SCOPE: CPT

## 2019-01-01 PROCEDURE — 90732 PPSV23 VACC 2 YRS+ SUBQ/IM: CPT | Performed by: STUDENT IN AN ORGANIZED HEALTH CARE EDUCATION/TRAINING PROGRAM

## 2019-01-01 PROCEDURE — 99231 SBSQ HOSP IP/OBS SF/LOW 25: CPT | Performed by: INTERNAL MEDICINE

## 2019-01-01 PROCEDURE — 6650990 HC HOSPICE AND HOME CARE RX REV CODE 0250

## 2019-01-01 PROCEDURE — 85025 COMPLETE CBC W/AUTO DIFF WBC: CPT

## 2019-01-01 PROCEDURE — 84100 ASSAY OF PHOSPHORUS: CPT

## 2019-01-01 PROCEDURE — 97116 GAIT TRAINING THERAPY: CPT

## 2019-01-01 PROCEDURE — 80048 BASIC METABOLIC PNL TOTAL CA: CPT

## 2019-01-01 PROCEDURE — 99220 PR INITIAL OBSERVATION CARE,LEVL III: CPT | Mod: GC | Performed by: INTERNAL MEDICINE

## 2019-01-01 PROCEDURE — 99233 SBSQ HOSP IP/OBS HIGH 50: CPT | Mod: GC | Performed by: INTERNAL MEDICINE

## 2019-01-01 PROCEDURE — 36415 COLL VENOUS BLD VENIPUNCTURE: CPT

## 2019-01-01 PROCEDURE — 87493 C DIFF AMPLIFIED PROBE: CPT

## 2019-01-01 PROCEDURE — 97166 OT EVAL MOD COMPLEX 45 MIN: CPT

## 2019-01-01 PROCEDURE — 700101 HCHG RX REV CODE 250: Performed by: STUDENT IN AN ORGANIZED HEALTH CARE EDUCATION/TRAINING PROGRAM

## 2019-01-01 PROCEDURE — 71045 X-RAY EXAM CHEST 1 VIEW: CPT

## 2019-01-01 PROCEDURE — 80053 COMPREHEN METABOLIC PANEL: CPT

## 2019-01-01 PROCEDURE — G0299 HHS/HOSPICE OF RN EA 15 MIN: HCPCS

## 2019-01-01 PROCEDURE — 86900 BLOOD TYPING SEROLOGIC ABO: CPT

## 2019-01-01 PROCEDURE — 90662 IIV NO PRSV INCREASED AG IM: CPT | Performed by: STUDENT IN AN ORGANIZED HEALTH CARE EDUCATION/TRAINING PROGRAM

## 2019-01-01 PROCEDURE — 84134 ASSAY OF PREALBUMIN: CPT

## 2019-01-01 PROCEDURE — 97535 SELF CARE MNGMENT TRAINING: CPT

## 2019-01-01 PROCEDURE — 99239 HOSP IP/OBS DSCHRG MGMT >30: CPT | Performed by: INTERNAL MEDICINE

## 2019-01-01 PROCEDURE — 770020 HCHG ROOM/CARE - TELE (206)

## 2019-01-01 PROCEDURE — 86480 TB TEST CELL IMMUN MEASURE: CPT

## 2019-01-01 PROCEDURE — 770001 HCHG ROOM/CARE - MED/SURG/GYN PRIV*

## 2019-01-01 PROCEDURE — 70551 MRI BRAIN STEM W/O DYE: CPT

## 2019-01-01 PROCEDURE — 82607 VITAMIN B-12: CPT

## 2019-01-01 PROCEDURE — 97162 PT EVAL MOD COMPLEX 30 MIN: CPT

## 2019-01-01 PROCEDURE — 51798 US URINE CAPACITY MEASURE: CPT

## 2019-01-01 PROCEDURE — 82150 ASSAY OF AMYLASE: CPT

## 2019-01-01 PROCEDURE — 97110 THERAPEUTIC EXERCISES: CPT

## 2019-01-01 PROCEDURE — 70460 CT HEAD/BRAIN W/DYE: CPT

## 2019-01-01 PROCEDURE — 97165 OT EVAL LOW COMPLEX 30 MIN: CPT

## 2019-01-01 PROCEDURE — 86901 BLOOD TYPING SEROLOGIC RH(D): CPT

## 2019-01-01 PROCEDURE — 86780 TREPONEMA PALLIDUM: CPT

## 2019-01-01 PROCEDURE — 74018 RADEX ABDOMEN 1 VIEW: CPT

## 2019-01-01 PROCEDURE — 99222 1ST HOSP IP/OBS MODERATE 55: CPT | Mod: AI,GC | Performed by: INTERNAL MEDICINE

## 2019-01-01 PROCEDURE — G0155 HHCP-SVS OF CSW,EA 15 MIN: HCPCS

## 2019-01-01 PROCEDURE — A4357 BEDSIDE DRAINAGE BAG: HCPCS | Performed by: INTERNAL MEDICINE

## 2019-01-01 PROCEDURE — 85027 COMPLETE CBC AUTOMATED: CPT

## 2019-01-01 PROCEDURE — 96365 THER/PROPH/DIAG IV INF INIT: CPT

## 2019-01-01 PROCEDURE — 82550 ASSAY OF CK (CPK): CPT

## 2019-01-01 PROCEDURE — 96367 TX/PROPH/DG ADDL SEQ IV INF: CPT

## 2019-01-01 PROCEDURE — 82140 ASSAY OF AMMONIA: CPT

## 2019-01-01 PROCEDURE — 76937 US GUIDE VASCULAR ACCESS: CPT

## 2019-01-01 PROCEDURE — 70450 CT HEAD/BRAIN W/O DYE: CPT

## 2019-01-01 PROCEDURE — 99233 SBSQ HOSP IP/OBS HIGH 50: CPT | Performed by: HOSPITALIST

## 2019-01-01 PROCEDURE — 92523 SPEECH SOUND LANG COMPREHEN: CPT

## 2019-01-01 PROCEDURE — 82306 VITAMIN D 25 HYDROXY: CPT

## 2019-01-01 PROCEDURE — 85018 HEMOGLOBIN: CPT

## 2019-01-01 PROCEDURE — 81003 URINALYSIS AUTO W/O SCOPE: CPT

## 2019-01-01 PROCEDURE — 85014 HEMATOCRIT: CPT

## 2019-01-01 PROCEDURE — 85730 THROMBOPLASTIN TIME PARTIAL: CPT

## 2019-01-01 PROCEDURE — 84443 ASSAY THYROID STIM HORMONE: CPT

## 2019-01-01 PROCEDURE — 665036 HSPC NOTICE OF ELECTION NOE

## 2019-01-01 PROCEDURE — 74177 CT ABD & PELVIS W/CONTRAST: CPT

## 2019-01-01 PROCEDURE — G0378 HOSPITAL OBSERVATION PER HR: HCPCS

## 2019-01-01 PROCEDURE — 700117 HCHG RX CONTRAST REV CODE 255: Performed by: STUDENT IN AN ORGANIZED HEALTH CARE EDUCATION/TRAINING PROGRAM

## 2019-01-01 PROCEDURE — 302146: Performed by: HOSPITALIST

## 2019-01-01 PROCEDURE — 85610 PROTHROMBIN TIME: CPT

## 2019-01-01 PROCEDURE — 700117 HCHG RX CONTRAST REV CODE 255: Performed by: EMERGENCY MEDICINE

## 2019-01-01 PROCEDURE — 05HY33Z INSERTION OF INFUSION DEVICE INTO UPPER VEIN, PERCUTANEOUS APPROACH: ICD-10-PCS | Performed by: INTERNAL MEDICINE

## 2019-01-01 PROCEDURE — 84550 ASSAY OF BLOOD/URIC ACID: CPT

## 2019-01-01 PROCEDURE — 99231 SBSQ HOSP IP/OBS SF/LOW 25: CPT | Performed by: NURSE PRACTITIONER

## 2019-01-01 PROCEDURE — 93005 ELECTROCARDIOGRAM TRACING: CPT | Performed by: EMERGENCY MEDICINE

## 2019-01-01 PROCEDURE — 306565 RIGID MIT RESTRAINT(PAIR): Performed by: INTERNAL MEDICINE

## 2019-01-01 PROCEDURE — 93306 TTE W/DOPPLER COMPLETE: CPT | Mod: 26 | Performed by: INTERNAL MEDICINE

## 2019-01-01 PROCEDURE — 85007 BL SMEAR W/DIFF WBC COUNT: CPT

## 2019-01-01 PROCEDURE — 99239 HOSP IP/OBS DSCHRG MGMT >30: CPT | Mod: GC | Performed by: INTERNAL MEDICINE

## 2019-01-01 PROCEDURE — 86140 C-REACTIVE PROTEIN: CPT

## 2019-01-01 PROCEDURE — 302146: Performed by: INTERNAL MEDICINE

## 2019-01-01 PROCEDURE — 93306 TTE W/DOPPLER COMPLETE: CPT

## 2019-01-01 PROCEDURE — 700105 HCHG RX REV CODE 258: Performed by: PHYSICAL MEDICINE & REHABILITATION

## 2019-01-01 PROCEDURE — 93010 ELECTROCARDIOGRAM REPORT: CPT | Performed by: INTERNAL MEDICINE

## 2019-01-01 PROCEDURE — 73521 X-RAY EXAM HIPS BI 2 VIEWS: CPT

## 2019-01-01 PROCEDURE — 96366 THER/PROPH/DIAG IV INF ADDON: CPT

## 2019-01-01 PROCEDURE — 92612 ENDOSCOPY SWALLOW (FEES) VID: CPT

## 2019-01-01 PROCEDURE — 92610 EVALUATE SWALLOWING FUNCTION: CPT

## 2019-01-01 PROCEDURE — 83615 LACTATE (LD) (LDH) ENZYME: CPT

## 2019-01-01 PROCEDURE — 83605 ASSAY OF LACTIC ACID: CPT | Mod: 91

## 2019-01-01 PROCEDURE — 82962 GLUCOSE BLOOD TEST: CPT

## 2019-01-01 PROCEDURE — 86850 RBC ANTIBODY SCREEN: CPT

## 2019-01-01 PROCEDURE — 700111 HCHG RX REV CODE 636 W/ 250 OVERRIDE (IP): Performed by: EMERGENCY MEDICINE

## 2019-01-01 PROCEDURE — 93005 ELECTROCARDIOGRAM TRACING: CPT | Performed by: STUDENT IN AN ORGANIZED HEALTH CARE EDUCATION/TRAINING PROGRAM

## 2019-01-01 PROCEDURE — 82746 ASSAY OF FOLIC ACID SERUM: CPT

## 2019-01-01 PROCEDURE — 93005 ELECTROCARDIOGRAM TRACING: CPT

## 2019-01-01 PROCEDURE — 87329 GIARDIA AG IA: CPT

## 2019-01-01 PROCEDURE — 87324 CLOSTRIDIUM AG IA: CPT

## 2019-01-01 RX ORDER — GUAIFENESIN/DEXTROMETHORPHAN 100-10MG/5
10 SYRUP ORAL EVERY 6 HOURS PRN
Status: DISCONTINUED | OUTPATIENT
Start: 2019-01-01 | End: 2019-01-01 | Stop reason: HOSPADM

## 2019-01-01 RX ORDER — POTASSIUM CHLORIDE 20 MEQ/1
40 TABLET, EXTENDED RELEASE ORAL ONCE
Status: COMPLETED | OUTPATIENT
Start: 2019-01-01 | End: 2019-01-01

## 2019-01-01 RX ORDER — SODIUM CHLORIDE 9 MG/ML
250 INJECTION, SOLUTION INTRAVENOUS ONCE
Status: COMPLETED | OUTPATIENT
Start: 2019-01-01 | End: 2019-01-01

## 2019-01-01 RX ORDER — LISINOPRIL 5 MG/1
5 TABLET ORAL DAILY
Status: ON HOLD | COMMUNITY
End: 2019-01-01

## 2019-01-01 RX ORDER — SODIUM CHLORIDE, SODIUM LACTATE, POTASSIUM CHLORIDE, CALCIUM CHLORIDE 600; 310; 30; 20 MG/100ML; MG/100ML; MG/100ML; MG/100ML
INJECTION, SOLUTION INTRAVENOUS CONTINUOUS
Status: DISCONTINUED | OUTPATIENT
Start: 2019-01-01 | End: 2019-01-01

## 2019-01-01 RX ORDER — HEPARIN SODIUM 5000 [USP'U]/ML
5000 INJECTION, SOLUTION INTRAVENOUS; SUBCUTANEOUS EVERY 8 HOURS
Status: DISCONTINUED | OUTPATIENT
Start: 2019-01-01 | End: 2019-01-01

## 2019-01-01 RX ORDER — POLYETHYLENE GLYCOL 3350 17 G/17G
1 POWDER, FOR SOLUTION ORAL
Status: DISCONTINUED | OUTPATIENT
Start: 2019-01-01 | End: 2019-01-01

## 2019-01-01 RX ORDER — ATROPINE SULFATE 10 MG/ML
2 SOLUTION/ DROPS OPHTHALMIC EVERY 4 HOURS PRN
Status: DISCONTINUED | OUTPATIENT
Start: 2019-01-01 | End: 2019-01-01 | Stop reason: HOSPADM

## 2019-01-01 RX ORDER — POTASSIUM CHLORIDE 7.45 MG/ML
10 INJECTION INTRAVENOUS ONCE
Status: COMPLETED | OUTPATIENT
Start: 2019-01-01 | End: 2019-01-01

## 2019-01-01 RX ORDER — LISINOPRIL 5 MG/1
5 TABLET ORAL
Status: DISCONTINUED | OUTPATIENT
Start: 2019-01-01 | End: 2019-01-01

## 2019-01-01 RX ORDER — LEVOTHYROXINE SODIUM 0.05 MG/1
50 TABLET ORAL
Status: DISCONTINUED | OUTPATIENT
Start: 2019-01-01 | End: 2019-01-01

## 2019-01-01 RX ORDER — HEPARIN SODIUM 5000 [USP'U]/ML
5000 INJECTION, SOLUTION INTRAVENOUS; SUBCUTANEOUS EVERY 12 HOURS
Status: DISCONTINUED | OUTPATIENT
Start: 2019-01-01 | End: 2019-01-01

## 2019-01-01 RX ORDER — ALLOPURINOL 100 MG/1
300 TABLET ORAL DAILY
Status: DISCONTINUED | OUTPATIENT
Start: 2019-01-01 | End: 2019-01-01

## 2019-01-01 RX ORDER — ASPIRIN 81 MG/1
81 TABLET, CHEWABLE ORAL DAILY
Status: DISCONTINUED | OUTPATIENT
Start: 2019-01-01 | End: 2019-01-01

## 2019-01-01 RX ORDER — AMOXICILLIN 250 MG
2 CAPSULE ORAL 2 TIMES DAILY
Status: DISCONTINUED | OUTPATIENT
Start: 2019-01-01 | End: 2019-01-01

## 2019-01-01 RX ORDER — SODIUM CHLORIDE 9 MG/ML
500 INJECTION, SOLUTION INTRAVENOUS ONCE
Status: COMPLETED | OUTPATIENT
Start: 2019-01-01 | End: 2019-01-01

## 2019-01-01 RX ORDER — LEVOTHYROXINE SODIUM 0.05 MG/1
50 TABLET ORAL
Status: ON HOLD | COMMUNITY
End: 2019-01-01

## 2019-01-01 RX ORDER — LORAZEPAM 2 MG/ML
1 INJECTION INTRAMUSCULAR
Status: DISCONTINUED | OUTPATIENT
Start: 2019-01-01 | End: 2019-01-01

## 2019-01-01 RX ORDER — CEFDINIR 300 MG/1
300 CAPSULE ORAL EVERY 12 HOURS
Status: COMPLETED | OUTPATIENT
Start: 2019-01-01 | End: 2019-01-01

## 2019-01-01 RX ORDER — BISACODYL 10 MG
10 SUPPOSITORY, RECTAL RECTAL
Status: DISCONTINUED | OUTPATIENT
Start: 2019-01-01 | End: 2019-01-01 | Stop reason: HOSPADM

## 2019-01-01 RX ORDER — ENALAPRILAT 1.25 MG/ML
1.25 INJECTION INTRAVENOUS EVERY 6 HOURS PRN
Status: DISCONTINUED | OUTPATIENT
Start: 2019-01-01 | End: 2019-01-01 | Stop reason: HOSPADM

## 2019-01-01 RX ORDER — POLYETHYLENE GLYCOL 3350 17 G/17G
1 POWDER, FOR SOLUTION ORAL
Status: DISCONTINUED | OUTPATIENT
Start: 2019-01-01 | End: 2019-01-01 | Stop reason: HOSPADM

## 2019-01-01 RX ORDER — LISINOPRIL 5 MG/1
5 TABLET ORAL DAILY
Status: DISCONTINUED | OUTPATIENT
Start: 2019-01-01 | End: 2019-01-01

## 2019-01-01 RX ORDER — ADENOSINE 3 MG/ML
6 INJECTION, SOLUTION INTRAVENOUS ONCE
Status: DISCONTINUED | OUTPATIENT
Start: 2019-01-01 | End: 2019-01-01

## 2019-01-01 RX ORDER — POTASSIUM CHLORIDE 7.45 MG/ML
10 INJECTION INTRAVENOUS ONCE
Status: DISCONTINUED | OUTPATIENT
Start: 2019-01-01 | End: 2019-01-01

## 2019-01-01 RX ORDER — MORPHINE SULFATE 4 MG/ML
2-4 INJECTION, SOLUTION INTRAMUSCULAR; INTRAVENOUS
Status: DISCONTINUED | OUTPATIENT
Start: 2019-01-01 | End: 2019-01-01

## 2019-01-01 RX ORDER — THIAMINE MONONITRATE (VIT B1) 100 MG
100 TABLET ORAL DAILY
Status: DISCONTINUED | OUTPATIENT
Start: 2019-01-01 | End: 2019-01-01

## 2019-01-01 RX ORDER — SODIUM CHLORIDE 9 MG/ML
INJECTION, SOLUTION INTRAVENOUS CONTINUOUS
Status: DISCONTINUED | OUTPATIENT
Start: 2019-01-01 | End: 2019-01-01

## 2019-01-01 RX ORDER — SODIUM CHLORIDE, SODIUM LACTATE, POTASSIUM CHLORIDE, CALCIUM CHLORIDE 600; 310; 30; 20 MG/100ML; MG/100ML; MG/100ML; MG/100ML
1000 INJECTION, SOLUTION INTRAVENOUS ONCE
Status: COMPLETED | OUTPATIENT
Start: 2019-01-01 | End: 2019-01-01

## 2019-01-01 RX ORDER — POTASSIUM CHLORIDE 20 MEQ/1
40 TABLET, EXTENDED RELEASE ORAL DAILY
Qty: 60 TAB | Refills: 11 | Status: ON HOLD | OUTPATIENT
Start: 2019-01-01 | End: 2019-01-01

## 2019-01-01 RX ORDER — SODIUM CHLORIDE, SODIUM LACTATE, POTASSIUM CHLORIDE, AND CALCIUM CHLORIDE .6; .31; .03; .02 G/100ML; G/100ML; G/100ML; G/100ML
1000 INJECTION, SOLUTION INTRAVENOUS ONCE
Status: COMPLETED | OUTPATIENT
Start: 2019-01-01 | End: 2019-01-01

## 2019-01-01 RX ORDER — ALLOPURINOL 300 MG/1
300 TABLET ORAL DAILY
Status: DISCONTINUED | OUTPATIENT
Start: 2019-01-01 | End: 2019-01-01

## 2019-01-01 RX ORDER — ALLOPURINOL 100 MG/1
100 TABLET ORAL DAILY
Status: DISCONTINUED | OUTPATIENT
Start: 2019-01-01 | End: 2019-01-01

## 2019-01-01 RX ORDER — LEVOTHYROXINE SODIUM 0.05 MG/1
50 TABLET ORAL DAILY
Status: DISCONTINUED | OUTPATIENT
Start: 2019-01-01 | End: 2019-01-01 | Stop reason: HOSPADM

## 2019-01-01 RX ORDER — METOPROLOL TARTRATE 1 MG/ML
5 INJECTION, SOLUTION INTRAVENOUS ONCE
Status: COMPLETED | OUTPATIENT
Start: 2019-01-01 | End: 2019-01-01

## 2019-01-01 RX ORDER — SODIUM CHLORIDE 9 MG/ML
750 INJECTION, SOLUTION INTRAVENOUS ONCE
Status: COMPLETED | OUTPATIENT
Start: 2019-01-01 | End: 2019-01-01

## 2019-01-01 RX ORDER — DEXTROSE, SODIUM CHLORIDE, SODIUM LACTATE, POTASSIUM CHLORIDE, AND CALCIUM CHLORIDE 5; .6; .31; .03; .02 G/100ML; G/100ML; G/100ML; G/100ML; G/100ML
INJECTION, SOLUTION INTRAVENOUS CONTINUOUS
Status: DISCONTINUED | OUTPATIENT
Start: 2019-01-01 | End: 2019-01-01

## 2019-01-01 RX ORDER — ATORVASTATIN CALCIUM 20 MG/1
20 TABLET, FILM COATED ORAL NIGHTLY
Status: DISCONTINUED | OUTPATIENT
Start: 2019-01-01 | End: 2019-01-01

## 2019-01-01 RX ORDER — POTASSIUM CHLORIDE 20 MEQ/1
40 TABLET, EXTENDED RELEASE ORAL DAILY
Status: DISCONTINUED | OUTPATIENT
Start: 2019-01-01 | End: 2019-01-01 | Stop reason: HOSPADM

## 2019-01-01 RX ORDER — ALLOPURINOL 300 MG/1
300 TABLET ORAL DAILY
Status: ON HOLD | COMMUNITY
End: 2019-01-01

## 2019-01-01 RX ORDER — METOPROLOL TARTRATE 1 MG/ML
5 INJECTION, SOLUTION INTRAVENOUS
Status: DISCONTINUED | OUTPATIENT
Start: 2019-01-01 | End: 2019-01-01

## 2019-01-01 RX ORDER — BISACODYL 10 MG
10 SUPPOSITORY, RECTAL RECTAL
Status: DISCONTINUED | OUTPATIENT
Start: 2019-01-01 | End: 2019-01-01

## 2019-01-01 RX ORDER — ATORVASTATIN CALCIUM 20 MG/1
20 TABLET, FILM COATED ORAL NIGHTLY
Status: DISCONTINUED | OUTPATIENT
Start: 2019-01-01 | End: 2019-01-01 | Stop reason: HOSPADM

## 2019-01-01 RX ORDER — TRAZODONE HYDROCHLORIDE 50 MG/1
50 TABLET ORAL NIGHTLY PRN
Status: DISCONTINUED | OUTPATIENT
Start: 2019-01-01 | End: 2019-01-01

## 2019-01-01 RX ORDER — LISINOPRIL 5 MG/1
5 TABLET ORAL DAILY
Status: DISCONTINUED | OUTPATIENT
Start: 2019-01-01 | End: 2019-01-01 | Stop reason: HOSPADM

## 2019-01-01 RX ORDER — ACETAMINOPHEN 325 MG/1
650 TABLET ORAL EVERY 6 HOURS PRN
Status: DISCONTINUED | OUTPATIENT
Start: 2019-01-01 | End: 2019-01-01

## 2019-01-01 RX ORDER — AMOXICILLIN 250 MG
2 CAPSULE ORAL 2 TIMES DAILY PRN
Status: DISCONTINUED | OUTPATIENT
Start: 2019-01-01 | End: 2019-01-01 | Stop reason: HOSPADM

## 2019-01-01 RX ORDER — ALLOPURINOL 100 MG/1
200 TABLET ORAL DAILY
Status: DISCONTINUED | OUTPATIENT
Start: 2019-01-01 | End: 2019-01-01

## 2019-01-01 RX ORDER — CEFDINIR 300 MG/1
300 CAPSULE ORAL EVERY 12 HOURS
Qty: 5 CAP | Refills: 0 | Status: ON HOLD | OUTPATIENT
Start: 2019-01-01 | End: 2019-01-01

## 2019-01-01 RX ORDER — AMLODIPINE BESYLATE 5 MG/1
5 TABLET ORAL DAILY
Status: ON HOLD | COMMUNITY
End: 2019-01-01

## 2019-01-01 RX ORDER — POTASSIUM CHLORIDE 20 MEQ/1
20 TABLET, EXTENDED RELEASE ORAL 2 TIMES DAILY
Status: COMPLETED | OUTPATIENT
Start: 2019-01-01 | End: 2019-01-01

## 2019-01-01 RX ORDER — TAMSULOSIN HYDROCHLORIDE 0.4 MG/1
0.4 CAPSULE ORAL
Status: DISCONTINUED | OUTPATIENT
Start: 2019-01-01 | End: 2019-01-01

## 2019-01-01 RX ORDER — METRONIDAZOLE 500 MG/1
500 TABLET ORAL EVERY 8 HOURS
Qty: 5 TAB | Refills: 0 | Status: SHIPPED | OUTPATIENT
Start: 2019-01-01 | End: 2019-01-01

## 2019-01-01 RX ORDER — DEXTROSE MONOHYDRATE, SODIUM CHLORIDE, SODIUM LACTATE, POTASSIUM CHLORIDE, CALCIUM CHLORIDE 5; 600; 310; 179; 20 G/100ML; MG/100ML; MG/100ML; MG/100ML; MG/100ML
INJECTION, SOLUTION INTRAVENOUS CONTINUOUS
Status: DISCONTINUED | OUTPATIENT
Start: 2019-01-01 | End: 2019-01-01

## 2019-01-01 RX ORDER — AMOXICILLIN 250 MG
2 CAPSULE ORAL 2 TIMES DAILY
Status: DISCONTINUED | OUTPATIENT
Start: 2019-01-01 | End: 2019-01-01 | Stop reason: HOSPADM

## 2019-01-01 RX ORDER — HYDRALAZINE HYDROCHLORIDE 20 MG/ML
20 INJECTION INTRAMUSCULAR; INTRAVENOUS EVERY 6 HOURS PRN
Status: DISCONTINUED | OUTPATIENT
Start: 2019-01-01 | End: 2019-01-01

## 2019-01-01 RX ORDER — ATORVASTATIN CALCIUM 20 MG/1
20 TABLET, FILM COATED ORAL NIGHTLY
Status: ON HOLD | COMMUNITY
End: 2019-01-01

## 2019-01-01 RX ORDER — METRONIDAZOLE 500 MG/1
500 TABLET ORAL EVERY 8 HOURS
Status: COMPLETED | OUTPATIENT
Start: 2019-01-01 | End: 2019-01-01

## 2019-01-01 RX ORDER — ECHINACEA PURPUREA EXTRACT 125 MG
2 TABLET ORAL
Status: DISCONTINUED | OUTPATIENT
Start: 2019-01-01 | End: 2019-01-01

## 2019-01-01 RX ORDER — METOPROLOL TARTRATE 1 MG/ML
5 INJECTION, SOLUTION INTRAVENOUS ONCE
Status: DISCONTINUED | OUTPATIENT
Start: 2019-01-01 | End: 2019-01-01

## 2019-01-01 RX ADMIN — ASPIRIN 81 MG 81 MG: 81 TABLET ORAL at 06:00

## 2019-01-01 RX ADMIN — SODIUM CHLORIDE 250 ML: 9 INJECTION, SOLUTION INTRAVENOUS at 11:00

## 2019-01-01 RX ADMIN — LISINOPRIL 5 MG: 5 TABLET ORAL at 04:50

## 2019-01-01 RX ADMIN — Medication 400 MG: at 05:01

## 2019-01-01 RX ADMIN — ATORVASTATIN CALCIUM 20 MG: 20 TABLET, FILM COATED ORAL at 21:52

## 2019-01-01 RX ADMIN — ATORVASTATIN CALCIUM 20 MG: 20 TABLET, FILM COATED ORAL at 20:18

## 2019-01-01 RX ADMIN — ATORVASTATIN CALCIUM 20 MG: 20 TABLET, FILM COATED ORAL at 20:17

## 2019-01-01 RX ADMIN — Medication 400 MG: at 04:46

## 2019-01-01 RX ADMIN — METOPROLOL TARTRATE 12.5 MG: 25 TABLET, FILM COATED ORAL at 19:35

## 2019-01-01 RX ADMIN — ASPIRIN 81 MG 81 MG: 81 TABLET ORAL at 06:24

## 2019-01-01 RX ADMIN — SENNOSIDES AND DOCUSATE SODIUM 2 TABLET: 8.6; 5 TABLET ORAL at 04:42

## 2019-01-01 RX ADMIN — ASPIRIN 81 MG 81 MG: 81 TABLET ORAL at 05:25

## 2019-01-01 RX ADMIN — LEVOTHYROXINE SODIUM 50 MCG: 100 TABLET ORAL at 04:40

## 2019-01-01 RX ADMIN — ASPIRIN 81 MG 81 MG: 81 TABLET ORAL at 08:08

## 2019-01-01 RX ADMIN — HEPARIN SODIUM 5000 UNITS: 5000 INJECTION, SOLUTION INTRAVENOUS; SUBCUTANEOUS at 19:53

## 2019-01-01 RX ADMIN — ATORVASTATIN CALCIUM 20 MG: 20 TABLET, FILM COATED ORAL at 22:00

## 2019-01-01 RX ADMIN — LISINOPRIL 5 MG: 5 TABLET ORAL at 08:58

## 2019-01-01 RX ADMIN — LISINOPRIL 5 MG: 5 TABLET ORAL at 04:35

## 2019-01-01 RX ADMIN — LISINOPRIL 5 MG: 5 TABLET ORAL at 08:35

## 2019-01-01 RX ADMIN — SODIUM CHLORIDE, POTASSIUM CHLORIDE, SODIUM LACTATE AND CALCIUM CHLORIDE: 600; 310; 30; 20 INJECTION, SOLUTION INTRAVENOUS at 20:57

## 2019-01-01 RX ADMIN — ASPIRIN 81 MG 81 MG: 81 TABLET ORAL at 04:49

## 2019-01-01 RX ADMIN — SODIUM CHLORIDE, POTASSIUM CHLORIDE, SODIUM LACTATE AND CALCIUM CHLORIDE: 600; 310; 30; 20 INJECTION, SOLUTION INTRAVENOUS at 16:50

## 2019-01-01 RX ADMIN — LEVOTHYROXINE SODIUM 50 MCG: 100 TABLET ORAL at 09:17

## 2019-01-01 RX ADMIN — LISINOPRIL 5 MG: 5 TABLET ORAL at 04:14

## 2019-01-01 RX ADMIN — LEVOTHYROXINE SODIUM 50 MCG: 100 TABLET ORAL at 09:22

## 2019-01-01 RX ADMIN — HEPARIN SODIUM 5000 UNITS: 5000 INJECTION, SOLUTION INTRAVENOUS; SUBCUTANEOUS at 21:06

## 2019-01-01 RX ADMIN — TRAZODONE HYDROCHLORIDE 50 MG: 50 TABLET ORAL at 19:43

## 2019-01-01 RX ADMIN — HEPARIN SODIUM 5000 UNITS: 5000 INJECTION, SOLUTION INTRAVENOUS; SUBCUTANEOUS at 20:58

## 2019-01-01 RX ADMIN — HEPARIN SODIUM 5000 UNITS: 5000 INJECTION, SOLUTION INTRAVENOUS; SUBCUTANEOUS at 05:21

## 2019-01-01 RX ADMIN — LEVOTHYROXINE SODIUM 50 MCG: 100 TABLET ORAL at 05:10

## 2019-01-01 RX ADMIN — METOPROLOL TARTRATE 25 MG: 25 TABLET, FILM COATED ORAL at 06:13

## 2019-01-01 RX ADMIN — HEPARIN SODIUM 5000 UNITS: 5000 INJECTION, SOLUTION INTRAVENOUS; SUBCUTANEOUS at 09:07

## 2019-01-01 RX ADMIN — LEVOTHYROXINE SODIUM 50 MCG: 100 TABLET ORAL at 04:16

## 2019-01-01 RX ADMIN — TRAZODONE HYDROCHLORIDE 50 MG: 50 TABLET ORAL at 21:39

## 2019-01-01 RX ADMIN — MAGNESIUM OXIDE TAB 400 MG (241.3 MG ELEMENTAL MG) 400 MG: 400 (241.3 MG) TAB at 07:38

## 2019-01-01 RX ADMIN — HEPARIN SODIUM 5000 UNITS: 5000 INJECTION, SOLUTION INTRAVENOUS; SUBCUTANEOUS at 21:36

## 2019-01-01 RX ADMIN — ATORVASTATIN CALCIUM 20 MG: 20 TABLET, FILM COATED ORAL at 20:28

## 2019-01-01 RX ADMIN — HEPARIN SODIUM 5000 UNITS: 5000 INJECTION, SOLUTION INTRAVENOUS; SUBCUTANEOUS at 13:52

## 2019-01-01 RX ADMIN — TAMSULOSIN HYDROCHLORIDE 0.4 MG: 0.4 CAPSULE ORAL at 08:13

## 2019-01-01 RX ADMIN — HEPARIN SODIUM 5000 UNITS: 5000 INJECTION, SOLUTION INTRAVENOUS; SUBCUTANEOUS at 04:54

## 2019-01-01 RX ADMIN — LEVOTHYROXINE SODIUM 50 MCG: 100 TABLET ORAL at 05:35

## 2019-01-01 RX ADMIN — Medication 100 MG: at 07:47

## 2019-01-01 RX ADMIN — ALLOPURINOL 300 MG: 300 TABLET ORAL at 04:41

## 2019-01-01 RX ADMIN — METOPROLOL TARTRATE 25 MG: 25 TABLET ORAL at 06:00

## 2019-01-01 RX ADMIN — ALLOPURINOL 300 MG: 300 TABLET ORAL at 05:29

## 2019-01-01 RX ADMIN — Medication 100 MG: at 05:05

## 2019-01-01 RX ADMIN — ASPIRIN 81 MG 81 MG: 81 TABLET ORAL at 05:20

## 2019-01-01 RX ADMIN — ATORVASTATIN CALCIUM 20 MG: 20 TABLET, FILM COATED ORAL at 20:37

## 2019-01-01 RX ADMIN — ASPIRIN 81 MG 81 MG: 81 TABLET ORAL at 09:06

## 2019-01-01 RX ADMIN — Medication 400 MG: at 05:02

## 2019-01-01 RX ADMIN — METOPROLOL TARTRATE 12.5 MG: 25 TABLET, FILM COATED ORAL at 09:16

## 2019-01-01 RX ADMIN — HEPARIN SODIUM 5000 UNITS: 5000 INJECTION, SOLUTION INTRAVENOUS; SUBCUTANEOUS at 05:17

## 2019-01-01 RX ADMIN — ALLOPURINOL 300 MG: 300 TABLET ORAL at 05:25

## 2019-01-01 RX ADMIN — LEVOTHYROXINE SODIUM 50 MCG: 100 TABLET ORAL at 04:34

## 2019-01-01 RX ADMIN — METOPROLOL TARTRATE 12.5 MG: 25 TABLET ORAL at 17:41

## 2019-01-01 RX ADMIN — HEPARIN SODIUM 5000 UNITS: 5000 INJECTION, SOLUTION INTRAVENOUS; SUBCUTANEOUS at 08:50

## 2019-01-01 RX ADMIN — HEPARIN SODIUM 5000 UNITS: 5000 INJECTION, SOLUTION INTRAVENOUS; SUBCUTANEOUS at 05:19

## 2019-01-01 RX ADMIN — LEVOTHYROXINE SODIUM 50 MCG: 100 TABLET ORAL at 08:49

## 2019-01-01 RX ADMIN — ATORVASTATIN CALCIUM 20 MG: 20 TABLET, FILM COATED ORAL at 20:09

## 2019-01-01 RX ADMIN — ATORVASTATIN CALCIUM 20 MG: 20 TABLET, FILM COATED ORAL at 19:23

## 2019-01-01 RX ADMIN — HEPARIN SODIUM 5000 UNITS: 5000 INJECTION, SOLUTION INTRAVENOUS; SUBCUTANEOUS at 14:30

## 2019-01-01 RX ADMIN — HEPARIN SODIUM 5000 UNITS: 5000 INJECTION, SOLUTION INTRAVENOUS; SUBCUTANEOUS at 19:11

## 2019-01-01 RX ADMIN — Medication 100 MG: at 07:36

## 2019-01-01 RX ADMIN — LISINOPRIL 5 MG: 5 TABLET ORAL at 06:13

## 2019-01-01 RX ADMIN — ALLOPURINOL 100 MG: 100 TABLET ORAL at 08:10

## 2019-01-01 RX ADMIN — ALLOPURINOL 200 MG: 100 TABLET ORAL at 05:12

## 2019-01-01 RX ADMIN — TRAZODONE HYDROCHLORIDE 50 MG: 50 TABLET ORAL at 20:55

## 2019-01-01 RX ADMIN — METRONIDAZOLE 500 MG: 500 INJECTION, SOLUTION INTRAVENOUS at 05:24

## 2019-01-01 RX ADMIN — ASPIRIN 81 MG 81 MG: 81 TABLET ORAL at 05:42

## 2019-01-01 RX ADMIN — ASPIRIN 81 MG 81 MG: 81 TABLET ORAL at 04:17

## 2019-01-01 RX ADMIN — HEPARIN SODIUM 5000 UNITS: 5000 INJECTION, SOLUTION INTRAVENOUS; SUBCUTANEOUS at 04:56

## 2019-01-01 RX ADMIN — ALLOPURINOL 300 MG: 300 TABLET ORAL at 06:03

## 2019-01-01 RX ADMIN — Medication 400 MG: at 04:45

## 2019-01-01 RX ADMIN — ATORVASTATIN CALCIUM 20 MG: 20 TABLET, FILM COATED ORAL at 20:45

## 2019-01-01 RX ADMIN — METOPROLOL TARTRATE 25 MG: 25 TABLET ORAL at 05:28

## 2019-01-01 RX ADMIN — METRONIDAZOLE 500 MG: 500 TABLET ORAL at 15:29

## 2019-01-01 RX ADMIN — ASPIRIN 81 MG 81 MG: 81 TABLET ORAL at 04:19

## 2019-01-01 RX ADMIN — LEVOTHYROXINE SODIUM 50 MCG: 100 TABLET ORAL at 06:03

## 2019-01-01 RX ADMIN — ASPIRIN 81 MG 81 MG: 81 TABLET ORAL at 05:57

## 2019-01-01 RX ADMIN — SENNOSIDES,DOCUSATE SODIUM 2 TABLET: 8.6; 5 TABLET, FILM COATED ORAL at 21:00

## 2019-01-01 RX ADMIN — TRAZODONE HYDROCHLORIDE 50 MG: 50 TABLET ORAL at 19:59

## 2019-01-01 RX ADMIN — LEVOTHYROXINE SODIUM 50 MCG: 100 TABLET ORAL at 04:51

## 2019-01-01 RX ADMIN — Medication 100 MG: at 08:26

## 2019-01-01 RX ADMIN — HEPARIN SODIUM 5000 UNITS: 5000 INJECTION, SOLUTION INTRAVENOUS; SUBCUTANEOUS at 21:04

## 2019-01-01 RX ADMIN — HEPARIN SODIUM 5000 UNITS: 5000 INJECTION, SOLUTION INTRAVENOUS; SUBCUTANEOUS at 19:47

## 2019-01-01 RX ADMIN — ASPIRIN 81 MG 81 MG: 81 TABLET ORAL at 08:59

## 2019-01-01 RX ADMIN — SENNOSIDES AND DOCUSATE SODIUM 2 TABLET: 8.6; 5 TABLET ORAL at 06:04

## 2019-01-01 RX ADMIN — SENNOSIDES AND DOCUSATE SODIUM 2 TABLET: 8.6; 5 TABLET ORAL at 04:41

## 2019-01-01 RX ADMIN — Medication 100 MG: at 08:22

## 2019-01-01 RX ADMIN — METOPROLOL TARTRATE 12.5 MG: 25 TABLET ORAL at 18:13

## 2019-01-01 RX ADMIN — ALLOPURINOL 300 MG: 300 TABLET ORAL at 05:20

## 2019-01-01 RX ADMIN — LISINOPRIL 5 MG: 5 TABLET ORAL at 08:16

## 2019-01-01 RX ADMIN — SENNOSIDES AND DOCUSATE SODIUM 2 TABLET: 8.6; 5 TABLET ORAL at 16:43

## 2019-01-01 RX ADMIN — TAMSULOSIN HYDROCHLORIDE 0.4 MG: 0.4 CAPSULE ORAL at 10:21

## 2019-01-01 RX ADMIN — ALLOPURINOL 200 MG: 100 TABLET ORAL at 05:42

## 2019-01-01 RX ADMIN — Medication 400 MG: at 04:50

## 2019-01-01 RX ADMIN — HEPARIN SODIUM 5000 UNITS: 5000 INJECTION, SOLUTION INTRAVENOUS; SUBCUTANEOUS at 14:53

## 2019-01-01 RX ADMIN — ALLOPURINOL 100 MG: 100 TABLET ORAL at 08:33

## 2019-01-01 RX ADMIN — SODIUM CHLORIDE: 9 INJECTION, SOLUTION INTRAVENOUS at 20:14

## 2019-01-01 RX ADMIN — LEVOTHYROXINE SODIUM 50 MCG: 100 TABLET ORAL at 07:56

## 2019-01-01 RX ADMIN — METOPROLOL TARTRATE 25 MG: 25 TABLET ORAL at 05:15

## 2019-01-01 RX ADMIN — LEVOTHYROXINE SODIUM 50 MCG: 100 TABLET ORAL at 08:32

## 2019-01-01 RX ADMIN — POTASSIUM CHLORIDE 10 MEQ: 7.46 INJECTION, SOLUTION INTRAVENOUS at 23:00

## 2019-01-01 RX ADMIN — LISINOPRIL 5 MG: 5 TABLET ORAL at 07:45

## 2019-01-01 RX ADMIN — HEPARIN SODIUM 5000 UNITS: 5000 INJECTION, SOLUTION INTRAVENOUS; SUBCUTANEOUS at 20:03

## 2019-01-01 RX ADMIN — TAMSULOSIN HYDROCHLORIDE 0.4 MG: 0.4 CAPSULE ORAL at 09:16

## 2019-01-01 RX ADMIN — LEVOTHYROXINE SODIUM 50 MCG: 100 TABLET ORAL at 05:13

## 2019-01-01 RX ADMIN — HEPARIN SODIUM 5000 UNITS: 5000 INJECTION, SOLUTION INTRAVENOUS; SUBCUTANEOUS at 07:46

## 2019-01-01 RX ADMIN — METOPROLOL TARTRATE 25 MG: 25 TABLET, FILM COATED ORAL at 05:56

## 2019-01-01 RX ADMIN — METOPROLOL TARTRATE 25 MG: 25 TABLET, FILM COATED ORAL at 05:32

## 2019-01-01 RX ADMIN — ASPIRIN 81 MG 81 MG: 81 TABLET ORAL at 05:29

## 2019-01-01 RX ADMIN — LISINOPRIL 5 MG: 5 TABLET ORAL at 05:54

## 2019-01-01 RX ADMIN — LISINOPRIL 5 MG: 5 TABLET ORAL at 04:41

## 2019-01-01 RX ADMIN — ASPIRIN 81 MG: 81 TABLET, COATED ORAL at 16:42

## 2019-01-01 RX ADMIN — TAMSULOSIN HYDROCHLORIDE 0.4 MG: 0.4 CAPSULE ORAL at 09:57

## 2019-01-01 RX ADMIN — Medication 400 MG: at 06:00

## 2019-01-01 RX ADMIN — LEVOTHYROXINE SODIUM 50 MCG: 100 TABLET ORAL at 08:11

## 2019-01-01 RX ADMIN — Medication 100 MG: at 05:42

## 2019-01-01 RX ADMIN — LEVOTHYROXINE SODIUM 50 MCG: 100 TABLET ORAL at 04:55

## 2019-01-01 RX ADMIN — ALLOPURINOL 300 MG: 300 TABLET ORAL at 05:55

## 2019-01-01 RX ADMIN — LEVOTHYROXINE SODIUM 50 MCG: 100 TABLET ORAL at 05:05

## 2019-01-01 RX ADMIN — ALLOPURINOL 300 MG: 300 TABLET ORAL at 05:01

## 2019-01-01 RX ADMIN — METOPROLOL TARTRATE 25 MG: 25 TABLET ORAL at 04:36

## 2019-01-01 RX ADMIN — ATORVASTATIN CALCIUM 20 MG: 20 TABLET, FILM COATED ORAL at 20:04

## 2019-01-01 RX ADMIN — METOPROLOL TARTRATE 12.5 MG: 25 TABLET, FILM COATED ORAL at 17:06

## 2019-01-01 RX ADMIN — METOPROLOL TARTRATE 12.5 MG: 25 TABLET ORAL at 04:39

## 2019-01-01 RX ADMIN — TAMSULOSIN HYDROCHLORIDE 0.4 MG: 0.4 CAPSULE ORAL at 10:11

## 2019-01-01 RX ADMIN — METRONIDAZOLE 500 MG: 500 INJECTION, SOLUTION INTRAVENOUS at 14:32

## 2019-01-01 RX ADMIN — LEVOTHYROXINE SODIUM 50 MCG: 100 TABLET ORAL at 10:16

## 2019-01-01 RX ADMIN — ATORVASTATIN CALCIUM 20 MG: 20 TABLET, FILM COATED ORAL at 20:01

## 2019-01-01 RX ADMIN — TRAZODONE HYDROCHLORIDE 50 MG: 50 TABLET ORAL at 01:48

## 2019-01-01 RX ADMIN — HEPARIN SODIUM 5000 UNITS: 5000 INJECTION, SOLUTION INTRAVENOUS; SUBCUTANEOUS at 19:30

## 2019-01-01 RX ADMIN — METOPROLOL TARTRATE 12.5 MG: 25 TABLET, FILM COATED ORAL at 08:53

## 2019-01-01 RX ADMIN — ASPIRIN 81 MG 81 MG: 81 TABLET ORAL at 05:45

## 2019-01-01 RX ADMIN — Medication 400 MG: at 05:15

## 2019-01-01 RX ADMIN — METOPROLOL TARTRATE 25 MG: 25 TABLET, FILM COATED ORAL at 17:22

## 2019-01-01 RX ADMIN — METOPROLOL TARTRATE 25 MG: 25 TABLET ORAL at 05:58

## 2019-01-01 RX ADMIN — Medication 400 MG: at 05:44

## 2019-01-01 RX ADMIN — ALLOPURINOL 300 MG: 300 TABLET ORAL at 04:34

## 2019-01-01 RX ADMIN — ALLOPURINOL 100 MG: 100 TABLET ORAL at 09:08

## 2019-01-01 RX ADMIN — ASPIRIN 81 MG 81 MG: 81 TABLET ORAL at 04:15

## 2019-01-01 RX ADMIN — LISINOPRIL 5 MG: 5 TABLET ORAL at 05:43

## 2019-01-01 RX ADMIN — LISINOPRIL 5 MG: 5 TABLET ORAL at 04:19

## 2019-01-01 RX ADMIN — ATORVASTATIN CALCIUM 20 MG: 20 TABLET, FILM COATED ORAL at 22:12

## 2019-01-01 RX ADMIN — METOPROLOL TARTRATE 25 MG: 25 TABLET, FILM COATED ORAL at 06:05

## 2019-01-01 RX ADMIN — SENNOSIDES AND DOCUSATE SODIUM 2 TABLET: 8.6; 5 TABLET ORAL at 04:49

## 2019-01-01 RX ADMIN — HEPARIN SODIUM 5000 UNITS: 5000 INJECTION, SOLUTION INTRAVENOUS; SUBCUTANEOUS at 05:16

## 2019-01-01 RX ADMIN — LISINOPRIL 5 MG: 5 TABLET ORAL at 05:35

## 2019-01-01 RX ADMIN — ALLOPURINOL 100 MG: 100 TABLET ORAL at 04:50

## 2019-01-01 RX ADMIN — METOPROLOL TARTRATE 12.5 MG: 25 TABLET, FILM COATED ORAL at 09:22

## 2019-01-01 RX ADMIN — METOPROLOL TARTRATE 25 MG: 25 TABLET ORAL at 05:19

## 2019-01-01 RX ADMIN — LISINOPRIL 5 MG: 5 TABLET ORAL at 05:41

## 2019-01-01 RX ADMIN — HEPARIN SODIUM 5000 UNITS: 5000 INJECTION, SOLUTION INTRAVENOUS; SUBCUTANEOUS at 18:04

## 2019-01-01 RX ADMIN — METOPROLOL TARTRATE 25 MG: 25 TABLET, FILM COATED ORAL at 05:54

## 2019-01-01 RX ADMIN — METOPROLOL TARTRATE 12.5 MG: 25 TABLET ORAL at 05:34

## 2019-01-01 RX ADMIN — Medication 100 MG: at 05:55

## 2019-01-01 RX ADMIN — ASPIRIN 81 MG 81 MG: 81 TABLET ORAL at 05:02

## 2019-01-01 RX ADMIN — ASPIRIN 81 MG 81 MG: 81 TABLET ORAL at 05:19

## 2019-01-01 RX ADMIN — METOPROLOL TARTRATE 25 MG: 25 TABLET, FILM COATED ORAL at 05:29

## 2019-01-01 RX ADMIN — Medication 400 MG: at 06:13

## 2019-01-01 RX ADMIN — HEPARIN SODIUM 5000 UNITS: 5000 INJECTION, SOLUTION INTRAVENOUS; SUBCUTANEOUS at 13:46

## 2019-01-01 RX ADMIN — LISINOPRIL 5 MG: 5 TABLET ORAL at 04:52

## 2019-01-01 RX ADMIN — Medication 400 MG: at 04:49

## 2019-01-01 RX ADMIN — ASPIRIN 81 MG 81 MG: 81 TABLET ORAL at 04:06

## 2019-01-01 RX ADMIN — ATORVASTATIN CALCIUM 20 MG: 20 TABLET, FILM COATED ORAL at 21:00

## 2019-01-01 RX ADMIN — ATORVASTATIN CALCIUM 20 MG: 20 TABLET, FILM COATED ORAL at 20:54

## 2019-01-01 RX ADMIN — TAMSULOSIN HYDROCHLORIDE 0.4 MG: 0.4 CAPSULE ORAL at 08:10

## 2019-01-01 RX ADMIN — LISINOPRIL 5 MG: 5 TABLET ORAL at 04:18

## 2019-01-01 RX ADMIN — ALLOPURINOL 300 MG: 300 TABLET ORAL at 04:49

## 2019-01-01 RX ADMIN — ASPIRIN 81 MG 81 MG: 81 TABLET ORAL at 10:17

## 2019-01-01 RX ADMIN — LEVOTHYROXINE SODIUM 50 MCG: 50 TABLET ORAL at 06:16

## 2019-01-01 RX ADMIN — METOPROLOL TARTRATE 12.5 MG: 25 TABLET ORAL at 17:14

## 2019-01-01 RX ADMIN — LISINOPRIL 5 MG: 5 TABLET ORAL at 04:54

## 2019-01-01 RX ADMIN — MAGNESIUM OXIDE TAB 400 MG (241.3 MG ELEMENTAL MG) 400 MG: 400 (241.3 MG) TAB at 08:14

## 2019-01-01 RX ADMIN — METOPROLOL TARTRATE 12.5 MG: 25 TABLET, FILM COATED ORAL at 17:09

## 2019-01-01 RX ADMIN — HEPARIN SODIUM 5000 UNITS: 5000 INJECTION, SOLUTION INTRAVENOUS; SUBCUTANEOUS at 07:16

## 2019-01-01 RX ADMIN — ALLOPURINOL 100 MG: 100 TABLET ORAL at 04:05

## 2019-01-01 RX ADMIN — Medication 100 MG: at 07:44

## 2019-01-01 RX ADMIN — LISINOPRIL 5 MG: 5 TABLET ORAL at 08:50

## 2019-01-01 RX ADMIN — LISINOPRIL 5 MG: 5 TABLET ORAL at 09:00

## 2019-01-01 RX ADMIN — INFLUENZA A VIRUS A/MICHIGAN/45/2015 X-275 (H1N1) ANTIGEN (FORMALDEHYDE INACTIVATED), INFLUENZA A VIRUS A/SINGAPORE/INFIMH-16-0019/2016 IVR-186 (H3N2) ANTIGEN (FORMALDEHYDE INACTIVATED), AND INFLUENZA B VIRUS B/MARYLAND/15/2016 BX-69A (A B/COLORADO/6/2017-LIKE VIRUS) ANTIGEN (FORMALDEHYDE INACTIVATED) 0.5 ML: 60; 60; 60 INJECTION, SUSPENSION INTRAMUSCULAR at 14:56

## 2019-01-01 RX ADMIN — MAGNESIUM OXIDE TAB 400 MG (241.3 MG ELEMENTAL MG) 400 MG: 400 (241.3 MG) TAB at 08:26

## 2019-01-01 RX ADMIN — Medication 400 MG: at 05:18

## 2019-01-01 RX ADMIN — ALLOPURINOL 200 MG: 100 TABLET ORAL at 05:44

## 2019-01-01 RX ADMIN — METOPROLOL TARTRATE 12.5 MG: 25 TABLET, FILM COATED ORAL at 07:16

## 2019-01-01 RX ADMIN — ASPIRIN 81 MG 81 MG: 81 TABLET ORAL at 07:46

## 2019-01-01 RX ADMIN — HEPARIN SODIUM 5000 UNITS: 5000 INJECTION, SOLUTION INTRAVENOUS; SUBCUTANEOUS at 20:15

## 2019-01-01 RX ADMIN — LISINOPRIL 5 MG: 5 TABLET ORAL at 08:52

## 2019-01-01 RX ADMIN — LISINOPRIL 5 MG: 5 TABLET ORAL at 11:52

## 2019-01-01 RX ADMIN — METRONIDAZOLE 500 MG: 500 TABLET ORAL at 20:17

## 2019-01-01 RX ADMIN — METOPROLOL TARTRATE 25 MG: 25 TABLET, FILM COATED ORAL at 17:19

## 2019-01-01 RX ADMIN — METOPROLOL TARTRATE 12.5 MG: 25 TABLET, FILM COATED ORAL at 09:17

## 2019-01-01 RX ADMIN — METOPROLOL TARTRATE 12.5 MG: 25 TABLET, FILM COATED ORAL at 19:49

## 2019-01-01 RX ADMIN — HEPARIN SODIUM 5000 UNITS: 5000 INJECTION, SOLUTION INTRAVENOUS; SUBCUTANEOUS at 20:08

## 2019-01-01 RX ADMIN — SENNOSIDES AND DOCUSATE SODIUM 2 TABLET: 8.6; 5 TABLET ORAL at 18:26

## 2019-01-01 RX ADMIN — LEVOTHYROXINE SODIUM 50 MCG: 100 TABLET ORAL at 04:46

## 2019-01-01 RX ADMIN — ASPIRIN 81 MG 81 MG: 81 TABLET ORAL at 04:47

## 2019-01-01 RX ADMIN — ASPIRIN 81 MG 81 MG: 81 TABLET ORAL at 06:09

## 2019-01-01 RX ADMIN — HEPARIN SODIUM 5000 UNITS: 5000 INJECTION, SOLUTION INTRAVENOUS; SUBCUTANEOUS at 09:13

## 2019-01-01 RX ADMIN — METOPROLOL TARTRATE 25 MG: 25 TABLET, FILM COATED ORAL at 04:34

## 2019-01-01 RX ADMIN — TRAZODONE HYDROCHLORIDE 50 MG: 50 TABLET ORAL at 19:58

## 2019-01-01 RX ADMIN — SODIUM CHLORIDE 500 ML: 9 INJECTION, SOLUTION INTRAVENOUS at 19:02

## 2019-01-01 RX ADMIN — LISINOPRIL 5 MG: 5 TABLET ORAL at 08:26

## 2019-01-01 RX ADMIN — POTASSIUM BICARBONATE 50 MEQ: 978 TABLET, EFFERVESCENT ORAL at 02:19

## 2019-01-01 RX ADMIN — ALLOPURINOL 100 MG: 100 TABLET ORAL at 07:44

## 2019-01-01 RX ADMIN — Medication 100 MG: at 05:03

## 2019-01-01 RX ADMIN — HEPARIN SODIUM 5000 UNITS: 5000 INJECTION, SOLUTION INTRAVENOUS; SUBCUTANEOUS at 17:49

## 2019-01-01 RX ADMIN — LEVOTHYROXINE SODIUM 50 MCG: 100 TABLET ORAL at 05:03

## 2019-01-01 RX ADMIN — HEPARIN SODIUM 5000 UNITS: 5000 INJECTION, SOLUTION INTRAVENOUS; SUBCUTANEOUS at 14:25

## 2019-01-01 RX ADMIN — HEPARIN SODIUM 5000 UNITS: 5000 INJECTION, SOLUTION INTRAVENOUS; SUBCUTANEOUS at 14:59

## 2019-01-01 RX ADMIN — METOPROLOL TARTRATE 12.5 MG: 25 TABLET ORAL at 16:52

## 2019-01-01 RX ADMIN — HEPARIN SODIUM 5000 UNITS: 5000 INJECTION, SOLUTION INTRAVENOUS; SUBCUTANEOUS at 05:54

## 2019-01-01 RX ADMIN — METRONIDAZOLE 500 MG: 500 TABLET ORAL at 13:13

## 2019-01-01 RX ADMIN — LEVOTHYROXINE SODIUM 50 MCG: 100 TABLET ORAL at 07:42

## 2019-01-01 RX ADMIN — LISINOPRIL 5 MG: 5 TABLET ORAL at 09:44

## 2019-01-01 RX ADMIN — Medication 400 MG: at 05:19

## 2019-01-01 RX ADMIN — ALLOPURINOL 100 MG: 100 TABLET ORAL at 06:00

## 2019-01-01 RX ADMIN — ATORVASTATIN CALCIUM 20 MG: 20 TABLET, FILM COATED ORAL at 20:30

## 2019-01-01 RX ADMIN — Medication 100 MG: at 08:35

## 2019-01-01 RX ADMIN — SENNOSIDES AND DOCUSATE SODIUM 2 TABLET: 8.6; 5 TABLET ORAL at 05:23

## 2019-01-01 RX ADMIN — POTASSIUM CHLORIDE 40 MEQ: 1500 TABLET, EXTENDED RELEASE ORAL at 04:53

## 2019-01-01 RX ADMIN — LEVOTHYROXINE SODIUM 50 MCG: 100 TABLET ORAL at 05:36

## 2019-01-01 RX ADMIN — HEPARIN SODIUM 5000 UNITS: 5000 INJECTION, SOLUTION INTRAVENOUS; SUBCUTANEOUS at 22:00

## 2019-01-01 RX ADMIN — Medication 400 MG: at 04:39

## 2019-01-01 RX ADMIN — LISINOPRIL 5 MG: 5 TABLET ORAL at 08:21

## 2019-01-01 RX ADMIN — HEPARIN SODIUM 5000 UNITS: 5000 INJECTION, SOLUTION INTRAVENOUS; SUBCUTANEOUS at 14:06

## 2019-01-01 RX ADMIN — LISINOPRIL 5 MG: 5 TABLET ORAL at 06:03

## 2019-01-01 RX ADMIN — ASPIRIN 81 MG 81 MG: 81 TABLET ORAL at 07:49

## 2019-01-01 RX ADMIN — ASPIRIN 81 MG 81 MG: 81 TABLET ORAL at 08:48

## 2019-01-01 RX ADMIN — ALLOPURINOL 200 MG: 100 TABLET ORAL at 04:23

## 2019-01-01 RX ADMIN — METOPROLOL TARTRATE 12.5 MG: 25 TABLET ORAL at 17:13

## 2019-01-01 RX ADMIN — ASPIRIN 81 MG 81 MG: 81 TABLET ORAL at 08:10

## 2019-01-01 RX ADMIN — Medication 100 MG: at 07:56

## 2019-01-01 RX ADMIN — HEPARIN SODIUM 5000 UNITS: 5000 INJECTION, SOLUTION INTRAVENOUS; SUBCUTANEOUS at 07:49

## 2019-01-01 RX ADMIN — METOPROLOL TARTRATE 12.5 MG: 25 TABLET, FILM COATED ORAL at 09:06

## 2019-01-01 RX ADMIN — METRONIDAZOLE 500 MG: 500 TABLET ORAL at 06:04

## 2019-01-01 RX ADMIN — LEVOTHYROXINE SODIUM 50 MCG: 100 TABLET ORAL at 05:43

## 2019-01-01 RX ADMIN — METOPROLOL TARTRATE 12.5 MG: 25 TABLET ORAL at 17:32

## 2019-01-01 RX ADMIN — HEPARIN SODIUM 5000 UNITS: 5000 INJECTION, SOLUTION INTRAVENOUS; SUBCUTANEOUS at 14:44

## 2019-01-01 RX ADMIN — SENNOSIDES AND DOCUSATE SODIUM 2 TABLET: 8.6; 5 TABLET ORAL at 06:13

## 2019-01-01 RX ADMIN — HEPARIN SODIUM 5000 UNITS: 5000 INJECTION, SOLUTION INTRAVENOUS; SUBCUTANEOUS at 20:39

## 2019-01-01 RX ADMIN — SENNOSIDES AND DOCUSATE SODIUM 2 TABLET: 8.6; 5 TABLET ORAL at 17:40

## 2019-01-01 RX ADMIN — LISINOPRIL 5 MG: 5 TABLET ORAL at 04:16

## 2019-01-01 RX ADMIN — SODIUM CHLORIDE: 9 INJECTION, SOLUTION INTRAVENOUS at 09:07

## 2019-01-01 RX ADMIN — SENNOSIDES AND DOCUSATE SODIUM 2 TABLET: 8.6; 5 TABLET ORAL at 06:22

## 2019-01-01 RX ADMIN — LEVOTHYROXINE SODIUM 50 MCG: 100 TABLET ORAL at 05:41

## 2019-01-01 RX ADMIN — ALLOPURINOL 100 MG: 100 TABLET ORAL at 05:11

## 2019-01-01 RX ADMIN — LISINOPRIL 5 MG: 5 TABLET ORAL at 06:18

## 2019-01-01 RX ADMIN — LISINOPRIL 5 MG: 5 TABLET ORAL at 05:30

## 2019-01-01 RX ADMIN — LISINOPRIL 5 MG: 5 TABLET ORAL at 05:11

## 2019-01-01 RX ADMIN — HEPARIN SODIUM 5000 UNITS: 5000 INJECTION, SOLUTION INTRAVENOUS; SUBCUTANEOUS at 08:58

## 2019-01-01 RX ADMIN — Medication 400 MG: at 04:40

## 2019-01-01 RX ADMIN — LEVOTHYROXINE SODIUM 50 MCG: 50 TABLET ORAL at 05:26

## 2019-01-01 RX ADMIN — ATORVASTATIN CALCIUM 20 MG: 20 TABLET, FILM COATED ORAL at 21:15

## 2019-01-01 RX ADMIN — HEPARIN SODIUM 5000 UNITS: 5000 INJECTION, SOLUTION INTRAVENOUS; SUBCUTANEOUS at 20:17

## 2019-01-01 RX ADMIN — Medication 400 MG: at 06:03

## 2019-01-01 RX ADMIN — METOPROLOL TARTRATE 12.5 MG: 25 TABLET ORAL at 05:05

## 2019-01-01 RX ADMIN — HEPARIN SODIUM 5000 UNITS: 5000 INJECTION, SOLUTION INTRAVENOUS; SUBCUTANEOUS at 04:41

## 2019-01-01 RX ADMIN — ASPIRIN 81 MG 81 MG: 81 TABLET ORAL at 05:41

## 2019-01-01 RX ADMIN — ACETAMINOPHEN 650 MG: 325 TABLET, FILM COATED ORAL at 04:49

## 2019-01-01 RX ADMIN — ALLOPURINOL 200 MG: 100 TABLET ORAL at 05:17

## 2019-01-01 RX ADMIN — ALLOPURINOL 100 MG: 100 TABLET ORAL at 07:58

## 2019-01-01 RX ADMIN — Medication 100 MG: at 09:12

## 2019-01-01 RX ADMIN — HEPARIN SODIUM 5000 UNITS: 5000 INJECTION, SOLUTION INTRAVENOUS; SUBCUTANEOUS at 20:05

## 2019-01-01 RX ADMIN — ATORVASTATIN CALCIUM 20 MG: 20 TABLET, FILM COATED ORAL at 20:05

## 2019-01-01 RX ADMIN — ASPIRIN 81 MG 81 MG: 81 TABLET ORAL at 05:27

## 2019-01-01 RX ADMIN — ATORVASTATIN CALCIUM 20 MG: 20 TABLET, FILM COATED ORAL at 21:32

## 2019-01-01 RX ADMIN — METOPROLOL TARTRATE 25 MG: 25 TABLET, FILM COATED ORAL at 04:14

## 2019-01-01 RX ADMIN — MAGNESIUM OXIDE TAB 400 MG (241.3 MG ELEMENTAL MG) 400 MG: 400 (241.3 MG) TAB at 09:45

## 2019-01-01 RX ADMIN — LEVOTHYROXINE SODIUM 50 MCG: 100 TABLET ORAL at 05:17

## 2019-01-01 RX ADMIN — ATORVASTATIN CALCIUM 20 MG: 20 TABLET, FILM COATED ORAL at 21:39

## 2019-01-01 RX ADMIN — TRAZODONE HYDROCHLORIDE 25 MG: 50 TABLET ORAL at 19:43

## 2019-01-01 RX ADMIN — LEVOTHYROXINE SODIUM 50 MCG: 100 TABLET ORAL at 05:01

## 2019-01-01 RX ADMIN — HEPARIN SODIUM 5000 UNITS: 5000 INJECTION, SOLUTION INTRAVENOUS; SUBCUTANEOUS at 13:12

## 2019-01-01 RX ADMIN — METOPROLOL TARTRATE 12.5 MG: 25 TABLET, FILM COATED ORAL at 20:17

## 2019-01-01 RX ADMIN — ATORVASTATIN CALCIUM 20 MG: 20 TABLET, FILM COATED ORAL at 20:58

## 2019-01-01 RX ADMIN — SENNOSIDES AND DOCUSATE SODIUM 2 TABLET: 8.6; 5 TABLET ORAL at 05:55

## 2019-01-01 RX ADMIN — ALLOPURINOL 100 MG: 100 TABLET ORAL at 06:17

## 2019-01-01 RX ADMIN — LEVOTHYROXINE SODIUM 50 MCG: 100 TABLET ORAL at 04:05

## 2019-01-01 RX ADMIN — LISINOPRIL 5 MG: 5 TABLET ORAL at 04:56

## 2019-01-01 RX ADMIN — HEPARIN SODIUM 5000 UNITS: 5000 INJECTION, SOLUTION INTRAVENOUS; SUBCUTANEOUS at 20:27

## 2019-01-01 RX ADMIN — ATORVASTATIN CALCIUM 20 MG: 20 TABLET, FILM COATED ORAL at 21:25

## 2019-01-01 RX ADMIN — ALLOPURINOL 100 MG: 100 TABLET ORAL at 08:59

## 2019-01-01 RX ADMIN — TRAZODONE HYDROCHLORIDE 50 MG: 50 TABLET ORAL at 20:15

## 2019-01-01 RX ADMIN — LEVOTHYROXINE SODIUM 50 MCG: 100 TABLET ORAL at 07:29

## 2019-01-01 RX ADMIN — LISINOPRIL 5 MG: 5 TABLET ORAL at 08:31

## 2019-01-01 RX ADMIN — METRONIDAZOLE 500 MG: 500 TABLET ORAL at 05:11

## 2019-01-01 RX ADMIN — Medication 100 MG: at 08:16

## 2019-01-01 RX ADMIN — SENNOSIDES AND DOCUSATE SODIUM 2 TABLET: 8.6; 5 TABLET ORAL at 04:35

## 2019-01-01 RX ADMIN — HEPARIN SODIUM 5000 UNITS: 5000 INJECTION, SOLUTION INTRAVENOUS; SUBCUTANEOUS at 21:58

## 2019-01-01 RX ADMIN — METOPROLOL TARTRATE 25 MG: 25 TABLET, FILM COATED ORAL at 04:49

## 2019-01-01 RX ADMIN — HEPARIN SODIUM 5000 UNITS: 5000 INJECTION, SOLUTION INTRAVENOUS; SUBCUTANEOUS at 08:53

## 2019-01-01 RX ADMIN — METOPROLOL TARTRATE 12.5 MG: 25 TABLET, FILM COATED ORAL at 19:23

## 2019-01-01 RX ADMIN — ASPIRIN 81 MG 81 MG: 81 TABLET ORAL at 05:35

## 2019-01-01 RX ADMIN — ALLOPURINOL 300 MG: 300 TABLET ORAL at 05:57

## 2019-01-01 RX ADMIN — Medication 400 MG: at 04:34

## 2019-01-01 RX ADMIN — LEVOTHYROXINE SODIUM 50 MCG: 100 TABLET ORAL at 05:02

## 2019-01-01 RX ADMIN — METOPROLOL TARTRATE 25 MG: 25 TABLET, FILM COATED ORAL at 17:02

## 2019-01-01 RX ADMIN — LISINOPRIL 5 MG: 5 TABLET ORAL at 04:44

## 2019-01-01 RX ADMIN — METOPROLOL TARTRATE 25 MG: 25 TABLET, FILM COATED ORAL at 17:40

## 2019-01-01 RX ADMIN — METOPROLOL TARTRATE 12.5 MG: 25 TABLET, FILM COATED ORAL at 07:33

## 2019-01-01 RX ADMIN — Medication 400 MG: at 04:17

## 2019-01-01 RX ADMIN — METOPROLOL TARTRATE 12.5 MG: 25 TABLET, FILM COATED ORAL at 08:20

## 2019-01-01 RX ADMIN — ATORVASTATIN CALCIUM 20 MG: 20 TABLET, FILM COATED ORAL at 19:46

## 2019-01-01 RX ADMIN — CEFDINIR 300 MG: 300 CAPSULE ORAL at 05:11

## 2019-01-01 RX ADMIN — HEPARIN SODIUM 5000 UNITS: 5000 INJECTION, SOLUTION INTRAVENOUS; SUBCUTANEOUS at 20:24

## 2019-01-01 RX ADMIN — LISINOPRIL 5 MG: 5 TABLET ORAL at 10:16

## 2019-01-01 RX ADMIN — METOPROLOL TARTRATE 12.5 MG: 25 TABLET, FILM COATED ORAL at 09:12

## 2019-01-01 RX ADMIN — ALLOPURINOL 300 MG: 300 TABLET ORAL at 06:00

## 2019-01-01 RX ADMIN — ALLOPURINOL 300 MG: 300 TABLET ORAL at 06:12

## 2019-01-01 RX ADMIN — MAGNESIUM OXIDE TAB 400 MG (241.3 MG ELEMENTAL MG) 400 MG: 400 (241.3 MG) TAB at 07:58

## 2019-01-01 RX ADMIN — ALLOPURINOL 100 MG: 100 TABLET ORAL at 08:21

## 2019-01-01 RX ADMIN — Medication 400 MG: at 05:57

## 2019-01-01 RX ADMIN — HEPARIN SODIUM 5000 UNITS: 5000 INJECTION, SOLUTION INTRAVENOUS; SUBCUTANEOUS at 05:20

## 2019-01-01 RX ADMIN — LISINOPRIL 5 MG: 5 TABLET ORAL at 09:16

## 2019-01-01 RX ADMIN — CEFTRIAXONE SODIUM 2 G: 2 INJECTION, POWDER, FOR SOLUTION INTRAMUSCULAR; INTRAVENOUS at 05:55

## 2019-01-01 RX ADMIN — METOPROLOL TARTRATE 25 MG: 25 TABLET, FILM COATED ORAL at 16:58

## 2019-01-01 RX ADMIN — Medication 400 MG: at 04:29

## 2019-01-01 RX ADMIN — HEPARIN SODIUM 5000 UNITS: 5000 INJECTION, SOLUTION INTRAVENOUS; SUBCUTANEOUS at 21:30

## 2019-01-01 RX ADMIN — LEVOTHYROXINE SODIUM 50 MCG: 100 TABLET ORAL at 05:29

## 2019-01-01 RX ADMIN — LISINOPRIL 5 MG: 5 TABLET ORAL at 05:05

## 2019-01-01 RX ADMIN — LISINOPRIL 5 MG: 5 TABLET ORAL at 05:32

## 2019-01-01 RX ADMIN — METRONIDAZOLE 500 MG: 500 TABLET ORAL at 14:56

## 2019-01-01 RX ADMIN — HEPARIN SODIUM 5000 UNITS: 5000 INJECTION, SOLUTION INTRAVENOUS; SUBCUTANEOUS at 19:39

## 2019-01-01 RX ADMIN — HEPARIN SODIUM 5000 UNITS: 5000 INJECTION, SOLUTION INTRAVENOUS; SUBCUTANEOUS at 08:30

## 2019-01-01 RX ADMIN — METOPROLOL TARTRATE 25 MG: 25 TABLET ORAL at 05:44

## 2019-01-01 RX ADMIN — LISINOPRIL 5 MG: 5 TABLET ORAL at 05:18

## 2019-01-01 RX ADMIN — HEPARIN SODIUM 5000 UNITS: 5000 INJECTION, SOLUTION INTRAVENOUS; SUBCUTANEOUS at 08:22

## 2019-01-01 RX ADMIN — ATORVASTATIN CALCIUM 20 MG: 20 TABLET, FILM COATED ORAL at 20:39

## 2019-01-01 RX ADMIN — METOPROLOL TARTRATE 12.5 MG: 25 TABLET ORAL at 17:09

## 2019-01-01 RX ADMIN — Medication 400 MG: at 17:50

## 2019-01-01 RX ADMIN — ATORVASTATIN CALCIUM 20 MG: 20 TABLET, FILM COATED ORAL at 19:58

## 2019-01-01 RX ADMIN — HEPARIN SODIUM 5000 UNITS: 5000 INJECTION, SOLUTION INTRAVENOUS; SUBCUTANEOUS at 10:16

## 2019-01-01 RX ADMIN — MAGNESIUM OXIDE TAB 400 MG (241.3 MG ELEMENTAL MG) 400 MG: 400 (241.3 MG) TAB at 09:17

## 2019-01-01 RX ADMIN — HEPARIN SODIUM 5000 UNITS: 5000 INJECTION, SOLUTION INTRAVENOUS; SUBCUTANEOUS at 19:36

## 2019-01-01 RX ADMIN — ALLOPURINOL 200 MG: 100 TABLET ORAL at 05:57

## 2019-01-01 RX ADMIN — METOPROLOL TARTRATE 25 MG: 25 TABLET ORAL at 04:29

## 2019-01-01 RX ADMIN — HEPARIN SODIUM 5000 UNITS: 5000 INJECTION, SOLUTION INTRAVENOUS; SUBCUTANEOUS at 04:32

## 2019-01-01 RX ADMIN — METOPROLOL TARTRATE 12.5 MG: 25 TABLET, FILM COATED ORAL at 20:24

## 2019-01-01 RX ADMIN — Medication 100 MG: at 04:06

## 2019-01-01 RX ADMIN — ATORVASTATIN CALCIUM 20 MG: 20 TABLET, FILM COATED ORAL at 20:31

## 2019-01-01 RX ADMIN — METOPROLOL TARTRATE 12.5 MG: 25 TABLET, FILM COATED ORAL at 08:13

## 2019-01-01 RX ADMIN — METRONIDAZOLE 500 MG: 500 TABLET ORAL at 04:41

## 2019-01-01 RX ADMIN — HEPARIN SODIUM 5000 UNITS: 5000 INJECTION, SOLUTION INTRAVENOUS; SUBCUTANEOUS at 21:10

## 2019-01-01 RX ADMIN — HEPARIN SODIUM 5000 UNITS: 5000 INJECTION, SOLUTION INTRAVENOUS; SUBCUTANEOUS at 20:01

## 2019-01-01 RX ADMIN — ALLOPURINOL 200 MG: 100 TABLET ORAL at 05:21

## 2019-01-01 RX ADMIN — LEVOTHYROXINE SODIUM 50 MCG: 50 TABLET ORAL at 08:13

## 2019-01-01 RX ADMIN — MAGNESIUM OXIDE TAB 400 MG (241.3 MG ELEMENTAL MG) 400 MG: 400 (241.3 MG) TAB at 07:17

## 2019-01-01 RX ADMIN — HEPARIN SODIUM 5000 UNITS: 5000 INJECTION, SOLUTION INTRAVENOUS; SUBCUTANEOUS at 20:10

## 2019-01-01 RX ADMIN — SENNOSIDES AND DOCUSATE SODIUM 2 TABLET: 8.6; 5 TABLET ORAL at 17:02

## 2019-01-01 RX ADMIN — TAMSULOSIN HYDROCHLORIDE 0.4 MG: 0.4 CAPSULE ORAL at 10:17

## 2019-01-01 RX ADMIN — HEPARIN SODIUM 5000 UNITS: 5000 INJECTION, SOLUTION INTRAVENOUS; SUBCUTANEOUS at 12:46

## 2019-01-01 RX ADMIN — HEPARIN SODIUM 5000 UNITS: 5000 INJECTION, SOLUTION INTRAVENOUS; SUBCUTANEOUS at 07:37

## 2019-01-01 RX ADMIN — METOPROLOL TARTRATE 12.5 MG: 25 TABLET, FILM COATED ORAL at 20:05

## 2019-01-01 RX ADMIN — LISINOPRIL 5 MG: 5 TABLET ORAL at 05:03

## 2019-01-01 RX ADMIN — LEVOTHYROXINE SODIUM 50 MCG: 50 TABLET ORAL at 05:22

## 2019-01-01 RX ADMIN — LEVOTHYROXINE SODIUM 50 MCG: 100 TABLET ORAL at 08:50

## 2019-01-01 RX ADMIN — Medication 100 MG: at 07:43

## 2019-01-01 RX ADMIN — METOPROLOL TARTRATE 12.5 MG: 25 TABLET ORAL at 17:29

## 2019-01-01 RX ADMIN — ASPIRIN 81 MG 81 MG: 81 TABLET ORAL at 05:56

## 2019-01-01 RX ADMIN — Medication 100 MG: at 07:58

## 2019-01-01 RX ADMIN — HEPARIN SODIUM 5000 UNITS: 5000 INJECTION, SOLUTION INTRAVENOUS; SUBCUTANEOUS at 15:06

## 2019-01-01 RX ADMIN — METRONIDAZOLE 500 MG: 500 INJECTION, SOLUTION INTRAVENOUS at 22:34

## 2019-01-01 RX ADMIN — ASPIRIN 81 MG 81 MG: 81 TABLET ORAL at 05:13

## 2019-01-01 RX ADMIN — GUAIFENESIN AND DEXTROMETHORPHAN 10 ML: 100; 10 SYRUP ORAL at 00:52

## 2019-01-01 RX ADMIN — HEPARIN SODIUM 5000 UNITS: 5000 INJECTION, SOLUTION INTRAVENOUS; SUBCUTANEOUS at 05:03

## 2019-01-01 RX ADMIN — TRAZODONE HYDROCHLORIDE 50 MG: 50 TABLET ORAL at 20:54

## 2019-01-01 RX ADMIN — METRONIDAZOLE 500 MG: 500 TABLET ORAL at 21:44

## 2019-01-01 RX ADMIN — POTASSIUM CHLORIDE 40 MEQ: 1500 TABLET, EXTENDED RELEASE ORAL at 09:05

## 2019-01-01 RX ADMIN — ALLOPURINOL 100 MG: 100 TABLET ORAL at 05:55

## 2019-01-01 RX ADMIN — Medication 100 MG: at 07:29

## 2019-01-01 RX ADMIN — ASPIRIN 81 MG 81 MG: 81 TABLET ORAL at 04:29

## 2019-01-01 RX ADMIN — LEVOTHYROXINE SODIUM 50 MCG: 100 TABLET ORAL at 04:58

## 2019-01-01 RX ADMIN — ATORVASTATIN CALCIUM 20 MG: 20 TABLET, FILM COATED ORAL at 19:34

## 2019-01-01 RX ADMIN — ALLOPURINOL 100 MG: 100 TABLET ORAL at 08:26

## 2019-01-01 RX ADMIN — LEVOTHYROXINE SODIUM 50 MCG: 100 TABLET ORAL at 06:16

## 2019-01-01 RX ADMIN — Medication 400 MG: at 04:06

## 2019-01-01 RX ADMIN — LISINOPRIL 5 MG: 5 TABLET ORAL at 08:48

## 2019-01-01 RX ADMIN — LEVOTHYROXINE SODIUM 50 MCG: 100 TABLET ORAL at 07:58

## 2019-01-01 RX ADMIN — HEPARIN SODIUM 5000 UNITS: 5000 INJECTION, SOLUTION INTRAVENOUS; SUBCUTANEOUS at 05:11

## 2019-01-01 RX ADMIN — ATORVASTATIN CALCIUM 20 MG: 20 TABLET, FILM COATED ORAL at 19:56

## 2019-01-01 RX ADMIN — METOPROLOL TARTRATE 12.5 MG: 25 TABLET ORAL at 04:31

## 2019-01-01 RX ADMIN — LISINOPRIL 5 MG: 5 TABLET ORAL at 04:53

## 2019-01-01 RX ADMIN — ALLOPURINOL 300 MG: 300 TABLET ORAL at 04:14

## 2019-01-01 RX ADMIN — Medication 400 MG: at 05:03

## 2019-01-01 RX ADMIN — HEPARIN SODIUM 5000 UNITS: 5000 INJECTION, SOLUTION INTRAVENOUS; SUBCUTANEOUS at 20:13

## 2019-01-01 RX ADMIN — Medication 400 MG: at 05:05

## 2019-01-01 RX ADMIN — POTASSIUM CHLORIDE 40 MEQ: 1500 TABLET, EXTENDED RELEASE ORAL at 21:18

## 2019-01-01 RX ADMIN — POTASSIUM CHLORIDE 40 MEQ: 1500 TABLET, EXTENDED RELEASE ORAL at 06:05

## 2019-01-01 RX ADMIN — HEPARIN SODIUM 5000 UNITS: 5000 INJECTION, SOLUTION INTRAVENOUS; SUBCUTANEOUS at 17:11

## 2019-01-01 RX ADMIN — ALLOPURINOL 100 MG: 100 TABLET ORAL at 08:16

## 2019-01-01 RX ADMIN — ASPIRIN 81 MG 81 MG: 81 TABLET ORAL at 09:12

## 2019-01-01 RX ADMIN — SODIUM CHLORIDE: 9 INJECTION, SOLUTION INTRAVENOUS at 08:14

## 2019-01-01 RX ADMIN — HEPARIN SODIUM 5000 UNITS: 5000 INJECTION, SOLUTION INTRAVENOUS; SUBCUTANEOUS at 07:57

## 2019-01-01 RX ADMIN — TRAZODONE HYDROCHLORIDE 50 MG: 50 TABLET ORAL at 19:34

## 2019-01-01 RX ADMIN — METOPROLOL TARTRATE 12.5 MG: 25 TABLET ORAL at 17:07

## 2019-01-01 RX ADMIN — ATORVASTATIN CALCIUM 20 MG: 20 TABLET, FILM COATED ORAL at 20:51

## 2019-01-01 RX ADMIN — ASPIRIN 81 MG 81 MG: 81 TABLET ORAL at 04:53

## 2019-01-01 RX ADMIN — Medication 400 MG: at 04:59

## 2019-01-01 RX ADMIN — HEPARIN SODIUM 5000 UNITS: 5000 INJECTION, SOLUTION INTRAVENOUS; SUBCUTANEOUS at 20:32

## 2019-01-01 RX ADMIN — METOPROLOL TARTRATE 12.5 MG: 25 TABLET, FILM COATED ORAL at 07:44

## 2019-01-01 RX ADMIN — TAMSULOSIN HYDROCHLORIDE 0.4 MG: 0.4 CAPSULE ORAL at 09:04

## 2019-01-01 RX ADMIN — ASPIRIN 81 MG 81 MG: 81 TABLET ORAL at 07:57

## 2019-01-01 RX ADMIN — ALLOPURINOL 100 MG: 100 TABLET ORAL at 09:01

## 2019-01-01 RX ADMIN — METRONIDAZOLE 500 MG: 500 TABLET ORAL at 04:45

## 2019-01-01 RX ADMIN — METOPROLOL TARTRATE 12.5 MG: 25 TABLET, FILM COATED ORAL at 08:30

## 2019-01-01 RX ADMIN — LISINOPRIL 5 MG: 5 TABLET ORAL at 07:34

## 2019-01-01 RX ADMIN — Medication 400 MG: at 04:10

## 2019-01-01 RX ADMIN — ALLOPURINOL 100 MG: 100 TABLET ORAL at 08:27

## 2019-01-01 RX ADMIN — ALLOPURINOL 300 MG: 300 TABLET ORAL at 04:19

## 2019-01-01 RX ADMIN — METOPROLOL TARTRATE 12.5 MG: 25 TABLET, FILM COATED ORAL at 08:10

## 2019-01-01 RX ADMIN — ALLOPURINOL 200 MG: 100 TABLET ORAL at 05:28

## 2019-01-01 RX ADMIN — METOPROLOL TARTRATE 25 MG: 25 TABLET, FILM COATED ORAL at 17:03

## 2019-01-01 RX ADMIN — LEVOTHYROXINE SODIUM 50 MCG: 100 TABLET ORAL at 04:41

## 2019-01-01 RX ADMIN — ASPIRIN 81 MG 81 MG: 81 TABLET ORAL at 08:58

## 2019-01-01 RX ADMIN — POTASSIUM CHLORIDE 40 MEQ: 1500 TABLET, EXTENDED RELEASE ORAL at 04:41

## 2019-01-01 RX ADMIN — Medication 100 MG: at 04:59

## 2019-01-01 RX ADMIN — ASPIRIN 81 MG 81 MG: 81 TABLET ORAL at 04:39

## 2019-01-01 RX ADMIN — MAGNESIUM OXIDE TAB 400 MG (241.3 MG ELEMENTAL MG) 400 MG: 400 (241.3 MG) TAB at 08:55

## 2019-01-01 RX ADMIN — SENNOSIDES AND DOCUSATE SODIUM 2 TABLET: 8.6; 5 TABLET ORAL at 06:06

## 2019-01-01 RX ADMIN — LISINOPRIL 5 MG: 5 TABLET ORAL at 05:09

## 2019-01-01 RX ADMIN — HEPARIN SODIUM 5000 UNITS: 5000 INJECTION, SOLUTION INTRAVENOUS; SUBCUTANEOUS at 07:38

## 2019-01-01 RX ADMIN — LISINOPRIL 5 MG: 5 TABLET ORAL at 05:21

## 2019-01-01 RX ADMIN — HEPARIN SODIUM 5000 UNITS: 5000 INJECTION, SOLUTION INTRAVENOUS; SUBCUTANEOUS at 08:27

## 2019-01-01 RX ADMIN — METOPROLOL TARTRATE 25 MG: 25 TABLET, FILM COATED ORAL at 17:39

## 2019-01-01 RX ADMIN — Medication 100 MG: at 08:15

## 2019-01-01 RX ADMIN — HEPARIN SODIUM 5000 UNITS: 5000 INJECTION, SOLUTION INTRAVENOUS; SUBCUTANEOUS at 09:08

## 2019-01-01 RX ADMIN — ATORVASTATIN CALCIUM 20 MG: 20 TABLET, FILM COATED ORAL at 20:43

## 2019-01-01 RX ADMIN — METOPROLOL TARTRATE 12.5 MG: 25 TABLET ORAL at 18:19

## 2019-01-01 RX ADMIN — METOPROLOL TARTRATE 25 MG: 25 TABLET, FILM COATED ORAL at 06:09

## 2019-01-01 RX ADMIN — ATORVASTATIN CALCIUM 20 MG: 20 TABLET, FILM COATED ORAL at 21:44

## 2019-01-01 RX ADMIN — ASPIRIN 81 MG 81 MG: 81 TABLET ORAL at 04:36

## 2019-01-01 RX ADMIN — ATORVASTATIN CALCIUM 20 MG: 20 TABLET, FILM COATED ORAL at 21:05

## 2019-01-01 RX ADMIN — METOPROLOL TARTRATE 25 MG: 25 TABLET, FILM COATED ORAL at 05:55

## 2019-01-01 RX ADMIN — ATORVASTATIN CALCIUM 20 MG: 20 TABLET, FILM COATED ORAL at 20:34

## 2019-01-01 RX ADMIN — METOPROLOL TARTRATE 12.5 MG: 25 TABLET, FILM COATED ORAL at 09:00

## 2019-01-01 RX ADMIN — ALLOPURINOL 100 MG: 100 TABLET ORAL at 09:29

## 2019-01-01 RX ADMIN — Medication 100 MG: at 05:02

## 2019-01-01 RX ADMIN — LEVOTHYROXINE SODIUM 50 MCG: 100 TABLET ORAL at 05:48

## 2019-01-01 RX ADMIN — LISINOPRIL 5 MG: 5 TABLET ORAL at 07:44

## 2019-01-01 RX ADMIN — ASPIRIN 81 MG 81 MG: 81 TABLET ORAL at 09:22

## 2019-01-01 RX ADMIN — METOPROLOL TARTRATE 25 MG: 25 TABLET ORAL at 04:06

## 2019-01-01 RX ADMIN — HEPARIN SODIUM 5000 UNITS: 5000 INJECTION, SOLUTION INTRAVENOUS; SUBCUTANEOUS at 10:12

## 2019-01-01 RX ADMIN — ASPIRIN 81 MG 81 MG: 81 TABLET ORAL at 04:45

## 2019-01-01 RX ADMIN — CEFDINIR 300 MG: 300 CAPSULE ORAL at 17:26

## 2019-01-01 RX ADMIN — LISINOPRIL 5 MG: 5 TABLET ORAL at 07:46

## 2019-01-01 RX ADMIN — SENNOSIDES AND DOCUSATE SODIUM 2 TABLET: 8.6; 5 TABLET ORAL at 16:58

## 2019-01-01 RX ADMIN — Medication 400 MG: at 04:14

## 2019-01-01 RX ADMIN — ALLOPURINOL 100 MG: 100 TABLET ORAL at 08:30

## 2019-01-01 RX ADMIN — TRAZODONE HYDROCHLORIDE 50 MG: 50 TABLET ORAL at 19:47

## 2019-01-01 RX ADMIN — LISINOPRIL 5 MG: 5 TABLET ORAL at 09:18

## 2019-01-01 RX ADMIN — METOPROLOL TARTRATE 12.5 MG: 25 TABLET, FILM COATED ORAL at 07:58

## 2019-01-01 RX ADMIN — LISINOPRIL 5 MG: 5 TABLET ORAL at 08:25

## 2019-01-01 RX ADMIN — LEVOTHYROXINE SODIUM 50 MCG: 100 TABLET ORAL at 06:18

## 2019-01-01 RX ADMIN — LISINOPRIL 5 MG: 5 TABLET ORAL at 05:10

## 2019-01-01 RX ADMIN — HEPARIN SODIUM 5000 UNITS: 5000 INJECTION, SOLUTION INTRAVENOUS; SUBCUTANEOUS at 13:23

## 2019-01-01 RX ADMIN — TRAZODONE HYDROCHLORIDE 50 MG: 50 TABLET ORAL at 20:17

## 2019-01-01 RX ADMIN — MAGNESIUM OXIDE TAB 400 MG (241.3 MG ELEMENTAL MG) 400 MG: 400 (241.3 MG) TAB at 08:15

## 2019-01-01 RX ADMIN — ASPIRIN 81 MG 81 MG: 81 TABLET ORAL at 07:58

## 2019-01-01 RX ADMIN — LEVOTHYROXINE SODIUM 50 MCG: 100 TABLET ORAL at 08:48

## 2019-01-01 RX ADMIN — Medication 100 MG: at 05:35

## 2019-01-01 RX ADMIN — ASPIRIN 81 MG 81 MG: 81 TABLET ORAL at 08:45

## 2019-01-01 RX ADMIN — LEVOTHYROXINE SODIUM 50 MCG: 100 TABLET ORAL at 05:14

## 2019-01-01 RX ADMIN — HEPARIN SODIUM 5000 UNITS: 5000 INJECTION, SOLUTION INTRAVENOUS; SUBCUTANEOUS at 20:35

## 2019-01-01 RX ADMIN — LISINOPRIL 5 MG: 5 TABLET ORAL at 06:38

## 2019-01-01 RX ADMIN — ALLOPURINOL 300 MG: 300 TABLET ORAL at 05:56

## 2019-01-01 RX ADMIN — ASPIRIN 81 MG 81 MG: 81 TABLET ORAL at 04:10

## 2019-01-01 RX ADMIN — Medication 400 MG: at 09:11

## 2019-01-01 RX ADMIN — ALLOPURINOL 300 MG: 300 TABLET ORAL at 04:44

## 2019-01-01 RX ADMIN — LEVOTHYROXINE SODIUM 50 MCG: 100 TABLET ORAL at 08:29

## 2019-01-01 RX ADMIN — METOPROLOL TARTRATE 12.5 MG: 25 TABLET ORAL at 05:20

## 2019-01-01 RX ADMIN — SENNOSIDES AND DOCUSATE SODIUM 2 TABLET: 8.6; 5 TABLET ORAL at 05:57

## 2019-01-01 RX ADMIN — Medication 100 MG: at 04:40

## 2019-01-01 RX ADMIN — METOPROLOL TARTRATE 12.5 MG: 25 TABLET, FILM COATED ORAL at 08:48

## 2019-01-01 RX ADMIN — METOPROLOL TARTRATE 12.5 MG: 25 TABLET ORAL at 04:06

## 2019-01-01 RX ADMIN — Medication 100 MG: at 08:33

## 2019-01-01 RX ADMIN — ASPIRIN 81 MG 81 MG: 81 TABLET ORAL at 09:16

## 2019-01-01 RX ADMIN — LEVOTHYROXINE SODIUM 50 MCG: 100 TABLET ORAL at 08:28

## 2019-01-01 RX ADMIN — HEPARIN SODIUM 5000 UNITS: 5000 INJECTION, SOLUTION INTRAVENOUS; SUBCUTANEOUS at 20:26

## 2019-01-01 RX ADMIN — ALLOPURINOL 100 MG: 100 TABLET ORAL at 10:17

## 2019-01-01 RX ADMIN — ASPIRIN 81 MG 81 MG: 81 TABLET ORAL at 04:46

## 2019-01-01 RX ADMIN — Medication 400 MG: at 05:25

## 2019-01-01 RX ADMIN — LISINOPRIL 5 MG: 5 TABLET ORAL at 08:10

## 2019-01-01 RX ADMIN — POTASSIUM CHLORIDE 20 MEQ: 1500 TABLET, EXTENDED RELEASE ORAL at 18:17

## 2019-01-01 RX ADMIN — HEPARIN SODIUM 5000 UNITS: 5000 INJECTION, SOLUTION INTRAVENOUS; SUBCUTANEOUS at 13:57

## 2019-01-01 RX ADMIN — METOPROLOL TARTRATE 12.5 MG: 25 TABLET, FILM COATED ORAL at 08:32

## 2019-01-01 RX ADMIN — METOPROLOL TARTRATE 12.5 MG: 25 TABLET ORAL at 04:41

## 2019-01-01 RX ADMIN — LISINOPRIL 5 MG: 5 TABLET ORAL at 08:55

## 2019-01-01 RX ADMIN — HEPARIN SODIUM 5000 UNITS: 5000 INJECTION, SOLUTION INTRAVENOUS; SUBCUTANEOUS at 13:36

## 2019-01-01 RX ADMIN — METOPROLOL TARTRATE 25 MG: 25 TABLET, FILM COATED ORAL at 17:47

## 2019-01-01 RX ADMIN — METOPROLOL TARTRATE 25 MG: 25 TABLET, FILM COATED ORAL at 17:43

## 2019-01-01 RX ADMIN — LEVOTHYROXINE SODIUM 50 MCG: 100 TABLET ORAL at 08:18

## 2019-01-01 RX ADMIN — ASPIRIN 81 MG 81 MG: 81 TABLET ORAL at 09:29

## 2019-01-01 RX ADMIN — ATORVASTATIN CALCIUM 20 MG: 20 TABLET, FILM COATED ORAL at 20:26

## 2019-01-01 RX ADMIN — Medication 400 MG: at 06:21

## 2019-01-01 RX ADMIN — LEVOTHYROXINE SODIUM 50 MCG: 100 TABLET ORAL at 05:44

## 2019-01-01 RX ADMIN — Medication 100 MG: at 09:22

## 2019-01-01 RX ADMIN — ALLOPURINOL 200 MG: 100 TABLET ORAL at 04:57

## 2019-01-01 RX ADMIN — METOPROLOL TARTRATE 12.5 MG: 25 TABLET, FILM COATED ORAL at 19:55

## 2019-01-01 RX ADMIN — ALLOPURINOL 200 MG: 100 TABLET ORAL at 04:15

## 2019-01-01 RX ADMIN — LISINOPRIL 5 MG: 5 TABLET ORAL at 09:45

## 2019-01-01 RX ADMIN — Medication 100 MG: at 08:53

## 2019-01-01 RX ADMIN — LEVOTHYROXINE SODIUM 50 MCG: 100 TABLET ORAL at 09:29

## 2019-01-01 RX ADMIN — LEVOTHYROXINE SODIUM 50 MCG: 100 TABLET ORAL at 06:00

## 2019-01-01 RX ADMIN — ATORVASTATIN CALCIUM 20 MG: 20 TABLET, FILM COATED ORAL at 21:02

## 2019-01-01 RX ADMIN — METOPROLOL TARTRATE 12.5 MG: 25 TABLET, FILM COATED ORAL at 05:52

## 2019-01-01 RX ADMIN — SENNOSIDES AND DOCUSATE SODIUM 2 TABLET: 8.6; 5 TABLET ORAL at 17:26

## 2019-01-01 RX ADMIN — ALLOPURINOL 200 MG: 100 TABLET ORAL at 04:38

## 2019-01-01 RX ADMIN — Medication 400 MG: at 06:09

## 2019-01-01 RX ADMIN — Medication 100 MG: at 09:45

## 2019-01-01 RX ADMIN — Medication 400 MG: at 05:35

## 2019-01-01 RX ADMIN — ALLOPURINOL 100 MG: 100 TABLET ORAL at 09:00

## 2019-01-01 RX ADMIN — METRONIDAZOLE 500 MG: 500 TABLET ORAL at 04:02

## 2019-01-01 RX ADMIN — HEPARIN SODIUM 5000 UNITS: 5000 INJECTION, SOLUTION INTRAVENOUS; SUBCUTANEOUS at 20:52

## 2019-01-01 RX ADMIN — HEPARIN SODIUM 5000 UNITS: 5000 INJECTION, SOLUTION INTRAVENOUS; SUBCUTANEOUS at 07:43

## 2019-01-01 RX ADMIN — LISINOPRIL 5 MG: 5 TABLET ORAL at 09:07

## 2019-01-01 RX ADMIN — Medication 100 MG: at 05:18

## 2019-01-01 RX ADMIN — ASPIRIN 81 MG 81 MG: 81 TABLET ORAL at 04:57

## 2019-01-01 RX ADMIN — ATORVASTATIN CALCIUM 20 MG: 20 TABLET, FILM COATED ORAL at 20:35

## 2019-01-01 RX ADMIN — ALLOPURINOL 100 MG: 100 TABLET ORAL at 08:53

## 2019-01-01 RX ADMIN — LISINOPRIL 5 MG: 5 TABLET ORAL at 08:29

## 2019-01-01 RX ADMIN — MAGNESIUM OXIDE TAB 400 MG (241.3 MG ELEMENTAL MG) 400 MG: 400 (241.3 MG) TAB at 08:28

## 2019-01-01 RX ADMIN — HEPARIN SODIUM 5000 UNITS: 5000 INJECTION, SOLUTION INTRAVENOUS; SUBCUTANEOUS at 14:54

## 2019-01-01 RX ADMIN — HEPARIN SODIUM 5000 UNITS: 5000 INJECTION, SOLUTION INTRAVENOUS; SUBCUTANEOUS at 21:02

## 2019-01-01 RX ADMIN — HEPARIN SODIUM 5000 UNITS: 5000 INJECTION, SOLUTION INTRAVENOUS; SUBCUTANEOUS at 08:13

## 2019-01-01 RX ADMIN — LISINOPRIL 5 MG: 5 TABLET ORAL at 07:37

## 2019-01-01 RX ADMIN — ASPIRIN 81 MG: 81 TABLET, COATED ORAL at 18:21

## 2019-01-01 RX ADMIN — LISINOPRIL 5 MG: 5 TABLET ORAL at 04:42

## 2019-01-01 RX ADMIN — METOPROLOL TARTRATE 12.5 MG: 25 TABLET, FILM COATED ORAL at 19:47

## 2019-01-01 RX ADMIN — Medication 100 MG: at 08:58

## 2019-01-01 RX ADMIN — METOPROLOL TARTRATE 25 MG: 25 TABLET, FILM COATED ORAL at 05:30

## 2019-01-01 RX ADMIN — METOPROLOL TARTRATE 12.5 MG: 25 TABLET ORAL at 17:39

## 2019-01-01 RX ADMIN — HEPARIN SODIUM 5000 UNITS: 5000 INJECTION, SOLUTION INTRAVENOUS; SUBCUTANEOUS at 14:41

## 2019-01-01 RX ADMIN — ASPIRIN 81 MG 81 MG: 81 TABLET ORAL at 08:52

## 2019-01-01 RX ADMIN — METOPROLOL TARTRATE 25 MG: 25 TABLET, FILM COATED ORAL at 16:43

## 2019-01-01 RX ADMIN — ALLOPURINOL 100 MG: 100 TABLET ORAL at 08:23

## 2019-01-01 RX ADMIN — Medication 400 MG: at 04:05

## 2019-01-01 RX ADMIN — HEPARIN SODIUM 5000 UNITS: 5000 INJECTION, SOLUTION INTRAVENOUS; SUBCUTANEOUS at 20:34

## 2019-01-01 RX ADMIN — METOPROLOL TARTRATE 25 MG: 25 TABLET, FILM COATED ORAL at 19:20

## 2019-01-01 RX ADMIN — HEPARIN SODIUM 5000 UNITS: 5000 INJECTION, SOLUTION INTRAVENOUS; SUBCUTANEOUS at 08:51

## 2019-01-01 RX ADMIN — HEPARIN SODIUM 5000 UNITS: 5000 INJECTION, SOLUTION INTRAVENOUS; SUBCUTANEOUS at 08:31

## 2019-01-01 RX ADMIN — HEPARIN SODIUM 5000 UNITS: 5000 INJECTION, SOLUTION INTRAVENOUS; SUBCUTANEOUS at 19:46

## 2019-01-01 RX ADMIN — ALLOPURINOL 300 MG: 300 TABLET ORAL at 06:16

## 2019-01-01 RX ADMIN — ALLOPURINOL 100 MG: 100 TABLET ORAL at 05:02

## 2019-01-01 RX ADMIN — HEPARIN SODIUM 5000 UNITS: 5000 INJECTION, SOLUTION INTRAVENOUS; SUBCUTANEOUS at 05:44

## 2019-01-01 RX ADMIN — METOPROLOL TARTRATE 12.5 MG: 25 TABLET ORAL at 17:25

## 2019-01-01 RX ADMIN — SODIUM CHLORIDE 750 ML: 9 INJECTION, SOLUTION INTRAVENOUS at 13:49

## 2019-01-01 RX ADMIN — METOPROLOL TARTRATE 25 MG: 25 TABLET, FILM COATED ORAL at 05:35

## 2019-01-01 RX ADMIN — METOPROLOL TARTRATE 12.5 MG: 25 TABLET, FILM COATED ORAL at 21:03

## 2019-01-01 RX ADMIN — LISINOPRIL 5 MG: 5 TABLET ORAL at 04:02

## 2019-01-01 RX ADMIN — LISINOPRIL 5 MG: 5 TABLET ORAL at 05:24

## 2019-01-01 RX ADMIN — ATORVASTATIN CALCIUM 20 MG: 20 TABLET, FILM COATED ORAL at 20:24

## 2019-01-01 RX ADMIN — ASPIRIN 81 MG 81 MG: 81 TABLET ORAL at 05:17

## 2019-01-01 RX ADMIN — Medication 100 MG: at 04:46

## 2019-01-01 RX ADMIN — METOPROLOL TARTRATE 25 MG: 25 TABLET, FILM COATED ORAL at 05:24

## 2019-01-01 RX ADMIN — HEPARIN SODIUM 5000 UNITS: 5000 INJECTION, SOLUTION INTRAVENOUS; SUBCUTANEOUS at 14:10

## 2019-01-01 RX ADMIN — ASPIRIN 81 MG 81 MG: 81 TABLET ORAL at 07:33

## 2019-01-01 RX ADMIN — LEVOTHYROXINE SODIUM 50 MCG: 100 TABLET ORAL at 08:13

## 2019-01-01 RX ADMIN — LISINOPRIL 5 MG: 5 TABLET ORAL at 05:02

## 2019-01-01 RX ADMIN — ALLOPURINOL 300 MG: 300 TABLET ORAL at 05:22

## 2019-01-01 RX ADMIN — LEVOTHYROXINE SODIUM 50 MCG: 100 TABLET ORAL at 09:12

## 2019-01-01 RX ADMIN — SENNOSIDES AND DOCUSATE SODIUM 2 TABLET: 8.6; 5 TABLET ORAL at 06:00

## 2019-01-01 RX ADMIN — Medication 400 MG: at 04:41

## 2019-01-01 RX ADMIN — METOPROLOL TARTRATE 12.5 MG: 25 TABLET, FILM COATED ORAL at 08:22

## 2019-01-01 RX ADMIN — ATORVASTATIN CALCIUM 20 MG: 20 TABLET, FILM COATED ORAL at 20:12

## 2019-01-01 RX ADMIN — ATORVASTATIN CALCIUM 20 MG: 20 TABLET, FILM COATED ORAL at 20:41

## 2019-01-01 RX ADMIN — METOPROLOL TARTRATE 25 MG: 25 TABLET, FILM COATED ORAL at 17:30

## 2019-01-01 RX ADMIN — HEPARIN SODIUM 5000 UNITS: 5000 INJECTION, SOLUTION INTRAVENOUS; SUBCUTANEOUS at 15:46

## 2019-01-01 RX ADMIN — LISINOPRIL 5 MG: 5 TABLET ORAL at 08:30

## 2019-01-01 RX ADMIN — LISINOPRIL 5 MG: 5 TABLET ORAL at 04:06

## 2019-01-01 RX ADMIN — Medication 400 MG: at 06:18

## 2019-01-01 RX ADMIN — ATORVASTATIN CALCIUM 20 MG: 20 TABLET, FILM COATED ORAL at 20:10

## 2019-01-01 RX ADMIN — ALLOPURINOL 300 MG: 300 TABLET ORAL at 05:17

## 2019-01-01 RX ADMIN — METOPROLOL TARTRATE 25 MG: 25 TABLET, FILM COATED ORAL at 17:11

## 2019-01-01 RX ADMIN — METOPROLOL TARTRATE 12.5 MG: 25 TABLET, FILM COATED ORAL at 20:39

## 2019-01-01 RX ADMIN — ASPIRIN 81 MG 81 MG: 81 TABLET ORAL at 08:35

## 2019-01-01 RX ADMIN — LISINOPRIL 5 MG: 5 TABLET ORAL at 06:09

## 2019-01-01 RX ADMIN — LEVOTHYROXINE SODIUM 50 MCG: 100 TABLET ORAL at 04:35

## 2019-01-01 RX ADMIN — MAGNESIUM OXIDE TAB 400 MG (241.3 MG ELEMENTAL MG) 400 MG: 400 (241.3 MG) TAB at 09:00

## 2019-01-01 RX ADMIN — ALLOPURINOL 100 MG: 100 TABLET ORAL at 08:49

## 2019-01-01 RX ADMIN — TRAZODONE HYDROCHLORIDE 50 MG: 50 TABLET ORAL at 21:17

## 2019-01-01 RX ADMIN — LISINOPRIL 5 MG: 5 TABLET ORAL at 10:10

## 2019-01-01 RX ADMIN — MAGNESIUM OXIDE TAB 400 MG (241.3 MG ELEMENTAL MG) 400 MG: 400 (241.3 MG) TAB at 09:11

## 2019-01-01 RX ADMIN — METOPROLOL TARTRATE 12.5 MG: 25 TABLET, FILM COATED ORAL at 20:57

## 2019-01-01 RX ADMIN — IOHEXOL 100 ML: 350 INJECTION, SOLUTION INTRAVENOUS at 08:52

## 2019-01-01 RX ADMIN — LEVOTHYROXINE SODIUM 50 MCG: 100 TABLET ORAL at 04:36

## 2019-01-01 RX ADMIN — ASPIRIN 81 MG 81 MG: 81 TABLET ORAL at 07:56

## 2019-01-01 RX ADMIN — Medication 100 MG: at 07:37

## 2019-01-01 RX ADMIN — METOPROLOL TARTRATE 12.5 MG: 25 TABLET ORAL at 04:56

## 2019-01-01 RX ADMIN — ATORVASTATIN CALCIUM 20 MG: 20 TABLET, FILM COATED ORAL at 19:11

## 2019-01-01 RX ADMIN — Medication 100 MG: at 09:11

## 2019-01-01 RX ADMIN — ALLOPURINOL 300 MG: 300 TABLET ORAL at 06:22

## 2019-01-01 RX ADMIN — ASPIRIN 81 MG: 81 TABLET, COATED ORAL at 17:41

## 2019-01-01 RX ADMIN — SENNOSIDES AND DOCUSATE SODIUM 2 TABLET: 8.6; 5 TABLET ORAL at 16:50

## 2019-01-01 RX ADMIN — METOPROLOL TARTRATE 25 MG: 25 TABLET, FILM COATED ORAL at 06:18

## 2019-01-01 RX ADMIN — ASPIRIN 81 MG 81 MG: 81 TABLET ORAL at 04:52

## 2019-01-01 RX ADMIN — HEPARIN SODIUM 5000 UNITS: 5000 INJECTION, SOLUTION INTRAVENOUS; SUBCUTANEOUS at 07:45

## 2019-01-01 RX ADMIN — METOPROLOL TARTRATE 25 MG: 25 TABLET ORAL at 04:15

## 2019-01-01 RX ADMIN — LISINOPRIL 5 MG: 5 TABLET ORAL at 04:49

## 2019-01-01 RX ADMIN — ASPIRIN 81 MG 81 MG: 81 TABLET ORAL at 05:15

## 2019-01-01 RX ADMIN — METOPROLOL TARTRATE 12.5 MG: 25 TABLET, FILM COATED ORAL at 16:58

## 2019-01-01 RX ADMIN — ATORVASTATIN CALCIUM 20 MG: 20 TABLET, FILM COATED ORAL at 22:09

## 2019-01-01 RX ADMIN — METOPROLOL TARTRATE 12.5 MG: 25 TABLET, FILM COATED ORAL at 09:44

## 2019-01-01 RX ADMIN — METOPROLOL TARTRATE 12.5 MG: 25 TABLET, FILM COATED ORAL at 07:45

## 2019-01-01 RX ADMIN — METOPROLOL TARTRATE 25 MG: 25 TABLET ORAL at 04:45

## 2019-01-01 RX ADMIN — Medication 100 MG: at 09:00

## 2019-01-01 RX ADMIN — METOPROLOL TARTRATE 25 MG: 25 TABLET, FILM COATED ORAL at 05:27

## 2019-01-01 RX ADMIN — METRONIDAZOLE 500 MG: 500 TABLET ORAL at 20:28

## 2019-01-01 RX ADMIN — METOPROLOL TARTRATE 25 MG: 25 TABLET, FILM COATED ORAL at 16:50

## 2019-01-01 RX ADMIN — HEPARIN SODIUM 5000 UNITS: 5000 INJECTION, SOLUTION INTRAVENOUS; SUBCUTANEOUS at 07:44

## 2019-01-01 RX ADMIN — HEPARIN SODIUM 5000 UNITS: 5000 INJECTION, SOLUTION INTRAVENOUS; SUBCUTANEOUS at 04:38

## 2019-01-01 RX ADMIN — HEPARIN SODIUM 5000 UNITS: 5000 INJECTION, SOLUTION INTRAVENOUS; SUBCUTANEOUS at 09:00

## 2019-01-01 RX ADMIN — Medication 400 MG: at 04:36

## 2019-01-01 RX ADMIN — ASPIRIN 81 MG 81 MG: 81 TABLET ORAL at 05:07

## 2019-01-01 RX ADMIN — ALLOPURINOL 300 MG: 300 TABLET ORAL at 06:13

## 2019-01-01 RX ADMIN — Medication 400 MG: at 04:35

## 2019-01-01 RX ADMIN — Medication 100 MG: at 08:39

## 2019-01-01 RX ADMIN — METOPROLOL TARTRATE 25 MG: 25 TABLET, FILM COATED ORAL at 16:45

## 2019-01-01 RX ADMIN — ASPIRIN 81 MG 81 MG: 81 TABLET ORAL at 05:21

## 2019-01-01 RX ADMIN — ASPIRIN 81 MG 81 MG: 81 TABLET ORAL at 09:44

## 2019-01-01 RX ADMIN — HEPARIN SODIUM 5000 UNITS: 5000 INJECTION, SOLUTION INTRAVENOUS; SUBCUTANEOUS at 21:03

## 2019-01-01 RX ADMIN — Medication 400 MG: at 05:30

## 2019-01-01 RX ADMIN — IOHEXOL 70 ML: 350 INJECTION, SOLUTION INTRAVENOUS at 20:05

## 2019-01-01 RX ADMIN — TRAZODONE HYDROCHLORIDE 50 MG: 50 TABLET ORAL at 20:34

## 2019-01-01 RX ADMIN — SODIUM CHLORIDE, POTASSIUM CHLORIDE, SODIUM LACTATE AND CALCIUM CHLORIDE 1000 ML: 600; 310; 30; 20 INJECTION, SOLUTION INTRAVENOUS at 16:31

## 2019-01-01 RX ADMIN — METOPROLOL TARTRATE 25 MG: 25 TABLET, FILM COATED ORAL at 04:50

## 2019-01-01 RX ADMIN — LEVOTHYROXINE SODIUM 50 MCG: 100 TABLET ORAL at 08:34

## 2019-01-01 RX ADMIN — ATORVASTATIN CALCIUM 20 MG: 20 TABLET, FILM COATED ORAL at 21:36

## 2019-01-01 RX ADMIN — LISINOPRIL 5 MG: 5 TABLET ORAL at 08:09

## 2019-01-01 RX ADMIN — LISINOPRIL 5 MG: 5 TABLET ORAL at 05:57

## 2019-01-01 RX ADMIN — METOPROLOL TARTRATE 12.5 MG: 25 TABLET ORAL at 17:26

## 2019-01-01 RX ADMIN — SODIUM CHLORIDE 500 ML: 9 INJECTION, SOLUTION INTRAVENOUS at 15:53

## 2019-01-01 RX ADMIN — POTASSIUM CHLORIDE 40 MEQ: 1500 TABLET, EXTENDED RELEASE ORAL at 04:45

## 2019-01-01 RX ADMIN — LEVOTHYROXINE SODIUM 50 MCG: 100 TABLET ORAL at 04:06

## 2019-01-01 RX ADMIN — METOPROLOL TARTRATE 25 MG: 25 TABLET, FILM COATED ORAL at 16:42

## 2019-01-01 RX ADMIN — LEVOTHYROXINE SODIUM 50 MCG: 100 TABLET ORAL at 05:20

## 2019-01-01 RX ADMIN — ALLOPURINOL 100 MG: 100 TABLET ORAL at 08:54

## 2019-01-01 RX ADMIN — Medication 100 MG: at 04:56

## 2019-01-01 RX ADMIN — LISINOPRIL 5 MG: 5 TABLET ORAL at 08:28

## 2019-01-01 RX ADMIN — ASPIRIN 81 MG 81 MG: 81 TABLET ORAL at 06:16

## 2019-01-01 RX ADMIN — METOPROLOL TARTRATE 12.5 MG: 25 TABLET, FILM COATED ORAL at 18:18

## 2019-01-01 RX ADMIN — ALLOPURINOL 200 MG: 100 TABLET ORAL at 04:06

## 2019-01-01 RX ADMIN — HEPARIN SODIUM 5000 UNITS: 5000 INJECTION, SOLUTION INTRAVENOUS; SUBCUTANEOUS at 19:22

## 2019-01-01 RX ADMIN — ASPIRIN 81 MG 81 MG: 81 TABLET ORAL at 08:54

## 2019-01-01 RX ADMIN — SODIUM CHLORIDE, SODIUM LACTATE, POTASSIUM CHLORIDE, CALCIUM CHLORIDE AND DEXTROSE MONOHYDRATE: 5; 600; 310; 30; 20 INJECTION, SOLUTION INTRAVENOUS at 01:16

## 2019-01-01 RX ADMIN — HEPARIN SODIUM 5000 UNITS: 5000 INJECTION, SOLUTION INTRAVENOUS; SUBCUTANEOUS at 20:48

## 2019-01-01 RX ADMIN — METOPROLOL TARTRATE 12.5 MG: 25 TABLET, FILM COATED ORAL at 07:37

## 2019-01-01 RX ADMIN — ATORVASTATIN CALCIUM 20 MG: 20 TABLET, FILM COATED ORAL at 20:38

## 2019-01-01 RX ADMIN — LEVOTHYROXINE SODIUM 50 MCG: 100 TABLET ORAL at 09:16

## 2019-01-01 RX ADMIN — ATORVASTATIN CALCIUM 20 MG: 20 TABLET, FILM COATED ORAL at 17:41

## 2019-01-01 RX ADMIN — LISINOPRIL 5 MG: 5 TABLET ORAL at 08:14

## 2019-01-01 RX ADMIN — Medication 400 MG: at 05:55

## 2019-01-01 RX ADMIN — Medication 100 MG: at 09:17

## 2019-01-01 RX ADMIN — LEVOTHYROXINE SODIUM 50 MCG: 50 TABLET ORAL at 04:41

## 2019-01-01 RX ADMIN — LEVOTHYROXINE SODIUM 50 MCG: 100 TABLET ORAL at 07:33

## 2019-01-01 RX ADMIN — ALLOPURINOL 200 MG: 100 TABLET ORAL at 05:14

## 2019-01-01 RX ADMIN — Medication 400 MG: at 05:28

## 2019-01-01 RX ADMIN — ASPIRIN 81 MG: 81 TABLET, COATED ORAL at 17:30

## 2019-01-01 RX ADMIN — METOPROLOL TARTRATE 5 MG: 1 INJECTION, SOLUTION INTRAVENOUS at 18:51

## 2019-01-01 RX ADMIN — METOPROLOL TARTRATE 12.5 MG: 25 TABLET ORAL at 16:46

## 2019-01-01 RX ADMIN — ASPIRIN 81 MG 81 MG: 81 TABLET ORAL at 05:32

## 2019-01-01 RX ADMIN — Medication 100 MG: at 09:06

## 2019-01-01 RX ADMIN — SENNOSIDES,DOCUSATE SODIUM 2 TABLET: 8.6; 5 TABLET, FILM COATED ORAL at 21:57

## 2019-01-01 RX ADMIN — HEPARIN SODIUM 5000 UNITS: 5000 INJECTION, SOLUTION INTRAVENOUS; SUBCUTANEOUS at 15:28

## 2019-01-01 RX ADMIN — CEFDINIR 300 MG: 300 CAPSULE ORAL at 04:41

## 2019-01-01 RX ADMIN — HEPARIN SODIUM 5000 UNITS: 5000 INJECTION, SOLUTION INTRAVENOUS; SUBCUTANEOUS at 14:01

## 2019-01-01 RX ADMIN — HEPARIN SODIUM 5000 UNITS: 5000 INJECTION, SOLUTION INTRAVENOUS; SUBCUTANEOUS at 21:14

## 2019-01-01 RX ADMIN — Medication 100 MG: at 04:15

## 2019-01-01 RX ADMIN — LISINOPRIL 5 MG: 5 TABLET ORAL at 09:12

## 2019-01-01 RX ADMIN — Medication 400 MG: at 05:32

## 2019-01-01 RX ADMIN — SENNOSIDES AND DOCUSATE SODIUM 2 TABLET: 8.6; 5 TABLET ORAL at 17:55

## 2019-01-01 RX ADMIN — METOPROLOL TARTRATE 25 MG: 25 TABLET, FILM COATED ORAL at 04:10

## 2019-01-01 RX ADMIN — HEPARIN SODIUM 5000 UNITS: 5000 INJECTION, SOLUTION INTRAVENOUS; SUBCUTANEOUS at 08:16

## 2019-01-01 RX ADMIN — LEVOTHYROXINE SODIUM 50 MCG: 100 TABLET ORAL at 08:16

## 2019-01-01 RX ADMIN — METOPROLOL TARTRATE 25 MG: 25 TABLET ORAL at 06:38

## 2019-01-01 RX ADMIN — ASPIRIN 81 MG 81 MG: 81 TABLET ORAL at 06:37

## 2019-01-01 RX ADMIN — Medication 100 MG: at 08:25

## 2019-01-01 RX ADMIN — HEPARIN SODIUM 5000 UNITS: 5000 INJECTION, SOLUTION INTRAVENOUS; SUBCUTANEOUS at 05:59

## 2019-01-01 RX ADMIN — METOPROLOL TARTRATE 25 MG: 25 TABLET, FILM COATED ORAL at 05:42

## 2019-01-01 RX ADMIN — METOPROLOL TARTRATE 12.5 MG: 25 TABLET, FILM COATED ORAL at 20:34

## 2019-01-01 RX ADMIN — HEPARIN SODIUM 5000 UNITS: 5000 INJECTION, SOLUTION INTRAVENOUS; SUBCUTANEOUS at 21:05

## 2019-01-01 RX ADMIN — ASPIRIN 81 MG 81 MG: 81 TABLET ORAL at 04:35

## 2019-01-01 RX ADMIN — HEPARIN SODIUM 5000 UNITS: 5000 INJECTION, SOLUTION INTRAVENOUS; SUBCUTANEOUS at 14:08

## 2019-01-01 RX ADMIN — ASPIRIN 81 MG 81 MG: 81 TABLET ORAL at 07:43

## 2019-01-01 RX ADMIN — SENNOSIDES,DOCUSATE SODIUM 2 TABLET: 8.6; 5 TABLET, FILM COATED ORAL at 17:55

## 2019-01-01 RX ADMIN — METOPROLOL TARTRATE 25 MG: 25 TABLET, FILM COATED ORAL at 17:15

## 2019-01-01 RX ADMIN — METOPROLOL TARTRATE 12.5 MG: 25 TABLET ORAL at 04:44

## 2019-01-01 RX ADMIN — METOPROLOL TARTRATE 12.5 MG: 25 TABLET ORAL at 18:04

## 2019-01-01 RX ADMIN — METOPROLOL TARTRATE 12.5 MG: 25 TABLET ORAL at 05:10

## 2019-01-01 RX ADMIN — MAGNESIUM OXIDE TAB 400 MG (241.3 MG ELEMENTAL MG) 400 MG: 400 (241.3 MG) TAB at 09:07

## 2019-01-01 RX ADMIN — METOPROLOL TARTRATE 25 MG: 25 TABLET, FILM COATED ORAL at 17:04

## 2019-01-01 RX ADMIN — MAGNESIUM OXIDE TAB 400 MG (241.3 MG ELEMENTAL MG) 400 MG: 400 (241.3 MG) TAB at 08:48

## 2019-01-01 RX ADMIN — ALLOPURINOL 100 MG: 100 TABLET ORAL at 09:44

## 2019-01-01 RX ADMIN — LISINOPRIL 5 MG: 5 TABLET ORAL at 06:25

## 2019-01-01 RX ADMIN — Medication 400 MG: at 04:54

## 2019-01-01 RX ADMIN — HEPARIN SODIUM 5000 UNITS: 5000 INJECTION, SOLUTION INTRAVENOUS; SUBCUTANEOUS at 20:16

## 2019-01-01 RX ADMIN — METRONIDAZOLE 500 MG: 500 TABLET ORAL at 14:16

## 2019-01-01 RX ADMIN — HEPARIN SODIUM 5000 UNITS: 5000 INJECTION, SOLUTION INTRAVENOUS; SUBCUTANEOUS at 09:11

## 2019-01-01 RX ADMIN — CEFDINIR 300 MG: 300 CAPSULE ORAL at 14:57

## 2019-01-01 RX ADMIN — LEVOTHYROXINE SODIUM 50 MCG: 100 TABLET ORAL at 07:44

## 2019-01-01 RX ADMIN — ALLOPURINOL 100 MG: 100 TABLET ORAL at 04:32

## 2019-01-01 RX ADMIN — ASPIRIN 81 MG 81 MG: 81 TABLET ORAL at 05:55

## 2019-01-01 RX ADMIN — Medication 400 MG: at 05:29

## 2019-01-01 RX ADMIN — TRAZODONE HYDROCHLORIDE 50 MG: 50 TABLET ORAL at 21:03

## 2019-01-01 RX ADMIN — METOPROLOL TARTRATE 12.5 MG: 25 TABLET, FILM COATED ORAL at 08:08

## 2019-01-01 RX ADMIN — METOPROLOL TARTRATE 12.5 MG: 25 TABLET ORAL at 05:03

## 2019-01-01 RX ADMIN — METOPROLOL TARTRATE 12.5 MG: 25 TABLET, FILM COATED ORAL at 17:43

## 2019-01-01 RX ADMIN — ASPIRIN 81 MG 81 MG: 81 TABLET ORAL at 08:20

## 2019-01-01 RX ADMIN — ATORVASTATIN CALCIUM 20 MG: 20 TABLET, FILM COATED ORAL at 20:36

## 2019-01-01 RX ADMIN — METOPROLOL TARTRATE 12.5 MG: 25 TABLET ORAL at 18:30

## 2019-01-01 RX ADMIN — ATORVASTATIN CALCIUM 20 MG: 20 TABLET, FILM COATED ORAL at 20:13

## 2019-01-01 RX ADMIN — ATORVASTATIN CALCIUM 20 MG: 20 TABLET, FILM COATED ORAL at 20:53

## 2019-01-01 RX ADMIN — MAGNESIUM OXIDE TAB 400 MG (241.3 MG ELEMENTAL MG) 400 MG: 400 (241.3 MG) TAB at 07:56

## 2019-01-01 RX ADMIN — Medication 100 MG: at 08:27

## 2019-01-01 RX ADMIN — ATORVASTATIN CALCIUM 20 MG: 20 TABLET, FILM COATED ORAL at 20:02

## 2019-01-01 RX ADMIN — ASPIRIN 81 MG 81 MG: 81 TABLET ORAL at 08:33

## 2019-01-01 RX ADMIN — CEFDINIR 300 MG: 300 CAPSULE ORAL at 04:20

## 2019-01-01 RX ADMIN — ALLOPURINOL 100 MG: 100 TABLET ORAL at 08:14

## 2019-01-01 RX ADMIN — SODIUM CHLORIDE 500 ML: 9 INJECTION, SOLUTION INTRAVENOUS at 18:24

## 2019-01-01 RX ADMIN — MAGNESIUM OXIDE TAB 400 MG (241.3 MG ELEMENTAL MG) 400 MG: 400 (241.3 MG) TAB at 07:29

## 2019-01-01 RX ADMIN — ATORVASTATIN CALCIUM 20 MG: 20 TABLET, FILM COATED ORAL at 20:11

## 2019-01-01 RX ADMIN — LISINOPRIL 5 MG: 5 TABLET ORAL at 06:05

## 2019-01-01 RX ADMIN — METOPROLOL TARTRATE 25 MG: 25 TABLET, FILM COATED ORAL at 05:02

## 2019-01-01 RX ADMIN — ASPIRIN 81 MG 81 MG: 81 TABLET ORAL at 04:50

## 2019-01-01 RX ADMIN — ALLOPURINOL 200 MG: 100 TABLET ORAL at 04:54

## 2019-01-01 RX ADMIN — POTASSIUM BICARBONATE 50 MEQ: 978 TABLET, EFFERVESCENT ORAL at 08:13

## 2019-01-01 RX ADMIN — ALLOPURINOL 100 MG: 100 TABLET ORAL at 08:09

## 2019-01-01 RX ADMIN — ATORVASTATIN CALCIUM 20 MG: 20 TABLET, FILM COATED ORAL at 19:52

## 2019-01-01 RX ADMIN — METOPROLOL TARTRATE 25 MG: 25 TABLET ORAL at 05:21

## 2019-01-01 RX ADMIN — ALLOPURINOL 100 MG: 100 TABLET ORAL at 08:15

## 2019-01-01 RX ADMIN — LISINOPRIL 5 MG: 5 TABLET ORAL at 08:15

## 2019-01-01 RX ADMIN — METOPROLOL TARTRATE 25 MG: 25 TABLET, FILM COATED ORAL at 04:41

## 2019-01-01 RX ADMIN — SODIUM CHLORIDE: 9 INJECTION, SOLUTION INTRAVENOUS at 10:46

## 2019-01-01 RX ADMIN — ASPIRIN 81 MG 81 MG: 81 TABLET ORAL at 08:29

## 2019-01-01 RX ADMIN — ATORVASTATIN CALCIUM 20 MG: 20 TABLET, FILM COATED ORAL at 19:36

## 2019-01-01 RX ADMIN — LEVOTHYROXINE SODIUM 50 MCG: 50 TABLET ORAL at 04:54

## 2019-01-01 RX ADMIN — HEPARIN SODIUM 5000 UNITS: 5000 INJECTION, SOLUTION INTRAVENOUS; SUBCUTANEOUS at 19:23

## 2019-01-01 RX ADMIN — HEPARIN SODIUM 5000 UNITS: 5000 INJECTION, SOLUTION INTRAVENOUS; SUBCUTANEOUS at 09:17

## 2019-01-01 RX ADMIN — LISINOPRIL 5 MG: 5 TABLET ORAL at 05:17

## 2019-01-01 RX ADMIN — HEPARIN SODIUM 5000 UNITS: 5000 INJECTION, SOLUTION INTRAVENOUS; SUBCUTANEOUS at 20:41

## 2019-01-01 RX ADMIN — LISINOPRIL 5 MG: 5 TABLET ORAL at 04:34

## 2019-01-01 RX ADMIN — POTASSIUM CHLORIDE 40 MEQ: 1500 TABLET, EXTENDED RELEASE ORAL at 17:50

## 2019-01-01 RX ADMIN — LEVOTHYROXINE SODIUM 50 MCG: 100 TABLET ORAL at 04:42

## 2019-01-01 RX ADMIN — LEVOTHYROXINE SODIUM 50 MCG: 100 TABLET ORAL at 08:22

## 2019-01-01 RX ADMIN — SENNOSIDES AND DOCUSATE SODIUM 2 TABLET: 8.6; 5 TABLET ORAL at 16:44

## 2019-01-01 RX ADMIN — ASPIRIN 81 MG 81 MG: 81 TABLET ORAL at 04:42

## 2019-01-01 RX ADMIN — ASPIRIN 81 MG 81 MG: 81 TABLET ORAL at 04:41

## 2019-01-01 RX ADMIN — LISINOPRIL 5 MG: 5 TABLET ORAL at 10:17

## 2019-01-01 RX ADMIN — ATORVASTATIN CALCIUM 20 MG: 20 TABLET, FILM COATED ORAL at 21:16

## 2019-01-01 RX ADMIN — ALLOPURINOL 300 MG: 300 TABLET ORAL at 04:18

## 2019-01-01 RX ADMIN — METOPROLOL TARTRATE 12.5 MG: 25 TABLET ORAL at 05:55

## 2019-01-01 RX ADMIN — LISINOPRIL 5 MG: 5 TABLET ORAL at 04:45

## 2019-01-01 RX ADMIN — METOPROLOL TARTRATE 5 MG: 1 INJECTION, SOLUTION INTRAVENOUS at 19:08

## 2019-01-01 RX ADMIN — ASPIRIN 81 MG 81 MG: 81 TABLET ORAL at 08:28

## 2019-01-01 RX ADMIN — METOPROLOL TARTRATE 25 MG: 25 TABLET, FILM COATED ORAL at 20:16

## 2019-01-01 RX ADMIN — ALLOPURINOL 100 MG: 100 TABLET ORAL at 10:10

## 2019-01-01 RX ADMIN — MAGNESIUM OXIDE TAB 400 MG (241.3 MG ELEMENTAL MG) 400 MG: 400 (241.3 MG) TAB at 08:58

## 2019-01-01 RX ADMIN — METOPROLOL TARTRATE 12.5 MG: 25 TABLET ORAL at 17:42

## 2019-01-01 RX ADMIN — ATORVASTATIN CALCIUM 20 MG: 20 TABLET, FILM COATED ORAL at 22:02

## 2019-01-01 RX ADMIN — HEPARIN SODIUM 5000 UNITS: 5000 INJECTION, SOLUTION INTRAVENOUS; SUBCUTANEOUS at 09:22

## 2019-01-01 RX ADMIN — Medication 100 MG: at 08:08

## 2019-01-01 RX ADMIN — LISINOPRIL 5 MG: 5 TABLET ORAL at 04:38

## 2019-01-01 RX ADMIN — ALLOPURINOL 300 MG: 300 TABLET ORAL at 05:32

## 2019-01-01 RX ADMIN — LEVOTHYROXINE SODIUM 50 MCG: 100 TABLET ORAL at 05:18

## 2019-01-01 RX ADMIN — ASPIRIN 81 MG 81 MG: 81 TABLET ORAL at 05:28

## 2019-01-01 RX ADMIN — LEVOTHYROXINE SODIUM 50 MCG: 100 TABLET ORAL at 05:55

## 2019-01-01 RX ADMIN — HEPARIN SODIUM 5000 UNITS: 5000 INJECTION, SOLUTION INTRAVENOUS; SUBCUTANEOUS at 04:37

## 2019-01-01 RX ADMIN — ALLOPURINOL 200 MG: 100 TABLET ORAL at 06:36

## 2019-01-01 RX ADMIN — METOPROLOL TARTRATE 12.5 MG: 25 TABLET, FILM COATED ORAL at 07:56

## 2019-01-01 RX ADMIN — HEPARIN SODIUM 5000 UNITS: 5000 INJECTION, SOLUTION INTRAVENOUS; SUBCUTANEOUS at 21:00

## 2019-01-01 RX ADMIN — ASPIRIN 81 MG 81 MG: 81 TABLET ORAL at 08:14

## 2019-01-01 RX ADMIN — LISINOPRIL 5 MG: 5 TABLET ORAL at 09:11

## 2019-01-01 RX ADMIN — HEPARIN SODIUM 5000 UNITS: 5000 INJECTION, SOLUTION INTRAVENOUS; SUBCUTANEOUS at 16:48

## 2019-01-01 RX ADMIN — Medication 100 MG: at 08:47

## 2019-01-01 RX ADMIN — LEVOTHYROXINE SODIUM 50 MCG: 50 TABLET ORAL at 05:11

## 2019-01-01 RX ADMIN — LEVOTHYROXINE SODIUM 50 MCG: 100 TABLET ORAL at 05:11

## 2019-01-01 RX ADMIN — LEVOTHYROXINE SODIUM 50 MCG: 100 TABLET ORAL at 07:16

## 2019-01-01 RX ADMIN — LEVOTHYROXINE SODIUM 50 MCG: 100 TABLET ORAL at 04:37

## 2019-01-01 RX ADMIN — ATORVASTATIN CALCIUM 20 MG: 20 TABLET, FILM COATED ORAL at 19:30

## 2019-01-01 RX ADMIN — METOPROLOL TARTRATE 12.5 MG: 25 TABLET ORAL at 16:25

## 2019-01-01 RX ADMIN — ATORVASTATIN CALCIUM 20 MG: 20 TABLET, FILM COATED ORAL at 21:18

## 2019-01-01 RX ADMIN — HEPARIN SODIUM 5000 UNITS: 5000 INJECTION, SOLUTION INTRAVENOUS; SUBCUTANEOUS at 08:33

## 2019-01-01 RX ADMIN — LEVOTHYROXINE SODIUM 50 MCG: 100 TABLET ORAL at 09:07

## 2019-01-01 RX ADMIN — ATORVASTATIN CALCIUM 20 MG: 20 TABLET, FILM COATED ORAL at 19:21

## 2019-01-01 RX ADMIN — METOPROLOL TARTRATE 12.5 MG: 25 TABLET ORAL at 09:09

## 2019-01-01 RX ADMIN — LISINOPRIL 5 MG: 5 TABLET ORAL at 06:16

## 2019-01-01 RX ADMIN — HEPARIN SODIUM 5000 UNITS: 5000 INJECTION, SOLUTION INTRAVENOUS; SUBCUTANEOUS at 20:31

## 2019-01-01 RX ADMIN — LEVOTHYROXINE SODIUM 50 MCG: 50 TABLET ORAL at 05:24

## 2019-01-01 RX ADMIN — METOPROLOL TARTRATE 12.5 MG: 25 TABLET ORAL at 18:27

## 2019-01-01 RX ADMIN — HEPARIN SODIUM 5000 UNITS: 5000 INJECTION, SOLUTION INTRAVENOUS; SUBCUTANEOUS at 04:50

## 2019-01-01 RX ADMIN — POTASSIUM CHLORIDE 40 MEQ: 1500 TABLET, EXTENDED RELEASE ORAL at 04:02

## 2019-01-01 RX ADMIN — ALLOPURINOL 300 MG: 300 TABLET ORAL at 06:06

## 2019-01-01 RX ADMIN — ALLOPURINOL 100 MG: 100 TABLET ORAL at 07:50

## 2019-01-01 RX ADMIN — HEPARIN SODIUM 5000 UNITS: 5000 INJECTION, SOLUTION INTRAVENOUS; SUBCUTANEOUS at 20:44

## 2019-01-01 RX ADMIN — ATORVASTATIN CALCIUM 20 MG: 20 TABLET, FILM COATED ORAL at 20:15

## 2019-01-01 RX ADMIN — Medication 400 MG: at 04:55

## 2019-01-01 RX ADMIN — ASPIRIN 81 MG 81 MG: 81 TABLET ORAL at 09:18

## 2019-01-01 RX ADMIN — HEPARIN SODIUM 5000 UNITS: 5000 INJECTION, SOLUTION INTRAVENOUS; SUBCUTANEOUS at 08:12

## 2019-01-01 RX ADMIN — ALLOPURINOL 200 MG: 100 TABLET ORAL at 06:24

## 2019-01-01 RX ADMIN — ATORVASTATIN CALCIUM 20 MG: 20 TABLET, FILM COATED ORAL at 21:06

## 2019-01-01 RX ADMIN — METOPROLOL TARTRATE 12.5 MG: 25 TABLET, FILM COATED ORAL at 21:00

## 2019-01-01 RX ADMIN — TRAZODONE HYDROCHLORIDE 50 MG: 50 TABLET ORAL at 23:36

## 2019-01-01 RX ADMIN — ALLOPURINOL 100 MG: 100 TABLET ORAL at 08:45

## 2019-01-01 RX ADMIN — Medication 100 MG: at 05:13

## 2019-01-01 RX ADMIN — ALLOPURINOL 300 MG: 300 TABLET ORAL at 05:10

## 2019-01-01 RX ADMIN — Medication 400 MG: at 05:41

## 2019-01-01 RX ADMIN — HEPARIN SODIUM 5000 UNITS: 5000 INJECTION, SOLUTION INTRAVENOUS; SUBCUTANEOUS at 20:28

## 2019-01-01 RX ADMIN — METOPROLOL TARTRATE 25 MG: 25 TABLET, FILM COATED ORAL at 06:22

## 2019-01-01 RX ADMIN — LEVOTHYROXINE SODIUM 50 MCG: 50 TABLET ORAL at 04:20

## 2019-01-01 RX ADMIN — LEVOTHYROXINE SODIUM 50 MCG: 100 TABLET ORAL at 05:21

## 2019-01-01 RX ADMIN — ASPIRIN 81 MG: 81 TABLET, COATED ORAL at 17:47

## 2019-01-01 RX ADMIN — METOPROLOL TARTRATE 12.5 MG: 25 TABLET, FILM COATED ORAL at 08:19

## 2019-01-01 RX ADMIN — LEVOTHYROXINE SODIUM 50 MCG: 100 TABLET ORAL at 06:24

## 2019-01-01 RX ADMIN — ASPIRIN 81 MG 81 MG: 81 TABLET ORAL at 07:44

## 2019-01-01 RX ADMIN — METOPROLOL TARTRATE 25 MG: 25 TABLET, FILM COATED ORAL at 05:10

## 2019-01-01 RX ADMIN — ATORVASTATIN CALCIUM 20 MG: 20 TABLET, FILM COATED ORAL at 19:32

## 2019-01-01 RX ADMIN — LEVOTHYROXINE SODIUM 50 MCG: 100 TABLET ORAL at 05:51

## 2019-01-01 RX ADMIN — HEPARIN SODIUM 5000 UNITS: 5000 INJECTION, SOLUTION INTRAVENOUS; SUBCUTANEOUS at 20:19

## 2019-01-01 RX ADMIN — HEPARIN SODIUM 5000 UNITS: 5000 INJECTION, SOLUTION INTRAVENOUS; SUBCUTANEOUS at 20:12

## 2019-01-01 RX ADMIN — HEPARIN SODIUM 5000 UNITS: 5000 INJECTION, SOLUTION INTRAVENOUS; SUBCUTANEOUS at 09:16

## 2019-01-01 RX ADMIN — HEPARIN SODIUM 5000 UNITS: 5000 INJECTION, SOLUTION INTRAVENOUS; SUBCUTANEOUS at 05:57

## 2019-01-01 RX ADMIN — METOPROLOL TARTRATE 12.5 MG: 25 TABLET, FILM COATED ORAL at 17:21

## 2019-01-01 RX ADMIN — Medication 100 MG: at 04:49

## 2019-01-01 RX ADMIN — ATORVASTATIN CALCIUM 20 MG: 20 TABLET, FILM COATED ORAL at 20:48

## 2019-01-01 RX ADMIN — LEVOTHYROXINE SODIUM 50 MCG: 100 TABLET ORAL at 04:50

## 2019-01-01 RX ADMIN — HEPARIN SODIUM 5000 UNITS: 5000 INJECTION, SOLUTION INTRAVENOUS; SUBCUTANEOUS at 05:05

## 2019-01-01 RX ADMIN — ASPIRIN 81 MG 81 MG: 81 TABLET ORAL at 09:11

## 2019-01-01 RX ADMIN — Medication 100 MG: at 08:48

## 2019-01-01 RX ADMIN — LEVOTHYROXINE SODIUM 50 MCG: 100 TABLET ORAL at 04:09

## 2019-01-01 RX ADMIN — METOPROLOL TARTRATE 25 MG: 25 TABLET, FILM COATED ORAL at 04:35

## 2019-01-01 RX ADMIN — Medication 100 MG: at 04:31

## 2019-01-01 RX ADMIN — CEFDINIR 300 MG: 300 CAPSULE ORAL at 06:05

## 2019-01-01 RX ADMIN — SODIUM CHLORIDE, POTASSIUM CHLORIDE, SODIUM LACTATE AND CALCIUM CHLORIDE 1000 ML: 600; 310; 30; 20 INJECTION, SOLUTION INTRAVENOUS at 14:34

## 2019-01-01 RX ADMIN — LEVOTHYROXINE SODIUM 50 MCG: 100 TABLET ORAL at 04:31

## 2019-01-01 RX ADMIN — METOPROLOL TARTRATE 12.5 MG: 25 TABLET ORAL at 15:16

## 2019-01-01 RX ADMIN — HEPARIN SODIUM 5000 UNITS: 5000 INJECTION, SOLUTION INTRAVENOUS; SUBCUTANEOUS at 05:10

## 2019-01-01 RX ADMIN — METOPROLOL TARTRATE 25 MG: 25 TABLET, FILM COATED ORAL at 05:25

## 2019-01-01 RX ADMIN — LEVOTHYROXINE SODIUM 50 MCG: 100 TABLET ORAL at 04:17

## 2019-01-01 RX ADMIN — Medication 100 MG: at 05:11

## 2019-01-01 RX ADMIN — HEPARIN SODIUM 5000 UNITS: 5000 INJECTION, SOLUTION INTRAVENOUS; SUBCUTANEOUS at 16:23

## 2019-01-01 RX ADMIN — Medication 400 MG: at 04:48

## 2019-01-01 RX ADMIN — SENNOSIDES,DOCUSATE SODIUM 2 TABLET: 8.6; 5 TABLET, FILM COATED ORAL at 17:21

## 2019-01-01 RX ADMIN — HEPARIN SODIUM 5000 UNITS: 5000 INJECTION, SOLUTION INTRAVENOUS; SUBCUTANEOUS at 05:55

## 2019-01-01 RX ADMIN — METOPROLOL TARTRATE 25 MG: 25 TABLET, FILM COATED ORAL at 06:16

## 2019-01-01 RX ADMIN — MAGNESIUM OXIDE TAB 400 MG (241.3 MG ELEMENTAL MG) 400 MG: 400 (241.3 MG) TAB at 07:43

## 2019-01-01 RX ADMIN — LISINOPRIL 5 MG: 5 TABLET ORAL at 05:28

## 2019-01-01 RX ADMIN — HEPARIN SODIUM 5000 UNITS: 5000 INJECTION, SOLUTION INTRAVENOUS; SUBCUTANEOUS at 19:45

## 2019-01-01 RX ADMIN — LISINOPRIL 5 MG: 5 TABLET ORAL at 04:29

## 2019-01-01 RX ADMIN — METOPROLOL TARTRATE 12.5 MG: 25 TABLET ORAL at 16:31

## 2019-01-01 RX ADMIN — LISINOPRIL 5 MG: 5 TABLET ORAL at 04:15

## 2019-01-01 RX ADMIN — TRAZODONE HYDROCHLORIDE 50 MG: 50 TABLET ORAL at 23:28

## 2019-01-01 RX ADMIN — SENNOSIDES AND DOCUSATE SODIUM 2 TABLET: 8.6; 5 TABLET ORAL at 18:00

## 2019-01-01 RX ADMIN — METOPROLOL TARTRATE 25 MG: 25 TABLET, FILM COATED ORAL at 05:14

## 2019-01-01 RX ADMIN — ATORVASTATIN CALCIUM 20 MG: 20 TABLET, FILM COATED ORAL at 20:06

## 2019-01-01 RX ADMIN — TRAZODONE HYDROCHLORIDE 50 MG: 50 TABLET ORAL at 21:05

## 2019-01-01 RX ADMIN — ALLOPURINOL 100 MG: 100 TABLET ORAL at 05:20

## 2019-01-01 RX ADMIN — METOPROLOL TARTRATE 12.5 MG: 25 TABLET, FILM COATED ORAL at 08:49

## 2019-01-01 RX ADMIN — POTASSIUM BICARBONATE 50 MEQ: 978 TABLET, EFFERVESCENT ORAL at 17:47

## 2019-01-01 RX ADMIN — MAGNESIUM OXIDE TAB 400 MG (241.3 MG ELEMENTAL MG) 400 MG: 400 (241.3 MG) TAB at 07:47

## 2019-01-01 RX ADMIN — ALLOPURINOL 100 MG: 100 TABLET ORAL at 09:18

## 2019-01-01 RX ADMIN — MAGNESIUM OXIDE TAB 400 MG (241.3 MG ELEMENTAL MG) 400 MG: 400 (241.3 MG) TAB at 07:44

## 2019-01-01 RX ADMIN — LISINOPRIL 5 MG: 5 TABLET ORAL at 04:05

## 2019-01-01 RX ADMIN — MAGNESIUM OXIDE TAB 400 MG (241.3 MG ELEMENTAL MG) 400 MG: 400 (241.3 MG) TAB at 09:12

## 2019-01-01 RX ADMIN — ATORVASTATIN CALCIUM 20 MG: 20 TABLET, FILM COATED ORAL at 20:23

## 2019-01-01 RX ADMIN — ASPIRIN 81 MG 81 MG: 81 TABLET ORAL at 10:10

## 2019-01-01 RX ADMIN — ALLOPURINOL 100 MG: 100 TABLET ORAL at 04:37

## 2019-01-01 RX ADMIN — ATORVASTATIN CALCIUM 20 MG: 20 TABLET, FILM COATED ORAL at 20:16

## 2019-01-01 RX ADMIN — ATORVASTATIN CALCIUM 20 MG: 20 TABLET, FILM COATED ORAL at 19:59

## 2019-01-01 RX ADMIN — LEVOTHYROXINE SODIUM 50 MCG: 100 TABLET ORAL at 08:57

## 2019-01-01 RX ADMIN — HEPARIN SODIUM 5000 UNITS: 5000 INJECTION, SOLUTION INTRAVENOUS; SUBCUTANEOUS at 08:57

## 2019-01-01 RX ADMIN — ALLOPURINOL 300 MG: 300 TABLET ORAL at 04:50

## 2019-01-01 RX ADMIN — LEVOTHYROXINE SODIUM 50 MCG: 50 TABLET ORAL at 06:04

## 2019-01-01 RX ADMIN — TRAZODONE HYDROCHLORIDE 50 MG: 50 TABLET ORAL at 19:54

## 2019-01-01 RX ADMIN — ASPIRIN 81 MG 81 MG: 81 TABLET ORAL at 05:03

## 2019-01-01 RX ADMIN — METOPROLOL TARTRATE 12.5 MG: 25 TABLET, FILM COATED ORAL at 08:35

## 2019-01-01 RX ADMIN — MAGNESIUM OXIDE TAB 400 MG (241.3 MG ELEMENTAL MG) 400 MG: 400 (241.3 MG) TAB at 08:16

## 2019-01-01 RX ADMIN — HEPARIN SODIUM 5000 UNITS: 5000 INJECTION, SOLUTION INTRAVENOUS; SUBCUTANEOUS at 17:14

## 2019-01-01 RX ADMIN — ALLOPURINOL 100 MG: 100 TABLET ORAL at 07:17

## 2019-01-01 RX ADMIN — HEPARIN SODIUM 5000 UNITS: 5000 INJECTION, SOLUTION INTRAVENOUS; SUBCUTANEOUS at 14:38

## 2019-01-01 RX ADMIN — METRONIDAZOLE 500 MG: 500 INJECTION, SOLUTION INTRAVENOUS at 21:28

## 2019-01-01 RX ADMIN — ASPIRIN 81 MG 81 MG: 81 TABLET ORAL at 07:17

## 2019-01-01 RX ADMIN — Medication 400 MG: at 05:17

## 2019-01-01 RX ADMIN — ATORVASTATIN CALCIUM 20 MG: 20 TABLET, FILM COATED ORAL at 19:47

## 2019-01-01 RX ADMIN — LISINOPRIL 5 MG: 5 TABLET ORAL at 09:10

## 2019-01-01 RX ADMIN — ALLOPURINOL 100 MG: 100 TABLET ORAL at 09:12

## 2019-01-01 RX ADMIN — TRAZODONE HYDROCHLORIDE 50 MG: 50 TABLET ORAL at 20:04

## 2019-01-01 RX ADMIN — ASPIRIN 81 MG 81 MG: 81 TABLET ORAL at 08:57

## 2019-01-01 RX ADMIN — ALLOPURINOL 100 MG: 100 TABLET ORAL at 09:22

## 2019-01-01 RX ADMIN — ATORVASTATIN CALCIUM 20 MG: 20 TABLET, FILM COATED ORAL at 19:55

## 2019-01-01 RX ADMIN — MAGNESIUM OXIDE TAB 400 MG (241.3 MG ELEMENTAL MG) 400 MG: 400 (241.3 MG) TAB at 08:34

## 2019-01-01 RX ADMIN — HEPARIN SODIUM 5000 UNITS: 5000 INJECTION, SOLUTION INTRAVENOUS; SUBCUTANEOUS at 07:58

## 2019-01-01 RX ADMIN — ATORVASTATIN CALCIUM 20 MG: 20 TABLET, FILM COATED ORAL at 21:03

## 2019-01-01 RX ADMIN — ATORVASTATIN CALCIUM 20 MG: 20 TABLET, FILM COATED ORAL at 04:45

## 2019-01-01 RX ADMIN — LISINOPRIL 5 MG: 5 TABLET ORAL at 09:22

## 2019-01-01 RX ADMIN — TAMSULOSIN HYDROCHLORIDE 0.4 MG: 0.4 CAPSULE ORAL at 09:55

## 2019-01-01 RX ADMIN — METOPROLOL TARTRATE 12.5 MG: 25 TABLET ORAL at 16:50

## 2019-01-01 RX ADMIN — METOPROLOL TARTRATE 12.5 MG: 25 TABLET, FILM COATED ORAL at 10:09

## 2019-01-01 RX ADMIN — METOPROLOL TARTRATE 12.5 MG: 25 TABLET, FILM COATED ORAL at 08:28

## 2019-01-01 RX ADMIN — HEPARIN SODIUM 5000 UNITS: 5000 INJECTION, SOLUTION INTRAVENOUS; SUBCUTANEOUS at 21:25

## 2019-01-01 RX ADMIN — ALLOPURINOL 100 MG: 100 TABLET ORAL at 09:17

## 2019-01-01 RX ADMIN — LEVOTHYROXINE SODIUM 50 MCG: 100 TABLET ORAL at 07:43

## 2019-01-01 RX ADMIN — METOPROLOL TARTRATE 12.5 MG: 25 TABLET, FILM COATED ORAL at 19:59

## 2019-01-01 RX ADMIN — SENNOSIDES AND DOCUSATE SODIUM 2 TABLET: 8.6; 5 TABLET ORAL at 18:54

## 2019-01-01 RX ADMIN — ASPIRIN 81 MG 81 MG: 81 TABLET ORAL at 06:18

## 2019-01-01 RX ADMIN — TRAZODONE HYDROCHLORIDE 50 MG: 50 TABLET ORAL at 20:07

## 2019-01-01 RX ADMIN — TRAZODONE HYDROCHLORIDE 50 MG: 50 TABLET ORAL at 21:27

## 2019-01-01 RX ADMIN — Medication 100 MG: at 05:45

## 2019-01-01 RX ADMIN — HEPARIN SODIUM 5000 UNITS: 5000 INJECTION, SOLUTION INTRAVENOUS; SUBCUTANEOUS at 19:58

## 2019-01-01 RX ADMIN — ATORVASTATIN CALCIUM 20 MG: 20 TABLET, FILM COATED ORAL at 20:07

## 2019-01-01 RX ADMIN — ATORVASTATIN CALCIUM 20 MG: 20 TABLET, FILM COATED ORAL at 21:27

## 2019-01-01 RX ADMIN — HEPARIN SODIUM 5000 UNITS: 5000 INJECTION, SOLUTION INTRAVENOUS; SUBCUTANEOUS at 20:40

## 2019-01-01 RX ADMIN — MAGNESIUM OXIDE TAB 400 MG (241.3 MG ELEMENTAL MG) 400 MG: 400 (241.3 MG) TAB at 08:45

## 2019-01-01 RX ADMIN — LEVOTHYROXINE SODIUM 50 MCG: 100 TABLET ORAL at 06:13

## 2019-01-01 RX ADMIN — LISINOPRIL 5 MG: 5 TABLET ORAL at 05:29

## 2019-01-01 RX ADMIN — TRAZODONE HYDROCHLORIDE 50 MG: 50 TABLET ORAL at 20:05

## 2019-01-01 RX ADMIN — HEPARIN SODIUM 5000 UNITS: 5000 INJECTION, SOLUTION INTRAVENOUS; SUBCUTANEOUS at 08:26

## 2019-01-01 RX ADMIN — METOPROLOL TARTRATE 12.5 MG: 25 TABLET, FILM COATED ORAL at 20:32

## 2019-01-01 RX ADMIN — Medication 400 MG: at 04:15

## 2019-01-01 RX ADMIN — HEPARIN SODIUM 5000 UNITS: 5000 INJECTION, SOLUTION INTRAVENOUS; SUBCUTANEOUS at 21:55

## 2019-01-01 RX ADMIN — ATORVASTATIN CALCIUM 20 MG: 20 TABLET, FILM COATED ORAL at 20:27

## 2019-01-01 RX ADMIN — Medication 100 MG: at 08:11

## 2019-01-01 RX ADMIN — ASPIRIN 81 MG 81 MG: 81 TABLET ORAL at 09:00

## 2019-01-01 RX ADMIN — Medication 400 MG: at 06:25

## 2019-01-01 RX ADMIN — ATORVASTATIN CALCIUM 20 MG: 20 TABLET, FILM COATED ORAL at 20:25

## 2019-01-01 RX ADMIN — Medication 400 MG: at 04:32

## 2019-01-01 RX ADMIN — ASPIRIN 81 MG 81 MG: 81 TABLET ORAL at 05:44

## 2019-01-01 RX ADMIN — HEPARIN SODIUM 5000 UNITS: 5000 INJECTION, SOLUTION INTRAVENOUS; SUBCUTANEOUS at 20:51

## 2019-01-01 RX ADMIN — ATORVASTATIN CALCIUM 20 MG: 20 TABLET, FILM COATED ORAL at 19:22

## 2019-01-01 RX ADMIN — MAGNESIUM OXIDE TAB 400 MG (241.3 MG ELEMENTAL MG) 400 MG: 400 (241.3 MG) TAB at 09:22

## 2019-01-01 RX ADMIN — HEPARIN SODIUM 5000 UNITS: 5000 INJECTION, SOLUTION INTRAVENOUS; SUBCUTANEOUS at 20:11

## 2019-01-01 RX ADMIN — ALLOPURINOL 300 MG: 300 TABLET ORAL at 04:09

## 2019-01-01 RX ADMIN — METOPROLOL TARTRATE 25 MG: 25 TABLET, FILM COATED ORAL at 05:23

## 2019-01-01 RX ADMIN — Medication 400 MG: at 06:16

## 2019-01-01 RX ADMIN — SODIUM CHLORIDE, SODIUM LACTATE, POTASSIUM CHLORIDE, CALCIUM CHLORIDE AND DEXTROSE MONOHYDRATE: 5; 600; 310; 30; 20 INJECTION, SOLUTION INTRAVENOUS at 20:02

## 2019-01-01 RX ADMIN — HEPARIN SODIUM 5000 UNITS: 5000 INJECTION, SOLUTION INTRAVENOUS; SUBCUTANEOUS at 09:43

## 2019-01-01 RX ADMIN — METOPROLOL TARTRATE 12.5 MG: 25 TABLET ORAL at 05:13

## 2019-01-01 RX ADMIN — HEPARIN SODIUM 5000 UNITS: 5000 INJECTION, SOLUTION INTRAVENOUS; SUBCUTANEOUS at 15:16

## 2019-01-01 RX ADMIN — Medication 400 MG: at 06:05

## 2019-01-01 RX ADMIN — LEVOTHYROXINE SODIUM 50 MCG: 100 TABLET ORAL at 09:06

## 2019-01-01 RX ADMIN — METOPROLOL TARTRATE 25 MG: 25 TABLET ORAL at 04:54

## 2019-01-01 RX ADMIN — TAMSULOSIN HYDROCHLORIDE 0.4 MG: 0.4 CAPSULE ORAL at 09:29

## 2019-01-01 RX ADMIN — ATORVASTATIN CALCIUM 20 MG: 20 TABLET, FILM COATED ORAL at 21:22

## 2019-01-01 RX ADMIN — LEVOTHYROXINE SODIUM 50 MCG: 100 TABLET ORAL at 04:18

## 2019-01-01 RX ADMIN — METOPROLOL TARTRATE 25 MG: 25 TABLET, FILM COATED ORAL at 04:19

## 2019-01-01 RX ADMIN — CEFDINIR 300 MG: 300 CAPSULE ORAL at 18:18

## 2019-01-01 RX ADMIN — LEVOTHYROXINE SODIUM 50 MCG: 100 TABLET ORAL at 06:05

## 2019-01-01 RX ADMIN — ALLOPURINOL 300 MG: 300 TABLET ORAL at 06:08

## 2019-01-01 RX ADMIN — METOPROLOL TARTRATE 25 MG: 25 TABLET, FILM COATED ORAL at 05:57

## 2019-01-01 RX ADMIN — LISINOPRIL 5 MG: 5 TABLET ORAL at 05:20

## 2019-01-01 RX ADMIN — Medication 400 MG: at 04:57

## 2019-01-01 RX ADMIN — ALLOPURINOL 100 MG: 100 TABLET ORAL at 08:57

## 2019-01-01 RX ADMIN — HEPARIN SODIUM 5000 UNITS: 5000 INJECTION, SOLUTION INTRAVENOUS; SUBCUTANEOUS at 15:00

## 2019-01-01 RX ADMIN — ATORVASTATIN CALCIUM 20 MG: 20 TABLET, FILM COATED ORAL at 21:09

## 2019-01-01 RX ADMIN — HEPARIN SODIUM 5000 UNITS: 5000 INJECTION, SOLUTION INTRAVENOUS; SUBCUTANEOUS at 20:06

## 2019-01-01 RX ADMIN — TRAZODONE HYDROCHLORIDE 50 MG: 50 TABLET ORAL at 20:16

## 2019-01-01 RX ADMIN — METOPROLOL TARTRATE 12.5 MG: 25 TABLET ORAL at 05:04

## 2019-01-01 RX ADMIN — LEVOTHYROXINE SODIUM 50 MCG: 100 TABLET ORAL at 04:57

## 2019-01-01 RX ADMIN — ATORVASTATIN CALCIUM 20 MG: 20 TABLET, FILM COATED ORAL at 20:52

## 2019-01-01 RX ADMIN — LEVOTHYROXINE SODIUM 50 MCG: 100 TABLET ORAL at 08:25

## 2019-01-01 RX ADMIN — METOPROLOL TARTRATE 12.5 MG: 25 TABLET ORAL at 17:17

## 2019-01-01 RX ADMIN — HEPARIN SODIUM 5000 UNITS: 5000 INJECTION, SOLUTION INTRAVENOUS; SUBCUTANEOUS at 20:43

## 2019-01-01 RX ADMIN — ATORVASTATIN CALCIUM 20 MG: 20 TABLET, FILM COATED ORAL at 19:40

## 2019-01-01 RX ADMIN — ALLOPURINOL 200 MG: 100 TABLET ORAL at 05:16

## 2019-01-01 RX ADMIN — ASPIRIN 81 MG 81 MG: 81 TABLET ORAL at 06:13

## 2019-01-01 RX ADMIN — LEVOTHYROXINE SODIUM 50 MCG: 100 TABLET ORAL at 05:58

## 2019-01-01 RX ADMIN — HEPARIN SODIUM 5000 UNITS: 5000 INJECTION, SOLUTION INTRAVENOUS; SUBCUTANEOUS at 14:57

## 2019-01-01 RX ADMIN — ATORVASTATIN CALCIUM 20 MG: 20 TABLET, FILM COATED ORAL at 19:50

## 2019-01-01 RX ADMIN — ALLOPURINOL 300 MG: 300 TABLET ORAL at 05:24

## 2019-01-01 RX ADMIN — LISINOPRIL 5 MG: 5 TABLET ORAL at 07:43

## 2019-01-01 RX ADMIN — POTASSIUM CHLORIDE 40 MEQ: 1500 TABLET, EXTENDED RELEASE ORAL at 05:11

## 2019-01-01 RX ADMIN — ALLOPURINOL 200 MG: 100 TABLET ORAL at 05:34

## 2019-01-01 RX ADMIN — HEPARIN SODIUM 5000 UNITS: 5000 INJECTION, SOLUTION INTRAVENOUS; SUBCUTANEOUS at 14:29

## 2019-01-01 RX ADMIN — HEPARIN SODIUM 5000 UNITS: 5000 INJECTION, SOLUTION INTRAVENOUS; SUBCUTANEOUS at 19:50

## 2019-01-01 RX ADMIN — ALLOPURINOL 300 MG: 300 TABLET ORAL at 05:02

## 2019-01-01 RX ADMIN — ATORVASTATIN CALCIUM 20 MG: 20 TABLET, FILM COATED ORAL at 20:21

## 2019-01-01 RX ADMIN — METOPROLOL TARTRATE 12.5 MG: 25 TABLET, FILM COATED ORAL at 19:21

## 2019-01-01 RX ADMIN — HEPARIN SODIUM 5000 UNITS: 5000 INJECTION, SOLUTION INTRAVENOUS; SUBCUTANEOUS at 07:30

## 2019-01-01 RX ADMIN — HEPARIN SODIUM 5000 UNITS: 5000 INJECTION, SOLUTION INTRAVENOUS; SUBCUTANEOUS at 07:59

## 2019-01-01 RX ADMIN — ATORVASTATIN CALCIUM 20 MG: 20 TABLET, FILM COATED ORAL at 19:00

## 2019-01-01 RX ADMIN — LISINOPRIL 5 MG: 5 TABLET ORAL at 07:16

## 2019-01-01 RX ADMIN — LISINOPRIL 5 MG: 5 TABLET ORAL at 04:58

## 2019-01-01 RX ADMIN — LEVOTHYROXINE SODIUM 50 MCG: 100 TABLET ORAL at 04:49

## 2019-01-01 RX ADMIN — ALLOPURINOL 300 MG: 300 TABLET ORAL at 04:42

## 2019-01-01 RX ADMIN — HEPARIN SODIUM 5000 UNITS: 5000 INJECTION, SOLUTION INTRAVENOUS; SUBCUTANEOUS at 05:45

## 2019-01-01 RX ADMIN — METRONIDAZOLE 500 MG: 500 TABLET ORAL at 04:53

## 2019-01-01 RX ADMIN — METOPROLOL TARTRATE 12.5 MG: 25 TABLET, FILM COATED ORAL at 17:11

## 2019-01-01 RX ADMIN — HEPARIN SODIUM 5000 UNITS: 5000 INJECTION, SOLUTION INTRAVENOUS; SUBCUTANEOUS at 20:14

## 2019-01-01 RX ADMIN — LISINOPRIL 5 MG: 5 TABLET ORAL at 05:27

## 2019-01-01 RX ADMIN — MAGNESIUM OXIDE TAB 400 MG (241.3 MG ELEMENTAL MG) 400 MG: 400 (241.3 MG) TAB at 09:44

## 2019-01-01 RX ADMIN — METOPROLOL TARTRATE 25 MG: 25 TABLET, FILM COATED ORAL at 04:44

## 2019-01-01 RX ADMIN — Medication 100 MG: at 04:41

## 2019-01-01 RX ADMIN — METOPROLOL TARTRATE 12.5 MG: 25 TABLET, FILM COATED ORAL at 21:06

## 2019-01-01 RX ADMIN — HEPARIN SODIUM 5000 UNITS: 5000 INJECTION, SOLUTION INTRAVENOUS; SUBCUTANEOUS at 21:39

## 2019-01-01 RX ADMIN — HEPARIN SODIUM 5000 UNITS: 5000 INJECTION, SOLUTION INTRAVENOUS; SUBCUTANEOUS at 13:53

## 2019-01-01 RX ADMIN — METOPROLOL TARTRATE 12.5 MG: 25 TABLET ORAL at 04:40

## 2019-01-01 RX ADMIN — HEPARIN SODIUM 5000 UNITS: 5000 INJECTION, SOLUTION INTRAVENOUS; SUBCUTANEOUS at 13:34

## 2019-01-01 RX ADMIN — METOPROLOL TARTRATE 12.5 MG: 25 TABLET, FILM COATED ORAL at 08:16

## 2019-01-01 RX ADMIN — SENNOSIDES,DOCUSATE SODIUM 2 TABLET: 8.6; 5 TABLET, FILM COATED ORAL at 20:57

## 2019-01-01 RX ADMIN — METOPROLOL TARTRATE 12.5 MG: 25 TABLET, FILM COATED ORAL at 08:57

## 2019-01-01 RX ADMIN — METOPROLOL TARTRATE 12.5 MG: 25 TABLET ORAL at 17:18

## 2019-01-01 RX ADMIN — SENNOSIDES AND DOCUSATE SODIUM 2 TABLET: 8.6; 5 TABLET ORAL at 18:24

## 2019-01-01 RX ADMIN — HEPARIN SODIUM 5000 UNITS: 5000 INJECTION, SOLUTION INTRAVENOUS; SUBCUTANEOUS at 20:07

## 2019-01-01 RX ADMIN — ASPIRIN 81 MG 81 MG: 81 TABLET ORAL at 08:16

## 2019-01-01 RX ADMIN — METOPROLOL TARTRATE 12.5 MG: 25 TABLET ORAL at 18:10

## 2019-01-01 RX ADMIN — METOPROLOL TARTRATE 5 MG: 1 INJECTION, SOLUTION INTRAVENOUS at 18:30

## 2019-01-01 RX ADMIN — Medication 400 MG: at 05:42

## 2019-01-01 RX ADMIN — ASPIRIN 81 MG 81 MG: 81 TABLET ORAL at 06:03

## 2019-01-01 RX ADMIN — METOPROLOL TARTRATE 12.5 MG: 25 TABLET, FILM COATED ORAL at 20:30

## 2019-01-01 RX ADMIN — HEPARIN SODIUM 5000 UNITS: 5000 INJECTION, SOLUTION INTRAVENOUS; SUBCUTANEOUS at 13:59

## 2019-01-01 RX ADMIN — ASPIRIN 81 MG: 81 TABLET, COATED ORAL at 17:22

## 2019-01-01 RX ADMIN — METOPROLOL TARTRATE 12.5 MG: 25 TABLET, FILM COATED ORAL at 07:46

## 2019-01-01 RX ADMIN — METOPROLOL TARTRATE 25 MG: 25 TABLET, FILM COATED ORAL at 06:07

## 2019-01-01 RX ADMIN — Medication 100 MG: at 04:37

## 2019-01-01 RX ADMIN — LEVOTHYROXINE SODIUM 50 MCG: 100 TABLET ORAL at 05:56

## 2019-01-01 RX ADMIN — HEPARIN SODIUM 5000 UNITS: 5000 INJECTION, SOLUTION INTRAVENOUS; SUBCUTANEOUS at 04:29

## 2019-01-01 RX ADMIN — LEVOTHYROXINE SODIUM 50 MCG: 100 TABLET ORAL at 09:04

## 2019-01-01 RX ADMIN — ATORVASTATIN CALCIUM 20 MG: 20 TABLET, FILM COATED ORAL at 20:32

## 2019-01-01 RX ADMIN — METOPROLOL TARTRATE 12.5 MG: 25 TABLET ORAL at 04:49

## 2019-01-01 RX ADMIN — LISINOPRIL 5 MG: 5 TABLET ORAL at 05:23

## 2019-01-01 RX ADMIN — ASPIRIN 81 MG 81 MG: 81 TABLET ORAL at 08:18

## 2019-01-01 RX ADMIN — LISINOPRIL 5 MG: 5 TABLET ORAL at 05:55

## 2019-01-01 RX ADMIN — METOPROLOL TARTRATE 25 MG: 25 TABLET, FILM COATED ORAL at 18:26

## 2019-01-01 RX ADMIN — ALLOPURINOL 300 MG: 300 TABLET ORAL at 05:41

## 2019-01-01 RX ADMIN — METOPROLOL TARTRATE 12.5 MG: 25 TABLET ORAL at 17:36

## 2019-01-01 RX ADMIN — LISINOPRIL 5 MG: 5 TABLET ORAL at 07:58

## 2019-01-01 RX ADMIN — ATORVASTATIN CALCIUM 20 MG: 20 TABLET, FILM COATED ORAL at 21:35

## 2019-01-01 RX ADMIN — HEPARIN SODIUM 5000 UNITS: 5000 INJECTION, SOLUTION INTRAVENOUS; SUBCUTANEOUS at 20:50

## 2019-01-01 RX ADMIN — HEPARIN SODIUM 5000 UNITS: 5000 INJECTION, SOLUTION INTRAVENOUS; SUBCUTANEOUS at 20:45

## 2019-01-01 RX ADMIN — MAGNESIUM OXIDE TAB 400 MG (241.3 MG ELEMENTAL MG) 400 MG: 400 (241.3 MG) TAB at 07:36

## 2019-01-01 RX ADMIN — METOPROLOL TARTRATE 12.5 MG: 25 TABLET ORAL at 17:30

## 2019-01-01 RX ADMIN — MAGNESIUM OXIDE TAB 400 MG (241.3 MG ELEMENTAL MG) 400 MG: 400 (241.3 MG) TAB at 08:09

## 2019-01-01 RX ADMIN — LISINOPRIL 5 MG: 5 TABLET ORAL at 05:16

## 2019-01-01 RX ADMIN — HEPARIN SODIUM 5000 UNITS: 5000 INJECTION, SOLUTION INTRAVENOUS; SUBCUTANEOUS at 19:32

## 2019-01-01 RX ADMIN — ALLOPURINOL 200 MG: 100 TABLET ORAL at 05:45

## 2019-01-01 RX ADMIN — LISINOPRIL 5 MG: 5 TABLET ORAL at 07:36

## 2019-01-01 RX ADMIN — MAGNESIUM OXIDE TAB 400 MG (241.3 MG ELEMENTAL MG) 400 MG: 400 (241.3 MG) TAB at 08:25

## 2019-01-01 RX ADMIN — LORAZEPAM 1 MG: 2 INJECTION INTRAMUSCULAR; INTRAVENOUS at 21:26

## 2019-01-01 RX ADMIN — METOPROLOL TARTRATE 12.5 MG: 25 TABLET, FILM COATED ORAL at 20:06

## 2019-01-01 RX ADMIN — METOPROLOL TARTRATE 12.5 MG: 25 TABLET, FILM COATED ORAL at 08:45

## 2019-01-01 RX ADMIN — LISINOPRIL 5 MG: 5 TABLET ORAL at 08:13

## 2019-01-01 RX ADMIN — LISINOPRIL 5 MG: 5 TABLET ORAL at 08:33

## 2019-01-01 RX ADMIN — Medication 100 MG: at 09:44

## 2019-01-01 RX ADMIN — HEPARIN SODIUM 5000 UNITS: 5000 INJECTION, SOLUTION INTRAVENOUS; SUBCUTANEOUS at 14:32

## 2019-01-01 RX ADMIN — LEVOTHYROXINE SODIUM 50 MCG: 100 TABLET ORAL at 04:19

## 2019-01-01 RX ADMIN — ATORVASTATIN CALCIUM 20 MG: 20 TABLET, FILM COATED ORAL at 22:34

## 2019-01-01 RX ADMIN — ASPIRIN 81 MG 81 MG: 81 TABLET ORAL at 08:13

## 2019-01-01 RX ADMIN — ATORVASTATIN CALCIUM 20 MG: 20 TABLET, FILM COATED ORAL at 19:45

## 2019-01-01 RX ADMIN — TRAZODONE HYDROCHLORIDE 50 MG: 50 TABLET ORAL at 19:11

## 2019-01-01 RX ADMIN — LEVOTHYROXINE SODIUM 50 MCG: 100 TABLET ORAL at 04:54

## 2019-01-01 RX ADMIN — ALLOPURINOL 100 MG: 100 TABLET ORAL at 09:11

## 2019-01-01 RX ADMIN — LISINOPRIL 5 MG: 5 TABLET ORAL at 07:56

## 2019-01-01 RX ADMIN — ALLOPURINOL 200 MG: 100 TABLET ORAL at 04:29

## 2019-01-01 RX ADMIN — ATORVASTATIN CALCIUM 20 MG: 20 TABLET, FILM COATED ORAL at 21:31

## 2019-01-01 RX ADMIN — METOPROLOL TARTRATE 12.5 MG: 25 TABLET ORAL at 04:50

## 2019-01-01 RX ADMIN — MAGNESIUM OXIDE TAB 400 MG (241.3 MG ELEMENTAL MG) 400 MG: 400 (241.3 MG) TAB at 07:45

## 2019-01-01 RX ADMIN — SENNOSIDES AND DOCUSATE SODIUM 2 TABLET: 8.6; 5 TABLET ORAL at 04:02

## 2019-01-01 RX ADMIN — HEPARIN SODIUM 5000 UNITS: 5000 INJECTION, SOLUTION INTRAVENOUS; SUBCUTANEOUS at 14:22

## 2019-01-01 RX ADMIN — LEVOTHYROXINE SODIUM 50 MCG: 50 TABLET ORAL at 06:07

## 2019-01-01 RX ADMIN — HEPARIN SODIUM 5000 UNITS: 5000 INJECTION, SOLUTION INTRAVENOUS; SUBCUTANEOUS at 19:21

## 2019-01-01 RX ADMIN — CEFDINIR 300 MG: 300 CAPSULE ORAL at 17:25

## 2019-01-01 RX ADMIN — HEPARIN SODIUM 5000 UNITS: 5000 INJECTION, SOLUTION INTRAVENOUS; SUBCUTANEOUS at 04:46

## 2019-01-01 RX ADMIN — METOPROLOL TARTRATE 25 MG: 25 TABLET, FILM COATED ORAL at 16:12

## 2019-01-01 RX ADMIN — METOPROLOL TARTRATE 12.5 MG: 25 TABLET ORAL at 04:58

## 2019-01-01 RX ADMIN — ATORVASTATIN CALCIUM 20 MG: 20 TABLET, FILM COATED ORAL at 19:43

## 2019-01-01 RX ADMIN — LISINOPRIL 5 MG: 5 TABLET ORAL at 06:22

## 2019-01-01 RX ADMIN — LEVOTHYROXINE SODIUM 50 MCG: 100 TABLET ORAL at 07:36

## 2019-01-01 RX ADMIN — ALLOPURINOL 300 MG: 300 TABLET ORAL at 06:05

## 2019-01-01 RX ADMIN — HEPARIN SODIUM 5000 UNITS: 5000 INJECTION, SOLUTION INTRAVENOUS; SUBCUTANEOUS at 04:34

## 2019-01-01 RX ADMIN — METRONIDAZOLE 500 MG: 500 TABLET ORAL at 20:36

## 2019-01-01 RX ADMIN — Medication 100 MG: at 06:16

## 2019-01-01 RX ADMIN — LEVOTHYROXINE SODIUM 50 MCG: 100 TABLET ORAL at 09:44

## 2019-01-01 RX ADMIN — HEPARIN SODIUM 5000 UNITS: 5000 INJECTION, SOLUTION INTRAVENOUS; SUBCUTANEOUS at 05:06

## 2019-01-01 RX ADMIN — ASPIRIN 81 MG: 81 TABLET, COATED ORAL at 17:43

## 2019-01-01 RX ADMIN — METOPROLOL TARTRATE 12.5 MG: 25 TABLET, FILM COATED ORAL at 20:54

## 2019-01-01 RX ADMIN — Medication 100 MG: at 05:19

## 2019-01-01 RX ADMIN — Medication 100 MG: at 08:14

## 2019-01-01 RX ADMIN — ALLOPURINOL 100 MG: 100 TABLET ORAL at 09:04

## 2019-01-01 RX ADMIN — POTASSIUM CHLORIDE 10 MEQ: 7.46 INJECTION, SOLUTION INTRAVENOUS at 23:21

## 2019-01-01 RX ADMIN — POTASSIUM CHLORIDE 20 MEQ: 1500 TABLET, EXTENDED RELEASE ORAL at 09:11

## 2019-01-01 RX ADMIN — SENNOSIDES AND DOCUSATE SODIUM 2 TABLET: 8.6; 5 TABLET ORAL at 17:25

## 2019-01-01 RX ADMIN — HEPARIN SODIUM 5000 UNITS: 5000 INJECTION, SOLUTION INTRAVENOUS; SUBCUTANEOUS at 14:52

## 2019-01-01 RX ADMIN — CEFDINIR 300 MG: 300 CAPSULE ORAL at 20:36

## 2019-01-01 RX ADMIN — TRAZODONE HYDROCHLORIDE 50 MG: 50 TABLET ORAL at 20:06

## 2019-01-01 RX ADMIN — HEPARIN SODIUM 5000 UNITS: 5000 INJECTION, SOLUTION INTRAVENOUS; SUBCUTANEOUS at 05:12

## 2019-01-01 RX ADMIN — ALLOPURINOL 200 MG: 100 TABLET ORAL at 05:03

## 2019-01-01 RX ADMIN — HEPARIN SODIUM 5000 UNITS: 5000 INJECTION, SOLUTION INTRAVENOUS; SUBCUTANEOUS at 19:54

## 2019-01-01 RX ADMIN — LEVOTHYROXINE SODIUM 50 MCG: 50 TABLET ORAL at 05:55

## 2019-01-01 RX ADMIN — HEPARIN SODIUM 5000 UNITS: 5000 INJECTION, SOLUTION INTRAVENOUS; SUBCUTANEOUS at 04:49

## 2019-01-01 RX ADMIN — HEPARIN SODIUM 5000 UNITS: 5000 INJECTION, SOLUTION INTRAVENOUS; SUBCUTANEOUS at 06:37

## 2019-01-01 RX ADMIN — METOPROLOL TARTRATE 12.5 MG: 25 TABLET ORAL at 06:19

## 2019-01-01 RX ADMIN — SENNOSIDES AND DOCUSATE SODIUM 2 TABLET: 8.6; 5 TABLET ORAL at 17:15

## 2019-01-01 RX ADMIN — METRONIDAZOLE 500 MG: 500 INJECTION, SOLUTION INTRAVENOUS at 14:30

## 2019-01-01 RX ADMIN — ATORVASTATIN CALCIUM 20 MG: 20 TABLET, FILM COATED ORAL at 19:54

## 2019-01-01 RX ADMIN — Medication 100 MG: at 05:28

## 2019-01-01 RX ADMIN — SENNOSIDES AND DOCUSATE SODIUM 2 TABLET: 8.6; 5 TABLET ORAL at 19:20

## 2019-01-01 RX ADMIN — ASPIRIN 81 MG 81 MG: 81 TABLET ORAL at 06:06

## 2019-01-01 RX ADMIN — SENNOSIDES AND DOCUSATE SODIUM 2 TABLET: 8.6; 5 TABLET ORAL at 17:22

## 2019-01-01 RX ADMIN — METOPROLOL TARTRATE 25 MG: 25 TABLET ORAL at 04:53

## 2019-01-01 RX ADMIN — HEPARIN SODIUM 5000 UNITS: 5000 INJECTION, SOLUTION INTRAVENOUS; SUBCUTANEOUS at 13:48

## 2019-01-01 RX ADMIN — METOPROLOL TARTRATE 12.5 MG: 25 TABLET, FILM COATED ORAL at 08:14

## 2019-01-01 RX ADMIN — LEVOTHYROXINE SODIUM 50 MCG: 100 TABLET ORAL at 04:38

## 2019-01-01 RX ADMIN — ALLOPURINOL 100 MG: 100 TABLET ORAL at 07:43

## 2019-01-01 RX ADMIN — HEPARIN SODIUM 5000 UNITS: 5000 INJECTION, SOLUTION INTRAVENOUS; SUBCUTANEOUS at 04:06

## 2019-01-01 RX ADMIN — METOPROLOL TARTRATE 12.5 MG: 25 TABLET, FILM COATED ORAL at 17:55

## 2019-01-01 RX ADMIN — ALLOPURINOL 200 MG: 100 TABLET ORAL at 04:52

## 2019-01-01 RX ADMIN — HEPARIN SODIUM 5000 UNITS: 5000 INJECTION, SOLUTION INTRAVENOUS; SUBCUTANEOUS at 04:10

## 2019-01-01 RX ADMIN — Medication 400 MG: at 04:19

## 2019-01-01 RX ADMIN — CEFDINIR 300 MG: 300 CAPSULE ORAL at 04:45

## 2019-01-01 RX ADMIN — POTASSIUM CHLORIDE 40 MEQ: 1500 TABLET, EXTENDED RELEASE ORAL at 04:20

## 2019-01-01 RX ADMIN — ALLOPURINOL 100 MG: 100 TABLET ORAL at 07:57

## 2019-01-01 RX ADMIN — METOPROLOL TARTRATE 12.5 MG: 25 TABLET ORAL at 18:06

## 2019-01-01 RX ADMIN — METOPROLOL TARTRATE 12.5 MG: 25 TABLET ORAL at 17:12

## 2019-01-01 RX ADMIN — HEPARIN SODIUM 5000 UNITS: 5000 INJECTION, SOLUTION INTRAVENOUS; SUBCUTANEOUS at 21:35

## 2019-01-01 RX ADMIN — HEPARIN SODIUM 5000 UNITS: 5000 INJECTION, SOLUTION INTRAVENOUS; SUBCUTANEOUS at 08:08

## 2019-01-01 RX ADMIN — Medication 400 MG: at 06:38

## 2019-01-01 RX ADMIN — ASPIRIN 81 MG 81 MG: 81 TABLET ORAL at 05:05

## 2019-01-01 RX ADMIN — LISINOPRIL 5 MG: 5 TABLET ORAL at 04:36

## 2019-01-01 RX ADMIN — LEVOTHYROXINE SODIUM 50 MCG: 100 TABLET ORAL at 08:14

## 2019-01-01 RX ADMIN — HEPARIN SODIUM 5000 UNITS: 5000 INJECTION, SOLUTION INTRAVENOUS; SUBCUTANEOUS at 20:55

## 2019-01-01 RX ADMIN — Medication 100 MG: at 07:33

## 2019-01-01 RX ADMIN — SENNOSIDES AND DOCUSATE SODIUM 2 TABLET: 8.6; 5 TABLET ORAL at 04:54

## 2019-01-01 RX ADMIN — LISINOPRIL 5 MG: 5 TABLET ORAL at 06:07

## 2019-01-01 RX ADMIN — ASPIRIN 81 MG 81 MG: 81 TABLET ORAL at 08:26

## 2019-01-01 RX ADMIN — METOPROLOL TARTRATE 12.5 MG: 25 TABLET, FILM COATED ORAL at 18:06

## 2019-01-01 RX ADMIN — ALLOPURINOL 100 MG: 100 TABLET ORAL at 08:19

## 2019-01-01 RX ADMIN — ALLOPURINOL 300 MG: 300 TABLET ORAL at 04:35

## 2019-01-01 RX ADMIN — METOPROLOL TARTRATE 12.5 MG: 25 TABLET, FILM COATED ORAL at 19:53

## 2019-01-01 RX ADMIN — ASPIRIN 81 MG 81 MG: 81 TABLET ORAL at 04:32

## 2019-01-01 RX ADMIN — LISINOPRIL 5 MG: 5 TABLET ORAL at 05:25

## 2019-01-01 RX ADMIN — ALLOPURINOL 200 MG: 100 TABLET ORAL at 05:19

## 2019-01-01 RX ADMIN — METOPROLOL TARTRATE 12.5 MG: 25 TABLET, FILM COATED ORAL at 08:58

## 2019-01-01 RX ADMIN — METOPROLOL TARTRATE 12.5 MG: 25 TABLET ORAL at 17:55

## 2019-01-01 RX ADMIN — SENNOSIDES AND DOCUSATE SODIUM 2 TABLET: 8.6; 5 TABLET ORAL at 17:11

## 2019-01-01 RX ADMIN — ATORVASTATIN CALCIUM 20 MG: 20 TABLET, FILM COATED ORAL at 21:58

## 2019-01-01 RX ADMIN — ASPIRIN 81 MG 81 MG: 81 TABLET ORAL at 04:54

## 2019-01-01 RX ADMIN — ALLOPURINOL 100 MG: 100 TABLET ORAL at 08:28

## 2019-01-01 RX ADMIN — Medication 400 MG: at 04:08

## 2019-01-01 RX ADMIN — METOPROLOL TARTRATE 12.5 MG: 25 TABLET, FILM COATED ORAL at 19:46

## 2019-01-01 RX ADMIN — HEPARIN SODIUM 5000 UNITS: 5000 INJECTION, SOLUTION INTRAVENOUS; SUBCUTANEOUS at 08:48

## 2019-01-01 RX ADMIN — HEPARIN SODIUM 5000 UNITS: 5000 INJECTION, SOLUTION INTRAVENOUS; SUBCUTANEOUS at 21:17

## 2019-01-01 RX ADMIN — LEVOTHYROXINE SODIUM 50 MCG: 100 TABLET ORAL at 05:28

## 2019-01-01 RX ADMIN — ALLOPURINOL 300 MG: 300 TABLET ORAL at 06:07

## 2019-01-01 RX ADMIN — ATORVASTATIN CALCIUM 20 MG: 20 TABLET, FILM COATED ORAL at 21:54

## 2019-01-01 RX ADMIN — METRONIDAZOLE 500 MG: 500 TABLET ORAL at 04:20

## 2019-01-01 RX ADMIN — PNEUMOCOCCAL VACCINE POLYVALENT 25 MCG
25; 25; 25; 25; 25; 25; 25; 25; 25; 25; 25; 25; 25; 25; 25; 25; 25; 25; 25; 25; 25; 25; 25 INJECTION, SOLUTION INTRAMUSCULAR; SUBCUTANEOUS at 14:58

## 2019-01-01 RX ADMIN — Medication 100 MG: at 08:55

## 2019-01-01 RX ADMIN — ALLOPURINOL 200 MG: 100 TABLET ORAL at 04:53

## 2019-01-01 RX ADMIN — LEVOTHYROXINE SODIUM 50 MCG: 50 TABLET ORAL at 04:45

## 2019-01-01 RX ADMIN — METOPROLOL TARTRATE 12.5 MG: 25 TABLET ORAL at 17:21

## 2019-01-01 RX ADMIN — LEVOTHYROXINE SODIUM 50 MCG: 100 TABLET ORAL at 08:54

## 2019-01-01 RX ADMIN — CEFDINIR 300 MG: 300 CAPSULE ORAL at 18:25

## 2019-01-01 RX ADMIN — LISINOPRIL 5 MG: 5 TABLET ORAL at 04:10

## 2019-01-01 RX ADMIN — Medication 100 MG: at 04:48

## 2019-01-01 RX ADMIN — METOPROLOL TARTRATE 25 MG: 25 TABLET, FILM COATED ORAL at 17:44

## 2019-01-01 RX ADMIN — LISINOPRIL 5 MG: 5 TABLET ORAL at 05:44

## 2019-01-01 RX ADMIN — LEVOTHYROXINE SODIUM 50 MCG: 100 TABLET ORAL at 06:26

## 2019-01-01 RX ADMIN — METOPROLOL TARTRATE 25 MG: 25 TABLET, FILM COATED ORAL at 17:54

## 2019-01-01 RX ADMIN — ALLOPURINOL 100 MG: 100 TABLET ORAL at 08:13

## 2019-01-01 RX ADMIN — HEPARIN SODIUM 5000 UNITS: 5000 INJECTION, SOLUTION INTRAVENOUS; SUBCUTANEOUS at 20:37

## 2019-01-01 RX ADMIN — LEVOTHYROXINE SODIUM 50 MCG: 100 TABLET ORAL at 05:32

## 2019-01-01 RX ADMIN — ALLOPURINOL 100 MG: 100 TABLET ORAL at 04:41

## 2019-01-01 RX ADMIN — METOPROLOL TARTRATE 12.5 MG: 25 TABLET, FILM COATED ORAL at 19:50

## 2019-01-01 RX ADMIN — ALLOPURINOL 100 MG: 100 TABLET ORAL at 09:06

## 2019-01-01 RX ADMIN — SENNOSIDES AND DOCUSATE SODIUM 2 TABLET: 8.6; 5 TABLET ORAL at 05:30

## 2019-01-01 RX ADMIN — METOPROLOL TARTRATE 25 MG: 25 TABLET, FILM COATED ORAL at 18:00

## 2019-01-01 RX ADMIN — ATORVASTATIN CALCIUM 20 MG: 20 TABLET, FILM COATED ORAL at 21:55

## 2019-01-01 RX ADMIN — HEPARIN SODIUM 5000 UNITS: 5000 INJECTION, SOLUTION INTRAVENOUS; SUBCUTANEOUS at 20:20

## 2019-01-01 RX ADMIN — ASPIRIN 81 MG 81 MG: 81 TABLET ORAL at 08:32

## 2019-01-01 RX ADMIN — LEVOTHYROXINE SODIUM 50 MCG: 100 TABLET ORAL at 04:29

## 2019-01-01 RX ADMIN — HEPARIN SODIUM 5000 UNITS: 5000 INJECTION, SOLUTION INTRAVENOUS; SUBCUTANEOUS at 14:00

## 2019-01-01 RX ADMIN — METOPROLOL TARTRATE 25 MG: 25 TABLET, FILM COATED ORAL at 18:17

## 2019-01-01 RX ADMIN — HEPARIN SODIUM 5000 UNITS: 5000 INJECTION, SOLUTION INTRAVENOUS; SUBCUTANEOUS at 14:05

## 2019-01-01 RX ADMIN — METOPROLOL TARTRATE 12.5 MG: 25 TABLET ORAL at 04:46

## 2019-01-01 RX ADMIN — METOPROLOL TARTRATE 12.5 MG: 25 TABLET, FILM COATED ORAL at 21:57

## 2019-01-01 RX ADMIN — ASPIRIN 81 MG 81 MG: 81 TABLET ORAL at 08:11

## 2019-01-01 RX ADMIN — LEVOTHYROXINE SODIUM 50 MCG: 100 TABLET ORAL at 04:44

## 2019-01-01 RX ADMIN — ATORVASTATIN CALCIUM 20 MG: 20 TABLET, FILM COATED ORAL at 20:29

## 2019-01-01 RX ADMIN — METOPROLOL TARTRATE 12.5 MG: 25 TABLET, FILM COATED ORAL at 07:43

## 2019-01-01 RX ADMIN — SENNOSIDES AND DOCUSATE SODIUM 2 TABLET: 8.6; 5 TABLET ORAL at 05:17

## 2019-01-01 RX ADMIN — TRAZODONE HYDROCHLORIDE 50 MG: 50 TABLET ORAL at 20:00

## 2019-01-01 RX ADMIN — ASPIRIN 81 MG 81 MG: 81 TABLET ORAL at 05:18

## 2019-01-01 RX ADMIN — LISINOPRIL 5 MG: 5 TABLET ORAL at 08:23

## 2019-01-01 RX ADMIN — ALLOPURINOL 200 MG: 100 TABLET ORAL at 04:47

## 2019-01-01 RX ADMIN — ALLOPURINOL 200 MG: 100 TABLET ORAL at 04:58

## 2019-01-01 RX ADMIN — HEPARIN SODIUM 5000 UNITS: 5000 INJECTION, SOLUTION INTRAVENOUS; SUBCUTANEOUS at 04:53

## 2019-01-01 RX ADMIN — LEVOTHYROXINE SODIUM 50 MCG: 100 TABLET ORAL at 05:57

## 2019-01-01 RX ADMIN — SENNOSIDES AND DOCUSATE SODIUM 2 TABLET: 8.6; 5 TABLET ORAL at 17:54

## 2019-01-01 RX ADMIN — LEVOTHYROXINE SODIUM 50 MCG: 100 TABLET ORAL at 06:22

## 2019-01-01 RX ADMIN — ASPIRIN 81 MG 81 MG: 81 TABLET ORAL at 04:05

## 2019-01-01 RX ADMIN — ASPIRIN 81 MG 81 MG: 81 TABLET ORAL at 08:23

## 2019-01-01 RX ADMIN — MAGNESIUM OXIDE TAB 400 MG (241.3 MG ELEMENTAL MG) 400 MG: 400 (241.3 MG) TAB at 08:23

## 2019-01-01 RX ADMIN — MAGNESIUM OXIDE TAB 400 MG (241.3 MG ELEMENTAL MG) 400 MG: 400 (241.3 MG) TAB at 08:31

## 2019-01-01 RX ADMIN — METRONIDAZOLE 500 MG: 500 TABLET ORAL at 21:27

## 2019-01-01 RX ADMIN — SENNOSIDES AND DOCUSATE SODIUM 2 TABLET: 8.6; 5 TABLET ORAL at 05:41

## 2019-01-01 RX ADMIN — Medication 400 MG: at 05:21

## 2019-01-01 RX ADMIN — Medication 100 MG: at 07:17

## 2019-01-01 RX ADMIN — METOPROLOL TARTRATE 12.5 MG: 25 TABLET, FILM COATED ORAL at 17:36

## 2019-01-01 RX ADMIN — HEPARIN SODIUM 5000 UNITS: 5000 INJECTION, SOLUTION INTRAVENOUS; SUBCUTANEOUS at 06:24

## 2019-01-01 RX ADMIN — METOPROLOL TARTRATE 25 MG: 25 TABLET, FILM COATED ORAL at 06:03

## 2019-01-01 RX ADMIN — HEPARIN SODIUM 5000 UNITS: 5000 INJECTION, SOLUTION INTRAVENOUS; SUBCUTANEOUS at 04:15

## 2019-01-01 RX ADMIN — LEVOTHYROXINE SODIUM 50 MCG: 100 TABLET ORAL at 08:53

## 2019-01-01 RX ADMIN — Medication 400 MG: at 05:07

## 2019-01-01 RX ADMIN — ASPIRIN 81 MG 81 MG: 81 TABLET ORAL at 05:30

## 2019-01-01 RX ADMIN — METOPROLOL TARTRATE 25 MG: 25 TABLET, FILM COATED ORAL at 05:17

## 2019-01-01 RX ADMIN — TRAZODONE HYDROCHLORIDE 50 MG: 50 TABLET ORAL at 20:50

## 2019-01-01 RX ADMIN — HEPARIN SODIUM 5000 UNITS: 5000 INJECTION, SOLUTION INTRAVENOUS; SUBCUTANEOUS at 21:31

## 2019-01-01 RX ADMIN — HEPARIN SODIUM 5000 UNITS: 5000 INJECTION, SOLUTION INTRAVENOUS; SUBCUTANEOUS at 19:55

## 2019-01-01 RX ADMIN — ALLOPURINOL 300 MG: 300 TABLET ORAL at 05:27

## 2019-01-01 RX ADMIN — LISINOPRIL 5 MG: 5 TABLET ORAL at 06:21

## 2019-01-01 RX ADMIN — LEVOTHYROXINE SODIUM 50 MCG: 100 TABLET ORAL at 05:53

## 2019-01-01 RX ADMIN — HEPARIN SODIUM 5000 UNITS: 5000 INJECTION, SOLUTION INTRAVENOUS; SUBCUTANEOUS at 05:49

## 2019-01-01 RX ADMIN — Medication 400 MG: at 04:44

## 2019-01-01 RX ADMIN — HEPARIN SODIUM 5000 UNITS: 5000 INJECTION, SOLUTION INTRAVENOUS; SUBCUTANEOUS at 05:31

## 2019-01-01 RX ADMIN — ASPIRIN 81 MG 81 MG: 81 TABLET ORAL at 04:40

## 2019-01-01 RX ADMIN — ATORVASTATIN CALCIUM 20 MG: 20 TABLET, FILM COATED ORAL at 21:08

## 2019-01-01 RX ADMIN — HEPARIN SODIUM 5000 UNITS: 5000 INJECTION, SOLUTION INTRAVENOUS; SUBCUTANEOUS at 13:55

## 2019-01-01 RX ADMIN — Medication 100 MG: at 08:57

## 2019-01-01 RX ADMIN — ASPIRIN 81 MG 81 MG: 81 TABLET ORAL at 04:34

## 2019-01-01 RX ADMIN — TRAZODONE HYDROCHLORIDE 50 MG: 50 TABLET ORAL at 20:24

## 2019-01-01 RX ADMIN — ALLOPURINOL 100 MG: 100 TABLET ORAL at 07:33

## 2019-01-01 RX ADMIN — METOPROLOL TARTRATE 12.5 MG: 25 TABLET, FILM COATED ORAL at 17:18

## 2019-01-01 RX ADMIN — LISINOPRIL 5 MG: 5 TABLET ORAL at 05:15

## 2019-01-01 RX ADMIN — METOPROLOL TARTRATE 12.5 MG: 25 TABLET, FILM COATED ORAL at 20:27

## 2019-01-01 RX ADMIN — SENNOSIDES AND DOCUSATE SODIUM 2 TABLET: 8.6; 5 TABLET ORAL at 06:09

## 2019-01-01 RX ADMIN — Medication 400 MG: at 04:42

## 2019-01-01 RX ADMIN — SENNOSIDES AND DOCUSATE SODIUM 2 TABLET: 8.6; 5 TABLET ORAL at 05:32

## 2019-01-01 RX ADMIN — METOPROLOL TARTRATE 12.5 MG: 25 TABLET ORAL at 05:45

## 2019-01-01 RX ADMIN — TRAZODONE HYDROCHLORIDE 50 MG: 50 TABLET ORAL at 00:31

## 2019-01-01 RX ADMIN — ALLOPURINOL 200 MG: 100 TABLET ORAL at 04:37

## 2019-01-01 RX ADMIN — LEVOTHYROXINE SODIUM 50 MCG: 100 TABLET ORAL at 05:25

## 2019-01-01 RX ADMIN — HEPARIN SODIUM 5000 UNITS: 5000 INJECTION, SOLUTION INTRAVENOUS; SUBCUTANEOUS at 09:29

## 2019-01-01 RX ADMIN — HEPARIN SODIUM 5000 UNITS: 5000 INJECTION, SOLUTION INTRAVENOUS; SUBCUTANEOUS at 21:57

## 2019-01-01 RX ADMIN — METOPROLOL TARTRATE 12.5 MG: 25 TABLET ORAL at 16:33

## 2019-01-01 RX ADMIN — ASPIRIN 81 MG 81 MG: 81 TABLET ORAL at 09:03

## 2019-01-01 RX ADMIN — METRONIDAZOLE 500 MG: 500 TABLET ORAL at 22:21

## 2019-01-01 RX ADMIN — ASPIRIN 81 MG 81 MG: 81 TABLET ORAL at 07:38

## 2019-01-01 RX ADMIN — ATORVASTATIN CALCIUM 20 MG: 20 TABLET, FILM COATED ORAL at 19:51

## 2019-01-01 RX ADMIN — METOPROLOL TARTRATE 12.5 MG: 25 TABLET, FILM COATED ORAL at 17:54

## 2019-01-01 RX ADMIN — TAMSULOSIN HYDROCHLORIDE 0.4 MG: 0.4 CAPSULE ORAL at 09:58

## 2019-01-01 RX ADMIN — ATORVASTATIN CALCIUM 20 MG: 20 TABLET, FILM COATED ORAL at 21:01

## 2019-01-01 RX ADMIN — HEPARIN SODIUM 5000 UNITS: 5000 INJECTION, SOLUTION INTRAVENOUS; SUBCUTANEOUS at 20:30

## 2019-01-01 RX ADMIN — Medication 400 MG: at 05:20

## 2019-01-01 RX ADMIN — Medication 400 MG: at 05:10

## 2019-01-01 RX ADMIN — SENNOSIDES AND DOCUSATE SODIUM 2 TABLET: 8.6; 5 TABLET ORAL at 17:39

## 2019-01-01 RX ADMIN — SENNOSIDES,DOCUSATE SODIUM 2 TABLET: 8.6; 5 TABLET, FILM COATED ORAL at 04:44

## 2019-01-01 RX ADMIN — Medication 400 MG: at 04:37

## 2019-01-01 RX ADMIN — LISINOPRIL 5 MG: 5 TABLET ORAL at 05:13

## 2019-01-01 RX ADMIN — METOPROLOL TARTRATE 12.5 MG: 25 TABLET, FILM COATED ORAL at 10:16

## 2019-01-01 RX ADMIN — ALLOPURINOL 100 MG: 100 TABLET ORAL at 05:05

## 2019-01-01 RX ADMIN — ASPIRIN 81 MG 81 MG: 81 TABLET ORAL at 08:49

## 2019-01-01 RX ADMIN — ATORVASTATIN CALCIUM 20 MG: 20 TABLET, FILM COATED ORAL at 20:44

## 2019-01-01 RX ADMIN — HEPARIN SODIUM 5000 UNITS: 5000 INJECTION, SOLUTION INTRAVENOUS; SUBCUTANEOUS at 14:18

## 2019-01-01 RX ADMIN — METOPROLOL TARTRATE 12.5 MG: 25 TABLET, FILM COATED ORAL at 08:26

## 2019-01-01 RX ADMIN — ALLOPURINOL 100 MG: 100 TABLET ORAL at 07:36

## 2019-01-01 RX ADMIN — METOPROLOL TARTRATE 12.5 MG: 25 TABLET, FILM COATED ORAL at 08:33

## 2019-01-01 RX ADMIN — ALLOPURINOL 200 MG: 100 TABLET ORAL at 06:18

## 2019-01-01 RX ADMIN — LEVOTHYROXINE SODIUM 50 MCG: 100 TABLET ORAL at 05:40

## 2019-01-01 RX ADMIN — ATORVASTATIN CALCIUM 20 MG: 20 TABLET, FILM COATED ORAL at 21:17

## 2019-01-01 RX ADMIN — METOPROLOL TARTRATE 12.5 MG: 25 TABLET, FILM COATED ORAL at 08:11

## 2019-01-01 RX ADMIN — METOPROLOL TARTRATE 25 MG: 25 TABLET, FILM COATED ORAL at 16:44

## 2019-01-01 RX ADMIN — LISINOPRIL 5 MG: 5 TABLET ORAL at 07:29

## 2019-01-01 RX ADMIN — TAMSULOSIN HYDROCHLORIDE 0.4 MG: 0.4 CAPSULE ORAL at 09:00

## 2019-01-01 RX ADMIN — ATORVASTATIN CALCIUM 20 MG: 20 TABLET, FILM COATED ORAL at 22:21

## 2019-01-01 RX ADMIN — ALLOPURINOL 200 MG: 100 TABLET ORAL at 04:41

## 2019-01-01 RX ADMIN — MAGNESIUM OXIDE TAB 400 MG (241.3 MG ELEMENTAL MG) 400 MG: 400 (241.3 MG) TAB at 08:52

## 2019-01-01 RX ADMIN — ASPIRIN 81 MG: 81 TABLET, COATED ORAL at 19:21

## 2019-01-01 RX ADMIN — ATORVASTATIN CALCIUM 20 MG: 20 TABLET, FILM COATED ORAL at 17:34

## 2019-01-01 RX ADMIN — HEPARIN SODIUM 5000 UNITS: 5000 INJECTION, SOLUTION INTRAVENOUS; SUBCUTANEOUS at 14:36

## 2019-01-01 RX ADMIN — HEPARIN SODIUM 5000 UNITS: 5000 INJECTION, SOLUTION INTRAVENOUS; SUBCUTANEOUS at 15:07

## 2019-01-01 RX ADMIN — ATORVASTATIN CALCIUM 20 MG: 20 TABLET, FILM COATED ORAL at 20:20

## 2019-01-01 RX ADMIN — MAGNESIUM OXIDE TAB 400 MG (241.3 MG ELEMENTAL MG) 400 MG: 400 (241.3 MG) TAB at 08:11

## 2019-01-01 RX ADMIN — ALLOPURINOL 200 MG: 100 TABLET ORAL at 05:55

## 2019-01-01 RX ADMIN — ACETAMINOPHEN 650 MG: 325 TABLET, FILM COATED ORAL at 21:39

## 2019-01-01 RX ADMIN — Medication 100 MG: at 06:21

## 2019-01-01 RX ADMIN — HEPARIN SODIUM 5000 UNITS: 5000 INJECTION, SOLUTION INTRAVENOUS; SUBCUTANEOUS at 04:59

## 2019-01-01 RX ADMIN — Medication 400 MG: at 05:13

## 2019-01-01 RX ADMIN — ATORVASTATIN CALCIUM 20 MG: 20 TABLET, FILM COATED ORAL at 19:49

## 2019-01-01 RX ADMIN — METOPROLOL TARTRATE 12.5 MG: 25 TABLET, FILM COATED ORAL at 17:51

## 2019-01-01 RX ADMIN — SENNOSIDES AND DOCUSATE SODIUM 2 TABLET: 8.6; 5 TABLET ORAL at 05:10

## 2019-01-01 RX ADMIN — LEVOTHYROXINE SODIUM 50 MCG: 50 TABLET ORAL at 04:02

## 2019-01-01 RX ADMIN — CEFDINIR 300 MG: 300 CAPSULE ORAL at 18:54

## 2019-01-01 RX ADMIN — ASPIRIN 81 MG 81 MG: 81 TABLET ORAL at 05:09

## 2019-01-01 RX ADMIN — LISINOPRIL 5 MG: 5 TABLET ORAL at 08:45

## 2019-01-01 RX ADMIN — METOPROLOL TARTRATE 12.5 MG: 25 TABLET, FILM COATED ORAL at 07:49

## 2019-01-01 RX ADMIN — ASPIRIN 81 MG 81 MG: 81 TABLET ORAL at 08:15

## 2019-01-01 RX ADMIN — ASPIRIN 81 MG 81 MG: 81 TABLET ORAL at 07:42

## 2019-01-01 RX ADMIN — Medication 100 MG: at 04:53

## 2019-01-01 RX ADMIN — HEPARIN SODIUM 5000 UNITS: 5000 INJECTION, SOLUTION INTRAVENOUS; SUBCUTANEOUS at 08:45

## 2019-01-01 RX ADMIN — SODIUM CHLORIDE, POTASSIUM CHLORIDE, SODIUM LACTATE AND CALCIUM CHLORIDE: 600; 310; 30; 20 INJECTION, SOLUTION INTRAVENOUS at 10:48

## 2019-01-01 RX ADMIN — METOPROLOL TARTRATE 25 MG: 25 TABLET, FILM COATED ORAL at 16:30

## 2019-01-01 RX ADMIN — Medication 100 MG: at 04:50

## 2019-01-01 RX ADMIN — TAMSULOSIN HYDROCHLORIDE 0.4 MG: 0.4 CAPSULE ORAL at 08:48

## 2019-01-01 RX ADMIN — ATORVASTATIN CALCIUM 20 MG: 20 TABLET, FILM COATED ORAL at 21:57

## 2019-01-01 RX ADMIN — HEPARIN SODIUM 5000 UNITS: 5000 INJECTION, SOLUTION INTRAVENOUS; SUBCUTANEOUS at 06:15

## 2019-01-01 RX ADMIN — LEVOTHYROXINE SODIUM 50 MCG: 100 TABLET ORAL at 04:15

## 2019-01-01 RX ADMIN — HEPARIN SODIUM 5000 UNITS: 5000 INJECTION, SOLUTION INTRAVENOUS; SUBCUTANEOUS at 19:49

## 2019-01-01 RX ADMIN — HEPARIN SODIUM 5000 UNITS: 5000 INJECTION, SOLUTION INTRAVENOUS; SUBCUTANEOUS at 22:12

## 2019-01-01 RX ADMIN — METOPROLOL TARTRATE 12.5 MG: 25 TABLET, FILM COATED ORAL at 20:11

## 2019-01-01 RX ADMIN — METOPROLOL TARTRATE 12.5 MG: 25 TABLET, FILM COATED ORAL at 21:31

## 2019-01-01 RX ADMIN — LEVOTHYROXINE SODIUM 50 MCG: 50 TABLET ORAL at 06:22

## 2019-01-01 RX ADMIN — ASPIRIN 81 MG 81 MG: 81 TABLET ORAL at 07:36

## 2019-01-01 RX ADMIN — METOPROLOL TARTRATE 25 MG: 25 TABLET, FILM COATED ORAL at 19:01

## 2019-01-01 RX ADMIN — Medication 100 MG: at 08:45

## 2019-01-01 RX ADMIN — LEVOTHYROXINE SODIUM 50 MCG: 100 TABLET ORAL at 05:27

## 2019-01-01 RX ADMIN — MAGNESIUM OXIDE TAB 400 MG (241.3 MG ELEMENTAL MG) 400 MG: 400 (241.3 MG) TAB at 08:57

## 2019-01-01 RX ADMIN — LEVOTHYROXINE SODIUM 50 MCG: 100 TABLET ORAL at 05:49

## 2019-01-01 RX ADMIN — SENNOSIDES AND DOCUSATE SODIUM 2 TABLET: 8.6; 5 TABLET ORAL at 17:04

## 2019-01-01 RX ADMIN — HEPARIN SODIUM 5000 UNITS: 5000 INJECTION, SOLUTION INTRAVENOUS; SUBCUTANEOUS at 05:28

## 2019-01-01 RX ADMIN — LEVOTHYROXINE SODIUM 50 MCG: 50 TABLET ORAL at 05:30

## 2019-01-01 RX ADMIN — ALLOPURINOL 100 MG: 100 TABLET ORAL at 07:47

## 2019-01-01 RX ADMIN — ALLOPURINOL 200 MG: 100 TABLET ORAL at 05:07

## 2019-01-01 RX ADMIN — HEPARIN SODIUM 5000 UNITS: 5000 INJECTION, SOLUTION INTRAVENOUS; SUBCUTANEOUS at 06:05

## 2019-01-01 RX ADMIN — ALLOPURINOL 100 MG: 100 TABLET ORAL at 08:35

## 2019-01-01 RX ADMIN — MAGNESIUM OXIDE TAB 400 MG (241.3 MG ELEMENTAL MG) 400 MG: 400 (241.3 MG) TAB at 08:35

## 2019-01-01 RX ADMIN — HEPARIN SODIUM 5000 UNITS: 5000 INJECTION, SOLUTION INTRAVENOUS; SUBCUTANEOUS at 05:15

## 2019-01-01 RX ADMIN — SODIUM CHLORIDE 500 ML: 9 INJECTION, SOLUTION INTRAVENOUS at 19:09

## 2019-01-01 RX ADMIN — Medication 100 MG: at 05:17

## 2019-01-01 RX ADMIN — METRONIDAZOLE 500 MG: 500 TABLET ORAL at 15:02

## 2019-01-01 RX ADMIN — HEPARIN SODIUM 5000 UNITS: 5000 INJECTION, SOLUTION INTRAVENOUS; SUBCUTANEOUS at 14:19

## 2019-01-01 RX ADMIN — POTASSIUM BICARBONATE 50 MEQ: 978 TABLET, EFFERVESCENT ORAL at 05:23

## 2019-01-01 RX ADMIN — ASPIRIN 81 MG 81 MG: 81 TABLET ORAL at 07:30

## 2019-01-01 RX ADMIN — SENNOSIDES,DOCUSATE SODIUM 2 TABLET: 8.6; 5 TABLET, FILM COATED ORAL at 18:01

## 2019-01-01 RX ADMIN — SENNOSIDES AND DOCUSATE SODIUM 2 TABLET: 8.6; 5 TABLET ORAL at 16:45

## 2019-01-01 RX ADMIN — METOPROLOL TARTRATE 12.5 MG: 25 TABLET, FILM COATED ORAL at 08:25

## 2019-01-01 RX ADMIN — METOPROLOL TARTRATE 12.5 MG: 25 TABLET ORAL at 17:59

## 2019-01-01 RX ADMIN — LISINOPRIL 5 MG: 5 TABLET ORAL at 08:18

## 2019-01-01 RX ADMIN — METOPROLOL TARTRATE 25 MG: 25 TABLET ORAL at 05:42

## 2019-01-01 RX ADMIN — CEFDINIR 300 MG: 300 CAPSULE ORAL at 04:54

## 2019-01-01 RX ADMIN — LISINOPRIL 5 MG: 5 TABLET ORAL at 04:20

## 2019-01-01 RX ADMIN — METRONIDAZOLE 500 MG: 500 INJECTION, SOLUTION INTRAVENOUS at 05:55

## 2019-01-01 RX ADMIN — HEPARIN SODIUM 5000 UNITS: 5000 INJECTION, SOLUTION INTRAVENOUS; SUBCUTANEOUS at 06:12

## 2019-01-01 RX ADMIN — TAMSULOSIN HYDROCHLORIDE 0.4 MG: 0.4 CAPSULE ORAL at 10:34

## 2019-01-01 RX ADMIN — METOPROLOL TARTRATE 25 MG: 25 TABLET ORAL at 05:55

## 2019-01-01 RX ADMIN — METOPROLOL TARTRATE 12.5 MG: 25 TABLET, FILM COATED ORAL at 08:54

## 2019-01-01 RX ADMIN — ALLOPURINOL 100 MG: 100 TABLET ORAL at 07:29

## 2019-01-01 RX ADMIN — METOPROLOL TARTRATE 25 MG: 25 TABLET ORAL at 05:30

## 2019-01-01 RX ADMIN — CEFTRIAXONE SODIUM 2 G: 2 INJECTION, POWDER, FOR SOLUTION INTRAMUSCULAR; INTRAVENOUS at 05:24

## 2019-01-01 RX ADMIN — Medication 100 MG: at 05:20

## 2019-01-01 RX ADMIN — METOPROLOL TARTRATE 12.5 MG: 25 TABLET, FILM COATED ORAL at 08:59

## 2019-01-01 RX ADMIN — ALLOPURINOL 300 MG: 300 TABLET ORAL at 05:35

## 2019-01-01 RX ADMIN — LEVOTHYROXINE SODIUM 50 MCG: 100 TABLET ORAL at 06:09

## 2019-01-01 RX ADMIN — METRONIDAZOLE 500 MG: 500 TABLET ORAL at 15:20

## 2019-01-01 RX ADMIN — METOPROLOL TARTRATE 12.5 MG: 25 TABLET ORAL at 17:08

## 2019-01-01 RX ADMIN — TRAZODONE HYDROCHLORIDE 50 MG: 50 TABLET ORAL at 21:09

## 2019-01-01 RX ADMIN — METOPROLOL TARTRATE 12.5 MG: 25 TABLET, FILM COATED ORAL at 21:10

## 2019-01-01 RX ADMIN — ALLOPURINOL 100 MG: 100 TABLET ORAL at 07:38

## 2019-01-01 RX ADMIN — LEVOTHYROXINE SODIUM 50 MCG: 100 TABLET ORAL at 06:38

## 2019-01-01 RX ADMIN — SENNOSIDES AND DOCUSATE SODIUM 2 TABLET: 8.6; 5 TABLET ORAL at 04:50

## 2019-01-01 RX ADMIN — TRAZODONE HYDROCHLORIDE 50 MG: 50 TABLET ORAL at 22:13

## 2019-01-01 RX ADMIN — LISINOPRIL 5 MG: 5 TABLET ORAL at 10:42

## 2019-01-01 RX ADMIN — METRONIDAZOLE 500 MG: 500 TABLET ORAL at 13:59

## 2019-01-01 RX ADMIN — MAGNESIUM OXIDE TAB 400 MG (241.3 MG ELEMENTAL MG) 400 MG: 400 (241.3 MG) TAB at 07:34

## 2019-01-01 RX ADMIN — CEFDINIR 300 MG: 300 CAPSULE ORAL at 04:02

## 2019-01-01 RX ADMIN — METOPROLOL TARTRATE 12.5 MG: 25 TABLET, FILM COATED ORAL at 20:02

## 2019-01-01 RX ADMIN — ASPIRIN 81 MG 81 MG: 81 TABLET ORAL at 04:59

## 2019-01-01 RX ADMIN — HEPARIN SODIUM 5000 UNITS: 5000 INJECTION, SOLUTION INTRAVENOUS; SUBCUTANEOUS at 21:09

## 2019-01-01 RX ADMIN — LEVOTHYROXINE SODIUM 50 MCG: 50 TABLET ORAL at 05:56

## 2019-01-01 RX ADMIN — LEVOTHYROXINE SODIUM 50 MCG: 100 TABLET ORAL at 08:58

## 2019-01-01 RX ADMIN — HEPARIN SODIUM 5000 UNITS: 5000 INJECTION, SOLUTION INTRAVENOUS; SUBCUTANEOUS at 04:11

## 2019-01-01 RX ADMIN — HEPARIN SODIUM 5000 UNITS: 5000 INJECTION, SOLUTION INTRAVENOUS; SUBCUTANEOUS at 09:44

## 2019-01-01 RX ADMIN — HEPARIN SODIUM 5000 UNITS: 5000 INJECTION, SOLUTION INTRAVENOUS; SUBCUTANEOUS at 14:58

## 2019-01-01 RX ADMIN — Medication 100 MG: at 08:28

## 2019-01-01 RX ADMIN — METOPROLOL TARTRATE 12.5 MG: 25 TABLET, FILM COATED ORAL at 05:01

## 2019-01-01 SDOH — ECONOMIC STABILITY: GENERAL

## 2019-01-01 ASSESSMENT — COGNITIVE AND FUNCTIONAL STATUS - GENERAL
CLIMB 3 TO 5 STEPS WITH RAILING: A LOT
PERSONAL GROOMING: A LOT
SUGGESTED CMS G CODE MODIFIER MOBILITY: CL
MOVING TO AND FROM BED TO CHAIR: A LITTLE
DRESSING REGULAR UPPER BODY CLOTHING: A LITTLE
DAILY ACTIVITIY SCORE: 16
TOILETING: A LOT
DAILY ACTIVITIY SCORE: 16
TOILETING: A LITTLE
HELP NEEDED FOR BATHING: A LOT
DRESSING REGULAR UPPER BODY CLOTHING: A LITTLE
CLIMB 3 TO 5 STEPS WITH RAILING: A LOT
STANDING UP FROM CHAIR USING ARMS: A LOT
TURNING FROM BACK TO SIDE WHILE IN FLAT BAD: UNABLE
HELP NEEDED FOR BATHING: A LOT
DRESSING REGULAR UPPER BODY CLOTHING: A LOT
HELP NEEDED FOR BATHING: A LOT
DRESSING REGULAR LOWER BODY CLOTHING: A LOT
PERSONAL GROOMING: A LOT
STANDING UP FROM CHAIR USING ARMS: A LITTLE
TURNING FROM BACK TO SIDE WHILE IN FLAT BAD: UNABLE
STANDING UP FROM CHAIR USING ARMS: A LITTLE
DRESSING REGULAR LOWER BODY CLOTHING: A LOT
PERSONAL GROOMING: A LITTLE
MOBILITY SCORE: 11
SUGGESTED CMS G CODE MODIFIER MOBILITY: CL
MOVING TO AND FROM BED TO CHAIR: UNABLE
TOILETING: A LOT
MOVING FROM LYING ON BACK TO SITTING ON SIDE OF FLAT BED: A LITTLE
TURNING FROM BACK TO SIDE WHILE IN FLAT BAD: A LITTLE
PERSONAL GROOMING: A LITTLE
MOBILITY SCORE: 13
MOVING FROM LYING ON BACK TO SITTING ON SIDE OF FLAT BED: UNABLE
STANDING UP FROM CHAIR USING ARMS: A LITTLE
DAILY ACTIVITIY SCORE: 16
CLIMB 3 TO 5 STEPS WITH RAILING: A LOT
STANDING UP FROM CHAIR USING ARMS: A LITTLE
EATING MEALS: A LOT
DRESSING REGULAR LOWER BODY CLOTHING: A LOT
DRESSING REGULAR LOWER BODY CLOTHING: A LOT
EATING MEALS: A LOT
WALKING IN HOSPITAL ROOM: A LITTLE
CLIMB 3 TO 5 STEPS WITH RAILING: A LITTLE
SUGGESTED CMS G CODE MODIFIER MOBILITY: CL
DAILY ACTIVITIY SCORE: 15
MOBILITY SCORE: 12
DAILY ACTIVITIY SCORE: 15
SUGGESTED CMS G CODE MODIFIER MOBILITY: CL
EATING MEALS: A LITTLE
MOVING TO AND FROM BED TO CHAIR: A LOT
STANDING UP FROM CHAIR USING ARMS: A LITTLE
TOILETING: A LOT
TURNING FROM BACK TO SIDE WHILE IN FLAT BAD: UNABLE
SUGGESTED CMS G CODE MODIFIER MOBILITY: CK
TURNING FROM BACK TO SIDE WHILE IN FLAT BAD: A LITTLE
WALKING IN HOSPITAL ROOM: A LITTLE
MOBILITY SCORE: 15
DRESSING REGULAR UPPER BODY CLOTHING: A LITTLE
PERSONAL GROOMING: A LOT
TURNING FROM BACK TO SIDE WHILE IN FLAT BAD: UNABLE
TURNING FROM BACK TO SIDE WHILE IN FLAT BAD: UNABLE
MOBILITY SCORE: 17
EATING MEALS: A LOT
SUGGESTED CMS G CODE MODIFIER DAILY ACTIVITY: CL
MOVING TO AND FROM BED TO CHAIR: UNABLE
HELP NEEDED FOR BATHING: A LOT
STANDING UP FROM CHAIR USING ARMS: A LITTLE
MOVING FROM LYING ON BACK TO SITTING ON SIDE OF FLAT BED: A LITTLE
MOVING TO AND FROM BED TO CHAIR: A LITTLE
EATING MEALS: A LITTLE
TURNING FROM BACK TO SIDE WHILE IN FLAT BAD: A LOT
MOVING FROM LYING ON BACK TO SITTING ON SIDE OF FLAT BED: A LOT
HELP NEEDED FOR BATHING: A LOT
STANDING UP FROM CHAIR USING ARMS: A LOT
TOILETING: A LITTLE
DRESSING REGULAR LOWER BODY CLOTHING: A LOT
SUGGESTED CMS G CODE MODIFIER MOBILITY: CK
HELP NEEDED FOR BATHING: A LITTLE
CLIMB 3 TO 5 STEPS WITH RAILING: A LOT
TURNING FROM BACK TO SIDE WHILE IN FLAT BAD: A LITTLE
MOVING FROM LYING ON BACK TO SITTING ON SIDE OF FLAT BED: A LOT
STANDING UP FROM CHAIR USING ARMS: A LITTLE
EATING MEALS: A LITTLE
EATING MEALS: A LITTLE
MOVING FROM LYING ON BACK TO SITTING ON SIDE OF FLAT BED: A LITTLE
SUGGESTED CMS G CODE MODIFIER MOBILITY: CK
HELP NEEDED FOR BATHING: A LOT
HELP NEEDED FOR BATHING: A LOT
EATING MEALS: A LITTLE
MOBILITY SCORE: 11
SUGGESTED CMS G CODE MODIFIER MOBILITY: CK
MOVING FROM LYING ON BACK TO SITTING ON SIDE OF FLAT BED: A LOT
CLIMB 3 TO 5 STEPS WITH RAILING: A LOT
DRESSING REGULAR LOWER BODY CLOTHING: A LOT
CLIMB 3 TO 5 STEPS WITH RAILING: A LOT
WALKING IN HOSPITAL ROOM: A LITTLE
CLIMB 3 TO 5 STEPS WITH RAILING: A LOT
MOVING TO AND FROM BED TO CHAIR: UNABLE
TURNING FROM BACK TO SIDE WHILE IN FLAT BAD: A LOT
SUGGESTED CMS G CODE MODIFIER MOBILITY: CK
MOBILITY SCORE: 16
EATING MEALS: A LOT
DAILY ACTIVITIY SCORE: 16
MOBILITY SCORE: 11
MOBILITY SCORE: 17
TURNING FROM BACK TO SIDE WHILE IN FLAT BAD: A LITTLE
WALKING IN HOSPITAL ROOM: A LOT
CLIMB 3 TO 5 STEPS WITH RAILING: A LOT
SUGGESTED CMS G CODE MODIFIER MOBILITY: CL
WALKING IN HOSPITAL ROOM: A LOT
DAILY ACTIVITIY SCORE: 12
PERSONAL GROOMING: A LITTLE
DAILY ACTIVITIY SCORE: 21
TOILETING: A LOT
DRESSING REGULAR UPPER BODY CLOTHING: A LITTLE
DRESSING REGULAR UPPER BODY CLOTHING: A LOT
SUGGESTED CMS G CODE MODIFIER DAILY ACTIVITY: CK
MOVING TO AND FROM BED TO CHAIR: A LITTLE
SUGGESTED CMS G CODE MODIFIER DAILY ACTIVITY: CJ
DRESSING REGULAR LOWER BODY CLOTHING: A LITTLE
SUGGESTED CMS G CODE MODIFIER MOBILITY: CK
TOILETING: A LOT
SUGGESTED CMS G CODE MODIFIER DAILY ACTIVITY: CK
DRESSING REGULAR UPPER BODY CLOTHING: A LITTLE
WALKING IN HOSPITAL ROOM: A LITTLE
DRESSING REGULAR UPPER BODY CLOTHING: A LITTLE
MOVING FROM LYING ON BACK TO SITTING ON SIDE OF FLAT BED: A LITTLE
SUGGESTED CMS G CODE MODIFIER MOBILITY: CL
DRESSING REGULAR LOWER BODY CLOTHING: A LOT
DAILY ACTIVITIY SCORE: 12
DRESSING REGULAR UPPER BODY CLOTHING: A LITTLE
SUGGESTED CMS G CODE MODIFIER DAILY ACTIVITY: CK
TURNING FROM BACK TO SIDE WHILE IN FLAT BAD: A LITTLE
CLIMB 3 TO 5 STEPS WITH RAILING: A LOT
SUGGESTED CMS G CODE MODIFIER DAILY ACTIVITY: CL
SUGGESTED CMS G CODE MODIFIER MOBILITY: CL
MOVING FROM LYING ON BACK TO SITTING ON SIDE OF FLAT BED: A LITTLE
MOBILITY SCORE: 15
WALKING IN HOSPITAL ROOM: A LITTLE
TURNING FROM BACK TO SIDE WHILE IN FLAT BAD: UNABLE
SUGGESTED CMS G CODE MODIFIER DAILY ACTIVITY: CK
STANDING UP FROM CHAIR USING ARMS: A LITTLE
MOVING FROM LYING ON BACK TO SITTING ON SIDE OF FLAT BED: UNABLE
STANDING UP FROM CHAIR USING ARMS: A LITTLE
DAILY ACTIVITIY SCORE: 14
MOBILITY SCORE: 18
MOBILITY SCORE: 14
SUGGESTED CMS G CODE MODIFIER MOBILITY: CL
WALKING IN HOSPITAL ROOM: A LITTLE
MOVING TO AND FROM BED TO CHAIR: A LITTLE
WALKING IN HOSPITAL ROOM: A LITTLE
STANDING UP FROM CHAIR USING ARMS: A LITTLE
WALKING IN HOSPITAL ROOM: A LITTLE
CLIMB 3 TO 5 STEPS WITH RAILING: A LOT
STANDING UP FROM CHAIR USING ARMS: A LITTLE
MOBILITY SCORE: 12
MOVING TO AND FROM BED TO CHAIR: UNABLE
DRESSING REGULAR UPPER BODY CLOTHING: A LITTLE
HELP NEEDED FOR BATHING: A LOT
CLIMB 3 TO 5 STEPS WITH RAILING: A LOT
WALKING IN HOSPITAL ROOM: A LITTLE
MOVING TO AND FROM BED TO CHAIR: A LOT
DRESSING REGULAR LOWER BODY CLOTHING: A LITTLE
TOILETING: A LITTLE
TURNING FROM BACK TO SIDE WHILE IN FLAT BAD: A LITTLE
MOVING TO AND FROM BED TO CHAIR: A LITTLE
MOVING TO AND FROM BED TO CHAIR: UNABLE
MOVING FROM LYING ON BACK TO SITTING ON SIDE OF FLAT BED: A LITTLE
CLIMB 3 TO 5 STEPS WITH RAILING: A LOT
MOVING FROM LYING ON BACK TO SITTING ON SIDE OF FLAT BED: A LOT
DRESSING REGULAR LOWER BODY CLOTHING: A LOT
DAILY ACTIVITIY SCORE: 12
PERSONAL GROOMING: A LITTLE
SUGGESTED CMS G CODE MODIFIER DAILY ACTIVITY: CK
DRESSING REGULAR LOWER BODY CLOTHING: A LOT
WALKING IN HOSPITAL ROOM: A LITTLE
HELP NEEDED FOR BATHING: A LOT
MOVING TO AND FROM BED TO CHAIR: UNABLE
EATING MEALS: A LITTLE
MOVING FROM LYING ON BACK TO SITTING ON SIDE OF FLAT BED: UNABLE
PERSONAL GROOMING: A LITTLE
WALKING IN HOSPITAL ROOM: A LITTLE
SUGGESTED CMS G CODE MODIFIER DAILY ACTIVITY: CL
PERSONAL GROOMING: A LITTLE
SUGGESTED CMS G CODE MODIFIER DAILY ACTIVITY: CK
TOILETING: A LOT
MOVING FROM LYING ON BACK TO SITTING ON SIDE OF FLAT BED: UNABLE
SUGGESTED CMS G CODE MODIFIER DAILY ACTIVITY: CK
DRESSING REGULAR UPPER BODY CLOTHING: A LOT
MOBILITY SCORE: 13
WALKING IN HOSPITAL ROOM: A LITTLE
STANDING UP FROM CHAIR USING ARMS: A LITTLE
PERSONAL GROOMING: A LITTLE
MOVING TO AND FROM BED TO CHAIR: A LITTLE
HELP NEEDED FOR BATHING: A LOT
CLIMB 3 TO 5 STEPS WITH RAILING: A LOT
TOILETING: A LOT

## 2019-01-01 ASSESSMENT — PAIN SCALES - PAIN ASSESSMENT IN ADVANCED DEMENTIA (PAINAD)
CONSOLABILITY: NO NEED TO CONSOLE
BREATHING: NORMAL
BODYLANGUAGE: RELAXED
FACIALEXPRESSION: SMILING OR INEXPRESSIVE
BODYLANGUAGE: RELAXED
CONSOLABILITY: NO NEED TO CONSOLE
TOTALSCORE: 0
BODYLANGUAGE: RELAXED
CONSOLABILITY: NO NEED TO CONSOLE
CONSOLABILITY: NO NEED TO CONSOLE
BREATHING: NORMAL
BREATHING: NORMAL
CONSOLABILITY: NO NEED TO CONSOLE
CONSOLABILITY: NO NEED TO CONSOLE
TOTALSCORE: 0
TOTALSCORE: 0
CONSOLABILITY: NO NEED TO CONSOLE
TOTALSCORE: 0
BODYLANGUAGE: RELAXED
TOTALSCORE: 0
TOTALSCORE: 0
FACIALEXPRESSION: SMILING OR INEXPRESSIVE
TOTALSCORE: 0
TOTALSCORE: 0
BREATHING: NORMAL
BODYLANGUAGE: RELAXED
FACIALEXPRESSION: SMILING OR INEXPRESSIVE
CONSOLABILITY: NO NEED TO CONSOLE
BODYLANGUAGE: RELAXED
BREATHING: NORMAL
TOTALSCORE: 0
BODYLANGUAGE: RELAXED
BREATHING: NORMAL
FACIALEXPRESSION: SMILING OR INEXPRESSIVE
TOTALSCORE: 0
BODYLANGUAGE: RELAXED
TOTALSCORE: 0
CONSOLABILITY: NO NEED TO CONSOLE
BODYLANGUAGE: RELAXED
BREATHING: NORMAL
BREATHING: NORMAL
CONSOLABILITY: NO NEED TO CONSOLE
CONSOLABILITY: NO NEED TO CONSOLE
TOTALSCORE: 0
BODYLANGUAGE: RELAXED
BREATHING: NORMAL
TOTALSCORE: 0
TOTALSCORE: 0
BREATHING: NORMAL
CONSOLABILITY: NO NEED TO CONSOLE
FACIALEXPRESSION: SMILING OR INEXPRESSIVE
TOTALSCORE: 0
CONSOLABILITY: NO NEED TO CONSOLE
BREATHING: NORMAL
BODYLANGUAGE: RELAXED
TOTALSCORE: 0
FACIALEXPRESSION: SMILING OR INEXPRESSIVE
BREATHING: NORMAL
BREATHING: NORMAL
FACIALEXPRESSION: SMILING OR INEXPRESSIVE
CONSOLABILITY: NO NEED TO CONSOLE
BREATHING: NORMAL
FACIALEXPRESSION: SMILING OR INEXPRESSIVE
BODYLANGUAGE: RELAXED
BREATHING: NORMAL
TOTALSCORE: 0
FACIALEXPRESSION: SMILING OR INEXPRESSIVE
BREATHING: NORMAL
CONSOLABILITY: NO NEED TO CONSOLE
BODYLANGUAGE: RELAXED
FACIALEXPRESSION: SMILING OR INEXPRESSIVE
TOTALSCORE: 0
BREATHING: NORMAL
FACIALEXPRESSION: SMILING OR INEXPRESSIVE
BREATHING: NORMAL
FACIALEXPRESSION: SMILING OR INEXPRESSIVE
BODYLANGUAGE: RELAXED
TOTALSCORE: 0
BODYLANGUAGE: RELAXED
CONSOLABILITY: NO NEED TO CONSOLE
CONSOLABILITY: NO NEED TO CONSOLE
BODYLANGUAGE: TENSE, DISTRESSED PACING, FIDGETING
TOTALSCORE: 0
BREATHING: NORMAL
FACIALEXPRESSION: SMILING OR INEXPRESSIVE
BODYLANGUAGE: RELAXED
FACIALEXPRESSION: SMILING OR INEXPRESSIVE
TOTALSCORE: 0
FACIALEXPRESSION: SMILING OR INEXPRESSIVE
BREATHING: NORMAL
TOTALSCORE: 0
BODYLANGUAGE: RELAXED
FACIALEXPRESSION: SMILING OR INEXPRESSIVE
BODYLANGUAGE: RELAXED
BODYLANGUAGE: RELAXED
CONSOLABILITY: NO NEED TO CONSOLE
FACIALEXPRESSION: SMILING OR INEXPRESSIVE
BODYLANGUAGE: RELAXED
BODYLANGUAGE: RELAXED
FACIALEXPRESSION: SMILING OR INEXPRESSIVE
BODYLANGUAGE: RELAXED
BODYLANGUAGE: RELAXED
FACIALEXPRESSION: SMILING OR INEXPRESSIVE
BREATHING: NORMAL
BREATHING: NORMAL
BODYLANGUAGE: RELAXED
BREATHING: NORMAL
BODYLANGUAGE: RELAXED
BODYLANGUAGE: RELAXED
TOTALSCORE: 0
TOTALSCORE: 0
FACIALEXPRESSION: SMILING OR INEXPRESSIVE
BODYLANGUAGE: RELAXED
FACIALEXPRESSION: SMILING OR INEXPRESSIVE
BREATHING: NORMAL
BODYLANGUAGE: RELAXED
BODYLANGUAGE: RELAXED
CONSOLABILITY: NO NEED TO CONSOLE
TOTALSCORE: 0
TOTALSCORE: 0
CONSOLABILITY: NO NEED TO CONSOLE
CONSOLABILITY: NO NEED TO CONSOLE
FACIALEXPRESSION: SMILING OR INEXPRESSIVE
TOTALSCORE: 0
BODYLANGUAGE: RELAXED
CONSOLABILITY: NO NEED TO CONSOLE
BREATHING: NORMAL
FACIALEXPRESSION: SMILING OR INEXPRESSIVE
TOTALSCORE: 0
BODYLANGUAGE: RELAXED
BODYLANGUAGE: RELAXED
BREATHING: NORMAL
FACIALEXPRESSION: SMILING OR INEXPRESSIVE
TOTALSCORE: 0
TOTALSCORE: 0
FACIALEXPRESSION: SMILING OR INEXPRESSIVE
BODYLANGUAGE: RELAXED
FACIALEXPRESSION: SMILING OR INEXPRESSIVE
BREATHING: NORMAL
BODYLANGUAGE: RELAXED
BODYLANGUAGE: RELAXED
TOTALSCORE: 0
FACIALEXPRESSION: SMILING OR INEXPRESSIVE
BREATHING: NORMAL
CONSOLABILITY: NO NEED TO CONSOLE
BODYLANGUAGE: RELAXED
CONSOLABILITY: NO NEED TO CONSOLE
BODYLANGUAGE: RELAXED
CONSOLABILITY: NO NEED TO CONSOLE
FACIALEXPRESSION: SMILING OR INEXPRESSIVE
TOTALSCORE: 0
FACIALEXPRESSION: SMILING OR INEXPRESSIVE
BODYLANGUAGE: RELAXED
CONSOLABILITY: NO NEED TO CONSOLE
BREATHING: NORMAL
CONSOLABILITY: NO NEED TO CONSOLE
BODYLANGUAGE: RELAXED
TOTALSCORE: 0
BREATHING: NORMAL
CONSOLABILITY: NO NEED TO CONSOLE
BREATHING: NORMAL
CONSOLABILITY: NO NEED TO CONSOLE
TOTALSCORE: 0
BODYLANGUAGE: RELAXED
CONSOLABILITY: NO NEED TO CONSOLE
TOTALSCORE: 0
CONSOLABILITY: NO NEED TO CONSOLE
FACIALEXPRESSION: SMILING OR INEXPRESSIVE
TOTALSCORE: 0
TOTALSCORE: 0
CONSOLABILITY: NO NEED TO CONSOLE
FACIALEXPRESSION: SMILING OR INEXPRESSIVE
CONSOLABILITY: NO NEED TO CONSOLE
TOTALSCORE: 0
BODYLANGUAGE: RELAXED
CONSOLABILITY: NO NEED TO CONSOLE
BREATHING: NORMAL
BREATHING: NORMAL
TOTALSCORE: 3
BREATHING: NORMAL
CONSOLABILITY: NO NEED TO CONSOLE
FACIALEXPRESSION: SMILING OR INEXPRESSIVE
CONSOLABILITY: NO NEED TO CONSOLE
BREATHING: NORMAL
TOTALSCORE: 0
CONSOLABILITY: NO NEED TO CONSOLE
FACIALEXPRESSION: SMILING OR INEXPRESSIVE
CONSOLABILITY: NO NEED TO CONSOLE
FACIALEXPRESSION: SMILING OR INEXPRESSIVE
TOTALSCORE: 0
TOTALSCORE: 0
FACIALEXPRESSION: SAD, FRIGHTENED, FROWN
TOTALSCORE: 0
FACIALEXPRESSION: SMILING OR INEXPRESSIVE
BREATHING: NORMAL
BODYLANGUAGE: RELAXED
CONSOLABILITY: NO NEED TO CONSOLE
BODYLANGUAGE: RELAXED
TOTALSCORE: 0
BREATHING: NORMAL
FACIALEXPRESSION: SMILING OR INEXPRESSIVE
TOTALSCORE: 0
BREATHING: NORMAL
FACIALEXPRESSION: SMILING OR INEXPRESSIVE
TOTALSCORE: 0
BREATHING: NORMAL
FACIALEXPRESSION: SMILING OR INEXPRESSIVE
FACIALEXPRESSION: SMILING OR INEXPRESSIVE
TOTALSCORE: 0
BODYLANGUAGE: RELAXED
FACIALEXPRESSION: SMILING OR INEXPRESSIVE
BODYLANGUAGE: RELAXED
CONSOLABILITY: NO NEED TO CONSOLE
BODYLANGUAGE: RELAXED
CONSOLABILITY: NO NEED TO CONSOLE
CONSOLABILITY: NO NEED TO CONSOLE
BREATHING: NORMAL
CONSOLABILITY: NO NEED TO CONSOLE
FACIALEXPRESSION: SMILING OR INEXPRESSIVE
CONSOLABILITY: NO NEED TO CONSOLE
TOTALSCORE: 0
FACIALEXPRESSION: SMILING OR INEXPRESSIVE
BREATHING: NORMAL
TOTALSCORE: 0
CONSOLABILITY: NO NEED TO CONSOLE
FACIALEXPRESSION: SMILING OR INEXPRESSIVE
BREATHING: NORMAL
BREATHING: NORMAL
FACIALEXPRESSION: SMILING OR INEXPRESSIVE
BODYLANGUAGE: RELAXED
TOTALSCORE: 0
BODYLANGUAGE: RELAXED
TOTALSCORE: 0
FACIALEXPRESSION: SMILING OR INEXPRESSIVE
TOTALSCORE: 0
BREATHING: NORMAL
TOTALSCORE: 0
BREATHING: NORMAL
BREATHING: NORMAL
CONSOLABILITY: NO NEED TO CONSOLE
TOTALSCORE: 0
TOTALSCORE: 0
BODYLANGUAGE: RELAXED
CONSOLABILITY: NO NEED TO CONSOLE
TOTALSCORE: 0
FACIALEXPRESSION: SMILING OR INEXPRESSIVE
TOTALSCORE: 0
BREATHING: NORMAL
BREATHING: NORMAL
CONSOLABILITY: NO NEED TO CONSOLE
BODYLANGUAGE: RELAXED
BODYLANGUAGE: RELAXED
BREATHING: NORMAL
CONSOLABILITY: NO NEED TO CONSOLE
FACIALEXPRESSION: SMILING OR INEXPRESSIVE
BODYLANGUAGE: RELAXED
CONSOLABILITY: NO NEED TO CONSOLE
TOTALSCORE: 0
CONSOLABILITY: NO NEED TO CONSOLE
BODYLANGUAGE: RELAXED
BREATHING: NORMAL
CONSOLABILITY: NO NEED TO CONSOLE
FACIALEXPRESSION: SMILING OR INEXPRESSIVE
BODYLANGUAGE: RELAXED
BODYLANGUAGE: RELAXED
BREATHING: NORMAL
BODYLANGUAGE: RELAXED
BREATHING: NORMAL
BREATHING: NORMAL
CONSOLABILITY: NO NEED TO CONSOLE
BODYLANGUAGE: RELAXED
BREATHING: NORMAL
FACIALEXPRESSION: SMILING OR INEXPRESSIVE
BODYLANGUAGE: RELAXED
CONSOLABILITY: NO NEED TO CONSOLE
BODYLANGUAGE: RELAXED
TOTALSCORE: 0
CONSOLABILITY: NO NEED TO CONSOLE
TOTALSCORE: 0
FACIALEXPRESSION: SMILING OR INEXPRESSIVE
BODYLANGUAGE: RELAXED
BODYLANGUAGE: RELAXED
TOTALSCORE: 0
FACIALEXPRESSION: SMILING OR INEXPRESSIVE
BODYLANGUAGE: RELAXED
FACIALEXPRESSION: SMILING OR INEXPRESSIVE
TOTALSCORE: 0
BREATHING: NORMAL
FACIALEXPRESSION: SMILING OR INEXPRESSIVE
BODYLANGUAGE: RELAXED
CONSOLABILITY: NO NEED TO CONSOLE
TOTALSCORE: 0
BREATHING: NORMAL
CONSOLABILITY: NO NEED TO CONSOLE
BODYLANGUAGE: RELAXED
CONSOLABILITY: NO NEED TO CONSOLE
FACIALEXPRESSION: SMILING OR INEXPRESSIVE
FACIALEXPRESSION: SMILING OR INEXPRESSIVE
BREATHING: NORMAL
TOTALSCORE: 0
FACIALEXPRESSION: SMILING OR INEXPRESSIVE
FACIALEXPRESSION: SMILING OR INEXPRESSIVE
BODYLANGUAGE: RELAXED
BODYLANGUAGE: RELAXED
FACIALEXPRESSION: SMILING OR INEXPRESSIVE
BREATHING: NORMAL
FACIALEXPRESSION: SMILING OR INEXPRESSIVE
CONSOLABILITY: NO NEED TO CONSOLE
FACIALEXPRESSION: SMILING OR INEXPRESSIVE
BREATHING: NORMAL
CONSOLABILITY: NO NEED TO CONSOLE
FACIALEXPRESSION: SMILING OR INEXPRESSIVE
BREATHING: NORMAL
BREATHING: NORMAL
FACIALEXPRESSION: SMILING OR INEXPRESSIVE
BREATHING: NORMAL
BODYLANGUAGE: RELAXED
CONSOLABILITY: DISTRACTED OR REASSURED BY VOICE/TOUCH
TOTALSCORE: 0
BREATHING: NORMAL
CONSOLABILITY: NO NEED TO CONSOLE
CONSOLABILITY: NO NEED TO CONSOLE
TOTALSCORE: 0
CONSOLABILITY: NO NEED TO CONSOLE
BREATHING: NORMAL
CONSOLABILITY: NO NEED TO CONSOLE
TOTALSCORE: 0
BREATHING: NORMAL
BODYLANGUAGE: RELAXED
FACIALEXPRESSION: SMILING OR INEXPRESSIVE
BREATHING: NORMAL
BODYLANGUAGE: RELAXED
CONSOLABILITY: NO NEED TO CONSOLE
CONSOLABILITY: NO NEED TO CONSOLE
FACIALEXPRESSION: SMILING OR INEXPRESSIVE
CONSOLABILITY: NO NEED TO CONSOLE
TOTALSCORE: 0
CONSOLABILITY: NO NEED TO CONSOLE

## 2019-01-01 ASSESSMENT — ENCOUNTER SYMPTOMS
CONSTITUTIONAL NEGATIVE: 1
RESPIRATORY NEGATIVE: 1
FALLS: 0
SLEEP QUALITY: FAIR
NAUSEA: 0
ABDOMINAL PAIN: 0
STOOL FREQUENCY: LESS THAN DAILY
FALLS: 0
COUGH: 0
HEARTBURN: 0
NAUSEA: 0
DOUBLE VISION: 0
POLYDIPSIA: 0
MUSCULOSKELETAL NEGATIVE: 1
VOMITING: 0
PALPITATIONS: 0
FATIGUES EASILY: 1
NAUSEA: 0
SLEEP QUALITY: FAIR
ABDOMINAL PAIN: 0
NERVOUS/ANXIOUS: 0
BOWEL PATTERN NORMAL: 1
DIZZINESS: 0
VOMITING: 0
RESPIRATORY NEGATIVE: 1
CARDIOVASCULAR NEGATIVE: 1
DIZZINESS: 0
FEVER: 0
FALLS: 0
BLURRED VISION: 0
BRUISES/BLEEDS EASILY: 0
DEPRESSION: 0
BRUISES/BLEEDS EASILY: 0
DEPRESSION: 0
STOOL FREQUENCY: LESS THAN DAILY
CONSTIPATION: 0
NAUSEA: 0
NERVOUS/ANXIOUS: 0
FALLS: 1
DRY SKIN: 1
VOMITING: 0
SHORTNESS OF BREATH: 0
FALLS: 0
PSYCHIATRIC NEGATIVE: 1
COUGH: 0
BOWEL PATTERN NORMAL: 1
EYES NEGATIVE: 1
MUSCULOSKELETAL NEGATIVE: 1
VOMITING: 0
FALLS: 0
DEPRESSION: 0
NAUSEA: 0
ABDOMINAL PAIN: 0
COUGH: 0
HEADACHES: 0
CARDIOVASCULAR NEGATIVE: 1
NAUSEA: 0
HEADACHES: 0
CHILLS: 0
FEVER: 0
DRY SKIN: 1
BLOOD IN STOOL: 1
BLURRED VISION: 0
NERVOUS/ANXIOUS: 0
BLURRED VISION: 0
MUSCULOSKELETAL NEGATIVE: 1
MUSCULOSKELETAL NEGATIVE: 1
VOMITING: 0
BLOOD IN STOOL: 0
HEADACHES: 0
DIZZINESS: 0
FATIGUE: 1
CHILLS: 0
FALLS: 0
VOMITING: 0
HEADACHES: 0
BLOOD IN STOOL: 0
BLURRED VISION: 0
POLYDIPSIA: 0
VOMITING: 0
RESPIRATORY NEGATIVE: 1
RESPIRATORY NEGATIVE: 1
BRUISES/BLEEDS EASILY: 0
VOMITING: 0
FEVER: 0
DIZZINESS: 0
NAUSEA: 0
SHORTNESS OF BREATH: 0
POLYDIPSIA: 0
VOMITING: 0
MUSCULOSKELETAL NEGATIVE: 1
DRY SKIN: 1
CONSTITUTIONAL NEGATIVE: 1
NERVOUS/ANXIOUS: 0
VOMITING: 0
CHILLS: 0
MYALGIAS: 0
CONSTIPATION: 1
FEVER: 0
NEUROLOGICAL NEGATIVE: 1
CARDIOVASCULAR NEGATIVE: 1
ADDITIONAL INFORMATION: NONE NOTED
BLURRED VISION: 0
HEARTBURN: 0
VOMITING: 0
COUGH: 0
GASTROINTESTINAL NEGATIVE: 1
DIZZINESS: 0
CHILLS: 0
PSYCHIATRIC NEGATIVE: 1
NAUSEA: 0
DEPRESSION: 0
RESPIRATORY NEGATIVE: 1
PSYCHIATRIC NEGATIVE: 1
DRY SKIN: 1
SHORTNESS OF BREATH: 0
ABDOMINAL PAIN: 0
NAUSEA: 0
VOMITING: 0
VOMITING: 0
FEVER: 0
CONSTIPATION: 1
GASTROINTESTINAL NEGATIVE: 1
EYES NEGATIVE: 1
CONSTITUTIONAL NEGATIVE: 1
NERVOUS/ANXIOUS: 0
STOOL FREQUENCY: LESS THAN DAILY
GASTROINTESTINAL NEGATIVE: 1
EYES NEGATIVE: 1
CARDIOVASCULAR NEGATIVE: 1
NAUSEA: 0
MYALGIAS: 0
FEVER: 0
MYALGIAS: 0
HEADACHES: 0
GASTROINTESTINAL NEGATIVE: 1
RESPIRATORY NEGATIVE: 1
FEVER: 0
DOUBLE VISION: 0
DEPRESSION: 0
CONSTIPATION: 0
PSYCHIATRIC NEGATIVE: 1
CARDIOVASCULAR NEGATIVE: 1
CARDIOVASCULAR NEGATIVE: 1
DOUBLE VISION: 0
SHORTNESS OF BREATH: 0
BRUISES/BLEEDS EASILY: 0
FATIGUE: 1
BACK PAIN: 0
MYALGIAS: 0
NERVOUS/ANXIOUS: 0
NERVOUS/ANXIOUS: 0
SOMNOLENCE: 1
NAUSEA: 0
DOUBLE VISION: 0
NEUROLOGICAL NEGATIVE: 1
RESPIRATORY NEGATIVE: 1
PALPITATIONS: 0
DOUBLE VISION: 0
FEVER: 0
HEADACHES: 0
CONSTITUTIONAL NEGATIVE: 1
SHORTNESS OF BREATH: 0
DIARRHEA: 0
CONSTITUTIONAL NEGATIVE: 1
MUSCULOSKELETAL NEGATIVE: 1
LAST BOWEL MOVEMENT: 65271
CHILLS: 0
VOMITING: 0
VOMITING: 0
SHORTNESS OF BREATH: 0
NAUSEA: 0
CONSTITUTIONAL NEGATIVE: 1
MYALGIAS: 0
CARDIOVASCULAR NEGATIVE: 1
VOMITING: 0
SLEEP QUALITY: FAIR
DIZZINESS: 0
MUSCULOSKELETAL NEGATIVE: 1
SOMNOLENCE: 1
GASTROINTESTINAL NEGATIVE: 1
CONSTITUTIONAL NEGATIVE: 1
POLYDIPSIA: 0
BLOOD IN STOOL: 0
SHORTNESS OF BREATH: 0
CARDIOVASCULAR NEGATIVE: 1
ABDOMINAL PAIN: 0
NEUROLOGICAL NEGATIVE: 1
VOMITING: 0
BLOOD IN STOOL: 1
NAUSEA: 0
HEADACHES: 0
POLYDIPSIA: 0
DOUBLE VISION: 0
FEVER: 0
PSYCHIATRIC NEGATIVE: 1
BLOOD IN STOOL: 0
FATIGUE: 1
DIZZINESS: 0
NERVOUS/ANXIOUS: 0
EYES NEGATIVE: 1
PSYCHIATRIC NEGATIVE: 1
LAST BOWEL MOVEMENT: 65278
COUGH: 0
FALLS: 0
FALLS: 0
PALPITATIONS: 0
PALPITATIONS: 0
NAUSEA: 0
DIZZINESS: 0
CHANGE IN LEVEL OF CONSCIOUSNESS: 1
CARDIOVASCULAR NEGATIVE: 1
EYES NEGATIVE: 1
BLURRED VISION: 0
EYES NEGATIVE: 1
COUGH: 0
RESPIRATORY NEGATIVE: 1
DEPRESSION: 0
PSYCHIATRIC NEGATIVE: 1
HEADACHES: 0
SHORTNESS OF BREATH: 1
SOMNOLENCE: 1
NEUROLOGICAL NEGATIVE: 1
BACK PAIN: 0
DIZZINESS: 0
FEVER: 0
SLEEP QUALITY: FAIR
FEVER: 0
POLYDIPSIA: 0
CONSTITUTIONAL NEGATIVE: 1
EYES NEGATIVE: 1
FALLS: 0
HEADACHES: 0
FALLS: 0
FATIGUES EASILY: 1
FATIGUES EASILY: 1
FALLS: 0
SLEEP QUALITY: ADEQUATE
EYES NEGATIVE: 1
VOMITING: 0
CHILLS: 0
FALLS: 0
MUSCULOSKELETAL NEGATIVE: 1
NAUSEA: 0
BLURRED VISION: 0
NAUSEA: 0
VOMITING: 0
COUGH: 0
STOOL FREQUENCY: LESS THAN DAILY
BLURRED VISION: 0
BRUISES/BLEEDS EASILY: 0
GASTROINTESTINAL NEGATIVE: 1
BOWEL PATTERN NORMAL: 1
VOMITING: 0
CONSTIPATION: 1
MYALGIAS: 0
COUGH: 0
FATIGUES EASILY: 1
BLOOD IN STOOL: 0
MUSCULOSKELETAL NEGATIVE: 1
EYES NEGATIVE: 1
DIZZINESS: 0
MYALGIAS: 0
NAUSEA: 0
BRUISES/BLEEDS EASILY: 0
SHORTNESS OF BREATH: 0
SHORTNESS OF BREATH: 0
NAUSEA: 0
SHORTNESS OF BREATH: 0
ABDOMINAL PAIN: 0
WEAKNESS: 0
PALPITATIONS: 0
NEUROLOGICAL NEGATIVE: 1
PSYCHIATRIC NEGATIVE: 1
HEADACHES: 0
CHILLS: 0
FATIGUE: 1
FATIGUE: 1
MYALGIAS: 0
CONSTITUTIONAL NEGATIVE: 1
CHILLS: 0
GASTROINTESTINAL NEGATIVE: 1
BRUISES/BLEEDS EASILY: 0
LAST BOWEL MOVEMENT: 65301
SLEEP QUALITY: ADEQUATE
GASTROINTESTINAL NEGATIVE: 1
DOUBLE VISION: 0
GASTROINTESTINAL NEGATIVE: 1
NEUROLOGICAL NEGATIVE: 1
COUGH: 0
SHORTNESS OF BREATH: 0
RESPIRATORY NEGATIVE: 1
POLYDIPSIA: 0
CONSTITUTIONAL NEGATIVE: 1
MYALGIAS: 0
MUSCULOSKELETAL NEGATIVE: 1
NAUSEA: 0
COUGH: 0
ABDOMINAL PAIN: 0
COUGH: 0
SHORTNESS OF BREATH: 0
NEUROLOGICAL NEGATIVE: 1
CONSTITUTIONAL NEGATIVE: 1
CARDIOVASCULAR NEGATIVE: 1
EYES NEGATIVE: 1
HEADACHES: 0
RESPIRATORY NEGATIVE: 1
NEUROLOGICAL NEGATIVE: 1
CHILLS: 0
CONSTITUTIONAL NEGATIVE: 1
SHORTNESS OF BREATH: 0
DEPRESSION: 0
LAST BOWEL MOVEMENT: 65271
SHORTNESS OF BREATH: 0
CARDIOVASCULAR NEGATIVE: 1
FALLS: 0
NAUSEA: 0
EYES NEGATIVE: 1
EYES NEGATIVE: 1
DOUBLE VISION: 0
COUGH: 0
POLYDIPSIA: 0
MYALGIAS: 0
CARDIOVASCULAR NEGATIVE: 1
ABDOMINAL PAIN: 0
CARDIOVASCULAR NEGATIVE: 1
CONSTITUTIONAL NEGATIVE: 1
RESPIRATORY NEGATIVE: 1
FATIGUES EASILY: 1
FALLS: 0
STOOL FREQUENCY: LESS THAN DAILY
LAST BOWEL MOVEMENT: 65295
DIZZINESS: 0
CHILLS: 0
NAUSEA: 0
NERVOUS/ANXIOUS: 1
CHILLS: 0
BLURRED VISION: 0
ABDOMINAL PAIN: 0
DIARRHEA: 0
SOMNOLENCE: 1
GASTROINTESTINAL NEGATIVE: 1
LAST BOWEL MOVEMENT: 65280
MEMORY LOSS: 1
DIZZINESS: 0
DIZZINESS: 0
FEVER: 0
HEADACHES: 0
POLYDIPSIA: 0
VOMITING: 0
NAUSEA: 0
MYALGIAS: 0
CONSTITUTIONAL NEGATIVE: 1
BRUISES/BLEEDS EASILY: 0
COUGH: 0
EYES NEGATIVE: 1
NEUROLOGICAL NEGATIVE: 1
FATIGUE: 1
HEADACHES: 1
DEPRESSION: 0
FALLS: 1
VOMITING: 0
FALLS: 0
COUGH: 0
CARDIOVASCULAR NEGATIVE: 1
PALPITATIONS: 0
COUGH: 0
FALLS: 1
DOUBLE VISION: 0
NERVOUS/ANXIOUS: 0
BLURRED VISION: 0
FALLS: 0
DRY SKIN: 1
LAST BOWEL MOVEMENT: 65309
DEPRESSION: 0
BRUISES/BLEEDS EASILY: 0
PALPITATIONS: 0
CHILLS: 0
PSYCHIATRIC NEGATIVE: 1
PHOTOPHOBIA: 0
PALPITATIONS: 0
EYES NEGATIVE: 1
EYES NEGATIVE: 1
MYALGIAS: 0
ABDOMINAL PAIN: 0
GASTROINTESTINAL NEGATIVE: 1
RESPIRATORY NEGATIVE: 1
BRUISES/BLEEDS EASILY: 0
COUGH: 0
SOMNOLENCE: 1
SLEEP QUALITY: ADEQUATE
POLYDIPSIA: 0
NERVOUS/ANXIOUS: 1
CARDIOVASCULAR NEGATIVE: 1
RESPIRATORY NEGATIVE: 1
SLEEP QUALITY: ADEQUATE
EYES NEGATIVE: 1
MUSCULOSKELETAL NEGATIVE: 1
DOUBLE VISION: 0
BOWEL PATTERN NORMAL: 1
FALLS: 1
FEVER: 0
BRUISES/BLEEDS EASILY: 0
DEPRESSION: 0
FEVER: 0
NEUROLOGICAL NEGATIVE: 1
GASTROINTESTINAL NEGATIVE: 1
LAST BOWEL MOVEMENT: 65287
CARDIOVASCULAR NEGATIVE: 1
FALLS: 0
NECK PAIN: 0
BLURRED VISION: 0
BLOOD IN STOOL: 0
CHILLS: 0
PALPITATIONS: 0
MUSCULOSKELETAL NEGATIVE: 1
DRY SKIN: 1
SHORTNESS OF BREATH: 0
POLYDIPSIA: 0
BLURRED VISION: 0
NEUROLOGICAL NEGATIVE: 1
VOMITING: 0
FEVER: 0
DEPRESSION: 0
SHORTNESS OF BREATH: 0
PALPITATIONS: 0
NAUSEA: 0
ABDOMINAL PAIN: 0
DOUBLE VISION: 0
CHILLS: 0
FATIGUES EASILY: 1
STOOL FREQUENCY: LESS THAN DAILY
SOMNOLENCE: 1
DOUBLE VISION: 0
PSYCHIATRIC NEGATIVE: 1
VOMITING: 0
MUSCULOSKELETAL NEGATIVE: 1
NECK PAIN: 0
PALPITATIONS: 0
RESPIRATORY NEGATIVE: 1
DEPRESSION: 0
VOMITING: 0
DEPRESSION: 0
CONSTIPATION: 0
MUSCULOSKELETAL NEGATIVE: 1
NERVOUS/ANXIOUS: 1
ABDOMINAL PAIN: 0
RESPIRATORY NEGATIVE: 1
CHILLS: 0
NERVOUS/ANXIOUS: 0
MYALGIAS: 0
RESPIRATORY NEGATIVE: 1
FALLS: 0
MUSCULOSKELETAL NEGATIVE: 1
CHANGE IN LEVEL OF CONSCIOUSNESS: 1
BLURRED VISION: 0
PALPITATIONS: 0
NEUROLOGICAL NEGATIVE: 1
ABDOMINAL PAIN: 0
SHORTNESS OF BREATH: 0
COUGH: 0
CHILLS: 0
FEVER: 0
BLOOD IN STOOL: 1
PALPITATIONS: 0
STOOL FREQUENCY: LESS THAN DAILY
NAUSEA: 0
NERVOUS/ANXIOUS: 0
NAUSEA: 0
MUSCULOSKELETAL NEGATIVE: 1
HEADACHES: 0

## 2019-01-01 ASSESSMENT — PATIENT HEALTH QUESTIONNAIRE - PHQ9
1. LITTLE INTEREST OR PLEASURE IN DOING THINGS: NOT AT ALL
SUM OF ALL RESPONSES TO PHQ9 QUESTIONS 1 AND 2: 0
SUM OF ALL RESPONSES TO PHQ9 QUESTIONS 1 AND 2: 0
2. FEELING DOWN, DEPRESSED, IRRITABLE, OR HOPELESS: NOT AT ALL
SUM OF ALL RESPONSES TO PHQ9 QUESTIONS 1 AND 2: 0
2. FEELING DOWN, DEPRESSED, IRRITABLE, OR HOPELESS: NOT AT ALL
1. LITTLE INTEREST OR PLEASURE IN DOING THINGS: NOT AT ALL
1. LITTLE INTEREST OR PLEASURE IN DOING THINGS: NOT AT ALL
2. FEELING DOWN, DEPRESSED, IRRITABLE, OR HOPELESS: NOT AT ALL
SUM OF ALL RESPONSES TO PHQ9 QUESTIONS 1 AND 2: 0
2. FEELING DOWN, DEPRESSED, IRRITABLE, OR HOPELESS: NOT AT ALL
1. LITTLE INTEREST OR PLEASURE IN DOING THINGS: NOT AT ALL
2. FEELING DOWN, DEPRESSED, IRRITABLE, OR HOPELESS: NOT AT ALL
1. LITTLE INTEREST OR PLEASURE IN DOING THINGS: NOT AT ALL
2. FEELING DOWN, DEPRESSED, IRRITABLE, OR HOPELESS: NOT AT ALL
CLINICAL INTERPRETATION OF PHQ2 SCORE: 0
1. LITTLE INTEREST OR PLEASURE IN DOING THINGS: NOT AT ALL

## 2019-01-01 ASSESSMENT — GAIT ASSESSMENTS
DEVIATION: DECREASED BASE OF SUPPORT;BRADYKINETIC;SHUFFLED GAIT
DEVIATION: SHUFFLED GAIT;DECREASED HEEL STRIKE;DECREASED TOE OFF
ASSISTIVE DEVICE: FRONT WHEEL WALKER
DEVIATION: BRADYKINETIC;SHUFFLED GAIT
DEVIATION: DECREASED BASE OF SUPPORT;SHUFFLED GAIT
ASSISTIVE DEVICE: FRONT WHEEL WALKER
DISTANCE (FEET): 60
DEVIATION: SHUFFLED GAIT;DECREASED HEEL STRIKE;DECREASED TOE OFF
ASSISTIVE DEVICE: FRONT WHEEL WALKER
DISTANCE (FEET): 110
ASSISTIVE DEVICE: FRONT WHEEL WALKER
ASSISTIVE DEVICE: FRONT WHEEL WALKER
GAIT LEVEL OF ASSIST: CONTACT GUARD ASSIST
DISTANCE (FEET): 20
GAIT LEVEL OF ASSIST: CONTACT GUARD ASSIST
ASSISTIVE DEVICE: FRONT WHEEL WALKER
GAIT LEVEL OF ASSIST: MINIMAL ASSIST
GAIT LEVEL OF ASSIST: CONTACT GUARD ASSIST
DEVIATION: SHUFFLED GAIT;DECREASED HEEL STRIKE;DECREASED TOE OFF
GAIT LEVEL OF ASSIST: MINIMAL ASSIST
DISTANCE (FEET): 150
GAIT LEVEL OF ASSIST: MINIMAL ASSIST
DISTANCE (FEET): 75
DISTANCE (FEET): 100
ASSISTIVE DEVICE: FRONT WHEEL WALKER
DEVIATION: DECREASED BASE OF SUPPORT;BRADYKINETIC
DISTANCE (FEET): 80
DISTANCE (FEET): 125
ASSISTIVE DEVICE: FRONT WHEEL WALKER
DEVIATION: BRADYKINETIC;SHUFFLED GAIT
DISTANCE (FEET): 75
ASSISTIVE DEVICE: FRONT WHEEL WALKER
GAIT LEVEL OF ASSIST: MINIMAL ASSIST
DEVIATION: DECREASED BASE OF SUPPORT
ASSISTIVE DEVICE: FRONT WHEEL WALKER
DEVIATION: BRADYKINETIC
GAIT LEVEL OF ASSIST: MINIMAL ASSIST
DISTANCE (FEET): 150
GAIT LEVEL OF ASSIST: CONTACT GUARD ASSIST
GAIT LEVEL OF ASSIST: MODERATE ASSIST
ASSISTIVE DEVICE: FRONT WHEEL WALKER
DISTANCE (FEET): 15
DEVIATION: SHUFFLED GAIT;DECREASED HEEL STRIKE;DECREASED TOE OFF
DISTANCE (FEET): 175
GAIT LEVEL OF ASSIST: CONTACT GUARD ASSIST
GAIT LEVEL OF ASSIST: MINIMAL ASSIST
ASSISTIVE DEVICE: FRONT WHEEL WALKER

## 2019-01-01 ASSESSMENT — ACTIVITIES OF DAILY LIVING (ADL)
CONTINENCE_REQUIRES_ASSISTANCE: 1
BATHING_REQUIRES_ASSISTANCE: 1
DRESSING_REQUIRES_ASSISTANCE: 1
PHYSICAL_TRANSFER_REQUIRES_ASSISTANCE: 1
CONTINENCE_REQUIRES_ASSISTANCE: 1
TOILETING: UNABLE TO DETERMINE AT THIS TIME
EATING_REQUIRES_ASSISTANCE: 1
MONEY MANAGEMENT (EXPENSES/BILLS): TOTALLY DEPENDENT
DRESSING_REQUIRES_ASSISTANCE: 1
BATHING_REQUIRES_ASSISTANCE: 1
AMBULATION_REQUIRES_ASSISTANCE: 1
TOILETING: UNABLE TO DETERMINE AT THIS TIME
AMBULATION_REQUIRES_ASSISTANCE: 1

## 2019-01-01 ASSESSMENT — LIFESTYLE VARIABLES
TOTAL SCORE: 0
EVER FELT BAD OR GUILTY ABOUT YOUR DRINKING: NO
HOW MANY TIMES IN THE PAST YEAR HAVE YOU HAD 5 OR MORE DRINKS IN A DAY: 0
AVERAGE NUMBER OF DAYS PER WEEK YOU HAVE A DRINK CONTAINING ALCOHOL: 0
DO YOU DRINK ALCOHOL: NO
HAVE PEOPLE ANNOYED YOU BY CRITICIZING YOUR DRINKING: NO
TOTAL SCORE: 0
ON A TYPICAL DAY WHEN YOU DRINK ALCOHOL HOW MANY DRINKS DO YOU HAVE: 0
ALCOHOL_USE: NO
EVER HAD A DRINK FIRST THING IN THE MORNING TO STEADY YOUR NERVES TO GET RID OF A HANGOVER: NO
HAVE YOU EVER FELT YOU SHOULD CUT DOWN ON YOUR DRINKING: NO
TOTAL SCORE: 0
CONSUMPTION TOTAL: NEGATIVE
EVER_SMOKED: NEVER

## 2019-01-01 ASSESSMENT — PAIN SCALES - WONG BAKER
WONGBAKER_NUMERICALRESPONSE: DOESN'T HURT AT ALL
WONGBAKER_NUMERICALRESPONSE: DOESN'T HURT AT ALL

## 2019-01-16 ENCOUNTER — HOSPITAL ENCOUNTER (EMERGENCY)
Facility: MEDICAL CENTER | Age: 84
End: 2019-01-16
Attending: EMERGENCY MEDICINE
Payer: MEDICARE

## 2019-01-16 VITALS
HEART RATE: 60 BPM | DIASTOLIC BLOOD PRESSURE: 77 MMHG | WEIGHT: 120 LBS | BODY MASS INDEX: 22.08 KG/M2 | TEMPERATURE: 97.2 F | HEIGHT: 62 IN | RESPIRATION RATE: 16 BRPM | SYSTOLIC BLOOD PRESSURE: 156 MMHG | OXYGEN SATURATION: 97 %

## 2019-01-16 DIAGNOSIS — S09.92XA BLUNT TRAUMA OF NOSE, INITIAL ENCOUNTER: ICD-10-CM

## 2019-01-16 DIAGNOSIS — R04.0 EPISTAXIS: ICD-10-CM

## 2019-01-16 PROCEDURE — 99284 EMERGENCY DEPT VISIT MOD MDM: CPT

## 2019-01-16 RX ORDER — ADENOSINE 3 MG/ML
INJECTION, SOLUTION INTRAVENOUS
Status: DISCONTINUED
Start: 2019-01-16 | End: 2019-01-16 | Stop reason: HOSPADM

## 2019-01-16 ASSESSMENT — PAIN SCALES - GENERAL: PAINLEVEL_OUTOF10: 0

## 2019-01-16 ASSESSMENT — LIFESTYLE VARIABLES: DO YOU DRINK ALCOHOL: NO

## 2019-01-16 NOTE — ED PROVIDER NOTES
ED Provider Note    Scribed for Selvin Gamble M.D. by Thien Rubin. 1/16/2019  8:39 AM    Primary care provider: Halie Dominique M.D.  Means of arrival: Walk-in  History obtained from: Patient  History limited by: None    CHIEF COMPLAINT  Chief Complaint   Patient presents with   • Nasal Pain     pt went to reach for something then lost balance then hit face/nose on the chair. denies loc. denies taking blood thinner. denies hitting head.   • Nose Bleed       HPI  Rafia Shaikh is a 85 y.o. male who presents to the Emergency Department complaining of nose pain and epistaxis after a trauma around 6:45 AM today. He was reaching for something when a large chair fell and hit him in the nose and by his lip. He got up immediately after the fall. He denies any other head trauma or loss of consciousness. His bleeding is controlled at this time without any packing or medication.    REVIEW OF SYSTEMS  Pertinent positives include nose pain, epistaxis (resolved).   Pertinent negatives include no other head trauma or loss of consciousness.  See HPI for further details.       PAST MEDICAL HISTORY   has a past medical history of Anemia (4/2016); Arrhythmia (5/12/17); CAD (coronary artery disease); Carotid artery stenosis (6/13/2011); CATARACT (1990's); Dental disorder; Dental disorder (5/12/17); Dizziness (6/13/2011); Dyslipidemia (9/18/2010); Gout; Heart murmur; High cholesterol; Hyperlipidemia; Hypertension; Hypothyroidism (6/13/2011); Insomnia; Myocardial infarct (HCC) (2008); PAD (peripheral artery disease); Preoperative examination, unspecified (4/12/2011); Rectal bleed (4/4/2016); Renal disorder (2000); Sleep apnea (2014); Stroke (HCC) (4/16/2010); Tendonitis; TIA (transient ischemic attack) (6/13/2011); Unspecified disorder of thyroid; and Urinary incontinence.    SURGICAL HISTORY   has a past surgical history that includes multiple coronary artery bypass endo vein harvest (3/20/08); septoplasty (1995); recovery  "(3/25/2011); recovery (4/1/2011); cataract extraction with iol (Bilateral, early 1990's); carotid endarterectomy (5/3/2011); recovery (8/22/2012); colonoscopy (2015); lithotripsy (2000); laparoscopy (4/2/2016); colonoscopy (N/A, 5/16/2017); transcatheter aortic valve replacement (N/A, 2/12/2018); and keysha (2/12/2018).    SOCIAL HISTORY  Social History   Substance Use Topics   • Smoking status: Never Smoker   • Smokeless tobacco: Never Used   • Alcohol use No      History   Drug Use No       FAMILY HISTORY  Family History   Problem Relation Age of Onset   • Heart Disease Father    • Other Mother         TB   • Hypertension Unknown    • Colon Cancer Brother    • Breast Cancer Sister    • Other Unknown         GOUT       CURRENT MEDICATIONS  Current Outpatient Prescriptions:   •  metoprolol (LOPRESSOR) 25 MG Tab, TAKE 1 TABLET BY MOUTH TWICE A DAY, Disp: 180 Tab, Rfl: 0  •  levothyroxine (SYNTHROID) 50 MCG Tab, TAKE 1 TABLET BY MOUTH DAILY, Disp: 90 Tab, Rfl: 0  •  lisinopril (PRINIVIL) 5 MG Tab, Take 1 Tab by mouth every day., Disp: 90 Tab, Rfl: 0  •  allopurinol (ZYLOPRIM) 300 MG Tab, Take 1 Tab by mouth every day., Disp: 90 Tab, Rfl: 0  •  amLODIPine (NORVASC) 5 MG Tab, Take 1 Tab by mouth every day., Disp: 90 Tab, Rfl: 0  •  atorvastatin (LIPITOR) 20 MG Tab, Take 1 Tab by mouth every day., Disp: 90 Tab, Rfl: 0  •  aspirin EC (ECOTRIN) 81 MG Tablet Delayed Response, Take 1 Tab by mouth every evening., Disp: 90 Tab, Rfl: 0  •  cephALEXin (KEFLEX) 500 MG Cap, Take 1 Cap by mouth 2 times a day., Disp: 10 Cap, Rfl: 0  •  B Complex Vitamins (VITAMIN B COMPLEX PO), Take  by mouth every day., Disp: , Rfl:   •  cilostazol (PLETAL) 100 MG Tab, Take 100 mg by mouth 2 Times a Day., Disp: , Rfl:      ALLERGIES  No Known Allergies    PHYSICAL EXAM  VITAL SIGNS: BP (!) 179/82   Pulse 60   Temp 36 °C (96.8 °F) (Temporal)   Resp 16   Ht 1.575 m (5' 2\")   Wt 54.4 kg (120 lb)   SpO2 97%   BMI 21.95 kg/m²     Nursing note and " vitals reviewed.  Constitutional: No distress.   HENT: Head is atraumatic. Oropharynx is moist. No nasal bone or facial bone tenderness. No swelling, no cephalohematoma.  Eyes: Conjunctivae are normal. Pupils are equal, round, and reactive to light.   Cardiovascular: Normal peripheral perfusion  Respiratory: No respiratory distress.   Musculoskeletal: Normal range of motion.   Neurological: Alert. No focal deficits noted.    Skin: No rash.   Psych: Appropriate for clinical situation     COURSE & MEDICAL DECISION MAKING  Nursing notes, VS, PMSFHx reviewed in chart.     8:39 AM - Patient seen and examined at bedside. I considered a CT Head and Face, but there is no evidence of fracture or history of head trauma. I explained to the patient that he appears healthy and well. He does not exhibit any facial tenderness. He is stable for discharge at this time. I explained he can use OTC Afrin for bleeding control if bleeding returns after discharge. Patient instructed to follow up with primary care and given strict return precautions with any new or worsening symptoms. He understands and agrees to plan of care and discharge at this time.    HTN/IDDM FOLLOW UP:  The patient has known hypertension and is being followed by their primary care doctor    The patient will return for new or worsening symptoms and is stable at the time of discharge.    DISPOSITION:  Patient will be discharged home in stable condition.    FOLLOW UP:  Halie Dominique M.D.  [address] [id number] [phone]    Schedule an appointment as soon as possible for a visit       [facility], Emergency Dept  [address] [facility] [id number] [phone]    If symptoms worsen      OUTPATIENT MEDICATIONS:  New Prescriptions    No medications on file      The patient was discharged home with an information sheet on Nosebleed and told to return immediately for any signs or symptoms listed.  The patient agreed to the  discharge precautions and follow-up plan which is documented in EPIC.     FINAL IMPRESSION  1. Epistaxis    2. Blunt trauma of nose, initial encounter          I, Thien Rubin (Scribe), am scribing for, and in the presence of, Selvin Gamble M.D..    Electronically signed by: Thien Rubin (Scribe), 1/16/2019    ISelvin M.D. personally performed the services described in this documentation, as scribed by Thien Rubin in my presence, and it is both accurate and complete. E.    The note accurately reflects work and decisions made by me.  Selvin Gamble  1/16/2019  3:15 PM

## 2019-01-16 NOTE — ED TRIAGE NOTES
.  Chief Complaint   Patient presents with   • Nasal Pain     pt went to reach for something then lost balance then hit face/nose on the chair. denies loc. denies taking blood thinner. denies hitting head.   • Nose Bleed   Agree with triage assessment.  The chair Pt reached for to regain his balance tipped over.  Fall occurred at 0645 this morning.

## 2019-01-16 NOTE — ED TRIAGE NOTES
Chief Complaint   Patient presents with   • Nasal Pain     pt went to reach for something then lost balance then hit face/nose on the chair. denies loc. denies taking blood thinner. denies hitting head.   • Nose Bleed     Bleeding in the nose controlled. Triage process explained. Instructed to notify staff for any worsening symptoms.

## 2019-02-12 ENCOUNTER — HOSPITAL ENCOUNTER (EMERGENCY)
Facility: MEDICAL CENTER | Age: 84
End: 2019-02-12
Attending: EMERGENCY MEDICINE
Payer: MEDICARE

## 2019-02-12 ENCOUNTER — APPOINTMENT (OUTPATIENT)
Dept: RADIOLOGY | Facility: MEDICAL CENTER | Age: 84
End: 2019-02-12
Attending: EMERGENCY MEDICINE
Payer: MEDICARE

## 2019-02-12 VITALS
HEIGHT: 63 IN | RESPIRATION RATE: 18 BRPM | WEIGHT: 108.91 LBS | SYSTOLIC BLOOD PRESSURE: 126 MMHG | HEART RATE: 68 BPM | BODY MASS INDEX: 19.3 KG/M2 | DIASTOLIC BLOOD PRESSURE: 78 MMHG | OXYGEN SATURATION: 98 % | TEMPERATURE: 98.1 F

## 2019-02-12 DIAGNOSIS — S01.01XA LACERATION OF SCALP, INITIAL ENCOUNTER: ICD-10-CM

## 2019-02-12 DIAGNOSIS — S09.90XA CLOSED HEAD INJURY, INITIAL ENCOUNTER: ICD-10-CM

## 2019-02-12 PROCEDURE — 70450 CT HEAD/BRAIN W/O DYE: CPT

## 2019-02-12 PROCEDURE — 90471 IMMUNIZATION ADMIN: CPT

## 2019-02-12 PROCEDURE — 99284 EMERGENCY DEPT VISIT MOD MDM: CPT

## 2019-02-12 PROCEDURE — 700111 HCHG RX REV CODE 636 W/ 250 OVERRIDE (IP): Performed by: EMERGENCY MEDICINE

## 2019-02-12 PROCEDURE — 90715 TDAP VACCINE 7 YRS/> IM: CPT | Performed by: EMERGENCY MEDICINE

## 2019-02-12 RX ADMIN — CLOSTRIDIUM TETANI TOXOID ANTIGEN (FORMALDEHYDE INACTIVATED), CORYNEBACTERIUM DIPHTHERIAE TOXOID ANTIGEN (FORMALDEHYDE INACTIVATED), BORDETELLA PERTUSSIS TOXOID ANTIGEN (GLUTARALDEHYDE INACTIVATED), BORDETELLA PERTUSSIS FILAMENTOUS HEMAGGLUTININ ANTIGEN (FORMALDEHYDE INACTIVATED), BORDETELLA PERTUSSIS PERTACTIN ANTIGEN, AND BORDETELLA PERTUSSIS FIMBRIAE 2/3 ANTIGEN 0.5 ML: 5; 2; 2.5; 5; 3; 5 INJECTION, SUSPENSION INTRAMUSCULAR at 18:07

## 2019-02-13 NOTE — ED NOTES
Pt resting in bed, breathing equal/unlabored. No distress noted. Will continue to monitor.    Steri strips placed on forehead. Pt refused having hair cut to better place steri strips

## 2019-02-13 NOTE — ED NOTES
.Patient states understanding of discharge instructions.patient taken out of ED via wheelchair by RN. Accompanied by spouse, who states she is calling a taxi to get home.  Personal belongings with patient.

## 2019-02-13 NOTE — ED PROVIDER NOTES
ED Provider Note    Scribed for Libia Orr M.D. by George Squires. 2/12/2019, 5:42 PM.    Primary care provider: Halie Dominique M.D.  Means of arrival: Walk-in  History obtained from: Patient's wife  History limited by: None    CHIEF COMPLAINT  Chief Complaint   Patient presents with   • T-5000 GLF   • Head Injury       HPI  Rafia Shaikh is a 85 y.o. male who presents to the Emergency Department for injuries sustained during a ground level fall that occurred this morning. Patient was attempting to shovel snow on his driveway when he slipped on a patch of ice, fell, and struck the right side of his forehead on the ground. He sustained a laceration to his right forehead during the incident. Unknown tetanus vaccination. He is not experiencing vomiting, headache, head pain.    REVIEW OF SYSTEMS  Pertinent positives include fall, head injury, head abrasion. Pertinent negatives include no vomiting, headache, head pain. All other systems negative.    See HPI for further details.     PAST MEDICAL HISTORY  Past Medical History:   Diagnosis Date   • Anemia 4/2016    due to rectal bleed   • Arrhythmia 5/12/17    Hx A fib. On only aspirin.    • CAD (coronary artery disease)    • Carotid artery stenosis 6/13/2011   • CATARACT 1990's    Bilateral phaco with IOL   • Dental disorder     cavities, under current care   • Dental disorder 5/12/17    permanent bridge lower   • Dizziness 6/13/2011   • Dyslipidemia 9/18/2010   • Gout    • Heart murmur    • High cholesterol    • Hyperlipidemia    • Hypertension    • Hypothyroidism 6/13/2011   • Insomnia    • Myocardial infarct (HCC) 2008    Stent 2011.  5/12/17-Cardiologist is DR Wu ([facility])   • PAD (peripheral artery disease)    • Preoperative examination, unspecified 4/12/2011   • Rectal bleed 4/4/2016    Cauterized and received blood transfusions   • Renal disorder 2000    kidney stone   • Sleep apnea 2014    States recommended CPAP but never did get it.   • Stroke (HCC) 4/16/2010     TIA    • Tendonitis    • TIA (transient ischemic attack) 6/13/2011   • Unspecified disorder of thyroid    • Urinary incontinence        SURGICAL HISTORY  Past Surgical History:   Procedure Laterality Date   • TRANSCATHETER AORTIC VALVE REPLACEMENT N/A 2/12/2018    Procedure: TRANSCATHETER AORTIC VALVE REPLACEMENT;  Surgeon: Jez Waters M.D.;  Location: SURGERY [facility];  Service: Cardiac   • JANETH  2/12/2018    Procedure: JANETH;  Surgeon: Jez Waters M.D.;  Location: SURGERY [facility];  Service: Cardiac   • COLONOSCOPY N/A 5/16/2017    Procedure: COLONOSCOPY;  Surgeon: Osmany Smart M.D.;  Location: [facility];  Service:    • LAPAROSCOPY  4/2/2016    exp for rectal bleed and cautery   • COLONOSCOPY  2015   • RECOVERY  8/22/2012    aortogram-Performed by SURGERY, IR-RECOVERY at SURGERY [facility]   • CAROTID ENDARTERECTOMY  5/3/2011    Performed by ERASMO NAVARRETE at SURGERY [facility]   • RECOVERY  4/1/2011    Performed by SURGERY, CATH-RECOVERY at SURGERY SAME DAY [facility]S   • RECOVERY  3/25/2011    Performed by SURGERY, CATH-RECOVERY at SURGERY SAME DAY [facility]   • MULTIPLE CORONARY ARTERY BYPASS ENDO VEIN HARVEST  3/20/08    Performed by AMANDA CRYSTAL at SURGERY [facility]   • LITHOTRIPSY  2000   • SEPTOPLASTY  1995   • CATARACT EXTRACTION WITH IOL Bilateral early 1990's       SOCIAL HISTORY  Social History   Substance Use Topics   • Smoking status: Never Smoker   • Smokeless tobacco: Never Used   • Alcohol use No      History   Drug Use No       FAMILY HISTORY  Family History   Problem Relation Age of Onset   • Heart Disease Father    • Other Mother         TB   • Hypertension Unknown    • Colon Cancer Brother    • Breast Cancer Sister    • Other Unknown         GOUT       CURRENT MEDICATIONS    Current Outpatient Prescriptions on File Prior to Encounter   Medication Sig Dispense Refill   • metoprolol (LOPRESSOR) 25 MG Tab TAKE 1 TABLET BY MOUTH TWICE A  " Tab 0   • levothyroxine (SYNTHROID) 50 MCG Tab TAKE 1 TABLET BY MOUTH DAILY 90 Tab 0   • lisinopril (PRINIVIL) 5 MG Tab Take 1 Tab by mouth every day. 90 Tab 0   • allopurinol (ZYLOPRIM) 300 MG Tab Take 1 Tab by mouth every day. 90 Tab 0   • amLODIPine (NORVASC) 5 MG Tab Take 1 Tab by mouth every day. 90 Tab 0   • atorvastatin (LIPITOR) 20 MG Tab Take 1 Tab by mouth every day. 90 Tab 0   • aspirin EC (ECOTRIN) 81 MG Tablet Delayed Response Take 1 Tab by mouth every evening. 90 Tab 0   • cephALEXin (KEFLEX) 500 MG Cap Take 1 Cap by mouth 2 times a day. 10 Cap 0   • B Complex Vitamins (VITAMIN B COMPLEX PO) Take  by mouth every day.     • cilostazol (PLETAL) 100 MG Tab Take 100 mg by mouth 2 Times a Day.        ALLERGIES  No Known Allergies    PHYSICAL EXAM  VITAL SIGNS: /58   Pulse 60   Temp 36.7 °C (98.1 °F) (Temporal)   Resp 17   Ht 1.6 m (5' 3\")   Wt 49.4 kg (108 lb 14.5 oz)   SpO2 98%   BMI 19.29 kg/m²   Vitals reviewed.    Consitutional: Well-developed, well-nourished. Negative for: distress.  HENT: Normocephalic, right external ear normal, left external ear normal, oropharynx clear and moist. 2 cm jagged, full thickness laceration to the right forehead with some surrounding abrasions but no hematoma. No hemotympanum.   Eyes: Conjunctivae normal, extraocular movements normal. Negative for: discharge in right and left eye, icterus. PERRLA at 2 mm.   Neck: Range of motion normal, supple. Negative for cervical adenopathy.  Cardiovascular: Normal rate, regular rhythm, heart sounds normal, intact distal pulses. Negative for: murmur, rub, gallop.  Pulmonary/Chest Wall: Effort normal, breath sounds normal. Negative for: respiratory distress, wheezes, rales, rhonchi.   Musculoskeletal: Normal range of motion. Negative for edema.  Neurological: Alert and oriented x3. No focal deficits.  Skin: Warm, dry. Negative for rash. 2 cm jagged, full thickness laceration to the right forehead with some " surrounding abrasions but no hematoma.   Psych: Mood/affect normal, behavior normal, judgment normal.    DIAGNOSTIC STUDIES/PROCEDURES    RADIOLOGY     CT-HEAD W/O   Final Result      1.  Cerebral atrophy.      2.  White matter lucencies most consistent with small vessel ischemic change versus demyelination or gliosis.      3.  Otherwise, Head CT without contrast with no acute findings. No evidence of acute cerebral infarction, hemorrhage or mass lesion.        interpreted by the radiologist and reviewed by me.     Laceration Repair Procedure Note    Indication: Laceration    Procedure: The patient was placed in the appropriate position and anesthesia around the laceration was not required. The area was then draped in a sterile fashion. The wound was explored and no foreign body/bodies was/were found. The laceration was closed with steri strips. There were no additional lacerations requiring repair. The wound area was then dressed with a sterile dressing.      Total repaired wound length: 2 cm.     Other Items: None    The patient tolerated the procedure well.    Complications: None    COURSE & MEDICAL DECISION MAKING  Nursing notes, VS, PMSFHx reviewed in chart.    5:42 PM Patient seen and examined at bedside. The patient presents with closed head injury and scalp laceration and the differential diagnosis includes but is not limited to intracranial bleed due to age and possible aspirin, skull fracture. Informed family that there is little concern for a serious head injury but will still order a CT head to rule out a bleed. Ordered CT head.    6:50 PM Informed family of CT results which were negative. Patient is stable for discharge at this time. Discussed return precautions and follow up if symptoms do not improve. He agrees to the plan of care.     The patient will return for new or worsening symptoms and is stable at the time of discharge.    DISPOSITION:  Patient will be discharged home in stable  condition.    FOLLOW UP:  Halie Dominique M.D.  [address]  [address]  [facility] [id number] [phone]    Schedule an appointment as soon as possible for a visit         FINAL IMPRESSION  1. Closed head injury, initial encounter    2. Laceration of scalp, initial encounter          George DAMIAN (Scribe), am scribing for, and in the presence of, Libia Orr M.D..    Electronically signed by: George Squires (Scribe), 2/12/2019    ILibia M.D. personally performed the services described in this documentation, as scribed by George Squires in my presence, and it is both accurate and complete. C.    The note accurately reflects work and decisions made by me.  Libia Orr  2/12/2019  10:55 PM

## 2019-03-05 NOTE — TELEPHONE ENCOUNTER
1. Caller Name: Jenna ([facility])                                         Call Back Number: [phone]      Patient approves a detailed voicemail message: yes    Jenna called and left vm stating that pt told her that he's had some blood in his urine the last couple of days and therapist is a little concerned he might have a UTI. They can either send a nurse out to get a sample or have pt come in to be seen. They would like an order for UA to be faxed to [phone]. Please advise.

## 2019-03-06 NOTE — TELEPHONE ENCOUNTER
Please send the urinalysis lab order and please make an appointment at the clinic as soon as possible since he is on blood thinners.

## 2019-03-08 NOTE — ED NOTES
Pt's home health nurse called 911 for blood in urine , c/o dysuria, and increased weakness. Pt placed in gown, blood noted in brief , appears rectal vs urethral, bright red in color

## 2019-03-09 PROBLEM — A41.9 SEPSIS (HCC): Status: ACTIVE | Noted: 2019-01-01

## 2019-03-09 PROBLEM — R41.82 ALTERED MENTAL STATUS: Status: ACTIVE | Noted: 2019-01-01

## 2019-03-09 PROBLEM — K57.92 DIVERTICULITIS: Status: ACTIVE | Noted: 2019-01-01

## 2019-03-09 PROBLEM — I10 HYPERTENSION: Status: ACTIVE | Noted: 2019-01-01

## 2019-03-09 PROBLEM — R79.1 ELEVATED INR: Status: ACTIVE | Noted: 2019-01-01

## 2019-03-09 PROBLEM — E03.9 HYPOTHYROIDISM: Status: ACTIVE | Noted: 2019-01-01

## 2019-03-09 NOTE — H&P
"      Internal Medicine Admitting History and Physical    Note Author: Delmy Khan M.D.       Name Rafia Shaikh       1933   Age/Sex 85 y.o. male   MRN [id number]   Code Status Full     After 5PM or if no immediate response to page, please call for cross-coverage  Attending/Team: Dr. Romo / Tony See Patient List for primary contact information  Call [phone] to page    1st Call - Day Intern (R1):   Dr. Hahn 2nd Call - Day Sr. Resident (R2/R3):   Dr. Lilly       Chief Complaint:  BRBPR, AMS    HPI:  86 yo man with PMH of prior lower GI bleed s/p embolization of ileocecal branch in 2017, diverticulosis, CAD s/p CABG 2018, peripheral vascular disease, paroxysmal a fib, cognitive impairment deemed without capacity by psych 2018, frequent falls presented with 2-day history of BRBPR, 2-week h/o acute decline in mental status.  Patient unable to communicate well due AMS, wife at bedside to provide history.    Regarding BRBPR, wife noted significant amount of painless BRBPR not associated with BM (patient was in bed when wife noted \"about 1 gallon of blood\") noted since day prior to admission.  Patient did not complain of abdominal pain, constipation, diarrhea, recent ill symptoms to wife.    Regarding acute decline in mental status, wife stated patient debilitated at home, lays on bed most of the time, but has noted increased drowsiness and confusion over past 2 week.  Endorsed h/o frequent falls, most recently 2 weeks and 1 day prior to admission, with resultant facial bruising.    In ED, vitals stable, BP relatively higher in systolic 160s-170s.  Labs notable for WBC 14.6, Hb 13.5 (11.2018), plt 138, Na 144, K 2.8, INR 1.16.  UA negative.  CT ab / pelv with contrast showed severe colitis from the splenic flexure through the proximal sigmoid, possible diverticulitis, though colitis more severe than expected for diverticulitis.  CT head without contrast showed no apparent acute " process.  EKG sinus, PVCs, chronic anterolateral T-wave inversions.  Received IV K 20 mEq.    Review of Systems   Unable to perform ROS: Mental status change             Past Medical History:   Past Medical History:   Diagnosis Date   • Anemia 4/2016    due to rectal bleed   • Arrhythmia 5/12/17    Hx A fib. On only aspirin.    • CAD (coronary artery disease)    • Carotid artery stenosis 6/13/2011   • CATARACT 1990's    Bilateral phaco with IOL   • Dental disorder     cavities, under current care   • Dental disorder 5/12/17    permanent bridge lower   • Dizziness 6/13/2011   • Dyslipidemia 9/18/2010   • Gout    • Heart murmur    • High cholesterol    • Hyperlipidemia    • Hypertension    • Hypothyroidism 6/13/2011   • Insomnia    • Myocardial infarct (HCC) 2008    Stent 2011.  5/12/17-Cardiologist is DR Wu ([facility])   • PAD (peripheral artery disease)    • Preoperative examination, unspecified 4/12/2011   • Rectal bleed 4/4/2016    Cauterized and received blood transfusions   • Renal disorder 2000    kidney stone   • Sleep apnea 2014    States recommended CPAP but never did get it.   • Stroke (HCC) 4/16/2010    TIA    • Tendonitis    • TIA (transient ischemic attack) 6/13/2011   • Unspecified disorder of thyroid    • Urinary incontinence        Past Surgical History:  Past Surgical History:   Procedure Laterality Date   • TRANSCATHETER AORTIC VALVE REPLACEMENT N/A 2/12/2018    Procedure: TRANSCATHETER AORTIC VALVE REPLACEMENT;  Surgeon: Jez Waters M.D.;  Location: SURGERY [facility];  Service: Cardiac   • JANETH  2/12/2018    Procedure: JANETH;  Surgeon: Jez Waters M.D.;  Location: SURGERY [facility];  Service: Cardiac   • COLONOSCOPY N/A 5/16/2017    Procedure: COLONOSCOPY;  Surgeon: Osmany Smart M.D.;  Location: SURGERY [facility];  Service:    • LAPAROSCOPY  4/2/2016    exp for rectal bleed and cautery   • COLONOSCOPY  2015   • RECOVERY  8/22/2012    aortogram-Performed by SURGERY, IR-RECOVERY  at SURGERY [facility] ORS   • CAROTID ENDARTERECTOMY  5/3/2011    Performed by ERASMO NAVARRETE at SURGERY [facility] ORS   • RECOVERY  4/1/2011    Performed by SURGERY, CATH-RECOVERY at SURGERY [facility] ORS   • RECOVERY  3/25/2011    Performed by SURGERY, CATH-RECOVERY at SURGERY [facility] ORS   • MULTIPLE CORONARY ARTERY BYPASS ENDO VEIN HARVEST  3/20/08    Performed by AMANDA CRYSTAL at SURGERY [facility] ORS   • LITHOTRIPSY  2000   • SEPTOPLASTY  1995   • CATARACT EXTRACTION WITH IOL Bilateral early 1990's       Current Outpatient Medications:  Home Medications     Reviewed by Shanna Ott (Pharmacy Tech) on 03/08/19 at 1855  Med List Status: Complete   Medication Last Dose Status   allopurinol (ZYLOPRIM) 300 MG Tab 3/7/2019 Active   amLODIPine (NORVASC) 5 MG Tab 3/7/2019 Active   aspirin EC (ECOTRIN) 81 MG Tablet Delayed Response 3/7/2019 Active   atorvastatin (LIPITOR) 20 MG Tab 3/7/2019 Active   levothyroxine (SYNTHROID) 50 MCG Tab 3/7/2019 Active   lisinopril (PRINIVIL) 5 MG Tab 3/7/2019 Active   metoprolol (LOPRESSOR) 25 MG Tab 3/7/2019 Active                Medication Allergy/Sensitivities:  No Known Allergies      Family History:  Family History   Problem Relation Age of Onset   • Heart Disease Father    • Other Mother         TB   • Hypertension Unknown    • Colon Cancer Brother    • Breast Cancer Sister    • Other Unknown         GOUT       Social History:  Social History     Social History   • Marital status:      Spouse name: N/A   • Number of children: N/A   • Years of education: N/A     Occupational History   • Not on file.     Social History Main Topics   • Smoking status: Never Smoker   • Smokeless tobacco: Never Used   • Alcohol use No   • Drug use: No   • Sexual activity: Not on file     Other Topics Concern   • Not on file     Social History Narrative   • No narrative on file     PCP : Halie Dominique M.D.      Physical Exam     Vitals:    03/08/19 2201  "03/08/19 2231 03/08/19 2300 03/09/19 0005   BP:    (!) 185/69   Pulse: 60 (!) 57 60 61   Resp: 17   16   Temp:    36.1 °C (97 °F)   TempSrc:    Temporal   SpO2: 99% 97% 97% 98%   Weight:    47.9 kg (105 lb 9.6 oz)   Height:         Body mass index is 22.85 kg/m².  BP (!) 185/69   Pulse 61   Temp 36.1 °C (97 °F) (Temporal)   Resp 16   Ht 1.448 m (4' 9\")   Wt 47.9 kg (105 lb 9.6 oz)   SpO2 98%   BMI 22.85 kg/m²   O2 therapy: Pulse Oximetry: 98 %, O2 (LPM): 0, O2 Delivery: None (Room Air)    Physical Exam   Constitutional:   Cachectic.  Drowsy, slow to respond, minimal verbal communication.  In no acute distress.   HENT:   Head: Normocephalic.   Facial ecchymosis apparent.  Mullampati 2.   Eyes: Pupils are equal, round, and reactive to light. Conjunctivae are normal.   Neck: Normal range of motion. Neck supple. No JVD present.   Cardiovascular: Normal rate and regular rhythm.    Systolic murmur   Pulmonary/Chest: Effort normal.   Exam limited as patient unable to sit up for posterior lung exam.  No abnormal breath sounds upon ausculation of anterior lung fields.   Abdominal: Soft. There is no rebound and no guarding.   No tenderness of abdominal / suprapubic regions.   Genitourinary:   Genitourinary Comments: JOSEPH showed external hemorrhoids, bright red blood in rectal vault though no apparent active bleed, no tenderness on exam.   Musculoskeletal: He exhibits no edema or tenderness.   Neurological:   Drowsy though arousable, slow to respond, minimal verbal communication.  AO x2 (self, place).  Able to follow commands.   Skin: Skin is warm and dry.   Psychiatric: Mood and affect normal.             Data Review         Current Records review/summary: Completed    Lab Data Review:  Recent Results (from the past 24 hour(s))   EKG (NOW)    Collection Time: 03/08/19  3:53 PM   Result Value Ref Range    Report       [facility] Emergency Dept.    Test Date:  2019-03-08  Pt Name:    LUZ IYER"           Department: ER  MRN:        [id number]                      Room:       [facility]  Gender:     Male                         Technician: [id number]  :        1933                   Requested By:ER TRIAGE PROTOCOL  Order #:    [id number]                    Reading MD: MELANI READ MD    Measurements  Intervals                                Axis  Rate:       61                           P:          72  CA:         144                          QRS:        56  QRSD:       158                          T:          41  QT:         484  QTc:        488    Interpretive Statements  SINUS RHYTHM  VENTRICULAR PREMATURE COMPLEX  LEFT ATRIAL ABNORMALITY  RIGHT BUNDLE BRANCH BLOCK  ANTEROLATERAL INFARCT, AGE INDETERMINATE  BASELINE WANDER IN LEAD(S) I,II,aVR,aVL,V3,V5  Compared to ECG 2018 05:38:25  Ventricular premature complex(es) now present  Atrial abnormality now present  Rig ht ventricular hypertrophy no longer present  Myocardial infarct finding still present    Electronically Signed On 3-8-2019 18:16:13 PST by MELANI READ MD     Prothrombin Time    Collection Time: 19  5:54 PM   Result Value Ref Range    PT 15.0 (H) 12.0 - 14.6 sec    INR 1.16 (H) 0.87 - 1.13   CBC WITH DIFFERENTIAL    Collection Time: 19  5:54 PM   Result Value Ref Range    WBC 14.6 (H) 4.8 - 10.8 K/uL    RBC 4.19 (L) 4.70 - 6.10 M/uL    Hemoglobin 13.5 (L) 14.0 - 18.0 g/dL    Hematocrit 40.8 (L) 42.0 - 52.0 %    MCV 97.4 81.4 - 97.8 fL    MCH 32.2 27.0 - 33.0 pg    MCHC 33.1 (L) 33.7 - 35.3 g/dL    RDW 46.9 35.9 - 50.0 fL    Platelet Count 138 (L) 164 - 446 K/uL    MPV 10.4 9.0 - 12.9 fL    Neutrophils-Polys 82.80 (H) 44.00 - 72.00 %    Lymphocytes 9.80 (L) 22.00 - 41.00 %    Monocytes 6.80 0.00 - 13.40 %    Eosinophils 0.10 0.00 - 6.90 %    Basophils 0.10 0.00 - 1.80 %    Immature Granulocytes 0.40 0.00 - 0.90 %    Nucleated RBC 0.00 /100 WBC    Neutrophils (Absolute) 12.11 (H) 1.82 - 7.42 K/uL    Lymphs (Absolute)  1.43 1.00 - 4.80 K/uL    Monos (Absolute) 1.00 (H) 0.00 - 0.85 K/uL    Eos (Absolute) 0.01 0.00 - 0.51 K/uL    Baso (Absolute) 0.02 0.00 - 0.12 K/uL    Immature Granulocytes (abs) 0.06 0.00 - 0.11 K/uL    NRBC (Absolute) 0.00 K/uL   Basic Metabolic Panel    Collection Time: 03/08/19  5:54 PM   Result Value Ref Range    Sodium 144 135 - 145 mmol/L    Potassium 2.8 (L) 3.6 - 5.5 mmol/L    Chloride 110 96 - 112 mmol/L    Co2 24 20 - 33 mmol/L    Glucose 135 (H) 65 - 99 mg/dL    Bun 21 8 - 22 mg/dL    Creatinine 0.95 0.50 - 1.40 mg/dL    Calcium 9.3 8.5 - 10.5 mg/dL    Anion Gap 10.0 0.0 - 11.9   ESTIMATED GFR    Collection Time: 03/08/19  5:54 PM   Result Value Ref Range    GFR If African American >60 >60 mL/min/1.73 m 2    GFR If Non African American >60 >60 mL/min/1.73 m 2   URINALYSIS    Collection Time: 03/08/19  8:48 PM   Result Value Ref Range    Color Yellow     Character Clear     Specific Gravity 1.024 <1.035    Ph 6.5 5.0 - 8.0    Glucose Negative Negative mg/dL    Ketones Negative Negative mg/dL    Protein Negative Negative mg/dL    Bilirubin Negative Negative    Urobilinogen, Urine 0.2 Negative    Nitrite Negative Negative    Leukocyte Esterase Negative Negative    Occult Blood Negative Negative    Micro Urine Req see below        Imaging/Procedures Review:    Independant Imaging Review: Completed  CT-ABDOMEN-PELVIS WITH   Final Result         1.  Severe colitis from the splenic flexure through the proximal sigmoid. Diverticula are seen in component of diverticulitis cannot be excluded, however degree of involvement would not be expected for diverticulitis alone.   2.  Bilateral nephrolithiasis, mild left hydronephrosis and hydroureter without visualized obstructing stone.   3.  Atherosclerosis and atherosclerotic coronary artery disease.   4.  Low-density hepatic lesions, similar in size and number compared to prior study, appearance favors cyst.      CT-HEAD W/O   Final Result         1.  No acute  intracranial abnormality is identified, there are nonspecific white matter changes, commonly associated with small vessel ischemic disease.  Associated mild cerebral atrophy is noted.   2.  Atherosclerosis.      CT-HEAD WITH    (Results Pending)   DX-CHEST-LIMITED (1 VIEW)    (Results Pending)            EKG:   EKG Independant Review: Completed  QTc: 488, HR: 61, normal sinus rhythm, PVCs, chronic anterolateral T-wave inversions     Records reviewed and summarized in current documentation :  Yes             Assessment/Plan     Altered mental status- (present on admission)   Assessment & Plan    -Deemed to lack capacity by psych March 2018.  -Progressive decline in cognition per wife; however, acutely more confused the past two weeks.  -H/o frequent falls, recently 2 falls - 2 weeks and 1 day prior to admission with visible facial bruising.  -AO x2 (self, place) on exam, but responses slowed.  -CT head w/out contrast showed no acute process, but progressive AMS in setting of fall concerning for subdural hematoma.  Plan:  -NPO for now pending bedside swallow test.  -Pending CT head with contrast.  -Palliative consult in AM given progressive decline in setting of significant comorbidities.     Sepsis (HCC)- (present on admission)   Assessment & Plan    -Met sepsis criteria on admission: SIRS 2/4 (leukocytosis, tachypnea) + suspected source of infection (diverticulitis).  -3/22/2018 echo (post-TAVR February 2018) showed normal left ventricular systolic function, LVEF 65 (previously 40), grade I diastolic dysfunction - no bolus fluids, gentle rehydration at this time given known significant cardiac h/o, clinically stable.  -On ceftriaxone, Flagyl x10 days for diverticulitis.         Diverticulitis- (present on admission)   Assessment & Plan    -CT ab / pelv with contrast showed severe colitis from the splenic flexure through the proximal sigmoid, possible diverticulitis, though colitis more severe than expected for  diverticulitis.  -No h/o abdominal pain, diarrhea, nausea, vomiting, fever, etc., per wife, abdominal exam benign - however, given patient altered, SIRS criteria (leukocytosis, tachypnea), CT findings, bleed, will treat for diverticulitis.  -On ceftriaxone, Flagyl x10 days.     Lower GI bleed- (present on admission)   Assessment & Plan    -Per wife, significant amount of painless BRBPR not associated with BM noted since day prior to admission.  Patient did not complain of abdominal pain, constipation, diarrhea, recent ill symptoms.  -H/o GI bleeding with embolization of ileocecal branch by IR Dr. 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  -Vitals stable, Hb 13.5 at baseline, no active bleed apparent, though bright red blood noted in rectal vault at bedside.  -Possibly 2/2 diverticular bleed in setting of diverticulitis with known ASA use and falls, also possible AVM, hemorrhoids, less likely upper GI bleed.  Plan:  -Tele.  -Trend H/H.  -Type and screen.  -Hold home ASA, no pharm DVT ppx; SCDs instead.  -Ceftriaxone, Flagyl x10 days.     Hypokalemia- (present on admission)   Assessment & Plan    -K 2.8 on admission.  -Replete K.  -Tele.     Elevated INR- (present on admission)   Assessment & Plan    -Would not expect ASA to increase INR, not otherwise on anticoagulation.  -Pending CMP to check LFTs.  -Hold ASA, pharm DVT ppx in setting of GI bleed.     Cognitive impairment- (present on admission)   Assessment & Plan    -Deemed to lack capacity by psych March 2018.  -Progressive decline in cognition per wife, though acutely more confused the past two weeks.  -Likely in setting of progressive dementia and current acute presentation.     Falls- (present on admission)   Assessment & Plan    -Debility noted in previous notes, recent falls x2 - 2 weeks and 1 day prior to admission.  -PT / OT.     Leukocytosis- (present on  admission)   Assessment & Plan    -WBC 14.6, possibly 2/2 combination of diverticulitis, hemoconcentration in setting of diverticulitis.  -On Flagyl, ceftriaxone.  -Monitor.     Hypertension   Assessment & Plan    -On home lisinopril, metoprolol, hold amlodipine for now in setting of GI bleed and possible hypotension.     Hypothyroidism- (present on admission)   Assessment & Plan    -On home levothyroxine.     CAD (coronary artery disease) of artery bypass graft- (present on admission)   Assessment & Plan    -H/o 3-vessel coronary bypass graft with left internal mammary artery graft to left anterior descending artery, saphenous vein graft to obtuse marginal branch and saphenous vein graft to posterior descending artery by Dr. Coates in March 2018.  -No cardiac symptoms on this admission, EKG stable compared to prior.  -On home atorvastatin, hold ASA in setting of GI bleed.     PAF (paroxysmal atrial fibrillation) ([facility])- (present on admission)   Assessment & Plan    -Xarelto discontinued by cards in August 2018.  -On home metoprolol, hold ASA in setting of GI bleed.     PVD (peripheral vascular disease) ([facility])- (present on admission)   Assessment & Plan    -H/o prior left superficial femoral artery stent, known high grade stenosis of right common femoral artery closed with Starclose closure by Dr. Amaya on 03/22/17.  -On home atorvastatin, hold ASA in setting of GI bleed.         Anticipated Hospital stay:  >2 midnights        Quality Measures  Quality-Core Measures   Reviewed items::  EKG reviewed, Labs reviewed, Medications reviewed and Radiology images reviewed  Baldwin catheter::  No Baldwin  DVT prophylaxis - mechanical:  SCDs  Antibiotics:  Treating active infection/contamination beyond 24 hours perioperative coverage    PCP: Halie Dominique M.D.

## 2019-03-09 NOTE — PROGRESS NOTES
Internal Medicine Interval Note  Note Author: Naty Lilly M.D.     Name Rafia Shaikh       1933   Age/Sex 85 y.o. male   MRN [id number]   Code Status FULL     After 5PM or if no immediate response to page, please call for cross-coverage  Attending/Team: Dr. Romo/ CRISTI Jimenez See Patient List for primary contact information  Call [phone] to page    1st Call - Day Intern (R1):   Jovani 2nd Call - Day Sr. Resident (R2/R3):   Vijaya         Reason for interval visit  (Principal Problem)   AMS  Hematochezia    Interval Problem Daily Status Update  (24 hours, problem oriented, brief subjective history, new lab/imaging data pertinent to that problem)   Stable overnight; has no acute complaints. Patient denies abdominal pain or discomfort. Noted by the nursing staff to have a voluminous passage or BRBPR. Vital stable. Hb increased from 13->14 overnight, WBC up to 20, Lactic acid 3.2. CT abdomen showed extensive colitis from splenic flexure through proximal sigmoid. Surgery and GI to see patient.    Review of Systems   Constitutional: Negative.    HENT: Negative.    Eyes: Negative.    Respiratory: Negative.    Cardiovascular: Negative.    Gastrointestinal: Positive for blood in stool. Negative for abdominal pain, nausea and vomiting.   Musculoskeletal: Negative.    Skin: Negative.        Disposition/Barriers to discharge:   PT/OT eval pending.    Consultants/Specialty  Surgery  GI  PCP: Halie Dominique M.D.     Quality Measures  Quality-Core Measures   Reviewed items::  EKG reviewed, Radiology images reviewed, Labs reviewed and Medications reviewed  Baldwin catheter::  No Baldwin  DVT prophylaxis pharmacological::  Contraindicated - High bleeding risk  DVT prophylaxis - mechanical:  SCDs  Ulcer Prophylaxis::  Not indicated      Physical Exam       Vitals:    19 2300 19 0005 19 0400 19 0800   BP:  (!) 185/69 158/97 146/69   Pulse: 60 61 (!) 104 61   Resp:  16 16 16   Temp:  36.1 °C  (97 °F) 36.5 °C (97.7 °F) 36.4 °C (97.5 °F)   TempSrc:  Temporal Temporal Temporal   SpO2: 97% 98% 99% 96%   Weight:  47.9 kg (105 lb 9.6 oz)     Height:         Body mass index is 22.85 kg/m². Weight: 47.9 kg (105 lb 9.6 oz)  Oxygen Therapy:  Pulse Oximetry: 96 %, O2 (LPM): 0, O2 Delivery: None (Room Air)    Physical Exam   Constitutional: Vital signs are normal. He appears malnourished. He appears unhealthy.   HENT:   Head: Normocephalic and atraumatic.   Eyes: Pupils are equal, round, and reactive to light. Conjunctivae, EOM and lids are normal.   Cardiovascular: Normal rate, regular rhythm and normal pulses.  Exam reveals no gallop and no friction rub.    Murmur heard.  Pulmonary/Chest: Effort normal and breath sounds normal. He has no wheezes. He has no rales.   Abdominal: Soft. Bowel sounds are normal. He exhibits no distension. There is no tenderness. There is no rebound and no guarding.   Musculoskeletal: Normal range of motion. He exhibits no edema.   Neurological: He is alert.   Skin: Skin is warm and dry. No erythema.     Assessment/Plan     Sepsis (HCC)- (present on admission)   Assessment & Plan    Met sepsis criteria on admission: SIRS 2/4 (leukocytosis, tachypnea) + suspected source of infection (diverticulitis).  Did not get aggressive fluid management due to cardiac history.  On ceftriaxone, Flagyl x10 days for diverticulitis.         Diverticulitis- (present on admission)   Assessment & Plan    CT ab / pelv with contrast showed severe colitis from the splenic flexure through the proximal sigmoid, possible diverticulitis, though colitis more severe than expected for diverticulitis.  h/o abdominal pain, diarrhea, nausea, vomiting, fever, etc., per wife, abdominal exam benign - however, given patient altered, SIRS criteria (leukocytosis, tachypnea), CT findings, bleed, will treat for diverticulitis.  Continue ceftriaxone, Flagyl x10 days.     Lower GI bleed- (present on admission)   Assessment & Plan     BRBPR on admission. Had a voluminous passage of blood this AM and has continuous small dripping from rectum.  Hx GI bleeding with embolization of ileocecal branch by IR Dr. Hart (03/21/17), subsequent colonoscopy in May 2017 (follwed by GI consultants - Dr. 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  Vitals stable, Hb at baseline, active small volume bleeding per rectum.  Etiology ischemic colitis vs diverticular bleed in setting of diverticulitis with known ASA use and falls, possible AVM, hemorrhoids, less likely upper GI bleed.  Surgery and GI to see the patient. Spoke with IR, no role for embolization at this time.  Type and screen active.  [facility] H&H, lactic acid.  Transfuse if Hb <7.  Aggressive fluid resuscitation.      Altered mental status- (present on admission)   Assessment & Plan    Has had progressive decline in mental status per his wife.  Evaluated by psych in March 2018 - had no capacity at that time.  Alert and responsive on evaluation today.  CTM.     Elevated INR- (present on admission)   Assessment & Plan    Hold AC in the setting of GIB.  Monitor.     Cognitive impairment- (present on admission)   Assessment & Plan    Progressive decline in cognition per wife, though acutely more confused the past two weeks.  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     Hypokalemia- (present on admission)   Assessment & Plan    Monitor and replete as appropriate.     Hypertension   Assessment & Plan    On home lisinopril, metoprolol, hold amlodipine for now in setting of GI bleed and potential hemodynamic instability.     Hypothyroidism- (present on admission)   Assessment & Plan    Continue home dose of Levothyroxine.     Falls- (present on admission)   Assessment & Plan    Debility noted in previous notes, recent falls x2 - 2 weeks and 1 day prior to admission.  PT / OT.     Leukocytosis- (present on admission)   Assessment & Plan    Increasing,  likely due to acute illness.  On Ceftriaxone and Flagyl.  CTM.     CAD (coronary artery disease) of artery bypass graft- (present on admission)   Assessment & Plan    H/o 3-vessel coronary bypass graft with left internal mammary artery graft to left anterior descending artery, saphenous vein graft to obtuse marginal branch and saphenous vein graft to posterior descending artery by Dr. Coates in March 2018.  No cardiac symptoms on this admission, EKG stable compared to prior.  On home atorvastatin, hold ASA in setting of GI bleed.  CTM.     PAF (paroxysmal atrial fibrillation) ([facility])- (present on admission)   Assessment & Plan    Xarelto discontinued by cards in August 2018.  Continue home metoprolol.  Hold ASA in setting of GI bleed.     PVD (peripheral vascular disease) ([facility])- (present on admission)   Assessment & Plan    H/o prior left superficial femoral artery stent, known high grade stenosis of right common femoral artery closed with Starclose closure by Dr. Amaya on 03/22/17.  CTM.

## 2019-03-09 NOTE — ED NOTES
Bedside report given to RANDY Villalobos at this time. Pt to be transported to tele floor on monitor with nurse in stable condition.

## 2019-03-09 NOTE — PROGRESS NOTES
· 2 RN skin check complete with RANDY Soria.  · Devices in place condom catheter.    Bilateral elbows pink blanching   Sacrum red slow to sergio, picture uploaded  Bilateral heals pink, blanching     Pt placed on waffle cushion, turne Q2, heel float boots in place

## 2019-03-09 NOTE — CONSULTS
DATE OF SERVICE:  03/09/2019    SURGICAL CONSULTATION    PHYSICIAN REQUESTING CONSULTATION:  Naty Lilly MD, resident for the [facility]   internal medicine service    REASON FOR CONSULTATION:  Colitis, ischemic versus diverticulitis.    HISTORY OF PRESENT ILLNESS:  The patient is an 85-year-old male, English is a   second language.  He has had a previous stroke and is very poorly   communicative at baseline.  He is accompanied by his wife, who is also a   somewhat poor historian.  He was admitted on 03/08/2019 with complaint of   hematochezia.  He has a history of anemia and has had a myocardial infarction,   stroke, and has had a transaortic valve replacement.  In the recent past, he   has, per the family, been extremely fatigued recently and has had multiple   ground level falls.  Over the last 2 weeks, the patient's mentation and energy   levels have actually worsened and he is essentially bedbound.  He has a home   health provider and he was noted to have blood in his bed clothes this   morning.  It was unclear whether he was bleeding from his penis or his rectum.    He was transferred to the ER and a CT scan of the head showed no acute   pathology; however, a CT scan of the abdomen and pelvis showed a significantly   thickened colon from the splenic flexure down to the proximal sigmoid colon.    He had a CBC, which showed an elevated white blood cell count at 20,100 with   84% neutrophils and a lactic acid of 3.2 at 0952 this morning.  A followup CBC   with diff again showed an elevated white blood cell count at 20,500, a   neutrophil percentage of 84.5, and now a lactic acid of 6.4.  The patient   states that he has had no abdominal pain and a surgical consultation was   obtained.    PAST MEDICAL HISTORY:  1.  He has had previous GI bleed and underwent an SMA embolization with   clipping in the past.  2.  Anemia.  3.  Arrhythmia.  4.  Coronary artery disease.  5.  History of carotid artery stenosis.  6.   Dyslipidemia.  7.  Gout.  8.  Hypertension.  9.  Hypothyroidism.  10.  Peripheral artery disease.  11.  Urinary incontinence.  12.  Myocardial infarction in 2008.  13.  Stroke in 2010.    PAST SURGICAL HISTORY:  He has had a coronary artery bypass graft in 2008.  He   has had cardiac surgery.  He had a carotid endarterectomy in 2011.  He has   undergone a laparoscopy in the past.  He has had a transaortic valve   replacement in 02/2018 and an SMA embolization with clip in the past.    HOME MEDICATIONS:  Allopurinol 300 mg p.o. daily, amlodipine 5 mg p.o. daily,   aspirin EC 81 mg p.o. daily, Lipitor 20 mg p.o. daily, levothyroxine 50 mcg   p.o. daily, lisinopril 5 mg tablets 1 p.o. daily, metoprolol 25 mg tablets 1   p.o. 2 times a day.    CURRENT MEDICATIONS:  Rocephin 2 g IV q. 24 hours, Vasotec p.r.n., Robitussin   p.r.n., influenza vaccine, Synthroid 50 mcg tablet 1 p.o. daily, metronidazole   500 mg IV q. 8 hours, and he is on a bowel protocol.    SOCIAL HISTORY:  Obtained from the chart.  He has previously denied tobacco   use, alcohol abuse, or drug use.    FAMILY HISTORY:  Noncontributory.    REVIEW OF SYSTEMS:  Unobtainable.    PHYSICAL EXAMINATION:  VITAL SIGNS:  Temperature 36.4, heart rate 61, respirations 18, blood pressure   150/68.  GENERAL:  He is a very debilitated male, in no acute distress.  HEENT:  Pupils equal, round, reactive to light.  No scleral icterus.    Extraocular movements intact.  He has dry oral mucosa.  NECK:  Supple.  No JVD, no lymphadenopathy.  LUNGS:  Clear to auscultation bilaterally with normal bilateral chest rise.  HEART:  Has a regular rate and rhythm.  ABDOMEN:  Nondistended, has positive decreased bowel sounds, is soft and is   completely nontender.  He has no umbilical hernias appreciated on exam.  RECTAL:  Deferred.  PELVIC:  Deferred.  EXTREMITIES:  1+ pulses in all 4 extremities.  No clubbing, cyanosis, or   edema.  NEUROLOGIC:  Cranial nerves are unable to be  completely examined.  He does   appear to have his cranial nerves intact, but is not able to communicate   effectively.    LABORATORY DATA:  Labs are as noted in the HPI.    IMAGING:  CT scan as noted in the HPI.    ASSESSMENT:  An 85-year-old male with multiple medical problems includin.  History of a stroke.  2.  History of congestive heart failure.  3.  Coronary artery disease.  4.  Dyslipidemia.  5.  Gout.  6.  Hypertension.  7.  Hypothyroidism.  8.  Now with evidence of ischemic colitis versus other etiology of colitis.    This patient was previously on Xarelto, but that was discontinued greater than   6 months ago due to multiple ground level falls.  He is currently bedbound   and basically noncommunicative.  I had a long discussion with him and his wife   and attempted to call his son, who is actually a physician.  Unfortunately,   their son's voicemail box was not turned on.  I did page him as well, but I   have not received the page.  I have discussed the possible treatments for this   patient, which include taking the patient to the operating room now and   removing his diseased colon, this would result in a colostomy placement, which   I informed her would never be taken down.  We also discussed that he has a   significant inflammatory reaction now and this would cause a further   inflammatory reaction.  It is likely that he would remain intubated   postoperatively and that he would have a very difficult postoperative course   and that his condition on discharge would very likely be worse than it is at   his baseline now.  We also discussed putting this patient on comfort care.    She has chosen option 3, which is not proceeding with surgery at this time,   but continuing with IV fluids, IV antibiotics, and medical treatment of this.    I talked to her about the fact that the window for a successful surgery is   now and that while I would be willing to entertain surgery in the future if   she changes  her mind, his best chance for the best surgical outcome would be   now, although again, we did discuss that his chance for a good surgical   outcome is poor.  She again has decided that she would like to continue his   care, but not have him undergo surgery at this time.    PLAN:  I will continue to follow this patient for at least 24 hours and will   not plan on taking him to the operating room at this point.       ____________________________________     MD BASILIO Small / NTS    DD:  03/09/2019 14:43:24  DT:  03/09/2019 15:25:45    D#:  [id number]  Job#:  [id number]

## 2019-03-09 NOTE — NON-PROVIDER
Patient Summary:    Patient is an 85 y.o. M w/ a PMHx significant for prior lower GI bleed s/p embolization of ileocecal branch in March 2017, diverticulosis, CAD s/p CABG March 2018, peripheral vascular disease, paroxysmal Afib, and cognitive impairment deemed w/o capacity by psych in March 2018, who presented to the ED w/ a 2 day history of bright red blood per rectum and a 2 week history of acute decline in mental status.     24 Hour Events:    The patient is unable to communicate well due to altered mental status so some of HPI is provided by wife at bedside. Patient did not complain of abdominal pain, N/V/D/C, acute illness (fever, chills, etc.), rash, edema, chest pain or SOB. Regarding acute decline in mental status, wife stated that patient mostly lays on bed at home, but she has noted increased drowsiness and confusion over past 2 weeks in the patient. She endorsed a history of frequent falls in the patient, most recently 2 weeks and 1 day prior to admission, with resultant facial bruising.    SUBJECTIVE/OBJECTIVE:    NEURO/HEENT/PSYCH:  GCS = 15  24hr: --   Current: --   Exam Some facial bruising, cachectic appearance w/ temporal and supraclavicular wasting, poor dentition, PERRL, some vertical gaze palsy, bilateral cataracts, no JVD or lymphadenopathy   Meds/Pain (see medications list)   Labs --   Imaging CT head w/ contrast showed no apparent acute process     RESP:  RR, SpO2 RR = 16; SpO2 = 96% on room air   Exam NAD, chest wall motion normal, could not auscultate lungs 2/2 patient's inability to sit up easily; no abnormal breath sounds upon anterior and lateral lung field auscultation   Meds (see medications list)   Labs --   Imaging --     CV:  Vitals T = 97.5; BP = 146/69; HR = 61; RR = 16; SpO2 = 96% on room air   Exam RRR; systolic murmur present   Meds Amlodipine, aspirin, atorvastatin, lisinopril, metoprolol   Labs --   Imaging EKG sinus w/ PVCs and chronic anterolateral T-wave inversions      GI:  Diet/Bowel Function Cardiac diet w/ modified consistency 2/2 dysphagia   Exam NT/ND, scaphoid abdomen; JOSEPH showed external hemorrhoids and bright red blood in rectal vault though no apparent active bleed, no tenderness on exam   Labs --   Imaging CT w/ contrast showed severe colitis from the splenic flexure through the proximal sigmoid, possible diverticulitis, though colitis more severe than expected for diverticulitis.     HEME:  Labs H/H: Hgb = 14.8; Hct = 45.7    CMP: Na = 146; K = 3.6; Cl = 108; HCO3 = 16; BUN = 18; Cr = 1.01; Glu = 83   Transfusions --   Imaging --     ENDOCRINE:  Blood Sugar Glu = 83   Meds Levothyroxine 50 mcg     MUSCULOSKELETAL/RHEUM:  Imaging --   Meds Allopurinol 300 mg   WB Status Patient is mostly bed bound; h/o falls     SKIN:  Exam Senile purpura present on patients arms; no lower extremity edema   Interventions --     ASSESSMENT/PLAN:    NEURO/HEENT/PSYCH: Patient has somewhat altered mental status and was deemed to lack capacity by psych in March 2018. He has had a progressive decline in cognition per wife w/ more confusion than usual over the past two weeks, this is on top of a recent Hx of frequent falls. CT head w/o contrast showed no acute processes or chronic SDHs, continue to monitor and possibly evaluate further w/ MRI. Consider palliative consult given patient's progressive decline in the setting of multiple, irreversible, significant comorbidities.     CV: Patient has a significant medical and surgical CV Hx. He is on multiple outpatient medications (antihypertensives, statin, etc.) for these conditions. Continue outpatient medications unless contraindicated in light of patient's current GI bleed (I.e. hold any anticoagulants)    GI: Per wife, patient had a significant amount of painless bright red blood per rectum not associated with any bowel movements. Patient has a history of GI bleed with embolization of ileocecal branch; he has also had AVMs in the distal  ileum and removal of polyps via colonoscopy prior to this admission. His CT abdomen/pelvis with contrast showed severe colitis from the splenic flexure through the proximal sigmoid, possibly diverticulitis...though the associated colitis was more severe than expected for an episode of diverticulitis. Patient's current bleeding episode may be 2/2 possible diverticular bleed in the setting of previous diverticulosis with known aspirin use and falls vs. possible AVM vs hemorrhoids. Bleeding episode is less likely to be 2/2 an upper GI bleed. Continue monitoring patient on telemetry, trend H/H, type and screen blood for possible transfusion, hold home ASA and DVT ppx, and start patient on C3 + flagyl for 10 days.    HEME: Continue to monitor pt.'s H/H and chem panel for any significant changes; replete electrolytes as necessary. Transfuse if Hgb < 7. Type and screen blood for possible transfusion.    ENDOCRINE: Patient has hypothyroidism; continue on home levothyroxine 50 mcg.    MSK/RHEUM/SKIN: Patient has a Hx of gout; continue outpt. allopurinol     PROPHYLAXIS:  DVT SCDs   GI --   Mallampati 2   Restraints --     LINES/TUBES/CATHETERS:    PIV    DISPOSITION:     Continue pt. On floor; consider transfer to ICU if bleeding per rectum does not scott. Consult surgery, GI, and palliative care for treatment considerations.  Patient seen by:    Medical Student: KEELEY Combs  Supervising Resident: Grady Hahn MD (PGY-1)  Senior Resident: Naty iLlly MD (PGY-1)  Attending Physician: Wade Romo MD

## 2019-03-09 NOTE — ASSESSMENT & PLAN NOTE
H/o 3-vessel coronary bypass graft with left internal mammary artery graft to left anterior descending artery, saphenous vein graft to obtuse marginal branch and saphenous vein graft to posterior descending artery by Dr. Coates in March 2018.  No cardiac symptoms on this admission, EKG stable compared to prior.  On home atorvastatin, hold ASA in setting of GI bleed.  CTM.

## 2019-03-09 NOTE — PROGRESS NOTES
Fransisca from Lab called with critical result of lactic acid at 6.4. Critical lab result read back to Fransisca.   Dr. Lilly notified of critical lab result at 1335.  Critical lab result read back by Dr. Lilly.

## 2019-03-09 NOTE — ED PROVIDER NOTES
ED Provider Note    Scribed for Melia Wood M.D. by Graciela Reyes 3/8/2019, 5:03 PM.    Primary care provider: Halie Dominique M.D.  Means of arrival: EMS  History obtained from: Patient's wife  History limited by: baseline altered mental status    CHIEF COMPLAINT  Chief Complaint   Patient presents with   • Painful Urination   • Bloody Stools       HPI  Rafia Shaikh is a 85 y.o. male who presents to the Emergency Department for hematochezia, onset this morning. His wife reports that she noticed hematochezia this morning greater than a tablespoon, that was bright red in nature and appeared to solely be blood. She denies any recent fever, vomiting, or decreased appetite. She has noticed weight loss over the past year. His wife reports that is mental status is unchanged, and he does not verbally communicate at baseline. She states he fell 3 days ago resulting in ecchymosis on his face, that he has not been seen for. He is on aspirin but is not anticoagulated. She states that he ambulates at home with a walker. HPI is limited due to baseline altered mental status.    REVIEW OF SYSTEMS  Pertinent positives include hematochezia and dysuria. Pertinent negatives include no fever, vomiting, decreased appetite. See HPI for further details. ROS limited due to baseline altered mental status.    PAST MEDICAL HISTORY   has a past medical history of Anemia (4/2016); Arrhythmia (5/12/17); CAD (coronary artery disease); Carotid artery stenosis (6/13/2011); CATARACT (1990's); Dental disorder; Dental disorder (5/12/17); Dizziness (6/13/2011); Dyslipidemia (9/18/2010); Gout; Heart murmur; High cholesterol; Hyperlipidemia; Hypertension; Hypothyroidism (6/13/2011); Insomnia; Myocardial infarct (HCC) (2008); PAD (peripheral artery disease); Preoperative examination, unspecified (4/12/2011); Rectal bleed (4/4/2016); Renal disorder (2000); Sleep apnea (2014); Stroke (HCC) (4/16/2010); Tendonitis; TIA (transient ischemic attack)  "(6/13/2011); Unspecified disorder of thyroid; and Urinary incontinence.    SURGICAL HISTORY   has a past surgical history that includes multiple coronary artery bypass endo vein harvest (3/20/08); septoplasty (1995); recovery (3/25/2011); recovery (4/1/2011); cataract extraction with iol (Bilateral, early 1990's); carotid endarterectomy (5/3/2011); recovery (8/22/2012); colonoscopy (2015); lithotripsy (2000); laparoscopy (4/2/2016); colonoscopy (N/A, 5/16/2017); transcatheter aortic valve replacement (N/A, 2/12/2018); and keysha (2/12/2018).    SOCIAL HISTORY  Social History   Substance Use Topics   • Smoking status: Never Smoker   • Smokeless tobacco: Never Used   • Alcohol use No      History   Drug Use No       FAMILY HISTORY  Family History   Problem Relation Age of Onset   • Heart Disease Father    • Other Mother         TB   • Hypertension Unknown    • Colon Cancer Brother    • Breast Cancer Sister    • Other Unknown         GOUT       CURRENT MEDICATIONS  No current facility-administered medications for this encounter.     Current Outpatient Prescriptions:   •  allopurinol, 300 mg, Oral, DAILY, 3/7/2019 at AM  •  amLODIPine, 5 mg, Oral, DAILY, 3/7/2019 at AM  •  aspirin EC, 81 mg, Oral, Q EVENING, 3/7/2019 at PM  •  atorvastatin, 20 mg, Oral, Nightly, 3/7/2019 at PM  •  levothyroxine, 50 mcg, Oral, DAILY, 3/7/2019 at AM  •  lisinopril, 5 mg, Oral, DAILY, 3/7/2019 at AM  •  metoprolol, 25 mg, Oral, BID, 3/7/2019 at PM    ALLERGIES  No Known Allergies    PHYSICAL EXAM  VITAL SIGNS: BP (!) 170/60   Pulse (!) 57   Temp 36.6 °C (97.9 °F) (Temporal)   Resp 16   Ht 1.448 m (4' 9\")   Wt 40.4 kg (89 lb)   SpO2 98%   BMI 19.26 kg/m²     General: Elderly male, somnolent, cachectic, dehydrated appearing with dry lips in NAD; A+Ox3; V/S as above, hypertensive   Skin: warm and dry; pale; no rash   HEENT: NC, yellowish ecchymosis on left side of cheek/face; EOMs intact; PERRL; no scleral icterus   Neck: Soft, no " meningismus, no LAD  Cardiovascular: Regular heart rate and rhythm. No murmurs, rubs, or gallops; pulses 2+ bilaterally radially and DP areas  Lungs: Clear to auscultation with decreased air movement bilaterally. No wheezes, rhonchi, or rales.   Abdomen: BS present; soft; NTND; no rebound, guarding, or rigidity. No organomegaly or pulsatile mass  Rectal exam: dark red blood noted externally at the rectum with soft dark stool within the vault. No masses.   Extremities:  no e/o trauma; no pedal edema   Neurologic: CNs III-XII grossly intact; distal sensation intact; unable to assess strength  Psychiatric: Patient not following commands alireza to somnolence                                                       DIAGNOSTIC STUDIES / PROCEDURES    LABS  Results for orders placed or performed during the hospital encounter of 03/08/19   Prothrombin Time   Result Value Ref Range    PT 15.0 (H) 12.0 - 14.6 sec    INR 1.16 (H) 0.87 - 1.13   CBC WITH DIFFERENTIAL   Result Value Ref Range    WBC 14.6 (H) 4.8 - 10.8 K/uL    RBC 4.19 (L) 4.70 - 6.10 M/uL    Hemoglobin 13.5 (L) 14.0 - 18.0 g/dL    Hematocrit 40.8 (L) 42.0 - 52.0 %    MCV 97.4 81.4 - 97.8 fL    MCH 32.2 27.0 - 33.0 pg    MCHC 33.1 (L) 33.7 - 35.3 g/dL    RDW 46.9 35.9 - 50.0 fL    Platelet Count 138 (L) 164 - 446 K/uL    MPV 10.4 9.0 - 12.9 fL    Neutrophils-Polys 82.80 (H) 44.00 - 72.00 %    Lymphocytes 9.80 (L) 22.00 - 41.00 %    Monocytes 6.80 0.00 - 13.40 %    Eosinophils 0.10 0.00 - 6.90 %    Basophils 0.10 0.00 - 1.80 %    Immature Granulocytes 0.40 0.00 - 0.90 %    Nucleated RBC 0.00 /100 WBC    Neutrophils (Absolute) 12.11 (H) 1.82 - 7.42 K/uL    Lymphs (Absolute) 1.43 1.00 - 4.80 K/uL    Monos (Absolute) 1.00 (H) 0.00 - 0.85 K/uL    Eos (Absolute) 0.01 0.00 - 0.51 K/uL    Baso (Absolute) 0.02 0.00 - 0.12 K/uL    Immature Granulocytes (abs) 0.06 0.00 - 0.11 K/uL    NRBC (Absolute) 0.00 K/uL   EKG (NOW)   Result Value Ref Range    Report       [facility] Emergency Dept[facility]    Test Date:  2019  Pt Name:    RAFIA SHAIKH                  Department: ER  MRN:        [id number]                      Room:       [facility]  Gender:     Male                         Technician: [id number]  :        1933                   Requested By:ER TRIAGE PROTOCOL  Order #:    [id number]                    Reading MD: MELANI READ MD    Measurements  Intervals                                Axis  Rate:       61                           P:          72  OR:         144                          QRS:        56  QRSD:       158                          T:          41  QT:         484  QTc:        488    Interpretive Statements  SINUS RHYTHM  VENTRICULAR PREMATURE COMPLEX  LEFT ATRIAL ABNORMALITY  RIGHT BUNDLE BRANCH BLOCK  ANTEROLATERAL INFARCT, AGE INDETERMINATE  BASELINE WANDER IN LEAD(S) I,II,aVR,aVL,V3,V5  Compared to ECG 2018 05:38:25  Ventricular premature complex(es) now present  Atrial abnormality now present  Rig ht ventricular hypertrophy no longer present  Myocardial infarct finding still present    Electronically Signed On 3-8-2019 18:16:13 PST by MELANI READ MD         All labs reviewed by me.    RADIOLOGY  CT-ABDOMEN-PELVIS WITH    (Results Pending)     The radiologist's interpretation of all radiological studies have been reviewed by me.    COURSE & MEDICAL DECISION MAKING  Pertinent Labs & Imaging studies reviewed. (See chart for details)    Rafia Shaikh is a 85 y.o. male who presents with his wife who reports     Differential Diagnoses include but are not limited to anemia, dehydration, UTI, lower GI bleed, brisk upper GI bleed, cancer, electrolyte abnormality    4:26 PM - Ordered Prothrombin time, CBC with differential, BMP, UA, and EKG to evaluate his symptoms.    5:03 PM - Patient seen and examined at bedside. I informed his wife that I was very concerned about cancer due to recent weight loss, ill appearance, and hematochezia. We discussed  that he may be weak and tired due to anemia from blood loss.     7:23 PM  Awaiting CT results and chemistry panel.  Patient will need to be admitted.  I will ask the oncoming ERP to follow-up on CT results and chemistry panel and call R internal medicine for admission.    FINAL IMPRESSION  1. Lower GI bleed          Graciela DAMIAN (Sophiaibadelia), am scribing for, and in the presence of, Melia Wood M.D..    Electronically signed by: Graciela Mahoney (Kavin), 3/8/2019    Melia DAMIAN M.D. personally performed the services described in this documentation, as scribed by Graciela Mahoney in my presence, and it is both accurate and complete. C.    The note accurately reflects work and decisions made by me.  Melia Wood  3/8/2019  7:25 PM

## 2019-03-09 NOTE — ASSESSMENT & PLAN NOTE
Met sepsis criteria on admission: SIRS 2/4 (leukocytosis, tachypnea) + suspected source of infection (diverticulitis).  Go aggressive fluid management and ABT with good response.  Received course of ABT for diverticulitis.  Finish Omnicef and Flagyl 3/18/19.

## 2019-03-09 NOTE — RESPIRATORY CARE
COPD EDUCATION by COPD CLINICAL EDUCATOR  3/9/2019 at 6:36 AM by Chandni Hawkins     Patient reviewed by COPD education team. Patient does not qualify for COPD program.

## 2019-03-09 NOTE — ASSESSMENT & PLAN NOTE
CT ab / pelv with contrast showed severe colitis from the splenic flexure through the proximal sigmoid, possible diverticulitis, though colitis more severe than expected for diverticulitis.  h/o abdominal pain, diarrhea, nausea, vomiting, fever, etc., per wife, abdominal exam benign - however, given patient altered, SIRS criteria (leukocytosis, tachypnea), CT findings, bleed, will treat for diverticulitis.  Continue ceftriaxone, Flagyl x10 days; changed to oral cefdinir/flagyl end date 3/18/19.

## 2019-03-09 NOTE — CARE PLAN
Problem: Pain Management  Goal: Pain level will decrease to patient's comfort goal  Outcome: PROGRESSING AS EXPECTED  Denies pain or discomfort at this time

## 2019-03-09 NOTE — ASSESSMENT & PLAN NOTE
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 Amaya on 03/22/17.  CTM.

## 2019-03-09 NOTE — ASSESSMENT & PLAN NOTE
BRBPR on admission. Had a voluminous passage of blood this AM and has continuous small dripping from rectum.  Hx GI bleeding with embolization of ileocecal branch by IR Dr. Hart (03/21/17), subsequent colonoscopy in May 2017 (follwed by GI consultants - Dr. 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  Vitals stable, Hb at baseline, active small volume bleeding per rectum.  Etiology ischemic colitis vs diverticular bleed in setting of diverticulitis with known ASA use and falls, possible AVM, hemorrhoids, less likely upper GI bleed.  -LDH, CPK, amylase wnl  -C diff, crypto/giardia antigen negative  -Surgery, no surgery at this time after discussing with patient  -GI, no endoscopy considering inflammation unless requested by surg  -Spoke with IR, no role for embolization at this time.  -Transfuse if Hb <7.  -IVF

## 2019-03-09 NOTE — ED PROVIDER NOTES
ED Provider Note    Labs Reviewed   PROTHROMBIN TIME - Abnormal; Notable for the following:        Result Value    PT 15.0 (*)     INR 1.16 (*)     All other components within normal limits    Narrative:     Indicate which anticoagulants the patient is on:->UNKNOWN   CBC WITH DIFFERENTIAL - Abnormal; Notable for the following:     WBC 14.6 (*)     RBC 4.19 (*)     Hemoglobin 13.5 (*)     Hematocrit 40.8 (*)     MCHC 33.1 (*)     Platelet Count 138 (*)     Neutrophils-Polys 82.80 (*)     Lymphocytes 9.80 (*)     Neutrophils (Absolute) 12.11 (*)     Monos (Absolute) 1.00 (*)     All other components within normal limits    Narrative:     Indicate which anticoagulants the patient is on:->UNKNOWN   BASIC METABOLIC PANEL - Abnormal; Notable for the following:     Potassium 2.8 (*)     Glucose 135 (*)     All other components within normal limits    Narrative:     Indicate which anticoagulants the patient is on:->UNKNOWN   URINALYSIS   ESTIMATED GFR    Narrative:     Indicate which anticoagulants the patient is on:->UNKNOWN     CT-ABDOMEN-PELVIS WITH   Final Result         1.  Severe colitis from the splenic flexure through the proximal sigmoid. Diverticula are seen in component of diverticulitis cannot be excluded, however degree of involvement would not be expected for diverticulitis alone.   2.  Bilateral nephrolithiasis, mild left hydronephrosis and hydroureter without visualized obstructing stone.   3.  Atherosclerosis and atherosclerotic coronary artery disease.   4.  Low-density hepatic lesions, similar in size and number compared to prior study, appearance favors cyst.      CT-HEAD W/O   Final Result         1.  No acute intracranial abnormality is identified, there are nonspecific white matter changes, commonly associated with small vessel ischemic disease.  Associated mild cerebral atrophy is noted.   2.  Atherosclerosis.        The patient was found to have severe colitis.  At this time will admit the patient.   The patient also has a significantly low potassium at 2.8.  I will replete this with IV potassium.  Will speak [facility] and admit the patient.

## 2019-03-09 NOTE — PROGRESS NOTES
Bedside report received. Assumed care of pt. Pt resting in bed. No signs of distress, no complaints of pain at this time. Tele box on.Call light and belongings within reach. Bed alarm on, bed locked and in lowest position.

## 2019-03-09 NOTE — PROGRESS NOTES
[facility] [place], [place] INTERNAL MEDICINE ATTENDING NOTE:   Wade Romo MD      Visit Time:   Attending/resident bedside rounds 9-11:30 AM     PATIENT ID  Name:             Rafia Shaikh     YOB: 1933  Age:                 85 y.o.  male   MRN:               [id number]  Admit:  3/8/2019     I saw and examined the patient and discussed the management with the resident staff.  I reviewed the resident's note and agree with the resident's findings and plan as documented in the resident's note except as documented in the attending note. Please reference resident daily note for complete information.    The chart was reviewed and summarized.  Available labs, imaging, O2 sats ,  EKGs were reviewed. Available nursing, consultant, and resident notes were reviewed. I am actively involved in the patient's care.                                                                            ______________________________________________________________________            85(   )  INTERVAL:  Chart reviewed/summarized,      12N: BP stable, abd unchanged, but still BRB  Plan: large bore Ivs, surgical, GI, IR input, ICU heads up , type and hold blood, continue treatment for bowel sepsis , hold BP meds until volume status stable     March 9 (11AM) :  feels better, abd soft, HR, BP stable - another BRB movement w/o other s/s, LAB 4.1,   Na 146, K 3.6, CO2 16, Dgap 12 (normal on amdit) , Lactic acid 3.2 , WBC 20, HB  14,    Impression: clinical better and benigh exam  and BP, HR stable, but labs markedly worse, incr WBC, CR, Lactic acidosis, HCT up rather than down, apparently got only 200cc PO last PM with atleast 600 out, patient no HF or CKD, should be able to tolerate fluid boluses with frequent reassessment - suspected ischemic bowel, suboptimal fluid resuscitation so far , clinically does not have an invasive colitis or infarcted bowel  Plan: 1-2 L fluid bolus, surgical heads up, frequent exam and lab reassessment , should  turn around quickly if ischemic colitis, continue antibx for suspected bowel sepsis     March 9AM:  AF, HR , -105, bed bound, debilitated - alert, no HF, lung clear, abd soft, no TTP, +heme stool, currently no BRB, no edema, per spouse is more alert and feels better than last PM     March 8PM:   Na 144, K 2.8, CO2 24, , CR 0.95, WBC14, HB 13, ,      March 8: AF, H R60, BP 170s, 97% RA     Impression:  * BRB per rectum , CT suggests ischemic colitis  , BP, HR  exam stable, but labs unexpectedly much worse -- reassess after fluid bolus , continue antibx, surgery heads up if not improving   * encephalopathy, HTN - improving,   * acc HTN  * hypokalemia   * CAD, cerbral, renal , mesenteric ASVD, vasculopath     MEDS: CV (lipitor, Ace, Lopressor, KCL)   , RHEUM (allopurinol) , LT4     CORE:  Code Status (  FULL  --------------------------------------------------------------------------------------------------  Hospital Summary/ Patient System Review      NP:   *admit(  Chronic : cerebral ASVD/TIAS by hx , ACUTE: AMS/encephalopathy, CT head - atrophy, SIVD, exvacuo dilatation, no mass, no ICH, sinus inflamm, B12 1327 -->   Impression: encephalopathy, sepsis, metabolic, stable to improving   Plan: fluid resuscitation, antibx, electrolyte repair      2018: CTA: carotid ASVD, SHARYN 50-70s, right vertebral 70%, Left vertebral 50%, LICA ASVD w/o stenosis  2011: MRI: mod atrophy, SVID, no infarcts/no masses   2018: EEG: diffuse cortical dysfx, no epilepsy seen      EENT:   *admit(  ACUTE: GLF 3 days PTA face contusions     MSK/PAIN:   *admit(  CHRONIC: hx hip replacement      CVS:   *admit(  Chronic: CAD/CABG, PAD, LD, HTN, AFIB,  hx AS/TAVR, DLD, PAD  ACUTE:  BP 170s, HR 57, INR 1.16, EKG: SR, PVCs, LAE, RBBB, ant Qs, no HF or cor pulm or fluid overload ,  pulses OK?   2018: ECHO: 65%, RV, RA, LA IVC wnl , RVSP 35mm, root/pericardium wnl   Impression: stable CAD, PAD, acc HTN, AFIB  Plan: reassess after  fluids, high risk for demand ischemia      2017: CTA: ASVD, SMA, Left renal artery, iliofemoral diffuse ASVD      PULM:   *admit( CHRONIC: MONTRELL /no CPAP   ACUTE: lungs clear, sats > 90s, pCXR: no infiltrates, +ASVD      GI:   *admit(  CHRONIC: hx GIB/AVM/2016, ACUTE: BRB , cachectic, abd soft, JOSEPH: dark red blood, soft dark stool, hemorrhoids/BRB PreALB 20 - no pain, abd soft, no tenderness at all   CT abd with: atelectasis, liver wnl, hepatic lesions/stable, GB/biliary normal, spleen/adrenals wnl, bilateral renal calculi, mild LEFT HYDRONEPHROSIS, HYDROURETEER, no stone, Colonic diverticula, CAD/ASVD  Impression: recurrent BRB,  colitis (ischemic >> infectious or inflammatory  Plan: C3/ Flagyl stool WBC, heme, volume, lyte repair, angiogram if acute unstable BRB, surgical input if not improving with fluids and antibx quickly      2017: colonoscopy: adenomas     :   *admit(   ACUTE: dysuria >? , UA neg however       RENAL:   *admit(  UA neg, Na 144, K 2.8, CO2 24, , Bun 21, C r0.95, Ca 9.3 --> lactate 3.2, Dgap 10  Impression: BETTY vs CKD (CR is high for muscle mass, cachecia) , incr  lactic acidosis, ischemic bowel > infarcted bowel,  bowel sepsis ?  Plan: fluid, lyte repair     HEME:   *admit(  WBC 14.6, HB 13.5, , ferritin 354, B12 1327, folate 24, VIT D 32 --. WBC 20,  Ferrint 354  Impression: leukocytosis, ischemic bowel /bowel sepsis      ENDO:   *admit(  THS 0.740 , VIT D 32     DERM/BREAST:   *admit(       ID:   *admit(  SIRS, colitis /CT, procalcitonin 0.06   Impressoin: prob noninfectious  (ischemic ) colitis , diverticulitis unlikely   Plan C3/ Flagyl, reassessment, fluid/lyte repair, stool WBC

## 2019-03-09 NOTE — CARE PLAN
Problem: Safety  Goal: Will remain free from injury  Outcome: PROGRESSING AS EXPECTED  Able to use call bell as directed.  Wife at bedside.  Bed alarm on

## 2019-03-09 NOTE — ED NOTES
Med Rec Updated and Complete per Pts family at bedside with packaged medications (returned)  Allergies Reviewed  No PO ABX last 30 days    Family reports Pt last took medications yesterday.

## 2019-03-09 NOTE — ASSESSMENT & PLAN NOTE
Progressive decline in cognition per wife, though acutely more confused the past two weeks.  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

## 2019-03-10 NOTE — PROGRESS NOTES
Gastroenterology Progress Note     Author: Pradip MORA Matireji   Date & Time Created: 3/10/2019 7:04 AM    Chief Complaint:  Reason for Consultation: Hematochezia/abnormal CT scan     History of Present Illness:   Thank you for allowing me to consult on this patient.  Patient seen at the request of Dr. Monge for abnormal CT scan hematochezia.  Patient is a very poor historian due to communication difficulty English as second language with history of stroke who was accompanied by wife that also provided history although also poor historian.  History significant for myocardial infarction, stroke, transaortic valve replacement as well as multiple ground fall that required discontinuation Xarelto admitted on 8 March with complaint of hematochezia.  History obtained from discussion with his wife as well as chart review.  Over the last 2 weeks worsening energy and more fatigue.  Imaging at emergency room showed normal CT scan of the head without acute finding but abdomen pelvis demonstrated thickening of the colon from the splenic flexure down to the proximal sigmoid colon with associated white blood cell elevation of 20,084% neutrophil and lactic acid increasing to 6.4.  Patient denies any abdominal pain unable to recall last episode of hematochezia.  Denies any fevers any chills.     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  Endo Clip.  Moderate left-sided diverticulosis    Interval History:  3/10/2019: no issues overnight. Per RN, no BM, no bloody stool.     Review of Systems:  Review of Systems   Unable to perform ROS: Mental acuity   Constitutional: Negative.    HENT: Negative.    Eyes: Negative.    Respiratory: Negative.    Cardiovascular: Negative.     Gastrointestinal: Negative.    Genitourinary: Negative.    Musculoskeletal: Negative.    Skin: Negative.    Neurological: Negative.    Endo/Heme/Allergies: Negative.    Psychiatric/Behavioral: Negative.    Unclear if mental acuity reliable for review of system.    Physical Exam:  Physical Exam   Constitutional: He is oriented to person, place, and time. No distress.   HENT:   Head: Normocephalic.   Mouth/Throat: No oropharyngeal exudate.   Eyes: Pupils are equal, round, and reactive to light. Conjunctivae and EOM are normal. Right eye exhibits no discharge. Left eye exhibits no discharge. No scleral icterus.   Neck: Normal range of motion. Neck supple. No JVD present. No tracheal deviation present. No thyromegaly present.   Cardiovascular: Normal rate and regular rhythm.  Exam reveals no gallop and no friction rub.    No murmur heard.  Pulmonary/Chest: Effort normal and breath sounds normal. No stridor. He has no wheezes. He has no rales. He exhibits no tenderness.   Abdominal: Soft. He exhibits no distension and no mass. There is no tenderness. There is no rebound and no guarding.   Musculoskeletal: He exhibits no edema or tenderness.   Lymphadenopathy:     He has no cervical adenopathy.   Neurological: He is alert and oriented to person, place, and time. He displays normal reflexes. No cranial nerve deficit. Coordination normal.   Skin: Skin is warm and dry. No rash noted. He is not diaphoretic. No erythema. No pallor.       Labs:      Recent Labs      03/09/19   1302  03/09/19 2147   CPKTOTAL   --   97   TROPONINI  0.06*   --      Recent Labs      03/09/19   0255  03/09/19   1302  03/10/19   0344   SODIUM  146*  142  143   POTASSIUM  3.6  4.1  3.4*   CHLORIDE  108  105  108   CO2  16*  25  27   BUN  18  16  12   CREATININE  1.01  1.01  0.92   MAGNESIUM  2.3   --    --    CALCIUM  9.9  9.5  9.1     Recent Labs      03/09/19   0255  03/09/19   1302  03/09/19   2147  03/10/19   0344   ALTSGPT  11  10   --   7    ASTSGOT  20  17   --   15   ALKPHOSPHAT  90  81   --   73   TBILIRUBIN  1.6*  1.0   --   0.9   AMYLASE   --    --   36   --    PREALBUMIN  20.0   --    --    --    GLUCOSE  83  127*   --   89     Recent Labs      19   1302  19   1607  19   2147  03/10/19   0344   RBC  4.19*   < >  4.60*  4.61*   --   4.52*   HEMOGLOBIN  13.5*   < >  14.7  14.7  13.6*  14.2   HEMATOCRIT  40.8*   < >  46.2  45.9  42.8  45.0   PLATELETCT  138*   < >  110*  110*   --   118*   PROTHROMBTM  15.0*   --    --    --    --    --    INR  1.16*   --    --    --    --    --     < > = values in this interval not displayed.     Recent Labs      19   0952  19   13019   1607  03/10/19   0344   WBC  14.6*   --   20.1*  20.5*  18.6*  17.7*   NEUTSPOLYS  82.80*   --   83.90*  84.50*   --    --    LYMPHOCYTES  9.80*   --   8.90*  8.30*   --    --    MONOCYTES  6.80   --   6.30  5.90   --    --    EOSINOPHILS  0.10   --   0.10  0.10   --    --    BASOPHILS  0.10   --   0.40  0.40   --    --    ASTSGOT   --   20   --   17   --   15   ALTSGPT   --   11   --   10   --   7   ALKPHOSPHAT   --   90   --   81   --   73   TBILIRUBIN   --   1.6*   --   1.0   --   0.9     Hemodynamics:  Temp (24hrs), Av.4 °C (97.5 °F), Min:36.2 °C (97.1 °F), Max:36.6 °C (97.9 °F)  Temperature: 36.6 °C (97.9 °F)  Pulse  Av.7  Min: 56  Max: 104   Blood Pressure : (!) 178/66     Respiratory:    Respiration: 18, Pulse Oximetry: 96 %        RUL Breath Sounds: Clear, RML Breath Sounds: Clear, RLL Breath Sounds: Diminished, WHIT Breath Sounds: Clear, LLL Breath Sounds: Diminished  Fluids:    Intake/Output Summary (Last 24 hours) at 03/10/19 0704  Last data filed at 03/10/19 0555   Gross per 24 hour   Intake              200 ml   Output             3320 ml   Net            -3120 ml     Weight: 51.2 kg (112 lb 14 oz)  GI/Nutrition:  Orders Placed This Encounter   Procedures   • Diet NPO      Standing Status:   Standing     Number of Occurrences:   1     Order Specific Question:   Restrict to:     Answer:   Ice Chips [2]     Medical Decision Making, by Problem:  Active Hospital Problems    Diagnosis   • Diverticulitis [K57.92]   • Sepsis (HCC) [A41.9]   • Lower GI bleed [K92.2]   • Elevated INR [R79.1]   • Altered mental status [R41.82]   • Cognitive impairment [R41.89]   • Hypokalemia [E87.6]   • PAF (paroxysmal atrial fibrillation) ([facility]) [I48.0]   • PVD (peripheral vascular disease) (CMS-HCC) [I73.9]   • Hypothyroidism [E03.9]   • Hypertension [I10]   • Falls [W19.XXXA]   • Leukocytosis [D72.829]   • CAD (coronary artery disease) of artery bypass graft [I25.810]     Imaging:  CT-HEAD WITH   Final Result       1.  No acute intracranial findings.       2.  Diffuse atrophy and periventricular white matter change, consistent with chronic small vessel disease.       DX-CHEST-LIMITED (1 VIEW)   Final Result           1.  No acute cardiopulmonary disease.   2.  Atherosclerosis       CT-ABDOMEN-PELVIS WITH   Final Result           1.  Severe colitis from the splenic flexure through the proximal sigmoid. Diverticula are seen in component of diverticulitis cannot be excluded, however degree of involvement would not be expected for diverticulitis alone.   2.  Bilateral nephrolithiasis, mild left hydronephrosis and hydroureter without visualized obstructing stone.   3.  Atherosclerosis and atherosclerotic coronary artery disease.   4.  Low-density hepatic lesions, similar in size and number compared to prior study, appearance favors cyst.       CT-HEAD W/O   Final Result           1.  No acute intracranial abnormality is identified, there are nonspecific white matter changes, commonly associated with small vessel ischemic disease.  Associated mild cerebral atrophy is noted.   2.  Atherosclerosis.       IR-MIDLINE CATHETER INSERTION WO GUIDANCE > AGE 3    (Results Pending)                  Impressions:  1.  Hematochezia- h/h stable, Lactic improved  2. Abnormal CT scan with suspicion for ischemic colitis vs diverticulitis vs other  3. CAD  4. History of heart failure  5. Gout  6. Hypertension  7. Hypothyroisidm     85-year-old male with history of CVA CHF coronary disease hypertension with history of GI bleed requiring embolization of the bleeding cecal branch of the superior mesenteric artery who presents to the hospital with hematochezia.  Poor historian unable to provide reliable history however reports of no pain.  Leukocytosis significantly elevated with also lactic acid elevation.  Differential diagnoses include diverticulitis versus ischemic colitis versus other colitis.  Unable to obtain history about infection or travel      Plan:  1. Continue current management with antibiotics  2. Await stool sample for analysis  3. Concurrent management with surgery  4. Currently no plan for colonoscopy given potential differential includes diverticulitis; improvement and stability.  5. Maintain normotensive BP.  6. Dr. Griggs to assume care in AM.    Quality-Core Measures

## 2019-03-10 NOTE — PROGRESS NOTES
Internal Medicine Interval Note  Note Author: Grady Hahn M.D.     Name Rafia Shaikh       1933   Age/Sex 85 y.o. male   MRN [id number]   Code Status FULL     After 5PM or if no immediate response to page, please call for cross-coverage  Attending/Team: Dr. Romo/ CRISTI Jimenez See Patient List for primary contact information  Call [phone] to page    1st Call - Day Intern (R1):   Jovani 2nd Call - Day Sr. Resident (R2/R3):   Vijaya         Reason for interval visit  (Principal Problem)   AMS  Hematochezia    Interval Problem Daily Status Update  (24 hours, problem oriented, brief subjective history, new lab/imaging data pertinent to that problem)     -EFRAIN o/n; has no acute complaints, no abd tenderness/pain  -LA 1.9 today, VSS  -LDH, CPK, amylase wnl  -C diff, Stool Cx, O&P pending  -K repleted  -spoke with palliative who spoke with patient and his wife, both of the latter desire DNR/DNI and POLST completed, want to go home with home health, and do not wish to have their kids involved  -GI continuing to follow, recs included in plan  -Surg no surg, f/u palliative care, recs included in plan      Review of Systems   Constitutional: Negative.    HENT: Negative.    Eyes: Negative.    Respiratory: Negative.    Cardiovascular: Negative.    Gastrointestinal: Positive for blood in stool. Negative for abdominal pain, nausea and vomiting.   Musculoskeletal: Negative.    Skin: Negative.        Disposition/Barriers to discharge:   PT/OT eval pending.    Consultants/Specialty  Surgery  GI  PCP: Halie Dominique M.D.     Quality Measures  Quality-Core Measures   Reviewed items::  EKG reviewed, Radiology images reviewed, Labs reviewed and Medications reviewed  Baldwin catheter::  No Baldwin  DVT prophylaxis pharmacological::  Contraindicated - High bleeding risk  DVT prophylaxis - mechanical:  SCDs  Ulcer Prophylaxis::  Not indicated      Physical Exam       Vitals:    19 2000 03/10/19 0000 03/10/19 0400  03/10/19 0800   BP: 151/71 (!) 174/65 (!) 178/66 154/72   Pulse: 62 60 60 60   Resp: 18 18 18 16   Temp: 36.5 °C (97.7 °F) 36.3 °C (97.4 °F) 36.6 °C (97.9 °F) 36.3 °C (97.4 °F)   TempSrc: Temporal Temporal Temporal Temporal   SpO2: 97% 98% 96% 99%   Weight: 51.2 kg (112 lb 14 oz)      Height:         Body mass index is 24.43 kg/m². Weight: 51.2 kg (112 lb 14 oz)  Oxygen Therapy:  Pulse Oximetry: 99 %, O2 (LPM): 0, O2 Delivery: None (Room Air)    Physical Exam   Constitutional: Vital signs are normal. He appears malnourished. He appears unhealthy.   HENT:   Head: Normocephalic and atraumatic.   Eyes: Pupils are equal, round, and reactive to light. Conjunctivae, EOM and lids are normal.   Cardiovascular: Normal rate, regular rhythm and normal pulses.  Exam reveals no gallop and no friction rub.    Murmur heard.  Pulmonary/Chest: Effort normal and breath sounds normal. He has no wheezes. He has no rales.   Abdominal: Soft. Bowel sounds are normal. He exhibits no distension. There is no tenderness. There is no rebound and no guarding.   Musculoskeletal: Normal range of motion. He exhibits no edema.   Neurological: He is alert.   Skin: Skin is warm and dry. No erythema.     Assessment/Plan     Sepsis (HCC)- (present on admission)   Assessment & Plan    Met sepsis criteria on admission: SIRS 2/4 (leukocytosis, tachypnea) + suspected source of infection (diverticulitis).  Did not get aggressive fluid management due to cardiac history.  On ceftriaxone, Flagyl x10 days for diverticulitis.         Diverticulitis- (present on admission)   Assessment & Plan    CT ab / pelv with contrast showed severe colitis from the splenic flexure through the proximal sigmoid, possible diverticulitis, though colitis more severe than expected for diverticulitis.  h/o abdominal pain, diarrhea, nausea, vomiting, fever, etc., per wife, abdominal exam benign - however, given patient altered, SIRS criteria (leukocytosis, tachypnea), CT findings,  bleed, will treat for diverticulitis.  Continue ceftriaxone, Flagyl x10 days.     Lower GI bleed- (present on admission)   Assessment & Plan    BRBPR on admission. Had a voluminous passage of blood this AM and has continuous small dripping from rectum.  Hx GI bleeding with embolization of ileocecal branch by IR Dr. Hart (03/21/17), subsequent colonoscopy in May 2017 (follwed by GI consultants - Dr. 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  Vitals stable, Hb at baseline, active small volume bleeding per rectum.  Etiology ischemic colitis vs diverticular bleed in setting of diverticulitis with known ASA use and falls, possible AVM, hemorrhoids, less likely upper GI bleed.  -LDH, CPK, amylase wnl  -C diff, Stool Cx, O&P pending  -Surgery, no surgery at this time after discussing with patient  -GI, no endoscopy considering inflammation unless requested by surg  -Spoke with IR, no role for embolization at this time.  -Transfuse if Hb <7.  -IVF     Altered mental status- (present on admission)   Assessment & Plan    Has had progressive decline in mental status per his wife.  Evaluated by psych in March 2018 - had no capacity at that time.  Alert and responsive on evaluation today.  CTM.     Elevated INR- (present on admission)   Assessment & Plan    Hold AC in the setting of GIB.  Monitor.     Cognitive impairment- (present on admission)   Assessment & Plan    Progressive decline in cognition per wife, though acutely more confused the past two weeks.  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     Hypokalemia- (present on admission)   Assessment & Plan    Monitor and replete as appropriate.     Hypertension   Assessment & Plan    On home lisinopril, metoprolol, hold amlodipine for now in setting of GI bleed and potential hemodynamic instability.     Hypothyroidism- (present on admission)   Assessment & Plan    Continue home dose of Levothyroxine.     Falls-  (present on admission)   Assessment & Plan    Debility noted in previous notes, recent falls x2 - 2 weeks and 1 day prior to admission.  PT / OT.     Leukocytosis- (present on admission)   Assessment & Plan    Increasing, likely due to acute illness.  On Ceftriaxone and Flagyl.  CTM.     CAD (coronary artery disease) of artery bypass graft- (present on admission)   Assessment & Plan    H/o 3-vessel coronary bypass graft with left internal mammary artery graft to left anterior descending artery, saphenous vein graft to obtuse marginal branch and saphenous vein graft to posterior descending artery by Dr. Coates in March 2018.  No cardiac symptoms on this admission, EKG stable compared to prior.  On home atorvastatin, hold ASA in setting of GI bleed.  CTM.     PAF (paroxysmal atrial fibrillation) ([facility])- (present on admission)   Assessment & Plan    Xarelto discontinued by cards in August 2018.  Continue home metoprolol.  Hold ASA in setting of GI bleed.     PVD (peripheral vascular disease) (CMS-[facility])- (present on admission)   Assessment & Plan    H/o prior left superficial femoral artery stent, known high grade stenosis of right common femoral artery closed with Starclose closure by Dr. Amaya on 03/22/17.  CTM.

## 2019-03-10 NOTE — PROGRESS NOTES
Bedside report received. Assumed care of pt. Pt resting in bed. No signs of distress, no complaints of pain at this time. Wife at bedside.Tele box on.Call light and belongings within reach. Bed alarm on, bed locked and in lowest position.

## 2019-03-10 NOTE — CONSULTS
Date of Consultation:  3/9/2019    Patient: : Rafia Shaikh  MRN: [id number]    Referring Physician:  Dr. Monge/ CRISTI Jimenez     GI:Pradip Conklin M.D.     Reason for Consultation: Hematochezia/abnormal CT scan    History of Present Illness:   Thank you for allowing me to consult on this patient.  Patient seen at the request of Dr. Monge for abnormal CT scan hematochezia.  Patient is a very poor historian due to communication difficulty English as second language with history of stroke who was accompanied by wife that also provided history although also poor historian.  History significant for myocardial infarction, stroke, transaortic valve replacement as well as multiple ground fall that required discontinuation Xarelto admitted on 8 March with complaint of hematochezia.  History obtained from discussion with his wife as well as chart review.  Over the last 2 weeks worsening energy and more fatigue.  Imaging at emergency room showed normal CT scan of the head without acute finding but abdomen pelvis demonstrated thickening of the colon from the splenic flexure down to the proximal sigmoid colon with associated white blood cell elevation of 20,084% neutrophil and lactic acid increasing to 6.4.  Patient denies any abdominal pain unable to recall last episode of hematochezia.  Denies any fevers any chills.    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  Endo Clip.  Moderate left-sided diverticulosis      Past Medical History:   Diagnosis Date   • Arrhythmia 5/12/17    Hx A fib. On only aspirin.    • Dental disorder 5/12/17    permanent bridge lower   • Rectal bleed 4/4/2016    Cauterized and received blood transfusions   • Anemia 4/2016    due to rectal  bleed   • Sleep apnea 2014    States recommended CPAP but never did get it.   • Carotid artery stenosis 6/13/2011   • Dizziness 6/13/2011   • Hypothyroidism 6/13/2011   • TIA (transient ischemic attack) 6/13/2011   • Preoperative examination, unspecified 4/12/2011   • Dyslipidemia 9/18/2010   • Stroke (HCC) 4/16/2010    TIA    • Myocardial infarct (HCC) 2008    Stent 2011.  5/12/17-Cardiologist is DR Wu ([facility])   • Renal disorder 2000    kidney stone   • CAD (coronary artery disease)    • CATARACT 1990's    Bilateral phaco with IOL   • Dental disorder     cavities, under current care   • Gout    • Heart murmur    • High cholesterol    • Hyperlipidemia    • Hypertension    • Insomnia    • PAD (peripheral artery disease)    • Tendonitis    • Unspecified disorder of thyroid    • Urinary incontinence          Past Surgical History:   Procedure Laterality Date   • TRANSCATHETER AORTIC VALVE REPLACEMENT N/A 2/12/2018    Procedure: TRANSCATHETER AORTIC VALVE REPLACEMENT;  Surgeon: Jez Waters M.D.;  Location: [facility];  Service: Cardiac   • JANETH  2/12/2018    Procedure: JANETH;  Surgeon: Jez Waters M.D.;  Location: [facility];  Service: Cardiac   • COLONOSCOPY N/A 5/16/2017    Procedure: COLONOSCOPY;  Surgeon: Osmany Smart M.D.;  Location: [facility];  Service:    • LAPAROSCOPY  4/2/2016    exp for rectal bleed and cautery   • COLONOSCOPY  2015   • RECOVERY  8/22/2012    aortogram-Performed by SURGERY, IR-RECOVERY at [facility] ORS   • CAROTID ENDARTERECTOMY  5/3/2011    Performed by ERASMO NAVARRETE at [facility]   • RECOVERY  4/1/2011    Performed by SURGERY, CATH-RECOVERY at SURGERY SAME DAY [facility] ORS   • RECOVERY  3/25/2011    Performed by SURGERY, CATH-RECOVERY at SURGERY SAME DAY [facility] ORS   • MULTIPLE CORONARY ARTERY BYPASS ENDO VEIN HARVEST  3/20/08    Performed by AMANDA CRYSTAL at [facility]   • LITHOTRIPSY  2000   •  SEPTOPLASTY  1995   • CATARACT EXTRACTION WITH IOL Bilateral early 1990's       Family History   Problem Relation Age of Onset   • Heart Disease Father    • Other Mother         TB   • Hypertension Unknown    • Colon Cancer Brother    • Breast Cancer Sister    • Other Unknown         GOUT       Social History     Social History   • Marital status:      Spouse name: N/A   • Number of children: N/A   • Years of education: N/A     Social History Main Topics   • Smoking status: Never Smoker   • Smokeless tobacco: Never Used   • Alcohol use No   • Drug use: No   • Sexual activity: Not on file     Other Topics Concern   • Not on file     Social History Narrative   • No narrative on file       Review of Systems   Unable to perform ROS: Mental acuity         Physical Exam:  Vitals:    03/09/19 0400 03/09/19 0800 03/09/19 1200 03/09/19 1527   BP: 158/97 146/69 150/68 (!) 166/65   Pulse: (!) 104 61 61 66   Resp: 16 16 18 17   Temp: 36.5 °C (97.7 °F) 36.4 °C (97.5 °F) 36.4 °C (97.5 °F) 36.2 °C (97.1 °F)   TempSrc: Temporal Temporal Temporal Temporal   SpO2: 99% 96% 96% 96%   Weight:       Height:           Physical Exam   Constitutional: He is well-developed, well-nourished, and in no distress. No distress.   HENT:   Head: Normocephalic and atraumatic.   Mouth/Throat: No oropharyngeal exudate.   Eyes: Pupils are equal, round, and reactive to light. Conjunctivae are normal.   Neck: Normal range of motion. Neck supple. No JVD present. No tracheal deviation present. No thyromegaly present.   Cardiovascular: Normal rate and regular rhythm.  Exam reveals no gallop and no friction rub.    No murmur heard.  Pulmonary/Chest: Effort normal and breath sounds normal. No stridor. No respiratory distress. He has no wheezes. He has no rales. He exhibits no tenderness.   Abdominal: Soft. Bowel sounds are normal. He exhibits no distension and no mass. There is no tenderness. There is no rebound and no guarding.   Musculoskeletal: He  exhibits no edema or deformity.   Lymphadenopathy:     He has no cervical adenopathy.   Neurological: He is alert.   Skin: Skin is warm and dry. No rash noted. He is not diaphoretic. No erythema. No pallor.         Labs:  Recent Labs      03/09/19   0952  03/09/19   1302  03/09/19   1607   WBC  20.1*  20.5*  18.6*   RBC  4.44*  4.60*  4.61*   HEMOGLOBIN  14.3  14.7  14.7   HEMATOCRIT  45.6  46.2  45.9   MCV  102.7*  100.4*  99.6*   MCH  32.2  32.0  31.9   MCHC  31.4*  31.8*  32.0*   RDW  49.9  49.1  49.1   PLATELETCT  117*  110*  110*   MPV  10.6  10.6  10.2     Recent Labs      03/08/19   1754  03/09/19   0255  03/09/19   1302   SODIUM  144  146*  142   POTASSIUM  2.8*  3.6  4.1   CHLORIDE  110  108  105   CO2  24  16*  25   GLUCOSE  135*  83  127*   BUN  21  18  16     Recent Labs      03/08/19   1754   INR  1.16*         Imaging:  CT-HEAD WITH   Final Result      1.  No acute intracranial findings.      2.  Diffuse atrophy and periventricular white matter change, consistent with chronic small vessel disease.      DX-CHEST-LIMITED (1 VIEW)   Final Result         1.  No acute cardiopulmonary disease.   2.  Atherosclerosis      CT-ABDOMEN-PELVIS WITH   Final Result         1.  Severe colitis from the splenic flexure through the proximal sigmoid. Diverticula are seen in component of diverticulitis cannot be excluded, however degree of involvement would not be expected for diverticulitis alone.   2.  Bilateral nephrolithiasis, mild left hydronephrosis and hydroureter without visualized obstructing stone.   3.  Atherosclerosis and atherosclerotic coronary artery disease.   4.  Low-density hepatic lesions, similar in size and number compared to prior study, appearance favors cyst.      CT-HEAD W/O   Final Result         1.  No acute intracranial abnormality is identified, there are nonspecific white matter changes, commonly associated with small vessel ischemic disease.  Associated mild cerebral atrophy is noted.   2.   Atherosclerosis.      IR-MIDLINE CATHETER INSERTION WO GUIDANCE > AGE 3    (Results Pending)             Impressions:  1. Hematochezia  2. Abnormal CT scan with suspicion for ischemic colitis vs diverticulitis vs other  3. CAD  4. History of heart failure  5. Gout  6. Hypertension  7. 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  Poor historian unable to provide reliable history however reports of no pain.  Leukocytosis significantly elevated with also lactic acid elevation.  Differential diagnoses include diverticulitis versus ischemic colitis versus other colitis.  Unable to obtain history about infection or travel    Recommendations:  1. Based on ACG guidelines for suspected ischemic colitis, recommend stool culture, O&P, C.difficile to be ordered. Check LDH, CK, Amylase  2. Based on ACG guidelines, at least a moderate severity of ischemic colitis, however differential does include diverticulitis. Currently would not proceed unless requested by Surgery given high risk for perforation if diverticulitis. As such would hold off and aggresively manage with hydration, maintain normotensive BP, broad spectrum abx.  3. Appreciate Surgical management, consult reviewed  4. Monitor daily CBC, lactic acid  5. Serial abdominal exam.  6. Chronic medical disease management as per team.  7. Serial H/H maintain HgB > 8.    Critically ill patient with possibility of rapid deterioration given significant leukocytosis, age, compromised SMA.    This note was generated using voice recognition software which has a small chance of producing errors of grammar and possibly content. I have made every reasonable attempt to find and correct any obvious errors, but expect that some may not be found prior to finalization of this note.

## 2019-03-10 NOTE — PROGRESS NOTES
Surgical Progress Note    Author: Wayne Ardon Date & Time created: 3/10/2019   9:35 AM     Interval Events:  Lactic acid has normalized.  WBCs decreased slightly.  Tolerating clear liquid diet.  3 BMs recorded in last 24 hours    Review of Systems   Unable to perform ROS: Dementia     Hemodynamics:  Temp (24hrs), Av.4 °C (97.5 °F), Min:36.2 °C (97.1 °F), Max:36.6 °C (97.9 °F)  Temperature: 36.3 °C (97.4 °F)  Pulse  Av.6  Min: 56  Max: 104   Blood Pressure : 154/72     Respiratory:    Respiration: 16, Pulse Oximetry: 99 %        RUL Breath Sounds: Clear, RML Breath Sounds: Clear, RLL Breath Sounds: Diminished, WHIT Breath Sounds: Clear, LLL Breath Sounds: Diminished  Neuro:  GCS       Fluids:    Intake/Output Summary (Last 24 hours) at 03/10/19 0935  Last data filed at 03/10/19 0555   Gross per 24 hour   Intake              200 ml   Output             3320 ml   Net            -3120 ml     Weight: 51.2 kg (112 lb 14 oz)  Current Diet Order   Procedures   • Diet Order Clear Liquids - No Red Foods     Physical Exam   Constitutional: He appears well-developed and well-nourished. No distress.   HENT:   Head: Normocephalic.   Eyes: Pupils are equal, round, and reactive to light.   Cardiovascular: Normal rate.    Pulmonary/Chest: Effort normal. No respiratory distress.   Abdominal: Soft. Bowel sounds are normal. He exhibits no distension. There is no tenderness. There is no rebound and no guarding.   Neurological: He is alert.   Skin: Skin is warm and dry.     Labs:  Recent Results (from the past 24 hour(s))   CBC WITH DIFFERENTIAL    Collection Time: 19  9:52 AM   Result Value Ref Range    WBC 20.1 (H) 4.8 - 10.8 K/uL    RBC 4.44 (L) 4.70 - 6.10 M/uL    Hemoglobin 14.3 14.0 - 18.0 g/dL    Hematocrit 45.6 42.0 - 52.0 %    .7 (H) 81.4 - 97.8 fL    MCH 32.2 27.0 - 33.0 pg    MCHC 31.4 (L) 33.7 - 35.3 g/dL    RDW 49.9 35.9 - 50.0 fL    Platelet Count 117 (L) 164 - 446 K/uL    MPV 10.6 9.0 - 12.9 fL     Neutrophils-Polys 83.90 (H) 44.00 - 72.00 %    Lymphocytes 8.90 (L) 22.00 - 41.00 %    Monocytes 6.30 0.00 - 13.40 %    Eosinophils 0.10 0.00 - 6.90 %    Basophils 0.40 0.00 - 1.80 %    Immature Granulocytes 0.40 0.00 - 0.90 %    Nucleated RBC 0.00 /100 WBC    Neutrophils (Absolute) 16.88 (H) 1.82 - 7.42 K/uL    Lymphs (Absolute) 1.80 1.00 - 4.80 K/uL    Monos (Absolute) 1.26 (H) 0.00 - 0.85 K/uL    Eos (Absolute) 0.03 0.00 - 0.51 K/uL    Baso (Absolute) 0.08 0.00 - 0.12 K/uL    Immature Granulocytes (abs) 0.09 0.00 - 0.11 K/uL    NRBC (Absolute) 0.00 K/uL   LACTIC ACID    Collection Time: 03/09/19  9:52 AM   Result Value Ref Range    Lactic Acid 3.2 (H) 0.5 - 2.0 mmol/L   PERIPHERAL SMEAR REVIEW    Collection Time: 03/09/19  9:52 AM   Result Value Ref Range    Peripheral Smear Review see below    DIFFERENTIAL COMMENT    Collection Time: 03/09/19  9:52 AM   Result Value Ref Range    Comments-Diff see below    CBC WITH DIFFERENTIAL    Collection Time: 03/09/19  1:02 PM   Result Value Ref Range    WBC 20.5 (H) 4.8 - 10.8 K/uL    RBC 4.60 (L) 4.70 - 6.10 M/uL    Hemoglobin 14.7 14.0 - 18.0 g/dL    Hematocrit 46.2 42.0 - 52.0 %    .4 (H) 81.4 - 97.8 fL    MCH 32.0 27.0 - 33.0 pg    MCHC 31.8 (L) 33.7 - 35.3 g/dL    RDW 49.1 35.9 - 50.0 fL    Platelet Count 110 (L) 164 - 446 K/uL    MPV 10.6 9.0 - 12.9 fL    Neutrophils-Polys 84.50 (H) 44.00 - 72.00 %    Lymphocytes 8.30 (L) 22.00 - 41.00 %    Monocytes 5.90 0.00 - 13.40 %    Eosinophils 0.10 0.00 - 6.90 %    Basophils 0.40 0.00 - 1.80 %    Immature Granulocytes 0.80 0.00 - 0.90 %    Nucleated RBC 0.00 /100 WBC    Neutrophils (Absolute) 17.32 (H) 1.82 - 7.42 K/uL    Lymphs (Absolute) 1.69 1.00 - 4.80 K/uL    Monos (Absolute) 1.20 (H) 0.00 - 0.85 K/uL    Eos (Absolute) 0.02 0.00 - 0.51 K/uL    Baso (Absolute) 0.08 0.00 - 0.12 K/uL    Immature Granulocytes (abs) 0.16 (H) 0.00 - 0.11 K/uL    NRBC (Absolute) 0.00 K/uL   Comp Metabolic Panel    Collection Time:  03/09/19  1:02 PM   Result Value Ref Range    Sodium 142 135 - 145 mmol/L    Potassium 4.1 3.6 - 5.5 mmol/L    Chloride 105 96 - 112 mmol/L    Co2 25 20 - 33 mmol/L    Anion Gap 12.0 (H) 0.0 - 11.9    Glucose 127 (H) 65 - 99 mg/dL    Bun 16 8 - 22 mg/dL    Creatinine 1.01 0.50 - 1.40 mg/dL    Calcium 9.5 8.5 - 10.5 mg/dL    AST(SGOT) 17 12 - 45 U/L    ALT(SGPT) 10 2 - 50 U/L    Alkaline Phosphatase 81 30 - 99 U/L    Total Bilirubin 1.0 0.1 - 1.5 mg/dL    Albumin 3.7 3.2 - 4.9 g/dL    Total Protein 6.2 6.0 - 8.2 g/dL    Globulin 2.5 1.9 - 3.5 g/dL    A-G Ratio 1.5 g/dL   LACTIC ACID    Collection Time: 03/09/19  1:02 PM   Result Value Ref Range    Lactic Acid 6.4 (HH) 0.5 - 2.0 mmol/L   TROPONIN    Collection Time: 03/09/19  1:02 PM   Result Value Ref Range    Troponin I 0.06 (H) 0.00 - 0.04 ng/mL   ESTIMATED GFR    Collection Time: 03/09/19  1:02 PM   Result Value Ref Range    GFR If African American >60 >60 mL/min/1.73 m 2    GFR If Non African American >60 >60 mL/min/1.73 m 2   CBC WITHOUT DIFFERENTIAL    Collection Time: 03/09/19  4:07 PM   Result Value Ref Range    WBC 18.6 (H) 4.8 - 10.8 K/uL    RBC 4.61 (L) 4.70 - 6.10 M/uL    Hemoglobin 14.7 14.0 - 18.0 g/dL    Hematocrit 45.9 42.0 - 52.0 %    MCV 99.6 (H) 81.4 - 97.8 fL    MCH 31.9 27.0 - 33.0 pg    MCHC 32.0 (L) 33.7 - 35.3 g/dL    RDW 49.1 35.9 - 50.0 fL    Platelet Count 110 (L) 164 - 446 K/uL    MPV 10.2 9.0 - 12.9 fL   LACTIC ACID    Collection Time: 03/09/19  4:07 PM   Result Value Ref Range    Lactic Acid 4.0 (HH) 0.5 - 2.0 mmol/L   LACTIC ACID    Collection Time: 03/09/19  9:47 PM   Result Value Ref Range    Lactic Acid 3.4 (H) 0.5 - 2.0 mmol/L   AMYLASE    Collection Time: 03/09/19  9:47 PM   Result Value Ref Range    Amylase 36 20 - 103 U/L   LDH    Collection Time: 03/09/19  9:47 PM   Result Value Ref Range    LDH Total 259 107 - 266 U/L   CREATINE KINASE    Collection Time: 03/09/19  9:47 PM   Result Value Ref Range    CPK Total 97 0 - 154 U/L    HEMOGLOBIN AND HEMATOCRIT    Collection Time: 03/09/19  9:47 PM   Result Value Ref Range    Hemoglobin 13.6 (L) 14.0 - 18.0 g/dL    Hematocrit 42.8 42.0 - 52.0 %   LACTIC ACID    Collection Time: 03/10/19  3:44 AM   Result Value Ref Range    Lactic Acid 1.9 0.5 - 2.0 mmol/L   CBC WITHOUT DIFFERENTIAL    Collection Time: 03/10/19  3:44 AM   Result Value Ref Range    WBC 17.7 (H) 4.8 - 10.8 K/uL    RBC 4.52 (L) 4.70 - 6.10 M/uL    Hemoglobin 14.2 14.0 - 18.0 g/dL    Hematocrit 45.0 42.0 - 52.0 %    MCV 99.6 (H) 81.4 - 97.8 fL    MCH 31.4 27.0 - 33.0 pg    MCHC 31.6 (L) 33.7 - 35.3 g/dL    RDW 48.4 35.9 - 50.0 fL    Platelet Count 118 (L) 164 - 446 K/uL    MPV 10.8 9.0 - 12.9 fL   Comp Metabolic Panel    Collection Time: 03/10/19  3:44 AM   Result Value Ref Range    Sodium 143 135 - 145 mmol/L    Potassium 3.4 (L) 3.6 - 5.5 mmol/L    Chloride 108 96 - 112 mmol/L    Co2 27 20 - 33 mmol/L    Anion Gap 8.0 0.0 - 11.9    Glucose 89 65 - 99 mg/dL    Bun 12 8 - 22 mg/dL    Creatinine 0.92 0.50 - 1.40 mg/dL    Calcium 9.1 8.5 - 10.5 mg/dL    AST(SGOT) 15 12 - 45 U/L    ALT(SGPT) 7 2 - 50 U/L    Alkaline Phosphatase 73 30 - 99 U/L    Total Bilirubin 0.9 0.1 - 1.5 mg/dL    Albumin 3.5 3.2 - 4.9 g/dL    Total Protein 6.1 6.0 - 8.2 g/dL    Globulin 2.6 1.9 - 3.5 g/dL    A-G Ratio 1.3 g/dL   ESTIMATED GFR    Collection Time: 03/10/19  3:44 AM   Result Value Ref Range    GFR If African American >60 >60 mL/min/1.73 m 2    GFR If Non African American >60 >60 mL/min/1.73 m 2     Medical Decision Making, by Problem:  Active Hospital Problems    Diagnosis   • Diverticulitis [K57.92]     Priority: High   • Sepsis (HCC) [A41.9]     Priority: High   • Lower GI bleed [K92.2]     Priority: High     3/17- embolization of ileocecal branch of SMA- Smart     • Elevated INR [R79.1]     Priority: Medium   • Altered mental status [R41.82]     Priority: Medium   • Cognitive impairment [R41.89]     Priority: Medium   • Hypokalemia [E87.6]      Priority: Medium   • PAF (paroxysmal atrial fibrillation) ([facility]) [I48.0]     Priority: Low   • PVD (peripheral vascular disease) (CMS-HCC) [I73.9]     Priority: Low     Parra- stent-n LLE.     • Hypothyroidism [E03.9]   • Hypertension [I10]   • Falls [W19.XXXA]   • Leukocytosis [D72.829]   • CAD (coronary artery disease) of artery bypass graft [I25.810]     Plan:  No plan for surgical intervention at this time.  Follow up Palliative Care recommendations.    Quality Measures:  Quality-Core Measures   Reviewed items::  Labs reviewed and Medications reviewed      Discussed patient condition with Family, RN and Patient

## 2019-03-10 NOTE — THERAPY
"Occupational Therapy Evaluation completed.   Functional Status:  Min assist to CGA for functional transfers with FWW; min assist to mod assist for self-care   Plan of Care: Will benefit from Occupational Therapy 3 times per week  Discharge Recommendations:  Equipment: Will Continue to Assess for Equipment Needs. Post-acute therapy; Recommend inpatient transitional care services for continued occupational therapy services.     See \"Rehab Therapy-Acute\" Patient Summary Report for complete documentation.    "

## 2019-03-10 NOTE — PROCEDURES
Vascular Access Team    Date of Insertion: 3/10/19  Arm Circumference: n/a  Line Length: 8cm  Line Size: 18g  Vein Occupancy %: 45  Reason for Midline: lack of access  Labs: WBC 17.7, , BUN 12, Cr 0.92, GFR >60, INR n/a    Orders confirmed, vessel patency confirmed with ultrasound. Risks and benefits of procedure explained to patient and education regarding line associated bloodstream infections provided. Questions answered.     PowerGlide Midline placed in LUE per MD order with ultrasound guidance. 18g, 8 cm line placed in brachial vein after 1 attempt(s).  Catheter inserted with good blood return. Secured with 2cm external from insertion site.  Flushed without resistance with 10 mL 0.9% normal saline. Midline secured with Biopatch and Tegaderm.     Midline placement is confirmed by nurse using ultrasound and ability to flush and draw blood. Midline is appropriate for use at this time.  No X-ray is needed for placement confirmation. Pt tolerated procedure well.  Patient condition relayed to unit RN or ordering physician via this post procedure note in the EMR.    Ultrasound images uploaded to PACS and viewable in the EMR - yes  Ultrasound imaged printed and placed in paper chart - no      BARD PowerGlide Midline ref # [id number], Lot # [id number]

## 2019-03-10 NOTE — PROGRESS NOTES
· 2 RN skin check complete with RANDY Soria.  · Devices in place condom catheter.     Bilateral elbows pink blanching   Sacrum red slow to sergio  Bilateral heals pink, blanching      Pt placed on waffle cushion, turne Q2, heel float boots in place

## 2019-03-10 NOTE — PROGRESS NOTES
Meño from Lab called with critical result of lactic acid at 4.0. Critical lab result read back to Meño.   Dr. Styles notified of critical lab result at 1631.  Critical lab result read back by Dr. styles.

## 2019-03-10 NOTE — THERAPY
"Physical Therapy Evaluation completed.   Bed Mobility:  Supine to Sit: Minimal Assist  Transfers: Sit to Stand: Contact Guard Assist  Gait: Level Of Assist: Contact Guard Assist with Front-Wheel Walker      Plan of Care: Will benefit from Physical Therapy 3 times per week  Discharge Recommendations: Equipment: Will Continue to Assess for Equipment Needs.     Pt presents with impaired activity tolerance associated with acute on chronic deconditioning. Pt most limited by active BRBPR but was able to mobilize easily within room; anticipate need for placement though wife was asleep on couch and could not confirm social history or PLOF and may be to provide support needed though potentially undergoing further sx/medical interventions therefore will continue to follow and update.           See \"Rehab Therapy-Acute\" Patient Summary Report for complete documentation.     "

## 2019-03-10 NOTE — CONSULTS
"Reason for PC Consult: Advance Care Planning    Consulted by: Dr. Lilly    Assessment:  General: 85-year-old male from home with hematochezia. PMHx of CVA, CHF, coronary disease, hypertension, and afib previously on Xarelto but stopped several months ago d/t falls. Previous history of GI bleed requiring embolization of the bleeding cecal branch of the superior mesenteric artery. Per surgery, family decided against surgical interventions which would leave the pt with a colostomy and potential for poor outcomes given limited baseline function.     Social: Pt lives at home with his wife Prisca. They have a son Lopez in [place] and a daughter Cheryl in [place]. They have had home health assisting at home recently for therapy, etc. He was mobilizing/shuffling around home but jokingly \"lazy\" per wife. They are originally from [facility], but have lived in the  for over 50 years. 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 They have no other family in [place].    Consults: GI, surgery    Dyspnea: No-    Last BM: 03/09/19-    Pain: No-    Depression: Unable to determine-    Dementia: Uncertain; AMS secondary to current situation    Spiritual:  Is Islam or spirituality important for coping with this illness? No-    Has a  or spiritual provider visit been requested? No    Palliative Performance Scale: 30%    Advance Directive: None-  Discussed and he does not want to complete stating \"I will make my decisions.\" When explained that there may come a time that he lacks the ability, he stated jokingly \"that will never happen.\" PC RN explained the [place] for [place] for their understanding.  DPOA: No- NOJASON is his wife Prisca   POLST: No- completed today for DNR/DNI, selective interventions and no TF     Code Status: Full- Pt and wife desire for DNR/DNI and a \"natural\" death    Outcome:  PC RN met with Rafia and his wife Prisca at bedside and explained the role of PC. PC RN inquired about their " "native language and the one which they feel most comfortable communicating in and they both agreed English was preferred, though they also speak Chinese and Romansh. They together provided his history and situation leading up to this admission. Prisca states that they were \"both sick\" at home and not feeling well. They were visited by surgery this AM and understand that this is no longer necessary. Explored pt's values, beliefs, and preferences in order to identify GOC. Prisca stated that they had spoken about it an would have \"taken the surgery\" if needed; both were relieved that it wasn't necessary. PC RN asked if they spoke with their kids about this admission and they both agreed that they didn't want to involve them and \"we make out decisions.\" She went on to say that \"we would do what we wanted and tell them after.\" PC RN discussed goals and they both desire to return home with home health services. While they were adamant about not completing an AD or POA document, they were both excited about the POLST form. Prisca stated \"Where have you been? You make us so happy.\" POLST was explained and completed with his wishes as noted above. PC RN explained what to do with the POLST when home and Prisca expressed understanding. They denied any other questions at this time.    While talking to Rafia and Prisca, PC RN provided active listening, normalization, validation of thoughts and feelings, as well as reinforced his goals of care. PC contact information provided to Prisca and encouraged to call with any questions or concerns. She denied wanting this RN to call their children.     Updated: Dr. Hahn (for code status and POLST signature)/RN    Plan: home with home health when cleared    Recommendations: I do not recommend an ethics or hospice consult at this time because family desiring continued treatment.    Thank you for allowing Palliative Care to participate in this patient's care. Please feel free " to call [phone] with any questions or concerns.

## 2019-03-10 NOTE — NON-PROVIDER
Patient Summary:     Patient is an 85 y.o. M w/ a PMHx significant for prior lower GI bleed s/p embolization of ileocecal branch in March 2017 (compromised SMA), diverticulosis, CAD s/p CABG March 2018, peripheral vascular disease, paroxysmal Afib, and cognitive impairment deemed w/o capacity by psych in March 2018, who presented to the ED w/ a 2 day history of bright red blood per rectum and a 2 week history of acute decline in mental status.      24 Hour Events:     Patient had a significant worsening in labs, specifically an increased WBC and lactic acid level. He was transferred to the ICU and was aggressively resuscitated. He has shown significant improvement in lab trends since yesterday. Both surgery and GI were consulted for the patient. Surgery explained the benefits and risks of surgery to the patient and his wife and how the patient's post-op course would be difficult due to his multiple comorbidities; they would do a halima/total-colectomy (depending on extent of ischemic/infarcted bowel) and place a permanent colostomy bag. Patient's wife denied surgery at the time and wanted continue aggressive resuscitation. GI consult resulted in a workup for ischemic colitis (stool culture, O&P, C. Diff, checking amylase, LDH & CK). Working differential per GI for patient's CC (I.e. Bright red blood per rectum) includes ischemic colitis and diverticulitis. Due to fear of perforating colon in possible diverticulitis, GI recommended against a colonoscopy. Current treatment plan includes aggressively managing patient w/ fluids to maintain a normotensive BP, as well as the administration of broad-spectrum abx for possible diverticulitis coverage.     On ROS, patient denies abdominal pain, chest pain, N/V/D/C, fever, chills, SOB, cough, edema, or rash. Patient and his wife are considering surgery this morning as an option if patient's bright red blood per rectum returns. Surgeon saw patient and indicated that no surgery is  necessary at this time as patient is stabilized and his labs are trending down.      SUBJECTIVE/OBJECTIVE:     NEURO/HEENT/PSYCH:  GCS = 15  24hr: --   Current: --   Exam Some facial bruising, cachectic appearance w/ temporal and supraclavicular wasting, poor dentition, PERRL, some vertical gaze palsy, bilateral cataracts, no JVD or lymphadenopathy   Meds/Pain (see medications list)   Labs --   Imaging CT head w/ contrast showed no apparent acute process      RESP:  RR, SpO2 RR = 18; SpO2 = 96% on room air   Exam NAD, chest wall motion normal, could not auscultate lungs 2/2 patient's inability to sit up easily; no abnormal breath sounds upon anterior and lateral lung field auscultation   Meds (see medications list)   Labs --   Imaging --      CV:  Vitals T = 97.9; BP = 178/66; HR = 60; RR = 18; SpO2 = 96% on room air   Exam RRR; systolic murmur present   Meds Amlodipine, aspirin, atorvastatin, lisinopril, metoprolol   Labs --   Imaging EKG sinus w/ PVCs and chronic anterolateral T-wave inversions      GI:  Diet/Bowel Function Cardiac diet w/ modified consistency 2/2 dysphagia   Exam NT/ND, scaphoid abdomen; JOSEPH showed external hemorrhoids and bright red blood in rectal vault though no apparent active bleed, no tenderness on exam   Labs --   Imaging CT w/ contrast showed severe colitis from the splenic flexure through the proximal sigmoid, possible diverticulitis, though colitis more severe than expected for diverticulitis.      HEME:  Labs CBC (3/9/19): WBC = 18.6; Hgb = 13.6; Hct = 42.8; Plt = 110  CBC (3/10/19): WBC = 17.7; Hgb = 14.2; Hct = 45; Plt = 118     CMP (3/9/19): Na = 142; K = 4.1; Cl = 105; HCO3 = 25; BUN = 16; Cr = 1.01; Glu = 127  CMP (3/10/19): Na = 143; K = 3.4; Cl = 108; HCO3 = 27; BUN = 12; Cr = 0.92; Glu = 89   Transfusions --   Imaging --      ENDOCRINE:  Blood Sugar Glu = 83   Meds Levothyroxine 50 mcg      MUSCULOSKELETAL/RHEUM:  Imaging --   Meds Allopurinol 300 mg   WB Status Patient is  mostly bed bound; h/o falls      SKIN:  Exam Senile purpura present on patients arms; no lower extremity edema   Interventions --      ASSESSMENT/PLAN:     NEURO/HEENT/PSYCH: Patient has somewhat altered mental status and was deemed to lack capacity by psych in March 2018. He has had a progressive decline in cognition per wife w/ more confusion than usual over the past two weeks, this is on top of a recent Hx of frequent falls. CT head w/o contrast showed no acute processes or chronic SDHs, continue to monitor and possibly evaluate further w/ MRI. Consider palliative consult given patient's progressive decline in the setting of multiple, irreversible, significant comorbidities. Consult palliative for treatment considerations.     CV: Patient has a significant medical and surgical CV Hx. He is on multiple outpatient medications (antihypertensives, statin, etc.) for these conditions. Continue outpatient medications unless contraindicated in light of patient's current GI bleed (I.e. hold any anticoagulants)     GI: Per wife, patient had a significant amount of painless bright red blood per rectum not associated with any bowel movements. Patient has a history of GI bleed with embolization of ileocecal branch; he has also had AVMs in the distal ileum and removal of polyps via colonoscopy prior to this admission. His CT abdomen/pelvis with contrast showed severe colitis from the splenic flexure through the proximal sigmoid, possibly diverticulitis...though the associated colitis was more severe than expected for an episode of diverticulitis. Patient's current bleeding episode may be 2/2 possible diverticular bleed in the setting of previous diverticulosis with known aspirin use and falls vs. possible AVM vs hemorrhoids. Bleeding episode is less likely to be 2/2 an upper GI bleed. Continue monitoring patient on telemetry, daily CBCs w/ lactic acid, serial abdominal exams, type and screen blood for possible transfusion,  hold home ASA and DVT ppx, and start patient on C3 + flagyl for 10 days. Follow GI and surgery consult recommendations     HEME: Continue to monitor pt.'s CBC and chem panel for any significant changes; replete electrolytes as necessary. Transfuse if Hgb < 8. Type and screen blood for possible transfusion.     ENDOCRINE: Patient has hypothyroidism; continue on home levothyroxine 50 mcg.     MSK/RHEUM/SKIN: Patient has a Hx of gout; continue outpt. allopurinol      PROPHYLAXIS:  DVT SCDs   GI --   Mallampati 2   Restraints --      LINES/TUBES/CATHETERS:     R. PIV     DISPOSITION:      Continue pt. On floor; consider transfer to ICU if bleeding per rectum returns. Continue surgery, GI, and palliative care consults for treatment considerations.    Patient seen by:     Medical Student: KEELEY Combs  Supervising Resident: Grady Hahn MD (PGY-1)  Senior Resident: Naty Lilly MD (PGY-1)  Attending Physician: Wade Romo MD

## 2019-03-10 NOTE — CARE PLAN
Problem: Infection  Goal: Will remain free from infection    Intervention: Implement standard precautions and perform hand washing before and after patient contact  Educated pt and family on precautions and hand hygiene.       Problem: Knowledge Deficit  Goal: Knowledge of disease process/condition, treatment plan, diagnostic tests, and medications will improve    Intervention: Explain information regarding disease process/condition, treatment plan, diagnostic tests, and medications and document in education  Pt and family aware of treatment plan.

## 2019-03-10 NOTE — PROGRESS NOTES
Evaluation of patient appropriateness for higher level of care  Note Author: Nadine Bianchi M.D., Internal Medicine Resident    Name Rafia Shaikh       1933   Age/Sex 85 y.o. male   MRN [id number]   Code Status Full Code       Interval Problem  Hematochezia, elevated lactate at 6.4    Subsequent Hospital Course  Rafia Shaikh is a 85 y.o. male with history of CVA, transcatheter aortic valve replacement, atrial fibrillation previously anticoagulated with Xarelto (discontinued in 2018 for fall risk), diverticulosis, ischemic colitis, AVMs of distal ileum, GI bleeding s/p embolization of the cecal branch of the SMA who was admitted on 3/8/2019 with gastrointestinal bleeding.    This physician was called to bedside to evaluate patient for appropriateness of ICU transfer in the setting of lactate of 6.4.  At time of examination, he was hemodynamically stable, H/H stable within normal limits, and abdominal exam was benign.  His lactate was elevated at 6.4, however he had not been resuscitated overnight due to inability to achieve IV access.  This had been recently remedied and he was being bolused at the time of examination.  The working plan was to establish reliable IV access with a midline IV if he did not transfer to ICU, and a central line if he did.  His lactic acid was trended and decreased from 6.4 to 4.0 in 3 hours after initial fluid resuscitation.    Due to the aforementioned stability and apparent hypovolemia that was being appropriately addressed, as well as his improvement in lactate level with fluid resuscitation, it was determined that his needs could be met at his current level of care on the telemetry floor.     Plan for definitive treatment includes palliative care consult for determination of goals of care, surgery and gastroenterology consults for evaluation of treatment options.       Disposition  Remained on telemetry floor     Physical Exam     Vitals:    19 0400 19 0800  "03/09/19 1200 03/09/19 1527   BP: 158/97 146/69 150/68 (!) 166/65   Pulse: (!) 104 61 61 66   Resp: 16 16 18 17   Temp: 36.5 °C (97.7 °F) 36.4 °C (97.5 °F) 36.4 °C (97.5 °F) 36.2 °C (97.1 °F)   TempSrc: Temporal Temporal Temporal Temporal   SpO2: 99% 96% 96% 96%   Weight:       Height:         Body mass index is 22.85 kg/m². Weight: 47.9 kg (105 lb 9.6 oz)  Oxygen Therapy:  Pulse Oximetry: 96 %, O2 (LPM): 0, O2 Delivery: None (Room Air)    Pertinent findings on exam:  General: Very thin appearing, confused  HEENT: Normocephalic, atraumatic, no scleral icterus, dry mucous membranes  Pulm: Normal respiratory effort, CTAB  CV: RRR, systolic murmur present, no JVD, no peripheral edema  Abd: Soft, no guarding, nontender to deep palpation  Neuro: Alert, confused  Psych: Anxious affect, confused, reports that he \"needs help deciding what to do\"    Pertinent Data Review:     Lab Results  Component Value Date/Time   HEMOGLOBIN 14.7 03/09/2019 1607   HEMOGLOBIN 14.7 03/09/2019 1302   HEMOGLOBIN 14.3 03/09/2019 0952   HEMOGLOBIN 14.8 03/09/2019 0255   HEMOGLOBIN 13.5 (L) 03/08/2019 1754     Imaging Results  CT-ABDOMEN-PELVIS WITH   Final Result   1.  Severe colitis from the splenic flexure through the proximal sigmoid. Diverticula are seen in component of diverticulitis cannot be excluded, however degree of involvement would not be expected for diverticulitis alone.   2.  Bilateral nephrolithiasis, mild left hydronephrosis and hydroureter without visualized obstructing stone.   3.  Atherosclerosis and atherosclerotic coronary artery disease.   4.  Low-density hepatic lesions, similar in size and number compared to prior study, appearance favors cyst.       Problem List     Active Problems:    Lower GI bleed POA: Yes      Overview: 3/17- embolization of ileocecal branch of SMA- Smart    Diverticulitis POA: Yes    Sepsis (HCC) POA: Yes    Hypokalemia POA: Yes    Cognitive impairment POA: Yes    Elevated INR POA: Yes    Altered " mental status POA: Yes    PVD (peripheral vascular disease) (CMS-HCC) POA: Yes      Overview: Parra- stent-n LLE.    PAF (paroxysmal atrial fibrillation) ([facility]) POA: Yes    CAD (coronary artery disease) of artery bypass graft POA: Yes    Leukocytosis POA: Yes    Falls POA: Yes    Hypothyroidism POA: Yes    Hypertension POA: Unknown  Resolved Problems:    * No resolved hospital problems. *

## 2019-03-11 NOTE — PROGRESS NOTES
Assumed care of patient at this time. Bedside shift report received. Pt pulled out all IVs overnight. Oriented to person only, not very communicative. High risk fall precautions in place. Will continue to monitor.

## 2019-03-11 NOTE — CARE PLAN
Problem: Nutritional:  Goal: Achieve adequate nutritional intake  Diet advancement beyond clear liquids + PO intake >50%  Outcome: NOT MET

## 2019-03-11 NOTE — PROGRESS NOTES
Patient's spouse was in the room with patient and frequently interrupting care and making safety difficult for this patient. Wife was AOx1-2, needed continuous re-orientation and was at times agitated and a bit combative with staff about care of . Wife made many efforts to try and get patient dressed and get him up from bed to leave the hospital. To RN knowledge no one has been able to contact other family, and patient and wife live alone and receive home health care. RN felt it was not appropriate to keep wife in room with patient who was frequently interfering with care and patient safety. Patient high fall risk. Unable to find if there was someone nearby caregiver for patient and/or wife who would be able to take wife home. Spoke with Charge RN about concerns; she found that this has occurred before with this patient and she has had prior legal holds. Charge spoke with NAM about these concerns. Patient's wife was spoken to and take downstairs with NAM and other RNs to find suitable solution about for her care at this time.

## 2019-03-11 NOTE — NON-PROVIDER
Patient Summary:     Patient is an 85 y.o. M w/ a PMHx significant for prior lower GI bleed s/p embolization of ileocecal branch in March 2017 (compromised SMA), diverticulosis, CAD s/p CABG March 2018, peripheral vascular disease, paroxysmal Afib, and cognitive impairment deemed w/o capacity by psych in March 2018, who presented to the ED w/ a 2 day history of bright red blood per rectum and a 2 week history of acute decline in mental status.      24 Hour Events:     No acute events overnight. Patient had a midline catheter placed yesterday for easier vascular access as he has pulled his IV's. He is also starting to develop a significant sacrococcygeal ulcer 2/2 inactivity and inability to get out of bed. Patient has shown significant improvement in lab trends over the past two days. GI consult resulted in a workup for ischemic colitis (stool culture, O&P, C. Diff studies, check amylase, LDH & CK). Working differential per GI for patient's CC (I.e. Bright red blood per rectum) includes ischemic colitis and diverticulitis. Due to fear of perforating colon in possible diverticulitis, GI recommended against a colonoscopy. Current treatment plan includes managing patient w/ fluids to maintain a normotensive BP, repletion of electrolytes, and the administration of broad-spectrum abx for possible diverticulitis coverage. On ROS, patient denies abdominal pain, chest pain, N/V/D/C, fever, chills, SOB, cough, edema, or rash. Surgeon saw patient today and indicated that no surgery is necessary at this time as patient is stabilized and his labs have been trending down.     SUBJECTIVE/OBJECTIVE:     NEURO/HEENT/PSYCH:  GCS = 15  24hr: --   Current: --   Exam Some facial bruising, cachectic appearance w/ temporal and supraclavicular wasting, poor dentition, PERRL, some vertical gaze palsy, bilateral cataracts, no JVD or lymphadenopathy   Labs --   Imaging CT head w/ contrast showed no apparent acute process      RESP:  RR, SpO2  RR = 16; SpO2 = 98% on room air   Exam NAD, chest wall motion normal, could not auscultate lungs 2/2 patient's inability to sit up easily; no abnormal breath sounds upon anterior and lateral lung field auscultation   Labs --   Imaging --      CV:  Vitals T = 97.6; BP = 162/79; HR = 60; RR = 16; SpO2 = 98% on room air   Exam RRR; systolic murmur present (patient has pacemaker)   Labs --   Imaging EKG sinus w/ PVCs and chronic anterolateral T-wave inversions      GI:  Diet/Bowel Function Cardiac diet w/ modified consistency 2/2 dysphagia   Exam NT/ND, scaphoid abdomen; JOSEPH showed external hemorrhoids and bright red blood in rectal vault though no apparent active bleed, no tenderness on exam   Labs --   Imaging CT w/ contrast showed severe colitis from the splenic flexure through the proximal sigmoid, possible diverticulitis, though colitis more severe than expected for diverticulitis.      HEME:  Labs CBC (3/9/19): WBC = 18.6; Hgb = 13.6; Hct = 42.8; Plt = 110  CBC (3/10/19): WBC = 17.7; Hgb = 14.2; Hct = 45; Plt = 118  CBC (3/11/19): WBC = 10.8; Hgb = 14.6; Hct = 46.3; Plt = 117     CMP (3/9/19): Na = 142; K = 4.1; Cl = 105; HCO3 = 25; BUN = 16; Cr = 1.01; Glu = 127  CMP (3/10/19): Na = 143; K = 3.4; Cl = 108; HCO3 = 27; BUN = 12; Cr = 0.92; Glu = 89  CMP (3/11/19): Na = 138; K = 3.6; Cl = 103; HCO3 = 26; BUN = 10; Cr = 0.84; Glu = 87    Lactic acid = 1.9 on 3/10/19   Transfusions Transfuse if Hgb < 8     Imaging --      ENDOCRINE:  Blood Sugar Glu = 87   Meds Levothyroxine 50 mcg      MUSCULOSKELETAL/RHEUM:  Imaging --   Meds Allopurinol 300 mg   WB Status Patient is mostly bed bound; h/o falls      SKIN:  Exam Senile purpura present on patients arms; no lower extremity edema; sacrococcygeal pressure ulcer starting to develop on patient's backside 2/2 inactivity   Interventions --      ASSESSMENT/PLAN:     1.) Ischemic colitis vs. diverticulitis  Per wife on admission, patient had a significant amount of painless  bright red blood per rectum not associated with any bowel movements. 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 His CT abdomen/pelvis with contrast showed severe colitis from the splenic flexure through the proximal sigmoid, possibly diverticulitis...though the associated colitis was more severe than expected for an episode of diverticulitis. Patient's current bleeding episode most likely 2/2 ischemic colitis given this patient's extensive vasculopathic history. Continue monitoring patient on telemetry, daily CBCs w/ lactic acid, serial abdominal exams, hold home ASA and DVT ppx, and start patient on C3 + flagyl for 10 days (currently on day 3). Follow GI and surgery consult recommendations.    2.) Dementia  Patient has an altered mental status and was deemed to lack capacity by psych in March 2018. He has had a progressive decline in cognition per wife (who was recently admitted inpatient herself 2/2 worsening dementia) w/ more confusion than usual over the past two weeks; this is superimposed upon a recent Hx of frequent falls. CT head w/o contrast showed no acute processes or chronic SDHs. Continue to monitor and possibly evaluate further w/ MRI. Consider palliative consult given patient's progressive decline in the setting of multiple, irreversible, significant comorbidities. Refer to palliative for treatment considerations.     3.) Extensive vasculopathy  Patient has a significant medical and surgical CV Hx. He is on multiple outpatient medications (antihypertensives, statin, etc.) for these conditions. Continue outpatient medications unless contraindicated in light of patient's current GI bleed (I.e. hold ASA and DVT ppx)     4.) Gout  Patient has a Hx of gout; no acute flares at the moment. Continue to monitor and administer outpatient allopurinol      PROPHYLAXIS:  DVT SCDs    GI --   Mallampati 2   Restraints --      LINES/TUBES/CATHETERS:     Midline catheter     DISPOSITION:      Patient is currently medically stable; his wife (who herself was admitted inpatient yesterday) and him are currently awaiting transfer to an assisted living facility alongside his wife.     Patient seen by:     Medical Student: KEELEY Combs  Supervising Resident: Grady Hahn MD (PGY-1)  Senior Resident: Naty Lilly MD (PGY-3)  Attending Physician: Irwin Noe MD

## 2019-03-11 NOTE — PROGRESS NOTES
· 2 RN skin check complete with RANDY Villalobos.  · Devices in place: .  · Skin assessed under devices: NA.  · Confirmed pressure ulcers found on: NA  · Skin issues are as follows:  · Various bruising upper extremities  · Scab L knee  · Sacrum- pink blanching but intact  · Heels- pink, boggy, heel float boots applied.   · Elbows- intact and dry  · Ears pink and intact  · The following interventions in place   · Pt. Turning self, RN reinforcing teaching. Pt. With dementia  · Waffle cushion

## 2019-03-11 NOTE — THERAPY
"Physical Therapy Treatment completed.   Bed Mobility:  Supine to Sit: Moderate Assist  Transfers: Sit to Stand: Contact Guard Assist  Gait: Level Of Assist: Contact Guard Assist with Front-Wheel Walker       Plan of Care: Will benefit from Physical Therapy 3 times per week  Discharge Recommendations: Equipment: Will Continue to Assess for Equipment Needs. Post-acute therapy: see below.    See \"Rehab Therapy-Acute\" Patient Summary Report for complete documentation.     Patient continues to be limited by decreased activity tolerance. Patient ambulated approximately 20ft x2 in room with FWW and CGA. Patient with 1x overt posterior LOB and decreased alfa. Spouse currently admitted on S6 and visiting  during session, wife reported they live alone in [place] but have help available when needed, unclear who provides assist and capacity of care. Anticipate patient will require post acute placement prior to return home. Will continue to follow.  "

## 2019-03-11 NOTE — PROGRESS NOTES
Gastroenterology Progress Note     Author: Faustina Griggs   Date & Time Created: 3/11/2019 9:40 AM    Chief Complaint:  Reason for Consultation: Hematochezia/abnormal CT scan     History of Present Illness:   Patient is a very poor historian due to communication difficulty, history of stroke who was accompanied by wife that also provided history although also poor historian.  History significant for myocardial infarction, stroke, transaortic valve replacement as well as multiple ground fall that required discontinuation Xarelto admitted on 8 March with complaint of hematochezia.   Over the last 2 weeks worsening energy and more fatigue.  Imaging at ER showed normal CT scan of the head without acute finding.  CT abd/ pelvis with thickening of the colon from the splenic flexure to the proximal sigmoid colon. WBC elevation of 20, 84% neutrophil and lactic acid increasing to 6.4.  Patient denies any abdominal pain unable to recall last episode of hematochezia.  Denies any fevers any chills.     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  Endo Clip.  Moderate left-sided diverticulosis    Interval History:  3/10/2019: no issues overnight. Per RN, no BM, no bloody stool.   3/11/2019:    Review of Systems:  Review of Systems   Unable to perform ROS: Mental acuity   Constitutional: Negative.    HENT: Negative.    Eyes: Negative.    Respiratory: Negative.    Cardiovascular: Negative.    Gastrointestinal: Negative.    Genitourinary: Negative.    Musculoskeletal: Negative.    Skin: Negative.    Neurological: Negative.    Endo/Heme/Allergies: Negative.    Psychiatric/Behavioral: Negative.    Unclear if mental acuity reliable for review of system.    Physical  Exam:  Physical Exam   Constitutional: No distress.   HENT:   Head: Normocephalic.   Eyes: Pupils are equal, round, and reactive to light.   Neck: Normal range of motion. Neck supple.   Cardiovascular: Normal rate and regular rhythm.  Exam reveals no gallop and no friction rub.    No murmur heard.  Pulmonary/Chest: Effort normal and breath sounds normal. He has no wheezes. He has no rales. He exhibits no tenderness.   Abdominal: Soft. He exhibits no distension and no mass. There is no tenderness. There is no rebound and no guarding.   Musculoskeletal: He exhibits no edema.   Neurological: He is alert.   Skin: Skin is warm and dry. No rash noted. He is not diaphoretic. No erythema. No pallor.       Labs:      Recent Labs      03/09/19   1302  03/09/19 2147   CPKTOTAL   --   97   TROPONINI  0.06*   --      Recent Labs      03/09/19   0255  03/09/19   1302  03/10/19   0344  03/11/19   0217   SODIUM  146*  142  143  138   POTASSIUM  3.6  4.1  3.4*  3.6   CHLORIDE  108  105  108  103   CO2  16*  25  27  26   BUN  18  16  12  10   CREATININE  1.01  1.01  0.92  0.84   MAGNESIUM  2.3   --    --    --    CALCIUM  9.9  9.5  9.1  9.5     Recent Labs      03/09/19   0255  03/09/19   1302  03/09/19 2147  03/10/19   0344  03/11/19   0217   ALTSGPT  11  10   --   7  8   ASTSGOT  20  17   --   15  18   ALKPHOSPHAT  90  81   --   73  79   TBILIRUBIN  1.6*  1.0   --   0.9  0.9   AMYLASE   --    --   36   --    --    PREALBUMIN  20.0   --    --    --    --    GLUCOSE  83  127*   --   89  87     Recent Labs      03/08/19   1754   03/10/19   0344  03/10/19   1007  03/10/19   1450  03/11/19   0217   RBC  4.19*   < >  4.52*   --   4.45*  4.59*   HEMOGLOBIN  13.5*   < >  14.2  14.4  13.8*  14.6   HEMATOCRIT  40.8*   < >  45.0  44.6  43.7  46.3   PLATELETCT  138*   < >  118*   --   124*  117*   PROTHROMBTM  15.0*   --    --    --    --    --    INR  1.16*   --    --    --    --    --     < > = values in this interval not displayed.      Recent Labs      19   0952  19   1302   03/10/19   0344  03/10/19   1450  19   0217   WBC  20.1*  20.5*   < >  17.7*  13.9*  10.8   NEUTSPOLYS  83.90*  84.50*   --    --    --   73.80*   LYMPHOCYTES  8.90*  8.30*   --    --    --   15.90*   MONOCYTES  6.30  5.90   --    --    --   7.40   EOSINOPHILS  0.10  0.10   --    --    --   1.80   BASOPHILS  0.40  0.40   --    --    --   0.60   ASTSGOT   --   17   --   15   --   18   ALTSGPT   --   10   --   7   --   8   ALKPHOSPHAT   --   81   --   73   --   79   TBILIRUBIN   --   1.0   --   0.9   --   0.9    < > = values in this interval not displayed.     Hemodynamics:  Temp (24hrs), Av.6 °C (97.9 °F), Min:36.3 °C (97.4 °F), Max:37.1 °C (98.7 °F)  Temperature: 37.1 °C (98.7 °F)  Pulse  Av.4  Min: 56  Max: 104   Blood Pressure : 153/72     Respiratory:    Respiration: 18, Pulse Oximetry: 99 %        RUL Breath Sounds: Diminished, RML Breath Sounds: Diminished, RLL Breath Sounds: Diminished, WHIT Breath Sounds: Diminished, LLL Breath Sounds: Diminished  Fluids:    Intake/Output Summary (Last 24 hours) at 03/10/19 0704  Last data filed at 03/10/19 0555   Gross per 24 hour   Intake              200 ml   Output             3320 ml   Net            -3120 ml        GI/Nutrition:  Orders Placed This Encounter   Procedures   • Diet Order Clear Liquids - No Red Foods     Standing Status:   Standing     Number of Occurrences:   1     Order Specific Question:   Diet:     Answer:   Clear Liquids - No Red Foods [12]     Medical Decision Making, by Problem:  Active Hospital Problems    Diagnosis   • Diverticulitis [K57.92]   • Sepsis (HCC) [A41.9]   • Lower GI bleed [K92.2]   • Elevated INR [R79.1]   • Altered mental status [R41.82]   • Cognitive impairment [R41.89]   • Hypokalemia [E87.6]   • PAF (paroxysmal atrial fibrillation) (HCC) [I48.0]   • PVD (peripheral vascular disease) (CMS-HCC) [I73.9]   • Hypothyroidism [E03.9]   • Hypertension [I10]   • Falls  [W19.XXXA]   • Leukocytosis [D72.829]   • CAD (coronary artery disease) of artery bypass graft [I25.810]     Imaging:  CT-HEAD WITH   Final Result       1.  No acute intracranial findings.       2.  Diffuse atrophy and periventricular white matter change, consistent with chronic small vessel disease.       DX-CHEST-LIMITED (1 VIEW)   Final Result           1.  No acute cardiopulmonary disease.   2.  Atherosclerosis       CT-ABDOMEN-PELVIS WITH   Final Result           1.  Severe colitis from the splenic flexure through the proximal sigmoid. Diverticula are seen in component of diverticulitis cannot be excluded, however degree of involvement would not be expected for diverticulitis alone.   2.  Bilateral nephrolithiasis, mild left hydronephrosis and hydroureter without visualized obstructing stone.   3.  Atherosclerosis and atherosclerotic coronary artery disease.   4.  Low-density hepatic lesions, similar in size and number compared to prior study, appearance favors cyst.       CT-HEAD W/O   Final Result           1.  No acute intracranial abnormality is identified, there are nonspecific white matter changes, commonly associated with small vessel ischemic disease.  Associated mild cerebral atrophy is noted.   2.  Atherosclerosis.       IR-MIDLINE CATHETER INSERTION WO GUIDANCE > AGE 3    (Results Pending)                  Impressions:  1. Hematochezia- h/h stable, Lactic improved  2. Abnormal CT scan with suspicion for ischemic colitis vs diverticulitis (less likely)  from splenic flexure to proximal sigmoid  3. CAD  4. History of heart failure  5. Gout  6. Hypertension  7. Hypothyroisidm  8.  Leukocytosis, improved         Plan:  1. Continue current management with antibiotics  2. Await stool sample for analysis  3. Concurrent management with surgery  4. Currently no plan for colonoscopy given potential differential includes diverticulitis; improvement and stability.  5. Maintain normotensive BP.       Quality-Core  Measures

## 2019-03-11 NOTE — PROCEDURES
Pt. Has severe dementia, has pulled out multiple IVs, including midline placed yesterday. 2 US certified RNs have attempted to replace IVs x4 but were unable to do so. Charge RN aware. Pt. Has IV abx pending d/t no IV access and midline team only works during day shift. Alerted day shift RN to follow-up and get replaced.

## 2019-03-11 NOTE — DIETARY
"Nutrition services: Day 1 of admit. Rafia Shaikh is a 85 y.o. male with admitting DX of Hypokalemia, Colitis, Bloody stool.    Consult received for poor PO / wt loss PTA    Assessment:  Height: 144.8 cm (4' 9\")  Weight: 51.2 kg (112 lb 14 oz)  Body mass index is 24.43 kg/m² - WNL   Diet/Intake: Clear liquids (no reds), PO intake 0% x1, 50-75% x1    Evaluation:   1. Pt and his wife (Prisca) seen in room today for interview. Prisca states that the pt normally weighs between 140's to 150's. She states that over the last month that the pt has lost 30#. Wt on 3/8 was 89# by bed scale. If wt was truly 140# prior to wt loss, then wt loss has been severe at 36.4% x1 month. Prisca is apparently currently residing on .  2. The pt is apparently a very good eater normally. No issues with chewing. Prisca says that she limits the pt to having steak once every other week because he likes it so much.   3. Pt noted with decreased energy and weakness x2 weeks PTA. He had an episode of hematochezia which led the pt to come to ED.  4. Meds: Flagyl, Kdur, Lipitor    Malnutrition Risk: At risk d/t advanced age and suspected severe 36.4% wt loss x1 month, however unable to meet criteria at this time.    Recommendations/Plan:  1. Diet advancement per pt tolerance.  2. Encourage intake of meals  3. Document intake of all meals as % taken in ADL's to provide interdisciplinary communication across all shifts.   4. Monitor weight.  5. Nutrition rep will continue to see patient for ongoing meal and snack preferences.     RD monitoring.      "

## 2019-03-11 NOTE — PROGRESS NOTES
Spoke with CRISTI Jimenez about IV's- patient has no access as he pulled them all out. Said they will try to switch to PO abx, make pt a medical pt. No IV access at the moment, OK.

## 2019-03-11 NOTE — DOCUMENTATION QUERY
[facility]                                                                         Query Response Note      PATIENT:               LUZ IYER  ACCT #:                  [id number]  MRN:                       [id number]  :                       1933  ADMIT DATE:       3/8/2019 3:38 PM  DISCH DATE:          RESPONDING  PROVIDER #:        [id number]           RESPONSE TEXT:    Encephalopathy is due to or associated with sepsis    QUERY TEXT:    Cause and Effect Relationship [email or web address]    Please clarify in documentation the relationship, if any, between sepsis and encephalopathy.    The patient's Clinical Indicators include:  Sepsis, diverticulitis, ischemic colitis, metabolic encephalopathy, progressive dementia and current acute presentation  3/8 WBC 14.6  3/9 lactic acid 3.2, 6.4, 4.0   3/8 Vitals 170/60, 57, 16-25, 97.9F, 98%  Risk Factors:  diverticulitis   Treatment: IVF, rocephin, flagyl  Query created by: Jack Dunlap on 3/11/2019 11:14 AM        Electronically signed by:  TREVON FELTON MD 3/11/2019 3:54 PM

## 2019-03-11 NOTE — PROGRESS NOTES
Surgical Progress Note    Author: Wayne Ardon Date & Time created: 3/11/2019   12:11 PM     Interval Events:  Patient denies abdominal pain, but unreliable historian and minimally communicative.  Patient has pulled his IVs.    Review of Systems   Unable to perform ROS: Dementia     Hemodynamics:  Temp (24hrs), Av.6 °C (97.9 °F), Min:36.3 °C (97.4 °F), Max:37.1 °C (98.7 °F)  Temperature: 37.1 °C (98.7 °F)  Pulse  Av.4  Min: 56  Max: 104   Blood Pressure : 153/72     Respiratory:    Respiration: 18, Pulse Oximetry: 99 %        RUL Breath Sounds: Diminished, RML Breath Sounds: Diminished, RLL Breath Sounds: Diminished, WHIT Breath Sounds: Diminished, LLL Breath Sounds: Diminished  Neuro:  GCS       Fluids:    Intake/Output Summary (Last 24 hours) at 19 1211  Last data filed at 03/10/19 1800   Gross per 24 hour   Intake                0 ml   Output             1200 ml   Net            -1200 ml        Current Diet Order   Procedures   • Diet Order Clear Liquids - No Red Foods     Physical Exam   Constitutional: He appears well-developed and well-nourished.   Cardiovascular: Normal rate, regular rhythm and normal heart sounds.    Pulmonary/Chest: Effort normal and breath sounds normal. No respiratory distress.   Abdominal: Soft. Bowel sounds are normal. He exhibits no distension. There is no tenderness. There is no rebound and no guarding.   Skin: Skin is warm and dry.     Labs:  Recent Results (from the past 24 hour(s))   CBC WITHOUT DIFFERENTIAL    Collection Time: 03/10/19  2:50 PM   Result Value Ref Range    WBC 13.9 (H) 4.8 - 10.8 K/uL    RBC 4.45 (L) 4.70 - 6.10 M/uL    Hemoglobin 13.8 (L) 14.0 - 18.0 g/dL    Hematocrit 43.7 42.0 - 52.0 %    MCV 98.2 (H) 81.4 - 97.8 fL    MCH 31.0 27.0 - 33.0 pg    MCHC 31.6 (L) 33.7 - 35.3 g/dL    RDW 47.4 35.9 - 50.0 fL    Platelet Count 124 (L) 164 - 446 K/uL    MPV 10.7 9.0 - 12.9 fL   Comp Metabolic Panel    Collection Time: 19  2:17 AM   Result  Value Ref Range    Sodium 138 135 - 145 mmol/L    Potassium 3.6 3.6 - 5.5 mmol/L    Chloride 103 96 - 112 mmol/L    Co2 26 20 - 33 mmol/L    Anion Gap 9.0 0.0 - 11.9    Glucose 87 65 - 99 mg/dL    Bun 10 8 - 22 mg/dL    Creatinine 0.84 0.50 - 1.40 mg/dL    Calcium 9.5 8.5 - 10.5 mg/dL    AST(SGOT) 18 12 - 45 U/L    ALT(SGPT) 8 2 - 50 U/L    Alkaline Phosphatase 79 30 - 99 U/L    Total Bilirubin 0.9 0.1 - 1.5 mg/dL    Albumin 3.7 3.2 - 4.9 g/dL    Total Protein 6.3 6.0 - 8.2 g/dL    Globulin 2.6 1.9 - 3.5 g/dL    A-G Ratio 1.4 g/dL   CBC WITH DIFFERENTIAL    Collection Time: 03/11/19  2:17 AM   Result Value Ref Range    WBC 10.8 4.8 - 10.8 K/uL    RBC 4.59 (L) 4.70 - 6.10 M/uL    Hemoglobin 14.6 14.0 - 18.0 g/dL    Hematocrit 46.3 42.0 - 52.0 %    .9 (H) 81.4 - 97.8 fL    MCH 31.8 27.0 - 33.0 pg    MCHC 31.5 (L) 33.7 - 35.3 g/dL    RDW 48.5 35.9 - 50.0 fL    Platelet Count 117 (L) 164 - 446 K/uL    MPV 10.3 9.0 - 12.9 fL    Neutrophils-Polys 73.80 (H) 44.00 - 72.00 %    Lymphocytes 15.90 (L) 22.00 - 41.00 %    Monocytes 7.40 0.00 - 13.40 %    Eosinophils 1.80 0.00 - 6.90 %    Basophils 0.60 0.00 - 1.80 %    Immature Granulocytes 0.50 0.00 - 0.90 %    Nucleated RBC 0.00 /100 WBC    Neutrophils (Absolute) 7.95 (H) 1.82 - 7.42 K/uL    Lymphs (Absolute) 1.71 1.00 - 4.80 K/uL    Monos (Absolute) 0.80 0.00 - 0.85 K/uL    Eos (Absolute) 0.19 0.00 - 0.51 K/uL    Baso (Absolute) 0.07 0.00 - 0.12 K/uL    Immature Granulocytes (abs) 0.05 0.00 - 0.11 K/uL    NRBC (Absolute) 0.00 K/uL   ESTIMATED GFR    Collection Time: 03/11/19  2:17 AM   Result Value Ref Range    GFR If African American >60 >60 mL/min/1.73 m 2    GFR If Non African American >60 >60 mL/min/1.73 m 2     Medical Decision Making, by Problem:  Active Hospital Problems    Diagnosis   • Diverticulitis [K57.92]     Priority: High   • Sepsis (HCC) [A41.9]     Priority: High   • Lower GI bleed [K92.2]     Priority: High     3/17- embolization of ileocecal branch  Destinee Smart     • Elevated INR [R79.1]     Priority: Medium   • Altered mental status [R41.82]     Priority: Medium   • Cognitive impairment [R41.89]     Priority: Medium   • Hypokalemia [E87.6]     Priority: Medium   • PAF (paroxysmal atrial fibrillation) (HCC) [I48.0]     Priority: Low   • PVD (peripheral vascular disease) (CMS-HCC) [I73.9]     Priority: Low     Parra- stent-n LLE.     • Hypothyroidism [E03.9]   • Hypertension [I10]   • Falls [W19.XXXA]   • Leukocytosis [D72.829]   • CAD (coronary artery disease) of artery bypass graft [I25.810]     Plan:  No surgical intervention planned at this time.    Quality Measures:  Quality-Core Measures    Discussed patient condition with RN

## 2019-03-11 NOTE — PROGRESS NOTES
Internal Medicine Interval Note  Note Author: Grady Hahn M.D.     Name Rafia Shaikh       1933   Age/Sex 85 y.o. male   MRN [id number]   Code Status FULL     After 5PM or if no immediate response to page, please call for cross-coverage  Attending/Team: Dr. Romo/ CRISTI Jimenez See Patient List for primary contact information  Call [phone] to page    1st Call - Day Intern (R1):   Joavni 2nd Call - Day Sr. Resident (R2/R3):   Vijaya         Reason for interval visit  (Principal Problem)   AMS  Hematochezia    Interval Problem Daily Status Update  (24 hours, problem oriented, brief subjective history, new lab/imaging data pertinent to that problem)     -Patient pulled out midline overnight, will hold and transition antibiotics to oral; has no acute complaints, no abd tenderness/pain  -C diff, Stool Cx, O&P pending    -GI, no plan for colonoscopy recs included in plan  -Surg no surg, f/u palliative care, recs included in plan  -pending placement will try to coordinate with SW and with patient's wife's SW as he is also inpatient, and try to contact patient's son      Review of Systems   Constitutional: Negative.    HENT: Negative.    Eyes: Negative.    Respiratory: Negative.    Cardiovascular: Negative.    Gastrointestinal: Positive for blood in stool. Negative for abdominal pain, nausea and vomiting.   Musculoskeletal: Negative.    Skin: Negative.        Disposition/Barriers to discharge:   PT/OT eval pending.    Consultants/Specialty  Surgery  GI  PCP: Halie Dominique M.D.     Quality Measures  Quality-Core Measures   Reviewed items::  EKG reviewed, Radiology images reviewed, Labs reviewed and Medications reviewed  Baldwin catheter::  No Baldwin  DVT prophylaxis pharmacological::  Contraindicated - High bleeding risk  DVT prophylaxis - mechanical:  SCDs  Ulcer Prophylaxis::  Not indicated      Physical Exam       Vitals:    19 0019 19 0540 19 0800 19 1215   BP: (!) 168/68 (!)  162/79 153/72 148/72   Pulse: 61 60 67 (!) 59   Resp: 16 16 18 20   Temp: 36.3 °C (97.4 °F) 36.4 °C (97.6 °F) 37.1 °C (98.7 °F) 36.8 °C (98.2 °F)   TempSrc: Temporal Temporal Temporal Temporal   SpO2: 97% 100% 99% 95%   Weight:       Height:         Body mass index is 24.43 kg/m².    Oxygen Therapy:  Pulse Oximetry: 95 %, O2 (LPM): 0, O2 Delivery: None (Room Air)    Physical Exam   Constitutional: Vital signs are normal. He appears malnourished. He appears unhealthy.   HENT:   Head: Normocephalic and atraumatic.   Eyes: Pupils are equal, round, and reactive to light. Conjunctivae, EOM and lids are normal.   Cardiovascular: Normal rate, regular rhythm and normal pulses.  Exam reveals no gallop and no friction rub.    Murmur heard.  Pulmonary/Chest: Effort normal and breath sounds normal. He has no wheezes. He has no rales.   Abdominal: Soft. Bowel sounds are normal. He exhibits no distension. There is no tenderness. There is no rebound and no guarding.   Musculoskeletal: Normal range of motion. He exhibits no edema.   Neurological: He is alert.   Skin: Skin is warm and dry. No erythema.     Assessment/Plan     Sepsis (HCC)- (present on admission)   Assessment & Plan    Met sepsis criteria on admission: SIRS 2/4 (leukocytosis, tachypnea) + suspected source of infection (diverticulitis).  Did not get aggressive fluid management due to cardiac history.  On ceftriaxone, Flagyl x10 days for diverticulitis, changed to oral cefdinir/flagyl         Diverticulitis- (present on admission)   Assessment & Plan    CT ab / pelv with contrast showed severe colitis from the splenic flexure through the proximal sigmoid, possible diverticulitis, though colitis more severe than expected for diverticulitis.  h/o abdominal pain, diarrhea, nausea, vomiting, fever, etc., per wife, abdominal exam benign - however, given patient altered, SIRS criteria (leukocytosis, tachypnea), CT findings, bleed, will treat for diverticulitis.  Continue  ceftriaxone, Flagyl x10 days; changed to oral cefdinir/flagyl     Lower GI bleed- (present on admission)   Assessment & Plan    BRBPR on admission. Had a voluminous passage of blood this AM and has continuous small dripping from rectum.  Hx GI bleeding with embolization of ileocecal branch by IR Dr. Hart (03/21/17), subsequent colonoscopy in May 2017 (follwed by GI consultants - Dr. 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  Vitals stable, Hb at baseline, active small volume bleeding per rectum.  Etiology ischemic colitis vs diverticular bleed in setting of diverticulitis with known ASA use and falls, possible AVM, hemorrhoids, less likely upper GI bleed.  -LDH, CPK, amylase wnl  -C diff, Stool Cx, O&P pending  -Surgery, no surgery at this time after discussing with patient  -GI, no endoscopy considering inflammation unless requested by surg  -Spoke with IR, no role for embolization at this time.  -Transfuse if Hb <7.  -IVF     Altered mental status- (present on admission)   Assessment & Plan    Has had progressive decline in mental status per his wife.  Evaluated by psych in March 2018 - had no capacity at that time.  Alert and responsive on evaluation today.  CTM.     Elevated INR- (present on admission)   Assessment & Plan    Hold AC in the setting of GIB.  Monitor.     Cognitive impairment- (present on admission)   Assessment & Plan    Progressive decline in cognition per wife, though acutely more confused the past two weeks.  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     Hypokalemia- (present on admission)   Assessment & Plan    Monitor and replete as appropriate.     Hypertension   Assessment & Plan    On home lisinopril, metoprolol, hold amlodipine for now in setting of GI bleed and potential hemodynamic instability.     Hypothyroidism- (present on admission)   Assessment & Plan    Continue home dose of Levothyroxine.     Falls- (present on  admission)   Assessment & Plan    Debility noted in previous notes, recent falls x2 - 2 weeks and 1 day prior to admission.  PT / OT.     Leukocytosis- (present on admission)   Assessment & Plan    Increasing, likely due to acute illness.  On Ceftriaxone and Flagyl, changed to PO cefdinir/flagyl  CTM.     CAD (coronary artery disease) of artery bypass graft- (present on admission)   Assessment & Plan    H/o 3-vessel coronary bypass graft with left internal mammary artery graft to left anterior descending artery, saphenous vein graft to obtuse marginal branch and saphenous vein graft to posterior descending artery by Dr. Coates in March 2018.  No cardiac symptoms on this admission, EKG stable compared to prior.  On home atorvastatin, hold ASA in setting of GI bleed.  CTM.     PAF (paroxysmal atrial fibrillation) ([facility])- (present on admission)   Assessment & Plan    Xarelto discontinued by cards in August 2018.  Continue home metoprolol.  Hold ASA in setting of GI bleed.     PVD (peripheral vascular disease) (CMS-[facility])- (present on admission)   Assessment & Plan    H/o prior left superficial femoral artery stent, known high grade stenosis of right common femoral artery closed with Starclose closure by Dr. Amaya on 03/22/17.  CTM.

## 2019-03-12 NOTE — DISCHARGE PLANNING
Anticipated Discharge Disposition: Home with home health for skilled therapies.    Action: RN CM paged [facility] for home health order which was received. RANDY DE DIOS spoke with AMADOU Srinivasan on [facility] taking care of pt's wife to coordinate dc plan. Craig states pt's spouse does not have any post-acute needs.    Barriers to Discharge: Medical clearance.    Plan: CHOICE for home health (resume with [facility]). Continue to collaborate dc planning with [facility] staff.

## 2019-03-12 NOTE — PROGRESS NOTES
Dr. Griggs at bedside. Patient tolerating diet and order to advance diet as tolerated. MD d/c'd clear liquid diet, order received via verbal with read back to place patient on cardiac diet.

## 2019-03-12 NOTE — PROGRESS NOTES
Gastroenterology Progress Note     Author: Faustina Griggs   Date & Time Created: 3/12/2019 7:33 AM    Chief Complaint:  Reason for Consultation: Hematochezia/abnormal CT scan     History of Present Illness:   Patient is a very poor historian due to communication difficulty, history of stroke who was accompanied by wife that also provided history although also poor historian.  History significant for myocardial infarction, stroke, transaortic valve replacement as well as multiple ground fall that required discontinuation Xarelto admitted on 8 March with complaint of hematochezia.   Over the last 2 weeks worsening energy and more fatigue.  Imaging at ER showed normal CT scan of the head without acute finding.  CT abd/ pelvis with thickening of the colon from the splenic flexure to the proximal sigmoid colon. WBC elevation of 20, 84% neutrophil and lactic acid increasing to 6.4.  Patient denies any abdominal pain unable to recall last episode of hematochezia.  Denies any fevers any chills.     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  Endo Clip.  Moderate left-sided diverticulosis    Interval History:  3/10/2019: no issues overnight. Per RN, no BM, no bloody stool.   3/11/2019:  No report of bleeding  3/12/2019:   Smiling, denies abd pain.  RN states 1 loose stool without blood last night    Review of Systems:  Review of Systems   Unable to perform ROS: Mental acuity   Constitutional: Negative.    HENT: Negative.    Eyes: Negative.    Respiratory: Negative.    Cardiovascular: Negative.    Gastrointestinal: Negative.    Genitourinary: Negative.    Musculoskeletal: Negative.    Skin: Negative.    Neurological: Negative.    Endo/Heme/Allergies:  Negative.    Psychiatric/Behavioral: Negative.    Unclear if mental acuity reliable for review of system.    Physical Exam:  Physical Exam   Constitutional: No distress.   HENT:   Head: Normocephalic.   Eyes: Pupils are equal, round, and reactive to light.   Neck: Normal range of motion. Neck supple.   Cardiovascular: Normal rate and regular rhythm.  Exam reveals no gallop and no friction rub.    No murmur heard.  Pulmonary/Chest: Effort normal and breath sounds normal. He has no wheezes. He has no rales. He exhibits no tenderness.   Abdominal: Soft. He exhibits no distension and no mass. There is no tenderness. There is no rebound and no guarding.   Musculoskeletal: He exhibits no edema.   Neurological: He is alert.   Skin: Skin is warm and dry. No rash noted. He is not diaphoretic. No erythema. No pallor.       Labs:      Recent Labs      03/09/19   1302  03/09/19 2147   CPKTOTAL   --   97   TROPONINI  0.06*   --      Recent Labs      03/10/19   0344 03/11/19 0217 03/12/19 0215   SODIUM  143  138  137   POTASSIUM  3.4*  3.6  3.7   CHLORIDE  108  103  104   CO2  27  26  25   BUN  12  10  10   CREATININE  0.92  0.84  0.87   CALCIUM  9.1  9.5  9.6     Recent Labs      03/09/19   1302  03/09/19   2147  03/10/19   0344  03/11/19   0217  03/12/19   0215   ALTSGPT  10   --   7  8   --    ASTSGOT  17   --   15  18   --    ALKPHOSPHAT  81   --   73  79   --    TBILIRUBIN  1.0   --   0.9  0.9   --    AMYLASE   --   36   --    --    --    GLUCOSE  127*   --   89  87  86     Recent Labs      03/10/19   1450  03/11/19 0217 03/12/19 0215   RBC  4.45*  4.59*  4.62*   HEMOGLOBIN  13.8*  14.6  14.8   HEMATOCRIT  43.7  46.3  45.2   PLATELETCT  124*  117*  134*     Recent Labs      03/09/19   1302   03/10/19   0344  03/10/19   1450  03/11/19 0217 03/12/19 0215   WBC  20.5*   < >  17.7*  13.9*  10.8  7.2   NEUTSPOLYS  84.50*   --    --    --   73.80*  59.80   LYMPHOCYTES  8.30*   --    --    --   15.90*  24.10    MONOCYTES  5.90   --    --    --   7.40  8.70   EOSINOPHILS  0.10   --    --    --   1.80  5.30   BASOPHILS  0.40   --    --    --   0.60  1.00   ASTSGOT  17   --   15   --   18   --    ALTSGPT  10   --   7   --   8   --    ALKPHOSPHAT  81   --   73   --   79   --    TBILIRUBIN  1.0   --   0.9   --   0.9   --     < > = values in this interval not displayed.     Hemodynamics:  Temp (24hrs), Av.6 °C (97.8 °F), Min:36.1 °C (97 °F), Max:37.1 °C (98.7 °F)  Temperature: 36.4 °C (97.6 °F)  Pulse  Av.6  Min: 56  Max: 104   Blood Pressure : (!) 175/74 (RN present)     Respiratory:    Respiration: 16, Pulse Oximetry: 97 %        RUL Breath Sounds: Diminished, RML Breath Sounds: Diminished, RLL Breath Sounds: Diminished, WHIT Breath Sounds: Diminished, LLL Breath Sounds: Diminished  Fluids:    Intake/Output Summary (Last 24 hours) at 03/10/19 0704  Last data filed at 03/10/19 0555   Gross per 24 hour   Intake              200 ml   Output             3320 ml   Net            -3120 ml     Weight: 51.7 kg (113 lb 15.7 oz)  GI/Nutrition:  Orders Placed This Encounter   Procedures   • Diet Order Clear Liquids - No Red Foods     Standing Status:   Standing     Number of Occurrences:   1     Order Specific Question:   Diet:     Answer:   Clear Liquids - No Red Foods [12]     Medical Decision Making, by Problem:  Active Hospital Problems    Diagnosis   • Diverticulitis [K57.92]   • Sepsis (HCC) [A41.9]   • Lower GI bleed [K92.2]   • Elevated INR [R79.1]   • Altered mental status [R41.82]   • Cognitive impairment [R41.89]   • Hypokalemia [E87.6]   • PAF (paroxysmal atrial fibrillation) (HCC) [I48.0]   • PVD (peripheral vascular disease) (CMS-HCC) [I73.9]   • Hypothyroidism [E03.9]   • Hypertension [I10]   • Falls [W19.XXXA]   • Leukocytosis [D72.829]   • CAD (coronary artery disease) of artery bypass graft [I25.810]     Imaging:  CT-HEAD WITH   Final Result       1.  No acute intracranial findings.       2.  Diffuse atrophy  and periventricular white matter change, consistent with chronic small vessel disease.       DX-CHEST-LIMITED (1 VIEW)   Final Result           1.  No acute cardiopulmonary disease.   2.  Atherosclerosis       CT-ABDOMEN-PELVIS WITH   Final Result           1.  Severe colitis from the splenic flexure through the proximal sigmoid. Diverticula are seen in component of diverticulitis cannot be excluded, however degree of involvement would not be expected for diverticulitis alone.   2.  Bilateral nephrolithiasis, mild left hydronephrosis and hydroureter without visualized obstructing stone.   3.  Atherosclerosis and atherosclerotic coronary artery disease.   4.  Low-density hepatic lesions, similar in size and number compared to prior study, appearance favors cyst.       CT-HEAD W/O   Final Result           1.  No acute intracranial abnormality is identified, there are nonspecific white matter changes, commonly associated with small vessel ischemic disease.  Associated mild cerebral atrophy is noted.   2.  Atherosclerosis.       IR-MIDLINE CATHETER INSERTION WO GUIDANCE > AGE 3    (Results Pending)                  Impressions:  1. Hematochezia- resolved  2. Abnormal CT scan with suspicion for ischemic colitis vs diverticulitis (less likely)  from splenic flexure to proximal sigmoid  3. CAD  4. History of heart failure  5. Gout  6. Hypertension  7. Hypothyroisidm  8.  Leukocytosis, improved         Plan:  1. Has done well with conservative approach.  No plans for colonoscopy at this time. No leukocytosis, no anemia, no further hematochezia, no abd pain.  On PO abx.  Cardiac diet.  2.  Discharge plans reviewed.    Signing off.  Please call if we can assist.    Quality-Core Measures

## 2019-03-12 NOTE — PROGRESS NOTES
Internal Medicine Interval Note  Note Author: Grady Hahn M.D.     Name Rafia Shaikh       1933   Age/Sex 85 y.o. male   MRN [id number]   Code Status FULL     After 5PM or if no immediate response to page, please call for cross-coverage  Attending/Team: K[place]/ CRISTI [facility] See Patient List for primary contact information  Call [phone] to page    1st Call - Day Intern (R1):   Jovani 2nd Call - Day Sr. Resident (R2/R3):   Vijaya         Reason for interval visit  (Principal Problem)   AMS  Hematochezia    Interval Problem Daily Status Update  (24 hours, problem oriented, brief subjective history, new lab/imaging data pertinent to that problem)     -EFRAIN o/n; has no acute complaints, no abd tenderness/pain  -C diff, crypto/giardia antigen negative  -Stool Cx pending  -restarting home lisinopril for BP    -transfer to medical floor today, telemetry reviewed  -pending placement will try to coordinate with SW and with patient's wife's SW as he is also inpatient, and try to contact patient's son      Review of Systems   Constitutional: Negative.    HENT: Negative.    Eyes: Negative.    Respiratory: Negative.    Cardiovascular: Negative.    Gastrointestinal: Negative for abdominal pain, blood in stool, nausea and vomiting.   Musculoskeletal: Negative.    Skin: Negative.        Disposition/Barriers to discharge:   PT/OT eval pending.    Consultants/Specialty  Surgery  GI  PCP: Halie Dominique M.D.     Quality Measures  Quality-Core Measures   Reviewed items::  EKG reviewed, Radiology images reviewed, Labs reviewed and Medications reviewed  Baldwin catheter::  No Baldwin  DVT prophylaxis pharmacological::  Contraindicated - High bleeding risk  DVT prophylaxis - mechanical:  SCDs  Ulcer Prophylaxis::  Not indicated      Physical Exam       Vitals:    19 1951 19 2300 19 0428 19 0849   BP: 155/70 (!) 165/78 (!) 175/74 (!) 163/76   Pulse: 60 71 62 60   Resp: 18 18 16 18   Temp: 36.1 °C (97 °F)  36.6 °C (97.9 °F) 36.4 °C (97.6 °F) 36.3 °C (97.3 °F)   TempSrc: Temporal Temporal Temporal Temporal   SpO2: 98% 98% 97% 95%   Weight: 51.7 kg (113 lb 15.7 oz)      Height:         Body mass index is 24.66 kg/m². Weight: 51.7 kg (113 lb 15.7 oz)  Oxygen Therapy:  Pulse Oximetry: 95 %, O2 (LPM): 0, O2 Delivery: None (Room Air)    Physical Exam   Constitutional: Vital signs are normal. He appears malnourished. He appears unhealthy.   HENT:   Head: Normocephalic and atraumatic.   Eyes: Pupils are equal, round, and reactive to light. Conjunctivae, EOM and lids are normal.   Cardiovascular: Normal rate, regular rhythm and normal pulses.  Exam reveals no gallop and no friction rub.    Murmur heard.  Pulmonary/Chest: Effort normal and breath sounds normal. He has no wheezes. He has no rales.   Abdominal: Soft. Bowel sounds are normal. He exhibits no distension. There is no tenderness. There is no rebound and no guarding.   Musculoskeletal: Normal range of motion. He exhibits no edema.   Neurological: He is alert.   Skin: Skin is warm and dry. No erythema.     Assessment/Plan     Sepsis (HCC)- (present on admission)   Assessment & Plan    Met sepsis criteria on admission: SIRS 2/4 (leukocytosis, tachypnea) + suspected source of infection (diverticulitis).  Did not get aggressive fluid management due to cardiac history.  On ceftriaxone, Flagyl x10 days for diverticulitis, changed to oral cefdinir/flagyl         Diverticulitis- (present on admission)   Assessment & Plan    CT ab / pelv with contrast showed severe colitis from the splenic flexure through the proximal sigmoid, possible diverticulitis, though colitis more severe than expected for diverticulitis.  h/o abdominal pain, diarrhea, nausea, vomiting, fever, etc., per wife, abdominal exam benign - however, given patient altered, SIRS criteria (leukocytosis, tachypnea), CT findings, bleed, will treat for diverticulitis.  Continue ceftriaxone, Flagyl x10 days; changed to  oral cefdinir/flagyl     Lower GI bleed- (present on admission)   Assessment & Plan    BRBPR on admission. Had a voluminous passage of blood this AM and has continuous small dripping from rectum.  Hx GI bleeding with embolization of ileocecal branch by IR Dr. Hart (03/21/17), subsequent colonoscopy in May 2017 (follwed by GI consultants - Dr. 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  Vitals stable, Hb at baseline, active small volume bleeding per rectum.  Etiology ischemic colitis vs diverticular bleed in setting of diverticulitis with known ASA use and falls, possible AVM, hemorrhoids, less likely upper GI bleed.  -LDH, CPK, amylase wnl  -C diff, crypto/giardia antigen negative  -Stool Cx pending  -Surgery, no surgery at this time after discussing with patient  -GI, no endoscopy considering inflammation unless requested by surg  -Spoke with IR, no role for embolization at this time.  -Transfuse if Hb <7.  -IVF     Altered mental status- (present on admission)   Assessment & Plan    Has had progressive decline in mental status per his wife.  Evaluated by psych in March 2018 - had no capacity at that time.  Alert and responsive on evaluation today.  CTM.     Elevated INR- (present on admission)   Assessment & Plan    Hold AC in the setting of GIB.  Monitor.     Cognitive impairment- (present on admission)   Assessment & Plan    Progressive decline in cognition per wife, though acutely more confused the past two weeks.  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     Hypokalemia- (present on admission)   Assessment & Plan    Monitor and replete as appropriate.     Hypertension   Assessment & Plan    -holding metoprolol, amlodipine (may hold on d.c if concern for constipation)  -restarting home lisinopril       Hypothyroidism- (present on admission)   Assessment & Plan    Continue home dose of Levothyroxine.     Falls- (present on admission)   Assessment & Plan     Debility noted in previous notes, recent falls x2 - 2 weeks and 1 day prior to admission.  PT / OT.     Leukocytosis- (present on admission)   Assessment & Plan    Increasing, likely due to acute illness.  On Ceftriaxone and Flagyl, changed to PO cefdinir/flagyl  CTM.     CAD (coronary artery disease) of artery bypass graft- (present on admission)   Assessment & Plan    H/o 3-vessel coronary bypass graft with left internal mammary artery graft to left anterior descending artery, saphenous vein graft to obtuse marginal branch and saphenous vein graft to posterior descending artery by Dr. Coates in March 2018.  No cardiac symptoms on this admission, EKG stable compared to prior.  On home atorvastatin, hold ASA in setting of GI bleed.  CTM.     PAF (paroxysmal atrial fibrillation) ([facility])- (present on admission)   Assessment & Plan    Xarelto discontinued by cards in August 2018.  Continue home metoprolol.  Hold ASA in setting of GI bleed.     PVD (peripheral vascular disease) (CMS-[facility])- (present on admission)   Assessment & Plan    H/o prior left superficial femoral artery stent, known high grade stenosis of right common femoral artery closed with Starclose closure by Dr. Amaya on 03/22/17.  CTM.

## 2019-03-12 NOTE — CARE PLAN
Problem: Safety  Goal: Will remain free from falls    Intervention: Assess risk factors for falls  Patient needs a two person assist related to generalized weakness and history of falls. Patient educated on use of call light, patient verbalizes understanding. Bed in lowest locked position. Non-skid socks on. Upper bed rails raised. Bed alarm on. Fall risk precautions in place. Room close to nurses station. Hourly rounding.      Problem: Infection  Goal: Will remain free from infection    Intervention: Implement standard precautions and perform hand washing before and after patient contact  Patient educated on hand hygiene and importance of cleanliness. Patient verbalizes understanding. RN performs appropriate hand hygiene and maintains aseptic technique / standard precautions when in contact with patient.

## 2019-03-12 NOTE — PROGRESS NOTES
Surgical Progress Note    Author: Wayne Ardon Date & Time created: 3/12/2019   8:00 AM     Interval Events:  Denies abdominal pain.    Review of Systems   Unable to perform ROS: Dementia     Hemodynamics:  Temp (24hrs), Av.5 °C (97.7 °F), Min:36.1 °C (97 °F), Max:36.8 °C (98.2 °F)  Temperature: 36.4 °C (97.6 °F)  Pulse  Av.6  Min: 56  Max: 104   Blood Pressure : (!) 175/74 (RN present)     Respiratory:    Respiration: 16, Pulse Oximetry: 97 %        RUL Breath Sounds: Diminished, RML Breath Sounds: Diminished, RLL Breath Sounds: Diminished, WHIT Breath Sounds: Diminished, LLL Breath Sounds: Diminished  Neuro:  GCS       Fluids:    Intake/Output Summary (Last 24 hours) at 19 0800  Last data filed at 19 0500   Gross per 24 hour   Intake                0 ml   Output              575 ml   Net             -575 ml     Weight: 51.7 kg (113 lb 15.7 oz)  Current Diet Order   Procedures   • Diet Order Cardiac     Physical Exam   Constitutional: He appears well-developed and well-nourished. No distress.   Cardiovascular: Normal rate.    Pulmonary/Chest: Effort normal. No respiratory distress.   Abdominal: Soft. Bowel sounds are normal. He exhibits no distension. There is no tenderness. There is no rebound and no guarding.     Labs:  Recent Results (from the past 24 hour(s))   CRYPTO/GIARDIA RAPID ASSAY    Collection Time: 19  9:17 AM   Result Value Ref Range    Significant Indicator NEG     Source STL     Site -     Ova And Parasites Antigen Eia       Negative for Giardia lamblia antigen.  Negative for Cryptosporidium parvum antigen.  NOTE:  The Cryptosporidium/Giardia assay is a rapid test for the  presence or absence of these specific antigens.  In special  circumstances, a physician may need to request a complete  ova and parasite procedure when the patient meets certain  criteria. For example, recent travel abroad,immunosupression,  recent immigration, persistent undiagnosed diarrhea,  or  persistent unexplained eosinophilia may be conditions to  warrant a complete ova and parasite examination.  In these  special cases, or if the physician suspects another specific  gastrointestinal parasite,the Microbiology Department can  perform a complete ova and parasite microscopic examination.  The request for a complete ova and parasite examination must  come directly from the physician (or designee) within the  seven days of the original stool specimen being received in  the Microbiology Department.  Stool specimens are discarded  after  seven days of storage.     CBC WITH DIFFERENTIAL    Collection Time: 03/12/19  2:15 AM   Result Value Ref Range    WBC 7.2 4.8 - 10.8 K/uL    RBC 4.62 (L) 4.70 - 6.10 M/uL    Hemoglobin 14.8 14.0 - 18.0 g/dL    Hematocrit 45.2 42.0 - 52.0 %    MCV 97.8 81.4 - 97.8 fL    MCH 32.0 27.0 - 33.0 pg    MCHC 32.7 (L) 33.7 - 35.3 g/dL    RDW 46.4 35.9 - 50.0 fL    Platelet Count 134 (L) 164 - 446 K/uL    MPV 10.6 9.0 - 12.9 fL    Neutrophils-Polys 59.80 44.00 - 72.00 %    Lymphocytes 24.10 22.00 - 41.00 %    Monocytes 8.70 0.00 - 13.40 %    Eosinophils 5.30 0.00 - 6.90 %    Basophils 1.00 0.00 - 1.80 %    Immature Granulocytes 1.10 (H) 0.00 - 0.90 %    Nucleated RBC 0.00 /100 WBC    Neutrophils (Absolute) 4.33 1.82 - 7.42 K/uL    Lymphs (Absolute) 1.74 1.00 - 4.80 K/uL    Monos (Absolute) 0.63 0.00 - 0.85 K/uL    Eos (Absolute) 0.38 0.00 - 0.51 K/uL    Baso (Absolute) 0.07 0.00 - 0.12 K/uL    Immature Granulocytes (abs) 0.08 0.00 - 0.11 K/uL    NRBC (Absolute) 0.00 K/uL   Basic Metabolic Panel    Collection Time: 03/12/19  2:15 AM   Result Value Ref Range    Sodium 137 135 - 145 mmol/L    Potassium 3.7 3.6 - 5.5 mmol/L    Chloride 104 96 - 112 mmol/L    Co2 25 20 - 33 mmol/L    Glucose 86 65 - 99 mg/dL    Bun 10 8 - 22 mg/dL    Creatinine 0.87 0.50 - 1.40 mg/dL    Calcium 9.6 8.5 - 10.5 mg/dL    Anion Gap 8.0 0.0 - 11.9   ESTIMATED GFR    Collection Time: 03/12/19  2:15 AM    Result Value Ref Range    GFR If African American >60 >60 mL/min/1.73 m 2    GFR If Non African American >60 >60 mL/min/1.73 m 2     Medical Decision Making, by Problem:  Active Hospital Problems    Diagnosis   • Diverticulitis [K57.92]     Priority: High   • Sepsis (HCC) [A41.9]     Priority: High   • Lower GI bleed [K92.2]     Priority: High     3/17- embolization of ileocecal branch of SMA- [facility]     • Elevated INR [R79.1]     Priority: Medium   • Altered mental status [R41.82]     Priority: Medium   • Cognitive impairment [R41.89]     Priority: Medium   • Hypokalemia [E87.6]     Priority: Medium   • PAF (paroxysmal atrial fibrillation) ([facility]) [I48.0]     Priority: Low   • PVD (peripheral vascular disease) ([facility]) [I73.9]     Priority: Low     Parra- stent-n LLE.     • Hypothyroidism [E03.9]   • Hypertension [I10]   • Falls [W19.XXXA]   • Leukocytosis [D72.829]   • CAD (coronary artery disease) of artery bypass graft [I25.810]     Plan:  Doing well with conservative approach.  No plans for surgery at this time.  Leukocytosis resolved, no further hematochezia, and exam continues to be benign.  Tolerating cardiac diet.  Will sign off, please bernarda with any questions or concerns during this hospitalization.    Quality Measures:  Quality-Core Measures   Reviewed items::  Labs reviewed and Medications reviewed  Antibiotics:  Treating active infection/contamination beyond 24 hours perioperative coverage      Discussed patient condition with

## 2019-03-12 NOTE — PROGRESS NOTES
Dr. Hahn at bedside. Order received via verbal with read back for continuous nursing communication that patient okay to not have IV access.

## 2019-03-12 NOTE — THERAPY
"Occupational Therapy Treatment completed with focus on ADLs, ADL transfers, patient education and cognition.  Functional Status: Pt seen today for OT treatment. Pleasant and cooperative, very soft spoken and with delay. CGA for functional mobility. Eyad for LB cares. CGA grooming in stance at sink. Eyad to don/doff gown. He is making progress, limited by weakness, fatigue, impaired balance, and impaired cognition which impacts independence in self care.  Plan of Care: Will benefit from Occupational Therapy 3 times per week  Discharge Recommendations:  Equipment Will Continue to Assess for Equipment Needs. Recommend inpatient transitional care services for continued occupational therapy services.       See \"Rehab Therapy-Acute\" Patient Summary Report for complete documentation.   "

## 2019-03-12 NOTE — PROGRESS NOTES
2 RN Skin check performed with Ivet PADILLA    -B/l elbows intact   - b/l ears intact  - generalized bruising b/l upper extremeties  - b/l groin intact. Condom cath in place  - small bruise on right 4th toe  - b/l heels intact  - sacrum intact    Waffle mattress in place, q2h turns, bed alarm on. Nikko cream used r/t incontinence.

## 2019-03-12 NOTE — PROGRESS NOTES
· 2 RN skin check complete with Joy PADILLA.  · Devices in place- condom cath  · Skin assessed under devices clean,dry intact  · Generalize bruising b/l upper extremities. Sacrum is blanching.  · The following interventions in place waffle mattress, Q2 turns/ pt turns self side to side, aurora cream and barrier wipes in use*

## 2019-03-12 NOTE — PROGRESS NOTES
Bedside report received from day shift RN, assumed pt care. Pt assessment complete. Pt is oriented to person and place. tele box on and rhythm verified.  Chart and labs reviewed.  Bed in lowest position, call light within reach. Hourly rounding in place. Educated about calling for assistance.

## 2019-03-12 NOTE — PROGRESS NOTES
2 RN skin check with Ivet:    Generalized integumentary is fragile   Devices used: condom catheter   Bilateral elbows - pink, dry, blanching   Bilateral upper extremities - generalized bruising  Bilateral heels - pink, dry, blanching   Sacrum - intact, blanching     Pillows used for repositioning and to float heels, waffle mattress in use, patient turns self from side to side, and barrier wipes/cream for post voiding and BMs

## 2019-03-12 NOTE — NON-PROVIDER
Patient Summary:     Patient is an 85 y.o. M w/ a PMHx significant for prior lower GI bleed s/p embolization of ileocecal branch in March 2017 (compromised SMA), diverticulosis, CAD s/p CABG March 2018, peripheral vascular disease, paroxysmal Afib, and cognitive impairment deemed w/o capacity by psych in March 2018, who presented to the ED w/ a 2 day history of bright red blood per rectum and a 2 week history of acute decline in mental status. Currently stable and awaiting placement w/ his wife (who is also an inpatient admit).     24 Hour Events:     No acute events overnight. Patient is being followed by PT (ambulated 20 ft. x2 times in room w/ assistance) who will continue to follow him for the duration of his stay. He was also seen by a registered dietitian who calculated his caloric intake and will work w/ him to maximize nutrition both as an inpatient and after discharge. Patient's IV's and infusions have been d/c at this time 2/2 good PO intake. We are continuing to work on placement for the patient and his wife 2/2 inability to care for self, as well as continuing abx administration for possible diverticulitis at this time.     On ROS, patient denies abdominal pain, chest pain, N/V/D/C, fever, chills, SOB, cough, edema, or rash.     SUBJECTIVE/OBJECTIVE:     NEURO/HEENT/PSYCH:  GCS = 15  24hr: --   Current: --   Exam Some facial bruising, cachectic appearance w/ temporal and supraclavicular wasting, poor dentition, PERRL, some vertical gaze palsy, bilateral cataracts, no JVD or lymphadenopathy   Labs --   Imaging CT head w/ contrast showed no apparent acute process      RESP:  RR, SpO2 RR = 16; SpO2 = 97% on room air   Exam NAD, chest wall motion normal, could not auscultate lungs 2/2 patient's inability to sit up easily; no abnormal breath sounds upon anterior and lateral lung field auscultation   Labs --   Imaging --      CV:  Vitals T = 97.6; BP = 175/74; HR = 62; RR = 16; SpO2 = 97% on room air   Exam  RRR; systolic murmur present (patient has pacemaker)   Labs --   Imaging EKG sinus w/ PVCs and chronic anterolateral T-wave inversions      GI:  Diet/Bowel Function Cardiac diet w/ modified consistency 2/2 dysphagia   Exam NT/ND, scaphoid abdomen; JOSEPH showed external hemorrhoids and bright red blood in rectal vault though no apparent active bleed, no tenderness on exam   Labs --   Imaging CT w/ contrast showed severe colitis from the splenic flexure through the proximal sigmoid, possible diverticulitis, though colitis more severe than expected for diverticulitis.      HEME:  Labs CBC (3/10/19): WBC = 17.7; Hgb = 14.2; Hct = 45; Plt = 118  CBC (3/11/19): WBC = 10.8; Hgb = 14.6; Hct = 46.3; Plt = 117  CBC (3/12/19): WBC = 7.2; Hgb = 14.8; Hct = 45.2; Plt = 134     CMP (3/10/19): Na = 143; K = 3.4; Cl = 108; HCO3 = 27; BUN = 12; Cr = 0.92; Glu = 89  CMP (3/11/19): Na = 138; K = 3.6; Cl = 103; HCO3 = 26; BUN = 10; Cr = 0.84; Glu = 87  CMP (3/12/19): Na = 137; K = 3.7; Cl = 104; HCO3 = 25; BUN = 10; Cr = 0.87; Glu = 86   Transfusions Transfuse if Hgb < 8     Imaging --      ENDOCRINE:  Blood Sugar Glu = 87   Meds Levothyroxine 50 mcg      MUSCULOSKELETAL/RHEUM:  Imaging --   Meds Allopurinol 300 mg   WB Status Patient is mostly bed bound; h/o falls; was ambulating w/ assistance yesterday      SKIN:  Exam Senile purpura present on patients arms; no lower extremity edema; sacrococcygeal pressure ulcer starting to develop on patient's backside 2/2 inactivity   Interventions --      ASSESSMENT/PLAN:     1.) Ischemic colitis vs. diverticulitis  Per wife on admission, patient had a significant amount of painless bright red blood per rectum not associated with any bowel movements. 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 His CT abdomen/pelvis with contrast showed severe colitis from the splenic  flexure through the proximal sigmoid, possibly diverticulitis...though the associated colitis was more severe than expected for an episode of diverticulitis. Patient's current bleeding episode most likely 2/2 ischemic colitis given this patient's extensive vasculopathic history. Continue monitoring patient (does not need to be on telemetry anymore), daily H/H's, serial abdominal exams,cautiously restart home ASA and DVT ppx, and continue patient on C3 + flagyl for 10 days (currently on day 4; can continue on outpt. basis). Follow GI and surgery consult recommendations.     2.) Dementia  Patient has an altered mental status and was deemed to lack capacity by psych in March 2018. He has had a progressive decline in cognition per wife (who was recently admitted inpatient herself 2/2 worsening dementia) w/ more confusion than usual over the past two weeks; this is superimposed upon a recent Hx of frequent falls. CT head w/o contrast showed no acute processes or chronic SDHs. Continue to monitor and possibly evaluate further w/ MRI. Patient's mental status seems to be recovering slightly and may have been related to his acute admitting condition. Continue to monitor and evaluate w/ aid of PT/OT.     3.) Extensive vasculopathy  Patient has a significant medical and surgical CV Hx. He is on multiple outpatient medications (antihypertensives, statin, etc.) for these conditions. Continue outpatient medications. Cautiously restart patient's ASA and DVT ppx in light of patient's admitting CC.     4.) Sacrococcygeal ulcer  Patient has started to develop an ulcer above the gluteal cleft 2/2 inactivity. He is working w/ PT to improve ambulation and wound care is on board w/ patient's treatment plan and is managing bandage changes and q2h repositioning.     PROPHYLAXIS:  DVT SCDs   GI --   Mallampati 2   Restraints --      LINES/TUBES/CATHETERS:     Midline catheter - not currently in use for any infusions     DISPOSITION:       Patient is currently medically stable; his wife (who herself was admitted inpatient 2 days ago) and him are currently awaiting transfer either to an assisted living facility or home w/ assistance as she was deemed functional in her ADLs. Discuss w/ social work.     Patient seen by:     Medical Student: KEELEY Combs  Supervising Resident: Grady Hahn MD (PGY-1)  Senior Resident: Naty Lilly MD (PGY-3)  Attending Physician: Irwin Noe MD

## 2019-03-12 NOTE — PROGRESS NOTES
Received report from NOC RN. Assumed care of patient at 0700. Patient A&Ox2, disoriented to time and event, reorientation given. On room air, no signs of respiratory distress. Patient denies pain. POC discussed and agreed upon with patient. Call light and belongings within reach. Bed in lowest locked position. Upper side rails raised. Hourly rounding in place. Will continue to monitor.

## 2019-03-13 NOTE — NON-PROVIDER
Patient Summary:     Patient is an 85 y.o. M w/ a PMHx significant for prior lower GI bleed s/p embolization of ileocecal branch in March 2017 (compromised SMA), diverticulosis, CAD s/p CABG March 2018, peripheral vascular disease, paroxysmal Afib, and cognitive impairment deemed w/o capacity by psych in March 2018, who presented to the ED w/ a 2 day history of bright red blood per rectum and a 2 week history of acute decline in mental status. Currently stable and awaiting placement w/ his wife (who is also an inpatient admit).     24 Hour Events:     No acute events overnight. Pt. Was transferred to [facility] to be closer to his wife (in the same room with her). His wife and him are being assessed by home health services prior to discharge to see if they need any skilled nursing assistance, a dietitian, a home health aide, PT/OT, or supportive devices (crutch, cane, walker). Patient's wife wants to go home today as soon as possible. On ROS, patient denies abdominal pain, chest pain, N/V/D/C, fever, chills, SOB, cough, edema, or rash. He does have a sacrococcygeal pressure ulcer but it does not seem to be bothering him (could be due to diminished mental status)     SUBJECTIVE/OBJECTIVE:     NEURO/HEENT/PSYCH:  GCS = 15  24hr: --   Current: --   Exam Some facial bruising, cachectic appearance w/ temporal and supraclavicular wasting, poor dentition, PERRL, some vertical gaze palsy, bilateral cataracts, no JVD or lymphadenopathy   Labs --   Imaging CT head w/ contrast showed no apparent acute process      RESP:  RR, SpO2 RR = 18; SpO2 = 94% on room air   Exam NAD, chest wall motion normal, could not auscultate lungs 2/2 patient's inability to sit up easily; no abnormal breath sounds upon anterior and lateral lung field auscultation   Labs --   Imaging --      CV:  Vitals T = 98.2; BP = 123/53; HR = 89; RR = 18; SpO2 = 94% on room air   Exam RRR; systolic murmur present (patient has pacemaker)   Labs --   Imaging EKG  sinus w/ PVCs and chronic anterolateral T-wave inversions      GI:  Diet/Bowel Function Cardiac diet w/ modified consistency 2/2 dysphagia   Exam NT/ND, scaphoid abdomen; JOSEPH showed external hemorrhoids and bright red blood in rectal vault though no apparent active bleed, no tenderness on exam   Labs --   Imaging CT w/ contrast showed severe colitis from the splenic flexure through the proximal sigmoid, possible diverticulitis, though colitis more severe than expected for diverticulitis.      HEME:  Labs CBC (3/10/19): WBC = 17.7; Hgb = 14.2; Hct = 45; Plt = 118  CBC (3/11/19): WBC = 10.8; Hgb = 14.6; Hct = 46.3; Plt = 117  CBC (3/12/19): WBC = 7.2; Hgb = 14.8; Hct = 45.2; Plt = 134     CMP (3/10/19): Na = 143; K = 3.4; Cl = 108; HCO3 = 27; BUN = 12; Cr = 0.92; Glu = 89  CMP (3/11/19): Na = 138; K = 3.6; Cl = 103; HCO3 = 26; BUN = 10; Cr = 0.84; Glu = 87  CMP (3/12/19): Na = 137; K = 3.7; Cl = 104; HCO3 = 25; BUN = 10; Cr = 0.87; Glu = 86    [No new labs on 3/13/9]   Transfusions Transfuse if Hgb < 8     Imaging --      ENDOCRINE:  Blood Sugar Glu = 86   Meds Levothyroxine 50 mcg      MUSCULOSKELETAL/RHEUM:  Imaging --   Meds Allopurinol 300 mg   WB Status Patient is mostly bed bound; h/o falls; can ambulate w/ assistance      SKIN:  Exam Senile purpura present on patients arms; no lower extremity edema; sacrococcygeal pressure ulcer has started to develop on patient's backside 2/2 inactivity   Interventions --      ASSESSMENT/PLAN:     1.) Ischemic colitis vs. diverticulitis  Per wife on admission, patient had a significant amount of painless bright red blood per rectum not associated with any bowel movements. 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 His CT abdomen/pelvis with contrast showed severe colitis from the splenic flexure through the proximal sigmoid,  possibly diverticulitis...though the associated colitis was more severe than expected for an episode of diverticulitis. Patient's current bleeding episode most likely 2/2 ischemic colitis given this patient's extensive vasculopathic history. For the time being, pt.'s acute issue w/ bright red blood per rectum seems to have resolved. Continue monitoring patient up until discharge. Cautiously restart home ASA and DVT ppx, and continue patient on C3 + flagyl for 10 days (currently on day 5; can continue on outpt. basis). GI and surgery have signed off.     2.) Dementia  Patient has an altered mental status and was deemed to lack capacity by psych in March 2018. He has had a progressive decline in cognition per wife (who was recently admitted inpatient herself 2/2 worsening dementia) w/ more confusion than usual over the past two weeks; this is superimposed upon a recent Hx of frequent falls. CT head w/o contrast showed no acute processes or chronic SDHs. Continue to monitor and possibly evaluate further w/ MRI. Patient's mental status seems to be recovering slightly and may have been at least partially related to his acute admitting condition. Continue to monitor and evaluate w/ aid of PT/OT and home health.     3.) Extensive vasculopathy  Patient has a significant medical and surgical CV Hx. He is on multiple outpatient medications (antihypertensives, statin, etc.) for these conditions. Continue outpatient medications. Cautiously restart patient's ASA and DVT ppx in light of patient's admitting CC.     4.) Sacrococcygeal ulcer  Patient has started to develop an ulcer above the gluteal cleft 2/2 inactivity. He is working w/ PT to improve ambulation and wound care is on board w/ patient's treatment plan and is managing bandage changes and repositioning.     PROPHYLAXIS:  DVT SCDs (doesn't like to wear them)   GI --   Mallampati 2   Restraints --      DISPOSITION:      Patient is currently medically stable; his wife (who  herself was admitted inpatient 2 days ago) and him are ready for discharge and are being evaluated by home health prior to leaving the hospital.     Patient seen by:     Medical Student: KEELEY Combs  Supervising Resident: Grady Hahn MD (PGY-1)  Senior Resident: Naty Lilly MD (PGY-3)  Attending Physician: Irwin Noe MD

## 2019-03-13 NOTE — PROGRESS NOTES
Pt arrived to unit. Pt oriented to room and call bell system. Fall prevention education provided. Pt safety alarm in use.

## 2019-03-13 NOTE — PROGRESS NOTES
· 2 RN skin check complete with RANDY Shepherd.  · Devices in place n/a.  · Skin assessed under devices n/a.  · Confirmed pressure ulcers found on n/a.  · New potential pressure ulcers noted on n/a.  · The following interventions in place waffle, q2 turns, incontinence checks q2 hours, skin checks qshift    Generalized bruising, bruise on 3rd right toe, scab on left shin

## 2019-03-13 NOTE — CARE PLAN
Problem: Safety  Goal: Will remain free from falls  Outcome: PROGRESSING AS EXPECTED  Patient axox2, wife also in room,  Up with 1assist and walker, wearing nonslip socks, 2-rn skin check, q2 turns as needed    Problem: Infection  Goal: Will remain free from infection  Outcome: PROGRESSING AS EXPECTED  Patient vitals, I/os, mentation, and labs monitored throughout the night    Problem: Pain Management  Goal: Pain level will decrease to patient's comfort goal  Outcome: PROGRESSING AS EXPECTED  Pain assessed qshift, declines pain

## 2019-03-13 NOTE — PROGRESS NOTES
Report called to Nadine PADILLA. Patient transferred to [place] [id number] bed 1. Chart and belongings sent with patient.

## 2019-03-13 NOTE — PROGRESS NOTES
Patient new admit to floor during day shift, axox2, no skin issues, pleasant, up with sba and walker, takes meds crushed, incontinence, q2 turns as needed but moves well, waffle mattress, wife wants to go home in am

## 2019-03-13 NOTE — THERAPY
"Pt demonstrating improved LE strength and endurance. Pt demonstrated slow gait pattern, pausing every couple steps to stop for a shirt break, requiring verbal cuing. Pt w/poor command following, requiring maximal verbal cues. Pt would benefit from further acute PT txs to progress towards goals and independence. Recommend post acute placement at an inpatient transitional care facility to address deficits.    Physical Therapy Treatment completed.   Bed Mobility:  Supine to Sit:  (NT--found sitting EOB)  Transfers: Sit to Stand: Contact Guard Assist  Gait: Level Of Assist: Contact Guard Assist with Front-Wheel Walker       Plan of Care: Will benefit from Physical Therapy 3 times per week    See \"Rehab Therapy-Acute\" Patient Summary Report for complete documentation.       "

## 2019-03-13 NOTE — CARE PLAN
Problem: Nutritional:  Goal: Achieve adequate nutritional intake  Diet advancement beyond clear liquids + PO intake >50%   Outcome: PROGRESSING AS EXPECTED  Pt advanced to cardiac diet and intake fluctuating; % PO of meals. RD to continue to encourage consistent adequate PO of meals and leave further recommendations accordingly.

## 2019-03-13 NOTE — PROGRESS NOTES
Internal Medicine Interval Note  Note Author: Grady Hahn M.D.     Name Rafia Shaikh       1933   Age/Sex 85 y.o. male   MRN [id number]   Code Status FULL     After 5PM or if no immediate response to page, please call for cross-coverage  Attending/Team: Kusum/ CRISTI Jimenez See Patient List for primary contact information  Call [phone] to page    1st Call - Day Intern (R1):   Jovani 2nd Call - Day Sr. Resident (R2/R3):   Vijaya         Reason for interval visit  (Principal Problem)   AMS  Hematochezia    Interval Problem Daily Status Update  (24 hours, problem oriented, brief subjective history, new lab/imaging data pertinent to that problem)     -EFRAIN o/n; has no acute complaints, no abd tenderness/pain    -pending placement will try to coordinate with SW and with patient's wife's SW as he is also inpatient, and try to contact patient's children  -home health order and face to face note placed      Review of Systems   Constitutional: Negative.    HENT: Negative.    Eyes: Negative.    Respiratory: Negative.    Cardiovascular: Negative.    Gastrointestinal: Negative for abdominal pain, blood in stool, nausea and vomiting.   Musculoskeletal: Negative.    Skin: Negative.        Disposition/Barriers to discharge:   PT/OT eval pending.    Consultants/Specialty  Surgery  GI  PCP: Halie Dominique M.D.     Quality Measures  Quality-Core Measures   Reviewed items::  EKG reviewed, Radiology images reviewed, Labs reviewed and Medications reviewed  Baldwin catheter::  No Baldwin  DVT prophylaxis pharmacological::  Contraindicated - High bleeding risk  DVT prophylaxis - mechanical:  SCDs  Ulcer Prophylaxis::  Not indicated      Physical Exam       Vitals:    19 1700 19 1957 19 0400 19 0723   BP: 133/72 121/61 123/53 128/56   Pulse: 85 71 89 66   Resp: 18 18 18 16   Temp: 36.7 °C (98 °F) 36.8 °C (98.2 °F) 36.8 °C (98.2 °F) 36.6 °C (97.8 °F)   TempSrc: Temporal Temporal Temporal Temporal   SpO2:  96% 98% 94% 99%   Weight:       Height:         Body mass index is 24.66 kg/m².    Oxygen Therapy:  Pulse Oximetry: 99 %, O2 (LPM): 0, O2 Delivery: None (Room Air)    Physical Exam   Constitutional: Vital signs are normal. He appears malnourished. He appears unhealthy.   HENT:   Head: Normocephalic and atraumatic.   Eyes: Pupils are equal, round, and reactive to light. Conjunctivae, EOM and lids are normal.   Cardiovascular: Normal rate, regular rhythm and normal pulses.  Exam reveals no gallop and no friction rub.    Murmur heard.  Pulmonary/Chest: Effort normal and breath sounds normal. He has no wheezes. He has no rales.   Abdominal: Soft. Bowel sounds are normal. He exhibits no distension. There is no tenderness. There is no rebound and no guarding.   Musculoskeletal: Normal range of motion. He exhibits no edema.   Neurological: He is alert.   Skin: Skin is warm and dry. No erythema.     Assessment/Plan     Sepsis (HCC)- (present on admission)   Assessment & Plan    Met sepsis criteria on admission: SIRS 2/4 (leukocytosis, tachypnea) + suspected source of infection (diverticulitis).  Did not get aggressive fluid management due to cardiac history.  On ceftriaxone, Flagyl x10 days for diverticulitis, changed to oral cefdinir/flagyl         Diverticulitis- (present on admission)   Assessment & Plan    CT ab / pelv with contrast showed severe colitis from the splenic flexure through the proximal sigmoid, possible diverticulitis, though colitis more severe than expected for diverticulitis.  h/o abdominal pain, diarrhea, nausea, vomiting, fever, etc., per wife, abdominal exam benign - however, given patient altered, SIRS criteria (leukocytosis, tachypnea), CT findings, bleed, will treat for diverticulitis.  Continue ceftriaxone, Flagyl x10 days; changed to oral cefdinir/flagyl     Lower GI bleed- (present on admission)   Assessment & Plan    BRBPR on admission. Had a voluminous passage of blood this AM and has  continuous small dripping from rectum.  Hx GI bleeding with embolization of ileocecal branch by IR Dr. Hart (03/21/17), subsequent colonoscopy in May 2017 (follwed by GI consultants - Dr. 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  Vitals stable, Hb at baseline, active small volume bleeding per rectum.  Etiology ischemic colitis vs diverticular bleed in setting of diverticulitis with known ASA use and falls, possible AVM, hemorrhoids, less likely upper GI bleed.  -LDH, CPK, amylase wnl  -C diff, crypto/giardia antigen negative  -Stool Cx pending  -Surgery, no surgery at this time after discussing with patient  -GI, no endoscopy considering inflammation unless requested by surg  -Spoke with IR, no role for embolization at this time.  -Transfuse if Hb <7.  -IVF     Altered mental status- (present on admission)   Assessment & Plan    Has had progressive decline in mental status per his wife.  Evaluated by psych in March 2018 - had no capacity at that time.  Alert and responsive on evaluation today.  CTM.     Elevated INR- (present on admission)   Assessment & Plan    Hold AC in the setting of GIB.  Monitor.     Cognitive impairment- (present on admission)   Assessment & Plan    Progressive decline in cognition per wife, though acutely more confused the past two weeks.  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     Hypokalemia- (present on admission)   Assessment & Plan    Monitor and replete as appropriate.     Hypertension   Assessment & Plan    -holding metoprolol, amlodipine (may hold on d.c if concern for constipation)  -restarting home lisinopril       Hypothyroidism- (present on admission)   Assessment & Plan    Continue home dose of Levothyroxine.     Falls- (present on admission)   Assessment & Plan    Debility noted in previous notes, recent falls x2 - 2 weeks and 1 day prior to admission.  PT / OT.     Leukocytosis- (present on admission)   Assessment  & Plan    Increasing, likely due to acute illness.  On Ceftriaxone and Flagyl, changed to PO cefdinir/flagyl  CTM.     CAD (coronary artery disease) of artery bypass graft- (present on admission)   Assessment & Plan    H/o 3-vessel coronary bypass graft with left internal mammary artery graft to left anterior descending artery, saphenous vein graft to obtuse marginal branch and saphenous vein graft to posterior descending artery by Dr. Coates in March 2018.  No cardiac symptoms on this admission, EKG stable compared to prior.  On home atorvastatin, hold ASA in setting of GI bleed.  CTM.     PAF (paroxysmal atrial fibrillation) ([facility])- (present on admission)   Assessment & Plan    Xarelto discontinued by cards in August 2018.  Continue home metoprolol.  Hold ASA in setting of GI bleed.     PVD (peripheral vascular disease) ([facility])- (present on admission)   Assessment & Plan    H/o prior left superficial femoral artery stent, known high grade stenosis of right common femoral artery closed with Starclose closure by Dr. Amaya on 03/22/17.  CTM.

## 2019-03-13 NOTE — CARE PLAN
Problem: Safety  Goal: Will remain free from falls  Outcome: PROGRESSING SLOWER THAN EXPECTED  Patient high fall risk. Fall precautions maintained. Fall prevention education reviewed w/ patient and patient's wife. Pt safety pad alarm and bed frame alarm in use. Hourly rounding completed. No beds available closer to the nurse's station.     Problem: Skin Integrity  Goal: Risk for impaired skin integrity will decrease  Outcome: PROGRESSING AS EXPECTED  Pt turn and repositions self in bed. Pt OOB w/ one assist and a wheeled walker. Waffle mattress overlay in use.

## 2019-03-14 NOTE — PROGRESS NOTES
· 2 RN skin check complete RANDY Cooper.  · Devices in place waffle mattress  · Skin assessed under devices n/a.  · Confirmed pressure ulcers found on n/a  · New potential pressure ulcers noted on n/a  · The following interventions in place ;positioning every 2 hours,waffle cushion in placed    Sacrum is red and blanching,right third toe old blood blister,bilateral heels redness and dry blanching,bilateral arms bruises intact.

## 2019-03-14 NOTE — CARE PLAN
Problem: Safety  Goal: Will remain free from falls  Outcome: PROGRESSING AS EXPECTED  Bed in lowest postion, bed alarm on, call light in reach, hourly rounding done

## 2019-03-14 NOTE — PROGRESS NOTES
· 2 RN skin check complete with RANDY Steve.  · Devices in place waffle mattress.  · Skin assessed under devices n/a.  · Confirmed pressure ulcers found on n/a.  · New potential pressure ulcers noted on n/a.   · Blanching redness to bilateral heels, sacrum is pink and blanching. Right third toe has what appears to be an old blood blister. Waffle mattress in place heels elevated on pillows.

## 2019-03-14 NOTE — CARE PLAN
Problem: Safety  Goal: Will remain free from falls  Outcome: PROGRESSING AS EXPECTED    Intervention: Implement fall precautions  Call light and personal belongings within reach. Pt instructed to call for assistance, pt don't call for assistance Non skid socks on. Strip alarm in place. Bed in lowest position and hourly rounding implemented.

## 2019-03-14 NOTE — PROGRESS NOTES
Internal Medicine Interval Note  Note Author: Grady Hahn M.D.     Name Rafia Shaikh       1933   Age/Sex 85 y.o. male   MRN [id number]   Code Status FULL     After 5PM or if no immediate response to page, please call for cross-coverage  Attending/Team: Kusum/ CRISTI Jimenez See Patient List for primary contact information  Call [phone] to page    1st Call - Day Intern (R1):   Jovani 2nd Call - Day Sr. Resident (R2/R3):   Vijaya         Reason for interval visit  (Principal Problem)   AMS  Hematochezia    Interval Problem Daily Status Update  (24 hours, problem oriented, brief subjective history, new lab/imaging data pertinent to that problem)     -EFRAIN o/n; has no acute complaints, no abd tenderness/pain    -working with SW, pending placement and trying to coordinate with patient's wife's placement as she is also inpatient, and trying to contact patient's children  -SNF (vs assisted living vs home health if the latter two can be recommended at later date to help coordinate with patient's wife's placement)  -PT/OT rec post acute placement at inpatient transitional care facility      Review of Systems   Constitutional: Negative.    HENT: Negative.    Eyes: Negative.    Respiratory: Negative.    Cardiovascular: Negative.    Gastrointestinal: Negative for abdominal pain, blood in stool, nausea and vomiting.   Musculoskeletal: Negative.    Skin: Negative.        Disposition/Barriers to discharge:   placement    Consultants/Specialty  Surgery  GI  PCP: Halie Dominique M.D.     Quality Measures  Quality-Core Measures   Reviewed items::  EKG reviewed, Radiology images reviewed, Labs reviewed and Medications reviewed  Baldwin catheter::  No Baldwin  DVT prophylaxis pharmacological::  Contraindicated - High bleeding risk  DVT prophylaxis - mechanical:  SCDs  Ulcer Prophylaxis::  Not indicated      Physical Exam       Vitals:    19 1600 19 1923 19 0350 19 0757   BP: 102/62 141/58 143/75  115/60   Pulse: 72 83 83 75   Resp: 19 16 16 16   Temp: 36.6 °C (97.9 °F) 36.5 °C (97.7 °F) 36.2 °C (97.1 °F) 36.4 °C (97.5 °F)   TempSrc: Temporal Temporal Temporal Temporal   SpO2: 98% 99% 97% 93%   Weight:       Height:         Body mass index is 24.66 kg/m².    Oxygen Therapy:  Pulse Oximetry: 93 %, O2 (LPM): 0, O2 Delivery: None (Room Air)    Physical Exam   Constitutional: Vital signs are normal. He appears cachectic.   HENT:   Head: Normocephalic and atraumatic.   Eyes: Pupils are equal, round, and reactive to light. Conjunctivae, EOM and lids are normal.   Cardiovascular: Normal rate, regular rhythm and normal pulses.  Exam reveals no gallop and no friction rub.    No murmur heard.  Pulmonary/Chest: Effort normal and breath sounds normal. He has no wheezes. He has no rales.   Abdominal: Soft. Bowel sounds are normal. He exhibits no distension. There is no tenderness. There is no rebound and no guarding.   Musculoskeletal: Normal range of motion. He exhibits no edema.   Neurological: He is alert.   Skin: Skin is warm and dry. No erythema.     Assessment/Plan     Sepsis (HCC)- (present on admission)   Assessment & Plan    Met sepsis criteria on admission: SIRS 2/4 (leukocytosis, tachypnea) + suspected source of infection (diverticulitis).  Did not get aggressive fluid management due to cardiac history.  On ceftriaxone, Flagyl x10 days for diverticulitis, changed to oral cefdinir/flagyl         Diverticulitis- (present on admission)   Assessment & Plan    CT ab / pelv with contrast showed severe colitis from the splenic flexure through the proximal sigmoid, possible diverticulitis, though colitis more severe than expected for diverticulitis.  h/o abdominal pain, diarrhea, nausea, vomiting, fever, etc., per wife, abdominal exam benign - however, given patient altered, SIRS criteria (leukocytosis, tachypnea), CT findings, bleed, will treat for diverticulitis.  Continue ceftriaxone, Flagyl x10 days; changed to  oral cefdinir/flagyl     Lower GI bleed- (present on admission)   Assessment & Plan    BRBPR on admission. Had a voluminous passage of blood this AM and has continuous small dripping from rectum.  Hx GI bleeding with embolization of ileocecal branch by IR Dr. Hart (03/21/17), subsequent colonoscopy in May 2017 (follwed by GI consultants - Dr. 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  Vitals stable, Hb at baseline, active small volume bleeding per rectum.  Etiology ischemic colitis vs diverticular bleed in setting of diverticulitis with known ASA use and falls, possible AVM, hemorrhoids, less likely upper GI bleed.  -LDH, CPK, amylase wnl  -C diff, crypto/giardia antigen negative  -Surgery, no surgery at this time after discussing with patient  -GI, no endoscopy considering inflammation unless requested by surg  -Spoke with IR, no role for embolization at this time.  -Transfuse if Hb <7.  -IVF     Altered mental status- (present on admission)   Assessment & Plan    Has had progressive decline in mental status per his wife.  Evaluated by psych in March 2018 - had no capacity at that time.  Alert and responsive on evaluation today.  CTM.     Elevated INR- (present on admission)   Assessment & Plan    Hold AC in the setting of GIB.  Monitor.     Cognitive impairment- (present on admission)   Assessment & Plan    Progressive decline in cognition per wife, though acutely more confused the past two weeks.  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     Hypokalemia- (present on admission)   Assessment & Plan    Monitor and replete as appropriate.     Hypertension   Assessment & Plan    -holding metoprolol, amlodipine (may hold on d.c if concern for constipation)  -restarting home lisinopril       Hypothyroidism- (present on admission)   Assessment & Plan    Continue home dose of Levothyroxine.     Falls- (present on admission)   Assessment & Plan    Debility noted  in previous notes, recent falls x2 - 2 weeks and 1 day prior to admission.  PT / OT.     Leukocytosis- (present on admission)   Assessment & Plan    Increasing, likely due to acute illness.  On Ceftriaxone and Flagyl, changed to PO cefdinir/flagyl  CTM.     CAD (coronary artery disease) of artery bypass graft- (present on admission)   Assessment & Plan    H/o 3-vessel coronary bypass graft with left internal mammary artery graft to left anterior descending artery, saphenous vein graft to obtuse marginal branch and saphenous vein graft to posterior descending artery by Dr. Coates in March 2018.  No cardiac symptoms on this admission, EKG stable compared to prior.  On home atorvastatin, hold ASA in setting of GI bleed.  CTM.     PAF (paroxysmal atrial fibrillation) ([facility])- (present on admission)   Assessment & Plan    Xarelto discontinued by cards in August 2018.  Continue home metoprolol.  Hold ASA in setting of GI bleed.     PVD (peripheral vascular disease) ([facility])- (present on admission)   Assessment & Plan    H/o prior left superficial femoral artery stent, known high grade stenosis of right common femoral artery closed with Starclose closure by Dr. Amaya on 03/22/17.  CTM.

## 2019-03-15 NOTE — DISCHARGE PLANNING
Received Choice form at 1230  Agency/Facility Name: [facility]  Referral sent per Choice form at 1300

## 2019-03-15 NOTE — THERAPY
"Occupational Therapy Treatment completed with focus on ADLs and ADL transfers.  Functional Status:  Min A with ADLs and txfs  Plan of Care: Will benefit from Occupational Therapy 3 times per week  Discharge Recommendations:  Equipment Will Continue to Assess for Equipment Needs. Post-acute therapy Discharge to a transitional care facility for continued therapy services.    See \"Rehab Therapy-Acute\" Patient Summary Report for complete documentation.   "

## 2019-03-15 NOTE — THERAPY
"Pt continues to demonstrate slow gait mechanics, requiring occasional rest breaks. Pt continues to require maximal verbal and tactile cuing for posture. Pt incntinent of stool, rquiring extra time for pericare. Pt would benefit from further acute PT txs to progress towards goals and independence. Recommend post acute placement at an inpatient transitional care facility to address deficits.    Physical Therapy Treatment completed.   Bed Mobility:  Supine to Sit: Minimal Assist  Transfers: Sit to Stand: Contact Guard Assist  Gait: Level Of Assist: Contact Guard Assist with Front-Wheel Walker       Plan of Care: Will benefit from Physical Therapy 4 times per week    See \"Rehab Therapy-Acute\" Patient Summary Report for complete documentation.       "

## 2019-03-15 NOTE — FACE TO FACE
Face to Face Supporting Documentation - Home Health    The encounter with this patient was in whole or in part the primary reason for home health admission.    Date of encounter:   Patient:                    MRN:                       YOB: 2019  Rafia Shaikh  [id number]  12/16/1933     Home health to see patient for:  Skilled Nursing care for assessment, interventions & education, Registered dietitian consult, Medical social work consult, Home health aide, Physical Therapy evaluation and treatment and Occupational therapy evaluation and treatment    Skilled need for:  Recent Deterioration of Health Status Cognitive Imapirment and Medication Management for chronic medical conditions    Skilled nursing interventions to include:  Comment: interventions necessary for patient's skilled needs    Homebound status evidenced by:  Need the aid of supportive devices such as crutches, canes, wheelchairs or walkers. Leaving home requires a considerable and taxing effort. There is a normal inability to leave the home.    Community Physician to provide follow up care: Halie Dominique M.D.     Optional Interventions? No      I certify the face to face encounter for this home health care referral meets the CMS requirements and the encounter/clinical assessment with the patient was, in whole, or in part, for the medical condition(s) listed above, which is the primary reason for home health care. Based on my clinical findings: the service(s) are medically necessary, support the need for home health care, and the homebound criteria are met.  I certify that this patient has had a face to face encounter by myself.  Grady Hahn M.D. - NPI: [id number]

## 2019-03-15 NOTE — DISCHARGE PLANNING
Anticipated Discharge Disposition: Home with assistance    Action: patient talked to patient about discharge planning and what she wants.  Patient stated she wants to go home, does not want to go to a assisted living.  Patient then said that she is afraid to go home, as her  falls and she is unable to pick him up.  SW talked to patient about possible resources for in home help to assist them.  Patient is open to receiving services.  Patient gave SW permission to call.    PC to [facility]:[phone]: AMADOU spoke to Liz who stated they have been working with the family for some time.  The issue is the patient is not willing to accept services on a long term basis.  Liz stated the spouse is open to them.  While the patient will say she can not afford the cost. Liz stated that she and the [facility] nurse took them to see [facility].  While the spouse was open to it, patient stated no.  Liz stated they are more than willing to work with the family and provide additional in home services. Liz stated she will provided services for the first 48 hours then reassess the amount of hours needed on Monday with the family.  Liz stressed that [facility] needs to understand that while they are willing to offer these services, the patient may not follow through.  Liz requested SW call her back with a d/c date and time and they will have someone pick them up.    AMADOU spoke to patient at length about in home services through [facility] along with [facility].  SW stated that it appears they have worked with them before and are looking forward to working with them again. AMADOU stressed to patient the need to be willling to have them in her home.  SW stated as her spouses dementia progress they will need more help.  Patient wanted to know when she will be able to go home.  SW stated at 1:00 and [facility] employee will be picking them up.     PC to CRISTI Jimenez  PC from Dr. Jimenez, provided the above information.  Amadou  also requested a home health order.   Dr. Jimenez is okay with d/c patient's tomorrow for a 1:00 d/c time    PC to [facility], message left for Liz that family is open to services and will be d/c tomorrow at 1:00    Barriers to Discharge: n/a    Plan: n/a

## 2019-03-15 NOTE — PROGRESS NOTES
Internal Medicine Interval Note  Note Author: Grady Hahn M.D.     Name Rafia Shaikh DOB 1933   Age/Sex 85 y.o. male   MRN [id number]   Code Status FULL     After 5PM or if no immediate response to page, please call for cross-coverage  Attending/Team: Kusum/ CRISTI Jimenez See Patient List for primary contact information  Call [phone] to page    1st Call - Day Intern (R1):   Jovani 2nd Call - Day Sr. Resident (R2/R3):   Vijaya         Reason for interval visit  (Principal Problem)   AMS  Hematochezia    Interval Problem Daily Status Update  (24 hours, problem oriented, brief subjective history, new lab/imaging data pertinent to that problem)     -EFRAIN o/n; has no acute complaints, no abd tenderness/pain    -working with SW, pending placement and trying to coordinate with patient's wife's placement as she is also inpatient  -PT/OT rec post acute placement at inpatient transitional care facility  -patient and wife deferring SNF, requesting to go home with home health; arranging for tomorrow      Review of Systems   Constitutional: Negative.    HENT: Negative.    Eyes: Negative.    Respiratory: Negative.    Cardiovascular: Negative.    Gastrointestinal: Negative for abdominal pain, blood in stool, nausea and vomiting.   Musculoskeletal: Negative.    Skin: Negative.        Disposition/Barriers to discharge:   placement    Consultants/Specialty  Surgery  GI  PCP: Halie Dominique M.D.     Quality Measures  Quality-Core Measures   Reviewed items::  EKG reviewed, Radiology images reviewed, Labs reviewed and Medications reviewed  Baldwin catheter::  No Baldwin  DVT prophylaxis pharmacological::  Contraindicated - High bleeding risk  DVT prophylaxis - mechanical:  SCDs  Ulcer Prophylaxis::  Not indicated      Physical Exam       Vitals:    19 1922 19 2204 03/15/19 0324 03/15/19 0800   BP: (!) 93/58 113/63 111/67 114/72   Pulse: 78  80 83   Resp: 15  15 20   Temp: 36.5 °C (97.7 °F)  36.4 °C (97.6 °F)  36.3 °C (97.4 °F)   TempSrc: Temporal  Temporal Temporal   SpO2: 96%  96% 98%   Weight:       Height:         Body mass index is 24.66 kg/m².    Oxygen Therapy:  Pulse Oximetry: 98 %, O2 (LPM): 0, O2 Delivery: None (Room Air)    Physical Exam   Constitutional: Vital signs are normal. He appears cachectic.   HENT:   Head: Normocephalic and atraumatic.   Eyes: Pupils are equal, round, and reactive to light. Conjunctivae, EOM and lids are normal.   Cardiovascular: Normal rate, regular rhythm and normal pulses.  Exam reveals no gallop and no friction rub.    No murmur heard.  Pulmonary/Chest: Effort normal and breath sounds normal. He has no wheezes. He has no rales.   Abdominal: Soft. Bowel sounds are normal. He exhibits no distension. There is no tenderness. There is no rebound and no guarding.   Musculoskeletal: Normal range of motion. He exhibits no edema.   Neurological: He is alert.   Skin: Skin is warm and dry. No erythema.     Assessment/Plan     Sepsis (HCC)- (present on admission)   Assessment & Plan    Met sepsis criteria on admission: SIRS 2/4 (leukocytosis, tachypnea) + suspected source of infection (diverticulitis).  Did not get aggressive fluid management due to cardiac history.  On ceftriaxone, Flagyl x10 days for diverticulitis, changed to oral cefdinir/flagyl         Diverticulitis- (present on admission)   Assessment & Plan    CT ab / pelv with contrast showed severe colitis from the splenic flexure through the proximal sigmoid, possible diverticulitis, though colitis more severe than expected for diverticulitis.  h/o abdominal pain, diarrhea, nausea, vomiting, fever, etc., per wife, abdominal exam benign - however, given patient altered, SIRS criteria (leukocytosis, tachypnea), CT findings, bleed, will treat for diverticulitis.  Continue ceftriaxone, Flagyl x10 days; changed to oral cefdinir/flagyl     Lower GI bleed- (present on admission)   Assessment & Plan    BRBPR on admission. Had a voluminous  passage of blood this AM and has continuous small dripping from rectum.  Hx GI bleeding with embolization of ileocecal branch by IR Dr. Hart (03/21/17), subsequent colonoscopy in May 2017 (follwed by GI consultants - Dr. 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  Vitals stable, Hb at baseline, active small volume bleeding per rectum.  Etiology ischemic colitis vs diverticular bleed in setting of diverticulitis with known ASA use and falls, possible AVM, hemorrhoids, less likely upper GI bleed.  -LDH, CPK, amylase wnl  -C diff, crypto/giardia antigen negative  -Surgery, no surgery at this time after discussing with patient  -GI, no endoscopy considering inflammation unless requested by surg  -Spoke with IR, no role for embolization at this time.  -Transfuse if Hb <7.  -IVF     Altered mental status- (present on admission)   Assessment & Plan    Has had progressive decline in mental status per his wife.  Evaluated by psych in March 2018 - had no capacity at that time.  Alert and responsive on evaluation today.  CTM.     Elevated INR- (present on admission)   Assessment & Plan    Hold AC in the setting of GIB.  Monitor.     Cognitive impairment- (present on admission)   Assessment & Plan    Progressive decline in cognition per wife, though acutely more confused the past two weeks.  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     Hypokalemia- (present on admission)   Assessment & Plan    Monitor and replete as appropriate.     Hypertension   Assessment & Plan    -holding metoprolol, amlodipine (may hold on d.c if concern for constipation)  -restarting home lisinopril       Hypothyroidism- (present on admission)   Assessment & Plan    Continue home dose of Levothyroxine.     Falls- (present on admission)   Assessment & Plan    Debility noted in previous notes, recent falls x2 - 2 weeks and 1 day prior to admission.  PT / OT.     Leukocytosis- (present on  admission)   Assessment & Plan    Increasing, likely due to acute illness.  On Ceftriaxone and Flagyl, changed to PO cefdinir/flagyl  CTM.     CAD (coronary artery disease) of artery bypass graft- (present on admission)   Assessment & Plan    H/o 3-vessel coronary bypass graft with left internal mammary artery graft to left anterior descending artery, saphenous vein graft to obtuse marginal branch and saphenous vein graft to posterior descending artery by Dr. Coates in March 2018.  No cardiac symptoms on this admission, EKG stable compared to prior.  On home atorvastatin, hold ASA in setting of GI bleed.  CTM.     PAF (paroxysmal atrial fibrillation) ([facility])- (present on admission)   Assessment & Plan    Xarelto discontinued by cards in August 2018.  Continue home metoprolol.  Hold ASA in setting of GI bleed.     PVD (peripheral vascular disease) (CMS-[facility])- (present on admission)   Assessment & Plan    H/o prior left superficial femoral artery stent, known high grade stenosis of right common femoral artery closed with Starclose closure by Dr. Amaya on 03/22/17.  CTM.

## 2019-03-15 NOTE — CARE PLAN
Problem: Safety  Goal: Will remain free from injury  Outcome: PROGRESSING AS EXPECTED  Participating in ambulation with PT. Shoes or gripper socks in place. Fall bracelet in place, bed in low position. Bed alarm in place and functioning.     Problem: Infection  Goal: Will remain free from infection  Outcome: PROGRESSING AS EXPECTED  Educated about hand hygiene with patient and family. Hand hygiene performed by staff before and after patient care. Vitals assessed each shift.

## 2019-03-15 NOTE — PROGRESS NOTES
· 2 RN skin check complete with RANDY Black.  · Devices in place none  · Skin assessed under devices n/a.  · Confirmed pressure ulcers found on n/a.  · New potential pressure ulcers noted on n/a.  · The following interventions in place waffle, q2 turns, incontinence checks q2 hours, skin checks qshift, patient turns self

## 2019-03-16 PROBLEM — A41.9 SEPSIS (HCC): Status: RESOLVED | Noted: 2019-01-01 | Resolved: 2019-01-01

## 2019-03-16 PROBLEM — K57.92 DIVERTICULITIS: Status: RESOLVED | Noted: 2019-01-01 | Resolved: 2019-01-01

## 2019-03-16 PROBLEM — R79.1 ELEVATED INR: Status: RESOLVED | Noted: 2019-01-01 | Resolved: 2019-01-01

## 2019-03-16 PROBLEM — K92.2 LOWER GI BLEED: Status: RESOLVED | Noted: 2017-03-21 | Resolved: 2019-01-01

## 2019-03-16 PROBLEM — R41.82 ALTERED MENTAL STATUS: Status: RESOLVED | Noted: 2019-01-01 | Resolved: 2019-01-01

## 2019-03-16 PROBLEM — D72.829 LEUKOCYTOSIS: Status: RESOLVED | Noted: 2018-02-25 | Resolved: 2019-01-01

## 2019-03-16 NOTE — DISCHARGE SUMMARY
Internal Medicine Discharge Summary  Note Author: Naty Lilly M.D.       Name Rafia Shaikh 1933   Age/Sex 85 y.o. male   MRN [id number]         Admit Date:  3/8/2019       Discharge Date:   19    Service:   [facility] Internal Medicine Gray Team  Attending Physician(s):   Dr. Kusum Clemente       Senior Resident(s):   Dr. Lilly  Sae Resident(s):   Dr. Hahn  PCP: Halie Dominique M.D.      Primary Diagnosis:   Ischemic colitits    Secondary Diagnoses:                Active Problems:    Lower GI bleed POA: Yes      Overview: 3/17- embolization of ileocecal branch of SMA- Smart    Diverticulitis POA: Yes    Sepsis (HCC) POA: Yes    Hypokalemia POA: Yes    Cognitive impairment POA: Yes    Elevated INR POA: Yes    Altered mental status POA: Yes    PVD (peripheral vascular disease) (CMS-HCC) POA: Yes      Overview: Parra- stent-n LLE.    PAF (paroxysmal atrial fibrillation) ([facility]) POA: Yes    CAD (coronary artery disease) of artery bypass graft POA: Yes    Leukocytosis POA: Yes    Falls POA: Yes    Hypothyroidism POA: Yes    Hypertension POA: Unknown  Resolved Problems:    * No resolved hospital problems. *      Hospital Summary (Brief Narrative):       Rafia Shaikh is a 85 y.o. male with past medical history of Afib (previously on Xarelto but recently DC'd due to frequent falls), CAD s/p CABG, LUKE, AS s/p TAVR, HLD, gout, HTN, hypothyroidism, PAD, TIA, MONTRELL (noncompliant with CPAP), GI bleed in 2016 2/2 AVM s/p cauterization presented to the ED following an episode of hematochezia. In the ED, rectal showed BRBPR, Hgb was slightly low and pt was noted to have slight thrombocytopenia and a leukocytosis. CMP showed considerable hypokalemia. UA was not suggestive of infxn. CT head w/o showed no acute pathology. He was 2/4 SIRS (RR and WBC) but hypertensive on arrival. CT a/p showed colitis vs diverticulitis. Patient was evaluated by surgery, who offered surgical intervention which the family  declines. Patient was managed conservatively in collaboration with surgery and gastroenterology. Patient improved with conservative treatment; bleeding resolved and Hb remained stable. Currently the patient is medically cleared for discharge. Both him and his wife would like to return home with home health.    Patient /Hospital Summary (Details -- Problem Oriented) :          Sepsis (HCC)-resolved as of 3/16/2019   Assessment & Plan    Met sepsis criteria on admission: SIRS 2/4 (leukocytosis, tachypnea) + suspected source of infection (diverticulitis).  Go aggressive fluid management and ABT with good response.  Received course of ABT for diverticulitis.  Finish Omnicef and Flagyl 3/18/19.         Diverticulitis-resolved as of 3/16/2019   Assessment & Plan    CT ab / pelv with contrast showed severe colitis from the splenic flexure through the proximal sigmoid, possible diverticulitis, though colitis more severe than expected for diverticulitis.  h/o abdominal pain, diarrhea, nausea, vomiting, fever, etc., per wife, abdominal exam benign - however, given patient altered, SIRS criteria (leukocytosis, tachypnea), CT findings, bleed, will treat for diverticulitis.  Continue ceftriaxone, Flagyl x10 days; changed to oral cefdinir/flagyl end date 3/18/19.     Lower GI bleed-resolved as of 3/16/2019   Assessment & Plan    BRBPR on admission. Had a voluminous passage of blood this AM and has continuous small dripping from rectum.  Hx GI bleeding with embolization of ileocecal branch by IR Dr. Hart (03/21/17), subsequent colonoscopy in May 2017 (follwed by GI consultants - Dr. 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  Vitals stable, Hb at baseline, active small volume bleeding per rectum.  Etiology ischemic colitis vs diverticular bleed in setting of diverticulitis with known ASA use and falls, possible AVM, hemorrhoids, less likely upper GI bleed.  -LDH, CPK, amylase wnl  -C diff, crypto/giardia  antigen negative  -Surgery, no surgery at this time after discussing with patient  -GI, no endoscopy considering inflammation unless requested by surg  -Spoke with IR, no role for embolization at this time.  -Transfuse if Hb <7.  -IVF     Cognitive impairment   Assessment & Plan    Progressive decline in cognition per wife, though acutely more confused the past two weeks.  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     Hypokalemia   Assessment & Plan    Monitor and replete as appropriate.     Altered mental status-resolved as of 3/16/2019   Assessment & Plan    Has had progressive decline in mental status per his wife.  Evaluated by psych in March 2018 - had no capacity at that time.  Alert and responsive on evaluation today.  CTM.     Elevated INR-resolved as of 3/16/2019   Assessment & Plan    Hold AC in the setting of GIB.  Monitor.     Hypertension   Assessment & Plan    Continue home Lisinopril.  Restart other home medications as per PCP.       Hypothyroidism   Assessment & Plan    Continue home dose of Levothyroxine.     Falls   Assessment & Plan    PT as outpatient.     CAD (coronary artery disease) of artery bypass graft   Assessment & Plan    H/o 3-vessel coronary bypass graft with left internal mammary artery graft to left anterior descending artery, saphenous vein graft to obtuse marginal branch and saphenous vein graft to posterior descending artery by Dr. Coates in March 2018.  No cardiac symptoms on this admission, EKG stable compared to prior.  On home atorvastatin, hold ASA in setting of GI bleed.  CTM.     Leukocytosis-resolved as of 3/16/2019   Assessment & Plan    RESOLVED.  CTM.     PAF (paroxysmal atrial fibrillation) ([facility])   Assessment & Plan    Xarelto discontinued by cards in August 2018.  Continue home metoprolol.     PVD (peripheral vascular disease) (CMS-[facility])   Assessment & Plan    H/o prior left superficial femoral artery stent, known high grade stenosis of  right common femoral artery closed with Starclose closure by Dr. Amaya on 03/22/17.  CTM.         Consultants:     Surgery.  Gastroenterology.    Procedures:        None.    Imaging/ Testing:      IR-MIDLINE CATHETER INSERTION WO GUIDANCE > AGE 3   Final Result                  Ultrasound-guided midline placement performed by qualified nursing staff    as above.          CT-HEAD WITH   Final Result      1.  No acute intracranial findings.      2.  Diffuse atrophy and periventricular white matter change, consistent with chronic small vessel disease.      DX-CHEST-LIMITED (1 VIEW)   Final Result         1.  No acute cardiopulmonary disease.   2.  Atherosclerosis      CT-ABDOMEN-PELVIS WITH   Final Result         1.  Severe colitis from the splenic flexure through the proximal sigmoid. Diverticula are seen in component of diverticulitis cannot be excluded, however degree of involvement would not be expected for diverticulitis alone.   2.  Bilateral nephrolithiasis, mild left hydronephrosis and hydroureter without visualized obstructing stone.   3.  Atherosclerosis and atherosclerotic coronary artery disease.   4.  Low-density hepatic lesions, similar in size and number compared to prior study, appearance favors cyst.      CT-HEAD W/O   Final Result         1.  No acute intracranial abnormality is identified, there are nonspecific white matter changes, commonly associated with small vessel ischemic disease.  Associated mild cerebral atrophy is noted.   2.  Atherosclerosis.        Discharge Medications:         Medication Reconciliation: Completed       Medication List      START taking these medications      Instructions   cefdinir 300 MG Caps  Commonly known as:  OMNICEF   Take 1 Cap by mouth every 12 hours for 5 doses.  Dose:  300 mg     metroNIDAZOLE 500 MG Tabs  Commonly known as:  FLAGYL   Take 1 Tab by mouth every 8 hours for 5 doses.  Dose:  500 mg     potassium chloride SA 20 MEQ Tbcr  Start taking on:   3/17/2019  Commonly known as:  Kdur   Take 2 Tabs by mouth every day.  Dose:  40 mEq        CONTINUE taking these medications      Instructions   allopurinol 300 MG Tabs  Commonly known as:  ZYLOPRIM   Take 300 mg by mouth every day.  Dose:  300 mg     aspirin EC 81 MG Tbec  Commonly known as:  ECOTRIN   Take 81 mg by mouth every evening.  Dose:  81 mg     atorvastatin 20 MG Tabs  Commonly known as:  LIPITOR   Take 20 mg by mouth every evening.  Dose:  20 mg     levothyroxine 50 MCG Tabs  Commonly known as:  SYNTHROID   Take 50 mcg by mouth every day.  Dose:  50 mcg     lisinopril 5 MG Tabs  Commonly known as:  PRINIVIL   Take 5 mg by mouth every day.  Dose:  5 mg        STOP taking these medications    amLODIPine 5 MG Tabs  Commonly known as:  NORVASC     metoprolol 25 MG Tabs  Commonly known as:  LOPRESSOR            Disposition:   Home with .    Diet:   Regular.    Activity:   As tolerated.    Instructions:      Finish course of antibiotic therapy with omnicef and metronidazole (last does 3/18).  Follow up with PCP.    The patient was instructed to return to the ER in the event of worsening symptoms. I have counseled the patient on the importance of compliance and the patient has agreed to proceed with all medical recommendations and follow up plan indicated above.   The patient understands that all medications come with benefits and risks. Risks may include permanent injury or death and these risks can be minimized with close reassessment and monitoring.        Primary Care Provider:    Halie Dominique M.D.    Discharge summary faxed to primary care provider:  Completed  Copy of discharge summary given to the patient: Completed      Follow up appointment details :      Future Appointments  Date Time Provider Department Center   4/4/2019 3:15 PM Halie Dominique M.D. [facility] None     Halie Dominique M.D.  [address]  [address] [address]  [phone]    Schedule an appointment as soon as possible for a  visit  As needed      Pending Studies:        None.    Time spent on discharge day patient visit, preparing discharge paperwork and arranging for patient follow up.    Summary of follow up issues:   Follow up for recurrence of hematochezia.  F/u cognitive impairment.    Discharge Time (Minutes) :    45 mins  Hospital Course Type:  Inpatient Stay >2 midnights      Condition on Discharge    ______________________________________________________________________    Interval history/exam for day of discharge:    No acute overnight events. Patient excited to leave home.    Review of Systems   Constitutional: Negative.    HENT: Negative.    Eyes: Negative.    Respiratory: Negative.    Cardiovascular: Negative.    Gastrointestinal: Negative.    Musculoskeletal: Negative.    Skin: Negative.    Neurological: Negative.    Endo/Heme/Allergies: Negative.      Physical Exam   Constitutional: He appears well-developed and well-nourished. No distress.   HENT:   Head: Normocephalic and atraumatic.   Right Ear: External ear normal.   Left Ear: External ear normal.   Mouth/Throat: No oropharyngeal exudate.   Eyes: Pupils are equal, round, and reactive to light. Conjunctivae and EOM are normal. Right eye exhibits no discharge. Left eye exhibits no discharge.   Neck: Normal range of motion. Neck supple. No JVD present.   Cardiovascular: Normal rate, regular rhythm and normal heart sounds.  Exam reveals no gallop and no friction rub.    No murmur heard.  Pulmonary/Chest: Effort normal and breath sounds normal. He has no wheezes. He has no rales.   Abdominal: Soft. Bowel sounds are normal. He exhibits no mass. There is no tenderness. There is no guarding.   Musculoskeletal: Normal range of motion. He exhibits no edema.   Neurological: He is alert.   Skin: Skin is warm and dry. He is not diaphoretic. No erythema.       Most Recent Labs:    Lab Results   Component Value Date/Time    WBC 7.2 03/12/2019 02:15 AM    WBC 7.2 04/05/2013 10:05 AM     RBC 4.62 (L) 03/12/2019 02:15 AM    RBC 3.96 (L) 04/05/2013 10:05 AM    HEMOGLOBIN 14.8 03/12/2019 02:15 AM    HEMATOCRIT 45.2 03/12/2019 02:15 AM    MCV 97.8 03/12/2019 02:15 AM     (H) 04/05/2013 10:05 AM    MCH 32.0 03/12/2019 02:15 AM    MCH 33.3 (H) 04/05/2013 10:05 AM    MCHC 32.7 (L) 03/12/2019 02:15 AM    MPV 10.6 03/12/2019 02:15 AM    NEUTSPOLYS 59.80 03/12/2019 02:15 AM    LYMPHOCYTES 24.10 03/12/2019 02:15 AM    MONOCYTES 8.70 03/12/2019 02:15 AM    EOSINOPHILS 5.30 03/12/2019 02:15 AM    BASOPHILS 1.00 03/12/2019 02:15 AM    ANISOCYTOSIS 1+ 03/01/2018 03:22 AM      Lab Results   Component Value Date/Time    SODIUM 137 03/12/2019 02:15 AM    POTASSIUM 3.7 03/12/2019 02:15 AM    CHLORIDE 104 03/12/2019 02:15 AM    CO2 25 03/12/2019 02:15 AM    GLUCOSE 86 03/12/2019 02:15 AM    BUN 10 03/12/2019 02:15 AM    CREATININE 0.87 03/12/2019 02:15 AM    CREATININE 1.80 (H) 04/05/2013 10:05 AM    BUNCREATRAT 15 09/12/2017 11:47 AM    BUNCREATRAT 16 04/05/2013 10:05 AM      Lab Results   Component Value Date/Time    ALTSGPT 8 03/11/2019 02:17 AM    ASTSGOT 18 03/11/2019 02:17 AM    ALKPHOSPHAT 79 03/11/2019 02:17 AM    TBILIRUBIN 0.9 03/11/2019 02:17 AM    LIPASE 59 02/17/2018 05:15 AM    ALBUMIN 3.7 03/11/2019 02:17 AM    GLOBULIN 2.6 03/11/2019 02:17 AM    PREALBUMIN 20.0 03/09/2019 02:55 AM    INR 1.16 (H) 03/08/2019 05:54 PM    MACROCYTOSIS 1+ 03/01/2018 03:22 AM     Lab Results   Component Value Date/Time    PROTHROMBTM 15.0 (H) 03/08/2019 05:54 PM    INR 1.16 (H) 03/08/2019 05:54 PM

## 2019-03-16 NOTE — DISCHARGE INSTRUCTIONS
Colitis  Introduction  Colitis is inflammation of the colon. Colitis may last a short time (acute) or it may last a long time (chronic).  What are the causes?  This condition may be caused by:  · Viruses.  · Bacteria.  · Reactions to medicine.  · Certain autoimmune diseases, such as Crohn disease or ulcerative colitis.  What are the signs or symptoms?  Symptoms of this condition include:  · Diarrhea.  · Passing bloody or tarry stool.  · Pain.  · Fever.  · Vomiting.  · Tiredness (fatigue).  · Weight loss.  · Bloating.  · Sudden increase in abdominal pain.  · Having fewer bowel movements than usual.  How is this diagnosed?  This condition is diagnosed with a stool test or a blood test. You may also have other tests, including X-rays, a CT scan, or a colonoscopy.  How is this treated?  Treatment may include:  · Resting the bowel. This involves not eating or drinking for a period of time.  · Fluids that are given through an IV tube.  · Medicine for pain and diarrhea.  · Antibiotic medicines.  · Cortisone medicines.  · Surgery.  Follow these instructions at home:  Eating and drinking  · Follow instructions from your health care provider about eating or drinking restrictions.  · Drink enough fluid to keep your urine clear or pale yellow.  · Work with a dietitian to determine which foods cause your condition to flare up.  · Avoid foods that cause flare-ups.  · Eat a well-balanced diet.  Medicines  · Take over-the-counter and prescription medicines only as told by your health care provider.  · If you were prescribed an antibiotic medicine, take it as told by your health care provider. Do not stop taking the antibiotic even if you start to feel better.  General instructions  · Keep all follow-up visits as told by your health care provider. This is important.  Contact a health care provider if:  · Your symptoms do not go away.  · You develop new symptoms.  Get help right away if:  · You have a fever that does not go away with  treatment.  · You develop chills.  · You have extreme weakness, fainting, or dehydration.  · You have repeated vomiting.  · You develop severe pain in your abdomen.  · You pass bloody or tarry stool.  This information is not intended to replace advice given to you by your health care provider. Make sure you discuss any questions you have with your health care provider.  Document Released: 01/25/2006 Document Revised: 05/25/2017 Document Reviewed: 04/11/2016 © 2017 Elsevier      Discharge Instructions    Discharged to home by car with escort. Discharged via wheelchair, hospital escort: Yes.  Special equipment needed: Not Applicable    Be sure to schedule a follow-up appointment with your primary care doctor or any specialists as instructed.     Discharge Plan:   Diet Plan: Discussed  Activity Level: Discussed  Confirmed Symptoms Management: Discussed  Medication Reconciliation Updated: Yes  Influenza Vaccine Indication: Indicated: 65 years and older    I understand that a diet low in cholesterol, fat, and sodium is recommended for good health. Unless I have been given specific instructions below for another diet, I accept this instruction as my diet prescription.   Other diet: cardiac    Special Instructions: Sepsis, Adult  Sepsis is a serious infection of your blood or tissues that affects your whole body. The infection that causes sepsis may be bacterial, viral, fungal, or parasitic. Sepsis may be life threatening. Sepsis can cause your blood pressure to drop. This may result in shock. Shock causes your central nervous system and your organs to stop working correctly.  What increases the risk?  Sepsis can happen in anyone, but it is more likely to happen in people who have weakened immune systems.  What are the signs or symptoms?  Symptoms of sepsis can include:  · Fever or low body temperature (hypothermia).  · Rapid breathing (hyperventilation).  · Chills.  · Rapid heartbeat (tachycardia).  · Confusion or  light-headedness.  · Trouble breathing.  · Urinating much less than usual.  · Cool, clammy skin or red, flushed skin.  · Other problems with the heart, kidneys, or brain.  How is this diagnosed?  Your health care provider will likely do tests to look for an infection, to see if the infection has spread to your blood, and to see how serious your condition is. Tests can include:  · Blood tests, including cultures of your blood.  · Cultures of other fluids from your body, such as:  ¨ Urine.  ¨ Pus from wounds.  ¨ Mucus coughed up from your lungs.  · Urine tests other than cultures.  · X-ray exams or other imaging tests.  How is this treated?  Treatment will begin with elimination of the source of infection. If your sepsis is likely caused by a bacterial or fungal infection, you will be given antibiotic or antifungal medicines.  You may also receive:  · Oxygen.  · Fluids through an IV tube.  · Medicines to increase your blood pressure.  · A machine to clean your blood (dialysis) if your kidneys fail.  · A machine to help you breathe if your lungs fail.  Get help right away if:  You get an infection or develop any of the signs and symptoms of sepsis after surgery or a hospitalization.  This information is not intended to replace advice given to you by your health care provider. Make sure you discuss any questions you have with your health care provider.  Document Released: 09/15/2004 Document Revised: 05/25/2017 Document Reviewed: 08/25/2014  [employer] Interactive Patient Education © 2017 [employer]      · Is patient discharged on Warfarin / Coumadin?   No     Depression / Suicide Risk    As you are discharged from this [facility] facility, it is important to learn how to keep safe from harming yourself.    Recognize the warning signs:  · Abrupt changes in personality, positive or negative- including increase in energy   · Giving away possessions  · Change in eating patterns- significant weight changes-  positive or  negative  · Change in sleeping patterns- unable to sleep or sleeping all the time   · Unwillingness or inability to communicate  · Depression  · Unusual sadness, discouragement and loneliness  · Talk of wanting to die  · Neglect of personal appearance   · Rebelliousness- reckless behavior  · Withdrawal from people/activities they love  · Confusion- inability to concentrate     If you or a loved one observes any of these behaviors or has concerns about self-harm, here's what you can do:  · Talk about it- your feelings and reasons for harming yourself  · Remove any means that you might use to hurt yourself (examples: pills, rope, extension cords, firearm)  · Get professional help from the community (Mental Health, Substance Abuse, psychological counseling)  · Do not be alone:Call your Safe Contact- someone whom you trust who will be there for you.  · Call your local CRISIS HOTLINE 382-8759 or 579-033-3677  · Call your local Children's Mobile Crisis Response Team [place] (921) 176-9654 or www.[facility]  · Call the toll free National Suicide Prevention Hotlines   · National Suicide Prevention Lifeline 576-542-DGPS (2641)  · National Hope Line Network 800-SUICIDE (768-6015)

## 2019-03-16 NOTE — PROGRESS NOTES
Pt alert and oriented x3, disoriented to event. Wife at bedside. Offered snacks and fluids. Safety precautions in placed. Upper side rails up. Bed in lowest position. Reinforced the use of call light when needing assistance.

## 2019-03-16 NOTE — CARE PLAN
Problem: Bowel/Gastric:  Goal: Normal bowel function is maintained or improved  Outcome: PROGRESSING AS EXPECTED  Bowel meds administered as ordered by provider. Encourage fluid intake, adequate meal consumption. Ambulating with walker with staff assistance     Problem: Fluid Volume:  Goal: Will maintain balanced intake and output  Encourage fluid intake. Monitor intake and output. Educate patient about fluid imbalance and proper hydration/output

## 2019-03-16 NOTE — PROGRESS NOTES
Pt out of bed this shift to chair, at this time is not using call light appropriately. Bed and chair alarm in place for safety. Pt reoriented and educated on use of call light and need for assistance with transfers. Pt scheduled to discharge this shift, however wife felt that she could not care for him at home. [facility] team notified and home health notified. Will continue to monitor

## 2019-03-16 NOTE — PROGRESS NOTES
Pt out of bed this shift ambulating in abel with physical therapy. Wife encouraging and offering support during therapy. Pt presenting with dementia, ischemic colitis and encephalopathy. At this time patient denies pain or discomfort. Pacer in place to L chest. Bed alarm in place and functioning for safety. Pt had multiple loose stools this morning, afternoon bowel meds held. Will continue to monitor

## 2019-03-17 NOTE — PROGRESS NOTES
Internal Medicine Interval Note  Note Author: Naty Lilly M.D.     Name Rafia Shaikh DOB 1933   Age/Sex 85 y.o. male   MRN [id number]   Code Status FULL     After 5PM or if no immediate response to page, please call for cross-coverage  Attending/Team: Kusum/ Tony See Patient List for primary contact information  Call [phone] to page    1st Call - Day Intern (R1):   Jovani 2nd Call - Day Sr. Resident (R2/R3):   Vijaya         Reason for interval visit  (Principal Problem)   Dementia.      Interval Problem Daily Status Update  (24 hours, problem oriented, brief subjective history, new lab/imaging data pertinent to that problem)   No acute overnight events.   Patient for DC today, had some apprehension about leaving.   Review of Systems   Constitutional: Negative.    HENT: Negative.    Eyes: Negative.    Respiratory: Negative.    Cardiovascular: Negative.    Gastrointestinal: Negative.    Musculoskeletal: Negative.    Skin: Negative.    Neurological: Negative.    Endo/Heme/Allergies: Negative.    Psychiatric/Behavioral: Negative.        Disposition/Barriers to discharge:   Placement.    Consultants/Specialty  PCP: Halie Dominique M.D.      Quality Measures  Quality-Core Measures   Reviewed items::  EKG reviewed, Medications reviewed, Labs reviewed and Radiology images reviewed  Baldwin catheter::  No Baldwin  DVT prophylaxis pharmacological::  Not indicated at this time, ambulatory  Ulcer Prophylaxis::  Not indicated          Physical Exam       Vitals:    19 1600 19 1923 19 0335 19 0800   BP: (!) 90/48 (!) 96/50 122/64 (!) 95/41   Pulse: 65 78 69 77   Resp:    Temp: 36.1 °C (97 °F) 36.1 °C (96.9 °F) 36.3 °C (97.3 °F) 36.4 °C (97.5 °F)   TempSrc: Temporal Temporal Temporal Temporal   SpO2: 97% 97% 96% 96%   Weight:       Height:         Body mass index is 24.66 kg/m².    Oxygen Therapy:  Pulse Oximetry: 96 %, O2 (LPM): 0, O2 Delivery: None (Room Air)    Physical  Exam   Constitutional: He is well-developed, well-nourished, and in no distress. No distress.   HENT:   Head: Normocephalic and atraumatic.   Right Ear: External ear normal.   Left Ear: External ear normal.   Mouth/Throat: No oropharyngeal exudate.   Cardiovascular: Normal rate and regular rhythm.  Exam reveals no gallop and no friction rub.    No murmur heard.  Pulmonary/Chest: Effort normal and breath sounds normal.   Abdominal: Soft. Bowel sounds are normal. He exhibits no distension. There is no tenderness.   Musculoskeletal: He exhibits no edema.   Skin: He is not diaphoretic.       Assessment/Plan     Cognitive impairment- (present on admission)   Assessment & Plan    Progressive decline in cognition per wife, though acutely more confused the past two weeks.  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     Hypokalemia- (present on admission)   Assessment & Plan    Monitor and replete as appropriate.     Hypertension- (present on admission)   Assessment & Plan    Continue home Lisinopril.  Restart other home medications as per PCP.       Hypothyroidism- (present on admission)   Assessment & Plan    Continue home dose of Levothyroxine.     Falls- (present on admission)   Assessment & Plan    PT as outpatient.     CAD (coronary artery disease) of artery bypass graft- (present on admission)   Assessment & Plan    H/o 3-vessel coronary bypass graft with left internal mammary artery graft to left anterior descending artery, saphenous vein graft to obtuse marginal branch and saphenous vein graft to posterior descending artery by Dr. Coates in March 2018.  No cardiac symptoms on this admission, EKG stable compared to prior.  On home atorvastatin, hold ASA in setting of GI bleed.  CTM.     PAF (paroxysmal atrial fibrillation) (HCC)- (present on admission)   Assessment & Plan    Xarelto discontinued by cards in August 2018.  Continue home metoprolol.     PVD (peripheral vascular disease)  (CMS-[facility])- (present on admission)   Assessment & Plan    H/o prior left superficial femoral artery stent, known high grade stenosis of right common femoral artery closed with Starclose closure by Dr. Amaya on 03/22/17.  CTM.     \

## 2019-03-17 NOTE — PROGRESS NOTES
Report received by RANDY Balbuena. Assumed care of pt. Assessment complete. Wife at bedside. Pt A&Ox2-3, VSS. Pt in no apparent signs of distress. Plan of care discussed. Call light within reach, bed in lowest position, and pt has no further questions at this time.

## 2019-03-17 NOTE — CARE PLAN
Problem: Safety  Goal: Will remain free from injury  Outcome: PROGRESSING AS EXPECTED  Fall precautions in place. Treaded socks on pt. Appropriate signs on doorway. Bedrails up. Bed in lowest position and locked.  Call light and phone within reach. Bed alarm on.     Problem: Knowledge Deficit  Goal: Knowledge of disease process/condition, treatment plan, diagnostic tests, and medications will improve  Outcome: PROGRESSING AS EXPECTED  Patient and spouse educated about POC.  All questions answered in regards to disease process, treatment, medications and diet.  Patient verbalized understanding and voiced no further questions at this time.

## 2019-03-17 NOTE — CARE PLAN
Problem: Safety  Goal: Will remain free from injury  Outcome: PROGRESSING AS EXPECTED  Encouraged to use call light to ask for assistance  Bed and chair alarms in place for safety  Staff assistance patient with ambulation/toileting  Educate significant other to ask for help when needed and use call light to call for staff    Problem: Discharge Barriers/Planning  Goal: Patient's continuum of care needs will be met  Outcome: PROGRESSING AS EXPECTED  Discuss discharge planning with patient and wife  Encourage safe discharge with home health team   Educate patient and wife about safety on discharge and safe discharge goals

## 2019-03-17 NOTE — PROGRESS NOTES
Internal Medicine Interval Note  Note Author: Grady Hahn M.D.     Name Rafia Shaikh 1933   Age/Sex 85 y.o. male   MRN [id number]   Code Status FULL     After 5PM or if no immediate response to page, please call for cross-coverage  Attending/Team: Kusum/ CRISTI Jimenez See Patient List for primary contact information  Call [phone] to page    1st Call - Day Intern (R1):   Jovani 2nd Call - Day Sr. Resident (R2/R3):   Vijaya         Reason for interval visit  (Principal Problem)   AMS  Hematochezia    Interval Problem Daily Status Update  (24 hours, problem oriented, brief subjective history, new lab/imaging data pertinent to that problem)     -EFRAIN o/n; has no acute complaints, no abd tenderness/pain    -working with SW, pending placement and trying to coordinate with patient's wife's placement as she is also inpatient  -PT/OT rec post acute placement at inpatient transitional care facility  -patient and wife deferring SNF, requesting to go home with home health; arranging    -miscommunication yesterday, patient's wife did not feel safe going home felt she would not be able to care for him; spoke with wife this morning she also was under the impression they would not be leaving together; discussed that the home health would be providing initially 48hr assistance on discharge and then would be reassessed if they needed it continued or escalated care and that they would be leaving together; she is very agreeable with this clarification and they would like to go home with home health       Review of Systems   Constitutional: Negative.    HENT: Negative.    Eyes: Negative.    Respiratory: Negative.    Cardiovascular: Negative.    Gastrointestinal: Negative for abdominal pain, blood in stool, nausea and vomiting.   Musculoskeletal: Negative.    Skin: Negative.        Disposition/Barriers to discharge:   placement    Consultants/Specialty  Surgery  GI  PCP: Halie Dominique M.D.     Quality  Measures  Quality-Core Measures   Reviewed items::  EKG reviewed, Radiology images reviewed, Labs reviewed and Medications reviewed  Baldwin catheter::  No Baldwin  DVT prophylaxis pharmacological::  Contraindicated - High bleeding risk  DVT prophylaxis - mechanical:  SCDs  Ulcer Prophylaxis::  Not indicated      Physical Exam       Vitals:    03/16/19 1600 03/16/19 1923 03/17/19 0335 03/17/19 0800   BP: (!) 90/48 (!) 96/50 122/64 (!) 95/41   Pulse: 65 78 69 77   Resp: 17 16 16 17   Temp: 36.1 °C (97 °F) 36.1 °C (96.9 °F) 36.3 °C (97.3 °F) 36.4 °C (97.5 °F)   TempSrc: Temporal Temporal Temporal Temporal   SpO2: 97% 97% 96% 96%   Weight:       Height:         Body mass index is 24.66 kg/m².    Oxygen Therapy:  Pulse Oximetry: 96 %, O2 (LPM): 0, O2 Delivery: None (Room Air)    Physical Exam   Constitutional: Vital signs are normal. He appears cachectic.   HENT:   Head: Normocephalic and atraumatic.   Eyes: Pupils are equal, round, and reactive to light. Conjunctivae, EOM and lids are normal.   Cardiovascular: Normal rate, regular rhythm and normal pulses.  Exam reveals no gallop and no friction rub.    No murmur heard.  Pulmonary/Chest: Effort normal and breath sounds normal. He has no wheezes. He has no rales.   Abdominal: Soft. Bowel sounds are normal. He exhibits no distension. There is no tenderness. There is no rebound and no guarding.   Musculoskeletal: Normal range of motion. He exhibits no edema.   Neurological: He is alert.   Skin: Skin is warm and dry. No erythema.     Assessment/Plan     Cognitive impairment- (present on admission)   Assessment & Plan    Progressive decline in cognition per wife, though acutely more confused the past two weeks.  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     Hypokalemia- (present on admission)   Assessment & Plan    Monitor and replete as appropriate.     Hypertension- (present on admission)   Assessment & Plan    Continue home  Lisinopril.  Restart other home medications as per PCP.       Hypothyroidism- (present on admission)   Assessment & Plan    Continue home dose of Levothyroxine.     Falls- (present on admission)   Assessment & Plan    PT as outpatient.     CAD (coronary artery disease) of artery bypass graft- (present on admission)   Assessment & Plan    H/o 3-vessel coronary bypass graft with left internal mammary artery graft to left anterior descending artery, saphenous vein graft to obtuse marginal branch and saphenous vein graft to posterior descending artery by Dr. Coates in March 2018.  No cardiac symptoms on this admission, EKG stable compared to prior.  On home atorvastatin, hold ASA in setting of GI bleed.  CTM.     PAF (paroxysmal atrial fibrillation) ([facility])- (present on admission)   Assessment & Plan    Xarelto discontinued by cards in August 2018.  Continue home metoprolol.     PVD (peripheral vascular disease) (CMS-[facility])- (present on admission)   Assessment & Plan    H/o prior left superficial femoral artery stent, known high grade stenosis of right common femoral artery closed with Starclose closure by Dr. Amaya on 03/22/17.  CTM.

## 2019-03-17 NOTE — PROGRESS NOTES
Pt out of bed to chair this shift with staff assistance. Continues to self transfer without asking for assist. Bed and chair alarm in place and functioning. Wife at bedside offering assistance. Denies pain or discomfort at this time. Ambulating with contact guard with walker. Mixed incontinence this shift. Incontinence care provided as needed. Continue to monitor

## 2019-03-18 NOTE — CARE PLAN
Problem: Discharge Barriers/Planning  Goal: Patient's continuum of care needs will be met  Outcome: PROGRESSING AS EXPECTED  Pt and wife/ roommate are expected to discharge tomorrow after a ride has been arranged.     Problem: Pain Management  Goal: Pain level will decrease to patient's comfort goal  Outcome: PROGRESSING AS EXPECTED  Pt denies being in any pain. Will continue to monitory pt for pain.

## 2019-03-18 NOTE — DISCHARGE PLANNING
Medical Social Work  [facility] to [facility], AMADOU spoke to Sandra who is aware of the discharge plan.  Can send a  to pick them up at 3:00.

## 2019-03-18 NOTE — PROGRESS NOTES
Assumed care of pt from Marybeth Balbuena. Pt has no complaints of pain/discomfort at this time. Hourly rounding in place. Call light within reach, treaded slipper socks on, bed locked in lowest position. Mobility and fall risk assessed- proper communication signs are in place.   Pt is up in chair eating dinner with chair alarm ON.

## 2019-03-18 NOTE — PROGRESS NOTES
Pt discharged to home with wife. Pt was transported in wheelchair by [facility] at 1530. All meds and discharge summary reviewed. Pt and representative are driving to pharmacy when leaving the hospital to retrieve medications.

## 2019-03-18 NOTE — CARE PLAN
Problem: Safety  Goal: Will remain free from falls  Outcome: PROGRESSING AS EXPECTED  Encourage to use call light to ask for assistance with transfers  Gripper socks in place   Using walker with ambulation    Problem: Discharge Barriers/Planning  Goal: Patient's continuum of care needs will be met  Outcome: PROGRESSING AS EXPECTED  Pt informed on discharge plan, discussed with patient and wife together   Barriers assessed and support provided where needed

## 2019-03-18 NOTE — THERAPY
"Pt continues to demonstrate slow step-to gait pattern, requiring verbal cuing. Pt required extra time for initiation as pt will ambulate a few steps and stop, then requires maximum verbal cuing to start ambulating again. Pt continues to be incontinent of stool during session, requiring extra time for pericare. Pt would benefit from further acute PT txs to progress towards goals and independence, Reocmmend post acute placement at an inpatient transitional care facility to address deficits.    Physical Therapy Treatment completed.   Bed Mobility:  Supine to Sit:  (NT--received in chair)  Transfers: Sit to Stand: Contact Guard Assist  Gait: Level Of Assist: Contact Guard Assist with Front-Wheel Walker       Plan of Care: Will benefit from Physical Therapy 4 times per week    See \"Rehab Therapy-Acute\" Patient Summary Report for complete documentation.       "

## 2019-03-18 NOTE — PROGRESS NOTES
Internal Medicine Interval Note  Note Author: Grady Hahn M.D.     Name Rafia Shaikh DOB 1933   Age/Sex 85 y.o. male   MRN [id number]   Code Status FULL     After 5PM or if no immediate response to page, please call for cross-coverage  Attending/Team: Kusum/ CRISTI Jimenez See Patient List for primary contact information  Call [phone] to page    1st Call - Day Intern (R1):   Jovani 2nd Call - Day Sr. Resident (R2/R3):   Mariecl         Reason for interval visit  (Principal Problem)   AMS  Hematochezia    Interval Problem Daily Status Update  (24 hours, problem oriented, brief subjective history, new lab/imaging data pertinent to that problem)     -EFRAIN o/n; has no acute complaints, no abd tenderness/pain    -working with SW, pending placement and trying to coordinate with patient's wife's placement as she is also inpatient  -PT/OT rec post acute placement at inpatient transitional care facility  -patient and wife deferring SNF, requesting to go home with home health; arranging    -will try to discharge with home health once can be arranged    Review of Systems   Constitutional: Negative.    HENT: Negative.    Eyes: Negative.    Respiratory: Negative.    Cardiovascular: Negative.    Gastrointestinal: Negative for abdominal pain, blood in stool, nausea and vomiting.   Musculoskeletal: Negative.    Skin: Negative.        Disposition/Barriers to discharge:   placement    Consultants/Specialty  Surgery  GI  PCP: Halie Dominique M.D.     Quality Measures  Quality-Core Measures   Reviewed items::  EKG reviewed, Radiology images reviewed, Labs reviewed and Medications reviewed  Baldwin catheter::  No Baldwin  DVT prophylaxis pharmacological::  Contraindicated - High bleeding risk  DVT prophylaxis - mechanical:  SCDs  Ulcer Prophylaxis::  Not indicated      Physical Exam       Vitals:    19 1600 19 1933 19 0330 19 0900   BP: 112/59 (!) 95/57 116/53 114/68   Pulse: 87 76 86 82   Resp:   16   Temp: 36.1 °C (97 °F) 36 °C (96.8 °F) 36.3 °C (97.3 °F) 36.4 °C (97.5 °F)   TempSrc: Temporal Temporal Temporal Temporal   SpO2: 97% 97% 98% 97%   Weight:       Height:         Body mass index is 24.66 kg/m².    Oxygen Therapy:  Pulse Oximetry: 97 %, O2 (LPM): 0, O2 Delivery: None (Room Air)    Physical Exam   Constitutional: Vital signs are normal. He appears cachectic.   HENT:   Head: Normocephalic and atraumatic.   Eyes: Pupils are equal, round, and reactive to light. Conjunctivae, EOM and lids are normal.   Cardiovascular: Normal rate, regular rhythm and normal pulses.  Exam reveals no gallop and no friction rub.    No murmur heard.  Pulmonary/Chest: Effort normal and breath sounds normal. He has no wheezes. He has no rales.   Abdominal: Soft. Bowel sounds are normal. He exhibits no distension. There is no tenderness. There is no rebound and no guarding.   Musculoskeletal: Normal range of motion. He exhibits no edema.   Neurological: He is alert.   Skin: Skin is warm and dry. No erythema.     Assessment/Plan     Cognitive impairment- (present on admission)   Assessment & Plan    Progressive decline in cognition per wife, though acutely more confused the past two weeks.  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     Hypokalemia- (present on admission)   Assessment & Plan    Monitor and replete as appropriate.     Hypertension- (present on admission)   Assessment & Plan    Continue home Lisinopril.  Restart other home medications as per PCP.       Hypothyroidism- (present on admission)   Assessment & Plan    Continue home dose of Levothyroxine.     Falls- (present on admission)   Assessment & Plan    PT as outpatient.     CAD (coronary artery disease) of artery bypass graft- (present on admission)   Assessment & Plan    H/o 3-vessel coronary bypass graft with left internal mammary artery graft to left anterior descending artery, saphenous vein graft to obtuse marginal branch  and saphenous vein graft to posterior descending artery by Dr. Coates in March 2018.  No cardiac symptoms on this admission, EKG stable compared to prior.  On home atorvastatin, hold ASA in setting of GI bleed.  CTM.     PAF (paroxysmal atrial fibrillation) ([facility])- (present on admission)   Assessment & Plan    Xarelto discontinued by cards in August 2018.  Continue home metoprolol.     PVD (peripheral vascular disease) ([facility])- (present on admission)   Assessment & Plan    H/o prior left superficial femoral artery stent, known high grade stenosis of right common femoral artery closed with Starclose closure by Dr. Amaya on 03/22/17.  CTM.

## 2019-03-19 PROBLEM — R51.9 HEADACHE: Status: ACTIVE | Noted: 2019-01-01

## 2019-03-19 PROBLEM — E86.1 HYPOVOLEMIA: Status: ACTIVE | Noted: 2019-01-01

## 2019-03-19 PROBLEM — T07.XXXA MULTIPLE BRUISES: Status: ACTIVE | Noted: 2019-01-01

## 2019-03-19 PROBLEM — N17.9 AKI (ACUTE KIDNEY INJURY) (HCC): Status: ACTIVE | Noted: 2019-01-01

## 2019-03-19 NOTE — DISCHARGE PLANNING
CM received call from Dr North stating pt's wife at bedside (has dementia) will need to be a social admit. Pt cannot go home as there is no one there to care for her. Sons live in [place]. Discussed with Raul (CM manager) and pt's wife will be a direct admit. Dr North notified and transfer center will coordinate admission.

## 2019-03-19 NOTE — SENIOR ADMIT NOTE
"Mr. Shaikh is a 85 y.o. 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  He was discharged home with his wife who is also demented.  The home health nurse visited today and found him to be dehydrated and not very responsive.  He could not provide a meaningful history, he notes to say yes and no.  Stated he fell yesterday and had a headache today.  Wife is unable to provide a meaningful history, unclear if he had been eating.  Unable to comment on urine output.  Denies abdominal pain  Wife states they have a son and a daughter who are not in contact with them.    Exam:  /56   Pulse 82   Temp 36.6 °C (97.8 °F) (Temporal)   Resp 18   Ht 1.448 m (4' 9\")   Wt 51.7 kg (113 lb 15.7 oz)   BMI 24.66 kg/m²     Physical exam:   General: Not in acute distress  HEENT: NC/AT. PERRLA, EOMI. dry mucous membranes. No lymphadenopathy.  CVS: Systolic murmur present  RS: CTA, good air entry. No wheezes or ronchi.  Abdomen: Soft, NT/ND, BS +. No organomegaly  Ext: No pedal edema  Neuro: CN 2-12 wnl. Motor and sensory exam wnl.  Bruises noted over the chin, epigastric area, right knee    Labs:  Recent Labs      03/19/19   1410   SODIUM  138   POTASSIUM  4.8   CHLORIDE  110   CO2  24   BUN  40*   CREATININE  1.72*   CALCIUM  10.5       Recent Labs      03/19/19   1410   ALTSGPT  21   ASTSGOT  55*   ALKPHOSPHAT  73   TBILIRUBIN  0.9   GLUCOSE  112*       Recent Labs      03/19/19   1410   RBC  4.73   HEMOGLOBIN  15.0   HEMATOCRIT  46.6   PLATELETCT  177       Recent Labs      03/19/19   1410   WBC  14.3*   NEUTSPOLYS  84.20*   LYMPHOCYTES  9.00*   MONOCYTES  5.60   EOSINOPHILS  0.00   BASOPHILS  0.60   ASTSGOT  55*   ALTSGPT  21   ALKPHOSPHAT  73   TBILIRUBIN  0.9 "         DX-CHEST-PORTABLE (1 VIEW)   Final Result      No evidence of acute cardiopulmonary process.      CT-HEAD W/O   Final Result      No acute intracranial abnormality is identified.      There are periventricular and subcortical white matter changes present.  This finding is nonspecific and could be from previous small vessel ischemia, demyelination, or gliosis.      Atrophy      Paranasal sinus disease.             Assessment and Plan:  1.  Acute kidney injury  2.  Dehydration  3.  Dementia  4.  Leukocytosis -chest x-ray normal, unclear source of infection, pending UA.     Admit to medical floor  IV fluid hydration  Will get CPK  Bedside swallow evaluation failed -formal SLP evaluation tomorrow  Monitor input and output  Will likely need placement -PT OT social work consult      For further details , please refer to H&P by      R Tony team to assume care from 03/20/2019

## 2019-03-19 NOTE — ED TRIAGE NOTES
".  Chief Complaint   Patient presents with   • Malaise   Pt BIB EMS from home.  EMS contacted by home health personnel.  Per wife \" he cannot talk, he is so tired, we just got home yesterday. \"  Pt recently discharged from [facility] Ischemic Colitis.  Pt has history of dementia, baseline?  Pt alert and oriented X 2(event/date?).   "

## 2019-03-19 NOTE — H&P
"..        Internal Medicine Admitting History and Physical    Note Author: Mallory North M.D.       Name Rafia Shaikh 1933   Age/Sex 85 y.o. male   MRN [id number]   Code Status DNR/DNI     After 5PM or if no immediate response to page, please call for cross-coverage  Attending/Team: Christopher/Bryson See Patient List for primary contact information  Call [phone] to page    1st Call - Day Intern (R1):   Mary Anne 2nd Call - Day Sr. Resident (R2/R3):   Rebeca        Chief Complaint:   Malaise    HPI:  Rafia Shaikh is a 85 y.o. male with past medical history of Afib (previously on Xarelto but recently DC'd due to frequent falls), CAD s/p CABG, LUKE, AS s/p TAVR, HLD, gout, HTN, hypothyroidism, PAD, TIA, MONTRELL (noncompliant with CPAP), GI bleed in  2/2 AVM s/p cauterization presented to the ED for general malaise. Pt was discharged yesterday after being admitted for ischemic colitis. He was BIB EMS who was contacted by home health personnel. Per wife, pt is \"so tired he cannot talk and is short of breath. We just got home yesterday.\" Pt has hx of cognitive impairment. The home health nurse visited today and found him to be dehydrated and not very responsive.  He could not provide a meaningful history, he notes to say yes and no.  Stated he fell yesterday and had a headache today.  Wife is unable to provide a meaningful history, unclear if pt has had been eating.  Unable to comment on urine output.      In ED, vitals stable.     Labs suggestive of BETTY and hypovolemia with BUN/Cr >20.    Head CT negative for acute bleed. Pt has fresh bruises on his leg around his knees and xiphoid process.      Difficult to assess AAO status. Had pt squeeze hands for yes and no but he often contradicted himself. Wife states that they have a son in [facility] who is in the medical field but they do not speak very often to their son.  Spoke extensively with pt and wife about code status and they have " agreed to be DNR.     Pt failed bedside swallow eval in ED. SLP pending.           Review of Systems   Constitutional: Positive for malaise/fatigue. Negative for chills and fever.   Eyes: Negative for blurred vision.   Respiratory: Positive for shortness of breath.    Cardiovascular: Negative for chest pain.   Gastrointestinal: Negative for diarrhea and vomiting.   Genitourinary:        Unable to obtain clear answer from pt or wife   Musculoskeletal: Positive for falls.   Skin: Negative for rash.   Neurological: Positive for headaches. Negative for weakness.   Psychiatric/Behavioral: Positive for memory loss.             Past Medical History (Chronic medical problem, known complications and current treatment)    ..  Past Medical History:   Diagnosis Date   • Anemia 4/2016    due to rectal bleed   • Arrhythmia 5/12/17    Hx A fib. On only aspirin.    • CAD (coronary artery disease)    • Carotid artery stenosis 6/13/2011   • CATARACT 1990's    Bilateral phaco with IOL   • Dental disorder     cavities, under current care   • Dental disorder 5/12/17    permanent bridge lower   • Dizziness 6/13/2011   • Dyslipidemia 9/18/2010   • Gout    • Heart murmur    • High cholesterol    • Hyperlipidemia    • Hypertension    • Hypothyroidism 6/13/2011   • Insomnia    • Myocardial infarct (HCC) 2008    Stent 2011.  5/12/17-Cardiologist is DR Wu ([facility])   • PAD (peripheral artery disease)    • Preoperative examination, unspecified 4/12/2011   • Rectal bleed 4/4/2016    Cauterized and received blood transfusions   • Renal disorder 2000    kidney stone   • Sleep apnea 2014    States recommended CPAP but never did get it.   • Stroke (HCC) 4/16/2010    TIA    • Tendonitis    • TIA (transient ischemic attack) 6/13/2011   • Unspecified disorder of thyroid    • Urinary incontinence           Past Surgical History:  Past Surgical History:   Procedure Laterality Date   • TRANSCATHETER AORTIC VALVE REPLACEMENT N/A 2/12/2018    Procedure:  TRANSCATHETER AORTIC VALVE REPLACEMENT;  Surgeon: Jez Waters M.D.;  Location: SURGERY [facility];  Service: Cardiac   • JANETH  2/12/2018    Procedure: JANETH;  Surgeon: Jez Waters M.D.;  Location: SURGERY [facility];  Service: Cardiac   • COLONOSCOPY N/A 5/16/2017    Procedure: COLONOSCOPY;  Surgeon: Osmany Smart M.D.;  Location: SURGERY [facility];  Service:    • LAPAROSCOPY  4/2/2016    exp for rectal bleed and cautery   • COLONOSCOPY  2015   • RECOVERY  8/22/2012    aortogram-Performed by SURGERY, IR-RECOVERY at SURGERY [facility]   • CAROTID ENDARTERECTOMY  5/3/2011    Performed by ERASMO NAVARRETE at SURGERY [facility]   • RECOVERY  4/1/2011    Performed by SURGERY, CATH-RECOVERY at SURGERY SAME DAY [facility] ORS   • RECOVERY  3/25/2011    Performed by SURGERY, CATH-RECOVERY at SURGERY SAME DAY [facility] ORS   • MULTIPLE CORONARY ARTERY BYPASS ENDO VEIN HARVEST  3/20/08    Performed by AMANDA CRYSTAL at SURGERY [facility]   • LITHOTRIPSY  2000   • SEPTOPLASTY  1995   • CATARACT EXTRACTION WITH IOL Bilateral early 1990's       Current Outpatient Medications:  Home Medications     Reviewed by Shanna Lindo (Pharmacy Tech) on 03/19/19 at 1403  Med List Status: Complete   Medication Last Dose Status   allopurinol (ZYLOPRIM) 300 MG Tab 3/19/2019 Active   aspirin EC (ECOTRIN) 81 MG Tablet Delayed Response 3/19/2019 Active   atorvastatin (LIPITOR) 20 MG Tab 3/18/2019 Active   cefdinir (OMNICEF) 300 MG Cap 3/19/2019 Active   levothyroxine (SYNTHROID) 50 MCG Tab 3/19/2019 Active   lisinopril (PRINIVIL) 5 MG Tab 3/19/2019 Active   potassium chloride SA (KDUR) 20 MEQ Tab CR 3/19/2019 Active                Medication Allergy/Sensitivities:  No Known Allergies      Family History (mandatory)   Family History   Problem Relation Age of Onset   • Heart Disease Father    • Other Mother         TB   • Hypertension Unknown    • Colon Cancer Brother    • Breast Cancer Sister    • Other Unknown  "        GOUT       Social History (mandatory)   Social History     Social History   • Marital status:      Spouse name: N/A   • Number of children: N/A   • Years of education: N/A     Occupational History   • Not on file.     Social History Main Topics   • Smoking status: Never Smoker   • Smokeless tobacco: Never Used   • Alcohol use No   • Drug use: No   • Sexual activity: Not on file     Other Topics Concern   • Not on file     Social History Narrative   • No narrative on file     Living situation: Pt has cog impairment and lives with his wife who appears to have cog impairment. They are unable to take care of themselves; need nursing facility   PCP : Halie Dominique M.D.    Physical Exam     Vitals:    03/19/19 1250 03/19/19 1255 03/19/19 1435   BP: 119/70  100/56   Pulse: 88  82   Resp: 20  18   Temp: 36.6 °C (97.8 °F)     TempSrc: Temporal     Weight:  51.7 kg (113 lb 15.7 oz)    Height:  1.448 m (4' 9\")      Body mass index is 24.66 kg/m².  /56   Pulse 82   Temp 36.6 °C (97.8 °F) (Temporal)   Resp 18   Ht 1.448 m (4' 9\")   Wt 51.7 kg (113 lb 15.7 oz)   BMI 24.66 kg/m²   O2 therapy:      Physical Exam   Constitutional:   Very malnourished; temporal wasting; ribs protruding; thin extremities; pt appears to have dificulty talking; communicated with hand squeezes; hand Dr. Hahn who is familiar with pt evaluate his mental status and Dr. Hahn states pt is at baseline.    HENT:   Head: Normocephalic.   Eyes: Pupils are equal, round, and reactive to light. EOM are normal. Right eye exhibits no discharge. Left eye exhibits no discharge. No scleral icterus.   Neck:   No nuchal rigidity    Cardiovascular: Normal rate and regular rhythm.    No murmur heard.  Pulmonary/Chest: Effort normal and breath sounds normal.   Pt desaturating to high 80's on RA but returned to 100% on <0.5L.    Abdominal: Soft. Bowel sounds are normal. He exhibits no distension. There is no tenderness. There is no rebound and no " guarding.   Musculoskeletal: He exhibits no edema or tenderness.   Neurological: He is alert. No cranial nerve deficit.   Difficult to assess orientation. Pt has cognitive impairment at baseline and had difficulty talking    Skin: Skin is warm and dry.   Fresh bruises on knees, chin, and xiphoid process.    Psychiatric:   Cog impairment          Data Review       Old Records Request:   Completed  Current Records review/summary: Completed    Lab Data Review:  Recent Results (from the past 24 hour(s))   LACTIC ACID    Collection Time: 03/19/19  2:10 PM   Result Value Ref Range    Lactic Acid 2.0 0.5 - 2.0 mmol/L   Comp Metabolic Panel    Collection Time: 03/19/19  2:10 PM   Result Value Ref Range    Sodium 138 135 - 145 mmol/L    Potassium 4.8 3.6 - 5.5 mmol/L    Chloride 110 96 - 112 mmol/L    Co2 24 20 - 33 mmol/L    Anion Gap 4.0 0.0 - 11.9    Glucose 112 (H) 65 - 99 mg/dL    Bun 40 (H) 8 - 22 mg/dL    Creatinine 1.72 (H) 0.50 - 1.40 mg/dL    Calcium 10.5 8.5 - 10.5 mg/dL    AST(SGOT) 55 (H) 12 - 45 U/L    ALT(SGPT) 21 2 - 50 U/L    Alkaline Phosphatase 73 30 - 99 U/L    Total Bilirubin 0.9 0.1 - 1.5 mg/dL    Albumin 4.0 3.2 - 4.9 g/dL    Total Protein 6.8 6.0 - 8.2 g/dL    Globulin 2.8 1.9 - 3.5 g/dL    A-G Ratio 1.4 g/dL   CBC WITH DIFFERENTIAL    Collection Time: 03/19/19  2:10 PM   Result Value Ref Range    WBC 14.3 (H) 4.8 - 10.8 K/uL    RBC 4.73 4.70 - 6.10 M/uL    Hemoglobin 15.0 14.0 - 18.0 g/dL    Hematocrit 46.6 42.0 - 52.0 %    MCV 98.5 (H) 81.4 - 97.8 fL    MCH 31.7 27.0 - 33.0 pg    MCHC 32.2 (L) 33.7 - 35.3 g/dL    RDW 48.1 35.9 - 50.0 fL    Platelet Count 177 164 - 446 K/uL    MPV 9.5 9.0 - 12.9 fL    Neutrophils-Polys 84.20 (H) 44.00 - 72.00 %    Lymphocytes 9.00 (L) 22.00 - 41.00 %    Monocytes 5.60 0.00 - 13.40 %    Eosinophils 0.00 0.00 - 6.90 %    Basophils 0.60 0.00 - 1.80 %    Immature Granulocytes 0.60 0.00 - 0.90 %    Nucleated RBC 0.00 /100 WBC    Neutrophils (Absolute) 12.05 (H) 1.82 -  7.42 K/uL    Lymphs (Absolute) 1.29 1.00 - 4.80 K/uL    Monos (Absolute) 0.80 0.00 - 0.85 K/uL    Eos (Absolute) 0.00 0.00 - 0.51 K/uL    Baso (Absolute) 0.08 0.00 - 0.12 K/uL    Immature Granulocytes (abs) 0.08 0.00 - 0.11 K/uL    NRBC (Absolute) 0.00 K/uL   ESTIMATED GFR    Collection Time: 03/19/19  2:10 PM   Result Value Ref Range    GFR If  46 (A) >60 mL/min/1.73 m 2    GFR If Non  38 (A) >60 mL/min/1.73 m 2   CREATINE KINASE    Collection Time: 03/19/19  2:10 PM   Result Value Ref Range    CPK Total 1832 (HH) 0 - 154 U/L       Imaging/Procedures Review:    Independant Imaging Review: Completed  DX-CHEST-PORTABLE (1 VIEW)   Final Result      No evidence of acute cardiopulmonary process.      CT-HEAD W/O   Final Result      No acute intracranial abnormality is identified.      There are periventricular and subcortical white matter changes present.  This finding is nonspecific and could be from previous small vessel ischemia, demyelination, or gliosis.      Atrophy      Paranasal sinus disease.                  Records reviewed and summarized in current documentation :  Yes  [employer] teaching service handout given to patient:  No         Assessment/Plan     * BETTY (acute kidney injury) (HCC)   Assessment & Plan    Likely d/t hypovolemia/decreased oral intake   Baseline is 1.0  BUN/Cr >20    Plan:   IVF  Hold home lisinopril  Avoid nephrotoxic medication      Cognitive impairment- (present on admission)   Assessment & Plan    Pt and his wife are unable to care for themselves. Pt needs nursing facility. No family in area. 1 son in [facility]     Risk for falls- (present on admission)   Assessment & Plan    Pt appears to have fallen recently. Head CT negative for acute bleed. Pt has HA. Will monitor. Tylenol for HA       Hypertension, essential- (present on admission)   Assessment & Plan    Holding home lisinopril in light of kidney injury. -119     Dyslipidemia- (present on  admission)   Assessment & Plan    Con't home statin      CAD (coronary artery disease)- (present on admission)   Assessment & Plan    Con't statin  Holding ASA overnight; day team to reassess ASA use.      Paroxysmal atrial fibrillation (CMS-HCC)- (present on admission)   Assessment & Plan    Per chart review, pt not on AC d/t high fall risk      Headache   Assessment & Plan    Recent fall, hypovolemic. CT head negative for acute bleed  -Tylenol   -IVF     Multiple bruises   Assessment & Plan    Appears as though pt had recent fall; knees, xiphoid process  Plan:  SW to assist pt with finding SNF     Hypovolemia   Assessment & Plan    BUN/Cr>20  NaCl okay  BP okay    Will given IVF     Hypothyroidism- (present on admission)   Assessment & Plan    Con't home levothyroxine      Gout of multiple sites- (present on admission)   Assessment & Plan    Con't home allopurinol          Anticipated Hospital stay:  >2 midnights        Quality Measures  Quality-Core Measures   Reviewed items::  Labs reviewed, Medications reviewed and Radiology images reviewed  Baldwin catheter::  No Baldwin  DVT prophylaxis pharmacological::  Contraindicated - High bleeding risk (Holding pharmacological AC in light of concern for bleeding risk given recent hematochezia)  DVT prophylaxis - mechanical:  SCDs    PCP: Halie Dominique M.D.h&

## 2019-03-19 NOTE — ED PROVIDER NOTES
CHIEF COMPLAINT  Chief Complaint   Patient presents with   • Malaise       HPI  Rafia Shaikh is a 85 y.o. male with multiple co-morbidities BIBEMS with malaise. Patient recently discharged from [facility] after being managed conservatively for ischemic colitis. As per his wife, the patient is getting better in terms of his mental and functional status. This AM, patient seen by [facility] nurse who called EMS after noting the patient was not feeling well. On interview, patient nods appropriately to yes/no questions, but cannot speak clearly. Indicates that he's not feeling well and pointing to his head. Neither he nor his wife are able to provide further history. Patient definitely less responsive than he was prior to discharge. Patient and wife deny falls. Hsa not had recurrence of bleeding.    REVIEW OF SYSTEMS  Pertinent positives include: malaise, ?headache.  Pertinent negatives include: no pain or trouble breathing.  Unable to review the rest of the review of systems as the patient is essentially nonverbal     PAST MEDICAL HISTORY  Past Medical History:   Diagnosis Date   • Anemia 4/2016    due to rectal bleed   • Arrhythmia 5/12/17    Hx A fib. On only aspirin.    • CAD (coronary artery disease)    • Carotid artery stenosis 6/13/2011   • CATARACT 1990's    Bilateral phaco with IOL   • Dental disorder     cavities, under current care   • Dental disorder 5/12/17    permanent bridge lower   • Dizziness 6/13/2011   • Dyslipidemia 9/18/2010   • Gout    • Heart murmur    • High cholesterol    • Hyperlipidemia    • Hypertension    • Hypothyroidism 6/13/2011   • Insomnia    • Myocardial infarct (HCC) 2008    Stent 2011.  5/12/17-Cardiologist is DR Wu ([facility])   • PAD (peripheral artery disease)    • Preoperative examination, unspecified 4/12/2011   • Rectal bleed 4/4/2016    Cauterized and received blood transfusions   • Renal disorder 2000    kidney stone   • Sleep apnea 2014    States recommended CPAP but never did get  it.   • Stroke (HCC) 4/16/2010    TIA    • Tendonitis    • TIA (transient ischemic attack) 6/13/2011   • Unspecified disorder of thyroid    • Urinary incontinence        SOCIAL HISTORY  Social History   Substance Use Topics   • Smoking status: Never Smoker   • Smokeless tobacco: Never Used   • Alcohol use No   a  History   Drug Use No       SURGICAL HISTORY  Past Surgical History:   Procedure Laterality Date   • TRANSCATHETER AORTIC VALVE REPLACEMENT N/A 2/12/2018    Procedure: TRANSCATHETER AORTIC VALVE REPLACEMENT;  Surgeon: Jez Waters M.D.;  Location: SURGERY [facility];  Service: Cardiac   • JANETH  2/12/2018    Procedure: JANETH;  Surgeon: Jez Waters M.D.;  Location: SURGERY [facility];  Service: Cardiac   • COLONOSCOPY N/A 5/16/2017    Procedure: COLONOSCOPY;  Surgeon: Osmany Smart M.D.;  Location: [facility];  Service:    • LAPAROSCOPY  4/2/2016    exp for rectal bleed and cautery   • COLONOSCOPY  2015   • RECOVERY  8/22/2012    aortogram-Performed by SURGERY, IR-RECOVERY at SURGERY [facility] ORS   • CAROTID ENDARTERECTOMY  5/3/2011    Performed by ERASMO NAVARRETE at [facility] ORS   • RECOVERY  4/1/2011    Performed by SURGERY, CATH-RECOVERY at SURGERY SAME DAY [facility] ORS   • RECOVERY  3/25/2011    Performed by SURGERY, CATH-RECOVERY at SURGERY SAME DAY [facility] ORS   • MULTIPLE CORONARY ARTERY BYPASS ENDO VEIN HARVEST  3/20/08    Performed by AMANDA CRYSTAL at SURGERY [facility]   • LITHOTRIPSY  2000   • SEPTOPLASTY  1995   • CATARACT EXTRACTION WITH IOL Bilateral early 1990's       CURRENT MEDICATIONS  Home Medications     Reviewed by Shanna Lindo (Pharmacy Tech) on 03/19/19 at 1403  Med List Status: Complete   Medication Last Dose Status   allopurinol (ZYLOPRIM) 300 MG Tab 3/19/2019 Active   aspirin EC (ECOTRIN) 81 MG Tablet Delayed Response 3/19/2019 Active   atorvastatin (LIPITOR) 20 MG Tab 3/18/2019 Active   cefdinir (OMNICEF) 300 MG Cap 3/19/2019  "Active   levothyroxine (SYNTHROID) 50 MCG Tab 3/19/2019 Active   lisinopril (PRINIVIL) 5 MG Tab 3/19/2019 Active   potassium chloride SA (KDUR) 20 MEQ Tab CR 3/19/2019 Active                ALLERGIES  No Known Allergies    PHYSICAL EXAM (2)  VITAL SIGNS: /56   Pulse 82   Temp 36.6 °C (97.8 °F) (Temporal)   Resp 18   Ht 1.448 m (4' 9\")   Wt 51.7 kg (113 lb 15.7 oz)   BMI 24.66 kg/m²   Constitutional: Frail old man with temporal wasting. No acute distress.  HENT: dry mucus membranes. Questionable bruise on forehead.  Eyes: EOMI. PERRL.  Respiratory: good bilateral air entry. No adventitious sounds appreciated.  Cardiovascular: regular rate and rhythm. No murmurs, rubs, or gallops.  Gastrointestinal: + bowel sounds. Soft, non-distended, non-tender.  Extremities: no peripheral edema. Bruise on right lateral knee. Redness on left knee.  Integumentary: scrape on right knee.  Neurological: Awake, nods appropriately to questions, but cannot verbalize clearly. Otherwise grossly intact.     DIFFERENTIAL DIAGNOSIS:  ICH vs dehydration vs electrolyte abnormalities.    RADIOLOGY/PROCEDURES  DX-CHEST-PORTABLE (1 VIEW)   Final Result      No evidence of acute cardiopulmonary process.      CT-HEAD W/O   Final Result      No acute intracranial abnormality is identified.      There are periventricular and subcortical white matter changes present.  This finding is nonspecific and could be from previous small vessel ischemia, demyelination, or gliosis.      Atrophy      Paranasal sinus disease.             LABORATORY: Reviewed as below.  Results for orders placed or performed during the hospital encounter of 03/19/19   LACTIC ACID   Result Value Ref Range    Lactic Acid 2.0 0.5 - 2.0 mmol/L   Comp Metabolic Panel   Result Value Ref Range    Sodium 138 135 - 145 mmol/L    Potassium 4.8 3.6 - 5.5 mmol/L    Chloride 110 96 - 112 mmol/L    Co2 24 20 - 33 mmol/L    Anion Gap 4.0 0.0 - 11.9    Glucose 112 (H) 65 - 99 mg/dL    Bun " 40 (H) 8 - 22 mg/dL    Creatinine 1.72 (H) 0.50 - 1.40 mg/dL    Calcium 10.5 8.5 - 10.5 mg/dL    AST(SGOT) 55 (H) 12 - 45 U/L    ALT(SGPT) 21 2 - 50 U/L    Alkaline Phosphatase 73 30 - 99 U/L    Total Bilirubin 0.9 0.1 - 1.5 mg/dL    Albumin 4.0 3.2 - 4.9 g/dL    Total Protein 6.8 6.0 - 8.2 g/dL    Globulin 2.8 1.9 - 3.5 g/dL    A-G Ratio 1.4 g/dL   CBC WITH DIFFERENTIAL   Result Value Ref Range    WBC 14.3 (H) 4.8 - 10.8 K/uL    RBC 4.73 4.70 - 6.10 M/uL    Hemoglobin 15.0 14.0 - 18.0 g/dL    Hematocrit 46.6 42.0 - 52.0 %    MCV 98.5 (H) 81.4 - 97.8 fL    MCH 31.7 27.0 - 33.0 pg    MCHC 32.2 (L) 33.7 - 35.3 g/dL    RDW 48.1 35.9 - 50.0 fL    Platelet Count 177 164 - 446 K/uL    MPV 9.5 9.0 - 12.9 fL    Neutrophils-Polys 84.20 (H) 44.00 - 72.00 %    Lymphocytes 9.00 (L) 22.00 - 41.00 %    Monocytes 5.60 0.00 - 13.40 %    Eosinophils 0.00 0.00 - 6.90 %    Basophils 0.60 0.00 - 1.80 %    Immature Granulocytes 0.60 0.00 - 0.90 %    Nucleated RBC 0.00 /100 WBC    Neutrophils (Absolute) 12.05 (H) 1.82 - 7.42 K/uL    Lymphs (Absolute) 1.29 1.00 - 4.80 K/uL    Monos (Absolute) 0.80 0.00 - 0.85 K/uL    Eos (Absolute) 0.00 0.00 - 0.51 K/uL    Baso (Absolute) 0.08 0.00 - 0.12 K/uL    Immature Granulocytes (abs) 0.08 0.00 - 0.11 K/uL    NRBC (Absolute) 0.00 K/uL   ESTIMATED GFR   Result Value Ref Range    GFR If  46 (A) >60 mL/min/1.73 m 2    GFR If Non  38 (A) >60 mL/min/1.73 m 2   CREATINE KINASE   Result Value Ref Range    CPK Total 1832 (HH) 0 - 154 U/L       INTERVENTIONS:  Medications   LR infusion (continuous) (not administered)   LR infusion (bolus) (1,000 mL Intravenous New Bag 3/19/19 1434)     IV fluids given for sepsis protocol    COURSE & MEDICAL DECISION MAKING  Patient seen and examined at bedside. Since his discharge on Sunday, the patient is more lethargic, but easily arousable and nods appropriately yes/no to questions.  Unclear what exactly happened in the past 2 days, but as  per the wife the patient has been recovering slowly from his hospital stay.  Also unclear why the patient was not getting 24-hour home health as was part of the discharge plan.  Patient will be admitted to the [facility] for further treatment and evaluation as well as for aggressive social work intervention as to further care and placement of the patient.    PLAN:  Admit to medical, telemetry.    CONDITION: guarded.    FINAL IMPRESSION  1. Stupor    2. Malaise    3. Dehydration    4. Renal insufficiency    5. Non-traumatic rhabdomyolysis          Electronically signed by: Naty Lilly, 3/19/2019 1:46 PM

## 2019-03-19 NOTE — ED NOTES
Med rec updated and complete.  Allergies reviewed. Pt  Recently finished a course of cefdinir .  03/19/19 was the last dose.

## 2019-03-20 PROBLEM — E86.1 HYPOVOLEMIA: Status: RESOLVED | Noted: 2019-01-01 | Resolved: 2019-01-01

## 2019-03-20 PROBLEM — R54 AGE-RELATED PHYSICAL DEBILITY: Status: ACTIVE | Noted: 2018-05-07

## 2019-03-20 NOTE — CARE PLAN
Problem: Safety  Goal: Will remain free from injury  Outcome: PROGRESSING AS EXPECTED  Fall precautions in place. Bed alarm on.

## 2019-03-20 NOTE — ASSESSMENT & PLAN NOTE
Guardianship obtained 7/19/19.  Behavior is appropriate.   Ethics and palliative care previously evaluated the patient. He is now comfort care.     There was concern for possible underlying undiagnosed Parkinson's disease.  MRI brain showed moderate to severe cerebral volume loss consistent with advanced dementia.  Neurology evaluated patient and do not suspect Parkinson's.  Will not initiate additional medications at this time.

## 2019-03-20 NOTE — CARE PLAN
Problem: Safety  Goal: Will remain free from injury  Outcome: PROGRESSING AS EXPECTED  Hourly rounding in place. Educated wife and pt. To use call light    Problem: Infection  Goal: Will remain free from infection  Outcome: PROGRESSING AS EXPECTED  No s/s of infection noted.

## 2019-03-20 NOTE — PROGRESS NOTES
2 RN skin check done with Christine PADILLA. Pt. Has multiple bruises to BUE. Blanchable redness to sacrum. A bruise to the lateral aspect of the right knee. Bilateral Heels pink and blanchable. Bruising to left side, right side and middle of forehead. Redness to xyphoid processes.

## 2019-03-20 NOTE — ASSESSMENT & PLAN NOTE
S/p 3-vessel CABG with LIMA to LAD, saphenous vein graft to obtuse marginal branch, and saphenous vein graft to the posterior descending artery by Dr. Coates in March 2018.  Aspirin and statin discontinued when he became comfort care status.  Continue comfort care.

## 2019-03-20 NOTE — DISCHARGE PLANNING
LSW placed call to EPS intake regarding report and previous open case, LSW left detailed message requesting intake unit staff to return call.     LSW attempted to contact eps  Kimberli Abrams at [phone], left message regarding Pt being admitted and inquiry previous case remains opens. LSW also contacted EPS SW on call and requested call back.

## 2019-03-20 NOTE — RESPIRATORY CARE
COPD EDUCATION by COPD CLINICAL EDUCATOR  3/19/2019 at 5:34 PM by Alicja Collins     Patient reviewed by COPD education team. Patient does not qualify for COPD program.

## 2019-03-20 NOTE — ASSESSMENT & PLAN NOTE
Seen on admission, on knees and sternum, appeared as though patient had a recent fall.   PT and OT, fall precautions.  Resolved.

## 2019-03-20 NOTE — ASSESSMENT & PLAN NOTE
Chronic & stable.   No acute flare.   All medications have been discontinued.  Patient on comfort care.

## 2019-03-20 NOTE — PROGRESS NOTES
2 rn skin check  Bruising to BUE noted. Redness/abrasion to R shin noted.  Blanchable redness to sacrum.

## 2019-03-20 NOTE — PROGRESS NOTES
Assumed care at 0700. Pt. Is resting quietly in bed. Having episodes of sleep apnea during bed side report and O2 sats dropping into the mid 80's on room air. Easily arousable with O2 sats climbing to mid 90's. Bed alarm on. Wife at bedside as a social admit. Hourly rounding initiated and will continue to monitor.

## 2019-03-20 NOTE — THERAPY
Speech Language Therapy Clinical Swallow Evaluation completed.  Functional Status: Clinical swallow evaluation completed this date. Of note: wife is in the hospital bed next to patient admitted for social reasons, per RN. Pt sleeping upon arrival though able to rouse and maintain alertness with verbal and tactile cuing. Pt presents with overall weakness and slow movements/responses. Pt initially very lethargic with hypophonic, weak voice though alertness, responsiveness and vocal quality improved throughout the session, especially once pt presented with PO trials. Speech c/b slow rate, decreased articulatory precision. Aggressive oral care completed prior to PO trials as pt had thick, dried, stringy secretions on lingual surface, soft and hard palate. These secretions were not fully removed with oral care but were gone upon completion of PO trials. Pt presented with ice chips x5, NTL via tsp/cup (4 oz), thins via tsp/cup (1 oz), purees (8 oz). Oral phase mild-mod prolonged for bolus manipulation and A-P transit with purees. However, no oral residue present post swallow. Pharyngeal swallow trigger was delayed 3-5s across PO intake with pt executing 1 swallow for purees and 2 swallows for NTL, thins. No overt s/sx of aspiration appreciated with NTL, purees. However, wet vocal quality noted with intake of thins via cup. Pt unable to execute strong cough upon command, though attempted (pt weakly throat cleared). Vocal quality was clear with purees/nectars. Recommend initiating diet of nectar thick full liquids by tsp only with 1:1 feeding A and strict use of safe swallow strategies: upright at 90 degrees, must be alert, tsp size bites/sips, slow rate of intake, allow pt to swallow twice, crush meds in puree, hold PO with any difficulty. SLP following.     Recommendations - Diet:  Nectar thick full liquid by tsp                           Strategies: Strict 1:1 feeding , No Straws and Head of Bed at 90 Degrees              "             Medication Administration:    Plan of Care: Will benefit from Speech Therapy 3 times per week  Post-Acute Therapy: Recommend inpatient transitional care services for continued speech therapy services.      See \"Rehab Therapy-Acute\" Patient Summary Report for complete documentation.   "

## 2019-03-20 NOTE — PROGRESS NOTES
Assume care of patient. Pt is A/O 1-2. Pt is lethargic, able to answer yes/no questions. Pt is incontinent, condom cath in place. Fall precautions in place. Bed alarm on. Hourly rounding in place.     Wife is at bedside. Wife hx dementia, admitted as a social admit.

## 2019-03-20 NOTE — DISCHARGE PLANNING
LSW received call back from EPS intake department regarding previous EPS case, LSW informed case has been closed and new report completed. Unit LSW Cassy informed, LSW awaiting additional information from EPS.

## 2019-03-20 NOTE — PROGRESS NOTES
Internal Medicine Interval Note  Note Author: Grady Hahn M.D.     Name Rafia Shaikh       1933   Age/Sex 85 y.o. male   MRN [id number]   Code Status DNAR/DNI     After 5PM or if no immediate response to page, please call for cross-coverage  Attending/Team: Kusum/Tony See Patient List for primary contact information  Call [phone] to page    1st Call - Day Intern (R1):   Hahn 2nd Call - Day Sr. Resident (R2/R3):   Maricel         Reason for interval visit  (Principal Problem)   Malaise      Interval Problem Daily Status Update  (24 hours, problem oriented, brief subjective history, new lab/imaging data pertinent to that problem)   -EFRAIN o/n, patient does not endorse current pain, no current concerns    -pending placement coordinating with SW, wife is also inpatient and will need to coordinate  -after discussion with patient and wife, they are agreeable to post-acute placement at SNF, assisted living or other prior to discharge home    Review of Systems   Constitutional: Negative for chills and fever.   HENT: Negative for ear pain and hearing loss.    Eyes: Negative for blurred vision and double vision.   Respiratory: Negative for cough and shortness of breath.    Cardiovascular: Negative for chest pain and palpitations.   Gastrointestinal: Negative for nausea and vomiting.   Genitourinary: Negative for dysuria and hematuria.   Musculoskeletal: Negative for joint pain and myalgias.   Skin: Negative for itching and rash.   Neurological: Negative for dizziness and headaches.   Endo/Heme/Allergies: Negative for polydipsia. Does not bruise/bleed easily.   Psychiatric/Behavioral: Negative for depression. The patient is not nervous/anxious.        Disposition/Barriers to discharge:   placement    Consultants/Specialty  none  PCP: Halie Dominique M.D.      Quality Measures  Quality-Core Measures   Reviewed items::  Labs reviewed and Medications reviewed  Baldwin catheter::  No Baldwin  DVT: Contraindicated -  High bleeding risk.  DVT prophylaxis - mechanical:  SCDs          Physical Exam       Vitals:    03/19/19 1915 03/20/19 0000 03/20/19 0400 03/20/19 0800   BP: 140/55 134/63 150/62 140/62   Pulse: 69 78 77 60   Resp: 20 18 16 15   Temp: 36.4 °C (97.5 °F) 36.2 °C (97.2 °F) 36.6 °C (97.8 °F) 36.2 °C (97.1 °F)   TempSrc: Temporal Temporal Temporal Temporal   SpO2: 100% 100% 98% 93%   Weight: 47.2 kg (104 lb 0.9 oz)      Height:         Body mass index is 22.52 kg/m². Weight: 47.2 kg (104 lb 0.9 oz)  Oxygen Therapy:  Pulse Oximetry: 93 %, O2 (LPM): 2, O2 Delivery: None (Room Air)    Physical Exam   Constitutional: He is oriented to person, place, and time and well-developed, well-nourished, and in no distress. He appears cachectic. No distress.   HENT:   Head: Normocephalic and atraumatic.   Mouth/Throat: Oropharynx is clear and moist. No oropharyngeal exudate.   Eyes: Pupils are equal, round, and reactive to light. Conjunctivae and EOM are normal. Right eye exhibits no discharge. Left eye exhibits no discharge. No scleral icterus.   Neck: Normal range of motion. Neck supple. No tracheal deviation present.   Cardiovascular: Normal rate, regular rhythm, normal heart sounds and intact distal pulses.  Exam reveals no gallop and no friction rub.    No murmur heard.  Pulmonary/Chest: Effort normal and breath sounds normal. No respiratory distress. He has no wheezes. He has no rales. He exhibits no tenderness.   Abdominal: Soft. Bowel sounds are normal. He exhibits no distension and no mass. There is no tenderness. There is no rebound and no guarding.   Musculoskeletal: Normal range of motion. He exhibits no edema, tenderness or deformity.   Neurological: He is alert and oriented to person, place, and time. He exhibits normal muscle tone. Coordination normal.   Skin: Skin is warm and dry. No rash noted. He is not diaphoretic. No erythema. No pallor.   Minor abrasions on face, arms, legs and chest   Psychiatric: Mood and affect  normal.             Assessment/Plan     * Cognitive impairment- (present on admission)   Assessment & Plan    Pt and his wife are unable to care for themselves, progressive decline per wife  -ethics consult on 3/7/18 detailing recommendations depending on capacity status of patient and wife    -coordinating with SW for safe placement     Risk for falls- (present on admission)   Assessment & Plan    Pt appears to have fallen recently. Head CT negative for acute bleed. Pt has HA. Will monitor. Tylenol for HA       Hypertension, essential- (present on admission)   Assessment & Plan    -will resume home lisinopril if needed     Dyslipidemia- (present on admission)   Assessment & Plan    Con't home statin      CAD (coronary artery disease)- (present on admission)   Assessment & Plan    -H/o 3-vessel coronary bypass graft with left internal mammary artery graft to left anterior descending artery, saphenous vein graft to obtuse marginal branch and saphenous vein graft to posterior descending artery by Dr. Coates in March 2018.    -Con't statin, asa     Paroxysmal atrial fibrillation (CMS-HCC)- (present on admission)   Assessment & Plan    -Xarelto discontinued by cards in August 2018  -Per chart review, pt not on AC d/t high fall risk      Headache   Assessment & Plan    Recent fall, hypovolemic. CT head negative for acute bleed  -Tylenol   -IVF     Multiple bruises   Assessment & Plan    Appears as though pt had recent fall; knees, xiphoid process  Plan:  SW to assist pt with finding SNF     BETTY (acute kidney injury) (HCC)   Assessment & Plan    -resolved  Likely d/t hypovolemia/decreased oral intake   Baseline is 1.0  BUN/Cr >20    IVF  Hold home lisinopril  Avoid nephrotoxic medication      Hypothyroidism- (present on admission)   Assessment & Plan    Con't home levothyroxine      Age-related physical debility- (present on admission)   Assessment & Plan    -previously assess by PT/OT requiring SNF for further therapy  needs    -continue PT/OT as needed     Gout of multiple sites- (present on admission)   Assessment & Plan    Con't home allopurinol

## 2019-03-21 NOTE — PROGRESS NOTES
· 2 RN skin check complete with RANDY Francis.  · Devices in place none.  · Skin assessed under devices intact.  · Confirmed pressure ulcers found on NA.  · New potential pressure ulcers noted on right middle toe, abrasion to right knee. Wound consult placed and wound reported.  · The following interventions in place waffle mattress placed, oxygen tubing offloaded. Pillows to float elbows and heels. Elbows are pink and slow to sergio. Ears are pink and slow to sergio.

## 2019-03-21 NOTE — THERAPY
"Speech Language Therapy dysphagia treatment completed.   Functional Status:  Pt tolerated trials of dysphagia 1 (pudding and applesauce) and dysphagia 2 (pieces of fruit) without any overt s/s of aspiration or penetration. Pt tolerated cup sips of NTL without any overt s/s of penetration/aspiration. Pt coughed x2 during trials of thin liquid during cup sip and straw sips. In addition, during a normal texture trial the pt coughed x1. Thin liquid was provided as a liquid wash, pt continued to cough. Therapist used verbal cues to encourage small sips and double swallows as a compensatory strategy to swallow safely which resulted in no s/s concerning for penetration/ aspiration on dysphagia 2 and NTL. Recommend upgrade to dysphagia 2 soft solids with continuation of NTL.  Recommendations: 1) Dysphagia 2 NTL 2) Monitor during meals 3) Crush meds in puree   Plan of Care: Will benefit from Speech Therapy 3 times per week  Post-Acute Therapy: Discharge to a transitional care facility for continued skilled therapy services.    See \"Rehab Therapy-Acute\" Patient Summary Report for complete documentation.     "

## 2019-03-21 NOTE — THERAPY
"Physical Therapy Evaluation completed.   Bed Mobility:  Supine to Sit: Minimal Assist  Transfers: Sit to Stand: Minimal Assist  Gait: Level Of Assist: Minimal Assist with Front-Wheel Walker     75 ft  Plan of Care: Will benefit from Physical Therapy 2 times per week  Discharge Recommendations: Equipment: Will Continue to Assess for Equipment Needs. Post-acute therapy  Recommend inpatient transitional care services for continued physical therapy services.    See \"Rehab Therapy-Acute\" Patient Summary Report for complete documentation.     "

## 2019-03-21 NOTE — PROGRESS NOTES
HLOC for afib. Pt in bed, bed locked in low position, bed alarm is on, pt A&Ox2, on 2L NC, no complaints of pain at this time. Call light and personal possessions in reach. Hourly rounding in place

## 2019-03-21 NOTE — PROGRESS NOTES
Medicine Cross Coverage Note    I was paged 5:47 PM that the patient's heart rate was sustaining in the 170's. A rapid response was called.  I immediately evaluated the patient at bedside with Dr. Zhang and ordered a STAT EKG . The EKG showed wide complex tachycardia with a heart of 174. The patient's blood pressure was also decreased in the 80s/40s likely secondary to decreased cardiac output. We gave a total of 3 500 cc boluses of NS and the patient's BP increased.  Due to the patient's history of paroxysmal atrial fibrillation, we administered 25mg PO metoprolol followed by 5mg IV metoprolol x 3. The patient's heart rate subsequently reduced to the 80s - low 100s and was rate-controlled. The patient was placed on cardiac monitor and transferred to Telemetry for further cardiac monitoring.

## 2019-03-21 NOTE — THERAPY
"Occupational Therapy Evaluation completed.   Functional Status:  Min A supine>sit EOB, max A LB dressing, min A sit>stand walking in room w/fww and min A, frequent posterior and lateral LOB. Pt returned BTB w/alarm on and call light w/in reach. RN aware of session and pts efforts.   Plan of Care: Will benefit from Occupational Therapy 2 times per week  Discharge Recommendations:  Equipment: Will Continue to Assess for Equipment Needs. Post-acute therapy     See \"Rehab Therapy-Acute\" Patient Summary Report for complete documentation.      85 yr old male admitted for malaise, pt was recently d/c'd from [facility] on 3/18. PMHX dementia, CAD, CABG, TAVR, PAD, OAS, and GI bleed. Pt is demonstrating generalized weakness, and decreased activity tolerance. Pt is a poor historian and is unable to provide PLOF. Notes from previous/recent admission per therapy recommendations were for post acute placement and to note HH does not provide 24/7 assistance, which was indicated to be available at d/c (see notes on 3/17). Given pts advanced dementia and high fall risk, pt needs 24/7 assistance and supervision, additionally per nsg pts spouse is also currently admitted to the hospital. Will follow pt in this setting and recommend continued daily OOB activity w/nsg (up to chair for meals and ambulation to the bathroom as tolerated) anticipate pt needs long term care/placement if family is unable to provide care.   "

## 2019-03-21 NOTE — PROGRESS NOTES
Internal Medicine Interval Note  Note Author: Grady Hahn M.D.     Name Rafia Shaikh       1933   Age/Sex 85 y.o. male   MRN [id number]   Code Status DNAR/DNI     After 5PM or if no immediate response to page, please call for cross-coverage  Attending/Team: Kusum/Tony See Patient List for primary contact information  Call [phone] to page    1st Call - Day Intern (R1):   Hahn 2nd Call - Day Sr. Resident (R2/R3):   Maricel         Reason for interval visit  (Principal Problem)   Malaise      Interval Problem Daily Status Update  (24 hours, problem oriented, brief subjective history, new lab/imaging data pertinent to that problem)   -patient with afib RVR last night 150-190s, rapid called, converted to SR following metoprolol, metoprolol scheduled; patient received 1.5L bolus total for hypotension; please see Dr. Landin's note from 3/20/19 for further details  -patient does not endorse current pain, no current concerns, conversive, HR 61 this morning  -CPK elevated but downtrending, UA pending    -pending placement coordinating with SW, wife is also inpatient and will need to coordinate  -after discussion with patient and wife, they are agreeable to post-acute placement at SNF, assisted living or other prior to discharge home    Review of Systems   Constitutional: Negative for chills and fever.   HENT: Negative for ear pain and hearing loss.    Eyes: Negative for blurred vision and double vision.   Respiratory: Negative for cough and shortness of breath.    Cardiovascular: Negative for chest pain and palpitations.   Gastrointestinal: Negative for nausea and vomiting.   Genitourinary: Negative for dysuria and hematuria.   Musculoskeletal: Negative for joint pain and myalgias.   Skin: Negative for itching and rash.   Neurological: Negative for dizziness and headaches.   Endo/Heme/Allergies: Negative for polydipsia. Does not bruise/bleed easily.   Psychiatric/Behavioral: Negative for depression. The  patient is not nervous/anxious.        Disposition/Barriers to discharge:   placement    Consultants/Specialty  none  PCP: Halie Dominique M.D.      Quality Measures  Quality-Core Measures   Reviewed items::  Labs reviewed and Medications reviewed  Baldwin catheter::  No Baldwin  DVT: Contraindicated - High bleeding risk.  DVT prophylaxis - mechanical:  SCDs          Physical Exam       Vitals:    03/20/19 2012 03/21/19 0016 03/21/19 0200 03/21/19 0437   BP: (!) 99/55 124/62  113/51   Pulse: (!) 55 61  61   Resp: 16 15  17   Temp: 36.1 °C (96.9 °F) 36.6 °C (97.9 °F)  36.2 °C (97.2 °F)   TempSrc: Temporal Temporal  Temporal   SpO2: 98% 98%  100%   Weight: 50 kg (110 lb 3.7 oz)  52.7 kg (116 lb 2.9 oz)    Height:         Body mass index is 25.14 kg/m². Weight: 52.7 kg (116 lb 2.9 oz)  Oxygen Therapy:  Pulse Oximetry: 100 %, O2 (LPM): 2, O2 Delivery: Silicone Nasal Cannula    Physical Exam   Constitutional: He is oriented to person, place, and time and well-developed, well-nourished, and in no distress. He appears cachectic. No distress.   HENT:   Head: Normocephalic and atraumatic.   Mouth/Throat: Oropharynx is clear and moist. No oropharyngeal exudate.   Eyes: Pupils are equal, round, and reactive to light. Conjunctivae and EOM are normal. Right eye exhibits no discharge. Left eye exhibits no discharge. No scleral icterus.   Neck: Normal range of motion. Neck supple. No tracheal deviation present.   Cardiovascular: Normal rate, regular rhythm, normal heart sounds and intact distal pulses.  Exam reveals no gallop and no friction rub.    No murmur heard.  Pulmonary/Chest: Effort normal and breath sounds normal. No respiratory distress. He has no wheezes. He has no rales. He exhibits no tenderness.   Abdominal: Soft. Bowel sounds are normal. He exhibits no distension and no mass. There is no tenderness. There is no rebound and no guarding.   Musculoskeletal: Normal range of motion. He exhibits no edema, tenderness or  deformity.   Neurological: He is alert and oriented to person, place, and time. He exhibits normal muscle tone. Coordination normal.   Skin: Skin is warm and dry. No rash noted. He is not diaphoretic. No erythema. No pallor.   Minor abrasions on face, arms, legs and chest   Psychiatric: Mood and affect normal.             Assessment/Plan     * Cognitive impairment- (present on admission)   Assessment & Plan    Pt and his wife are unable to care for themselves, progressive decline per wife  -ethics consult on 3/7/18 detailing recommendations depending on capacity status of patient and wife    -coordinating with SW for safe placement     Risk for falls- (present on admission)   Assessment & Plan    Pt appears to have fallen recently. Head CT negative for acute bleed. Pt has HA. Will monitor. Tylenol for HA       Hypertension, essential- (present on admission)   Assessment & Plan    -will resume home lisinopril if needed  -metoprolol scheduled     Dyslipidemia- (present on admission)   Assessment & Plan    Con't home statin      CAD (coronary artery disease)- (present on admission)   Assessment & Plan    -H/o 3-vessel coronary bypass graft with left internal mammary artery graft to left anterior descending artery, saphenous vein graft to obtuse marginal branch and saphenous vein graft to posterior descending artery by Dr. Coates in March 2018.    -Con't statin, asa     Paroxysmal atrial fibrillation (CMS-HCC)- (present on admission)   Assessment & Plan    -Xarelto discontinued by cards in August 2018  -Per chart review, pt not on AC d/t high fall risk   -metoprolol     Headache   Assessment & Plan    Recent fall, hypovolemic. CT head negative for acute bleed  -Tylenol   -IVF     Multiple bruises   Assessment & Plan    Appears as though pt had recent fall; knees, xiphoid process  Plan:  SW to assist pt with finding SNF     BETTY (acute kidney injury) (HCC)   Assessment & Plan    -resolved  Likely d/t hypovolemia/decreased  oral intake   Baseline is 1.0  BUN/Cr >20    IVF  Hold home lisinopril  Avoid nephrotoxic medication      Hypothyroidism- (present on admission)   Assessment & Plan    Con't home levothyroxine      Age-related physical debility- (present on admission)   Assessment & Plan    -previously assess by PT/OT requiring SNF for further therapy needs    -continue PT/OT as needed     Gout of multiple sites- (present on admission)   Assessment & Plan    Con't home allopurinol

## 2019-03-21 NOTE — PROGRESS NOTES
CNA advised nurse at 1730 of patient heart rate of 170. Charge nurse notified and came in to assist in assessing pt. Attempted to have pt. Perform valsalva maneuvers, resulted in no change of rhythm. Attempted several times with no change in rhythm.   Change in patient condition due to: Cardiac  Rapid Response called at: 1815  Physician Urvashi notified at 1800    See Code Blue timeline for rapid response events. Patient Remained on Unit   Rapid team and MD remains at bedside. Report given to on coming nurse.

## 2019-03-22 NOTE — THERAPY
"Speech Language Therapy Evaluation completed to address cognition  Functional Status:  Per admit notes, pt with hist of \"cognitive deficits.\" MMSE completed with pt scoring 16/30 indicating severe deficits for lower level cognitive linguistic tasks. Pt with slow rate of speech with mild dysarthria and imprecise articulation. Pt with also intermittent dysfluency.Perseverative responses and probable word retrieval difficulty when pt attempedt to answer personal questions regarding his family and prior work. Pt stating year \"1990.\" Pt accurate stating month, city without cues. Pt able to repeat 3 words but unable to recall the 3 words following approx 1 minute. With choice of 2 cues, pt was 2/3 correct choosing the words that were initially repeated. Pt with small cursive writing with reduced legibility when writing the sentence \"I am .\" Earlier during the eval, SLP had asked the pt what type of work he retired from. Pt will benefit from assist and superv following d/c from acute hospital   Recommendations:  Assess oral reading and reading compreh.   Plan of Care: Will benefit from Speech Therapy 3 times per week  Post-Acute Therapy: dysphagia and cognition/language. ThanksDeon    See \"Rehab Therapy-Acute\" Patient Summary Report for complete documentation.   "

## 2019-03-22 NOTE — PROGRESS NOTES
Internal Medicine Interval Note  Note Author: Grady Hahn M.D.     Name Rafia Shaikh       1933   Age/Sex 85 y.o. male   MRN [id number]   Code Status DNAR/DNI     After 5PM or if no immediate response to page, please call for cross-coverage  Attending/Team: Kusum/Tony See Patient List for primary contact information  Call [phone] to page    1st Call - Day Intern (R1):   Hahn 2nd Call - Day Sr. Resident (R2/R3):   Maricel         Reason for interval visit  (Principal Problem)   Malaise      Interval Problem Daily Status Update  (24 hours, problem oriented, brief subjective history, new lab/imaging data pertinent to that problem)   -EFRAIN o/n, patient does not endorse current pain, no current concerns  -per tele, PAC, rare PVCs overnight, patient paced  -contacted [employer] for pacemaker interrogation yesterday, normal device function, noted afib w/RVR on 3/20, printout in chart to be scanned in  -troponin downtrended  -echo EF65, TAVR aortic valve normal, RVSP 30mmHg, no significant change since last echo  -restarted lisinopril  -congnitive evaluation, to help assess safe placement  -transferred to medical    -pending placement coordinating with SW, wife is also inpatient and will need to coordinate  -after discussion with patient and wife, they are agreeable to post-acute placement at SNF, assisted living or other prior to discharge home    Review of Systems   Constitutional: Negative for chills and fever.   HENT: Negative for ear pain and hearing loss.    Eyes: Negative for blurred vision and double vision.   Respiratory: Negative for cough and shortness of breath.    Cardiovascular: Negative for chest pain and palpitations.   Gastrointestinal: Negative for nausea and vomiting.   Genitourinary: Negative for dysuria and hematuria.   Musculoskeletal: Negative for joint pain and myalgias.   Skin: Negative for itching and rash.   Neurological: Negative for dizziness and headaches.    Endo/Heme/Allergies: Negative for polydipsia. Does not bruise/bleed easily.   Psychiatric/Behavioral: Negative for depression. The patient is not nervous/anxious.        Disposition/Barriers to discharge:   placement    Consultants/Specialty  none  PCP: Halie Dominique M.D.      Quality Measures  Quality-Core Measures   Reviewed items::  Labs reviewed and Medications reviewed  Baldwin catheter::  No Baldwin  DVT: Contraindicated - High bleeding risk.  DVT prophylaxis - mechanical:  SCDs          Physical Exam       Vitals:    03/22/19 0004 03/22/19 0503 03/22/19 0850 03/22/19 1135   BP: 154/60 153/58 (!) 161/60 150/75   Pulse: 61 61 60 60   Resp: 18 18 16 16   Temp: 36.1 °C (97 °F) 36.1 °C (96.9 °F) 36.4 °C (97.6 °F) 36.2 °C (97.2 °F)   TempSrc: Temporal Temporal Temporal Temporal   SpO2: 99% 97% 90% 92%   Weight:       Height:         Body mass index is 23.14 kg/m². Weight: 48.5 kg (106 lb 14.8 oz)  Oxygen Therapy:  Pulse Oximetry: 92 %, O2 (LPM): 1, O2 Delivery: Silicone Nasal Cannula    Physical Exam   Constitutional: He is oriented to person, place, and time and well-developed, well-nourished, and in no distress. He appears cachectic. No distress.   HENT:   Head: Normocephalic and atraumatic.   Mouth/Throat: Oropharynx is clear and moist. No oropharyngeal exudate.   Eyes: Pupils are equal, round, and reactive to light. Conjunctivae and EOM are normal. Right eye exhibits no discharge. Left eye exhibits no discharge. No scleral icterus.   Neck: Normal range of motion. Neck supple. No tracheal deviation present.   Cardiovascular: Normal rate, regular rhythm, normal heart sounds and intact distal pulses.  Exam reveals no gallop and no friction rub.    No murmur heard.  Pulmonary/Chest: Effort normal and breath sounds normal. No respiratory distress. He has no wheezes. He has no rales. He exhibits no tenderness.   Abdominal: Soft. Bowel sounds are normal. He exhibits no distension and no mass. There is no tenderness.  There is no rebound and no guarding.   Musculoskeletal: Normal range of motion. He exhibits no edema, tenderness or deformity.   Neurological: He is alert and oriented to person, place, and time. He exhibits normal muscle tone. Coordination normal.   Skin: Skin is warm and dry. No rash noted. He is not diaphoretic. No erythema. No pallor.   Minor abrasions on face, arms, legs and chest   Psychiatric: Mood and affect normal.             Assessment/Plan     * Cognitive impairment- (present on admission)   Assessment & Plan    Pt and his wife are unable to care for themselves, progressive decline per wife  -ethics consult on 3/7/18 detailing recommendations depending on capacity status of patient and wife    -coordinating with  for safe placement     Risk for falls- (present on admission)   Assessment & Plan    Pt appears to have fallen recently. Head CT negative for acute bleed. Pt has HA. Will monitor. Tylenol for HA       Hypertension, essential- (present on admission)   Assessment & Plan    -metoprolol, lisinopril scheduled     Dyslipidemia- (present on admission)   Assessment & Plan    Con't home statin      CAD (coronary artery disease)- (present on admission)   Assessment & Plan    -H/o 3-vessel coronary bypass graft with left internal mammary artery graft to left anterior descending artery, saphenous vein graft to obtuse marginal branch and saphenous vein graft to posterior descending artery by Dr. Coates in March 2018.  -echo EF65, TAVR aortic valve normal, RVSP 30mmHg, no significant change since last echo  -pacemaker interrogated with normal device function    -Con't statin, asa, metoprolol, lisinopril     Paroxysmal atrial fibrillation (CMS-HCC)- (present on admission)   Assessment & Plan    -Xarelto discontinued by cards in August 2018  -Per chart review, pt not on AC d/t high fall risk   -metoprolol     Headache   Assessment & Plan    Recent fall, hypovolemic. CT head negative for acute bleed  -Tylenol    -IVF     Multiple bruises   Assessment & Plan    Appears as though pt had recent fall; knees, xiphoid process  Plan:  SW to assist pt with finding SNF     BETTY (acute kidney injury) (HCC)   Assessment & Plan    -resolved  Likely d/t hypovolemia/decreased oral intake   Baseline is 1.0  BUN/Cr >20    IVF  Hold home lisinopril  Avoid nephrotoxic medication      Hypothyroidism- (present on admission)   Assessment & Plan    Con't home levothyroxine      Age-related physical debility- (present on admission)   Assessment & Plan    -previously assess by PT/OT requiring SNF for further therapy needs    -continue PT/OT as needed     Gout of multiple sites- (present on admission)   Assessment & Plan    Con't home allopurinol

## 2019-03-22 NOTE — PROGRESS NOTES
Bedside report received. Pt is [place]ian-speaking. Pt asleep at this time. Call light within reach, fall precautions in place.

## 2019-03-22 NOTE — CARE PLAN
Problem: Safety  Goal: Will remain free from falls  Outcome: PROGRESSING AS EXPECTED  Bed in lowest position and locked. Bed alarm in use. Pt wearing non-slip socks, call light within reach.    Problem: Pain Management  Goal: Pain level will decrease to patient's comfort goal  Outcome: PROGRESSING AS EXPECTED  Pt reports no pain at this time. Pt currently at stated comfort goal.

## 2019-03-23 NOTE — PROGRESS NOTES
Assumed care of patient, bedside report received from noc RN. Pt awake watching TV. Denies any pain, no observable distress or acute needs at this time. Bed low and locked, bed alarm in place, call light within reach. Will monitor.

## 2019-03-23 NOTE — PROGRESS NOTES
Internal Medicine Interval Note  Note Author: Grady Hahn M.D.     Name Rafia Shaikh 1933   Age/Sex 85 y.o. male   MRN [id number]   Code Status DNAR/DNI     After 5PM or if no immediate response to page, please call for cross-coverage  Attending/Team: Kusum/Tony See Patient List for primary contact information  Call [phone] to page    1st Call - Day Intern (R1):   Hahn 2nd Call - Day Sr. Resident (R2/R3):   Maricel         Reason for interval visit  (Principal Problem)   Malaise      Interval Problem Daily Status Update  (24 hours, problem oriented, brief subjective history, new lab/imaging data pertinent to that problem)   -EFRAIN o/n, patient does not endorse current pain, no current concerns  -cog eval showing severe deficits, rec supervision following d/c from acute hospital    -pending placement coordinating with SW, wife is also inpatient and will need to coordinate  -after discussion with patient and wife, they are agreeable to post-acute placement at SNF, assisted living or other prior to discharge home    Review of Systems   Constitutional: Negative for chills and fever.   HENT: Negative for ear pain and hearing loss.    Eyes: Negative for blurred vision and double vision.   Respiratory: Negative for cough and shortness of breath.    Cardiovascular: Negative for chest pain and palpitations.   Gastrointestinal: Negative for nausea and vomiting.   Genitourinary: Negative for dysuria and hematuria.   Musculoskeletal: Negative for joint pain and myalgias.   Skin: Negative for itching and rash.   Neurological: Negative for dizziness and headaches.   Endo/Heme/Allergies: Negative for polydipsia. Does not bruise/bleed easily.   Psychiatric/Behavioral: Negative for depression. The patient is not nervous/anxious.        Disposition/Barriers to discharge:   placement    Consultants/Specialty  none  PCP: Halie Dominique M.D.      Quality Measures  Quality-Core Measures   Reviewed items::  Labs  reviewed and Medications reviewed  Baldwin catheter::  No Baldwin  DVT: Contraindicated - High bleeding risk.  DVT prophylaxis - mechanical:  SCDs          Physical Exam       Vitals:    03/22/19 1135 03/22/19 1603 03/22/19 2018 03/23/19 0447   BP: 150/75 137/69 136/57 150/62   Pulse: 60 77 60 62   Resp: 16 18 16 16   Temp: 36.2 °C (97.2 °F) 35.8 °C (96.5 °F) 36.7 °C (98 °F) 36.8 °C (98.3 °F)   TempSrc: Temporal Temporal Temporal Temporal   SpO2: 92% 97% 98% 97%   Weight:   47.4 kg (104 lb 8 oz)    Height:         Body mass index is 22.61 kg/m². Weight: 47.4 kg (104 lb 8 oz)  Oxygen Therapy:  Pulse Oximetry: 97 %, O2 (LPM): 0, O2 Delivery: None (Room Air)    Physical Exam   Constitutional: He is well-developed, well-nourished, and in no distress. He appears cachectic. No distress.   HENT:   Head: Normocephalic and atraumatic.   Mouth/Throat: Oropharynx is clear and moist. No oropharyngeal exudate.   Eyes: Pupils are equal, round, and reactive to light. Conjunctivae and EOM are normal. Right eye exhibits no discharge. Left eye exhibits no discharge. No scleral icterus.   Neck: Normal range of motion. Neck supple. No tracheal deviation present.   Cardiovascular: Normal rate, regular rhythm, normal heart sounds and intact distal pulses.  Exam reveals no gallop and no friction rub.    No murmur heard.  Pulmonary/Chest: Effort normal and breath sounds normal. No respiratory distress. He has no wheezes. He has no rales. He exhibits no tenderness.   Abdominal: Soft. Bowel sounds are normal. He exhibits no distension and no mass. There is no tenderness. There is no rebound and no guarding.   Musculoskeletal: Normal range of motion. He exhibits no edema, tenderness or deformity.   Neurological: He is alert. He exhibits normal muscle tone. Coordination normal.   Skin: Skin is warm and dry. No rash noted. He is not diaphoretic. No erythema. No pallor.   Minor abrasions on face, arms, legs and chest   Psychiatric: Mood and affect  normal.             Assessment/Plan     * Cognitive impairment- (present on admission)   Assessment & Plan    Pt and his wife are unable to care for themselves, progressive decline per wife  -ethics consult on 3/7/18 detailing recommendations depending on capacity status of patient and wife    -coordinating with SW for safe placement     Risk for falls- (present on admission)   Assessment & Plan    Pt appears to have fallen recently. Head CT negative for acute bleed. Pt has HA. Will monitor. Tylenol for HA       Hypertension, essential- (present on admission)   Assessment & Plan    -metoprolol, lisinopril scheduled     Dyslipidemia- (present on admission)   Assessment & Plan    Con't home statin      CAD (coronary artery disease)- (present on admission)   Assessment & Plan    -H/o 3-vessel coronary bypass graft with left internal mammary artery graft to left anterior descending artery, saphenous vein graft to obtuse marginal branch and saphenous vein graft to posterior descending artery by Dr. Coates in March 2018.  -echo EF65, TAVR aortic valve normal, RVSP 30mmHg, no significant change since last echo  -pacemaker interrogated with normal device function    -Con't statin, asa, metoprolol, lisinopril     Paroxysmal atrial fibrillation (CMS-HCC)- (present on admission)   Assessment & Plan    -Xarelto discontinued by cards in August 2018  -Per chart review, pt not on AC d/t high fall risk   -metoprolol     Headache   Assessment & Plan    Recent fall, hypovolemic. CT head negative for acute bleed  -Tylenol   -IVF     Multiple bruises   Assessment & Plan    Appears as though pt had recent fall; knees, xiphoid process  Plan:  SW to assist pt with finding SNF     BETTY (acute kidney injury) (HCC)   Assessment & Plan    -resolved  Likely d/t hypovolemia/decreased oral intake   Baseline is 1.0  BUN/Cr >20    IVF  Hold home lisinopril  Avoid nephrotoxic medication      Hypothyroidism- (present on admission)   Assessment & Plan     Con't home levothyroxine      Age-related physical debility- (present on admission)   Assessment & Plan    -previously assess by PT/OT requiring SNF for further therapy needs    -continue PT/OT as needed     Gout of multiple sites- (present on admission)   Assessment & Plan    Con't home allopurinol

## 2019-03-24 NOTE — CARE PLAN
Problem: Skin Integrity  Goal: Risk for impaired skin integrity will decrease  Outcome: PROGRESSING AS EXPECTED  Patient has been very lethargic today. Have gotten patient up out of bed for meals today. Denies any pain. Denies any nausea.

## 2019-03-24 NOTE — PROGRESS NOTES
Bedside report received from day shift RN. Assumed care. Pt is A&O x 1 oriented to self. Pt is resting in bed. Pt denies pain at this time. Pt was updated on plan of care. Pt has call light, personal belongings, and bedside table within reach. Bed is in the lowest position and bed alarm is on. Will continue to monitor.

## 2019-03-24 NOTE — CARE PLAN
Problem: Mobility  Goal: Risk for activity intolerance will decrease  Outcome: PROGRESSING AS EXPECTED  Patient has not wanted to get out of bed except to eat today. Wife in same room. Waits on the patient hand and foot. TM

## 2019-03-24 NOTE — PROGRESS NOTES
Internal Medicine Interval Note  Note Author: Devin Connelly M.D.     Name Rafia Shaikh 1933   Age/Sex 85 y.o. male   MRN [id number]   Code Status DNAR/DNI     After 5PM or if no immediate response to page, please call for cross-coverage  Attending/Team: Kusum/Tony See Patient List for primary contact information  Call [phone] to page    1st Call - Day Intern (R1):   Hahn 2nd Call - Day Sr. Resident (R2/R3):   Maricel         Reason for interval visit  (Principal Problem)   Malaise    Interval Problem Daily Status Update  (24 hours, problem oriented, brief subjective history, new lab/imaging data pertinent to that problem)   -No events overnight, patient does not endorse current pain, no current concerns  -Cog eval showing severe deficits, rec supervision following d/c from acute hospital    -Pending placement coordinating with SW, wife is also inpatient and will need to coordinate  -After discussion with patient and wife, they are agreeable to post-acute placement at SNF, assisted living or other prior to discharge home  -Discharge barrier:  Placement.    Review of Systems   Constitutional: Negative for chills and fever.   HENT: Negative for ear pain and hearing loss.    Eyes: Negative for blurred vision and double vision.   Respiratory: Negative for cough and shortness of breath.    Cardiovascular: Negative for chest pain and palpitations.   Gastrointestinal: Negative for nausea and vomiting.   Genitourinary: Negative for dysuria and hematuria.   Musculoskeletal: Negative for joint pain and myalgias.   Skin: Negative for itching and rash.   Neurological: Negative for dizziness and headaches.   Endo/Heme/Allergies: Negative for polydipsia. Does not bruise/bleed easily.   Psychiatric/Behavioral: Negative for depression. The patient is not nervous/anxious.        Disposition/Barriers to discharge:   placement    Consultants/Specialty  none  PCP: Halie Dominique M.D.      Quality  Measures  Quality-Core Measures   Reviewed items::  Labs reviewed and Medications reviewed  Baldwin catheter::  No Baldwin  DVT: Contraindicated - High bleeding risk.  DVT prophylaxis - mechanical:  SCDs          Physical Exam       Vitals:    03/23/19 2000 03/23/19 2310 03/24/19 0400 03/24/19 0900   BP: 145/63 140/62 (!) 162/77 145/63   Pulse: 60 (!) 57 66 61   Resp: 16 18 18 15   Temp: 36 °C (96.8 °F) 36.7 °C (98 °F) 36.7 °C (98 °F) 36.3 °C (97.3 °F)   TempSrc: Temporal Temporal Temporal Temporal   SpO2: 99% 99%  96%   Weight:       Height:         Body mass index is 22.61 kg/m².    Oxygen Therapy:  Pulse Oximetry: 96 %, O2 (LPM): 0, O2 Delivery: None (Room Air)    Physical Exam   Constitutional: He is well-developed, well-nourished, and in no distress. He appears cachectic. No distress.   HENT:   Head: Normocephalic and atraumatic.   Mouth/Throat: Oropharynx is clear and moist. No oropharyngeal exudate.   Eyes: Pupils are equal, round, and reactive to light. Conjunctivae and EOM are normal. Right eye exhibits no discharge. Left eye exhibits no discharge. No scleral icterus.   Neck: Normal range of motion. Neck supple. No tracheal deviation present.   Cardiovascular: Normal rate, regular rhythm, normal heart sounds and intact distal pulses.  Exam reveals no gallop and no friction rub.    No murmur heard.  Pulmonary/Chest: Effort normal and breath sounds normal. No respiratory distress. He has no wheezes. He has no rales. He exhibits no tenderness.   Abdominal: Soft. Bowel sounds are normal. He exhibits no distension and no mass. There is no tenderness. There is no rebound and no guarding.   Musculoskeletal: Normal range of motion. He exhibits no edema, tenderness or deformity.   Neurological: He is alert. He exhibits normal muscle tone. Coordination normal.   Skin: Skin is warm and dry. No rash noted. He is not diaphoretic. No erythema. No pallor.   Minor abrasions on face, arms, legs and chest   Psychiatric: Mood and  affect normal.             Assessment/Plan     * Cognitive impairment- (present on admission)   Assessment & Plan    Pt and his wife are unable to care for themselves, progressive decline per wife  -ethics consult on 3/7/18 detailing recommendations depending on capacity status of patient and wife    -coordinating with SW for safe placement     Risk for falls- (present on admission)   Assessment & Plan    Pt appears to have fallen recently. Head CT negative for acute bleed. Pt has HA. Will monitor. Tylenol for HA       Hypertension, essential- (present on admission)   Assessment & Plan    -metoprolol, lisinopril scheduled     Dyslipidemia- (present on admission)   Assessment & Plan    Con't home statin      CAD (coronary artery disease)- (present on admission)   Assessment & Plan    -H/o 3-vessel coronary bypass graft with left internal mammary artery graft to left anterior descending artery, saphenous vein graft to obtuse marginal branch and saphenous vein graft to posterior descending artery by Dr. Coates in March 2018.  -echo EF65, TAVR aortic valve normal, RVSP 30mmHg, no significant change since last echo  -pacemaker interrogated with normal device function    -Con't statin, asa, metoprolol, lisinopril     Paroxysmal atrial fibrillation (CMS-HCC)- (present on admission)   Assessment & Plan    -Xarelto discontinued by cards in August 2018  -Per chart review, pt not on AC d/t high fall risk   -metoprolol     Headache   Assessment & Plan    Recent fall, hypovolemic. CT head negative for acute bleed  -Tylenol   -IVF     Multiple bruises   Assessment & Plan    Appears as though pt had recent fall; knees, xiphoid process  Plan:  SW to assist pt with finding SNF     BETTY (acute kidney injury) ([facility])   Assessment & Plan    -resolved  Likely d/t hypovolemia/decreased oral intake   Baseline is 1.0  BUN/Cr >20    IVF  Hold home lisinopril  Avoid nephrotoxic medication      Hypothyroidism- (present on admission)   Assessment  & Plan    Con't home levothyroxine      Age-related physical debility- (present on admission)   Assessment & Plan    -previously assess by PT/OT requiring SNF for further therapy needs    -continue PT/OT as needed     Gout of multiple sites- (present on admission)   Assessment & Plan    Con't home allopurinol

## 2019-03-24 NOTE — PROGRESS NOTES
Pt AOx1-2, pres to [facility] w/ alt mental status. Stay was c/b A-fib w/ RVR which has since resolved.  Hx HTN. Coronary Bypass (2018). Has Pacemaker. TIA (2011). Currently showing some cognitive deficits and fatigue. Transferred from [facility] floor around 2300 tonight.  Pt consistent w/ report.  NTL. Cont. Vs incont.  R toe wound and coccyx moisture fissure, minor abrasions d/t fall before coming to hosp.  Head CT (-) for bleed). Denies pain. VSS. Plan is SNF/Assisted Living before going home. Guardianship pending.

## 2019-03-25 NOTE — THERAPY
"Occupational Therapy Treatment completed with focus on ADLs, ADL transfers, patient education and caregiver training.  Functional Status:  Eyad sit>stand, CGA standing grooming w/ cues, CGA toilet txf w/grab bars, MaxA LB dress, Eyad UB dress, delayed responses, cognitive impairment    Plan of Care: Will benefit from Occupational Therapy 2 times per week  Discharge Recommendations:  Equipment Will Continue to Assess for Equipment Needs. Post-acute therapy Recommend inpatient transitional care services for continued occupational therapy services.     See \"Rehab Therapy-Acute\" Patient Summary Report for complete documentation.     Pt seen for OT tx, agreeable to functional activities. Eyad for sit>stand 2/2 posterior lean, progressed to CGA after education regarding body mechanics for sit>stand txf. Pt required extra time for ADLs and ADL txfs, spouse donned pt socks. Pt requires step-by-step cues for functional activities, responsive to cues however demonstrates slow processing speed, impaired task initiation, sequencing, and termination. Pt spouse is unable to provide level of care pt requires at this time, pt will require post-acute placement for continued therapy. Acute OT will continue to follow while in-house.   "

## 2019-03-25 NOTE — THERAPY
"Physical Therapy Treatment completed.   Bed Mobility:  Supine to Sit:  (up in chair)  Transfers: Sit to Stand: Minimal Assist  Gait: Level Of Assist: Moderate Assist with Front-Wheel Walker       Plan of Care: Will benefit from Physical Therapy 3 times per week  Discharge Recommendations: Equipment: Will Continue to Assess for Equipment Needs. Recommend inpatient transitional care services for continued physical therapy services.      See \"Rehab Therapy-Acute\" Patient Summary Report for complete documentation.     Pt seen for PT tx today. Performed STS from bedside chair with min A for balance and safety. Demonstrated posterior lean while standing with FWW occasionally requiring mod A to maintain upright. Ambulated 110ft with FWW and min A to maintain balance and for FWW management for proper use. Occasional mod A with ambulating with posterior lean. Pt with slow alfa and required multiple standing rest breaks though stating he did not feel fatigued. Once back in room, pt requesting return to bed, required mod A, again with posterior lean while sitting EOB. Pt will continue to benefit from acute PT services and post acute placement prior to return home as pt is at risk for falls at this time.   "

## 2019-03-25 NOTE — PROGRESS NOTES
Pt AOx1-2, pres to [facility] w/ alt mental status. Stay was c/b A-fib w/ RVR which has since resolved.  Hx HTN. Coronary Bypass (2018). Has Pacemaker. TIA (2011). Currently showing some cognitive deficits and fatigue. NTL, full liquid diet.  Cont. Vs incont. 2 lg soft BMs.  R toe wound and coccyx moisture fissure, minor abrasions d/t fall before coming to hosp.  Head CT (-) for bleed). Denies pain. VSS. Plan is SNF/Assisted Living before going home. Guardianship pending.

## 2019-03-26 NOTE — PROGRESS NOTES
Pt AOx1-2, pres to [facility] w/ alt mental status. Stay was c/b A-fib w/ RVR.  Hx HTN. Coronary Bypass (2018). Has Pacemaker. TIA (2011). Currently showing some cognitive deficits and fatigue. NTL, full liquid diet, good intake.  Cont. vs Incont. Coccyx moisture fissure, minor abrasions d/t fall before coming to hosp. (e.g. R knee abrasion that's scabbed over),  Head CT (-) for bleed). Denies pain. VSS. Plan is SNF/Assisted Living before going home. Wife sharing room.  SNF pending

## 2019-03-26 NOTE — ASSESSMENT & PLAN NOTE
Placed secondary to SSS.  Site is uncomplicated.  There is no current indication for having the device interrogated.  Continue comfort care.

## 2019-03-26 NOTE — PROGRESS NOTES
Internal Medicine Interval Note  Note Author: Devin Connelly M.D.     Name Rafia Shaikh 1933   Age/Sex 85 y.o. male   MRN [id number]   Code Status DNAR/DNI     After 5PM or if no immediate response to page, please call for cross-coverage  Attending/Team: Kusum/Tony See Patient List for primary contact information  Call [phone] to page    1st Call - Day Intern (R1):   Jovani 2nd Call - Day Sr. Resident (R2/R3):   Maricel         Reason for interval visit  (Principal Problem)   Malaise    Interval Problem Daily Status Update  (24 hours, problem oriented, brief subjective history, new lab/imaging data pertinent to that problem)   -No events overnight, patient does not endorse current pain, no current concerns  -Cog eval showing severe deficits, rec supervision following d/c from acute hospital    -Pending placement coordinating with SW, wife is also inpatient and will need to coordinate  -After discussion with patient and wife, they are agreeable to post-acute placement at SNF, assisted living or other prior to discharge home, however at times the wife has expressed desire to discharge home.  She is pending psych eval for capacity.  -Discharge barrier:  Placement.    Review of Systems   Constitutional: Negative for chills and fever.   HENT: Negative for ear pain and hearing loss.    Eyes: Negative for blurred vision and double vision.   Respiratory: Negative for cough and shortness of breath.    Cardiovascular: Negative for chest pain and palpitations.   Gastrointestinal: Negative for nausea and vomiting.   Genitourinary: Negative for dysuria and hematuria.   Musculoskeletal: Negative for joint pain and myalgias.   Skin: Negative for itching and rash.   Neurological: Negative for dizziness and headaches.   Endo/Heme/Allergies: Negative for polydipsia. Does not bruise/bleed easily.   Psychiatric/Behavioral: Negative for depression. The patient is not nervous/anxious.        Disposition/Barriers to  discharge:   placement    Consultants/Specialty  none  PCP: Halie Dominique M.D.      Quality Measures  Quality-Core Measures   Reviewed items::  Labs reviewed and Medications reviewed  Baldwin catheter::  No Baldwin  DVT: Contraindicated - High bleeding risk.  DVT prophylaxis - mechanical:  SCDs          Physical Exam       Vitals:    03/24/19 1600 03/24/19 2000 03/25/19 0400 03/25/19 0800   BP: 119/50 123/53 149/63 (!) 99/45   Pulse: 60 61 60 63   Resp: 15 14 16 18   Temp: 36.9 °C (98.5 °F) 36.1 °C (97 °F) 36.1 °C (97 °F) 36.6 °C (97.9 °F)   TempSrc: Temporal Temporal Temporal Temporal   SpO2: 98% 98% 96% 99%   Weight:       Height:         Body mass index is 22.61 kg/m².    Oxygen Therapy:  Pulse Oximetry: 99 %, O2 (LPM): 0, O2 Delivery: None (Room Air)    Physical Exam   Constitutional: He is well-developed, well-nourished, and in no distress. He appears cachectic. No distress.   HENT:   Head: Normocephalic and atraumatic.   Mouth/Throat: Oropharynx is clear and moist. No oropharyngeal exudate.   Eyes: Pupils are equal, round, and reactive to light. Conjunctivae and EOM are normal. Right eye exhibits no discharge. Left eye exhibits no discharge. No scleral icterus.   Neck: Normal range of motion. Neck supple. No tracheal deviation present.   Cardiovascular: Normal rate, regular rhythm, normal heart sounds and intact distal pulses.  Exam reveals no gallop and no friction rub.    No murmur heard.  Pulmonary/Chest: Effort normal and breath sounds normal. No respiratory distress. He has no wheezes. He has no rales. He exhibits no tenderness.   Abdominal: Soft. Bowel sounds are normal. He exhibits no distension and no mass. There is no tenderness. There is no rebound and no guarding.   Musculoskeletal: Normal range of motion. He exhibits no edema, tenderness or deformity.   Neurological: He is alert. He exhibits normal muscle tone. Coordination normal.   Skin: Skin is warm and dry. No rash noted. He is not diaphoretic. No  erythema. No pallor.   Minor abrasions on face, arms, legs and chest   Psychiatric: Mood and affect normal.             Assessment/Plan     * Cognitive impairment- (present on admission)   Assessment & Plan    Pt and his wife are unable to care for themselves, progressive decline per wife  -ethics consult on 3/7/18 detailing recommendations depending on capacity status of patient and wife    -coordinating with SW for safe placement     Risk for falls- (present on admission)   Assessment & Plan    Pt appears to have fallen recently. Head CT negative for acute bleed. Pt has HA. Will monitor. Tylenol for HA       Hypertension, essential- (present on admission)   Assessment & Plan    -metoprolol, lisinopril scheduled     Dyslipidemia- (present on admission)   Assessment & Plan    Con't home statin      CAD (coronary artery disease)- (present on admission)   Assessment & Plan    -H/o 3-vessel coronary bypass graft with left internal mammary artery graft to left anterior descending artery, saphenous vein graft to obtuse marginal branch and saphenous vein graft to posterior descending artery by Dr. Coates in March 2018.  -echo EF65, TAVR aortic valve normal, RVSP 30mmHg, no significant change since last echo  -pacemaker interrogated with normal device function    -Con't statin, asa, metoprolol, lisinopril     Paroxysmal atrial fibrillation (CMS-HCC)- (present on admission)   Assessment & Plan    -Xarelto discontinued by cards in August 2018  -Per chart review, pt not on AC d/t high fall risk   -metoprolol     Headache   Assessment & Plan    Recent fall, hypovolemic. CT head negative for acute bleed  -Tylenol   -IVF     Multiple bruises   Assessment & Plan    Appears as though pt had recent fall; knees, xiphoid process  Plan:  SW to assist pt with finding SNF     BETTY (acute kidney injury) (HCC)   Assessment & Plan    -resolved  Likely d/t hypovolemia/decreased oral intake   Baseline is 1.0  BUN/Cr >20    IVF  Hold home  lisinopril  Avoid nephrotoxic medication      Hypothyroidism- (present on admission)   Assessment & Plan    Con't home levothyroxine      Age-related physical debility- (present on admission)   Assessment & Plan    -previously assess by PT/OT requiring SNF for further therapy needs    -continue PT/OT as needed     Gout of multiple sites- (present on admission)   Assessment & Plan    Con't home allopurinol

## 2019-03-26 NOTE — PROGRESS NOTES
Internal Medicine Interval Note  Note Author: Carlitos Mathews M.D.     Name Rafia Shaikh       1933   Age/Sex 85 y.o. male   MRN [id number]   Code Status DNAR/DNI     After 5PM or if no immediate response to page, please call for cross-coverage  Attending/Team: Dr. Romo / Tony See Patient List for primary contact information  Call [phone] to page    1st Call - Day Intern (R1):   Dr. Mathews 2nd Call - Day Sr. Resident (R2/R3):        Reason for interval visit  (Principal Problem)   Malaise    Interval Problem Daily Status Update  (24 hours, problem oriented, brief subjective history, new lab/imaging data pertinent to that problem)   - The patient was evaluated at the bedside. The patient is stable without any overnight event. He wanted to go home.  - All the relevant labs and imaging were reviewed this morning.   - Rafia Shaikh's vitals were stable overnight and this morning.    Review of Systems   Constitutional: Negative.  Negative for chills and fever.   HENT: Negative.  Negative for ear pain and hearing loss.    Eyes: Negative.  Negative for blurred vision and double vision.   Respiratory: Negative.  Negative for cough and shortness of breath.    Cardiovascular: Negative.  Negative for chest pain and palpitations.   Gastrointestinal: Negative.  Negative for nausea and vomiting.   Genitourinary: Negative.  Negative for dysuria and hematuria.   Musculoskeletal: Negative.  Negative for joint pain and myalgias.   Skin: Negative.  Negative for itching and rash.   Neurological: Negative.  Negative for dizziness and headaches.   Endo/Heme/Allergies: Negative.  Negative for polydipsia. Does not bruise/bleed easily.   Psychiatric/Behavioral: Negative.  Negative for depression. The patient is not nervous/anxious.    All other systems reviewed and are negative.    Disposition/Barriers to discharge:   Placement    Consultants/Specialty  None    PCP: Halie Dominique M.D.    Quality  Measures  Quality-Core Measures   Reviewed items::  Labs reviewed and Medications reviewed  Baldwin catheter::  No Baldwin  DVT prophylaxis pharmacological::  Contraindicated - High bleeding risk (Contraindicated - High bleeding risk)  DVT prophylaxis - mechanical:  SCDs  Ulcer Prophylaxis::  Not indicated    Physical Exam       Vitals:    03/25/19 0800 03/25/19 1741 03/25/19 2000 03/26/19 0400   BP: (!) 99/45  115/50 102/52   Pulse: 63 (!) 59 60 61   Resp: 18  18 18   Temp: 36.6 °C (97.9 °F)  36.6 °C (97.8 °F) 36.4 °C (97.6 °F)   TempSrc: Temporal  Temporal Temporal   SpO2: 99%  98% 95%   Weight:       Height:         Body mass index is 22.61 kg/m².    Oxygen Therapy:  Pulse Oximetry: 95 %, O2 (LPM): 0, O2 Delivery: None (Room Air)    Physical Exam   Constitutional: He is well-developed, well-nourished, and in no distress. He appears cachectic. No distress.   HENT:   Head: Normocephalic and atraumatic.   Mouth/Throat: Oropharynx is clear and moist. No oropharyngeal exudate.   Eyes: Pupils are equal, round, and reactive to light. Conjunctivae and EOM are normal. Right eye exhibits no discharge. Left eye exhibits no discharge. No scleral icterus.   Neck: Normal range of motion. Neck supple. No tracheal deviation present.   Cardiovascular: Normal rate, regular rhythm, normal heart sounds and intact distal pulses.  Exam reveals no gallop and no friction rub.    No murmur heard.  Pulmonary/Chest: Effort normal and breath sounds normal. No respiratory distress. He has no wheezes. He has no rales. He exhibits no tenderness.   Abdominal: Soft. Bowel sounds are normal. He exhibits no distension and no mass. There is no tenderness. There is no rebound and no guarding.   Musculoskeletal: Normal range of motion. He exhibits no edema, tenderness or deformity.   Neurological: He is alert. He exhibits normal muscle tone. Coordination normal.   Skin: Skin is warm and dry. No rash noted. He is not diaphoretic. No erythema. No pallor.    Minor abrasions on face, arms, legs and chest   Psychiatric: Mood and affect normal.   Nursing note and vitals reviewed.    Assessment/Plan     * Cognitive impairment- (present on admission)   Assessment & Plan    Pt and his wife are unable to care for themselves, progressive decline per wife  - ethics consult on 3/7/18 detailing recommendations depending on capacity status of patient and wife  - coordinating with SW for safe placement     Multiple bruises   Assessment & Plan    Appears as though pt had recent fall; knees, xiphoid process  - SW to assist pt with finding SNF     Hypothyroidism- (present on admission)   Assessment & Plan    Con't home levothyroxine      Cardiac pacemaker in situ- (present on admission)   Assessment & Plan    For sick sinus rhythm     SSS (sick sinus syndrome) (HCC)- (present on admission)   Assessment & Plan    S/p pacemaker     S/P TAVR (transcatheter aortic valve replacement)- (present on admission)   Assessment & Plan    Continue ASA and statin     Risk for falls- (present on admission)   Assessment & Plan    Pt appears to have fallen recently. Head CT negative for acute bleed. Pt has HA. Will monitor. Tylenol for HA  - Fall precautions ordered     Hypertension, essential- (present on admission)   Assessment & Plan    - Continue metoprolol, lisinopril     Dyslipidemia- (present on admission)   Assessment & Plan    Con't home statin      CAD (coronary artery disease)- (present on admission)   Assessment & Plan    - H/o 3-vessel coronary bypass graft with left internal mammary artery graft to left anterior descending artery, saphenous vein graft to obtuse marginal branch and saphenous vein graft to posterior descending artery by Dr. Coates in March 2018.  - echo EF 65%, s/p TAVR, RVSP 30mmHg, no significant change since last echo  - pacemaker interrogated with normal device function  - Con't statin, ASA, metoprolol, and lisinopril     Paroxysmal atrial fibrillation (CMS-HCC)-  (present on admission)   Assessment & Plan    - Xarelto discontinued by cards in August 2018  - Per chart review, pt not on AC d/t high fall risk   - Continue metoprolol for rate control     Headache   Assessment & Plan    Recent fall, hypovolemic. CT head negative for acute bleed  -Tylenol   -IVF     BETTY (acute kidney injury) (HCC)   Assessment & Plan    RESOLVED  - Likely d/t hypovolemia/decreased oral intake   - Baseline is 1.0 and eGFR above 60  - Continue IVF  - Avoid nephrotoxic medication      Age-related physical debility- (present on admission)   Assessment & Plan    -previously assess by PT/OT requiring SNF for further therapy needs  -continue PT/OT as needed     Hx of CABG- (present on admission)   Assessment & Plan    Continue ASA and statin     Gout of multiple sites- (present on admission)   Assessment & Plan    Con't home allopurinol        Carlitos Mathews M.D.

## 2019-03-27 NOTE — THERAPY
"Physical Therapy Treatment completed.   Bed Mobility:  Supine to Sit:  (pt found seated in chair)  Transfers: Sit to Stand: Minimal Assist (fromn chair height->FWW)  Gait: Level Of Assist: Minimal Assist with Front-Wheel Walker       Plan of Care: Will benefit from Physical Therapy 3 times per week  Discharge Recommendations: Equipment: Will Continue to Assess for Equipment Needs. Post-acute therapy Discharge to a transitional care facility for continued skilled therapy services.     See \"Rehab Therapy-Acute\" Patient Summary Report for complete documentation.       "

## 2019-03-27 NOTE — PROGRESS NOTES
Pt AOx1-2, pres to [facility] w/ alt mental status. Stay was c/b A-fib w/ RVR.  Hx HTN. Coronary Bypass (2018). Has Pacemaker. TIA (2011). Currently showing some cognitive deficits and fatigue. Confused. Can respond to basic questions. NTL, full liquid diet, good intake.  Cont. vs Incont--but lately able to make it to BR in time.  Coccyx moisture fissure healing but red, minor abrasions d/t fall before coming to hosp. (e.g. R knee abrasion that's scabbed over),  Head CT (-) for bleed). Denies pain. VSS. Plan is SNF/Assisted Living before going home. Wife sharing room. She could benefit from reinforcing safety teaching---as she keeps saying she'll be taking her  home. SNF pending.

## 2019-03-27 NOTE — DISCHARGE PLANNING
Care Transition Team Assessment    AMS. Unsafe to return home with spouse. Needs SNF & placement.     LSW to call family.     Information Source  Orientation : Disoriented to Event, Disoriented to Place, Disoriented to Time  Information Given By: Spouse  Informant's Name: Prisca    Elopement Risk  Legal Hold: No  Ambulatory or Self Mobile in Wheelchair: No-Not an Elopement Risk  Disoriented: No  Psychiatric Symptoms: None  History of Wandering: No  Elopement this Admit: No  Vocalizing Wanting to Leave: No  Displays Behaviors, Body Language Wanting to Leave: No-Not at Risk for Elopement  Elopement Risk: Not at Risk for Elopement    Interdisciplinary Discharge Planning  Does Admitting Nurse Feel This Could be a Complex Discharge?: Yes  Primary Care Physician: Halie Dominique  Lives with - Patient's Self Care Capacity: Spouse  Support Systems: Children, Friends / Neighbors  Housing / Facility: Unable To Determine At This Time  Do You Take your Prescribed Medications Regularly: Yes  Able to Return to Previous ADL's: Yes  Mobility Issues: (S) Yes (LEO, bedrest)  Prior Services: Skilled Home Health Services  Patient Expects to be Discharged to:: Home  Assistance Needed: Unknown at this Time  Durable Medical Equipment: Walker    Discharge Preparedness  What is your plan after discharge?: Uncertain - pending medical team collaboration         Finances  Financial Barriers to Discharge: No  Prescription Coverage: Yes    Vision / Hearing Impairment  Vision Impairment : No  Hearing Impairment : No              Domestic Abuse  Have you ever been the victim of abuse or violence?: No  Physical Abuse or Sexual Abuse: No  Verbal Abuse or Emotional Abuse: No  Possible Abuse Reported to:: Not Applicable    Psychological Assessment  History of Substance Abuse: None  History of Psychiatric Problems: No    Discharge Risks or Barriers  Discharge risks or barriers?: Mental health  Patient risk factors: Complex medical needs

## 2019-03-27 NOTE — THERAPY
"Speech Language Therapy dysphagia treatment completed.   Functional Status:  Pt seen for dysphagia tx and further assessment of reading/writing skills today. Swallow function reassessed with breakfast meal of D2/NTL textures and tx trials of thins via tsp/cup. Safe swallow strategy training conducted with pt and wife with mod verbal/written/tactile cues provided for pt to slow rate of intake and reduce bite/sip size. No s/sx of aspiration appreciated with tsp size sips of thins. However, coughing occurred with thins via cup in 25% of instances. No s/sx of aspiration noted with soft solids, purees or NTL via cup. Recommend pt continue diet of Dysphagia 2/NTL with monitoring during meal. Pt may benefit from diagnostic swallow assessment (MBS v. FEES) to objectively assess swallow function and further direct POC. Given phrase-sentence level written instructions, pt demo'd good understanding of written material. Pt demo'd significant difficulty with writing mechanics, though was able to write simple phrases in English given extra time and min-mod verbal/written cues. External memory aid provided with swallow strategies written in simple, large bold print to facilitate carryover.     Recommendations: Dysphagia 2/NTL diet with monitoring during meal; provide external memory aid in pt's view during meals to facilitate carryover    Plan of Care: Will benefit from Speech Therapy 3 times per week  Post-Acute Therapy: Recommend inpatient transitional care services for continued speech therapy services.        See \"Rehab Therapy-Acute\" Patient Summary Report for complete documentation.     "

## 2019-03-27 NOTE — PROGRESS NOTES
Internal Medicine Interval Note  Note Author: Carlitos Mathews M.D.     Name Rafia Shaikh       1933   Age/Sex 85 y.o. male   MRN [id number]   Code Status DNAR/DNI     After 5PM or if no immediate response to page, please call for cross-coverage  Attending/Team: Dr. Romo / Tony See Patient List for primary contact information  Call [phone] to page    1st Call - Day Intern (R1):   Dr. Mathews 2nd Call - Day Sr. Resident (R2/R3):        Reason for interval visit  (Principal Problem)   Malaise    Interval Problem Daily Status Update  (24 hours, problem oriented, brief subjective history, new lab/imaging data pertinent to that problem)   - The patient was evaluated at the bedside. The patient is stable without any overnight event.  Patient was comfortably sleeping in his room.  FEES was ordered for swallow evaluation.  - All the relevant labs and imaging were reviewed this morning.   - Rafia Guerrero vitals were stable overnight and this morning.    Review of Systems   Constitutional: Negative.  Negative for chills and fever.   HENT: Negative.  Negative for ear pain and hearing loss.    Eyes: Negative.  Negative for blurred vision and double vision.   Respiratory: Negative.  Negative for cough and shortness of breath.    Cardiovascular: Negative.  Negative for chest pain and palpitations.   Gastrointestinal: Negative.  Negative for nausea and vomiting.   Genitourinary: Negative.  Negative for dysuria and hematuria.   Musculoskeletal: Negative.  Negative for joint pain and myalgias.   Skin: Negative.  Negative for itching and rash.   Neurological: Negative.  Negative for dizziness and headaches.   Endo/Heme/Allergies: Negative.  Negative for polydipsia. Does not bruise/bleed easily.   Psychiatric/Behavioral: Negative.  Negative for depression. The patient is not nervous/anxious.    All other systems reviewed and are negative.    Disposition/Barriers to discharge:    Placement    Consultants/Specialty  None    PCP: Halie Dominique M.D.    Quality Measures  Quality-Core Measures   Reviewed items::  Labs reviewed and Medications reviewed  Baldwin catheter::  No Baldwin  DVT prophylaxis pharmacological::  Contraindicated - High bleeding risk (Contraindicated - High bleeding risk)  DVT prophylaxis - mechanical:  SCDs  Ulcer Prophylaxis::  Not indicated    Physical Exam       Vitals:    03/26/19 1817 03/26/19 2045 03/27/19 0400 03/27/19 0800   BP: 137/63 126/49 131/60 108/57   Pulse: 68 60 61 60   Resp:  14 16 16   Temp:  36.1 °C (96.9 °F) 36.3 °C (97.3 °F) 36.8 °C (98.2 °F)   TempSrc:  Temporal Temporal Temporal   SpO2:  100% 97% 96%   Weight:       Height:         Body mass index is 22.61 kg/m².    Oxygen Therapy:  Pulse Oximetry: 96 %, O2 (LPM): 0, O2 Delivery: None (Room Air)    Physical Exam   Constitutional: He is well-developed, well-nourished, and in no distress. He appears cachectic. No distress.   HENT:   Head: Normocephalic and atraumatic.   Mouth/Throat: Oropharynx is clear and moist. No oropharyngeal exudate.   Eyes: Pupils are equal, round, and reactive to light. Conjunctivae and EOM are normal. Right eye exhibits no discharge. Left eye exhibits no discharge. No scleral icterus.   Neck: Normal range of motion. Neck supple. No tracheal deviation present.   Cardiovascular: Normal rate, regular rhythm, normal heart sounds and intact distal pulses.  Exam reveals no gallop and no friction rub.    No murmur heard.  Pulmonary/Chest: Effort normal and breath sounds normal. No respiratory distress. He has no wheezes. He has no rales. He exhibits no tenderness.   Abdominal: Soft. Bowel sounds are normal. He exhibits no distension and no mass. There is no tenderness. There is no rebound and no guarding.   Musculoskeletal: Normal range of motion. He exhibits no edema, tenderness or deformity.   Neurological: He is alert. He exhibits normal muscle tone. Coordination normal.   Skin:  Skin is warm and dry. No rash noted. He is not diaphoretic. No erythema. No pallor.   Minor abrasions on face, arms, legs and chest   Psychiatric: Mood and affect normal.   Nursing note and vitals reviewed.    Assessment/Plan     * Cognitive impairment- (present on admission)   Assessment & Plan    Pt and his wife are unable to care for themselves, progressive decline per wife  - ethics consult on 3/7/18 detailing recommendations depending on capacity status of patient and wife  - coordinating with SW for safe placement     Multiple bruises   Assessment & Plan    Appears as though pt had recent fall; knees, xiphoid process  - SW to assist pt with finding SNF     Hypothyroidism- (present on admission)   Assessment & Plan    Con't home levothyroxine      Cardiac pacemaker in situ- (present on admission)   Assessment & Plan    For sick sinus rhythm     SSS (sick sinus syndrome) (HCC)- (present on admission)   Assessment & Plan    S/p pacemaker     S/P TAVR (transcatheter aortic valve replacement)- (present on admission)   Assessment & Plan    Continue ASA and statin     Risk for falls- (present on admission)   Assessment & Plan    Pt appears to have fallen recently. Head CT negative for acute bleed. Pt has HA. Will monitor. Tylenol for HA  - Fall precautions ordered     Hypertension, essential- (present on admission)   Assessment & Plan    - Continue metoprolol, lisinopril     Dyslipidemia- (present on admission)   Assessment & Plan    Con't home statin      CAD (coronary artery disease)- (present on admission)   Assessment & Plan    - H/o 3-vessel coronary bypass graft with left internal mammary artery graft to left anterior descending artery, saphenous vein graft to obtuse marginal branch and saphenous vein graft to posterior descending artery by Dr. Coates in March 2018.  - echo EF 65%, s/p TAVR, RVSP 30mmHg, no significant change since last echo  - pacemaker interrogated with normal device function  - Con't statin,  ASA, metoprolol, and lisinopril     Paroxysmal atrial fibrillation (CMS-HCC)- (present on admission)   Assessment & Plan    - Xarelto discontinued by cards in August 2018  - Per chart review, pt not on AC d/t high fall risk   - Continue metoprolol for rate control     Headache   Assessment & Plan    Recent fall, hypovolemic. CT head negative for acute bleed  -Tylenol   -IVF     BETTY (acute kidney injury) (HCC)   Assessment & Plan    RESOLVED  - Likely d/t hypovolemia/decreased oral intake   - Baseline is 1.0 and eGFR above 60  - Continue IVF  - Avoid nephrotoxic medication      Age-related physical debility- (present on admission)   Assessment & Plan    -previously assess by PT/OT requiring SNF for further therapy needs  -continue PT/OT as needed     Hx of CABG- (present on admission)   Assessment & Plan    Continue ASA and statin     Gout of multiple sites- (present on admission)   Assessment & Plan    Con't home allopurinol        Carlitos Mathews M.D.

## 2019-03-28 NOTE — THERAPY
"Occupational Therapy Treatment completed with focus on ADLs, ADL transfers and patient education.  Functional Status:  Pt seen for OT tx. Min A supine > sit, min A sit > stand, amb to BR w/ FWW. Pt required min A for balance, safety and FWW management. Completed toilet transfer w/ mod A for balance, safety and cues for sequencing to sit. Completed pericare and toileting w/ min A. Min A standing for grooming tasks. Pt required repeated cues and step by step directions for sequencing. Mod A to return to chair for sequencing tasks. Min A for self-feeding for setup and cues to follow swallow precautions. Pt pleasant and cooperative. Continues to be limited by decreased activity tolerance, generalized weakness and cognitive deficits.   Plan of Care: Will benefit from Occupational Therapy 2 times per week  Discharge Recommendations:  Equipment Will Continue to Assess for Equipment Needs.     See \"Rehab Therapy-Acute\" Patient Summary Report for complete documentation.   "

## 2019-03-28 NOTE — DISCHARGE PLANNING
LSW called EPS and was informed that there is an active EPS case open for pt & spouse. SW on case is Fátima Waite [phone]. LSW called and LM with Fátima to f/u on case.     Plan: LSW to contact pt's children to determine if children would like to be involved with dc planning and what their goals are for their parents.     Per past notes, if pt and spouse didn't have capacity children then wanted to pursue guardianship however other past notes indicated children didn't want to be involved at all. Numbers were pulled from past notes. MD tried calling numbers for son on spouse's face sheet yet stated numbers didn't work. Son (Lopez Shaikh [phone]) Daughter (Ray [phone]). Above, ethics e-mailed the son yet no e-mail address in notes.     Pt's spouses notes indicates recommendations from ethics during past hospitalizations.

## 2019-03-28 NOTE — PROGRESS NOTES
Internal Medicine Interval Note  Note Author: Carlitos Mathews M.D.     Name Rafia Shaikh       1933   Age/Sex 85 y.o. male   MRN [id number]   Code Status DNAR/DNI     After 5PM or if no immediate response to page, please call for cross-coverage  Attending/Team: Dr. Romo / Tony See Patient List for primary contact information  Call [phone] to page    1st Call - Day Intern (R1):   Dr. Mathews 2nd Call - Day Sr. Resident (R2/R3):        Reason for interval visit  (Principal Problem)   Malaise    Interval Problem Daily Status Update  (24 hours, problem oriented, brief subjective history, new lab/imaging data pertinent to that problem)   - The patient was evaluated at the bedside. The patient is stable without any overnight event.  Patient was comfortably sleeping in his room.  He denied having any pain and went back to sleep.  - All the relevant labs and imaging were reviewed this morning.   - Rafia Shaikh's vitals were stable overnight and this morning.    Review of Systems   Constitutional: Negative.  Negative for chills and fever.   HENT: Negative.  Negative for ear pain and hearing loss.    Eyes: Negative.  Negative for blurred vision and double vision.   Respiratory: Negative.  Negative for cough and shortness of breath.    Cardiovascular: Negative.  Negative for chest pain and palpitations.   Gastrointestinal: Negative.  Negative for nausea and vomiting.   Genitourinary: Negative.  Negative for dysuria and hematuria.   Musculoskeletal: Negative.  Negative for joint pain and myalgias.   Skin: Negative.  Negative for itching and rash.   Neurological: Negative.  Negative for dizziness and headaches.   Endo/Heme/Allergies: Negative.  Negative for polydipsia. Does not bruise/bleed easily.   Psychiatric/Behavioral: Negative.  Negative for depression. The patient is not nervous/anxious.    All other systems reviewed and are negative.    Disposition/Barriers to discharge:    Placement    Consultants/Specialty  None    PCP: Halie Dominique M.D.    Quality Measures  Quality-Core Measures   Reviewed items::  Labs reviewed and Medications reviewed  Baldwin catheter::  No Baldwin  DVT prophylaxis pharmacological::  Contraindicated - High bleeding risk (Contraindicated - High bleeding risk)  DVT prophylaxis - mechanical:  SCDs  Ulcer Prophylaxis::  Not indicated    Physical Exam       Vitals:    03/27/19 1600 03/27/19 2000 03/28/19 0600 03/28/19 0819   BP: 103/50 102/51 137/48 123/65   Pulse: 60 65 65 63   Resp: 18 16 20 16   Temp: 36.6 °C (97.8 °F) 36.1 °C (97 °F) 36.3 °C (97.3 °F) 36.2 °C (97.1 °F)   TempSrc: Temporal Temporal Temporal Temporal   SpO2: 98% 90% 97% 97%   Weight:       Height:         Body mass index is 22.61 kg/m².    Oxygen Therapy:  Pulse Oximetry: 97 %, O2 (LPM): 0, O2 Delivery: None (Room Air)    Physical Exam   Constitutional: He is well-developed, well-nourished, and in no distress. He appears cachectic. No distress.   HENT:   Head: Normocephalic and atraumatic.   Mouth/Throat: Oropharynx is clear and moist. No oropharyngeal exudate.   Eyes: Pupils are equal, round, and reactive to light. Conjunctivae and EOM are normal. Right eye exhibits no discharge. Left eye exhibits no discharge. No scleral icterus.   Neck: Normal range of motion. Neck supple. No tracheal deviation present.   Cardiovascular: Normal rate, regular rhythm, normal heart sounds and intact distal pulses.  Exam reveals no gallop and no friction rub.    No murmur heard.  Pulmonary/Chest: Effort normal and breath sounds normal. No respiratory distress. He has no wheezes. He has no rales. He exhibits no tenderness.   Abdominal: Soft. Bowel sounds are normal. He exhibits no distension and no mass. There is no tenderness. There is no rebound and no guarding.   Musculoskeletal: Normal range of motion. He exhibits no edema, tenderness or deformity.   Neurological: He is alert. He exhibits normal muscle tone.  Coordination normal.   Skin: Skin is warm and dry. No rash noted. He is not diaphoretic. No erythema. No pallor.   Minor abrasions on face, arms, legs and chest   Psychiatric: Mood and affect normal.   Nursing note and vitals reviewed.    Assessment/Plan     * Cognitive impairment- (present on admission)   Assessment & Plan    Pt and his wife are unable to care for themselves, progressive decline per wife  - ethics consult on 3/7/18 detailing recommendations depending on capacity status of patient and wife  - coordinating with SW for safe placement     Multiple bruises   Assessment & Plan    Appears as though pt had recent fall; knees, xiphoid process  - SW to assist pt with finding SNF     Hypothyroidism- (present on admission)   Assessment & Plan    Con't home levothyroxine      Cardiac pacemaker in situ- (present on admission)   Assessment & Plan    For sick sinus rhythm     SSS (sick sinus syndrome) (HCC)- (present on admission)   Assessment & Plan    S/p pacemaker     S/P TAVR (transcatheter aortic valve replacement)- (present on admission)   Assessment & Plan    Continue ASA and statin     Risk for falls- (present on admission)   Assessment & Plan    Pt appears to have fallen recently. Head CT negative for acute bleed. Pt has HA. Will monitor. Tylenol for HA  - Fall precautions ordered     Hypertension, essential- (present on admission)   Assessment & Plan    - Continue metoprolol, lisinopril     Dyslipidemia- (present on admission)   Assessment & Plan    Con't home statin      CAD (coronary artery disease)- (present on admission)   Assessment & Plan    - H/o 3-vessel coronary bypass graft with left internal mammary artery graft to left anterior descending artery, saphenous vein graft to obtuse marginal branch and saphenous vein graft to posterior descending artery by Dr. Coates in March 2018.  - echo EF 65%, s/p TAVR, RVSP 30mmHg, no significant change since last echo  - pacemaker interrogated with normal  device function  - Con't statin, ASA, metoprolol, and lisinopril     Paroxysmal atrial fibrillation (CMS-HCC)- (present on admission)   Assessment & Plan    - Xarelto discontinued by cards in August 2018  - Per chart review, pt not on AC d/t high fall risk   - Continue metoprolol for rate control     Headache   Assessment & Plan    Recent fall, hypovolemic. CT head negative for acute bleed  -Tylenol   -IVF     BETTY (acute kidney injury) (HCC)   Assessment & Plan    RESOLVED  - Likely d/t hypovolemia/decreased oral intake   - Baseline is 1.0 and eGFR above 60  - Continue IVF  - Avoid nephrotoxic medication      Age-related physical debility- (present on admission)   Assessment & Plan    -previously assess by PT/OT requiring SNF for further therapy needs  -continue PT/OT as needed     Hx of CABG- (present on admission)   Assessment & Plan    Continue ASA and statin     Gout of multiple sites- (present on admission)   Assessment & Plan    Con't home allopurinol        Carlitos Mathews M.D.

## 2019-03-28 NOTE — THERAPY
"Speech Language Therapy FEES completed.  Functional Status: The patient was seen for FEES evaluation this date with wife present. The patient tolerated scope passage without difficulty. Upon insertion of scope patient's epiglottis was noted to be positioned in an upright fashion. The patient was note to have appearance of residual food from lunch tray (?ground meat) in his vallecula. Generalized weak laryngeal adduction was noted (?presbylarynx) however, patient appeared to have complete laryngeal adduction although unable to maintain for long. Presentation of PO included ice chips, nectars, purees, and purees. Soft solids were held 2/2 concern for aspiration. The patient presented with moderate-severe oropharyngeal swallow function with absent pharyngeal swallow noted in ~40% of PO trials. Max verbal and tactile cues were utilized however, patient was unable to independently and volitionally trigger pharyngeal swallow, resulting in severe residue remaining in vallecula and pooling in pyriform sinuses. With larger bolus patient was able to trigger timely pharyngeal swallow however, mesfin SILENT aspiration was suspected during the swallow on NTL and puree trials as evidenced by moderate amount of blue coating tracheal shelf and vocal folds. Patient was unable to trigger cough on command and did not trigger cough in response to aspiration. Thin liquid trials resulted in incomplete pharyngeal swallow trigger with residue spilling to laryngeal vestibule with trickle aspiration noted. Given patient's current swallow function and degree of aspiration noted during this evaluation, patient remains high risk for aspiration with PO alimentation. At this time, patient and his wife were unable to verbalize understanding of aspiration risk, current swallow function or SLP recommendations. Patient's wife verbalized with regards to her 's health that she felt pt's \"hair was getting darker\" and therefore he is getting \"more " "life\" and younger. Due to co-morbid medical condition, patient may benefit from palliative/ethics consult to assist with further determining goals of care and nutrition source.     Recommendations - Diet: NPO, Pre-Feeding Trials with SLP Only                          Strategies: Head of Bed at 90 Degrees                          Medication Administration: Other- non-oral   Plan of Care: Will benefit from Speech Therapy 3 times per week  Post-Acute Therapy: Recommend inpatient transitional care services for continued speech therapy services.        See \"Rehab Therapy-Acute\" Patient Summary Report for complete documentation.   "

## 2019-03-28 NOTE — PROGRESS NOTES
Pt AOx1-2, pres to [facility] w/ alt mental status. Stay was c/b A-fib w/ RVR.  Hx HTN. Coronary Bypass (2018). Has Pacemaker. TIA (2011). Currently showing some cognitive deficits and fatigue. Confused. Can respond to basic questions. NTL, diet upgraded to dysphag soft diet yesterday,  good intake.  Cont. vs Incont--but lately able to make it to BR in time.  Coccyx moisture fissure healing but red, minor abrasions d/t fall before coming to hosp. (e.g. R knee abrasion that's scabbed over),  Head CT (-) for bleed). Denies pain. VSS. Plan is SNF/Assisted Living before going home. Wife sharing room. She could benefit from reinforcing safety teaching---as she keeps saying she'll be taking her  home. SNF pending.

## 2019-03-28 NOTE — PROGRESS NOTES
Spoke with speech regarding FEES study. Speech spoke with Dr. Mathews- core track will be placed this afternoon.

## 2019-03-29 NOTE — PROGRESS NOTES
Internal Medicine Interval Note  Note Author: Carlitos Mathews M.D.     Name Rafia Shaikh       1933   Age/Sex 85 y.o. male   MRN [id number]   Code Status DNAR/DNI     After 5PM or if no immediate response to page, please call for cross-coverage  Attending/Team: Dr. Romo / Tony See Patient List for primary contact information  Call [phone] to page    1st Call - Day Intern (R1):   Dr. Mathews 2nd Call - Day Sr. Resident (R2/R3):        Reason for interval visit  (Principal Problem)   Malaise    Interval Problem Daily Status Update  (24 hours, problem oriented, brief subjective history, new lab/imaging data pertinent to that problem)   - The patient was evaluated at the bedside. The patient is stable without any overnight event.  Discussed with wife regarding patient's care.  The patient did not answer anything overall was able to move her arms and legs.  Denied having any pain.  Consulted palliative care ethics committee to decide discharge planning.  The patient does not want the son or daughter to be involved in their care as they have so patient funding from the [employer] and [employer] funds.  -Ordered Cortrack however patient has a POLST form which indicates DNR, DNI and no tube feedings or TPN.  Discontinued it later and started on one-to-one feeding with nectar thick diet.  - All the relevant labs and imaging were reviewed this morning.   - Rafia Shaikh's vitals were stable overnight and this morning.    Review of Systems   Constitutional: Negative.  Negative for chills and fever.   HENT: Negative.  Negative for ear pain and hearing loss.    Eyes: Negative.  Negative for blurred vision and double vision.   Respiratory: Negative.  Negative for cough and shortness of breath.    Cardiovascular: Negative.  Negative for chest pain and palpitations.   Gastrointestinal: Negative.  Negative for nausea and vomiting.   Genitourinary: Negative.  Negative for dysuria and hematuria.    Musculoskeletal: Negative.  Negative for joint pain and myalgias.   Skin: Negative.  Negative for itching and rash.   Neurological: Negative.  Negative for dizziness and headaches.   Endo/Heme/Allergies: Negative.  Negative for polydipsia. Does not bruise/bleed easily.   Psychiatric/Behavioral: Negative.  Negative for depression. The patient is not nervous/anxious.    All other systems reviewed and are negative.    Disposition/Barriers to discharge:   Placement    Consultants/Specialty  None    PCP: Halie Dominique M.D.    Quality Measures  Quality-Core Measures   Reviewed items::  Labs reviewed and Medications reviewed  Baldwin catheter::  No Baldwin  DVT prophylaxis pharmacological::  Contraindicated - High bleeding risk (Contraindicated - High bleeding risk)  DVT prophylaxis - mechanical:  SCDs  Ulcer Prophylaxis::  Not indicated    Physical Exam       Vitals:    03/28/19 1554 03/28/19 2100 03/29/19 0400 03/29/19 0835   BP: 132/66 105/42 132/64 126/51   Pulse: 76 60 78 61   Resp: 16 17 16 12   Temp: 36.4 °C (97.5 °F) 36.3 °C (97.3 °F) 36.3 °C (97.4 °F) 36.2 °C (97.2 °F)   TempSrc: Temporal Temporal Temporal Temporal   SpO2: 97% 97% 96% 97%   Weight:       Height:         Body mass index is 22.61 kg/m².    Oxygen Therapy:  Pulse Oximetry: 97 %, O2 (LPM): 0, O2 Delivery: None (Room Air)    Physical Exam   Constitutional: He is well-developed, well-nourished, and in no distress. He appears cachectic. No distress.   HENT:   Head: Normocephalic and atraumatic.   Mouth/Throat: Oropharynx is clear and moist. No oropharyngeal exudate.   Eyes: Pupils are equal, round, and reactive to light. Conjunctivae and EOM are normal. Right eye exhibits no discharge. Left eye exhibits no discharge. No scleral icterus.   Neck: Normal range of motion. Neck supple. No tracheal deviation present.   Cardiovascular: Normal rate, regular rhythm, normal heart sounds and intact distal pulses.  Exam reveals no gallop and no friction rub.    No  murmur heard.  Pulmonary/Chest: Effort normal and breath sounds normal. No respiratory distress. He has no wheezes. He has no rales. He exhibits no tenderness.   Abdominal: Soft. Bowel sounds are normal. He exhibits no distension and no mass. There is no tenderness. There is no rebound and no guarding.   Musculoskeletal: Normal range of motion. He exhibits no edema, tenderness or deformity.   Neurological: He is alert. He exhibits normal muscle tone. Coordination normal.   Skin: Skin is warm and dry. No rash noted. He is not diaphoretic. No erythema. No pallor.   Minor abrasions on face, arms, legs and chest   Psychiatric: Mood and affect normal.   Nursing note and vitals reviewed.    Assessment/Plan     * Cognitive impairment- (present on admission)   Assessment & Plan    Pt and his wife are unable to care for themselves, progressive decline per wife  - ethics consult on 3/7/18 detailing recommendations depending on capacity status of patient and wife  - coordinating with , palliative care and ethics committee for safe placement     Multiple bruises   Assessment & Plan    Appears as though pt had recent fall; knees, xiphoid process  - SW to assist pt with finding SNF     Hypothyroidism- (present on admission)   Assessment & Plan    Con't home levothyroxine      Cardiac pacemaker in situ- (present on admission)   Assessment & Plan    For sick sinus rhythm     SSS (sick sinus syndrome) (HCC)- (present on admission)   Assessment & Plan    S/p pacemaker     S/P TAVR (transcatheter aortic valve replacement)- (present on admission)   Assessment & Plan    Continue ASA and statin     Risk for falls- (present on admission)   Assessment & Plan    Pt appears to have fallen recently. Head CT negative for acute bleed. Pt has HA. Will monitor. Tylenol for HA  - Fall precautions ordered     Hypertension, essential- (present on admission)   Assessment & Plan    - Continue metoprolol, lisinopril     Dyslipidemia- (present on  admission)   Assessment & Plan    Con't home statin      CAD (coronary artery disease)- (present on admission)   Assessment & Plan    - H/o 3-vessel coronary bypass graft with left internal mammary artery graft to left anterior descending artery, saphenous vein graft to obtuse marginal branch and saphenous vein graft to posterior descending artery by Dr. Coates in March 2018.  - echo EF 65%, s/p TAVR, RVSP 30mmHg, no significant change since last echo  - pacemaker interrogated with normal device function  - Con't statin, ASA, metoprolol, and lisinopril     Paroxysmal atrial fibrillation (CMS-HCC)- (present on admission)   Assessment & Plan    - Xarelto discontinued by cards in August 2018  - Per chart review, pt not on AC d/t high fall risk   - Continue metoprolol for rate control     Headache   Assessment & Plan    Recent fall, hypovolemic. CT head negative for acute bleed  -Tylenol   -IVF     BETTY (acute kidney injury) (HCC)   Assessment & Plan    RESOLVED  - Likely d/t hypovolemia/decreased oral intake   - Baseline is 1.0 and eGFR above 60  - Continue IVF  - Avoid nephrotoxic medication      Age-related physical debility- (present on admission)   Assessment & Plan    -previously assess by PT/OT requiring SNF for further therapy needs  -continue PT/OT as needed     Hx of CABG- (present on admission)   Assessment & Plan    Continue ASA and statin     Gout of multiple sites- (present on admission)   Assessment & Plan    Con't home allopurinol        Carlitos Mathews M.D.

## 2019-03-29 NOTE — PROGRESS NOTES
Patient care assumed at 1900 after bedside report. Patient is alert and oriented to himself mostly. Very soft spoken and does not converse with this writer much, but can be heard speaking in long, full sentences to wife from outside of the room. Did not sleep until about 0300. Does not attempt to get out of bed. Has episodes of incontinence. Sometimes walks to the bathroom with stand by assist and front-wheeled walker. Bed alarm maintained and hourly rounding utilized. Patient has been NPO. Awaiting update to POC.

## 2019-03-29 NOTE — PROGRESS NOTES
Assumed care of pt from nightshift RN; pt resting in bed at this time; pt denies pain; incontinent at times; not able to make needs known at all times, pts wife in same room as a pt as well; frequent rounding on pt    Pt showered and oral care provided; pt up to chair for approx 1 hour, tolerated well. MD ordered a a FLNT diet. Pt eating despite risk per POLST. MD placed an ethics consult as well

## 2019-03-29 NOTE — PALLIATIVE CARE
Palliative Care follow-up  Consult received and EMR reviewed; case briefly reviewed. The patient is known to this RN from a previous admission at which time a POLST was completed and the patient stated he DID NOT want a feeding tube. PC may not be available to complete formal consult till 3/30; recommend calling the patient's son who is a physician in [facility] about the case. The patient and his wife have been independent till recently and did not want their kids involved at all in any decision making. Previous joint admission and ethics referrals d/t dynamic of the couple. They expressed previously not wanting their kids involved and it's also believed their kids want limited involvement from previous discussions.     Spoke with Dr. Mathews and BS RN to provide updates on POLST and family, who has been unreachable at this point. Consider ethics for this case if family not reached.    Updated: BS team; bioethics     Plan: f/u for PC as needed- possibly bioethics    Thank you for allowing Palliative Care to participate in this patient's care. Please feel free to call [phone] with any questions or concerns.

## 2019-03-29 NOTE — CARE PLAN
Problem: Communication  Goal: The ability to communicate needs accurately and effectively will improve    Intervention: Reorient patient to environment as needed  Reorient pt to environment and situation      Problem: Safety  Goal: Will remain free from falls    Intervention: Implement fall precautions  Bed alarm in place, upper rails up

## 2019-03-29 NOTE — THERAPY
"Physical Therapy Treatment completed.   Bed Mobility:  Supine to Sit: (up in chair)  Transfers: Sit to Stand: Minimal Assist  Gait: Level Of Assist: Minimal Assist with Front-Wheel Walker       Plan of Care: Will benefit from Physical Therapy 3 times per week  Discharge Recommendations: Equipment: Will Continue to Assess for Equipment Needs. Recommend inpatient transitional care services for continued physical therapy services.      See \"Rehab Therapy-Acute\" Patient Summary Report for complete documentation.     Pt continues to require min A to perform STS with facilitation for hip extension and min A to ambulate with FWW due to occasional posterior LOB and for balance and safety. Pt ambulated 175ft with FWW and min A, did require multiple standing rest breaks and encouragement. Noticeable fatigue at end of session. Due to baseline cognitive impairments, pt with limited ability to retain education reagrding technique for STS and FWW management. However, pt will continue to benefit from acute PT while in acute to continue to progress mobility and improve balance. Continue to recommend post acute placement or long term care.   "

## 2019-03-30 NOTE — CARE PLAN
Problem: Skin Integrity  Goal: Risk for impaired skin integrity will decrease    Intervention: Assess risk factors for impaired skin integrity and/or pressure ulcers  Waffle cushion in place; pt up to chair, and mobilized throughout the day

## 2019-03-30 NOTE — CARE PLAN
Problem: Skin Integrity  Goal: Risk for impaired skin integrity will decrease  Outcome: PROGRESSING AS EXPECTED    Intervention: Assess and monitor skin integrity, appearance and/or temperature  Qshift 2 RN skin checks in place. At this time there are no s/s of breakdown. Waffle cushion in place.

## 2019-03-30 NOTE — PROGRESS NOTES
Pt and pt's wife had family friend and neighbor come to visit. He states that they all watch out for each other and that the neighbors have been looking for Rafia and his wife.   Friend's name is  Pradip Nava   #[phone]    2nd close family friend  [phone]

## 2019-03-30 NOTE — PROGRESS NOTES
Received bedside handoff from RANDY Bautista. The pt. is A&Ox2, disordered to time and event. The pt. Is pleasant and doesn't speak much. The pt. has no c/o pain and staits no needs at this time.

## 2019-03-30 NOTE — PROGRESS NOTES
2 RN skin check completed with RANDY Ortiz    Abrasions from PTA on right lateral knee, left lateral ankle, and xiphoid process.  Scattered bruising throughout, including face, legs, and arms  Bruise on right second toe near toe nail  Moisture fissure on coccyx, barrier cream used    Waffle mattress in place

## 2019-03-30 NOTE — PROGRESS NOTES
Assumed care of pt  Pt aox3, to self, location, and event; pt aragon, 5/5 BUE, 4/5 BLE  Pt occasionally incontinent of urine; fall precautions in place  Pt FLNT diet, strict 1:1 feed; pt denies pain; able to make needs known

## 2019-03-30 NOTE — CARE PLAN
Problem: Safety  Goal: Will remain free from falls  Outcome: PROGRESSING AS EXPECTED  Pt. is free from falls tonight, fall precautions in place, bed alarm on, bed controls locked.

## 2019-03-30 NOTE — PROGRESS NOTES
2 RN skin check:    Abrasions on right lateral knee, left lateral ankle, and xiphoid process.    Scattered bruising throughout, including face, legs, and arms  Bruise on right second toe near toe nail.  Moisture fissure on coccyx, barrier cream used.   Waffle mattress in place.

## 2019-03-31 NOTE — PROGRESS NOTES
2 RN skin check completed with RANDY Leon     Abrasions on right lateral knee, left lateral ankle, and xiphoid process. Scattered bruising throughout, including face, legs, and arms. Bruise on right second toe. Moisture fissure on coccyx, barrier cream used, mepilex in place. Waffle mattress in place.

## 2019-03-31 NOTE — PROGRESS NOTES
2RN skin check complete with RANDY Hamilton    abraisions noted throughout, all from pta.   Left lateral ankle, right lateral knee, left knee cap, right forearm; and bruising thorughout  Coccyx red and blanching, moisture fissure. Barrier wipes and cream applied and mepilex in place. Waffle cushion overlay in place

## 2019-03-31 NOTE — PROGRESS NOTES
2 RN skin check    Moisture fissure on sacrum  Scattered bruising throughout  Scattered abrasions on R knee, L ankle, on xiphoid

## 2019-03-31 NOTE — CARE PLAN
Problem: Safety  Goal: Will remain free from injury  Outcome: PROGRESSING AS EXPECTED  Bed and frame alarm on, treaded socks on    Problem: Pain Management  Goal: Pain level will decrease to patient's comfort goal  Outcome: PROGRESSING AS EXPECTED  No complaints of pain

## 2019-03-31 NOTE — CARE PLAN
Problem: Communication  Goal: The ability to communicate needs accurately and effectively will improve  Outcome: PROGRESSING SLOWER THAN EXPECTED  Pt encouraged to use call light to make needs known.    Problem: Safety  Goal: Will remain free from falls  Outcome: PROGRESSING AS EXPECTED  Bed alarm on and frame alarm on, bed locked in lowest position, upper side rails up, call light and personal items within reach, non skids socks on.

## 2019-03-31 NOTE — PROGRESS NOTES
Assumed pt care at 1900 and received bedside report.    Pt alert and oriented X 3, disoriented to time. Pt denies chest pain, sob, nausea and vomiting, headache, and blurry or double vision. Pt denies numbness and tingling. Pt denies pain. Pt ambulating x1 assist with FWW. Pt incontinent of bowel and bladder at times. Waffle mattress in place.   POC discussed and education provided on administered medications. All questions and concerns addressed. Fall precautions, hourly rounding and Q4 hour neuro checks in place.

## 2019-03-31 NOTE — PALLIATIVE CARE
Reason for PC Consult: Advance Care Planning    Consulted by: [employer] [facility]    Assessment:  General: 84 y/o male from home after DC d/t wife not being able to care for him. 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 Pt and wife are admitted and both lack capacity for decision making    Consults: n/a    Dyspnea: No-    Last BM: 03/31/19-    Pain: No-    Depression: Unable to determine-    Dementia: Yes;       Spiritual:  Is Roman Catholic or spirituality important for coping with this illness? No-    Has a  or spiritual provider visit been requested? No    Palliative Performance Scale: 30%    Advance Directive: None-    DPOA: No-    POLST: Yes-      Code Status: DNR-      Outcome:  PC RN discussed case with MD. This appears to be a social situation warranting the need for more social support/planning. Recommend calling son Lopez [phone] to discuss need for assistance with DC plan; up to this time he has not returned calls. Per previous conversations, Rafia and his wife live in [place] and own their home, as well as had a desire to remain there. Consider 24 hour in-home care for the couple or placement in a [facility] pending family assistance.    Patient failed FEES and BS team following POLST noting a desire for NO feeding tubes.     If family not willing to help or remain unreachable, guardianship may be warranted. Case discussed with ethics team as well who will f/u    Updated: MD    Plan: follow/support as needed    Recommendations: I do not recommend an ethics or hospice consult at this time because no family to consent.    Thank you for allowing Palliative Care to participate in this patient's care. Please feel  free to call [phone] with any questions or concerns.

## 2019-03-31 NOTE — CONSULTS
Reason for PC Consult: Advance Care Planning    Consulted by: [employer] [facility]    Assessment:  General: 84 y/o male from home after DC d/t wife not being able to care for him. 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 Pt and wife are admitted and both lack capacity for decision making    Consults: n/a    Dyspnea: No-    Last BM: 03/31/19-    Pain: No-    Depression: Unable to determine-    Dementia: Yes;       Spiritual:  Is Christianity or spirituality important for coping with this illness? No-    Has a  or spiritual provider visit been requested? No    Palliative Performance Scale: 30%    Advance Directive: None-    DPOA: No-    POLST: Yes-      Code Status: DNR-      Outcome:  PC RN discussed case with MD. This appears to be a social situation warranting the need for more social support/planning. Recommend calling son Lopez [phone] to discuss need for assistance with DC plan; up to this time he has not returned calls. Per previous conversations, Rafia and his wife live in [place] and own their home, as well as had a desire to remain there. Consider 24 hour in-home care for the couple or placement in a [facility] pending family assistance.    Patient failed FEES and BS team following POLST noting a desire for NO feeding tubes.     If family not willing to help or remain unreachable, guardianship may be warranted. Case discussed with ethics team as well who will f/u    Updated: MD    Plan: follow/support as needed    Recommendations: I do not recommend an ethics or hospice consult at this time because no family to consent.    Thank you for allowing Palliative Care to participate in this patient's care. Please feel  free to call [phone] with any questions or concerns.

## 2019-03-31 NOTE — PALLIATIVE CARE
Palliative Care follow-up  TT to MD to determine need for PC vs ethics on this case. This appears to be a social situation warranting the need for more social support/planning. Unclear if family has been contacted. Recommend calling farzaneh Marroquin [phone] to discuss need for assistance with DC plan. Per previous conversations, Rafia and his wife live in [place] and own their home, as well as had a desire to remain there. Consider 24 hour in-home care for the couple or placement in a care home pending family assistance. If family not willing to help, guardianship may be warranted. Await update from MD on role of PC in this case.    Updated: SW/MD    Plan: await call back from MD    Thank you for allowing Palliative Care to participate in this patient's care. Please feel free to call [phone] with any questions or concerns.

## 2019-04-01 NOTE — PROGRESS NOTES
Pt pleasantly confused, impulsive at times, gets out of bed without calling for assistance. Pt ESCAMILLA, denies pain, ambulates up to bathroom with 1 assist FWW, unsteady, voids, incontinent at times. Weak cough noted, pt denies N/V, needs assistance with feeds, needs much encouaragement to clear throat.

## 2019-04-01 NOTE — PROGRESS NOTES
2 RN skin check:     Bed alarm sounded, pt found standing at bedside, unsteady. Pt incontinent of urine. New linens and nathan care provided. Abrasions to bilateral hands with bruising. Skin fragile and flaky. Moisture fissure to gluteal fold, mepilex changed. Sacrum pink/red/blanching. Abrasions and scabs to right outer leg, no drainage.

## 2019-04-01 NOTE — CARE PLAN
Problem: Pain Management  Goal: Pain level will decrease to patient's comfort goal  Outcome: PROGRESSING AS EXPECTED  Pt denies any pain   Repositioning q 2 for comfort       Problem: Mobility  Goal: Risk for activity intolerance will decrease  Outcome: PROGRESSING AS EXPECTED  Pt ambulating with 1 assist FWW   Unsteady and needs redirection with use of walker

## 2019-04-01 NOTE — PROGRESS NOTES
Internal Medicine Interval Note    Note Author: Shirlene Benton M.D.      Name Rafia MCINTYRE 1933   Age 85 y.o. male   MRN [id number]   Code Status DNAR, DNI     After 5PM or if no immediate response to page, please call for cross-coverage.    Attending: Dr. Romo  Team: Niles See patient list for primary contact information.  Call [phone] to page.    1st Call:  Dr. Benton, Day Intern, R1 2nd Call:  Dr. Connelly, Leigh Senior Resident, R2-R3       Admission Date: 3/19/2019      Principal Problem:  Cognitive impairment      Interval History:  No acute events overnight.  Hard of hearing, denies any complaints, states he wants to go home.  Eating porridge and yogurt without difficulty.  Palliative Care consulted, appreciate recommendations.  Tried calling patient's children but they did not respond.  Called and left a VM with  regarding updates on DC plan, awaiting call back.       ROS:  Constitutional: Negative for chills and fatigue.  HEN: Negative for sinus pain and congestion.  Throat: Negative for dysphagia and sore throat.  Eyes: Negative for pain and abnormal discharge.   Respiratory: Negative for cough and shortness of breath.  Cardiovascular: Negative for chest pain and palpitations.  Gastrointestinal: Negative for constipation, diarrhea, nausea and vomiting.   Genitourinary: Negative for dysuria and hematuria.    Extremities: Negative for pain and swelling.  Neurological: Negative for headaches and sensory change.  Hematologic: Negative for abnormal bleeding and bruising.   Psychiatric: Negative for depression and anxiety.      Disposition-Discharge Barriers:  Pending placement      Consultants:  Palliative Care  Ethics        Quality Measures:  Baldwin: No  Central Line: No  VTE Prophylaxis: SCDs given high fall risk  Antibiotics: NA  Rehab: SLP, PT, OT      Physical Exam:    Vitals:    19 0400 19 0800 19 1600 19 1700   BP: (!) 162/83 138/63 115/76     Pulse: 64 (!) 59  75   Resp: 16 18     Temp: 36.5 °C (97.7 °F) 35.9 °C (96.7 °F)     TempSrc: Temporal Temporal     SpO2: 96% 96%     Weight:       Height:         General: No acute distress, cooperative  Head: Normocephalic, atraumatic, bitemporal wasting  Eyes: Normal conjunctivae, sclerae anicteric  Throat: Oropharynx clear, oral mucosa moist  Neck: Supple, no lymphadenopathy  Lungs: Normal work of breathing, clear to auscultation bilaterally  Heart: Normal rate, regular rhythm, no murmurs  Abdomen: Soft, bowel sounds present, non-tender, no peritoneal signs  Extremities: Nontender, no cyanosis, no edema, minor abrasions on face and BL extremities  Neuro: Alert, normal coordination, no focal deficits  Skin: Warm, dry, intact  Psych: Normal mood, appropriate affect      Labs and Imaging:  Pertinent labs and diagnostic tests associated with this visit have been reviewed and specific comments can be found in the assessment and plan section below.       Assessment and Plan:    Multiple bruises   Assessment & Plan    Knees and sternum.  Appears as though patient had a recent fall.  CT head negative for acute bleed.  SW, Palliative Care and Ethics consulted for recommendations on safe discharge.   PT and OT, fall precautions.     Hypothyroidism   Assessment & Plan    Continue home levothyroxine, TSH 0.54 on 3/21/19.     Cardiac pacemaker in situ   Assessment & Plan    For sick sinus rhythm     SSS (sick sinus syndrome) (HCC)   Assessment & Plan    Status post pacemaker.     S/P TAVR (transcatheter aortic valve replacement)   Assessment & Plan    Continue aspirin and statin.     Risk for falls   Assessment & Plan    Pt appears to have fallen recently. Head CT negative for acute bleed.   Fall precautions, PT and OT consulted.      Hypertension, essential   Assessment & Plan    Continue metoprolol, lisinopril     Dyslipidemia   Assessment & Plan    Continue home statin      CAD (coronary artery disease)   Assessment &  Plan    - H/o 3-vessel coronary bypass graft with left internal mammary artery graft to left anterior descending artery, saphenous vein graft to obtuse marginal branch and saphenous vein graft to posterior descending artery by Dr. Coates in March 2018.  - echo EF 65%, s/p TAVR, RVSP 30mmHg, no significant change since last echo  - pacemaker interrogated with normal device function  - Con't statin, ASA, metoprolol, and lisinopril     Paroxysmal atrial fibrillation (CMS-HCC)   Assessment & Plan    Xarelto discontinued by Cardiology in August 2018 due to high fall risk per chart review.  Continue metoprolol for rate control.     * Cognitive impairment   Assessment & Plan    Patient and his wife are unable to care for themselves, progressive decline per wife.  Ethics and Palliative Care consulted, appreciate recommendations.   Coordinating safe discharge with , Palliative Care and Ethics Committee.     Headache   Assessment & Plan    Recent fall, hypovolemic.   CT head negative for acute bleed.    Plan:  Tylenol PRN  Encourage PO hydration  Fall precautions      BETTY (acute kidney injury) ([facility])   Assessment & Plan    RESOLVED  Likely due to hypovolemia, decreased oral intake.   Baseline Cr 1.0 and eGFR > 60.  Given IVF rehydration.  Avoid nephrotoxic medications.      Age-related physical debility   Assessment & Plan    Previously assessed by PT and OT, SNF recommended for further therapy needs.  Continue PT and OT.     Hx of CABG   Assessment & Plan    Continue aspirin and statin.     Gout of multiple sites   Assessment & Plan    Continue home allopurinol      PVD (peripheral vascular disease) (CMS-HCC)   Assessment & Plan    History of prior L superficial femoral artery stent, known high grade stenosis of R common femoral artery closed with Starclose closure by Dr. Amaya on 03/22/17.  CTM.

## 2019-04-01 NOTE — CARE PLAN
Problem: Safety  Goal: Will remain free from injury  Outcome: PROGRESSING AS EXPECTED  Frame and bed alarm on, close to nursing station, FWW out of sight    Problem: Pain Management  Goal: Pain level will decrease to patient's comfort goal  Outcome: PROGRESSING AS EXPECTED  No complaints of pain

## 2019-04-02 PROBLEM — R13.10 DYSPHAGIA: Status: ACTIVE | Noted: 2019-01-01

## 2019-04-02 NOTE — PROGRESS NOTES
Internal Medicine Interval Note    Note Author: Coby Galvan M.D.      Name Rafia MCINTYRE 1933   Age 85 y.o. male   MRN [id number]   Code Status DNAR, DNI     After 5PM or if no immediate response to page, please call for cross-coverage.    Attending: Dr. Romo  Team: Niles See patient list for primary contact information.  Call [phone] to page.    1st Call:  Dr. Benton, Day Intern, R1 2nd Call:  Dr. Galvan, Day Senior Resident, R2-R3       Admission Date: 3/19/2019      Principal Problem:  Cognitive impairment      Interval History:  No acute events overnight.  Resting comfortably in bed.  Tolerating diet with 1:1 feeding  attempted to contact family again without success will follow up with CM, regarding discharge plan       ROS:  Constitutional: Negative for chills and fatigue.  HEN: Negative for sinus pain and congestion.  Throat: Negative for dysphagia and sore throat.  Eyes: Negative for pain and abnormal discharge.   Respiratory: Negative for cough and shortness of breath.  Cardiovascular: Negative for chest pain and palpitations.  Gastrointestinal: Negative for constipation, diarrhea, nausea and vomiting.   Genitourinary: Negative for dysuria and hematuria.    Extremities: Negative for pain and swelling.  Neurological: Negative for headaches and sensory change.  Hematologic: Negative for abnormal bleeding and bruising.   Psychiatric: Negative for depression and anxiety.      Disposition-Discharge Barriers:  Pending placement      Consultants:  Palliative Care  Ethics        Quality Measures:  Baldwin: No  Central Line: No  VTE Prophylaxis: SCDs given high fall risk  Antibiotics: NA  Rehab: SLP, PT, OT      Physical Exam:    Vitals:    19 1550 19 2000 19 0400 19 0800   BP: 104/56 112/57 157/68 128/59   Pulse: 60 60 82 60   Resp: 16 16 18 16   Temp: 36.2 °C (97.2 °F) 36.4 °C (97.5 °F) 36.3 °C (97.3 °F) 36.7 °C (98 °F)   TempSrc: Temporal Temporal  Temporal Temporal   SpO2: 99% 98% 95% 95%   Weight:       Height:         General: No acute distress, cooperative  Head: Normocephalic, atraumatic, bitemporal wasting  Eyes: Normal conjunctivae, sclerae anicteric  Throat: Oropharynx clear, oral mucosa moist  Neck: Supple, no lymphadenopathy  Lungs: Normal work of breathing, clear to auscultation bilaterally  Heart: Normal rate, regular rhythm, no murmurs  Abdomen: Soft, bowel sounds present, non-tender, no peritoneal signs  Extremities: Nontender, no cyanosis, no edema, minor abrasions on face and BL extremities  Neuro: Alert, normal coordination, no focal deficits, poor speech   Skin: Warm, dry, intact  Psych: Normal mood, appropriate affect      Assessment and Plan:    Multiple bruises   Assessment & Plan    Knees and sternum.  Appears as though patient had a recent fall.  CT head negative for acute bleed.  SW, Palliative Care and Ethics consulted for recommendations on safe discharge.   PT and OT, fall precautions.     Hypothyroidism   Assessment & Plan    Continue home levothyroxine, TSH 0.54 on 3/21/19.     Cardiac pacemaker in situ   Assessment & Plan    For sick sinus rhythm     SSS (sick sinus syndrome) (HCC)   Assessment & Plan    Status post pacemaker.     S/P TAVR (transcatheter aortic valve replacement)   Assessment & Plan    Continue aspirin and statin.     Risk for falls   Assessment & Plan    Pt appears to have fallen recently. Head CT negative for acute bleed.   Fall precautions, PT and OT consulted.      Hypertension, essential   Assessment & Plan    Continue metoprolol, lisinopril     Dyslipidemia   Assessment & Plan    Continue home statin      CAD (coronary artery disease)   Assessment & Plan    - H/o 3-vessel coronary bypass graft with left internal mammary artery graft to left anterior descending artery, saphenous vein graft to obtuse marginal branch and saphenous vein graft to posterior descending artery by Dr. Coates in March 2018.  - echo EF  65%, s/p TAVR, RVSP 30mmHg, no significant change since last echo  - pacemaker interrogated with normal device function  - Con't statin, ASA, metoprolol, and lisinopril     Paroxysmal atrial fibrillation (CMS-[facility])   Assessment & Plan    Xarelto discontinued by Cardiology in August 2018 due to high fall risk per chart review.  Continue metoprolol for rate control.     * Cognitive impairment   Assessment & Plan    Patient and his wife are unable to care for themselves, progressive decline per wife.  Ethics and Palliative Care consulted, appreciate recommendations.   Coordinating safe discharge with , Palliative Care and Ethics Committee.     Dysphagia   Assessment & Plan    Found to have dysphagia, high risk aspiration.   Advanced directive specified no feeding tube   Speech following   1:1 feeding, tolerating well      Headache   Assessment & Plan    Recent fall, hypovolemic.   CT head negative for acute bleed.    Plan:  Tylenol PRN  Encourage PO hydration  Fall precautions      BETTY (acute kidney injury) ([facility])   Assessment & Plan    RESOLVED  Likely due to hypovolemia, decreased oral intake.   Baseline Cr 1.0 and eGFR > 60.  Given IVF rehydration.  Avoid nephrotoxic medications.      Age-related physical debility   Assessment & Plan    Previously assessed by PT and OT, SNF recommended for further therapy needs.  Continue PT and OT.     Hx of CABG   Assessment & Plan    Continue aspirin and statin.     Gout of multiple sites   Assessment & Plan    Continue home allopurinol      PVD (peripheral vascular disease) (CMS-HCC)   Assessment & Plan    History of prior L superficial femoral artery stent, known high grade stenosis of R common femoral artery closed with Starclose closure by Dr. Amaya on 03/22/17.  CTM.

## 2019-04-02 NOTE — PROGRESS NOTES
Pt AOx1-2, pres to [facility] w/ alt mental status. Stay was c/b A-fib w/ RVR.  Hx HTN. Coronary Bypass (2018). Has Pacemaker. TIA (2011). Currently showing some cognitive deficits and fatigue. Confused. Can respond to basic questions. NTL, meds crushed in purree,  good intake.  Cont. vs Incont,Coccyx moisture fissure healing but still red, minor abrasions d/t fall before coming to hosp. (e.g. R knee abrasion that's scabbed over),  Head CT (-) for bleed). Denies pain. VSS. Plan is SNF/Assisted Living before going home. Wife sharing room. She could benefit from reinforcing safety teaching, SNF pending.

## 2019-04-02 NOTE — CARE PLAN
Problem: Safety  Goal: Will remain free from falls  Outcome: PROGRESSING AS EXPECTED  Pt is A&Ox2, follows some commands. Fall precautions are in place. Bed is locked and in lowest position. Call light within reach. Bed alarm on.     Problem: Mobility  Goal: Risk for activity intolerance will decrease  Outcome: PROGRESSING SLOWER THAN EXPECTED  Educated pt on the importance of getting OOB for meals and sitting up for meals. Educated pt that ambulating with a healthcare professional will help build strength. Despite education given, pt and pt's wife are reluctant to get OOB and mobilize

## 2019-04-02 NOTE — THERAPY
"Speech Language Therapy dysphagia treatment completed.   Functional Status:  Pt seen on this date for dysphagia treatment. Pt A&Ox2 to self and hospital and pt's wife present at bedside. PO trials of NTL via teaspoon and purees were presented to pt and delayed cough following cessation of trials noted, which may be concerning for aspiration. Upon palpation, laryngeal elevation was incomplete and delayed swallow trigger up to 22 seconds as PO trials progressed. Pt reporting fatigue after ~5-6 oz of NTL and purees. Oral motor and laryngeal elevation exercises were taught to pt and he completed 15 reps of lingual and labial exercises and 15 reps of laryngeal elevation exercises with \"fair\" accuracy and 1:1 clinician support. Pt closed eyes at end of session and nodded head in agreement when asked if he wanted to take a nap. As FEES on 3/28 found \"mesfin silent aspiration\" with NTL and purees, would recommend NPO with supplemental source of nutrition, however, POLST on file states \"no feeding tubes\" so would recommend safest oral diet of NTFL diet with 1:1 feeding during meals which will reduce but not completely eliminate risk of aspiration. Dysphagia education and recommendations provided to pt and pt's wife and they verbalize/demonstrated understanding, however, will need ongoing education. SLP following for dysphagia treatment.    Recommendations: NPO with supplemental source of nutrition, however, POLST on file states \"no feeding tubes\" so would recommend continuation of safest oral diet of NTFL diet with 1:1 feeding during meals which will reduce but not completely eliminate risk of aspiration.   Plan of Care: Will benefit from Speech Therapy 3 times per week  Post-Acute Therapy: Recommend inpatient transitional care services for continued speech therapy services.        See \"Rehab Therapy-Acute\" Patient Summary Report for complete documentation.     "

## 2019-04-02 NOTE — PROGRESS NOTES
0700: Assumed pt care. Pt resting comfortably in bed, medication regimen and plan of care discussed with patient. No needs at this time. Fall, seizure, and aspiration precautions in place. Call light within reach.    1300: Pt sitting up in chair, up for meals. RN present at bedside. Pt's wife sitting with him. No acute changes to report at this time. Hourly rounding in place.     1600: Pt found standing on side of bed with a BM, wife at his side attempting to clean him up. Pt transferred back into bed and cleaned up. No c/o pain, N/V, or SOB. Pt is still following all commands, A&Ox2, and ESCAMILLA. No needs at this time.

## 2019-04-02 NOTE — DISCHARGE PLANNING
Nanette Hartley  [facility] Ethics, met with pt & spouse. Nanette requested LSW to do a NOK search to gather additional information re other family members and/or possible new contact information for children. LSW informed SW Sup. Aliza Armstrong on matter. Aliza will be conducting the NOK search. Care team is needing to speak with children to help dc planning. Either children assisting with dc plan or to move forward with guardianship.     Nanette Hartley [facility] Ethics recommended hospice for this pt. Unsure wether pt would qualify. LSW to speak with [facility] team re recommendation.

## 2019-04-02 NOTE — THERAPY
"Occupational Therapy Treatment completed with focus on ADLs, ADL transfers and patient education.  Functional Status:  Pt seen for OT tx. Min A supine > sit, Min A sit > stand, amb w/ FWW to BR w/ min A for balance and safety. Min A toilet transfer w/ cues for sequencing. Pt verbalizing that he wants to complete ADLs on his own. Pt educated about safety. Pt receptive to education. Max A to manage clothing after toileting. Pt tolerated amb w/ FWW in hallway w/ min A. Pt pleasant and cooperative. Pt required cues for safety and sequencing throughout tx session d/t cognitive deficits.   Plan of Care: Will benefit from Occupational Therapy 2 times per week  Discharge Recommendations:  Equipment Will Continue to Assess for Equipment Needs.    See \"Rehab Therapy-Acute\" Patient Summary Report for complete documentation.   "

## 2019-04-02 NOTE — PROGRESS NOTES
Internal Medicine Interval Note    Note Author: Shirlene Benton M.D.      Name Rafia MCINTYRE 1933   Age 85 y.o. male   MRN [id number]   Code Status DNAR, DNI     After 5PM or if no immediate response to page, please call for cross-coverage.    Attending: Dr. Romo  Team: Niles See patient list for primary contact information.  Call [phone] to page.    1st Call:  Dr. Benton, Day Intern, R1 2nd Call:  Dr. Galvan, Day Senior Resident, R2-R3       Admission Date: 3/19/2019      Principal Problem:  Cognitive impairment      Interval History:  No acute events overnight.  Resting comfortably in bed.  Followed up with SW regarding discharge, attempted to contact family again.      ROS:  Constitutional: Negative for chills and fatigue.  HEN: Negative for sinus pain and congestion.  Throat: Negative for dysphagia and sore throat.  Eyes: Negative for pain and abnormal discharge.   Respiratory: Negative for cough and shortness of breath.  Cardiovascular: Negative for chest pain and palpitations.  Gastrointestinal: Negative for constipation, diarrhea, nausea and vomiting.   Genitourinary: Negative for dysuria and hematuria.    Extremities: Negative for pain and swelling.  Neurological: Negative for headaches and sensory change.  Hematologic: Negative for abnormal bleeding and bruising.   Psychiatric: Negative for depression and anxiety.      Disposition-Discharge Barriers:  Pending placement      Consultants:  Palliative Care  Ethics        Quality Measures:  Baldwin: No  Central Line: No  VTE Prophylaxis: SCDs given high fall risk  Antibiotics: NA  Rehab: SLP, PT, OT      Physical Exam:    Vitals:    19 2000 19 0400 19 0730 19 1550   BP: 128/59 121/61 135/64 104/56   Pulse: 63 60 60 60   Resp: 16 16 14 16   Temp: 36.1 °C (97 °F) 36.4 °C (97.5 °F) 36.1 °C (96.9 °F) 36.2 °C (97.2 °F)   TempSrc: Temporal Temporal Temporal Temporal   SpO2: 98% 98% 97% 99%   Weight:        Height:         General: No acute distress, cooperative  Head: Normocephalic, atraumatic, bitemporal wasting  Eyes: Normal conjunctivae, sclerae anicteric  Throat: Oropharynx clear, oral mucosa moist  Neck: Supple, no lymphadenopathy  Lungs: Normal work of breathing, clear to auscultation bilaterally  Heart: Normal rate, regular rhythm, no murmurs  Abdomen: Soft, bowel sounds present, non-tender, no peritoneal signs  Extremities: Nontender, no cyanosis, no edema, minor abrasions on face and BL extremities  Neuro: Alert, normal coordination, no focal deficits  Skin: Warm, dry, intact  Psych: Normal mood, appropriate affect      Assessment and Plan:    Multiple bruises   Assessment & Plan    Knees and sternum.  Appears as though patient had a recent fall.  CT head negative for acute bleed.  SW, Palliative Care and Ethics consulted for recommendations on safe discharge.   PT and OT, fall precautions.     Hypothyroidism   Assessment & Plan    Continue home levothyroxine, TSH 0.54 on 3/21/19.     Cardiac pacemaker in situ   Assessment & Plan    For sick sinus rhythm     SSS (sick sinus syndrome) (HCC)   Assessment & Plan    Status post pacemaker.     S/P TAVR (transcatheter aortic valve replacement)   Assessment & Plan    Continue aspirin and statin.     Risk for falls   Assessment & Plan    Pt appears to have fallen recently. Head CT negative for acute bleed.   Fall precautions, PT and OT consulted.      Hypertension, essential   Assessment & Plan    Continue metoprolol, lisinopril     Dyslipidemia   Assessment & Plan    Continue home statin      CAD (coronary artery disease)   Assessment & Plan    - H/o 3-vessel coronary bypass graft with left internal mammary artery graft to left anterior descending artery, saphenous vein graft to obtuse marginal branch and saphenous vein graft to posterior descending artery by Dr. Coates in March 2018.  - echo EF 65%, s/p TAVR, RVSP 30mmHg, no significant change since last echo  -  pacemaker interrogated with normal device function  - Con't statin, ASA, metoprolol, and lisinopril     Paroxysmal atrial fibrillation (CMS-[facility])   Assessment & Plan    Xarelto discontinued by Cardiology in August 2018 due to high fall risk per chart review.  Continue metoprolol for rate control.     * Cognitive impairment   Assessment & Plan    Patient and his wife are unable to care for themselves, progressive decline per wife.  Ethics and Palliative Care consulted, appreciate recommendations.   Coordinating safe discharge with , Palliative Care and Ethics Committee.     Headache   Assessment & Plan    Recent fall, hypovolemic.   CT head negative for acute bleed.    Plan:  Tylenol PRN  Encourage PO hydration  Fall precautions      BETTY (acute kidney injury) ([facility])   Assessment & Plan    RESOLVED  Likely due to hypovolemia, decreased oral intake.   Baseline Cr 1.0 and eGFR > 60.  Given IVF rehydration.  Avoid nephrotoxic medications.      Age-related physical debility   Assessment & Plan    Previously assessed by PT and OT, SNF recommended for further therapy needs.  Continue PT and OT.     Hx of CABG   Assessment & Plan    Continue aspirin and statin.     Gout of multiple sites   Assessment & Plan    Continue home allopurinol      PVD (peripheral vascular disease) (CMS-HCC)   Assessment & Plan    History of prior L superficial femoral artery stent, known high grade stenosis of R common femoral artery closed with Starclose closure by Dr. Amaya on 03/22/17.  CTM.

## 2019-04-03 NOTE — PROGRESS NOTES
Internal Medicine Interval Note    Note Author: Shirlene Benton M.D.      Name Rafia MCINTYRE 1933   Age 85 y.o. male   MRN [id number]   Code Status DNAR, DNI     After 5PM or if no immediate response to page, please call for cross-coverage.    Attending: Dr. Romo  Team: [place] See patient list for primary contact information.  Call [phone] to page.    1st Call:  Dr. Benton, Day Intern, R1 2nd Call:  Dr. Galvan, Day Senior Resident, R2-R3       Admission Date: 3/19/2019      Principal Problem:  Cognitive impairment      Interval History:  No acute events overnight.  Lying comfortably in bed, had breakfast this morning.  Called and left a message with  regarding updates on obtaining family contact information.  Tried calling the patient's son and daughter again without success.      ROS:  Constitutional: Negative for chills and fatigue.  HEN: Negative for sinus pain and congestion.  Throat: Negative for dysphagia and sore throat.  Eyes: Negative for pain and abnormal discharge.   Respiratory: Negative for cough and shortness of breath.  Cardiovascular: Negative for chest pain and palpitations.  Gastrointestinal: Negative for constipation, diarrhea, nausea and vomiting.   Genitourinary: Negative for dysuria and hematuria.    Extremities: Negative for pain and swelling.  Neurological: Negative for headaches and sensory change.  Hematologic: Negative for abnormal bleeding and bruising.   Psychiatric: Negative for depression and anxiety.      Disposition-Discharge Barriers:  Pending placement      Consultants:  Palliative Care  Ethics        Quality Measures:  Baldwin: No  Central Line: No  VTE Prophylaxis: SCDs given high fall risk  Antibiotics: NA  Rehab: SLP, PT, OT      Physical Exam:    Vitals:    19 1700 19 0400 19 0800   BP: 122/67 159/70 127/66 117/60   Pulse: 60 89 60 60   Resp: 20 18 18 14   Temp: 36.7 °C (98 °F) 36.1 °C (96.9 °F) 36.3 °C (97.4  °F) 36.8 °C (98.3 °F)   TempSrc: Temporal Temporal Temporal Temporal   SpO2: 99% 91% 98% 97%   Weight:       Height:         General: No acute distress, cooperative  Head: Normocephalic, atraumatic, bitemporal wasting  Eyes: Normal conjunctivae, sclerae anicteric  Throat: Oropharynx clear, oral mucosa moist  Neck: Supple, no lymphadenopathy  Lungs: Normal work of breathing, clear to auscultation bilaterally  Heart: Normal rate, regular rhythm, no murmurs  Abdomen: Soft, bowel sounds present, non-tender, no peritoneal signs  Extremities: Nontender, no cyanosis, no edema, minor abrasions on face and BL extremities  Neuro: Alert, normal coordination, no focal deficits  Skin: Warm, dry, intact  Psych: Normal mood, appropriate affect      Assessment and Plan:    Multiple bruises   Assessment & Plan    Knees and sternum.  Appears as though patient had a recent fall.  CT head negative for acute bleed.  SW, Palliative Care and Ethics consulted for recommendations on safe discharge.   PT and OT, fall precautions.     Hypothyroidism   Assessment & Plan    Continue home levothyroxine, TSH 0.54 on 3/21/19.     Cardiac pacemaker in situ   Assessment & Plan    For sick sinus rhythm     SSS (sick sinus syndrome) (HCC)   Assessment & Plan    Status post pacemaker.     S/P TAVR (transcatheter aortic valve replacement)   Assessment & Plan    Continue aspirin and statin.     Risk for falls   Assessment & Plan    Pt appears to have fallen recently. Head CT negative for acute bleed.   Fall precautions, PT and OT consulted.      Hypertension, essential   Assessment & Plan    Continue metoprolol, lisinopril     Dyslipidemia   Assessment & Plan    Continue home statin      CAD (coronary artery disease)   Assessment & Plan    - H/o 3-vessel coronary bypass graft with left internal mammary artery graft to left anterior descending artery, saphenous vein graft to obtuse marginal branch and saphenous vein graft to posterior descending artery by  Dr. Coates in March 2018.  - echo EF 65%, s/p TAVR, RVSP 30mmHg, no significant change since last echo  - pacemaker interrogated with normal device function  - Con't statin, ASA, metoprolol, and lisinopril     Paroxysmal atrial fibrillation (CMS-[facility])   Assessment & Plan    Xarelto discontinued by Cardiology in August 2018 due to high fall risk per chart review.  Continue metoprolol for rate control.     * Cognitive impairment   Assessment & Plan    Patient and his wife are unable to care for themselves, progressive decline per wife.  Ethics and Palliative Care consulted, appreciate recommendations.   Coordinating safe discharge with , Palliative Care and Ethics Committee.     Dysphagia   Assessment & Plan    Found to have dysphagia, high risk aspiration.   Advanced directive specified no feeding tube   Speech following   1:1 feeding, tolerating well      Headache   Assessment & Plan    Recent fall, hypovolemic.   CT head negative for acute bleed.    Plan:  Tylenol PRN  Encourage PO hydration  Fall precautions      BETTY (acute kidney injury) ([facility])   Assessment & Plan    RESOLVED  Likely due to hypovolemia, decreased oral intake.   Baseline Cr 1.0 and eGFR > 60.  Given IVF rehydration.  Avoid nephrotoxic medications.      Age-related physical debility   Assessment & Plan    Previously assessed by PT and OT, SNF recommended for further therapy needs.  Continue PT and OT.     Hx of CABG   Assessment & Plan    Continue aspirin and statin.     Gout of multiple sites   Assessment & Plan    Continue home allopurinol      PVD (peripheral vascular disease) (CMS-HCC)   Assessment & Plan    History of prior L superficial femoral artery stent, known high grade stenosis of R common femoral artery closed with Starclose closure by Dr. Amaya on 03/22/17.  CTM.

## 2019-04-03 NOTE — THERAPY
"Physical Therapy Treatment completed.   Bed Mobility:  Supine to Sit: Minimal Assist  Transfers: Sit to Stand: Minimal Assist  Gait: Level Of Assist: Minimal Assist with Front-Wheel Walker       Plan of Care: Plan to complete next treatment by 4/5  Discharge Recommendations: Equipment: Will Continue to Assess for Equipment Needs.  Today, pt woke from sleep and was pleasant, motivated, cooperative, but remains with balance issues, especially when backing up to chair. Pt lacks endurance, needing a few standing rests during walking x 125 feet using FWW with min A, and appeared fatigued by end. Cognition is limiting. Pt will need a post acute transitional care stay.     See \"Rehab Therapy-Acute\" Patient Summary Report for complete documentation.       "

## 2019-04-03 NOTE — CARE PLAN
Problem: Communication  Goal: The ability to communicate needs accurately and effectively will improve  Outcome: PROGRESSING AS EXPECTED  Pt is A&Ox2, follows some commands but ESCAMILLA. Pt will make needs known to healthcare staff and to wife who is at his bedside. Pt's room is close to a nursing station and hourly rounding is in place.     Problem: Safety  Goal: Will remain free from falls  Outcome: PROGRESSING AS EXPECTED  Pt is on aspiration, seizure, and fall precautions. Bed alarm on and lap belt in place. Pt demonstrates that he is capable of taking the lap belt off. Pt's room is close to nursing station, call light within reach. Hourly rounding in place.

## 2019-04-03 NOTE — PROGRESS NOTES
0700: Assumed pt care. Pt resting comfortably in bed and appears in no distress. Fall, aspiration, and seizure precautions in place. No needs at this time. Call light within reach.     1630: Pt sitting up in bed, wife at bedside and is a social admit. No acute events to report at this time. No needs at this time. Call light within reach, hourly rounding in place.

## 2019-04-04 NOTE — DIETARY
"Nutrition Services: Consult   Day 16 of admit.  Rafia Shaikh is a 85 y.o. male with admitting DX of altered mental status.    Consult received for supplements - \"Pt may benefit from supplements.\"  RD to add Boost [facility] BID.  Pt is currently on a full liquid, high protein, nectar thick diet with 1:1 feeds and per chart, pt is consuming >50% of meals.      Weight from 3/30 was 47.9 kg via bed scale, which is stable with admit wt of 47.2 kg via bed scale taken on 3/19.      Recommendations/Plan:  1. Boost [facility] BID.    2. Encourage intake of meals and supplements.  3. Document intake of all meals and supplements as % taken in ADL's to provide interdisciplinary communication across all shifts.   4. Monitor weight.  5. Nutrition rep will continue to see patient for ongoing meal and snack preferences.    RD to rescreen weekly.    "

## 2019-04-04 NOTE — CARE PLAN
Problem: Communication  Goal: The ability to communicate needs accurately and effectively will improve  Outcome: PROGRESSING AS EXPECTED  Pt is able to communicate needs to staff accurately and effectively.     Problem: Mobility  Goal: Risk for activity intolerance will decrease  Outcome: PROGRESSING AS EXPECTED  Pt is up to chair for meals. Pt turns self side to side. Pt is able to sit in chair a time period greater than 30 minutes.

## 2019-04-04 NOTE — PROGRESS NOTES
Pt more tired tonight. Pt AOx1-2, pres to [facility] w/ alt mental status. Stay was c/b A-fib w/ RVR.  Hx HTN. Coronary Bypass (2018). Has Pacemaker. TIA (2011). Currently showing some cognitive deficits and fatigue. Confused. Can respond to basic questions. NTL, meds crushed in purree,  good intake.  Cont. vs Incont,Coccyx moisture fissure healing but still red, minor abrasions d/t fall before coming to hosp. (e.g. R knee abrasion that's scabbed over),  Head CT (-) for bleed). Denies pain. VSS. Plan is SNF/Assisted Living before going home. Wife sharing room. She could benefit from reinforcing safety teaching, SNF pending.

## 2019-04-04 NOTE — PROGRESS NOTES
0700: Assumed pt care. Pt awake and alert, sitting up in bed. Wife at bedside as a social admit. Aspiration, seizure, and fall precautions in place. No needs at this time.     1100: Pt seen by RD. Per RD note, pt will receive Boost [facility] BID.

## 2019-04-05 NOTE — CARE PLAN
Problem: Communication  Goal: The ability to communicate needs accurately and effectively will improve  Outcome: PROGRESSING AS EXPECTED  Pt answered all LOC questions appropriately, communicated increase appetite, communicated need to go back to bed and communicated need to be boosted in bed.     Problem: Mobility  Goal: Risk for activity intolerance will decrease  Outcome: PROGRESSING AS EXPECTED  Pt got up to chair for breakfast, using the FWW to increase mobility,  strength 5/5, strong and steady with FWW. Wife actively participating in care and encouraging mobility.

## 2019-04-05 NOTE — CONSULTS
ETHICS CONSULTATION  Patient Name: Prisca Shaikh & Rafia Shaikh  MRN: [id number] & [id number]  DATE OF SERVICE: 4/2/2019    ETHICAL ISSUE:   The ethical issues are around the patients’ decision making capacity and lack of involved surrogate decision maker.  and wife patients currently sharing a room at [facility].     REASON FOR ADMISSION AND MEDICAL INDICATIONS:   Prisca Shaikh  83 year old female admitted for confusion. Found disorientated while in her ’s hospital room. Past medical history of HTN, hyperlipidemia, and hypokalemia.  Patient previously admitted 03/10/2019-03/18/2019 for same reason. Patient and  only discharged for one day before returning to the hospital. They are unable to take care of each other and require assistance. They have no local family.     February 2018 CT head showed no acute intracranial process, but did show atrophy and small vessel ischemic change.  MRI brain without contrast within normal limits for age with advanced atrophy in a nonspecific pattern and moderate white matter changes.  Assessed by psychiatry and ethics during March 2018 hospitalization, found to have unspecified major neurocognitive disorder but with retained capacity.     3/26/2019 Dr. Parra (Psychiatry) “Based on my evaluation of this patient she does express a consistent preference regarding the decision to return home, does not have a factual understanding of the current situation as evidenced by examples provided above, does not appreciate the risks and benefits of different placement options, and is unable to rationally manipulate information to make this decision as evidenced by examples provided above. Therefore, in my opinion, this patient lacks the capacity to make this medical decision to discharge to home without caregiver in place.”    Rafia Shaikh   85 year old male admitted 3/19/2019 from home after discharge 3/18/2019 because wife not able to care for him. Home  "Health reported patient had fallen and was less responsive.   Past medical history atrial fibrillation (previously on Xarelto but recently discontinued due to frequent falls), CAD s/p CABG, LUKE, AS s/p TAVR, HLD, gout, HTN, hypothyroidism, PAD, PVD,TIA, MONTRELL (noncompliant with CPAP), GI bleed in 2016 and ischemic colitis. Dysphagia requires 1:1 feedings.     DISCUSSIONS WITH MEDICAL TEAM:   Ethics spoke with Cassy TOBAR, Joby RN, Dr. Benton via [employer] Text     CODE STATUS AT THE TIME OF ETHICS CONSULTATION:  Prisca Shaikh: DNR/DNI  Rafia Shaikh: DNR/DNI     PATIENT’S DECISION MAKING CAPACITY:  Prisca Shaikh:  Oriented but confused at times. Per Psychiatry lacks decisional capacity.   Rafia Shaikh:  Oreinted but confused at times.       ADVANCE DIRECTIVE/POLST FORM:  Prisca Shaikh: No advance directive documents or POLST form on file.  Rafia Shaikh: No DPOA-HC.  Patient has a POLST form executed 3/10/2019 DNR/Allow Natural Death, Selective Treatments, No artificial nutrition or tube feedings.     HEALTH CARE DECISION MAKER:  Prisca Shaikh is oriented but with periods of confusion.  Per Dr. Benton 4/4 patient lacks capacity per psych evaluation this admission.  Rafia Shaikh is oriented with periods of confusion.  Hospital Care Management and clinical team have been unable to reach patient’s children.     DISCUSSIONS WITH PATIENT/PATIENT PREFERENCES:  Ethics spoke with both patients on 4/2/2019.  Lengthy discussion with patient's wife who talks at length about her children, her neighbors, her goal to return home so that she can take care of her .  Prisca feels that they have enough resources to stay in their home and hire help.  Prisca also OK with team attempting to contact children. Prisca states her  has a brother \"[employer]\" in [place] but unable to provide more detail. Prisca often can't recall details or specifics. Both patients want to take care of each other and try to be independent but with " help.     SOCIAL HISTORY/LIVING SITUATION PRIOR TO HOSPITALIZATION:  The patients are  and wife and live in a home together alone.  Prisca Shaikh reports they have the financial means to have 24 hours caregivers.       DISCUSSIONS WITH SURROGATE/FAMILY/PATIENT REPRESENTATIVE:  Clinical team unable to reach son with contact information available.      CONTEXTUAL ISSUES:  There is an open EPS case.    Wife is providing some of the physical care for her  with the guidance of the nursing team.      RECOMMENDATIONS:     Currently both patient’s lack decisional capacity per the medical team.  All attempts should be made to contact the patient’s children to assist with medical decisions including discharge planning .  If patient’s children will not take on role of surrogate decision maker then a search should be conducted for other family members willing and able to assist with decisions.  If no surrogate can be identified than guardianship may need to be obtained.   Both patients want to return to their home with enough support to continue to live together.  A safe discharge plan should be determined with both patients and a surrogate.  This may include returning home if enough resources and supports can be developed.     Thank you for involving us in the care of this patient. Please let me know if you have any other questions or concerns.      Respectfully submitted,    Taylor Hartley RN, MSN [facility]  Ethics Consultant  Office [phone]  Cell [phone] or [place] Text

## 2019-04-05 NOTE — PROGRESS NOTES
Internal Medicine Interval Note    Note Author: Shirlene Benton M.D.      Name Rafia MCINTYRE 1933   Age 85 y.o. male   MRN [id number]   Code Status DNAR-DNI     After 5PM or if no immediate response to page, please call for cross-coverage.    Attending: Dr. Romo  Team: [place] See patient list for primary contact information.  Call [phone] to page.    1st Call:  Dr. Benton, Day Intern, R1 2nd Call:  Dr. Galvan, Day Senior Resident, R2-R3       Admission Date: 3/19/2019      Principal Problem:  Cognitive impairment      Interval History:  No acute events overnight.  Sitting in his chair, awaiting breakfast, pleasant, cooperative.   Continues to ask when he can go home.   SW attempting to find other family who they can reach out to for guardianship.       ROS:  Constitutional: Negative for chills and fatigue.  HEN: Negative for sinus pain and congestion.  Throat: Negative for dysphagia and sore throat.  Eyes: Negative for pain and abnormal discharge.   Respiratory: Negative for cough and shortness of breath.  Cardiovascular: Negative for chest pain and palpitations.  Gastrointestinal: Negative for constipation, diarrhea, nausea and vomiting.   Genitourinary: Negative for dysuria and hematuria.    Extremities: Negative for pain and swelling.  Neurological: Negative for headaches and sensory change.  Hematologic: Negative for abnormal bleeding and bruising.   Psychiatric: Negative for depression and anxiety.      Disposition-Discharge Barriers:  Pending placement      Consultants:  Palliative Care  Ethics        Quality Measures:  Baldwin: No  Central Line: No  VTE Prophylaxis: SCDs given high fall risk  Antibiotics: NA  Rehab: SLP, PT, OT      Physical Exam:    Vitals:    19 0400 19 0703 19 1535   BP: 109/50 112/59 105/53 110/57   Pulse: 60 61 61 60   Resp: 16 16 17 16   Temp: 36.1 °C (97 °F) 36.3 °C (97.3 °F) 36.2 °C (97.2 °F) 36.3 °C (97.4 °F)   TempSrc:  Temporal Temporal Temporal Temporal   SpO2: 99% 94% 98% 100%   Weight:       Height:         General: No acute distress, cooperative  Head: Normocephalic, atraumatic, bitemporal wasting  Eyes: Normal conjunctivae, sclerae anicteric  Throat: Oropharynx clear, oral mucosa moist  Neck: Supple, no lymphadenopathy  Lungs: Normal work of breathing, clear to auscultation bilaterally  Heart: Normal rate, regular rhythm, no murmurs  Abdomen: Soft, bowel sounds present, non-tender, no peritoneal signs  Extremities: Nontender, no cyanosis, no edema, minor abrasions on face and BL extremities  Neuro: Alert, normal coordination, no focal deficits  Skin: Warm, dry, intact  Psych: Normal mood, appropriate affect      Assessment and Plan:    Multiple bruises   Assessment & Plan    Knees and sternum.  Appears as though patient had a recent fall.  CT head negative for acute bleed.  SW, Palliative Care and Ethics consulted for recommendations on safe discharge.   PT and OT, fall precautions.     Hypothyroidism   Assessment & Plan    Continue home levothyroxine, TSH 0.54 on 3/21/19.     Cardiac pacemaker in situ   Assessment & Plan    For sick sinus rhythm     SSS (sick sinus syndrome) (HCC)   Assessment & Plan    Status post pacemaker.     S/P TAVR (transcatheter aortic valve replacement)   Assessment & Plan    Continue aspirin and statin.     Risk for falls   Assessment & Plan    Pt appears to have fallen recently. Head CT negative for acute bleed.   Fall precautions, PT and OT consulted.      Hypertension, essential   Assessment & Plan    Continue metoprolol, lisinopril     Dyslipidemia   Assessment & Plan    Continue home statin      CAD (coronary artery disease)   Assessment & Plan    - H/o 3-vessel coronary bypass graft with left internal mammary artery graft to left anterior descending artery, saphenous vein graft to obtuse marginal branch and saphenous vein graft to posterior descending artery by Dr. Coates in March 2018.  -  echo EF 65%, s/p TAVR, RVSP 30mmHg, no significant change since last echo  - pacemaker interrogated with normal device function  - Con't statin, ASA, metoprolol, and lisinopril     Paroxysmal atrial fibrillation (CMS-[facility])   Assessment & Plan    Xarelto discontinued by Cardiology in August 2018 due to high fall risk per chart review.  Continue metoprolol for rate control.     * Cognitive impairment   Assessment & Plan    Patient and his wife are unable to care for themselves, progressive decline per wife.  Ethics and Palliative Care consulted, appreciate recommendations.   Coordinating safe discharge with , Palliative Care and Ethics Committee.     Dysphagia   Assessment & Plan    Found to have dysphagia, high risk aspiration.   Advanced directive specified no feeding tube   Speech following   1:1 feeding, tolerating well      Headache   Assessment & Plan    Recent fall, hypovolemic.   CT head negative for acute bleed.    Plan:  Tylenol PRN  Encourage PO hydration  Fall precautions      BETTY (acute kidney injury) ([facility])   Assessment & Plan    RESOLVED  Likely due to hypovolemia, decreased oral intake.   Baseline Cr 1.0 and eGFR > 60.  Given IVF rehydration.  Avoid nephrotoxic medications.      Age-related physical debility   Assessment & Plan    Previously assessed by PT and OT, SNF recommended for further therapy needs.  Continue PT and OT.     Hx of CABG   Assessment & Plan    Continue aspirin and statin.     Gout of multiple sites   Assessment & Plan    Continue home allopurinol      PVD (peripheral vascular disease) (CMS-HCC)   Assessment & Plan    History of prior L superficial femoral artery stent, known high grade stenosis of R common femoral artery closed with Starclose closure by Dr. Amaya on 03/22/17.  CTM.

## 2019-04-05 NOTE — PROGRESS NOTES
Assumed pt care at 0645. Pt is A&Ox 4 with periods of confusion. Pt denies CP, SOB, n/v, HA, and blurry/double vision. Pt denies n/t. PT ambulating with assistance of one with FWW. Wife ABS/roomate actively participating in ADL's. Pt up to chair for meals. Plan of care discussed, education provided on administered medications. All questions and concerns addresed. Fall, aspiration, and seizure precautions with hourly rounding in place.

## 2019-04-05 NOTE — THERAPY
"Occupational Therapy Treatment completed with focus on ADLs.  Functional Status:  Pt stood at the sink to groom with min assist and changed his gown with min assist. Pt needing assist to sequence, terminate and thoroughly complete basic ADLs and functional transfers.   Plan of Care: Will benefit from Occupational Therapy 2 times per week  Discharge Recommendations:  Equipment Will Continue to Assess for Equipment Needs. Post-acute therapy: Recommend inpatient transitional care services for continued occupational therapy services.     See \"Rehab Therapy-Acute\" Patient Summary Report for complete documentation.   "

## 2019-04-05 NOTE — PROGRESS NOTES
Assumed care of pt  Pt A&Ox1-2  Vitals stable  Steady gait with FWW and one assist to bathroom  Fall precautions in place  Hourly rounding completed  Wife at bedside assisting with care  No acute events overnight

## 2019-04-05 NOTE — CARE PLAN
Problem: Safety  Goal: Will remain free from injury  Outcome: PROGRESSING AS EXPECTED  Bed locked and in low position with bed and frame alarm on. Fall precautions in place. Hourly rounding completed. Room near nurses station    Problem: Mobility  Goal: Risk for activity intolerance will decrease  Outcome: PROGRESSING AS EXPECTED  Gait steady with FWW and one assist

## 2019-04-06 NOTE — CARE PLAN
Problem: Bowel/Gastric:  Goal: Normal bowel function is maintained or improved  Outcome: PROGRESSING AS EXPECTED  Pt passed multiple large formed bowel movement, soft, formed, and brown    Problem: Psychosocial Needs:  Goal: Level of anxiety will decrease  Outcome: PROGRESSING AS EXPECTED  PT mood appears improved, cheerful, smiling more, and conversing more with staff.

## 2019-04-06 NOTE — CARE PLAN
Problem: Safety  Goal: Will remain free from injury  Outcome: PROGRESSING AS EXPECTED  Fall precautions in place. Two bed alarms on. Educated to call for assistance.     Problem: Mobility  Goal: Risk for activity intolerance will decrease  Outcome: PROGRESSING AS EXPECTED  Gait steady with FWW and one assist

## 2019-04-06 NOTE — PROGRESS NOTES
Assumed pt care at 0645. Pt is A&Ox 1 (disoriented to time, place, and event), Prisca (wife) will sometimes give him the answers to neuro check questions. Pt denies CP, SOB, n/v, HA, and blurry/double vision. Pt denies n/t. PT ambulating with assistance of 1 and a FWW to the bathroom and the chair. Eating % of meals and boost supplements. Plan of care discussed, education provided on administered medications. All questions and concerns addresed. Fall, aspiration, and seizure precautions with hourly rounding and Q 4hr neuro checks in place.

## 2019-04-06 NOTE — PROGRESS NOTES
Internal Medicine Interval Note    Note Author: Shirlene Benton M.D.      Name Rafia MCINTYRE 1933   Age 85 y.o. male   MRN [id number]   Code Status DNAR-DNI     After 5PM or if no immediate response to page, please call for cross-coverage.    Attending: Dr. Romo  Team: [place] See patient list for primary contact information.  Call [phone] to page.    1st Call:  Dr. Benton, Day Intern, R1 2nd Call:  Dr. Galvan, Day Senior Resident, R2-R3       Admission Date: 3/19/2019      Principal Problem:  Cognitive impairment      Interval History:  No acute events overnight.  Resting in bed, pleasant, cooperative.   SW and Ethics consulted, attempting to find other family who they can reach out to for guardianship.       ROS:  Constitutional: Negative for chills and fatigue.  HEN: Negative for sinus pain and congestion.  Throat: Negative for dysphagia and sore throat.  Eyes: Negative for pain and abnormal discharge.   Respiratory: Negative for cough and shortness of breath.  Cardiovascular: Negative for chest pain and palpitations.  Gastrointestinal: Negative for constipation, diarrhea, nausea and vomiting.   Genitourinary: Negative for dysuria and hematuria.    Extremities: Negative for pain and swelling.  Neurological: Negative for headaches and sensory change.  Hematologic: Negative for abnormal bleeding and bruising.   Psychiatric: Negative for depression and anxiety.      Disposition-Discharge Barriers:  Pending placement      Consultants:  Palliative Care  Ethics        Quality Measures:  Baldwin: No  Central Line: No  VTE Prophylaxis: SCDs given high fall risk  Antibiotics: NA  Rehab: SLP, PT, OT      Physical Exam:    Vitals:    19 1945 19 0400 19 0753 19 1600   BP: 100/43 130/62 123/70 115/55   Pulse: 61 60 60 61   Resp: 16 16 16 16   Temp: 36.4 °C (97.5 °F) 37 °C (98.6 °F) 36.2 °C (97.2 °F) 36.1 °C (97 °F)   TempSrc: Temporal Temporal Temporal Temporal   SpO2:  94% 98% 97% 98%   Weight:       Height:         General: No acute distress, cooperative  Head: Normocephalic, atraumatic, bitemporal wasting  Eyes: Normal conjunctivae, sclerae anicteric  Throat: Oropharynx clear, oral mucosa moist  Neck: Supple, no lymphadenopathy  Lungs: Normal work of breathing, clear to auscultation bilaterally  Heart: Normal rate, regular rhythm, no murmurs  Abdomen: Soft, bowel sounds present, non-tender, no peritoneal signs  Extremities: Nontender, no cyanosis, no edema, minor abrasions on face and BL extremities  Neuro: Alert, normal coordination, no focal deficits  Skin: Warm, dry, intact  Psych: Normal mood, appropriate affect      Assessment and Plan:    Multiple bruises   Assessment & Plan    Knees and sternum.  Appears as though patient had a recent fall.  CT head negative for acute bleed.  SW, Palliative Care and Ethics consulted for recommendations on safe discharge.   PT and OT, fall precautions.     Hypothyroidism   Assessment & Plan    Continue home levothyroxine, TSH 0.54 on 3/21/19.     Cardiac pacemaker in situ   Assessment & Plan    For sick sinus rhythm     SSS (sick sinus syndrome) ([facility])   Assessment & Plan    Status post pacemaker.     S/P TAVR (transcatheter aortic valve replacement)   Assessment & Plan    Continue aspirin and statin.     Risk for falls   Assessment & Plan    Pt appears to have fallen recently. Head CT negative for acute bleed.   Fall precautions, PT and OT consulted.      Hypertension, essential   Assessment & Plan    Continue metoprolol, lisinopril     Dyslipidemia   Assessment & Plan    Continue home statin      CAD (coronary artery disease)   Assessment & Plan    History of 3-vessel coronary bypass graft with left internal mammary artery graft to left anterior descending artery, saphenous vein graft to obtuse marginal branch and saphenous vein graft to posterior descending artery by Dr. Coatse in March 2018.  Echo EF 65%, status post TAVR, RVSP 30 mmHg,  no significant change since last TTE.  Pacemaker interrogated with normal device function.  Con't statin, ASA, metoprolol, and lisinopril.     Paroxysmal atrial fibrillation (CMS-HCC)   Assessment & Plan    Xarelto discontinued by Cardiology in August 2018 due to high fall risk per chart review.  Continue metoprolol for rate control.     * Cognitive impairment   Assessment & Plan    Patient and his wife are unable to care for themselves, progressive decline per wife.  Ethics and Palliative Care consulted, appreciate recommendations.   Coordinating safe discharge with , Palliative Care and Ethics Committee.     Dysphagia   Assessment & Plan    Found to have dysphagia, high risk aspiration.   Advanced directive specified no feeding tube   Speech following   1:1 feeding, tolerating well      Headache   Assessment & Plan    Recent fall, hypovolemic.   CT head negative for acute bleed.    Plan:  Tylenol PRN  Encourage PO hydration  Fall precautions      BETTY (acute kidney injury) ([facility])   Assessment & Plan    RESOLVED  Likely due to hypovolemia, decreased oral intake.   Baseline Cr 1.0 and eGFR > 60.  Given IVF rehydration.  Avoid nephrotoxic medications.      Age-related physical debility   Assessment & Plan    Previously assessed by PT and OT, SNF recommended for further therapy needs.  Continue PT and OT.     Hx of CABG   Assessment & Plan    Continue aspirin and statin.     Gout of multiple sites   Assessment & Plan    Continue home allopurinol      PVD (peripheral vascular disease) (CMS-[facility])   Assessment & Plan    History of prior L superficial femoral artery stent, known high grade stenosis of R common femoral artery closed with Starclose closure by Dr. Amaya on 03/22/17.  CTM.

## 2019-04-07 NOTE — PROGRESS NOTES
"      Internal Medicine Interval Note    Note Author: Shirlene Benton M.D.      Name Rafia MCINTYRE 1933   Age 85 y.o. male   MRN [id number]   Code Status DNAR-DNI     After 5PM or if no immediate response to page, please call for cross-coverage.    Attending: Dr. Romo  Team: Blue See patient list for primary contact information.  Call [phone] to page.    1st Call:  Dr. Benton, Day Intern, R1 2nd Call:  Dr. Galvan, Day Senior Resident, R2-R3       Admission Date: 3/19/2019      Principal Problem:  Cognitive impairment      Interval History:  No acute events overnight.  Patient was assigned an alias to avoid visits from \"friends\" suspected of malicious intent.  The patient remains confused, he was sitting up in his chair and denied any concerns.   SW and Ethics consulted, attempting to find other family who they can reach out to for guardianship.       ROS:  Constitutional: Negative for chills and fatigue.  HEN: Negative for sinus pain and congestion.  Throat: Negative for dysphagia and sore throat.  Eyes: Negative for pain and abnormal discharge.   Respiratory: Negative for cough and shortness of breath.  Cardiovascular: Negative for chest pain and palpitations.  Gastrointestinal: Negative for constipation, diarrhea, nausea and vomiting.   Genitourinary: Negative for dysuria and hematuria.    Extremities: Negative for pain and swelling.  Neurological: Negative for headaches and sensory change.  Hematologic: Negative for abnormal bleeding and bruising.   Psychiatric: Negative for depression and anxiety.      Disposition-Discharge Barriers:  Pending placement      Consultants:  Palliative Care  Ethics        Quality Measures:  Baldwin: No  Central Line: No  VTE Prophylaxis: SCDs given high fall risk  Antibiotics: NA  Rehab: SLP, PT, OT      Physical Exam:    Vitals:    19 1700 19 2000 19 0400 19 0800   BP: 102/45 121/61 158/65 130/61   Pulse: 60 60 76 60   Resp: 16 16 " 18 18   Temp: 36.6 °C (97.8 °F) 36.2 °C (97.2 °F) 36.5 °C (97.7 °F) 36.3 °C (97.3 °F)   TempSrc: Temporal Temporal Temporal Temporal   SpO2: 97% 96% 97% 97%   Weight:       Height:         General: No acute distress, cooperative  Head: Normocephalic, atraumatic, bitemporal wasting  Eyes: Normal conjunctivae, sclerae anicteric  Throat: Oropharynx clear, oral mucosa moist  Neck: Supple, no lymphadenopathy  Lungs: Normal work of breathing, clear to auscultation bilaterally  Heart: Normal rate, regular rhythm, no murmurs  Abdomen: Soft, bowel sounds present, non-tender, no peritoneal signs  Extremities: Nontender, no cyanosis, no edema, minor abrasions on face and BL extremities  Neuro: Alert, normal coordination, no focal deficits  Skin: Warm, dry, intact  Psych: Normal mood, appropriate affect      Assessment and Plan:    Multiple bruises   Assessment & Plan    Knees and sternum.  Appears as though patient had a recent fall.  CT head negative for acute bleed.  SW, Palliative Care and Ethics consulted for recommendations on safe discharge.   PT and OT, fall precautions.     Hypothyroidism   Assessment & Plan    Continue home levothyroxine, TSH 0.54 on 3/21/19.     Cardiac pacemaker in situ   Assessment & Plan    For sick sinus rhythm     SSS (sick sinus syndrome) ([facility])   Assessment & Plan    Status post pacemaker.     S/P TAVR (transcatheter aortic valve replacement)   Assessment & Plan    Continue aspirin and statin.     Risk for falls   Assessment & Plan    Pt appears to have fallen recently. Head CT negative for acute bleed.   Fall precautions, PT and OT consulted.      Hypertension, essential   Assessment & Plan    Continue metoprolol, lisinopril     Dyslipidemia   Assessment & Plan    Continue home statin      CAD (coronary artery disease)   Assessment & Plan    History of 3-vessel coronary bypass graft with left internal mammary artery graft to left anterior descending artery, saphenous vein graft to obtuse  marginal branch and saphenous vein graft to posterior descending artery by Dr. Coates in March 2018.  Echo EF 65%, status post TAVR, RVSP 30 mmHg, no significant change since last TTE.  Pacemaker interrogated with normal device function.  Con't statin, ASA, metoprolol, and lisinopril.     Paroxysmal atrial fibrillation (CMS-HCC)   Assessment & Plan    Xarelto discontinued by Cardiology in August 2018 due to high fall risk per chart review.  Continue metoprolol for rate control.     * Cognitive impairment   Assessment & Plan    Patient and his wife are unable to care for themselves, progressive decline per wife.  Ethics and Palliative Care consulted, appreciate recommendations.   Coordinating safe discharge with , Palliative Care and Ethics Committee.     Dysphagia   Assessment & Plan    Found to have dysphagia, high risk aspiration.   Advanced directive specified no feeding tube   Speech following   1:1 feeding, tolerating well      Headache   Assessment & Plan    Recent fall, hypovolemic.   CT head negative for acute bleed.    Plan:  Tylenol PRN  Encourage PO hydration  Fall precautions      BETTY (acute kidney injury) ([facility])   Assessment & Plan    RESOLVED  Likely due to hypovolemia, decreased oral intake.   Baseline Cr 1.0 and eGFR > 60.  Given IVF rehydration.  Avoid nephrotoxic medications.      Age-related physical debility   Assessment & Plan    Previously assessed by PT and OT, SNF recommended for further therapy needs.  Continue PT and OT.     Hx of CABG   Assessment & Plan    Continue aspirin and statin.     Gout of multiple sites   Assessment & Plan    Continue home allopurinol      PVD (peripheral vascular disease) (CMS-HCC)   Assessment & Plan    History of prior L superficial femoral artery stent, known high grade stenosis of R common femoral artery closed with Starclose closure by Dr. Amaya on 03/22/17.  CTM.

## 2019-04-07 NOTE — PROGRESS NOTES
Internal Medicine Interval Note    Note Author: Shirlene Benton M.D.      Name Rafia MCINTYRE 1933   Age 85 y.o. male   MRN [id number]   Code Status DNAR-DNI     After 5PM or if no immediate response to page, please call for cross-coverage.    Attending: Dr. Romo  Team: Niles See patient list for primary contact information.  Call [phone] to page.    1st Call:  Dr. Benton, Day Intern, R1 2nd Call:  Dr. Galvan, Day Senior Resident, R2-R3       Admission Date: 3/19/2019      Principal Problem:  Cognitive impairment      Interval History:  No acute events overnight.  Resting in bed, pleasant, cooperative.  SW and Ethics consulted, attempting to find other family who they can reach out to for guardianship.       ROS:  Constitutional: Negative for chills and fatigue.  HEN: Negative for sinus pain and congestion.  Throat: Negative for dysphagia and sore throat.  Eyes: Negative for pain and abnormal discharge.   Respiratory: Negative for cough and shortness of breath.  Cardiovascular: Negative for chest pain and palpitations.  Gastrointestinal: Negative for constipation, diarrhea, nausea and vomiting.   Genitourinary: Negative for dysuria and hematuria.    Extremities: Negative for pain and swelling.  Neurological: Negative for headaches and sensory change.  Hematologic: Negative for abnormal bleeding and bruising.   Psychiatric: Negative for depression and anxiety.      Disposition-Discharge Barriers:  Pending placement      Consultants:  Palliative Care  Ethics        Quality Measures:  Baldwin: No  Central Line: No  VTE Prophylaxis: SCDs given high fall risk  Antibiotics: NA  Rehab: SLP, PT, OT      Physical Exam:    Vitals:    19 0400 19 0830 19 1204 19 1700   BP: 124/55 106/51  102/45   Pulse: 60 60  60   Resp: 16 16  16   Temp: 36.7 °C (98.1 °F) 36.2 °C (97.1 °F)  36.6 °C (97.8 °F)   TempSrc: Temporal Temporal  Temporal   SpO2: 100% 100%  97%   Weight:   44.4 kg  (97 lb 14.2 oz)    Height:         General: No acute distress, cooperative  Head: Normocephalic, atraumatic, bitemporal wasting  Eyes: Normal conjunctivae, sclerae anicteric  Throat: Oropharynx clear, oral mucosa moist  Neck: Supple, no lymphadenopathy  Lungs: Normal work of breathing, clear to auscultation bilaterally  Heart: Normal rate, regular rhythm, no murmurs  Abdomen: Soft, bowel sounds present, non-tender, no peritoneal signs  Extremities: Nontender, no cyanosis, no edema, minor abrasions on face and BL extremities  Neuro: Alert, normal coordination, no focal deficits  Skin: Warm, dry, intact  Psych: Normal mood, appropriate affect      Assessment and Plan:    Multiple bruises   Assessment & Plan    Knees and sternum.  Appears as though patient had a recent fall.  CT head negative for acute bleed.  SW, Palliative Care and Ethics consulted for recommendations on safe discharge.   PT and OT, fall precautions.     Hypothyroidism   Assessment & Plan    Continue home levothyroxine, TSH 0.54 on 3/21/19.     Cardiac pacemaker in situ   Assessment & Plan    For sick sinus rhythm     SSS (sick sinus syndrome) ([facility])   Assessment & Plan    Status post pacemaker.     S/P TAVR (transcatheter aortic valve replacement)   Assessment & Plan    Continue aspirin and statin.     Risk for falls   Assessment & Plan    Pt appears to have fallen recently. Head CT negative for acute bleed.   Fall precautions, PT and OT consulted.      Hypertension, essential   Assessment & Plan    Continue metoprolol, lisinopril     Dyslipidemia   Assessment & Plan    Continue home statin      CAD (coronary artery disease)   Assessment & Plan    History of 3-vessel coronary bypass graft with left internal mammary artery graft to left anterior descending artery, saphenous vein graft to obtuse marginal branch and saphenous vein graft to posterior descending artery by Dr. Coates in March 2018.  Echo EF 65%, status post TAVR, RVSP 30 mmHg, no  significant change since last TTE.  Pacemaker interrogated with normal device function.  Con't statin, ASA, metoprolol, and lisinopril.     Paroxysmal atrial fibrillation (CMS-HCC)   Assessment & Plan    Xarelto discontinued by Cardiology in August 2018 due to high fall risk per chart review.  Continue metoprolol for rate control.     * Cognitive impairment   Assessment & Plan    Patient and his wife are unable to care for themselves, progressive decline per wife.  Ethics and Palliative Care consulted, appreciate recommendations.   Coordinating safe discharge with , Palliative Care and Ethics Committee.     Dysphagia   Assessment & Plan    Found to have dysphagia, high risk aspiration.   Advanced directive specified no feeding tube   Speech following   1:1 feeding, tolerating well      Headache   Assessment & Plan    Recent fall, hypovolemic.   CT head negative for acute bleed.    Plan:  Tylenol PRN  Encourage PO hydration  Fall precautions      EBTTY (acute kidney injury) ([facility])   Assessment & Plan    RESOLVED  Likely due to hypovolemia, decreased oral intake.   Baseline Cr 1.0 and eGFR > 60.  Given IVF rehydration.  Avoid nephrotoxic medications.      Age-related physical debility   Assessment & Plan    Previously assessed by PT and OT, SNF recommended for further therapy needs.  Continue PT and OT.     Hx of CABG   Assessment & Plan    Continue aspirin and statin.     Gout of multiple sites   Assessment & Plan    Continue home allopurinol      PVD (peripheral vascular disease) (CMS-HCC)   Assessment & Plan    History of prior L superficial femoral artery stent, known high grade stenosis of R common femoral artery closed with Starclose closure by Dr. Amaya on 03/22/17.  CTM.

## 2019-04-07 NOTE — CARE PLAN
Problem: Safety  Goal: Will remain free from falls  Outcome: PROGRESSING AS EXPECTED  Pt. Is free from falls this shift, fall precautions, bed alarm, frame alarm in place.

## 2019-04-07 NOTE — PROGRESS NOTES
Received bedside handoff from RANDY Mccoy. The pt. is A&Ox1, oriented to self only, and states no pain and no needs at this time. Received report that the pt. is no longer to have visitors, and if any come to report it to charge RN so the  can be called.

## 2019-04-07 NOTE — CARE PLAN
Problem: Safety  Goal: Will remain free from injury  Outcome: PROGRESSING AS EXPECTED    Pt. is free from injury thi  Shift, fall precautions, bed, chair alarm in place.

## 2019-04-07 NOTE — CARE PLAN
Problem: Communication  Goal: The ability to communicate needs accurately and effectively will improve    Intervention: [place] patient and significant other/support system to call light to alert staff of needs  Instructed to call for assistance       Problem: Mobility  Goal: Risk for activity intolerance will decrease    Intervention: Assess and monitor signs of activity intolerance  1 assist with FWW

## 2019-04-07 NOTE — PROGRESS NOTES
Assumed care of pt. Full liquid NTL, 1:1 feeder. No IV access. 1 assist with FWW. POC family search or guardianship. Call light in reach, bed in low position, will continue hourly rounding.

## 2019-04-08 NOTE — DISCHARGE PLANNING
AMADOU Sup. Aliza Armstrong conducted a [employer] search for pt and spouse. Results are limited. AMADOU Moss. Aliza Armstrong is under the impression this is daughter and son-in-law in [place]. Pt and spouse had the same results, only these two results.     Alondra Shoemaker - 1st degree   [phone]  [email or web address]  [address] [place] [address]     Hesham Shoemaker - 2nd degree   [phone]  [email or web address]  [address] [place] [address]

## 2019-04-08 NOTE — PROGRESS NOTES
Internal Medicine Interval Note  Note Author: Kimberlee Livingston M.D.     Name Wally Torres       1933   Age/Sex 85 y.o. male   MRN [id number]   Code Status DNAR/DNI     After 5PM or if no immediate response to page, please call for cross-coverage  Attending/Team: Dr. Noe/ CRISTI Cage See Patient List for primary contact information  Call [phone] to page    1st Call - Day Intern (R1):   Dr. Livingston 2nd Call - Day Sr. Resident (R2/R3):   Dr. Galvan         Reason for interval visit  (Principal Problem)   Cognitive impairment      Interval Problem Daily Status Update  (24 hours, problem oriented, brief subjective history, new lab/imaging data pertinent to that problem)   No acute overnight events. No change from baseline, was lying comfortably in the bed. Wife bedside him.   SW and Ethics on board for next of kin location/ guardianship.     Review of Systems   Reason unable to perform ROS: Limited due to patient condition.   Constitutional: Negative for chills and fever.   HENT: Negative for hearing loss and tinnitus.    Eyes: Negative for blurred vision, double vision and photophobia.   Respiratory: Negative for cough and shortness of breath.    Cardiovascular: Negative for chest pain and palpitations.   Gastrointestinal: Negative for abdominal pain, heartburn, nausea and vomiting.   Genitourinary: Negative for dysuria and urgency.   Musculoskeletal: Negative for back pain, myalgias and neck pain.   Skin: Negative for itching and rash.   Neurological: Negative for dizziness and headaches.   Psychiatric/Behavioral: Negative for depression and suicidal ideas.       Disposition/Barriers to discharge:   Pending placement/ guardianship    Consultants/Specialty  Palliative care    Ethics  PCP: Halie Dominique M.D.      Quality Measures  Quality-Core Measures   Reviewed items::  EKG reviewed, Labs reviewed, Medications reviewed and Radiology images reviewed  Baldwin catheter::  No  Denny  DVT prophylaxis - mechanical:  SCDs          Physical Exam       Vitals:    04/07/19 1600 04/07/19 2000 04/08/19 0400 04/08/19 0710   BP: (!) 99/45 137/70 (!) 171/67 (!) 94/61   Pulse: 60 61 60 67   Resp: 18 18 16 16   Temp: 36.7 °C (98 °F) 36.4 °C (97.5 °F) 36.2 °C (97.2 °F) 36.2 °C (97.2 °F)   TempSrc: Temporal Temporal Temporal Temporal   SpO2: 100% 99% 98% 99%   Weight:       Height:         Body mass index is 21.18 kg/m².    Oxygen Therapy:  Pulse Oximetry: 99 %, O2 (LPM): 0, O2 Delivery: None (Room Air)    Physical Exam   Constitutional: He appears malnourished. He appears not lethargic. No distress.   HENT:   Head: Normocephalic and atraumatic.   Eyes: Pupils are equal, round, and reactive to light. Right eye exhibits no discharge.   Neck: Normal range of motion. Neck supple.   Cardiovascular: Normal rate, regular rhythm and normal heart sounds.    Pulmonary/Chest: Effort normal and breath sounds normal. No respiratory distress.   Abdominal: Soft. Bowel sounds are normal. He exhibits no distension. There is no tenderness.   Musculoskeletal: Normal range of motion. He exhibits no edema.   Neurological: He is alert. He has normal sensation and normal strength. He appears not lethargic.   Skin: Skin is warm. No erythema.   Psychiatric: Affect and judgment normal.             Assessment/Plan     * Cognitive impairment- (present on admission)   Assessment & Plan    Patient and his wife are unable to care for themselves, progressive decline per wife.  Ethics and Palliative Care consulted, appreciate recommendations.   Coordinating safe discharge with , Palliative Care and Ethics Committee.     Multiple bruises   Assessment & Plan    Knees and sternum.  Appears as though patient had a recent fall.  CT head negative for acute bleed.  , Palliative Care and Ethics consulted for recommendations on safe discharge.   PT and OT, fall precautions.     Hypothyroidism- (present on admission)   Assessment & Plan     Continue home levothyroxine, TSH 0.54 on 3/21/19.     Cardiac pacemaker in situ- (present on admission)   Assessment & Plan    For sick sinus rhythm     SSS (sick sinus syndrome) ([facility])- (present on admission)   Assessment & Plan    Status post pacemaker.     S/P TAVR (transcatheter aortic valve replacement)- (present on admission)   Assessment & Plan    Continue aspirin and statin.     Risk for falls- (present on admission)   Assessment & Plan    Pt appears to have fallen recently. Head CT negative for acute bleed.   Fall precautions, PT and OT consulted, appreciate recs.     Hypertension, essential- (present on admission)   Assessment & Plan    Continue metoprolol, lisinopril     Dyslipidemia- (present on admission)   Assessment & Plan    Continue home statin      CAD (coronary artery disease)- (present on admission)   Assessment & Plan    Stable, history of 3-vessel coronary bypass graft with left internal mammary artery graft to left anterior descending artery, saphenous vein graft to obtuse marginal branch and saphenous vein graft to posterior descending artery by Dr. Coates in March 2018.  Echo EF 65%, status post TAVR, RVSP 30 mmHg, no significant change since last TTE.  Pacemaker interrogated with normal device function.  Con't statin, ASA, metoprolol, and lisinopril.     Paroxysmal atrial fibrillation (CMS-[facility])- (present on admission)   Assessment & Plan    Stable. Xarelto discontinued by Cardiology in August 2018 due to high fall risk per chart review.  Continue metoprolol for rate control.     Dysphagia   Assessment & Plan    Found to have dysphagia, high risk aspiration.   Advanced directive specified no feeding tube   Speech following   1:1 feeding, tolerating well      Headache   Assessment & Plan    Recent fall, 2/2 to hypovolemia.   CT head negative for acute bleed.  Tylenol PRN  Encourage PO hydration  Fall precautions      BETTY (acute kidney injury) (HCC)   Assessment & Plan    RESOLVED  Likely due  to hypovolemia, decreased oral intake.   Baseline Cr 1.0 and eGFR > 60.  Given IVF rehydration.  Avoid nephrotoxic medications.      Age-related physical debility- (present on admission)   Assessment & Plan    Previously assessed by PT and OT, SNF recommended for further therapy needs.  Continue PT and OT.     Hx of CABG- (present on admission)   Assessment & Plan    Continue aspirin and statin.     Gout of multiple sites- (present on admission)   Assessment & Plan    Continue home allopurinol      PVD (peripheral vascular disease) (CMS-[facility])- (present on admission)   Assessment & Plan    History of prior L superficial femoral artery stent, known high grade stenosis of R common femoral artery closed with Starclose closure by Dr. Amaya on 03/22/17.  CTM.

## 2019-04-08 NOTE — CARE PLAN
Problem: Communication  Goal: The ability to communicate needs accurately and effectively will improve  Outcome: PROGRESSING SLOWER THAN EXPECTED  Pt able to communicate needs sometimes, confused most of the time however.     Problem: Safety  Goal: Will remain free from falls  Outcome: PROGRESSING AS EXPECTED  Fall precautions in place. Bed alarm in place, wife admitted and at bedside, wife agreeable to having all 4 rails up on bed this evening.

## 2019-04-08 NOTE — PROGRESS NOTES
Assumed care of patient at 0700.    Received report from night RN.    Pt alert and oriented x1, has no complaints of pain.  Pt resting comfortably in bed.   Bed alarm on.  Hourly rounding implemented.

## 2019-04-09 NOTE — PROGRESS NOTES
Internal Medicine Interval Note  Note Author: Kimberlee Livingston M.D.     Name Wally Torres       1933   Age/Sex 85 y.o. male   MRN [id number]   Code Status DNAR/DNI     After 5PM or if no immediate response to page, please call for cross-coverage  Attending/Team: Dr. Noe/ CRISTI Cage See Patient List for primary contact information  Call [phone] to page    1st Call - Day Intern (R1):   Dr. Livingston 2nd Call - Day Sr. Resident (R2/R3):   Dr. Galvan         Reason for interval visit  (Principal Problem)   Cognitive impairment      Interval Problem Daily Status Update  (24 hours, problem oriented, brief subjective history, new lab/imaging data pertinent to that problem)   No acute overnight events. Alert and oriented x2, no change from baseline, was lying comfortably in the bed.  Last BM   SW and Ethics on board for next of kin location/ guardianship, appreciate recs.       Review of Systems   Reason unable to perform ROS: Limited due to patient condition.   Constitutional: Negative for chills and fever.   HENT: Negative for hearing loss and tinnitus.    Respiratory: Negative for cough and shortness of breath.    Cardiovascular: Negative for chest pain and palpitations.   Gastrointestinal: Negative for abdominal pain, constipation, diarrhea, heartburn, nausea and vomiting.   Genitourinary: Negative for dysuria and urgency.   Musculoskeletal: Negative for back pain, myalgias and neck pain.   Skin: Negative for itching and rash.   Neurological: Negative for dizziness and headaches.   Psychiatric/Behavioral: Negative for depression and suicidal ideas.       Disposition/Barriers to discharge:   Pending placement/ guardianship    Consultants/Specialty  Palliative care    Ethics  PCP: Halie Dominique M.D.      Quality Measures  Quality-Core Measures   Reviewed items::  EKG reviewed, Labs reviewed, Medications reviewed and Radiology images reviewed  Baldwin catheter::  No Baldwin  DVT  prophylaxis - mechanical:  SCDs          Physical Exam       Vitals:    04/08/19 1430 04/08/19 2000 04/09/19 0400 04/09/19 0705   BP: (!) 91/51 124/46 125/69 110/58   Pulse: 60 61 64 60   Resp: 16 16 16 18   Temp: 36.3 °C (97.4 °F) 36.3 °C (97.3 °F) 36.3 °C (97.3 °F) 36.3 °C (97.4 °F)   TempSrc: Temporal Temporal Temporal Temporal   SpO2: 100% 99% 99% 97%   Weight:       Height:         Body mass index is 21.18 kg/m².    Oxygen Therapy:  Pulse Oximetry: 97 %, O2 (LPM): 0, O2 Delivery: None (Room Air)    Physical Exam   Constitutional: He appears malnourished. He appears not lethargic. No distress.   HENT:   Head: Normocephalic and atraumatic.   Eyes: Pupils are equal, round, and reactive to light. Right eye exhibits no discharge.   Neck: Normal range of motion. Neck supple.   Cardiovascular: Normal rate, regular rhythm and normal heart sounds.    Pulmonary/Chest: Effort normal and breath sounds normal. No respiratory distress.   Abdominal: Soft. Bowel sounds are normal. He exhibits no distension. There is no tenderness.   Musculoskeletal: Normal range of motion. He exhibits no edema.   Neurological: He is alert. He appears not lethargic.   Oriented x 2   Skin: Skin is warm. No erythema.   Psychiatric: Affect and judgment normal.             Assessment/Plan     * Cognitive impairment- (present on admission)   Assessment & Plan    Patient and his wife are unable to care for themselves, progressive decline per wife.  Ethics and Palliative Care consulted, appreciate recommendations.   Coordinating safe discharge with , Palliative Care and Ethics Committee.     Multiple bruises   Assessment & Plan    Seen on admission, on knees and sternum, appeared as though patient had a recent fall.  CT head negative for acute bleed.  , Palliative Care and Ethics consulted for recommendations on safe discharge.   PT and OT, fall precautions.     Hypothyroidism- (present on admission)   Assessment & Plan    Continue home  levothyroxine, TSH 0.54 on 3/21/19.     Cardiac pacemaker in situ- (present on admission)   Assessment & Plan    For sick sinus rhythm     SSS (sick sinus syndrome) ([facility])- (present on admission)   Assessment & Plan    Status post pacemaker. Stable     S/P TAVR (transcatheter aortic valve replacement)- (present on admission)   Assessment & Plan    Continue aspirin and statin.     Risk for falls- (present on admission)   Assessment & Plan    Pt appears to have fallen recently. Head CT negative for acute bleed.   Fall precautions, PT and OT consulted, appreciate recs.     Hypertension, essential- (present on admission)   Assessment & Plan    Continue metoprolol, lisinopril     Dyslipidemia- (present on admission)   Assessment & Plan    Continue home statin      CAD (coronary artery disease)- (present on admission)   Assessment & Plan    Stable, history of 3V CABG with left internal mammary artery graft to LAD, saphenous vein graft to obtuse marginal branch and saphenous vein graft to posterior descending artery by Dr. Coates in March 2018.  Echo EF 65%, status post TAVR, RVSP 30 mmHg, no significant change since last TTE.  Pacemaker interrogated with normal device function.  Con't statin, ASA, metoprolol, and lisinopril.     Paroxysmal atrial fibrillation (CMS-[facility])- (present on admission)   Assessment & Plan    Stable. Xarelto discontinued by Cardiology in August 2018 due to high fall risk per chart review.  Continue metoprolol for rate control.     Dysphagia   Assessment & Plan    Found to have dysphagia, high risk aspiration.   Advanced directive specified no feeding tube   Speech following   1:1 feeding, tolerating well      Headache   Assessment & Plan    Resolved, Tylenol PRN  Encourage PO hydration  Fall precautions      BETTY (acute kidney injury) ([facility])   Assessment & Plan    RESOLVED       Age-related physical debility- (present on admission)   Assessment & Plan    Previously assessed by PT and OT, SNF  recommended for further therapy needs.  PT and OT following for this admission, appreciate help.     Hx of CABG- (present on admission)   Assessment & Plan    Continue aspirin and statin.     Gout of multiple sites- (present on admission)   Assessment & Plan    Continue home allopurinol      PVD (peripheral vascular disease) (CMS-[facility])- (present on admission)   Assessment & Plan    History of prior L superficial femoral artery stent, known high grade stenosis of R common femoral artery closed with Starclose closure by Dr. Amaya on 03/22/17. Stable, no acute complains.

## 2019-04-09 NOTE — THERAPY
"Physical Therapy Treatment completed.   Bed Mobility:  Supine to Sit: Minimal Assist  Transfers: Sit to Stand: Minimal Assist  Gait: Level Of Assist: Minimal Assist with Front-Wheel Walker       Plan of Care: Will benefit from Physical Therapy 1 times per week  Discharge Recommendations: Equipment: Will Continue to Assess for Equipment Needs.     See \"Rehab Therapy-Acute\" Patient Summary Report for complete documentation.     Pt progressing slowly w/ therapy. Pt requires Eyad for all mobility primarily for safety and initiation. Pt appears to have the strength to perform but is limited by his cognition. Pt requiring a lot of time to perform short distance ambulation as he kept stopping. Pt does not appear to have any carryover of education. Pt appears to be at his baseline. He will most likely require long term placement. Will follow at 1x/wk to monitor and assist w/ DC needs. Recommend pt ambulate w/ nursing staff.  "

## 2019-04-09 NOTE — PROGRESS NOTES
· 2 RN skin check completed with RN Carolyn d/t low Lalit score.  · Devices in place: bilateral SCDs.  · Skin assessed under SCDs and found to be intact.  · No new or confirmed pressure ulcers noted. Moisture fissure and partial thickness wound appear to be healed.  · Skin is intact, including to coccyx and heels. Multiple, scattered scabs and bruises noted; skin to body is cool and fragile, with poor turgor. Implanted pacemaker to left upper chest.  · The following interventions in place: waffle mattress overlay in use, staff helping patient stay clean and dry, staff assisting patient OOB to ambulate, patient repositioning self in bed without assist. Patient is very bony and thin, but no preventative mepilex is in place to his sacrum d/t his repeated incontinence.

## 2019-04-09 NOTE — CARE PLAN
Problem: Safety  Goal: Will remain free from injury  Outcome: PROGRESSING AS EXPECTED  Bed alarm on, tread socks on, bed in low and locked position.    Problem: Venous Thromboembolism (VTW)/Deep Vein Thrombosis (DVT) Prevention:  Goal: Patient will participate in Venous Thrombosis (VTE)/Deep Vein Thrombosis (DVT)Prevention Measures  scds on

## 2019-04-09 NOTE — PROGRESS NOTES
Assumed care of patient at 0700.    Received report from night RN.    Pt alert and oriented x 1, has no complaints of pain.  Pt resting comfortably in bed.   Bed alarm on.  Hourly rounding implemented.

## 2019-04-09 NOTE — PROGRESS NOTES
Internal Medicine Interval Note  Note Author: Naty Lilly M.D.     Name Rafia Shaikh DOB 1933   Age/Sex 85 y.o. male   MRN [id number]   Code Status FULL     After 5PM or if no immediate response to page, please call for cross-coverage  Attending/Team: Kusum/ Tony See Patient List for primary contact information  Call [phone] to page    1st Call - Day Intern (R1):   Jovani 2nd Call - Day Sr. Resident (R2/R3):   Vijaya         Reason for interval visit  (Principal Problem)   Dementia.      Interval Problem Daily Status Update  (24 hours, problem oriented, brief subjective history, new lab/imaging data pertinent to that problem)   No acute overnight events.   Patient for DC today, had some apprehension about leaving.   Review of Systems   Constitutional: Negative.    HENT: Negative.    Eyes: Negative.    Respiratory: Negative.    Cardiovascular: Negative.    Gastrointestinal: Negative.    Musculoskeletal: Negative.    Skin: Negative.    Neurological: Negative.    Endo/Heme/Allergies: Negative.    Psychiatric/Behavioral: Negative.        Disposition/Barriers to discharge:   Placement.    Consultants/Specialty  PCP: Halie Dominique M.D.      Quality Measures  Quality-Core Measures   Reviewed items::  EKG reviewed, Medications reviewed, Labs reviewed and Radiology images reviewed  Baldwin catheter::  No Baldwin  DVT prophylaxis pharmacological::  Not indicated at this time, ambulatory  Ulcer Prophylaxis::  Not indicated      Physical Exam       Vitals:    19 1600 19 1933 19 0330 19 0900   BP: 112/59 (!) 95/57 116/53 114/68   Pulse: 87 76 86 82   Resp:    Temp: 36.1 °C (97 °F) 36 °C (96.8 °F) 36.3 °C (97.3 °F) 36.4 °C (97.5 °F)   TempSrc: Temporal Temporal Temporal Temporal   SpO2: 97% 97% 98% 97%   Weight:       Height:         Body mass index is 24.66 kg/m².    Oxygen Therapy:       Physical Exam   Constitutional: He is well-developed, well-nourished, and in no  distress. No distress.   HENT:   Head: Normocephalic and atraumatic.   Right Ear: External ear normal.   Left Ear: External ear normal.   Mouth/Throat: No oropharyngeal exudate.   Cardiovascular: Normal rate and regular rhythm.  Exam reveals no gallop and no friction rub.    No murmur heard.  Pulmonary/Chest: Effort normal and breath sounds normal.   Abdominal: Soft. Bowel sounds are normal. He exhibits no distension. There is no tenderness.   Musculoskeletal: He exhibits no edema.   Skin: He is not diaphoretic.       Assessment/Plan     Cognitive impairment- (present on admission)   Assessment & Plan     Progressive decline in cognition per wife, though acutely more confused the past two weeks.  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      Hypokalemia- (present on admission)   Assessment & Plan     Monitor and replete as appropriate.      Hypertension- (present on admission)   Assessment & Plan     Continue home Lisinopril.  Restart other home medications as per PCP.         Hypothyroidism- (present on admission)   Assessment & Plan     Continue home dose of Levothyroxine.      Falls- (present on admission)   Assessment & Plan     PT as outpatient.      CAD (coronary artery disease) of artery bypass graft- (present on admission)   Assessment & Plan     H/o 3-vessel coronary bypass graft with left internal mammary artery graft to left anterior descending artery, saphenous vein graft to obtuse marginal branch and saphenous vein graft to posterior descending artery by Dr. Coates in March 2018.  No cardiac symptoms on this admission, EKG stable compared to prior.  On home atorvastatin, hold ASA in setting of GI bleed.  CTM.      PAF (paroxysmal atrial fibrillation) ([facility])- (present on admission)   Assessment & Plan     Xarelto discontinued by cards in August 2018.  Continue home metoprolol.      PVD (peripheral vascular disease) (CMS-[facility])- (present on admission)   Assessment & Plan      H/o prior left superficial femoral artery stent, known high grade stenosis of right common femoral artery closed with Starclose closure by Dr. Amaya on 03/22/17.  CTM.

## 2019-04-09 NOTE — THERAPY
"Occupational Therapy Treatment completed with focus on ADLs, ADL transfers and patient education.  Functional Status:  Pt seen for OT tx. Supv supine > sit w/ HOB elevated. Supv sit > stand, amb w/ FWW in room w/ min A for balance and safety. Min A w/ cues for sequencing for toilet transfer. Max A pericare. Max A LB dressing. Pt continues to require assistance w/ ADLs and ADL transfers d/t cognitive deficits. Pt w/ impaired safety awareness and poor carryover of education d/t cognitive deficits. Pt requires assistance from staff to initiate and encourage for OOB activity. Pt continues to be incontinent requiring max A for pericare.   Plan of Care: Will discuss POC w/ OT regarding current goals, frequency and pt status.   Discharge Recommendations:  Equipment Will Continue to Assess for Equipment Needs.     See \"Rehab Therapy-Acute\" Patient Summary Report for complete documentation.   "

## 2019-04-09 NOTE — THERAPY
"Speech Language Therapy dysphagia treatment completed.   Functional Status:  Pt seen on this date for dysphagia treatment. Pt A&Ox2 to self and hospital and pt's wife present at bedside. The patient was seen during his NTFL meal tray. The patient required min A to complete pharyngeal swallow following each bite and sip, however, with cues was able to maintain clear vocal quality. The patient ate 100% of his NTFL meal tray with no overt s/s of aspiration with mod cues for swallow strategies however, FEES on 3/28 found \"mesfin silent aspiration\" with NTL and purees. At this time, SLP recommendation remains NPO with supplemental source of nutrition, however given POLST on file states \"no feeding tubes\" so would recommend safest oral diet. As patient is eating 100% of his meal trays and NTFL does not have as much nutritional value, would recommend modifying diet to D1/NTL to allow for more variety and nutritional intake. Dysphagia education and recommendations provided to pt and pt's wife and they verbalize/demonstrated understanding, however, will need ongoing education. SLP will follow 1-2 more times to ensure follow through with swallow strategies and no increased s/s of aspiration with diet modification.     Recommendations: 1)  modify diet to D1/NTL to allow for more variety and nutritional intake.     Plan of Care: Will benefit from Speech Therapy 2 times per week    Post-Acute Therapy: Discharge to home with outpatient or home health for additional skilled therapy services.    See \"Rehab Therapy-Acute\" Patient Summary Report for complete documentation.     "

## 2019-04-10 NOTE — PROGRESS NOTES
Internal Medicine Interval Note  Note Author: Kimberlee Livingston M.D.     Name Wally Torres       1933   Age/Sex 85 y.o. male   MRN [id number]   Code Status DNAR/DNI     After 5PM or if no immediate response to page, please call for cross-coverage  Attending/Team: Dr. Noe/ CRISTI Cage See Patient List for primary contact information  Call [phone] to page    1st Call - Day Intern (R1):   Dr. Livingston 2nd Call - Day Sr. Resident (R2/R3):   Dr. Galvan         Reason for interval visit  (Principal Problem)   Cognitive impairment      Interval Problem Daily Status Update  (24 hours, problem oriented, brief subjective history, new lab/imaging data pertinent to that problem)   No acute overnight events. Sleeping comfortably in bed. Spoke with Day RN, no acute concerns.   SW and Ethics on board for next of kin location/ guardianship, appreciate recs.       Review of Systems   Reason unable to perform ROS: Limited due to patient condition, patient sleeping.       Disposition/Barriers to discharge:   Pending placement/ guardianship    Consultants/Specialty  Palliative care    Ethics  PCP: Halie Dominique M.D.      Quality Measures  Quality-Core Measures   Reviewed items::  EKG reviewed, Labs reviewed, Medications reviewed and Radiology images reviewed  Baldwin catheter::  No Baldwin  DVT prophylaxis - mechanical:  SCDs          Physical Exam       Vitals:    19 1900 04/10/19 0330 04/10/19 0502 04/10/19 0700   BP: 131/49 148/69  144/66   Pulse: 60 62 62 60   Resp: 18 16  16   Temp: 36.3 °C (97.4 °F) 36.1 °C (96.9 °F)  36.1 °C (97 °F)   TempSrc: Temporal Temporal  Temporal   SpO2: 99% 97%  99%   Weight:       Height:         Body mass index is 21.18 kg/m².    Oxygen Therapy:  Pulse Oximetry: 99 %, O2 (LPM): 0, O2 Delivery: None (Room Air)    Physical Exam  Couldn't perform due to patient condition/ sleeping.       Assessment/Plan     * Cognitive impairment- (present on admission)    Assessment & Plan    Patient and his wife are unable to care for themselves, progressive decline per wife.  Ethics and Palliative Care consulted, appreciate recommendations.   Coordinating safe discharge with , Palliative Care and Ethics Committee.     Multiple bruises   Assessment & Plan    Seen on admission, on knees and sternum, appeared as though patient had a recent fall.  CT head negative for acute bleed.  , Palliative Care and Ethics consulted for recommendations on safe discharge.   PT and OT, fall precautions.     Hypothyroidism- (present on admission)   Assessment & Plan    Continue home levothyroxine, TSH 0.54 on 3/21/19.     Cardiac pacemaker in situ- (present on admission)   Assessment & Plan    For sick sinus rhythm     SSS (sick sinus syndrome) (HCC)- (present on admission)   Assessment & Plan    Status post pacemaker. Stable     S/P TAVR (transcatheter aortic valve replacement)- (present on admission)   Assessment & Plan    Continue aspirin and statin.     Risk for falls- (present on admission)   Assessment & Plan    Pt appears to have fallen recently. Head CT negative for acute bleed.   Fall precautions, PT and OT consulted, appreciate recs.     Hypertension, essential- (present on admission)   Assessment & Plan    Continue metoprolol, lisinopril     Dyslipidemia- (present on admission)   Assessment & Plan    Continue home statin      CAD (coronary artery disease)- (present on admission)   Assessment & Plan    Stable, history of 3V CABG with left internal mammary artery graft to LAD, saphenous vein graft to obtuse marginal branch and saphenous vein graft to posterior descending artery by Dr. Coates in March 2018.  Echo EF 65%, status post TAVR, RVSP 30 mmHg, no significant change since last TTE.  Pacemaker interrogated with normal device function.  Con't statin, ASA, metoprolol, and lisinopril.     Paroxysmal atrial fibrillation (CMS-HCC)- (present on admission)   Assessment & Plan    Stable.  Xarelto discontinued by Cardiology in August 2018 due to high fall risk per chart review.  Continue metoprolol for rate control.     Dysphagia   Assessment & Plan    Found to have dysphagia, high risk aspiration.   Advanced directive specified no feeding tube   Speech following   1:1 feeding, tolerating well      Headache   Assessment & Plan    Resolved, Tylenol PRN  Encourage PO hydration  Fall precautions      BETTY (acute kidney injury) (HCC)   Assessment & Plan    RESOLVED       Age-related physical debility- (present on admission)   Assessment & Plan    Previously assessed by PT and OT, SNF recommended for further therapy needs.  PT and OT following for this admission, appreciate help.     Hx of CABG- (present on admission)   Assessment & Plan    Continue aspirin and statin.     Gout of multiple sites- (present on admission)   Assessment & Plan    Continue home allopurinol      PVD (peripheral vascular disease) (CMS-[facility])- (present on admission)   Assessment & Plan    History of prior L superficial femoral artery stent, known high grade stenosis of R common femoral artery closed with Starclose closure by Dr. Amaya on 03/22/17. Stable, no acute complains.

## 2019-04-10 NOTE — PROGRESS NOTES
Report Received from Day RN, assumed care @1900  Bed alarm on  A/O x 1( oriented to self)  VSS  Pain-denies  O2-ra  Diet- Reg, dysphagia 1 nectar thick  Void-pos incontinence  BM-pos   Bed Locked in Lowest Position  Call light in reach

## 2019-04-10 NOTE — DISCHARGE PLANNING
Anticipated Discharge Disposition: TBD    Action: LSW received a VM from Lynda with [employer]. Dr. Lopez Shaikh does work for them. His  can be reached at [phone].    LSW made tc to  Dr. Lopez Shaikh's , Kajal at [phone] and  for a return call by Dr. Shaikh.    Barriers to Discharge: Safe dc Plan; No Decision Maker    Plan: LSW to f/u with management.

## 2019-04-11 NOTE — PROGRESS NOTES
Received report, assumed pt care. Pt a&o 1, VSS, assessment completed. Resting comfortably in bed with call light, bedside table in reach. No c/o at this time. Side rails up 2. Bed alarm on, bed in low position. Will continue to monitor.

## 2019-04-11 NOTE — PROGRESS NOTES
· 2 RN skin check complete: with RANDY Monique  · Devices in place: no devices in place  · Confirmed pressure ulcers found on: no pressure ulcers found.  · The following interventions in place: patient moves around frequently in bed, able to turn self.

## 2019-04-11 NOTE — THERAPY
Occupational Therapy Contact Note:    Discussed patients POC with GARCIA that has been caring with pt. Pt has hit new functional baseline. Will decrease frequency to DC needs only.     Lily Berumen, OTR/L  Pager: [phone]

## 2019-04-11 NOTE — PALLIATIVE CARE
Palliative Care follow-up  Appreciate SW team working diligently to locate the patient's children to assist with DC planning. Ethics currently involved. PC will continue to monitor intermittently and assist as able when/if family located and willing to be involved. Pt may require guardianship unless he declines clinically and primary team and ethics feel comfort care is more appropriate.    Plan: monitor intermittently and assist as needed    Thank you for allowing Palliative Care to participate in this patient's care. Please feel free to call [phone] with any questions or concerns.

## 2019-04-11 NOTE — DISCHARGE PLANNING
Anticipated Discharge Disposition: TBD (Pending Guardianship)      Action: LSW made tc to [facility]ging [facility] Disability ([phone]) and was transferred to assigned EPS SWFátima ([phone]). EPS Case# [id number](Prisca) & Case # [id number] (Rafia). They have had limited to no contact with pt's family and have the same phone#'s as this worker. In October 2018 pt was referred to [employer] for a rep payee. ([phone]) Fátima is unsure if they actually went forward with the process. MARCELLEW filled her in on what happened with pt's friends Pradip and Karla and provided pt's Alias names. Fátima is adding Financial Exploitation to the EPS Case.      LSW made tc to [employer] ([phone]) and spoke with Nanda. They do not have the pt on service for rep payee.    MARCELLEW sent EPS Fátima TOBAR an email with an update on rep payee.    LSW provided management with the completed guardianship paperwork for approval to submit to Legal.     Barriers to Discharge: Safe DC Plan; EPS Case; No Decision Maker; Pending Guardianship      Plan: MARCELLEW to f/u with management

## 2019-04-11 NOTE — CARE PLAN
Problem: Safety  Goal: Will remain free from injury  Bed alarm and strip alarm on, call light within reach, hourly rounding in place.

## 2019-04-11 NOTE — CARE PLAN
Problem: Discharge Barriers/Planning  Goal: Patient's continuum of care needs will be met  EPS case, awaiting safe discharge.

## 2019-04-11 NOTE — PROGRESS NOTES
Internal Medicine Interval Note  Note Author: Kimberlee Livingston M.D.     Name Wally Torres       1933   Age/Sex 85 y.o. male   MRN [id number]   Code Status DNAR/DNI     After 5PM or if no immediate response to page, please call for cross-coverage  Attending/Team: Dr. Noe/ CRISTI Cage See Patient List for primary contact information  Call [phone] to page    1st Call - Day Intern (R1):   Dr. Livingston 2nd Call - Day Sr. Resident (R2/R3):   Dr. Galvan         Reason for interval visit  (Principal Problem)   Cognitive impairment      Interval Problem Daily Status Update  (24 hours, problem oriented, brief subjective history, new lab/imaging data pertinent to that problem)   Patient seen in the AM, lying comfortably and sleeping in bed. No acute concerns.  SW and Ethics on board for next of kin location/ guardianship, appreciate recs.       Review of Systems   Reason unable to perform ROS: Limited due to patient condition, patient sleeping.       Disposition/Barriers to discharge:   Pending placement/ guardianship    Consultants/Specialty  Palliative care    Ethics  PCP: Halie Dominique M.D.      Quality Measures  Quality-Core Measures   Reviewed items::  EKG reviewed, Labs reviewed, Medications reviewed and Radiology images reviewed  Baldwin catheter::  No Baldwin  DVT prophylaxis - mechanical:  SCDs          Physical Exam       Vitals:    04/10/19 1755 04/10/19 2000 19 0400 19 0700   BP: 100/45 100/48 144/62 124/50   Pulse: 62 60 67 60   Resp:  16 16 16   Temp:  36.4 °C (97.6 °F) 36.4 °C (97.6 °F) 36.7 °C (98 °F)   TempSrc:  Temporal Temporal Temporal   SpO2:  98% 98% 99%   Weight:       Height:         Body mass index is 21.18 kg/m².    Oxygen Therapy:  Pulse Oximetry: 99 %, O2 Delivery: None (Room Air)    Physical Exam  Couldn't perform due to patient condition/ sleeping.       Assessment/Plan     * Cognitive impairment- (present on admission)   Assessment & Plan     Patient and his wife are unable to care for themselves, progressive decline per wife.  Ethics and Palliative Care consulted, appreciate recommendations.   Coordinating safe discharge with , Palliative Care and Ethics Committee.     Multiple bruises   Assessment & Plan    Seen on admission, on knees and sternum, appeared as though patient had a recent fall.  CT head negative for acute bleed.  , Palliative Care and Ethics consulted for recommendations on safe discharge.   PT and OT, fall precautions.     Hypothyroidism- (present on admission)   Assessment & Plan    Continue home levothyroxine, TSH 0.54 on 3/21/19.     Cardiac pacemaker in situ- (present on admission)   Assessment & Plan    For sick sinus rhythm     SSS (sick sinus syndrome) (HCC)- (present on admission)   Assessment & Plan    Status post pacemaker. Stable     S/P TAVR (transcatheter aortic valve replacement)- (present on admission)   Assessment & Plan    Continue aspirin and statin.     Risk for falls- (present on admission)   Assessment & Plan    Pt appears to have fallen recently. Head CT negative for acute bleed.   Fall precautions, PT and OT consulted, appreciate recs.     Hypertension, essential- (present on admission)   Assessment & Plan    Continue metoprolol, lisinopril     Dyslipidemia- (present on admission)   Assessment & Plan    Continue home statin      CAD (coronary artery disease)- (present on admission)   Assessment & Plan    Stable, history of 3V CABG with left internal mammary artery graft to LAD, saphenous vein graft to obtuse marginal branch and saphenous vein graft to posterior descending artery by Dr. Coates in March 2018.  Echo EF 65%, status post TAVR, RVSP 30 mmHg, no significant change since last TTE.  Pacemaker interrogated with normal device function.  Con't statin, ASA, metoprolol, and lisinopril.     Paroxysmal atrial fibrillation (CMS-HCC)- (present on admission)   Assessment & Plan    Stable. Xarelto discontinued by  Cardiology in August 2018 due to high fall risk per chart review.  Continue metoprolol for rate control.     Dysphagia   Assessment & Plan    Found to have dysphagia, high risk aspiration.   Advanced directive specified no feeding tube   Speech following   1:1 feeding, tolerating well      Headache   Assessment & Plan    Resolved, Tylenol PRN  Encourage PO hydration  Fall precautions      BETTY (acute kidney injury) ([facility])   Assessment & Plan    RESOLVED       Age-related physical debility- (present on admission)   Assessment & Plan    Previously assessed by PT and OT, SNF recommended for further therapy needs.  PT and OT following for this admission, appreciate help.     Hx of CABG- (present on admission)   Assessment & Plan    Continue aspirin and statin.     Gout of multiple sites- (present on admission)   Assessment & Plan    Continue home allopurinol      PVD (peripheral vascular disease) (CMS-[facility])- (present on admission)   Assessment & Plan    History of prior L superficial femoral artery stent, known high grade stenosis of R common femoral artery closed with Starclose closure by Dr. Amaya on 03/22/17. Stable, no acute complains.

## 2019-04-12 NOTE — DISCHARGE PLANNING
Anticipated Discharge Disposition: TBD (Pending Guardianship)     Action: LSW received completed guardianship paperwork back from management with approval to submit to Legal.    LSW received a VM from pt's EPS Worker, Fátima Waite, who requested pt's bank name in order for her to obtain bank statement for investigation.      LSW spoke with BSN, Tisha. Pt's wife does have her purse at bedside.      LSW met with pt's wife, Prisca bedside. She has her purse with her. She was able to show this worker her checkbook and her debit cards for her B of A accounts.      LSW made tc to EPS Worker, Fátima ([phone]) and provided her with the information this worker located. She is adding financial exploitation to the cases as when SO went out to investigate on a burglary call this weekend they found Karla and her daughter in the pt's house. It is unknown at this time if pt has possession of house key. The car keys are in [employer]. Fátima has also talked with Pradip. Family friend, Meño Sultana maybe Karla's father. At this time there are no visitors allowed.    LSW submitted Guardianship paperwork to Legal.       Barriers to Discharge: Safe DC Plan; EPS Case; No Decision Maker; Pending Guardianship      Plan: LSW to f/u with Legal

## 2019-04-12 NOTE — CARE PLAN
Problem: Safety  Goal: Will remain free from falls  Outcome: PROGRESSING AS EXPECTED  Bed alarm on, fall precautions in place     Problem: Skin Integrity  Goal: Risk for impaired skin integrity will decrease  Outcome: PROGRESSING AS EXPECTED  Skin assessed for signs of breakdown. Pt turns self in bed

## 2019-04-12 NOTE — CARE PLAN
Problem: Bowel/Gastric:  Goal: Will not experience complications related to bowel motility  Patient had bowel movement this morning.    Problem: Respiratory:  Goal: Respiratory status will improve  Patient on room air saturating at 98%    Problem: Urinary Elimination:  Goal: Ability to reestablish a normal urinary elimination pattern will improve  Patient incontinent.  Ana Cristina care and linen changes with each incontinent episode.

## 2019-04-12 NOTE — PROGRESS NOTES
Internal Medicine Interval Note  Note Author: Kimberlee Livingston M.D.     Name Wally Torres       1933   Age/Sex 85 y.o. male   MRN [id number]   Code Status DNAR/DNI     After 5PM or if no immediate response to page, please call for cross-coverage  Attending/Team: Dr. Noe/ CRISTI Cage See Patient List for primary contact information  Call [phone] to page    1st Call - Day Intern (R1):   Dr. Livingston 2nd Call - Day Sr. Resident (R2/R3):   Dr. Galvan         Reason for interval visit  (Principal Problem)   Cognitive impairment      Interval Problem Daily Status Update  (24 hours, problem oriented, brief subjective history, new lab/imaging data pertinent to that problem)   Patient seen in the AM, sleeping in bed. Breathing comfortably in RA. Per chart review, no acute concerns.   No NOK found to take responsibility, pending guardianship.       Review of Systems   Reason unable to perform ROS: Limited due to patient condition, patient sleeping.       Disposition/Barriers to discharge:   Pending placement/ guardianship    Consultants/Specialty  Palliative care    Ethics  PCP: Halie Dominique M.D.      Quality Measures  Quality-Core Measures   Reviewed items::  EKG reviewed, Labs reviewed, Medications reviewed and Radiology images reviewed  Baldwin catheter::  No Baldwin  DVT prophylaxis - mechanical:  SCDs          Physical Exam       Vitals:    19 1530 19 2000 19 0350 19 0700   BP: (!) 96/48 109/48 148/62 122/50   Pulse: 60 60 61 60   Resp: 16 16 18 16   Temp: 36.5 °C (97.7 °F) 36.5 °C (97.7 °F) 36.6 °C (97.8 °F) 36.6 °C (97.8 °F)   TempSrc: Temporal Temporal Temporal Temporal   SpO2: 96% 97% 94% 98%   Weight:       Height:         Body mass index is 21.18 kg/m².    Oxygen Therapy:  Pulse Oximetry: 98 %, O2 (LPM): 0, O2 Delivery: None (Room Air)    Physical Exam  Couldn't perform due to patient condition/ sleeping.       Assessment/Plan     * Cognitive  impairment- (present on admission)   Assessment & Plan    Patient and his wife are unable to care for themselves, progressive decline per wife.  Ethics and Palliative Care consulted, appreciate recommendations.   Coordinating safe discharge with , Palliative Care and Ethics Committee.  As no NOK found who can take responsibility, pending guardianship now.      Multiple bruises   Assessment & Plan    Seen on admission, on knees and sternum, appeared as though patient had a recent fall.  CT head negative for acute bleed.  , Palliative Care and Ethics consulted for recommendations on safe discharge.   PT and OT, fall precautions.     Hypothyroidism- (present on admission)   Assessment & Plan    Continue home levothyroxine, TSH 0.54 on 3/21/19.     Cardiac pacemaker in situ- (present on admission)   Assessment & Plan    For sick sinus rhythm     SSS (sick sinus syndrome) (HCC)- (present on admission)   Assessment & Plan    Status post pacemaker. Stable     S/P TAVR (transcatheter aortic valve replacement)- (present on admission)   Assessment & Plan    Continue aspirin and statin.     Risk for falls- (present on admission)   Assessment & Plan    Pt appears to have fallen recently. Head CT negative for acute bleed.   Fall precautions, PT and OT consulted, appreciate recs.     Hypertension, essential- (present on admission)   Assessment & Plan    Continue metoprolol, lisinopril     Dyslipidemia- (present on admission)   Assessment & Plan    Continue home statin      CAD (coronary artery disease)- (present on admission)   Assessment & Plan    Stable, history of 3V CABG with left internal mammary artery graft to LAD, saphenous vein graft to obtuse marginal branch and saphenous vein graft to posterior descending artery by Dr. Coates in March 2018.  Echo EF 65%, status post TAVR, RVSP 30 mmHg, no significant change since last TTE.  Pacemaker interrogated with normal device function.  Con't statin, ASA, metoprolol, and  lisinopril.     Paroxysmal atrial fibrillation (CMS-HCC)- (present on admission)   Assessment & Plan    Stable. Xarelto discontinued by Cardiology in August 2018 due to high fall risk per chart review.  Continue metoprolol for rate control.     Dysphagia   Assessment & Plan    Found to have dysphagia, high risk aspiration.   Advanced directive specified no feeding tube   Speech following   1:1 feeding, tolerating well      Headache   Assessment & Plan    Resolved, Tylenol PRN  Encourage PO hydration  Fall precautions      BETTY (acute kidney injury) ([facility])   Assessment & Plan    RESOLVED       Age-related physical debility- (present on admission)   Assessment & Plan    Previously assessed by PT and OT, SNF recommended for further therapy needs.  PT and OT following for this admission, appreciate help.     Hx of CABG- (present on admission)   Assessment & Plan    Continue aspirin and statin.     Gout of multiple sites- (present on admission)   Assessment & Plan    Continue home allopurinol      PVD (peripheral vascular disease) (CMS-HCC)- (present on admission)   Assessment & Plan    History of prior L superficial femoral artery stent, known high grade stenosis of R common femoral artery closed with Starclose closure by Dr. Amaya on 03/22/17. Stable, no acute complains.

## 2019-04-12 NOTE — PROGRESS NOTES
Pt AAOX1, to self only. ESCAMILLA. Generalized weakness present. Up with SBA w/ FWW. Call light in reach. Bed alarm on.

## 2019-04-13 NOTE — PROGRESS NOTES
Internal Medicine Interval Note  Note Author: Coby Galvan M.D.     Name Wally Torres       1933   Age/Sex 85 y.o. male   MRN [id number]   Code Status DNAR/DNI     After 5PM or if no immediate response to page, please call for cross-coverage  Attending/Team: Dr. Noe/ CRISTI Cage See Patient List for primary contact information  Call [phone] to page    1st Call - Day Intern (R1):   Dr. Livingston 2nd Call - Day Sr. Resident (R2/R3):   Dr. Galvan         Reason for interval visit  (Principal Problem)   Cognitive impairment      Interval Problem Daily Status Update  (24 hours, problem oriented, brief subjective history, new lab/imaging data pertinent to that problem)   Patient seen in the AM, sleeping in bed. Breathing comfortably in RA. No acute concerns   Incontinent of urine, BM yesterday    No NOK found to take responsibility, pending guardianship.       Review of Systems   Reason unable to perform ROS: Limited due to patient condition, patient sleeping.       Disposition/Barriers to discharge:   Pending placement/ guardianship    Consultants/Specialty  Palliative care    Ethics  PCP: Halie Dominique M.D.      Quality Measures  Quality-Core Measures   Reviewed items::  EKG reviewed, Labs reviewed, Medications reviewed and Radiology images reviewed  Baldwin catheter::  No Baldwin  DVT prophylaxis - mechanical:  SCDs          Physical Exam       Vitals:    19 1716 19 2000 19 0400 19 0721   BP:  106/51 127/74 119/63   Pulse: (!) 55 60 77 72   Resp:  18 16 17   Temp:  36.6 °C (97.9 °F) 36.6 °C (97.8 °F) 36.4 °C (97.6 °F)   TempSrc:  Temporal Temporal Temporal   SpO2:  97% 98% 98%   Weight:       Height:         Body mass index is 21.18 kg/m².    Oxygen Therapy:  Pulse Oximetry: 98 %, O2 (LPM): 0, O2 Delivery: None (Room Air)    Physical Exam   Constitutional:   Chronically ill appearing, thin male    HENT:   Head: Normocephalic and atraumatic.   Eyes:  Pupils are equal, round, and reactive to light.   Arcus senilis present bilaterally    Neck: Neck supple. No JVD present.   Cardiovascular: Normal rate and regular rhythm.    Pulmonary/Chest: No respiratory distress.   Diminished breath sounds bilaterally    Abdominal: Soft. He exhibits no distension. There is no tenderness.   Lymphadenopathy:     He has no cervical adenopathy.   Neurological:   Somnolent, easily arousable, poor speech   Skin: Skin is warm and dry.   Psychiatric:   Poor memory            Assessment/Plan     * Cognitive impairment- (present on admission)   Assessment & Plan    Patient and his wife are unable to care for themselves, progressive decline per wife.  Ethics and Palliative Care consulted, appreciate recommendations.   Coordinating safe discharge with , Palliative Care and Ethics Committee.  As no NOK found who can take responsibility, pending guardianship now.      Multiple bruises   Assessment & Plan    Seen on admission, on knees and sternum, appeared as though patient had a recent fall.  CT head negative for acute bleed.  , Palliative Care and Ethics consulted for recommendations on safe discharge.   PT and OT, fall precautions.     Hypothyroidism- (present on admission)   Assessment & Plan    Continue home levothyroxine, TSH 0.54 on 3/21/19.     Cardiac pacemaker in situ- (present on admission)   Assessment & Plan    For sick sinus rhythm     SSS (sick sinus syndrome) (HCC)- (present on admission)   Assessment & Plan    Status post pacemaker. Stable     S/P TAVR (transcatheter aortic valve replacement)- (present on admission)   Assessment & Plan    Continue aspirin and statin.     Risk for falls- (present on admission)   Assessment & Plan    Pt appears to have fallen recently. Head CT negative for acute bleed.   Fall precautions, PT and OT consulted, appreciate recs.     Hypertension, essential- (present on admission)   Assessment & Plan    Continue metoprolol, lisinopril      Dyslipidemia- (present on admission)   Assessment & Plan    Continue home statin      CAD (coronary artery disease)- (present on admission)   Assessment & Plan    Stable, history of 3V CABG with left internal mammary artery graft to LAD, saphenous vein graft to obtuse marginal branch and saphenous vein graft to posterior descending artery by Dr. Coates in March 2018.  Echo EF 65%, status post TAVR, RVSP 30 mmHg, no significant change since last TTE.  Pacemaker interrogated with normal device function.  Con't statin, ASA, metoprolol, and lisinopril.     Paroxysmal atrial fibrillation (CMS-HCC)- (present on admission)   Assessment & Plan    Stable. Xarelto discontinued by Cardiology in August 2018 due to high fall risk per chart review.  Continue metoprolol for rate control.     Dysphagia   Assessment & Plan    Found to have dysphagia, high risk aspiration.   Advanced directive specified no feeding tube   Speech following   1:1 feeding, tolerating well      Headache   Assessment & Plan    Resolved, Tylenol PRN  Encourage PO hydration  Fall precautions      BETTY (acute kidney injury) ([facility])   Assessment & Plan    RESOLVED       Age-related physical debility- (present on admission)   Assessment & Plan    Previously assessed by PT and OT, SNF recommended for further therapy needs.  PT and OT following for this admission, appreciate help.     Hx of CABG- (present on admission)   Assessment & Plan    Continue aspirin and statin.     Gout of multiple sites- (present on admission)   Assessment & Plan    Continue home allopurinol      PVD (peripheral vascular disease) (CMS-HCC)- (present on admission)   Assessment & Plan    History of prior L superficial femoral artery stent, known high grade stenosis of R common femoral artery closed with Starclose closure by Dr. Amaya on 03/22/17. Stable, no acute complains.

## 2019-04-13 NOTE — DOCUMENTATION QUERY
[facility]                                                                       Query Response Note      PATIENT:               LUZ IYER  ACCT #:                  [id number]  MRN:                     [id number]  :                      1933  ADMIT DATE:       3/8/2019 3:38 PM  DISCH DATE:        3/18/2019 3:30 PM  RESPONDING  PROVIDER #:        [id number]           QUERY TEXT:    Please clarify in documentation the relationship, if any, between:  lactic acidosis and sepsis    The patient's Clinical Indicators include:  Lactic acidosis  Met sepsis criteria on admission: SIRS 2/4 (leukocytosis, tachypnea) + suspected source of infection (diverticulitis).    Query created by: Kaitlynn Amor on 2019 1:46 PM    RESPONSE TEXT:    Unable to determine          Electronically signed by:  ELLEN PANCHAL MD 2019 7:00 AM

## 2019-04-13 NOTE — PROGRESS NOTES
Assumed care of pt. Dysphagia 1 with NTL, supervision. Up to chair for meals. A/Ox2. Bed/chair alarm on. POC guardianship. Call light in reach, bed in low position, will continue hourly rounding.

## 2019-04-13 NOTE — CARE PLAN
Problem: Skin Integrity  Goal: Risk for impaired skin integrity will decrease    Intervention: Assess risk factors for impaired skin integrity and/or pressure ulcers  Up to chair for meals      Problem: Urinary Elimination:  Goal: Ability to reestablish a normal urinary elimination pattern will improve    Intervention: Encourage scheduled voiding  Incontinent at times

## 2019-04-14 NOTE — PROGRESS NOTES
Dysphagia 1 with NTL, supervision, patient noted to choke on pudding, will ask to have speech reevaluate him. Up to chair for meals. A/Ox2. Bed/chair alarm on. POC guardianship. Call light in reach, bed in low position, will continue hourly rounding

## 2019-04-14 NOTE — CARE PLAN
Problem: Skin Integrity  Goal: Risk for impaired skin integrity will decrease  Outcome: PROGRESSING AS EXPECTED  Skin remains intact at this time.     Problem: Mobility  Goal: Risk for activity intolerance will decrease  Outcome: PROGRESSING AS EXPECTED  Pt is up with one assist and fww.

## 2019-04-14 NOTE — PROGRESS NOTES
"       Internal Medicine Interval Note  Note Author: Kimberlee Livingston M.D.     Name Wally Torres       1933   Age/Sex 85 y.o. male   MRN [id number]   Code Status DNAR/DNI     After 5PM or if no immediate response to page, please call for cross-coverage  Attending/Team: Dr. Noe/ CRISTI Cage See Patient List for primary contact information  Call [phone] to page    1st Call - Day Intern (R1):   Dr. Livingston 2nd Call - Day Sr. Resident (R2/R3):   Dr. Galvan         Reason for interval visit  (Principal Problem)   Cognitive impairment      Interval Problem Daily Status Update  (24 hours, problem oriented, brief subjective history, new lab/imaging data pertinent to that problem)   Patient seen in the AM, sitting comfortably in chair. No active complains.   Per chart review, patient had a incident overnight when he coughed on pudding. Spoke with RN this AM, it seems he does fine with small bites. Only has troubles with big full size bites. Patient does have POLST on file states \"no feeding tubes\", will continue dysphagia feeding. Speech to evaluate if consistency needs to be changed.   No NOK found to take responsibility, pending guardianship.       Review of Systems   Reason unable to perform ROS: Limited due to patient condition.       Disposition/Barriers to discharge:   Pending placement/ guardianship    Consultants/Specialty  Palliative care    Ethics  PCP: Halie Dominique M.D.      Quality Measures  Quality-Core Measures   Reviewed items::  EKG reviewed, Labs reviewed, Medications reviewed and Radiology images reviewed  Baldwin catheter::  No Baldwin  DVT prophylaxis - mechanical:  SCDs          Physical Exam       Vitals:    19 1600 19 2000 19 0400 19 0723   BP: 133/64 115/59 146/54 109/62   Pulse: 61 67 62 61   Resp: 17 18 16 16   Temp: 36 °C (96.8 °F) 36.3 °C (97.4 °F) 36.5 °C (97.7 °F) 36.8 °C (98.2 °F)   TempSrc: Temporal Temporal Temporal Temporal   SpO2: " 95% 94% 100% 97%   Weight:       Height:         Body mass index is 21.18 kg/m².    Oxygen Therapy:  Pulse Oximetry: 97 %, O2 (LPM): 0, O2 Delivery: None (Room Air)    Physical Exam   Constitutional:   Chronically ill appearing, thin male    HENT:   Head: Normocephalic and atraumatic.   Eyes: Pupils are equal, round, and reactive to light.   Arcus senilis present bilaterally    Neck: Neck supple. No JVD present.   Cardiovascular: Normal rate and regular rhythm.    Pulmonary/Chest: No respiratory distress.   Diminished breath sounds bilaterally    Abdominal: Soft. He exhibits no distension. There is no tenderness.   Lymphadenopathy:     He has no cervical adenopathy.   Neurological:   poor speech   Skin: Skin is warm and dry.   Psychiatric:   Poor memory            Assessment/Plan     * Cognitive impairment- (present on admission)   Assessment & Plan    Patient and his wife are unable to care for themselves, progressive decline per wife.  Ethics and Palliative Care consulted, appreciate recommendations.   Coordinating safe discharge with SW, Palliative Care and Ethics Committee.  As no NOK found who can take responsibility, pending guardianship now.      Multiple bruises   Assessment & Plan    Seen on admission, on knees and sternum, appeared as though patient had a recent fall.  CT head negative for acute bleed.  SW, Palliative Care and Ethics consulted for recommendations on safe discharge.   PT and OT, fall precautions.     Hypothyroidism- (present on admission)   Assessment & Plan    Continue home levothyroxine, TSH 0.54 on 3/21/19.     Cardiac pacemaker in situ- (present on admission)   Assessment & Plan    For sick sinus rhythm     SSS (sick sinus syndrome) (HCC)- (present on admission)   Assessment & Plan    Status post pacemaker. Stable     S/P TAVR (transcatheter aortic valve replacement)- (present on admission)   Assessment & Plan    Continue aspirin and statin.     Risk for falls- (present on admission)    Assessment & Plan    Pt appears to have fallen recently. Head CT negative for acute bleed.   Fall precautions, PT and OT consulted, appreciate recs.     Hypertension, essential- (present on admission)   Assessment & Plan    Continue metoprolol, lisinopril     Dyslipidemia- (present on admission)   Assessment & Plan    Continue home statin      CAD (coronary artery disease)- (present on admission)   Assessment & Plan    Stable, history of 3V CABG with left internal mammary artery graft to LAD, saphenous vein graft to obtuse marginal branch and saphenous vein graft to posterior descending artery by Dr. Coates in March 2018.  Echo EF 65%, status post TAVR, RVSP 30 mmHg, no significant change since last TTE.  Pacemaker interrogated with normal device function.  Con't statin, ASA, metoprolol, and lisinopril.     Paroxysmal atrial fibrillation (CMS-HCC)- (present on admission)   Assessment & Plan    Stable. Xarelto discontinued by Cardiology in August 2018 due to high fall risk per chart review.  Continue metoprolol for rate control.     Dysphagia   Assessment & Plan    Found to have dysphagia, high risk aspiration.   Advanced directive specified no feeding tube   Speech following   1:1 feeding, tolerating well      Headache   Assessment & Plan    Resolved, Tylenol PRN  Encourage PO hydration  Fall precautions      BETTY (acute kidney injury) ([facility])   Assessment & Plan    RESOLVED       Age-related physical debility- (present on admission)   Assessment & Plan    Previously assessed by PT and OT, SNF recommended for further therapy needs.  PT and OT following for this admission, appreciate help.     Hx of CABG- (present on admission)   Assessment & Plan    Continue aspirin and statin.     Gout of multiple sites- (present on admission)   Assessment & Plan    Continue home allopurinol      PVD (peripheral vascular disease) (CMS-[facility])- (present on admission)   Assessment & Plan    History of prior L superficial femoral artery  stent, known high grade stenosis of R common femoral artery closed with Starclose closure by Dr. Amaya on 03/22/17. Stable, no acute complains.

## 2019-04-14 NOTE — PROGRESS NOTES
Spoke with MD will continue to feed patient but will have speech come and reevaluate patient. Per noc RN pt was only having trouble with big bites. Pt did just fine with the half size bites he had after he coughed on the full size bites.

## 2019-04-15 NOTE — PROGRESS NOTES
Patient alert and oriented x2. Becoming more restless and confused as the night goes on, sundowning. Not sleeping very much, maybe 1.5 to 2 hours all night. Also pulling off gown and blue pads and throwing them on the floor. I am concerned if he does not get on a regular sleep schedule that he will continue to have worsening delirium. I would suggest a light sleep aid for tonight.

## 2019-04-15 NOTE — PROGRESS NOTES
Internal Medicine Interval Note  Note Author: Kimberlee Livingston M.D.     Name Wally Torres       1933   Age/Sex 85 y.o. male   MRN [id number]   Code Status DNAR/DNI     After 5PM or if no immediate response to page, please call for cross-coverage  Attending/Team: Dr. Noe/ CRISTI Cage See Patient List for primary contact information  Call [phone] to page    1st Call - Day Intern (R1):   Dr. Livingston 2nd Call - Day Sr. Resident (R2/R3):   Dr. Lisa         Reason for interval visit  (Principal Problem)   Cognitive impairment      Interval Problem Daily Status Update  (24 hours, problem oriented, brief subjective history, new lab/imaging data pertinent to that problem)   Patient agitated overnight, added PRN trazodone for night. Seen in the AM, lying comfortably in bed. No active complains.   He does't wear SCDs, as he is not very mobile will add heparin SQ for DVT prophylaxis. Monitor for any active bleed for next couple of days. If tolerates, will change to Lovenox in 2/3 days.  No NOK found to take responsibility, pending guardianship.       Review of Systems   Reason unable to perform ROS: Limited due to patient condition.       Disposition/Barriers to discharge:   Pending placement/ guardianship    Consultants/Specialty  Palliative care    Ethics  PCP: Halie Dominique M.D.      Quality Measures  Quality-Core Measures   Reviewed items::  EKG reviewed, Labs reviewed, Medications reviewed and Radiology images reviewed  Baldwin catheter::  No Baldwin  DVT prophylaxis pharmacological::  Heparin          Physical Exam       Vitals:    19 1600 19 2000 04/15/19 0400 04/15/19 0650   BP: 119/58 108/54 (!) 181/66 155/67   Pulse: 70 67 61 60   Resp: 16 16 16 17   Temp: 36.9 °C (98.4 °F) 36.8 °C (98.2 °F) 36.7 °C (98.1 °F) 36.3 °C (97.4 °F)   TempSrc: Temporal Temporal Temporal Temporal   SpO2: 96% 99% 98% 98%   Weight:       Height:         Body mass index is 21.18 kg/m².     Oxygen Therapy:  Pulse Oximetry: 98 %, O2 (LPM): 0, O2 Delivery: None (Room Air)    Physical Exam   Constitutional: He appears malnourished.   Chronically ill appearing, thin male    HENT:   Head: Normocephalic and atraumatic.   Eyes: Pupils are equal, round, and reactive to light.   Arcus senilis present bilaterally    Neck: Neck supple. No JVD present.   Cardiovascular: Normal rate and regular rhythm.    Pulmonary/Chest: No respiratory distress.   Diminished breath sounds bilaterally    Abdominal: Soft. He exhibits no distension. There is no tenderness.   Lymphadenopathy:     He has no cervical adenopathy.   Neurological:   poor speech   Skin: Skin is warm and dry.   Psychiatric:   Poor memory            Assessment/Plan     * Cognitive impairment- (present on admission)   Assessment & Plan    Patient and his wife are unable to care for themselves, progressive decline per wife.  Ethics and Palliative Care consulted, appreciate recommendations.   Coordinating safe discharge with , Palliative Care and Ethics Committee.  As no NOK found who can take responsibility, pending guardianship now.      Multiple bruises   Assessment & Plan    Seen on admission, on knees and sternum, appeared as though patient had a recent fall.  CT head negative for acute bleed.  , Palliative Care and Ethics consulted for recommendations on safe discharge.   PT and OT, fall precautions.     Hypothyroidism- (present on admission)   Assessment & Plan    Continue home levothyroxine, TSH 0.54 on 3/21/19.     Cardiac pacemaker in situ- (present on admission)   Assessment & Plan    For sick sinus rhythm     SSS (sick sinus syndrome) (HCC)- (present on admission)   Assessment & Plan    Status post pacemaker. Stable     S/P TAVR (transcatheter aortic valve replacement)- (present on admission)   Assessment & Plan    Continue aspirin and statin.     Risk for falls- (present on admission)   Assessment & Plan    Pt appears to have fallen recently.  Head CT negative for acute bleed.   Fall precautions, PT and OT consulted, appreciate recs.     Hypertension, essential- (present on admission)   Assessment & Plan    Continue metoprolol, lisinopril     Dyslipidemia- (present on admission)   Assessment & Plan    Continue home statin      CAD (coronary artery disease)- (present on admission)   Assessment & Plan    Stable, history of 3V CABG with left internal mammary artery graft to LAD, saphenous vein graft to obtuse marginal branch and saphenous vein graft to posterior descending artery by Dr. Coates in March 2018.  Echo EF 65%, status post TAVR, RVSP 30 mmHg, no significant change since last TTE.  Pacemaker interrogated with normal device function.  Con't statin, ASA, metoprolol, and lisinopril.     Paroxysmal atrial fibrillation (CMS-HCC)- (present on admission)   Assessment & Plan    Stable. Xarelto discontinued by Cardiology in August 2018 due to high fall risk per chart review.  Continue metoprolol for rate control.     Dysphagia   Assessment & Plan    Found to have dysphagia, high risk aspiration.   Advanced directive specified no feeding tube   Speech following   1:1 feeding, tolerating well      Headache   Assessment & Plan    Resolved, Tylenol PRN  Encourage PO hydration  Fall precautions      BETTY (acute kidney injury) ([facility])   Assessment & Plan    RESOLVED       Age-related physical debility- (present on admission)   Assessment & Plan    Previously assessed by PT and OT, SNF recommended for further therapy needs.  PT and OT following for this admission, appreciate help.     Hx of CABG- (present on admission)   Assessment & Plan    Continue aspirin and statin.     Gout of multiple sites- (present on admission)   Assessment & Plan    Continue home allopurinol      PVD (peripheral vascular disease) (CMS-[facility])- (present on admission)   Assessment & Plan    History of prior L superficial femoral artery stent, known high grade stenosis of R common femoral artery  closed with Starclose closure by Dr. Amaya on 03/22/17. Stable, no acute complains.

## 2019-04-15 NOTE — CARE PLAN
Problem: Safety  Goal: Will remain free from injury  Outcome: PROGRESSING AS EXPECTED  Patient has bed alarm on, bed locked and in low position, call light within reach. Spouse at bedside. Patient ambulates x1 assist with FWW. Patient has been calling appropriately today. Universal fall precautions in place and three side-rails up.    Problem: Bowel/Gastric:  Goal: Normal bowel function is maintained or improved  Outcome: PROGRESSING AS EXPECTED  Patient had large soft bowel movement during this RN's shift today.    Problem: Pain Management  Goal: Pain level will decrease to patient's comfort goal  Outcome: PROGRESSING AS EXPECTED  Patient denied pain throughout shift for this RN.    Problem: Respiratory:  Goal: Respiratory status will improve  Outcome: PROGRESSING AS EXPECTED  Encouraging patient to cough and deep breathe. Patient encouraged to sit up in chair for all meals or up to 90 degrees while eating to prevent aspiration.

## 2019-04-15 NOTE — PROGRESS NOTES
Received bedside report from RANDY Lamar at 0705 and assumed care at that time. Patient currently A&Ox2 and resting comfortably in bed. Patient denies SOB, chest pain, n/v, n/t. Patient denies pain at this time. Patient educated regarding fall risk, aspiration precautions, SCDs to prevent DVTs, and use of call light for assistance prior to getting OOB. Patient nods when asked if he understands but needs reinforcement as confused and unable to teach back. Patient pleasantly confused. POC discussed and all questions and concerns at this time. This RN to address sleep aid and refusal of SCDs with physicians during bedside rounding.

## 2019-04-16 NOTE — PROGRESS NOTES
Assumed care of patient at 0700.    Received report from night RN.    Pt alert and oriented x 2, has no complaints of pain.  Pt resting comfortably in bed.   Bed alarm on.  Hourly rounding implemented.

## 2019-04-16 NOTE — PROGRESS NOTES
Internal Medicine Interval Note  Note Author: Kimberlee Livingston M.D.     Name Wally Torres       1933   Age/Sex 85 y.o. male   MRN [id number]   Code Status DNAR/DNI     After 5PM or if no immediate response to page, please call for cross-coverage  Attending/Team: Dr. Noe/ CRISTI Cage See Patient List for primary contact information  Call [phone] to page    1st Call - Day Intern (R1):   Dr. Livingston 2nd Call - Day Sr. Resident (R2/R3):   Dr. Lisa         Reason for interval visit  (Principal Problem)   Cognitive impairment      Interval Problem Daily Status Update  (24 hours, problem oriented, brief subjective history, new lab/imaging data pertinent to that problem)   Patient lying comfortably on bed, no acute concerns. Heparin for DVT prophylaxis yesterday, no active bleeding.  Since he is not that mobile initiated pressure ulcer prevention protocol.   No NOK found to take responsibility, pending guardianship.       Review of Systems   Reason unable to perform ROS: Limited due to patient condition.       Disposition/Barriers to discharge:   Pending placement/ guardianship    Consultants/Specialty  Palliative care    Ethics  PCP: Halie Dominique M.D.      Quality Measures  Quality-Core Measures   Reviewed items::  EKG reviewed, Labs reviewed, Medications reviewed and Radiology images reviewed  Baldwin catheter::  No Baldwin  DVT prophylaxis pharmacological::  Heparin          Physical Exam       Vitals:    04/15/19 1731 04/15/19 2000 19 0400 19 0800   BP: 144/62 113/53 157/74 127/66   Pulse: 64 68 61 60   Resp: 16 16 16 20   Temp: 36.1 °C (97 °F) 36.4 °C (97.6 °F) 36.1 °C (97 °F) 36.5 °C (97.7 °F)   TempSrc: Temporal Temporal Temporal Temporal   SpO2: 96% 98% 97% 95%   Weight:       Height:         Body mass index is 19.94 kg/m². Weight: 41.8 kg (92 lb 2.4 oz)  Oxygen Therapy:  Pulse Oximetry: 95 %, O2 (LPM): 0, O2 Delivery: None (Room Air)    Physical Exam    Constitutional: He appears malnourished.   Chronically ill appearing, thin male    HENT:   Head: Normocephalic and atraumatic.   Eyes: Pupils are equal, round, and reactive to light.   Arcus senilis present bilaterally    Neck: Neck supple. No JVD present.   Cardiovascular: Normal rate and regular rhythm.    Pulmonary/Chest: No respiratory distress.   Diminished breath sounds bilaterally    Abdominal: Soft. He exhibits no distension. There is no tenderness.   Lymphadenopathy:     He has no cervical adenopathy.   Neurological:   poor speech   Skin: Skin is warm and dry.   Psychiatric:   Poor memory            Assessment/Plan     * Cognitive impairment- (present on admission)   Assessment & Plan    Patient and his wife are unable to care for themselves, progressive decline per wife.  Ethics and Palliative Care consulted, appreciate recommendations.   Coordinating safe discharge with , Palliative Care and Ethics Committee.  As no NOK found who can take responsibility, pending guardianship now.      Multiple bruises   Assessment & Plan    Seen on admission, on knees and sternum, appeared as though patient had a recent fall.  CT head negative for acute bleed.  SW, Palliative Care and Ethics consulted for recommendations on safe discharge.   PT and OT, fall precautions.     Hypothyroidism- (present on admission)   Assessment & Plan    Continue home levothyroxine, TSH 0.54 on 3/21/19.     Cardiac pacemaker in situ- (present on admission)   Assessment & Plan    For sick sinus rhythm     SSS (sick sinus syndrome) (HCC)- (present on admission)   Assessment & Plan    Status post pacemaker. Stable     S/P TAVR (transcatheter aortic valve replacement)- (present on admission)   Assessment & Plan    Continue aspirin and statin.     Risk for falls- (present on admission)   Assessment & Plan    Pt appears to have fallen recently. Head CT negative for acute bleed.   Fall precautions, PT and OT consulted, appreciate recs.      Hypertension, essential- (present on admission)   Assessment & Plan    Continue metoprolol, lisinopril     Dyslipidemia- (present on admission)   Assessment & Plan    Continue home statin      CAD (coronary artery disease)- (present on admission)   Assessment & Plan    Stable, history of 3V CABG with left internal mammary artery graft to LAD, saphenous vein graft to obtuse marginal branch and saphenous vein graft to posterior descending artery by Dr. Coaets in March 2018.  Echo EF 65%, status post TAVR, RVSP 30 mmHg, no significant change since last TTE.  Pacemaker interrogated with normal device function.  Con't statin, ASA, metoprolol, and lisinopril.     Paroxysmal atrial fibrillation (CMS-HCC)- (present on admission)   Assessment & Plan    Stable. Xarelto discontinued by Cardiology in August 2018 due to high fall risk per chart review.  Continue metoprolol for rate control.     Dysphagia   Assessment & Plan    Found to have dysphagia, high risk aspiration.   Advanced directive specified no feeding tube   Speech following   1:1 feeding, tolerating well      Headache   Assessment & Plan    Resolved, Tylenol PRN  Encourage PO hydration  Fall precautions      BETTY (acute kidney injury) ([facility])   Assessment & Plan    RESOLVED       Age-related physical debility- (present on admission)   Assessment & Plan    Previously assessed by PT and OT, SNF recommended for further therapy needs.  PT and OT following for this admission, appreciate help.     Hx of CABG- (present on admission)   Assessment & Plan    Continue aspirin and statin.     Gout of multiple sites- (present on admission)   Assessment & Plan    Continue home allopurinol      PVD (peripheral vascular disease) (CMS-HCC)- (present on admission)   Assessment & Plan    History of prior L superficial femoral artery stent, known high grade stenosis of R common femoral artery closed with Starclose closure by Dr. Amaya on 03/22/17. Stable, no acute complains.

## 2019-04-16 NOTE — CARE PLAN
Problem: Venous Thromboembolism (VTW)/Deep Vein Thrombosis (DVT) Prevention:  Goal: Patient will participate in Venous Thrombosis (VTE)/Deep Vein Thrombosis (DVT)Prevention Measures  Chem prophylaxis in use     Problem: Mobility  Goal: Risk for activity intolerance will decrease  Pt is a one person assist while ambulating with a FWW

## 2019-04-16 NOTE — PROGRESS NOTES
Pt A+Ox2. ESCAMILLA. Denies pain, n/t or n/v. One person assist while ambulating. Voiding. Tolerating diet. All questions answered regarding POC.

## 2019-04-17 NOTE — PROGRESS NOTES
Assumed pt care at 0645. Pt is A&Ox 1 (disoriented to place, time and situation. Pt denies CP, SOB, n/v, HA, and blurry/double vision. Pt denies n/t. PT ambulating SBA with FWW, steady shuffling gait. Pt up to chair for 1 to 1 feeding. Plan of care discussed, education provided on administered medications. All questions and concerns addresed. Fall precautions with hourly rounding and Q 4hr neuro checks in place.

## 2019-04-17 NOTE — CARE PLAN
Problem: Safety  Goal: Will remain free from falls  Outcome: PROGRESSING AS EXPECTED  Pt 1 assist with FWW, wife calls for  appropriately, both educated on the need to bernarda for assistance due to high risk for falls. Wife verbalized understanding.      Problem: Knowledge Deficit  Goal: Knowledge of disease process/condition, treatment plan, diagnostic tests, and medications will improve  Outcome: PROGRESSING SLOWER THAN EXPECTED  Pt A&O x1, disoriented to place, situation, and time. Does not fully understand POC or reason for hospitalization.

## 2019-04-17 NOTE — THERAPY
"Speech Language Therapy dysphagia treatment completed.   Functional Status:  The patient was seen on this date for dysphagia therapy. PT asleep upon arrival but woke easily to verbal cues. The patient was moved to his chair by CNA for breakfast with wife present in the room. The patient was presented with purees and NTL for breakfast. The patient benefited from max verbal and hand over hand cues to slow rate of feeding and take single bites and sips. The patient presented with throat clearing following purees and NTL which remains concerning for aspiration/penetration. At this time, due to results of previous diagnostic FEES (3/28) finding \"mesfin silent aspiration\" alongside with s/sx of aspiration/penetration at bedside, recommend NPO with supplemental source of nutrition. However, given POLST on file states \"no feeding tubes\" would recommend continuation of safest oral diet of D1/NTL. Given patient performance and plan of care to continue PO despite risk SLP will no longer actively follow for dysphagia therapy but will remain available for questions and discharge needs.   Recommendations: Continuation of safest oral diet of D1/NTL.    Plan of Care: SLP not actively follow; monitor only unless needed.  Post-Acute Therapy: Anticipate no further SLP needs following discharge for acute care if patient remains on present trajectory.     See \"Rehab Therapy-Acute\" Patient Summary Report for complete documentation.     "

## 2019-04-17 NOTE — PROGRESS NOTES
Internal Medicine Interval Note  Note Author: Kimberlee Livingston M.D.     Name Wally Torres       1933   Age/Sex 85 y.o. male   MRN [id number]   Code Status DNAR/DNI     After 5PM or if no immediate response to page, please call for cross-coverage  Attending/Team: Dr. Noe/ CRISTI Cage See Patient List for primary contact information  Call [phone] to page    1st Call - Day Intern (R1):   Dr. Livingston 2nd Call - Day Sr. Resident (R2/R3):   Dr. Lisa         Reason for interval visit  (Principal Problem)   Cognitive impairment      Interval Problem Daily Status Update  (24 hours, problem oriented, brief subjective history, new lab/imaging data pertinent to that problem)   Patient lying in bed, no acute concerns.   Pressure ulcer prevention protocol in place.   No NOK found to take responsibility, pending guardianship.       Review of Systems   Reason unable to perform ROS: Limited due to patient condition.       Disposition/Barriers to discharge:   Pending placement/ guardianship    Consultants/Specialty  Palliative care    Ethics  PCP: Halie Dominique M.D.      Quality Measures  Quality-Core Measures   Reviewed items::  EKG reviewed, Labs reviewed, Medications reviewed and Radiology images reviewed  Baldwin catheter::  No Baldwin  DVT prophylaxis pharmacological::  Heparin          Physical Exam       Vitals:    19 1600 19 2000 19 0400 19 0700   BP: 127/60 132/54 (!) 161/75 121/58   Pulse: 60 60 77 66   Resp: 20 16 16 16   Temp: 36.4 °C (97.6 °F) 36.3 °C (97.3 °F) 36.1 °C (96.9 °F) 36.3 °C (97.4 °F)   TempSrc: Temporal Temporal Temporal Temporal   SpO2: 98% 96% 97% 97%   Weight:       Height:         Body mass index is 19.94 kg/m².    Oxygen Therapy:  Pulse Oximetry: 97 %, O2 (LPM): 0, O2 Delivery: None (Room Air)    Physical Exam   Constitutional: He appears malnourished.   Chronically ill appearing, thin male    HENT:   Head: Normocephalic and atraumatic.    Eyes: Pupils are equal, round, and reactive to light.   Arcus senilis present bilaterally    Neck: Neck supple. No JVD present.   Cardiovascular: Normal rate and regular rhythm.    Pulmonary/Chest: No respiratory distress.   Diminished breath sounds bilaterally    Abdominal: Soft. He exhibits no distension. There is no tenderness.   Lymphadenopathy:     He has no cervical adenopathy.   Neurological:   poor speech   Skin: Skin is warm and dry.   Psychiatric:   Poor memory            Assessment/Plan     * Cognitive impairment- (present on admission)   Assessment & Plan    Patient and his wife are unable to care for themselves, progressive decline per wife.  Ethics and Palliative Care consulted, appreciate recommendations.   Coordinating safe discharge with , Palliative Care and Ethics Committee.  As no NOK found who can take responsibility, pending guardianship now.      Multiple bruises   Assessment & Plan    Seen on admission, on knees and sternum, appeared as though patient had a recent fall.  CT head negative for acute bleed.  , Palliative Care and Ethics consulted for recommendations on safe discharge.   PT and OT, fall precautions.     Hypothyroidism- (present on admission)   Assessment & Plan    Continue home levothyroxine, TSH 0.54 on 3/21/19.     Cardiac pacemaker in situ- (present on admission)   Assessment & Plan    For sick sinus rhythm     SSS (sick sinus syndrome) (HCC)- (present on admission)   Assessment & Plan    Status post pacemaker. Stable     S/P TAVR (transcatheter aortic valve replacement)- (present on admission)   Assessment & Plan    Continue aspirin and statin.     Risk for falls- (present on admission)   Assessment & Plan    Pt appears to have fallen recently. Head CT negative for acute bleed.   Fall precautions, PT and OT consulted, appreciate recs.     Hypertension, essential- (present on admission)   Assessment & Plan    Continue metoprolol, lisinopril     Dyslipidemia- (present on  admission)   Assessment & Plan    Continue home statin      CAD (coronary artery disease)- (present on admission)   Assessment & Plan    Stable, history of 3V CABG with left internal mammary artery graft to LAD, saphenous vein graft to obtuse marginal branch and saphenous vein graft to posterior descending artery by Dr. Coates in March 2018.  Echo EF 65%, status post TAVR, RVSP 30 mmHg, no significant change since last TTE.  Pacemaker interrogated with normal device function.  Con't statin, ASA, metoprolol, and lisinopril.     Paroxysmal atrial fibrillation (CMS-HCC)- (present on admission)   Assessment & Plan    Stable. Xarelto discontinued by Cardiology in August 2018 due to high fall risk per chart review.  Continue metoprolol for rate control.     Dysphagia   Assessment & Plan    Found to have dysphagia, high risk aspiration.   Advanced directive specified no feeding tube   Speech following   1:1 feeding, tolerating well      Headache   Assessment & Plan    Resolved, Tylenol PRN  Encourage PO hydration  Fall precautions      BETTY (acute kidney injury) ([facility])   Assessment & Plan    RESOLVED       Age-related physical debility- (present on admission)   Assessment & Plan    Previously assessed by PT and OT, SNF recommended for further therapy needs.  PT and OT following for this admission, appreciate help.     Hx of CABG- (present on admission)   Assessment & Plan    Continue aspirin and statin.     Gout of multiple sites- (present on admission)   Assessment & Plan    Continue home allopurinol      PVD (peripheral vascular disease) (CMS-HCC)- (present on admission)   Assessment & Plan    History of prior L superficial femoral artery stent, known high grade stenosis of R common femoral artery closed with Starclose closure by Dr. Amaya on 03/22/17. Stable, no acute complains.

## 2019-04-18 NOTE — PROGRESS NOTES
Received report from night nurse at the bedside. Assume care of Pt at 0700. Assessment completed. Pt A&O X 2 to self and location. Pt denies numbness and tingling, Pt denies pain, Assist of one with fww.  Pt in bed, bed in lowest position, call light in place, bed alarm is on, lap belt is on, treaded slipper socks on.

## 2019-04-18 NOTE — PROGRESS NOTES
Pt is A/Ox1, ESCAMILLA. Denies pain at this time. Pt is up with x1 assist and FWW to bathroom. Tolerating dys 1 nectar thick diet. Trazodone given tonight. Fall precautions in place. Bed alarm on. Hourly rounding in place.

## 2019-04-18 NOTE — PROGRESS NOTES
Internal Medicine Interval Note  Note Author: Kimberlee Livingston M.D.     Name Wally Torres       1933   Age/Sex 85 y.o. male   MRN [id number]   Code Status DNAR/DNI     After 5PM or if no immediate response to page, please call for cross-coverage  Attending/Team: Dr. Noe/ CRISTI Cage See Patient List for primary contact information  Call [phone] to page    1st Call - Day Intern (R1):   Dr. Livingston 2nd Call - Day Sr. Resident (R2/R3):   Dr. Lisa         Reason for interval visit  (Principal Problem)   Cognitive impairment      Interval Problem Daily Status Update  (24 hours, problem oriented, brief subjective history, new lab/imaging data pertinent to that problem)   Patient lying in bed, hard to understand what he is saying. Wife doing the conversation for him. No active medical complains. Looks very frail.   Pressure ulcer prevention protocol in place.   No NOK found to take responsibility, pending guardianship.       Review of Systems   Reason unable to perform ROS: Limited due to patient condition.       Disposition/Barriers to discharge:   Pending placement/ guardianship    Consultants/Specialty  Palliative care    Ethics  PCP: Halie Dominique M.D.      Quality Measures  Quality-Core Measures   Reviewed items::  EKG reviewed, Labs reviewed, Medications reviewed and Radiology images reviewed  Baldwin catheter::  No Baldwin  DVT prophylaxis pharmacological::  Heparin          Physical Exam       Vitals:    19 1644 19 2000 19 0400 19 0655   BP: 126/76 102/50 152/66 131/61   Pulse: 64 64 62 60   Resp:  16 16 16   Temp:  36.5 °C (97.7 °F) 36.3 °C (97.4 °F) 36.2 °C (97.2 °F)   TempSrc:  Temporal Temporal Temporal   SpO2:  98% 97% 100%   Weight:       Height:         Body mass index is 19.94 kg/m².    Oxygen Therapy:  Pulse Oximetry: 100 %, O2 (LPM): 0, O2 Delivery: None (Room Air)    Physical Exam   Constitutional: He appears malnourished.   Chronically  ill appearing, thin male    HENT:   Head: Normocephalic and atraumatic.   Eyes: Pupils are equal, round, and reactive to light.   Arcus senilis present bilaterally    Neck: Neck supple. No JVD present.   Cardiovascular: Normal rate and regular rhythm.    Pulmonary/Chest: No respiratory distress.   Diminished breath sounds bilaterally    Abdominal: Soft. He exhibits no distension. There is no tenderness.   Lymphadenopathy:     He has no cervical adenopathy.   Neurological:   poor speech   Skin: Skin is warm and dry.   Psychiatric:   Poor memory            Assessment/Plan     * Cognitive impairment- (present on admission)   Assessment & Plan    Patient and his wife are unable to care for themselves, progressive decline per wife.  Ethics and Palliative Care consulted, appreciate recommendations.   Coordinating safe discharge with , Palliative Care and Ethics Committee.  As no NOK found who can take responsibility, pending guardianship now.      Multiple bruises   Assessment & Plan    Seen on admission, on knees and sternum, appeared as though patient had a recent fall.  CT head negative for acute bleed.  , Palliative Care and Ethics consulted for recommendations on safe discharge.   PT and OT, fall precautions.     Hypothyroidism- (present on admission)   Assessment & Plan    Continue home levothyroxine, TSH 0.54 on 3/21/19.     Cardiac pacemaker in situ- (present on admission)   Assessment & Plan    For sick sinus rhythm     SSS (sick sinus syndrome) (HCC)- (present on admission)   Assessment & Plan    Status post pacemaker. Stable     S/P TAVR (transcatheter aortic valve replacement)- (present on admission)   Assessment & Plan    Continue aspirin and statin.     Risk for falls- (present on admission)   Assessment & Plan    Pt appears to have fallen recently. Head CT negative for acute bleed.   Fall precautions, PT and OT consulted, appreciate recs.     Hypertension, essential- (present on admission)   Assessment  & Plan    Continue metoprolol, lisinopril     Dyslipidemia- (present on admission)   Assessment & Plan    Continue home statin      CAD (coronary artery disease)- (present on admission)   Assessment & Plan    Stable, history of 3V CABG with left internal mammary artery graft to LAD, saphenous vein graft to obtuse marginal branch and saphenous vein graft to posterior descending artery by Dr. Coates in March 2018.  Echo EF 65%, status post TAVR, RVSP 30 mmHg, no significant change since last TTE.  Pacemaker interrogated with normal device function.  Con't statin, ASA, metoprolol, and lisinopril.     Paroxysmal atrial fibrillation (CMS-HCC)- (present on admission)   Assessment & Plan    Stable. Xarelto discontinued by Cardiology in August 2018 due to high fall risk per chart review.  Continue metoprolol for rate control.     Dysphagia   Assessment & Plan    Found to have dysphagia, high risk aspiration.   Advanced directive specified no feeding tube   Speech following   1:1 feeding, tolerating well      Headache   Assessment & Plan    Resolved, Tylenol PRN  Encourage PO hydration  Fall precautions      B[facility]TY (acute kidney injury) ([facility])   Assessment & Plan    RESOLVED       Age-related physical debility- (present on admission)   Assessment & Plan    Previously assessed by PT and OT, SNF recommended for further therapy needs.  PT and OT following for this admission, appreciate help.     Hx of CABG- (present on admission)   Assessment & Plan    Continue aspirin and statin.     Gout of multiple sites- (present on admission)   Assessment & Plan    Continue home allopurinol      PVD (peripheral vascular disease) (CMS-HCC)- (present on admission)   Assessment & Plan    History of prior L superficial femoral artery stent, known high grade stenosis of R common femoral artery closed with Starclose closure by Dr. Amaya on 03/22/17. Stable, no acute complains.

## 2019-04-18 NOTE — CARE PLAN
Problem: Safety  Goal: Will remain free from injury  Outcome: PROGRESSING AS EXPECTED  Fall precautions in place. Bed alarm on.    Problem: Skin Integrity  Goal: Risk for impaired skin integrity will decrease  Outcome: PROGRESSING AS EXPECTED  Will monitor skin integrity.

## 2019-04-18 NOTE — CARE PLAN
Problem: Venous Thromboembolism (VTW)/Deep Vein Thrombosis (DVT) Prevention:  Goal: Patient will participate in Venous Thrombosis (VTE)/Deep Vein Thrombosis (DVT)Prevention Measures  Outcome: PROGRESSING AS EXPECTED  Heparin in use.     Problem: Skin Integrity  Goal: Risk for impaired skin integrity will decrease  Outcome: PROGRESSING AS EXPECTED  Skin is intact.

## 2019-04-19 NOTE — CARE PLAN
Problem: Safety  Goal: Will remain free from injury  Outcome: PROGRESSING AS EXPECTED  Fall precautions in place. Bed alarm on.    Problem: Skin Integrity  Goal: Risk for impaired skin integrity will decrease  Outcome: PROGRESSING AS EXPECTED  Will monitor skin integrity. Waffle pillow in place.

## 2019-04-19 NOTE — PROGRESS NOTES
Internal Medicine Interval Note  Note Author: Kimberlee Livingston M.D.     Name Wally Torres       1933   Age/Sex 85 y.o. male   MRN [id number]   Code Status DNAR/DNI     After 5PM or if no immediate response to page, please call for cross-coverage  Attending/Team: Dr. Noe/ CRISTI Cage See Patient List for primary contact information  Call [phone] to page    1st Call - Day Intern (R1):   Dr. Livingston 2nd Call - Day Sr. Resident (R2/R3):   Dr. Lisa         Reason for interval visit  (Principal Problem)   Cognitive impairment      Interval Problem Daily Status Update  (24 hours, problem oriented, brief subjective history, new lab/imaging data pertinent to that problem)   Basic labs checked this AM, grossly normal. Patient looks very frail. Difficult to understand speech. Denies having any pain/ difficulty/ distress.   Pressure ulcer prevention protocol in place.   No NOK found to take responsibility, pending guardianship.       Review of Systems   Reason unable to perform ROS: Limited due to patient condition.       Disposition/Barriers to discharge:   Pending placement/ guardianship    Consultants/Specialty  Palliative care    Ethics  PCP: Halie Dominique M.D.      Quality Measures  Quality-Core Measures   Reviewed items::  EKG reviewed, Labs reviewed, Medications reviewed and Radiology images reviewed  Baldwin catheter::  No Baldwin  DVT prophylaxis pharmacological::  Heparin          Physical Exam       Vitals:    19 2000 19 0400 19 0710 19 0715   BP: 102/48 (!) 164/65 (!) 161/70 158/70   Pulse: 60 (!) 59 60    Resp: 18 18 16    Temp: 36.9 °C (98.5 °F) 36.9 °C (98.4 °F) 36.1 °C (97 °F)    TempSrc: Temporal Temporal Temporal    SpO2: 100% 96% 100%    Weight:       Height:         Body mass index is 19.94 kg/m².    Oxygen Therapy:  Pulse Oximetry: 100 %, O2 (LPM): 0, O2 Delivery: None (Room Air)    Physical Exam   Constitutional: He appears malnourished.    Chronically ill appearing, thin male    HENT:   Head: Normocephalic and atraumatic.   Eyes: Pupils are equal, round, and reactive to light.   Arcus senilis present bilaterally    Neck: Neck supple. No JVD present.   Cardiovascular: Normal rate and regular rhythm.    Pulmonary/Chest: No respiratory distress.   Diminished breath sounds bilaterally    Abdominal: Soft. He exhibits no distension. There is no tenderness.   Lymphadenopathy:     He has no cervical adenopathy.   Neurological:   poor speech   Skin: Skin is warm and dry.   Psychiatric:   Poor memory            Assessment/Plan     * Cognitive impairment- (present on admission)   Assessment & Plan    Patient and his wife are unable to care for themselves, progressive decline per wife.  Ethics and Palliative Care consulted, appreciate recommendations.   Coordinating safe discharge with , Palliative Care and Ethics Committee.  As no NOK found who can take responsibility, pending guardianship now.      Multiple bruises   Assessment & Plan    Seen on admission, on knees and sternum, appeared as though patient had a recent fall.  CT head negative for acute bleed.  , Palliative Care and Ethics consulted for recommendations on safe discharge.   PT and OT, fall precautions.     Hypothyroidism- (present on admission)   Assessment & Plan    Continue home levothyroxine, TSH 0.54 on 3/21/19.     Cardiac pacemaker in situ- (present on admission)   Assessment & Plan    For sick sinus rhythm     SSS (sick sinus syndrome) (HCC)- (present on admission)   Assessment & Plan    Status post pacemaker. Stable     S/P TAVR (transcatheter aortic valve replacement)- (present on admission)   Assessment & Plan    Continue aspirin and statin.     Risk for falls- (present on admission)   Assessment & Plan    Pt appears to have fallen recently. Head CT negative for acute bleed.   Fall precautions, PT and OT consulted, appreciate recs.     Hypertension, essential- (present on admission)    Assessment & Plan    Continue metoprolol, lisinopril     Dyslipidemia- (present on admission)   Assessment & Plan    Continue home statin      CAD (coronary artery disease)- (present on admission)   Assessment & Plan    Stable, history of 3V CABG with left internal mammary artery graft to LAD, saphenous vein graft to obtuse marginal branch and saphenous vein graft to posterior descending artery by Dr. Coates in March 2018.  Echo EF 65%, status post TAVR, RVSP 30 mmHg, no significant change since last TTE.  Pacemaker interrogated with normal device function.  Con't statin, ASA, metoprolol, and lisinopril.     Paroxysmal atrial fibrillation (CMS-HCC)- (present on admission)   Assessment & Plan    Stable. Xarelto discontinued by Cardiology in August 2018 due to high fall risk per chart review.  Continue metoprolol for rate control.     Dysphagia   Assessment & Plan    Found to have dysphagia, high risk aspiration.   Advanced directive specified no feeding tube   Speech following   1:1 feeding, tolerating well      Headache   Assessment & Plan    Resolved, Tylenol PRN  Encourage PO hydration  Fall precautions      BETTY (acute kidney injury) ([facility])   Assessment & Plan    RESOLVED       Age-related physical debility- (present on admission)   Assessment & Plan    Previously assessed by PT and OT, SNF recommended for further therapy needs.  PT and OT following for this admission, appreciate help.     Hx of CABG- (present on admission)   Assessment & Plan    Continue aspirin and statin.     Gout of multiple sites- (present on admission)   Assessment & Plan    Continue home allopurinol      PVD (peripheral vascular disease) (CMS-HCC)- (present on admission)   Assessment & Plan    History of prior L superficial femoral artery stent, known high grade stenosis of R common femoral artery closed with Starclose closure by Dr. Amaya on 03/22/17. Stable, no acute complains.

## 2019-04-19 NOTE — THERAPY
"Physical Therapy Treatment completed.   Bed Mobility:  Supine to Sit: Minimal Assist  Transfers: Sit to Stand: Minimal Assist  Gait: Level Of Assist: Minimal Assist with Front-Wheel Walker       Discharge Recommendations: Equipment: Will Continue to Assess for Equipment Needs.     See \"Rehab Therapy-Acute\" Patient Summary Report for complete documentation.       Pt appears to have plateaued w/ therapy. Pt has not made any progress and does not have any carryover of education. Pt is most likely at his baseline mobility wise. Pt will be decreased to monitor status for DC needs only. Recommend, nursing staff continue to ambulate pt.  "

## 2019-04-19 NOTE — PROGRESS NOTES
Assumed pt care at 0645. Pt is A&Ox 1 (disoriented to time, place, and situation). Pt denies CP, SOB, n/v, HA, and blurry/double vision. Pt denies n/t. PT ambulating SBA with FWW. Eating % of meals, demeanor pleasant and calm. Plan of care discussed, education provided on administered medications. All questions and concerns addresed. Fall and aspiration precautions with hourly rounding and Q 4hr neuro checks in place.

## 2019-04-19 NOTE — CARE PLAN
Problem: Knowledge Deficit  Goal: Knowledge of the prescribed therapeutic regimen will improve  Outcome: PROGRESSING SLOWER THAN EXPECTED  Pt A&O x1, disoriented to time, place, and situation. Pt and wife unclear for reasoning behind hospitalization and wife requesting to go home. Both do not understand reasoning for guardianship.    Problem: Skin Integrity  Goal: Risk for impaired skin integrity will decrease  Outcome: PROGRESSING SLOWER THAN EXPECTED  Redness noted to occiput of head, blanching, waffle chair overlay placed behind head for sleeping. Pt encouraged to sit in the chair or mobilize for longer periods of time.

## 2019-04-20 NOTE — CARE PLAN
Problem: Safety  Goal: Will remain free from injury  Outcome: PROGRESSING AS EXPECTED  Fall precautions in place. Bed alarm on.    Problem: Skin Integrity  Goal: Risk for impaired skin integrity will decrease  Outcome: PROGRESSING SLOWER THAN EXPECTED  Will monitor for normal skin integrity.

## 2019-04-20 NOTE — CARE PLAN
Problem: Safety  Goal: Will remain free from falls    Intervention: Implement fall precautions  Bed alarm on, call light w/in reach, non skid socks on, hourly rounding in place.      Problem: Urinary Elimination:  Goal: Ability to reestablish a normal urinary elimination pattern will improve    Intervention: Assess and monitor for signs and symptoms of urinary retention  Pt incontinent at times.

## 2019-04-20 NOTE — PROGRESS NOTES
Internal Medicine Interval Note  Note Author: Kimberlee Livingston M.D.     Name Wally Torres       1933   Age/Sex 85 y.o. male   MRN [id number]   Code Status DNAR/DNI     After 5PM or if no immediate response to page, please call for cross-coverage  Attending/Team: Dr. Noe/ CRISTI Cage See Patient List for primary contact information  Call [phone] to page    1st Call - Day Intern (R1):   Dr. Livingston 2nd Call - Day Sr. Resident (R2/R3):   Dr. Lisa         Reason for interval visit  (Principal Problem)   Cognitive impairment      Interval Problem Daily Status Update  (24 hours, problem oriented, brief subjective history, new lab/imaging data pertinent to that problem)   Patient seen in the AM, looks very frail. Difficult to understand speech. Denies having any pain/ difficulty/ distress.   Diet per speech.   Pressure ulcer prevention protocol in place.   No NOK found to take responsibility, pending guardianship.       Review of Systems   Reason unable to perform ROS: Limited due to patient condition.       Disposition/Barriers to discharge:   Pending placement/ guardianship    Consultants/Specialty  Palliative care    Ethics  PCP: Halie Dominique M.D.      Quality Measures  Quality-Core Measures   Reviewed items::  EKG reviewed, Labs reviewed, Medications reviewed and Radiology images reviewed  Baldwin catheter::  No Baldwin  DVT prophylaxis pharmacological::  Heparin          Physical Exam       Vitals:    19 1700 19 2000 19 0400 19 0655   BP: 108/58 126/49 156/60 144/73   Pulse: 61 (!) 59 62 60   Resp:  14 15 16   Temp:  36.4 °C (97.6 °F) 36.1 °C (96.9 °F) 36.2 °C (97.1 °F)   TempSrc:  Temporal Temporal Temporal   SpO2:  100% 98% 99%   Weight:       Height:         Body mass index is 19.94 kg/m².    Oxygen Therapy:  Pulse Oximetry: 99 %, O2 (LPM): 0, O2 Delivery: None (Room Air)    Physical Exam   Constitutional: He appears malnourished.   Chronically ill  appearing, thin male    HENT:   Head: Normocephalic and atraumatic.   Eyes: Pupils are equal, round, and reactive to light.   Arcus senilis present bilaterally    Neck: Neck supple. No JVD present.   Cardiovascular: Normal rate and regular rhythm.    Pulmonary/Chest: No respiratory distress.   Diminished breath sounds bilaterally    Abdominal: Soft. He exhibits no distension. There is no tenderness.   Lymphadenopathy:     He has no cervical adenopathy.   Neurological:   poor speech   Skin: Skin is warm and dry.   Psychiatric:   Poor memory            Assessment/Plan     * Cognitive impairment- (present on admission)   Assessment & Plan    Patient and his wife are unable to care for themselves, progressive decline per wife.  Ethics and Palliative Care consulted, appreciate recommendations.   Coordinating safe discharge with , Palliative Care and Ethics Committee.  As no NOK found who can take responsibility, pending guardianship now.      Multiple bruises   Assessment & Plan    Seen on admission, on knees and sternum, appeared as though patient had a recent fall.  CT head negative for acute bleed.  , Palliative Care and Ethics consulted for recommendations on safe discharge.   PT and OT, fall precautions.     Hypothyroidism- (present on admission)   Assessment & Plan    Continue home levothyroxine, TSH 0.54 on 3/21/19.     Cardiac pacemaker in situ- (present on admission)   Assessment & Plan    For sick sinus rhythm     SSS (sick sinus syndrome) (HCC)- (present on admission)   Assessment & Plan    Status post pacemaker. Stable     S/P TAVR (transcatheter aortic valve replacement)- (present on admission)   Assessment & Plan    Continue aspirin and statin.     Risk for falls- (present on admission)   Assessment & Plan    Pt appears to have fallen recently. Head CT negative for acute bleed.   Fall precautions, PT and OT consulted, appreciate recs.     Hypertension, essential- (present on admission)   Assessment &  Plan    Continue metoprolol, lisinopril     Dyslipidemia- (present on admission)   Assessment & Plan    Continue home statin      CAD (coronary artery disease)- (present on admission)   Assessment & Plan    Stable, history of 3V CABG with left internal mammary artery graft to LAD, saphenous vein graft to obtuse marginal branch and saphenous vein graft to posterior descending artery by Dr. Coates in March 2018.  Echo EF 65%, status post TAVR, RVSP 30 mmHg, no significant change since last TTE.  Pacemaker interrogated with normal device function.  Con't statin, ASA, metoprolol, and lisinopril.     Paroxysmal atrial fibrillation (CMS-HCC)- (present on admission)   Assessment & Plan    Stable. Xarelto discontinued by Cardiology in August 2018 due to high fall risk per chart review.  Continue metoprolol for rate control.     Dysphagia   Assessment & Plan    Found to have dysphagia, high risk aspiration.   Advanced directive specified no feeding tube   Speech following   1:1 feeding, tolerating well      Headache   Assessment & Plan    Resolved, Tylenol PRN  Encourage PO hydration  Fall precautions      B[facility] (acute kidney injury) ([facility])   Assessment & Plan    RESOLVED       Age-related physical debility- (present on admission)   Assessment & Plan    Previously assessed by PT and OT, SNF recommended for further therapy needs.  PT and OT following for this admission, appreciate help.     Hx of CABG- (present on admission)   Assessment & Plan    Continue aspirin and statin.     Gout of multiple sites- (present on admission)   Assessment & Plan    Continue home allopurinol      PVD (peripheral vascular disease) (CMS-HCC)- (present on admission)   Assessment & Plan    History of prior L superficial femoral artery stent, known high grade stenosis of R common femoral artery closed with Starclose closure by Dr. Amaya on 03/22/17. Stable, no acute complains.

## 2019-04-20 NOTE — PROGRESS NOTES
Pt is A/Ox1, ESCAMILLA. Denies pain at this time. Pt is up with x1 assist and FWW to bathroom. Tolerating dys 1 nectar thick diet. + BM this shift. Trazodone given tonight. Fall precautions in place. Bed alarm on. Hourly rounding in place.

## 2019-04-20 NOTE — PROGRESS NOTES
Pt AO x1, on RA, Diet is dysphagia 1 with NT liquids 1:1 supervision. Takes meds cut in half floated in puree. NO IV MD aware, ambulates with 1 assist and fww. Hold senna for today as pt has been having loose BMs. All needs met at this time.

## 2019-04-21 NOTE — PROGRESS NOTES
Assumed care of pt at approximately 1900. Pt A&Ox1, oriented to place at 2000, only oriented to self at 0000. Pt unable to make needs known. Bed locked and in lowest position, bed alarm on. Pt does not use call light appropriately, impulsive. Lap belt in place. Will continue to round hourly.

## 2019-04-21 NOTE — PROGRESS NOTES
Internal Medicine Interval Note  Note Author: Susie Lisa M.D.     Name Wally Torres       1933   Age/Sex 85 y.o. male   MRN [id number]   Code Status DNAR/DNI     After 5PM or if no immediate response to page, please call for cross-coverage  Attending/Team: Dr. Noe/ CRISTI Cage See Patient List for primary contact information  Call [phone] to page    1st Call - Day Intern (R1):   Dr. Livingston 2nd Call - Day Sr. Resident (R2/R3):   Dr. Lisa         Reason for interval visit  (Principal Problem)   Cognitive impairment      Interval Problem Daily Status Update  (24 hours, problem oriented, brief subjective history, new lab/imaging data pertinent to that problem)   Patient seen in the AM, looks very frail. Lying comfortably in bed.   Difficult to understand speech. Denies having any pain/ difficulty/ distress. Wants to go home.   Diet per speech.   Pressure ulcer prevention protocol in place.   No NOK found to take responsibility, pending guardianship.       Review of Systems   Reason unable to perform ROS: Limited due to patient condition.       Disposition/Barriers to discharge:   Pending placement/ guardianship    Consultants/Specialty  Palliative care    Ethics  PCP: Halie Dominique M.D.      Quality Measures  Quality-Core Measures   Reviewed items::  Labs reviewed and Medications reviewed  Baldwin catheter::  No Baldwin  DVT prophylaxis pharmacological::  Heparin    Physical Exam       Vitals:    19 1525 19 2000 19 0400 19 0800   BP: 123/48 148/41 (!) 162/63 147/70   Pulse: 65 70 64 60   Resp: 16 16 16 16   Temp: 36.6 °C (97.9 °F) 36.4 °C (97.5 °F) 36.9 °C (98.5 °F) 36.5 °C (97.7 °F)   TempSrc: Temporal Temporal Temporal Temporal   SpO2: 98% 99% 97% 98%   Weight:  47.7 kg (105 lb 2.6 oz)     Height:         Body mass index is 22.76 kg/m². Weight: 47.7 kg (105 lb 2.6 oz)  Oxygen Therapy:  Pulse Oximetry: 98 %, O2 (LPM): 0, O2 Delivery: None (Room  Air)    Physical Exam   Constitutional: He appears malnourished.   Chronically ill appearing, thin male    HENT:   Head: Normocephalic and atraumatic.   Eyes: Pupils are equal, round, and reactive to light. No scleral icterus.   Arcus senilis present bilaterally    Neck: Neck supple. No JVD present.   Cardiovascular: Normal rate and regular rhythm.    Pulmonary/Chest: No respiratory distress.   Diminished breath sounds bilaterally    Abdominal: Soft. He exhibits no distension. There is no tenderness.   Musculoskeletal: He exhibits no edema.   Neurological: He is alert.   poor speech  Diffuse muscle atrophy/deconditioning.    Skin: Skin is warm and dry.   Psychiatric:   Poor memory        Assessment/Plan     * Cognitive impairment- (present on admission)   Assessment & Plan    Patient and his wife are unable to care for themselves, progressive decline per wife.  Ethics and Palliative Care consulted, appreciate recommendations.   Coordinating safe discharge with SW, Palliative Care and Ethics Committee.  As no NOK found who can take responsibility, pending guardianship now.      Multiple bruises   Assessment & Plan    Seen on admission, on knees and sternum, appeared as though patient had a recent fall.  CT head negative for acute bleed.  SW, Palliative Care and Ethics consulted for recommendations on safe discharge.   PT and OT, fall precautions.     Hypothyroidism- (present on admission)   Assessment & Plan    Continue home levothyroxine, TSH 0.54 on 3/21/19.     Cardiac pacemaker in situ- (present on admission)   Assessment & Plan    For sick sinus rhythm     SSS (sick sinus syndrome) (HCC)- (present on admission)   Assessment & Plan    Status post pacemaker. Stable     S/P TAVR (transcatheter aortic valve replacement)- (present on admission)   Assessment & Plan    Continue aspirin and statin.     Risk for falls- (present on admission)   Assessment & Plan    Pt appears to have fallen recently. Head CT negative for  acute bleed.   Fall precautions, PT and OT consulted, appreciate recs.     Hypertension, essential- (present on admission)   Assessment & Plan    Continue metoprolol, lisinopril     Dyslipidemia- (present on admission)   Assessment & Plan    Continue home statin      CAD (coronary artery disease)- (present on admission)   Assessment & Plan    Stable, history of 3V CABG with left internal mammary artery graft to LAD, saphenous vein graft to obtuse marginal branch and saphenous vein graft to posterior descending artery by Dr. Coates in March 2018.  Echo EF 65%, status post TAVR, RVSP 30 mmHg, no significant change since last TTE.  Pacemaker interrogated with normal device function.  Con't statin, ASA, metoprolol, and lisinopril.     Paroxysmal atrial fibrillation (CMS-HCC)- (present on admission)   Assessment & Plan    Stable. Xarelto discontinued by Cardiology in August 2018 due to high fall risk per chart review.  Continue metoprolol for rate control.     Dysphagia   Assessment & Plan    Found to have dysphagia, high risk aspiration.   Advanced directive specified no feeding tube   Speech following   1:1 feeding, tolerating well      Headache   Assessment & Plan    Resolved, Tylenol PRN  Encourage PO hydration  Fall precautions      BETTY (acute kidney injury) ([facility])   Assessment & Plan    RESOLVED       Age-related physical debility- (present on admission)   Assessment & Plan    Previously assessed by PT and OT, SNF recommended for further therapy needs.  PT and OT following for this admission, appreciate help.     Hx of CABG- (present on admission)   Assessment & Plan    Continue aspirin and statin.     Gout of multiple sites- (present on admission)   Assessment & Plan    Continue home allopurinol      PVD (peripheral vascular disease) (CMS-HCC)- (present on admission)   Assessment & Plan    History of prior L superficial femoral artery stent, known high grade stenosis of R common femoral artery closed with Starclose  closure by Dr. Amaya on 03/22/17. Stable, no acute complains.

## 2019-04-21 NOTE — PROGRESS NOTES
2 RN skin check with RANDY Linares    -BLE skin dry, flaky  -[place] pink, blanching  -Skin warm, dry, intact, blanching

## 2019-04-21 NOTE — PROGRESS NOTES
Pt AAOx1. Up with FWW and CGA, unsteady gait. Denies pain. Eating with assistance. Incontinent of bladder. VSS. Bed alarm in place. Waffle cushion placed.

## 2019-04-21 NOTE — CARE PLAN
Problem: Safety  Goal: Will remain free from injury  Outcome: PROGRESSING AS EXPECTED  Bed alarm in place    Problem: Mobility  Goal: Risk for activity intolerance will decrease  Outcome: PROGRESSING AS EXPECTED  Up with FWW and 1 assist to ambulate

## 2019-04-22 NOTE — PROGRESS NOTES
Internal Medicine Interval Note  Note Author: Susie Lisa M.D.     Name Wally Torres       1933   Age/Sex 85 y.o. male   MRN [id number]   Code Status DNAR/DNI     After 5PM or if no immediate response to page, please call for cross-coverage  Attending/Team: Dr. Noe/ CRISTI Cage See Patient List for primary contact information  Call [phone] to page    1st Call - Day Intern (R1):   Dr. Livingston 2nd Call - Day Sr. Resident (R2/R3):   Dr. Lisa         Reason for interval visit    Cognitive impairment      Interval Problem Daily Status Update     No events overnight. Patient lying comfortably in bed, looks very frail. Asks when he can go home.  Difficult to understand speech. Denies having any pain/ difficulty/ distress.   Pressure ulcer prevention protocol in place.   No NOK found to take responsibility, pending guardianship.       Review of Systems   Reason unable to perform ROS: Limited due to patient condition.       Disposition/Barriers to discharge:   Pending placement/ guardianship    Consultants/Specialty  Palliative care    Ethics  PCP: Halie Dominique M.D.      Quality Measures  Quality-Core Measures   Reviewed items::  Labs reviewed and Medications reviewed  Baldwin catheter::  No Baldwin  DVT prophylaxis pharmacological::  Heparin    Physical Exam       Vitals:    19 1600 19 2000 19 0400 19 0800   BP: 135/62 124/61 (!) 169/67 127/63   Pulse: 60 60 67 60   Resp: 16 17 16 16   Temp: 36.6 °C (97.8 °F) 37.1 °C (98.7 °F) 36.5 °C (97.7 °F) 36.6 °C (97.8 °F)   TempSrc: Temporal Temporal Temporal Temporal   SpO2: 97% 96% 97% 97%   Weight:       Height:         Body mass index is 22.76 kg/m².    Oxygen Therapy:  Pulse Oximetry: 97 %, O2 (LPM): 0, O2 Delivery: None (Room Air)    Physical Exam   Constitutional: He appears malnourished.   Chronically ill appearing, thin male    HENT:   Head: Normocephalic and atraumatic.   Eyes: Pupils are equal,  round, and reactive to light. No scleral icterus.   Arcus senilis present bilaterally    Neck: Neck supple. No JVD present.   Cardiovascular: Normal rate and regular rhythm.    Pulmonary/Chest: No respiratory distress.   Diminished breath sounds bilaterally    Abdominal: Soft. He exhibits no distension. There is no tenderness.   Musculoskeletal: He exhibits no edema.   Neurological: He is alert.   poor speech  Diffuse muscle atrophy/deconditioning.    Skin: Skin is warm and dry.   Psychiatric:   Poor memory        Assessment/Plan     * Cognitive impairment- (present on admission)   Assessment & Plan    Patient and his wife are unable to care for themselves, progressive decline per wife.  Ethics and Palliative Care consulted, appreciate recommendations.   Coordinating safe discharge with , Palliative Care and Ethics Committee.  As no NOK found who can take responsibility, pending guardianship now.      Multiple bruises   Assessment & Plan    Seen on admission, on knees and sternum, appeared as though patient had a recent fall.  CT head negative for acute bleed.  , Palliative Care and Ethics consulted for recommendations on safe discharge.   PT and OT, fall precautions.     Hypothyroidism- (present on admission)   Assessment & Plan    Continue home levothyroxine, TSH 0.54 on 3/21/19.     Cardiac pacemaker in situ- (present on admission)   Assessment & Plan    For sick sinus rhythm     SSS (sick sinus syndrome) (HCC)- (present on admission)   Assessment & Plan    Status post pacemaker. Stable     S/P TAVR (transcatheter aortic valve replacement)- (present on admission)   Assessment & Plan    Continue aspirin and statin.     Risk for falls- (present on admission)   Assessment & Plan    Pt appears to have fallen recently. Head CT negative for acute bleed.   Fall precautions, PT and OT consulted, appreciate recs.     Hypertension, essential- (present on admission)   Assessment & Plan    Continue metoprolol,  lisinopril     Dyslipidemia- (present on admission)   Assessment & Plan    Continue home statin      CAD (coronary artery disease)- (present on admission)   Assessment & Plan    Stable, history of 3V CABG with left internal mammary artery graft to LAD, saphenous vein graft to obtuse marginal branch and saphenous vein graft to posterior descending artery by Dr. Coates in March 2018.  Echo EF 65%, status post TAVR, RVSP 30 mmHg, no significant change since last TTE.  Pacemaker interrogated with normal device function.  Con't statin, ASA, metoprolol, and lisinopril.     Paroxysmal atrial fibrillation (CMS-HCC)- (present on admission)   Assessment & Plan    Stable. Xarelto discontinued by Cardiology in August 2018 due to high fall risk per chart review.  Continue metoprolol for rate control.     Dysphagia   Assessment & Plan    Found to have dysphagia, high risk aspiration.   Advanced directive specified no feeding tube   Speech following   1:1 feeding, tolerating well      Headache   Assessment & Plan    Resolved, Tylenol PRN  Encourage PO hydration  Fall precautions      BETTY (acute kidney injury) ([facility])   Assessment & Plan    RESOLVED       Age-related physical debility- (present on admission)   Assessment & Plan    Previously assessed by PT and OT, SNF recommended for further therapy needs.  PT and OT following for this admission, appreciate help.     Hx of CABG- (present on admission)   Assessment & Plan    Continue aspirin and statin.     Gout of multiple sites- (present on admission)   Assessment & Plan    Continue home allopurinol      PVD (peripheral vascular disease) (CMS-HCC)- (present on admission)   Assessment & Plan    History of prior L superficial femoral artery stent, known high grade stenosis of R common femoral artery closed with Starclose closure by Dr. Amaya on 03/22/17. Stable, no acute complains.

## 2019-04-22 NOTE — PROGRESS NOTES
Alert and oriented to self only.  Pleasant mood but confused.  Ambulated to the bathroom on several occasions but does not use the bathroom every time.  Lap belt remains in place, patient able to remove.  Patient continues to get OOB without calling.  Bed alarm on, bed low and call light in reach.  Room near nursing station.

## 2019-04-22 NOTE — CARE PLAN
Problem: Safety  Goal: Will remain free from injury    Intervention: Provide assistance with mobility  Pt is 1 assist to chair      Problem: Urinary Elimination:  Goal: Ability to reestablish a normal urinary elimination pattern will improve    Intervention: Encourage scheduled voiding  Incontinent at times

## 2019-04-22 NOTE — PROGRESS NOTES
Assumed care of pt. A/Ox2. Dysphagia 1 with NTL, 1:1 feeder. No IV access. 1 assist with FWW. POC placement/guardianship. Call light in reach, bed in low position, will continue hourly rounding.

## 2019-04-22 NOTE — PROGRESS NOTES
2 RN skin check completed  -sacral area pink, blanching  -small scab on right knee  No other skin issues found

## 2019-04-23 NOTE — PROGRESS NOTES
"       Internal Medicine Interval Note  Note Author: Kimberlee Livingston M.D.     Name Wally Torres       1933   Age/Sex 85 y.o. male   MRN [id number]   Code Status DNAR/DNI     After 5PM or if no immediate response to page, please call for cross-coverage  Attending/Team: Dr. Romo/ CRISTI Cage See Patient List for primary contact information  Call [phone] to page    1st Call - Day Intern (R1):   Dr. Livingston 2nd Call - Day Sr. Resident (R2/R3):   Dr. Lisa         Reason for interval visit    Cognitive impairment      Interval Problem Daily Status Update     No events overnight. Patient lying comfortably in bed, looks very frail. Said he is having \"a good morning\".   Difficult to understand speech. Denies having any pain/ difficulty/ distress.   Pressure ulcer prevention protocol in place.   No NOK found to take responsibility, pending guardianship.       Review of Systems   Reason unable to perform ROS: Limited due to patient condition.       Disposition/Barriers to discharge:   Pending placement/ guardianship    Consultants/Specialty  Palliative care    Ethics  PCP: Halie Dominique M.D.      Quality Measures  Quality-Core Measures   Reviewed items::  Labs reviewed and Medications reviewed  Baldwin catheter::  No Baldwin  DVT prophylaxis pharmacological::  Heparin    Physical Exam       Vitals:    19 1600 19 2000 19 0400 19 0800   BP: 100/43 134/54 (!) 171/61 137/67   Pulse: 60 60 63 60   Resp: 16 16 16 18   Temp: 36.3 °C (97.3 °F) 36.6 °C (97.9 °F) 36.2 °C (97.1 °F) 36.1 °C (97 °F)   TempSrc: Temporal Temporal Temporal Temporal   SpO2: 96% 99% 98% 97%   Weight:       Height:         Body mass index is 22.76 kg/m².    Oxygen Therapy:  Pulse Oximetry: 97 %, O2 (LPM): 0, O2 Delivery: None (Room Air)    Physical Exam   Constitutional: He appears malnourished.   Chronically ill appearing, thin male    HENT:   Head: Normocephalic and atraumatic.   Eyes: Pupils are " equal, round, and reactive to light. No scleral icterus.   Arcus senilis present bilaterally    Neck: Neck supple. No JVD present.   Cardiovascular: Normal rate and regular rhythm.    Pulmonary/Chest: No respiratory distress.   Diminished breath sounds bilaterally    Abdominal: Soft. He exhibits no distension. There is no tenderness.   Musculoskeletal: He exhibits no edema.   Neurological: He is alert.   poor speech  Diffuse muscle atrophy/deconditioning.    Skin: Skin is warm and dry.   Psychiatric:   Poor memory        Assessment/Plan     * Cognitive impairment- (present on admission)   Assessment & Plan    Patient and his wife are unable to care for themselves, progressive decline per wife.  Ethics and Palliative Care consulted, appreciate recommendations.   Coordinating safe discharge with , Palliative Care and Ethics Committee.  As no NOK found who can take responsibility, pending guardianship now.      Multiple bruises   Assessment & Plan    Seen on admission, on knees and sternum, appeared as though patient had a recent fall.  CT head negative for acute bleed.  , Palliative Care and Ethics consulted for recommendations on safe discharge.   PT and OT, fall precautions.     Hypothyroidism- (present on admission)   Assessment & Plan    Continue home levothyroxine, TSH 0.54 on 3/21/19.     Cardiac pacemaker in situ- (present on admission)   Assessment & Plan    For sick sinus rhythm     SSS (sick sinus syndrome) (HCC)- (present on admission)   Assessment & Plan    Status post pacemaker. Stable     S/P TAVR (transcatheter aortic valve replacement)- (present on admission)   Assessment & Plan    Continue aspirin and statin.     Risk for falls- (present on admission)   Assessment & Plan    Pt appears to have fallen recently. Head CT negative for acute bleed.   Fall precautions, PT and OT consulted, appreciate recs.     Hypertension, essential- (present on admission)   Assessment & Plan    Continue metoprolol,  lisinopril     Dyslipidemia- (present on admission)   Assessment & Plan    Continue home statin      CAD (coronary artery disease)- (present on admission)   Assessment & Plan    Stable, history of 3V CABG with left internal mammary artery graft to LAD, saphenous vein graft to obtuse marginal branch and saphenous vein graft to posterior descending artery by Dr. Coates in March 2018.  Echo EF 65%, status post TAVR, RVSP 30 mmHg, no significant change since last TTE.  Pacemaker interrogated with normal device function.  Con't statin, ASA, metoprolol, and lisinopril.     Paroxysmal atrial fibrillation (CMS-HCC)- (present on admission)   Assessment & Plan    Stable. Xarelto discontinued by Cardiology in August 2018 due to high fall risk per chart review.  Continue metoprolol for rate control.     Dysphagia   Assessment & Plan    Found to have dysphagia, high risk aspiration.   Advanced directive specified no feeding tube   Speech following   1:1 feeding, tolerating well      Headache   Assessment & Plan    Resolved, Tylenol PRN  Encourage PO hydration  Fall precautions      BETTY (acute kidney injury) ([facility])   Assessment & Plan    RESOLVED       Age-related physical debility- (present on admission)   Assessment & Plan    Previously assessed by PT and OT, SNF recommended for further therapy needs.  PT and OT following for this admission, appreciate help.     Hx of CABG- (present on admission)   Assessment & Plan    Continue aspirin and statin.     Gout of multiple sites- (present on admission)   Assessment & Plan    Continue home allopurinol      PVD (peripheral vascular disease) (CMS-HCC)- (present on admission)   Assessment & Plan    History of prior L superficial femoral artery stent, known high grade stenosis of R common femoral artery closed with Starclose closure by Dr. Amaya on 03/22/17. Stable, no acute complains.

## 2019-04-23 NOTE — CARE PLAN
Problem: Safety  Goal: Will remain free from falls  Outcome: PROGRESSING AS EXPECTED  Bed alarm on, lap belt in place.     Problem: Skin Integrity  Goal: Risk for impaired skin integrity will decrease  Outcome: PROGRESSING AS EXPECTED  Waffle cushion in place

## 2019-04-23 NOTE — PROGRESS NOTES
Alert and oriented to self only.  Skin warm, dry and intact.  Bed alarm on, bed low and call light in reach.  Patient ambulated to BR with hand held assistance.  Lap belt in place and patient can remove it.  No complaints or s/s of distress.

## 2019-04-23 NOTE — PROGRESS NOTES
Assumed care of pt. A/Ox2. 1 assist with FWW. Dysphagia 1 with NTL, crush meds with applesauce. No IV access. Bed/chair alarm on. POC placement/guardianship. Call light in reach, bed in low position, will continue hourly rounding.

## 2019-04-24 NOTE — PROGRESS NOTES
Pt assisted to shower with RASHEED Briseno. A large goose egg was noticed on the pts left hip. Notified MD.

## 2019-04-24 NOTE — PROGRESS NOTES
"Tonight:  Pt and wife were sleeping in same bed.  Pt found down in BR, bed alarm didn't sound d/t wife still being in bed.  Pt denies hitting head.  No signs of abrasion or blunt trauma. VSS.  MD notified.  Additional \"frame\" BA set and wife instructed to sleep in her own bed.  Guardianship still pending.     Pt AOx1-2, pres to [facility] w/ alt mental status. Stay was c/b A-fib w/ RVR.  Hx HTN. Coronary Bypass (2018). Has Pacemaker. TIA (2011). Currently showing some cognitive deficits and fatigue. Confused. Can respond to basic questions. NTL, meds crushed in purree,  decent  intake.  Cont. vs Incont, walks to BR w/ 1PA and walker.  Fall risk.  Denies pain. VSS. Plan is SNF vs Assisted Living. Wife sharing room. She could benefit from reinforcing safety teaching, Guardianship pending.  "

## 2019-04-24 NOTE — PROGRESS NOTES
Internal Medicine Interval Note  Note Author: Kimberlee Livingston M.D.     Name Wally Torres       1933   Age/Sex 85 y.o. male   MRN [id number]   Code Status DNAR/DNI     After 5PM or if no immediate response to page, please call for cross-coverage  Attending/Team: Dr. Romo/ CRISTI Cage See Patient List for primary contact information  Call [phone] to page    1st Call - Day Intern (R1):   Dr. Livingsotn 2nd Call - Day Sr. Resident (R2/R3):   Dr. Lisa         Reason for interval visit    Cognitive impairment      Interval Problem Daily Status Update     Overnight: Patient found in the floor, unknown how long he was there. Bed alarm didn't sound as the wife was still on the bed. Patient denied hitting his head/ pain anywhere. Later during the day while showering RN noticed a bump on hip area. Examination showed: a bump around 5 cm on the left lateral side of hip/ femoral neck area. Slightly tender on touch, ROM of left hip no worsening from baseline. No overlying skin color change. Patient ambulating with walker the same way as he was doing before.  Xray hip ordered to rule out fracture, unlikely given no change in mobility and mild tenderness  Pressure ulcer prevention protocol in place.   Fall precaution  Room changed today close to nursing station  No NOK found to take responsibility, pending guardianship.       Review of Systems   Reason unable to perform ROS: Limited due to patient condition.       Disposition/Barriers to discharge:   Pending placement/ guardianship    Consultants/Specialty  Palliative care    Ethics  PCP: Halie Dominique M.D.      Quality Measures  Quality-Core Measures   Reviewed items::  Labs reviewed and Medications reviewed  Baldwin catheter::  No Baldwin  DVT prophylaxis pharmacological::  Heparin    Physical Exam       Vitals:    19 0400 19 0515 19 0745   BP: 123/58 127/54 159/62 120/63   Pulse: 65 61 68 60   Resp: 16 16 16  16   Temp: 36.4 °C (97.5 °F) 36.3 °C (97.3 °F) 36.3 °C (97.4 °F) 36.2 °C (97.2 °F)   TempSrc: Temporal Temporal Temporal Temporal   SpO2: 98% 98% 99% 98%   Weight:       Height:         Body mass index is 22.76 kg/m².    Oxygen Therapy:  Pulse Oximetry: 98 %, O2 Delivery: None (Room Air)    Physical Exam   Constitutional: He appears malnourished.   Chronically ill appearing, thin male    HENT:   Head: Normocephalic and atraumatic.   Eyes: Pupils are equal, round, and reactive to light. No scleral icterus.   Arcus senilis present bilaterally    Neck: Neck supple. No JVD present.   Cardiovascular: Normal rate and regular rhythm.    Pulmonary/Chest: No respiratory distress.   Diminished breath sounds bilaterally    Abdominal: Soft. He exhibits no distension. There is no tenderness.   Musculoskeletal: He exhibits no edema.   See Interval update for Left hip examination   Neurological: He is alert.   poor speech  Diffuse muscle atrophy/deconditioning.    Skin: Skin is warm and dry.   Psychiatric:   Poor memory        Assessment/Plan     * Cognitive impairment- (present on admission)   Assessment & Plan    Patient and his wife are unable to care for themselves, progressive decline per wife.  Ethics and Palliative Care consulted, appreciate recommendations.   Coordinating safe discharge with , Palliative Care and Ethics Committee.  As no NOK found who can take responsibility, pending guardianship now.      Multiple bruises   Assessment & Plan    Seen on admission, on knees and sternum, appeared as though patient had a recent fall.  CT head negative for acute bleed.  SW, Palliative Care and Ethics consulted for recommendations on safe discharge.   PT and OT, fall precautions.     Hypothyroidism- (present on admission)   Assessment & Plan    Continue home levothyroxine, TSH 0.54 on 3/21/19.     Cardiac pacemaker in situ- (present on admission)   Assessment & Plan    For sick sinus rhythm     SSS (sick sinus syndrome)  ([facility])- (present on admission)   Assessment & Plan    Status post pacemaker. Stable     S/P TAVR (transcatheter aortic valve replacement)- (present on admission)   Assessment & Plan    Continue aspirin and statin.     Risk for falls- (present on admission)   Assessment & Plan    Pt appears to have fallen recently. Head CT negative for acute bleed.   Fall precautions, PT and OT consulted, appreciate recs.     Hypertension, essential- (present on admission)   Assessment & Plan    Continue metoprolol, lisinopril     Dyslipidemia- (present on admission)   Assessment & Plan    Continue home statin      CAD (coronary artery disease)- (present on admission)   Assessment & Plan    Stable, history of 3V CABG with left internal mammary artery graft to LAD, saphenous vein graft to obtuse marginal branch and saphenous vein graft to posterior descending artery by Dr. Coates in March 2018.  Echo EF 65%, status post TAVR, RVSP 30 mmHg, no significant change since last TTE.  Pacemaker interrogated with normal device function.  Con't statin, ASA, metoprolol, and lisinopril.     Paroxysmal atrial fibrillation (CMS-[facility])- (present on admission)   Assessment & Plan    Stable. Xarelto discontinued by Cardiology in August 2018 due to high fall risk per chart review.  Continue metoprolol for rate control.     Dysphagia   Assessment & Plan    Found to have dysphagia, high risk aspiration.   Advanced directive specified no feeding tube   Speech following   1:1 feeding, tolerating well      Headache   Assessment & Plan    Resolved, Tylenol PRN  Encourage PO hydration  Fall precautions      BETTY (acute kidney injury) ([facility])   Assessment & Plan    RESOLVED       Age-related physical debility- (present on admission)   Assessment & Plan    Previously assessed by PT and OT, SNF recommended for further therapy needs.  PT and OT following for this admission, appreciate help.     Hx of CABG- (present on admission)   Assessment & Plan    Continue aspirin  and statin.     Gout of multiple sites- (present on admission)   Assessment & Plan    Continue home allopurinol      PVD (peripheral vascular disease) (CMS-HCC)- (present on admission)   Assessment & Plan    History of prior L superficial femoral artery stent, known high grade stenosis of R common femoral artery closed with Starclose closure by Dr. Amaya on 03/22/17. Stable, no acute complains.

## 2019-04-25 NOTE — DISCHARGE PLANNING
Anticipated Discharge Disposition: TBD (Pending Guardianship)    Action: LSW spoke with Nimo Gomez with Jeannette Juarez's office. Scheduled a conference call for tomorrow (4/25) at 9:30am to go over Guardianship paperwork.    Barriers to Discharge: Safe DC Plan; EPS Case; No Decision Maker; Pending Guardianship     Plan: LSW to f/u with Legal

## 2019-04-25 NOTE — DISCHARGE PLANNING
Anticipated Discharge Disposition: TBD (Pending Guardianship)     Action: LSW spoke with Jeannette Juarez to go over Guardianship paperwork.    LSW sent revised Guardianship paperwork to Jeannette Juarez (Legal).    LSW received a VM from Nimo with Jeannette Juarez's office requesting completed Physician's packet.    LSW contacted [employer] Resident, Miki via TT. She will completed the packet tomorrow (4/26).     Barriers to Discharge: EPS Case; Pending Guardianship      Plan: LSW to f/u with Legal

## 2019-04-25 NOTE — CARE PLAN
Problem: Communication  Goal: The ability to communicate needs accurately and effectively will improve  Outcome: PROGRESSING SLOWER THAN EXPECTED  Pt is confused and slow to respond when asked a question. Pt oriented to environment but does not use call light to make his needs known. Hourly rounding in place in an attempt to meet his needs.     Problem: Safety  Goal: Will remain free from injury  Outcome: PROGRESSING AS EXPECTED  Pt given fall education. Bed locked and in lowest position. Bed and frame alarm on. Room close to nursing station. Hourly rounding in place.

## 2019-04-25 NOTE — PROGRESS NOTES
Internal Medicine Interval Note  Note Author: Kimberlee Livingston M.D.     Name Wally Torres       1933   Age/Sex 85 y.o. male   MRN [id number]   Code Status DNAR/DNI     After 5PM or if no immediate response to page, please call for cross-coverage  Attending/Team: Dr. Romo/ CRISTI Cage See Patient List for primary contact information  Call [phone] to page    1st Call - Day Intern (R1):   Dr. Livingston 2nd Call - Day Sr. Resident (R2/R3):   Dr. Lisa         Reason for interval visit    Cognitive impairment      Interval Problem Daily Status Update     No acute overnight events, patient looks frail.   Xray hip negative for any fracture/ acute process, swelling going down. Patient stats it's not tender on touch. Per RN, he is ambulating with the walker same as before, no acute worsening/ pain.   Will hold off heparin until swelling resolves.   Pressure ulcer prevention protocol in place.   Fall precaution  No NOK found to take responsibility, pending guardianship.       Review of Systems   Reason unable to perform ROS: Limited due to patient condition.       Disposition/Barriers to discharge:   Pending placement/ guardianship    Consultants/Specialty  Palliative care    Ethics  PCP: Halie Dominique M.D.      Quality Measures  Quality-Core Measures   Reviewed items::  Labs reviewed and Medications reviewed  Baldwin catheter::  No Baldwin  DVT prophylaxis pharmacological::  Heparin    Physical Exam       Vitals:    19 1600 19 2000 19 0400 19 0740   BP: 120/52 111/57 156/60 157/62   Pulse: 60 61 67 63   Resp: 18 16 16 16   Temp: 36.3 °C (97.3 °F) 36.2 °C (97.1 °F) 36.3 °C (97.4 °F) 36.4 °C (97.5 °F)   TempSrc: Temporal Temporal Temporal Temporal   SpO2: 98% 98% 95% 98%   Weight:       Height:         Body mass index is 22.76 kg/m².    Oxygen Therapy:  Pulse Oximetry: 98 %, O2 (LPM): 0, O2 Delivery: None (Room Air)    Physical Exam   Constitutional: He appears  malnourished.   Chronically ill appearing, thin male    HENT:   Head: Normocephalic and atraumatic.   Eyes: Pupils are equal, round, and reactive to light. No scleral icterus.   Arcus senilis present bilaterally    Neck: Neck supple. No JVD present.   Cardiovascular: Normal rate and regular rhythm.    Pulmonary/Chest: No respiratory distress.   Diminished breath sounds bilaterally    Abdominal: Soft. He exhibits no distension. There is no tenderness.   Musculoskeletal: He exhibits no edema.   Swelling on the left lateral hip/ femoral neck area, down from yesterday. Mild brownish color change on overlying skin. Non tender on touch. ROM at baseline   Neurological: He is alert.   poor speech  Diffuse muscle atrophy/deconditioning.    Skin: Skin is warm and dry.   Psychiatric:   Poor memory        Assessment/Plan     * Cognitive impairment- (present on admission)   Assessment & Plan    Patient and his wife are unable to care for themselves, progressive decline per wife.  Ethics and Palliative Care consulted, appreciate recommendations.   Coordinating safe discharge with SW, Palliative Care and Ethics Committee.  As no NOK found who can take responsibility, pending guardianship now.      Multiple bruises   Assessment & Plan    Seen on admission, on knees and sternum, appeared as though patient had a recent fall.  CT head negative for acute bleed.  SW, Palliative Care and Ethics consulted for recommendations on safe discharge.   PT and OT, fall precautions.     Hypothyroidism- (present on admission)   Assessment & Plan    Continue home levothyroxine, TSH 0.54 on 3/21/19.     Cardiac pacemaker in situ- (present on admission)   Assessment & Plan    For sick sinus rhythm     SSS (sick sinus syndrome) (HCC)- (present on admission)   Assessment & Plan    Status post pacemaker. Stable     S/P TAVR (transcatheter aortic valve replacement)- (present on admission)   Assessment & Plan    Continue aspirin and statin.     Risk for  falls- (present on admission)   Assessment & Plan    Pt appears to have fallen recently. Head CT negative for acute bleed.   Fall precautions, PT and OT consulted, appreciate recs.     Hypertension, essential- (present on admission)   Assessment & Plan    Continue metoprolol, lisinopril     Dyslipidemia- (present on admission)   Assessment & Plan    Continue home statin      CAD (coronary artery disease)- (present on admission)   Assessment & Plan    Stable, history of 3V CABG with left internal mammary artery graft to LAD, saphenous vein graft to obtuse marginal branch and saphenous vein graft to posterior descending artery by Dr. Coates in March 2018.  Echo EF 65%, status post TAVR, RVSP 30 mmHg, no significant change since last TTE.  Pacemaker interrogated with normal device function.  Con't statin, ASA, metoprolol, and lisinopril.     Paroxysmal atrial fibrillation (CMS-HCC)- (present on admission)   Assessment & Plan    Stable. Xarelto discontinued by Cardiology in August 2018 due to high fall risk per chart review.  Continue metoprolol for rate control.     Dysphagia   Assessment & Plan    Found to have dysphagia, high risk aspiration.   Advanced directive specified no feeding tube   Speech following   1:1 feeding, tolerating well      Headache   Assessment & Plan    Resolved, Tylenol PRN  Encourage PO hydration  Fall precautions      BETTY (acute kidney injury) ([facility])   Assessment & Plan    RESOLVED       Age-related physical debility- (present on admission)   Assessment & Plan    Previously assessed by PT and OT, SNF recommended for further therapy needs.  PT and OT following for this admission, appreciate help.     Hx of CABG- (present on admission)   Assessment & Plan    Continue aspirin and statin.     Gout of multiple sites- (present on admission)   Assessment & Plan    Continue home allopurinol      PVD (peripheral vascular disease) (CMS-[facility])- (present on admission)   Assessment & Plan    History of prior  L superficial femoral artery stent, known high grade stenosis of R common femoral artery closed with Starclose closure by Dr. Amaya on 03/22/17. Stable, no acute complains.

## 2019-04-25 NOTE — PROGRESS NOTES
0700: Assumed care of pt. Bedside report received from RANDY Ortiz. Pt resting comfortably in bed. Wife at bedside as a social admit. Pt denies any pain. Left hip assessed and shows mild swelling and bruising. No needs at this time.

## 2019-04-25 NOTE — PROGRESS NOTES
Pt AOx1-2, pres to [facility] w/ alt mental status. Stay was c/b A-fib w/ RVR.  Hx HTN. Coronary Bypass (2018). Has Pacemaker. TIA (2011). Currently showing some cognitive deficits and fatigue. Confused. Can respond to basic questions. NTL, meds crushed in purree,  decent  intake.  Cont. vs Incont, walks to BR w/ 1PA and walker.  Fall risk. Moved to room right by nurses' station. 2 bed alarms d/t recent fall.  Challenging bc wife (roommate) will get pt OOB and turn alarm off.  Re-education provided.  Denies pain. VSS. Plan is SNF vs Assisted Living. Guardianship pending.

## 2019-04-26 NOTE — PROGRESS NOTES
Tonight: Pt still gets OOB, care improved now that his room is right at nurses' station. Hip bruise from recent fall improving, hep still being held.  Slept fairly well. Definitely has problems with frequent urination (but this isn't new).     Background:  Pt AOx1-2, pres to [facility] w/ alt mental status. Stay was c/b A-fib w/ RVR.  Hx HTN. Coronary Bypass (2018). Has Pacemaker. TIA (2011). Currently showing some cognitive deficits and fatigue. Confused. Can respond to basic questions. NTL, meds crushed in purree,  decent  intake.  Cont. vs Incont, walks to BR w/ 1PA and walker.  Fall risk.  2 bed alarms d/t recent fall.  Denies pain. VSS. Plan is SNF vs Assisted Living. Guardianship pending.

## 2019-04-26 NOTE — PROGRESS NOTES
Internal Medicine Interval Note  Note Author: Kimberlee Livingston M.D.     Name Wally Torres       1933   Age/Sex 85 y.o. male   MRN [id number]   Code Status DNAR/DNI     After 5PM or if no immediate response to page, please call for cross-coverage  Attending/Team: Dr. Romo/ CRISTI Cage See Patient List for primary contact information  Call [phone] to page    1st Call - Day Intern (R1):   Dr. Livingston 2nd Call - Day Sr. Resident (R2/R3):   Dr. Lisa         Reason for interval visit    Cognitive impairment      Interval Problem Daily Status Update     No acute overnight events, patient looks frail.   Hip bruise/swelling improving.   Will hold off heparin until swelling resolves.   Pressure ulcer prevention protocol in place.   Fall precaution  No NOK found to take responsibility, pending guardianship.       Review of Systems   Reason unable to perform ROS: Limited due to patient condition.       Disposition/Barriers to discharge:   Pending placement/ guardianship    Consultants/Specialty  Palliative care    Ethics  PCP: Halie Dominique M.D.      Quality Measures  Quality-Core Measures   Reviewed items::  Labs reviewed and Medications reviewed  Baldwin catheter::  No Baldwin  DVT prophylaxis pharmacological::  Heparin    Physical Exam       Vitals:    19 1558 19 2000 19 0400 19 0800   BP: 113/52 105/49 152/71 139/72   Pulse: 68 80 63 61   Resp: 16 17 18 18   Temp: 36.1 °C (97 °F) 36.2 °C (97.2 °F) 36.3 °C (97.3 °F) 37.2 °C (98.9 °F)   TempSrc: Temporal Temporal Temporal Temporal   SpO2: 99% 100% 93% 98%   Weight:       Height:         Body mass index is 22.76 kg/m².    Oxygen Therapy:  Pulse Oximetry: 98 %, O2 (LPM): 0, O2 Delivery: None (Room Air)    Physical Exam   Constitutional: He appears malnourished.   Chronically ill appearing, thin male    HENT:   Head: Normocephalic and atraumatic.   Eyes: Pupils are equal, round, and reactive to light. No scleral  icterus.   Arcus senilis present bilaterally    Neck: Neck supple. No JVD present.   Cardiovascular: Normal rate and regular rhythm.    Pulmonary/Chest: No respiratory distress.   Diminished breath sounds bilaterally    Abdominal: Soft. He exhibits no distension. There is no tenderness.   Musculoskeletal: He exhibits no edema.   Swelling on the left lateral hip/ femoral neck area, improving. Mild brownish color change on overlying skin. Non tender on touch. ROM at baseline   Neurological: He is alert.   poor speech  Diffuse muscle atrophy/deconditioning.    Skin: Skin is warm and dry.   Psychiatric:   Poor memory        Assessment/Plan     * Cognitive impairment- (present on admission)   Assessment & Plan    Patient and his wife are unable to care for themselves, progressive decline per wife.  Ethics and Palliative Care consulted, appreciate recommendations.   Coordinating safe discharge with , Palliative Care and Ethics Committee.  As no NOK found who can take responsibility, pending guardianship now.      Multiple bruises   Assessment & Plan    Seen on admission, on knees and sternum, appeared as though patient had a recent fall.  CT head negative for acute bleed.  SW, Palliative Care and Ethics consulted for recommendations on safe discharge.   PT and OT, fall precautions.     Hypothyroidism- (present on admission)   Assessment & Plan    Continue home levothyroxine, TSH 0.54 on 3/21/19.     Cardiac pacemaker in situ- (present on admission)   Assessment & Plan    For sick sinus rhythm     SSS (sick sinus syndrome) (HCC)- (present on admission)   Assessment & Plan    Status post pacemaker. Stable     S/P TAVR (transcatheter aortic valve replacement)- (present on admission)   Assessment & Plan    Continue aspirin and statin.     Risk for falls- (present on admission)   Assessment & Plan    Pt appears to have fallen recently. Head CT negative for acute bleed.   Fall precautions, PT and OT consulted, appreciate  recs.     Hypertension, essential- (present on admission)   Assessment & Plan    Continue metoprolol, lisinopril     Dyslipidemia- (present on admission)   Assessment & Plan    Continue home statin      CAD (coronary artery disease)- (present on admission)   Assessment & Plan    Stable, history of 3V CABG with left internal mammary artery graft to LAD, saphenous vein graft to obtuse marginal branch and saphenous vein graft to posterior descending artery by Dr. Coates in March 2018.  Echo EF 65%, status post TAVR, RVSP 30 mmHg, no significant change since last TTE.  Pacemaker interrogated with normal device function.  Con't statin, ASA, metoprolol, and lisinopril.     Paroxysmal atrial fibrillation (CMS-HCC)- (present on admission)   Assessment & Plan    Stable. Xarelto discontinued by Cardiology in August 2018 due to high fall risk per chart review.  Continue metoprolol for rate control.     Dysphagia   Assessment & Plan    Found to have dysphagia, high risk aspiration.   Advanced directive specified no feeding tube   Speech following   1:1 feeding, tolerating well      Headache   Assessment & Plan    Resolved, Tylenol PRN  Encourage PO hydration  Fall precautions      BETTY (acute kidney injury) ([facility])   Assessment & Plan    RESOLVED       Age-related physical debility- (present on admission)   Assessment & Plan    Previously assessed by PT and OT, SNF recommended for further therapy needs.  PT and OT following for this admission, appreciate help.     Hx of CABG- (present on admission)   Assessment & Plan    Continue aspirin and statin.     Gout of multiple sites- (present on admission)   Assessment & Plan    Continue home allopurinol      PVD (peripheral vascular disease) (CMS-[facility])- (present on admission)   Assessment & Plan    History of prior L superficial femoral artery stent, known high grade stenosis of R common femoral artery closed with Starclose closure by Dr. Amaya on 03/22/17. Stable, no acute complains.

## 2019-04-27 NOTE — CARE PLAN
Problem: Safety  Goal: Will remain free from injury  Outcome: PROGRESSING AS EXPECTED  Patient shares a room with his wife. Patient and wife placed in room by Nurse's station to ensure wife does not try to ambulate patient.    Problem: Skin Integrity  Goal: Risk for impaired skin integrity will decrease  Outcome: PROGRESSING AS EXPECTED  Patient is unable to ambulate frequently. Linens kept clean and dry, frequent monitoring for incontinence, waffle overlay in place.

## 2019-04-27 NOTE — PROGRESS NOTES
Received report and assumed patient care. Patient is AOx1 however, hard to assess orientation due to patient speaking little english and very hard to understand. No complaints or signs of pain. Patient's wife is in the same room and very attentive to . Educated wife in regards to patient's diet and needs and she verbalized understanding. Assessment complete on RA. Safety precautions and hourly rounding in place.

## 2019-04-27 NOTE — PROGRESS NOTES
Received report from NOC RN, patient awake and alert. Unable to assess orientation, but patient is able to make needs known via one word requests. Educated patient to stay in bed as he has history of frequent falls. Bed alarm on, bed locked in lowest position, patient and his wife in room in direct sight of charge nurse's desk.

## 2019-04-27 NOTE — PROGRESS NOTES
Internal Medicine Interval Note  Note Author: Kimberlee Livingston M.D.     Name Wally Torres       1933   Age/Sex 85 y.o. male   MRN [id number]   Code Status DNAR/DNI     After 5PM or if no immediate response to page, please call for cross-coverage  Attending/Team: Dr. Romo/ CRISTI Cage See Patient List for primary contact information  Call [phone] to page    1st Call - Day Intern (R1):   Dr. Livingston 2nd Call - Day Sr. Resident (R2/R3):   Dr. Lisa         Reason for interval visit    Cognitive impairment      Interval Problem Daily Status Update     No acute events overnight, patient looks frail.   Seen in the AM, looking good. Hard to understand speech, but recognized me.   Hip bruise/swelling improving.   Will hold off heparin until swelling resolves.   Pressure ulcer prevention protocol in place.   Fall precaution  No NOK found to take responsibility, pending guardianship.       Review of Systems   Reason unable to perform ROS: Limited due to patient condition.       Disposition/Barriers to discharge:   Pending placement/ guardianship    Consultants/Specialty  Palliative care    Ethics  PCP: Halie Dominique M.D.      Quality Measures  Quality-Core Measures   Reviewed items::  Labs reviewed and Medications reviewed  Baldwin catheter::  No Baldwin  DVT prophylaxis pharmacological::  Heparin    Physical Exam       Vitals:    19 1800 19 1900 19 0433 19 0724   BP: 103/48 128/55 160/77 (!) 165/77   Pulse: 61 63 66 80   Resp: 16 18 16 14   Temp: 36.7 °C (98.1 °F) 36.4 °C (97.5 °F) 36.4 °C (97.6 °F) 36.6 °C (97.9 °F)   TempSrc: Temporal Temporal Temporal Temporal   SpO2:  95% 99% 96%   Weight:       Height:         Body mass index is 22.76 kg/m².    Oxygen Therapy:  Pulse Oximetry: 96 %, O2 Delivery: None (Room Air)    Physical Exam   Constitutional: He appears malnourished.   Chronically ill appearing, thin male    HENT:   Head: Normocephalic and atraumatic.    Eyes: Pupils are equal, round, and reactive to light. No scleral icterus.   Arcus senilis present bilaterally    Neck: Neck supple. No JVD present.   Cardiovascular: Normal rate and regular rhythm.    Pulmonary/Chest: No respiratory distress.   Diminished breath sounds bilaterally    Abdominal: Soft. He exhibits no distension. There is no tenderness.   Musculoskeletal: He exhibits no edema.   Swelling on the left lateral hip/ femoral neck area, improving. Mild brownish color change on overlying skin. Non tender on touch. ROM at baseline   Neurological: He is alert.   poor speech  Diffuse muscle atrophy/deconditioning.    Skin: Skin is warm and dry.   Psychiatric:   Poor memory        Assessment/Plan     * Cognitive impairment- (present on admission)   Assessment & Plan    Patient and his wife are unable to care for themselves, progressive decline per wife.  Ethics and Palliative Care consulted, appreciate recommendations.   Coordinating safe discharge with , Palliative Care and Ethics Committee.  As no NOK found who can take responsibility, pending guardianship now.      Multiple bruises   Assessment & Plan    Seen on admission, on knees and sternum, appeared as though patient had a recent fall.  CT head negative for acute bleed.  , Palliative Care and Ethics consulted for recommendations on safe discharge.   PT and OT, fall precautions.     Hypothyroidism- (present on admission)   Assessment & Plan    Continue home levothyroxine, TSH 0.54 on 3/21/19.     Cardiac pacemaker in situ- (present on admission)   Assessment & Plan    For sick sinus rhythm     SSS (sick sinus syndrome) (HCC)- (present on admission)   Assessment & Plan    Status post pacemaker. Stable     S/P TAVR (transcatheter aortic valve replacement)- (present on admission)   Assessment & Plan    Continue aspirin and statin.     Risk for falls- (present on admission)   Assessment & Plan    Pt appears to have fallen recently. Head CT negative for  acute bleed.   Fall precautions, PT and OT consulted, appreciate recs.     Hypertension, essential- (present on admission)   Assessment & Plan    Continue metoprolol, lisinopril     Dyslipidemia- (present on admission)   Assessment & Plan    Continue home statin      CAD (coronary artery disease)- (present on admission)   Assessment & Plan    Stable, history of 3V CABG with left internal mammary artery graft to LAD, saphenous vein graft to obtuse marginal branch and saphenous vein graft to posterior descending artery by Dr. Coates in March 2018.  Echo EF 65%, status post TAVR, RVSP 30 mmHg, no significant change since last TTE.  Pacemaker interrogated with normal device function.  Con't statin, ASA, metoprolol, and lisinopril.     Paroxysmal atrial fibrillation (CMS-HCC)- (present on admission)   Assessment & Plan    Stable. Xarelto discontinued by Cardiology in August 2018 due to high fall risk per chart review.  Continue metoprolol for rate control.     Dysphagia   Assessment & Plan    Found to have dysphagia, high risk aspiration.   Advanced directive specified no feeding tube   Speech following   1:1 feeding, tolerating well      Headache   Assessment & Plan    Resolved, Tylenol PRN  Encourage PO hydration  Fall precautions      BETTY (acute kidney injury) ([facility])   Assessment & Plan    RESOLVED       Age-related physical debility- (present on admission)   Assessment & Plan    Previously assessed by PT and OT, SNF recommended for further therapy needs.  PT and OT following for this admission, appreciate help.     Hx of CABG- (present on admission)   Assessment & Plan    Continue aspirin and statin.     Gout of multiple sites- (present on admission)   Assessment & Plan    Continue home allopurinol      PVD (peripheral vascular disease) (CMS-HCC)- (present on admission)   Assessment & Plan    History of prior L superficial femoral artery stent, known high grade stenosis of R common femoral artery closed with Starclose  closure by Dr. Amaya on 03/22/17. Stable, no acute complains.

## 2019-04-27 NOTE — PROGRESS NOTES
· 2 RN skin check complete with: RANDY Shepherd.  · Devices in place: N/A.  · Skin assessed under devices: N/A.  · Confirmed pressure ulcers found on: N/A  · New potential pressure ulcers noted on: N/A  · The following interventions in place: waffle mattress in place, frequent turning, pillows used for positioning, frequent linen changes due to incontinence.       Right outer wrist has a small abrasion, left hip has a medium bruise, lower abdomen has old bruising, sacrum is pink and blanching, bilateral heels are pink and blanching.

## 2019-04-27 NOTE — PROGRESS NOTES
Pharmacy Pharmacotherapy Consult for LOS >30 days    Admit Date: 3/19/2019      Medications were reviewed for appropriateness and ongoing need.     Current Facility-Administered Medications   Medication Dose Route Frequency Provider Last Rate Last Dose   • traZODone (DESYREL) tablet 50 mg  50 mg Oral HS PRN Kimberlee Livingston M.D.   50 mg at 04/19/19 1911   • acetaminophen (TYLENOL) tablet 650 mg  650 mg Oral Q6HRS PRN Carlitos Mathews M.D.   650 mg at 04/15/19 0449   • allopurinol (ZYLOPRIM) tablet 300 mg  300 mg Oral DAILY Carlitos Mathews M.D.   300 mg at 04/27/19 0435   • aspirin (ASA) chewable tab 81 mg  81 mg Oral DAILY Carlitos Mathews M.D.   81 mg at 04/27/19 0436   • levothyroxine (SYNTHROID) tablet 50 mcg  50 mcg Oral AM ES Carlitos Mathews M.D.   50 mcg at 04/27/19 0435   • lisinopril (PRINIVIL) tablet 5 mg  5 mg Oral DAILY Carlitos Mathews M.D.   5 mg at 04/27/19 0435   • magnesium oxide (MAG-OX) tablet 400 mg  400 mg Oral DAILY Carlitos Mathews M.D.   400 mg at 04/27/19 0436   • metoprolol (LOPRESSOR) tablet 25 mg  25 mg Oral TWICE DAILY Carlitos Mathews M.D.   25 mg at 04/27/19 0435   • magnesium hydroxide (MILK OF MAGNESIA) suspension 30 mL  30 mL Oral QDAY PRN Carlitos Mathews M.D.        And   • senna-docusate (PERICOLACE or SENOKOT S) 8.6-50 MG per tablet 2 Tab  2 Tab Oral BID Carlitos Mathews M.D.   2 Tab at 04/27/19 0435    And   • polyethylene glycol/lytes (MIRALAX) PACKET 1 Packet  1 Packet Oral QDAY PRN Carlitos Mathews M.D.        And   • bisacodyl (DULCOLAX) suppository 10 mg  10 mg Rectal QDAY PRN Carlitos Mathews M.D.       • atorvastatin (LIPITOR) tablet 20 mg  20 mg Oral Nightly Carlitos Mathews M.D.   20 mg at 04/26/19 2051   • hydrALAZINE (APRESOLINE) injection 20 mg  20 mg Intravenous Q6HRS PRN Chema Landin D.O.           Recommendations:  1. D/c IV hydralazine, never used  2. D/c scheduled docusate-senna pt refusing  3. Keep miralax qday prn  constipation, d/c milk of mag and bisacodyl sup.     Discussed recommendations with nilesh Fry to make above changes.     Christine Zambrano, PharmD, BCOP

## 2019-04-27 NOTE — PROGRESS NOTES
· 2 RN skin check complete with RANDY Noriega.  · Devices in place: N/A.  · Skin assessed under devices: N/A.  · Confirmed pressure ulcers found on: N/A.  · New potential pressure ulcers noted on: N/A.     Patient's bilateral heels are red, boggy and slow to sergio.        The following interventions in place: Pressure redistribution mattress is use, linens kept clean and dry, heel float boot in use.

## 2019-04-27 NOTE — CARE PLAN
Problem: Safety  Goal: Will remain free from falls  Outcome: PROGRESSING AS EXPECTED  Educated patient to not get out of bed without calling for assistance. Also educated patient's wife about not getting him out of bed without assistance. Wife verbalized understanding however, unsure of 's understanding.     Problem: Venous Thromboembolism (VTW)/Deep Vein Thrombosis (DVT) Prevention:  Goal: Patient will participate in Venous Thrombosis (VTE)/Deep Vein Thrombosis (DVT)Prevention Measures  Outcome: PROGRESSING SLOWER THAN EXPECTED  SCD's applied on the patient and educated patient to keep them on. Unsure of understanding.

## 2019-04-27 NOTE — PROGRESS NOTES
· 2 RN skin check complete with RANDY Su.  · Devices in place SCD's.  · Skin assessed under devices yes.  · Confirmed pressure ulcers found on N/A.  · New potential pressure ulcers noted on N/A. Wound consult placed and wound reported.  · The following interventions in place waffle overlay, Q2h turns, frequent checks for incontinence.         Pt has the following:      Bilateral heels are pink and blanching  R distal wrist has 1 cm abrasion  L hip bruised  Sacrum is pink and blanching

## 2019-04-28 NOTE — PROGRESS NOTES
Assumed care of pt, discussed plan of care. A&Ox2, disoriented to time, event. RA, tolerates well. Diminished throughout. No s/sx of pain. Incontinent of bowel, bladder. Assist x1-2, generalized weakness. Impulsive. On regular, dysphagia 1, NTL; tolerating well. Meds crush and floated in apple sauce. No PIV. Fall precautions in place; bed lowest position, treaded socks at bedside, call light within reach, bed alarm on, near nursing station. All needs met at this time.

## 2019-04-28 NOTE — PROGRESS NOTES
Internal Medicine Interval Note  Note Author: Kimberlee Livingston M.D.     Name Wally Torres       1933   Age/Sex 85 y.o. male   MRN [id number]   Code Status DNAR/DNI     After 5PM or if no immediate response to page, please call for cross-coverage  Attending/Team: Dr. Romo/ CRISTI Cage See Patient List for primary contact information  Call [phone] to page    1st Call - Day Intern (R1):   Dr. Livingston 2nd Call - Day Sr. Resident (R2/R3):   Dr. Lisa         Reason for interval visit    Cognitive impairment      Interval Problem Daily Status Update     No acute events overnight, sleeping comfortably in bed.   Hip bruise/swelling improving.   Pressure ulcer prevention protocol in place.   Fall precaution  No NOK found to take responsibility, pending guardianship.       Review of Systems   Reason unable to perform ROS: Limited due to patient condition.       Disposition/Barriers to discharge:   Pending placement/ guardianship    Consultants/Specialty  Palliative care    Ethics  PCP: Halie Dominique M.D.      Quality Measures  Quality-Core Measures   Reviewed items::  Labs reviewed and Medications reviewed  Baldwin catheter::  No Baldwin  DVT prophylaxis pharmacological::  Heparin    Physical Exam       Vitals:    19 1550 19 1933 19 0353 19 0702   BP: 143/72 111/64 (!) 161/71 124/73   Pulse: 75 64 61 77   Resp: 15 14 16 16   Temp: 36.7 °C (98.1 °F) 36.8 °C (98.2 °F) 36.6 °C (97.8 °F) 36.5 °C (97.7 °F)   TempSrc: Temporal Temporal Temporal Temporal   SpO2: 94% 92% 98% 97%   Weight:       Height:         Body mass index is 22.76 kg/m².    Oxygen Therapy:  Pulse Oximetry: 97 %, O2 Delivery: None (Room Air)    Physical Exam   Constitutional: He appears malnourished.   Chronically ill appearing, thin male sleeping comfortably in bed, breathing in room air   HENT:   Head: Normocephalic and atraumatic.   Patient sleeping, didn't wake up.       Assessment/Plan     *  Cognitive impairment- (present on admission)   Assessment & Plan    Patient and his wife are unable to care for themselves, progressive decline per wife.  Ethics and Palliative Care consulted, appreciate recommendations.   Coordinating safe discharge with , Palliative Care and Ethics Committee.  As no NOK found who can take responsibility, pending guardianship now.      Multiple bruises   Assessment & Plan    Seen on admission, on knees and sternum, appeared as though patient had a recent fall.  CT head negative for acute bleed.  , Palliative Care and Ethics consulted for recommendations on safe discharge.   PT and OT, fall precautions.     Hypothyroidism- (present on admission)   Assessment & Plan    Continue home levothyroxine, TSH 0.54 on 3/21/19.     Cardiac pacemaker in situ- (present on admission)   Assessment & Plan    For sick sinus rhythm     SSS (sick sinus syndrome) (HCC)- (present on admission)   Assessment & Plan    Status post pacemaker. Stable     S/P TAVR (transcatheter aortic valve replacement)- (present on admission)   Assessment & Plan    Continue aspirin and statin.     Risk for falls- (present on admission)   Assessment & Plan    Pt appears to have fallen recently. Head CT negative for acute bleed.   Fall precautions, PT and OT consulted, appreciate recs.     Hypertension, essential- (present on admission)   Assessment & Plan    Continue metoprolol, lisinopril     Dyslipidemia- (present on admission)   Assessment & Plan    Continue home statin      CAD (coronary artery disease)- (present on admission)   Assessment & Plan    Stable, history of 3V CABG with left internal mammary artery graft to LAD, saphenous vein graft to obtuse marginal branch and saphenous vein graft to posterior descending artery by Dr. Coates in March 2018.  Echo EF 65%, status post TAVR, RVSP 30 mmHg, no significant change since last TTE.  Pacemaker interrogated with normal device function.  Con't statin, ASA,  metoprolol, and lisinopril.     Paroxysmal atrial fibrillation (CMS-HCC)- (present on admission)   Assessment & Plan    Stable. Xarelto discontinued by Cardiology in August 2018 due to high fall risk per chart review.  Continue metoprolol for rate control.     Dysphagia   Assessment & Plan    Found to have dysphagia, high risk aspiration.   Advanced directive specified no feeding tube   Speech following   1:1 feeding, tolerating well      Headache   Assessment & Plan    Resolved, Tylenol PRN  Encourage PO hydration  Fall precautions      BETTY (acute kidney injury) (Former[facility])   Assessment & Plan    RESOLVED       Age-related physical debility- (present on admission)   Assessment & Plan    Previously assessed by PT and OT, SNF recommended for further therapy needs.  PT and OT following for this admission, appreciate help.     Hx of CABG- (present on admission)   Assessment & Plan    Continue aspirin and statin.     Gout of multiple sites- (present on admission)   Assessment & Plan    Continue home allopurinol      PVD (peripheral vascular disease) (CMS-HCC)- (present on admission)   Assessment & Plan    History of prior L superficial femoral artery stent, known high grade stenosis of R common femoral artery closed with Starclose closure by Dr. Amaya on 03/22/17. Stable, no acute complains.

## 2019-04-28 NOTE — PROGRESS NOTES
· 2 RN skin check complete with Esha NGUYEN RN.  · Devices in place: N/A.  · Skin assessed under devices: N/A.  · Confirmed pressure ulcers found on: N/A.  · New potential pressure ulcers noted on: N/A.     Patient has blanching pink spot on sacrum. Patient's heels are pink, boggy, and blanching.         The following interventions in place: Mepilex applied to sacrum, barrier paste in use, linens kept clean and dry, Q2 turns in place.

## 2019-04-28 NOTE — CARE PLAN
Problem: Discharge Barriers/Planning  Goal: Patient's continuum of care needs will be met  Outcome: PROGRESSING AS EXPECTED  Patient and  unable to care for themselves. SW is involved.    Problem: Psychosocial Needs:  Goal: Level of anxiety will decrease  Outcome: PROGRESSING SLOWER THAN EXPECTED  Patient is very concerned about washing her 's dirty clothes. Patient wants to leave to go do laundry. Redirected patient. Will also find volunteer in hospital to wash patient's clothes

## 2019-04-28 NOTE — CARE PLAN
Problem: Safety  Goal: Will remain free from falls    Intervention: Implement fall precautions  Pt is a high fall risk, hx of falls this admission, impulsive at times. Educated pt on use of call light; needs reinforcement. Fall precautions in place; bed lowest position, treaded socks at bedside, call light within reach, bed alarm on, near nursing station, frequent checks throughout the night.      Problem: Skin Integrity  Goal: Risk for impaired skin integrity will decrease    Intervention: Implement precautions to protect skin integrity in collaboration with the interdisciplinary team  Pt with neymar score 15. Does move in bed, but have yet seen to ambulate. Generalized weakness. Interventions: Q2T, incontinence monitoring, changing bed linens as needed, keeping pt clean and dry, use of aurora cream, pillows in use for positioning, heels floated, waffle overlay.

## 2019-04-28 NOTE — PROGRESS NOTES
· 2 RN skin check complete with RANDY Venegas.  · Devices in place: N/A.  · Skin assessed under devices: N/A.  · Confirmed pressure ulcers found on: N/A.  · New potential pressure ulcers noted on: N/A.   · Wound consult placed and wound reported: yes  · The following interventions in place: Q2T, incontinence monitoring, changing bed linens as needed, keeping pt clean and dry, use of aurora cream, pillows in use for positioning (as able), heels floated, waffle overlay.    Pacemaker left upper chest.  Elbows; pink and blanching.   Bruising noted to left hip. Scabbing to right shin.  Heels are boggy, dry, red, and slow to sergio.  Small fissure noted to mid sacrococcygeal area. Pictures taken.  Otherwise, skin is dry, fragile, and intact.

## 2019-04-29 NOTE — CARE PLAN
Problem: Skin Integrity  Goal: Risk for impaired skin integrity will decrease  Outcome: PROGRESSING AS EXPECTED  Pt has a small open area in the intragluteal cleft. mepilex lite applied. Pt also exhibits IAD in his groin area- barrier cream applied.     Problem: Urinary Elimination:  Goal: Ability to reestablish a normal urinary elimination pattern will improve  Outcome: PROGRESSING AS EXPECTED  Pt is incontinent of urine. Attempts to educate pt on calling for assistance before he voids have been unsuccessful. After episodes of incontinence, pt will often get out of bed.

## 2019-04-29 NOTE — PROGRESS NOTES
· 2 RN skin check complete with RANDY Treviño.  · Devices in place:none.  · Skin assessed under devices: NA.  · Confirmed pressure ulcers found on NA.  · New potential pressure ulcers noted on Sacrum. Wound consult placed and wound reported.  · The following interventions in place: Q 2 hr turns, frequent checks for incontinence, waffle overlay, mepilex lite to sacrum, aurora cream.     Pt has the following skin issues:  -boggy heels that are slow to sergio  -open fissure to intragluteal cleft (pics in epic, wound consult ordered)  -large bruise to left hip

## 2019-04-29 NOTE — CARE PLAN
Problem: Safety  Goal: Will remain free from injury  Outcome: PROGRESSING AS EXPECTED  Pt is at high risk for fall. Strip alarm in use. Pt's wife educated to not help pt out of bed without assistance from staff.     Problem: Discharge Barriers/Planning  Goal: Patient's continuum of care needs will be met  Outcome: PROGRESSING AS EXPECTED  Pt pending guardianship and placement. Social work on board.

## 2019-04-29 NOTE — PROGRESS NOTES
Internal Medicine Interval Note  Note Author: Kimberlee Livingston M.D.     Name Wally Torres       1933   Age/Sex 85 y.o. male   MRN [id number]   Code Status DNAR/DNI     After 5PM or if no immediate response to page, please call for cross-coverage  Attending/Team: Dr. Noe/ CRISTI Cage See Patient List for primary contact information  Call [phone] to page    1st Call - Day Intern (R1):   Dr. Livingston 2nd Call - Day Sr. Resident (R2/R3):   Dr. Lisa         Reason for interval visit    Cognitive impairment      Interval Problem Daily Status Update     No acute events overnight, sleeping comfortably in bed. Looks frail.  SCDs for DVT prevention, will consider restarting Heparin this week.   Uric acid 3.9, Allopurinol dose decreased yesterday.   Pressure ulcer prevention protocol in place, Q2H turns.   Fall precaution  Pending guardianship.       Review of Systems   Reason unable to perform ROS: Limited due to patient condition.       Disposition/Barriers to discharge:   Pending placement/ guardianship    Consultants/Specialty  Palliative care    Ethics  PCP: Halie Dominique M.D.      Quality Measures  Quality-Core Measures   Reviewed items::  Labs reviewed and Medications reviewed  Baldwin catheter::  No Baldwin  DVT prophylaxis pharmacological::  Heparin    Physical Exam       Vitals:    19 1540 19 1905 19 0300 19 0640   BP: 102/51 123/55 154/70 144/58   Pulse: 71 60 61 61   Resp: 16 18 18 17   Temp: 36.8 °C (98.3 °F) 36.1 °C (97 °F) 36.1 °C (97 °F) 36.1 °C (96.9 °F)   TempSrc: Temporal Temporal Temporal Temporal   SpO2: 97% 95% 98% 97%   Weight:       Height:         Body mass index is 22.76 kg/m².    Oxygen Therapy:  Pulse Oximetry: 97 %, O2 (LPM): 0, O2 Delivery: None (Room Air)    Physical Exam   Constitutional: He appears malnourished.   Chronically ill appearing, thin male sleeping comfortably in bed, breathing in room air   HENT:   Head: Normocephalic  and atraumatic.   Patient sleeping, didn't wake up.       Assessment/Plan     * Cognitive impairment- (present on admission)   Assessment & Plan    Patient and his wife are unable to care for themselves, progressive decline per wife.  Ethics and Palliative Care consulted, appreciate recommendations.   Coordinating safe discharge with , Palliative Care and Ethics Committee.  As no NOK found who can take responsibility, pending guardianship now.      Multiple bruises   Assessment & Plan    Seen on admission, on knees and sternum, appeared as though patient had a recent fall.  CT head negative for acute bleed.  , Palliative Care and Ethics consulted for recommendations on safe discharge.   PT and OT, fall precautions.     Hypothyroidism- (present on admission)   Assessment & Plan    Continue home levothyroxine, TSH 0.54 on 3/21/19.     Cardiac pacemaker in situ- (present on admission)   Assessment & Plan    For sick sinus rhythm     SSS (sick sinus syndrome) (HCC)- (present on admission)   Assessment & Plan    Status post pacemaker. Stable     S/P TAVR (transcatheter aortic valve replacement)- (present on admission)   Assessment & Plan    Continue aspirin and statin.     Risk for falls- (present on admission)   Assessment & Plan    Pt appears to have fallen recently. Head CT negative for acute bleed.   Fall precautions, PT and OT consulted, appreciate recs.     Hypertension, essential- (present on admission)   Assessment & Plan    Continue metoprolol, lisinopril     Dyslipidemia- (present on admission)   Assessment & Plan    Continue home statin      CAD (coronary artery disease)- (present on admission)   Assessment & Plan    Stable, history of 3V CABG with left internal mammary artery graft to LAD, saphenous vein graft to obtuse marginal branch and saphenous vein graft to posterior descending artery by Dr. Coates in March 2018.  Echo EF 65%, status post TAVR, RVSP 30 mmHg, no significant change since last  TTE.  Pacemaker interrogated with normal device function.  Con't statin, ASA, metoprolol, and lisinopril.     Paroxysmal atrial fibrillation (CMS-HCC)- (present on admission)   Assessment & Plan    Stable. Xarelto discontinued by Cardiology in August 2018 due to high fall risk per chart review.  Continue metoprolol for rate control.     Dysphagia   Assessment & Plan    Found to have dysphagia, high risk aspiration.   Advanced directive specified no feeding tube   Speech following   1:1 feeding, tolerating well      Headache   Assessment & Plan    Resolved, Tylenol PRN  Encourage PO hydration  Fall precautions      BETTY (acute kidney injury) ([facility])   Assessment & Plan    RESOLVED       Age-related physical debility- (present on admission)   Assessment & Plan    Previously assessed by PT and OT, SNF recommended for further therapy needs.  PT and OT following for this admission, appreciate help.     Hx of CABG- (present on admission)   Assessment & Plan    Continue aspirin and statin.     Gout of multiple sites- (present on admission)   Assessment & Plan    Continue home allopurinol      PVD (peripheral vascular disease) (CMS-HCC)- (present on admission)   Assessment & Plan    History of prior L superficial femoral artery stent, known high grade stenosis of R common femoral artery closed with Starclose closure by Dr. Amaya on 03/22/17. Stable, no acute complains.

## 2019-04-30 PROBLEM — K59.09 OTHER CONSTIPATION: Status: ACTIVE | Noted: 2019-01-01

## 2019-04-30 NOTE — CARE PLAN
Problem: Safety  Goal: Will remain free from falls  Outcome: PROGRESSING AS EXPECTED  Pt. Have history of multiple falls, pt. On all fall prec, bed alarm is on. Call light in reach and pt. On desk bed.     Problem: Discharge Barriers/Planning  Goal: Patient's continuum of care needs will be met  Outcome: PROGRESSING SLOWER THAN EXPECTED  Pending guardianship for the pt. AMADOU and MD working on safe dc plans.

## 2019-04-30 NOTE — PROGRESS NOTES
Internal Medicine Interval Note  Note Author: Susie Lisa M.D.     Name Wally Torres       1933   Age/Sex 85 y.o. male   MRN [id number]   Code Status DNAR/DNI     After 5PM or if no immediate response to page, please call for cross-coverage  Attending/Team: Dr. Noe/ CRISTI Cage See Patient List for primary contact information  Call [phone] to page    1st Call - Day Intern (R1):   Dr. Livingston 2nd Call - Day Sr. Resident (R2/R3):   Dr. Lisa         Reason for interval visit    Cognitive impairment      Interval Problem Daily Status Update     No acute events overnight, sleeping comfortably in bed  Decreased night time metoprolol to12.5mg  Bowel protocol now in place for constipation  Pending guardianship.       Review of Systems   Reason unable to perform ROS: Limited due to patient condition.       Disposition/Barriers to discharge:   Pending placement/ guardianship    Consultants/Specialty  Palliative care    Ethics  PCP: Halie Dominique M.D.      Quality Measures  Quality-Core Measures   Reviewed items::  Labs reviewed and Medications reviewed  Baldwin catheter::  No Baldwin  DVT prophylaxis pharmacological::  Heparin    Physical Exam       Vitals:    19 1500 19 1905 19 0305 19 0645   BP: 106/50 (!) 93/46 131/54 134/56   Pulse: 70 65 60 62   Resp: 18 18 17 18   Temp: 36 °C (96.8 °F) 35.9 °C (96.6 °F) 36.1 °C (96.9 °F) 36.2 °C (97.1 °F)   TempSrc: Temporal Temporal Temporal Temporal   SpO2: 99% 100% 99% 99%   Weight:       Height:         Body mass index is 22.76 kg/m².    Oxygen Therapy:  Pulse Oximetry: 99 %, O2 (LPM): 0, O2 Delivery: None (Room Air)    Physical Exam   Constitutional: He appears malnourished.   Thin male laying comfortably in bed, breathing in room air   HENT:   Head: Normocephalic and atraumatic.   Patient awake, and happy appearing.       Assessment/Plan     * Cognitive impairment- (present on admission)   Assessment & Plan     Patient and his wife are unable to care for themselves, progressive decline per wife.  Ethics and Palliative Care consulted, appreciate recommendations.   Coordinating safe discharge with , Palliative Care and Ethics Committee.  As no NOK found who can take responsibility, pending guardianship now.      Other constipation   Assessment & Plan    Put on bowel protocol      Multiple bruises   Assessment & Plan    Seen on admission, on knees and sternum, appeared as though patient had a recent fall.  CT head negative for acute bleed.  SW, Palliative Care and Ethics consulted for recommendations on safe discharge.   PT and OT, fall precautions.     Hypothyroidism- (present on admission)   Assessment & Plan    Continue home levothyroxine, TSH 0.54 on 3/21/19.     Cardiac pacemaker in situ- (present on admission)   Assessment & Plan    For sick sinus rhythm     SSS (sick sinus syndrome) (HCC)- (present on admission)   Assessment & Plan    Status post pacemaker. Stable     S/P TAVR (transcatheter aortic valve replacement)- (present on admission)   Assessment & Plan    Continue aspirin and statin.     Risk for falls- (present on admission)   Assessment & Plan    Pt appears to have fallen recently. Head CT negative for acute bleed.   Fall precautions, PT and OT consulted, appreciate recs.     Hypertension, essential- (present on admission)   Assessment & Plan    Continue metoprolol, lisinopril     Dyslipidemia- (present on admission)   Assessment & Plan    Continue home statin      CAD (coronary artery disease)- (present on admission)   Assessment & Plan    Stable, history of 3V CABG with left internal mammary artery graft to LAD, saphenous vein graft to obtuse marginal branch and saphenous vein graft to posterior descending artery by Dr. Coates in March 2018.  Echo EF 65%, status post TAVR, RVSP 30 mmHg, no significant change since last TTE.  Pacemaker interrogated with normal device function.  Con't statin, ASA,  metoprolol, and lisinopril.     Paroxysmal atrial fibrillation (CMS-HCC)- (present on admission)   Assessment & Plan    Stable. Xarelto discontinued by Cardiology in August 2018 due to high fall risk per chart review.  Continue metoprolol for rate control.     Dysphagia   Assessment & Plan    Found to have dysphagia, high risk aspiration.   Advanced directive specified no feeding tube   Speech following   1:1 feeding, tolerating well      Headache   Assessment & Plan    Resolved, Tylenol PRN  Encourage PO hydration  Fall precautions      BETTY (acute kidney injury) (Former[facility])   Assessment & Plan    RESOLVED       Age-related physical debility- (present on admission)   Assessment & Plan    Previously assessed by PT and OT, SNF recommended for further therapy needs.  PT and OT following for this admission, appreciate help.     Hx of CABG- (present on admission)   Assessment & Plan    Continue aspirin and statin.     Gout of multiple sites- (present on admission)   Assessment & Plan    Continue home allopurinol      PVD (peripheral vascular disease) (CMS-HCC)- (present on admission)   Assessment & Plan    History of prior L superficial femoral artery stent, known high grade stenosis of R common femoral artery closed with Starclose closure by Dr. Amaya on 03/22/17. Stable, no acute complains.

## 2019-04-30 NOTE — CARE PLAN
Problem: Safety  Goal: Will remain free from injury  Outcome: PROGRESSING AS EXPECTED  Pt is in room near nursing station. Pt encouraged to call for assistance.     Problem: Skin Integrity  Goal: Risk for impaired skin integrity will decrease  Outcome: PROGRESSING AS EXPECTED  Pt up to the chair for meals and Q2h turns as his skin is fragile.

## 2019-04-30 NOTE — PROGRESS NOTES
Assumed care of pt this am. Pt is in room near nursing station. A&O x2 (oriented to self and place). Pt wife educated on calling for assistance when ambulating and not moving around with his wife only. Pt denies pain at this time. Hourly rounding in place.

## 2019-04-30 NOTE — PROGRESS NOTES
Received pt. In bed awake, alert and orientedx4 to person and place, pt. Did state that it is 2019. Pt. Does understand English and able to respond when asked. Fall prec in place. Call light placed within reach, pt. Able to get up and ambulate with FWW with 1 person assisting. Due meds given, reinforced diet to pt. And wife who is also helping take care of pt. Needs attended.

## 2019-05-01 NOTE — PROGRESS NOTES
Internal Medicine Interval Note  Note Author: Kimberlee Livingston M.D.     Name Wally Torres       1933   Age/Sex 85 y.o. male   MRN [id number]   Code Status DNAR/DNI     After 5PM or if no immediate response to page, please call for cross-coverage  Attending/Team: Dr. Noe/ CRISTI Cage See Patient List for primary contact information  Call [phone] to page    1st Call - Day Intern (R1):   Dr. Livingston 2nd Call - Day Sr. Resident (R2/R3):   Dr. Lisa         Reason for interval visit    Cognitive impairment      Interval Problem Daily Status Update     No acute overnight events, denies any acute need. Lying comfortably in bed.   Bowel protocol, pressure ulcer prevention protocol in place.  Last BM   Pending guardianship.       Review of Systems   Reason unable to perform ROS: Limited due to patient condition.       Disposition/Barriers to discharge:   Pending placement/ guardianship    Consultants/Specialty  Palliative care    Ethics  PCP: Halie Dominique M.D.      Quality Measures  Quality-Core Measures   Reviewed items::  Labs reviewed and Medications reviewed  Baldwin catheter::  No Baldwin  DVT prophylaxis pharmacological::  Heparin    Physical Exam       Vitals:    19 1905 19 2005 19 0305 19 0705   BP: (!) 87/42 109/45 110/47 122/80   Pulse: 60 61 61 60   Resp: 18 17 18 16   Temp: 36.2 °C (97.2 °F)  36.3 °C (97.4 °F) 36.1 °C (97 °F)   TempSrc: Temporal  Temporal Temporal   SpO2: 97%  97% 98%   Weight:       Height:         Body mass index is 22.76 kg/m².    Oxygen Therapy:  Pulse Oximetry: 98 %, O2 (LPM): 0, O2 Delivery: None (Room Air)    Physical Exam   Constitutional: He appears malnourished.   Thin male laying comfortably in bed, breathing in room air   HENT:   Head: Normocephalic and atraumatic.   Patient awake, and happy appearing.       Assessment/Plan     * Cognitive impairment- (present on admission)   Assessment & Plan    Patient and his  wife are unable to care for themselves, progressive decline per wife.  Ethics and Palliative Care consulted, appreciate recommendations.   Coordinating safe discharge with , Palliative Care and Ethics Committee.  As no NOK found who can take responsibility, pending guardianship now.      Other constipation   Assessment & Plan    Put on bowel protocol      Multiple bruises   Assessment & Plan    Seen on admission, on knees and sternum, appeared as though patient had a recent fall.  CT head negative for acute bleed.  , Palliative Care and Ethics consulted for recommendations on safe discharge.   PT and OT, fall precautions.     Hypothyroidism- (present on admission)   Assessment & Plan    Continue home levothyroxine, TSH 0.54 on 3/21/19.     Cardiac pacemaker in situ- (present on admission)   Assessment & Plan    For sick sinus rhythm     SSS (sick sinus syndrome) (HCC)- (present on admission)   Assessment & Plan    Status post pacemaker. Stable     S/P TAVR (transcatheter aortic valve replacement)- (present on admission)   Assessment & Plan    Continue aspirin and statin.     Risk for falls- (present on admission)   Assessment & Plan    Pt appears to have fallen recently. Head CT negative for acute bleed.   Fall precautions, PT and OT consulted, appreciate recs.     Hypertension, essential- (present on admission)   Assessment & Plan    Continue metoprolol, lisinopril     Dyslipidemia- (present on admission)   Assessment & Plan    Continue home statin      CAD (coronary artery disease)- (present on admission)   Assessment & Plan    Stable, history of 3V CABG with left internal mammary artery graft to LAD, saphenous vein graft to obtuse marginal branch and saphenous vein graft to posterior descending artery by Dr. Coates in March 2018.  Echo EF 65%, status post TAVR, RVSP 30 mmHg, no significant change since last TTE.  Pacemaker interrogated with normal device function.  Con't statin, ASA, metoprolol, and  lisinopril.     Paroxysmal atrial fibrillation (CMS-HCC)- (present on admission)   Assessment & Plan    Stable. Xarelto discontinued by Cardiology in August 2018 due to high fall risk per chart review.  Continue metoprolol for rate control.     Dysphagia   Assessment & Plan    Found to have dysphagia, high risk aspiration.   Advanced directive specified no feeding tube   Speech following   1:1 feeding, tolerating well      Headache   Assessment & Plan    Resolved, Tylenol PRN  Encourage PO hydration  Fall precautions      BETTY (acute kidney injury) ([facility])   Assessment & Plan    RESOLVED       Age-related physical debility- (present on admission)   Assessment & Plan    Previously assessed by PT and OT, SNF recommended for further therapy needs.  PT and OT following for this admission, appreciate help.     Hx of CABG- (present on admission)   Assessment & Plan    Continue aspirin and statin.     Gout of multiple sites- (present on admission)   Assessment & Plan    Continue home allopurinol      PVD (peripheral vascular disease) (CMS-HCC)- (present on admission)   Assessment & Plan    History of prior L superficial femoral artery stent, known high grade stenosis of R common femoral artery closed with Starclose closure by Dr. Amaya on 03/22/17. Stable, no acute complains.

## 2019-05-01 NOTE — CARE PLAN
Problem: Safety  Goal: Will remain free from injury  Outcome: PROGRESSING AS EXPECTED  All fall precautions in place     Problem: Discharge Barriers/Planning  Goal: Patient's continuum of care needs will be met  Outcome: PROGRESSING AS EXPECTED  Pt pending guardianship

## 2019-05-01 NOTE — PROGRESS NOTES
Assumed care of pt this am. Bedding changed by RN as pt was wet. Pt A&O to self only. Pt wife stated he was answering my orientation questions in Tongan but they weren't answers to my questions. Pt has a bed alarm in place. Pt is in room near nursing station.

## 2019-05-01 NOTE — PROGRESS NOTES
A&Ox2, reporting no pain. Provided pt medications crushed in applesauce. Pt resting comfortably in bed. All needs met at this time.      Bed alarm is on, bed is locked and in lowest position, call light and bedside table are within reach, treaded slipper socks are on, and hourly rounding is in place.

## 2019-05-01 NOTE — CARE PLAN
Problem: Safety  Goal: Will remain free from falls  Outcome: PROGRESSING AS EXPECTED  Pt in desk bed, bed alarm is on, bed is locked and in lowest position, call light and bedside table are within reach, treaded slipper socks are on, and hourly rounding is in place.     Problem: Skin Integrity  Goal: Risk for impaired skin integrity will decrease  Outcome: PROGRESSING AS EXPECTED  Incontinence care, barrier wipes and cream, waffle overlay, q2 turns

## 2019-05-01 NOTE — PROGRESS NOTES
· 2 RN skin check complete with RANDY Patel.  · Devices in place No.  · Skin assessed under devices N/A.  · Confirmed pressure ulcers found on N/A.  · New potential pressure ulcers noted on N/A. Wound consult placed and wound reported.  · The following interventions in place: barrier cream, up to chair for meals, pillows to support positioning.      Pt has the following: bilateral heels are pink and blanching  Sacrum is red and blanching  All other skin is intact.

## 2019-05-02 NOTE — PROGRESS NOTES
Patient alert only to self, answers when prompted. No s/s of pain or distress. Patient became restless trying to reach for things on bedside table. Bed alarm in place. Needs attended to.

## 2019-05-02 NOTE — CARE PLAN
Problem: Safety  Goal: Will remain free from injury    Intervention: Provide assistance with mobility  The pt is a one person assist with a front wheel walker.       Problem: Pain Management  Goal: Pain level will decrease to patient's comfort goal    Intervention: Follow pain managment plan developed in collaboration with patient and Interdisciplinary Team  Pt denies pain no pain medication will be given.

## 2019-05-02 NOTE — CARE PLAN
Problem: Communication  Goal: The ability to communicate needs accurately and effectively will improve  Outcome: PROGRESSING AS EXPECTED      Problem: Safety  Goal: Will remain free from injury  Outcome: PROGRESSING AS EXPECTED  Reinforced the use of call light when needing assistance. Treaded socks on. Bed in lowest position. Personal belongings within reach. Clutter free environment provided.

## 2019-05-02 NOTE — PROGRESS NOTES
Assumed care of Pt at shift change. Pt is A&Ox1 reorientation unsucessful. Pt denies pain. Call light with in reach. Traction socks on pt and bed in lowest position.

## 2019-05-02 NOTE — PROGRESS NOTES
Internal Medicine Interval Note  Note Author: Kimberlee Livingston M.D.     Name Wally Torres       1933   Age/Sex 85 y.o. male   MRN [id number]   Code Status DNAR/DNI     After 5PM or if no immediate response to page, please call for cross-coverage  Attending/Team: Dr. Noe/ CRISTI Cage See Patient List for primary contact information  Call [phone] to page    1st Call - Day Intern (R1):   Dr. Livingston 2nd Call - Day Sr. Resident (R2/R3):   Dr. Lisa         Reason for interval visit    Cognitive impairment      Interval Problem Daily Status Update     No acute overnight events. Breathing comfortably in room air. Looks very frail.  No acute complains. Oriented x 1   Hip swelling resolved, re-started heparin for DVT prophylaxis. If he tolerates, will start Enoxaparin.   Bowel protocol, pressure ulcer prevention protocol in place.  Pending guardianship. See SW note for details.       Review of Systems   Reason unable to perform ROS: Limited due to patient condition.       Disposition/Barriers to discharge:   Pending placement/ guardianship    Consultants/Specialty  Palliative care    Ethics  PCP: Halie Dominique M.D.      Quality Measures  Quality-Core Measures   Reviewed items::  Labs reviewed and Medications reviewed  Baldwin catheter::  No Baldwin  DVT prophylaxis pharmacological::  Heparin    Physical Exam       Vitals:    19 1659 19 1930 19 0340 19 0645   BP:  124/51 (!) 162/61 121/57   Pulse: (!) 58 85 91 60   Resp:  16 16 17   Temp:  36.8 °C (98.3 °F) 36.2 °C (97.1 °F) 36.2 °C (97.1 °F)   TempSrc:  Temporal Temporal Temporal   SpO2:  98% 97% 100%   Weight:       Height:         Body mass index is 22.76 kg/m².    Oxygen Therapy:  Pulse Oximetry: 100 %, O2 (LPM): 0, O2 Delivery: None (Room Air)    Physical Exam   Constitutional: He appears malnourished.   Thin male laying comfortably in bed, breathing in room air   HENT:   Head: Normocephalic and  atraumatic.   Eyes: Pupils are equal, round, and reactive to light. Conjunctivae are normal.   Arcus senilis present bilaterally   Neck: Normal range of motion. Neck supple.   Cardiovascular: Normal rate, regular rhythm and normal heart sounds.    Pulmonary/Chest: Effort normal. No respiratory distress. He has no wheezes. He has no rales.   Abdominal: Soft. Bowel sounds are normal. He exhibits no distension.   Musculoskeletal: Normal range of motion. He exhibits no edema.   Diffuse muscle wasting   Neurological:   Oriented x 1   Poor speech  Poor memory  No focal signs   Skin: Skin is warm. No erythema.   Psychiatric:   Poor memory   Patient awake, and happy appearing.       Assessment/Plan     * Cognitive impairment- (present on admission)   Assessment & Plan    Patient and his wife are unable to care for themselves, progressive decline per wife.  Ethics and Palliative Care consulted, appreciate recommendations.   Coordinating safe discharge with , Palliative Care and Ethics Committee.  As no NOK found who can take responsibility, pending guardianship now.      Other constipation   Assessment & Plan    Put on bowel protocol      Multiple bruises   Assessment & Plan    Seen on admission, on knees and sternum, appeared as though patient had a recent fall.  CT head negative for acute bleed.  , Palliative Care and Ethics consulted for recommendations on safe discharge.   PT and OT, fall precautions.     Hypothyroidism- (present on admission)   Assessment & Plan    Continue home levothyroxine, TSH 0.54 on 3/21/19.     Cardiac pacemaker in situ- (present on admission)   Assessment & Plan    For sick sinus rhythm     SSS (sick sinus syndrome) (HCC)- (present on admission)   Assessment & Plan    Status post pacemaker. Stable     S/P TAVR (transcatheter aortic valve replacement)- (present on admission)   Assessment & Plan    Continue aspirin and statin.     Risk for falls- (present on admission)   Assessment & Plan     Pt appears to have fallen recently. Head CT negative for acute bleed.   Fall precautions, PT and OT consulted, appreciate recs.     Hypertension, essential- (present on admission)   Assessment & Plan    Continue metoprolol, lisinopril     Dyslipidemia- (present on admission)   Assessment & Plan    Continue home statin      CAD (coronary artery disease)- (present on admission)   Assessment & Plan    Stable, history of 3V CABG with left internal mammary artery graft to LAD, saphenous vein graft to obtuse marginal branch and saphenous vein graft to posterior descending artery by Dr. Coates in March 2018.  Echo EF 65%, status post TAVR, RVSP 30 mmHg, no significant change since last TTE.  Pacemaker interrogated with normal device function.  Con't statin, ASA, metoprolol, and lisinopril.     Paroxysmal atrial fibrillation (CMS-HCC)- (present on admission)   Assessment & Plan    Stable. Xarelto discontinued by Cardiology in August 2018 due to high fall risk per chart review.  Continue metoprolol for rate control.     Dysphagia   Assessment & Plan    Found to have dysphagia, high risk aspiration.   Advanced directive specified no feeding tube   Speech following   1:1 feeding, tolerating well      Headache   Assessment & Plan    Resolved, Tylenol PRN  Encourage PO hydration  Fall precautions      BETTY (acute kidney injury) ([facility])   Assessment & Plan    RESOLVED       Age-related physical debility- (present on admission)   Assessment & Plan    Previously assessed by PT and OT, SNF recommended for further therapy needs.  PT and OT following for this admission, appreciate help.     Hx of CABG- (present on admission)   Assessment & Plan    Continue aspirin and statin.     Gout of multiple sites- (present on admission)   Assessment & Plan    Continue home allopurinol      PVD (peripheral vascular disease) (CMS-[facility])- (present on admission)   Assessment & Plan    History of prior L superficial femoral artery stent, known high grade  stenosis of R common femoral artery closed with Starclose closure by Dr. Amaya on 03/22/17. Stable, no acute complains.

## 2019-05-03 NOTE — WOUND TEAM
Wound team in to see pt for gluteal cleft skin disturbance.  Area observed, no open wound present.  Improved from picture taken 4/29/19.  Pt on a waffle overlay.  Skin care orders written.

## 2019-05-03 NOTE — PROGRESS NOTES
Internal Medicine Interval Note  Note Author: Kimberlee Livingston M.D.     Name Wally Torres       1933   Age/Sex 85 y.o. male   MRN [id number]   Code Status DNAR/DNI     After 5PM or if no immediate response to page, please call for cross-coverage  Attending/Team: Dr. Noe/ CRISTI Cage See Patient List for primary contact information  Call [phone] to page    1st Call - Day Intern (R1):   Dr. Livingston 2nd Call - Day Sr. Resident (R2/R3):   Dr. Lisa         Reason for interval visit    Cognitive impairment      Interval Problem Daily Status Update     No acute events, lying in bed, greeted me with a smile. Hard to understand speech, but can communicate. Denied having any pain/ active complains.   On heparin for DVT prophylaxis.   Bowel protocol, pressure ulcer prevention protocol in place.  Pending guardianship. See SW note for details.       Review of Systems   Reason unable to perform ROS: Limited due to patient condition.       Disposition/Barriers to discharge:   Pending placement/ guardianship    Consultants/Specialty  Palliative care    Ethics  PCP: Halie Dominique M.D.      Quality Measures  Quality-Core Measures   Reviewed items::  Labs reviewed and Medications reviewed  Baldwin catheter::  No Baldwin  DVT prophylaxis pharmacological::  Heparin    Physical Exam       Vitals:    19 1853 19 2129 19 0315 19 0733   BP: (!) 97/38 123/47 152/58 139/68   Pulse: 62 60 68 60   Resp: 12  12 16   Temp: 36.7 °C (98 °F)  36.2 °C (97.1 °F) 36.1 °C (97 °F)   TempSrc: Temporal  Temporal Temporal   SpO2: 97%  95% 96%   Weight:       Height:         Body mass index is 22.76 kg/m².    Oxygen Therapy:  Pulse Oximetry: 96 %, O2 Delivery: None (Room Air)    Physical Exam   Constitutional: He appears malnourished.   Thin male laying comfortably in bed, breathing in room air   HENT:   Head: Normocephalic and atraumatic.   Eyes: Pupils are equal, round, and reactive to light.  Conjunctivae are normal.   Arcus senilis present bilaterally   Neck: Normal range of motion. Neck supple.   Cardiovascular: Normal rate, regular rhythm and normal heart sounds.    Pulmonary/Chest: Effort normal. No respiratory distress. He has no wheezes. He has no rales.   Abdominal: Soft. Bowel sounds are normal. He exhibits no distension.   Musculoskeletal: Normal range of motion. He exhibits no edema.   Diffuse muscle wasting   Neurological:   Oriented x 1   Poor speech  Poor memory  No focal signs   Skin: Skin is warm. No erythema.   Psychiatric:   Poor memory   Patient awake, and happy appearing.       Assessment/Plan     * Cognitive impairment- (present on admission)   Assessment & Plan    Patient and his wife are unable to care for themselves, progressive decline per wife.  Ethics and Palliative Care consulted, appreciate recommendations.   Coordinating safe discharge with , Palliative Care and Ethics Committee.  As no NOK found who can take responsibility, pending guardianship now.      Other constipation   Assessment & Plan    Put on bowel protocol      Multiple bruises   Assessment & Plan    Seen on admission, on knees and sternum, appeared as though patient had a recent fall.  CT head negative for acute bleed.  , Palliative Care and Ethics consulted for recommendations on safe discharge.   PT and OT, fall precautions.     Hypothyroidism- (present on admission)   Assessment & Plan    Continue home levothyroxine, TSH 0.54 on 3/21/19.     Cardiac pacemaker in situ- (present on admission)   Assessment & Plan    For sick sinus rhythm     SSS (sick sinus syndrome) (HCC)- (present on admission)   Assessment & Plan    Status post pacemaker. Stable     S/P TAVR (transcatheter aortic valve replacement)- (present on admission)   Assessment & Plan    Continue aspirin and statin.     Risk for falls- (present on admission)   Assessment & Plan    Pt appears to have fallen recently. Head CT negative for acute bleed.    Fall precautions, PT and OT consulted, appreciate recs.     Hypertension, essential- (present on admission)   Assessment & Plan    Continue metoprolol, lisinopril     Dyslipidemia- (present on admission)   Assessment & Plan    Continue home statin      CAD (coronary artery disease)- (present on admission)   Assessment & Plan    Stable, history of 3V CABG with left internal mammary artery graft to LAD, saphenous vein graft to obtuse marginal branch and saphenous vein graft to posterior descending artery by Dr. Coates in March 2018.  Echo EF 65%, status post TAVR, RVSP 30 mmHg, no significant change since last TTE.  Pacemaker interrogated with normal device function.  Con't statin, ASA, metoprolol, and lisinopril.     Paroxysmal atrial fibrillation (CMS-HCC)- (present on admission)   Assessment & Plan    Stable. Xarelto discontinued by Cardiology in August 2018 due to high fall risk per chart review.  Continue metoprolol for rate control.     Dysphagia   Assessment & Plan    Found to have dysphagia, high risk aspiration.   Advanced directive specified no feeding tube   Speech following   1:1 feeding, tolerating well      Headache   Assessment & Plan    Resolved, Tylenol PRN  Encourage PO hydration  Fall precautions      BETTY (acute kidney injury) ([facility])   Assessment & Plan    RESOLVED       Age-related physical debility- (present on admission)   Assessment & Plan    Previously assessed by PT and OT, SNF recommended for further therapy needs.  PT and OT following for this admission, appreciate help.     Hx of CABG- (present on admission)   Assessment & Plan    Continue aspirin and statin.     Gout of multiple sites- (present on admission)   Assessment & Plan    Continue home allopurinol      PVD (peripheral vascular disease) (CMS-[facility])- (present on admission)   Assessment & Plan    History of prior L superficial femoral artery stent, known high grade stenosis of R common femoral artery closed with Starclose closure by  Dr. Amaya on 03/22/17. Stable, no acute complains.

## 2019-05-03 NOTE — CARE PLAN
Problem: Skin Integrity  Goal: Risk for impaired skin integrity will decrease  Outcome: PROGRESSING AS EXPECTED    Intervention: Assess and monitor skin integrity, appearance and/or temperature  Offer frequent toileting, and incontinent care.

## 2019-05-03 NOTE — PROGRESS NOTES
Assumed care of Pt at shift change. Pt is A&Ox1. Pt denies pain. Call light with in reach. Traction socks on pt and bed in lowest position. Pt attempts to get out of bed when prompted by his wife. She is constantly at bedside.

## 2019-05-03 NOTE — CARE PLAN
Problem: Safety  Goal: Will remain free from falls  Outcome: PROGRESSING AS EXPECTED  Reinforced the use of call light when needing assistance. Treaded socks on. Bed in lowest position. Personal belongings within reach. Clutter free environment provided.

## 2019-05-03 NOTE — DISCHARGE PLANNING
Anticipated Discharge Disposition: Not determined at this time.  Pending guardianship.    Action: This patient transferred from the Neuro unit to Medical Nephrology on 4/26/19.  He shares a room with his wife, Prisca Shaikh, who is confused and is also pending guardianship.    Barriers to Discharge: He will need to have a public guardian before he can go anywhere.    Plan: Will have to wait for guardianship, which can take a few months.

## 2019-05-03 NOTE — PROGRESS NOTES
Patien alert, oriented only to self, with difficulty making needs known. Patient attempted to try to get out of bed several times, bed alarm in place. Needs attended to.

## 2019-05-04 NOTE — CARE PLAN
Problem: Communication  Goal: The ability to communicate needs accurately and effectively will improve  Outcome: PROGRESSING AS EXPECTED  Pt is encouraged to voice feelings, therapeutic communication was utilized.     Problem: Safety  Goal: Will remain free from falls  Outcome: PROGRESSING AS EXPECTED  Fall precautions in place. Bed in lowest position. Non-skid socks in place. Personal possessions within reach. Mobility sign on door. Bed-alarm on. Call light within reach. Pt educated regarding fall prevention and states understanding.

## 2019-05-04 NOTE — CARE PLAN
Problem: Safety  Goal: Will remain free from falls  Outcome: PROGRESSING AS EXPECTED  Bed alarm in place. Bed in lowest position, treaded socks on, personal belongings and call light within reach, instructed to call for any assistance, hourly rounding in place.    Problem: Mobility  Goal: Risk for activity intolerance will decrease  Outcome: PROGRESSING AS EXPECTED  Ambulated to bathroom with 2 person assist. Pt tolerated well.

## 2019-05-04 NOTE — PROGRESS NOTES
Internal Medicine Interval Note  Note Author: Kimberlee Livingston M.D.     Name Wally Torres       1933   Age/Sex 85 y.o. male   MRN [id number]   Code Status DNAR/DNI     After 5PM or if no immediate response to page, please call for cross-coverage  Attending/Team: Dr. Noe/ CRISTI Cage See Patient List for primary contact information  Call [phone] to page    1st Call - Day Intern (R1):   Dr. Livingston 2nd Call - Day Sr. Resident (R2/R3):   Dr. Lisa         Reason for interval visit    Cognitive impairment      Interval Problem Daily Status Update     No acute overnight events, has labile BP, patient normally asymptomatic.   On heparin for DVT prophylaxis.   Last BM   Bowel protocol, pressure ulcer prevention protocol in place.  Pending guardianship. See SW note for details.       Review of Systems   Reason unable to perform ROS: Limited due to patient condition.       Disposition/Barriers to discharge:   Pending placement/ guardianship    Consultants/Specialty  Palliative care    Ethics  PCP: Halie Dominique M.D.      Quality Measures  Quality-Core Measures   Reviewed items::  Labs reviewed and Medications reviewed  Baldwin catheter::  No Baldwin  DVT prophylaxis pharmacological::  Heparin    Physical Exam       Vitals:    19 1852 19 1854 19 2140 19 0345   BP: (!) 73/47 (!) 78/47 109/45 147/75   Pulse:   60 60   Resp:   16 16   Temp:   37.7 °C (99.8 °F) 36.4 °C (97.5 °F)   TempSrc:   Temporal Temporal   SpO2:   96% 97%   Weight:       Height:         Body mass index is 22.76 kg/m².    Oxygen Therapy:  Pulse Oximetry: 97 %, O2 Delivery: None (Room Air)    Physical Exam   Constitutional: He appears malnourished.   Thin male laying comfortably in bed, breathing in room air   HENT:   Head: Normocephalic and atraumatic.   Eyes: Pupils are equal, round, and reactive to light. Conjunctivae are normal.   Arcus senilis present bilaterally   Neck: Normal range of  motion. Neck supple.   Cardiovascular: Normal rate, regular rhythm and normal heart sounds.    Pulmonary/Chest: Effort normal. No respiratory distress. He has no wheezes. He has no rales.   Abdominal: Soft. Bowel sounds are normal. He exhibits no distension.   Musculoskeletal: Normal range of motion. He exhibits no edema.   Diffuse muscle wasting   Neurological:   Oriented x 1   Poor speech  Poor memory  No focal signs   Skin: Skin is warm. No erythema.   Psychiatric:   Poor memory   Patient awake, and happy appearing.       Assessment/Plan     * Cognitive impairment- (present on admission)   Assessment & Plan    Patient and his wife are unable to care for themselves, progressive decline per wife.  Ethics and Palliative Care consulted, appreciate recommendations.   Coordinating safe discharge with , Palliative Care and Ethics Committee.  As no NOK found who can take responsibility, pending guardianship now.      Other constipation   Assessment & Plan    Put on bowel protocol      Multiple bruises   Assessment & Plan    Seen on admission, on knees and sternum, appeared as though patient had a recent fall.  CT head negative for acute bleed.  SW, Palliative Care and Ethics consulted for recommendations on safe discharge.   PT and OT, fall precautions.     Hypothyroidism- (present on admission)   Assessment & Plan    Continue home levothyroxine, TSH 0.54 on 3/21/19.     Cardiac pacemaker in situ- (present on admission)   Assessment & Plan    For sick sinus rhythm     SSS (sick sinus syndrome) (HCC)- (present on admission)   Assessment & Plan    Status post pacemaker. Stable     S/P TAVR (transcatheter aortic valve replacement)- (present on admission)   Assessment & Plan    Continue aspirin and statin.     Risk for falls- (present on admission)   Assessment & Plan    Pt appears to have fallen recently. Head CT negative for acute bleed.   Fall precautions, PT and OT consulted, appreciate recs.     Hypertension,  essential- (present on admission)   Assessment & Plan    Continue metoprolol, lisinopril     Dyslipidemia- (present on admission)   Assessment & Plan    Continue home statin      CAD (coronary artery disease)- (present on admission)   Assessment & Plan    Stable, history of 3V CABG with left internal mammary artery graft to LAD, saphenous vein graft to obtuse marginal branch and saphenous vein graft to posterior descending artery by Dr. Coates in March 2018.  Echo EF 65%, status post TAVR, RVSP 30 mmHg, no significant change since last TTE.  Pacemaker interrogated with normal device function.  Con't statin, ASA, metoprolol, and lisinopril.     Paroxysmal atrial fibrillation (CMS-HCC)- (present on admission)   Assessment & Plan    Stable. Xarelto discontinued by Cardiology in August 2018 due to high fall risk per chart review.  Continue metoprolol for rate control.     Dysphagia   Assessment & Plan    Found to have dysphagia, high risk aspiration.   Advanced directive specified no feeding tube   Speech following   1:1 feeding, tolerating well      Headache   Assessment & Plan    Resolved, Tylenol PRN  Encourage PO hydration  Fall precautions      BETTY (acute kidney injury) ([facility])   Assessment & Plan    RESOLVED       Age-related physical debility- (present on admission)   Assessment & Plan    Previously assessed by PT and OT, SNF recommended for further therapy needs.  PT and OT following for this admission, appreciate help.     Hx of CABG- (present on admission)   Assessment & Plan    Continue aspirin and statin.     Gout of multiple sites- (present on admission)   Assessment & Plan    Continue home allopurinol      PVD (peripheral vascular disease) (CMS-HCC)- (present on admission)   Assessment & Plan    History of prior L superficial femoral artery stent, known high grade stenosis of R common femoral artery closed with Starclose closure by Dr. Amaya on 03/22/17. Stable, no acute complains.

## 2019-05-04 NOTE — PROGRESS NOTES
Bedside report received. Pt is A&Ox1 to self only, pt was reoriented with situation. Pt is 2 person assist up to bathroom. Fall precautions in place bed alarm is on, bed is locked and in lowest position. POC discussed with pt; all questions answered at this time.

## 2019-05-05 NOTE — PROGRESS NOTES
Internal Medicine Interval Note  Note Author: Kimberlee Livingston M.D.     Name Wally Torres       1933   Age/Sex 85 y.o. male   MRN [id number]   Code Status DNAR/DNI     After 5PM or if no immediate response to page, please call for cross-coverage  Attending/Team: Dr. Noe/ CRISTI Cage See Patient List for primary contact information  Call [phone] to page    1st Call - Day Intern (R1):   Dr. Livingston 2nd Call - Day Sr. Resident (R2/R3):   Dr. Lisa         Reason for interval visit    Cognitive impairment      Interval Problem Daily Status Update       Rapid response this AM: Got paged at 08.12 AM for a rapid response. Per RN he was last seen sitting in the chair at his baseline around 6.45 AM this morning. Around 8AM charge RN found him slumped over in his chair, initially not responsive. Patient seen and examined at bed side by me and Dr. Lisa. He was not verbally communicating, not following command. Vitals stable, saturating 100% in RA (Had nasal cannula on, but it was not on his nostrils) Eyes were shut close with good strength, symmetric facial features, no droop noted. Pupils reactive to light. He was moving actively all his limbs, withdrawing from pain. Lungs sound clear.     Stat CT didn't show any bleeding/ acute process.   Labs : CBC/CMP/Ammonia/LA grossly normal. BUN 27  Last BM     IV fluid was started, he probably passed out from dehydration/ chronic deconditioning. No acute lab/imaging abnormality was found. Will continue IVF at this time and continue to monitor.     Update: Patient more awake now, nodding head and responding. Very weak and frail.     Bowel protocol, pressure ulcer prevention protocol in place.  Pending guardianship. See SW note for details.       Review of Systems   Reason unable to perform ROS: Limited due to patient condition.       Disposition/Barriers to discharge:   Pending placement/ guardianship    Consultants/Specialty  Palliative  [facility]  Ethics  PCP: Halie Dominique M.D.      Quality Measures  Quality-Core Measures   Reviewed items::  Labs reviewed and Medications reviewed  Baldwin catheter::  No Baldwin  DVT prophylaxis pharmacological::  Heparin    Physical Exam       Vitals:    05/05/19 0813 05/05/19 0813 05/05/19 0828 05/05/19 0834   BP: 136/54  100/60 132/55   Pulse: 60   63   Resp: 14   14   Temp:  36.1 °C (97 °F)     TempSrc:       SpO2: 99%   92%   Weight:       Height:         Body mass index is 22.23 kg/m². Weight: 46.6 kg (102 lb 11.8 oz)  Oxygen Therapy:  Pulse Oximetry: 92 %, O2 (LPM): 2, O2 Delivery: None (Room Air)    Physical Exam   Constitutional: He appears malnourished.   See Interval update   HENT:   Head: Normocephalic and atraumatic.   Eyes: Pupils are equal, round, and reactive to light. Conjunctivae are normal. Right eye exhibits no discharge. Left eye exhibits no discharge.   Arcus senilis present bilaterally   Neck: Normal range of motion. Neck supple.   Cardiovascular: Normal rate, regular rhythm and normal heart sounds.    Pulmonary/Chest: Effort normal. No respiratory distress. He has no wheezes. He has no rales.   Abdominal: Soft. Bowel sounds are normal. He exhibits no distension.   Musculoskeletal: Normal range of motion. He exhibits no edema.   Diffuse muscle wasting   Neurological:   See interval update   Skin: Skin is warm. No erythema.         Assessment/Plan     * Cognitive impairment- (present on admission)   Assessment & Plan    Patient and his wife are unable to care for themselves, progressive decline per wife.  Ethics and Palliative Care consulted, appreciate recommendations.   Coordinating safe discharge with , Palliative Care and Ethics Committee.  As no NOK found who can take responsibility, pending guardianship now.      Other constipation   Assessment & Plan    Put on bowel protocol      Multiple bruises   Assessment & Plan    Seen on admission, on knees and sternum, appeared as  though patient had a recent fall.  CT head negative for acute bleed.  SW, Palliative Care and Ethics consulted for recommendations on safe discharge.   PT and OT, fall precautions.     Hypothyroidism- (present on admission)   Assessment & Plan    Continue home levothyroxine, TSH 0.54 on 3/21/19.     Cardiac pacemaker in situ- (present on admission)   Assessment & Plan    For sick sinus rhythm     SSS (sick sinus syndrome) (HCC)- (present on admission)   Assessment & Plan    Status post pacemaker. Stable     S/P TAVR (transcatheter aortic valve replacement)- (present on admission)   Assessment & Plan    Continue aspirin and statin.     Risk for falls- (present on admission)   Assessment & Plan    Pt appears to have fallen recently. Head CT negative for acute bleed.   Fall precautions, PT and OT consulted, appreciate recs.     Hypertension, essential- (present on admission)   Assessment & Plan    Continue metoprolol, lisinopril     Dyslipidemia- (present on admission)   Assessment & Plan    Continue home statin      CAD (coronary artery disease)- (present on admission)   Assessment & Plan    Stable, history of 3V CABG with left internal mammary artery graft to LAD, saphenous vein graft to obtuse marginal branch and saphenous vein graft to posterior descending artery by Dr. Coates in March 2018.  Echo EF 65%, status post TAVR, RVSP 30 mmHg, no significant change since last TTE.  Pacemaker interrogated with normal device function.  Con't statin, ASA, metoprolol, and lisinopril.     Paroxysmal atrial fibrillation (CMS-HCC)- (present on admission)   Assessment & Plan    Stable. Xarelto discontinued by Cardiology in August 2018 due to high fall risk per chart review.  Continue metoprolol for rate control.     Dysphagia   Assessment & Plan    Found to have dysphagia, high risk aspiration.   Advanced directive specified no feeding tube   Speech following   1:1 feeding, tolerating well      Headache   Assessment & Plan     Resolved, Tylenol PRN  Encourage PO hydration  Fall precautions      BETTY (acute kidney injury) ([facility])   Assessment & Plan    RESOLVED       Age-related physical debility- (present on admission)   Assessment & Plan    Previously assessed by PT and OT, SNF recommended for further therapy needs.  PT and OT following for this admission, appreciate help.     Hx of CABG- (present on admission)   Assessment & Plan    Continue aspirin and statin.     Gout of multiple sites- (present on admission)   Assessment & Plan    Continue home allopurinol      PVD (peripheral vascular disease) (CMS-[facility])- (present on admission)   Assessment & Plan    History of prior L superficial femoral artery stent, known high grade stenosis of R common femoral artery closed with Starclose closure by Dr. Amaya on 03/22/17. Stable, no acute complains.

## 2019-05-05 NOTE — CARE PLAN
Problem: Safety  Goal: Will remain free from falls  Outcome: PROGRESSING AS EXPECTED  Bed alarm in place. Bed in lowest position, treaded socks on, personal belongings and call light within reach, instructed to call for any assistance, hourly rounding in place.    Problem: Venous Thromboembolism (VTW)/Deep Vein Thrombosis (DVT) Prevention:  Goal: Patient will participate in Venous Thrombosis (VTE)/Deep Vein Thrombosis (DVT)Prevention Measures  Outcome: PROGRESSING AS EXPECTED  Heparin in place and administered.

## 2019-05-05 NOTE — CODE DOCUMENTATION
RR for patient who was sitting up in chair and then found to be slumped over in chair unresponsive

## 2019-05-05 NOTE — PROGRESS NOTES
Assumed care of pt at shift change.  Pt was sitting on chair. A & O x1. And not talking most of time. Pt on room air. Pt stood up without calling. Assisted pt back to bed.  Safety education provided to both pt and wife. Reinforcement needed. Pt wife is at bedside all the time talking with pt and adjusting bed covers for pt constantly.  All needs met at this time. Bed in lowest position, treaded socks on, personal belongings and call light within reach, instructed to call for any assistance, hourly rounding in place.

## 2019-05-05 NOTE — CODE DOCUMENTATION
Code stroke initially called, then Dr. Lisa decided to cancel code stroke and only get CT of head w/o.

## 2019-05-05 NOTE — PROGRESS NOTES
Received bedside report and accepted care of patient. Pt was alert and sitting up in chair during beginning of shift. By 0800 pt was found by charge RN slumped over in chair next to bed, pt was transferred to bed, pt was not responsive to sternal rub. Pt was barely breathing and unable to open eyes.VS were taken. /54 Pulse 60, Respiratory rate 14, temp was 97 F, and SPO2% was 99% on 2 L O2 nasal cannula. Rapid response team was called, Dr. Lisa with [facility] rose was called.  Ordered head CT scan stat, Labs were ordered stat. CBC, CMP, Lactic acid, PTT/INR, and amonia were ordered stat. Continuous IVF Normal saline is now running at 125 ml/hr. Pt is resting in bed, still not alert. Wife delgado natarajan is at bedside.

## 2019-05-05 NOTE — PROGRESS NOTES
Dropped off oral care supplies to patient, patient was sitting in the chair and responded by looking and nodding to this writer after new supplies were dropped off. Patient then found by Charge Nurse slumped over in chair and non-responsive. Assisted RN's in getting patient back to bed.

## 2019-05-05 NOTE — CARE PLAN
Problem: Safety  Goal: Will remain free from falls  Outcome: PROGRESSING AS EXPECTED  Fall precautions in place. Bed in lowest position. Non-skid socks in place. Personal possessions within reach. Mobility sign on door. Bed-alarm on. Call light within reach.    Problem: Urinary Elimination:  Goal: Ability to reestablish a normal urinary elimination pattern will improve  Outcome: PROGRESSING AS EXPECTED

## 2019-05-06 PROBLEM — N17.9 AKI (ACUTE KIDNEY INJURY) (HCC): Status: RESOLVED | Noted: 2019-01-01 | Resolved: 2019-01-01

## 2019-05-06 PROBLEM — R51.9 HEADACHE: Status: RESOLVED | Noted: 2019-01-01 | Resolved: 2019-01-01

## 2019-05-06 NOTE — PROGRESS NOTES
Some eating, feeding changes noted in pt today. Pt with intermittent upper respiratory sounds. Discussed pt's care with resident today. Suctioned today of minimal oral secretions. Fed x 2 today and pt allowing food to fall from his mouth and some pocketing noting. Pt some coughing noted today w/ feeding. Called resident, discussed pt's care. Keeping NPO, swallow eval, ordered. Discussed pt's care with Speech Therapist Eliel.

## 2019-05-06 NOTE — PROGRESS NOTES
Received care of pt from NOC RN this am. Pt does not verbalize for this RN this am. Pt intermittently moan in bed, pulling  cord and O2 tubing between his hands, or rolling toward R bed rails. @ BS with Dr. Vivas and [facility] resident this morning. Pt's O2 sat 96% on RA. Pt intermittently follows directions this morning for staff and physicians. Dr. Vivas says it's OK to leave PIV out of pt. Discussed pt's care with resident and attending. Physicians leave and pt can be calmed for a while with voice and touch. Cords and tubing removed from pt's reach.

## 2019-05-06 NOTE — CARE PLAN
Problem: Safety  Goal: Will remain free from falls  Outcome: PROGRESSING AS EXPECTED  BED & CHAIR ALARMS ON, IN PLACE AND NOTED TO BE WORKING FOR PATIENT'S SAFETY.       Problem: Mobility  Goal: Risk for activity intolerance will decrease  Outcome: PROGRESSING AS EXPECTED  Pt up to chair today for breakfast, 1:1 feed w/ CNA.

## 2019-05-06 NOTE — CARE PLAN
Problem: Safety  Goal: Will remain free from falls  Outcome: PROGRESSING AS EXPECTED  Bed alarm in place. Bed in lowest position, treaded socks on, personal belongings and call light within reach, instructed to call for any assistance, hourly rounding in place.    Problem: Psychosocial Needs:  Goal: Level of anxiety will decrease  Outcome: PROGRESSING AS EXPECTED  Educated wife to let pt rest and not waking him up all the time

## 2019-05-06 NOTE — PROGRESS NOTES
machine applied. Upper diminished upper lung sound, slight basilar crackles noted. Chest physiotherapy performed. Educated pt to have deep breathing and cough. Suction provided. Hot tea provided. Talked to pt in chinese. Pt is alert and oriented x1. Wane off oxygen. Pt  Oxygen is 99% on room air. Stopped heavy breathing. Respiration is 16. Pt is sleeping on bed currently.

## 2019-05-06 NOTE — PROGRESS NOTES
Pt back in bed, resting. Eyes closed, resps even. No s/s of distress. Passed care to RANDY Walter.

## 2019-05-06 NOTE — DISCHARGE PLANNING
Medical Social Work  SW forwarded Physician's Certificate with Needs Assessment to Jeannette Juarez's Office

## 2019-05-06 NOTE — PROGRESS NOTES
"       Internal Medicine Interval Note  Note Author: Lawrence Grove D.O.     Name Wally Torres       1933   Age/Sex 85 y.o. male   MRN [id number]   Code Status DNAR/DNI     After 5PM or if no immediate response to page, please call for cross-coverage  Attending/Team: Dr. Vivsa / CRISTI Cage See Patient List for primary contact information  Call [phone] to page    1st Call - Day Intern (R1):   Dr. Grove 2nd Call - Day Sr. Resident (R2/R3):   Dr. Lisa         Reason for interval visit    Cognitive impairment      Interval Problem Daily Status Update       Patient has been more responsive. Nursing staff noted that patient was \"breathing differently\" last night and gave him an oxygen mask for support. He denies any cough or shortness of breath. Wife reports that patient intermittently acts like this.       Review of Systems   Reason unable to perform ROS: Limited due to patient condition.       Disposition/Barriers to discharge:   Pending placement/ guardianship    Consultants/Specialty  Palliative care    Ethics  PCP: aHlie Dominique M.D.      Quality Measures  Quality-Core Measures   Reviewed items::  Labs reviewed and Medications reviewed  Baldwin catheter::  No Baldwin  DVT prophylaxis pharmacological::  Heparin    Physical Exam       Vitals:    19 2000 19 0309 19 0740 19 0741   BP: 136/56 (!) 164/83 144/67    Pulse: 61 70 60    Resp: 17 14 15    Temp: 36.6 °C (97.8 °F) 36.9 °C (98.4 °F) 36.5 °C (97.7 °F)    TempSrc: Temporal Temporal Temporal    SpO2: 99% 99% 100% 96%   Weight:       Height:         Body mass index is 22.23 kg/m².    Oxygen Therapy:  Pulse Oximetry: 96 %, O2 (LPM): 2, O2 Delivery: None (Room Air)    Physical Exam   Constitutional: He appears malnourished. No distress.   Elderly  male sitting in bed in no acute distress.    HENT:   Head: Normocephalic and atraumatic.   Mouth/Throat: No oropharyngeal exudate.   Dry nares   Eyes: EOM are " normal. No scleral icterus.   Arcus senilis present bilaterally   Neck: Normal range of motion.   Cardiovascular: Normal rate, regular rhythm and normal heart sounds.    No murmur heard.  Pulmonary/Chest: Effort normal. No respiratory distress. He has no wheezes. He has no rales.   Abdominal: Soft. Bowel sounds are normal. He exhibits no distension. There is no tenderness. There is no rebound and no guarding.   Musculoskeletal: Normal range of motion. He exhibits no edema.   Diffuse muscle wasting   Neurological: He is alert. He exhibits normal muscle tone. Coordination normal.   Skin: Skin is warm. He is not diaphoretic. No erythema.         Assessment/Plan     * Cognitive impairment- (present on admission)   Assessment & Plan    - Unable to care for self, progressive decline per wife   - Ethics and Palliative Care consulted, appreciate recommendations.   - Pending guardianship      Other constipation   Assessment & Plan    - on bowel protocol      Multiple bruises   Assessment & Plan    - Seen on admission, on knees and sternum, appeared as though patient had a recent fall.  - PT and OT, fall precautions.     Hypothyroidism- (present on admission)   Assessment & Plan    - TSH 0.54 on 3/21/19  - Continue home levothyroxine     Cardiac pacemaker in situ- (present on admission)   Assessment & Plan    For sick sinus rhythm     SSS (sick sinus syndrome) (HCC)- (present on admission)   Assessment & Plan    - Status post pacemaker. 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     Risk for falls- (present on admission)   Assessment & Plan    - Fall precautions, PT and OT consulted, appreciate recs.     Hypertension, essential- (present on admission)   Assessment & Plan    - Continue metoprolol, lisinopril     Dyslipidemia- (present on admission)   Assessment & Plan    - Continue home statin      CAD (coronary artery disease)-  (present on admission)   Assessment & Plan    History of 3V CABG with left internal mammary artery graft to LAD, saphenous vein graft to obtuse marginal branch and saphenous vein graft to posterior descending artery by Dr. Coates in March 2018.  - Echo EF 65%, status post TAVR, RVSP 30 mmHg  - Con't statin, ASA, metoprolol, and lisinopril.     Paroxysmal atrial fibrillation (CMS-HCC)- (present on admission)   Assessment & Plan    - Stable. Xarelto discontinued by Cardiology in August 2018 due to high fall risk per chart review.  - Continue Metoprolol     Dysphagia   Assessment & Plan    - Found to have dysphagia, high risk aspiration.   - Advanced directive specified no feeding tube   - Speech following   - 1:1 feeding, tolerating well      Age-related physical debility- (present on admission)   Assessment & Plan    - Previously assessed by PT and OT, SNF recommended for further therapy needs.  - PT and OT following for this admission, appreciate help.     Hx of CABG- (present on admission)   Assessment & Plan    - Continue aspirin and statin.     Gout of multiple sites- (present on admission)   Assessment & Plan    - Continue home allopurinol      PVD (peripheral vascular disease) (CMS-HCC)- (present on admission)   Assessment & Plan    - History of prior L superficial femoral artery stent, known high grade stenosis of R common femoral artery closed with Starclose closure by Dr. Amaya on 03/22/17. Stable, no acute complains.

## 2019-05-06 NOTE — PROGRESS NOTES
Pt wife stated that pt is having trouble breathing. Pt is breathing heavily. Oxygen is in the 80s. Oxymask applied. Pt is currently on 2 liter oxygen with oxygen 100%. BP is 164/83. On call physician [facility] paged. Updates given to physician. No new orders for now.

## 2019-05-06 NOTE — PROGRESS NOTES
Assumed care of pt at shift change. Pt was sleeping on bed. A & O x1. And not talking most of time. Pt on room air. Pushed fluid intake.   Pt is responsive and took bed time med without difficulty.  All needs met at this time. Bed in lowest position, treaded socks on, personal belongings and call light within reach, instructed to call for any assistance, hourly rounding in place.

## 2019-05-06 NOTE — THERAPY
"Speech Language Therapy dysphagia treatment completed.     Functional Status:  SLP has not actively been following pt as he is eating despite risk for aspiration, however RN paged SLP input as he was pocketing his food, spitting it out and had wet vocal quality this morning per RN report.  Pt was asleep upon arrival but woke easily to verbal cues, and was moved to his chair by CNA for meal with wife present in the room. The patient was presented with a dysphagia I, NTL meal tray (current diet despite risk).  He was very impulsive with self feeding, requiring max verbal cueing and hand over hand assistance to slow rate of feeding and take single bites and sips.  Pt consumed purees without any overt s/sx of aspiration.  Pt presented with throat clearing following 2 oz of nectars, which can be concerning for possible penetration or aspiration.  Pt able to improve vocal quality with max cueing to clear his throat.  NO pocketing or anterior spillage of bolus was noted at all during session, however he was provided with strict pacing during entire meal.  However, given results of FEES done on 3/28/19, indicating \"mesfin silent aspiration\", as well as intermittent s/sx of aspiration at bedside, pt continues to remain at HIGH risk for aspiration.  Recommend NPO with supplemental source of nutrition as the safest diet to prevent aspiration.  However, given POLST on file states \"no feeding tubes\" would recommend continuation of safest oral diet of D1/NTL to reduce, not prevent aspiration.  SLP will continue to follow once a week to ensure follow through with swallow strategies and for education.      Recommendations: 1) NPO with supplemental source of nutrition as the safest diet to prevent aspiration. 2)  However, given POLST on file states \"no feeding tubes\" would recommend continuation of safest oral diet of D1/NTL to reduce, not prevent aspiration     Plan of Care: SLP will continue to follow once a week to ensure follow " "through with swallow strategies and for education.      Post-Acute Therapy: Recommend inpatient transitional care services     See \"Rehab Therapy-Acute\" Patient Summary Report for complete documentation.     "

## 2019-05-06 NOTE — PROGRESS NOTES
Pt pulled out PIV in right forearm. Juani RN attempted to insert new PIV twice. Pt will need ultrasound guided PIV. Continuous IV fluids were stopped, charge RN is aware.

## 2019-05-07 NOTE — CARE PLAN
Problem: Safety  Goal: Will remain free from falls  Outcome: NOT MET    Intervention: Implement fall precautions  Call light and personal belongings within reach. non skid socks on. Strip alarm in place. Bed in lowest position,monitor patient frequently for safety.

## 2019-05-07 NOTE — PROGRESS NOTES
"Received care of pt from NOC RN this am. Pt says \"hello\" to RN this am. Speech is somewhat difficult to understand. Pt frequent moving himself round in bed this am. He is otherwise calm. Resident in this am to see pt. Discussed pt's care with resident.     "

## 2019-05-07 NOTE — PROGRESS NOTES
Patient been impulsive,tried to get out from bed multiple times,took patient to the bathroom multiple times,confused and unable to follow commands despite education,got up the patient in the chair to the nursing station with a coloring book,patient is calm with a chair alarm.

## 2019-05-07 NOTE — CARE PLAN
Problem: Safety  Goal: Will remain free from falls  Outcome: PROGRESSING AS EXPECTED  Strip bed and chair alarms are on, functional and in place for pt's safety today.     Problem: Mobility  Goal: Risk for activity intolerance will decrease  Outcome: PROGRESSING AS EXPECTED  Up to chair for breakfast this am. Eats w/o problems per CNA.  Also, noted to turn and reposition self in bed frequently.

## 2019-05-08 NOTE — CARE PLAN
Problem: Safety  Goal: Will remain free from falls  Outcome: PROGRESSING SLOWER THAN EXPECTED    Intervention: Implement fall precautions  Call light and personal belongings within reach. Pt instructed to call for assistance, pt verbalizes understanding. Non skid socks on. Strip alarm in place. Bed in lowest position.

## 2019-05-08 NOTE — PROGRESS NOTES
Assumed care of pt this am. Pt is A&O 1-2 (disoriented to time and event). Pt denies pain. Pt is in room near nursing station, Wife and pt educated to call for staff help when he would like to ambulate or eat, both verbalize understanding. Hourly rounding in place.

## 2019-05-08 NOTE — PROGRESS NOTES
· 2 RN skin check complete with RANDY Blanco.  · Devices in place N/A.  · Skin assessed under devices N/A.  · Confirmed pressure ulcers found on N/A.  · New potential pressure ulcers noted on N/A. Wound consult placed and wound reported.  · The following interventions in place: waffle overlay, pt up to chair for meals, frequent checks for incontinence. Pt turns self.      Pt had the following:    Sacrum is pink/purple and blanching  Generalized scabbing to bilateral shins  Small generalized scabbing to top of R foot  Bilateral heels are pink and blanching

## 2019-05-08 NOTE — PROGRESS NOTES
Internal Medicine Interval Note  Note Author: Lawrence Grove D.O.     Name Wally Torres       1933   Age/Sex 85 y.o. male   MRN [id number]   Code Status DNAR/DNI     After 5PM or if no immediate response to page, please call for cross-coverage  Attending/Team: Dr. Vivas / CRISTI Cage See Patient List for primary contact information  Call [phone] to page    1st Call - Day Intern (R1):   Dr. Grove 2nd Call - Day Sr. Resident (R2/R3):   Dr. Lisa         Reason for interval visit    Cognitive impairment      Interval Problem Daily Status Update   Patient seen resting comfortably in bed this morning.     Review of Systems   Reason unable to perform ROS: Limited due to patient condition.       Disposition/Barriers to discharge:   Pending placement/ guardianship    Consultants/Specialty  Palliative care    Ethics  PCP: Halie Dominique M.D.      Quality Measures  Quality-Core Measures   Reviewed items::  Medications reviewed  Baldwin catheter::  No Baldwin  DVT prophylaxis pharmacological::  Heparin    Physical Exam       Vitals:    19 1516 19 1940 19 0530 19 0815   BP: (!) 97/61 118/56 148/69 157/70   Pulse: 68 64 60 60   Resp: 16 16 16 16   Temp: 36.3 °C (97.4 °F) 36.3 °C (97.4 °F) 36.1 °C (97 °F) 36.1 °C (97 °F)   TempSrc: Temporal Temporal Temporal Temporal   SpO2: 98% 94% 99% 99%   Weight:       Height:         Body mass index is 22.23 kg/m².    Oxygen Therapy:  Pulse Oximetry: 99 %, O2 (LPM): 0, O2 Delivery: None (Room Air)    Physical Exam   Constitutional: He appears malnourished. No distress.   Elderly  male sleeping in bed   HENT:   Head: Normocephalic and atraumatic.   Eyes:   Sleeping   Pulmonary/Chest: Effort normal.   Symmetric chest expansion on room air   Abdominal: He exhibits no distension.   Musculoskeletal: Normal range of motion. He exhibits no edema.   Diffuse muscle wasting   Skin: Skin is warm. He is not diaphoretic.          Assessment/Plan     * Cognitive impairment- (present on admission)   Assessment & Plan    - Unable to care for self, progressive decline per wife   - Ethics and Palliative Care consulted, appreciate recommendations.   - Pending guardianship      Other constipation   Assessment & Plan    - on bowel protocol      Multiple bruises   Assessment & Plan    - Seen on admission, on knees and sternum, appeared as though patient had a recent fall.  - PT and OT, fall precautions.     Hypothyroidism- (present on admission)   Assessment & Plan    - TSH 0.54 on 3/21/19  - Continue home levothyroxine     Cardiac pacemaker in situ- (present on admission)   Assessment & Plan    For sick sinus rhythm     SSS (sick sinus syndrome) (HCC)- (present on admission)   Assessment & Plan    - Status post pacemaker. 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     Risk for falls- (present on admission)   Assessment & Plan    - Fall precautions, PT and OT consulted, appreciate recs.     Hypertension, essential- (present on admission)   Assessment & Plan    - Continue metoprolol, lisinopril     Dyslipidemia- (present on admission)   Assessment & Plan    - Continue home statin      CAD (coronary artery disease)- (present on admission)   Assessment & Plan    History of 3V CABG with left internal mammary artery graft to LAD, saphenous vein graft to obtuse marginal branch and saphenous vein graft to posterior descending artery by Dr. Coates in March 2018.  - Echo EF 65%, status post TAVR, RVSP 30 mmHg  - Con't statin, ASA, metoprolol, and lisinopril.     Paroxysmal atrial fibrillation (CMS-HCC)- (present on admission)   Assessment & Plan    - Stable. Xarelto discontinued by Cardiology in August 2018 due to high fall risk per chart review.  - Continue Metoprolol     Dysphagia   Assessment & Plan    - Found to have dysphagia, high risk aspiration.   -  Advanced directive specified no feeding tube   - Speech on board  - 1:1 feeding, tolerating well      Age-related physical debility- (present on admission)   Assessment & Plan    - Previously assessed by PT and OT, SNF recommended for further therapy needs.  - PT and OT following for this admission, appreciate help.     Hx of CABG- (present on admission)   Assessment & Plan    - Continue aspirin and statin.     Gout of multiple sites- (present on admission)   Assessment & Plan    - Continue home allopurinol      PVD (peripheral vascular disease) (CMS-[facility])- (present on admission)   Assessment & Plan    - History of prior L superficial femoral artery stent, known high grade stenosis of R common femoral artery closed with Starclose closure by Dr. Amaya on 03/22/17. Stable, no acute complains.

## 2019-05-09 NOTE — PROGRESS NOTES
Received report and assumed pt care at 1900 change of shift.  Pt A&Ox2 (disoriented to time and event), on room air with no signs or complaints of pain.  Pt frequently tries to get out of bed and states that he has to go to the bathroom; however, no urine nor BM production for most of the toileting instances.  Pt does not use call light and tries to get out of bed and pt's in room wife tries to assist him.  Pt's wife has been instructed to use the call light to request staff to assist pt.        Bed strip alarm on, bed frame alarm on, pt wearing non skid treaded socks, environment uncluttered, bed in lowest and locked position, bilat upper bed rails up and the use of call light when needing assistance was reinforced.  Pt room next to nurses' station.

## 2019-05-09 NOTE — PROGRESS NOTES
Internal Medicine Interval Note  Note Author: Lawrence Grove D.O.     Name Wally Torres       1933   Age/Sex 85 y.o. male   MRN [id number]   Code Status DNAR/DNI     After 5PM or if no immediate response to page, please call for cross-coverage  Attending/Team: Dr. Vivas / CRISTI Cage See Patient List for primary contact information  Call [phone] to page    1st Call - Day Intern (R1):   Dr. Grove 2nd Call - Day Sr. Resident (R2/R3):   Dr. Lisa         Reason for interval visit    Cognitive impairment      Interval Problem Daily Status Update   Patient examined this morning while he was sleeping. He was easily arousable. He denies any concerns, cough or shorntess of breath.     Review of Systems   Reason unable to perform ROS: Limited due to patient condition.   Constitutional: Negative for chills and fever.   Respiratory: Negative for cough and shortness of breath.    Cardiovascular: Negative for chest pain and leg swelling.       Disposition/Barriers to discharge:   Pending placement/ guardianship    Consultants/Specialty  Palliative care    Ethics  PCP: Halie Dominique M.D.      Quality Measures  Quality-Core Measures   Reviewed items::  Medications reviewed  Baldwin catheter::  No Baldwin  DVT prophylaxis pharmacological::  Heparin    Physical Exam       Vitals:    19 1513 19 1930 19 0420 19 0744   BP: 111/59 128/47 139/60 (!) 162/70   Pulse: 80 67 69 68   Resp: 16 16 16 14   Temp: 36.1 °C (97 °F) 36.1 °C (97 °F) 36.3 °C (97.3 °F) 36.3 °C (97.4 °F)   TempSrc: Temporal Temporal Temporal Temporal   SpO2: 99% 100% 99% 96%   Weight:       Height:         Body mass index is 22.23 kg/m².    Oxygen Therapy:  Pulse Oximetry: 96 %, O2 (LPM): 0, O2 Delivery: None (Room Air)    Physical Exam   Constitutional: He appears malnourished. No distress.   Elderly  male sleeping in bed, arousable today   HENT:   Head: Normocephalic and atraumatic.   Eyes: EOM are  normal. No scleral icterus.   Neck: Normal range of motion.   Cardiovascular: Normal rate, regular rhythm and intact distal pulses.    No murmur heard.  Scar from CABG, Pacemaker in left anterior chest   Pulmonary/Chest: Effort normal and breath sounds normal. No respiratory distress. He has no wheezes. He has no rales.   Abdominal: Soft. Bowel sounds are normal. He exhibits no distension. There is no tenderness.   Scaphoid   Musculoskeletal: Normal range of motion. He exhibits no edema.   Diffuse muscle wasting   Neurological: He is alert.   Replied to my questions in coherent manner   Skin: Skin is warm. He is not diaphoretic.         Assessment/Plan     * Cognitive impairment- (present on admission)   Assessment & Plan    - Unable to care for self, progressive decline per wife   - Ethics and Palliative Care consulted, appreciate recommendations.   - Pending guardianship      Other constipation   Assessment & Plan    - on bowel protocol      Multiple bruises   Assessment & Plan    - Seen on admission, on knees and sternum, appeared as though patient had a recent fall.  - PT and OT, fall precautions.     Hypothyroidism- (present on admission)   Assessment & Plan    - TSH 0.54 on 3/21/19  - Continue home levothyroxine     Cardiac pacemaker in situ- (present on admission)   Assessment & Plan    For sick sinus rhythm     SSS (sick sinus syndrome) (HCC)- (present on admission)   Assessment & Plan    - Status post pacemaker. 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     Risk for falls- (present on admission)   Assessment & Plan    - Fall precautions, PT and OT consulted, appreciate recs.     Hypertension, essential- (present on admission)   Assessment & Plan    - Continue metoprolol, lisinopril     Dyslipidemia- (present on admission)   Assessment & Plan    - Continue home statin      CAD (coronary artery disease)- (present  on admission)   Assessment & Plan    History of 3V CABG with left internal mammary artery graft to LAD, saphenous vein graft to obtuse marginal branch and saphenous vein graft to posterior descending artery by Dr. Coates in March 2018.  - Echo EF 65%, status post TAVR, RVSP 30 mmHg  - Con't statin, ASA, metoprolol, and lisinopril.     Paroxysmal atrial fibrillation (CMS-HCC)- (present on admission)   Assessment & Plan    - Stable. Xarelto discontinued by Cardiology in August 2018 due to high fall risk per chart review.  - Continue Metoprolol     Dysphagia   Assessment & Plan    - Found to have dysphagia, high risk aspiration.   - Advanced directive specified no feeding tube   - Speech on board  - 1:1 feeding, tolerating well      Age-related physical debility- (present on admission)   Assessment & Plan    - Previously assessed by PT and OT, SNF recommended for further therapy needs.  - PT and OT following for this admission, appreciate help.     Hx of CABG- (present on admission)   Assessment & Plan    - Continue aspirin and statin.     Gout of multiple sites- (present on admission)   Assessment & Plan    - Continue home allopurinol      PVD (peripheral vascular disease) (CMS-HCC)- (present on admission)   Assessment & Plan    - History of prior L superficial femoral artery stent, known high grade stenosis of R common femoral artery closed with Starclose closure by Dr. Amaya on 03/22/17. Stable, no acute complains.

## 2019-05-09 NOTE — PROGRESS NOTES
· 2 RN skin check complete with RANDY Luna.  · Devices in place: N/A.  · Skin assessed under devices: N/A.  · Confirmed pressure ulcers found on: N/A.  · New potential pressure ulcers noted on: N/A.    · The following interventions in place: waffle mattress overlay, incontinence monitoring and linens kept clean and dry. Pt self turns in bed.        Dry, scaly and reddened area to mid back of head.  Scab to left distal outer knee.

## 2019-05-09 NOTE — CARE PLAN
Problem: Safety  Goal: Will remain free from falls    Intervention: Implement fall precautions  Reorient/remind pt and pt's in room wife to use the call light if pt needs to get out of bed.  Pt needs frequent reminders as tries to get up by self.  Bed alarm on and active.      Problem: Urinary Elimination:  Goal: Ability to reestablish a normal urinary elimination pattern will improve    Intervention: Assist patient to sit on commode or toilet for voiding  Pt ambulates with walker and one person assistance to toilet frequently but most of the time does not produce urine nor BM.

## 2019-05-09 NOTE — PROGRESS NOTES
Assumed care of pt this am. No complaints of pain. A&O to self and place. Bed alarm in use. Hourly rounding in place. Pt wife educated on calling for assistance when pt needs to ambulate.

## 2019-05-10 NOTE — PROGRESS NOTES
Internal Medicine Interval Note  Note Author: Lawrence Grove D.O.     Name Wally Torres       1933   Age/Sex 85 y.o. male   MRN [id number]   Code Status DNAR/DNI     After 5PM or if no immediate response to page, please call for cross-coverage  Attending/Team: Dr. Vivas / CRISTI Cage See Patient List for primary contact information  Call [phone] to page    1st Call - Day Intern (R1):   Dr. Grove 2nd Call - Day Sr. Resident (R2/R3):   Dr. Lisa         Reason for interval visit    Cognitive impairment      Interval Problem Daily Status Update   Patient examined this morning while eating breakfast. He states he feels well.     Review of Systems   Reason unable to perform ROS: Limited due to patient condition.   Constitutional: Negative for chills and fever.   Respiratory: Negative for cough and shortness of breath.    Cardiovascular: Negative for chest pain and leg swelling.   Gastrointestinal: Negative for nausea and vomiting.       Disposition/Barriers to discharge:   Pending placement/ guardianship    Consultants/Specialty  Palliative care    Ethics  PCP: Halie Dominique M.D.      Quality Measures  Quality-Core Measures   Reviewed items::  Medications reviewed  Baldwin catheter::  No Baldwin  DVT prophylaxis pharmacological::  Heparin    Physical Exam       Vitals:    19 0744 19 1600 19 1900 05/10/19 0310   BP: (!) 162/70 116/49 125/48 160/97   Pulse: 68 60 61 67   Resp: 14 14 12 12   Temp: 36.3 °C (97.4 °F) 36.3 °C (97.4 °F) 36.4 °C (97.6 °F) 36.6 °C (97.9 °F)   TempSrc: Temporal Temporal Oral Temporal   SpO2: 96% 96% 99% 96%   Weight:       Height:         Body mass index is 22.23 kg/m².    Oxygen Therapy:  Pulse Oximetry: 96 %, O2 (LPM): 0, O2 Delivery: None (Room Air)    Physical Exam   Constitutional: He appears malnourished. No distress.   Elderly  male sitting in bed eating breakfast    HENT:   Head: Normocephalic and atraumatic.   Eyes: EOM are  normal. No scleral icterus.   Neck: Normal range of motion.   Cardiovascular: Normal rate, regular rhythm and intact distal pulses.    No murmur heard.  Scar from CABG, Pacemaker in left anterior chest   Pulmonary/Chest: Effort normal and breath sounds normal. No respiratory distress. He has no wheezes. He has no rales.   Abdominal: Soft. Bowel sounds are normal. He exhibits no distension. There is no tenderness.   Scaphoid   Musculoskeletal: Normal range of motion. He exhibits no edema.   Diffuse muscle wasting   Neurological: He is alert.   Replied to my questions in coherent manner   Skin: Skin is warm. He is not diaphoretic.         Assessment/Plan     * Cognitive impairment- (present on admission)   Assessment & Plan    - Unable to care for self, progressive decline per wife   - Ethics and Palliative Care consulted, appreciate recommendations.   - Pending guardianship      Other constipation   Assessment & Plan    - on bowel protocol      Multiple bruises   Assessment & Plan    - Seen on admission, on knees and sternum, appeared as though patient had a recent fall.  - PT and OT, fall precautions.     Hypothyroidism- (present on admission)   Assessment & Plan    - TSH 0.54 on 3/21/19  - Continue home levothyroxine     Cardiac pacemaker in situ- (present on admission)   Assessment & Plan    For sick sinus rhythm     SSS (sick sinus syndrome) (HCC)- (present on admission)   Assessment & Plan    - Status post pacemaker. 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     Risk for falls- (present on admission)   Assessment & Plan    - Fall precautions, PT and OT consulted, appreciate recs.     Hypertension, essential- (present on admission)   Assessment & Plan    - Continue metoprolol, lisinopril     Dyslipidemia- (present on admission)   Assessment & Plan    - Continue home statin      CAD (coronary artery disease)- (present  on admission)   Assessment & Plan    History of 3V CABG with left internal mammary artery graft to LAD, saphenous vein graft to obtuse marginal branch and saphenous vein graft to posterior descending artery by Dr. Coates in March 2018.  - Echo EF 65%, status post TAVR, RVSP 30 mmHg  - Con't statin, ASA, metoprolol, and lisinopril.     Paroxysmal atrial fibrillation (CMS-HCC)- (present on admission)   Assessment & Plan    - Stable. Xarelto discontinued by Cardiology in August 2018 due to high fall risk per chart review.  - Continue Metoprolol     Dysphagia   Assessment & Plan    - Found to have dysphagia, high risk aspiration.   - Advanced directive specified no feeding tube   - Speech on board  - 1:1 feeding, tolerating well      Age-related physical debility- (present on admission)   Assessment & Plan    - Previously assessed by PT and OT, SNF recommended for further therapy needs.  - PT and OT following for this admission, appreciate help.     Hx of CABG- (present on admission)   Assessment & Plan    - Continue aspirin and statin.     Gout of multiple sites- (present on admission)   Assessment & Plan    - Continue home allopurinol      PVD (peripheral vascular disease) (CMS-HCC)- (present on admission)   Assessment & Plan    - History of prior L superficial femoral artery stent, known high grade stenosis of R common femoral artery closed with Starclose closure by Dr. Amaya on 03/22/17. Stable, no acute complains.

## 2019-05-10 NOTE — CARE PLAN
Problem: Communication  Goal: The ability to communicate needs accurately and effectively will improve    Intervention: [facility] patient and significant other/support system to call light to alert staff of needs  Pt educated on the importance of calling for assistance. Reinforcement need the pt and his wife attempt to get him up without calling for help.       Problem: Bowel/Gastric:  Goal: Will not experience complications related to bowel motility    Intervention: Assess baseline bowel pattern  Per nightshift report the pt had a bowel movement last night.

## 2019-05-10 NOTE — PROGRESS NOTES
Pt A&Ox2 (disoriented to time and event), on room air with no signs or complaints of pain.  After wife attended to caring for pt, assessment was conducted and pt was found to have a fabricated diaper made out of absorbent pads tied together with tightly stretched coban around pt's waist and hips; restrictive fabrication removed from pt and wife educated about circulation and incontinence contact with skin.  Pt's wife instructed to use the call light to request staff to assist and change pt.         Bed strip alarm on, bed frame alarm on, pt wearing non skid treaded socks, environment uncluttered, bed in lowest and locked position, bilateral upper bed rails up and the use of call light when needing assistance was reinforced.  Pt room next to nurses' station

## 2019-05-10 NOTE — CARE PLAN
Problem: Safety  Goal: Will remain free from falls    Intervention: Implement fall precautions  Monitor pt and pt's wife's care interventions frequently for safety.  Reinforce the use of the call light.  Bed in lowest and locked position, pt wearing non skid socks, call light and personal belongings within reach and bed strip alarm in place as well as bed frame alarm.      Problem: Venous Thromboembolism (VTW)/Deep Vein Thrombosis (DVT) Prevention:  Goal: Patient will participate in Venous Thrombosis (VTE)/Deep Vein Thrombosis (DVT)Prevention Measures  Administer Heparin as ordered per MAR for VTE prevention.

## 2019-05-11 NOTE — PROGRESS NOTES
Pt A&Ox2 (disoriented to time and event), on room air with no signs or complaints of pain.  Pt used call light to request to ambulate to bathroom.  Pt ambulates frequently to bathroom with one person assist and walker and most of the time pt does not produce any output.         Bed strip alarm on, bed frame alarm on, pt wearing non skid treaded socks, environment uncluttered, bed in lowest and locked position, bilateral upper bed rails up and the use of call light when needing assistance was reinforced.  Pt room next to nurses' station

## 2019-05-11 NOTE — CARE PLAN
Problem: Communication  Goal: The ability to communicate needs accurately and effectively will improve  Allow pt time to answer questions.  Pt answered simple questions with simple words.    Problem: Skin Integrity  Goal: Risk for impaired skin integrity will decrease    Intervention: Assess risk factors for impaired skin integrity and/or pressure ulcers  Monitor skin integrity and possible risks factors.

## 2019-05-11 NOTE — PROGRESS NOTES
Internal Medicine Interval Note  Note Author: Lawrence Grove D.O.     Name Wally Torres       1933   Age/Sex 85 y.o. male   MRN [id number]   Code Status DNAR/DNI     After 5PM or if no immediate response to page, please call for cross-coverage  Attending/Team: Dr. Vivas / CRISTI Cage See Patient List for primary contact information  Call [phone] to page    1st Call - Day Intern (R1):   Dr. Grove 2nd Call - Day Sr. Resident (R2/R3):   Dr. Lisa         Reason for interval visit    Cognitive impairment      Interval Problem Daily Status Update   Patient examined this morning while he was walking to the bathroom with assistance from nursing staff to the restroom. Patient asking when he will be able to go home. Informed patient that we are working on safe discharge plan for him.     Review of Systems   Reason unable to perform ROS: Limited due to patient condition.   Respiratory: Negative for cough and shortness of breath.    Gastrointestinal: Negative for abdominal pain, constipation, nausea and vomiting.       Disposition/Barriers to discharge:   Pending placement/ guardianship    Consultants/Specialty  Palliative care    Ethics  PCP: Halie Dominique M.D.      Quality Measures  Quality-Core Measures   Reviewed items::  Medications reviewed  Baldwin catheter::  No Baldwin  DVT prophylaxis pharmacological::  Heparin    Physical Exam       Vitals:    05/10/19 1500 05/10/19 1800 05/10/19 1905 19 0305   BP: (!) 97/56 122/53 105/48 145/55   Pulse: (!) 57 62 60 (!) 58   Resp: 13 14 18 18   Temp: 36.3 °C (97.4 °F) 36.2 °C (97.2 °F) 36.6 °C (97.8 °F) 36.6 °C (97.8 °F)   TempSrc: Temporal Oral Temporal Temporal   SpO2:  98% 95% 98%   Weight:       Height:         Body mass index is 22.23 kg/m².    Oxygen Therapy:  Pulse Oximetry: 98 %, O2 (LPM): 0, O2 Delivery: None (Room Air)    Physical Exam   Constitutional: He appears malnourished. No distress.   Elderly  male sitting in bed  eating breakfast    HENT:   Head: Normocephalic and atraumatic.   Eyes: EOM are normal. No scleral icterus.   Cardiovascular:   Scar from CABG, Pacemaker in left anterior chest   Pulmonary/Chest: Effort normal. No respiratory distress.   Abdominal: Bowel sounds are normal.   Musculoskeletal: Normal range of motion. He exhibits no edema.   Diffuse muscle wasting   Neurological: He is alert.   Walking with assistance    Skin: Skin is warm. He is not diaphoretic.       Assessment/Plan     * Cognitive impairment- (present on admission)   Assessment & Plan    - Unable to care for self, progressive decline per wife   - Ethics and Palliative Care consulted, appreciate recommendations.   - Pending guardianship      Other constipation   Assessment & Plan    - on bowel protocol      Multiple bruises   Assessment & Plan    - Seen on admission, on knees and sternum, appeared as though patient had a recent fall.  - PT and OT, fall precautions.     Hypothyroidism- (present on admission)   Assessment & Plan    - TSH 0.54 on 3/21/19  - Continue home levothyroxine     Cardiac pacemaker in situ- (present on admission)   Assessment & Plan    For sick sinus rhythm     SSS (sick sinus syndrome) (HCC)- (present on admission)   Assessment & Plan    - Status post pacemaker. 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     Risk for falls- (present on admission)   Assessment & Plan    - Fall precautions, PT and OT consulted, appreciate recs.     Hypertension, essential- (present on admission)   Assessment & Plan    - Continue metoprolol, lisinopril     Dyslipidemia- (present on admission)   Assessment & Plan    - Continue home statin      CAD (coronary artery disease)- (present on admission)   Assessment & Plan    History of 3V CABG with left internal mammary artery graft to LAD, saphenous vein graft to obtuse marginal branch and saphenous vein  graft to posterior descending artery by Dr. Coates in March 2018.  - Echo EF 65%, status post TAVR, RVSP 30 mmHg  - Con't statin, ASA, metoprolol, and lisinopril.     Paroxysmal atrial fibrillation (CMS-HCC)- (present on admission)   Assessment & Plan    - Stable. Xarelto discontinued by Cardiology in August 2018 due to high fall risk per chart review.  - Continue Metoprolol     Dysphagia   Assessment & Plan    - Found to have dysphagia, high risk aspiration.   - Advanced directive specified no feeding tube   - Speech on board  - 1:1 feeding, tolerating well      Age-related physical debility- (present on admission)   Assessment & Plan    - Previously assessed by PT and OT, SNF recommended for further therapy needs.  - PT and OT following for this admission, appreciate help.     Hx of CABG- (present on admission)   Assessment & Plan    - Continue aspirin and statin.     Gout of multiple sites- (present on admission)   Assessment & Plan    - Continue home allopurinol      PVD (peripheral vascular disease) (CMS-HCC)- (present on admission)   Assessment & Plan    - History of prior L superficial femoral artery stent, known high grade stenosis of R common femoral artery closed with Starclose closure by Dr. Amaya on 03/22/17. Stable, no acute complains.

## 2019-05-11 NOTE — PROGRESS NOTES
Assumed care of Pt at shift change. Pt is A&Ox1 reorientation unsucessful. Pt denies pain. Call light with in reach. Traction socks on pt and bed in lowest position. Bed alarm on.

## 2019-05-11 NOTE — CARE PLAN
Problem: Safety  Goal: Will remain free from injury    Intervention: Provide assistance with mobility  Pt provided with assistance to the bathroom.       Problem: Skin Integrity  Goal: Risk for impaired skin integrity will decrease    Intervention: Assess risk factors for impaired skin integrity and/or pressure ulcers  Pt does not mobilize often and stays in bed all day. Pt encouraged to turn every two hours and to mobilize to the bathroom with assistance.

## 2019-05-12 PROBLEM — T07.XXXA MULTIPLE BRUISES: Status: RESOLVED | Noted: 2019-01-01 | Resolved: 2019-01-01

## 2019-05-12 NOTE — PROGRESS NOTES
· 2 RN skin check complete with RANDY Brownlee.  · Devices in place:NONE  · Skin assessed under devices: All skin assessed.  · Confirmed pressure ulcers found on: NA.  · New potential pressure ulcers noted on: NONE. Wound consult placed and wound reported.  · The following interventions in place: barrier wipes and aurora cream, encouragement to ambulate to the bathroom (with assistance), waffle mattress    Pt skin intact over all bony prominences. Skin tear to intragluteal cleft has healed

## 2019-05-12 NOTE — PROGRESS NOTES
Assumed care of pt from Marybeth Patel. Pt has no complaints of pain/discomfort at this time.   Call light within reach, treaded slipper socks on, bed locked in lowest position. Mobility and fall risk assessed- proper communication signs are in place. Bed alarm is ON

## 2019-05-12 NOTE — CARE PLAN
Problem: Communication  Goal: The ability to communicate needs accurately and effectively will improve    Intervention: Reorient patient to environment as needed  Pt reoriented to environment as needed. Pt is difficult to reorient.       Problem: Safety  Goal: Will remain free from injury    Intervention: Provide assistance with mobility  Pt provided with assistance during ambulation. He is a one person assist with a front wheel walker. Pt's gait is unsteady.

## 2019-05-12 NOTE — PROGRESS NOTES
Internal Medicine Interval Note  Note Author: Susie Lisa M.D.     Name Wally Torres       1933   Age/Sex 85 y.o. male   MRN [id number]   Code Status DNAR/DNI     After 5PM or if no immediate response to page, please call for cross-coverage  Attending/Team: Dr. Vivas / CRISTI Cage See Patient List for primary contact information  Call [phone] to page    1st Call - Day Intern (R1):   Dr. Grove 2nd Call - Day Sr. Resident (R2/R3):   Dr. Lisa         Reason for interval visit    Cognitive impairment      Interval Problem Daily Status Update   No events overnight. Doing well at baseline. Awaiting guardianship.      Review of Systems   Reason unable to perform ROS: Limited due to patient condition.   Respiratory: Negative for cough and shortness of breath.    Gastrointestinal: Negative for abdominal pain, constipation and nausea.   Musculoskeletal: Negative for falls.   Psychiatric/Behavioral:        Wants to go home       Disposition/Barriers to discharge:   Pending placement/ guardianship    Consultants/Specialty  Palliative care    Ethics  PCP: Halie Dominique M.D.      Quality Measures  Quality-Core Measures   Reviewed items::  Medications reviewed  Baldwin catheter::  No Baldwin  DVT prophylaxis pharmacological::  Heparin    Physical Exam       Vitals:    19 0700 19 1600 19 1905 19 0300   BP: 125/55 101/58 124/55 135/51   Pulse: 60 75 77 60   Resp: 15 16 18 17   Temp: 36.7 °C (98 °F) 36.7 °C (98.1 °F) 36.3 °C (97.4 °F) 36.4 °C (97.6 °F)   TempSrc: Temporal Temporal Temporal Temporal   SpO2: 94% 97% 97% 97%   Weight:       Height:         Body mass index is 22.23 kg/m².    Oxygen Therapy:  Pulse Oximetry: 97 %, O2 (LPM): 0, O2 Delivery: None (Room Air)    Physical Exam   Constitutional: He appears malnourished. No distress.   Elderly  male laying in bed, with wife at bedside   HENT:   Head: Normocephalic and atraumatic.   Eyes: EOM are normal.  No scleral icterus.   Cardiovascular:   Scar from CABG, Pacemaker in left anterior chest   Pulmonary/Chest: Effort normal. No respiratory distress.   Abdominal: Bowel sounds are normal.   Musculoskeletal: Normal range of motion. He exhibits no edema.   Diffuse muscle wasting   Neurological: He is alert.   Walking with assistance    Skin: Skin is warm. He is not diaphoretic.       Assessment/Plan     * Cognitive impairment- (present on admission)   Assessment & Plan    - Unable to care for self, progressive decline per wife   - Ethics and Palliative Care consulted, appreciate recommendations.   - Pending guardianship      Age-related physical debility- (present on admission)   Assessment & Plan    - Previously assessed by PT and OT, SNF recommended for further therapy needs.  - PT and OT following for this admission, appreciate help.     Cardiac pacemaker in situ- (present on admission)   Assessment & Plan    For sick sinus rhythm     SSS (sick sinus syndrome) (HCC)- (present on admission)   Assessment & Plan    - Status post pacemaker. 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     Risk for falls- (present on admission)   Assessment & Plan    - Fall precautions, PT and OT consulted, appreciate recs.     Hypertension, essential- (present on admission)   Assessment & Plan    - Continue metoprolol, lisinopril     Dyslipidemia- (present on admission)   Assessment & Plan    - Continue home statin      Other constipation   Assessment & Plan    - on bowel protocol      Dysphagia   Assessment & Plan    - Found to have dysphagia, high risk aspiration.   - Advanced directive specified no feeding tube   - Speech on board  - 1:1 feeding, tolerating well      Hypothyroidism- (present on admission)   Assessment & Plan    - TSH 0.54 on 3/21/19  - Continue home levothyroxine     Hx of CABG- (present on admission)   Assessment & Plan    -  Continue aspirin and statin.     Gout of multiple sites- (present on admission)   Assessment & Plan    - Continue home allopurinol      PVD (peripheral vascular disease) (CMS-HCC)- (present on admission)   Assessment & Plan    - History of prior L superficial femoral artery stent, known high grade stenosis of R common femoral artery closed with Starclose closure by Dr. Amaya on 03/22/17. Stable, no acute complains.      CAD (coronary artery disease)- (present on admission)   Assessment & Plan    History of 3V CABG with left internal mammary artery graft to LAD, saphenous vein graft to obtuse marginal branch and saphenous vein graft to posterior descending artery by Dr. Coates in March 2018.  - Echo EF 65%, status post TAVR, RVSP 30 mmHg  - Con't statin, ASA, metoprolol, and lisinopril.     Paroxysmal atrial fibrillation (CMS-HCC)- (present on admission)   Assessment & Plan    - Stable. Xarelto discontinued by Cardiology in August 2018 due to high fall risk per chart review.  - Continue Metoprolol

## 2019-05-12 NOTE — CARE PLAN
Problem: Communication  Goal: The ability to communicate needs accurately and effectively will improve  Outcome: PROGRESSING AS EXPECTED  Although pt's primary language is Mandarin Chinese, pt is able to make his needs known with english and body language.     Problem: Pain Management  Goal: Pain level will decrease to patient's comfort goal  Outcome: PROGRESSING AS EXPECTED  Pt denies being in any pain so far this shift, asked pt and wife to alert this RN is pt experiences pain and would like tylenol.

## 2019-05-13 NOTE — PROGRESS NOTES
"Assumed care of pt this am. Pt is very irritable and upset this am because, \"someone woke me up\" pt refusing to answer orientation questions. Pt refusing turn/incontinence check this am. Pt given water per request. Pt agreeable to taking morning medication. Bed alarm refusal, Charge RN notified. Hourly rounding in place.   "

## 2019-05-13 NOTE — PROGRESS NOTES
· 2 RN skin check complete with RANDY Stewart.  · Devices in place: NONE.  · Skin assessed under devices :all skin assessed.  · Confirmed pressure ulcers found on: NONE.  · New potential pressure ulcers noted on: NONE. Wound consult placed and wound reported.  · The following interventions in place: waffle overlay, aurora cream, barrier wipes, frequent checks for incontinence        Skin over all bony prominences intact. Skin red and blanching

## 2019-05-13 NOTE — PROGRESS NOTES
Assumed care of pt from Marybeth Patel. Pt has no complaints of pain/discomfort at this time. Pt is ebing helped back into bed by wife/roommate and day shift RN    Call light within reach, treaded slipper socks on, bed locked in lowest position. Mobility and fall risk assessed- proper communication signs are in place.

## 2019-05-13 NOTE — CARE PLAN
Problem: Communication  Goal: The ability to communicate needs accurately and effectively will improve  Outcome: PROGRESSING AS EXPECTED  Pt encouraged to voice needs and concerns, pt verbalized understanding.     Problem: Skin Integrity  Goal: Risk for impaired skin integrity will decrease  Outcome: PROGRESSING AS EXPECTED  Pt turns self in bed, up to chair for meals.

## 2019-05-13 NOTE — PROGRESS NOTES
Assumed care of pt this am. Pt is A&O to self only. Pt denies pain. Pt encouraged to drink plenty of fluids today. Bed alarm in use. Pt is in room near nursing station. Pt encouraged to call for assistance when he wants to ambulate. Hourly rounding in place.

## 2019-05-13 NOTE — THERAPY
"Speech Language Therapy dysphagia treatment completed.     Functional Status:  Pt was seen at lunch with a dysphagia I/NTL meal tray.  Pt was put into a chair for meal, not following directives today and spoke only one word throughout session.  He consumed his entire meal without any overt s/sx of aspiration.  NO pocketing or anterior spillage of bolus was noted at all during session, however he continues to require strict pacing during entire meal. However, as stated in previous notes, results of FEES done on 3/28/19, indicating \"mesfin silent aspiration\" and pt continues to remain at HIGH risk for aspiration. Given POLST on file stating \"no feeding tubes\",  would recommend continuation of safest oral diet of D1/NTL to reduce, not prevent aspiration. SLP will no longer actively follow patient, however will be available upon request.     Recommendations: dysphagia I with nectars as safest diet to reduce, not prevent aspiration     Plan of Care: SLP will no longer actively follow patient, however will be available upon request.     Post-Acute Therapy: Recommend inpatient transitional care services for continued speech therapy services.      See \"Rehab Therapy-Acute\" Patient Summary Report for complete documentation.     "

## 2019-05-13 NOTE — PROGRESS NOTES
Internal Medicine Interval Note  Note Author: Lawrence Grove D.O.     Name Wally Torres       1933   Age/Sex 85 y.o. male   MRN [id number]   Code Status DNAR/DNI     After 5PM or if no immediate response to page, please call for cross-coverage  Attending/Team: Dr. Noe / CRISTI Cage See Patient List for primary contact information  Call [phone] to page    1st Call - Day Intern (R1):   Dr. Grove 2nd Call - Day Sr. Resident (R2/R3):   Dr. Castrejon         Reason for interval visit    Cognitive impairment      Interval Problem Daily Status Update   Patient examined while sleeping.     Creatinine noted to be slightly elevated than baseline. Encouraged oral intake. If patient does not have good po intake may need one small bolus.     Review of Systems   Reason unable to perform ROS: Limited due to patient condition.   Musculoskeletal: Negative for falls.   Patient examined while sleeping    Disposition/Barriers to discharge:   Pending placement/ guardianship    Consultants/Specialty  Palliative care    Ethics  PCP: Halie Dominique M.D.      Quality Measures  Quality-Core Measures   Reviewed items::  Medications reviewed and Labs reviewed  Baldwin catheter::  No Baldwin  DVT prophylaxis pharmacological::  Heparin    Physical Exam       Vitals:    19 1535 19 1600 19 1905 19 0310   BP: (!) 90/45  (!) 92/52 124/57   Pulse: 75  81 62   Resp: 17  16 14   Temp: 36.9 °C (98.4 °F)  36.8 °C (98.3 °F) 36.6 °C (97.8 °F)   TempSrc: Temporal  Temporal Temporal   SpO2: 98% 94% 97% 96%   Weight:       Height:         Body mass index is 22.23 kg/m².    Oxygen Therapy:  Pulse Oximetry: 96 %, O2 (LPM): 0, O2 Delivery: None (Room Air)    Physical Exam   Constitutional: He appears malnourished. No distress.   Elderly  male sleeping    HENT:   Head: Normocephalic and atraumatic.   Pulmonary/Chest: Effort normal. No respiratory distress.   Abdominal: He exhibits no distension.    Musculoskeletal: Normal range of motion. He exhibits no edema.   Diffuse muscle wasting   Skin: Skin is warm. He is not diaphoretic.       Assessment/Plan     * Cognitive impairment- (present on admission)   Assessment & Plan    - Unable to care for self, progressive decline per wife   - Ethics and Palliative Care consulted, appreciate recommendations.   - Pending guardianship      Age-related physical debility- (present on admission)   Assessment & Plan    - Previously assessed by PT and OT, SNF recommended for further therapy needs.  - PT and OT following for this admission, appreciate help.     Cardiac pacemaker in situ- (present on admission)   Assessment & Plan    For sick sinus rhythm     SSS (sick sinus syndrome) ([facility])- (present on admission)   Assessment & Plan    - Status post pacemaker. 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     Risk for falls- (present on admission)   Assessment & Plan    - Fall precautions, PT and OT consulted, appreciate recs.     Hypertension, essential- (present on admission)   Assessment & Plan    - Continue metoprolol, lisinopril     Dyslipidemia- (present on admission)   Assessment & Plan    - Continue home statin      Other constipation   Assessment & Plan    - on bowel protocol      Dysphagia   Assessment & Plan    - Found to have dysphagia, high risk aspiration.   - Advanced directive specified no feeding tube   - Speech on board  - 1:1 feeding, tolerating well      Hypothyroidism- (present on admission)   Assessment & Plan    - TSH 0.54 on 3/21/19  - Continue home levothyroxine     Hx of CABG- (present on admission)   Assessment & Plan    - Continue aspirin and statin.     Gout of multiple sites- (present on admission)   Assessment & Plan    - Continue home allopurinol      PVD (peripheral vascular disease) (CMS-[facility])- (present on admission)   Assessment & Plan    - History of  prior L superficial femoral artery stent, known high grade stenosis of R common femoral artery closed with Starclose closure by Dr. Amaya on 03/22/17. Stable, no acute complains.      CAD (coronary artery disease)- (present on admission)   Assessment & Plan    History of 3V CABG with left internal mammary artery graft to LAD, saphenous vein graft to obtuse marginal branch and saphenous vein graft to posterior descending artery by Dr. Coates in March 2018.  - Echo EF 65%, status post TAVR, RVSP 30 mmHg  - Con't statin, ASA, metoprolol, and lisinopril.     Paroxysmal atrial fibrillation (CMS-HCC)- (present on admission)   Assessment & Plan    - Stable. Xarelto discontinued by Cardiology in August 2018 due to high fall risk per chart review.  - Continue Metoprolol

## 2019-05-13 NOTE — CARE PLAN
Problem: Urinary Elimination:  Goal: Ability to reestablish a normal urinary elimination pattern will improve  Outcome: PROGRESSING AS EXPECTED  Pt is often incontinent of urine. When pt attempts to get out of bed, he often has to pee, but not always.

## 2019-05-14 NOTE — PROGRESS NOTES
Assumed care of pat at shift change; Pt has had no complaints of pain or discomfort for me; Pt is AO to self only and is very pleasant; Pt is a high fall risk and is in bed with 3x side rails up in the locked position. Bed is in the lowest/locked position, personal belongings and call bell are within reach; Family is present and is a great help with assisting patient and alerting staff when patient needs assistance;

## 2019-05-14 NOTE — PROGRESS NOTES
Assumed care of pt from Marybeth Love. Pt has no complaints of pain/discomfort at this time. Pt is in his bed being tended to by Wife/ roommate.   Call light within reach, treaded slipper socks on, bed locked in lowest position. Mobility and fall risk assessed- proper communication signs are in place. Bed alarm is ON

## 2019-05-14 NOTE — PROGRESS NOTES
Internal Medicine Interval Note  Note Author: Lawrence Grove D.O.     Name Wally Torres       1933   Age/Sex 85 y.o. male   MRN [id number]   Code Status DNAR/DNI     After 5PM or if no immediate response to page, please call for cross-coverage  Attending/Team: Dr. Noe / CRISTI Cage See Patient List for primary contact information  Call [phone] to page    1st Call - Day Intern (R1):   Dr. Grove 2nd Call - Day Sr. Resident (R2/R3):   Dr. Castrejon         Reason for interval visit    Cognitive impairment      Interval Problem Daily Status Update   Patient examined while sleeping.      Nursing staff and wife report that patient has been having good oral intake.     Review of Systems   Reason unable to perform ROS: Limited due to patient condition.   Musculoskeletal: Negative for falls.   Patient examined while sleeping    Disposition/Barriers to discharge:   Pending placement/ guardianship    Consultants/Specialty  Palliative care    Ethics  PCP: Halie Dominique M.D.      Quality Measures  Quality-Core Measures   Reviewed items::  Medications reviewed and Labs reviewed  Baldwin catheter::  No Baldwin  DVT prophylaxis pharmacological::  Heparin    Physical Exam       Vitals:    19 0705 19 1500 19 1855 19 0335   BP: 135/64 106/66 118/58 113/56   Pulse: 65 60 70 72   Resp: 15 16 15 15   Temp: 36.2 °C (97.2 °F) 36.6 °C (97.8 °F) 36.4 °C (97.6 °F) 36.4 °C (97.5 °F)   TempSrc: Temporal Temporal Temporal Temporal   SpO2: 96% 95% 98% 93%   Weight:       Height:         Body mass index is 22.23 kg/m².    Oxygen Therapy:  Pulse Oximetry: 93 %, O2 (LPM): 0, O2 Delivery: None (Room Air)    Physical Exam   Constitutional: He appears malnourished. No distress.   Elderly  male sleeping    HENT:   Head: Normocephalic and atraumatic.   Pulmonary/Chest: Effort normal. No respiratory distress.   Abdominal: He exhibits no distension.   Musculoskeletal: Normal range of motion. He  exhibits no edema.   Diffuse muscle wasting   Skin: Skin is warm. He is not diaphoretic.       Assessment/Plan     * Cognitive impairment- (present on admission)   Assessment & Plan    - Unable to care for self, progressive decline per wife   - Ethics and Palliative Care consulted, appreciate recommendations.   - Pending guardianship      Age-related physical debility- (present on admission)   Assessment & Plan    - Previously assessed by PT and OT, SNF recommended for further therapy needs.  - PT and OT following for this admission, appreciate help.     Cardiac pacemaker in situ- (present on admission)   Assessment & Plan    For sick sinus rhythm     SSS (sick sinus syndrome) (HCC)- (present on admission)   Assessment & Plan    - Status post pacemaker. 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     Risk for falls- (present on admission)   Assessment & Plan    - Fall precautions, PT and OT consulted, appreciate recs.     Hypertension, essential- (present on admission)   Assessment & Plan    - Continue metoprolol, lisinopril     Dyslipidemia- (present on admission)   Assessment & Plan    - Continue home statin      Other constipation   Assessment & Plan    - on bowel protocol      Dysphagia   Assessment & Plan    - Found to have dysphagia, high risk aspiration.   - Advanced directive specified no feeding tube   - Speech on board  - 1:1 feeding, tolerating well      Hypothyroidism- (present on admission)   Assessment & Plan    - TSH 0.54 on 3/21/19  - Continue home levothyroxine     Hx of CABG- (present on admission)   Assessment & Plan    - Continue aspirin and statin.     Gout of multiple sites- (present on admission)   Assessment & Plan    - Continue home allopurinol      PVD (peripheral vascular disease) (CMS-HCC)- (present on admission)   Assessment & Plan    - History of prior L superficial femoral artery stent, known  high grade stenosis of R common femoral artery closed with Starclose closure by Dr. Amaya on 03/22/17. Stable, no acute complains.      CAD (coronary artery disease)- (present on admission)   Assessment & Plan    History of 3V CABG with left internal mammary artery graft to LAD, saphenous vein graft to obtuse marginal branch and saphenous vein graft to posterior descending artery by Dr. Coates in March 2018.  - Echo EF 65%, status post TAVR, RVSP 30 mmHg  - Con't statin, ASA, metoprolol, and lisinopril.     Paroxysmal atrial fibrillation (CMS-HCC)- (present on admission)   Assessment & Plan    - Stable. Xarelto discontinued by Cardiology in August 2018 due to high fall risk per chart review.  - Continue Metoprolol

## 2019-05-14 NOTE — CARE PLAN
Problem: Urinary Elimination:  Goal: Ability to reestablish a normal urinary elimination pattern will improve  Outcome: PROGRESSING SLOWER THAN EXPECTED  Pt is still frequently incontinent of urine; Staff still offering frequent toileting and getting the patient up to the bathroom to try and decrease frequency of incontinence;

## 2019-05-14 NOTE — CARE PLAN
Problem: Skin Integrity  Goal: Risk for impaired skin integrity will decrease  Outcome: PROGRESSING AS EXPECTED  Education provided to patient and family about changing soiled linens and alerting staff when they are are wet;

## 2019-05-14 NOTE — CARE PLAN
Problem: Skin Integrity  Goal: Risk for impaired skin integrity will decrease  Outcome: PROGRESSING AS EXPECTED  Pt is ambulating to the bathroom more frequently rather than be incontinent in bed; although pt is often still incontinent. Pt's neymar scale score is <17, pt requires a 2 RN skin check this shift (see additional note).    Problem: Mobility  Goal: Risk for activity intolerance will decrease  Outcome: PROGRESSING AS EXPECTED  Pt ambulates with a wide-based, unsteady gait. As long as pt has staff there to keep him from falling, pt does not experience activity intolerance when ambulating to and from the bathroom.

## 2019-05-15 NOTE — PROGRESS NOTES
Received report from NOC RN, patient awake and alert x 2. Bed alarm on, bed locked and in lowest position. Patient placed in room, with wife, directly across from Nurse's Station. Patient's wife tries to get him up to bathroom often. Patient is supervised 1:1 feed, nectar thick diet. Patient is incontinent of bowel and bladder. Patient is able to use call light to make needs known.

## 2019-05-15 NOTE — CARE PLAN
Problem: Safety  Goal: Will remain free from injury  Promptly respond to bed alarm and monitor pt's activity by doing charting outside of pt's room with pt in view as pt often reaches to the right side of the bed for items and bed alarm light.    Problem: Mobility  Goal: Risk for activity intolerance will decrease    Intervention: Assess and monitor signs of activity intolerance  Closely monitor and assess pt's gait during frequent ambulations to bathroom.  CNA advised that pt's gait is unsteady shuffling with backward leaning motion some time.  Pt tolerates ambulation well.

## 2019-05-15 NOTE — PROGRESS NOTES
Internal Medicine Interval Note  Note Author: Lawrence Grove D.O.     Name Wally Torres       1933   Age/Sex 85 y.o. male   MRN [id number]   Code Status DNAR/DNI     After 5PM or if no immediate response to page, please call for cross-coverage  Attending/Team: Dr. Noe / CRISTI Cage See Patient List for primary contact information  Call [phone] to page    1st Call - Day Intern (R1):   Dr. Grove 2nd Call - Day Sr. Resident (R2/R3):   Dr. Castrejon         Reason for interval visit    Cognitive impairment      Interval Problem Daily Status Update   Patient examined while sitting up in bed. Patient has been having good po intake. He denies any complaints.              Review of Systems   Reason unable to perform ROS: Limited due to patient condition.   Musculoskeletal: Negative for falls.       Disposition/Barriers to discharge:   Pending placement/ guardianship    Consultants/Specialty  Palliative care    Ethics  PCP: Halie Dominique M.D.      Quality Measures  Quality-Core Measures   Reviewed items::  Medications reviewed and Labs reviewed  Baldwin catheter::  No Baldwin  DVT prophylaxis pharmacological::  Heparin    Physical Exam       Vitals:    19 1505 19 1829 19 1905 05/15/19 0405   BP: 138/68 132/53 142/52 147/57   Pulse: 79 60 71 60   Resp: 17  18 16   Temp: 36.5 °C (97.7 °F)  36.9 °C (98.5 °F) 36.8 °C (98.2 °F)   TempSrc: Temporal  Temporal Temporal   SpO2: 98% 99% 99% 91%   Weight:       Height:         Body mass index is 22.23 kg/m².    Oxygen Therapy:  Pulse Oximetry: 91 %, O2 Delivery: None (Room Air)    Physical Exam   Constitutional: He appears malnourished. No distress.   Elderly  male sitting up in bed talking to wife   HENT:   Head: Normocephalic and atraumatic.   Mouth/Throat: Oropharynx is clear and moist.   Eyes: EOM are normal. No scleral icterus.   Neck: Normal range of motion.   Cardiovascular: Normal rate, regular rhythm and intact distal  pulses.    No murmur heard.  Pulmonary/Chest: Effort normal and breath sounds normal. No respiratory distress. He has no wheezes.   Abdominal: Soft. He exhibits no distension. There is no tenderness.   Musculoskeletal: Normal range of motion. He exhibits no edema.   Diffuse muscle wasting   Neurological: He is alert.   Skin: Skin is warm. He is not diaphoretic.       Assessment/Plan     * Cognitive impairment- (present on admission)   Assessment & Plan    - Unable to care for self, progressive decline per wife   - Ethics and Palliative Care consulted, appreciate recommendations.   - Pending guardianship      Age-related physical debility- (present on admission)   Assessment & Plan    - Previously assessed by PT and OT, SNF recommended for further therapy needs.  - PT and OT following for this admission, appreciate help.     Cardiac pacemaker in situ- (present on admission)   Assessment & Plan    For sick sinus rhythm     SSS (sick sinus syndrome) (HCC)- (present on admission)   Assessment & Plan    - Status post pacemaker. 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     Risk for falls- (present on admission)   Assessment & Plan    - Fall precautions, PT and OT consulted, appreciate recs.     Hypertension, essential- (present on admission)   Assessment & Plan    - Continue metoprolol, lisinopril     Dyslipidemia- (present on admission)   Assessment & Plan    - Continue home statin      Other constipation   Assessment & Plan    - on bowel protocol      Dysphagia   Assessment & Plan    - Found to have dysphagia, high risk aspiration.   - Advanced directive specified no feeding tube   - Speech on board  - 1:1 feeding, tolerating well      Hypothyroidism- (present on admission)   Assessment & Plan    - TSH 0.54 on 3/21/19  - Continue home levothyroxine     Hx of CABG- (present on admission)   Assessment & Plan    - Continue  aspirin and statin.     Gout of multiple sites- (present on admission)   Assessment & Plan    - Continue home allopurinol      PVD (peripheral vascular disease) (CMS-HCC)- (present on admission)   Assessment & Plan    - History of prior L superficial femoral artery stent, known high grade stenosis of R common femoral artery closed with Starclose closure by Dr. Amaya on 03/22/17. Stable, no acute complains.      CAD (coronary artery disease)- (present on admission)   Assessment & Plan    History of 3V CABG with left internal mammary artery graft to LAD, saphenous vein graft to obtuse marginal branch and saphenous vein graft to posterior descending artery by Dr. Coates in March 2018.  - Echo EF 65%, status post TAVR, RVSP 30 mmHg  - Con't statin, ASA, metoprolol, and lisinopril.     Paroxysmal atrial fibrillation (CMS-HCC)- (present on admission)   Assessment & Plan    - Stable. Xarelto discontinued by Cardiology in August 2018 due to high fall risk per chart review.  - Continue Metoprolol

## 2019-05-15 NOTE — CARE PLAN
Problem: Discharge Barriers/Planning  Goal: Patient's continuum of care needs will be met  Outcome: PROGRESSING SLOWER THAN EXPECTED  Patient and wife need place of care. SW involved.    Problem: Skin Integrity  Goal: Risk for impaired skin integrity will decrease  Outcome: PROGRESSING AS EXPECTED  Patient is incontinent of bowel and bladder. Frequent monitoring of incontinence, linens kept clean and dry. Patient turns himself in bed.

## 2019-05-15 NOTE — PROGRESS NOTES
"Pt A&Ox2 (disoriented to time and event), on room air with no signs or complaints of pain.  Pt ambulates frequently to bathroom with one person assist.  Pt restless in bed tonight and reaches through right side padded rails.  Pt requested \"Pepsi\" and agreeable to juice and water for oral intake.        Bed strip alarm on, bed frame alarm on, pt wearing non skid treaded socks, environment uncluttered, bed in lowest and locked position, bilateral upper bed rails up and the use of call light when needing assistance was reinforced.  Pt room next to nurses' station  "

## 2019-05-16 NOTE — PROGRESS NOTES
Received report from NOC RN, patient awake and alert. Patient able to make needs known using one word phrases. Patient is 1 assist with FWW to bathroom. Bed alarm on, wife is in admitted to hospital in same room. Patient declines pain.

## 2019-05-16 NOTE — CARE PLAN
Problem: Communication  Goal: The ability to communicate needs accurately and effectively will improve  Address verbal and perceived needs as pt's primary language is Mandarin Chinese; however, pt speaks simple English words to make his needs known.    Problem: Safety  Goal: Will remain free from falls    Intervention: Implement fall precautions  Implement and maintain fall precautions for one person assist for unsteady ambulation with walker.

## 2019-05-16 NOTE — PROGRESS NOTES
Internal Medicine Interval Note  Note Author: Lawrence Grove D.O.     Name Wally Torres       1933   Age/Sex 85 y.o. male   MRN [id number]   Code Status DNAR/DNI     After 5PM or if no immediate response to page, please call for cross-coverage  Attending/Team: Dr. Noe / CRISTI Cage See Patient List for primary contact information  Call [phone] to page    1st Call - Day Intern (R1):   Dr. Grove 2nd Call - Day Sr. Resident (R2/R3):   Dr. Castrejon         Reason for interval visit    Cognitive impairment      Interval Problem Daily Status Update   Patient examined while laying in bed. Per nursing report, patient has been getting up with assistance to use the restroom. Patient inquiring that he would like to go home. Informed patient that staff is working on a safe discharge plan.       Review of Systems   Reason unable to perform ROS: Limited due to patient condition.   Musculoskeletal: Negative for falls.       Disposition/Barriers to discharge:   Pending placement/ guardianship    Consultants/Specialty  Palliative care    Ethics  PCP: Halie Dominique M.D.      Quality Measures  Quality-Core Measures   Reviewed items::  Medications reviewed and Labs reviewed  Baldwin catheter::  No Baldwin  DVT prophylaxis pharmacological::  Heparin    Physical Exam       Vitals:    05/15/19 1600 05/15/19 1900 19 0410 19 0700   BP: 110/43 132/47 124/64 110/63   Pulse: 63 67 61 63   Resp: 17 18 16 16   Temp: 36.8 °C (98.2 °F) 36.6 °C (97.8 °F) 36.4 °C (97.5 °F) 35.8 °C (96.5 °F)   TempSrc: Temporal Temporal Axillary Oral   SpO2: 94% 99% 95% 97%   Weight:       Height:         Body mass index is 22.23 kg/m².    Oxygen Therapy:  Pulse Oximetry: 97 %, O2 Delivery: None (Room Air)    Physical Exam   Constitutional: He appears malnourished. No distress.   Elderly  male laying in bed    HENT:   Head: Normocephalic and atraumatic.   Mouth/Throat: Oropharynx is clear and moist.   Eyes: EOM are  normal. No scleral icterus.   Neck: Normal range of motion.   Cardiovascular: Normal rate.    Pulmonary/Chest: Effort normal. No respiratory distress.   Abdominal: He exhibits no distension.   Musculoskeletal: Normal range of motion. He exhibits no edema.   Diffuse muscle wasting   Neurological: He is alert.   Skin: Skin is warm. He is not diaphoretic.       Assessment/Plan     * Cognitive impairment- (present on admission)   Assessment & Plan    - Unable to care for self, progressive decline per wife   - Ethics and Palliative Care consulted, appreciate recommendations.   - Pending guardianship      Age-related physical debility- (present on admission)   Assessment & Plan    - Previously assessed by PT and OT, SNF recommended for further therapy needs.  - PT and OT following for this admission, appreciate help.     Cardiac pacemaker in situ- (present on admission)   Assessment & Plan    For sick sinus rhythm     SSS (sick sinus syndrome) (HCC)- (present on admission)   Assessment & Plan    - Status post pacemaker. 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     Risk for falls- (present on admission)   Assessment & Plan    - Fall precautions, PT and OT consulted, appreciate recs.     Hypertension, essential- (present on admission)   Assessment & Plan    - Continue metoprolol, lisinopril     Dyslipidemia- (present on admission)   Assessment & Plan    - Continue home statin      Other constipation   Assessment & Plan    - on bowel protocol      Dysphagia   Assessment & Plan    - Found to have dysphagia, high risk aspiration.   - Advanced directive specified no feeding tube   - Speech on board  - 1:1 feeding, tolerating well      Hypothyroidism- (present on admission)   Assessment & Plan    - TSH 0.54 on 3/21/19  - Continue home levothyroxine     Hx of CABG- (present on admission)   Assessment & Plan    - Continue aspirin and  statin.     Gout of multiple sites- (present on admission)   Assessment & Plan    - Continue home allopurinol      PVD (peripheral vascular disease) (CMS-HCC)- (present on admission)   Assessment & Plan    - History of prior L superficial femoral artery stent, known high grade stenosis of R common femoral artery closed with Starclose closure by Dr. Amaya on 03/22/17. Stable, no acute complains.      CAD (coronary artery disease)- (present on admission)   Assessment & Plan    History of 3V CABG with left internal mammary artery graft to LAD, saphenous vein graft to obtuse marginal branch and saphenous vein graft to posterior descending artery by Dr. Coates in March 2018.  - Echo EF 65%, status post TAVR, RVSP 30 mmHg  - Con't statin, ASA, metoprolol, and lisinopril.     Paroxysmal atrial fibrillation (CMS-HCC)- (present on admission)   Assessment & Plan    - Stable. Xarelto discontinued by Cardiology in August 2018 due to high fall risk per chart review.  - Continue Metoprolol

## 2019-05-16 NOTE — CARE PLAN
Problem: Skin Integrity  Goal: Risk for impaired skin integrity will decrease  Outcome: PROGRESSING AS EXPECTED  Patient is incontinent of bladder and bowel. Frequent incontinence monitoring in place, linens kept clean and dry, barrier wipes and paste in use.    Problem: Mobility  Goal: Risk for activity intolerance will decrease  Outcome: PROGRESSING AS EXPECTED  Patient is 1 assist to bathroom with FWW. Treaded socks in place, bed alarm on.

## 2019-05-16 NOTE — PROGRESS NOTES
· 2 RN skin check complete with RANDY Howell.  · Devices in place N/A.  · Skin assessed under devices N/A.  · Confirmed pressure ulcers found on N/A.  · New potential pressure ulcers noted on N/A.     Patient has dry, scaly, blanching spot on back of medial back of head. Sacrum has area of redness, blanching. Heels red, blanching, boggy.       The following interventions in place: linens kept clean and ry, frequent incontinence monitoring, barrier wipes, barrier paste applied. Patient removes heel float boots. Waffle overlay in place. Patients turns self in bed.

## 2019-05-16 NOTE — PROGRESS NOTES
Pt A&Ox2 (disoriented to time and event), on room air with no signs or complaints of pain.  Pt ambulates frequently to bathroom with one person assist.  So far into this shift, pt is more restful and sleeping tonight.  Oral intake offered when pt is awake.       Bed strip alarm on, bed frame alarm on, pt wearing non skid treaded socks, environment uncluttered, bed in lowest and locked position, bilateral upper bed rails up and the use of call light when needing assistance was reinforced.  Pt room next to nurses' station

## 2019-05-17 NOTE — CARE PLAN
Problem: Safety  Goal: Will remain free from falls  Implement and maintain fall precautions for one person assist for unsteady ambulation with walker.  Frequently remind pt to use call light and wait for staff before getting out of bed.      Problem: Urinary Elimination:  Goal: Ability to reestablish a normal urinary elimination pattern will improve    Intervention: Assist patient to sit on commode or toilet for voiding  Assist pt to bathroom with one person assist and walker as pt frequently urinates but sometimes does not produce urine.

## 2019-05-17 NOTE — PROGRESS NOTES
2 RN skin check complete with Lynda PADILLA.  Devices in place N/A  Skin assessed under devices N/A.  Confirmed pressure ulcers found on N/A.  New potential pressure ulcers noted on N/A.     Patient has dry, scaly area on medial back of head, blanching. Fissure in sacral area, pink, blanching. Heels red, boggy and blanching.    The following interventions in place: linens kept clean and dry, frequent checks for incontinence, waffle overlay in place, barrier wipes and paste in use. Patient removes heel float boots.

## 2019-05-17 NOTE — PROGRESS NOTES
Received report from NOC RN, patient awake and alert. Patient able to make needs known. Patient's wife in same hospital room. Wife helps care for  under supervision by CNA and RN. Patient is 1 assist to bathroom with FWW. Patient has adequate intake t/o shift, tolerates nectar thick liquids.

## 2019-05-17 NOTE — PROGRESS NOTES
Pt A&Ox2 (disoriented to time and event), on room air with no signs or complaints of pain.  Pt ambulates frequently to bathroom with one person assist and walker; pt sometimes leans backwards when standing.  Pt finishes thickened oral intake offerings and also requests juice.       Bed strip alarm on, bed frame alarm on, pt wearing non skid treaded socks, environment uncluttered, bed in lowest and locked position, bilateral upper bed rails up and the use of call light when needing assistance was reinforced.  Pt room next to nurses' station

## 2019-05-17 NOTE — PROGRESS NOTES
Internal Medicine Interval Note  Note Author: Lawrence Grove D.O.     Name Wally Torres       1933   Age/Sex 85 y.o. male   MRN [id number]   Code Status DNAR/DNI     After 5PM or if no immediate response to page, please call for cross-coverage  Attending/Team: Dr. Noe / CRISTI Cage See Patient List for primary contact information  Call [phone] to page    1st Call - Day Intern (R1):   Dr. Grove 2nd Call - Day Sr. Resident (R2/R3):   Dr. Castrejon         Reason for interval visit    Cognitive impairment      Interval Problem Daily Status Update   Patient examined while laying in bed. Patient reports feeling well. He has no acute concerns.     Review of Systems   Reason unable to perform ROS: Limited due to patient condition.   Musculoskeletal: Negative for falls.       Disposition/Barriers to discharge:   Pending placement/ guardianship    Consultants/Specialty  Palliative care    Ethics  PCP: Halie Dominique M.D.      Quality Measures  Quality-Core Measures   Reviewed items::  Medications reviewed and Labs reviewed  Baldwin catheter::  No Baldwin  DVT prophylaxis pharmacological::  Heparin    Physical Exam       Vitals:    19 0700 19 1410 19 1945 19 0410   BP: 110/63 101/56 126/55 143/57   Pulse: 63 68 76 86   Resp:    Temp: 35.8 °C (96.5 °F) 36.5 °C (97.7 °F) 36.4 °C (97.6 °F) 36.3 °C (97.3 °F)   TempSrc: Oral Axillary Oral Oral   SpO2: 97% 98% 96% 97%   Weight:       Height:         Body mass index is 22.23 kg/m².    Oxygen Therapy:  Pulse Oximetry: 97 %, O2 Delivery: None (Room Air)    Physical Exam   Constitutional: He appears malnourished. No distress.   Elderly  male laying in bed smiling   HENT:   Head: Normocephalic and atraumatic.   Mouth/Throat: Oropharynx is clear and moist.   Eyes: EOM are normal. No scleral icterus.   Neck: Normal range of motion.   Cardiovascular: Normal rate.    Pulmonary/Chest: Effort normal. No respiratory  distress.   Abdominal: He exhibits no distension.   Musculoskeletal: Normal range of motion. He exhibits no edema.   Diffuse muscle wasting   Neurological: He is alert.   Skin: Skin is warm. He is not diaphoretic.       Assessment/Plan     * Cognitive impairment- (present on admission)   Assessment & Plan    - Unable to care for self, progressive decline per wife   - Ethics and Palliative Care consulted, appreciate recommendations.   - Pending guardianship      Age-related physical debility- (present on admission)   Assessment & Plan    - Previously assessed by PT and OT, SNF recommended for further therapy needs.  - PT and OT following for this admission, appreciate help.     Cardiac pacemaker in situ- (present on admission)   Assessment & Plan    For sick sinus rhythm     SSS (sick sinus syndrome) (HCC)- (present on admission)   Assessment & Plan    - Status post pacemaker. 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     Risk for falls- (present on admission)   Assessment & Plan    - Fall precautions, PT and OT consulted, appreciate recs.     Hypertension, essential- (present on admission)   Assessment & Plan    - Continue metoprolol, lisinopril     Dyslipidemia- (present on admission)   Assessment & Plan    - Continue home statin      Other constipation   Assessment & Plan    - on bowel protocol      Dysphagia   Assessment & Plan    - Found to have dysphagia, high risk aspiration.   - Advanced directive specified no feeding tube   - Speech on board  - 1:1 feeding, tolerating well      Hypothyroidism- (present on admission)   Assessment & Plan    - TSH 0.54 on 3/21/19  - Continue home levothyroxine     Hx of CABG- (present on admission)   Assessment & Plan    - Continue aspirin and statin.     Gout of multiple sites- (present on admission)   Assessment & Plan    - Continue home allopurinol      PVD (peripheral vascular  disease) (CMS-HCC)- (present on admission)   Assessment & Plan    - History of prior L superficial femoral artery stent, known high grade stenosis of R common femoral artery closed with Starclose closure by Dr. Amaya on 03/22/17. Stable, no acute complains.      CAD (coronary artery disease)- (present on admission)   Assessment & Plan    History of 3V CABG with left internal mammary artery graft to LAD, saphenous vein graft to obtuse marginal branch and saphenous vein graft to posterior descending artery by Dr. Coates in March 2018.  - Echo EF 65%, status post TAVR, RVSP 30 mmHg  - Con't statin, ASA, metoprolol, and lisinopril.     Paroxysmal atrial fibrillation (CMS-HCC)- (present on admission)   Assessment & Plan    - Stable. Xarelto discontinued by Cardiology in August 2018 due to high fall risk per chart review.  - Continue Metoprolol

## 2019-05-18 NOTE — CARE PLAN
Problem: Safety  Goal: Will remain free from falls  Outcome: PROGRESSING SLOWER THAN EXPECTED    Intervention: Implement fall precautions  Call light and personal belongings within reach. Pt instructed to call for assistance,need reinforcement. Non skid socks on. Strip alarm in place. Bed in lowest position,hourly rounding in placed,room close to nursing station./

## 2019-05-18 NOTE — PROGRESS NOTES
No changes in pt assessment or level of care required since assuming care this evening. Hourly rounding and bed alarm in place.

## 2019-05-18 NOTE — PROGRESS NOTES
Pt pleasant and calm today with no c/o pain or n/v. Up to wheelchair this morning, enjoys coloring to occupy time. Bed and chair alarms in use as pt attempts to get up without assistance, continued education regarding this provided.   VS stable and no change in clinical status.

## 2019-05-18 NOTE — CARE PLAN
Problem: Discharge Barriers/Planning  Goal: Patient's continuum of care needs will be met  Outcome: PROGRESSING AS EXPECTED  VS stable and no change in clinical status today.   Up to wheelchair and ambulating to bathroom as well as short distances.    Problem: Skin Integrity  Goal: Risk for impaired skin integrity will decrease  Outcome: PROGRESSING AS EXPECTED  Up to chair, ambulating as tolerated, linen changes and skin care with incontinence

## 2019-05-18 NOTE — PROGRESS NOTES
Patient is awake and alert,room air,assumed care given at the beginning of the shift bed alarm on,bed in low position,room close to nursing station.

## 2019-05-18 NOTE — PROGRESS NOTES
Internal Medicine Interval Note  Note Author: Lawrence Grove D.O.     Name Wally Torres       1933   Age/Sex 85 y.o. male   MRN [id number]   Code Status DNAR/DNI     After 5PM or if no immediate response to page, please call for cross-coverage  Attending/Team: Dr. Noe / CRISTI Cage See Patient List for primary contact information  Call [phone] to page    1st Call - Day Intern (R1):   Dr. Grove 2nd Call - Day Sr. Resident (R2/R3):   Dr. Castrejon         Reason for interval visit    Cognitive impairment      Interval Problem Daily Status Update   Patient examined while sitting at lobby table, coloring. Patient denies any acute concerns.    Discharge/Barriers to discharge:   It is my medical judgment, that the patient requires continued medically necessary hospital services for dementia with intermittent behavioral disturbances, failure to thrive and inability to care for self. Patient will remain in the hospital until guardianship is obtained. Discharged plan will be deferred until guardianship is obtained.     Review of Systems   Reason unable to perform ROS: Limited due to patient condition.   Musculoskeletal: Negative for falls.       Disposition/Barriers to discharge:   Pending placement/ guardianship      Consultants/Specialty  Palliative care    Ethics  PCP: Halie Dominique M.D.      Quality Measures  Quality-Core Measures   Reviewed items::  Medications reviewed and Labs reviewed  Baldwin catheter::  No Baldwin  DVT prophylaxis pharmacological::  Heparin    Physical Exam       Vitals:    19 1300 19 1500 19 2000 19 0400   BP: (!) 84/48 105/44 (!) 96/56 114/68   Pulse: 66  65 87   Resp: 18  18 18   Temp: 36.3 °C (97.3 °F)  37.2 °C (98.9 °F) 36.9 °C (98.4 °F)   TempSrc: Axillary  Temporal Temporal   SpO2: 96%  93% 95%   Weight:       Height:         Body mass index is 22.23 kg/m².    Oxygen Therapy:  Pulse Oximetry: 95 %, O2 Delivery: None (Room  Air)    Physical Exam   Constitutional: He appears malnourished. No distress.   Elderly  male coloring with markers   HENT:   Head: Normocephalic and atraumatic.   Mouth/Throat: Oropharynx is clear and moist.   Eyes: EOM are normal. No scleral icterus.   Neck: Normal range of motion.   Cardiovascular: Normal rate.    Pulmonary/Chest: Effort normal. No respiratory distress.   Abdominal: He exhibits no distension.   Musculoskeletal: Normal range of motion. He exhibits no edema.   Diffuse muscle wasting   Neurological: He is alert.   Skin: Skin is warm. He is not diaphoretic.   Nursing note and vitals reviewed.      Assessment/Plan     * Cognitive impairment- (present on admission)   Assessment & Plan    - Unable to care for self, progressive decline per wife   - Ethics and Palliative Care consulted, appreciate recommendations.   - Pending guardianship      Age-related physical debility- (present on admission)   Assessment & Plan    - Previously assessed by PT and OT, SNF recommended for further therapy needs.  - PT and OT following for this admission, appreciate help.     Cardiac pacemaker in situ- (present on admission)   Assessment & Plan    For sick sinus rhythm     SSS (sick sinus syndrome) (HCC)- (present on admission)   Assessment & Plan    - Status post pacemaker. 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     Risk for falls- (present on admission)   Assessment & Plan    - Fall precautions, PT and OT consulted, appreciate recs.     Hypertension, essential- (present on admission)   Assessment & Plan    - Continue metoprolol, lisinopril     Dyslipidemia- (present on admission)   Assessment & Plan    - Continue home statin      Other constipation   Assessment & Plan    - on bowel protocol      Dysphagia   Assessment & Plan    - Found to have dysphagia, high risk aspiration.   - Advanced directive specified no  feeding tube   - Speech on board  - 1:1 feeding, tolerating well      Hypothyroidism- (present on admission)   Assessment & Plan    - TSH 0.54 on 3/21/19  - Continue home levothyroxine     Hx of CABG- (present on admission)   Assessment & Plan    - Continue aspirin and statin.     Gout of multiple sites- (present on admission)   Assessment & Plan    - Continue home allopurinol      PVD (peripheral vascular disease) (CMS-HCC)- (present on admission)   Assessment & Plan    - History of prior L superficial femoral artery stent, known high grade stenosis of R common femoral artery closed with Starclose closure by Dr. Amaya on 03/22/17. Stable, no acute complains.      CAD (coronary artery disease)- (present on admission)   Assessment & Plan    History of 3V CABG with left internal mammary artery graft to LAD, saphenous vein graft to obtuse marginal branch and saphenous vein graft to posterior descending artery by Dr. Coates in March 2018.  - Echo EF 65%, status post TAVR, RVSP 30 mmHg  - Con't statin, ASA, metoprolol, and lisinopril.     Paroxysmal atrial fibrillation (CMS-HCC)- (present on admission)   Assessment & Plan    - Stable. Xarelto discontinued by Cardiology in August 2018 due to high fall risk per chart review.  - Continue Metoprolol

## 2019-05-19 NOTE — CARE PLAN
Problem: Safety  Goal: Will remain free from falls  Outcome: PROGRESSING AS EXPECTED  Room adjacent to nurses station. Bed, chair alarms in use. Continued education to both pt and spouse regarding safe mobilization and necessity to have nursing staff assist with ambulation.    Problem: Mobility  Goal: Risk for activity intolerance will decrease  Outcome: PROGRESSING AS EXPECTED  Staff assisted ambulation, ambulate and sit at chair throughout day as tolerated

## 2019-05-19 NOTE — PROGRESS NOTES
Pt alert and pleasant today. No c/o pain or n/v, tolerating diet with good appetite. Up to chair and ambulating to bathroom as tolerated. Has had multiple incontinent urine episodes today, offer bathroom more frequently. Spouse very attentive and wishing to help pt with ambulating, she has been educated regarding safety in this situation, that staff needs to ambulate pt. Reinforcement needed. VS stable and no change in clinical status.

## 2019-05-19 NOTE — PROGRESS NOTES
Internal Medicine Interval Note  Note Author: Vinod Castrejon M.D.     Name Wally Torres       1933   Age/Sex 85 y.o. male   MRN [id number]   Code Status DNAR/DNI     After 5PM or if no immediate response to page, please call for cross-coverage  Attending/Team: Dr. Noe / CRISTI Cage See Patient List for primary contact information  Call [phone] to page    1st Call - Day Intern (R1):   Dr. Grove 2nd Call - Day Sr. Resident (R2/R3):   Dr. Castrejon         Reason for interval visit    Cognitive impairment      Interval Problem Daily Status Update   No overnight events reported. Lying comfortably in bed in no acute distress    Discharge/Barriers to discharge:   It is my medical judgment, that the patient requires continued medically necessary hospital services for dementia with intermittent behavioral disturbances, failure to thrive and inability to care for self. Patient will remain in the hospital until guardianship is obtained. Discharged plan will be deferred until guardianship is obtained.     Review of Systems   Unable to perform ROS: Dementia (Limited due to patient condition)   Musculoskeletal: Negative for falls.       Disposition/Barriers to discharge:   Pending placement/ guardianship      Consultants/Specialty  Palliative care    Ethics  PCP: Halie Dominique M.D.      Quality Measures  Quality-Core Measures   Reviewed items::  Medications reviewed and Labs reviewed  Baldwin catheter::  No Baldwin  DVT prophylaxis pharmacological::  Heparin    Physical Exam       Vitals:    19 1709 19 2010 19 0500 19 0735   BP: 109/55 112/56 113/70 146/71   Pulse: 72 62 61 63   Resp: 16 18 17 16   Temp:  36.9 °C (98.4 °F) 36.9 °C (98.4 °F) 36.4 °C (97.5 °F)   TempSrc:  Temporal Temporal Temporal   SpO2:  95% 95% 100%   Weight:       Height:         Body mass index is 22.23 kg/m².    Oxygen Therapy:  Pulse Oximetry: 100 %, O2 Delivery: None (Room Air)    Physical Exam    Constitutional: He appears malnourished. No distress.   Thin appearing elderly  male   HENT:   Head: Normocephalic and atraumatic.   Mouth/Throat: Oropharynx is clear and moist.   Eyes: EOM are normal. No scleral icterus.   Neck: Normal range of motion. No tracheal deviation present.   Cardiovascular: Normal rate.    Pulmonary/Chest: Effort normal. No stridor. No respiratory distress.   Abdominal: He exhibits no distension. There is no guarding.   Musculoskeletal: Normal range of motion. He exhibits no edema.   Diffuse muscle wasting   Neurological: He is alert.   Skin: Skin is warm and dry. He is not diaphoretic. No erythema.   Nursing note and vitals reviewed.      Assessment/Plan     * Cognitive impairment- (present on admission)   Assessment & Plan    - Unable to care for self, progressive decline per wife   - Ethics and Palliative Care evaluated the patient. All in agreement that he will need guardianship and placement which are pending.      Age-related physical debility- (present on admission)   Assessment & Plan    - PT/OT following and recommended SNF for further therapy needs.     Cardiac pacemaker in situ- (present on admission)   Assessment & Plan    For sick sinus rhythm     SSS (sick sinus syndrome) (HCC)- (present on admission)   Assessment & Plan    - Status post pacemaker. 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     Risk for falls- (present on admission)   Assessment & Plan    - Fall precautions, PT and OT consulted, appreciate recs.     Hypertension, essential- (present on admission)   Assessment & Plan    - Stable on metoprolol and lisinopril     Dyslipidemia- (present on admission)   Assessment & Plan    - Continue statin     Other constipation   Assessment & Plan    - on bowel protocol      Dysphagia   Assessment & Plan    - Found to have dysphagia, high risk aspiration.   - Advanced  directive specified no feeding tube   - Speech on board  - 1:1 feeding, tolerating well   - afebrile, no cough.     Hypothyroidism- (present on admission)   Assessment & Plan    - TSH 0.54 on 3/21/19  - Continue home levothyroxine     Hx of CABG- (present on admission)   Assessment & Plan    - On aspirin and statin.     Gout of multiple sites- (present on admission)   Assessment & Plan    - Continue home allopurinol      PVD (peripheral vascular disease) (CMS-HCC)- (present on admission)   Assessment & Plan    - History of prior L superficial femoral artery stent, known high grade stenosis of R common femoral artery closed with Starclose closure by Dr. Amaya on 03/22/17. Stable, no acute complains.      CAD (coronary artery disease)- (present on admission)   Assessment & Plan    History of 3V CABG with left internal mammary artery graft to LAD, saphenous vein graft to obtuse marginal branch and saphenous vein graft to posterior descending artery by Dr. Coates in March 2018.  - Echo EF 65%, status post TAVR, RVSP 30 mmHg  - Con't statin, ASA, metoprolol, and lisinopril.  - No acute changes     Paroxysmal atrial fibrillation (CMS-HCC)- (present on admission)   Assessment & Plan    - Stable. Xarelto discontinued by Cardiology in August 2018 due to high fall risk per chart review.  - Continue Metoprolol

## 2019-05-20 NOTE — PROGRESS NOTES
Internal Medicine Interval Note  Note Author: Rudi Grande M.D.     Name Wally Torres       1933   Age/Sex 85 y.o. male   MRN [id number]   Code Status DNAR/DNI     After 5PM or if no immediate response to page, please call for cross-coverage  Attending/Team: Dr. Noe / CRISTI Cage See Patient List for primary contact information  Call [phone] to page    1st Call - Day Intern (R1):   Dr. Grove 2nd Call - Day Sr. Resident (R2/R3):   Dr. Castrejon         Reason for interval visit    Cognitive impairment      Interval Problem Daily Status Update   No acute events overnight.  Patient lying comfortably in bed and in no acute distress.        Discharge/Barriers to discharge:   It is my medical judgment, that the patient requires continued medically necessary hospital services for dementia with intermittent behavioral disturbances, failure to thrive and inability to care for self. Patient will remain in the hospital until guardianship is obtained. Discharged plan will be deferred until guardianship is obtained.     Review of Systems   Unable to perform ROS: Dementia (Limited due to patient condition)   Musculoskeletal: Negative for falls.       Disposition/Barriers to discharge:   Pending placement/ guardianship      Consultants/Specialty  Palliative care    Ethics  PCP: Halie Dominique M.D.      Quality Measures  Quality-Core Measures   Reviewed items::  Medications reviewed and Labs reviewed  Baldwin catheter::  No Baldwin  DVT prophylaxis pharmacological::  Heparin    Physical Exam       Vitals:    19 1550 19 2000 19 0400 19 0700   BP: 142/65 101/63 141/98 101/46   Pulse: 62 69 71 62   Resp: 16 17 18 16   Temp: 36.9 °C (98.4 °F) 36.9 °C (98.4 °F) 36.9 °C (98.4 °F) 36.4 °C (97.6 °F)   TempSrc: Temporal Temporal Temporal Temporal   SpO2: 98% 97% 95% 91%   Weight:       Height:         Body mass index is 22.23 kg/m².    Oxygen Therapy:  Pulse Oximetry: 91 %, O2  Delivery: None (Room Air)    Physical Exam   Constitutional: He appears malnourished. No distress.   Thin appearing elderly  male   HENT:   Head: Normocephalic and atraumatic.   Mouth/Throat: Oropharynx is clear and moist.   Eyes: EOM are normal. No scleral icterus.   Neck: Normal range of motion. No tracheal deviation present.   Cardiovascular: Normal rate.    Pulmonary/Chest: Effort normal. No stridor. No respiratory distress.   Abdominal: He exhibits no distension. There is no guarding.   Musculoskeletal: Normal range of motion. He exhibits no edema.   Diffuse muscle wasting   Neurological: He is alert.   Skin: Skin is warm and dry. He is not diaphoretic. No erythema.   Nursing note and vitals reviewed.      Assessment/Plan     * Cognitive impairment- (present on admission)   Assessment & Plan    - Unable to care for self, progressive decline per wife   - Ethics and Palliative Care evaluated the patient. All in agreement that he will need guardianship and placement which are pending.      Age-related physical debility- (present on admission)   Assessment & Plan    - PT/OT following and recommended SNF for further therapy needs.     Cardiac pacemaker in situ- (present on admission)   Assessment & Plan    For sick sinus rhythm     SSS (sick sinus syndrome) (HCC)- (present on admission)   Assessment & Plan    - Status post pacemaker. 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     Risk for falls- (present on admission)   Assessment & Plan    - Fall precautions, PT and OT consulted, appreciate recs.     Hypertension, essential- (present on admission)   Assessment & Plan    - Stable on metoprolol and lisinopril     Dyslipidemia- (present on admission)   Assessment & Plan    - Continue statin     Other constipation   Assessment & Plan    - on bowel protocol      Dysphagia   Assessment & Plan    - Found to have dysphagia,  high risk aspiration.   - Advanced directive specified no feeding tube   - Speech on board  - 1:1 feeding, tolerating well   - afebrile, no cough.     Hypothyroidism- (present on admission)   Assessment & Plan    - TSH 0.54 on 3/21/19  - Continue home levothyroxine     Hx of CABG- (present on admission)   Assessment & Plan    - On aspirin and statin.     Gout of multiple sites- (present on admission)   Assessment & Plan    - Continue home allopurinol      PVD (peripheral vascular disease) (CMS-HCC)- (present on admission)   Assessment & Plan    - History of prior L superficial femoral artery stent, known high grade stenosis of R common femoral artery closed with Starclose closure by Dr. Amaya on 03/22/17. Stable, no acute complains.      CAD (coronary artery disease)- (present on admission)   Assessment & Plan    History of 3V CABG with left internal mammary artery graft to LAD, saphenous vein graft to obtuse marginal branch and saphenous vein graft to posterior descending artery by Dr. Coates in March 2018.  - Echo EF 65%, status post TAVR, RVSP 30 mmHg  - Con't statin, ASA, metoprolol, and lisinopril.  - No acute changes     Paroxysmal atrial fibrillation (CMS-HCC)- (present on admission)   Assessment & Plan    - Stable. Xarelto discontinued by Cardiology in August 2018 due to high fall risk per chart review.  - Continue Metoprolol

## 2019-05-20 NOTE — PROGRESS NOTES
Assumed care of pt from Marybeth Black. Pt has no complaints of pain/discomfort at this time. Pt is sitting up in bed, smiling, with wife at bedside.   Call light within reach, treaded slipper socks on, bed locked in lowest position. Mobility and fall risk assessed- proper communication signs are in place. Bed alarm is ON

## 2019-05-20 NOTE — PROGRESS NOTES
Pt alert and feeling well, no c/o pain or n/v. Good appetite, assistance provided. VS stable and not change in clinical status.

## 2019-05-20 NOTE — CARE PLAN
Problem: Safety  Goal: Will remain free from falls  Outcome: PROGRESSING AS EXPECTED  Continuing education to pt and spouse regarding safe mobilization, calling staff for assistance.   Room adjacent to nurses station and bed/chair alarms in use.    Problem: Psychosocial Needs:  Goal: Level of anxiety will decrease  Outcome: PROGRESSING AS EXPECTED  Distraction, redirection and orientation regarding POC. Spouse bedside. Pt enjoys sitting in chair coloring

## 2019-05-21 NOTE — PROGRESS NOTES
Internal Medicine Interval Note  Note Author: Rudi Grande M.D.     Name Wally Torres       1933   Age/Sex 85 y.o. male   MRN [id number]   Code Status DNAR/DNI     After 5PM or if no immediate response to page, please call for cross-coverage  Attending/Team: Dr. Noe / CRISTI Cage See Patient List for primary contact information  Call [phone] to page    1st Call - Day Intern (R1):   Dr. Grove 2nd Call - Day Sr. Resident (R2/R3):   Dr. Castrejon         Reason for interval visit    Cognitive impairment      Interval Problem Daily Status Update   No acute events overnight.  Patient is resting comfortably in his bed, not in any acute distress.    Discharge/Barriers to discharge:   It is my medical judgment, that the patient requires continued medically necessary hospital services for dementia with intermittent behavioral disturbances, failure to thrive and inability to care for self. Patient will remain in the hospital until guardianship is obtained. Discharged plan will be deferred until guardianship is obtained.     Review of Systems   Unable to perform ROS: Dementia (Limited due to patient condition)   Musculoskeletal: Negative for falls.       Disposition/Barriers to discharge:   Pending placement/ guardianship      Consultants/Specialty  Palliative care    Ethics  PCP: Halie Dominique M.D.      Quality Measures  Quality-Core Measures   Reviewed items::  Medications reviewed and Labs reviewed  Baldwin catheter::  No Baldwin  DVT prophylaxis pharmacological::  Heparin    Physical Exam       Vitals:    19 1915 19 0310 19 0700 19 1332   BP: 112/57 132/60 128/62 136/52   Pulse: 72 72 71 70   Resp: 17 17 16 16   Temp: 36.7 °C (98.1 °F) 36.4 °C (97.6 °F) 36.6 °C (97.8 °F) 36.7 °C (98 °F)   TempSrc: Temporal Temporal Temporal Temporal   SpO2: 90% 100% 94% 95%   Weight:       Height:         Body mass index is 22.23 kg/m².    Oxygen Therapy:  Pulse Oximetry: 95 %, O2  Delivery: None (Room Air)    Physical Exam   Constitutional: He appears malnourished. No distress.   Thin appearing elderly  male   HENT:   Head: Normocephalic and atraumatic.   Mouth/Throat: Oropharynx is clear and moist.   Eyes: EOM are normal. No scleral icterus.   Neck: Normal range of motion. No tracheal deviation present.   Cardiovascular: Normal rate.    Pulmonary/Chest: Effort normal. No stridor. No respiratory distress.   Abdominal: He exhibits no distension. There is no guarding.   Musculoskeletal: Normal range of motion. He exhibits no edema.   Diffuse muscle wasting   Neurological: He is alert.   Skin: Skin is warm and dry. He is not diaphoretic. No erythema.   Nursing note and vitals reviewed.      Assessment/Plan     * Cognitive impairment- (present on admission)   Assessment & Plan    - Unable to care for self, progressive decline per wife   - Ethics and Palliative Care evaluated the patient. All in agreement that he will need guardianship and placement which are pending.      Age-related physical debility- (present on admission)   Assessment & Plan    - PT/OT following and recommended SNF for further therapy needs.     Cardiac pacemaker in situ- (present on admission)   Assessment & Plan    For sick sinus rhythm     SSS (sick sinus syndrome) (HCC)- (present on admission)   Assessment & Plan    - Status post pacemaker. 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     Risk for falls- (present on admission)   Assessment & Plan    - Fall precautions, PT and OT consulted, appreciate recs.     Hypertension, essential- (present on admission)   Assessment & Plan    - Stable on metoprolol and lisinopril     Dyslipidemia- (present on admission)   Assessment & Plan    - Continue statin     Other constipation   Assessment & Plan    - on bowel protocol      Dysphagia   Assessment & Plan    - Found to have dysphagia,  high risk aspiration.   - Advanced directive specified no feeding tube   - Speech on board  - 1:1 feeding, tolerating well   - afebrile, no cough.     Hypothyroidism- (present on admission)   Assessment & Plan    - TSH 0.54 on 3/21/19  - Continue home levothyroxine     Hx of CABG- (present on admission)   Assessment & Plan    - On aspirin and statin.     Gout of multiple sites- (present on admission)   Assessment & Plan    - Continue home allopurinol      PVD (peripheral vascular disease) (CMS-HCC)- (present on admission)   Assessment & Plan    - History of prior L superficial femoral artery stent, known high grade stenosis of R common femoral artery closed with Starclose closure by Dr. Amaya on 03/22/17. Stable, no acute complains.      CAD (coronary artery disease)- (present on admission)   Assessment & Plan    History of 3V CABG with left internal mammary artery graft to LAD, saphenous vein graft to obtuse marginal branch and saphenous vein graft to posterior descending artery by Dr. Coates in March 2018.  - Echo EF 65%, status post TAVR, RVSP 30 mmHg  - Con't statin, ASA, metoprolol, and lisinopril.  - No acute changes     Paroxysmal atrial fibrillation (CMS-HCC)- (present on admission)   Assessment & Plan    - Stable. Xarelto discontinued by Cardiology in August 2018 due to high fall risk per chart review.  - Continue Metoprolol

## 2019-05-21 NOTE — PROGRESS NOTES
Alert to self, bed alarm in place for impulsiveness without calling. Wife in the same room and often tends to his needs. Took his medications without a problem. Cont plan of care, call light within reach and visual checks through the night

## 2019-05-21 NOTE — CARE PLAN
Problem: Safety  Goal: Will remain free from falls  Outcome: PROGRESSING AS EXPECTED  Room next to nurses station, bed and chair alarms in use, immediate response to alarms and staff assisted ambulation/transfers    Problem: Psychosocial Needs:  Goal: Level of anxiety will decrease  Outcome: PROGRESSING AS EXPECTED  Orientation, distraction and redirection. Spouse staying in same room.

## 2019-05-21 NOTE — CARE PLAN
Problem: Safety  Goal: Will remain free from injury  Outcome: PROGRESSING AS EXPECTED  Bed alarm on for safety and assistance out of bed    Problem: Discharge Barriers/Planning  Goal: Patient's continuum of care needs will be met  Outcome: PROGRESSING SLOWER THAN EXPECTED  Awaiting the guardianship process

## 2019-05-21 NOTE — PROGRESS NOTES
Pt alert, up to bathroom, up to chair for meals. No c/o pain and good appetite. VS stable and no change in clinical status.

## 2019-05-22 NOTE — PROGRESS NOTES
Internal Medicine Interval Note  Note Author: Rudi Grande M.D.     Name Wally Torres       1933   Age/Sex 85 y.o. male   MRN [id number]   Code Status DNAR/DNI     After 5PM or if no immediate response to page, please call for cross-coverage  Attending/Team: Dr. Noe / RCISTI Cage See Patient List for primary contact information  Call [phone] to page    1st Call - Day Intern (R1):   Dr. Grove 2nd Call - Day Sr. Resident (R2/R3):   Dr. Castrejon         Reason for interval visit    Cognitive impairment      Interval Problem Daily Status Update     No acute events overnight.  Patient is resting comfortably in his bed, alert and responding slowly to questions.  Not in any acute distress.      Discharge/Barriers to discharge:   It is my medical judgment, that the patient requires continued medically necessary hospital services for dementia with intermittent behavioral disturbances, failure to thrive and inability to care for self. Patient will remain in the hospital until guardianship is obtained. Discharged plan will be deferred until guardianship is obtained.     Review of Systems   Unable to perform ROS: Dementia (Limited due to patient condition)   Musculoskeletal: Negative for falls.       Disposition/Barriers to discharge:   Pending placement/ guardianship      Consultants/Specialty  Palliative care    Ethics  PCP: Halie Dominique M.D.      Quality Measures  Quality-Core Measures   Reviewed items::  Medications reviewed and Labs reviewed  Baldwin catheter::  No Baldwin  DVT prophylaxis pharmacological::  Heparin    Physical Exam       Vitals:    19 1715 19 1945 19 0255 19 0800   BP: 139/53 103/53 105/63 120/57   Pulse: 74 67 64 60   Resp:  16 17 18   Temp:  36.7 °C (98.1 °F) 36.4 °C (97.5 °F) 36.4 °C (97.6 °F)   TempSrc:  Temporal Temporal Temporal   SpO2:  99% 100% 96%   Weight:       Height:         Body mass index is 22.23 kg/m².    Oxygen Therapy:  Pulse  Oximetry: 96 %, O2 (LPM): 0, O2 Delivery: None (Room Air)    Physical Exam   Constitutional: He appears malnourished. No distress.   Thin appearing elderly  male   HENT:   Head: Normocephalic and atraumatic.   Mouth/Throat: Oropharynx is clear and moist.   Eyes: EOM are normal. No scleral icterus.   Neck: Normal range of motion. No tracheal deviation present.   Cardiovascular: Normal rate.    Pulmonary/Chest: Effort normal. No stridor. No respiratory distress.   Abdominal: He exhibits no distension. There is no guarding.   Musculoskeletal: Normal range of motion. He exhibits no edema.   Diffuse muscle wasting   Neurological: He is alert.   Skin: Skin is warm and dry. He is not diaphoretic. No erythema.   Nursing note and vitals reviewed.      Assessment/Plan     * Cognitive impairment- (present on admission)   Assessment & Plan    - Unable to care for self, progressive decline per wife   - Ethics and Palliative Care evaluated the patient. All in agreement that he will need guardianship and placement which are pending.      Age-related physical debility- (present on admission)   Assessment & Plan    - PT/OT following and recommended SNF for further therapy needs.     Cardiac pacemaker in situ- (present on admission)   Assessment & Plan    For sick sinus rhythm     SSS (sick sinus syndrome) (HCC)- (present on admission)   Assessment & Plan    - Status post pacemaker. 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     Risk for falls- (present on admission)   Assessment & Plan    - Fall precautions, PT and OT consulted, appreciate recs.     Hypertension, essential- (present on admission)   Assessment & Plan    - Stable on metoprolol and lisinopril     Dyslipidemia- (present on admission)   Assessment & Plan    - Continue statin     Other constipation   Assessment & Plan    - on bowel protocol      Dysphagia   Assessment &  Plan    - Found to have dysphagia, high risk aspiration.   - Advanced directive specified no feeding tube   - Speech on board  - 1:1 feeding, tolerating well   - afebrile, no cough.     Hypothyroidism- (present on admission)   Assessment & Plan    - TSH 0.54 on 3/21/19  - Continue home levothyroxine     Hx of CABG- (present on admission)   Assessment & Plan    - On aspirin and statin.     Gout of multiple sites- (present on admission)   Assessment & Plan    - Continue home allopurinol      PVD (peripheral vascular disease) (CMS-HCC)- (present on admission)   Assessment & Plan    - History of prior L superficial femoral artery stent, known high grade stenosis of R common femoral artery closed with Starclose closure by Dr. Amaya on 03/22/17. Stable, no acute complains.      CAD (coronary artery disease)- (present on admission)   Assessment & Plan    History of 3V CABG with left internal mammary artery graft to LAD, saphenous vein graft to obtuse marginal branch and saphenous vein graft to posterior descending artery by Dr. Coates in March 2018.  - Echo EF 65%, status post TAVR, RVSP 30 mmHg  - Con't statin, ASA, metoprolol, and lisinopril.  - No acute changes     Paroxysmal atrial fibrillation (CMS-HCC)- (present on admission)   Assessment & Plan    - Stable. Xarelto discontinued by Cardiology in August 2018 due to high fall risk per chart review.  - Continue Metoprolol

## 2019-05-22 NOTE — CARE PLAN
Problem: Safety  Goal: Will remain free from injury  Outcome: PROGRESSING AS EXPECTED  Bed alarm on for safety and assistance out of bed    Problem: Discharge Barriers/Planning  Goal: Patient's continuum of care needs will be met  Outcome: PROGRESSING SLOWER THAN EXPECTED  Awaiting guardianship process

## 2019-05-22 NOTE — PROGRESS NOTES
Alert to self, multiple attempts of getting out of bed- bed alarm in place. Wife at the bedside assisting with him. Cont plan of care, call light within reach and visual checks through the night

## 2019-05-22 NOTE — CARE PLAN
Problem: Venous Thromboembolism (VTW)/Deep Vein Thrombosis (DVT) Prevention:  Goal: Patient will participate in Venous Thrombosis (VTE)/Deep Vein Thrombosis (DVT)Prevention Measures  Outcome: PROGRESSING AS EXPECTED  Heparin administered as ordered.   Encouraged mobility as tolerated    Problem: Mobility  Goal: Risk for activity intolerance will decrease  Outcome: PROGRESSING AS EXPECTED  Up to chair for meals, ambulate to bathroom, ambulate in abel as tolerated

## 2019-05-23 NOTE — PROGRESS NOTES
BP improved post 500cc bolus. HR 59 and paged [facility] blue with updated values. Per [facility] ok to hold 6pm metoprolol with current VS values.

## 2019-05-23 NOTE — PROGRESS NOTES
Patient BP low at 85/37- paged [facility] blue to make aware and per [facility] recheck BP in 45min. Patient asymptomatic at this time. Will monitor.

## 2019-05-23 NOTE — PROGRESS NOTES
Re-checked BP and BP 80/38 with manual BP cuff. [facility] blue paged and per [facility] will place IV and give 500cc bolus. Will recheck BP post bolus.     MEWS score three due to low BP. MD is aware of current VS.

## 2019-05-23 NOTE — CARE PLAN
Problem: Knowledge Deficit  Goal: Knowledge of disease process/condition, treatment plan, diagnostic tests, and medications will improve  Patient reoriented as needed as patient is only oriented x1. Patient forgetful and needs frequent orientation.     Problem: Pain Management  Goal: Pain level will decrease to patient's comfort goal  Outcome: PROGRESSING AS EXPECTED  Patient denies pain at this time, patient resting in bed and appears comfortable.

## 2019-05-23 NOTE — PROGRESS NOTES
Patient alert to self, vss, denies pain. Patient up to bathroom with assist x 1, bed alarm in place, non skid socks on. Patient able to turn self in bed however is not able to be educated on importance and needs help making sure patient is turned. Patient noted to have blanching redness to coccyx however this is close to becoming stage one pressure ulcer. mepilex not used as patient is incontinent. Patient on waffle overlay, barrier cream applied, and patient turned to right side to get pressure of coccyx. Patient awaiting guardianship and placement.

## 2019-05-23 NOTE — PROGRESS NOTES
Internal Medicine Interval Note  Note Author: Vinod Castrejon M.D.     Name Wally Torres       1933   Age/Sex 85 y.o. male   MRN [id number]   Code Status DNAR/DNI     After 5PM or if no immediate response to page, please call for cross-coverage  Attending/Team: Dr. Noe / CRISTI Cage See Patient List for primary contact information  Call [phone] to page    1st Call - Day Intern (R1):   Dr. Grove 2nd Call - Day Sr. Resident (R2/R3):   Dr. Castrejon         Reason for interval visit    Cognitive impairment      Interval Problem Daily Status Update     No acute events overnight.  Sleeps most of the time. No agitation    Discharge/Barriers to discharge:   It is my medical judgment, that the patient requires continued medically necessary hospital services for dementia with intermittent behavioral disturbances, failure to thrive and inability to care for self. Patient will remain in the hospital until guardianship is obtained. Discharged plan will be deferred until guardianship is obtained.     Review of Systems   Unable to perform ROS: Dementia (Limited due to patient condition)   Musculoskeletal: Negative for falls.       Disposition/Barriers to discharge:   Pending placement/ guardianship      Consultants/Specialty  Palliative care    Ethics  PCP: Halie Dominique M.D.      Quality Measures  Quality-Core Measures   Reviewed items::  Medications reviewed and Labs reviewed  Baldwin catheter::  No Baldwin  DVT prophylaxis pharmacological::  Heparin    Physical Exam       Vitals:    19 0441 19 0754 19 1452 19 1534   BP: 126/52 130/58 (!) 85/37 (!) 80/38   Pulse: 67 61 60    Resp: 16 15 15    Temp: 36.1 °C (96.9 °F) 36.3 °C (97.4 °F) 36.3 °C (97.3 °F)    TempSrc: Temporal Temporal Temporal    SpO2: 97% 97%     Weight:       Height:         Body mass index is 22.23 kg/m².    Oxygen Therapy:  Pulse Oximetry: 97 %, O2 (LPM): 0, O2 Delivery: None (Room Air)    Physical Exam    Constitutional: He appears malnourished. No distress.   Thin appearing elderly  male   HENT:   Head: Normocephalic and atraumatic.   Mouth/Throat: Oropharynx is clear and moist.   Eyes: EOM are normal. No scleral icterus.   Neck: Normal range of motion. No tracheal deviation present.   Cardiovascular: Normal rate.  Exam reveals no gallop and no friction rub.    Pulmonary/Chest: Effort normal. No stridor. No respiratory distress. He has no wheezes. He has no rales.   Abdominal: He exhibits no distension. There is no tenderness. There is no guarding.   Musculoskeletal: Normal range of motion. He exhibits no edema.   Diffuse muscle wasting   Neurological: He is alert.   Skin: Skin is warm and dry. He is not diaphoretic. No erythema.   Psychiatric: Affect normal.   Nursing note and vitals reviewed.      Assessment/Plan     * Cognitive impairment- (present on admission)   Assessment & Plan    - Unable to care for self, progressive decline per wife   - Ethics and Palliative Care evaluated the patient. All in agreement that he will need guardianship and placement which are pending.   - No changes since admission     Age-related physical debility- (present on admission)   Assessment & Plan    - PT/OT following and recommended SNF for further therapy needs.  - Does not get out of bed much  - Significant muscle wasting diffusely      Cardiac pacemaker in situ- (present on admission)   Assessment & Plan    For sick sinus rhythm     SSS (sick sinus syndrome) (HCC)- (present on admission)   Assessment & Plan    - Status post pacemaker. 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     Risk for falls- (present on admission)   Assessment & Plan    - Fall precautions, PT and OT consulted, appreciate recs.  - Ambulation with assistance only     Hypertension, essential- (present on admission)   Assessment & Plan    - Stable on  metoprolol and lisinopril  - Hypotensive this evening. Repeat vitals same. Asymptomatic. Bolus 500cc x1. If remains low, will hold night dose of lisinopril     Dyslipidemia- (present on admission)   Assessment & Plan    - Continue statin     Other constipation   Assessment & Plan    - on bowel protocol   - target BMs at least every other day     Dysphagia   Assessment & Plan    - Found to have dysphagia, high risk aspiration.   - Advanced directive specified no feeding tube   - Speech evaluated and recommended  1:1 feeding. Tolerating well   - afebrile, no cough.     Hypothyroidism- (present on admission)   Assessment & Plan    - TSH 0.54 on 3/21/19  - Continue home levothyroxine     Hx of CABG- (present on admission)   Assessment & Plan    - On aspirin and statin.     Gout of multiple sites- (present on admission)   Assessment & Plan    - Continue home allopurinol      PVD (peripheral vascular disease) (CMS-HCC)- (present on admission)   Assessment & Plan    - History of prior L superficial femoral artery stent, known high grade stenosis of R common femoral artery closed with Starclose closure by Dr. Amaya on 03/22/17. Stable, no acute complains.      CAD (coronary artery disease)- (present on admission)   Assessment & Plan    History of 3V CABG with left internal mammary artery graft to LAD, saphenous vein graft to obtuse marginal branch and saphenous vein graft to posterior descending artery by Dr. Coates in March 2018.  - Echo EF 65%, status post TAVR, RVSP 30 mmHg  - Con't statin, ASA, metoprolol, and lisinopril.  - Denies cp/palpitations, n/v or other s/s. Appears comfortable     Paroxysmal atrial fibrillation (CMS-HCC)- (present on admission)   Assessment & Plan    - Stable. Xarelto discontinued by Cardiology in August 2018 due to high fall risk per chart review.  - Continue Metoprolol

## 2019-05-23 NOTE — PROGRESS NOTES
Patient alert and awake,impulsive and ,bed alarm on and patient is close to nursing station,denies pain ,assumed care given at the beginning of the shift,swallowed meds  And tolerated it well.

## 2019-05-23 NOTE — PROGRESS NOTES
2 RN skin check complete.   Devices in place none.  Skin assessed under devices n/a.  Confirmed pressure ulcers found on none.  New potential pressure ulcers noted on - patient with spot of scar tissue on coccyx that is blanching. Wound consult placed: no as there is no wound in place.  The following interventions in place - waffle overlay to bed, waffle cushion to chair, barrier cream as patient is incontinent, floating heels with pillows. Patient turned Q2 hours. Linens changed as needed to keep dry.     Patient heels also noted to be red and blanching. There is a healed scab to L knee.

## 2019-05-23 NOTE — CARE PLAN
Problem: Safety  Goal: Will remain free from falls  Outcome: PROGRESSING AS EXPECTED    Intervention: Implement fall precautions  Call light and personal belongings within reach. Pt instructed to call for assistance,patient need reinforcement. Non skid socks on. Strip alarm in place. Bed in lowest position.

## 2019-05-24 NOTE — PROGRESS NOTES
Patient is impulsive,tried to get out from the bed,assisted to the bathroom with unsteady gait,assumed care given ,patient is resting and sleeping in bed,monitor patient for safety.

## 2019-05-24 NOTE — PROGRESS NOTES
Internal Medicine Interval Note  Note Author: Vinod Castrejon M.D.     Name Wally Torres       1933   Age/Sex 85 y.o. male   MRN [id number]   Code Status DNAR/DNI     After 5PM or if no immediate response to page, please call for cross-coverage  Attending/Team: Dr. Noe / CRISTI Cage See Patient List for primary contact information  Call [phone] to page    1st Call - Day Intern (R1):   Dr. Grove 2nd Call - Day Sr. Resident (R2/R3):   Dr. Castrejon         Reason for interval visit    Cognitive impairment      Interval Problem Daily Status Update   Asymptomatic hypotension yesterday evening that recovered with IVNS bolus. PM BP meds held. No overnight events. BP stable this am. Remains asymptomatic.      Discharge/Barriers to discharge:   It is my medical judgment, that the patient requires continued medically necessary hospital services for dementia with intermittent behavioral disturbances, failure to thrive and inability to care for self. Patient will remain in the hospital until guardianship is obtained. Discharged plan will be deferred until guardianship is obtained.     Review of Systems   Unable to perform ROS: Dementia (Limited due to patient condition)   Musculoskeletal: Negative for falls.       Disposition/Barriers to discharge:   Pending placement/ guardianship      Consultants/Specialty  Palliative care    Ethics  PCP: Halie Dominique M.D.      Quality Measures  Quality-Core Measures   Reviewed items::  Medications reviewed and Labs reviewed  Baldwin catheter::  No Baldwin  DVT prophylaxis pharmacological::  Heparin    Physical Exam       Vitals:    19 1745 19 2000 19 0500 19 0751   BP: 100/58 (!) 96/51 118/85 145/59   Pulse: 71 62 87 60   Resp:  17 18 15   Temp:  36.4 °C (97.6 °F) 36.4 °C (97.6 °F) 36.4 °C (97.6 °F)   TempSrc:  Temporal Temporal Temporal   SpO2:  94% 95% 97%   Weight:       Height:         Body mass index is 22.23 kg/m².    Oxygen  Therapy:  Pulse Oximetry: 97 %, O2 Delivery: None (Room Air)    Physical Exam   Constitutional: He appears malnourished.   Thin appearing elderly  male in no acute distress.    HENT:   Head: Normocephalic and atraumatic.   Mouth/Throat: Oropharynx is clear and moist.   Eyes: EOM are normal. No scleral icterus.   Neck: Normal range of motion. No JVD present. No tracheal deviation present.   Cardiovascular: Normal rate.  Exam reveals no gallop and no friction rub.    Pulmonary/Chest: Effort normal. No stridor. No respiratory distress. He has no wheezes. He has no rales.   Abdominal: He exhibits no distension. There is no tenderness. There is no guarding.   Musculoskeletal: Normal range of motion. He exhibits no edema.   Diffuse muscle wasting   Neurological: He is alert.   Skin: Skin is warm and dry. He is not diaphoretic. No erythema.   Psychiatric: Affect normal.   Nursing note and vitals reviewed.      Assessment/Plan     * Cognitive impairment- (present on admission)   Assessment & Plan    - Unable to care for self, progressive decline per wife   - Ethics and Palliative Care evaluated the patient. All in agreement that he will need guardianship and placement which are pending.   - No changes since admission     Age-related physical debility- (present on admission)   Assessment & Plan    - PT/OT recommended SNF for further therapy needs.  - Does not get out of bed much  - Significant muscle wasting diffusely      Cardiac pacemaker in situ- (present on admission)   Assessment & Plan    Secondary to sick sinus rhythm     SSS (sick sinus syndrome) (HCC)- (present on admission)   Assessment & Plan    - Status post pacemaker. 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     Risk for falls- (present on admission)   Assessment & Plan    - Fall precautions, PT and OT consulted, appreciate recs.  - Ambulation with assistance  only     Hypertension, essential- (present on admission)   Assessment & Plan    - On metoprolol and lisinopril.   - PM BP meds held 05/23/19 secondary to asymptomatic low SBP that recovered with IVNS bolus.  - Stable today     Dyslipidemia- (present on admission)   Assessment & Plan    - Continue statin     Other constipation   Assessment & Plan    - on bowel protocol   - target BMs at least every other day     Dysphagia   Assessment & Plan    - Found to have dysphagia, high risk aspiration.   - Advanced directive specified no feeding tube   - Speech evaluated and recommended  1:1 feeding. Tolerating well   - afebrile, no cough.     Hypothyroidism- (present on admission)   Assessment & Plan    - TSH 0.54 on 3/21/19  - Continue home levothyroxine     Hx of CABG- (present on admission)   Assessment & Plan    - On aspirin and statin.     Gout of multiple sites- (present on admission)   Assessment & Plan    - Continue home allopurinol      PVD (peripheral vascular disease) (CMS-HCC)- (present on admission)   Assessment & Plan    - History of prior L superficial femoral artery stent, known high grade stenosis of R common femoral artery closed with Starclose closure by Dr. Amaya on 03/22/17. Stable, no acute complains.      CAD (coronary artery disease)- (present on admission)   Assessment & Plan    History of 3V CABG with left internal mammary artery graft to LAD, saphenous vein graft to obtuse marginal branch and saphenous vein graft to posterior descending artery by Dr. Coates in March 2018.  - Echo EF 65%, status post TAVR, RVSP 30 mmHg  - Con't statin, ASA, metoprolol, and lisinopril.  - Denies cp/palpitations, n/v or other s/s.   -Appears comfortable     Paroxysmal atrial fibrillation (CMS-HCC)- (present on admission)   Assessment & Plan    - Stable. Xarelto discontinued by Cardiology in August 2018 due to high fall risk per chart review.  - Continue Metoprolol

## 2019-05-24 NOTE — CARE PLAN
Problem: Venous Thromboembolism (VTW)/Deep Vein Thrombosis (DVT) Prevention:  Goal: Patient will participate in Venous Thrombosis (VTE)/Deep Vein Thrombosis (DVT)Prevention Measures  Outcome: PROGRESSING AS EXPECTED  Patient on heparin shot for VTE prevention and does not have SCDs ordered.     Problem: Bowel/Gastric:  Goal: Normal bowel function is maintained or improved  Outcome: PROGRESSING AS EXPECTED  Patient with BM yesterday.     Problem: Skin Integrity  Goal: Risk for impaired skin integrity will decrease  Outcome: PROGRESSING AS EXPECTED  Patient turned every two hours and patient does not seem to turn self, waffle overlay in use and barrier cream applied to coccyx.

## 2019-05-24 NOTE — PROGRESS NOTES
Patient alert to self, vss, denies pain. Patient assist x 1 with CNA/RN. Patient unsteady on feet and bed alarm in place. Non skid socks on. Patient unable to understand education at this time. Patient does not seem to turn self in bed so RN/CNA turning patient Q2 hours as coccyx is bony and red and blanching. Barrier cream applied and waffle overlay in use. Patient continues to need guardianship and placement for discharge.

## 2019-05-25 NOTE — PROGRESS NOTES
Internal Medicine Interval Note  Note Author: Rudi Grande M.D.     Name Wally Torres       1933   Age/Sex 85 y.o. male   MRN [id number]   Code Status DNAR/DNI     After 5PM or if no immediate response to page, please call for cross-coverage  Attending/Team: Dr. Noe / CRISTI Cage See Patient List for primary contact information  Call [phone] to page    1st Call - Day Intern (R1):   Dr. Grove 2nd Call - Day Sr. Resident (R2/R3):   Dr. Castrejon         Reason for interval visit    Cognitive impairment      Interval Problem Daily Status Update     Patient was sitting up in the chair by his bed when I saw him this morning.  He greets appropriately, however his reactions and speech are very slow.  No other acute changes.      Discharge/Barriers to discharge:   It is my medical judgment, that the patient requires continued medically necessary hospital services for dementia with intermittent behavioral disturbances, failure to thrive and inability to care for self. Patient will remain in the hospital until guardianship is obtained. Discharged plan will be deferred until guardianship is obtained.     Review of Systems   Unable to perform ROS: Dementia (Limited due to patient condition)   Musculoskeletal: Negative for falls.       Disposition/Barriers to discharge:   Pending placement/ guardianship      Consultants/Specialty  Palliative care    Ethics  PCP: Halie Dominique M.D.      Quality Measures  Quality-Core Measures   Reviewed items::  Medications reviewed and Labs reviewed  Baldwin catheter::  No Baldwin  DVT prophylaxis pharmacological::  Heparin    Physical Exam       Vitals:    19 0400 19 0700 19 0800 19 0934   BP: 148/61 (!) 165/76 148/94    Pulse: 60 60 (!) 59    Resp: 18 16     Temp: 36.4 °C (97.6 °F) 36.2 °C (97.1 °F)     TempSrc: Temporal Temporal     SpO2: 99% 98%     Weight:    47.1 kg (103 lb 13.4 oz)   Height:         Body mass index is 22.47  kg/m². Weight: 47.1 kg (103 lb 13.4 oz)  Oxygen Therapy:  Pulse Oximetry: 98 %, O2 Delivery: None (Room Air)    Physical Exam   Constitutional: He appears malnourished.   Thin appearing elderly  male in no acute distress.    HENT:   Head: Normocephalic and atraumatic.   Mouth/Throat: Oropharynx is clear and moist.   Eyes: EOM are normal. No scleral icterus.   Neck: Normal range of motion. No JVD present. No tracheal deviation present.   Cardiovascular: Normal rate.  Exam reveals no gallop and no friction rub.    Pulmonary/Chest: Effort normal. No stridor. No respiratory distress. He has no wheezes. He has no rales.   Abdominal: He exhibits no distension. There is no tenderness. There is no guarding.   Musculoskeletal: Normal range of motion. He exhibits no edema.   Diffuse muscle wasting   Neurological: He is alert.   Skin: Skin is warm and dry. He is not diaphoretic. No erythema.   Psychiatric: Affect normal.   Nursing note and vitals reviewed.      Assessment/Plan     * Cognitive impairment- (present on admission)   Assessment & Plan    - Unable to care for self, progressive decline per wife   - Ethics and Palliative Care evaluated the patient. All in agreement that he will need guardianship and placement which are pending.   - No changes since admission     Age-related physical debility- (present on admission)   Assessment & Plan    - PT/OT recommended SNF for further therapy needs.  - Does not get out of bed much  - Significant muscle wasting diffusely      Cardiac pacemaker in situ- (present on admission)   Assessment & Plan    Secondary to sick sinus rhythm     SSS (sick sinus syndrome) (HCC)- (present on admission)   Assessment & Plan    - Status post pacemaker. 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     Risk for falls- (present on admission)   Assessment & Plan    - Fall precautions, PT and OT consulted,  appreciate recs.  - Ambulation with assistance only     Hypertension, essential- (present on admission)   Assessment & Plan    - On metoprolol and lisinopril.   - PM BP meds held 05/23/19 secondary to asymptomatic low SBP that recovered with IVNS bolus.  - Stable today     Dyslipidemia- (present on admission)   Assessment & Plan    - Continue statin     Other constipation   Assessment & Plan    - on bowel protocol   - target BMs at least every other day     Dysphagia   Assessment & Plan    - Found to have dysphagia, high risk aspiration.   - Advanced directive specified no feeding tube   - Speech evaluated and recommended  1:1 feeding. Tolerating well   - afebrile, no cough.     Hypothyroidism- (present on admission)   Assessment & Plan    - TSH 0.54 on 3/21/19  - Continue home levothyroxine     Hx of CABG- (present on admission)   Assessment & Plan    - On aspirin and statin.     Gout of multiple sites- (present on admission)   Assessment & Plan    - Continue home allopurinol      PVD (peripheral vascular disease) (CMS-HCC)- (present on admission)   Assessment & Plan    - History of prior L superficial femoral artery stent, known high grade stenosis of R common femoral artery closed with Starclose closure by Dr. Amaya on 03/22/17. Stable, no acute complains.      CAD (coronary artery disease)- (present on admission)   Assessment & Plan    History of 3V CABG with left internal mammary artery graft to LAD, saphenous vein graft to obtuse marginal branch and saphenous vein graft to posterior descending artery by Dr. Coates in March 2018.  - Echo EF 65%, status post TAVR, RVSP 30 mmHg  - Con't statin, ASA, metoprolol, and lisinopril.  - Denies cp/palpitations, n/v or other s/s.   -Appears comfortable     Paroxysmal atrial fibrillation (CMS-HCC)- (present on admission)   Assessment & Plan    - Stable. Xarelto discontinued by Cardiology in August 2018 due to high fall risk per chart review.  - Continue Metoprolol

## 2019-05-25 NOTE — CARE PLAN
Problem: Safety  Goal: Will remain free from injury  Outcome: PROGRESSING AS EXPECTED  Fall precautions in place. Treaded socks on pt. Appropriate signs on doorway. Bedrails up. Bed in lowest position and locked.  Call light and phone within reach. Patient educated on importance of calling nurses before getting out of bed, verbalizes understanding. Bed alarm on.     Problem: Venous Thromboembolism (VTW)/Deep Vein Thrombosis (DVT) Prevention:  Goal: Patient will participate in Venous Thrombosis (VTE)/Deep Vein Thrombosis (DVT)Prevention Measures  Outcome: PROGRESSING AS EXPECTED   05/24/19 2000   OTHER   Risk Assessment Score 1   VTE RISK Moderate   Pharmacologic Prophylaxis Used Unfractionated Heparin   Mechanical/VTE Prophylaxis   Mechanical Prophylaxis  SCDs, Sequential Compression Device   SCDs, Sequential Compression Device Off

## 2019-05-25 NOTE — CARE PLAN
Problem: Safety  Goal: Will remain free from injury  Outcome: PROGRESSING AS EXPECTED  Assisted pt up to chair and scale, pt tolerated well, no injury noted, ctm    Problem: Bowel/Gastric:  Goal: Normal bowel function is maintained or improved  Outcome: PROGRESSING AS EXPECTED  BMx1 today

## 2019-05-25 NOTE — PROGRESS NOTES
Report received by amrita RN. Assumed care of pt. Assessment complete. Spouse at bedside. Pt A&Ox1 to self, VSS. Pt in no apparent signs of distress. Plan of care discussed. Call light within reach, bed in lowest position, and pt has no further questions at this time.

## 2019-05-26 NOTE — PROGRESS NOTES
Bedside report received, pt care assumed. Pt in bed resting. Wife at bedside. All needs attended to. Bed in lowest position, bed alarm on pt educated on fall risk and verbalized understanding, call light within reach. Hourly rounding in place.

## 2019-05-26 NOTE — CARE PLAN
Problem: Safety  Goal: Will remain free from injury  Outcome: PROGRESSING AS EXPECTED  Fall precautions in place. Treaded socks on pt. Appropriate signs on doorway. Bedrails up. Bed in lowest position and locked.  Call light and phone within reach. Bed alarm on.     Problem: Venous Thromboembolism (VTW)/Deep Vein Thrombosis (DVT) Prevention:  Goal: Patient will participate in Venous Thrombosis (VTE)/Deep Vein Thrombosis (DVT)Prevention Measures  Outcome: PROGRESSING AS EXPECTED   05/25/19 2000   OTHER   Risk Assessment Score 2   VTE RISK Moderate   Pharmacologic Prophylaxis Used Unfractionated Heparin   Mechanical/VTE Prophylaxis   Mechanical Prophylaxis  SCDs, Sequential Compression Device   SCDs, Sequential Compression Device Off

## 2019-05-26 NOTE — CARE PLAN
Problem: Safety  Goal: Will remain free from injury    Intervention: Provide assistance with mobility  Assisted pt up to bathroom, up to chair, and up for meal, pt tolerated well, ctm      Problem: Venous Thromboembolism (VTW)/Deep Vein Thrombosis (DVT) Prevention:  Goal: Patient will participate in Venous Thrombosis (VTE)/Deep Vein Thrombosis (DVT)Prevention Measures  Outcome: PROGRESSING AS EXPECTED  Heparin in use, no s/s of complication, ctm

## 2019-05-27 NOTE — PROGRESS NOTES
Internal Medicine Interval Note  Note Author: Rudi Grande M.D.     Name Wally Torres       1933   Age/Sex 85 y.o. male   MRN [id number]   Code Status DNAR/DNI     After 5PM or if no immediate response to page, please call for cross-coverage  Attending/Team: Dr. Noe / CRISTI Cage See Patient List for primary contact information  Call [phone] to page    1st Call - Day Intern (R1):   Dr. Grove 2nd Call - Day Sr. Resident (R2/R3):   Dr. Castrejon         Reason for interval visit    Cognitive impairment      Interval Problem Daily Status Update     No acute events overnight.  Patient is resting comfortably in his bed.  Responds to questions, however is very slow.  No new complaints.      Discharge/Barriers to discharge:   It is my medical judgment, that the patient requires continued medically necessary hospital services for dementia with intermittent behavioral disturbances, failure to thrive and inability to care for self. Patient will remain in the hospital until guardianship is obtained. Discharged plan will be deferred until guardianship is obtained.     Review of Systems   Unable to perform ROS: Dementia (Limited due to patient condition)   Musculoskeletal: Negative for falls.       Disposition/Barriers to discharge:   Pending placement/ guardianship      Consultants/Specialty  Palliative care    Ethics  PCP: Halie Dominique M.D.      Quality Measures  Quality-Core Measures   Reviewed items::  Medications reviewed and Labs reviewed  Baldwin catheter::  No Baldwin  DVT prophylaxis pharmacological::  Heparin    Physical Exam       Vitals:    19 1549 19 1901 19 0300 19 0705   BP: 117/61 116/53 145/73 146/68   Pulse: 62 60 60 62   Resp: 18 18 18 20   Temp: 36.3 °C (97.3 °F) 37 °C (98.6 °F) 36.8 °C (98.2 °F) 36.5 °C (97.7 °F)   TempSrc: Temporal Temporal Temporal Temporal   SpO2: 93% 98% 96% 96%   Weight:       Height:         Body mass index is 22.47 kg/m².     Oxygen Therapy:  Pulse Oximetry: 96 %, O2 (LPM): 0, O2 Delivery: None (Room Air)    Physical Exam   Constitutional: He appears malnourished.   Thin appearing elderly  male in no acute distress.    HENT:   Head: Normocephalic and atraumatic.   Mouth/Throat: Oropharynx is clear and moist.   Eyes: EOM are normal. No scleral icterus.   Neck: Normal range of motion. No JVD present. No tracheal deviation present.   Cardiovascular: Normal rate.  Exam reveals no gallop and no friction rub.    Pulmonary/Chest: Effort normal. No stridor. No respiratory distress. He has no wheezes. He has no rales.   Abdominal: He exhibits no distension. There is no tenderness. There is no guarding.   Musculoskeletal: Normal range of motion. He exhibits no edema.   Diffuse muscle wasting   Neurological: He is alert.   Skin: Skin is warm and dry. He is not diaphoretic. No erythema.   Psychiatric: Affect normal.   Nursing note and vitals reviewed.      Assessment/Plan     * Cognitive impairment- (present on admission)   Assessment & Plan    - Unable to care for self, progressive decline per wife   - Ethics and Palliative Care evaluated the patient. All in agreement that he will need guardianship and placement which are pending.   - No changes since admission     Age-related physical debility- (present on admission)   Assessment & Plan    - PT/OT recommended SNF for further therapy needs.  - Does not get out of bed much  - Significant muscle wasting diffusely      Cardiac pacemaker in situ- (present on admission)   Assessment & Plan    Secondary to sick sinus rhythm     SSS (sick sinus syndrome) (HCC)- (present on admission)   Assessment & Plan    - Status post pacemaker. 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     Risk for falls- (present on admission)   Assessment & Plan    - Fall precautions, PT and OT consulted, appreciate recs.  -  Ambulation with assistance only     Hypertension, essential- (present on admission)   Assessment & Plan    - On metoprolol and lisinopril.   - PM BP meds held 05/23/19 secondary to asymptomatic low SBP that recovered with IVNS bolus.  - Stable today     Dyslipidemia- (present on admission)   Assessment & Plan    - Continue statin     Other constipation   Assessment & Plan    - on bowel protocol   - target BMs at least every other day     Dysphagia   Assessment & Plan    - Found to have dysphagia, high risk aspiration.   - Advanced directive specified no feeding tube   - Speech evaluated and recommended  1:1 feeding. Tolerating well   - afebrile, no cough.     Hypothyroidism- (present on admission)   Assessment & Plan    - TSH 0.54 on 3/21/19  - Continue home levothyroxine     Hx of CABG- (present on admission)   Assessment & Plan    - On aspirin and statin.     Gout of multiple sites- (present on admission)   Assessment & Plan    - Continue home allopurinol      PVD (peripheral vascular disease) (CMS-HCC)- (present on admission)   Assessment & Plan    - History of prior L superficial femoral artery stent, known high grade stenosis of R common femoral artery closed with Starclose closure by Dr. Amaya on 03/22/17. Stable, no acute complains.      CAD (coronary artery disease)- (present on admission)   Assessment & Plan    History of 3V CABG with left internal mammary artery graft to LAD, saphenous vein graft to obtuse marginal branch and saphenous vein graft to posterior descending artery by Dr. Coates in March 2018.  - Echo EF 65%, status post TAVR, RVSP 30 mmHg  - Con't statin, ASA, metoprolol, and lisinopril.  - Denies cp/palpitations, n/v or other s/s.   -Appears comfortable     Paroxysmal atrial fibrillation (CMS-HCC)- (present on admission)   Assessment & Plan    - Stable. Xarelto discontinued by Cardiology in August 2018 due to high fall risk per chart review.  - Continue Metoprolol

## 2019-05-27 NOTE — CARE PLAN
Problem: Bowel/Gastric:  Goal: Normal bowel function is maintained or improved  Outcome: PROGRESSING AS EXPECTED  BMx1    Problem: Discharge Barriers/Planning  Goal: Patient's continuum of care needs will be met  Outcome: PROGRESSING SLOWER THAN EXPECTED  Waiting for guardianship, SW involves in care, ctm

## 2019-05-27 NOTE — PROGRESS NOTES
Internal Medicine Interval Note  Note Author: Rudi Grande M.D.     Name Wally Torres       1933   Age/Sex 85 y.o. male   MRN [id number]   Code Status DNAR/DNI     After 5PM or if no immediate response to page, please call for cross-coverage  Attending/Team: Dr. Noe / CRISTI Cage See Patient List for primary contact information  Call [phone] to page    1st Call - Day Intern (R1):   Dr. Grove 2nd Call - Day Sr. Resident (R2/R3):   Dr. Castrejon         Reason for interval visit    Cognitive impairment      Interval Problem Daily Status Update     No acute events overnight.  Patient is resting in his bed.  No aggressive behavior or new complaints.      Discharge/Barriers to discharge:   It is my medical judgment, that the patient requires continued medically necessary hospital services for dementia with intermittent behavioral disturbances, failure to thrive and inability to care for self. Patient will remain in the hospital until guardianship is obtained. Discharged plan will be deferred until guardianship is obtained.     Review of Systems   Unable to perform ROS: Dementia (Limited due to patient condition)   Musculoskeletal: Negative for falls.       Disposition/Barriers to discharge:   Pending placement/ guardianship      Consultants/Specialty  Palliative care    Ethics  PCP: Halie Dominique M.D.      Quality Measures  Quality-Core Measures   Reviewed items::  Medications reviewed and Labs reviewed  Baldwin catheter::  No Baldwin  DVT prophylaxis pharmacological::  Heparin    Physical Exam       Vitals:    19 0705 19 1549 19 1901 19 0300   BP: 122/62 117/61 116/53 145/73   Pulse: 62 62 60 60   Resp: 16 18 18 18   Temp: 36.3 °C (97.3 °F) 36.3 °C (97.3 °F) 37 °C (98.6 °F) 36.8 °C (98.2 °F)   TempSrc: Temporal Temporal Temporal Temporal   SpO2: 95% 93% 98% 96%   Weight:       Height:         Body mass index is 22.47 kg/m².    Oxygen Therapy:  Pulse Oximetry:  96 %, O2 (LPM): 0, O2 Delivery: None (Room Air)    Physical Exam   Constitutional: He appears malnourished.   Thin appearing elderly  male in no acute distress.    HENT:   Head: Normocephalic and atraumatic.   Mouth/Throat: Oropharynx is clear and moist.   Eyes: EOM are normal. No scleral icterus.   Neck: Normal range of motion. No JVD present. No tracheal deviation present.   Cardiovascular: Normal rate.  Exam reveals no gallop and no friction rub.    Pulmonary/Chest: Effort normal. No stridor. No respiratory distress. He has no wheezes. He has no rales.   Abdominal: He exhibits no distension. There is no tenderness. There is no guarding.   Musculoskeletal: Normal range of motion. He exhibits no edema.   Diffuse muscle wasting   Neurological: He is alert.   Skin: Skin is warm and dry. He is not diaphoretic. No erythema.   Psychiatric: Affect normal.   Nursing note and vitals reviewed.      Assessment/Plan     * Cognitive impairment- (present on admission)   Assessment & Plan    - Unable to care for self, progressive decline per wife   - Ethics and Palliative Care evaluated the patient. All in agreement that he will need guardianship and placement which are pending.   - No changes since admission     Age-related physical debility- (present on admission)   Assessment & Plan    - PT/OT recommended SNF for further therapy needs.  - Does not get out of bed much  - Significant muscle wasting diffusely      Cardiac pacemaker in situ- (present on admission)   Assessment & Plan    Secondary to sick sinus rhythm     SSS (sick sinus syndrome) (HCC)- (present on admission)   Assessment & Plan    - Status post pacemaker. 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     Risk for falls- (present on admission)   Assessment & Plan    - Fall precautions, PT and OT consulted, appreciate recs.  - Ambulation with assistance only      Hypertension, essential- (present on admission)   Assessment & Plan    - On metoprolol and lisinopril.   - PM BP meds held 05/23/19 secondary to asymptomatic low SBP that recovered with IVNS bolus.  - Stable today     Dyslipidemia- (present on admission)   Assessment & Plan    - Continue statin     Other constipation   Assessment & Plan    - on bowel protocol   - target BMs at least every other day     Dysphagia   Assessment & Plan    - Found to have dysphagia, high risk aspiration.   - Advanced directive specified no feeding tube   - Speech evaluated and recommended  1:1 feeding. Tolerating well   - afebrile, no cough.     Hypothyroidism- (present on admission)   Assessment & Plan    - TSH 0.54 on 3/21/19  - Continue home levothyroxine     Hx of CABG- (present on admission)   Assessment & Plan    - On aspirin and statin.     Gout of multiple sites- (present on admission)   Assessment & Plan    - Continue home allopurinol      PVD (peripheral vascular disease) (CMS-HCC)- (present on admission)   Assessment & Plan    - History of prior L superficial femoral artery stent, known high grade stenosis of R common femoral artery closed with Starclose closure by Dr. Amaya on 03/22/17. Stable, no acute complains.      CAD (coronary artery disease)- (present on admission)   Assessment & Plan    History of 3V CABG with left internal mammary artery graft to LAD, saphenous vein graft to obtuse marginal branch and saphenous vein graft to posterior descending artery by Dr. Coates in March 2018.  - Echo EF 65%, status post TAVR, RVSP 30 mmHg  - Con't statin, ASA, metoprolol, and lisinopril.  - Denies cp/palpitations, n/v or other s/s.   -Appears comfortable     Paroxysmal atrial fibrillation (CMS-HCC)- (present on admission)   Assessment & Plan    - Stable. Xarelto discontinued by Cardiology in August 2018 due to high fall risk per chart review.  - Continue Metoprolol

## 2019-05-27 NOTE — CARE PLAN
Problem: Safety  Goal: Will remain free from injury  Outcome: PROGRESSING AS EXPECTED  Fall precautions in place. Treaded socks on pt. Appropriate signs on doorway. Bedrails up. Bed in lowest position and locked.  Call light and phone within reach. Patient educated on importance of calling nurses before getting out of bed, needs constant reorienting. Bed alarm on.     Problem: Venous Thromboembolism (VTW)/Deep Vein Thrombosis (DVT) Prevention:  Goal: Patient will participate in Venous Thrombosis (VTE)/Deep Vein Thrombosis (DVT)Prevention Measures  Outcome: PROGRESSING AS EXPECTED   05/26/19 2000   OTHER   Risk Assessment Score 2   VTE RISK Moderate   Pharmacologic Prophylaxis Used Unfractionated Heparin

## 2019-05-27 NOTE — PROGRESS NOTES
Bedside report received, pt care assumed. Pt denies any additional needs at this time. Wife at bedside. Bed in lowest position, bed alarm on pt educated on fall risk and verbalized understanding, call light within reach.

## 2019-05-28 NOTE — CARE PLAN
Problem: Safety  Goal: Will remain free from falls  Outcome: PROGRESSING AS EXPECTED  Fall precautions in place. Treaded socks on pt. Appropriate signs on doorway. Pt close to nurses station. Bedrails up. Bed in lowest position and locked.  Call light and phone within reach. Bed alarm on.    Problem: Venous Thromboembolism (VTW)/Deep Vein Thrombosis (DVT) Prevention:  Goal: Patient will participate in Venous Thrombosis (VTE)/Deep Vein Thrombosis (DVT)Prevention Measures  Outcome: PROGRESSING AS EXPECTED   05/27/19 2000   OTHER   Risk Assessment Score 1   VTE RISK Moderate   Pharmacologic Prophylaxis Used Unfractionated Heparin

## 2019-05-28 NOTE — CARE PLAN
Problem: Safety  Goal: Will remain free from injury    Intervention: Provide assistance with mobility  Assisted pt up to bathroom, pt tolerated well, ctm      Problem: Venous Thromboembolism (VTW)/Deep Vein Thrombosis (DVT) Prevention:  Goal: Patient will participate in Venous Thrombosis (VTE)/Deep Vein Thrombosis (DVT)Prevention Measures  Outcome: PROGRESSING AS EXPECTED  Heparin in use, no s/s of complication, ctm

## 2019-05-28 NOTE — PROGRESS NOTES
Pharmacy Pharmacotherapy Consult for LOS >30 days    Admit Date: 3/19/2019      Medications were reviewed for appropriateness and ongoing need.     Current Facility-Administered Medications   Medication Dose Route Frequency Provider Last Rate Last Dose   • metoprolol (LOPRESSOR) tablet 12.5 mg  12.5 mg Oral AMINA Noe M.D.   12.5 mg at 05/28/19 0504   • thiamine tablet 100 mg  100 mg Oral DAILY Vinod Castrejon M.D.   100 mg at 05/28/19 0503   • sodium chloride (OCEAN) 0.65 % nasal spray 2 Spray  2 Spray Nasal Q2HRS PRN Oratcristina Grove D.O.       • heparin injection 5,000 Units  5,000 Units Subcutaneous Q8HRS Kimberlee Livingston M.D.   5,000 Units at 05/28/19 0503   • metoprolol (LOPRESSOR) tablet 12.5 mg  12.5 mg Oral Q EVENING Susie Lisa M.D.   12.5 mg at 05/27/19 1650   • senna-docusate (PERICOLACE or SENOKOT S) 8.6-50 MG per tablet 2 Tab  2 Tab Oral BID PRN Susie Lisa M.D.   2 Tab at 05/22/19 0444    And   • magnesium hydroxide (MILK OF MAGNESIA) suspension 30 mL  30 mL Oral QDAY PRN Susie Lisa M.D.        And   • bisacodyl (DULCOLAX) suppository 10 mg  10 mg Rectal QDAY PRN Susie Lisa M.D.       • allopurinol (ZYLOPRIM) tablet 200 mg  200 mg Oral DAILY Kimberlee Livingston M.D.   200 mg at 05/28/19 0503   • polyethylene glycol/lytes (MIRALAX) PACKET 1 Packet  1 Packet Oral QDAY PRN Kimberlee Livingston M.D.       • traZODone (DESYREL) tablet 50 mg  50 mg Oral HS PRN Kimberlee Livingston M.D.   50 mg at 05/27/19 2004   • acetaminophen (TYLENOL) tablet 650 mg  650 mg Oral Q6HRS PRN Kimberlee Livingston M.D.   650 mg at 05/18/19 2139   • aspirin (ASA) chewable tab 81 mg  81 mg Oral DAILY Kimberlee Mahboob, M.D.   81 mg at 05/28/19 0503   • levothyroxine (SYNTHROID) tablet 50 mcg  50 mcg Oral AM ES Kimberlee Livingston M.D.   50 mcg at 05/28/19 0503   • lisinopril (PRINIVIL) tablet 5 mg  5 mg Oral DAILY Kimberlee Livingston M.D.   5 mg at 05/28/19 0503   • magnesium oxide (MAG-OX) tablet 400 mg  400 mg  Oral DAILY Kimberlee Livingston M.D.   400 mg at 05/28/19 0503   • atorvastatin (LIPITOR) tablet 20 mg  20 mg Oral Nightly Kimberlee Livingston M.D.   20 mg at 05/27/19 2004     Recommendations:    HTN: BP is 147/74 on 5/28/19. Pt is currently on lisinopril 5mg    Pt is not currently on dementia medications: could potentially benefit from addition of donepezil/memantine. May also require medication therapy for intermittent behavioral disturbances.     Macie Pisano, Pharmacy Student    ADDENDUM  Agree with the above recommendations.  Per d/w MD Grande, we will remove PRN APAP and PRN saline spray due to limited or no use.  Confirmed heparin VTE prophylaxis is necessary as patient spends majority of time in bed.  No further changes at this time.    Anthony Caraballo, PharmD, BCPS

## 2019-05-28 NOTE — PROGRESS NOTES
Internal Medicine Interval Note  Note Author: Rudi Grande M.D.     Name Wally Torres       1933   Age/Sex 85 y.o. male   MRN [id number]   Code Status DNAR/DNI     After 5PM or if no immediate response to page, please call for cross-coverage  Attending/Team: Dr. Pratt / CRISTI Cage See Patient List for primary contact information  Call [phone] to page    1st Call - Day Intern (R1):   Dr. Grande 2nd Call - Day Sr. Resident (R2/R3):   Dr. Gr         Reason for interval visit    Cognitive impairment      Interval Problem Daily Status Update     No acute events overnight.   Patient is resting comfortably in his bed.  Wife most of the times next to the patient, taking care of him.    Discharge/Barriers to discharge:   It is my medical judgment, that the patient requires continued medically necessary hospital services for dementia with intermittent behavioral disturbances, failure to thrive and inability to care for self. Patient will remain in the hospital until guardianship is obtained. Discharged plan will be deferred until guardianship is obtained.     Review of Systems   Unable to perform ROS: Dementia (Limited due to patient condition)   Musculoskeletal: Negative for falls.       Disposition/Barriers to discharge:   Pending placement/ guardianship      Consultants/Specialty  Palliative care    Ethics  PCP: Halie Dominique M.D.      Quality Measures  Quality-Core Measures   Reviewed items::  Medications reviewed and Labs reviewed  Baldwin catheter::  No Baldwin  DVT prophylaxis pharmacological::  Heparin    Physical Exam       Vitals:    19 1527 19 1905 19 0453 19 0705   BP: 136/64 120/50 (!) 164/62 147/74   Pulse: 60 62 61 60   Resp: 16 18 18 16   Temp: 36.6 °C (97.9 °F) 36.3 °C (97.4 °F) 36.4 °C (97.6 °F) 36.5 °C (97.7 °F)   TempSrc: Temporal Temporal Temporal Temporal   SpO2: 93% 94% 97% 98%   Weight:       Height:         Body mass index is 22.47  kg/m².    Oxygen Therapy:  Pulse Oximetry: 98 %, O2 (LPM): 0, O2 Delivery: None (Room Air)    Physical Exam   Constitutional: He appears malnourished.   Thin appearing elderly  male in no acute distress.    HENT:   Head: Normocephalic and atraumatic.   Mouth/Throat: Oropharynx is clear and moist.   Eyes: EOM are normal. No scleral icterus.   Neck: Normal range of motion. No JVD present. No tracheal deviation present.   Cardiovascular: Normal rate.  Exam reveals no gallop and no friction rub.    Pulmonary/Chest: Effort normal. No stridor. No respiratory distress. He has no wheezes. He has no rales.   Abdominal: He exhibits no distension. There is no tenderness. There is no guarding.   Musculoskeletal: Normal range of motion. He exhibits no edema.   Diffuse muscle wasting   Neurological: He is alert.   Skin: Skin is warm and dry. He is not diaphoretic. No erythema.   Psychiatric: Affect normal.   Nursing note and vitals reviewed.      Assessment/Plan     * Cognitive impairment- (present on admission)   Assessment & Plan    - Unable to care for self, progressive decline per wife   - Ethics and Palliative Care evaluated the patient. All in agreement that he will need guardianship and placement which are pending.   - No changes since admission     Age-related physical debility- (present on admission)   Assessment & Plan    - PT/OT recommended SNF for further therapy needs.  - Does not get out of bed much  - Significant muscle wasting diffusely      Cardiac pacemaker in situ- (present on admission)   Assessment & Plan    Secondary to sick sinus rhythm     SSS (sick sinus syndrome) (HCC)- (present on admission)   Assessment & Plan    - Status post pacemaker. 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     Risk for falls- (present on admission)   Assessment & Plan    - Fall precautions, PT and OT consulted, appreciate  recs.  - Ambulation with assistance only     Hypertension, essential- (present on admission)   Assessment & Plan    - On metoprolol and lisinopril.   - PM BP meds held 05/23/19 secondary to asymptomatic low SBP that recovered with IVNS bolus.  - Stable today     Dyslipidemia- (present on admission)   Assessment & Plan    - Continue statin     Other constipation   Assessment & Plan    - on bowel protocol   - target BMs at least every other day     Dysphagia   Assessment & Plan    - Found to have dysphagia, high risk aspiration.   - Advanced directive specified no feeding tube   - Speech evaluated and recommended  1:1 feeding. Tolerating well   - afebrile, no cough.     Hypothyroidism- (present on admission)   Assessment & Plan    - TSH 0.54 on 3/21/19  - Continue home levothyroxine     Hx of CABG- (present on admission)   Assessment & Plan    - On aspirin and statin.     Gout of multiple sites- (present on admission)   Assessment & Plan    - Continue home allopurinol      PVD (peripheral vascular disease) (CMS-HCC)- (present on admission)   Assessment & Plan    - History of prior L superficial femoral artery stent, known high grade stenosis of R common femoral artery closed with Starclose closure by Dr. Amaya on 03/22/17. Stable, no acute complains.      CAD (coronary artery disease)- (present on admission)   Assessment & Plan    History of 3V CABG with left internal mammary artery graft to LAD, saphenous vein graft to obtuse marginal branch and saphenous vein graft to posterior descending artery by Dr. Coates in March 2018.  - Echo EF 65%, status post TAVR, RVSP 30 mmHg  - Con't statin, ASA, metoprolol, and lisinopril.  - Denies cp/palpitations, n/v or other s/s.   -Appears comfortable     Paroxysmal atrial fibrillation (CMS-HCC)- (present on admission)   Assessment & Plan    - Stable. Xarelto discontinued by Cardiology in August 2018 due to high fall risk per chart review.  - Continue Metoprolol

## 2019-05-28 NOTE — PROGRESS NOTES
Bedside report received, pt care assumed. Pt assisted to bathroom at start of shift. Wife at bedside. Pt denies any discomfort or pain at this time. Bed locked and low, bed alarm on, call light within reach. Pt near nurses station for high fall risk.

## 2019-05-29 NOTE — CARE PLAN
Problem: Safety  Goal: Will remain free from injury    Intervention: Provide assistance with mobility  Utilize walker and stand by pt while pt ambulates; be ready to stabilize pt as pt leans backwards at times.      Problem: Psychosocial Needs:  Goal: Level of anxiety will decrease  Turned on music channel on TV as pt stated that he could not sleep.  Pt enjoyed music and laid still in bed.

## 2019-05-29 NOTE — PROGRESS NOTES
Internal Medicine Interval Note  Note Author: Rudi Grande M.D.     Name Wally Torres       1933   Age/Sex 85 y.o. male   MRN [id number]   Code Status DNAR/DNI     After 5PM or if no immediate response to page, please call for cross-coverage  Attending/Team: Dr. Pratt / CRISTI Cage See Patient List for primary contact information  Call [phone] to page    1st Call - Day Intern (R1):   Dr. Grande 2nd Call - Day Sr. Resident (R2/R3):   Dr. Gr         Reason for interval visit    Cognitive impairment      Interval Problem Daily Status Update     No new events or medical complaints. Pt is resting comfortably in bed.   Awaiting guardianship    Discharge/Barriers to discharge:   It is my medical judgment, that the patient requires continued medically necessary hospital services for dementia with intermittent behavioral disturbances, failure to thrive and inability to care for self. Patient will remain in the hospital until guardianship is obtained. Discharged plan will be deferred until guardianship is obtained.     Review of Systems   Unable to perform ROS: Dementia (Limited due to patient condition)   Musculoskeletal: Negative for falls.       Disposition/Barriers to discharge:   Pending placement/ guardianship      Consultants/Specialty  Palliative care    Ethics  PCP: Halie Dominique M.D.      Quality Measures  Quality-Core Measures   Reviewed items::  Medications reviewed and Labs reviewed  Baldwin catheter::  No Baldwin  DVT prophylaxis pharmacological::  Heparin    Physical Exam       Vitals:    19 1625 19 1905 19 0300 19 0759   BP: 113/54 108/43 132/56 132/56   Pulse: 76 62 63 61   Resp: 16 18 18 15   Temp: 36.6 °C (97.9 °F) 36.9 °C (98.4 °F) 36.5 °C (97.7 °F) 36.5 °C (97.7 °F)   TempSrc: Temporal Temporal Temporal Temporal   SpO2: 95% 100% 96% 98%   Weight:       Height:         Body mass index is 22.47 kg/m².    Oxygen Therapy:  Pulse Oximetry: 98 %,  O2 (LPM): 0, O2 Delivery: None (Room Air)    Physical Exam   Constitutional: He appears malnourished.   Thin appearing elderly  male in no acute distress.    HENT:   Head: Normocephalic and atraumatic.   Mouth/Throat: Oropharynx is clear and moist.   Eyes: EOM are normal. No scleral icterus.   Neck: Normal range of motion. No JVD present. No tracheal deviation present.   Cardiovascular: Normal rate.  Exam reveals no gallop and no friction rub.    Pulmonary/Chest: Effort normal. No stridor. No respiratory distress. He has no wheezes. He has no rales.   Abdominal: He exhibits no distension. There is no tenderness. There is no guarding.   Musculoskeletal: Normal range of motion. He exhibits no edema.   Diffuse muscle wasting   Neurological: He is alert.   Skin: Skin is warm and dry. He is not diaphoretic. No erythema.   Psychiatric: Affect normal.   Nursing note and vitals reviewed.      Assessment/Plan     * Cognitive impairment- (present on admission)   Assessment & Plan    - Unable to care for self, progressive decline per wife   - Ethics and Palliative Care evaluated the patient. All in agreement that he will need guardianship and placement which are pending.   - No changes since admission     Age-related physical debility- (present on admission)   Assessment & Plan    - PT/OT recommended SNF for further therapy needs.  - Does not get out of bed much  - Significant muscle wasting diffusely      Cardiac pacemaker in situ- (present on admission)   Assessment & Plan    Secondary to sick sinus rhythm     SSS (sick sinus syndrome) (HCC)- (present on admission)   Assessment & Plan    - Status post pacemaker. 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     Risk for falls- (present on admission)   Assessment & Plan    - Fall precautions, PT and OT consulted, appreciate recs.  - Ambulation with assistance only      Hypertension, essential- (present on admission)   Assessment & Plan    - On metoprolol and lisinopril.   - PM BP meds held 05/23/19 secondary to asymptomatic low SBP that recovered with IVNS bolus.  - Stable today     Dyslipidemia- (present on admission)   Assessment & Plan    - Continue statin     Other constipation   Assessment & Plan    - on bowel protocol   - target BMs at least every other day     Dysphagia   Assessment & Plan    - Found to have dysphagia, high risk aspiration.   - Advanced directive specified no feeding tube   - Speech evaluated and recommended  1:1 feeding. Tolerating well   - afebrile, no cough.     Hypothyroidism- (present on admission)   Assessment & Plan    - TSH 0.54 on 3/21/19  - Continue home levothyroxine     Hx of CABG- (present on admission)   Assessment & Plan    - On aspirin and statin.     Gout of multiple sites- (present on admission)   Assessment & Plan    - Continue home allopurinol      PVD (peripheral vascular disease) (CMS-HCC)- (present on admission)   Assessment & Plan    - History of prior L superficial femoral artery stent, known high grade stenosis of R common femoral artery closed with Starclose closure by Dr. Amaya on 03/22/17. Stable, no acute complains.      CAD (coronary artery disease)- (present on admission)   Assessment & Plan    History of 3V CABG with left internal mammary artery graft to LAD, saphenous vein graft to obtuse marginal branch and saphenous vein graft to posterior descending artery by Dr. Coates in March 2018.  - Echo EF 65%, status post TAVR, RVSP 30 mmHg  - Con't statin, ASA, metoprolol, and lisinopril.  - Denies cp/palpitations, n/v or other s/s.   -Appears comfortable     Paroxysmal atrial fibrillation (CMS-HCC)- (present on admission)   Assessment & Plan    - Stable. Xarelto discontinued by Cardiology in August 2018 due to high fall risk per chart review.  - Continue Metoprolol

## 2019-05-29 NOTE — PROGRESS NOTES
Received report and assumed patient care. Patient is AOx1 to self only. No complaints or signs of pain at this time. Assessment complete on RA. Currently eating breakfast and sitting in the chair. All needs met at this time. Safety precautions and hourly rounding in place.

## 2019-05-29 NOTE — PROGRESS NOTES
Pt A&Ox2 (disoriented to time and event), on room air with no signs or complaints of discomfort or pain.  Pt is incontinent of urine and intermittently of bowels but ambulates to bathroom frequently with one person assist and walker; pt sometimes leans backwards when standing.  Many trips to the bathroom usually do not produce any urine nor BM in toilet.       Bed strip alarm on, pt wearing non skid treaded socks, environment uncluttered, bed in lowest and locked position, bilateral upper padded bed rails up and the use of call light when needing assistance was demonstrated.  Pt room next to nurses' station

## 2019-05-29 NOTE — CARE PLAN
Problem: Communication  Goal: The ability to communicate needs accurately and effectively will improve  Outcome: PROGRESSING AS EXPECTED  Hourly rounding in place to address patient's needs. All needs met at this time.     Problem: Skin Integrity  Goal: Risk for impaired skin integrity will decrease  Outcome: PROGRESSING AS EXPECTED  Linen changes in place due to incontinence. q2 turns. Pillows used for positioning.

## 2019-05-30 NOTE — CARE PLAN
Problem: Venous Thromboembolism (VTW)/Deep Vein Thrombosis (DVT) Prevention:  Goal: Patient will participate in Venous Thrombosis (VTE)/Deep Vein Thrombosis (DVT)Prevention Measures    Intervention: Encourage ambulation/mobilization at level directed by Physical Therapy in collaboration with Interdisciplinary Team  Assist pt to ambulate to bathroom when pt displays or states toileting needs.      Problem: Bowel/Gastric:  Goal: Normal bowel function is maintained or improved    Intervention: Educate patient and significant other/support system about diet, fluid intake, medications and activity to promote bowel function  Offer 1:1 feeder drinks of oral intake during rounding and while pt is awake to prevent constipation and dehydration.  Pt drinks all thicken liquids offered to him.

## 2019-05-30 NOTE — CARE PLAN
Problem: Safety  Goal: Will remain free from falls  Outcome: PROGRESSING AS EXPECTED  Fall precautions in place. Bed in lowest position. Non-skid socks in place. Personal possessions within reach. Mobility sign on door. Bed-alarm on. Call light within reach. Pt educated regarding fall prevention.     Problem: Knowledge Deficit  Goal: Knowledge of disease process/condition, treatment plan, diagnostic tests, and medications will improve  Outcome: PROGRESSING AS EXPECTED  Pt educated regarding plan of care and medications. All questions answered.

## 2019-05-30 NOTE — PROGRESS NOTES
Pt in pleasant mood and smiling during assessment.  Pt is a :1 feeder with nectar thick liquids and finishes all food and liquids offered to him.  Pt is A&Ox2 (disoriented to time and event), on room air with no signs or complaints of discomfort or pain.  Pt is incontinent of urine and intermittently of bowels but ambulates to bathroom frequently with one person assist and walker; pt sometimes leans backwards when standing.  Many trips to the bathroom usually do not produce any urine nor BM in toilet.  Pt restless in bed tonight, so pt up to nursing station table to color and draw while being fed ice cream.       Bed strip alarm on, pt wearing non skid treaded socks, environment uncluttered, bed in lowest and locked position, bilateral upper padded bed rails up and the use of call light when needing assistance was demonstrated.  Pt room next to nurses' station

## 2019-05-30 NOTE — PROGRESS NOTES
Internal Medicine Interval Note  Note Author: Rudi Grande M.D.     Name Wally Torres       1933   Age/Sex 85 y.o. male   MRN [id number]   Code Status DNAR/DNI     After 5PM or if no immediate response to page, please call for cross-coverage  Attending/Team: Dr. Pratt / CRISTI Cage See Patient List for primary contact information  Call [phone] to page    1st Call - Day Intern (R1):   Dr. Grande 2nd Call - Day Sr. Resident (R2/R3):   Dr. Gr         Reason for interval visit    Cognitive impairment      Interval Problem Daily Status Update     - Nurse called reporting that patient fell in the bathroom and hit his head, however there was no loss of consciousness.  - No changes upon neurological examination.  Ordered every 4 hours neurochecks and discontinued heparin  - We will get stat CT of the head if changes in neurological examination.    Discharge/Barriers to discharge:   It is my medical judgment, that the patient requires continued medically necessary hospital services for dementia with intermittent behavioral disturbances, failure to thrive and inability to care for self. Patient will remain in the hospital until guardianship is obtained. Discharged plan will be deferred until guardianship is obtained.     Review of Systems   Unable to perform ROS: Dementia (Limited due to patient condition)   Musculoskeletal: Negative for falls.       Disposition/Barriers to discharge:   Pending placement/ guardianship      Consultants/Specialty  Palliative care    Ethics  PCP: Halie Dominique M.D.      Quality Measures  Quality-Core Measures   Reviewed items::  Medications reviewed and Labs reviewed  Baldwin catheter::  No Baldwin  DVT prophylaxis pharmacological::  Heparin    Physical Exam       Vitals:    19 1500 19 1900 19 0300 19 0817   BP: (!) 92/53 128/54 151/63 110/52   Pulse: 65 80 68 60   Resp: 16 18 18 16   Temp: 36.7 °C (98 °F) 36.9 °C (98.5 °F) 36.6  °C (97.8 °F) 36.6 °C (97.9 °F)   TempSrc: Temporal Temporal Temporal Temporal   SpO2: 92% 95% 95% 97%   Weight:       Height:         Body mass index is 22.47 kg/m².    Oxygen Therapy:  Pulse Oximetry: 97 %, O2 (LPM): 0, O2 Delivery: None (Room Air)    Physical Exam   Constitutional: He appears malnourished.   Thin appearing elderly  male in no acute distress.    HENT:   Head: Normocephalic and atraumatic.   Mouth/Throat: Oropharynx is clear and moist.   Eyes: EOM are normal. No scleral icterus.   Neck: Normal range of motion. No JVD present. No tracheal deviation present.   Cardiovascular: Normal rate.  Exam reveals no gallop and no friction rub.    Pulmonary/Chest: Effort normal. No stridor. No respiratory distress. He has no wheezes. He has no rales.   Abdominal: He exhibits no distension. There is no tenderness. There is no guarding.   Musculoskeletal: Normal range of motion. He exhibits no edema.   Diffuse muscle wasting   Neurological: He is alert.   Skin: Skin is warm and dry. He is not diaphoretic. No erythema.   Psychiatric: Affect normal.   Nursing note and vitals reviewed.      Assessment/Plan     * Cognitive impairment- (present on admission)   Assessment & Plan    - Unable to care for self, progressive decline per wife   - Ethics and Palliative Care evaluated the patient. All in agreement that he will need guardianship and placement which are pending.   - No changes since admission     Age-related physical debility- (present on admission)   Assessment & Plan    - PT/OT recommended SNF for further therapy needs.  - Does not get out of bed much  - Significant muscle wasting diffusely      Cardiac pacemaker in situ- (present on admission)   Assessment & Plan    Secondary to sick sinus rhythm     SSS (sick sinus syndrome) (HCC)- (present on admission)   Assessment & Plan    - Status post pacemaker. Stable     S/P TAVR (transcatheter aortic valve replacement)- (present on admission)   Assessment & Plan     - Continue aspirin and statin.     Risk for falls- (present on admission)   Assessment & Plan    - Fall precautions, PT and OT consulted, appreciate recs.  - Ambulation with assistance only     Hypertension, essential- (present on admission)   Assessment & Plan    - On metoprolol and lisinopril.   - PM BP meds held 05/23/19 secondary to asymptomatic low SBP that recovered with IVNS bolus.  - Stable today     Dyslipidemia- (present on admission)   Assessment & Plan    - Continue statin     Fall- (present on admission)   Assessment & Plan    - Patient fell in the bathroom and hit his head, however there was no loss of consciousness.  - No changes upon neurological examination.      Plan:  - Ordered every 4 hours neurochecks and discontinued heparin  - We will get stat CT of the head if changes in neurological examination.     Other constipation   Assessment & Plan    - on bowel protocol   - target BMs at least every other day     Dysphagia   Assessment & Plan    - Found to have dysphagia, high risk aspiration.   - Advanced directive specified no feeding tube   - Speech evaluated and recommended  1:1 feeding. Tolerating well   - afebrile, no cough.     Hypothyroidism- (present on admission)   Assessment & Plan    - TSH 0.54 on 3/21/19  - Continue home levothyroxine     Hx of CABG- (present on admission)   Assessment & Plan    - On aspirin and statin.     Gout of multiple sites- (present on admission)   Assessment & Plan    - Continue home allopurinol      PVD (peripheral vascular disease) (CMS-HCC)- (present on admission)   Assessment & Plan    - History of prior L superficial femoral artery stent, known high grade stenosis of R common femoral artery closed with Starclose closure by Dr. Amaya on 03/22/17. Stable, no acute complains.      CAD (coronary artery disease)- (present on admission)   Assessment & Plan    History of 3V CABG with left internal mammary artery graft to LAD, saphenous vein graft to obtuse  marginal branch and saphenous vein graft to posterior descending artery by Dr. Coates in March 2018.  - Echo EF 65%, status post TAVR, RVSP 30 mmHg  - Con't statin, ASA, metoprolol, and lisinopril.  - Denies cp/palpitations, n/v or other s/s.   -Appears comfortable     Paroxysmal atrial fibrillation (CMS-HCC)- (present on admission)   Assessment & Plan    - Stable. Xarelto discontinued by Cardiology in August 2018 due to high fall risk per chart review.  - Continue Metoprolol

## 2019-05-30 NOTE — PROGRESS NOTES
[facility] team blue hospitalist was paged regarding pt hitting back of head on bathroom wall while sitting on the toilet. This RN was present during incident assisting pt to ambulate with walker up to bathroom. Pt leans backwards at times and is not steady. Pt needs close supervision while getting on and off the toilet. After pt bumped back of head pt was assessed, pt is in no signs or symptoms of distress. Pt does not complain of pain, no changes in LOC. There is a small red kam on back of head. Pt is resting comfortably in bed at this time. Dr Farah ordered q 4 neuro checks, to hold heparin and apply ice to area in back of head.

## 2019-05-30 NOTE — PROGRESS NOTES
Bedside report received. Pt is A&Ox2 to oriented to self and place only. Pt was reoriented to time and situation. Pt is able to ambulate up to bathroom with 1 person assistance with walker. Pt is a darin fall risk, pt leans backwards while ambulating. Pt hit back of head while sitting down on toilet, there is a small red kam on back of head, no changes in LOC, pt denies pain. MD was made aware. Pt needs close supervision while ambulating .Pt had a BM, pt is a 1:1 feeder with nectar thick liquids. POC discussed with pt; all questions answered at this time. Bed alarm is on, bed is locked in lowest position, call light and personal items are within reach.

## 2019-05-31 NOTE — PROGRESS NOTES
Pt A&Ox2 (disoriented to time and event), on room air with no signs or complaints of discomfort or pain.  Heparin injections being held and conducting neuro checks every 4 hours as pt hit back of head today; so far into shift there has been no change in mental status and no deficiencies assessed.    Bed alarm on, pt wearing non skid treaded socks, environment uncluttered, bed in lowest and locked position, bilateral upper padded bed rails up and the use of call light when needing assistance was demonstrated.  Pt room next to nurses' station

## 2019-05-31 NOTE — PROGRESS NOTES
Internal Medicine Interval Note  Note Author: Bravo Brunson M.D.     Name Wally Torres       1933   Age/Sex 85 y.o. male   MRN [id number]   Code Status DNAR/DNI     After 5PM or if no immediate response to page, please call for cross-coverage  Attending/Team: Dr. Pratt / CRISTI Cage See Patient List for primary contact information  Call [phone] to page    1st Call - Day Intern (R1):   Dr. Grande 2nd Call - Day Sr. Resident (R2/R3):   Dr. Gr         Reason for interval visit    Cognitive impairment      Interval Problem Daily Status Update     No acute events overnight. Head bump not enlarging and no visible signs of bruising and normal neurological exam.         Discharge/Barriers to discharge:   It is my medical judgment, that the patient requires continued medically necessary hospital services for dementia with intermittent behavioral disturbances, failure to thrive and inability to care for self. Patient will remain in the hospital until guardianship is obtained. Discharged plan will be deferred until guardianship is obtained.     Review of Systems   Unable to perform ROS: Dementia (Limited due to patient condition)   Musculoskeletal: Positive for falls.   Psychiatric/Behavioral: The patient is nervous/anxious.         Baseline dementia.        Disposition/Barriers to discharge:   Pending placement/ guardianship      Consultants/Specialty  Palliative care    Ethics  PCP: Halie Dominique M.D.      Quality Measures  Quality-Core Measures   Reviewed items::  Medications reviewed and Labs reviewed  Baldwin catheter::  No Baldwin  DVT prophylaxis pharmacological::  Heparin    Physical Exam       Vitals:    19 1905 19 0300 19 0700 19 1438   BP: 124/48 132/54 138/54 127/58   Pulse: 64 60 60 69   Resp: 17 18 18 18   Temp: 36.8 °C (98.3 °F) 36.5 °C (97.7 °F) 36.6 °C (97.8 °F) 36.8 °C (98.2 °F)   TempSrc: Temporal Temporal Temporal Temporal   SpO2: 96% 98% 98%  97%   Weight:       Height:         Body mass index is 22.47 kg/m².    Oxygen Therapy:  Pulse Oximetry: 97 %, O2 (LPM): 0, O2 Delivery: None (Room Air)    Physical Exam   Constitutional: He appears malnourished.   Thin appearing elderly  male in no acute distress.    HENT:   Head: Normocephalic and atraumatic.   Mouth/Throat: Oropharynx is clear and moist.   Eyes: EOM are normal. No scleral icterus.   Neck: Normal range of motion. No JVD present. No tracheal deviation present.   Cardiovascular: Normal rate.  Exam reveals no gallop and no friction rub.    Pulmonary/Chest: Effort normal. No stridor. No respiratory distress. He has no wheezes. He has no rales.   Abdominal: He exhibits no distension. There is no tenderness. There is no guarding.   Musculoskeletal: Normal range of motion. He exhibits no edema.   Diffuse muscle wasting   Neurological: He is alert.   -Small swelling noticed around the occipital area of head s/p fall unchanged compared to yesterday.  -No focal neurological deficits, following commands.      Skin: Skin is warm and dry. He is not diaphoretic. No erythema.   Psychiatric: Affect normal.   Nursing note and vitals reviewed.      Assessment/Plan     * Cognitive impairment- (present on admission)   Assessment & Plan    - Unable to care for self, progressive decline per wife   - Ethics and Palliative Care evaluated the patient. All in agreement that he will need guardianship and placement which are pending.   - No changes since admission     Age-related physical debility- (present on admission)   Assessment & Plan    - PT/OT recommended SNF for further therapy needs.  - Does not get out of bed much  - Significant muscle wasting diffusely      Cardiac pacemaker in situ- (present on admission)   Assessment & Plan    Secondary to sick sinus rhythm     SSS (sick sinus syndrome) (HCC)- (present on admission)   Assessment & Plan    - Status post pacemaker. Stable     S/P TAVR (transcatheter aortic  valve replacement)- (present on admission)   Assessment & Plan    - Continue aspirin and statin.     Risk for falls- (present on admission)   Assessment & Plan    - Fall precautions, PT and OT consulted, appreciate recs.  - Ambulation with assistance only     Hypertension, essential- (present on admission)   Assessment & Plan    - On metoprolol and lisinopril.   - PM BP meds held 05/23/19 secondary to asymptomatic low SBP that recovered with IVNS bolus.  - Stable today     Dyslipidemia- (present on admission)   Assessment & Plan    - Continue statin     Fall- (present on admission)   Assessment & Plan    - Patient fell in the bathroom and hit his head, however there was no loss of consciousness.  - No changes upon neurological examination.      Plan:  - Ordered every 4 hours neurochecks and discontinued heparin  - We will get stat CT of the head if changes in neurological examination.     Other constipation   Assessment & Plan    - on bowel protocol   - target BMs at least every other day     Dysphagia   Assessment & Plan    - Found to have dysphagia, high risk aspiration.   - Advanced directive specified no feeding tube   - Speech evaluated and recommended  1:1 feeding. Tolerating well   - afebrile, no cough.     Hypothyroidism- (present on admission)   Assessment & Plan    - TSH 0.54 on 3/21/19  - Continue home levothyroxine     Hx of CABG- (present on admission)   Assessment & Plan    - On aspirin and statin.     Gout of multiple sites- (present on admission)   Assessment & Plan    - Continue home allopurinol      PVD (peripheral vascular disease) (CMS-HCC)- (present on admission)   Assessment & Plan    - History of prior L superficial femoral artery stent, known high grade stenosis of R common femoral artery closed with Starclose closure by Dr. Amaya on 03/22/17. Stable, no acute complains.      CAD (coronary artery disease)- (present on admission)   Assessment & Plan    History of 3V CABG with left internal  mammary artery graft to LAD, saphenous vein graft to obtuse marginal branch and saphenous vein graft to posterior descending artery by Dr. Coates in March 2018.  - Echo EF 65%, status post TAVR, RVSP 30 mmHg  - Con't statin, ASA, metoprolol, and lisinopril.  - Denies cp/palpitations, n/v or other s/s.   -Appears comfortable     Paroxysmal atrial fibrillation (CMS-[facility])- (present on admission)   Assessment & Plan    - Stable. Xarelto discontinued by Cardiology in August 2018 due to high fall risk per chart review.  - Continue Metoprolol

## 2019-05-31 NOTE — PROGRESS NOTES
Received report and assumed patient care. Patient is AOx1-2, disoriented to time and event. Assessment complete on RA. q4 neuro checks in place due to patient hitting head yesterday. No abnormal signs noted. Small bump located on back of head. All needs met at this time. Safety precautions and hourly rounding in place.

## 2019-05-31 NOTE — CARE PLAN
"Problem: Safety  Goal: Will remain free from falls  Outcome: PROGRESSING AS EXPECTED  Pt hit head yesterday. Safety precautions are in place. Bed alarm on, non-slip socks, hourly rounding. Educated patient to not get out of bed without assistance. Patient stated, \"okay\".     Problem: Skin Integrity  Goal: Risk for impaired skin integrity will decrease  Outcome: PROGRESSING AS EXPECTED  Linen changes due to incontinence in place. Barrier wipes and cream. Out of bed for meals.       "

## 2019-05-31 NOTE — PROGRESS NOTES
Pt almost fell per CNA report, pt was found at edge of bed about to stand up with assistance of wife. Pt is extremely unsteady and weak. Pt needs staff assistance to ambulate up to bathroom. Wife was educated that staff must be present before wife gets pt up out of bed.

## 2019-05-31 NOTE — CARE PLAN
Problem: Safety  Goal: Will remain free from falls  Re-orient and demonstrate call light function and purpose.  Pt does not use call light and needs frequently re-orientation of function.  Fall precautions in place: bed alarm on, bed in lowest and locked position, pt wearing non skid socks, call light within reach, walker out of sight, environment uncluttered and nurse charting in front of pt's room as able.    Problem: Bowel/Gastric:  Goal: Normal bowel function is maintained or improved    Intervention: Educate patient and significant other/support system about diet, fluid intake, medications and activity to promote bowel function  Offer 1:1 feeder drinks of oral intake during rounding and while pt is awake to prevent constipation and dehydration.  Pt finishes all thicken liquids offered to him.

## 2019-06-01 NOTE — PROGRESS NOTES
A&OX2. Pt resting in bed throughout shift. No c/o pain or discomfort. Q4H neuro checks completed. Fall precautions in place. Call bell and bedside table within reach. Hourly rounding completed. Will continue to monitor.

## 2019-06-01 NOTE — CARE PLAN
Problem: Safety  Goal: Will remain free from falls  Outcome: PROGRESSING AS EXPECTED  Bed in locked and lowest position. Bed alarm on. Nonskid socks in place.     Problem: Pain Management  Goal: Pain level will decrease to patient's comfort goal  Outcome: PROGRESSING AS EXPECTED  No c/o pain at this time. Pt resting in bed. Will continue to monitor.

## 2019-06-01 NOTE — PROGRESS NOTES
Internal Medicine Interval Note  Note Author: Rudi Grande M.D.     Name Wally Torres       1933   Age/Sex 85 y.o. male   MRN [id number]   Code Status DNAR/DNI     After 5PM or if no immediate response to page, please call for cross-coverage  Attending/Team: Dr. Pratt / CRISTI Cage See Patient List for primary contact information  Call [phone] to page    1st Call - Day Intern (R1):   Dr. Grande 2nd Call - Day Sr. Resident (R2/R3):   Dr. Gr         Reason for interval visit    Cognitive impairment      Interval Problem Daily Status Update     No acute events overnight.  Patient is resting comfortably in his bed.  It has been more than 48 hours since the fall, no new neurological changes or focal neurological deficits.  Will resume heparin for DVT prophylaxis.      Discharge/Barriers to discharge:   It is my medical judgment, that the patient requires continued medically necessary hospital services for dementia with intermittent behavioral disturbances, failure to thrive and inability to care for self. Patient will remain in the hospital until guardianship is obtained. Discharged plan will be deferred until guardianship is obtained.     Review of Systems   Unable to perform ROS: Dementia (Limited due to patient condition)   Musculoskeletal: Positive for falls.   Psychiatric/Behavioral: The patient is nervous/anxious.         Baseline dementia.        Disposition/Barriers to discharge:   Pending placement/ guardianship      Consultants/Specialty  Palliative care    Ethics  PCP: Halie Dominique M.D.      Quality Measures  Quality-Core Measures   Reviewed items::  Medications reviewed and Labs reviewed  Baldwin catheter::  No Baldwin  DVT prophylaxis pharmacological::  Heparin    Physical Exam       Vitals:    19 1438 19 1905 19 0300 19 0724   BP: 127/58 115/61 125/56 116/66   Pulse: 69 72 62 60   Resp: 18 17 18 18   Temp: 36.8 °C (98.2 °F) 36.9 °C (98.5 °F)  36.1 °C (97 °F) 36.4 °C (97.5 °F)   TempSrc: Temporal Temporal Temporal Temporal   SpO2: 97% 97% 100% 97%   Weight:       Height:         Body mass index is 22.47 kg/m².    Oxygen Therapy:  Pulse Oximetry: 97 %, O2 (LPM): 0, O2 Delivery: None (Room Air)    Physical Exam   Constitutional: He appears malnourished.   Thin appearing elderly  male in no acute distress.    HENT:   Head: Normocephalic and atraumatic.   Mouth/Throat: Oropharynx is clear and moist.   Eyes: EOM are normal. No scleral icterus.   Neck: Normal range of motion. No JVD present. No tracheal deviation present.   Cardiovascular: Normal rate.  Exam reveals no gallop and no friction rub.    Pulmonary/Chest: Effort normal. No stridor. No respiratory distress. He has no wheezes. He has no rales.   Abdominal: He exhibits no distension. There is no tenderness. There is no guarding.   Musculoskeletal: Normal range of motion. He exhibits no edema.   Diffuse muscle wasting   Neurological: He is alert.   -Small swelling noticed around the occipital area of head s/p fall unchanged compared to yesterday.  -No focal neurological deficits, following commands.      Skin: Skin is warm and dry. He is not diaphoretic. No erythema.   Psychiatric: Affect normal.   Nursing note and vitals reviewed.      Assessment/Plan     * Cognitive impairment- (present on admission)   Assessment & Plan    - Unable to care for self, progressive decline per wife   - Ethics and Palliative Care evaluated the patient. All in agreement that he will need guardianship and placement which are pending.   - No changes since admission     Age-related physical debility- (present on admission)   Assessment & Plan    - PT/OT recommended SNF for further therapy needs.  - Does not get out of bed much  - Significant muscle wasting diffusely      Cardiac pacemaker in situ- (present on admission)   Assessment & Plan    Secondary to sick sinus rhythm     SSS (sick sinus syndrome) (HCC)- (present on  admission)   Assessment & Plan    - Status post pacemaker. 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     Risk for falls- (present on admission)   Assessment & Plan    - Fall precautions, PT and OT consulted, appreciate recs.  - Ambulation with assistance only     Hypertension, essential- (present on admission)   Assessment & Plan    - On metoprolol and lisinopril.   - PM BP meds held 05/23/19 secondary to asymptomatic low SBP that recovered with IVNS bolus.  - Stable today     Dyslipidemia- (present on admission)   Assessment & Plan    - Continue statin     Fall- (present on admission)   Assessment & Plan    - Patient fell in the bathroom and hit his head, however there was no loss of consciousness.  - No changes upon neurological examination.      Plan:  - Ordered every 4 hours neurochecks and discontinued heparin  - We will get stat CT of the head if changes in neurological examination.     Other constipation   Assessment & Plan    - on bowel protocol   - target BMs at least every other day     Dysphagia   Assessment & Plan    - Found to have dysphagia, high risk aspiration.   - Advanced directive specified no feeding tube   - Speech evaluated and recommended  1:1 feeding. Tolerating well   - afebrile, no cough.     Hypothyroidism- (present on admission)   Assessment & Plan    - TSH 0.54 on 3/21/19  - Continue home levothyroxine     Hx of CABG- (present on admission)   Assessment & Plan    - On aspirin and statin.     Gout of multiple sites- (present on admission)   Assessment & Plan    - Continue home allopurinol      PVD (peripheral vascular disease) (CMS-HCC)- (present on admission)   Assessment & Plan    - History of prior L superficial femoral artery stent, known high grade stenosis of R common femoral artery closed with Starclose closure by Dr. Amaya on 03/22/17. Stable, no acute complains.      CAD (coronary  artery disease)- (present on admission)   Assessment & Plan    History of 3V CABG with left internal mammary artery graft to LAD, saphenous vein graft to obtuse marginal branch and saphenous vein graft to posterior descending artery by Dr. Coates in March 2018.  - Echo EF 65%, status post TAVR, RVSP 30 mmHg  - Con't statin, ASA, metoprolol, and lisinopril.  - Denies cp/palpitations, n/v or other s/s.   -Appears comfortable     Paroxysmal atrial fibrillation (CMS-[facility])- (present on admission)   Assessment & Plan    - Stable. Xarelto discontinued by Cardiology in August 2018 due to high fall risk per chart review.  - Continue Metoprolol

## 2019-06-02 NOTE — CARE PLAN
Problem: Safety  Goal: Will remain free from falls  Outcome: PROGRESSING AS EXPECTED  Bed in locked and lowest position. Nonskid socks in place. Will continue to monitor.     Problem: Pain Management  Goal: Pain level will decrease to patient's comfort goal  Outcome: PROGRESSING AS EXPECTED  No c/o pain or discomfort at this time. Pt resting in bed at this time. Will continue to monitor.

## 2019-06-02 NOTE — PROGRESS NOTES
Assumed care of pt. Unable to assess orientation status, pt not answering orientation questions. Assessment completed, POC discussed. Pt ambulates with FWW, has unsteady/wide-based gait. Up to chair for meals. Redness to sacral area, blanching. Placed mepilex. CNA Bella applied condom cath. No evidence of pain or discomfort this morning. All needs met. Safety precautions/hourly rounding in place.

## 2019-06-02 NOTE — PROGRESS NOTES
A&OX2. Pt resting in bed at this time. No c/o pain or discomfort. Q4H neuro checks completed. Fall precautions in place. Call bell and bedside table within reach. Hourly rounding completed. Will continue to monitor.

## 2019-06-02 NOTE — CARE PLAN
Problem: Discharge Barriers/Planning  Goal: Patient's continuum of care needs will be met  Outcome: PROGRESSING AS EXPECTED  Pt alert, VS stable, no change in clinical status.  Staff assisted mobilization and up to chair for meals    Problem: Psychosocial Needs:  Goal: Level of anxiety will decrease  Outcome: PROGRESSING AS EXPECTED  Redirection, distraction, reassurance, spouse shared room.  Pt enjoys coloring out at nurses station to occupy time.

## 2019-06-03 NOTE — PROGRESS NOTES
A&OX2. Pt resting in bed throughout shift. No c/o pain at this time. Fall precautions in place. Call bell and bedside table within reach. Will continue to monitor.

## 2019-06-03 NOTE — CARE PLAN
Problem: Safety  Goal: Will remain free from falls  Outcome: PROGRESSING AS EXPECTED  Bed alarm on. Bed in locked and lowest position. Nonskid socks in place. Ambulates with 1 assist with FWW.    Problem: Pain Management  Goal: Pain level will decrease to patient's comfort goal  Outcome: PROGRESSING AS EXPECTED  No c/o pain at this time. Pt resting comfortably in bed. Will continue to monitor.

## 2019-06-03 NOTE — PROGRESS NOTES
Pt alert to self, unable to fully assess orientation due to pt not answering questions. Pt up to bathroom with FWW with staff assistance. Education provided to pt to call for assistance when needed. Reinforcement of education needed. Pt room close to nurses station.

## 2019-06-04 NOTE — CARE PLAN
Problem: Safety  Goal: Will remain free from injury  Outcome: PROGRESSING AS EXPECTED  Safety precautions in place. Non skid socks on. Bed alarm in place and functioning. Room near nursing station. Call light within reach. Bi hourly rounding in place.    Problem: Discharge Barriers/Planning  Goal: Patient's continuum of care needs will be met  Outcome: PROGRESSING SLOWER THAN EXPECTED  Patient awaiting guardianship/ placement. SW assisting.

## 2019-06-04 NOTE — CARE PLAN
Problem: Safety  Goal: Will remain free from injury  Outcome: PROGRESSING AS EXPECTED  Pt room close to nurses station. Pt ambulated to bathroom with staff assistance.

## 2019-06-04 NOTE — PROGRESS NOTES
Internal Medicine Interval Note    Note Author: Shirlene Benton M.D.      Name Rafia MCINTYRE 1933   Age 85 y.o. male   MRN [id number]   Code Status DNAR-DNI     After 5 PM or if no immediate response to page, please call for cross-coverage.    Attending: Dr. Romo  Team: Blue See patient list for primary contact information.  Call [phone] to page.    1st Call - Day Intern, R1  Dr. Benton 2nd Call - Day Sr. Resident, R2-R3  Dr. Brunson       Principal Problem  Dementia, awaiting guardianship       Interval History  No acute events overnight.  Resting comfortably in bed, denies any complaints, wife at bedside.  Has been ambulating without difficulty per nursing report.   Followed up with DONATO Srinivasan; he reports no updates, guardianship process ongoing.       ROS  Unable to perform, limited due to patient condition.  Has baseline dementia, denies any symptoms on ROS.       Disposition, Discharge Barriers  Pending placement and guardianship.      Consultants  Palliative Care  Ethics        Quality-Core Measures  Baldwin Catheter: No  Central Line: No  VTE Prophylaxis: Heparin  Antibiotics: NA  Rehab: NA      Physical Exam    Temp:  [36.2 °C (97.2 °F)-36.6 °C (97.9 °F)] 36.3 °C (97.4 °F)  Pulse:  [64-72] 64  Resp:  [14-18] 18  BP: (115-152)/(64-76) 152/67  SpO2:  [96 %] 96 %    General: No acute distress, cooperative  Head: Normocephalic, atraumatic, bitemporal wasting  Eyes: Normal conjunctivae, sclerae anicteric  Throat: Oropharynx clear, oral mucosa moist  Neck: Supple, no lymphadenopathy  Lungs: Normal work of breathing, clear to auscultation bilaterally  Heart: Normal rate, regular rhythm, no murmurs  Abdomen: Soft, thin, NTND, no peritoneal signs  Extremities: Nontender, no cyanosis, no edema, subcutaneous fat loss  Neuro: Alert, no focal deficits  Skin: Warm, dry, intact      Labs and Imaging  Pertinent labs, imaging and diagnostic tests and procedures associated with this hospitalization  have been reviewed, specific comments can be found in the assessment and plan section below.       Assessment and Plan    Age-related physical debility   Assessment & Plan    PT, OT recommended SNF for further therapy needs.  Does not get out of bed much.  Significant muscle wasting diffusely.     * Cognitive impairment   Assessment & Plan    Unable to care for self, progressive decline per wife.  Ethics and Palliative Care evaluated the patient. All in agreement that he will need guardianship and placement which are pending.   No changes since admission.     Cardiac pacemaker in situ   Assessment & Plan    Secondary to sick sinus rhythm     SSS (sick sinus syndrome) ([facility])   Assessment & Plan    Status post pacemaker.   Stable.     S/P TAVR (transcatheter aortic valve replacement)   Assessment & Plan    Continue aspirin and statin.     Risk for falls   Assessment & Plan    Fall precautions, PT and OT consulted, appreciate recs.   Ambulation with assistance only.     Hypertension, essential   Assessment & Plan    On metoprolol and lisinopril.     Dyslipidemia   Assessment & Plan    Continue statin.     Multiple bruises-resolved as of 5/12/2019   Assessment & Plan    Seen on admission, on knees and sternum, appeared as though patient had a recent fall.   PT and OT, fall precautions.  Resolved.     Fall   Assessment & Plan    Patient fell in the bathroom and hit his head, however there was no loss of consciousness.  No changes upon neurological examination.  Fall precautions, up with assistance only.     Other constipation   Assessment & Plan    Bowel protocol.  Goal of at least 1 BM every other day.      Dysphagia   Assessment & Plan    Found to have dysphagia, high risk aspiration.   Advanced directive specified no feeding tube   Speech evaluated and recommended  1:1 feeding.   Tolerating well, afebrile, no cough.     Hypothyroidism   Assessment & Plan    TSH 0.54 on 3/21/19.  Continue home levothyroxine.     Hx of  CABG   Assessment & Plan    On aspirin and statin.     Gout of multiple sites   Assessment & Plan    Continue home allopurinol.     PVD (peripheral vascular disease) (CMS-HCC)   Assessment & Plan    History of prior L superficial femoral artery stent, known high grade stenosis of R common femoral artery closed with Starclose closure by Dr. Amaya on 03/22/17.   Stable, no acute complaints.      CAD (coronary artery disease)   Assessment & Plan    History of 3-vessel CABG with left internal mammary artery graft to LAD, saphenous vein graft to obtuse marginal branch and saphenous vein graft to posterior descending artery by Dr. Coates in March 2018.  EF 65% on TTE, status post TAVR, RVSP 30 mmHg.    Plan:  Continue statin, aspirin, metoprolol, and lisinopril.     Paroxysmal atrial fibrillation (CMS-HCC)   Assessment & Plan    Stable.   Xarelto discontinued by Cardiology in August 2018 due to high fall risk per chart review.  Continue Metoprolol for rate control.      Headache-resolved as of 5/6/2019   Assessment & Plan    Resolved, Tylenol PRN  Encourage PO hydration  Fall precautions      Hypovolemia-resolved as of 3/20/2019   Assessment & Plan    BUN: Cr > 20.  NaCl and BP okay.  Resolved after IVF and encouraging PO intake.     BETTY (acute kidney injury) ([facility])-resolved as of 5/6/2019   Assessment & Plan    Resolved.

## 2019-06-04 NOTE — PROGRESS NOTES
Internal Medicine Interval Note    Note Author: Shirlene Benton M.D.      Name Rafia MCINTYRE 1933   Age 85 y.o. male   MRN [id number]   Code Status DNAR-DNI     After 5PM or if no immediate response to page, please call for cross-coverage.    Attending: Dr. Romo  Team: [place] See patient list for primary contact information.  Call [phone] to page.    1st Call:  Dr. Benton, Day Intern, R1 2nd Call:  Dr. Galvan, Leigh Senior Resident, R2-R3       Admission Date: 3/19/2019      Principal Problem:  Cognitive impairment      Interval History:  No acute events overnight, resting comfortably in bed with his wife by his side.       ROS:  Unable to perform, limited due to patient condition.  Has baseline dementia, denies any symptoms on ROS.       Disposition-Discharge Barriers:  Pending placement and guardianship.      Consultants:  Palliative Care  Ethics        Quality Measures:  Baldwin Catheter: No  Central Line: No  VTE Prophylaxis: Heparin  Antibiotics: NA  Rehab: SLP, PT, OT      Physical Exam:    Vitals:    19 0455 19 0755 19 1600 19 1934   BP: 141/72 136/70 130/76 115/64   Pulse: 65 68 66 72   Resp: 17 14 17 16   Temp: 36.2 °C (97.2 °F) 36.2 °C (97.2 °F) 36.2 °C (97.2 °F) 36.6 °C (97.9 °F)   TempSrc: Temporal Temporal Temporal Temporal   SpO2: 97% 96% 96% 96%   Weight:       Height:         General: No acute distress, cooperative  Head: Normocephalic, atraumatic, bitemporal wasting  Eyes: Normal conjunctivae, sclerae anicteric  Throat: Oropharynx clear, oral mucosa moist  Neck: Supple, no lymphadenopathy  Lungs: Normal work of breathing, clear to auscultation bilaterally  Heart: Normal rate, regular rhythm, no murmurs  Abdomen: Soft, bowel sounds present, non-tender, no peritoneal signs  Extremities: Nontender, no cyanosis, no edema, subcutaneous fat loss  Neuro: Alert, no focal deficits  Skin: Warm, dry, intact  Psych: Normal mood, appropriate  affect      Assessment and Plan:    Age-related physical debility   Assessment & Plan    PT, OT recommended SNF for further therapy needs.  Does not get out of bed much.  Significant muscle wasting diffusely.     * Cognitive impairment   Assessment & Plan    Unable to care for self, progressive decline per wife.  Ethics and Palliative Care evaluated the patient. All in agreement that he will need guardianship and placement which are pending.   No changes since admission.     Cardiac pacemaker in situ   Assessment & Plan    Secondary to sick sinus rhythm     SSS (sick sinus syndrome) ([facility])   Assessment & Plan    Status post pacemaker.   Stable.     S/P TAVR (transcatheter aortic valve replacement)   Assessment & Plan    Continue aspirin and statin.     Risk for falls   Assessment & Plan    Fall precautions, PT and OT consulted, appreciate recs.   Ambulation with assistance only.     Hypertension, essential   Assessment & Plan    On metoprolol and lisinopril.     Dyslipidemia   Assessment & Plan    Continue statin.     Fall   Assessment & Plan    Patient fell in the bathroom and hit his head, however there was no loss of consciousness.  No changes upon neurological examination.  Fall precautions, up with assistance only.     Other constipation   Assessment & Plan    Bowel protocol.  Goal of at least 1 BM every other day.      Dysphagia   Assessment & Plan    Found to have dysphagia, high risk aspiration.   Advanced directive specified no feeding tube   Speech evaluated and recommended  1:1 feeding.   Tolerating well, afebrile, no cough.     Hypothyroidism   Assessment & Plan    TSH 0.54 on 3/21/19.  Continue home levothyroxine.     Hx of CABG   Assessment & Plan    On aspirin and statin.     Gout of multiple sites   Assessment & Plan    Continue home allopurinol.     PVD (peripheral vascular disease) (CMS-[facility])   Assessment & Plan    History of prior L superficial femoral artery stent, known high grade stenosis of R  common femoral artery closed with Starclose closure by Dr. Amaya on 03/22/17.   Stable, no acute complaints.      CAD (coronary artery disease)   Assessment & Plan    History of 3-vessel CABG with left internal mammary artery graft to LAD, saphenous vein graft to obtuse marginal branch and saphenous vein graft to posterior descending artery by Dr. Coates in March 2018.  EF 65% on TTE, status post TAVR, RVSP 30 mmHg.    Plan:  Continue statin, aspirin, metoprolol, and lisinopril.     Paroxysmal atrial fibrillation (CMS-HCC)   Assessment & Plan    Stable.   Xarelto discontinued by Cardiology in August 2018 due to high fall risk per chart review.  Continue Metoprolol for rate control.

## 2019-06-04 NOTE — PROGRESS NOTES
Pt alert to self. Pt wife in neighboring bed. Pt and wife educated on safety and to call staff when needing assistance. Pt up and ambulated around unit using one assist and front wheel walker.Pt room near nurses station.

## 2019-06-04 NOTE — PROGRESS NOTES
Patient on bed resting. Alert and oriented yo self. Assessment completed. On room air, tolerating well. Denies any pain and discomfort at this time. Patient educated regarding plan of care. Bed alarm in place and functioning. Awaiting guardianship/ placement.    Bed locked, in lowest position, treaded socks on. Needs attended. No further needs at this time. Communication board updated. Call light and personal belongings within reach.

## 2019-06-05 NOTE — PROGRESS NOTES
BEDSIDE REPORT ACCEPTED FROM Evi Bonilla R.N.:  CARE ASSUMED    PLAN OF CARE:  1. FALLS PRECAUTIONS WITH PT/OT, UP WITH ASSIST(H/O OF FALLS)  2. ASPIRATION (HIGH) PRECAUTIONS WITH 1:1 FEEDING   3. GUARDIANSHIP AND PLACEMENT PENDING

## 2019-06-05 NOTE — CARE PLAN
Problem: Safety  Goal: Will remain free from falls  Outcome: PROGRESSING AS EXPECTED  FALLS PRECAUTIONS ONGOING WITH Q 1 HOUR AND PRN ROUNDING

## 2019-06-05 NOTE — CARE PLAN
Problem: Safety  Goal: Will remain free from injury  Outcome: PROGRESSING SLOWER THAN EXPECTED  Pt room close to nurses station. Pt educated on safety and to call for assistance from staff when needing to ambulate.

## 2019-06-05 NOTE — PROGRESS NOTES
Unable to assess orientation, pt does not answer questions. Pt wife in neighboring bed. Pt and wife educated on safety, educated to call for assistance from staff when needing to ambulate to restroom. Pt room close to nurses station.

## 2019-06-06 NOTE — CARE PLAN
Problem: Communication  Goal: The ability to communicate needs accurately and effectively will improve    Intervention: Reorient patient to environment as needed  Patient reorientation provided to the acute care hospital setting as needed, due to disorientation, and in conjunction with providing spiritual/emotional support.       Problem: Pain Management  Goal: Pain level will decrease to patient's comfort goal    Intervention: Follow pain managment plan developed in collaboration with patient and Interdisciplinary Team  Pt reports no pain

## 2019-06-06 NOTE — PROGRESS NOTES
Internal Medicine Interval Note    Note Author: Shirlene Benton M.D.      Name Rafia Shaikh DOB 1933   Age 85 y.o. male   MRN [id number]   Code Status DNAR-DNI     After 5 PM or if no immediate response to page, please call for cross-coverage.    Attending: Dr. Romo  Team: Blue See patient list for primary contact information.  Call [phone] to page.    1st Call - Day Intern, R1  Dr. Benton 2nd Call - Day Sr. Resident, R2-R3  Dr. Brunson       Principal Problem  Dementia, awaiting guardianship       Interval History  No acute events overnight.  Resting comfortably in bed, denies any complaints.   LSW continues to work on the guardianship process.      ROS  Unable to perform, limited due to patient condition.  Has baseline dementia, denies any symptoms on ROS.       Disposition, Discharge Barriers  Pending placement and guardianship.      Consultants  Palliative Care  Ethics        Quality-Core Measures  Baldwin Catheter: No  Central Line: No  VTE Prophylaxis: Heparin  Antibiotics: NA  Rehab: NA      Physical Exam    Temp:  [36.1 °C (97 °F)-36.6 °C (97.8 °F)] 36.3 °C (97.4 °F)  Pulse:  [67-71] 67  Resp:  [16-18] 17  BP: (107-127)/(52-63) 117/58  SpO2:  [93 %-100 %] 100 %    General: No acute distress, cooperative  Head: Normocephalic, atraumatic, bitemporal wasting  Eyes: Normal conjunctivae, sclerae anicteric  Throat: Oropharynx clear, oral mucosa moist  Neck: Supple, no lymphadenopathy  Lungs: Normal work of breathing, clear to auscultation bilaterally  Heart: Normal rate, regular rhythm, no murmurs  Abdomen: Soft, thin, NTND, no peritoneal signs  Extremities: Nontender, no cyanosis, no edema, subcutaneous fat loss  Neuro: Alert, no focal deficits  Skin: Warm, dry, intact      Labs and Imaging  Pertinent labs, imaging and diagnostic tests and procedures associated with this hospitalization have been reviewed, specific comments can be found in the assessment and plan section below.        Assessment and Plan    Age-related physical debility   Assessment & Plan    PT, OT recommended SNF for further therapy needs.  Does not get out of bed much.  Significant muscle wasting diffusely.     * Cognitive impairment   Assessment & Plan    Unable to care for self, progressive decline per wife.  Ethics and Palliative Care evaluated the patient. All in agreement that he will need guardianship and placement which are pending.   No changes since admission.     Cardiac pacemaker in situ   Assessment & Plan    Secondary to sick sinus rhythm     SSS (sick sinus syndrome) ([facility])   Assessment & Plan    Status post pacemaker.   Stable.     S/P TAVR (transcatheter aortic valve replacement)   Assessment & Plan    Continue aspirin and statin.     Risk for falls   Assessment & Plan    Fall precautions, PT and OT consulted, appreciate recs.   Ambulation with assistance only.     Hypertension, essential   Assessment & Plan    On metoprolol and lisinopril.     Dyslipidemia   Assessment & Plan    Continue statin.     Multiple bruises-resolved as of 5/12/2019   Assessment & Plan    Seen on admission, on knees and sternum, appeared as though patient had a recent fall.   PT and OT, fall precautions.  Resolved.     Fall   Assessment & Plan    Patient fell in the bathroom and hit his head, however there was no loss of consciousness.  No changes upon neurological examination.  Fall precautions, up with assistance only.     Other constipation   Assessment & Plan    Bowel protocol.  Goal of at least 1 BM every other day.      Dysphagia   Assessment & Plan    Found to have dysphagia, high risk aspiration.   Advanced directive specified no feeding tube   Speech evaluated and recommended  1:1 feeding.   Tolerating well, afebrile, no cough.     Hypothyroidism   Assessment & Plan    TSH 0.54 on 3/21/19.  Continue home levothyroxine.     Hx of CABG   Assessment & Plan    On aspirin and statin.     Gout of multiple sites   Assessment &  Plan    Continue home allopurinol.     PVD (peripheral vascular disease) (CMS-HCC)   Assessment & Plan    History of prior L superficial femoral artery stent, known high grade stenosis of R common femoral artery closed with Starclose closure by Dr. Amaya on 03/22/17.   Stable, no acute complaints.      CAD (coronary artery disease)   Assessment & Plan    History of 3-vessel CABG with left internal mammary artery graft to LAD, saphenous vein graft to obtuse marginal branch and saphenous vein graft to posterior descending artery by Dr. Coates in March 2018.  EF 65% on TTE, status post TAVR, RVSP 30 mmHg.    Plan:  Continue statin, aspirin, metoprolol, and lisinopril.     Paroxysmal atrial fibrillation (CMS-HCC)   Assessment & Plan    Stable.   Xarelto discontinued by Cardiology in August 2018 due to high fall risk per chart review.  Continue Metoprolol for rate control.      Headache-resolved as of 5/6/2019   Assessment & Plan    Resolved, Tylenol PRN  Encourage PO hydration  Fall precautions      Hypovolemia-resolved as of 3/20/2019   Assessment & Plan    BUN: Cr > 20.  NaCl and BP okay.  Resolved after IVF and encouraging PO intake.     BETTY (acute kidney injury) ([facility])-resolved as of 5/6/2019   Assessment & Plan    Resolved.

## 2019-06-06 NOTE — PROGRESS NOTES
I certify that the patient requires continued medically necessary hospital services for dementia and inability-to-care-for-self. The patient will remain in the hospital until a safe discharge can be arranged, the patient requires court appointed guardianship for this and the patient's length of stay is therefore difficult to predict at this time, Social Work is working on the Guardianship process and a Guardianship packet has been submitted to Jeannette Juarez per Social Work's documentation.

## 2019-06-06 NOTE — PROGRESS NOTES
Patient is alert to self, no complaints of pain nor SOB. Pt attempting to get OOB, bed alarm not working properly. Old bed alarm and box replaced with new equipment. Bed alarm active and audible. Pt compliant with evening medications.Pt wife in neighboring bed. Pt is considered a high fall risk and has appropriate precautions in place. Pt is visible from nursing station. Hourly rounding done. No signs of distress.

## 2019-06-06 NOTE — CARE PLAN
Problem: Safety  Goal: Will remain free from falls  Outcome: PROGRESSING AS EXPECTED  Patient is considered a high fall risk and has appropriate precautions in place. Pt is visible from nursing station.    Problem: Urinary Elimination:  Goal: Ability to reestablish a normal urinary elimination pattern will improve  Outcome: PROGRESSING SLOWER THAN EXPECTED  Patient is continent/incontinent. Will continue to offer toileting every 2 hours while awake.

## 2019-06-07 NOTE — PROGRESS NOTES
Assumed care of patient at change of shift.  During change of shift report, patient (pt) sleeping in bed, on room air, with bed alarm in place, and wife in room as roommate/patient.  During assessment, pt A&Ox1, oriented only to self.  Pt has no complaints of pain or any other needs, but pt did frequently mobilize without calling first for assistance.  Pt had one instance when this RN assisted this pt to the bathroom where he did become upset at his wife, for her intervening to help, and he did raise his voice at her.  Besides that, pt affect is flat except when interacting with nursing staff, when pt is amicable.

## 2019-06-07 NOTE — PROGRESS NOTES
Patient alert to self, attempted to get OOB bed three times but was easily redirected. Takes his medications crushed in applesauce or pudding without difficulty. Incontinent x2. Patient is a high fall risk, appropriate precautions in place. Bed in low and locked position, visible from nursing station. All needs met. Hourly rounding done. No signs of distress.

## 2019-06-07 NOTE — PROGRESS NOTES
Internal Medicine Interval Note    Note Author: Shirlene Benton M.D.      Name Rafia MCINTYRE 1933   Age 85 y.o. male   MRN [id number]   Code Status DNAR-DNI     After 5 PM or if no immediate response to page, please call for cross-coverage.    Attending: Dr. Romo  Team: Blue See patient list for primary contact information.  Call [phone] to page.    1st Call - Day Intern, R1  Dr. Benton 2nd Call - Day Sr. Resident, R2-R3  Dr. Brunson       Principal Problem  Dementia, awaiting guardianship       Interval History  No acute events overnight.  Lying comfortably in bed, denies any complaints.  Social work continues to work on the guardianship process for discharge.      ROS  Unable to perform, limited due to patient condition.  Has baseline dementia, denies any symptoms on ROS.       Disposition, Discharge Barriers  Pending placement and guardianship.      Consultants  Palliative Care  Ethics        Quality-Core Measures  Baldwin Catheter: No  Central Line: No  VTE Prophylaxis: Heparin  Antibiotics: NA  Rehab: NA      Physical Exam    Temp:  [36.2 °C (97.2 °F)-36.4 °C (97.6 °F)] 36.2 °C (97.2 °F)  Pulse:  [60-85] 60  Resp:  [16] 16  BP: ()/(47-70) 101/47  SpO2:  [90 %-99 %] 95 %    General: No acute distress, cooperative  Head: Normocephalic, atraumatic, bitemporal wasting  Eyes: Normal conjunctivae, sclerae anicteric  Throat: Oropharynx clear, oral mucosa moist  Neck: Supple, no lymphadenopathy  Lungs: Normal work of breathing, no respiratory distress  Heart: Normal rate, regular rhythm  Abdomen: Soft, thin, NTND, no peritoneal signs  Extremities: Nontender, no cyanosis, no edema, subcutaneous fat loss  Neuro: Alert, no focal deficits  Skin: Warm, dry, intact      Labs and Imaging  Pertinent labs, imaging and diagnostic tests and procedures associated with this hospitalization have been reviewed, specific comments can be found in the assessment and plan section below.       Assessment  and Plan    Age-related physical debility   Assessment & Plan    PT, OT recommended SNF for further therapy needs.  Does not get out of bed much.  Significant muscle wasting diffusely.     * Cognitive impairment   Assessment & Plan    Unable to care for self, progressive decline per wife.  Ethics and Palliative Care evaluated the patient. All in agreement that he will need guardianship and placement which are pending.   No changes since admission.     Cardiac pacemaker in situ   Assessment & Plan    Secondary to sick sinus rhythm     SSS (sick sinus syndrome) ([facility])   Assessment & Plan    Status post pacemaker.   Stable.     S/P TAVR (transcatheter aortic valve replacement)   Assessment & Plan    Continue aspirin and statin.     Risk for falls   Assessment & Plan    Fall precautions, PT and OT consulted, appreciate recs.   Ambulation with assistance only.     Hypertension, essential   Assessment & Plan    On metoprolol and lisinopril.     Dyslipidemia   Assessment & Plan    Continue statin.     Multiple bruises-resolved as of 5/12/2019   Assessment & Plan    Seen on admission, on knees and sternum, appeared as though patient had a recent fall.   PT and OT, fall precautions.  Resolved.     Fall   Assessment & Plan    Patient fell in the bathroom and hit his head, however there was no loss of consciousness.  No changes upon neurological examination.  Fall precautions, up with assistance only.     Other constipation   Assessment & Plan    Bowel protocol.  Goal of at least 1 BM every other day.      Dysphagia   Assessment & Plan    Found to have dysphagia, high risk aspiration.   Advanced directive specified no feeding tube   Speech evaluated and recommended  1:1 feeding.   Tolerating well, afebrile, no cough.     Hypothyroidism   Assessment & Plan    TSH 0.54 on 3/21/19.  Continue home levothyroxine.     Hx of CABG   Assessment & Plan    On aspirin and statin.     Gout of multiple sites   Assessment & Plan    Continue  home allopurinol.     PVD (peripheral vascular disease) (CMS-HCC)   Assessment & Plan    History of prior L superficial femoral artery stent, known high grade stenosis of R common femoral artery closed with Starclose closure by Dr. Amaya on 03/22/17.   Stable, no acute complaints.      CAD (coronary artery disease)   Assessment & Plan    History of 3-vessel CABG with left internal mammary artery graft to LAD, saphenous vein graft to obtuse marginal branch and saphenous vein graft to posterior descending artery by Dr. Coates in March 2018.  EF 65% on TTE, status post TAVR, RVSP 30 mmHg.    Plan:  Continue statin, aspirin, metoprolol, and lisinopril.     Paroxysmal atrial fibrillation (CMS-HCC)   Assessment & Plan    Stable.   Xarelto discontinued by Cardiology in August 2018 due to high fall risk per chart review.  Continue Metoprolol for rate control.      Headache-resolved as of 5/6/2019   Assessment & Plan    Resolved, Tylenol PRN  Encourage PO hydration  Fall precautions      Hypovolemia-resolved as of 3/20/2019   Assessment & Plan    BUN: Cr > 20.  NaCl and BP okay.  Resolved after IVF and encouraging PO intake.     BETTY (acute kidney injury) ([facility])-resolved as of 5/6/2019   Assessment & Plan    Resolved.

## 2019-06-07 NOTE — PROGRESS NOTES
Internal Medicine Interval Note    Note Author: Shirlene Benton M.D.      Name Rafia MCINTYRE 1933   Age 85 y.o. male   MRN [id number]   Code Status DNAR-DNI     After 5 PM or if no immediate response to page, please call for cross-coverage.    Attending: Dr. Romo  Team: Blue See patient list for primary contact information.  Call [phone] to page.    1st Call - Day Intern, R1  Dr. Benton 2nd Call - Day Sr. Resident, R2-R3  Dr. Brunson       Principal Problem  Dementia, awaiting guardianship       Interval History  No acute events overnight.  Lying comfortably in bed.  No complaints at this time.  Social work working on the guardianship process.      ROS  Unable to perform, limited due to patient condition.  Has baseline dementia, denies any symptoms on ROS.       Disposition, Discharge Barriers  Pending placement and guardianship.      Consultants  Palliative Care  Ethics        Quality-Core Measures  Baldwin Catheter: No  Central Line: No  VTE Prophylaxis: Heparin  Antibiotics: NA  Rehab: NA      Physical Exam    Temp:  [36.2 °C (97.2 °F)-36.4 °C (97.6 °F)] 36.4 °C (97.6 °F)  Pulse:  [67-92] 85  Resp:  [16-17] 16  BP: ()/(45-66) 96/54  SpO2:  [90 %-100 %] 90 %    General: No acute distress, cooperative  Head: Normocephalic, atraumatic, bitemporal wasting  Eyes: Normal conjunctivae, sclerae anicteric  Throat: Oropharynx clear, oral mucosa moist  Neck: Supple, no lymphadenopathy  Lungs: Normal work of breathing, clear to auscultation bilaterally  Heart: Normal rate, regular rhythm, no murmurs  Abdomen: Soft, thin, NTND, no peritoneal signs  Extremities: Nontender, no cyanosis, no edema, subcutaneous fat loss  Neuro: Alert, no focal deficits  Skin: Warm, dry, intact      Labs and Imaging  Pertinent labs, imaging and diagnostic tests and procedures associated with this hospitalization have been reviewed, specific comments can be found in the assessment and plan section below.        Assessment and Plan    Age-related physical debility   Assessment & Plan    PT, OT recommended SNF for further therapy needs.  Does not get out of bed much.  Significant muscle wasting diffusely.     * Cognitive impairment   Assessment & Plan    Unable to care for self, progressive decline per wife.  Ethics and Palliative Care evaluated the patient. All in agreement that he will need guardianship and placement which are pending.   No changes since admission.     Cardiac pacemaker in situ   Assessment & Plan    Secondary to sick sinus rhythm     SSS (sick sinus syndrome) ([facility])   Assessment & Plan    Status post pacemaker.   Stable.     S/P TAVR (transcatheter aortic valve replacement)   Assessment & Plan    Continue aspirin and statin.     Risk for falls   Assessment & Plan    Fall precautions, PT and OT consulted, appreciate recs.   Ambulation with assistance only.     Hypertension, essential   Assessment & Plan    On metoprolol and lisinopril.     Dyslipidemia   Assessment & Plan    Continue statin.     Multiple bruises-resolved as of 5/12/2019   Assessment & Plan    Seen on admission, on knees and sternum, appeared as though patient had a recent fall.   PT and OT, fall precautions.  Resolved.     Fall   Assessment & Plan    Patient fell in the bathroom and hit his head, however there was no loss of consciousness.  No changes upon neurological examination.  Fall precautions, up with assistance only.     Other constipation   Assessment & Plan    Bowel protocol.  Goal of at least 1 BM every other day.      Dysphagia   Assessment & Plan    Found to have dysphagia, high risk aspiration.   Advanced directive specified no feeding tube   Speech evaluated and recommended  1:1 feeding.   Tolerating well, afebrile, no cough.     Hypothyroidism   Assessment & Plan    TSH 0.54 on 3/21/19.  Continue home levothyroxine.     Hx of CABG   Assessment & Plan    On aspirin and statin.     Gout of multiple sites   Assessment &  Plan    Continue home allopurinol.     PVD (peripheral vascular disease) (CMS-HCC)   Assessment & Plan    History of prior L superficial femoral artery stent, known high grade stenosis of R common femoral artery closed with Starclose closure by Dr. Amaya on 03/22/17.   Stable, no acute complaints.      CAD (coronary artery disease)   Assessment & Plan    History of 3-vessel CABG with left internal mammary artery graft to LAD, saphenous vein graft to obtuse marginal branch and saphenous vein graft to posterior descending artery by Dr. Coates in March 2018.  EF 65% on TTE, status post TAVR, RVSP 30 mmHg.    Plan:  Continue statin, aspirin, metoprolol, and lisinopril.     Paroxysmal atrial fibrillation (CMS-HCC)   Assessment & Plan    Stable.   Xarelto discontinued by Cardiology in August 2018 due to high fall risk per chart review.  Continue Metoprolol for rate control.      Headache-resolved as of 5/6/2019   Assessment & Plan    Resolved, Tylenol PRN  Encourage PO hydration  Fall precautions      Hypovolemia-resolved as of 3/20/2019   Assessment & Plan    BUN: Cr > 20.  NaCl and BP okay.  Resolved after IVF and encouraging PO intake.     BETTY (acute kidney injury) ([facility])-resolved as of 5/6/2019   Assessment & Plan    Resolved.

## 2019-06-07 NOTE — CARE PLAN
Problem: Safety  Goal: Will remain free from falls  Outcome: PROGRESSING AS EXPECTED  Patient is considered a high fall risk and has appropriate precautions in place. Patient is visible from nursing station. Pt has to be frequently reminded not to get OOB with help from staff.    Problem: Venous Thromboembolism (VTW)/Deep Vein Thrombosis (DVT) Prevention:  Goal: Patient will participate in Venous Thrombosis (VTE)/Deep Vein Thrombosis (DVT)Prevention Measures  Outcome: PROGRESSING AS EXPECTED  Patient is agreeable to receiving heparin injections.

## 2019-06-08 NOTE — CARE PLAN
Problem: Safety  Goal: Will remain free from falls  Outcome: PROGRESSING SLOWER THAN EXPECTED    Intervention: Implement fall precautions  Call light and personal belongings within reach. Pt instructed to call for assistance, need reinforcement, Non skid socks on. Strip alarm in place. Bed in lowest position.

## 2019-06-08 NOTE — CARE PLAN
Problem: Communication  Goal: The ability to communicate needs accurately and effectively will improve    Intervention: Reorient patient to environment as needed  Patient reorientation provided to the acute care hospital setting as needed, due to disorientation, and in conjunction with providing spiritual/emotional support.       Problem: Discharge Barriers/Planning  Goal: Patient's continuum of care needs will be met    Intervention: Involve patient and significant other/support system in setting and prioritizing goals for hospital stay and discharge  Discussed needs for discharge with pt and bedside spouse

## 2019-06-08 NOTE — PROGRESS NOTES
Internal Medicine Interval Note    Note Author: Bravo Brunson M.D.      Name Rafia MCINTYRE 1933   Age 85 y.o. male   MRN [id number]   Code Status DNAR-DNI     After 5 PM or if no immediate response to page, please call for cross-coverage.    Attending: Dr. Romo  Team: Blue See patient list for primary contact information.  Call [phone] to page.    1st Call - Day Intern, R1  Dr. Benton 2nd Call - Day Sr. Resident, R2-R3  Dr. Brunson       Principal Problem  Dementia, awaiting guardianship       Interval History  Sitting up in the chair eating his meals. Looks comfortable. Wife concerned about him being agitated. Per wife he does not like the food in the hospital and getting home sick.   Social work continues to work on the guardianship process for discharge.      ROS  Unable to perform, limited due to patient condition.  Has baseline dementia, denies any symptoms on ROS.       Disposition, Discharge Barriers  Pending placement and guardianship.      Consultants  Palliative Care  Ethics        Quality-Core Measures  Baldwin Catheter: No  Central Line: No  VTE Prophylaxis: Heparin  Antibiotics: NA  Rehab: NA      Physical Exam    Temp:  [36.1 °C (97 °F)-36.6 °C (97.9 °F)] 36.2 °C (97.1 °F)  Pulse:  [60-75] 66  Resp:  [15-16] 15  BP: (100-141)/(47-56) 141/56  SpO2:  [97 %-100 %] 100 %    General: No acute distress, cooperative  Head: Normocephalic, atraumatic, bitemporal wasting  Eyes: Normal conjunctivae, sclerae anicteric  Throat: Oropharynx clear, oral mucosa moist  Neck: Supple, no lymphadenopathy  Lungs: Normal work of breathing, no respiratory distress  Heart: Normal rate, regular rhythm  Abdomen: Soft, thin, NTND, no peritoneal signs  Extremities: Nontender, no cyanosis, no edema, subcutaneous fat loss  Neuro: Alert, no focal deficits  Skin: Warm, dry, intact      Labs and Imaging  Pertinent labs, imaging and diagnostic tests and procedures associated with this hospitalization  have been reviewed, specific comments can be found in the assessment and plan section below.       Assessment and Plan    Age-related physical debility   Assessment & Plan    PT, OT recommended SNF for further therapy needs.  Does not get out of bed much.  Significant muscle wasting diffusely.     * Cognitive impairment   Assessment & Plan    Unable to care for self, progressive decline per wife.  Ethics and Palliative Care evaluated the patient. All in agreement that he will need guardianship and placement which are pending.   No changes since admission.     Cardiac pacemaker in situ   Assessment & Plan    Secondary to sick sinus rhythm     SSS (sick sinus syndrome) ([facility])   Assessment & Plan    Status post pacemaker.   Stable.     S/P TAVR (transcatheter aortic valve replacement)   Assessment & Plan    Continue aspirin and statin.     Risk for falls   Assessment & Plan    Fall precautions, PT and OT consulted, appreciate recs.   Ambulation with assistance only.     Hypertension, essential   Assessment & Plan    On metoprolol and lisinopril.     Dyslipidemia   Assessment & Plan    Continue statin.     Multiple bruises-resolved as of 5/12/2019   Assessment & Plan    Seen on admission, on knees and sternum, appeared as though patient had a recent fall.   PT and OT, fall precautions.  Resolved.     Fall   Assessment & Plan    Patient fell in the bathroom and hit his head, however there was no loss of consciousness.  No changes upon neurological examination.  Fall precautions, up with assistance only.     Other constipation   Assessment & Plan    Bowel protocol.  Goal of at least 1 BM every other day.      Dysphagia   Assessment & Plan    Found to have dysphagia, high risk aspiration.   Advanced directive specified no feeding tube   Speech evaluated and recommended  1:1 feeding.   Tolerating well, afebrile, no cough.     Hypothyroidism   Assessment & Plan    TSH 0.54 on 3/21/19.  Continue home levothyroxine.     Hx of  CABG   Assessment & Plan    On aspirin and statin.     Gout of multiple sites   Assessment & Plan    Continue home allopurinol.     PVD (peripheral vascular disease) (CMS-HCC)   Assessment & Plan    History of prior L superficial femoral artery stent, known high grade stenosis of R common femoral artery closed with Starclose closure by Dr. Amaya on 03/22/17.   Stable, no acute complaints.      CAD (coronary artery disease)   Assessment & Plan    History of 3-vessel CABG with left internal mammary artery graft to LAD, saphenous vein graft to obtuse marginal branch and saphenous vein graft to posterior descending artery by Dr. Coates in March 2018.  EF 65% on TTE, status post TAVR, RVSP 30 mmHg.    Plan:  Continue statin, aspirin, metoprolol, and lisinopril.     Paroxysmal atrial fibrillation (CMS-HCC)   Assessment & Plan    Stable.   Xarelto discontinued by Cardiology in August 2018 due to high fall risk per chart review.  Continue Metoprolol for rate control.      Headache-resolved as of 5/6/2019   Assessment & Plan    Resolved, Tylenol PRN  Encourage PO hydration  Fall precautions      Hypovolemia-resolved as of 3/20/2019   Assessment & Plan    BUN: Cr > 20.  NaCl and BP okay.  Resolved after IVF and encouraging PO intake.     BETTY (acute kidney injury) ([facility])-resolved as of 5/6/2019   Assessment & Plan    Resolved.

## 2019-06-08 NOTE — CARE PLAN
Problem: Communication  Goal: The ability to communicate needs accurately and effectively will improve    Intervention: Reorient patient to environment as needed  Patient reorientation provided to the acute care hospital setting as needed, due to disorientation, and in conjunction with providing spiritual/emotional support.       Problem: Discharge Barriers/Planning  Goal: Patient's continuum of care needs will be met    Intervention: Involve patient and significant other/support system in setting and prioritizing goals for hospital stay and discharge  Reviewed need for placement with pt and spouse

## 2019-06-09 NOTE — CARE PLAN
Problem: Venous Thromboembolism (VTW)/Deep Vein Thrombosis (DVT) Prevention:  Goal: Patient will participate in Venous Thrombosis (VTE)/Deep Vein Thrombosis (DVT)Prevention Measures  Outcome: PROGRESSING AS EXPECTED  No s/s of complication, pt tolerates well, free from DVT or VTE    Problem: Discharge Barriers/Planning  Goal: Patient's continuum of care needs will be met  Outcome: PROGRESSING AS EXPECTED  Discharge pending, waiting for placement and guardianship, SW involves in care,

## 2019-06-09 NOTE — PROGRESS NOTES
Internal Medicine Interval Note    Note Author: Bravo Brunson M.D.      Name Rafia MCINTYRE 1933   Age 85 y.o. male   MRN [id number]   Code Status DNAR-DNI     After 5 PM or if no immediate response to page, please call for cross-coverage.    Attending: Dr. Romo  Team: Blue See patient list for primary contact information.  Call [phone] to page.    1st Call - Day Intern, R1  Dr. Benton 2nd Call - Day Sr. Resident, R2-R3  Dr. Brunson       Principal Problem  Dementia, awaiting guardianship       Interval History  Looks comfortable lying in bed this morning. Wife at bedside and reports her  looks much better today.No acute events per nursing staff.       ROS  Unable to perform, limited due to patient condition.  Has baseline dementia, denies any symptoms on ROS.       Disposition, Discharge Barriers  Pending placement and guardianship.      Consultants  Palliative Care  Ethics        Quality-Core Measures  Baldwin Catheter: No  Central Line: No  VTE Prophylaxis: Heparin  Antibiotics: NA  Rehab: NA      Physical Exam    Temp:  [36 °C (96.8 °F)-36.7 °C (98.1 °F)] 36.2 °C (97.2 °F)  Pulse:  [60-62] 61  Resp:  [14-20] 15  BP: (102-134)/(45-66) 121/66  SpO2:  [92 %-98 %] 96 %    General: No acute distress, cooperative  Head: Normocephalic, atraumatic, bitemporal wasting  Eyes: Normal conjunctivae, sclerae anicteric  Throat: Oropharynx clear, oral mucosa moist  Neck: Supple, no lymphadenopathy  Lungs: Normal work of breathing, no respiratory distress  Heart: Normal rate, regular rhythm  Abdomen: Soft, thin, NTND, no peritoneal signs  Extremities: Nontender, no cyanosis, no edema, subcutaneous fat loss  Neuro: Alert, no focal deficits  Skin: Warm, dry, intact      Labs and Imaging  Pertinent labs, imaging and diagnostic tests and procedures associated with this hospitalization have been reviewed, specific comments can be found in the assessment and plan section below.       Assessment  and Plan    Age-related physical debility   Assessment & Plan    PT, OT recommended SNF for further therapy needs.  Does not get out of bed much.  Significant muscle wasting diffusely.     * Cognitive impairment   Assessment & Plan    Unable to care for self, progressive decline per wife.  Ethics and Palliative Care evaluated the patient. All in agreement that he will need guardianship and placement which are pending.   No changes since admission.     Cardiac pacemaker in situ   Assessment & Plan    Secondary to sick sinus rhythm     SSS (sick sinus syndrome) ([facility])   Assessment & Plan    Status post pacemaker.   Stable.     S/P TAVR (transcatheter aortic valve replacement)   Assessment & Plan    Continue aspirin and statin.     Risk for falls   Assessment & Plan    Fall precautions, PT and OT consulted, appreciate recs.   Ambulation with assistance only.     Hypertension, essential   Assessment & Plan    On metoprolol and lisinopril.     Dyslipidemia   Assessment & Plan    Continue statin.     Multiple bruises-resolved as of 5/12/2019   Assessment & Plan    Seen on admission, on knees and sternum, appeared as though patient had a recent fall.   PT and OT, fall precautions.  Resolved.     Fall   Assessment & Plan    Patient fell in the bathroom and hit his head, however there was no loss of consciousness.  No changes upon neurological examination.  Fall precautions, up with assistance only.     Other constipation   Assessment & Plan    Bowel protocol.  Goal of at least 1 BM every other day.      Dysphagia   Assessment & Plan    Found to have dysphagia, high risk aspiration.   Advanced directive specified no feeding tube   Speech evaluated and recommended  1:1 feeding.   Tolerating well, afebrile, no cough.     Hypothyroidism   Assessment & Plan    TSH 0.54 on 3/21/19.  Continue home levothyroxine.     Hx of CABG   Assessment & Plan    On aspirin and statin.     Gout of multiple sites   Assessment & Plan    Continue  home allopurinol.     PVD (peripheral vascular disease) (CMS-HCC)   Assessment & Plan    History of prior L superficial femoral artery stent, known high grade stenosis of R common femoral artery closed with Starclose closure by Dr. Amaya on 03/22/17.   Stable, no acute complaints.      CAD (coronary artery disease)   Assessment & Plan    History of 3-vessel CABG with left internal mammary artery graft to LAD, saphenous vein graft to obtuse marginal branch and saphenous vein graft to posterior descending artery by Dr. Coates in March 2018.  EF 65% on TTE, status post TAVR, RVSP 30 mmHg.    Plan:  Continue statin, aspirin, metoprolol, and lisinopril.     Paroxysmal atrial fibrillation (CMS-HCC)   Assessment & Plan    Stable.   Xarelto discontinued by Cardiology in August 2018 due to high fall risk per chart review.  Continue Metoprolol for rate control.      Headache-resolved as of 5/6/2019   Assessment & Plan    Resolved, Tylenol PRN  Encourage PO hydration  Fall precautions      Hypovolemia-resolved as of 3/20/2019   Assessment & Plan    BUN: Cr > 20.  NaCl and BP okay.  Resolved after IVF and encouraging PO intake.     BETTY (acute kidney injury) ([facility])-resolved as of 5/6/2019   Assessment & Plan    Resolved.

## 2019-06-09 NOTE — CARE PLAN
Problem: Safety  Goal: Will remain free from injury  Outcome: PROGRESSING AS EXPECTED  Fall precautions in place. Treaded socks on pt. Appropriate signs on doorway. Pt close to nurses station. Bedrails up. Bed in lowest position and locked.  Call light and phone within reach. Bed alarm on.    Problem: Venous Thromboembolism (VTW)/Deep Vein Thrombosis (DVT) Prevention:  Goal: Patient will participate in Venous Thrombosis (VTE)/Deep Vein Thrombosis (DVT)Prevention Measures  Outcome: PROGRESSING AS EXPECTED   06/08/19 2000   OTHER   Risk Assessment Score 1   VTE RISK Moderate   Pharmacologic Prophylaxis Used Unfractionated Heparin

## 2019-06-09 NOTE — PROGRESS NOTES
Report received by amrita RN. Assumed care of pt. Assessment complete. Wife at bedside. Pt A&Ox1 to self, VSS. Pt in no apparent signs of distress. Plan of care discussed. Call light within reach, bed in lowest position, and pt has no further questions at this time. Hourly rounding in place.

## 2019-06-10 NOTE — CARE PLAN
Problem: Venous Thromboembolism (VTW)/Deep Vein Thrombosis (DVT) Prevention:  Goal: Patient will participate in Venous Thrombosis (VTE)/Deep Vein Thrombosis (DVT)Prevention Measures  Outcome: PROGRESSING AS EXPECTED  No s/s of complication, free from VTE/DVT,     Problem: Bowel/Gastric:  Goal: Normal bowel function is maintained or improved  Outcome: PROGRESSING AS EXPECTED  BMx1

## 2019-06-10 NOTE — DISCHARGE PLANNING
Medical Social Work  PC to Jeannette Juarez's office to get an update on the Guardianship, SW told that the petition is with the [employer], no time frame on when a hearing will be held.

## 2019-06-10 NOTE — PROGRESS NOTES
Bedside report received, pt care assumed. Pt denies any additional needs at this time. Bed in lowest position, bed alarm on, pt educated on fall risk and verbalized understanding, call light within reach. Hourly rounding in place.

## 2019-06-10 NOTE — PROGRESS NOTES
Internal Medicine Interval Note    Note Author: Shirlene Benton M.D.      Name Rafia MCINTYRE 1933   Age 85 y.o. male   MRN [id number]   Code Status DNAR-DNI     After 5 PM or if no immediate response to page, please call for cross-coverage.    Attending: Dr. Arechiga  Team: Blue See patient list for primary contact information.  Call [phone] to page.    1st Call - Day Intern, R1  Dr. Benton 2nd Call - Day Sr. Resident, R2-R3  Dr. Persaud       Principal Problem  Dementia, awaiting guardianship       Interval History  No acute events overnight.  Lying comfortably in bed, awaiting breakfast, denies any complaints.  Social Work working on guardianship for discharge.      ROS  Unable to perform, limited due to patient condition.  Has baseline dementia, denies any symptoms on ROS.       Disposition, Discharge Barriers  Pending placement and guardianship.      Consultants  Palliative Care  Ethics        Quality-Core Measures  Baldwin Catheter: No  Central Line: No  VTE Prophylaxis: Heparin  Antibiotics: NA  Rehab: NA      Vitals  Vitals:    19 1953 06/10/19 0316 06/10/19 0650 06/10/19 1330   BP: (!) 94/71 (!) 97/55 121/55 103/52   Pulse: 61 80 60 69   Resp: 16 18 15 16   Temp: 36.8 °C (98.3 °F) 36.8 °C (98.3 °F) 36.2 °C (97.2 °F) 36.9 °C (98.5 °F)   TempSrc: Temporal Temporal Temporal Temporal   SpO2: 96% 94% 98% 95%   Weight:       Height:           Physical Exam  General: No acute distress, pleasant, cooperative  Head: Normocephalic, atraumatic, bitemporal wasting  Eyes: Normal conjunctivae, sclerae anicteric  Throat: Oropharynx clear, oral mucosa moist  Neck: Supple, no lymphadenopathy  Lungs: Normal work of breathing, no respiratory distress  Heart: Normal rate, regular rhythm  Abdomen: Soft, scaphoid, NTND, no peritoneal signs  Extremities: Nontender, no cyanosis, no edema, subcutaneous fat loss  Neuro: Alert, no focal deficits  Skin: Warm, dry, intact      Labs and  Imaging  Pertinent labs, imaging and diagnostic tests and procedures associated with this hospitalization have been reviewed, specific comments can be found in the assessment and plan section below.       Assessment and Plan    Age-related physical debility   Assessment & Plan    PT, OT recommended SNF for further therapy needs.  Does not get out of bed much.  Significant muscle wasting diffusely.     * Cognitive impairment   Assessment & Plan    Unable to care for self, progressive decline per wife.  Ethics and Palliative Care evaluated the patient. All in agreement that he will need guardianship and placement which are pending.   No changes since admission.     Cardiac pacemaker in situ   Assessment & Plan    Secondary to sick sinus rhythm     SSS (sick sinus syndrome) ([facility])   Assessment & Plan    Status post pacemaker.   Stable.     S/P TAVR (transcatheter aortic valve replacement)   Assessment & Plan    Continue aspirin and statin.     Risk for falls   Assessment & Plan    Fall precautions, PT and OT consulted, appreciate recs.   Ambulation with assistance only.     Hypertension, essential   Assessment & Plan    On metoprolol and lisinopril.     Dyslipidemia   Assessment & Plan    Continue statin.     Multiple bruises-resolved as of 5/12/2019   Assessment & Plan    Seen on admission, on knees and sternum, appeared as though patient had a recent fall.   PT and OT, fall precautions.  Resolved.     Fall   Assessment & Plan    Patient fell in the bathroom and hit his head, however there was no loss of consciousness.  No changes upon neurological examination.  Fall precautions, up with assistance only.     Other constipation   Assessment & Plan    Bowel protocol.  Goal of at least 1 BM every other day.      Dysphagia   Assessment & Plan    Found to have dysphagia, high risk aspiration.   Advanced directive specified no feeding tube   Speech evaluated and recommended  1:1 feeding.   Tolerating well, afebrile, no  cough.     Hypothyroidism   Assessment & Plan    TSH 0.54 on 3/21/19.  Continue home levothyroxine.     Hx of CABG   Assessment & Plan    On aspirin and statin.     Gout of multiple sites   Assessment & Plan    Continue home allopurinol.     PVD (peripheral vascular disease) (CMS-HCC)   Assessment & Plan    History of prior L superficial femoral artery stent, known high grade stenosis of R common femoral artery closed with Starclose closure by Dr. Aamya on 03/22/17.   Stable, no acute complaints.      CAD (coronary artery disease)   Assessment & Plan    History of 3-vessel CABG with left internal mammary artery graft to LAD, saphenous vein graft to obtuse marginal branch and saphenous vein graft to posterior descending artery by Dr. Coates in March 2018.  EF 65% on TTE, status post TAVR, RVSP 30 mmHg.    Plan:  Continue statin, aspirin, metoprolol, and lisinopril.     Paroxysmal atrial fibrillation (CMS-HCC)   Assessment & Plan    Stable.   Xarelto discontinued by Cardiology in August 2018 due to high fall risk per chart review.  Continue Metoprolol for rate control.      Headache-resolved as of 5/6/2019   Assessment & Plan    Resolved, Tylenol PRN  Encourage PO hydration  Fall precautions      Hypovolemia-resolved as of 3/20/2019   Assessment & Plan    BUN: Cr > 20.  NaCl and BP okay.  Resolved after IVF and encouraging PO intake.     BETTY (acute kidney injury) ([facility])-resolved as of 5/6/2019   Assessment & Plan    Resolved.

## 2019-06-10 NOTE — CARE PLAN
Problem: Safety  Goal: Will remain free from injury  Outcome: PROGRESSING AS EXPECTED  Fall precautions in place. Treaded socks on pt. Appropriate signs on doorway. Pt's room close to nurses station. Bedrails up. Bed in lowest position and locked.  Call light and phone within reach. Patient educated on importance of calling nurses before getting out of bed, verbalizes understanding. Bed alarm on.     Problem: Venous Thromboembolism (VTW)/Deep Vein Thrombosis (DVT) Prevention:  Goal: Patient will participate in Venous Thrombosis (VTE)/Deep Vein Thrombosis (DVT)Prevention Measures  Outcome: PROGRESSING AS EXPECTED   06/09/19 2000   OTHER   Risk Assessment Score 2   VTE RISK Moderate   Pharmacologic Prophylaxis Used Unfractionated Heparin

## 2019-06-11 NOTE — PROGRESS NOTES
Alert x2, bed alarm in place. Took his medications without problems with applesauce. Cont plan of care, call light within reach and visual checks through the night

## 2019-06-11 NOTE — CARE PLAN
Problem: Safety  Goal: Will remain free from injury  Outcome: PROGRESSING AS EXPECTED  Assisted pt up to chair and up to bathroom, pt tolerated well, free from injury    Problem: Venous Thromboembolism (VTW)/Deep Vein Thrombosis (DVT) Prevention:  Goal: Patient will participate in Venous Thrombosis (VTE)/Deep Vein Thrombosis (DVT)Prevention Measures  Outcome: PROGRESSING AS EXPECTED  No s/s of complication. Pt resting

## 2019-06-11 NOTE — CARE PLAN
Problem: Safety  Goal: Will remain free from injury  Outcome: PROGRESSING AS EXPECTED  Bed alarm on for safety and assistance out of bed    Problem: Discharge Barriers/Planning  Goal: Patient's continuum of care needs will be met  Outcome: PROGRESSING SLOWER THAN EXPECTED  Awaiting guardianship process for placement

## 2019-06-12 NOTE — PROGRESS NOTES
Alert x2, bed alarm in place, impulsive at times when getting out of bed without assistance. Cont plan of care, call light within reach and visual checks through the night

## 2019-06-12 NOTE — PROGRESS NOTES
Patient AAOx2, in room close to nursing station with strip alarm on as well as built in bed alarm.  Spouse is in room with pt, helpful with care but sometimes a little too helpful.  Pt will not call before attempting ambulation, alarm will go off and nursing staff gets in fairly quickly as to avoid falls.  Call light in reach yet does not get used, patient SO attempting to give thin liquids to pt multiple times on shift, trying to only have thickened beverages in room currently, hourly rounding in place.

## 2019-06-12 NOTE — PROGRESS NOTES
Internal Medicine Interval Note    Note Author: Shirlene Benton M.D.      Name Rafia MCINTYRE 1933   Age 85 y.o. male   MRN [id number]   Code Status DNAR-DNI     After 5 PM or if no immediate response to page, please call for cross-coverage.    Attending: Dr. Arechiga  Team: Blue See patient list for primary contact information.  Call [phone] to page.    1st Call - Day Intern, R1  Dr. Benton 2nd Call - Day Sr. Resident, R2-R3  Dr. Persaud       Principal Problem  Dementia, awaiting guardianship       Interval History  No acute events overnight.  Lying comfortably in bed, pleasant and cooperative, denies any complaints.  Social work following, guardianship process ongoing.      ROS  Unable to perform, limited due to patient condition.  Has baseline dementia, denies any symptoms on ROS.       Disposition, Discharge Barriers  Pending placement and guardianship.      Consultants  Palliative Care  Ethics        Quality-Core Measures  Baldwin Catheter: No  Central Line: No  VTE Prophylaxis: Heparin  Antibiotics: NA  Rehab: NA      Vitals  Vitals:    06/10/19 1900 19 0400 19 0755 19 1639   BP: 121/55 122/67 126/77 142/55   Pulse: 69 62 72 65   Resp: 16 15 15 12   Temp: 36.7 °C (98.1 °F) 36.1 °C (97 °F) 36.6 °C (97.8 °F) 36.4 °C (97.6 °F)   TempSrc: Temporal Temporal Temporal Temporal   SpO2: 92% 93% 97% 94%   Weight:       Height:           Physical Exam  General: No acute distress, pleasant, cooperative  Head: Normocephalic, atraumatic, bitemporal wasting  Eyes: Normal conjunctivae, sclerae anicteric  Throat: Oropharynx clear, oral mucosa moist  Neck: Supple, no lymphadenopathy  Lungs: Normal work of breathing, no respiratory distress  Heart: Normal rate, regular rhythm  Abdomen: Soft, scaphoid, NTND, no peritoneal signs  Extremities: Nontender, no cyanosis, no edema, subcutaneous fat loss  Neuro: Alert, no focal deficits  Skin: Warm, dry, intact      Labs and  Imaging  Pertinent labs, imaging and diagnostic tests and procedures associated with this hospitalization have been reviewed, specific comments can be found in the assessment and plan section below.       Assessment and Plan    Age-related physical debility   Assessment & Plan    PT, OT recommended SNF for further therapy needs.  Does not get out of bed much.  Significant muscle wasting diffusely.     * Cognitive impairment   Assessment & Plan    Unable to care for self, progressive decline per wife.  Ethics and Palliative Care evaluated the patient. All in agreement that he will need guardianship and placement which are pending.   No changes since admission.     Cardiac pacemaker in situ   Assessment & Plan    Secondary to sick sinus rhythm     SSS (sick sinus syndrome) ([facility])   Assessment & Plan    Status post pacemaker.   Stable.     S/P TAVR (transcatheter aortic valve replacement)   Assessment & Plan    Continue aspirin and statin.     Risk for falls   Assessment & Plan    Fall precautions, PT and OT consulted, appreciate recs.   Ambulation with assistance only.     Hypertension, essential   Assessment & Plan    On metoprolol and lisinopril.     Dyslipidemia   Assessment & Plan    Continue statin.     Multiple bruises-resolved as of 5/12/2019   Assessment & Plan    Seen on admission, on knees and sternum, appeared as though patient had a recent fall.   PT and OT, fall precautions.  Resolved.     Fall   Assessment & Plan    Patient fell in the bathroom and hit his head, however there was no loss of consciousness.  No changes upon neurological examination.  Fall precautions, up with assistance only.     Other constipation   Assessment & Plan    Bowel protocol.  Goal of at least 1 BM every other day.      Dysphagia   Assessment & Plan    Found to have dysphagia, high risk aspiration.   Advanced directive specified no feeding tube   Speech evaluated and recommended  1:1 feeding.   Tolerating well, afebrile, no  cough.     Hypothyroidism   Assessment & Plan    TSH 0.54 on 3/21/19.  Continue home levothyroxine.     Hx of CABG   Assessment & Plan    On aspirin and statin.     Gout of multiple sites   Assessment & Plan    Continue home allopurinol.     PVD (peripheral vascular disease) (CMS-HCC)   Assessment & Plan    History of prior L superficial femoral artery stent, known high grade stenosis of R common femoral artery closed with Starclose closure by Dr. Amaya on 03/22/17.   Stable, no acute complaints.      CAD (coronary artery disease)   Assessment & Plan    History of 3-vessel CABG with left internal mammary artery graft to LAD, saphenous vein graft to obtuse marginal branch and saphenous vein graft to posterior descending artery by Dr. Coates in March 2018.  EF 65% on TTE, status post TAVR, RVSP 30 mmHg.    Plan:  Continue statin, aspirin, metoprolol, and lisinopril.     Paroxysmal atrial fibrillation (CMS-HCC)   Assessment & Plan    Stable.   Xarelto discontinued by Cardiology in August 2018 due to high fall risk per chart review.  Continue Metoprolol for rate control.      Headache-resolved as of 5/6/2019   Assessment & Plan    Resolved, Tylenol PRN  Encourage PO hydration  Fall precautions      Hypovolemia-resolved as of 3/20/2019   Assessment & Plan    BUN: Cr > 20.  NaCl and BP okay.  Resolved after IVF and encouraging PO intake.     BETTY (acute kidney injury) ([facility])-resolved as of 5/6/2019   Assessment & Plan    Resolved.

## 2019-06-12 NOTE — CARE PLAN
Problem: Discharge Barriers/Planning  Goal: Patient's continuum of care needs will be met  Outcome: PROGRESSING SLOWER THAN EXPECTED  Awaiting the guardianship process with placement

## 2019-06-13 NOTE — CARE PLAN
Problem: Safety  Goal: Will remain free from falls  Outcome: PROGRESSING AS EXPECTED  Bed in locked and lowest position. Bed alarm on. Nonskid socks in place. Close to nurses station.     Problem: Pain Management  Goal: Pain level will decrease to patient's comfort goal  Outcome: PROGRESSING AS EXPECTED  No c/o pain at this time. Pt resting in bed. Will continue to monitor.

## 2019-06-13 NOTE — PROGRESS NOTES
Pt resting in bed throughout shift. No c/o pain at this time. Fall precautions in place. Hourly rounding completed. Call bell and bedside table within reach. Will continue to monitor.

## 2019-06-13 NOTE — PROGRESS NOTES
Internal Medicine Interval Note    Note Author: Shirlene Benton M.D.      Name Rafia MCINTYRE 1933   Age 85 y.o. male   MRN [id number]   Code Status DNAR-DNI     After 5 PM or if no immediate response to page, please call for cross-coverage.    Attending: Dr. Arechiga  Team: Blue See patient list for primary contact information.  Call [phone] to page.    1st Call - Day Intern, R1  Dr. Benton 2nd Call - Day Sr. Resident, R2-R3  Dr. Persaud       Principal Problem  Dementia, awaiting guardianship       Interval History  No acute events overnight.  Resting comfortably in bed, remains pleasant and cooperative and continues to deny any complaints.  Social work following, guardianship process ongoing.      ROS  Limited, patient has dementia, he denies any symptoms on 10 point ROS.       Disposition, Discharge Barriers  Pending placement and guardianship.      Consultants  Palliative Care  Ethics        Quality-Core Measures  Baldwin Catheter: No  Central Line: No  VTE Prophylaxis: Heparin  Antibiotics: NA  Rehab: NA      Vitals  Vitals:    19 1729 19 0330 19 0710 19 1510   BP:  140/71 120/69 102/55   Pulse:  60 61 62   Resp:  16 16 17   Temp:  36.4 °C (97.6 °F) 36.3 °C (97.3 °F) 36.3 °C (97.3 °F)   TempSrc:  Temporal Temporal Temporal   SpO2:  100% 97% 97%   Weight: 49.1 kg (108 lb 3.9 oz)      Height:           Physical Exam  General: No acute distress, pleasant, cooperative  Head: Normocephalic, atraumatic, bitemporal wasting  Eyes: Normal conjunctivae, sclerae anicteric  Throat: Oropharynx clear, oral mucosa moist  Neck: Supple, no lymphadenopathy  Lungs: Normal with breathing, no respiratory distress  Heart: Normal rate, regular rhythm  Abdomen: Soft, scaphoid, NTND, no peritoneal signs  Extremities: Nontender, no cyanosis, no edema, subcutaneous fat loss  Neuro: Alert, no focal deficits  Skin: Warm, dry, intact      Labs and Imaging  Pertinent labs, imaging and  diagnostic tests and procedures associated with this hospitalization have been reviewed, specific comments can be found in the assessment and plan section below.       Assessment and Plan    Age-related physical debility   Assessment & Plan    PT, OT recommended SNF for further therapy needs.  Does not get out of bed much.  Significant muscle wasting diffusely.     * Cognitive impairment   Assessment & Plan    Unable to care for self, progressive decline per wife.  Ethics and Palliative Care evaluated the patient. All in agreement that he will need guardianship and placement which are pending.   No changes since admission.     Cardiac pacemaker in situ   Assessment & Plan    Secondary to sick sinus rhythm     SSS (sick sinus syndrome) ([facility])   Assessment & Plan    Status post pacemaker.   Stable.     S/P TAVR (transcatheter aortic valve replacement)   Assessment & Plan    Continue aspirin and statin.     Risk for falls   Assessment & Plan    Fall precautions, PT and OT consulted, appreciate recs.   Ambulation with assistance only.     Hypertension, essential   Assessment & Plan    On metoprolol and lisinopril.     Dyslipidemia   Assessment & Plan    Continue statin.     Multiple bruises-resolved as of 5/12/2019   Assessment & Plan    Seen on admission, on knees and sternum, appeared as though patient had a recent fall.   PT and OT, fall precautions.  Resolved.     Fall   Assessment & Plan    Patient fell in the bathroom and hit his head, however there was no loss of consciousness.  No changes upon neurological examination.  Fall precautions, up with assistance only.     Other constipation   Assessment & Plan    Bowel protocol.  Goal of at least 1 BM every other day.      Dysphagia   Assessment & Plan    Found to have dysphagia, high risk aspiration.   Advanced directive specified no feeding tube   Speech evaluated and recommended  1:1 feeding.   Tolerating well, afebrile, no cough.     Hypothyroidism   Assessment &  Plan    TSH 0.54 on 3/21/19.  Continue home levothyroxine.     Hx of CABG   Assessment & Plan    On aspirin and statin.     Gout of multiple sites   Assessment & Plan    Continue home allopurinol.     PVD (peripheral vascular disease) (CMS-HCC)   Assessment & Plan    History of prior L superficial femoral artery stent, known high grade stenosis of R common femoral artery closed with Starclose closure by Dr. Amaya on 03/22/17.   Stable, no acute complaints.      CAD (coronary artery disease)   Assessment & Plan    History of 3-vessel CABG with left internal mammary artery graft to LAD, saphenous vein graft to obtuse marginal branch and saphenous vein graft to posterior descending artery by Dr. Coates in March 2018.  EF 65% on TTE, status post TAVR, RVSP 30 mmHg.    Plan:  Continue statin, aspirin, metoprolol, and lisinopril.     Paroxysmal atrial fibrillation (CMS-HCC)   Assessment & Plan    Stable.   Xarelto discontinued by Cardiology in August 2018 due to high fall risk per chart review.  Continue Metoprolol for rate control.      Headache-resolved as of 5/6/2019   Assessment & Plan    Resolved, Tylenol PRN  Encourage PO hydration  Fall precautions      Hypovolemia-resolved as of 3/20/2019   Assessment & Plan    BUN: Cr > 20.  NaCl and BP okay.  Resolved after IVF and encouraging PO intake.     BETTY (acute kidney injury) ([facility])-resolved as of 5/6/2019   Assessment & Plan    Resolved.

## 2019-06-13 NOTE — PROGRESS NOTES
Patient OOB at nursing station for much of AM, urinated in chair before informing nursing staff, set off chair alarm after pt had wet his clothing, escorted back to bed where he remained until the early afternoon, ate 100% of breakfast and lunch tray.  In afternoon pt informed staff of need to use restroom, assisted there with standby and FWWalker, then back to bed.  In room close to nursing station, hourly rounding in place, strip alarm in place.

## 2019-06-14 NOTE — PROGRESS NOTES
Pt resting in bed at this time. No c/o pain. Fall precautions in place. Bed alarm on. Call bell and bedside table within reach. Hourly rounding completed. Will continue to monitor.

## 2019-06-14 NOTE — CARE PLAN
Problem: Safety  Goal: Will remain free from injury  Outcome: PROGRESSING SLOWER THAN EXPECTED  Pt does not use call light appropriately. Bed and chair alarm in place. Lap belt used when in chair. Sets off chair alarm. Education provided.    Problem: Pain Management  Goal: Pain level will decrease to patient's comfort goal  Outcome: PROGRESSING AS EXPECTED  Pt denies pain. Rating 0/10. Will continue to assess.

## 2019-06-14 NOTE — CARE PLAN
Problem: Safety  Goal: Will remain free from falls  Outcome: PROGRESSING AS EXPECTED  Bed in locked and lowest position. Bed alarm on. Nonskid socks in place. Will continue to monitor.     Problem: Pain Management  Goal: Pain level will decrease to patient's comfort goal  Outcome: PROGRESSING AS EXPECTED  No c/o pain at this time.Pt resting in bed. Will continue to monitor.

## 2019-06-14 NOTE — PROGRESS NOTES
Pt A&Ox2. Denies pain. Good appetite, occasionally coughs with liquids and encouraged to slow down with drinks. Incontinent of bladder at times, other times able to ask staff for assistance to bathroom. Assist x1 with walker, walked down hallway today. Bed and chair alarm in place and lap belt when in chair for meals. Wife (roommate) attempts to get patient up on her own without asking for staff assistance. Awaiting placement and guardianship.   Epidermal Autograft Text: The defect edges were debeveled with a #15 scalpel blade.  Given the location of the defect, shape of the defect and the proximity to free margins an epidermal autograft was deemed most appropriate.  Using a sterile surgical marker, the primary defect shape was transferred to the donor site. The epidermal graft was then harvested.  The skin graft was then placed in the primary defect and oriented appropriately.

## 2019-06-14 NOTE — PROGRESS NOTES
Internal Medicine Interval Note    Note Author: Shirlene Benton M.D.      Name Rafia Shaikh DOB 1933   Age 85 y.o. male   MRN [id number]   Code Status DNAR-DNI     After 5 PM or if no immediate response to page, please call for cross-coverage.    Attending: Dr. Arechiga  Team: Blue See patient list for primary contact information.  Call [phone] to page.    1st Call - Day Intern, R1  Dr. Benton 2nd Call - Day Sr. Resident, R2-R3  Dr. Persaud       Principal Problem  Dementia, awaiting guardianship       Interval History  No acute events overnight.  Sleeping comfortably in bed, has been ambulating with assistance per nursing documentation.  Guardianship process underway, petition received by the 's office per LSW.      ROS  Limited, patient has dementia and denies any symptoms on 10 point ROS.       Disposition, Discharge Barriers  Pending placement and guardianship.      Consultants  Palliative Care  Ethics        Quality-Core Measures  Baldwin Catheter: No  Central Line: No  VTE Prophylaxis: Heparin q12h  Antibiotics: NA  Rehab: NA      Vitals  Vitals:    19 1905 19 0444 19 0811 19 1529   BP: (!) 99/49 116/60 (!) 97/52 119/62   Pulse: 69 65 60 69   Resp: 18  16 16   Temp: 36.3 °C (97.4 °F)  36.6 °C (97.8 °F) 36.3 °C (97.4 °F)   TempSrc: Baldwin  Temporal Temporal   SpO2: 95%  98% 97%   Weight:       Height:           Physical Exam  No changes to the physical exam from the previous day.  General: No acute distress, pleasant, cooperative  Head: Normocephalic, atraumatic, bitemporal wasting  Eyes: Normal conjunctivae, sclerae anicteric  Throat: Oropharynx clear, oral mucosa moist  Neck: Supple, no lymphadenopathy  Lungs: Normal with breathing, no respiratory distress  Heart: Normal rate, regular rhythm  Abdomen: Soft, scaphoid, NTND, no peritoneal signs  Extremities: Nontender, no cyanosis, no edema, subcutaneous fat loss  Neuro: Alert, no focal  deficits  Skin: Warm, dry, intact      Labs and Imaging  Pertinent labs, imaging and diagnostic tests and procedures associated with this hospitalization have been reviewed, specific comments can be found in the assessment and plan section below.       Assessment and Plan    Age-related physical debility   Assessment & Plan    PT, OT recommended SNF for further therapy needs.  Does not get out of bed much.  Significant muscle wasting diffusely.     * Cognitive impairment   Assessment & Plan    Unable to care for self, progressive decline per wife.  Ethics and Palliative Care evaluated the patient. All in agreement that he will need guardianship and placement which are pending.   No changes since admission.     Cardiac pacemaker in situ   Assessment & Plan    Secondary to sick sinus rhythm     SSS (sick sinus syndrome) ([facility])   Assessment & Plan    Status post pacemaker.   Stable.     S/P TAVR (transcatheter aortic valve replacement)   Assessment & Plan    Continue aspirin and statin.     Risk for falls   Assessment & Plan    Fall precautions, PT and OT consulted, appreciate recs.   Ambulation with assistance only.     Hypertension, essential   Assessment & Plan    On metoprolol and lisinopril.     Dyslipidemia   Assessment & Plan    Continue statin.     Multiple bruises-resolved as of 5/12/2019   Assessment & Plan    Seen on admission, on knees and sternum, appeared as though patient had a recent fall.   PT and OT, fall precautions.  Resolved.     Fall   Assessment & Plan    Patient fell in the bathroom and hit his head, however there was no loss of consciousness.  No changes upon neurological examination.  Fall precautions, up with assistance only.     Other constipation   Assessment & Plan    Bowel protocol.  Goal of at least 1 BM every other day.      Dysphagia   Assessment & Plan    Found to have dysphagia, high risk aspiration.   Advanced directive specified no feeding tube   Speech evaluated and recommended   1:1 feeding.   Tolerating well, afebrile, no cough.     Hypothyroidism   Assessment & Plan    TSH 0.54 on 3/21/19.  Continue home levothyroxine.     Hx of CABG   Assessment & Plan    On aspirin and statin.     Gout of multiple sites   Assessment & Plan    Continue home allopurinol.     PVD (peripheral vascular disease) (CMS-HCC)   Assessment & Plan    History of prior L superficial femoral artery stent, known high grade stenosis of R common femoral artery closed with Starclose closure by Dr. Amaya on 03/22/17.   Stable, no acute complaints.      CAD (coronary artery disease)   Assessment & Plan    History of 3-vessel CABG with left internal mammary artery graft to LAD, saphenous vein graft to obtuse marginal branch and saphenous vein graft to posterior descending artery by Dr. Coates in March 2018.  EF 65% on TTE, status post TAVR, RVSP 30 mmHg.    Plan:  Continue statin, aspirin, metoprolol, and lisinopril.     Paroxysmal atrial fibrillation (CMS-HCC)   Assessment & Plan    Stable.   Xarelto discontinued by Cardiology in August 2018 due to high fall risk per chart review.  Continue Metoprolol for rate control.      Headache-resolved as of 5/6/2019   Assessment & Plan    Resolved, Tylenol PRN  Encourage PO hydration  Fall precautions      Hypovolemia-resolved as of 3/20/2019   Assessment & Plan    BUN: Cr > 20.  NaCl and BP okay.  Resolved after IVF and encouraging PO intake.     BETTY (acute kidney injury) ([facility])-resolved as of 5/6/2019   Assessment & Plan    Resolved.

## 2019-06-15 NOTE — CARE PLAN
Problem: Safety  Goal: Will remain free from falls  Outcome: PROGRESSING AS EXPECTED  Bed in locked and lowest position. Bed alarm on. Close to nurses station. Will continue to monitor.     Problem: Pain Management  Goal: Pain level will decrease to patient's comfort goal  Outcome: PROGRESSING AS EXPECTED  No c/o pain at this time. Pt resting in bed. Will continue to monitor.

## 2019-06-15 NOTE — PROGRESS NOTES
Pt A&Ox2. Ambulates with assist x1 and walker. Up to chair for meals. Had bowel movement today. Occasional urinary incontinence. Showered today. Denies pain. High fall risk and wife (roommate) tries to mobilize without staff assistance. Bed and chair alarm in place along with lap belt when in chair.

## 2019-06-15 NOTE — PROGRESS NOTES
Internal Medicine Interval Note    Note Author: Shirlene Benton M.D.      Name Rafia MCINTYRE 1933   Age 85 y.o. male   MRN [id number]   Code Status DNAR-DNI     After 5 PM or if no immediate response to page, please call for cross-coverage.    Attending: Dr. Arechiga  Team: Blue See patient list for primary contact information.  Call [phone] to page.    1st Call - Day Intern, R1  Dr. Benton 2nd Call - Day Sr. Resident, R2-R3  Dr. Persaud       Principal Problem  Dementia, awaiting guardianship       Interval History  No acute events overnight.  Sitting up in a chair and enjoying his breakfast, denies any complaints.  LSW following, guardianship process ongoing.      ROS  Limited, patient has dementia and denies any symptoms on 10 point ROS.       Disposition, Discharge Barriers  Pending placement and guardianship.      Consultants  Palliative Care  Ethics        Quality-Core Measures  Baldwin Catheter: No  Central Line: No  VTE Prophylaxis: Heparin q12h  Antibiotics: NA  Rehab: NA      Vitals  Vitals:    19 1905 06/15/19 0400 06/15/19 0800 06/15/19 0956   BP: 100/47 119/66 116/56    Pulse: 78 63 60    Resp: 18 18 15    Temp: 36.6 °C (97.8 °F) 36.3 °C (97.4 °F) 36.6 °C (97.9 °F)    TempSrc: Temporal Temporal Temporal    SpO2: 98% 91% 94%    Weight:    50.7 kg (111 lb 12.4 oz)   Height:           Physical Exam  No changes to the physical exam from the previous day.  General: No acute distress, pleasant, cooperative  Head: Normocephalic, atraumatic, bitemporal wasting  Eyes: Normal conjunctivae, sclerae anicteric  Throat: Oropharynx clear, oral mucosa moist  Neck: Supple, no lymphadenopathy  Lungs: Normal with breathing, no respiratory distress  Heart: Normal rate, regular rhythm  Abdomen: Soft, scaphoid, NTND, no peritoneal signs  Extremities: Nontender, no cyanosis, no edema, subcutaneous fat loss  Neuro: Alert, no focal deficits  Skin: Warm, dry, intact      Labs and  Imaging  Pertinent labs, imaging and diagnostic tests and procedures associated with this hospitalization have been reviewed, specific comments can be found in the assessment and plan section below.       Assessment and Plan    Age-related physical debility   Assessment & Plan    PT, OT recommended SNF for further therapy needs.  Does not get out of bed much.  Significant muscle wasting diffusely.     * Cognitive impairment   Assessment & Plan    Unable to care for self, progressive decline per wife.  Ethics and Palliative Care evaluated the patient. All in agreement that he will need guardianship and placement which are pending.   No changes since admission.     Cardiac pacemaker in situ   Assessment & Plan    Secondary to sick sinus rhythm     SSS (sick sinus syndrome) ([facility])   Assessment & Plan    Status post pacemaker.   Stable.     S/P TAVR (transcatheter aortic valve replacement)   Assessment & Plan    Continue aspirin and statin.     Risk for falls   Assessment & Plan    Fall precautions, PT and OT consulted, appreciate recs.   Ambulation with assistance only.     Hypertension, essential   Assessment & Plan    On metoprolol and lisinopril.     Dyslipidemia   Assessment & Plan    Continue statin.     Multiple bruises-resolved as of 5/12/2019   Assessment & Plan    Seen on admission, on knees and sternum, appeared as though patient had a recent fall.   PT and OT, fall precautions.  Resolved.     Fall   Assessment & Plan    Patient fell in the bathroom and hit his head, however there was no loss of consciousness.  No changes upon neurological examination.  Fall precautions, up with assistance only.     Other constipation   Assessment & Plan    Bowel protocol.  Goal of at least 1 BM every other day.      Dysphagia   Assessment & Plan    Found to have dysphagia, high risk aspiration.   Advanced directive specified no feeding tube   Speech evaluated and recommended  1:1 feeding.   Tolerating well, afebrile, no  cough.     Hypothyroidism   Assessment & Plan    TSH 0.54 on 3/21/19.  Continue home levothyroxine.     Hx of CABG   Assessment & Plan    On aspirin and statin.     Gout of multiple sites   Assessment & Plan    Continue home allopurinol.     PVD (peripheral vascular disease) (CMS-HCC)   Assessment & Plan    History of prior L superficial femoral artery stent, known high grade stenosis of R common femoral artery closed with Starclose closure by Dr. Amaya on 03/22/17.   Stable, no acute complaints.      CAD (coronary artery disease)   Assessment & Plan    History of 3-vessel CABG with left internal mammary artery graft to LAD, saphenous vein graft to obtuse marginal branch and saphenous vein graft to posterior descending artery by Dr. Coates in March 2018.  EF 65% on TTE, status post TAVR, RVSP 30 mmHg.    Plan:  Continue statin, aspirin, metoprolol, and lisinopril.     Paroxysmal atrial fibrillation (CMS-HCC)   Assessment & Plan    Stable.   Xarelto discontinued by Cardiology in August 2018 due to high fall risk per chart review.  Continue Metoprolol for rate control.      Headache-resolved as of 5/6/2019   Assessment & Plan    Resolved, Tylenol PRN  Encourage PO hydration  Fall precautions      Hypovolemia-resolved as of 3/20/2019   Assessment & Plan    BUN: Cr > 20.  NaCl and BP okay.  Resolved after IVF and encouraging PO intake.     BETTY (acute kidney injury) ([facility])-resolved as of 5/6/2019   Assessment & Plan    Resolved.

## 2019-06-15 NOTE — CARE PLAN
Problem: Pain Management  Goal: Pain level will decrease to patient's comfort goal  Outcome: PROGRESSING AS EXPECTED  Pt denies pain. Will continue to assess.    Problem: Mobility  Goal: Risk for activity intolerance will decrease  Outcome: PROGRESSING AS EXPECTED  Pt assist x1 with walker. Walking in room and hallway. Up to chair for meals.

## 2019-06-15 NOTE — PROGRESS NOTES
Internal Medicine Interval Note    Note Author: Shirlene Benton M.D.      Name Rafia MCINTYRE 1933   Age 85 y.o. male   MRN [id number]   Code Status DNAR-DNI     After 5 PM or if no immediate response to page, please call for cross-coverage.    Attending: Dr. Arechiga  Team: Blue See patient list for primary contact information.  Call [phone] to page.    1st Call - Day Intern, R1  Dr. Benton 2nd Call - Day Sr. Resident, R2-R3  Dr. Persaud       Principal Problem  Dementia, awaiting guardianship       Interval History  No acute events overnight.  Resting comfortably in bed, continues to ambulate regularly with assistance per nursing documentation.  LSW following, guardianship process ongoing.      ROS  Limited, patient has dementia and denies any symptoms on 10 point ROS.       Disposition, Discharge Barriers  Pending placement and guardianship.      Consultants  Palliative Care  Ethics        Quality-Core Measures  Baldwin Catheter: No  Central Line: No  VTE Prophylaxis: Heparin q12h  Antibiotics: NA  Rehab: NA      Vitals  Vitals:    19 1905 19 0330 19 0828 19 1600   BP: 143/62 140/67 101/52 113/55   Pulse: 67 69 60 69   Resp: 18 18 16 16   Temp: 36.6 °C (97.8 °F) 36.4 °C (97.6 °F) 36.2 °C (97.2 °F) 36.8 °C (98.2 °F)   TempSrc: Temporal Temporal Temporal Temporal   SpO2: 94% 96% 97% 97%   Weight:       Height:           Physical Exam  No changes to the physical exam from the previous day.  General: No acute distress, pleasant, cooperative  Head: Normocephalic, atraumatic, bitemporal wasting  Eyes: Normal conjunctivae, sclerae anicteric  Throat: Oropharynx clear, oral mucosa moist  Neck: Supple, no lymphadenopathy  Lungs: Normal with breathing, no respiratory distress  Heart: Normal rate, regular rhythm  Abdomen: Soft, scaphoid, NTND, no peritoneal signs  Extremities: Nontender, no cyanosis, no edema, subcutaneous fat loss  Neuro: Alert, no focal deficits  Skin:  Warm, dry, intact      Labs and Imaging  Pertinent labs, imaging and diagnostic tests and procedures associated with this hospitalization have been reviewed, specific comments can be found in the assessment and plan section below.       Assessment and Plan    Age-related physical debility   Assessment & Plan    PT, OT recommended SNF for further therapy needs.  Does not get out of bed much.  Significant muscle wasting diffusely.     * Cognitive impairment   Assessment & Plan    Unable to care for self, progressive decline per wife.  Ethics and Palliative Care evaluated the patient. All in agreement that he will need guardianship and placement which are pending.   No changes since admission.     Cardiac pacemaker in situ   Assessment & Plan    Secondary to sick sinus rhythm     SSS (sick sinus syndrome) ([facility])   Assessment & Plan    Status post pacemaker.   Stable.     S/P TAVR (transcatheter aortic valve replacement)   Assessment & Plan    Continue aspirin and statin.     Risk for falls   Assessment & Plan    Fall precautions, PT and OT consulted, appreciate recs.   Ambulation with assistance only.     Hypertension, essential   Assessment & Plan    On metoprolol and lisinopril.     Dyslipidemia   Assessment & Plan    Continue statin.     Multiple bruises-resolved as of 5/12/2019   Assessment & Plan    Seen on admission, on knees and sternum, appeared as though patient had a recent fall.   PT and OT, fall precautions.  Resolved.     Fall   Assessment & Plan    Patient fell in the bathroom and hit his head, however there was no loss of consciousness.  No changes upon neurological examination.  Fall precautions, up with assistance only.     Other constipation   Assessment & Plan    Bowel protocol.  Goal of at least 1 BM every other day.      Dysphagia   Assessment & Plan    Found to have dysphagia, high risk aspiration.   Advanced directive specified no feeding tube   Speech evaluated and recommended  1:1 feeding.    Tolerating well, afebrile, no cough.     Hypothyroidism   Assessment & Plan    TSH 0.54 on 3/21/19.  Continue home levothyroxine.     Hx of CABG   Assessment & Plan    On aspirin and statin.     Gout of multiple sites   Assessment & Plan    Continue home allopurinol.     PVD (peripheral vascular disease) (CMS-HCC)   Assessment & Plan    History of prior L superficial femoral artery stent, known high grade stenosis of R common femoral artery closed with Starclose closure by Dr. Amaya on 03/22/17.   Stable, no acute complaints.      CAD (coronary artery disease)   Assessment & Plan    History of 3-vessel CABG with left internal mammary artery graft to LAD, saphenous vein graft to obtuse marginal branch and saphenous vein graft to posterior descending artery by Dr. Coates in March 2018.  EF 65% on TTE, status post TAVR, RVSP 30 mmHg.    Plan:  Continue statin, aspirin, metoprolol, and lisinopril.     Paroxysmal atrial fibrillation (CMS-HCC)   Assessment & Plan    Stable.   Xarelto discontinued by Cardiology in August 2018 due to high fall risk per chart review.  Continue Metoprolol for rate control.      Headache-resolved as of 5/6/2019   Assessment & Plan    Resolved, Tylenol PRN  Encourage PO hydration  Fall precautions      Hypovolemia-resolved as of 3/20/2019   Assessment & Plan    BUN: Cr > 20.  NaCl and BP okay.  Resolved after IVF and encouraging PO intake.     BETTY (acute kidney injury) ([facility])-resolved as of 5/6/2019   Assessment & Plan    Resolved.

## 2019-06-16 NOTE — CARE PLAN
Problem: Safety  Goal: Will remain free from falls  Outcome: PROGRESSING AS EXPECTED  Bed in locked and lowest position. Bed alarm on. Nonskid socks in place. Will continue to monitor.     Problem: Pain Management  Goal: Pain level will decrease to patient's comfort goal  Outcome: PROGRESSING AS EXPECTED  No c/o pain at this time. Pt resting in bed. Will continue to monitor.

## 2019-06-16 NOTE — CARE PLAN
Problem: Bowel/Gastric:  Goal: Normal bowel function is maintained or improved  Outcome: PROGRESSING AS EXPECTED  Pt with medium formed stool today. Pt has bowel movement charged every day.    Problem: Mobility  Goal: Risk for activity intolerance will decrease  Outcome: PROGRESSING AS EXPECTED  Pt assist x1 with walker. Up to chair for meals.

## 2019-06-16 NOTE — PROGRESS NOTES
Internal Medicine Interval Note    Note Author: Shirlene Benton M.D.      Name Rafia MCINTYRE 1933   Age 85 y.o. male   MRN [id number]   Code Status DNAR-DNI     After 5 PM or if no immediate response to page, please call for cross-coverage.    Attending: Dr. Arechiga  Team: Blue See patient list for primary contact information.  Call [phone] to page.    1st Call - Day Intern, R1  Dr. Benton 2nd Call - Day Sr. Resident, R2-R3  Dr. Persaud       Principal Problem  Dementia, awaiting guardianship       Interval History  No acute events overnight.  Continues to ambulate with assistance and have a healthy appetite.  Resting comfortably in bed.  Social work following, guardianship process ongoing.      ROS  Limited, patient has dementia and denies any symptoms on 10 point ROS.       Disposition, Discharge Barriers  Pending placement and guardianship.      Consultants  Palliative Care  Ethics        Quality-Core Measures  Baldwin Catheter: No  Central Line: No  VTE Prophylaxis: Heparin q12h  Antibiotics: NA  Rehab: NA      Vitals  Vitals:    06/15/19 1534 06/15/19 1900 19 0340 19 0710   BP: 109/58 118/59 131/72 111/57   Pulse: 60 67 60 67   Resp: 15 16 16 16   Temp: 36.3 °C (97.3 °F) 36.2 °C (97.1 °F) 37 °C (98.6 °F) 37.1 °C (98.7 °F)   TempSrc: Temporal Temporal Temporal Temporal   SpO2: 97% 97% 99% 93%   Weight:       Height:           Physical Exam  No changes from the previous day.  General: No acute distress, pleasant, cooperative  Head: Normocephalic, atraumatic, bitemporal wasting  Eyes: Normal conjunctivae, sclerae anicteric  Throat: Oropharynx clear, oral mucosa moist  Neck: Supple, no lymphadenopathy  Lungs: Normal with breathing, no respiratory distress  Heart: Normal rate, regular rhythm  Abdomen: Soft, scaphoid, NTND, no peritoneal signs  Extremities: Nontender, no cyanosis, no edema, subcutaneous fat loss  Neuro: Alert, no focal deficits  Skin: Warm, dry,  intact      Labs and Imaging  Pertinent labs, imaging and diagnostic tests and procedures associated with this hospitalization have been reviewed, specific comments can be found in the assessment and plan section below.       Assessment and Plan    Age-related physical debility   Assessment & Plan    PT, OT recommended SNF for further therapy needs.  Does not get out of bed much.  Significant muscle wasting diffusely.     * Cognitive impairment   Assessment & Plan    Unable to care for self, progressive decline per wife.  Ethics and Palliative Care evaluated the patient. All in agreement that he will need guardianship and placement which are pending.   No changes since admission.     Cardiac pacemaker in situ   Assessment & Plan    Secondary to sick sinus rhythm     SSS (sick sinus syndrome) ([facility])   Assessment & Plan    Status post pacemaker.   Stable.     S/P TAVR (transcatheter aortic valve replacement)   Assessment & Plan    Continue aspirin and statin.     Risk for falls   Assessment & Plan    Fall precautions, PT and OT consulted, appreciate recs.   Ambulation with assistance only.     Hypertension, essential   Assessment & Plan    On metoprolol and lisinopril.     Dyslipidemia   Assessment & Plan    Continue statin.     Multiple bruises-resolved as of 5/12/2019   Assessment & Plan    Seen on admission, on knees and sternum, appeared as though patient had a recent fall.   PT and OT, fall precautions.  Resolved.     Fall   Assessment & Plan    Patient fell in the bathroom and hit his head, however there was no loss of consciousness.  No changes upon neurological examination.  Fall precautions, up with assistance only.     Other constipation   Assessment & Plan    Bowel protocol.  Goal of at least 1 BM every other day.      Dysphagia   Assessment & Plan    Found to have dysphagia, high risk aspiration.   Advanced directive specified no feeding tube   Speech evaluated and recommended  1:1 feeding.   Tolerating  well, afebrile, no cough.     Hypothyroidism   Assessment & Plan    TSH 0.54 on 3/21/19.  Continue home levothyroxine.     Hx of CABG   Assessment & Plan    On aspirin and statin.     Gout of multiple sites   Assessment & Plan    Continue home allopurinol.     PVD (peripheral vascular disease) (CMS-HCC)   Assessment & Plan    History of prior L superficial femoral artery stent, known high grade stenosis of R common femoral artery closed with Starclose closure by Dr. Amaya on 03/22/17.   Stable, no acute complaints.      CAD (coronary artery disease)   Assessment & Plan    History of 3-vessel CABG with left internal mammary artery graft to LAD, saphenous vein graft to obtuse marginal branch and saphenous vein graft to posterior descending artery by Dr. Coates in March 2018.  EF 65% on TTE, status post TAVR, RVSP 30 mmHg.    Plan:  Continue statin, aspirin, metoprolol, and lisinopril.     Paroxysmal atrial fibrillation (CMS-HCC)   Assessment & Plan    Stable.   Xarelto discontinued by Cardiology in August 2018 due to high fall risk per chart review.  Continue Metoprolol for rate control.      Headache-resolved as of 5/6/2019   Assessment & Plan    Resolved, Tylenol PRN  Encourage PO hydration  Fall precautions      Hypovolemia-resolved as of 3/20/2019   Assessment & Plan    BUN: Cr > 20.  NaCl and BP okay.  Resolved after IVF and encouraging PO intake.     BETTY (acute kidney injury) ([facility])-resolved as of 5/6/2019   Assessment & Plan    Resolved.

## 2019-06-16 NOTE — PROGRESS NOTES
Pt A&Ox2, oriented to place and person. Assist x1 with walker. Chair for meals. Ambulating in abel. Supervision for meals. Denies pain. Wife continues to try to mobilize pt without staff assistance and sets off chair/bed alarm. Bowel movement today.

## 2019-06-16 NOTE — PROGRESS NOTES
Pt getting out of bed throughout shift. No c/o pain. Fall precautions in place. Call bell and bedside table within reach. Hourly rounding completed. Will continue to monitor.

## 2019-06-17 NOTE — PROGRESS NOTES
Patient on bed resting.  Assessment completed. Denies any pain and discomfort at this time. Patient educated regarding plan of care. Bed alarm in place and functioning. Room near nursing station.    Bed locked, in lowest position, treaded socks on. Needs attended. No further needs at this time. Communication board updated. Call light and personal belongings within reach.

## 2019-06-17 NOTE — CARE PLAN
Problem: Safety  Goal: Will remain free from injury  Outcome: PROGRESSING AS EXPECTED  Safety precautions in place. Non skid socks on. Bed alarm in place and functioning. Room near nursing station. Call light within reach. Bi hourly rounding in place.    Problem: Discharge Barriers/Planning  Goal: Patient's continuum of care needs will be met  Outcome: PROGRESSING SLOWER THAN EXPECTED  Patient pending guardianship/placement. SW assisting.

## 2019-06-17 NOTE — PROGRESS NOTES
Internal Medicine Interval Note    Note Author: Shirlene Benton M.D.      Name Rafia Shaikh DOB 1933   Age 85 y.o. male   MRN [id number]   Code Status DNAR-DNI     After 5 PM or if no immediate response to page, please call for cross-coverage.    Attending: Dr. Arechiga  Team: Blue See patient list for primary contact information.  Call [phone] to page.    1st Call - Day Intern, R1  Dr. Benton 2nd Call - Day Sr. Resident, R2-R3  Dr. Persaud       Principal Problem  Dementia, awaiting guardianship       Interval History  No acute events overnight.  Sitting comfortably in a chair and denies any complaints.  Awaiting guardianship for discharge,  on board.      ROS  Limited, patient has dementia and denies any symptoms on 10 point ROS.       Disposition, Discharge Barriers  Pending placement and guardianship.      Consultants  Palliative Care  Ethics        Quality-Core Measures  Baldwin Catheter: No  Central Line: No  VTE Prophylaxis: Heparin q12h  Antibiotics: NA  Rehab: NA      Vitals  Vitals:    19 0400 19 0454 19 0655 19 1500   BP: 139/75  111/55 (!) 93/53   Pulse: 60 66 61 62   Resp: 15 17 16 16   Temp: 36.4 °C (97.6 °F)  36.6 °C (97.8 °F) 36.3 °C (97.3 °F)   TempSrc: Temporal  Temporal Temporal   SpO2: 95%  98% 92%   Weight:       Height:           Physical Exam  No changes from the previous day.  General: No acute distress, pleasant, cooperative  Head: Normocephalic, atraumatic, bitemporal wasting  Eyes: Normal conjunctivae, sclerae anicteric  Throat: Oropharynx clear, oral mucosa moist  Neck: Supple, no lymphadenopathy  Lungs: Normal with breathing, no respiratory distress  Heart: Normal rate, regular rhythm  Abdomen: Soft, scaphoid, NTND, no peritoneal signs  Extremities: Nontender, no cyanosis, no edema, subcutaneous fat loss  Neuro: Alert, no focal deficits  Skin: Warm, dry, intact      Labs and Imaging  Pertinent labs, imaging and diagnostic  tests and procedures associated with this hospitalization have been reviewed, specific comments can be found in the assessment and plan section below.       Assessment and Plan    Age-related physical debility   Assessment & Plan    PT, OT recommended SNF for further therapy needs.  Does not get out of bed much.  Significant muscle wasting diffusely.     * Cognitive impairment   Assessment & Plan    Unable to care for self, progressive decline per wife.  Ethics and Palliative Care evaluated the patient. All in agreement that he will need guardianship and placement which are pending.   No changes since admission.     Cardiac pacemaker in situ   Assessment & Plan    Secondary to sick sinus rhythm     SSS [facility]sick sinus syndrome) ([facility])   Assessment & Plan    Status post pacemaker.   Stable.     S/P TAVR (transcatheter aortic valve replacement)   Assessment & Plan    Continue aspirin and statin.     Risk for falls   Assessment & Plan    Fall precautions, PT and OT consulted, appreciate recs.   Ambulation with assistance only.     Hypertension, essential   Assessment & Plan    On metoprolol and lisinopril.     Dyslipidemia   Assessment & Plan    Continue statin.     Multiple bruises-resolved as of 5/12/2019   Assessment & Plan    Seen on admission, on knees and sternum, appeared as though patient had a recent fall.   PT and OT, fall precautions.  Resolved.     Fall   Assessment & Plan    Patient fell in the bathroom and hit his head, however there was no loss of consciousness.  No changes upon neurological examination.  Fall precautions, up with assistance only.     Other constipation   Assessment & Plan    Bowel protocol.  Goal of at least 1 BM every other day.      Dysphagia   Assessment & Plan    Found to have dysphagia, high risk aspiration.   Advanced directive specified no feeding tube   Speech evaluated and recommended  1:1 feeding.   Tolerating well, afebrile, no cough.     Hypothyroidism   Assessment & Plan    TSH  0.54 on 3/21/19.  Continue home levothyroxine.     Hx of CABG   Assessment & Plan    On aspirin and statin.     Gout of multiple sites   Assessment & Plan    Continue home allopurinol.     PVD (peripheral vascular disease) (CMS-HCC)   Assessment & Plan    History of prior L superficial femoral artery stent, known high grade stenosis of R common femoral artery closed with Starclose closure by Dr. Amaya on 03/22/17.   Stable, no acute complaints.      CAD (coronary artery disease)   Assessment & Plan    History of 3-vessel CABG with left internal mammary artery graft to LAD, saphenous vein graft to obtuse marginal branch and saphenous vein graft to posterior descending artery by Dr. Coates in March 2018.  EF 65% on TTE, status post TAVR, RVSP 30 mmHg.    Plan:  Continue statin, aspirin, metoprolol, and lisinopril.     Paroxysmal atrial fibrillation (CMS-HCC)   Assessment & Plan    Stable.   Xarelto discontinued by Cardiology in August 2018 due to high fall risk per chart review.  Continue Metoprolol for rate control.      Headache-resolved as of 5/6/2019   Assessment & Plan    Resolved, Tylenol PRN  Encourage PO hydration  Fall precautions      Hypovolemia-resolved as of 3/20/2019   Assessment & Plan    BUN: Cr > 20.  NaCl and BP okay.  Resolved after IVF and encouraging PO intake.     BETTY (acute kidney injury) ([facility])-resolved as of 5/6/2019   Assessment & Plan    Resolved.

## 2019-06-17 NOTE — PROGRESS NOTES
Assumed care of patient at start of shift. Patient is alert to self only. Patient denies any pain. Patient was sitting at table near nurses station then I assisted the CNA to put the patient back to bed. Patient ambulates with 1 assist with a front wheeled walker. Patient's wife is at bedside. Patient denies any needs at this time. Safety precautions in place including treaded socks on, call light and personal belongings are within reach, bed alarm/chair alarm on.

## 2019-06-17 NOTE — DISCHARGE PLANNING
Medical Social Work  PC to Jeannette Juarez's office to get an update on guardianship hearing. Nimo stated that she is hopefully they will have a court date by later this week or next week.

## 2019-06-17 NOTE — CARE PLAN
Problem: Safety  Goal: Will remain free from falls  Outcome: PROGRESSING AS EXPECTED  Educated patient on fall risk and safety precautions. Safety precautions are in place including bed locked and low, call light and personal belongings within reach, treaded socks on, bed and chair alarms in use.    Problem: Pain Management  Goal: Pain level will decrease to patient's comfort goal  Patient denies any pain at this time. Patient is resting comfortably in bed with no signs of distress. Will continue to assess.

## 2019-06-18 NOTE — PROGRESS NOTES
Pt alert to self only. Pt up in chair at nurses station. Tolerating food and medications crushed in food. Wife in neighboring bed, attempts to ambulate pt at times. Pt educated on safety. Pt educated on use of call light.

## 2019-06-18 NOTE — PROGRESS NOTES
OFF SERVICE NOTE    Note Author: Shirlene Benton M.D.      Name Rafia Shaikh    1933   Age 85 y.o. male   MRN [id number]   Code Status DNAR-DNI     After 5 PM or if no immediate response to page, please call for cross-coverage.    Attending: Dr. Arechiga  Team: Blue See patient list for primary contact information.  Call [phone] to page.    1st Call - Day Intern, R1  Dr. Benton 2nd Call - Day Sr. Resident, R2-R3  Dr. Persaud       Principal Problem  Dementia, awaiting guardianship       Interval History  No acute events overnight.  Resting comfortably in bed, denies any complaints.  Awaiting guardianship for discharge,  on board, transferring patient to the hospital's long-term care service.      ROS  Limited by the patient's dementia, he continues to deny any complaints.      Disposition, Discharge Barriers  Pending placement and guardianship.      Consultants  Palliative Care  Ethics        Quality-Core Measures  Baldwin Catheter: No  Central Line: No  VTE Prophylaxis: Heparin q12h  Antibiotics: NA  Rehab: NA      Vitals  Vitals:    19 1942 19 0430 19 0700 19 1600   BP: (!) 95/44 101/57 116/52 113/51   Pulse: 63 60 62 73   Resp: 16 16 16 16   Temp: 36.6 °C (97.8 °F) 36.2 °C (97.1 °F) 36.1 °C (97 °F) 37.3 °C (99.2 °F)   TempSrc: Temporal Temporal Temporal Temporal   SpO2: 95% 97% 92% 94%   Weight:       Height:           Physical Exam  No changes since the previous day.  General: No acute distress, pleasant, cooperative  Head: Normocephalic, atraumatic, bitemporal wasting  Eyes: Normal conjunctivae, sclerae anicteric  Throat: Oropharynx clear, oral mucosa moist  Neck: Supple, no lymphadenopathy  Lungs: Normal with breathing, no respiratory distress  Heart: Normal rate, regular rhythm  Abdomen: Soft, scaphoid, NTND, no peritoneal signs  Extremities: Nontender, no cyanosis, no edema, subcutaneous fat loss  Neuro: Alert, no focal deficits  Skin: Warm,  dry, intact      Labs and Imaging  Pertinent labs, imaging and diagnostic tests and procedures associated with this hospitalization have been reviewed, specific comments can be found in the assessment and plan section below.       Assessment and Plan    Age-related physical debility   Assessment & Plan    PT, OT recommended SNF for further therapy needs.  Does not get out of bed much.  Significant muscle wasting diffusely.     * Cognitive impairment   Assessment & Plan    Unable to care for self, progressive decline per wife.  Ethics and Palliative Care evaluated the patient. All in agreement that he will need guardianship and placement which are pending.   No changes since admission.     Cardiac pacemaker in situ   Assessment & Plan    Secondary to sick sinus rhythm     SSS (sick sinus syndrome) ([facility])   Assessment & Plan    Status post pacemaker.   Stable.     S/P TAVR (transcatheter aortic valve replacement)   Assessment & Plan    Continue aspirin and statin.     Risk for falls   Assessment & Plan    Fall precautions, PT and OT consulted, appreciate recs.   Ambulation with assistance only.     Hypertension, essential   Assessment & Plan    On metoprolol and lisinopril.     Dyslipidemia   Assessment & Plan    Continue statin.     Multiple bruises-resolved as of 5/12/2019   Assessment & Plan    Seen on admission, on knees and sternum, appeared as though patient had a recent fall.   PT and OT, fall precautions.  Resolved.     Fall   Assessment & Plan    Patient fell in the bathroom and hit his head, however there was no loss of consciousness.  No changes upon neurological examination.  Fall precautions, up with assistance only.     Other constipation   Assessment & Plan    Bowel protocol.  Goal of at least 1 BM every other day.      Dysphagia   Assessment & Plan    Found to have dysphagia, high risk aspiration.   Advanced directive specified no feeding tube   Speech evaluated and recommended  1:1 feeding.    Tolerating well, afebrile, no cough.     Hypothyroidism   Assessment & Plan    TSH 0.54 on 3/21/19.  Continue home levothyroxine.     Hx of CABG   Assessment & Plan    On aspirin and statin.     Gout of multiple sites   Assessment & Plan    Continue home allopurinol.     PVD (peripheral vascular disease) (CMS-HCC)   Assessment & Plan    History of prior L superficial femoral artery stent, known high grade stenosis of R common femoral artery closed with Starclose closure by Dr. Amaya on 03/22/17.   Stable, no acute complaints.      CAD (coronary artery disease)   Assessment & Plan    History of 3-vessel CABG with left internal mammary artery graft to LAD, saphenous vein graft to obtuse marginal branch and saphenous vein graft to posterior descending artery by Dr. Coates in March 2018.  EF 65% on TTE, status post TAVR, RVSP 30 mmHg.    Plan:  Continue statin, aspirin, metoprolol, and lisinopril.     Paroxysmal atrial fibrillation (CMS-HCC)   Assessment & Plan    Stable.   Xarelto discontinued by Cardiology in August 2018 due to high fall risk per chart review.  Continue Metoprolol for rate control.      Headache-resolved as of 5/6/2019   Assessment & Plan    Resolved, Tylenol PRN  Encourage PO hydration  Fall precautions      Hypovolemia-resolved as of 3/20/2019   Assessment & Plan    BUN: Cr > 20.  NaCl and BP okay.  Resolved after IVF and encouraging PO intake.     BETTY (acute kidney injury) ([facility])-resolved as of 5/6/2019   Assessment & Plan    Resolved.

## 2019-06-18 NOTE — CARE PLAN
Problem: Pain Management  Goal: Pain level will decrease to patient's comfort goal  Outcome: PROGRESSING AS EXPECTED  Patient resting in bed with call light in reach and bed alarm on.      Problem: Urinary Elimination:  Goal: Ability to reestablish a normal urinary elimination pattern will improve  Outcome: PROGRESSING AS EXPECTED  Intake and output recorded.

## 2019-06-19 NOTE — PROGRESS NOTES
Pt alert and sitting up in chair for meals. Enjoys also coloring and sitting up at nurses station table for some time to color. No complaints or pain. VS stable and no change in clinical status.

## 2019-06-19 NOTE — PROGRESS NOTES
Pt fatigued and resting quietly in bed. Wife occasionally assisting with care. Call light within reach, personal belongings available, bed in lowest position, treaded socks on, and bed alarm on. Hourly rounding in place.

## 2019-06-19 NOTE — CARE PLAN
Problem: Safety  Goal: Will remain free from falls  Pt is confused and impulsive. Safety precautions in place. Bed in low position, treaded socks on, personal possessions in reach, call light in reach and pt not using it to call for assistance. Bed alarm on.    Problem: Venous Thromboembolism (VTW)/Deep Vein Thrombosis (DVT) Prevention:  Goal: Patient will participate in Venous Thrombosis (VTE)/Deep Vein Thrombosis (DVT)Prevention Measures  Pt receiving SQ heparin injections.

## 2019-06-20 PROBLEM — K59.09 OTHER CONSTIPATION: Status: RESOLVED | Noted: 2019-01-01 | Resolved: 2019-01-01

## 2019-06-20 PROBLEM — R00.1 BRADYCARDIA: Status: RESOLVED | Noted: 2018-02-14 | Resolved: 2019-01-01

## 2019-06-20 PROBLEM — I49.5 SSS (SICK SINUS SYNDROME) (HCC): Status: RESOLVED | Noted: 2018-02-25 | Resolved: 2019-01-01

## 2019-06-20 PROBLEM — Z79.01 CHRONIC ANTICOAGULATION: Status: RESOLVED | Noted: 2018-03-22 | Resolved: 2019-01-01

## 2019-06-20 NOTE — CARE PLAN
Problem: Safety  Goal: Will remain free from falls  Pt is confused, unsteady on his feet. Safety precautions in place. Bed in low position, treaded socks on, personal possessions in reach, call light in reach and bed alarm on.    Problem: Discharge Barriers/Planning  Goal: Patient's continuum of care needs will be met  Pt awaiting guardianship and placement. SW involved.

## 2019-06-20 NOTE — PROGRESS NOTES
Pt pleasantly confused, no s/sx pain/discomfort noted at this time. Pt impulsive and attempts to get out of bed without assistance. Pt brought to nurse's station, provided distraction and supplies to color pictures. Safety precautions and hourly rounding in place.

## 2019-06-20 NOTE — PROGRESS NOTES
Pt up to chair and nurses station for meals. Pleasant, with no complaints of pain or n/v. VS stable, no change in clinical or mental status.

## 2019-06-21 NOTE — PROGRESS NOTES
Report received from day shift nurse. Assumed care @ 1900.    Patient on bed resting. Alert and oriented to self. Assessment completed. Denies any pain and discomfort at this time. Patient educated regarding plan of care. Bed alarm in place and functioning. Room near nursing station. Pending guardianship.    Bed locked, in lowest position, treaded socks on. Needs attended. No further needs at this time. Communication board updated. Call light and personal belongings within reach.

## 2019-06-21 NOTE — CARE PLAN
Problem: Safety  Goal: Will remain free from injury  Outcome: PROGRESSING AS EXPECTED  Safety precautions in place. Non skid socks on. Bed alarm in place and functioning. Room near nursing station. Call light within reach. Bi hourly rounding in place.    Problem: Discharge Barriers/Planning  Goal: Patient's continuum of care needs will be met  Outcome: PROGRESSING SLOWER THAN EXPECTED  Patient pending guardianship. SW assisting.

## 2019-06-21 NOTE — PROGRESS NOTES
Hospital Medicine Daily Progress Note    Date of Service  6/20/2019    Chief Complaint  Malaise    Hospital Course   Mr. Morataya is an 86 y/o very malnourished male who was admitted to the hospital on 3/19/19 under the [facility] service for malaise. A head CT was negative for acute abnormalities, his labs were suggestive of BETTY and hypovolemia, and his vital signs were unremarkable outside of having hypoxia that resolved with 0.5 L/min of O2 via nasal cannula. His home lisinopril was subsequently discontinued and he was given IV fluids. His medical issues eventually stabilized, but due to lack of involvement from family (outside of his wife who was admitted at the same time) and the inability to care for themselves, guardianship has been pursued and placement is pending. [facility].        Interval Problem Update  Very tired. Was up in the chair all morning. Wife at bedside and helps provide assistance with some of his ADL's.     VSS on room air.     Consultants/Specialty  Palliative care  Ethics    Code Status  DNAR/DNI    Disposition  Pending guardianship    Review of Systems  Review of Systems   Unable to perform ROS: Dementia      Physical Exam  Temp:  [36.6 °C (97.8 °F)-36.8 °C (98.2 °F)] 36.6 °C (97.9 °F)  Pulse:  [59-73] 73  Resp:  [16-18] 16  BP: ()/(45-56) 109/56  SpO2:  [91 %-98 %] 98 %    Physical Exam   Constitutional: He appears lethargic. He appears cachectic. He is sleeping. He appears ill (chronically ill-appearing). No distress.   Frail, elderly appearing   HENT:   Head: Normocephalic and atraumatic.   Eyes: Conjunctivae are normal. Right eye exhibits no discharge. Left eye exhibits no discharge. No scleral icterus.   Neck: Normal range of motion. Neck supple.   Cardiovascular: Normal rate, regular rhythm and intact distal pulses.  Exam reveals no gallop and no friction rub.    Murmur heard.  Pulmonary/Chest: Effort normal. No accessory muscle  usage. No respiratory distress. He has decreased breath sounds. He has no wheezes. He has no rhonchi. He has no rales.   Abdominal: Soft. Bowel sounds are normal. He exhibits no distension. There is no tenderness.   Musculoskeletal: Normal range of motion. He exhibits no edema.   Neurological: He appears lethargic. He exhibits abnormal muscle tone.   Unable to assess orientation   Skin: Skin is warm and dry. No rash noted. He is not diaphoretic. No erythema. No pallor.   Psychiatric: His behavior is normal. Cognition and memory are impaired.   Nursing note and vitals reviewed.    Fluids    Intake/Output Summary (Last 24 hours) at 06/20/19 2119  Last data filed at 06/20/19 1325   Gross per 24 hour   Intake              560 ml   Output                0 ml   Net              560 ml     Laboratory    Imaging  CT-HEAD W/O   Final Result         NO ACUTE ABNORMALITIES ARE NOTED ON CT SCAN OF THE HEAD.      Findings are consistent with atrophy.  Decreased attenuation in the periventricular white matter likely indicates microvascular ischemic disease.      DX-HIP-BILATERAL-WITH PELVIS-2 VIEWS   Final Result      No pelvic or proximal femoral fracture identified      EC-ECHOCARDIOGRAM COMPLETE W/O CONT   Final Result      DX-CHEST-PORTABLE (1 VIEW)   Final Result      No evidence of acute cardiopulmonary process.      CT-HEAD W/O   Final Result      No acute intracranial abnormality is identified.      There are periventricular and subcortical white matter changes present.  This finding is nonspecific and could be from previous small vessel ischemia, demyelination, or gliosis.      Atrophy      Paranasal sinus disease.              Assessment/Plan  * Cognitive impairment- (present on admission)   Assessment & Plan    Unable to care for self, has had progressive decline per wife.  Ethics and palliative care evaluated the patient. All in agreement that he will need guardianship and placement, which are still pending.       Age-related physical debility- (present on admission)   Assessment & Plan    PT/OT recommending SNF.   Placement pending.   Mobilizing more with the nursing staff.      Cardiac pacemaker in situ- (present on admission)   Assessment & Plan    Secondary to sick sinus syndrome.   Site well-healed.      S/P TAVR (transcatheter aortic valve replacement)- (present on admission)   Assessment & Plan    Continue aspirin and atorvastatin.     Essential hypertension- (present on admission)   Assessment & Plan    Well controlled on current regimen.   Continue metoprolol and lisinopril.     Dyslipidemia- (present on admission)   Assessment & Plan    Continue atorvastatin.     Fall- (present on admission)   Assessment & Plan    Per chart review, seems to have fallen multiple times.   Continue fall precautions.   Continue to encourage mobilization with the nursing staff.      Dysphagia- (present on admission)   Assessment & Plan    Found to have dysphagia, high risk for aspiration.   Advanced directive specified no feeding tube.   Speech evaluated and recommended 1:1 feeding.   Tolerating well.  Continue to monitor.      Hypothyroidism- (present on admission)   Assessment & Plan    TSH 0.54 on 3/21/19.  Continue home levothyroxine.     Hx of CABG- (present on admission)   Assessment & Plan    Continue ASA & atorvastatin.      Gout of multiple sites- (present on admission)   Assessment & Plan    Chronic & stable.   No acute flare.   Continue allopurinol at dose lower than his home dose.      PVD (peripheral vascular disease) (CMS-HCC)- (present on admission)   Assessment & Plan    History of prior left superficial femoral artery stent, known high grade stenosis of right common femoral artery.  Stable.      History of CAD (coronary artery disease)- (present on admission)   Overview    Dr. Wu stopped plavix in 2016 due to falls.      Assessment & Plan    History of 3-vessel CABG with LIMA to LAD, saphenous vein graft to obtuse  marginal branch, and saphenous vein graft to the posterior descending artery by Dr. Coates in March 2018.  Normal EF on echocardiogram.   Continue atorvastatin, aspirin, metoprolol, and lisinopril.     Paroxysmal atrial fibrillation (CMS-HCC)- (present on admission)   Assessment & Plan    Stable. RRR on exam.   Xarelto discontinued by cardiology in August 2018 due to falls.   Continue metoprolol for rate control.         VTE prophylaxis: Subcutaneous heparin

## 2019-06-21 NOTE — PROGRESS NOTES
Pt A&Ox1. Denies pain, numbness, or tingling.  Pt pleasant and cooperative. All fall precautions in place, bed in low position, call light within reach, bed alarm in place, and treaded socks on.

## 2019-06-21 NOTE — PROGRESS NOTES
Patient refused bed alarm, pt upself, educated regarding importance. Education given regarding importance of interventions. Patient continues to refuse. Charge RN notified.

## 2019-06-22 NOTE — PROGRESS NOTES
Pt A&Ox1. Resting in bed between meals. Up to chair for all meals with staff supervision. Assist x1 with walker. Incontinent of bowel and bladder. Bowel movement 6/22/19. Pending guardianship.

## 2019-06-22 NOTE — PROGRESS NOTES
Patient on bed resting. Assessment partially completed. Denies any pain and discomfort at this time. Patient educated regarding plan of care. Bed alarm in place and functioning. Pending guardianship.    Bed locked, in lowest position, treaded socks on. Needs attended. No further needs at this time. Communication board updated. Call light and personal belongings within reach.

## 2019-06-23 NOTE — PROGRESS NOTES
Pt assisted back to bed from nurse's station. Pt is A&Ox1 and resting well. Took PM meds with no problem. Bed alarm on and treaded socks on. Will continue to monitor.

## 2019-06-23 NOTE — PROGRESS NOTES
Pt A&Ox1. Resting in bed between meals. Up to chair for meals with staff supervision. Assist x1 with walker. Bowel movement today. Incontinent of bladder occasionally. Pending gaurdianship. Bed and chair alarm utilized at all times d/t patient being impulsive. Lap belt used when in chair.

## 2019-06-23 NOTE — PROGRESS NOTES
[facility] Medicine Daily Progress Note    Date of Service  6/23/2019    Chief Complaint  Malaise    Hospital Course   Mr. Morataya is an 84 y/o very malnourished male who was admitted to the hospital on 3/19/19 under the [facility] service for malaise. A head CT was negative for acute abnormalities, his labs were suggestive of BETTY and hypovolemia, and his vital signs were unremarkable outside of having hypoxia that resolved with 0.5 L/min of O2 via nasal cannula. His home lisinopril was subsequently discontinued and he was given IV fluids. His medical issues eventually stabilized, but due to lack of involvement from family (outside of his wife who was admitted at the same time) and the inability to care for themselves, guardianship has been pursued and placement is pending. [facility] on [facility].        Interval Problem Update  Awake & alert today but with a minimal amount of verbal response. Is smiling. Denies pain.     Afebrile, HR 50s-60s, BP 100s-120s, O2 sats 97-98% on room air.     Consultants/Specialty  Palliative care  Ethics    Code Status  DNAR/DNI    Disposition  Pending guardianship.    Review of Systems  Review of Systems   Unable to perform ROS: Dementia      Physical Exam  Temp:  [36.2 °C (97.2 °F)-36.7 °C (98.1 °F)] 36.7 °C (98.1 °F)  Pulse:  [58-60] 58  Resp:  [18] 18  BP: (101-126)/(46-55) 126/55  SpO2:  [97 %-98 %] 98 %    Physical Exam   Constitutional: He appears cachectic. He is active and cooperative. He appears ill (chronically ill-appearing). No distress.   Frail, elderly appearing   HENT:   Head: Normocephalic and atraumatic.   Eyes: Right eye exhibits no discharge. Left eye exhibits no discharge. No scleral icterus.   Neck: Normal range of motion. Neck supple.   Cardiovascular: Normal rate, regular rhythm and intact distal pulses.  Exam reveals no gallop and no friction rub.    Murmur heard.  Pulmonary/Chest: Effort normal. No accessory muscle usage. No  respiratory distress. He has decreased breath sounds. He has no wheezes. He has no rhonchi. He has no rales.   Abdominal: Soft. Bowel sounds are normal. He exhibits no distension and no abdominal bruit. There is no tenderness.   Musculoskeletal: Normal range of motion. He exhibits no edema.   Neurological: He is alert. He exhibits abnormal muscle tone.   Unable to assess orientation   Skin: Skin is warm and dry. No rash noted. He is not diaphoretic. No erythema. No pallor.   Psychiatric: He has a normal mood and affect. His speech is normal and behavior is normal. Cognition and memory are impaired. He expresses impulsivity.   Nursing note and vitals reviewed.    Fluids    Intake/Output Summary (Last 24 hours) at 06/23/19 1414  Last data filed at 06/23/19 0800   Gross per 24 hour   Intake              480 ml   Output                0 ml   Net              480 ml     Laboratory    Imaging  CT-HEAD W/O   Final Result         NO ACUTE ABNORMALITIES ARE NOTED ON CT SCAN OF THE HEAD.      Findings are consistent with atrophy.  Decreased attenuation in the periventricular white matter likely indicates microvascular ischemic disease.      DX-HIP-BILATERAL-WITH PELVIS-2 VIEWS   Final Result      No pelvic or proximal femoral fracture identified      EC-ECHOCARDIOGRAM COMPLETE W/O CONT   Final Result      DX-CHEST-PORTABLE (1 VIEW)   Final Result      No evidence of acute cardiopulmonary process.      CT-HEAD W/O   Final Result      No acute intracranial abnormality is identified.      There are periventricular and subcortical white matter changes present.  This finding is nonspecific and could be from previous small vessel ischemia, demyelination, or gliosis.      Atrophy      Paranasal sinus disease.            Assessment/Plan  * Cognitive impairment- (present on admission)   Assessment & Plan    Unable to care for self, has had progressive decline per wife.  Ethics and palliative care evaluated the patient. All in  agreement that he will need guardianship and placement, which are still pending.      Age-related physical debility- (present on admission)   Assessment & Plan    PT/OT recommending SNF.   Placement pending.   Mobilizing more with the nursing staff.      Cardiac pacemaker in situ- (present on admission)   Assessment & Plan    Secondary to sick sinus syndrome.   Site well-healed.      S/P TAVR (transcatheter aortic valve replacement)- (present on admission)   Assessment & Plan    Continue aspirin and atorvastatin.     Essential hypertension- (present on admission)   Assessment & Plan    Well-controlled on current regimen. SBP 100s-120s overnight.   Continue metoprolol and lisinopril.     Dyslipidemia- (present on admission)   Assessment & Plan    Continue atorvastatin.     Fall- (present on admission)   Assessment & Plan    Per chart review, seems to have fallen multiple times.   Continue fall precautions.   Continue to encourage mobilization with the nursing staff.      Dysphagia- (present on admission)   Assessment & Plan    Found to have dysphagia, high risk for aspiration.   Advanced directive specified no feeding tube.   Speech evaluated and recommended 1:1 feeding.   Tolerating well.  Continue to monitor.      Hypothyroidism- (present on admission)   Assessment & Plan    TSH 0.54 on 3/21/19.  Continue home levothyroxine.     Hx of CABG- (present on admission)   Assessment & Plan    Continue ASA & atorvastatin.      Gout of multiple sites- (present on admission)   Assessment & Plan    Chronic & stable.   No acute flare.   Continue allopurinol at dose lower than his home dose.      PVD (peripheral vascular disease) (CMS-HCC)- (present on admission)   Assessment & Plan    History of prior left superficial femoral artery stent, known high grade stenosis of right common femoral artery.  Stable.      History of CAD (coronary artery disease)- (present on admission)   Overview    Dr. Wu stopped plavix in 2016 due to  falls.      Assessment & Plan    History of 3-vessel CABG with LIMA to LAD, saphenous vein graft to obtuse marginal branch, and saphenous vein graft to the posterior descending artery by Dr. Coates in March 2018.  Normal EF on echocardiogram.   Continue atorvastatin, aspirin, metoprolol, and lisinopril.     Paroxysmal atrial fibrillation (CMS-HCC)- (present on admission)   Assessment & Plan    Stable. RRR on exam.   Xarelto discontinued by cardiology in August 2018 due to falls.   Continue metoprolol for rate control.         VTE prophylaxis: Subcutaneous heparin

## 2019-06-23 NOTE — CARE PLAN
Problem: Safety  Goal: Will remain free from falls  Outcome: PROGRESSING AS EXPECTED  Bed alarm on, upper side rails on, and room close to nurse's station. Hourly rounding in place.    Problem: Mobility  Goal: Risk for activity intolerance will decrease  Outcome: PROGRESSING AS EXPECTED  Pt ambulates steadily back to bed with assistance.

## 2019-06-24 NOTE — CARE PLAN
Problem: Safety  Goal: Will remain free from falls  Outcome: PROGRESSING AS EXPECTED  Bed alarm on, upper side rails on, and room close to nurse's station. Hourly rounding in place.    Problem: Mobility  Goal: Risk for activity intolerance will decrease  Outcome: PROGRESSING AS EXPECTED  Pt ambulates steadily back to bed and up to bathroom with assistance using FWW.

## 2019-06-24 NOTE — DISCHARGE PLANNING
Medical Social Work  PC to [facility], petition was filled on Friday, should have a date and time for hearing  today or tomorrow, will let SW know.

## 2019-06-24 NOTE — PROGRESS NOTES
Assumed care of patient at start of shift. Patient is alert to self. Ambulated patient with FWW from bed to table at nurses station. Patient denies any pain at this time. Safety precautions in place including bed locked and low, treaded socks on, bed alarm/chair alarm in use.

## 2019-06-24 NOTE — CARE PLAN
Problem: Safety  Goal: Will remain free from injury  Outcome: PROGRESSING AS EXPECTED  Educated patient on safety precautions and fall risk. Safety precautions in place including bed locked and low, call light and personal belongings within reach, bed alarm/chair alarm in place, treaded socks on.    Problem: Pain Management  Goal: Pain level will decrease to patient's comfort goal  Outcome: PROGRESSING AS EXPECTED  Patient denies any pain at this time. Will continue to assess.

## 2019-06-24 NOTE — PROGRESS NOTES
Pt appears to be tired, sleeping in chair. RN transferred pt back to bed. Denies pain at this time and refused heparin stating he'd like to take it later in the morning. Bed alarm on, room close to nurse's station, and treaded socks on. Hourly rounding in place.

## 2019-06-25 NOTE — PROGRESS NOTES
Assumed care of patient at start of shift. Patient stated his first name but did not answer the other orientation questions. Patient denies any pain and does not show any signs of pain or distress. Ambulated patient to chair for breakfast. Tolerated scheduled meds well. Safety precautions in place including bed locked and low, call light and personal belongings within reach, treaded socks on, bed alarm and chair alarm in use.

## 2019-06-25 NOTE — PROGRESS NOTES
Alert to self,  Bed alarm in place and gets impulsive to get out of bed. Took his medications without a problem, cont plan of care, call light within reach and visual checks through the night

## 2019-06-25 NOTE — CARE PLAN
Problem: Safety  Goal: Will remain free from injury  Outcome: PROGRESSING AS EXPECTED  Bed alarm in place for safety and assistance out of bed    Problem: Discharge Barriers/Planning  Goal: Patient's continuum of care needs will be met  Outcome: PROGRESSING SLOWER THAN EXPECTED  Awaiting the guardianship process

## 2019-06-25 NOTE — PROGRESS NOTES
I certify that the patient requires continued hospital services as he lacks mental capacity to discharge safely without appropriate outpatient supervision.  He will remain in the hospital until legal guardianship is obtained and a safe, outpatient disposition can be decided upon by a state guardian.  Discharge plan is anticipated to be placement in a supervised facility once a state guardian can facilitate this transition.

## 2019-06-26 NOTE — CARE PLAN
Problem: Safety  Goal: Will remain free from injury  Outcome: PROGRESSING AS EXPECTED  Bed alarm on for safety and assistance out of bed with FWW    Problem: Discharge Barriers/Planning  Goal: Patient's continuum of care needs will be met  Outcome: PROGRESSING SLOWER THAN EXPECTED  Guardianship hearing set for July 19

## 2019-06-26 NOTE — CARE PLAN
Problem: Safety  Goal: Will remain free from falls  Outcome: PROGRESSING AS EXPECTED  Pt educated regarding plan of care and medications. All questions answered.     Problem: Knowledge Deficit  Goal: Knowledge of disease process/condition, treatment plan, diagnostic tests, and medications will improve  Outcome: PROGRESSING AS EXPECTED  Pt educated regarding plan of care and medications. All questions answered.

## 2019-06-26 NOTE — PROGRESS NOTES
Had a shower tonight, bed alarm in place. Took his medication whole in applesauce without a problem. Cont plan of care, call light within reach and visual checks through the night

## 2019-06-26 NOTE — PROGRESS NOTES
Pt is A&Ox1 to self only. Bed alarm is on, fall precautions in place. Pt is a high fall risk and requires 1 person staff assistance with front wheel walker up to bathroom. POC discussed with pt, pt is restin gin bed at this time.

## 2019-06-27 NOTE — PROGRESS NOTES
Pt is A&Ox1 to self only. Bed alarm is on, fall precautions in place. Pt is high fall risk and requires 1 person staff assistance with front wheel walker up to bathroom. POC discussed with pt, pt is resting in bed at this time.

## 2019-06-27 NOTE — CARE PLAN
Problem: Safety  Goal: Will remain free from injury  Outcome: PROGRESSING AS EXPECTED  Bed alarm in place for assistance out of bed to bathroom with FWW and confusion    Problem: Discharge Barriers/Planning  Goal: Patient's continuum of care needs will be met  Outcome: PROGRESSING SLOWER THAN EXPECTED  Guardianship July 19th

## 2019-06-27 NOTE — PROGRESS NOTES
Up in the chair with his wife, chair alarm in place and can be impulsive at times. Took his medications without a problem, cont plan of care, call light within reach and visual checks through the night

## 2019-06-27 NOTE — PROGRESS NOTES
[facility] Medicine Daily Progress Note    Date of Service  6/27/2019    Chief Complaint  Malaise    Hospital Course   Mr. Morataya is an 84 y/o very malnourished male who was admitted to the hospital on 3/19/19 under the [facility] service for malaise. A head CT was negative for acute abnormalities, his labs were suggestive of BETTY and hypovolemia, and his vital signs were unremarkable outside of having hypoxia that resolved with 0.5 L/min of O2 via nasal cannula. His home lisinopril was subsequently discontinued and he was given IV fluids. His medical issues eventually stabilized, but due to lack of involvement from family (outside of his wife who was admitted at the same time) and the inability to care for themselves, guardianship has been pursued and placement is pending. [facility].        Interval Problem Update  6/27:  No acute changes.  Mobilizing with FWW.  No apparent pain or distress.  Difficulty expressing himself verbally.  Wife assists with communication.  No acute needs.     Consultants/Specialty  Palliative care  Ethics    Code Status  DNAR/DNI    Disposition  Pending guardianship.    Review of Systems  Review of Systems   Unable to perform ROS: Dementia      Physical Exam  Temp:  [36.4 °C (97.6 °F)-36.7 °C (98 °F)] 36.5 °C (97.7 °F)  Pulse:  [60-77] 60  Resp:  [15-19] 15  BP: (114-116)/(56-57) 114/57  SpO2:  [97 %-98 %] 97 %    Physical Exam   Constitutional: He appears cachectic. He is active and cooperative. He appears ill (chronically ill-appearing).   Frail, elderly appearing   HENT:   Head: Normocephalic and atraumatic.   Right Ear: External ear normal.   Left Ear: External ear normal.   Mouth/Throat: No oropharyngeal exudate.   Eyes: Conjunctivae are normal. Right eye exhibits no discharge. Left eye exhibits no discharge.   Neck: Normal range of motion. No tracheal deviation present.   Cardiovascular: Normal rate, regular rhythm and intact distal  pulses.    Murmur heard.  Pulmonary/Chest: Effort normal and breath sounds normal. No accessory muscle usage or stridor. No respiratory distress. He has no wheezes. He has no rhonchi.   Abdominal: Soft. Bowel sounds are normal. He exhibits no distension and no abdominal bruit. There is no tenderness. There is no rebound.   Musculoskeletal: Normal range of motion. He exhibits no edema.   Generalized weakness.   Neurological: He is alert.   Unable to assess orientation     Skin: Skin is warm and dry. No rash noted. He is not diaphoretic. No erythema.   Psychiatric: He has a normal mood and affect. His speech is delayed and slurred. Cognition and memory are impaired. He expresses impulsivity.   Nursing note and vitals reviewed.    Fluids    Intake/Output Summary (Last 24 hours) at 06/27/19 1432  Last data filed at 06/27/19 0937   Gross per 24 hour   Intake              960 ml   Output                0 ml   Net              960 ml     Laboratory    Imaging  CT-HEAD W/O   Final Result         NO ACUTE ABNORMALITIES ARE NOTED ON CT SCAN OF THE HEAD.      Findings are consistent with atrophy.  Decreased attenuation in the periventricular white matter likely indicates microvascular ischemic disease.      DX-HIP-BILATERAL-WITH PELVIS-2 VIEWS   Final Result      No pelvic or proximal femoral fracture identified      EC-ECHOCARDIOGRAM COMPLETE W/O CONT   Final Result      DX-CHEST-PORTABLE (1 VIEW)   Final Result      No evidence of acute cardiopulmonary process.      CT-HEAD W/O   Final Result      No acute intracranial abnormality is identified.      There are periventricular and subcortical white matter changes present.  This finding is nonspecific and could be from previous small vessel ischemia, demyelination, or gliosis.      Atrophy      Paranasal sinus disease.            Assessment/Plan  * Cognitive impairment- (present on admission)   Assessment & Plan    Unable to care for self, has had progressive decline per  wife.  Ethics and palliative care evaluated the patient. All in agreement that he will need guardianship and placement, which are still pending.   Guardianship hearing 7/19.     Age-related physical debility- (present on admission)   Assessment & Plan    PT/OT recommending SNF.   Placement pending.   Encourage out of bed daily.      Cardiac pacemaker in situ- (present on admission)   Assessment & Plan    Secondary to sick sinus syndrome.   Site well-healed.      S/P TAVR (transcatheter aortic valve replacement)- (present on admission)   Assessment & Plan    Continue aspirin and atorvastatin.     Essential hypertension- (present on admission)   Assessment & Plan    Well-controlled on current regimen.   Continue metoprolol and lisinopril.     Dyslipidemia- (present on admission)   Assessment & Plan    Continue atorvastatin.     Fall- (present on admission)   Assessment & Plan    Per chart review, seems to have fallen multiple times.   Continue fall precautions.   Continue to encourage mobilization with the nursing staff.      Dysphagia- (present on admission)   Assessment & Plan    Found to have dysphagia, high risk for aspiration.   Advanced directive specified no feeding tube.   Speech evaluated and recommended 1:1 feeding.   Tolerating well.  Continue to monitor.      Hypothyroidism- (present on admission)   Assessment & Plan    TSH 0.54 on 3/21/19.  Continue home levothyroxine.     Hx of CABG- (present on admission)   Assessment & Plan    Continue ASA & atorvastatin.      Gout of multiple sites- (present on admission)   Assessment & Plan    Chronic & stable.   No acute flare.   Continue allopurinol at dose lower than his home dose.      PVD (peripheral vascular disease) (CMS-HCC)- (present on admission)   Assessment & Plan    History of prior left superficial femoral artery stent, known high grade stenosis of right common femoral artery.  Stable.      History of CAD (coronary artery disease)- (present on  admission)   Overview    Dr. Wu stopped plavix in 2016 due to falls.      Assessment & Plan    History of 3-vessel CABG with LIMA to LAD, saphenous vein graft to obtuse marginal branch, and saphenous vein graft to the posterior descending artery by Dr. Coates in March 2018.  Normal EF on echocardiogram.   Continue atorvastatin, aspirin, metoprolol, and lisinopril.     Paroxysmal atrial fibrillation (CMS-HCC)- (present on admission)   Assessment & Plan    Stable. RRR on exam.   Xarelto discontinued by cardiology in August 2018 due to falls.   Continue metoprolol for rate control.         VTE prophylaxis: Subcutaneous heparin

## 2019-06-28 NOTE — PROGRESS NOTES
Pt alert and oriented x1, only to self. Offered fluids. Incontinence care done. Safety precautions in placed. Bed in lowest position. Upper side rails up. Reinforced the use of call light when needing assistance.

## 2019-06-28 NOTE — PROGRESS NOTES
Pt is A&Ox1 to self only. Pt was able to sit up in chair and eat 100% of breakfast tray with 1:1 staff supervision and assistance. Fall precautions in place. Bed in lowest position. Non-skid socks in place. Personal possessions within reach. Mobility sign on door. Bed-alarm on. Call light within reach.

## 2019-06-28 NOTE — PROGRESS NOTES
Pharmacy Pharmacotherapy Consult for LOS >30 days    Admit Date: 3/19/2019      Medications were reviewed for appropriateness and ongoing need.     Current Facility-Administered Medications   Medication Dose Route Frequency Provider Last Rate Last Dose   • metoprolol (LOPRESSOR) tablet 12.5 mg  12.5 mg Oral TWICE DAILY Santana Monrealk, A.P.R.N.       • allopurinol (ZYLOPRIM) tablet 100 mg  100 mg Oral DAILY Santana Monrealk, A.P.R.N.   100 mg at 06/28/19 0816   • aspirin (ASA) chewable tab 81 mg  81 mg Oral DAILY Santana Monrealk, A.P.R.N.   81 mg at 06/28/19 0816   • lisinopril (PRINIVIL) tablet 5 mg  5 mg Oral DAILY Santana Monrealk, A.P.R.N.   5 mg at 06/28/19 0816   • magnesium oxide (MAG-OX) tablet 400 mg  400 mg Oral DAILY Santana Monrealk, A.P.R.N.   400 mg at 06/28/19 0816   • thiamine tablet 100 mg  100 mg Oral DAILY Santana Monrealk, A.P.R.N.   100 mg at 06/28/19 0816   • heparin injection 5,000 Units  5,000 Units Subcutaneous Q12HRS Santana Painting, A.P.R.N.   5,000 Units at 06/28/19 0816   • senna-docusate (PERICOLACE or SENOKOT S) 8.6-50 MG per tablet 2 Tab  2 Tab Oral BID PRN Rudi Grande M.D.   2 Tab at 06/19/19 2157    And   • magnesium hydroxide (MILK OF MAGNESIA) suspension 30 mL  30 mL Oral QDAY PRN Rudi Grande M.D.        And   • bisacodyl (DULCOLAX) suppository 10 mg  10 mg Rectal QDAY PRN Rudi Grande M.D.       • polyethylene glycol/lytes (MIRALAX) PACKET 1 Packet  1 Packet Oral QDAY PRN Kimberlee Livingston M.D.       • traZODone (DESYREL) tablet 50 mg  50 mg Oral HS PRN Kimberlee Livingston M.D.   50 mg at 06/26/19 1943   • levothyroxine (SYNTHROID) tablet 50 mcg  50 mcg Oral AM ES Kimberlee Livingston M.D.   50 mcg at 06/28/19 0816   • atorvastatin (LIPITOR) tablet 20 mg  20 mg Oral Nightly Kimberlee Livingston M.D.   20 mg at 06/27/19 1946       Recommendations:  1. Continue maintenance medications including supplements  2. Continue PRN medications  3. Continue [facility]    Cinthia Carter, JoieD, BCPS

## 2019-06-28 NOTE — CARE PLAN
Problem: Safety  Goal: Will remain free from injury  Outcome: PROGRESSING AS EXPECTED  Reinforced the use of call light when needing assistance. Treaded socks on. Bed on lowest position. Personal belongings within reach. Clutter free environment provided.     Problem: Infection  Goal: Will remain free from infection  Outcome: PROGRESSING AS EXPECTED  The patient will remain free from infection. Reinforced the importance of hand washing

## 2019-06-29 NOTE — PROGRESS NOTES
Patient alert and oriented to self, patient not answering questions for RN this morning so unable to assess other orientation. Patient up in chair for breakfast, without complaints. Patient denies pain, vss, resting back in bed at this time. Awaiting court hearing July 19. Bed alarm in place. Will monitor.

## 2019-06-30 NOTE — PROGRESS NOTES
[facility] Medicine Daily Progress Note    Date of Service  6/30/2019    Chief Complaint  Malaise    Hospital Course   Mr. Morataya is an 86 y/o very malnourished male who was admitted to the hospital on 3/19/19 under the [facility] service for malaise. A head CT was negative for acute abnormalities, his labs were suggestive of BETYT and hypovolemia, and his vital signs were unremarkable outside of having hypoxia that resolved with 0.5 L/min of O2 via nasal cannula. His home lisinopril was subsequently discontinued and he was given IV fluids. His medical issues eventually stabilized, but due to lack of involvement from family (outside of his wife who was admitted at the same time) and the inability to care for themselves, guardianship has been pursued and placement is pending. He was transferred to the [facility] long[facility] on 6/19/19.        Interval Problem Update  6/27:  No acute changes.  Mobilizing with FWW.  No apparent pain or distress.  Difficulty expressing himself verbally.  Wife assists with communication.  No acute needs.   6/30:  Resting in bed.  Denies pain or discomfort.  Pleasant mood.  VSS.  No interval change.     Consultants/Specialty  Palliative care  Ethics    Code Status  DNAR/DNI    Disposition  Pending guardianship.  Court hearing on 7/19.    Review of Systems  Review of Systems   Unable to perform ROS: Dementia      Physical Exam  Temp:  [36.6 °C (97.8 °F)-37.2 °C (98.9 °F)] 37.2 °C (98.9 °F)  Pulse:  [70-76] 70  Resp:  [16-18] 16  BP: (108-125)/(49-59) 108/49  SpO2:  [94 %-97 %] 94 %    Physical Exam   Constitutional: He appears cachectic. He is active and cooperative. He appears ill (chronically ill-appearing). No distress.   Frail, elderly appearing   HENT:   Head: Normocephalic and atraumatic.   Eyes: Conjunctivae are normal. No scleral icterus.   Neck: Normal range of motion. No JVD present.   Cardiovascular: Normal rate, regular rhythm and intact distal pulses.  Exam reveals no friction  rub.    Murmur heard.  Pulmonary/Chest: Effort normal and breath sounds normal. No accessory muscle usage or stridor. No respiratory distress. He has no rhonchi.   Abdominal: Soft. Bowel sounds are normal. He exhibits no distension and no abdominal bruit. There is no tenderness.   Musculoskeletal: Normal range of motion.   Generalized weakness.   Neurological: He is alert. No cranial nerve deficit.   Unable to assess orientation     Skin: Skin is warm and dry. No rash noted. He is not diaphoretic. No erythema.   Psychiatric: He has a normal mood and affect. His speech is delayed and slurred. Cognition and memory are impaired. He expresses impulsivity.   Nursing note and vitals reviewed.    Fluids    Intake/Output Summary (Last 24 hours) at 06/30/19 0937  Last data filed at 06/30/19 0500   Gross per 24 hour   Intake              940 ml   Output                0 ml   Net              940 ml     Laboratory    Imaging  CT-HEAD W/O   Final Result         NO ACUTE ABNORMALITIES ARE NOTED ON CT SCAN OF THE HEAD.      Findings are consistent with atrophy.  Decreased attenuation in the periventricular white matter likely indicates microvascular ischemic disease.      DX-HIP-BILATERAL-WITH PELVIS-2 VIEWS   Final Result      No pelvic or proximal femoral fracture identified      EC-ECHOCARDIOGRAM COMPLETE W/O CONT   Final Result      DX-CHEST-PORTABLE (1 VIEW)   Final Result      No evidence of acute cardiopulmonary process.      CT-HEAD W/O   Final Result      No acute intracranial abnormality is identified.      There are periventricular and subcortical white matter changes present.  This finding is nonspecific and could be from previous small vessel ischemia, demyelination, or gliosis.      Atrophy      Paranasal sinus disease.            Assessment/Plan  * Cognitive impairment- (present on admission)   Assessment & Plan    Unable to care for self, has had progressive decline per wife.  Ethics and palliative care evaluated  the patient. All in agreement that he will need guardianship and placement, which are still pending.   Guardianship hearing 7/19.     Age-related physical debility- (present on admission)   Assessment & Plan    PT/OT recommending SNF.   Placement pending.   Encourage out of bed daily.   Ambulate with FWW.      Cardiac pacemaker in situ- (present on admission)   Assessment & Plan    Secondary to sick sinus syndrome.   Site well-healed.      S/P TAVR (transcatheter aortic valve replacement)- (present on admission)   Assessment & Plan    Continue aspirin and atorvastatin.     Essential hypertension- (present on admission)   Assessment & Plan    Well-controlled on current regimen.   Continue metoprolol and lisinopril.     Dyslipidemia- (present on admission)   Assessment & Plan    Continue atorvastatin.     Fall- (present on admission)   Assessment & Plan    Per chart review, seems to have fallen multiple times.   Continue fall precautions.   Continue to encourage mobilization with the nursing staff.   Ambulates safely with FWW and assistance.      Dysphagia- (present on admission)   Assessment & Plan    Found to have dysphagia, high risk for aspiration.   Advanced directive specified no feeding tube.   Speech evaluated and recommended 1:1 feeding.   Tolerating well.  Continue to monitor.      Hypothyroidism- (present on admission)   Assessment & Plan    TSH 0.54 on 3/21/19.  Continue home levothyroxine.     Hx of CABG- (present on admission)   Assessment & Plan    Continue ASA & atorvastatin.      Gout of multiple sites- (present on admission)   Assessment & Plan    Chronic & stable.   No acute flare.   Continue allopurinol at dose lower than his home dose.      PVD (peripheral vascular disease) (CMS-HCC)- (present on admission)   Assessment & Plan    History of prior left superficial femoral artery stent, known high grade stenosis of right common femoral artery.  Stable.      History of CAD (coronary artery disease)-  (present on admission)   Overview    Dr. Wu stopped plavix in 2016 due to falls.      Assessment & Plan    History of 3-vessel CABG with LIMA to LAD, saphenous vein graft to obtuse marginal branch, and saphenous vein graft to the posterior descending artery by Dr. Coates in March 2018.  Normal EF on echocardiogram.   Continue atorvastatin, aspirin, metoprolol, and lisinopril.     Paroxysmal atrial fibrillation (CMS-HCC)- (present on admission)   Assessment & Plan    Stable. RRR on exam.   Xarelto discontinued by cardiology in August 2018 due to falls.   Continue metoprolol for rate control.         VTE prophylaxis: Subcutaneous heparin

## 2019-06-30 NOTE — PROGRESS NOTES
Assumed care of pt this am. Pt is A&O to self. Pt denies pain. Pt up to chair for meals. Attempts to get out of bed/chair without assistance. Pt is in room near nursing station. Hourly rounding in place.

## 2019-06-30 NOTE — PROGRESS NOTES
Pt doing good today. He came to activity room ate lunch with the group, as he watched movies with the other patients. Told pt he could come back to watch more movies around dinner time.

## 2019-07-01 NOTE — PROGRESS NOTES
Floresita participated in the 1000 movie and snacks this am. 7/1/19. He was enjoying himself, laughing and smiling.

## 2019-07-01 NOTE — DISCHARGE PLANNING
Anticipated Discharge Disposition: TBD    Action: Patient and her spouse are spending more time out of their room, eating some meals with other patients, playing bingo and watching movies.     Barriers to Discharge: Pending guardianship on 7/19, placement    Plan: ensure patient/spouse are at guardianship hearing on 7/19

## 2019-07-01 NOTE — PROGRESS NOTES
Received report and assumed patient care. Patient is AOx1 to self only. No complaints or signs of pain at this time. Assessment complete on RA. Patient is up eating breakfast at the activity table near nurses station. All needs met at this time. Safety precautions and hourly rounding in place.

## 2019-07-01 NOTE — CARE PLAN
Problem: Safety  Goal: Will remain free from falls  Outcome: PROGRESSING AS EXPECTED  Reinforced the use of call light when needing assistance. Treaded socks on. Bed on lowest position. Personal belongings within reach. Clutter free environment provided.     Problem: Psychosocial Needs:  Goal: Level of anxiety will decrease  Outcome: PROGRESSING AS EXPECTED  Provided distracting activities like coloring and watching TV.

## 2019-07-02 NOTE — PROGRESS NOTES
Received report and assumed patient care. Patient is AOx1 to self only. No complaints or signs of pain at this time. Assessment complete on RA. Patient is up in chair for all meals. All needs met at this time. Safety precautions and hourly rounding in place.

## 2019-07-02 NOTE — PROGRESS NOTES
Confused and impulsive when trying to go to the bathroom, bed alarm in place. Took his medications whole without a problem . Cont plan of care, call light within reach and visual checks through the night

## 2019-07-03 NOTE — PROGRESS NOTES
Sitting up in his chair having dinner with his wife. Took his medications without a problem. Awaiting guardianship  Court hearing in July. Cont plan of care, call light within reach and visual checks through the night

## 2019-07-03 NOTE — CARE PLAN
Problem: Safety  Goal: Will remain free from injury  Outcome: PROGRESSING AS EXPECTED  Bed alarm on for safety and assistance out of bed    Problem: Discharge Barriers/Planning  Goal: Patient's continuum of care needs will be met  Outcome: PROGRESSING SLOWER THAN EXPECTED  Awaiting guardianship hearing July 19

## 2019-07-04 NOTE — PROGRESS NOTES
[facility] Medicine Daily Progress Note    Date of Service  7/4/2019    Chief Complaint  Malaise    Hospital Course   Mr. Morataya is an 86 y/o very malnourished male who was admitted to the hospital on 3/19/19 under the [facility] service for malaise. A head CT was negative for acute abnormalities, his labs were suggestive of BETTY and hypovolemia, and his vital signs were unremarkable outside of having hypoxia that resolved with 0.5 L/min of O2 via nasal cannula. His home lisinopril was subsequently discontinued and he was given IV fluids. His medical issues eventually stabilized, but due to lack of involvement from family (outside of his wife who was admitted at the same time) and the inability to care for themselves, guardianship has been pursued and placement is pending. He was transferred to the [facility] long-term service on 6/19/19.      Interval Problem Update  -no acute events overnight. Up in shower with CNA this morning and did well.     Consultants/Specialty  -Ethics - signed off  -Palliative Care - signed off    Code Status  DNAR/DNI    Disposition  Guardianship hearing 7/19/19    Review of Systems  Review of Systems   Unable to perform ROS: Dementia        Physical Exam  Temp:  [36.4 °C (97.6 °F)-36.6 °C (97.9 °F)] 36.4 °C (97.6 °F)  Pulse:  [61-81] 61  Resp:  [15-17] 15  BP: ()/(51-57) 135/57  SpO2:  [95 %-96 %] 96 %    Physical Exam   Constitutional: He appears cachectic. He is active and cooperative. He appears ill (chronically ill-appearing). No distress.   Frail, elderly appearing   HENT:   Head: Normocephalic and atraumatic.   Eyes: Conjunctivae are normal. No scleral icterus.   Neck: Normal range of motion. No JVD present.   Cardiovascular: Normal rate and intact distal pulses.  Exam reveals no friction rub.    Murmur heard.  No edema   Pulmonary/Chest: Effort normal and breath sounds normal. No stridor.   Abdominal: Soft. Bowel sounds are normal.   Musculoskeletal: Normal range of motion.    Generalized weakness.  Ambulatory with FWW   Neurological: He is alert.   Unable to assess orientation     Skin: Skin is warm and dry.   Psychiatric: He has a normal mood and affect. His behavior is normal. Cognition and memory are impaired. He expresses impulsivity. He exhibits abnormal recent memory and abnormal remote memory.   Nursing note and vitals reviewed.      Fluids    Intake/Output Summary (Last 24 hours) at 07/04/19 1119  Last data filed at 07/04/19 0900   Gross per 24 hour   Intake              920 ml   Output                0 ml   Net              920 ml       Laboratory                        Imaging  CT-HEAD W/O   Final Result         NO ACUTE ABNORMALITIES ARE NOTED ON CT SCAN OF THE HEAD.      Findings are consistent with atrophy.  Decreased attenuation in the periventricular white matter likely indicates microvascular ischemic disease.      DX-HIP-BILATERAL-WITH PELVIS-2 VIEWS   Final Result      No pelvic or proximal femoral fracture identified      EC-ECHOCARDIOGRAM COMPLETE W/O CONT   Final Result      DX-CHEST-PORTABLE (1 VIEW)   Final Result      No evidence of acute cardiopulmonary process.      CT-HEAD W/O   Final Result      No acute intracranial abnormality is identified.      There are periventricular and subcortical white matter changes present.  This finding is nonspecific and could be from previous small vessel ischemia, demyelination, or gliosis.      Atrophy      Paranasal sinus disease.              Assessment/Plan  * Cognitive impairment- (present on admission)   Assessment & Plan    Unable to care for self, has had progressive decline per wife.  Ethics and palliative care evaluated the patient. All in agreement that he will need guardianship and placement, which are still pending.   Guardianship hearing 7/19.     Age-related physical debility- (present on admission)   Assessment & Plan    PT/OT recommending SNF.   Placement pending.   Encourage out of bed daily.   Ambulate with  FWW.      Cardiac pacemaker in situ- (present on admission)   Assessment & Plan    Secondary to sick sinus syndrome.   Site well-healed.      S/P TAVR (transcatheter aortic valve replacement)- (present on admission)   Assessment & Plan    -Continue aspirin & statin.     Essential hypertension- (present on admission)   Assessment & Plan    Well-controlled on current regimen.   Continue metoprolol and lisinopril.     Dyslipidemia- (present on admission)   Assessment & Plan    Continue atorvastatin.     Fall- (present on admission)   Assessment & Plan    Per chart review, seems to have fallen multiple times.   Continue fall precautions.   Continue to encourage mobilization with the nursing staff.   Ambulates safely with FWW and assistance.      Dysphagia- (present on admission)   Assessment & Plan    Found to have dysphagia, high risk for aspiration.   Advanced directive specified no feeding tube.   Speech evaluated and recommended 1:1 feeding.   Tolerating well.  Continue to monitor.      Hypothyroidism- (present on admission)   Assessment & Plan    -TSH 0.54 on 3/21/19.  -Continue levothyroxine.     Hx of CABG- (present on admission)   Assessment & Plan    -Continue ASA & statin     Gout of multiple sites- (present on admission)   Assessment & Plan    Chronic & stable.   No acute flare.   Continue allopurinol at dose lower than his home dose.      PVD (peripheral vascular disease) (CMS-HCC)- (present on admission)   Assessment & Plan    History of prior left superficial femoral artery stent, known high grade stenosis of right common femoral artery.  Stable.      History of CAD (coronary artery disease)- (present on admission)   Overview    Dr. Wu stopped plavix in 2016 due to falls.      Assessment & Plan    -s/p 3-vessel CABG with LIMA to LAD, saphenous vein graft to obtuse marginal branch, and saphenous vein graft to the posterior descending artery by Dr. Coates in March 2018.  -Continue atorvastatin, aspirin,  metoprolol, and lisinopril.     Paroxysmal atrial fibrillation (CMS-HCC)- (present on admission)   Assessment & Plan    -Xarelto discontinued by cardiology in August 2018 due to falls.   -Continue metoprolol for rate control.           VTE prophylaxis: Heparin    CLIFFORD Ewing

## 2019-07-04 NOTE — PROGRESS NOTES
Patients sits on the chair and so some coloring after a shower. Walked around the unit 2x with a front wheel walker, tolerates well.

## 2019-07-04 NOTE — PROGRESS NOTES
Patient OOB for breakfast then to nursing station for activities.  When in chair uses strip alarm, standby assist with FWW for ambulation.  Took morning medications, wife helpful with ADLs

## 2019-07-04 NOTE — PROGRESS NOTES
[facility] Medicine Daily Progress Note    Date of Service  7/4/2019    Chief Complaint  Malaise    Hospital Course   Mr. Morataya is an 86 y/o very malnourished male who was admitted to the hospital on 3/19/19 under the [facility] service for malaise. A head CT was negative for acute abnormalities, his labs were suggestive of BETTY and hypovolemia, and his vital signs were unremarkable outside of having hypoxia that resolved with 0.5 L/min of O2 via nasal cannula. His home lisinopril was subsequently discontinued and he was given IV fluids. His medical issues eventually stabilized, but due to lack of involvement from family (outside of his wife who was admitted at the same time) and the inability to care for themselves, guardianship has been pursued and placement is pending. [facility].      Interval Problem Update  -no acute events overnight. Up in shower with CNA this morning and did well.     Consultants/Specialty  -Ethics - signed off  -Palliative Care - signed off    Code Status  DNAR/DNI    Disposition  Guardianship hearing 7/19/19    Review of Systems  Review of Systems   Unable to perform ROS: Dementia        Physical Exam  Temp:  [36.4 °C (97.6 °F)-36.6 °C (97.9 °F)] 36.4 °C (97.6 °F)  Pulse:  [61-81] 61  Resp:  [15-17] 15  BP: ()/(51-57) 135/57  SpO2:  [95 %-96 %] 96 %    Physical Exam   Constitutional: He appears cachectic. He is active and cooperative. He appears ill (chronically ill-appearing). No distress.   Frail, elderly appearing   HENT:   Head: Normocephalic and atraumatic.   Eyes: Conjunctivae are normal. No scleral icterus.   Neck: Normal range of motion. No JVD present.   Cardiovascular: Normal rate and intact distal pulses.  Exam reveals no friction rub.    Murmur heard.  No edema   Pulmonary/Chest: Effort normal and breath sounds normal. No stridor.   Abdominal: Soft. Bowel sounds are normal.   Musculoskeletal: Normal range of motion.    Generalized weakness.  Ambulatory with FWW   Neurological: He is alert.   Unable to assess orientation     Skin: Skin is warm and dry.   Psychiatric: He has a normal mood and affect. His behavior is normal. Cognition and memory are impaired. He expresses impulsivity. He exhibits abnormal recent memory and abnormal remote memory.   Nursing note and vitals reviewed.      Fluids    Intake/Output Summary (Last 24 hours) at 07/04/19 1130  Last data filed at 07/04/19 0900   Gross per 24 hour   Intake              920 ml   Output                0 ml   Net              920 ml       Laboratory                        Imaging  CT-HEAD W/O   Final Result         NO ACUTE ABNORMALITIES ARE NOTED ON CT SCAN OF THE HEAD.      Findings are consistent with atrophy.  Decreased attenuation in the periventricular white matter likely indicates microvascular ischemic disease.      DX-HIP-BILATERAL-WITH PELVIS-2 VIEWS   Final Result      No pelvic or proximal femoral fracture identified      EC-ECHOCARDIOGRAM COMPLETE W/O CONT   Final Result      DX-CHEST-PORTABLE (1 VIEW)   Final Result      No evidence of acute cardiopulmonary process.      CT-HEAD W/O   Final Result      No acute intracranial abnormality is identified.      There are periventricular and subcortical white matter changes present.  This finding is nonspecific and could be from previous small vessel ischemia, demyelination, or gliosis.      Atrophy      Paranasal sinus disease.              Assessment/Plan  * Cognitive impairment- (present on admission)   Assessment & Plan    Unable to care for self, has had progressive decline per wife.  Ethics and palliative care evaluated the patient. All in agreement that he will need guardianship and placement, which are still pending.   Guardianship hearing 7/19.     Age-related physical debility- (present on admission)   Assessment & Plan    PT/OT recommending SNF.   Placement pending.   Encourage out of bed daily.   Ambulate with  FWW.      Cardiac pacemaker in situ- (present on admission)   Assessment & Plan    Secondary to sick sinus syndrome.   Site well-healed.      S/P TAVR (transcatheter aortic valve replacement)- (present on admission)   Assessment & Plan    -Continue aspirin & statin.     Essential hypertension- (present on admission)   Assessment & Plan    Well-controlled on current regimen.   Continue metoprolol and lisinopril.     Dyslipidemia- (present on admission)   Assessment & Plan    Continue atorvastatin.     Fall- (present on admission)   Assessment & Plan    Per chart review, seems to have fallen multiple times.   Continue fall precautions.   Continue to encourage mobilization with the nursing staff.   Ambulates safely with FWW and assistance.      Dysphagia- (present on admission)   Assessment & Plan    Found to have dysphagia, high risk for aspiration.   Advanced directive specified no feeding tube.   Speech evaluated and recommended 1:1 feeding.   Tolerating well.  Continue to monitor.      Hypothyroidism- (present on admission)   Assessment & Plan    -TSH 0.54 on 3/21/19.  -Continue levothyroxine.     Hx of CABG- (present on admission)   Assessment & Plan    -Continue ASA & statin     Gout of multiple sites- (present on admission)   Assessment & Plan    Chronic & stable.   No acute flare.   Continue allopurinol at dose lower than his home dose.      PVD (peripheral vascular disease) (CMS-HCC)- (present on admission)   Assessment & Plan    History of prior left superficial femoral artery stent, known high grade stenosis of right common femoral artery.  Stable.      History of CAD (coronary artery disease)- (present on admission)   Overview    Dr. Wu stopped plavix in 2016 due to falls.      Assessment & Plan    -s/p 3-vessel CABG with LIMA to LAD, saphenous vein graft to obtuse marginal branch, and saphenous vein graft to the posterior descending artery by Dr. Coates in March 2018.  -Continue atorvastatin, aspirin,  metoprolol, and lisinopril.     Paroxysmal atrial fibrillation (CMS-HCC)- (present on admission)   Assessment & Plan    -Xarelto discontinued by cardiology in August 2018 due to falls.   -Continue metoprolol for rate control.           VTE prophylaxis: Heparin    CLIFFORD Ewing

## 2019-07-04 NOTE — PROGRESS NOTES
Patient pleasant and alert to self only. Was up in chair in room at beginning of shift. Patient took evening medications crushed in pudding. No complaints of pain nor SOB. Patient is a high fall risk and has all precautions in place. Bed is in a low and locked position. Unable to use call light appropriately. He is visible from nursing station. Guardianship hearing scheduled for 7-19-19. No signs of distress.

## 2019-07-04 NOTE — CARE PLAN
Problem: Safety  Goal: Will remain free from falls  Outcome: PROGRESSING AS EXPECTED  Patient will remain free from falls for duration of shift. He is a high fall risk and has all safety precautions in place. Pt is visible from nursing station.    Problem: Urinary Elimination:  Goal: Ability to reestablish a normal urinary elimination pattern will improve  Outcome: PROGRESSING SLOWER THAN EXPECTED  Patient remains to be incontinent of urine. Implement timed voiding schedule while awake. Frequent incontinence monitoring being done.

## 2019-07-04 NOTE — PROGRESS NOTES
Patient BTB after coloring and having a snack. Patient really likes chocolate ice cream. No distress noted.

## 2019-07-05 NOTE — PROGRESS NOTES
Patient alert to self only. Up with one person assist using FWW. Lap belt and alarm used while up in chair at beginning of shift. Patient BTB at 2000, bed alarm active and audible. Patient took evening medications crushed in anita pudding w/o issue. He has no complaints of pain nor SOB. PRN 50 mg trazodone given with little effect.  2245 Patient up at nursing station coloring quietly. No signs of distress.

## 2019-07-05 NOTE — PROGRESS NOTES
Patient up on chair with assistance of a CNA at nursing station, chair alarm on set, no signs of distress.

## 2019-07-05 NOTE — CARE PLAN
Problem: Communication  Goal: The ability to communicate needs accurately and effectively will improve  Outcome: PROGRESSING SLOWER THAN EXPECTED  Patient is not able to communicate needs to staff but he can respond to questions if given extra time to answer.    Problem: Discharge Barriers/Planning  Goal: Patient's continuum of care needs will be met  Outcome: PROGRESSING SLOWER THAN EXPECTED    Intervention: Assess potential discharge barriers on admission and throughout hospital stay  Patient is a difficult discharge. He has a guardianship hearing on 7-19-19 at 0915 along with his wife. All needs continue to be met.

## 2019-07-05 NOTE — PROGRESS NOTES
Patient up in the chair for all meals, walks around the unit, went to the family room to hear his wife playing piano. He is cooperative and pleasant.

## 2019-07-06 NOTE — PROGRESS NOTES
· Assumed care at 0645  · Pt A&O to self, pleasantly confused, is cooperative and easily redirectable.   · Pt up in chair in rm this AM, up to nursing station for lunch and coloring at nursing station. Pt up with just 1P/FWW, his wife attempts to take him to and from the bathroom, educated both patients that this is not safe.   · Pt napping on and off throughout the day. Has a great appetite and tolerating his food well.   · Pt resting comfortably in his rm, will cont to monitor.

## 2019-07-06 NOTE — PROGRESS NOTES
Patient AOx1. Denies pain, no nonverbal signs of pain, resting comfortably in bed. Patient's wife/roommate fluttering around patient to tuck him in, sit him up for medications, and exposing his stomach for heparin injection.

## 2019-07-06 NOTE — PROGRESS NOTES
· Assumed care at 0645  · Pt A&O x1 , pt denies any pain.   · Pt up with 1p/handheld assist, able to get up to bathroom. Pt up sitting in chair most of the day.   · Pt tolerating diet well with a good appetite.   · Pt's wife talking to pt throughout the day. They appear to both be in good spirits today. Wife reminding pt of upcoming court hearing. RN unsure if pt fully understands.   · Pt resting well otherwise, will cont to monitor.

## 2019-07-07 PROBLEM — G47.00 INSOMNIA: Status: ACTIVE | Noted: 2019-01-01

## 2019-07-07 NOTE — PROGRESS NOTES
[facility] Medicine Daily Progress Note    Date of Service  7/7/2019    Chief Complaint  Malaise    Hospital Course   Mr. Morataya is an 84 y/o very malnourished male who was admitted to the hospital on 3/19/19 under the [employer] service for malaise. A head CT was negative for acute abnormalities, his labs were suggestive of BETTY and hypovolemia, and his vital signs were unremarkable outside of having hypoxia that resolved with 0.5 L/min of O2 via nasal cannula. He was given IV fluids. His medical issues eventually stabilized, but due to lack of involvement from family (outside of his wife who was admitted at the same time) and the inability to care for themselves, guardianship has been pursued and placement is pending. He [facility] to [facility] on [facility]/19.        Interval Problem Update  7/7:  Very pleasant mood.  Sitting in chair, coloring.  No evidence of acute pain or distress.     Consultants/Specialty  Palliative care  Ethics    Code Status  DNAR/DNI    Disposition  Pending guardianship.  Court hearing on 7/19.    Review of Systems  Review of Systems   Unable to perform ROS: Dementia      Physical Exam  Temp:  [36.3 °C (97.4 °F)-36.7 °C (98 °F)] 36.3 °C (97.4 °F)  Pulse:  [60-67] 67  Resp:  [16-17] 16  BP: (103-136)/(50-69) 136/69  SpO2:  [96 %-100 %] 98 %    Physical Exam   Constitutional: He appears cachectic. He is active and cooperative. He appears ill (chronically ill-appearing).   Frail, elderly appearing   HENT:   Head: Normocephalic and atraumatic.   Right Ear: External ear normal.   Left Ear: External ear normal.   Mouth/Throat: No oropharyngeal exudate.   Eyes: Conjunctivae are normal. Right eye exhibits no discharge. Left eye exhibits no discharge.   Neck: Normal range of motion. No tracheal deviation present.   Cardiovascular: Normal rate, regular rhythm and intact distal pulses.    Murmur heard.  Pulmonary/Chest: Effort normal. No accessory muscle usage or stridor. No  respiratory distress. He has no wheezes. He has no rhonchi.   Abdominal: Soft. Bowel sounds are normal. He exhibits no distension and no abdominal bruit. There is no tenderness. There is no rebound.   Musculoskeletal: Normal range of motion.   Generalized weakness.   Neurological: He is alert. He is disoriented.        Skin: Skin is warm and dry. No rash noted. He is not diaphoretic. No erythema.   Psychiatric: He has a normal mood and affect. His speech is delayed and slurred. Cognition and memory are impaired. He expresses impulsivity.   Nursing note and vitals reviewed.    Fluids    Intake/Output Summary (Last 24 hours) at 07/07/19 0918  Last data filed at 07/06/19 1900   Gross per 24 hour   Intake              500 ml   Output                0 ml   Net              500 ml     Laboratory    Imaging  CT-HEAD W/O   Final Result         NO ACUTE ABNORMALITIES ARE NOTED ON CT SCAN OF THE HEAD.      Findings are consistent with atrophy.  Decreased attenuation in the periventricular white matter likely indicates microvascular ischemic disease.      DX-HIP-BILATERAL-WITH PELVIS-2 VIEWS   Final Result      No pelvic or proximal femoral fracture identified      EC-ECHOCARDIOGRAM COMPLETE W/O CONT   Final Result      DX-CHEST-PORTABLE (1 VIEW)   Final Result      No evidence of acute cardiopulmonary process.      CT-HEAD W/O   Final Result      No acute intracranial abnormality is identified.      There are periventricular and subcortical white matter changes present.  This finding is nonspecific and could be from previous small vessel ischemia, demyelination, or gliosis.      Atrophy      Paranasal sinus disease.            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  Unable to care for self, has had progressive decline per wife.  Ethics and palliative care evaluated the patient. All in agreement that he will need  guardianship and placement, which are still pending.   Guardianship hearing 7/19.     Age-related physical debility- (present on admission)   Assessment & Plan    PT/OT recommending SNF.   Guardianship and placement pending.   Encourage out of bed daily.   Ambulate with FWW.      Cardiac pacemaker in situ- (present on admission)   Assessment & Plan    Secondary to sick sinus syndrome.   Site well-healed.      S/P TAVR (transcatheter aortic valve replacement)- (present on admission)   Assessment & Plan    -Continue aspirin & statin.     Essential hypertension- (present on admission)   Assessment & Plan    Well-controlled on current regimen.   Continue metoprolol and lisinopril.     Dyslipidemia- (present on admission)   Assessment & Plan    Continue atorvastatin.     Fall- (present on admission)   Assessment & Plan    Per chart review, seems to have fallen multiple times.   Continue fall precautions.   Continue to encourage mobilization with the nursing staff.   Ambulates safely with FWW and assistance.      Dysphagia- (present on admission)   Assessment & Plan    Found to have dysphagia, high risk for aspiration.   Advanced directive specified no feeding tube.   Speech evaluated and recommended 1:1 feeding.   Tolerating well.  Continue to monitor.      Hypothyroidism- (present on admission)   Assessment & Plan    -TSH 0.54 on 3/21/19.  -Continue levothyroxine.     Hx of CABG- (present on admission)   Assessment & Plan    -Continue ASA & statin     Gout of multiple sites- (present on admission)   Assessment & Plan    Chronic & stable.   No acute flare.   Continue allopurinol at dose lower than his home dose.      PVD (peripheral vascular disease) (CMS-HCC)- (present on admission)   Assessment & Plan    History of prior left superficial femoral artery stent, known high grade stenosis of right common femoral artery.  Stable.      History of CAD (coronary artery disease)- (present on admission)   Overview    Dr. Wu  stopped plavix in 2016 due to falls.      Assessment & Plan    -s/p 3-vessel CABG with LIMA to LAD, saphenous vein graft to obtuse marginal branch, and saphenous vein graft to the posterior descending artery by Dr. Coates in March 2018.  -Continue atorvastatin, aspirin, metoprolol, and lisinopril.     Paroxysmal atrial fibrillation (CMS-HCC)- (present on admission)   Assessment & Plan    -Xarelto discontinued by cardiology in August 2018 due to falls.   -Continue metoprolol for rate control.         VTE prophylaxis: Subcutaneous heparin

## 2019-07-07 NOTE — PROGRESS NOTES
Pt is letting us know when he has to go to the bathroom. He has not been incontinent at all today. Pt is up at meals and out at nurses station for lunch today. Pt has been coloring and socializing with others.

## 2019-07-07 NOTE — PROGRESS NOTES
Patient AOx1. Calm and smiling, resting in bed. Patient has a good appetite, finishing the applesauce his medication was floated in.

## 2019-07-07 NOTE — PROGRESS NOTES
Assumed care of patient at start of shift. Patient is alert and oriented X 1. Patient is smiling, sitting up in chair in room coloring and playing with dice. Patient tolerated scheduled meds well and took them crushed in applesauce. No signs of pain.

## 2019-07-08 NOTE — DISCHARGE PLANNING
Anticipated Discharge Disposition:  Assisted Living    Action: Patient is A X1, continues to spend time with other patients, meals, movies, coloring.      Barriers to Discharge: Guardianship/placement    Plan: ensure patient is at guardianship hearing on 7/19

## 2019-07-08 NOTE — PROGRESS NOTES
Patient sitting up in chair with lap belt on. Patient's wife/roommate requesting patient be helped back to bed. Patient transferred back to bed. Medication given floated in applesauce. Patient resting comfortably in bed

## 2019-07-08 NOTE — PROGRESS NOTES
Pt has been busy with the colored buttons putting them in groups of colors and sizes. He has been incon of urine twice today. He is very focused when I give him the colored buttons to organize. He has been up in his chair at meals also.

## 2019-07-08 NOTE — PROGRESS NOTES
Assumed care of patient at start of shift. Patient is alert and oriented X 1 to self. Patient tolerated scheduled meds well, denies any pain. Patient spent morning coloring in room and has a pleasant, calm demeanor.

## 2019-07-09 NOTE — PROGRESS NOTES
Pt sitting in chair chatting with wife this evening. Medication provided crushed and floated in pudding.  Pt requesting to go on a walk. CNA walked pt around the floor one time, and then pt was ready to get in bed for the night.  All needs met at this time.    Bed alarm is on, bed is locked and in lowest position, call light and bedside table are within reach, treaded slipper socks are on, and hourly rounding is in place.

## 2019-07-09 NOTE — PROGRESS NOTES
Patient is alert to self. Sitting in chair for meals and intermittently through out shift. Patient ambulating in room. Somewhat restless this afternoon. Activity provided. All needs met at this time. Continue plan of care. Safety measures and hourly rounding in place. Call light and belongings within reach.

## 2019-07-09 NOTE — PROGRESS NOTES
Patient ate meals in chair with lapbelt and chair alarm in place. Patient did some coloring this morning. He has been very calm and pleasant throughout shift.

## 2019-07-10 NOTE — PROGRESS NOTES
Pt is in a pleasant mood this evening. A&Oxself, no complaints of pain. Medication crushed and floated in pudding.  Pt sitting in chair watching tv in pt room.  All needs met at this time.    Chair alarm is on, call light and bedside table are within reach, treaded slipper socks are on, and hourly rounding is in place.

## 2019-07-10 NOTE — DISCHARGE PLANNING
Medical Social Work  Transport communication form faxed to [facility] for a 8:15 transport on 7/19 for guardianship hearing.

## 2019-07-10 NOTE — PROGRESS NOTES
Patient is alert, denies pain this morning. Patient up in chair, wife yelling at him ( see note for [id number]). Patient was provided with incontinence care and new gown. Medications given crushed in pudding. Denies any pain. All needs met at this time. Continue plan of care.  Safety measures and hourly rounding in place. Call light and belongings within reach. Will continue to monitor.

## 2019-07-10 NOTE — DISCHARGE PLANNING
Received Transport Form at 0910  Spoke to Lakia at [facility]     Transport is scheduled for Friday 7/19/19 at 0815 going to one [facility].    Return transport at 1130 back to [facility].

## 2019-07-11 NOTE — PROGRESS NOTES
[facility] Medicine Daily Progress Note    Date of Service  7/11/2019    Chief Complaint  Malaise    Hospital Course   Mr. Morataya is an 84 y/o very malnourished male who was admitted to the hospital on 3/19/19 under the [employer] service for malaise. A head CT was negative for acute abnormalities, his labs were suggestive of BETTY and hypovolemia, and his vital signs were unremarkable outside of having hypoxia that resolved with 0.5 L/min of O2 via nasal cannula. His home lisinopril was subsequently discontinued and he was given IV fluids. His medical issues eventually stabilized, but due to lack of involvement from family (outside of his wife who was admitted at the same time) and the inability to care for themselves, guardianship has been pursued and placement is pending. [facility].      Interval Problem Update  -slept most of the morning. Mentation at his baseline. No behavioral outbursts.    Consultants/Specialty  -Palliative Care - signed off  -Ethics - signed off    Code Status  DNAR/DNI    Disposition  Guardianship Hearing 7/19/19 then will persue [facility] versus SNF placement.     Review of Systems  Review of Systems   Unable to perform ROS: Dementia        Physical Exam  Temp:  [36.2 °C (97.2 °F)-36.9 °C (98.5 °F)] 36.2 °C (97.2 °F)  Pulse:  [60-70] 60  Resp:  [15-18] 15  BP: (102-138)/(47-61) 116/61  SpO2:  [95 %-97 %] 95 %    Physical Exam   Constitutional: Vital signs are normal. He appears cachectic. He is active and cooperative. He appears ill (chronically ill-appearing). No distress.   Thin/frail   HENT:   Head: Normocephalic.   Nose: Nose normal.   Mouth/Throat: Oropharynx is clear and moist and mucous membranes are normal.   Eyes: Conjunctivae are normal. No scleral icterus.   Neck: Normal range of motion.   Cardiovascular: Normal rate and intact distal pulses.  Exam reveals no friction rub.    Murmur heard.  No edema   Pulmonary/Chest: Effort normal and  breath sounds normal. He has no wheezes. He has no rhonchi.   Abdominal: Soft. Bowel sounds are normal.   Musculoskeletal: Normal range of motion.   Generalized weakness.  Ambulatory with FWW   Neurological: He is alert. He is disoriented.        Skin: Skin is warm and dry.   Psychiatric: He has a normal mood and affect. His behavior is normal. Cognition and memory are impaired. He expresses impulsivity. He exhibits abnormal recent memory and abnormal remote memory.   Nursing note and vitals reviewed.      Fluids    Intake/Output Summary (Last 24 hours) at 07/11/19 1402  Last data filed at 07/11/19 0927   Gross per 24 hour   Intake              620 ml   Output                0 ml   Net              620 ml       Laboratory                        Imaging  CT-HEAD W/O   Final Result         NO ACUTE ABNORMALITIES ARE NOTED ON CT SCAN OF THE HEAD.      Findings are consistent with atrophy.  Decreased attenuation in the periventricular white matter likely indicates microvascular ischemic disease.      DX-HIP-BILATERAL-WITH PELVIS-2 VIEWS   Final Result      No pelvic or proximal femoral fracture identified      EC-ECHOCARDIOGRAM COMPLETE W/O CONT   Final Result      DX-CHEST-PORTABLE (1 VIEW)   Final Result      No evidence of acute cardiopulmonary process.      CT-HEAD W/O   Final Result      No acute intracranial abnormality is identified.      There are periventricular and subcortical white matter changes present.  This finding is nonspecific and could be from previous small vessel ischemia, demyelination, or gliosis.      Atrophy      Paranasal sinus disease.              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  Unable to care for self, has had progressive decline per wife.  Ethics and palliative care evaluated the patient. All in agreement that he will need guardianship and placement, which are still pending.    Guardianship hearing 7/19.     Age-related physical debility- (present on admission)   Assessment & Plan    -PT/OT recommending SNF.   -Guardianship hearing scheduled 7/19  -Encourage out of bed daily.   -Ambulate with FWW.      Cardiac pacemaker in situ- (present on admission)   Assessment & Plan    -Secondary to sick sinus syndrome.      S/P TAVR (transcatheter aortic valve replacement)- (present on admission)   Assessment & Plan    -Continue aspirin & statin.     Essential hypertension- (present on admission)   Assessment & Plan    Well-controlled on current regimen.   Continue metoprolol and lisinopril.     Dyslipidemia- (present on admission)   Assessment & Plan    Continue atorvastatin.     Fall- (present on admission)   Assessment & Plan    Per chart review, seems to have fallen multiple times.   Continue fall precautions.   Continue to encourage mobilization with the nursing staff.   Ambulates safely with FWW and assistance.      Dysphagia- (present on admission)   Assessment & Plan    Found to have dysphagia, high risk for aspiration.   Advanced directive specified no feeding tube.   Speech evaluated and recommended 1:1 feeding.   Tolerating well.  Continue to monitor.      Hypothyroidism- (present on admission)   Assessment & Plan    -TSH 0.54 on 3/21/19.  -Continue levothyroxine.     Hx of CABG- (present on admission)   Assessment & Plan    -Continue ASA & statin     Gout of multiple sites- (present on admission)   Assessment & Plan    Chronic & stable.   No acute flare.   Continue allopurinol at dose lower than his home dose.      PVD (peripheral vascular disease) (CMS-HCC)- (present on admission)   Assessment & Plan    History of prior left superficial femoral artery stent, known high grade stenosis of right common femoral artery.  Stable.      History of CAD (coronary artery disease)- (present on admission)   Overview    Dr. Wu stopped plavix in 2016 due to falls.      Assessment & Plan    -s/p  3-vessel CABG with LIMA to LAD, saphenous vein graft to obtuse marginal branch, and saphenous vein graft to the posterior descending artery by Dr. Coates in March 2018.  -Continue atorvastatin, aspirin, metoprolol, and lisinopril.     Paroxysmal atrial fibrillation (CMS-HCC)- (present on admission)   Assessment & Plan    -Xarelto discontinued by cardiology in August 2018 due to falls.   -Continue metoprolol for rate control.   -continue ASA          VTE prophylaxis: Heparin    CLIFFORD Ewing

## 2019-07-11 NOTE — PROGRESS NOTES
Pt is alert, resting comfortably in bed. Wife continues to fuss/pester him. Patient took medications without incident. Incontinence care provided. All needs met at this time. Continue plan of care Safety measures and hourly rounding in place. Call light and belongings within reach. Will continue to monitor.

## 2019-07-12 NOTE — PROGRESS NOTES
Patient is alert, resting comfortably in bed. Got up to chair before breakfast, took morning medications and watching cartoons. Ate all of breakfast, took a shower and returned to bed for a nap. All needs met at this time. Continue plan of care. Safety measures and hourly rounding in place. Call light and belongings within reach. Will continue to monitor.

## 2019-07-12 NOTE — PROGRESS NOTES
Pt got in bed at beginning of shift. Patient sleeping already. Pt awoken and provided with night time medications without issue.  Pt has no complaints at this time.      Pt in desk bed, bed alarm is on, bed is locked and in lowest position, call light and bedside table are within reach, treaded slipper socks are on, and hourly rounding is in place.

## 2019-07-13 NOTE — PROGRESS NOTES
Pt in bed, resting with a smile on his face.  Pt took medications crushed in pudding with no issue.  All needs met at this time.    In desk bed, bed alarm is on, bed is locked and in lowest position, call light and bedside table are within reach, treaded slipper socks are on, and hourly rounding is in place.

## 2019-07-13 NOTE — PROGRESS NOTES
Patient smiling, resting comfortably in chair, looking at magazine. Went for walk with wife after breakfast. Counting numbers with wife. All needs met at this time. Continue plan of care. Safety measures and hourly rounding in place. Call light and belongings within reach. Will continue to monitor.

## 2019-07-13 NOTE — PROGRESS NOTES
Patient walks along the hallway using a front wheel walker, sit at the family room for a while hearing his wife playing the piano. Sleepy while sitting but in pleasant and cooperative mood.

## 2019-07-14 NOTE — PROGRESS NOTES
Pt is in pleasant mood this evening. Medication crushed up and placed in pudding.  Now resting in bed trying to get some sleep.      Pt in bed desk bed, bed alarm is on, bed is locked and in lowest position, call light and bedside table are within reach, treaded slipper socks are on, and hourly rounding is in place.

## 2019-07-14 NOTE — PROGRESS NOTES
[facility] Medicine Daily Progress Note    Date of Service  7/14/2019    Chief Complaint  Malaise    Hospital Course   Mr. Morataya is an 86 y/o very malnourished male who was admitted to the hospital on 3/19/19 under the [employer] service for malaise. A head CT was negative for acute abnormalities, his labs were suggestive of BETTY and hypovolemia, and his vital signs were unremarkable outside of having hypoxia that resolved with 0.5 L/min of O2 via nasal cannula. His home lisinopril was subsequently discontinued and he was given IV fluids. His medical issues eventually stabilized, but due to lack of involvement from family (outside of his wife who was admitted at the same time) and the inability to care for themselves, guardianship has been pursued and placement is pending. He was transferred to the [facility] on 6/19/19.      Interval Problem Update  7/14 - no acute events. Slept most of morning. Offers no complaints.    7/11 -slept most of the morning. Mentation at his baseline. No behavioral outbursts.    Consultants/Specialty  -Palliative Care - signed off  -Ethics - signed off    Code Status  DNAR/DNI    Disposition  Guardianship Hearing 7/19/19 then will persue GH versus SNF placement.     Review of Systems  Review of Systems   Unable to perform ROS: Dementia        Physical Exam  Temp:  [36.2 °C (97.2 °F)-37.1 °C (98.8 °F)] 36.3 °C (97.3 °F)  Pulse:  [67-70] 68  Resp:  [15-18] 18  BP: ()/(50-58) 119/58  SpO2:  [92 %-100 %] 99 %    Physical Exam   Constitutional: Vital signs are normal. He appears cachectic. He is active and cooperative. He appears ill (chronically ill-appearing). No distress.   Thin/frail   HENT:   Head: Normocephalic.   Nose: Nose normal.   Mouth/Throat: Oropharynx is clear and moist and mucous membranes are normal.   Eyes: Conjunctivae are normal. No scleral icterus.   Neck: Normal range of motion.   Cardiovascular: Normal rate and intact distal pulses.  Exam reveals no  friction rub.    Murmur heard.  No edema   Pulmonary/Chest: Effort normal and breath sounds normal. He has no wheezes. He has no rhonchi.   Abdominal: Soft. Bowel sounds are normal.   Musculoskeletal: Normal range of motion.   Generalized weakness.  Ambulatory with FWW   Neurological: He is alert. He is disoriented.        Skin: Skin is warm and dry. No rash noted.   Psychiatric: He has a normal mood and affect. His behavior is normal. Cognition and memory are impaired. He expresses impulsivity. He exhibits abnormal recent memory and abnormal remote memory.   Nursing note and vitals reviewed.      Fluids    Intake/Output Summary (Last 24 hours) at 07/14/19 1012  Last data filed at 07/14/19 0958   Gross per 24 hour   Intake             1000 ml   Output                0 ml   Net             1000 ml       Laboratory                        Imaging  CT-HEAD W/O   Final Result         NO ACUTE ABNORMALITIES ARE NOTED ON CT SCAN OF THE HEAD.      Findings are consistent with atrophy.  Decreased attenuation in the periventricular white matter likely indicates microvascular ischemic disease.      DX-HIP-BILATERAL-WITH PELVIS-2 VIEWS   Final Result      No pelvic or proximal femoral fracture identified      EC-ECHOCARDIOGRAM COMPLETE W/O CONT   Final Result      DX-CHEST-PORTABLE (1 VIEW)   Final Result      No evidence of acute cardiopulmonary process.      CT-HEAD W/O   Final Result      No acute intracranial abnormality is identified.      There are periventricular and subcortical white matter changes present.  This finding is nonspecific and could be from previous small vessel ischemia, demyelination, or gliosis.      Atrophy      Paranasal sinus disease.              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  Unable to care for self, has had progressive decline per wife.  Ethics and palliative care evaluated the patient.  All in agreement that he will need guardianship and placement, which are still pending.   Guardianship hearing 7/19.     Age-related physical debility- (present on admission)   Assessment & Plan    -PT/OT recommending SNF.   -Guardianship hearing scheduled 7/19  -Encourage out of bed daily.   -Ambulate with FWW.      Cardiac pacemaker in situ- (present on admission)   Assessment & Plan    -Secondary to sick sinus syndrome.      S/P TAVR (transcatheter aortic valve replacement)- (present on admission)   Assessment & Plan    -Continue aspirin & statin.     Essential hypertension- (present on admission)   Assessment & Plan    Well-controlled on current regimen.   Continue metoprolol and lisinopril.     Dyslipidemia- (present on admission)   Assessment & Plan    Continue atorvastatin.     Fall- (present on admission)   Assessment & Plan    Per chart review, seems to have fallen multiple times.   Continue fall precautions.   Continue to encourage mobilization with the nursing staff.   Ambulates safely with FWW and assistance.      Dysphagia- (present on admission)   Assessment & Plan    Found to have dysphagia, high risk for aspiration.   Advanced directive specified no feeding tube.   Speech evaluated and recommended 1:1 feeding.   Tolerating well.  Continue to monitor.      Hypothyroidism- (present on admission)   Assessment & Plan    -TSH 0.54 on 3/21/19.  -Continue levothyroxine.     Hx of CABG- (present on admission)   Assessment & Plan    -Continue ASA & statin     Gout of multiple sites- (present on admission)   Assessment & Plan    Chronic & stable.   No acute flare.   Continue allopurinol at dose lower than his home dose.      PVD (peripheral vascular disease) (CMS-HCC)- (present on admission)   Assessment & Plan    History of prior left superficial femoral artery stent, known high grade stenosis of right common femoral artery.  Stable.      History of CAD (coronary artery disease)- (present on admission)    Overview    Dr. Wu stopped plavix in 2016 due to falls.      Assessment & Plan    -s/p 3-vessel CABG with LIMA to LAD, saphenous vein graft to obtuse marginal branch, and saphenous vein graft to the posterior descending artery by Dr. Coates in March 2018.  -Continue atorvastatin, aspirin, metoprolol, and lisinopril.     Paroxysmal atrial fibrillation (CMS-HCC)- (present on admission)   Assessment & Plan    -Xarelto discontinued by cardiology in August 2018 due to falls.   -Continue metoprolol for rate control.   -continue ASA          VTE prophylaxis: Heparin    CLIFFORD Ewing

## 2019-07-14 NOTE — PROGRESS NOTES
Resting in bed this shift, out of bed to chair for meals. Ambulating to bathroom with staff assistance, bed/chair alarm in place and functioning    Will continue to monitor

## 2019-07-15 NOTE — DISCHARGE PLANNING
Anticipated Discharge Disposition:  Assisted Living    Action: Patient has guardianship court hearing on 7/19, transport has been arranged.  Patient is cooperative with staff, takes medications. Can ambulate, but needs staff assistance.  Spends the majority of day in his bed. Will eat meals with other patients, enjoys watching movies    Barriers to Discharge: guardianship/placement    Plan: ensure patient is at court on 7/19

## 2019-07-15 NOTE — PROGRESS NOTES
I certify that Mr. Morataya requires continued medically necessary hospital services for the treatment of dementia and will remain in the hospital for 90 days. Discharge plan is anticipated to be to a group home once guardianship has been established.    KEVIN Ewing.

## 2019-07-15 NOTE — PROGRESS NOTES
Patient resting in bed today, up to chair for meals.  Meeting with  today, using  for communication- awaiting court date later this week.   Ambulating to bathroom with 1 assist with walker.   Taking all meds appropriately.  Will continue to monitor

## 2019-07-16 NOTE — PROGRESS NOTES
Up in chair at beginning of shift. Pt in pleasant mood. Medications provided and then patient wanted to get in bed for the night.  Pt now in bed, resting comfortably.

## 2019-07-16 NOTE — PROGRESS NOTES
AAOx1, self. Wife at bedside. Up to chair for breakfast at this time. Denies pain. Able to take medications floated in applesauce. Room air, satting > 90%. Bed/chair alarm in place. 1x assist w/ FWW. POC discussed, denies further needs at this time. Bed alarm on, call light within reach & hourly rounding in place.

## 2019-07-17 NOTE — PROGRESS NOTES
Patient had a short walk in the hallway with a front wheel walker, went to the family room and hear his wife playing the piano.He had a shower, appears comfortable sitting on the chair.

## 2019-07-17 NOTE — PROGRESS NOTES
A&Ox1, appears comfortable and claims he has no pain.  Night time medications crushed up and provided with pudding.  Pt ready to go to sleep after med administration.  All needs met at this time.      Bed alarm and built in bed alarm is on, in desk bed, bed is locked and in lowest position, call light and bedside table are within reach, treaded slipper socks are on, and hourly rounding is in place.

## 2019-07-17 NOTE — PROGRESS NOTES
AAOx1, self. Wife at bedside. Up to chair wearing gray striped sweater. Denies pain. Able to take medications floated in applesauce. Room air, satting > 90%. Bed/chair alarm in place. 1x assist w/ FWW. POC discussed, denies further needs at this time. Bed alarm on, call light within reach & hourly rounding in place.

## 2019-07-18 NOTE — PROGRESS NOTES
Pt wanted to head to his bed shortly after shift change.  Medications provided crushed in pudding.  A&Ox1-2.  Declines any pain and appears calm. Pt now resting in bed.     Bed alarm is on, bed is locked and in lowest position, call light and bedside table are within reach, treaded slipper socks are on, and hourly rounding is in place.

## 2019-07-18 NOTE — PROGRESS NOTES
AAOx1, self. Wife at bedside. This RN assisted with getting patient up to chair for breakfast. Denies pain. Able to take medications floated in applesauce. Room air, satting > 90%. Bed/chair alarm in place. 1x assist w/ FWW. POC discussed, denies further needs at this time. Bed alarm on, call light within reach & hourly rounding in place.

## 2019-07-18 NOTE — PROGRESS NOTES
Daily Progress Note     Mood: good mood, follow direction    Activities: up for all meals, coloring      ADLs: shower rendered, brushed teeth, linen changed

## 2019-07-19 NOTE — CARE PLAN
Problem: Safety  Goal: Will remain free from falls    Intervention: Assess risk factors for falls  Continues to have bed alarm.        Problem: Discharge Barriers/Planning  Goal: Patient's continuum of care needs will be met    Intervention: Collaborate with Transitional Care Team and Interdisciplinary Team to meet discharge needs  Guardianship court 7/19

## 2019-07-19 NOTE — PROGRESS NOTES
From about 2130, pt tried to get up multiple times.  CNA brought pt out to nurse's station.  Pt had ice cream and was coloring.  However, wife came out to nurse's station couple times and asked pt to go to bed.  Pt assisted back to bed after sitting at nurse's station for about 45 minutes.  Has bed alarm on.

## 2019-07-19 NOTE — DISCHARGE PLANNING
Medical Social Work  SW informed that patient has been assigned a guardian , Aliza   Also informed that Pradip Nava and Meño Sultana can visit with the patient.

## 2019-07-20 NOTE — PROGRESS NOTES
Pt sleeping at this time.  Pt hardly slept last night.  Will try to give night medications when he wakes up.

## 2019-07-20 NOTE — PROGRESS NOTES
[facility] Medicine Daily Progress Note    Date of Service  7/19/2019    Chief Complaint  Malaise    Hospital Course   Mr. Morataya is an 86 y/o very malnourished male who was admitted to the hospital on 3/19/19 under the [facility] service for malaise. A head CT was negative for acute abnormalities, his labs were suggestive of BETTY and hypovolemia, and his vital signs were unremarkable outside of having hypoxia that resolved with 0.5 L/min of O2 via nasal cannula. His home lisinopril was subsequently discontinued and he was given IV fluids. His medical issues eventually stabilized, but due to lack of involvement from family (outside of his wife who was admitted at the same time) and the inability to care for themselves, guardianship has been pursued and placement is pending. He was transferred to the [facility] on 6/19/19.      Interval Problem Update  7/19- Patient resting in bed, very tired from his outing this am to the court house for guardianship hearing. Patient was granted guardian and will hopefully be able to proceed with finding placement. Patient states no needs, just very tired.     Consultants/Specialty  -Palliative Care - signed off  -Ethics - signed off    Code Status  DNAR/DNI    Disposition  Guardianship Hearing 7/19/19 granted, will persue GH versus SNF placement.     Review of Systems  Review of Systems   Unable to perform ROS: Dementia        Physical Exam  Temp:  [36.3 °C (97.3 °F)-37.1 °C (98.8 °F)] 37.1 °C (98.8 °F)  Pulse:  [60-62] 60  Resp:  [16] 16  BP: ()/(41-75) 93/41  SpO2:  [96 %-98 %] 96 %    Physical Exam   Constitutional: Vital signs are normal. He appears cachectic. He is active and cooperative. He appears ill (chronically ill-appearing). No distress.   Thin/frail   HENT:   Head: Normocephalic.   Nose: Nose normal.   Mouth/Throat: Oropharynx is clear and moist and mucous membranes are normal.   Eyes: Conjunctivae are normal. No scleral icterus.   Neck: Normal range  of motion.   Cardiovascular: Normal rate and intact distal pulses.  Exam reveals no friction rub.    Murmur heard.  No edema   Pulmonary/Chest: Effort normal and breath sounds normal. He has no wheezes. He has no rhonchi.   Abdominal: Soft. Bowel sounds are normal.   Musculoskeletal: Normal range of motion.   Generalized weakness.  Ambulatory with FWW   Neurological: He is alert. He is disoriented.        Skin: Skin is warm and dry. No rash noted.   Psychiatric: He has a normal mood and affect. His behavior is normal. Cognition and memory are impaired. He expresses impulsivity. He exhibits abnormal recent memory and abnormal remote memory.   Nursing note and vitals reviewed.  Exam performed and is unchanged since 7/14    Fluids    Intake/Output Summary (Last 24 hours) at 07/19/19 1950  Last data filed at 07/19/19 1900   Gross per 24 hour   Intake              890 ml   Output                0 ml   Net              890 ml       Laboratory                        Imaging  CT-HEAD W/O   Final Result         NO ACUTE ABNORMALITIES ARE NOTED ON CT SCAN OF THE HEAD.      Findings are consistent with atrophy.  Decreased attenuation in the periventricular white matter likely indicates microvascular ischemic disease.      DX-HIP-BILATERAL-WITH PELVIS-2 VIEWS   Final Result      No pelvic or proximal femoral fracture identified      EC-ECHOCARDIOGRAM COMPLETE W/O CONT   Final Result      DX-CHEST-PORTABLE (1 VIEW)   Final Result      No evidence of acute cardiopulmonary process.      CT-HEAD W/O   Final Result      No acute intracranial abnormality is identified.      There are periventricular and subcortical white matter changes present.  This finding is nonspecific and could be from previous small vessel ischemia, demyelination, or gliosis.      Atrophy      Paranasal sinus disease.              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  Unable to care for self, has had progressive decline per wife.  Ethics and palliative care evaluated the patient. All in agreement that he will need guardianship and placement, which are still pending.   Guardianship hearing 7/19, patient now has guardian.     Age-related physical debility- (present on admission)   Assessment & Plan    -PT/OT recommending SNF.   -Guardianship hearing  7/19, granted guardian   -Encourage out of bed daily.   -Ambulate with FWW.      Cardiac pacemaker in situ- (present on admission)   Assessment & Plan    -Secondary to sick sinus syndrome.      S/P TAVR (transcatheter aortic valve replacement)- (present on admission)   Assessment & Plan    -Continue aspirin & statin.     Essential hypertension- (present on admission)   Assessment & Plan    Well-controlled on current regimen.   Continue metoprolol and lisinopril.     Dyslipidemia- (present on admission)   Assessment & Plan    Continue atorvastatin.     Fall- (present on admission)   Assessment & Plan    Per chart review, seems to have fallen multiple times.   Continue fall precautions.   Continue to encourage mobilization with the nursing staff.   Ambulates safely with FWW and assistance.      Dysphagia- (present on admission)   Assessment & Plan    Found to have dysphagia, high risk for aspiration.   Advanced directive specified no feeding tube.   Speech evaluated and recommended 1:1 feeding.   Tolerating well.  Continue to monitor.      Hypothyroidism- (present on admission)   Assessment & Plan    -TSH 0.54 on 3/21/19.  -Continue levothyroxine.     Hx of CABG- (present on admission)   Assessment & Plan    -Continue ASA & statin     Gout of multiple sites- (present on admission)   Assessment & Plan    Chronic & stable.   No acute flare.   Continue allopurinol at dose lower than his home dose.      PVD (peripheral vascular disease) (CMS-HCC)- (present on admission)   Assessment & Plan    History of prior left superficial  femoral artery stent, known high grade stenosis of right common femoral artery.  Stable.      History of CAD (coronary artery disease)- (present on admission)   Overview    Dr. Wu stopped plavix in 2016 due to falls.      Assessment & Plan    -s/p 3-vessel CABG with LIMA to LAD, saphenous vein graft to obtuse marginal branch, and saphenous vein graft to the posterior descending artery by Dr. Coates in March 2018.  -Continue atorvastatin, aspirin, metoprolol, and lisinopril.     Paroxysmal atrial fibrillation (CMS-HCC)- (present on admission)   Assessment & Plan    -Xarelto discontinued by cardiology in August 2018 due to falls.   -Continue metoprolol for rate control.   -continue ASA          VTE prophylaxis: Heparin    Rachel Perez A.P.R.N.

## 2019-07-20 NOTE — PROGRESS NOTES
Assumed care of patient at change of shift.  During change of shift report, patient (pt) resting in bed, on room air, with bed alarm in place.  Pt tolerated trip to court well, pt did try to mobilize multiple times without first calling for assisstance.

## 2019-07-20 NOTE — PROGRESS NOTES
Assumed care of pt. Unable to assess orientation as pt does not answer questions. Appears comfortable this morning. Medications given per MAR. Sat up in chair for morning snack and back to bed with 1x. Bed alarm in place and working. All other needs met at this time. Safety precautions and hourly rounding in place.

## 2019-07-20 NOTE — PROGRESS NOTES
Woke up around 2145.  CNA took him to bathroom and assisted back to bed.  However, he continues to try to get up.  Assisted out to nurse's station and coloring at this time.

## 2019-07-21 NOTE — PROGRESS NOTES
Patient on bed resting. Alert and oriented x 1. Assessment completed. Denies any pain and discomfort at this time. Bed alarm in place and functioning. Room near nursing station.    Bed locked, in lowest position, treaded socks on. Needs attended. No further needs at this time. Communication board updated. Call light and personal belongings within reach.

## 2019-07-21 NOTE — PROGRESS NOTES
Assumed care of patient at change of shift.  During change of shift report, patient (pt) sleeping in bed, on room air, with bed alarm in place and wife as roomate.  During assessment, pt A&Ox1, only oriented to self.  Pt calm today, and no negative interactions noted between this pt and his wife.  No other needs at this time.

## 2019-07-21 NOTE — PROGRESS NOTES
[facility] Medicine Daily Progress Note    Date of Service  7/21/2019    Chief Complaint  Malaise    Hospital Course   Mr. Morataya is an 86 y/o very malnourished male who was admitted to the hospital on 3/19/19 under the [facility] service for malaise. A head CT was negative for acute abnormalities, his labs were suggestive of BETTY and hypovolemia, and his vital signs were unremarkable outside of having hypoxia that resolved with 0.5 L/min of O2 via nasal cannula. His home lisinopril was subsequently discontinued and he was given IV fluids. His medical issues eventually stabilized, but due to lack of involvement from family (outside of his wife who was admitted at the same time) and the inability to care for themselves, guardianship has been pursued and placement is pending. [facility]      Interval Problem Update  7/19- Patient resting in bed, very tired from his outing this am to the court house for guardianship hearing. Patient was granted guardian and will hopefully be able to proceed with finding placement. Patient states no needs, just very tired.     7/21- Patient resting in bed, ambulated with assistance to bathroom. No needs at this time. No acute events overnight. Will discuss discharge plan with SW/CM tomorrow.     Consultants/Specialty  -Palliative Care - signed off  -Ethics - signed off    Code Status  DNAR/DNI    Disposition  Guardianship Hearing 7/19/19 granted, will persue GH versus SNF placement.     Review of Systems  Review of Systems   Unable to perform ROS: Dementia        Physical Exam  Temp:  [36.3 °C (97.4 °F)-36.6 °C (97.8 °F)] 36.3 °C (97.4 °F)  Pulse:  [60-78] 66  Resp:  [17-18] 17  BP: (102-141)/(54-81) 141/73  SpO2:  [93 %-98 %] 93 %    Physical Exam   Constitutional: Vital signs are normal. He appears cachectic. He is active and cooperative. He appears ill (chronically ill-appearing). No distress.   Thin/frail   HENT:   Head:  Normocephalic.   Nose: Nose normal.   Mouth/Throat: Oropharynx is clear and moist and mucous membranes are normal.   Eyes: Conjunctivae are normal. No scleral icterus.   Neck: Normal range of motion.   Cardiovascular: Normal rate and intact distal pulses.  Exam reveals no friction rub.    Murmur heard.  No edema   Pulmonary/Chest: Effort normal and breath sounds normal. He has no wheezes. He has no rhonchi.   Abdominal: Soft. Bowel sounds are normal.   Musculoskeletal: Normal range of motion.   Generalized weakness.  Ambulatory with FWW   Neurological: He is alert. He is disoriented.        Skin: Skin is warm and dry. No rash noted.   Psychiatric: He has a normal mood and affect. His behavior is normal. Cognition and memory are impaired. He expresses impulsivity. He exhibits abnormal recent memory and abnormal remote memory.   Nursing note and vitals reviewed.  Exam performed and is unchanged since 7/19    Fluids    Intake/Output Summary (Last 24 hours) at 07/21/19 1042  Last data filed at 07/21/19 1000   Gross per 24 hour   Intake             1140 ml   Output                0 ml   Net             1140 ml       Laboratory                        Imaging  CT-HEAD W/O   Final Result         NO ACUTE ABNORMALITIES ARE NOTED ON CT SCAN OF THE HEAD.      Findings are consistent with atrophy.  Decreased attenuation in the periventricular white matter likely indicates microvascular ischemic disease.      DX-HIP-BILATERAL-WITH PELVIS-2 VIEWS   Final Result      No pelvic or proximal femoral fracture identified      EC-ECHOCARDIOGRAM COMPLETE W/O CONT   Final Result      DX-CHEST-PORTABLE (1 VIEW)   Final Result      No evidence of acute cardiopulmonary process.      CT-HEAD W/O   Final Result      No acute intracranial abnormality is identified.      There are periventricular and subcortical white matter changes present.  This finding is nonspecific and could be from previous small vessel ischemia, demyelination, or gliosis.       Atrophy      Paranasal sinus disease.              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  Unable to care for self, has had progressive decline per wife.  Ethics and palliative care evaluated the patient. All in agreement that he will need guardianship and placement, which are still pending.   Guardianship hearing 7/19, patient now has guardian.     Age-related physical debility- (present on admission)   Assessment & Plan    -PT/OT recommending SNF.   -Guardianship hearing  7/19, granted guardian   -Encourage out of bed daily.   -Ambulate with FWW.      Cardiac pacemaker in situ- (present on admission)   Assessment & Plan    -Secondary to sick sinus syndrome.      S/P TAVR (transcatheter aortic valve replacement)- (present on admission)   Assessment & Plan    -Continue aspirin & statin.     Essential hypertension- (present on admission)   Assessment & Plan    Well-controlled on current regimen.   Continue metoprolol and lisinopril.     Dyslipidemia- (present on admission)   Assessment & Plan    Continue atorvastatin.     Fall- (present on admission)   Assessment & Plan    Per chart review, seems to have fallen multiple times.   Continue fall precautions.   Continue to encourage mobilization with the nursing staff.   Ambulates safely with FWW and assistance.      Dysphagia- (present on admission)   Assessment & Plan    Found to have dysphagia, high risk for aspiration.   Advanced directive specified no feeding tube.   Speech evaluated and recommended 1:1 feeding.   Tolerating well.  Continue to monitor.      Hypothyroidism- (present on admission)   Assessment & Plan    -TSH 0.54 on 3/21/19.  -Continue levothyroxine.     Hx of CABG- (present on admission)   Assessment & Plan    -Continue ASA & statin     Gout of multiple sites- (present on admission)   Assessment & Plan    Chronic & stable.   No acute flare.    Continue allopurinol at dose lower than his home dose.      PVD (peripheral vascular disease) (CMS-[facility])- (present on admission)   Assessment & Plan    History of prior left superficial femoral artery stent, known high grade stenosis of right common femoral artery.  Stable.      History of CAD (coronary artery disease)- (present on admission)   Overview    Dr. Wu stopped plavix in 2016 due to falls.      Assessment & Plan    -s/p 3-vessel CABG with LIMA to LAD, saphenous vein graft to obtuse marginal branch, and saphenous vein graft to the posterior descending artery by Dr. Coates in March 2018.  -Continue atorvastatin, aspirin, metoprolol, and lisinopril.     Paroxysmal atrial fibrillation (CMS-HCC)- (present on admission)   Assessment & Plan    -Xarelto discontinued by cardiology in August 2018 due to falls.   -Continue metoprolol for rate control.   -continue ASA          VTE prophylaxis: Heparin    LILA LintonPPawelRPawelN.

## 2019-07-21 NOTE — CARE PLAN
Problem: Safety  Goal: Will remain free from injury  Outcome: PROGRESSING AS EXPECTED  Safety precautions in place. Non skid socks on. Bed alarm in place and functioning. Room near nursing station. Call light within reach. Bi hourly rounding in place.    Problem: Discharge Barriers/Planning  Goal: Patient's continuum of care needs will be met  Outcome: PROGRESSING SLOWER THAN EXPECTED  Patient awaiting placement. SW assisting.

## 2019-07-22 NOTE — PROGRESS NOTES
Pt alert and sitting up to chair for meals and as tolerated. Adequate appetite, and denies pain. VSS and no change in clinical status.

## 2019-07-22 NOTE — PROGRESS NOTES
Pt is AAOx1, family at bedside. Assessment completed. Pt denies. Pt ambulates requires assistance of 1 with FWW. Pt educated regarding plan of care. Call light and personal belongings in reach. No additional needs at this time. Bed alarm in use. Room near nurse station.

## 2019-07-23 NOTE — DISCHARGE PLANNING
Anticipated Discharge Disposition: Assisted Living    Action: Patient was granted a guardian through [facility].      Barriers to Discharge: placement and cost of care    Plan: follow up with guardian .

## 2019-07-23 NOTE — PROGRESS NOTES
Pt up alert, pleasant, up for meals and remains in chair as tolerated. No c/o pain, tolerating diet with good appetite. VSS and no change in clinical status.

## 2019-07-23 NOTE — CARE PLAN
Problem: Discharge Barriers/Planning  Goal: Patient's continuum of care needs will be met  Outcome: PROGRESSING SLOWER THAN EXPECTED  Awaiting placement. SW assisting.

## 2019-07-23 NOTE — PROGRESS NOTES
Patient sitting on chair in room. Denies pain. Scheduled medications given.    No additional needs at this time. Call light within reach. Bed alarm in place.

## 2019-07-24 NOTE — PROGRESS NOTES
Patient on sitting on chair. Denies pain. Scheduled medications given. Assisted back to bed by assigned CNA.    No additional needs at this time. Call light within reach. Left on bed resting comfortably. Bed alarm in place. Room near nurse station.

## 2019-07-24 NOTE — PROGRESS NOTES
Pt up alert, pleasant, up for meals and remains in chair as tolerated. No c/o pain, tolerating diet with good appetite. VSS and no change in clinical status.  Showered this morning.  This RN discussed with pt's guardian, level of needs and functionality.

## 2019-07-24 NOTE — CARE PLAN
Problem: Safety  Goal: Will remain free from injury  Outcome: PROGRESSING AS EXPECTED  Safety precautions in place. Non skid socks on. Bed alarm in place and functioning. Room near nurse station. Call light within reach. Bi hourly rounding in place.    Problem: Discharge Barriers/Planning  Goal: Patient's continuum of care needs will be met  Outcome: PROGRESSING SLOWER THAN EXPECTED  Awaiting placement. SW assisting.

## 2019-07-25 NOTE — CARE PLAN
Problem: Safety  Goal: Will remain free from injury  Outcome: PROGRESSING AS EXPECTED  Safety precautions in place. Non skid socks on. Bed alarm in place and functioning. Room near nursing station. Call light within reach. Bi hourly rounding in place.    Problem: Discharge Barriers/Planning  Goal: Patient's continuum of care needs will be met  Outcome: PROGRESSING SLOWER THAN EXPECTED  Awaiting placement. SW assisting.

## 2019-07-25 NOTE — PROGRESS NOTES
Patient back in bed. Denies pain. Due medications given. Needs attended. No additional needs at this time. Call light within reach. Left on bed resting comfortably. Bed alarm in place. Room near nurse station.

## 2019-07-25 NOTE — PROGRESS NOTES
Bedside report received. Pt is A&Ox1 to self only. Pt is up out of bed sitting in chair with lap belt on. Pt was able to eat 100% of breakfast with 1:1: feed. Pt is a darin fall risk. Fall precautions in place. POC discussed with pt; all questions answered at this time.

## 2019-07-25 NOTE — PROGRESS NOTES
[facility] Medicine Daily Progress Note    Date of Service  7/25/2019    Chief Complaint  Malaise    Hospital Course   Mr. Morataya is an 84 y/o very malnourished male who was admitted to the hospital on 3/19/19 under the [facility] service for malaise. A head CT was negative for acute abnormalities, his labs were suggestive of BETTY and hypovolemia, and his vital signs were unremarkable outside of having hypoxia that resolved with 0.5 L/min of O2 via nasal cannula. His home lisinopril was subsequently discontinued and he was given IV fluids. His medical issues eventually stabilized, but due to lack of involvement from family (outside of his wife who was admitted at the same time) and the inability to care for themselves, guardianship has been pursued and placement is pending. [facility].      Interval Problem Update  7/25:  Up to chair eating a snack on assessment.  At baseline mentation.  No acute discomfort.  Wife at bedside assisting.    Consultants/Specialty  -Palliative Care - signed off  -Ethics - signed off    Code Status  DNAR/DNI    Disposition  Guardianship Hearing 7/19/19 granted, will persue GH versus SNF placement.     Review of Systems  Review of Systems   Unable to perform ROS: Dementia        Physical Exam  Temp:  [36.2 °C (97.1 °F)-37.1 °C (98.8 °F)] 36.7 °C (98.1 °F)  Pulse:  [60-72] 72  Resp:  [16-18] 18  BP: (111-140)/(47-91) 140/91  SpO2:  [95 %] 95 %    Physical Exam   Constitutional: Vital signs are normal. He appears cachectic. He is active and cooperative. He appears ill (chronically ill-appearing). No distress.   Thin/frail   HENT:   Head: Normocephalic and atraumatic.   Nose: Nose normal.   Mouth/Throat: Oropharynx is clear and moist and mucous membranes are normal. No oropharyngeal exudate.   Eyes: Conjunctivae are normal. Right eye exhibits no discharge. Left eye exhibits no discharge.   Neck: Normal range of motion. No tracheal deviation  present.   Cardiovascular: Normal rate and intact distal pulses.    Murmur heard.  Pulmonary/Chest: Effort normal and breath sounds normal. No stridor. No respiratory distress. He has no wheezes. He has no rhonchi.   Abdominal: Soft. Bowel sounds are normal. He exhibits no distension.   Musculoskeletal: Normal range of motion. He exhibits no edema.   Generalized weakness.     Neurological: He is alert. He is disoriented. No cranial nerve deficit.        Skin: Skin is warm and dry. No rash noted. He is not diaphoretic. No erythema.   Psychiatric: He has a normal mood and affect. His behavior is normal. Cognition and memory are impaired. He expresses impulsivity. He exhibits abnormal recent memory and abnormal remote memory.   Nursing note and vitals reviewed.  Exam performed and is unchanged since 7/19    Fluids    Intake/Output Summary (Last 24 hours) at 07/25/19 1452  Last data filed at 07/25/19 1323   Gross per 24 hour   Intake             1080 ml   Output                0 ml   Net             1080 ml       Laboratory      Recent Labs      07/25/19   1243   SODIUM  135   POTASSIUM  4.2   CHLORIDE  102   CO2  26   GLUCOSE  118*   BUN  30*   CREATININE  1.13   CALCIUM  10.2                   Imaging  CT-HEAD W/O   Final Result         NO ACUTE ABNORMALITIES ARE NOTED ON CT SCAN OF THE HEAD.      Findings are consistent with atrophy.  Decreased attenuation in the periventricular white matter likely indicates microvascular ischemic disease.      DX-HIP-BILATERAL-WITH PELVIS-2 VIEWS   Final Result      No pelvic or proximal femoral fracture identified      EC-ECHOCARDIOGRAM COMPLETE W/O CONT   Final Result      DX-CHEST-PORTABLE (1 VIEW)   Final Result      No evidence of acute cardiopulmonary process.      CT-HEAD W/O   Final Result      No acute intracranial abnormality is identified.      There are periventricular and subcortical white matter changes present.  This finding is nonspecific and could be from previous  small vessel ischemia, demyelination, or gliosis.      Atrophy      Paranasal sinus disease.              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   No behavioral disturbance.  Unable to care for self, has had progressive decline per wife.  Ethics and palliative care evaluated the patient.   Guardianship hearing 7/19, patient now has guardian.  Will need 24/7 supervision at discharge.     Age-related physical debility- (present on admission)   Assessment & Plan    -At baseline mobility per PT/OT  -Encourage out of bed daily.   -Ambulate with FWW.      Cardiac pacemaker in situ- (present on admission)   Assessment & Plan    -Secondary to sick sinus syndrome.      S/P TAVR (transcatheter aortic valve replacement)- (present on admission)   Assessment & Plan    -Continue aspirin & statin.     Essential hypertension- (present on admission)   Assessment & Plan    Well-controlled on current regimen.   Continue metoprolol and lisinopril.     Dyslipidemia- (present on admission)   Assessment & Plan    Continue atorvastatin.     Fall- (present on admission)   Assessment & Plan    Per chart review, seems to have fallen multiple times.   Continue fall precautions.   Continue to encourage mobilization with the nursing staff.   Ambulates safely with FWW and assistance.      Dysphagia- (present on admission)   Assessment & Plan    Found to have dysphagia, high risk for aspiration.   Advanced directive specified no feeding tube.   Speech evaluated and recommended 1:1 feeding.   Tolerating well.  Continue to monitor.      Hypothyroidism- (present on admission)   Assessment & Plan    -TSH 0.54 on 3/21/19.  -Continue levothyroxine.     Hx of CABG- (present on admission)   Assessment & Plan    -Continue ASA & statin     Gout of multiple sites- (present on admission)   Assessment & Plan    Chronic & stable.   No acute flare.   Continue allopurinol at  dose lower than his home dose.      PVD (peripheral vascular disease) (CMS-HCC)- (present on admission)   Assessment & Plan    History of prior left superficial femoral artery stent, known high grade stenosis of right common femoral artery.  Stable.      History of CAD (coronary artery disease)- (present on admission)   Overview    Dr. Wu stopped plavix in 2016 due to falls.      Assessment & Plan    -s/p 3-vessel CABG with LIMA to LAD, saphenous vein graft to obtuse marginal branch, and saphenous vein graft to the posterior descending artery by Dr. Coates in March 2018.  -Continue atorvastatin, aspirin, metoprolol, and lisinopril.     Paroxysmal atrial fibrillation (CMS-[facility])- (present on admission)   Assessment & Plan    -Xarelto discontinued by cardiology in August 2018 due to falls.   -Continue metoprolol for rate control.   -continue ASA          VTE prophylaxis: Heparin    KEVIN Zepeda.

## 2019-07-25 NOTE — PROGRESS NOTES
[facility] Medicine Daily Progress Note    Date of Service  7/25/2019    Chief Complaint  Malaise    Hospital Course   Mr. Morataya is an 84 y/o very malnourished male who was admitted to the hospital on 3/19/19 under the [facility] service for malaise. A head CT was negative for acute abnormalities, his labs were suggestive of BETTY and hypovolemia, and his vital signs were unremarkable outside of having hypoxia that resolved with 0.5 L/min of O2 via nasal cannula. His home lisinopril was subsequently discontinued and he was given IV fluids. His medical issues eventually stabilized, but due to lack of involvement from family (outside of his wife who was admitted at the same time) and the inability to care for themselves, guardianship has been pursued and placement is pending. [facility].      Interval Problem Update  7/25:  Up to chair eating a snack on assessment.  At baseline mentation.  No acute discomfort.  Wife at bedside assisting.    Consultants/Specialty  -Palliative Care - signed off  -Ethics - signed off    Code Status  DNAR/DNI    Disposition  Guardianship Hearing 7/19/19 granted, will persue GH versus SNF placement.     Review of Systems  Review of Systems   Unable to perform ROS: Dementia        Physical Exam  Temp:  [36.2 °C (97.1 °F)-37.1 °C (98.8 °F)] 36.7 °C (98.1 °F)  Pulse:  [60-72] 72  Resp:  [16-18] 18  BP: (111-140)/(47-91) 140/91  SpO2:  [95 %] 95 %    Physical Exam   Constitutional: Vital signs are normal. He appears cachectic. He is active and cooperative. He appears ill (chronically ill-appearing). No distress.   Thin/frail   HENT:   Head: Normocephalic and atraumatic.   Nose: Nose normal.   Mouth/Throat: Oropharynx is clear and moist and mucous membranes are normal. No oropharyngeal exudate.   Eyes: Conjunctivae are normal. Right eye exhibits no discharge. Left eye exhibits no discharge.   Neck: Normal range of motion. No tracheal deviation  present.   Cardiovascular: Normal rate and intact distal pulses.    Murmur heard.  Pulmonary/Chest: Effort normal and breath sounds normal. No stridor. No respiratory distress. He has no wheezes. He has no rhonchi.   Abdominal: Soft. Bowel sounds are normal. He exhibits no distension.   Musculoskeletal: Normal range of motion. He exhibits no edema.   Generalized weakness.     Neurological: He is alert. He is disoriented. No cranial nerve deficit.        Skin: Skin is warm and dry. No rash noted. He is not diaphoretic. No erythema.   Psychiatric: He has a normal mood and affect. His behavior is normal. Cognition and memory are impaired. He expresses impulsivity. He exhibits abnormal recent memory and abnormal remote memory.   Nursing note and vitals reviewed.  Exam performed and is unchanged since 7/19    Fluids    Intake/Output Summary (Last 24 hours) at 07/25/19 1419  Last data filed at 07/25/19 1300   Gross per 24 hour   Intake              840 ml   Output                0 ml   Net              840 ml       Laboratory      Recent Labs      07/25/19   1243   SODIUM  135   POTASSIUM  4.2   CHLORIDE  102   CO2  26   GLUCOSE  118*   BUN  30*   CREATININE  1.13   CALCIUM  10.2                   Imaging  CT-HEAD W/O   Final Result         NO ACUTE ABNORMALITIES ARE NOTED ON CT SCAN OF THE HEAD.      Findings are consistent with atrophy.  Decreased attenuation in the periventricular white matter likely indicates microvascular ischemic disease.      DX-HIP-BILATERAL-WITH PELVIS-2 VIEWS   Final Result      No pelvic or proximal femoral fracture identified      EC-ECHOCARDIOGRAM COMPLETE W/O CONT   Final Result      DX-CHEST-PORTABLE (1 VIEW)   Final Result      No evidence of acute cardiopulmonary process.      CT-HEAD W/O   Final Result      No acute intracranial abnormality is identified.      There are periventricular and subcortical white matter changes present.  This finding is nonspecific and could be from previous  small vessel ischemia, demyelination, or gliosis.      Atrophy      Paranasal sinus disease.              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   No behavioral disturbance.  Unable to care for self, has had progressive decline per wife.  Ethics and palliative care evaluated the patient.   Guardianship hearing 7/19, patient now has guardian.  Will need 24/7 supervision at discharge.     Age-related physical debility- (present on admission)   Assessment & Plan    -At baseline mobility per PT/OT  -Encourage out of bed daily.   -Ambulate with FWW.      Cardiac pacemaker in situ- (present on admission)   Assessment & Plan    -Secondary to sick sinus syndrome.      S/P TAVR (transcatheter aortic valve replacement)- (present on admission)   Assessment & Plan    -Continue aspirin & statin.     Essential hypertension- (present on admission)   Assessment & Plan    Well-controlled on current regimen.   Continue metoprolol and lisinopril.     Dyslipidemia- (present on admission)   Assessment & Plan    Continue atorvastatin.     Fall- (present on admission)   Assessment & Plan    Per chart review, seems to have fallen multiple times.   Continue fall precautions.   Continue to encourage mobilization with the nursing staff.   Ambulates safely with FWW and assistance.      Dysphagia- (present on admission)   Assessment & Plan    Found to have dysphagia, high risk for aspiration.   Advanced directive specified no feeding tube.   Speech evaluated and recommended 1:1 feeding.   Tolerating well.  Continue to monitor.      Hypothyroidism- (present on admission)   Assessment & Plan    -TSH 0.54 on 3/21/19.  -Continue levothyroxine.     Hx of CABG- (present on admission)   Assessment & Plan    -Continue ASA & statin     Gout of multiple sites- (present on admission)   Assessment & Plan    Chronic & stable.   No acute flare.   Continue allopurinol at  dose lower than his home dose.      PVD (peripheral vascular disease) (CMS-HCC)- (present on admission)   Assessment & Plan    History of prior left superficial femoral artery stent, known high grade stenosis of right common femoral artery.  Stable.      History of CAD (coronary artery disease)- (present on admission)   Overview    Dr. Wu stopped plavix in 2016 due to falls.      Assessment & Plan    -s/p 3-vessel CABG with LIMA to LAD, saphenous vein graft to obtuse marginal branch, and saphenous vein graft to the posterior descending artery by Dr. Coates in March 2018.  -Continue atorvastatin, aspirin, metoprolol, and lisinopril.     Paroxysmal atrial fibrillation (CMS-[facility])- (present on admission)   Assessment & Plan    -Xarelto discontinued by cardiology in August 2018 due to falls.   -Continue metoprolol for rate control.   -continue ASA          VTE prophylaxis: Heparin    KEVIN Zepeda.

## 2019-07-26 NOTE — PROGRESS NOTES
Patient back to bed. Denies pain. Due medications given. Needs attended. Fluids offered. Needs attended. No additional needs at this time. Call light within reach. Left on bed resting comfortably. Bed alarm in place. Room near nursing station.

## 2019-07-26 NOTE — PROGRESS NOTES
Pt alert to self. Bed alarm in use, pt room close to nurses station. Fall precautions in place. Pt and pt wife educated on importance of calling for assistance when needed.

## 2019-07-27 NOTE — CARE PLAN
Problem: Safety  Goal: Will remain free from injury  Outcome: PROGRESSING AS EXPECTED  Fall precautions in place. Treaded socks on pt. Pt's room up close to nurses station for safety. Bedrails up. Bed in lowest position and locked.  Call light and phone within reach. Bed alarm on.    Problem: Venous Thromboembolism (VTW)/Deep Vein Thrombosis (DVT) Prevention:  Goal: Patient will participate in Venous Thrombosis (VTE)/Deep Vein Thrombosis (DVT)Prevention Measures  Outcome: PROGRESSING AS EXPECTED    07/26/19 2000   OTHER   Risk Assessment Score  1    VTE RISK  Moderate   Pharmacologic Prophylaxis Used  Unfractionated Heparin

## 2019-07-27 NOTE — PROGRESS NOTES
Pt alert to self only. Wife in room. Pt and wife educated on plan of care. Safety and importance of calling for help is enforced often. All needs met at this time.

## 2019-07-27 NOTE — PALLIATIVE CARE
Palliative Care follow-up  PC following pt since 3/10/19 with intermittent monitoring in place d/t complicated discharge plan. PC will continue to monitor, but please call for any change in condition or if other palliative needs are identified.    Plan: continue to monitor intermittently    Thank you for allowing Palliative Care to participate in this patient's care. Please feel free to call [phone] with any questions or concerns.

## 2019-07-27 NOTE — PROGRESS NOTES
Report received by amrita RN. Assumed care of pt. Assessment complete. Wife at bedside. Pt A&Ox1, to self, VSS.  Pt in no apparent signs of distress. Pt dressed up for dinner. Call light within reach, bed in lowest position, and pt has no further questions at this time. Hourly rounding in place.

## 2019-07-28 NOTE — PROGRESS NOTES
[facility] Medicine Daily Progress Note    Date of Service  7/28/2019    Chief Complaint  Malaise    Hospital Course   Mr. Morataya is an 86 y/o very malnourished male who was admitted to the hospital on 3/19/19 under the [facility] service for malaise. A head CT was negative for acute abnormalities, his labs were suggestive of BETTY and hypovolemia, and his vital signs were unremarkable outside of having hypoxia that resolved with 0.5 L/min of O2 via nasal cannula. His home lisinopril was subsequently discontinued and he was given IV fluids. His medical issues eventually stabilized, but due to lack of involvement from family (outside of his wife who was admitted at the same time) and the inability to care for themselves, guardianship has been pursued and placement is pending. [facility].      Interval Problem Update  7/25:  Up to chair eating a snack on assessment.  At baseline mentation.  No acute discomfort.  Wife at bedside assisting.  7/28:  Eating breakfast on assessment.  Wife is at bedside and reports his intake her drastically increased.  Appears in pleasant mood with no acute needs.     Consultants/Specialty  -Palliative Care - signed off  -Ethics - signed off    Code Status  DNAR/DNI    Disposition  Guardianship Hearing 7/19/19 granted, will persue GH versus SNF placement.     Review of Systems  Review of Systems   Unable to perform ROS: Dementia        Physical Exam  Temp:  [36.4 °C (97.5 °F)-36.6 °C (97.8 °F)] 36.4 °C (97.6 °F)  Pulse:  [60-64] 60  Resp:  [16-18] 16  BP: (106-111)/(51-56) 111/56  SpO2:  [95 %-100 %] 100 %    Physical Exam   Constitutional: Vital signs are normal. He appears cachectic. He is active and cooperative. He appears ill (chronically ill-appearing).   Thin/frail   HENT:   Head: Normocephalic and atraumatic.   Right Ear: External ear normal.   Left Ear: External ear normal.   Mouth/Throat: Mucous membranes are normal.   Eyes:  Conjunctivae are normal. No scleral icterus.   Neck: Normal range of motion. No JVD present.   Cardiovascular: Normal rate and intact distal pulses.  Exam reveals no friction rub.    Murmur heard.  Pulmonary/Chest: Effort normal and breath sounds normal. No stridor. No respiratory distress. He has no wheezes. He has no rhonchi. He has no rales.   Abdominal: Soft. Bowel sounds are normal. He exhibits no distension. There is no tenderness. There is no rebound.   Musculoskeletal: Normal range of motion. He exhibits no edema or tenderness.   Generalized weakness.     Neurological: He is alert. He is disoriented.        Skin: Skin is warm and dry. He is not diaphoretic. No erythema.   Psychiatric: He has a normal mood and affect. His behavior is normal. Cognition and memory are impaired. He expresses impulsivity. He exhibits abnormal recent memory and abnormal remote memory.   Nursing note and vitals reviewed.  Exam performed and is unchanged since 7/19    Fluids    Intake/Output Summary (Last 24 hours) at 07/28/19 1018  Last data filed at 07/28/19 0930   Gross per 24 hour   Intake             1010 ml   Output              200 ml   Net              810 ml       Laboratory      Recent Labs      07/25/19   1243   SODIUM  135   POTASSIUM  4.2   CHLORIDE  102   CO2  26   GLUCOSE  118*   BUN  30*   CREATININE  1.13   CALCIUM  10.2                   Imaging  CT-HEAD W/O   Final Result         NO ACUTE ABNORMALITIES ARE NOTED ON CT SCAN OF THE HEAD.      Findings are consistent with atrophy.  Decreased attenuation in the periventricular white matter likely indicates microvascular ischemic disease.      DX-HIP-BILATERAL-WITH PELVIS-2 VIEWS   Final Result      No pelvic or proximal femoral fracture identified      EC-ECHOCARDIOGRAM COMPLETE W/O CONT   Final Result      DX-CHEST-PORTABLE (1 VIEW)   Final Result      No evidence of acute cardiopulmonary process.      CT-HEAD W/O   Final Result      No acute intracranial abnormality  is identified.      There are periventricular and subcortical white matter changes present.  This finding is nonspecific and could be from previous small vessel ischemia, demyelination, or gliosis.      Atrophy      Paranasal sinus disease.              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   No behavioral disturbance.  Unable to care for self, has had progressive decline per wife.  Ethics and palliative care evaluated the patient.   Guardianship hearing 7/19, patient now has guardian.  Will need 24/7 supervision at discharge.  CM following for discharge plan     Age-related physical debility- (present on admission)   Assessment & Plan    -At baseline mobility per PT/OT  -Encourage out of bed daily.   -Ambulate with FWW.      Cardiac pacemaker in situ- (present on admission)   Assessment & Plan    -Secondary to sick sinus syndrome.      S/P TAVR (transcatheter aortic valve replacement)- (present on admission)   Assessment & Plan    -Continue aspirin & statin.     Essential hypertension- (present on admission)   Assessment & Plan    Well-controlled on current regimen.   Continue metoprolol and lisinopril.     Dyslipidemia- (present on admission)   Assessment & Plan    Continue atorvastatin.     Fall- (present on admission)   Assessment & Plan    Per chart review, seems to have fallen multiple times.   Continue fall precautions.   Continue to encourage mobilization with the nursing staff.   Ambulates safely with FWW and assistance.      Dysphagia- (present on admission)   Assessment & Plan    Found to have dysphagia, high risk for aspiration.   Advanced directive specified no feeding tube.   Speech evaluated and recommended 1:1 feeding.   Tolerating well.  Continue to monitor.      Hypothyroidism- (present on admission)   Assessment & Plan    -TSH 0.54 on 3/21/19.  -Continue levothyroxine.     Hx of CABG- (present on admission)    Assessment & Plan    -Continue ASA & statin     Gout of multiple sites- (present on admission)   Assessment & Plan    Chronic & stable.   No acute flare.   Continue allopurinol at dose lower than his home dose.      PVD (peripheral vascular disease) (CMS-HCC)- (present on admission)   Assessment & Plan    History of prior left superficial femoral artery stent, known high grade stenosis of right common femoral artery.  Stable.      History of CAD (coronary artery disease)- (present on admission)   Overview    Dr. Wu stopped plavix in 2016 due to falls.      Assessment & Plan    -s/p 3-vessel CABG with LIMA to LAD, saphenous vein graft to obtuse marginal branch, and saphenous vein graft to the posterior descending artery by Dr. Coates in March 2018.  -Continue atorvastatin, aspirin, metoprolol, and lisinopril.     Paroxysmal atrial fibrillation (CMS-HCC)- (present on admission)   Assessment & Plan    -Xarelto discontinued by cardiology in August 2018 due to falls.   -Continue metoprolol for rate control.   -continue ASA          VTE prophylaxis: Heparin    CLIFFORD Zepeda

## 2019-07-28 NOTE — CARE PLAN
Problem: Safety  Goal: Will remain free from injury  Outcome: PROGRESSING AS EXPECTED  Bed and chair alarm in use. Pt room close to nurses station.     Problem: Pain Management  Goal: Pain level will decrease to patient's comfort goal  Outcome: PROGRESSING AS EXPECTED  Pt denies pain at this time.

## 2019-07-28 NOTE — PROGRESS NOTES
Bedside report received, pt care assumed. Evening meds given. Pt had dinner in room with wife. Pt denies any additional needs at this time. Bed in lowest position, bed alarm on, pt educated on fall risk and verbalized understanding, call light within reach. Hourly rounding in place.

## 2019-07-28 NOTE — CARE PLAN
Problem: Safety  Goal: Will remain free from injury  Outcome: PROGRESSING AS EXPECTED  Fall precautions in place. Treaded socks on pt. Pt up close to nurses station for safety. Bedrails up. Bed in lowest position and locked.  Call light and phone within reach. Bed alarm on.     Problem: Venous Thromboembolism (VTW)/Deep Vein Thrombosis (DVT) Prevention:  Goal: Patient will participate in Venous Thrombosis (VTE)/Deep Vein Thrombosis (DVT)Prevention Measures  Outcome: PROGRESSING AS EXPECTED    07/27/19 2000   OTHER   Risk Assessment Score  1    VTE RISK  Moderate   Pharmacologic Prophylaxis Used  Unfractionated Heparin

## 2019-07-28 NOTE — PROGRESS NOTES
Pharmacy Pharmacotherapy Consult for LOS >30 days    Admit Date: 3/19/2019      Medications were reviewed for appropriateness and ongoing need.     Current Facility-Administered Medications   Medication Dose Route Frequency Provider Last Rate Last Dose   • metoprolol (LOPRESSOR) tablet 12.5 mg  12.5 mg Oral TWICE DAILY Santana Painting, A.P.R.N.   12.5 mg at 07/28/19 0745   • allopurinol (ZYLOPRIM) tablet 100 mg  100 mg Oral DAILY Santana Painting, A.P.R.N.   100 mg at 07/28/19 0744   • aspirin (ASA) chewable tab 81 mg  81 mg Oral DAILY Santana Painting, A.P.R.N.   81 mg at 07/28/19 0744   • lisinopril (PRINIVIL) tablet 5 mg  5 mg Oral DAILY Santana Painting, A.P.R.N.   5 mg at 07/28/19 0744   • heparin injection 5,000 Units  5,000 Units Subcutaneous Q12HRS Santana Painting, A.P.R.N.   5,000 Units at 07/28/19 0745   • senna-docusate (PERICOLACE or SENOKOT S) 8.6-50 MG per tablet 2 Tab  2 Tab Oral BID PRN Rudi Grande M.D.   2 Tab at 07/03/19 2100    And   • magnesium hydroxide (MILK OF MAGNESIA) suspension 30 mL  30 mL Oral QDAY PRN Rudi Grande M.D.        And   • bisacodyl (DULCOLAX) suppository 10 mg  10 mg Rectal QDAY PRN Rudi Grande M.D.       • polyethylene glycol/lytes (MIRALAX) PACKET 1 Packet  1 Packet Oral QDAY PRN Kimberlee Livingston M.D.       • traZODone (DESYREL) tablet 50 mg  50 mg Oral HS PRN Kimberlee Livingston M.D.   50 mg at 07/04/19 2034   • levothyroxine (SYNTHROID) tablet 50 mcg  50 mcg Oral AM ES Kimberlee Livingston M.D.   50 mcg at 07/28/19 0744   • atorvastatin (LIPITOR) tablet 20 mg  20 mg Oral Nightly Kimberlee Livingston M.D.   20 mg at 07/27/19 1950       Recommendations:  Discontinue thiamine and magnesium oxide given prolonged course of supplementation.    Markus Auguste, PharmD

## 2019-07-28 NOTE — PROGRESS NOTES
Pt alert and oriented to self only. Pt wife in room. Pt and wife educated on importance of calling for assistance when needed. Pt and wife irritable towards each other this am. Distraction provided with activities.

## 2019-07-29 NOTE — PROGRESS NOTES
Pt complaining of pain around penis. Area appeared red, tender, and swollen around top of penis and near shaft. Pictures taken of affected area, appropriate LDA opened on epic, and wound consult placed.

## 2019-07-29 NOTE — CARE PLAN
Problem: Safety  Goal: Will remain free from falls  Outcome: PROGRESSING AS EXPECTED  Bed alarm is on and in place. Reinforced the patient to call RN when he wants to get up.     Problem: Skin Integrity  Goal: Risk for impaired skin integrity will decrease  Outcome: PROGRESSING AS EXPECTED  Patient has occasional incontinence. Linen changes in place for incontinence.

## 2019-07-29 NOTE — WOUND TEAM
In to see patient for wound consult for penis.  Foreskin pulled back, area assessed and noted to be intact.  RN going to straight cath patient and cleanse area.  No advanced wound care needs at this time.

## 2019-07-29 NOTE — PROGRESS NOTES
Pt AxO&1 to self only. Pt sitting up at desk table coloring. Evening meds taken without difficulty. Bed locked and low, bed alarm on, and call light within reach. Hourly rounding in place.

## 2019-07-30 NOTE — PROGRESS NOTES
Received report and assumed patient care. Patient is AOx1, to self only. No complaints of pain at this time. Assessment complete on RA. Patient was in a deep sleep before morning medication pass and required a sternal rub to wake up. Patient then took about 15 minutes to fully wake up and speak. All medications were given. All needs met at this time. Safety precautions and hourly rounding in place.

## 2019-07-30 NOTE — PROGRESS NOTES
Bedside report received, pt care assumed. Pt denies any pain or discomfort around penile site. Assisted pt during incontinent episodes. Bed in lowest position, bed alarm on, pt educated on fall risk and verbalized understanding, call light within reach. Hourly rounding in place.

## 2019-07-31 NOTE — CARE PLAN
Problem: Safety  Goal: Will remain free from falls  Outcome: PROGRESSING AS EXPECTED   Note: wife continues to assist patient out of bed, they are in a desk bed and we are monitoring unsafe attempts at mobilization, bed alarm/ chair alarm in use.

## 2019-07-31 NOTE — CARE PLAN
Problem: Infection  Goal: Will remain free from infection  Outcome: PROGRESSING AS EXPECTED  Pt remains free from infection this shift. Continue to assess for signs/symptoms of infection and intervene as needed.      Problem: Bowel/Gastric:  Goal: Normal bowel function is maintained or improved  Outcome: PROGRESSING AS EXPECTED  Pt states bowel function is normal, with no irregularities noted. Continue to assess for irregularities in bowel function and apply bowel protocol as needed.

## 2019-07-31 NOTE — PROGRESS NOTES
Assumed care of Pt at shift change. Pt is A&Ox1 reorientation unsuccessful. Pt denies pain. Call light with in reach. Traction socks on pt and bed in lowest position.

## 2019-07-31 NOTE — PROGRESS NOTES
Assumed care of pt at 1900, family at bedside, no c/o pain or other discomforts at that time. Pt very quiet and subdued this evening. Meds given per MAR. Pt resting in bed now, call light in reach, no further needs at this time

## 2019-08-01 NOTE — PROGRESS NOTES
[facility] Medicine Daily Progress Note    Date of Service  8/1/2019    Chief Complaint  Malaise    Hospital Course   Mr. Morataya is an 86 y/o very malnourished male who was admitted to the hospital on 3/19/19 under the [facility] service for malaise. A head CT was negative for acute abnormalities, his labs were suggestive of BETTY and hypovolemia, and his vital signs were unremarkable outside of having hypoxia that resolved with 0.5 L/min of O2 via nasal cannula. His home lisinopril was subsequently discontinued and he was given IV fluids. His medical issues eventually stabilized, but due to lack of involvement from family (outside of his wife who was admitted at the same time) and the inability to care for themselves, guardianship has been pursued and placement is pending. He was [facility] to the [facility] on 6/19/19.      Interval Problem Update  8/1- Patient resting in chair, eating breakfast. No complaints. Wife at bedside helping him, and making sure blankets are secure. Patient is stable and unchanged     Consultants/Specialty  -Palliative Care - signed off  -Ethics - signed off    Code Status  DNAR/DNI    Disposition  Guardianship Hearing 7/19/19 granted, will persue GH versus SNF placement.     Review of Systems  Review of Systems   Unable to perform ROS: Dementia        Physical Exam  Temp:  [36.7 °C (98 °F)-37.1 °C (98.8 °F)] 36.8 °C (98.3 °F)  Pulse:  [] 100  Resp:  [16-20] 20  BP: ()/(43-48) 95/48  SpO2:  [91 %-96 %] 96 %    Physical Exam   Constitutional: Vital signs are normal. He appears cachectic. He is active and cooperative. He appears ill (chronically ill-appearing).   Thin/frail   HENT:   Head: Normocephalic and atraumatic.   Right Ear: External ear normal.   Left Ear: External ear normal.   Mouth/Throat: Mucous membranes are normal.   Eyes: Conjunctivae are normal. No scleral icterus.   Neck: Normal range of motion. No JVD present.   Cardiovascular: Normal rate and  intact distal pulses. Exam reveals no friction rub.   Murmur heard.  Pulmonary/Chest: Effort normal and breath sounds normal. No stridor. No respiratory distress. He has no wheezes. He has no rhonchi. He has no rales.   Abdominal: Soft. Bowel sounds are normal. He exhibits no distension. There is no tenderness. There is no rebound.   Musculoskeletal: Normal range of motion. He exhibits no edema or tenderness.   Generalized weakness.     Neurological: He is alert. He is disoriented.        Skin: Skin is warm and dry. He is not diaphoretic. No erythema.   Psychiatric: He has a normal mood and affect. His behavior is normal. Cognition and memory are impaired. He expresses impulsivity. He exhibits abnormal recent memory and abnormal remote memory.   Nursing note and vitals reviewed.  Exam completed, Plan and assessment completed, patient is unchanged from prior exam on 7/28 by [facility]     Fluids    Intake/Output Summary (Last 24 hours) at 8/1/2019 1210  Last data filed at 8/1/2019 1005  Gross per 24 hour   Intake 480 ml   Output --   Net 480 ml       Laboratory                        Imaging  CT-HEAD W/O   Final Result         NO ACUTE ABNORMALITIES ARE NOTED ON CT SCAN OF THE HEAD.      Findings are consistent with atrophy.  Decreased attenuation in the periventricular white matter likely indicates microvascular ischemic disease.      DX-HIP-BILATERAL-WITH PELVIS-2 VIEWS   Final Result      No pelvic or proximal femoral fracture identified      EC-ECHOCARDIOGRAM COMPLETE W/O CONT   Final Result      DX-CHEST-PORTABLE (1 VIEW)   Final Result      No evidence of acute cardiopulmonary process.      CT-HEAD W/O   Final Result      No acute intracranial abnormality is identified.      There are periventricular and subcortical white matter changes present.  This finding is nonspecific and could be from previous small vessel ischemia, demyelination, or gliosis.      Atrophy      Paranasal sinus disease.               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   No behavioral disturbance.  Unable to care for self, has had progressive decline per wife.  Ethics and palliative care evaluated the patient.   Guardianship hearing 7/19, patient now has guardian.  Will need 24/7 supervision at discharge.  CM following for discharge plan    Dysphagia- (present on admission)  Assessment & Plan  Found to have dysphagia, high risk for aspiration.   Advanced directive specified no feeding tube.   Speech evaluated and recommended 1:1 feeding.   Tolerating well.  Continue to monitor.     Hypothyroidism- (present on admission)  Assessment & Plan  -TSH 0.54 on 3/21/19.  -Continue levothyroxine.    Fall- (present on admission)  Assessment & Plan  Per chart review, seems to have fallen multiple times.   Continue fall precautions.   Continue to encourage mobilization with the nursing staff.   Ambulates safely with FWW and assistance.     Age-related physical debility- (present on admission)  Assessment & Plan  -At baseline mobility per PT/OT  -Encourage out of bed daily.   -Ambulate with FWW.     Hx of CABG- (present on admission)  Assessment & Plan  -Continue ASA & statin    Cardiac pacemaker in situ- (present on admission)  Assessment & Plan  -Secondary to sick sinus syndrome.     S/P TAVR (transcatheter aortic valve replacement)- (present on admission)  Assessment & Plan  -Continue aspirin & statin.    Essential hypertension- (present on admission)  Assessment & Plan  Well-controlled on current regimen.   Continue metoprolol and lisinopril.    Dyslipidemia- (present on admission)  Assessment & Plan  Continue atorvastatin.    Gout of multiple sites- (present on admission)  Assessment & Plan  Chronic & stable.   No acute flare.   Continue allopurinol at dose lower than his home dose.     PVD (peripheral vascular disease) (CMS-HCC)- (present on  admission)  Assessment & Plan  History of prior left superficial femoral artery stent, known high grade stenosis of right common femoral artery.  Stable.     History of CAD (coronary artery disease)- (present on admission)  Overview  Dr. Wu stopped plavix in 2016 due to falls.     Assessment & Plan  -s/p 3-vessel CABG with LIMA to LAD, saphenous vein graft to obtuse marginal branch, and saphenous vein graft to the posterior descending artery by Dr. Coates in March 2018.  -Continue atorvastatin, aspirin, metoprolol, and lisinopril.    Paroxysmal atrial fibrillation (CMS-HCC)- (present on admission)  Assessment & Plan  -Xarelto discontinued by cardiology in August 2018 due to falls.   -Continue metoprolol for rate control.   -continue ASA       VTE prophylaxis: Heparin    CHINO Linton.P.R.N.

## 2019-08-01 NOTE — PROGRESS NOTES
Assumed care of pt at 1900, family at bedside, no c/o pain or other discomforts at that time. Pt was sitting up in the chair at change of shift, then assisted to bed at 2000. Meds given per MAR. Pt's BP low enough to not administer BP meds. Pt resting in bed now, call light in reach, no further needs at this time

## 2019-08-01 NOTE — PROGRESS NOTES
Assumed care of Pt at shift change. Pt is A&Ox1. Pt denies pain. Call light with in reach. Traction socks on pt and bed in lowest position.

## 2019-08-02 NOTE — PROGRESS NOTES
Assumed care of pt at 1900, wife at bedside, no c/o pain or other discomforts at that time. CNA gave pt shower this evening. Meds given per MAR. Pt resting in bed now, call light in reach, no further needs at this time

## 2019-08-02 NOTE — PROGRESS NOTES
Pt A&Ox1. Ambulates assist x1 with walker. Bed and chair alarm in place. Lap belt in place when in chair. To chair for all meals. Continent of bowel and bladder. Took all medications as scheduled. No behaviors today.

## 2019-08-03 NOTE — PROGRESS NOTES
Assumed care of pt from Marybeth Almaguer. Pt has no complaints of pain/discomfort at this time. Pt is sitting up in chair at bedside and has just finished with dinner. Pt appears content.    Call light within reach, treaded slipper socks on, bed locked in lowest position. Mobility and fall risk assessed- proper communication signs are in place. Lap belt and chair alarm in use.

## 2019-08-03 NOTE — CARE PLAN
Pt has a guardian; working on placement with wife/ roommate. Pt O2 sats >92% on Ra, pt has no c/o SOB and displays no increased WOB

## 2019-08-03 NOTE — PROGRESS NOTES
Pt A&Ox1. Ambulates assist x1 with walker. Bed/chair alarm used. Lap belt in use when sitting in chair for meals. Occasional incontinence of urine. Up to chair for meals with supervision. Wife attempts to help patient with all cares.

## 2019-08-04 NOTE — PROGRESS NOTES
Assumed care of pt from Marybeth Almaguer. Pt has no complaints of pain/discomfort at this time. Pt is sitting in chair eating dinner with his wife's assistance.    Call light within reach, treaded slipper socks on, bed locked in lowest position. Mobility and fall risk assessed. Bed alarm and chair alarm are in use.

## 2019-08-04 NOTE — CARE PLAN
Problem: Communication  Goal: The ability to communicate needs accurately and effectively will improve  Intervention: [place] patient and significant other/support system to call light to alert staff of needs  Flowsheets (Taken 8/3/2019 2104)  Oriented to:: Call Light & Bedside Controls; Location of bathroom  Note:   Oriented pt to call light and asked that he call staff before attempting to ambulate.      Problem: Communication  Goal: The ability to communicate needs accurately and effectively will improve  Intervention: [place] patient and significant other/support system to call light to alert staff of needs  Flowsheets (Taken 8/3/2019 2104)  Oriented to:: Call Light & Bedside Controls; Location of bathroom  Note:   Oriented pt to call light and asked that he call staff before attempting to ambulate.      Problem: Venous Thromboembolism (VTW)/Deep Vein Thrombosis (DVT) Prevention:  Goal: Patient will participate in Venous Thrombosis (VTE)/Deep Vein Thrombosis (DVT)Prevention Measures  Intervention: Assess and monitor for anticoagulation complications  Note:   Pt has scattered bruises to abdomen from subQ injections. Pt has no discernable bruises elsewhere and has no other signs of bleeding

## 2019-08-04 NOTE — PROGRESS NOTES
[facility] Medicine Daily Progress Note    Date of Service  8/4/2019    Chief Complaint  Malaise    Hospital Course   Mr. Morataya is an 84 y/o very malnourished male who was admitted to the hospital on 3/19/19 under the [facility] service for malaise. A head CT was negative for acute abnormalities, his labs were suggestive of BETTY and hypovolemia, and his vital signs were unremarkable outside of having hypoxia that resolved with 0.5 L/min of O2 via nasal cannula. His home lisinopril was subsequently discontinued and he was given IV fluids. His medical issues eventually stabilized, but due to lack of involvement from family (outside of his wife who was admitted at the same time) and the inability to care for themselves, guardianship has been pursued and placement is pending. [facility]      Interval Problem Update  8/1- Patient resting in chair, eating breakfast. No complaints. Wife at bedside helping him, and making sure blankets are secure. Patient is stable and unchanged     8/4- Patient up in chair, very pleasant. No acute needs at this time. Wife remains at bedside and alerts nursing staff to help him when it is needed.       Consultants/Specialty  -Palliative Care - signed off  -Ethics - signed off    Code Status  DNAR/DNI    Disposition  Guardianship Hearing 7/19/19 granted, will persue GH versus SNF placement.     Review of Systems  Review of Systems   Unable to perform ROS: Dementia        Physical Exam  Temp:  [36.8 °C (98.2 °F)-37.4 °C (99.3 °F)] 37.2 °C (99 °F)  Pulse:  [66-74] 66  Resp:  [16-18] 16  BP: ()/(38-47) 90/38  SpO2:  [94 %-95 %] 95 %    Physical Exam   Constitutional: Vital signs are normal. He appears cachectic. He is active and cooperative. He appears ill (chronically ill-appearing).   Thin/frail   HENT:   Head: Normocephalic and atraumatic.   Right Ear: External ear normal.   Left Ear: External ear normal.   Mouth/Throat: Mucous  membranes are normal.   Eyes: Conjunctivae are normal. No scleral icterus.   Neck: Normal range of motion. No JVD present.   Cardiovascular: Normal rate and intact distal pulses. Exam reveals no friction rub.   Murmur heard.  Pulmonary/Chest: Effort normal and breath sounds normal. No stridor. No respiratory distress. He has no wheezes. He has no rhonchi. He has no rales.   Abdominal: Soft. Bowel sounds are normal. He exhibits no distension. There is no tenderness. There is no rebound.   Musculoskeletal: Normal range of motion. He exhibits no edema or tenderness.   Generalized weakness.     Neurological: He is alert. He is disoriented.        Skin: Skin is warm and dry. He is not diaphoretic. No erythema.   Psychiatric: He has a normal mood and affect. His behavior is normal. Cognition and memory are impaired. He expresses impulsivity. He exhibits abnormal recent memory and abnormal remote memory.   Nursing note and vitals reviewed.  Exam completed, Plan and assessment completed, patient is unchanged from prior exam on 8/1      Fluids    Intake/Output Summary (Last 24 hours) at 8/4/2019 0908  Last data filed at 8/3/2019 1905  Gross per 24 hour   Intake 822 ml   Output --   Net 822 ml       Laboratory                        Imaging  CT-HEAD W/O   Final Result         NO ACUTE ABNORMALITIES ARE NOTED ON CT SCAN OF THE HEAD.      Findings are consistent with atrophy.  Decreased attenuation in the periventricular white matter likely indicates microvascular ischemic disease.      DX-HIP-BILATERAL-WITH PELVIS-2 VIEWS   Final Result      No pelvic or proximal femoral fracture identified      EC-ECHOCARDIOGRAM COMPLETE W/O CONT   Final Result      DX-CHEST-PORTABLE (1 VIEW)   Final Result      No evidence of acute cardiopulmonary process.      CT-HEAD W/O   Final Result      No acute intracranial abnormality is identified.      There are periventricular and subcortical white matter changes present.  This finding is  nonspecific and could be from previous small vessel ischemia, demyelination, or gliosis.      Atrophy      Paranasal sinus disease.              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   No behavioral disturbance.  Unable to care for self, has had progressive decline per wife.  Ethics and palliative care evaluated the patient.   Guardianship hearing 7/19, patient now has guardian.  Will need 24/7 supervision at discharge.  CM following for discharge plan    Dysphagia- (present on admission)  Assessment & Plan  Found to have dysphagia, high risk for aspiration.   Advanced directive specified no feeding tube.   Speech evaluated and recommended 1:1 feeding.   Tolerating well.  Continue to monitor.     Hypothyroidism- (present on admission)  Assessment & Plan  -TSH 0.54 on 3/21/19.  -Continue levothyroxine.    Fall- (present on admission)  Assessment & Plan  Per chart review, seems to have fallen multiple times.   Continue fall precautions.   Continue to encourage mobilization with the nursing staff.   Ambulates safely with FWW and assistance.     Age-related physical debility- (present on admission)  Assessment & Plan  -At baseline mobility per PT/OT  -Encourage out of bed daily.   -Ambulate with FWW.     Hx of CABG- (present on admission)  Assessment & Plan  -Continue ASA & statin    Cardiac pacemaker in situ- (present on admission)  Assessment & Plan  -Secondary to sick sinus syndrome.     S/P TAVR (transcatheter aortic valve replacement)- (present on admission)  Assessment & Plan  -Continue aspirin & statin.    Essential hypertension- (present on admission)  Assessment & Plan  Well-controlled on current regimen.   Continue metoprolol and lisinopril.    Dyslipidemia- (present on admission)  Assessment & Plan  Continue atorvastatin.    Gout of multiple sites- (present on admission)  Assessment & Plan  Chronic & stable.   No acute  flare.   Continue allopurinol at dose lower than his home dose.     PVD (peripheral vascular disease) (CMS-HCC)- (present on admission)  Assessment & Plan  History of prior left superficial femoral artery stent, known high grade stenosis of right common femoral artery.  Stable.     History of CAD (coronary artery disease)- (present on admission)  Overview  Dr. Wu stopped plavix in 2016 due to falls.     Assessment & Plan  -s/p 3-vessel CABG with LIMA to LAD, saphenous vein graft to obtuse marginal branch, and saphenous vein graft to the posterior descending artery by Dr. Coates in March 2018.  -Continue atorvastatin, aspirin, metoprolol, and lisinopril.    Paroxysmal atrial fibrillation (CMS-[facility])- (present on admission)  Assessment & Plan  -Xarelto discontinued by cardiology in August 2018 due to falls.   -Continue metoprolol for rate control.   -continue ASA       VTE prophylaxis: Heparin    LILA LintonPMICHELLE.

## 2019-08-04 NOTE — PROGRESS NOTES
Pt A&Ox1. Ambulates assist x1 with walker. Bed/chair alarm on. Occasional incontinence of urine. Denies pain. Mepilex in place to spine for protection. Ate all meals in chair with supervision. Rested in bed between meals.

## 2019-08-04 NOTE — PROGRESS NOTES
2 RN skin check complete with Samia RN  Devices in place: none.  Skin assessed under devices: all skin assessed.  Confirmed pressure ulcers found on: none.  New potential pressure ulcers noted on (none). Wound consult placed NA.  The following interventions in place: waffle mattress, frequent checks for incontinence, barrier cream    Pt's skin intact except for the following issues: pt's left heel red, boggy, and slow to sergio. Pt's left heel has a crack in it. mepilex applied to spine (for boniness). Sacrum red and blanching

## 2019-08-05 NOTE — CARE PLAN
Problem: Safety  Goal: Will remain free from falls  Outcome: PROGRESSING AS EXPECTED  Intervention: Implement fall precautions  Flowsheets (Taken 8/4/2019 9096)  Environmental Precautions: Treaded Slipper Socks on Patient; Personal Belongings, Wastebasket, Call Bell etc. in Easy Reach; Bed in Low Position; Report Given to Other Health Care Providers Regarding Fall Risk; Mobility Assessed & Appropriate Sign Placed  Note:   Bed alarm in place. Bed in lowest position, treaded socks on, personal belongings and call light within reach, instructed to call for any assistance, hourly rounding in place.      Problem: Venous Thromboembolism (VTW)/Deep Vein Thrombosis (DVT) Prevention:  Goal: Patient will participate in Venous Thrombosis (VTE)/Deep Vein Thrombosis (DVT)Prevention Measures  Outcome: PROGRESSING AS EXPECTED  Intervention: Encourage ambulation/mobilization at level directed by Physical Therapy in collaboration with Interdisciplinary Team  Note:   Encouraged early ambulation. Heparin given.

## 2019-08-05 NOTE — PROGRESS NOTES
Assumed care of pt at shift change. A/Ox1. Pt on room air.  Ate dinner in chair with supervision.   Bed alarm in place. Bed in lowest position, treaded socks on, personal belongings and call light within reach, instructed to call for any assistance, hourly rounding in place.

## 2019-08-05 NOTE — PROGRESS NOTES
Assumed care of pt at shift change. Pt alert and oriented to self only. No complaints of pain/discomfort at this time. Pt sitting up in chair and denies any further needs at this time. Safety precautions in place.

## 2019-08-05 NOTE — PROGRESS NOTES
· 2 RN skin check complete with Samia PADILLA.   · Devices in place: none.  · Skin assessed under devices: all skin assessed.  · Confirmed pressure ulcers found on: none.  · New potential pressure ulcers noted on NA. Wound consult: NA  · The following interventions in place: Waffle mattress, frequent checks for incontinence, barrier cream. Keep pt dry and clean. Assist turns as needed.   · The following skin issues noted: ears are red and blanching. Heels are red and blanching. Crack noted on right heel. Mepilex in place on spine for protection. Sacrum is intact. Scrotum area is red, aurora applied.

## 2019-08-06 NOTE — PROGRESS NOTES
Assumed care of pt at shift change. A/Ox1. Pt on room air.  Ate dinner in chair with supervision. BP is 92/51. Encouraged fluid intake.   Bed alarm in place. Bed in lowest position, treaded socks on, personal belongings and call light within reach, instructed to call for any assistance, hourly rounding in place.

## 2019-08-06 NOTE — CARE PLAN
Problem: Safety  Goal: Will remain free from falls  Outcome: PROGRESSING AS EXPECTED  Intervention: Implement fall precautions  Flowsheets (Taken 8/4/2019 2217)  Environmental Precautions: Treaded Slipper Socks on Patient;Personal Belongings, Wastebasket, Call Bell etc. in Easy Reach;Bed in Low Position;Report Given to Other Health Care Providers Regarding Fall Risk;Mobility Assessed & Appropriate Sign Placed  Note:   Bed alarm in place. Bed in lowest position, treaded socks on, personal belongings and call light within reach, instructed to call for any assistance, hourly rounding in place.      Problem: Fluid Volume:  Goal: Will maintain balanced intake and output  Outcome: PROGRESSING AS EXPECTED  Note:   BP is low. Pushed fluids.

## 2019-08-06 NOTE — PROGRESS NOTES
· 2 RN skin check complete with Lulu PADILLA.   · Devices in place: none.  · Skin assessed under devices: all skin assessed.  · Confirmed pressure ulcers found on: none.  · New potential pressure ulcers noted on NA. Wound consult: NA  · The following interventions in place: Waffle mattress, frequent checks for incontinence, barrier cream. Keep pt dry and clean. Assist turns as needed.   · The following skin issues noted: ears are red and blanching. Heels are red and blanching. Crack noted on right heel. Mepilex applied on spine for protection. Sacrum is intact. Scrotum area is red, aurora applied. Bruising noted on ABD

## 2019-08-06 NOTE — PROGRESS NOTES
Assumed care of pt at shift change. Pt alert and oriented to self only. No complaints of pain/discomfort at this time. Scheduled medications given crushed in applesauce and tolerated well. Pt sitting up in chair, waiting for breakfast and denies any further needs at this time. Safety precautions in place.

## 2019-08-07 NOTE — PROGRESS NOTES
· 2 RN skin check complete with Lulu PADILLA.   · Devices in place: none.  · Skin assessed under devices: all skin assessed.  · Confirmed pressure ulcers found on: none.  · New potential pressure ulcers noted on NA. Wound consult: NA  · The following interventions in place: Waffle mattress, frequent checks for incontinence, barrier cream. Keep pt dry and clean. Assist turns as needed.   · The following skin issues noted: ears are red and blanching. Heels are red and blanching. Crack noted on right heel. The sole of bilateral red are red and blanching aurora cream applied. Mepilex applied on spine for protection. Sacrum is intact. Scrotum area is red, aurora applied. Bruising noted on ABD

## 2019-08-07 NOTE — DOCUMENTATION QUERY
Former[facility]                                                                       Query Response Note      PATIENT:               JAYLON ESCOBAR  ACCT #:                  [id number]  MRN:                     [id number]  :                      1933  ADMIT DATE:       3/19/2019 12:42 PM  DISCH DATE:          RESPONDING  PROVIDER #:        [id number]           QUERY TEXT:    It is unclear whether sepsis was present on admission.  Your help is needed.  Please clarify the POA status.    The patient's Clinical Indicators include:  Sepsis is documented in progress notes dated 19, 6/3/19 and 19.  No documentation of sepsis in H&P and other progress notes.  IV fluids given for sepsis is documented in ER.    ER VITAL SIGNS: /56   Pulse 82   Temp 36.6 °C (97.8 °F) (Temporal)   Resp 18     Progress notes:  6/3 and 19    Impression: dementia, acute encephalopathy, due to sepsis, metabolic, at baseline, / care recommended     HEME:   *admit(  WBC 14.6, HB 13.5, , ferritin 354, B12 1327, folate 24, VIT D 32 --.  Ferrint 354 --> WBC 12, HB 13,    Impression: leukocytosis, ischemic bowel /bowel sepsis , resolved  Options provided:   -- Sepsis was present on admission   -- Sepsis developed after admission   -- Sepsis has been ruled out   -- Unable to determine      Query created by: Nettie Nazario on 2019 8:42 AM    RESPONSE TEXT:    Sepsis has been ruled out          Electronically signed by:  TREVON FELTON 2019 12:18 PM

## 2019-08-07 NOTE — CARE PLAN
Problem: Safety  Goal: Will remain free from falls  Outcome: PROGRESSING AS EXPECTED  Intervention: Implement fall precautions  Flowsheets (Taken 8/6/2019 1922)  Environmental Precautions: Personal Belongings, Wastebasket, Call Bell etc. in Easy Reach;Treaded Slipper Socks on Patient  Note:   Bed alarm in place. Bed in lowest position, treaded socks on, personal belongings and call light within reach, instructed to call for any assistance, hourly rounding in place.      Problem: Skin Integrity  Goal: Risk for impaired skin integrity will decrease  Intervention: Implement precautions to protect skin integrity in collaboration with the interdisciplinary team  Flowsheets (Taken 8/6/2019 1930 by Aurora Wallace, C.N.A.)  Skin Preventative Measures: Pillows in Use to Float Heels;Pillows in Use for Support / Positioning  Note:   Waffle overlay in place. Nikko cream applied. Mepilex applied for protection. Pillows used for elevation of heels.

## 2019-08-07 NOTE — PROGRESS NOTES
Assumed care of pt at shift change. A/Ox1. Pt on room air.  Ate dinner in chair with supervision. Vitals are stable. Denied pain.   Bed alarm in place. Bed in lowest position, treaded socks on, personal belongings and call light within reach, instructed to call for any assistance, hourly rounding in place.

## 2019-08-08 NOTE — PROGRESS NOTES
Assumed care of pt at shift change. A/Ox1. Pt on room air.  Ate dinner in chair with other patients with supervision. Vitals are stable. Denied pain. Pt was incontinent. Ana Cristina care provided.   Bed alarm in place. Bed in lowest position, treaded socks on, personal belongings and call light within reach, instructed to call for any assistance, hourly rounding in place.

## 2019-08-08 NOTE — CARE PLAN
Problem: Safety  Goal: Will remain free from falls  Outcome: PROGRESSING AS EXPECTED  Intervention: Assess risk factors for falls  Note:   Bed alarm in place. Bed in lowest position, treaded socks on, personal belongings and call light within reach, instructed to call for any assistance, hourly rounding in place.      Problem: Skin Integrity  Goal: Risk for impaired skin integrity will decrease  Outcome: PROGRESSING AS EXPECTED  Intervention: Implement precautions to protect skin integrity in collaboration with the interdisciplinary team  Note:   Waffle overlay in place. Nikko cream applied. Mepilex applied for protection. Pillows used for elevation of heels.

## 2019-08-08 NOTE — PROGRESS NOTES
2 RN skin check complete with RANDY Venegas.   Devices in place: N/A.  Skin assessed under devices: N/A.  Confirmed pressure ulcers found on: N/A.  New potential pressure ulcers noted on: N/A  The following interventions in place: waffle mattress, frequent linen changes due to incontinence, mepilex on bony prominences, up in chair for meals.     Back of head is red and alow to sergio.   Bilateral ears are red and blanching.  Abdominal is bruised.   Bilateral heels are pink and blanching.   Scrotum is red.

## 2019-08-08 NOTE — PROGRESS NOTES
Received report and assumed patient care. Patient is AOx1, to self only. No complaints of pain at this time. Assessment complete on RA. Patient tolerating all medications. Patient up in chair for meals. All needs met at this time. Safety precautions and hourly rounding in place.

## 2019-08-08 NOTE — PROGRESS NOTES
[facility] Medicine Daily Progress Note    Date of Service  8/8/2019    Chief Complaint  Malaise    Hospital Course   This is an 86 y/o very malnourished male who was admitted to the hospital on 3/19/19 under the [facility] service for malaise. A head CT was negative for acute abnormalities, his labs were suggestive of BETTY and hypovolemia, and his vital signs were unremarkable outside of having hypoxia that resolved with 0.5 L/min of O2 via nasal cannula. His home lisinopril was subsequently discontinued and he was given IV fluids. His medical issues eventually stabilized, but due to lack of involvement from family (outside of his wife who was admitted at the same time) and the inability to care for themselves, guardianship has been pursued and placement is pending. [facility]      Interval Problem Update  -up at nurses station for meals. Appears in no distress, offers no complaints.    Consultants/Specialty  -Ethics - signed off  -Palliative Care - signed off    Code Status  DNAR/DNI    Disposition  Has guardianship as of 7/19/19 now pursuing Group Home versus SNF    Review of Systems  Review of Systems   Unable to perform ROS: Dementia        Physical Exam  Temp:  [36.2 °C (97.2 °F)-36.7 °C (98.1 °F)] 36.2 °C (97.2 °F)  Pulse:  [63-72] 63  Resp:  [15-17] 15  BP: (114-119)/(49-55) 119/55  SpO2:  [100 %] 100 %    Physical Exam   Constitutional: Vital signs are normal. He appears cachectic. He is active and cooperative. He does not appear ill. No distress.   Thin/frail   HENT:   Head: Normocephalic.   Nose: Nose normal.   Mouth/Throat: Oropharynx is clear and moist and mucous membranes are normal.   Eyes: Conjunctivae are normal. No scleral icterus.   Neck: Normal range of motion.   Cardiovascular: Normal rate and intact distal pulses.   Murmur heard.  No edema   Pulmonary/Chest: Effort normal and breath sounds normal. He has no wheezes. He has no rales.   Abdominal:  Soft. Bowel sounds are normal. There is no tenderness.   Musculoskeletal: Normal range of motion.   Generalized weakness.  Ambulatory with FWW   Neurological: He is alert. He is disoriented. He displays atrophy. He exhibits abnormal muscle tone.        Skin: Skin is warm and dry. No rash noted.   Psychiatric: He has a normal mood and affect. His behavior is normal. Cognition and memory are impaired. He exhibits abnormal recent memory and abnormal remote memory.   Nursing note and vitals reviewed.      Fluids    Intake/Output Summary (Last 24 hours) at 8/8/2019 1313  Last data filed at 8/8/2019 0900  Gross per 24 hour   Intake 1710 ml   Output --   Net 1710 ml       Laboratory                        Imaging  CT-HEAD W/O   Final Result         NO ACUTE ABNORMALITIES ARE NOTED ON CT SCAN OF THE HEAD.      Findings are consistent with atrophy.  Decreased attenuation in the periventricular white matter likely indicates microvascular ischemic disease.      DX-HIP-BILATERAL-WITH PELVIS-2 VIEWS   Final Result      No pelvic or proximal femoral fracture identified      EC-ECHOCARDIOGRAM COMPLETE W/O CONT   Final Result      DX-CHEST-PORTABLE (1 VIEW)   Final Result      No evidence of acute cardiopulmonary process.      CT-HEAD W/O   Final Result      No acute intracranial abnormality is identified.      There are periventricular and subcortical white matter changes present.  This finding is nonspecific and could be from previous small vessel ischemia, demyelination, or gliosis.      Atrophy      Paranasal sinus disease.              Assessment/Plan  * Cognitive impairment- (present on admission)  Assessment & Plan  -guardianship obtained 7/19 - now pending placement  -no behavioral outbursts  -Ethics and palliative care evaluated the patient.       Dysphagia- (present on admission)  Assessment & Plan  Found to have dysphagia, high risk for aspiration.   Advanced directive specified no feeding tube.   Speech evaluated and  recommended 1:1 feeding.   Tolerating well.  Continue to monitor.     Hypothyroidism- (present on admission)  Assessment & Plan  -TSH 0.54 on 3/21/19.  -Continue levothyroxine.    Fall- (present on admission)  Assessment & Plan  -Fall Precautions  -ambulates with FWW      Age-related physical debility- (present on admission)  Assessment & Plan  -At baseline mobility per PT/OT  -Encourage out of bed for all meals.   -Ambulates with FWW.     Hx of CABG- (present on admission)  Assessment & Plan  -Continue ASA & statin    Cardiac pacemaker in situ- (present on admission)  Assessment & Plan  -Secondary to sick sinus syndrome.     S/P TAVR (transcatheter aortic valve replacement)- (present on admission)  Assessment & Plan  -Continue aspirin & statin.    Essential hypertension- (present on admission)  Assessment & Plan  Well-controlled on current regimen.   Continue metoprolol and lisinopril.    Dyslipidemia- (present on admission)  Assessment & Plan  -Continue atorvastatin.    Gout of multiple sites- (present on admission)  Assessment & Plan  Chronic & stable.   No acute flare.   Continue allopurinol at dose lower than his home dose.     PVD (peripheral vascular disease) (CMS-HCC)- (present on admission)  Assessment & Plan  History of prior left superficial femoral artery stent, known high grade stenosis of right common femoral artery.  Stable.     History of CAD (coronary artery disease)- (present on admission)  Overview  Dr. Wu stopped plavix in 2016 due to falls.     Assessment & Plan  -s/p 3-vessel CABG with LIMA to LAD, saphenous vein graft to obtuse marginal branch, and saphenous vein graft to the posterior descending artery by Dr. Coates in March 2018.  -Continue atorvastatin, aspirin, metoprolol, and lisinopril.    Paroxysmal atrial fibrillation (CMS-HCC)- (present on admission)  Assessment & Plan  -Xarelto discontinued by cardiology in August 2018 due to falls.   -Continue metoprolol for rate control.    -continue ASA       VTE prophylaxis: Heparin    CLIFFORD Ewing

## 2019-08-09 NOTE — PROGRESS NOTES
Received report and assumed patient care. Patient is AOx1 to self. No complaints of pain at this time. Assessment complete on RA. Patient is comfortably resting in bed. All needs met at this time. Safety precautions and hourly rounding in place.

## 2019-08-09 NOTE — PROGRESS NOTES
Pt resting in bed throughout shift. No c/o pain at this time. Ambulates with 1 assist. Fall precautions in place. Call bell and bedside table within reach. Will continue to monitor.

## 2019-08-09 NOTE — PROGRESS NOTES
2 RN skin check completed with Lulu RN  Devices in place N/A.  Skin assessed under devices N/A.  Confirmed pressure ulcers found on N/A.  The following interventions in place: Mepilex on bony prominences. Waffle mattress. Barrier cream.      B/L ears red and blanching. Scrotum red. Bruises on abdomen. B/L heels pink and blanching.

## 2019-08-09 NOTE — CARE PLAN
Problem: Safety  Goal: Will remain free from injury  Outcome: PROGRESSING AS EXPECTED    Bed in locked and lowest position. Bed alarm on. Nonskid socks in place. Educated on call bell use. No evidence of learning. Will continue to monitor.      Problem: Pain Management  Goal: Pain level will decrease to patient's comfort goal  Outcome: PROGRESSING AS EXPECTED    No c/o pain or discomfort. Pt resting in bed at this time. Will continue to monitor.

## 2019-08-10 NOTE — PROGRESS NOTES
Received report and assumed patient care. Patient is AOx1, to himself only. No complaints of pain at this time. Assessment complete on RA. Patient is sitting in chair eating breakfast. All needs met at this time. Safety precautions and hourly rounding in place.

## 2019-08-11 NOTE — PROGRESS NOTES
Pt resting in bed throughout shift. No c/o pain or discomfort. Fall precautions in place. Call bell and bedside table within reach. Will continue to monitor.

## 2019-08-11 NOTE — PROGRESS NOTES
[facility] Medicine Daily Progress Note    Date of Service  8/11/2019    Chief Complaint  Malaise    Hospital Course   This is an 84 y/o very malnourished male who was admitted to the hospital on 3/19/19 under the [facility] service for malaise. A head CT was negative for acute abnormalities, his labs were suggestive of BETTY and hypovolemia, and his vital signs were unremarkable outside of having hypoxia that resolved with 0.5 L/min of O2 via nasal cannula. His home lisinopril was subsequently discontinued and he was given IV fluids. His medical issues eventually stabilized, but due to lack of involvement from family (outside of his wife who was admitted at the same time) and the inability to care for themselves, guardianship has been pursued and placement is pending. [facility]      Interval Problem Update  -sleeping most of morning. Mentation at his baseline. No acute events.     Consultants/Specialty  -Ethics - signed off  -Palliative Care - signed off    Code Status  DNAR/DNI    Disposition  Has guardianship now pursuing Group Home versus SNF    Review of Systems  Review of Systems   Unable to perform ROS: Dementia        Physical Exam  Temp:  [36.7 °C (98.1 °F)-37.2 °C (98.9 °F)] 36.7 °C (98.1 °F)  Pulse:  [72-78] 72  Resp:  [16-18] 16  BP: ()/(49-58) 119/49  SpO2:  [90 %-100 %] 90 %    Physical Exam   Constitutional: Vital signs are normal. He appears cachectic. He is active and cooperative. He does not appear ill. No distress.   Thin/frail   HENT:   Head: Normocephalic.   Nose: Nose normal.   Mouth/Throat: Oropharynx is clear and moist and mucous membranes are normal.   Eyes: Conjunctivae are normal. No scleral icterus.   Neck: Normal range of motion.   Cardiovascular: Normal rate and intact distal pulses.   Murmur heard.  No edema   Pulmonary/Chest: Effort normal and breath sounds normal. He has no wheezes. He has no rales.   Abdominal: Soft. Bowel sounds  are normal. There is no tenderness.   Musculoskeletal: Normal range of motion.   Generalized weakness.  Ambulatory with FWW   Neurological: He is alert. He is disoriented. He displays atrophy. He exhibits abnormal muscle tone.        Skin: Skin is warm and dry. No rash noted.   Psychiatric: He has a normal mood and affect. His behavior is normal. Cognition and memory are impaired. He exhibits abnormal recent memory and abnormal remote memory.   Nursing note and vitals reviewed.      Fluids    Intake/Output Summary (Last 24 hours) at 8/11/2019 1147  Last data filed at 8/11/2019 1000  Gross per 24 hour   Intake 480 ml   Output --   Net 480 ml       Laboratory                        Imaging  CT-HEAD W/O   Final Result         NO ACUTE ABNORMALITIES ARE NOTED ON CT SCAN OF THE HEAD.      Findings are consistent with atrophy.  Decreased attenuation in the periventricular white matter likely indicates microvascular ischemic disease.      DX-HIP-BILATERAL-WITH PELVIS-2 VIEWS   Final Result      No pelvic or proximal femoral fracture identified      EC-ECHOCARDIOGRAM COMPLETE W/O CONT   Final Result      DX-CHEST-PORTABLE (1 VIEW)   Final Result      No evidence of acute cardiopulmonary process.      CT-HEAD W/O   Final Result      No acute intracranial abnormality is identified.      There are periventricular and subcortical white matter changes present.  This finding is nonspecific and could be from previous small vessel ischemia, demyelination, or gliosis.      Atrophy      Paranasal sinus disease.              Assessment/Plan  * Cognitive impairment- (present on admission)  Assessment & Plan  -guardianship obtained 7/19 - now pending placement  -no behavioral outbursts  -Ethics and palliative care evaluated the patient.       Age-related physical debility- (present on admission)  Assessment & Plan  -At baseline mobility per PT/OT  -Encourage out of bed for all meals.   -Ambulates with FWW.     Cardiac pacemaker in situ-  (present on admission)  Assessment & Plan  -Secondary to SSS    S/P TAVR (transcatheter aortic valve replacement)- (present on admission)  Assessment & Plan  -Continue aspirin & statin.    Essential hypertension- (present on admission)  Assessment & Plan  Well-controlled on current regimen.   Continue metoprolol and lisinopril.    Dyslipidemia- (present on admission)  Assessment & Plan  -Continue atorvastatin.    Fall- (present on admission)  Assessment & Plan  -Fall Precautions  -ambulates with FWW      Dysphagia- (present on admission)  Assessment & Plan  -Found to have dysphagia, high risk for aspiration.   -Advanced directive specified no feeding tube.   -Speech evaluated and recommended 1:1 feeding.   -Aspiration Precautions    Hypothyroidism- (present on admission)  Assessment & Plan  -TSH 0.54 on 3/21/19.  -Continue levothyroxine.    Hx of CABG- (present on admission)  Assessment & Plan  -Continue ASA & statin    Gout of multiple sites- (present on admission)  Assessment & Plan  Chronic & stable.   No acute flare.   Continue allopurinol at dose lower than his home dose.     PVD (peripheral vascular disease) (CMS-HCC)- (present on admission)  Assessment & Plan  History of prior left superficial femoral artery stent, known high grade stenosis of right common femoral artery.  Stable.     History of CAD (coronary artery disease)- (present on admission)  Overview  Dr. Wu stopped plavix in 2016 due to falls.     Assessment & Plan  -s/p 3-vessel CABG with LIMA to LAD, saphenous vein graft to obtuse marginal branch, and saphenous vein graft to the posterior descending artery by Dr. Coates in March 2018.  -Continue atorvastatin, aspirin, metoprolol, and lisinopril.    Paroxysmal atrial fibrillation (CMS-HCC)- (present on admission)  Assessment & Plan  -Xarelto discontinued by cardiology in August 2018 due to falls.   -Continue metoprolol for rate control.   -continue ASA       VTE prophylaxis: Heparin    Yuki ALLEN  KEVIN Caraballo.

## 2019-08-11 NOTE — CARE PLAN
Problem: Safety  Goal: Will remain free from injury  Outcome: PROGRESSING AS EXPECTED     Bed in locked and lowest position. Bed alarm on. Nonskid socks in place. Will continue to monitor.     Problem: Pain Management  Goal: Pain level will decrease to patient's comfort goal  Outcome: PROGRESSING AS EXPECTED     No c/o pain at this time. Pt resting in bed. Will continue to monitor.

## 2019-08-11 NOTE — PROGRESS NOTES
2 RN skin check completed with Jeanie RN  Devices in place N/A.  Skin assessed under devices N/A.  Confirmed pressure ulcers found on N/A.  The following interventions in place: Mepilex on bony prominences. Waffle mattress. Barrier cream.                             B/L ears red and blanching. Scrotum red. Bruises on abdomen. B/L heels pink and blanching.

## 2019-08-11 NOTE — PROGRESS NOTES
Received report and assumed patient care. Patient is AOx1 to himself. No complaints of pain at this time. Assessment complete on RA. All needs met at this time. Safety precautions and hourly rounding in place.

## 2019-08-12 PROBLEM — Z51.5 COMFORT MEASURES ONLY STATUS: Status: ACTIVE | Noted: 2019-01-01

## 2019-08-12 NOTE — DISCHARGE PLANNING
Anticipated Discharge Disposition: TBD    Action: PC to Guardian , Aliza BRYANT  Not sure at this time what the d/c plan would look like on patient at this time.  Issue is his spouse as she may not be granted a guardian.  Lynda stated patient was suppose to have friends see her on Sunday and they were going to discuss options.  SW checked chart notes, which indicates friends where in yesterday.  Lynda stated the they have only been able to find one account but will look for more.  As the amount of funds they have found is not enough to support them at their home.      Barriers to Discharge: guardianship of spouse, placement of both together/financial.  Waiting on Court Hearing date    Plan: follow up with guardian  for court hearing date.

## 2019-08-12 NOTE — PROGRESS NOTES
[facility] Medicine Daily Progress Note    Date of Service  8/12/2019    Chief Complaint  Malaise    Hospital Course   This is an 84 y/o very malnourished male who was admitted to the hospital on 3/19/19 under the [employer] service for malaise. A head CT was negative for acute abnormalities, his labs were suggestive of BETTY and hypovolemia, and his vital signs were unremarkable outside of having hypoxia that resolved with 0.5 L/min of O2 via nasal cannula. His home lisinopril was subsequently discontinued and he was given IV fluids. His medical issues eventually stabilized, but due to lack of involvement from family (outside of his wife who was admitted at the same time) and the inability to care for themselves, guardianship has been pursued and placement is pending. He was transferred to [facility] on 6/19/19. Episode of choking 8/12 with subsequent vomiting and increased lethargy/unresponsiveness. Patient transitioned to comfort care.      Interval Problem Update  -patient was sitting up at nurses station before lunch. Wife was behind him rubbing/massaging his head. Wife was worried he was tired and wanted nursing staff to lie him back down in bed. As they were transferring him to the wheelchair he choked on what appeared to be his own secretions and vomited about 200 mL non-bloody emesis. BP 60's palpable afterwards with worsening responsiveness. Palliative care at bedside. POLST on file (filled out by the patient himself) says patient would not want to be intubated and/or transferred to ICU. Called his legal guardian, Lynda, who was unavailable so I spoke with OC guardian, Cynthia to notify of decision to transition patient to COMFORT CARE at this time.    1:33 PM  Dr. Pablo in at bedside. Patient appears comfortable in no distress. Extremities cool, delayed cap refill with decreased pulses. Wife at bedside.    Consultants/Specialty  -Palliative Care  -Ethics    Code Status  COMFORT CARE    Disposition  Poor  prognosis. Transitioned to comfort care today    Review of Systems  Review of Systems   Unable to perform ROS: Mental acuity        Physical Exam  Temp:  [36.4 °C (97.6 °F)-36.8 °C (98.2 °F)] 36.4 °C (97.6 °F)  Pulse:  [60-68] 68  Resp:  [16] 16  BP: (101-109)/(47-50) 109/47  SpO2:  [96 %] 96 %    Physical Exam   Constitutional: Vital signs are normal. He appears cachectic. He has a sickly appearance. He does not appear ill. No distress. Nasal cannula in place.   Thin/frail   HENT:   Head: Normocephalic.   Nose: Nose normal.   Mouth/Throat: Oropharynx is clear and moist and mucous membranes are normal.   Eyes: Conjunctivae are normal. No scleral icterus.   Neck: Normal range of motion.   Cardiovascular: Normal rate and intact distal pulses.   Murmur heard.  No edema   Pulmonary/Chest: Effort normal. Tachypnea noted. No respiratory distress. He has decreased breath sounds. He has no wheezes. He has no rales.   Abdominal: Soft. Bowel sounds are normal. There is no tenderness.   Musculoskeletal: Normal range of motion.        Neurological: He is disoriented and unresponsive. He displays atrophy. He exhibits abnormal muscle tone.        Skin: Skin is warm and dry. No rash noted.   Psychiatric: He has a normal mood and affect. His behavior is normal. Cognition and memory are impaired. He exhibits abnormal recent memory and abnormal remote memory.   Nursing note and vitals reviewed.      Fluids    Intake/Output Summary (Last 24 hours) at 8/12/2019 1339  Last data filed at 8/12/2019 0900  Gross per 24 hour   Intake 760 ml   Output --   Net 760 ml       Laboratory                        Imaging  CT-HEAD W/O   Final Result         NO ACUTE ABNORMALITIES ARE NOTED ON CT SCAN OF THE HEAD.      Findings are consistent with atrophy.  Decreased attenuation in the periventricular white matter likely indicates microvascular ischemic disease.      DX-HIP-BILATERAL-WITH PELVIS-2 VIEWS   Final Result      No pelvic or proximal femoral  fracture identified      EC-ECHOCARDIOGRAM COMPLETE W/O CONT   Final Result      DX-CHEST-PORTABLE (1 VIEW)   Final Result      No evidence of acute cardiopulmonary process.      CT-HEAD W/O   Final Result      No acute intracranial abnormality is identified.      There are periventricular and subcortical white matter changes present.  This finding is nonspecific and could be from previous small vessel ischemia, demyelination, or gliosis.      Atrophy      Paranasal sinus disease.              Assessment/Plan  * Cognitive impairment- (present on admission)  Assessment & Plan  -guardianship obtained 7/19 - now pending placement  -no behavioral outbursts  -Ethics and palliative care evaluated the patient.   -patient now COMFORT CARE      Age-related physical debility- (present on admission)  Assessment & Plan  -patient now COMFORT CARE    Cardiac pacemaker in situ- (present on admission)  Assessment & Plan  -Secondary to SSS  -patient now COMFORT CARE    S/P TAVR (transcatheter aortic valve replacement)- (present on admission)  Assessment & Plan  -patient now COMFORT CARE    Essential hypertension- (present on admission)  Assessment & Plan  -patient now COMFORT CARE    Dyslipidemia- (present on admission)  Assessment & Plan  -patient now COMFORT CARE    Comfort measures only status  Assessment & Plan  -patient was sitting up at nurses station before lunch. Wife was behind him rubbing/massaging his head. Wife was worried he was tired and wanted nursing staff to lie him back down in bed. As they were transferring him to the wheelchair he choked on what appeared to be his own secretions and vomited about 200 mL non-bloody emesis. BP 60's palpable afterwards with worsening responsiveness. Palliative care at bedside. POLST on file (filled out by the patient himself) says patient would not want to be intubated and/or transferred to ICU. Called his legal guardian, Lynda, who was unavailable so I spoke with OC guardian,  Cynthia lynn notify of decision to transition patient to COMFORT CARE at this time.  -prognosis very poor    Fall- (present on admission)  Assessment & Plan  -Fall Precautions  -ambulates with FWW      Dysphagia- (present on admission)  Assessment & Plan  -patient now COMFORT CARE    Hypothyroidism- (present on admission)  Assessment & Plan  -TSH 0.54 on 3/21/19.  -Continue levothyroxine.    Hx of CABG- (present on admission)  Assessment & Plan  -Continue ASA & statin    Gout of multiple sites- (present on admission)  Assessment & Plan  Chronic & stable.   No acute flare.   Continue allopurinol at dose lower than his home dose.     PVD (peripheral vascular disease) (CMS-HCC)- (present on admission)  Assessment & Plan  -patient now COMFORT CARE    History of CAD (coronary artery disease)- (present on admission)  Overview  Dr. Lynn stopped plavix in 2016 due to falls.     Assessment & Plan  -s/p 3-vessel CABG with LIMA to LAD, saphenous vein graft to obtuse marginal branch, and saphenous vein graft to the posterior descending artery by Dr. Coates in March 2018.  -patient now COMFORT CARE    Paroxysmal atrial fibrillation (CMS-HCC)- (present on admission)  Assessment & Plan  -patient now COMFORT CARE       VTE prophylaxis: Comfort Care    CLIFFORD Ewing

## 2019-08-12 NOTE — ASSESSMENT & PLAN NOTE
Has guardian. Previously transitioned to comfort care, which will be continued. Accurately reflected in the EMR.

## 2019-08-12 NOTE — PROGRESS NOTES
Patient alert and pleasant took his meds crushed with apple sauce and tolerated it well,assumed care given start of the shift,room air,call light and personal belongings within reach and bed in low position.Bed alarm on.

## 2019-08-12 NOTE — PALLIATIVE CARE
"Palliative Care follow-up  PC RN on the unit when pt had an episode of emesis, appearing minimally responsive and RNs having difficulty appreciating a manual BP. Pt back to bed with RN/CNA and emotional support provided to wife at BS who was very tearful stating \"I know they're doing everything they can.\" Case discussed with APRN who contacted Public Guardian office and pt was transitioned to CC given continual and current decline, as well as previously expressed wishes on the POLST. Will continue to provide support to pt/wife.     Updated:  team    Plan: comfort measures    Thank you for allowing Palliative Care to participate in this patient's care. Please feel free to call [phone] with any questions or concerns.  "

## 2019-08-12 NOTE — CARE PLAN
Staff took pt and wife down to [employer] and to listen to the pianist in the lobby. When pt got back, pt was sleeping in a chair in the nurses station waiting for lunch. Wife sitting next to him and wanted to move him to the bed, while moving pt he appeared to have choked on his own sputum and began to vomit. Pt still breathing but not waking up to stimuli and BP was low in the 60's/40's with a manual cuff. Pt transferred to bed. Oxygen was low bouncing from 70-90's, we put on 2L of O2 via nasal cannula. MD aware, comfort care initiated. Will cont to monitor.    Problem: Bowel/Gastric:  Goal: Normal bowel function is maintained or improved  Outcome: PROGRESSING SLOWER THAN EXPECTED     Problem: Respiratory:  Goal: Respiratory status will improve  Outcome: PROGRESSING SLOWER THAN EXPECTED

## 2019-08-13 NOTE — PROGRESS NOTES
Pt spent the rest of the day in bed but is awake and alert. BP improved to 90's/50's. Will cont to monitor.

## 2019-08-13 NOTE — PROGRESS NOTES
Patient alert to self,room air without distress,assumed care given start of the shift,tried to get out from his bed,call light and personal belongings within reach and bed in low position.

## 2019-08-14 NOTE — PROGRESS NOTES
Patient alert ,unable to follow command,ambulated to the bathroom with a walker and 1 assist twice,tried to get out of bed multiple times and restless,ativan given,will monitor patient,calllight and personal belongings within reach and bed in low position.Bed alarm on.

## 2019-08-14 NOTE — PROGRESS NOTES
Patient AAOx1, pleasant, OOB and up to nursing station, in activity room in AM.  Using cardiac chair, easier for tranfers in and out of bed.  Appetite returns, patient ate most of breakfast and lunch meals.  Room close to nursing station, strip alarm on

## 2019-08-14 NOTE — PROGRESS NOTES
Pt always so pleasant. doesnt speak much. Took pt to bathroom early he didn't void or have A bowel movement. But always eats very well. Now he is at table with other patients coloring.

## 2019-08-14 NOTE — CARE PLAN
• Assumed care at 0645  • Pt A&O x 1, tolerating rm air. Pt denies any pain.   • Pt up with 1p/FWW, wife tries to get pt out of bed alone and RN reinforced that this is not safe. Educated pt and wife on using the call light and pt was using the call light appropriately.   • Pt tolerating diet well, no BM today but passing gas.  • Pt up in nurses station for dinner and coloring with other residents, will cont to monitor.       Problem: Communication  Goal: The ability to communicate needs accurately and effectively will improve  Outcome: PROGRESSING AS EXPECTED     Problem: Safety  Goal: Will remain free from injury  Outcome: PROGRESSING AS EXPECTED     Problem: Bowel/Gastric:  Goal: Normal bowel function is maintained or improved  Outcome: PROGRESSING AS EXPECTED

## 2019-08-15 NOTE — PROGRESS NOTES
Pt participated in volleyball the activity today!! He actually hit the ball and was smiling. Please encourage to at least bring pt into room with activities so he can enjoy other things. Pt had a visit from bartolo bird last note about pelon says she is not allowed to visit. and to call security right away. Charge was notified and assisted in asking her to leave until her approval is verified. Please update both charts so we know the proper allowed visits.

## 2019-08-15 NOTE — PROGRESS NOTES
Patient up at nursing station for first half of shift with chair alarm on, then returned to room close to nursing station.  Patient requiring both strip and built in alarm as he will attempt to crawl out of bed rather than alert nursing staff.  Wife is admitted and assigned to bed 2 in same room.  Found door closed once with patient completely naked and wife wiping his butt.  Educated both patient's this behavior was not acceptable and the door to the room is to remain open unless a member of the nursing staff is with them.  Unsure if an understanding was created during the exchange.

## 2019-08-15 NOTE — PROGRESS NOTES
Patient awake and calm,room air,assumed care given start of the shift,denies pain,wife at bedside,incontinent at times,call light and personal belongings within reach and bed in low position.

## 2019-08-15 NOTE — CARE PLAN
Problem: Venous Thromboembolism (VTW)/Deep Vein Thrombosis (DVT) Prevention:  Goal: Patient will participate in Venous Thrombosis (VTE)/Deep Vein Thrombosis (DVT)Prevention Measures  Outcome: PROGRESSING AS EXPECTED   Patient is on heparin for dvt prophylaxis,with eduction,patient need reinforcement.

## 2019-08-15 NOTE — PROGRESS NOTES
[facility] Medicine Daily Progress Note    Date of Service  8/15/2019    Chief Complaint  Malaise    Hospital Course   This is an 84 y/o very malnourished male who was admitted to the hospital on 3/19/19 under the [employer] service for malaise. A head CT was negative for acute abnormalities, his labs were suggestive of BETTY and hypovolemia, and his vital signs were unremarkable outside of having hypoxia that resolved with 0.5 L/min of O2 via nasal cannula. His home lisinopril was subsequently discontinued and he was given IV fluids. His medical issues eventually stabilized, but due to lack of involvement from family (outside of his wife who was admitted at the same time) and the inability to care for themselves, guardianship has been pursued and placement is pending. He was transferred to [facility] on 6/19/19. Episode of choking 8/12 with subsequent vomiting and increased lethargy/unresponsiveness. Patient transitioned to comfort care.      Interval Problem Update  Awake.  Delayed.  Vital signs stable and within normal limits.  Oriented to person place.  On comfort care.  No pain or acute distress.    Consultants/Specialty  -Palliative Care  -Ethics    Code Status  COMFORT CARE    Disposition  Poor prognosis. Transitioned to comfort care today    Review of Systems  Review of Systems   Unable to perform ROS: Mental acuity        Physical Exam  Temp:  [36.2 °C (97.1 °F)-36.7 °C (98.1 °F)] 36.6 °C (97.8 °F)  Pulse:  [62-80] 70  Resp:  [16-18] 16  BP: (114-150)/(67-83) 150/67  SpO2:  [92 %-100 %] 93 %    Physical Exam   Constitutional: He appears well-developed and well-nourished. No distress.   HENT:   Head: Normocephalic and atraumatic.   Eyes: Pupils are equal, round, and reactive to light.   Neck: Neck supple.   Cardiovascular: Normal rate, regular rhythm and normal heart sounds.   Pulmonary/Chest: Effort normal and breath sounds normal. No respiratory distress. He has no wheezes. He has no rales.   Abdominal: Soft.  He exhibits no distension. There is no tenderness.   Neurological: He is alert. He is disoriented. No cranial nerve deficit.   No tremor or apparent cogwheel rigidity   Skin: Skin is warm and dry.   Psychiatric: His speech is delayed. He is slowed.       Fluids    Intake/Output Summary (Last 24 hours) at 8/15/2019 0752  Last data filed at 8/14/2019 1915  Gross per 24 hour   Intake 1130 ml   Output --   Net 1130 ml       Laboratory                        Imaging  CT-HEAD W/O   Final Result         NO ACUTE ABNORMALITIES ARE NOTED ON CT SCAN OF THE HEAD.      Findings are consistent with atrophy.  Decreased attenuation in the periventricular white matter likely indicates microvascular ischemic disease.      DX-HIP-BILATERAL-WITH PELVIS-2 VIEWS   Final Result      No pelvic or proximal femoral fracture identified      EC-ECHOCARDIOGRAM COMPLETE W/O CONT   Final Result      DX-CHEST-PORTABLE (1 VIEW)   Final Result      No evidence of acute cardiopulmonary process.      CT-HEAD W/O   Final Result      No acute intracranial abnormality is identified.      There are periventricular and subcortical white matter changes present.  This finding is nonspecific and could be from previous small vessel ischemia, demyelination, or gliosis.      Atrophy      Paranasal sinus disease.              Assessment/Plan  * Cognitive impairment- (present on admission)  Assessment & Plan  -guardianship obtained 7/19 - now pending placement  -no behavioral outbursts  -Ethics and palliative care evaluated the patient.   -patient now COMFORT CARE      Age-related physical debility- (present on admission)  Assessment & Plan  -patient now COMFORT CARE    Cardiac pacemaker in situ- (present on admission)  Assessment & Plan  -Secondary to SSS  -patient now COMFORT CARE    S/P TAVR (transcatheter aortic valve replacement)- (present on admission)  Assessment & Plan  -patient now COMFORT CARE    Essential hypertension- (present on  admission)  Assessment & Plan  -patient now COMFORT CARE    Dyslipidemia- (present on admission)  Assessment & Plan  -patient now COMFORT CARE    Comfort measures only status  Assessment & Plan  -patient was sitting up at nurses station before lunch. Wife was behind him rubbing/massaging his head. Wife was worried he was tired and wanted nursing staff to lie him back down in bed. As they were transferring him to the wheelchair he choked on what appeared to be his own secretions and vomited about 200 mL non-bloody emesis. BP 60's palpable afterwards with worsening responsiveness. Palliative care at bedside. POLST on file (filled out by the patient himself) says patient would not want to be intubated and/or transferred to ICU. Called his legal guardian, Lynda, who was unavailable so I spoke with OC guardian, Cynthia to notify of decision to transition patient to COMFORT CARE at this time.  -prognosis very poor    Fall- (present on admission)  Assessment & Plan  -Fall Precautions  -ambulates with FWW      Dysphagia- (present on admission)  Assessment & Plan  -patient now COMFORT CARE    Hypothyroidism- (present on admission)  Assessment & Plan  -TSH 0.54 on 3/21/19.  -Continue levothyroxine.    Hx of CABG- (present on admission)  Assessment & Plan  -Continue ASA & statin    Gout of multiple sites- (present on admission)  Assessment & Plan  Chronic & stable.   No acute flare.   Continue allopurinol at dose lower than his home dose.     PVD (peripheral vascular disease) (CMS-HCC)- (present on admission)  Assessment & Plan  -patient now COMFORT CARE    History of CAD (coronary artery disease)- (present on admission)  Overview  Dr. Wu stopped plavix in 2016 due to falls.     Assessment & Plan  -s/p 3-vessel CABG with LIMA to LAD, saphenous vein graft to obtuse marginal branch, and saphenous vein graft to the posterior descending artery by Dr. Coates in March 2018.  -patient now COMFORT CARE    Paroxysmal atrial fibrillation  (CMS-[facility])- (present on admission)  Assessment & Plan  -patient now COMFORT CARE       VTE prophylaxis: Comfort Care    Sami Brewer, A.P.N.      There are no changes to the ROS/physical exam and plan of care 8/15/2019 compared to the prior progress note. Patient requires 24/7 care/supervision.

## 2019-08-16 NOTE — PROGRESS NOTES
Bedside report received. Pt is A&Ox1 to self. Pt is able to ambulate up to bathroom with hand held assistance and 2 staff members. Pt is unsteady and is high fall risk. Pt is up sitting in chair with lap belt in place in activity room watching a movie.  POC discussed with pt; all questions answered at this time.

## 2019-08-16 NOTE — PROGRESS NOTES
Patient is alert, oriented to self, continue with difficulty speaking but is able to make needs known. No s/s of pain or distress. Patient continue to be restless in bed, taking off clothes and covers. Bed alarm in place.

## 2019-08-16 NOTE — CARE PLAN
Problem: Communication  Goal: The ability to communicate needs accurately and effectively will improve  Outcome: PROGRESSING AS EXPECTED     Problem: Safety  Goal: Will remain free from falls  Outcome: PROGRESSING AS EXPECTED  Note:   Reinforced the use of call light when needing assistance. Treaded socks on. Bed in lowest position. Personal belongings within reach. Clutter free environment provided.

## 2019-08-17 NOTE — PROGRESS NOTES
Pt is up in chair eating meals, pt has lap belt in place. Pt is A&Ox1, Pt is unsteady and weak while standing up to ambulate up the bathroom. Pt requires 2 staff members with hand held assist. Pt had BM today. Pt was with wife and visitor named Pradip who came to visit today. Comfort care measures in place.

## 2019-08-17 NOTE — CARE PLAN
Problem: Safety  Goal: Will remain free from falls  Outcome: PROGRESSING AS EXPECTED  Intervention: Implement fall precautions  Note:   Reinforced the use of call light when needing assistance. Treaded socks on. Bed in lowest position. Personal belongings within reach. Clutter free environment provided.

## 2019-08-17 NOTE — PROGRESS NOTES
Patient up on chair, eating dinner at shift change. No s/s of pain or distress. Continue to get up without calling for assistance. Fall precautions in place. Needs attended to.

## 2019-08-18 NOTE — CARE PLAN
Problem: Safety  Goal: Will remain free from injury  Outcome: PROGRESSING AS EXPECTED  Bed in locked and lowest position. Bed alarm on. Nonskid socks in place. Will continue to monitor.      Problem: Pain Management  Goal: Pain level will decrease to patient's comfort goal  Outcome: PROGRESSING AS EXPECTED  No c/o pain or discomfort at this time. Pt resting in bed. Will continue to monitor.

## 2019-08-18 NOTE — PROGRESS NOTES
Vital signs stable and within normal limits.  Patient is a 85-year-old male with bradykinesia, confusion, short-term memory loss requiring 24/7 care.  He has no formal neurology evaluation in the past but his symptoms are strongly suggestive of advanced Parkinson's.  He currently is on comfort care and is in a room residing with his wife awaiting placement.  His stay occasionally requires Medicare recertification is his discharge has been delayed due to guardianship and an acceptable facility based on his income limitations and physical debility.

## 2019-08-18 NOTE — PROGRESS NOTES
Pt is in room resting in bed with wife. Pt was OOB in chair for all 3 meals.Pt has adequate oral intake and consumes 100% of all meals. Pt is comfort care and appears very comfortable at this time. Pt is still hand held assistance of 2 staff members up to  Bathroom. Pt is weak and unsteady but still physically can transfer and sit on elevate toilet seat safely with staff. POC discussed with pt

## 2019-08-18 NOTE — PROGRESS NOTES
A&OX1. Pt resting in bed throughout shift. No c/o pain or discomfort. Fall precautions in place. Call bell and bedside table within reach. Will continue to monitor.

## 2019-08-19 NOTE — PROGRESS NOTES
Received report of patient at start of shift. Unable to assess orientation, patient not answering questions. Brought up chair for meals with one person assist. New orders received for brain MRI and lab draws. Page placed to MUNDO Patel. Discussed patient's status of comfort care. Jorge stated that checking for and treating possible parkinson's could potentially increase patient's functionality/comfort. Call received from MRI requesting screening form. Notified MRI that it is not possible to obtain accurate answers to the MRI screening form from patient or his wife. Notified MRI tech that notes in chart indicate patient has pacemaker. Per MRI tech, previous imaging results can be used to determine if patient is safe to have MRI. MRI tech requested abdominal x-ray be ordered. MUNDO Patel, updated - order received. Discussed with AMADOU Srinivasan as patient has a guardian. Guardian (Aliza Emmy [phone]) called with update. Update provided to Aliza. Aliza stated she agrees with imaging and plan. Bed alarm on, safety precautions in place.    Entry time 1624 - Call placed to MRI tech. Notified MRI tech that patient will be having abdominal X-ray shortly after 1700. MRI tech stated that since patient has a pacemaker, pacemaker rep will need to be contacted and present for MRI. MRI tech stated MRI will be moved to tomorrow as pacemaker reps are unavailable after 5pm. MUNDO Patel, paged with update.

## 2019-08-19 NOTE — PROGRESS NOTES
[facility] Medicine Daily Progress Note    Date of Service  8/19/2019    Chief Complaint  Malaise    Hospital Course   This is an 86 y/o very malnourished male who was admitted to the hospital on 3/19/19 under the R service for malaise. A head CT was negative for acute abnormalities, his labs were suggestive of BETTY and hypovolemia, and his vital signs were unremarkable outside of having hypoxia that resolved with 0.5 L/min of O2 via nasal cannula. His home lisinopril was subsequently discontinued and he was given IV fluids. His medical issues eventually stabilized, but due to lack of involvement from family (outside of his wife who was admitted at the same time) and the inability to care for themselves, guardianship has been pursued and placement is pending. He was transferred to [facility] on 6/19/19. Episode of choking 8/12 with subsequent vomiting and increased lethargy/unresponsiveness. Patient transitioned to comfort care.      Interval Problem Update  Vital signs stable and within normal limits.  Patiently largely nonverbal although significantly delayed. He does respond appropriately at times.  He has systemic bradykinesia, Parkinson-type faces, and a shuffling gait.  While he lacks resting tremor, his presentation is suggestive of a Parkinson's variant.  He is comfort care, but should he meet criteria for Parkinson's, he may stand to benefit in comfort and quality of life with anti-Parkinson's medications.  As such, I have asked inpatient neurology to consider evaluating him as no previous outpatient neurology work-up has been done per my search of records.    I discussed this with the patient, his wife, nursing, and case management.    Consultants/Specialty  -Neuro  -Palliative Care  -Ethics    Code Status  COMFORT CARE    Disposition  TBD    Review of Systems  Review of Systems   Unable to perform ROS: Mental acuity        Physical Exam  Temp:  [36.7 °C (98 °F)-36.8 °C (98.2 °F)] 36.8 °C (98.2  °F)  Pulse:  [72-80] 80  Resp:  [18-20] 18  BP: (143)/(64-76) 143/64  SpO2:  [93 %-98 %] 98 %    Physical Exam   Constitutional: He appears well-developed and well-nourished. No distress.   HENT:   Head: Normocephalic and atraumatic.   Eyes: Pupils are equal, round, and reactive to light.   Neck: Neck supple.   Cardiovascular: Normal rate, regular rhythm and normal heart sounds.   Pulmonary/Chest: Effort normal and breath sounds normal. No respiratory distress. He has no wheezes. He has no rales.   Abdominal: Soft. He exhibits no distension. There is no tenderness.   Neurological: He is alert. No cranial nerve deficit.   No tremor; slight cogwheel rigidity; diffuse bradykinesia, parkinsonism type facial expression   Skin: Skin is warm and dry.   Psychiatric: His speech is delayed. He is slowed.       Fluids    Intake/Output Summary (Last 24 hours) at 8/19/2019 1551  Last data filed at 8/19/2019 0945  Gross per 24 hour   Intake 712 ml   Output --   Net 712 ml       Laboratory                        Imaging  CT-HEAD W/O   Final Result         NO ACUTE ABNORMALITIES ARE NOTED ON CT SCAN OF THE HEAD.      Findings are consistent with atrophy.  Decreased attenuation in the periventricular white matter likely indicates microvascular ischemic disease.      DX-HIP-BILATERAL-WITH PELVIS-2 VIEWS   Final Result      No pelvic or proximal femoral fracture identified      EC-ECHOCARDIOGRAM COMPLETE W/O CONT   Final Result      DX-CHEST-PORTABLE (1 VIEW)   Final Result      No evidence of acute cardiopulmonary process.      CT-HEAD W/O   Final Result      No acute intracranial abnormality is identified.      There are periventricular and subcortical white matter changes present.  This finding is nonspecific and could be from previous small vessel ischemia, demyelination, or gliosis.      Atrophy      Paranasal sinus disease.         MR-BRAIN-W/O    (Results Pending)   IO-JIRSHFI-8 VIEW    (Results Pending)         Assessment/Plan  * Cognitive impairment- (present on admission)  Assessment & Plan  -guardianship obtained 7/19 - now pending placement  -no behavioral outbursts  -Ethics and palliative care evaluated the patient.   -patient now COMFORT CARE      Age-related physical debility- (present on admission)  Assessment & Plan  -patient now COMFORT CARE    Cardiac pacemaker in situ- (present on admission)  Assessment & Plan  -Secondary to SSS  -patient now COMFORT CARE    S/P TAVR (transcatheter aortic valve replacement)- (present on admission)  Assessment & Plan  -patient now COMFORT CARE    Essential hypertension- (present on admission)  Assessment & Plan  -patient now COMFORT CARE    Dyslipidemia- (present on admission)  Assessment & Plan  -patient now COMFORT CARE    Comfort measures only status  Assessment & Plan  -patient was sitting up at nurses station before lunch. Wife was behind him rubbing/massaging his head. Wife was worried he was tired and wanted nursing staff to lie him back down in bed. As they were transferring him to the wheelchair he choked on what appeared to be his own secretions and vomited about 200 mL non-bloody emesis. BP 60's palpable afterwards with worsening responsiveness. Palliative care at bedside. POLST on file (filled out by the patient himself) says patient would not want to be intubated and/or transferred to ICU. Called his legal guardian, Lynda, who was unavailable so I spoke with OC guardian, Cynthia to notify of decision to transition patient to COMFORT CARE at this time.  -prognosis very poor    Fall- (present on admission)  Assessment & Plan  -Fall Precautions  -ambulates with FWW      Dysphagia- (present on admission)  Assessment & Plan  -patient now COMFORT CARE    Hypothyroidism- (present on admission)  Assessment & Plan  -TSH 0.54 on 3/21/19.  -Continue levothyroxine.    Hx of CABG- (present on admission)  Assessment & Plan  -Continue ASA & statin    Gout of multiple sites-  (present on admission)  Assessment & Plan  Chronic & stable.   No acute flare.   Continue allopurinol at dose lower than his home dose.     PVD (peripheral vascular disease) (CMS-HCC)- (present on admission)  Assessment & Plan  -patient now COMFORT CARE    History of CAD (coronary artery disease)- (present on admission)  Overview  Dr. Wu stopped plavix in 2016 due to falls.     Assessment & Plan  -s/p 3-vessel CABG with LIMA to LAD, saphenous vein graft to obtuse marginal branch, and saphenous vein graft to the posterior descending artery by Dr. Coates in March 2018.  -patient now COMFORT CARE    Paroxysmal atrial fibrillation (CMS-HCC)- (present on admission)  Assessment & Plan  -patient now COMFORT CARE       VTE prophylaxis: Comfort Care    Sami Brewer, A.P.N.      There are no changes to the ROS/physical exam and plan of care 8/15/2019 compared to the prior progress note. Patient requires 24/7 care/supervision.

## 2019-08-19 NOTE — CONSULTS
"Chief Complaint   Patient presents with   • Malaise       Problem List Items Addressed This Visit     None      Visit Diagnoses     Stupor    -  Primary    Malaise        Dehydration        Renal insufficiency        Non-traumatic rhabdomyolysis          Neurology Consultation     History of present illness:  This is an 85-year old male with PMhx significant for CAD, MI, Atrial fibrillation (previously on Xarelto, d/c'd secondary to fall concern/risk; on ASA only), dyslipidemia, TIA, and some/unknown degree of mild cognitive impairment vs dementia at baseline who presented to [facility] on 3/19/19 for a chief complaint of malaise and \"not feeling well.\" At that time, found to have BETTY with dehydration, was appropriately treated by primary team. During the days/several weeks following admission, it was determined that patient and patient's wife live alone and were/are unable to appropriately care for one another. Patient and wife have remained in the hospital receiving care as well as further management per social work/case management, palliative care and ethics [place].   Our neurology service was asked to see the patient given concern for slow movement and concern for Parkinson's disease. At time of my presentation to bedside, patient is minimally verbal however interactive, follows simple commands bilaterally. No overt tremor/resting tremor nor obvious/significant rigidity.  Patient's wife states that prior to admission in March, patient was \"able to care for himself,\" admits that he was still driving and preparing meals. She denies history of dementia; does admit that patient was experiencing occasional falls at home. No reported use of (recent or remote) antipsychotics/other psychotropics or benzodiazepines. Wife states that patient's decline has been gradual and he can no longer get out of bed by himself or do other ADLs independently.   Currently, patient is laying in bed; appears awake and alert. " Oriented to self, otherwise disoriented. Follows simple commands. Further ROS is limited as patient has a limited ability to communicate.     Neurology has been consulted by Sami Brewer to further evaluate the findings noted above.     Past medical history:   Past Medical History:   Diagnosis Date   • Anemia 4/2016    due to rectal bleed   • Arrhythmia 5/12/17    Hx A fib. On only aspirin.    • CAD (coronary artery disease)    • Carotid artery stenosis 6/13/2011   • CATARACT 1990's    Bilateral phaco with IOL   • Dental disorder     cavities, under current care   • Dental disorder 5/12/17    permanent bridge lower   • Dizziness 6/13/2011   • Dyslipidemia 9/18/2010   • Gout    • Heart murmur    • High cholesterol    • Hyperlipidemia    • Hypertension    • Hypothyroidism 6/13/2011   • Insomnia    • Myocardial infarct (HCC) 2008    Stent 2011.  5/12/17-Cardiologist is DR Wu ([facility])   • PAD (peripheral artery disease)    • Preoperative examination, unspecified 4/12/2011   • Rectal bleed 4/4/2016    Cauterized and received blood transfusions   • Renal disorder 2000    kidney stone   • Sleep apnea 2014    States recommended CPAP but never did get it.   • Stroke (HCC) 4/16/2010    TIA    • Tendonitis    • TIA (transient ischemic attack) 6/13/2011   • Unspecified disorder of thyroid    • Urinary incontinence        Past surgical history:   Past Surgical History:   Procedure Laterality Date   • TRANSCATHETER AORTIC VALVE REPLACEMENT N/A 2/12/2018    Procedure: TRANSCATHETER AORTIC VALVE REPLACEMENT;  Surgeon: Jez Waters M.D.;  Location: SURGERY [facility];  Service: Cardiac   • JANETH  2/12/2018    Procedure: JANETH;  Surgeon: Jez Waters M.D.;  Location: SURGERY [facility];  Service: Cardiac   • COLONOSCOPY N/A 5/16/2017    Procedure: COLONOSCOPY;  Surgeon: Osmany Smart M.D.;  Location: SURGERY [facility];  Service:    • LAPAROSCOPY  4/2/2016    exp for rectal bleed and cautery   • COLONOSCOPY  2015    • RECOVERY  8/22/2012    aortogram-Performed by SURGERY, IR-RECOVERY at SURGERY [facility]   • CAROTID ENDARTERECTOMY  5/3/2011    Performed by ERASMO NAVARRETE at SURGERY [facility]   • RECOVERY  4/1/2011    Performed by SURGERY, CATH-RECOVERY at SURGERY SAME DAY [facility]   • RECOVERY  3/25/2011    Performed by SURGERY, CATH-RECOVERY at SURGERY SAME DAY [facility]   • MULTIPLE CORONARY ARTERY BYPASS ENDO VEIN HARVEST  3/20/08    Performed by AMANDA CRYSTAL at SURGERY [facility]   • LITHOTRIPSY  2000   • SEPTOPLASTY  1995   • CATARACT EXTRACTION WITH IOL Bilateral early 1990's       Family history:   Family History   Problem Relation Age of Onset   • Heart Disease Father    • Other Mother         TB   • Hypertension Unknown    • Colon Cancer Brother    • Breast Cancer Sister    • Other Unknown         GOUT       Social history:   Social History     Socioeconomic History   • Marital status:      Spouse name: Not on file   • Number of children: Not on file   • Years of education: Not on file   • Highest education level: Not on file   Occupational History   • Not on file   Social Needs   • Financial resource strain: Not on file   • Food insecurity:     Worry: Not on file     Inability: Not on file   • Transportation needs:     Medical: Not on file     Non-medical: Not on file   Tobacco Use   • Smoking status: Never Smoker   • Smokeless tobacco: Never Used   Substance and Sexual Activity   • Alcohol use: No   • Drug use: No   • Sexual activity: Not on file   Lifestyle   • Physical activity:     Days per week: Not on file     Minutes per session: Not on file   • Stress: Not on file   Relationships   • Social connections:     Talks on phone: Not on file     Gets together: Not on file     Attends Jainism service: Not on file     Active member of club or organization: Not on file     Attends meetings of clubs or organizations: Not on file     Relationship status: Not on file   • Intimate  partner violence:     Fear of current or ex partner: Not on file     Emotionally abused: Not on file     Physically abused: Not on file     Forced sexual activity: Not on file   Other Topics Concern   • Not on file   Social History Narrative   • Not on file       Current medications:   Current Facility-Administered Medications   Medication Dose   • MD ALERT...adult comfort care     • atropine 1 % ophthalmic solution 2 Drop  2 Drop   • morphine (pf) 4 mg/ml injection 2-4 mg  2-4 mg   • LORazepam (ATIVAN) injection 1 mg  1 mg   • senna-docusate (PERICOLACE or SENOKOT S) 8.6-50 MG per tablet 2 Tab  2 Tab    And   • magnesium hydroxide (MILK OF MAGNESIA) suspension 30 mL  30 mL    And   • bisacodyl (DULCOLAX) suppository 10 mg  10 mg   • polyethylene glycol/lytes (MIRALAX) PACKET 1 Packet  1 Packet       Medication Allergy:  No Known Allergies    Review of systems:   As noted above, otherwise limited secondary to patient's altered state.     Physical examination:   Vitals:    08/17/19 1900 08/18/19 0700 08/18/19 2000 08/19/19 0715   BP: 125/74 (!) 161/77 143/76 143/64   Pulse: 70 70 72 80   Resp: 20 16 20 18   Temp:  36.4 °C (97.6 °F) 36.7 °C (98 °F) 36.8 °C (98.2 °F)   TempSrc: Temporal Temporal Temporal Temporal   SpO2: 94% 99% 93% 98%   Weight:       Height:         General: Patient in no acute distress  HEENT: Normocephalic, no signs of acute trauma.   Neck: supple, no meningeal signs or carotid bruits. There is normal range of motion. No tenderness on exam.   Chest: clear to auscultation. No cough.   CV: RRR, no murmurs.   Skin: no signs of acute rashes or trauma.   Musculoskeletal: joints exhibit full range of motion, without any pain to palpation. There are no signs of joint or muscle swelling. There is no tenderness to deep palpation of muscles.   Psychiatric: No hallucinatory behavior. Denies symptoms of depression or suicidal ideation. Mood and affect appear normal on exam.     NEUROLOGICAL EXAM:   Mental  status, orientation: Awake, alert. Oriented to self.    Speech and language: Minimal verbal output. Patient follows simple commands bilaterally.   Cranial nerve exam: Pupils are 3-4 mm bilaterally and equally reactive to light and accommodation. Visual fields are intact by confrontation. There is no nystagmus on primary or secondary gaze. Impaired vertical eye movement both upward and downward; horizontal movement Intact/ full EOM. Face appears symmetric.  Corneal reflexes are intact bilaterally. Uvula is midline. Palate elevates symmetrically. Tongue is midline and without any signs of tongue biting or fasciculations. Sternocleidomastoid muscles exhibit is normal strength bilaterally. Shoulder shrug is intact bilaterally.   Motor exam: Strength is 5/5 in all extremities. Bradykinetic throughout; Tone is however normal. No abnormal movements were seen on exam, including no tremor/resting tremor.   Sensory exam reveals normal sense of light touch, vibration and pinprick in all extremities.   Deep tendon reflexes:  diminished throughout. Plantar responses are extensor. There is no clonus.   Coordination: impaired rapidly alternating movements bilaterally.   Gait: Not assessed at this time as patient is a fall risk.       ANCILLARY DATA REVIEWED:     Lab Data Review:  No results found for this or any previous visit (from the past 24 hour(s)).    Labs reviewed by me.       Imaging reviewed by me:     CT-HEAD W/O   Final Result         NO ACUTE ABNORMALITIES ARE NOTED ON CT SCAN OF THE HEAD.      Findings are consistent with atrophy.  Decreased attenuation in the periventricular white matter likely indicates microvascular ischemic disease.      DX-HIP-BILATERAL-WITH PELVIS-2 VIEWS   Final Result      No pelvic or proximal femoral fracture identified      EC-ECHOCARDIOGRAM COMPLETE W/O CONT   Final Result      DX-CHEST-PORTABLE (1 VIEW)   Final Result      No evidence of acute cardiopulmonary process.      CT-HEAD W/O  "  Final Result      No acute intracranial abnormality is identified.      There are periventricular and subcortical white matter changes present.  This finding is nonspecific and could be from previous small vessel ischemia, demyelination, or gliosis.      Atrophy      Paranasal sinus disease.         MR-BRAIN-W/O    (Results Pending)       ASSESSMENT AND PLAN:  85-year old male with PMhx significant for CAD, MI, Atrial fibrillation (previously on Xarelto, d/c'd secondary to fall concern/risk; on ASA only), dyslipidemia, TIA, and some/unknown degree of mild cognitive impairment vs dementia at baseline who presented to [facility] on 3/19/19 for a chief complaint of malaise, \"not feeling well,\" was ultimately treated for BETTY and dehydration. After admission however, palliative medicine and eventually ethics [place] became involved in patient's care as it was apparent that patient/patient's wife were unable to care for each other at home; for whatever reason, family have been minimally involved in their care as well. At any rate, our neurology service was asked to see patient given concerns for bradykinetic movement, worsening cognition and concern for Parkinson's disease. Per my exam, patient does appear bradykinetic, however is not significantly rigid as you would expect to see with classic PD. Additionally, no resting tremor (nor other tremor) is seen on exam. Given the above, feel patient likely suffers from advancing dementia; etiology unclear, likely Alzheimer's however may also consider Parkinson's plus syndrome(s); given concern for neglect, may also consider nutritional deficiency hastening/worsening symptoms of dementia as well. Will check B12, Folate, TSH, RPR, Ammonia, Vitamin D levels; will plan to follow up with these results and make additional recommendations accordingly. MRI Brain would also be helpful in determining extent of atrophy involving both the cortical and sub-coritcal structures of " the brain; patient however has AICD/pacemaker. Will attempt to gain information regarding ACID/pacemaker MRI compatibility and follow up with studies as they are completed.     The plan of care above has been discussed with Dr. Rodas.     Luh Sharma MSN, BSN, AGNP-C  [facility]

## 2019-08-19 NOTE — PROGRESS NOTES
Pt resting in bed throughout shift. No c/o pain at this time. Fall precautions in place. Call bell and bedside table within reach. Will continue to monitor.

## 2019-08-19 NOTE — CARE PLAN
Problem: Safety  Goal: Will remain free from injury  Outcome: PROGRESSING AS EXPECTED    Bed in locked and lowest position. Bed alarm on. Nonskid socks in place. Will continue to monitor.      Problem: Pain Management  Goal: Pain level will decrease to patient's comfort goal  Outcome: PROGRESSING AS EXPECTED    No c/o pain or discomfort. Pt resting in bed at this time. Will continue to monitor.

## 2019-08-20 NOTE — PROGRESS NOTES
Pt resting in bed throughout shift. No c/o pain or discomfort. Pt had shower at beginning of shift. Ambulates with 1 assist. Fall precautions in place. Call bell and bedside table within reach. Will continue to monitor.

## 2019-08-20 NOTE — PROGRESS NOTES
Received report of patient at start of shift. Unable to assess orientation, patient does not answer questions. No signs of pain or discomfort. Brought up to chair for breakfast. MRI scheduled for 1500 today. Room near nursing station, safety precautions in place.

## 2019-08-21 NOTE — PROGRESS NOTES
Pt not speaking much. He was up to the chair twice. Along with for meals. Just had a bowel movement incontinent And his wife was crying as she cleaned it up. I stopped her and asked If I could clean him up. She is very addiment about taking care of her .   No activities today for pt.

## 2019-08-21 NOTE — PROGRESS NOTES
Pt resting in bed throughout shift. No c/o pain and discomfort. Fall precautions in place. Call bell and bedside table within reach. Hourly rounding completed. Will continue to monitor.

## 2019-08-21 NOTE — PROGRESS NOTES
"Pt received from Med/Neph via [employer]. Pt only mouthed \"hello\"  Otherwise non-verbal throughout scan.  Pt tolerated MRI well c VSS, rare PACs.  [employer] rep present during entire scan.  PPM settings back to Pt's usual parameters per [employer] Rep. Report called to bedside NurseYamini.    "

## 2019-08-21 NOTE — PROGRESS NOTES
Received report of patient at start of shift. Unable to assess orientation, patient does not answer questions. Transported for MRI this morning. Patient back in room sitting up in chair. Call received from guardian, Aliza Booth, asking for an update on results of MRI. This RN asked Santana FLOWER to update Aliza when results are available. Patient's wife in shared room with patient assisting with care. Chair alarm on, safety precautions in place.

## 2019-08-22 NOTE — PROGRESS NOTES
Assumed care of pt at shift change. A/Ox1, discussed plan of care. Pt on room air. Pt was up on chair eating breakfast. Denied pain.       All needs met at this time. Bed in lowest position, treaded socks on, personal belongings and call light within reach, instructed to call for any assistance, hourly rounding in place.

## 2019-08-22 NOTE — PROGRESS NOTES
Pt up to chair for dinner at beginning of shift. No c/o pain or discomfort. Pt resting in bed at this time. Fall precautions in place. Call bell and bedside table within reach. Will continue to monitor.

## 2019-08-22 NOTE — PROGRESS NOTES
Pt sent down to MRI when we started our activity today. So he missed out on volleyball. But no changes in pt otherwise.

## 2019-08-22 NOTE — PROGRESS NOTES
[facility] Medicine Daily Progress Note    Date of Service  8/22/2019    Chief Complaint  Malaise    Hospital Course   This is an 86 y/o very malnourished male who was admitted to the hospital on 3/19/19 under the [facility] service for malaise. A head CT was negative for acute abnormalities, his labs were suggestive of BETTY and hypovolemia, and his vital signs were unremarkable outside of having hypoxia that resolved with 0.5 L/min of O2 via nasal cannula. His home lisinopril was subsequently discontinued and he was given IV fluids. His medical issues eventually stabilized, but due to lack of involvement from family (outside of his wife who was admitted at the same time) and the inability to care for themselves, guardianship has been pursued and placement is pending. He was transferred to [facility] on 6/19/19. Episode of choking 8/12 with subsequent vomiting and increased lethargy/unresponsiveness. Patient transitioned to comfort care.      Interval Problem Update  8/22:  MRI showing findings consistent with advanced dementia.  Discussed with neurology and they do not suspect Parkinson's at this time.  Patient appears to be at his baseline mentation and functioning.  Coloring on assessment.  No acute distress noted.  Updated guardian.     Consultants/Specialty  -Neuro  -Palliative Care  -Ethics    Code Status  COMFORT CARE    Disposition  TBD    Review of Systems  Review of Systems   Unable to perform ROS: Mental acuity        Physical Exam  Temp:  [37 °C (98.6 °F)-37.2 °C (99 °F)] 37.2 °C (99 °F)  Pulse:  [70-78] 70  Resp:  [16] 16  BP: (143-159)/(67-68) 159/67  SpO2:  [92 %-96 %] 96 %    Physical Exam   Constitutional: He appears well-developed and well-nourished. No distress.   HENT:   Head: Normocephalic and atraumatic.   Right Ear: External ear normal.   Left Ear: External ear normal.   Mouth/Throat: No oropharyngeal exudate.   Eyes: Conjunctivae are normal. Right eye exhibits no discharge. Left eye exhibits no  discharge.   Neck: Neck supple. No tracheal deviation present.   Cardiovascular: Normal rate, regular rhythm and normal heart sounds.   No murmur heard.  Pulmonary/Chest: Effort normal and breath sounds normal. No stridor. No respiratory distress. He has no wheezes.   Abdominal: Soft. He exhibits no distension. There is no tenderness. There is no rebound.   Musculoskeletal: He exhibits no edema.   Neurological: He is alert.   No tremor; slight cogwheel rigidity; diffuse bradykinesia,    Skin: Skin is warm and dry. No rash noted. He is not diaphoretic. No erythema.   Psychiatric: His speech is delayed. He is slowed.       Fluids    Intake/Output Summary (Last 24 hours) at 8/22/2019 1211  Last data filed at 8/21/2019 1900  Gross per 24 hour   Intake 800 ml   Output --   Net 800 ml       Laboratory                        Imaging  MR-BRAIN-W/O   Final Result      1.  No acute abnormality   2.  Moderate to severe cerebral volume loss. When compared with 2/13/2011 there has been Interval progression of the cerebral volume loss   3.  Mild chronic microvascular ischemic disease.      [id number]-2 VIEW   Final Result      1.  No evidence of bowel obstruction.      2.  Endovascular coil within the right lower quadrant.      3.  Pelvic calcification.      4.  AICD present.      CT-HEAD W/O   Final Result         NO ACUTE ABNORMALITIES ARE NOTED ON CT SCAN OF THE HEAD.      Findings are consistent with atrophy.  Decreased attenuation in the periventricular white matter likely indicates microvascular ischemic disease.      DX-HIP-BILATERAL-WITH PELVIS-2 VIEWS   Final Result      No pelvic or proximal femoral fracture identified      EC-ECHOCARDIOGRAM COMPLETE W/O CONT   Final Result      DX-CHEST-PORTABLE (1 VIEW)   Final Result      No evidence of acute cardiopulmonary process.      CT-HEAD W/O   Final Result      No acute intracranial abnormality is identified.      There are periventricular and subcortical white matter  changes present.  This finding is nonspecific and could be from previous small vessel ischemia, demyelination, or gliosis.      Atrophy      Paranasal sinus disease.              Assessment/Plan  * Cognitive impairment- (present on admission)  Assessment & Plan  -guardianship obtained 7/19 - now pending placement  -no behavioral outbursts  -Ethics and palliative care evaluated the patient.   -patient now COMFORT CARE  -There was concern for possible underlying undiagnosed Parkinson's disease.  MRI brain showing moderate to severe cerebral volume loss consistent with advancing dementia.  Neurology evaluated patient and do not suspect Parkinson's.  Will not initiate additional medications at this time.     Age-related physical debility- (present on admission)  Assessment & Plan  -patient now COMFORT CARE    Cardiac pacemaker in situ- (present on admission)  Assessment & Plan  -Secondary to SSS  -patient now COMFORT CARE    S/P TAVR (transcatheter aortic valve replacement)- (present on admission)  Assessment & Plan  -patient now COMFORT CARE    Essential hypertension- (present on admission)  Assessment & Plan  -patient now COMFORT CARE    Dyslipidemia- (present on admission)  Assessment & Plan  -patient now COMFORT CARE    Comfort measures only status  Assessment & Plan  -patient was sitting up at nurses station before lunch. Wife was behind him rubbing/massaging his head. Wife was worried he was tired and wanted nursing staff to lie him back down in bed. As they were transferring him to the wheelchair he choked on what appeared to be his own secretions and vomited about 200 mL non-bloody emesis. BP 60's palpable afterwards with worsening responsiveness. Palliative care at bedside. POLST on file (filled out by the patient himself) says patient would not want to be intubated and/or transferred to ICU. Called his legal guardian, Lynda, who was unavailable so I spoke with OC guardian, Cynthia to notify of decision to  transition patient to COMFORT CARE at this time.        Fall- (present on admission)  Assessment & Plan  -Fall Precautions  -ambulates with FWW      Dysphagia- (present on admission)  Assessment & Plan  -patient now COMFORT CARE    Hypothyroidism- (present on admission)  Assessment & Plan  -TSH 0.54 on 3/21/19.  -Continue levothyroxine.    Hx of CABG- (present on admission)  Assessment & Plan  -Continue ASA & statin    Gout of multiple sites- (present on admission)  Assessment & Plan  Chronic & stable.   No acute flare.   Continue allopurinol at dose lower than his home dose.     PVD (peripheral vascular disease) (CMS-HCC)- (present on admission)  Assessment & Plan  -patient now COMFORT CARE    History of CAD (coronary artery disease)- (present on admission)  Overview  Dr. Wu stopped plavix in 2016 due to falls.     Assessment & Plan  -s/p 3-vessel CABG with LIMA to LAD, saphenous vein graft to obtuse marginal branch, and saphenous vein graft to the posterior descending artery by Dr. Coates in March 2018.  -patient now COMFORT CARE    Paroxysmal atrial fibrillation (CMS-HCC)- (present on admission)  Assessment & Plan  -patient now COMFORT CARE       VTE prophylaxis: Comfort Care    CLIFFORD Zepeda

## 2019-08-23 NOTE — PROGRESS NOTES
Assumed care of pt at shift change. A/Ox1, discussed plan of care. Pt on room air. Pt was up on chair drawing picture and ate breakfast and went back to bed. Denied pain.         All needs met at this time. Bed in lowest position, treaded socks on, personal belongings and call light within reach, instructed to call for any assistance, hourly rounding in place.

## 2019-08-24 NOTE — PROGRESS NOTES
Pt was up in chair for dinner.  Denied any pain.  Assisted back to bed.  Continues to have bed alarm.

## 2019-08-24 NOTE — CARE PLAN
Problem: Safety  Goal: Will remain free from injury  Outcome: PROGRESSING AS EXPECTED  Continues to have bed alarm to prevent pt from falls     Problem: Discharge Barriers/Planning  Goal: Patient's continuum of care needs will be met  Outcome: PROGRESSING SLOWER THAN EXPECTED   SW working on placement

## 2019-08-24 NOTE — PROGRESS NOTES
Assumed care of pt at shift change. A/Ox1, discussed plan of care. Pt on room air.  Pt was up on chair drawing picture and ate breakfast and went back to bed. Wife at bedside caring pt. Denied pain.         All needs met at this time. Bed in lowest position, treaded socks on, personal belongings and call light within reach, instructed to call for any assistance, hourly rounding in place.

## 2019-08-25 NOTE — PROGRESS NOTES
Pt was up in chair for dinner.  Denied any needs.  Assisted back to bed.  Continues to have bed alarm.

## 2019-08-25 NOTE — PROGRESS NOTES
This is an 86 y/o male with advanced dementia currently on comfort care measures only.  He has a state appointed guardian and is pending placement.  He is medically clear, therefore he is only being assessed twice weekly.     8/25:  He is sitting in his chair, coloring.  He has chronic dysarthria with minimal verbal responses.  He does not appear to have acute pain or discomfort.

## 2019-08-25 NOTE — CARE PLAN
Problem: Safety  Goal: Will remain free from falls  Outcome: PROGRESSING AS EXPECTED   Continues to have bed alarm to prevent pt from fall.    Problem: Discharge Barriers/Planning  Goal: Patient's continuum of care needs will be met  Outcome: PROGRESSING SLOWER THAN EXPECTED  Difficult discharge as wife needs to place together.

## 2019-08-26 NOTE — PROGRESS NOTES
Assumed care of pt at shift change. A/Ox1, discussed plan of care. Pt on room air.  Pt was up on chair drawing picture and ate breakfast. Denied pain. Pt showered.         All needs met at this time. Bed in lowest position, treaded socks on, personal belongings and call light within reach, instructed to call for any assistance, hourly rounding in place.

## 2019-08-26 NOTE — PROGRESS NOTES
Pt is A/O x 1. Pt was up in chair at beginning of shift, eating dinner. Pt eats about % of meal. No complaints of pain or discomfort. Pt laying in bed comfortably at this time. Wife at bedside, caring for pt. Pt's room near nursing station. All needs met at this time. Bed in lowest position and locked. Instructed pt to call for assistance if needed. Call light and belongings within reach.

## 2019-08-26 NOTE — CARE PLAN
Problem: Safety  Goal: Will remain free from injury  Outcome: PROGRESSING AS EXPECTED  Intervention: Provide assistance with mobility  Flowsheets (Taken 8/25/2019 2151)  Assistance: Assistance of One  Ambulation Tolerance: Tolerates Well  Note:   Assist pt with ambulation, with a one person assist with a FWW. Pt room is near nursing station and bed alarm on and working.      Problem: Skin Integrity  Goal: Risk for impaired skin integrity will decrease  Outcome: PROGRESSING AS EXPECTED     Provided with incontinence checks, perineal care and incontinence care, linen changes, encourage pt to turn, however pt is able to turn self in bed, pt is up for meals as well. Keep skin dry from moisture.

## 2019-08-27 NOTE — PROGRESS NOTES
Assumed care of pt at shift change. A/Ox1, discussed plan of care. Pt on room air.  Pt was up on chair drawing picture and ate breakfast. Denied pain. Discussed with  about the discharge plan. One group home will come to evaluate pt in two days.  [facility] put in for possible group home placement.        All needs met at this time. Bed in lowest position, treaded socks on, personal belongings and call light within reach, instructed to call for any assistance, hourly rounding in place.

## 2019-08-27 NOTE — DISCHARGE PLANNING
Medical Social Work  PC to patient's guardian , Aliza KANE [phone]: to get an update on court hearing.  Stated that the court is wanting the spouse is to go with Karla/family friend and look at three group homes.  AMADOU informed Aliza that [facility] doesn't give day passes and would have to d/c her.  Aliza stated she would need to staff this with her supervisor.  In the mean time, Aliza stated she will be contacting local group homes/[facility] to see if they have openings.  They are planning on finding a home for the patient, and if the spouse wants to follow him then she can.

## 2019-08-27 NOTE — PROGRESS NOTES
Report received from day shift nurse. Assumed care @ 1900.    Patient on bed resting. Alert and oriented x 1. Assessment completed. Denies any pain and discomfort at this time. Patient educated regarding plan of care. Bed alarm in place and functioning. Room near nursing station. On Comfort Care.    Bed locked, in lowest position, treaded socks on. Needs attended. No further needs at this time. Communication board updated. Call light and personal belongings within reach.

## 2019-08-27 NOTE — DISCHARGE PLANNING
Medical Social Work  PC from Aliza, called to say there are two homes they have found so far that will be out today or tomorrow to assess the patient and his spouse, [facility] and [facility].  SW informed patient's nurse

## 2019-08-27 NOTE — PROGRESS NOTES
Pharmacy Pharmacotherapy Consult for LOS >30 days    Admit Date: 3/19/2019      Medications were reviewed for appropriateness and ongoing need.     Current Facility-Administered Medications   Medication Dose Route Frequency Provider Last Rate Last Dose   • MD ALERT...adult comfort care   Other PRN Yuki Caraballo, A.P.R.N.       • atropine 1 % ophthalmic solution 2 Drop  2 Drop Sublingual Q4HRS PRN Yuki Caraballo, A.P.R.N.       • morphine (pf) 4 mg/ml injection 2-4 mg  2-4 mg Intravenous Q2HRS PRN Yuki Caraballo, A.P.R.N.       • LORazepam (ATIVAN) injection 1 mg  1 mg Intravenous Q2HRS PRN Yuki Caraballo, A.P.R.N.   1 mg at 08/13/19 2126   • senna-docusate (PERICOLACE or SENOKOT S) 8.6-50 MG per tablet 2 Tab  2 Tab Oral BID PRN Rudi Grande M.D.   2 Tab at 08/13/19 1755    And   • magnesium hydroxide (MILK OF MAGNESIA) suspension 30 mL  30 mL Oral QDAY PRN Rudi Grande M.D.        And   • bisacodyl (DULCOLAX) suppository 10 mg  10 mg Rectal QDAY PRN Rudi Grande M.D.       • polyethylene glycol/lytes (MIRALAX) PACKET 1 Packet  1 Packet Oral QDAY PRN Kimberlee Livingston M.D.           Recommendations:  - All medications appear to be appropriate at this time. No recommendations.    Girma Koenig, Pharmacy Intern

## 2019-08-27 NOTE — CARE PLAN
Problem: Safety  Goal: Will remain free from injury  Outcome: PROGRESSING AS EXPECTED     Safety precautions in place. Non skid socks on. Bed alarm in place and functioning. Call light within reach. Bi hourly rounding in place.  Room near nursing station.     Problem: Discharge Barriers/Planning  Goal: Patient's continuum of care needs will be met  Outcome: PROGRESSING SLOWER THAN EXPECTED  Difficult placement. SW assisting.

## 2019-08-28 NOTE — PROGRESS NOTES
Patient on bed resting. Denies pain. On Comfort Care. Room near nursing station.    No additional needs at this time. Call light within reach. Left on bed resting comfortably. Bed alarm in place.

## 2019-08-28 NOTE — CARE PLAN
Problem: Safety  Goal: Will remain free from injury  Outcome: PROGRESSING AS EXPECTED    Safety precautions in place. Non skid socks on. Bed alarm in place and functioning. Call light within reach. Bi hourly rounding in place. Room near nursing station.     Problem: Discharge Barriers/Planning  Goal: Patient's continuum of care needs will be met  Outcome: PROGRESSING AS EXPECTED     Pending placement. SW assisting.

## 2019-08-29 NOTE — PROGRESS NOTES
Assumed care of pt at shift change. A/Ox1, discussed plan of care. Pt on room air.  Pt was up on chair drawing picture and ate breakfast and went to bed. Wife at bedside caring pt constantly. Denied pain. Friend come to visit yesterday.        All needs met at this time. Bed in lowest position, treaded socks on, personal belongings and call light within reach, instructed to call for any assistance, hourly rounding in place.

## 2019-08-29 NOTE — DISCHARGE PLANNING
Anticipated Discharge Disposition:  Assisted Living    Action: AMADOU spoke to guardian , Aliza MIDDLETON.  Aliza stated they have found a home for him, [facility].  The plan at this time is to start paying rent as of 9/1/19 and move some of their furniture into their room. SW if the room will be big enough for both patient and spouse, AMADOU told yes.  Aliza stated that when the patient is moved, the spouse is okay to move in with him.  Aliza stated that since the spouse does not have a guardian.  [facility] will have to see if [facility] will take her.  Aliza stated they will be sending an email to the  involved to give them an update.  That the  have until next Wednesday at 4:00 to have a plan for the spouse.  If they don't they are to schedule a hearing the follow week.     Barriers to Discharge: date of discharge    Plan: SW will follow up with [facility] to see if they will accept spouse/allowing her to sign admit papers, etc.  Inform guardian  of outcome.

## 2019-08-29 NOTE — PROGRESS NOTES
[facility] Medicine Daily Progress Note    Date of Service  8/29/2019    Chief Complaint  Malaise    Hospital Course   This is an 84 y/o very malnourished male who was admitted to the hospital on 3/19/19 under the [facility] service for malaise. A head CT was negative for acute abnormalities, his labs were suggestive of BETTY and hypovolemia, and his vital signs were unremarkable outside of having hypoxia that resolved with 0.5 L/min of O2 via nasal cannula. His home lisinopril was subsequently discontinued and he was given IV fluids. His medical issues eventually stabilized, but due to lack of involvement from family (outside of his wife who was admitted at the same time) and the inability to care for themselves, guardianship has been pursued and placement is pending. He was transferred to [facility] on 6/19/19. Episode of choking 8/12 with subsequent vomiting and increased lethargy/unresponsiveness. Patient transitioned to comfort care.      Interval Problem Update  8/29- Patient sitting in chair with wife at side helping him eat lunch. Patient remains medically stable. Group home to come out and evaluate in the next day or so for possible placement. Guardianship in place.     Consultants/Specialty  -Neuro  -Palliative Care  -Ethics    Code Status  COMFORT CARE    Disposition  TBD- group home to evaluate     Review of Systems  Review of Systems   Unable to perform ROS: Mental acuity        Physical Exam  Temp:  [36.4 °C (97.6 °F)-36.9 °C (98.5 °F)] 36.4 °C (97.6 °F)  Pulse:  [67-93] 67  Resp:  [17-18] 17  BP: (150-154)/(76-93) 150/76  SpO2:  [94 %-95 %] 95 %    Physical Exam   Constitutional: He appears well-developed and well-nourished. No distress.   HENT:   Head: Normocephalic and atraumatic.   Right Ear: External ear normal.   Left Ear: External ear normal.   Mouth/Throat: No oropharyngeal exudate.   Eyes: Conjunctivae are normal. Right eye exhibits no discharge. Left eye exhibits no discharge.   Neck: Neck supple.  No tracheal deviation present.   Cardiovascular: Normal rate, regular rhythm and normal heart sounds.   No murmur heard.  Pulmonary/Chest: Effort normal and breath sounds normal. No stridor. No respiratory distress. He has no wheezes.   Abdominal: Soft. He exhibits no distension. There is no tenderness. There is no rebound.   Musculoskeletal: He exhibits no edema.   Neurological: He is alert.   No tremor; slight cogwheel rigidity; diffuse bradykinesia,    Skin: Skin is warm and dry. No rash noted. He is not diaphoretic. No erythema.   Psychiatric: His speech is delayed. He is slowed.     Exam unchanged since previous on 8/25/2019      Fluids    Intake/Output Summary (Last 24 hours) at 8/29/2019 1346  Last data filed at 8/29/2019 0900  Gross per 24 hour   Intake 990 ml   Output --   Net 990 ml       Laboratory                        Imaging  MR-BRAIN-W/O   Final Result      1.  No acute abnormality   2.  Moderate to severe cerebral volume loss. When compared with 2/13/2011 there has been Interval progression of the cerebral volume loss   3.  Mild chronic microvascular ischemic disease.      [id number] VIEW   Final Result      1.  No evidence of bowel obstruction.      2.  Endovascular coil within the right lower quadrant.      3.  Pelvic calcification.      4.  AICD present.      CT-HEAD W/O   Final Result         NO ACUTE ABNORMALITIES ARE NOTED ON CT SCAN OF THE HEAD.      Findings are consistent with atrophy.  Decreased attenuation in the periventricular white matter likely indicates microvascular ischemic disease.      DX-HIP-BILATERAL-WITH PELVIS-2 VIEWS   Final Result      No pelvic or proximal femoral fracture identified      EC-ECHOCARDIOGRAM COMPLETE W/O CONT   Final Result      DX-CHEST-PORTABLE (1 VIEW)   Final Result      No evidence of acute cardiopulmonary process.      CT-HEAD W/O   Final Result      No acute intracranial abnormality is identified.      There are periventricular and subcortical white  matter changes present.  This finding is nonspecific and could be from previous small vessel ischemia, demyelination, or gliosis.      Atrophy      Paranasal sinus disease.              Assessment/Plan  * Cognitive impairment- (present on admission)  Assessment & Plan  -guardianship obtained 7/19 - now pending placement  -no behavioral outbursts  -Ethics and palliative care evaluated the patient.   -patient now COMFORT CARE  -There was concern for possible underlying undiagnosed Parkinson's disease.  MRI brain showing moderate to severe cerebral volume loss consistent with advancing dementia.  Neurology evaluated patient and do not suspect Parkinson's.  Will not initiate additional medications at this time.     Age-related physical debility- (present on admission)  Assessment & Plan  -patient now COMFORT CARE    Cardiac pacemaker in situ- (present on admission)  Assessment & Plan  -Secondary to SSS  -patient now COMFORT CARE    S/P TAVR (transcatheter aortic valve replacement)- (present on admission)  Assessment & Plan  -patient now COMFORT CARE    Essential hypertension- (present on admission)  Assessment & Plan  -patient now COMFORT CARE    Dyslipidemia- (present on admission)  Assessment & Plan  -patient now COMFORT CARE    Comfort measures only status  Assessment & Plan  -patient was sitting up at nurses station before lunch. Wife was behind him rubbing/massaging his head. Wife was worried he was tired and wanted nursing staff to lie him back down in bed. As they were transferring him to the wheelchair he choked on what appeared to be his own secretions and vomited about 200 mL non-bloody emesis. BP 60's palpable afterwards with worsening responsiveness. Palliative care at bedside. POLST on file (filled out by the patient himself) says patient would not want to be intubated and/or transferred to ICU. Called his legal guardian, Lynda, who was unavailable so I spoke with OC guardian, Cynthia to notify of decision  to transition patient to COMFORT CARE at this time.        Fall- (present on admission)  Assessment & Plan  -Fall Precautions  -ambulates with FWW      Dysphagia- (present on admission)  Assessment & Plan  -patient now COMFORT CARE    Hypothyroidism- (present on admission)  Assessment & Plan  -TSH 0.54 on 3/21/19.  -Continue levothyroxine.    Hx of CABG- (present on admission)  Assessment & Plan  -Continue ASA & statin    Gout of multiple sites- (present on admission)  Assessment & Plan  Chronic & stable.   No acute flare.   Continue allopurinol at dose lower than his home dose.     PVD (peripheral vascular disease) (CMS-HCC)- (present on admission)  Assessment & Plan  -patient now COMFORT CARE    History of CAD (coronary artery disease)- (present on admission)  Overview  Dr. Wu stopped plavix in 2016 due to falls.     Assessment & Plan  -s/p 3-vessel CABG with LIMA to LAD, saphenous vein graft to obtuse marginal branch, and saphenous vein graft to the posterior descending artery by Dr. Coates in March 2018.  -patient now COMFORT CARE    Paroxysmal atrial fibrillation (CMS-HCC)- (present on admission)  Assessment & Plan  -patient now COMFORT CARE       VTE prophylaxis: Comfort Care    KEVIN Linton.

## 2019-08-29 NOTE — PROGRESS NOTES
Patient on bed resting. Denies pain. On Comfort care. Needs attended. Had shower this evening.    No additional needs at this time. Call light within reach. Left on bed resting comfortably. Bed alarm in place.

## 2019-08-29 NOTE — CARE PLAN
Problem: Safety  Goal: Will remain free from falls  Outcome: PROGRESSING AS EXPECTED    Safety precautions in place. Non skid socks on. Bed alarm in place and functioning. Room near nursing station. Call light within reach. Bi hourly rounding in place.      Problem: Discharge Barriers/Planning  Goal: Patient's continuum of care needs will be met  Outcome: PROGRESSING SLOWER THAN EXPECTED    Pending discharge/ placement. SW assisting.

## 2019-08-30 NOTE — PROGRESS NOTES
Patient on bed resting. Denies pain. On Comfort care.     No additional needs at this time. Call light within reach. Left on bed resting comfortably. Bed alarm in place. Room near nursing station.

## 2019-08-30 NOTE — CARE PLAN
Problem: Safety  Goal: Will remain free from falls  Outcome: PROGRESSING AS EXPECTED    Safety precautions in place. Non skid socks on. Bed alarm in place and functioning. Call light within reach. Room near nursing station. Bi hourly rounding in place.      Problem: Discharge Barriers/Planning  Goal: Patient's continuum of care needs will be met  Outcome: PROGRESSING SLOWER THAN EXPECTED     Pending discharge date. SW assisting.

## 2019-08-30 NOTE — PROGRESS NOTES
Assumed care of pt at shift change. A/Ox1, discussed plan of care. Pt on room air.  Pt was up on chair drawing picture and ate breakfast and went to bed. Wife at bedside caring pt constantly. Denied pain.      All needs met at this time. Bed in lowest position, treaded socks on, personal belongings and call light within reach, instructed to call for any assistance, hourly rounding in place.

## 2019-08-31 NOTE — PROGRESS NOTES
Assumed care of pt at shift change. A/Ox1, discussed plan of care. Pt on room air.  Pt was up on chair drawing picture and ate breakfast and went to family room while staff and his wife playing piano for pt.      All needs met at this time. Bed in lowest position, treaded socks on, personal belongings and call light within reach, instructed to call for any assistance, hourly rounding in place.

## 2019-08-31 NOTE — PROGRESS NOTES
Patient assisted back to bed with assigned CNA. Denies pain.     No additional needs at this time. Call light within reach. Left on bed resting comfortably. Bed alarm in place. Room near nursing station.

## 2019-09-01 NOTE — PROGRESS NOTES
[facility] Medicine Daily Progress Note    Date of Service  9/1/2019    Chief Complaint  Malaise    Hospital Course   This is an 86 y/o very malnourished male who was admitted to the hospital on 3/19/19 under the [employer] service for malaise. A head CT was negative for acute abnormalities, his labs were suggestive of BETTY and hypovolemia, and his vital signs were unremarkable outside of having hypoxia that resolved with 0.5 L/min of O2 via nasal cannula. His home lisinopril was subsequently discontinued and he was given IV fluids. His medical issues eventually stabilized, but due to lack of involvement from family (outside of his wife who was admitted at the same time) and the inability to care for themselves, guardianship has been pursued and placement is pending. He was transferred to [facility] on 6/19/19. Episode of choking 8/12 with subsequent vomiting and increased lethargy/unresponsiveness. Patient transitioned to comfort care.      Interval Problem Update  8/29- Patient sitting in chair with wife at side helping him eat lunch. Patient remains medically stable. Group home to come out and evaluate in the next day or so for possible placement. Guardianship in place.     9/1- Patient sitting in chair with staff assistance, remains unchanged medically. Patient stable at this time. Case management has had success in patient being accepted by group [facility]. Per their notes, guardian has set up payment for group home to begin today so patient's furniture may be moved into room. Anticipate discharge to group home this next week.     Consultants/Specialty  -Neuro  -Palliative Care  -Ethics    Code Status  COMFORT CARE    Disposition  Accepted to [facility], anticipate discharge sometime this week     Review of Systems  Review of Systems   Unable to perform ROS: Mental acuity        Physical Exam  Temp:  [36.7 °C (98 °F)] 36.7 °C (98 °F)  Pulse:  [72] 72  Resp:  [18] 18  BP: (135)/(73) 135/73    Physical Exam    Constitutional: He appears well-developed and well-nourished. No distress.   HENT:   Head: Normocephalic and atraumatic.   Right Ear: External ear normal.   Left Ear: External ear normal.   Mouth/Throat: No oropharyngeal exudate.   Eyes: Conjunctivae are normal. Right eye exhibits no discharge. Left eye exhibits no discharge.   Neck: Neck supple. No tracheal deviation present.   Cardiovascular: Normal rate, regular rhythm and normal heart sounds.   No murmur heard.  Pulmonary/Chest: Effort normal and breath sounds normal. No stridor. No respiratory distress. He has no wheezes.   Abdominal: Soft. He exhibits no distension. There is no tenderness. There is no rebound.   Musculoskeletal: He exhibits no edema.   Neurological: He is alert.   No tremor; slight cogwheel rigidity; diffuse bradykinesia,    Skin: Skin is warm and dry. No rash noted. He is not diaphoretic. No erythema.   Psychiatric: His speech is delayed. He is slowed.   Nursing note reviewed.    Exam unchanged since previous on 8/29/2019      Fluids    Intake/Output Summary (Last 24 hours) at 9/1/2019 0729  Last data filed at 8/31/2019 2055  Gross per 24 hour   Intake 1330 ml   Output --   Net 1330 ml       Laboratory                        Imaging  MR-BRAIN-W/O   Final Result      1.  No acute abnormality   2.  Moderate to severe cerebral volume loss. When compared with 2/13/2011 there has been Interval progression of the cerebral volume loss   3.  Mild chronic microvascular ischemic disease.      [id number]-2 VIEW   Final Result      1.  No evidence of bowel obstruction.      2.  Endovascular coil within the right lower quadrant.      3.  Pelvic calcification.      4.  AICD present.      CT-HEAD W/O   Final Result         NO ACUTE ABNORMALITIES ARE NOTED ON CT SCAN OF THE HEAD.      Findings are consistent with atrophy.  Decreased attenuation in the periventricular white matter likely indicates microvascular ischemic disease.      DX-HIP-BILATERAL-WITH  PELVIS-2 VIEWS   Final Result      No pelvic or proximal femoral fracture identified      EC-ECHOCARDIOGRAM COMPLETE W/O CONT   Final Result      DX-CHEST-PORTABLE (1 VIEW)   Final Result      No evidence of acute cardiopulmonary process.      CT-HEAD W/O   Final Result      No acute intracranial abnormality is identified.      There are periventricular and subcortical white matter changes present.  This finding is nonspecific and could be from previous small vessel ischemia, demyelination, or gliosis.      Atrophy      Paranasal sinus disease.              Assessment/Plan  * Cognitive impairment- (present on admission)  Assessment & Plan  -guardianship obtained 7/19 - now pending placement  -no behavioral outbursts  -Ethics and palliative care evaluated the patient.   -patient now COMFORT CARE  -There was concern for possible underlying undiagnosed Parkinson's disease.  MRI brain showing moderate to severe cerebral volume loss consistent with advancing dementia.  Neurology evaluated patient and do not suspect Parkinson's.  Will not initiate additional medications at this time.     Age-related physical debility- (present on admission)  Assessment & Plan  -patient now COMFORT CARE    Cardiac pacemaker in situ- (present on admission)  Assessment & Plan  -Secondary to SSS  -patient now COMFORT CARE    S/P TAVR (transcatheter aortic valve replacement)- (present on admission)  Assessment & Plan  -patient now COMFORT CARE    Essential hypertension- (present on admission)  Assessment & Plan  -patient now COMFORT CARE    Dyslipidemia- (present on admission)  Assessment & Plan  -patient now COMFORT CARE    Comfort measures only status  Assessment & Plan  -patient was sitting up at nurses station before lunch. Wife was behind him rubbing/massaging his head. Wife was worried he was tired and wanted nursing staff to lie him back down in bed. As they were transferring him to the wheelchair he choked on what appeared to be his  own secretions and vomited about 200 mL non-bloody emesis. BP 60's palpable afterwards with worsening responsiveness. Palliative care at bedside. POLST on file (filled out by the patient himself) says patient would not want to be intubated and/or transferred to ICU. Called his legal guardian, Lynda, who was unavailable so I spoke with OC guardian, Cynthia to notify of decision to transition patient to COMFORT CARE at this time.        Fall- (present on admission)  Assessment & Plan  -Fall Precautions  -ambulates with FWW      Dysphagia- (present on admission)  Assessment & Plan  -patient now COMFORT CARE    Hypothyroidism- (present on admission)  Assessment & Plan  -TSH 0.54 on 3/21/19.  -Continue levothyroxine.    Hx of CABG- (present on admission)  Assessment & Plan  -Continue ASA & statin    Gout of multiple sites- (present on admission)  Assessment & Plan  Chronic & stable.   No acute flare.   Continue allopurinol at dose lower than his home dose.     PVD (peripheral vascular disease) (CMS-HCC)- (present on admission)  Assessment & Plan  -patient now COMFORT CARE    History of CAD (coronary artery disease)- (present on admission)  Overview  Dr. Wu stopped plavix in 2016 due to falls.     Assessment & Plan  -s/p 3-vessel CABG with LIMA to LAD, saphenous vein graft to obtuse marginal branch, and saphenous vein graft to the posterior descending artery by Dr. Coates in March 2018.  -patient now COMFORT CARE    Paroxysmal atrial fibrillation (CMS-HCC)- (present on admission)  Assessment & Plan  -patient now COMFORT CARE       VTE prophylaxis: Comfort Care    KEVIN Linton.

## 2019-09-01 NOTE — PROGRESS NOTES
Patient on bed resting. Denies pain. Repositioned, kept clean and dry. Needs attended. On Comfort care.    No additional needs at this time. Call light within reach. Left on bed resting comfortably. Bed alarm in place.

## 2019-09-02 NOTE — PROGRESS NOTES
Patient AAOx1, pleasant, OOB and up to nursing station, in activity room in AM.  Using cardiac chair, easier for tranfers in and out of bed.  Appetite returns, patient ate most of breakfast and lunch meals.  Room close to nursing station, strip alarm on when in chair and also when in bed.  Let staff know when need for BM, up to restroom, incontinent of urine X4

## 2019-09-02 NOTE — PROGRESS NOTES
Pt is comfort care, pt is resting in bed at this time. Pt was up in chair for breakfast and went back to sleep after breakfast. Pt has expressive aphasia. POC discussed, all questions answered at this time.

## 2019-09-02 NOTE — CARE PLAN
Problem: Safety  Goal: Will remain free from falls  Outcome: PROGRESSING AS EXPECTED    Safety precautions in place. Non skid socks on. Bed alarm in place and functioning. Call light within reach. Room near nursing station. Bi hourly rounding in place.      Problem: Discharge Barriers/Planning  Goal: Patient's continuum of care needs will be met  Outcome: PROGRESSING SLOWER THAN EXPECTED    Patient accepted to [facility]. Pending discharge date. SW assisting.

## 2019-09-03 NOTE — PROGRESS NOTES
Received report and assumed patient care. Patient is AOx1, to self only. No complaints or signs of pain at this time. Assessment complete on RA. All needs met at this time. Safety precautions and hourly rounding in place.

## 2019-09-03 NOTE — DISCHARGE PLANNING
Medical Social Work  PC to [facility], [phone], can accept the patient/spouse between 1-2:00 on Monday.  Requested Rx be sent to [facility].  Address of facility is [address] [place] [address] Jose Guadalupe Morgan [address]

## 2019-09-03 NOTE — DISCHARGE PLANNING
Anticipated Discharge Disposition: Group Home    Action: PC from Aliza MIDDLETON, stated she is looking at d/c patient to group home on Monday, 9/9 to [facility].  Requested [facility]OU set up Hospice, is okay with [facility] to follow him.  AMADOU asked about spouse, SW told that an e-mail will be sent out to their  about transfer and they will forward e-mail to patient's .  AMADOU asked if spouse can be sent to group home also, told yes.     Physician's statement has been provided to patient's nurse to be completed    Faxed Choice to [facility], [facility]    Barriers to Discharge: n/a    Plan: once physician statement complete forward to [facility] and arrange transport for patient/spouse

## 2019-09-03 NOTE — CARE PLAN
Problem: Safety  Goal: Will remain free from injury  Outcome: PROGRESSING AS EXPECTED  Educated patient about fall prevention strategies. Educated patient to call and wait for staff assistance before attempting to ambulate. Nodded understanding. Call light in use, belongings within reach, bed alarm on, treaded socks on, bed locked in low position     Problem: Discharge Barriers/Planning  Goal: Patient's continuum of care needs will be met  Outcome: PROGRESSING AS EXPECTED   Social work coordinating placement to a group home with wife

## 2019-09-03 NOTE — PROGRESS NOTES
Patient resting comfortably in bed. Denied pain. Wife at bedside adjusting his covers. Patient in room near nursing station, bed alarm on, socks on

## 2019-09-04 NOTE — CARE PLAN
Problem: Safety  Goal: Will remain free from injury  Outcome: PROGRESSING AS EXPECTED  Bed alarm and built in bed alarm placed  Problem: Discharge Barriers/Planning  Goal: Patient's continuum of care needs will be met  Outcome: PROGRESSING SLOWER THAN EXPECTED  Has a guardian, waiting for placement

## 2019-09-04 NOTE — PROGRESS NOTES
Confused, bed alarm in place. Comfort care measures in place. Cont plan of care, call light within reach and visual checks through the night

## 2019-09-04 NOTE — HOSPICE
ATTN: PROVIDER/CARE MANAGEMENT TEAM/NURSES  RE: Referral for [facility]     As of (9/4/19), we have accepted the patient to [facility] for the patient listed above.    To access the [facility]osp[facility] Notes you can click on Chart Review and then click onto Encounters (left top corner of screen) and see our documentation.      If you have any questions or concerns regarding the patient’s transition to [facility], please do not hesitate to contact us at [phone]     We look forward to collaborating with you,  [facility] Care Team

## 2019-09-05 NOTE — PROGRESS NOTES
"Hospital Medicine Daily Progress Note    Date of Service  9/5/2019    Chief Complaint  Malaise    Hospital Course   \"This is an 86 y/o very malnourished male who was admitted to the hospital on 3/19/19 under the [facility] service for malaise. A head CT was negative for acute abnormalities, his labs were suggestive of BETTY and hypovolemia, and his vital signs were unremarkable outside of having hypoxia that resolved with 0.5 L/min of O2 via nasal cannula. His home lisinopril was subsequently discontinued and he was given IV fluids. His medical issues eventually stabilized, but due to lack of involvement from family (outside of his wife who was admitted at the same time) and the inability to care for themselves, guardianship has been pursued and placement is pending. He was transferred to [facility] on 6/19/19. Episode of choking 8/12 with subsequent vomiting and increased lethargy/unresponsiveness. Patient transitioned to comfort care.\" -prior progress note      Interval Problem Update  9/5:- Patient sitting in chair, unaware of urinary incontinence.  No complaints of pain. Patient remains medically stable. Guardianship in place.  [facility] accepted patient and spouse for admission on Monday.     Consultants/Specialty  -Neuro  -Palliative Care  -Ethics  Hospice    Code Status  COMFORT CARE    Disposition  Accepted to [facility], anticipate discharge sometime this week     Review of Systems  Review of Systems   Unable to perform ROS: Mental acuity        Physical Exam  Temp:  [36.9 °C (98.4 °F)-37.2 °C (99 °F)] 36.9 °C (98.4 °F)  Pulse:  [68-73] 73  Resp:  [16-17] 16  BP: (143-147)/(60-87) 147/60  SpO2:  [94 %-96 %] 94 %    Physical Exam   Constitutional: Vital signs are normal. He appears well-developed and well-nourished. He is active and cooperative. He does not appear ill. No distress.   HENT:   Head: Normocephalic and atraumatic.   Right Ear: External ear normal.   Left Ear: External ear " normal.   Mouth/Throat: No oropharyngeal exudate.   Eyes: Conjunctivae are normal. Right eye exhibits no discharge. Left eye exhibits no discharge.   Neck: Neck supple. No JVD present. No tracheal deviation present.   Cardiovascular: An irregular rhythm present. Exam reveals no distant heart sounds.   No murmur heard.  Heart sounds: Skipped beats, patient asymptomatic   Pulmonary/Chest: Effort normal and breath sounds normal. No stridor. No apnea, no tachypnea and no bradypnea. No respiratory distress. He has no wheezes. He has no rales. He exhibits no tenderness.   Abdominal: Soft. Normal appearance and bowel sounds are normal. He exhibits no distension and no mass. There is no tenderness. There is no rebound and no guarding.   Musculoskeletal: He exhibits no edema, tenderness or deformity.   Neurological: He is alert. He is disoriented.   No tremor; slight cogwheel rigidity; diffuse bradykinesia, disoriented x4   Skin: Skin is warm and dry. No rash noted. He is not diaphoretic. No erythema. No pallor.   Psychiatric: He has a normal mood and affect. His speech is delayed. He is slowed.   Nursing note and vitals reviewed.    Fluids    Intake/Output Summary (Last 24 hours) at 9/5/2019 1625  Last data filed at 9/5/2019 1345  Gross per 24 hour   Intake 1260 ml   Output --   Net 1260 ml     Imaging  MR-BRAIN-W/O   Final Result      1.  No acute abnormality   2.  Moderate to severe cerebral volume loss. When compared with 2/13/2011 there has been Interval progression of the cerebral volume loss   3.  Mild chronic microvascular ischemic disease.      [id number] VIEW   Final Result      1.  No evidence of bowel obstruction.      2.  Endovascular coil within the right lower quadrant.      3.  Pelvic calcification.      4.  AICD present.      CT-HEAD W/O   Final Result         NO ACUTE ABNORMALITIES ARE NOTED ON CT SCAN OF THE HEAD.      Findings are consistent with atrophy.  Decreased attenuation in the periventricular  white matter likely indicates microvascular ischemic disease.      DX-HIP-BILATERAL-WITH PELVIS-2 VIEWS   Final Result      No pelvic or proximal femoral fracture identified      EC-ECHOCARDIOGRAM COMPLETE W/O CONT   Final Result      DX-CHEST-PORTABLE (1 VIEW)   Final Result      No evidence of acute cardiopulmonary process.      CT-HEAD W/O   Final Result      No acute intracranial abnormality is identified.      There are periventricular and subcortical white matter changes present.  This finding is nonspecific and could be from previous small vessel ischemia, demyelination, or gliosis.      Atrophy      Paranasal sinus disease.              Assessment/Plan  * Cognitive impairment- (present on admission)  Assessment & Plan  -guardianship obtained 7/19 - placement in group home.  Transferring Monday  -no behavioral outbursts  -Ethics and palliative care evaluated the patient.   -patient now COMFORT CARE  -There was concern for possible underlying undiagnosed Parkinson's disease.  MRI brain showing moderate to severe cerebral volume loss consistent with advancing dementia.  Neurology evaluated patient and do not suspect Parkinson's.  Will not initiate additional medications at this time.     Age-related physical debility- (present on admission)  Assessment & Plan  -patient now COMFORT CARE    Cardiac pacemaker in situ- (present on admission)  Assessment & Plan  -Secondary to SSS  -patient now COMFORT CARE    S/P TAVR (transcatheter aortic valve replacement)- (present on admission)  Assessment & Plan  -patient now COMFORT CARE    Essential hypertension- (present on admission)  Assessment & Plan  -patient now COMFORT CARE    Dyslipidemia- (present on admission)  Assessment & Plan  -patient now COMFORT CARE    Comfort measures only status  Assessment & Plan  -patient was sitting up at nurses station before lunch. Wife was behind him rubbing/massaging his head. Wife was worried he was tired and wanted nursing staff to  lie him back down in bed. As they were transferring him to the wheelchair he choked on what appeared to be his own secretions and vomited about 200 mL non-bloody emesis. BP 60's palpable afterwards with worsening responsiveness. Palliative care at bedside. POLST on file (filled out by the patient himself) says patient would not want to be intubated and/or transferred to ICU. Called his legal guardian, Lynda, who was unavailable so I spoke with OC guardian, Cynthia to notify of decision to transition patient to COMFORT CARE at this time.        Fall- (present on admission)  Assessment & Plan  -Fall Precautions  -ambulates with FWW      Dysphagia- (present on admission)  Assessment & Plan  -patient now COMFORT CARE    Hypothyroidism- (present on admission)  Assessment & Plan  -TSH 0.54 on 3/21/19.  -levothyroxine discontinued patient on comfort care    Hx of CABG- (present on admission)  Assessment & Plan  -ASA & statin discontinued.  Patient on comfort care    Gout of multiple sites- (present on admission)  Assessment & Plan  Chronic & stable.   No acute flare.   All medications have been discontinued.  Patient on comfort care    PVD (peripheral vascular disease) (CMS-HCC)- (present on admission)  Assessment & Plan  -patient now COMFORT CARE    History of CAD (coronary artery disease)- (present on admission)  Overview  Dr. Wu stopped plavix in 2016 due to falls.     Assessment & Plan  -s/p 3-vessel CABG with LIMA to LAD, saphenous vein graft to obtuse marginal branch, and saphenous vein graft to the posterior descending artery by Dr. Coates in March 2018.  -patient now COMFORT CARE    Paroxysmal atrial fibrillation (CMS-HCC)- (present on admission)  Assessment & Plan  -patient now COMFORT CARE       VTE prophylaxis: Comfort Care    CLIFFORD Day

## 2019-09-05 NOTE — PROGRESS NOTES
Confused, bed alarm in place. Appears comfortable and in no distress; comfort care measures. Cont plan of care, call light within reach and visual checks through the night

## 2019-09-06 NOTE — DISCHARGE PLANNING
Medical Social Work  PC from patient's guardian , Aliza BRYANT  Called to say that everything is a go for the patient on Monday.  Aliza stated that when she sent over the admit packet to the group [facility] they wants to know why Louann Lew was not completed.  Aliza stated she informed them that the patient does not have a guardian, so patient will have to complete.  Further, [facility] is concerned over who will pay for the Rx or co-pays as the patient has no funds.  Aliza stated she has contact her  and the patient's  to get direction.  As Aliza is concerned what may happen if the patient is d/c on Monday and Louann Lew is not.  Aliza will contact  with more information.

## 2019-09-06 NOTE — PROGRESS NOTES
Patient has IV Morphine PRN for comfort care yet has no IV access. Medication needs to be changed to oral route. Will discuss with day RN.

## 2019-09-06 NOTE — PROGRESS NOTES
Patient alert but confused to time place, situation. Up in chair at beginning of shift. RN put pt back to bed with help of spouse using walker. No complaints of pain. Patient is comfort care. Bed in low and locked position with bed alarm active and audible. Patient is visible from nursing station. Hourly rounding done. No signs of distress noted.

## 2019-09-06 NOTE — DISCHARGE PLANNING
Received Transport Form 0915  Spoke to Len at [facility]  Transport is scheduled for Monday 9/9/19 at 1300 going to [facility] at [address]. Jose Guadalupe.

## 2019-09-06 NOTE — PROGRESS NOTES
Assumed care of pt this am. Pt is up in the chair for breakfast. Pt is oriented to self and place. Denies pain. Pt is in room near nursing station. Hourly rounding in place.

## 2019-09-07 NOTE — PROGRESS NOTES
Patient was up in chair for dinner at beginning of shift. CNA put him back to bed. No complaints of pain nor any visual signs signifying that pt is in pain. Pt did have a BM. No medications due. Pt remains on Comfort Care status. Transport has been arranged for Monday 9/9/19 with [facility] at 1300 to [facility], per discharge planning notes. Hourly rounding done. No signs of distress.

## 2019-09-07 NOTE — PROGRESS NOTES
Patient out of bed and sitting at table by nurses station. Patient ambulated with one assist from CNA using walker. No signs of distress.

## 2019-09-07 NOTE — PROGRESS NOTES
Received bedside report and accepted care of patient.  Patient currently resting in bed in no visible or stated distress.  Bed controls on and bed in locked position.  Bed alarm on.  Call light and personal possessions within reach.  Plan of care to include assistance with ADL's as tolerated and keeping patient calm and comfortable.  Will continue to update notes/plan of care as needed throughout shift.

## 2019-09-07 NOTE — CARE PLAN
Problem: Safety  Goal: Will remain free from falls  Outcome: PROGRESSING AS EXPECTED  Intervention: Assess risk factors for falls  Note:   Patient is a high fall risk and has all proper precautions in place. He is visible from nursing station.     Problem: Bowel/Gastric:  Goal: Normal bowel function is maintained or improved  Outcome: PROGRESSING AS EXPECTED  Intervention: Educate patient and significant other/support system about signs and symptoms of constipation and interventions to implement  Note:   Patient is maintaining proper bowel movements.

## 2019-09-08 NOTE — CARE PLAN
Problem: Discharge Barriers/Planning  Goal: Patient's continuum of care needs will be met  Outcome: PROGRESSING AS EXPECTED  Intervention: Assess potential discharge barriers on admission and throughout hospital stay  Note:   Patient is scheduled to discharge 9/9/19 to [facility]. Transport has been scheduled for 1300.      Problem: Skin Integrity  Goal: Risk for impaired skin integrity will decrease  Outcome: PROGRESSING AS EXPECTED  Intervention: Assess risk factors for impaired skin integrity and/or pressure ulcers  Note:   Patient skin is noted to be fully intact, pink, and blanching. He is on a waffle cushion and is being turned regularly. Incontinence monitoring done frequently.

## 2019-09-08 NOTE — PROGRESS NOTES
Patient alert but confused. Up in chair eating dinner at beginning of shift. Wife and CNA helping feed pt. Patient denies any pain. No visual clues to any pain. Patient is comfort care status. All needs met. He is visible from nursing station. No signs of distress.

## 2019-09-08 NOTE — PROGRESS NOTES
Pt pleasant, up to chair for meals and ambulating to bathroom with staff assistance which he tolerates well. Denies pain or n/v. VSS and no change clinical status.
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
## 2019-09-09 NOTE — DISCHARGE PLANNING
CANCELLED [facility] FOR TODAY.      Spoke to Len at [facility]  Transport is scheduled for Monday 9/16/19 at 1300 going to [facility] at [address] [facility]. [place]

## 2019-09-09 NOTE — DISCHARGE INSTRUCTIONS
Discharge Instructions    Discharged to group home by medical transportation with escort. Discharged via wheelchair, hospital escort: Yes.  Special equipment needed: Not Applicable    Be sure to schedule a follow-up appointment with your primary care doctor or any specialists as instructed.     Discharge Plan:   Diet Plan: Discussed  Activity Level: Discussed  Confirmed Follow up Appointment: Patient to Call and Schedule Appointment  Confirmed Symptoms Management: Discussed  Medication Reconciliation Updated: Yes  Pneumococcal Vaccine Administered/Refused: Not given - Patient refused pneumococcal vaccine  Influenza Vaccine Indication: Patient Refuses    I understand that a diet low in cholesterol, fat, and sodium is recommended for good health. Unless I have been given specific instructions below for another diet, I accept this instruction as my diet prescription.   Other diet: dysphagia 1 nectar thick liquids    Special Instructions: None    · Is patient discharged on Warfarin / Coumadin?   No     Depression / Suicide Risk    As you are discharged from this [facility] facility, it is important to learn how to keep safe from harming yourself.    Recognize the warning signs:  · Abrupt changes in personality, positive or negative- including increase in energy   · Giving away possessions  · Change in eating patterns- significant weight changes-  positive or negative  · Change in sleeping patterns- unable to sleep or sleeping all the time   · Unwillingness or inability to communicate  · Depression  · Unusual sadness, discouragement and loneliness  · Talk of wanting to die  · Neglect of personal appearance   · Rebelliousness- reckless behavior  · Withdrawal from people/activities they love  · Confusion- inability to concentrate     If you or a loved one observes any of these behaviors or has concerns about self-harm, here's what you can do:  · Talk about it- your feelings and reasons for harming yourself  · Remove any  means that you might use to hurt yourself (examples: pills, rope, extension cords, firearm)  · Get professional help from the community (Mental Health, Substance Abuse, psychological counseling)  · Do not be alone:Call your Safe Contact- someone whom you trust who will be there for you.  · Call your local CRISIS HOTLINE 857-4701 or 643-509-0992  · Call your local Children's Mobile Crisis Response Team [place] (758) 518-8584 or www[employer]  · Call the toll free National Suicide Prevention Hotlines   · National Suicide Prevention Lifeline 440-258-DTED (7223)  · National Hope Line Network 800-SUICIDE (758-9812)    Hospice  Hospice is a service that is designed to provide people who are terminally ill and their families with medical, spiritual, and psychological support. Its aim is to improve your quality of life by keeping you as alert and comfortable as possible. Hospice is performed by a team of health care professionals and volunteers who:  · Help keep you comfortable. Hospice can be provided in your home or in a homelike setting. The hospice staff works with your family and friends to help meet your needs. You will enjoy the support of loved ones by receiving much of your basic care from family and friends.  · Provide pain relief and manage your symptoms. The staff supply all necessary medicines and equipment.  · Provide companionship when you are alone.  · Allow you and your family to rest. They may do light housekeeping, prepare meals, and run errands.  · Provide counseling. They will make sure your emotional, spiritual, and social needs and those of your family are being met.  · Provide spiritual care. Spiritual care is individualized to meet your needs and your family's needs. It may involve helping you look at what death means to you, say goodbye, or perform a specific Islam ceremony or ritual.  Hospice teams often include:  · A nurse.  · A doctor.  · Social workers.  · Rastafari leaders  (such as a ).  · Trained volunteers.  WHEN SHOULD HOSPICE CARE BEGIN?  Most people who use hospice are believed to have fewer than 6 months to live. Your family and health care providers can help you decide when hospice services should begin. If your condition improves, you may discontinue the program.  WHAT SHOULD I CONSIDER BEFORE SELECTING A PROGRAM?  Most hospice programs are run by nonprofit, independent organizations. Some are affiliated with hospitals, nursing homes, or home health care agencies. Hospice programs can take place in the home or at a hospice center, hospital, or skilled nursing facility. When choosing a hospice program, ask the following questions:  · What services are available to me?  · What services are offered to my loved ones?  · How involved are my loved ones?  · How involved is my health care provider?  · Who makes up the hospice care team? How are they trained or screened?  · How will my pain and symptoms be managed?  · If my circumstances change, can the services be provided in a different setting, such as my home or in the hospital?  · Is the program reviewed and licensed by the state or certified in some other way?  WHERE CAN I LEARN MORE ABOUT HOSPICE?  You can learn about existing hospice programs in your area from your health care providers. You can also read more about hospice online. The websites of the following organizations contain helpful information:  · The National Hospice and Palliative Care Organization (NHPCO).  · The Hospice Association of [place] (HAA).  · The Hospice Education [place].  · The American Cancer Society (ACS).  · Hospice Net.  This information is not intended to replace advice given to you by your health care provider. Make sure you discuss any questions you have with your health care provider.  Document Released: 04/05/2005 Document Revised: 12/23/2014 Document Reviewed: 10/28/2014  Elsevier Interactive Patient Education © 2017 Elsevier  Inc.
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
## 2019-09-09 NOTE — PROGRESS NOTES
"Hospital Medicine Daily Progress Note    Date of Service  9/9/2019    Chief Complaint  Malaise    Hospital Course   \"This is an 84 y/o very malnourished male who was admitted to the hospital on 3/19/19 under the [employer] service for malaise. A head CT was negative for acute abnormalities, his labs were suggestive of BETTY and hypovolemia, and his vital signs were unremarkable outside of having hypoxia that resolved with 0.5 L/min of O2 via nasal cannula. His home lisinopril was subsequently discontinued and he was given IV fluids. His medical issues eventually stabilized, but due to lack of involvement from family (outside of his wife who was admitted at the same time) and the inability to care for themselves,\" (-prior progress note) guardianship and placement were established. Episode of choking 8/12 with subsequent vomiting and increased lethargy/unresponsiveness. Patient transitioned to comfort care.  He is incontinent and unable to care for his hygiene and self-care needs.  His wife Louann Lew prefers to do as much of his care as possible.      Interval Problem Update  9/9:- Patient sitting in chair, calm, well-groomed, responds to some commands and cooperates with assessment.  Unable to answer subjective questions. Guardianship in place.  [facility] accepted patient for admission today, however spouse Louann Lew was not accepted at this time.  Court hearing for guardianship for Louann Lew next week, after which group home will reevaluate Louann Lew's acceptance for the following Monday.     Consultants/Specialty  -Neuro  -Palliative Care  -Ethics  Hospice    Code Status  COMFORT CARE    Disposition  Accepted to [facility], anticipate discharge in 1 week    Review of Systems  Review of Systems   Unable to perform ROS: Mental acuity        Physical Exam  Temp:  [36.2 °C (97.2 °F)-36.7 °C (98.1 °F)] 36.6 °C (97.8 °F)  Pulse:  [70-80] 70  Resp:  [16-18] 16  BP: (127-142)/(68-80) 132/71  SpO2:  [95 %] " 95 %    Physical Exam   Constitutional: Vital signs are normal. He appears well-developed and well-nourished. He is active and cooperative. He does not appear ill. No distress.   HENT:   Head: Normocephalic and atraumatic.   Right Ear: External ear normal.   Left Ear: External ear normal.   Mouth/Throat: No oropharyngeal exudate.   Eyes: Conjunctivae are normal. Right eye exhibits no discharge. Left eye exhibits no discharge.   Neck: Neck supple. No JVD present. No tracheal deviation present.   Cardiovascular: An irregular rhythm present. Exam reveals no distant heart sounds.   No murmur heard.  Heart sounds: Skipped beats, patient asymptomatic   Pulmonary/Chest: Effort normal and breath sounds normal. No stridor. No apnea, no tachypnea and no bradypnea. No respiratory distress. He has no wheezes. He has no rales. He exhibits no tenderness.   Abdominal: Soft. Normal appearance and bowel sounds are normal. He exhibits no distension and no mass. There is no tenderness. There is no rebound and no guarding.   Musculoskeletal: He exhibits no edema, tenderness or deformity.   Neurological: He is alert. He is disoriented.   No tremor; slight cogwheel rigidity; diffuse bradykinesia, disoriented x4   Skin: Skin is warm and dry. No rash noted. He is not diaphoretic. No erythema. No pallor.   Psychiatric: He has a normal mood and affect. His speech is delayed. He is slowed.   Nursing note and vitals reviewed.  All systems reviewed and exam is unchanged from last assessment.    Fluids    Intake/Output Summary (Last 24 hours) at 9/9/2019 1250  Last data filed at 9/9/2019 0945  Gross per 24 hour   Intake 840 ml   Output --   Net 840 ml     Imaging  MR-BRAIN-W/O   Final Result      1.  No acute abnormality   2.  Moderate to severe cerebral volume loss. When compared with 2/13/2011 there has been Interval progression of the cerebral volume loss   3.  Mild chronic microvascular ischemic disease.      IN-ULATYMZ-2 VIEW   Final Result       1.  No evidence of bowel obstruction.      2.  Endovascular coil within the right lower quadrant.      3.  Pelvic calcification.      4.  AICD present.      CT-HEAD W/O   Final Result         NO ACUTE ABNORMALITIES ARE NOTED ON CT SCAN OF THE HEAD.      Findings are consistent with atrophy.  Decreased attenuation in the periventricular white matter likely indicates microvascular ischemic disease.      DX-HIP-BILATERAL-WITH PELVIS-2 VIEWS   Final Result      No pelvic or proximal femoral fracture identified      EC-ECHOCARDIOGRAM COMPLETE W/O CONT   Final Result      DX-CHEST-PORTABLE (1 VIEW)   Final Result      No evidence of acute cardiopulmonary process.      CT-HEAD W/O   Final Result      No acute intracranial abnormality is identified.      There are periventricular and subcortical white matter changes present.  This finding is nonspecific and could be from previous small vessel ischemia, demyelination, or gliosis.      Atrophy      Paranasal sinus disease.              Assessment/Plan  * Cognitive impairment- (present on admission)  Assessment & Plan  -guardianship obtained 7/19 - placement in group home.  Transferring Monday  -no behavioral outbursts  -Ethics and palliative care evaluated the patient.   -patient now COMFORT CARE  -There was concern for possible underlying undiagnosed Parkinson's disease.  MRI brain showing moderate to severe cerebral volume loss consistent with advancing dementia.  Neurology evaluated patient and do not suspect Parkinson's.  Will not initiate additional medications at this time.     Age-related physical debility- (present on admission)  Assessment & Plan  -patient now COMFORT CARE    Cardiac pacemaker in situ- (present on admission)  Assessment & Plan  -Secondary to SSS  -patient now COMFORT CARE    S/P TAVR (transcatheter aortic valve replacement)- (present on admission)  Assessment & Plan  -patient now COMFORT CARE    Essential hypertension- (present on  admission)  Assessment & Plan  -patient now COMFORT CARE    Dyslipidemia- (present on admission)  Assessment & Plan  -patient now COMFORT CARE    Comfort measures only status  Assessment & Plan  -patient was sitting up at nurses station before lunch. Wife was behind him rubbing/massaging his head. Wife was worried he was tired and wanted nursing staff to lie him back down in bed. As they were transferring him to the wheelchair he choked on what appeared to be his own secretions and vomited about 200 mL non-bloody emesis. BP 60's palpable afterwards with worsening responsiveness. Palliative care at bedside. POLST on file (filled out by the patient himself) says patient would not want to be intubated and/or transferred to ICU. Called his legal guardian, Lynda, who was unavailable so I spoke with OC guardian, Cynthia to notify of decision to transition patient to COMFORT CARE at this time.        Dysphagia- (present on admission)  Assessment & Plan  -patient now COMFORT CARE    Hypothyroidism- (present on admission)  Assessment & Plan  -TSH 0.54 on 3/21/19.  -levothyroxine discontinued patient on comfort care    Hx of CABG- (present on admission)  Assessment & Plan  -ASA & statin discontinued.  Patient on comfort care    Gout of multiple sites- (present on admission)  Assessment & Plan  Chronic & stable.   No acute flare.   All medications have been discontinued.  Patient on comfort care    PVD (peripheral vascular disease) (CMS-HCC)- (present on admission)  Assessment & Plan  -patient now COMFORT CARE    History of CAD (coronary artery disease)- (present on admission)  Overview  Dr. Wu stopped plavix in 2016 due to falls.     Assessment & Plan  -s/p 3-vessel CABG with LIMA to LAD, saphenous vein graft to obtuse marginal branch, and saphenous vein graft to the posterior descending artery by Dr. Coates in March 2018.  -patient now COMFORT CARE    Paroxysmal atrial fibrillation (CMS-HCC)- (present on  admission)  Assessment & Plan  -patient now COMFORT CARE       VTE prophylaxis: Comfort Care    KEVIN Day.

## 2019-09-09 NOTE — PROGRESS NOTES
Patient in room in chair eating dinner. He is alert, pleasant, and confused. Able to answer some questions after delay. He is continent/incontinent of bowel and bladder. Incontinence mainly during night. Patient was showered this evening. No complaints of discomfort. He is Comfort Care status. Wife in next bed. Visible from nursing station, bed alarm active and audible. No signs of distress. Patient is set to d/c tomorrow to [facility]. Transport scheduled for 1300 with [facility].

## 2019-09-09 NOTE — CARE PLAN
Problem: Pain Management  Goal: Pain level will decrease to patient's comfort goal  Outcome: PROGRESSING AS EXPECTED  Intervention: Follow pain managment plan developed in collaboration with patient and Interdisciplinary Team  Note:   Patient has no complaints of pain, PRN available. Routes need to be changed if needed.     Problem: Urinary Elimination:  Goal: Ability to reestablish a normal urinary elimination pattern will improve  Outcome: PROGRESSING AS EXPECTED  Intervention: Encourage scheduled voiding  Note:   Patient is continent/incontinent. This is pt baseline while inpatient

## 2019-09-09 NOTE — PROGRESS NOTES
Pt alert and with no complaints or signs of distress. Up to chair for meals and as tolerated and ambulates to bathroom with FWW and staff assistance. VSS and no change clinical status. Discharge now planned for next Monday.

## 2019-09-10 NOTE — PROGRESS NOTES
Nurse assistance helped pt up to chair and up for meal, pt tolerated well, pt's spouse at bedside.

## 2019-09-10 NOTE — PROGRESS NOTES
Pt sitting up in chair for dinner. Wife helping pt with dinner. Pt ambulated to bathroom with CNA and then brushed teeth and laid down in bed. Pt enjoys laying on right side, but will turn to other side with help.

## 2019-09-11 NOTE — PROGRESS NOTES
Assumed care of pt this am. Pt is A&O x3 disoriented to time. Pt is sitting up in the chair for meals. Bed alarm in use. Pt is in room near nursing station. Hourly rounding in place.

## 2019-09-11 NOTE — CARE PLAN
Problem: Safety  Goal: Will remain free from falls  9/11/2019 0524 by Maribel Stevens R.N.  Outcome: PROGRESSING AS EXPECTED  9/11/2019 0524 by Maribel Stevens R.N.  Reactivated  Intervention: Implement fall precautions  Flowsheets  Taken 9/11/2019 0524  Bed Alarm: Yes - Alarm On  Environmental Precautions: Treaded Slipper Socks on Patient;Personal Belongings, Wastebasket, Call Bell etc. in Easy Reach;Transferred to Stronger Side;Report Given to Other Health Care Providers Regarding Fall Risk;Bed in Low Position;Communication Sign for Patients & Families;Mobility Assessed & Appropriate Sign Placed  Taken 7/8/2019 2012  Chair/Bed Strip Alarm: Yes - Alarm On     Problem: Skin Integrity  Goal: Risk for impaired skin integrity will decrease  9/11/2019 0524 by Maribel Stevens R.N.  Outcome: PROGRESSING AS EXPECTED  9/11/2019 0524 by Maribel Stevens R.N.  Reactivated  Intervention: Implement precautions to protect skin integrity in collaboration with the interdisciplinary team  Flowsheets  Taken 9/11/2019 0524  Skin Preventative Measures: Waffle Cushion;Pillows in Use for Support / Positioning  Bed Types: Pressure Redistribution Mattress (Atmosair)  Patient Turns / Repositioning: Patient Turns Self from Side to Side  Moisturizers: Barrier Cream;Moisturizer   Vitamin Therapy in Use: No  Activity : Bed;Chair  Assistance / Tolerance for Turning/Repositioning: Assistance of One  Taken 9/10/2019 2003  Friction Interventions: Draw Sheet / Pad Used for Repositioning  Patient is Receiving Nutrition: Oral Intake Adequate  Note:   Bilat mepilex to heels, waffle overlay in use, ensuring patient is kept clean and dry.

## 2019-09-12 NOTE — PROGRESS NOTES
Report received from day shift nurse. Assumed care @ 1900.     Patient sitting on chair. Alert and oriented x 1. Assessment completed. Denies any pain and discomfort at this time. Patient educated regarding plan of care. Bed/chair alarm in place and functioning. Room near nursing station. On Comfort care.    Bed locked, in lowest position, treaded socks on. Needs attended. No further needs at this time. Communication board updated. Call light and personal belongings within reach.

## 2019-09-12 NOTE — CARE PLAN
Problem: Safety  Goal: Will remain free from falls  Outcome: PROGRESSING AS EXPECTED  Note:   Safety precautions in place. Non skid socks on. Bed alarm in place and functioning. Call light within reach. Bi hourly rounding in place. Room near nursing station.     Problem: Skin Integrity  Goal: Risk for impaired skin integrity will decrease  Outcome: PROGRESSING AS EXPECTED     Patient kept clean and dry, waffle overlay mattress in place, mepilex on bilateral heels in place, heels floated on pillow.

## 2019-09-13 NOTE — PROGRESS NOTES
Rafia appears to be in a pleasant mood today; No signs of pain or distress; CNA helped him shower today and he ate breakfast in his chair; Now back in bed resting; Does well getting back to chair for all meals;

## 2019-09-13 NOTE — PROGRESS NOTES
[facility] Medicine Daily Progress Note    Date of Service  9/12/2019    Chief Complaint  Malaise    Hospital Course   Mr. Morataya is an 86 y/o male who was admitted to the hospital on 3/19/19 under the [facility] service for malaise. A head CT was negative for acute abnormalities, his labs were suggestive of BETTY and hypovolemia, and his vital signs were unremarkable outside of having hypoxia that resolved with 0.5 L/min of O2 via nasal cannula. His home lisinopril was subsequently discontinued and he was given IV fluids. His medical issues eventually stabilized, but due to lack of involvement from family (outside of his wife who was admitted at the same time) and the inability to care for himself, guardianship was pursued and granted. [facility] where he has remained relatively stable outside of a choking episode that resulted in vomiting on 8/12/19. He was transitioned to comfort care but has actually started to improve in his mentation and strength. He is now pending placement, which is tentatively scheduled for him and his wife on 9/16/19.        Interval Problem Update  Just got back from the bathroom, had a BM that was described as normal. Denies pain. Assist of 1 staff member and a FWW. Awake & alert. Answers questions with a yes/no. Appropriate but clearly confused. His wife prefers to do the majority of his care. Per her report, she has noticed that he has gotten stronger.     VSS on room air.     Consultants/Specialty  Palliative care  Ethics    Code Status  Comfort care    Disposition  Guardianship in place. Pending transfer to [facility] on Monday 9/16/19.    Review of Systems  Review of Systems   Unable to perform ROS: Dementia      Physical Exam  Temp:  [36.2 °C (97.1 °F)-36.9 °C (98.4 °F)] 36.9 °C (98.4 °F)  Pulse:  [70-85] 70  Resp:  [14-16] 14  BP: (126-142)/(62-79) 126/62  SpO2:  [96 %] 96 %    Physical Exam   Constitutional: He appears  cachectic. He is active and cooperative. He appears ill (chronically ill-appearing). No distress.   More awake than the last time I saw him. Ambulates with FWW & staff assist x 1.   HENT:   Head: Normocephalic and atraumatic.   Eyes: Conjunctivae are normal. Right eye exhibits no discharge. Left eye exhibits no discharge. No scleral icterus.   Neck: Normal range of motion and phonation normal. Neck supple.   Cardiovascular: Normal rate, regular rhythm and intact distal pulses. Exam reveals no gallop and no friction rub.   Murmur heard.  Pulmonary/Chest: Effort normal. No accessory muscle usage or stridor. No respiratory distress. He has no decreased breath sounds. He has no wheezes. He has no rhonchi. He has no rales.   Abdominal: Soft. He exhibits no distension and no abdominal bruit. Bowel sounds are decreased. There is no tenderness.   Musculoskeletal: Normal range of motion. He exhibits no edema.   Neurological: He is alert. He is disoriented. He displays atrophy. He exhibits abnormal muscle tone. Gait abnormal. GCS eye subscore is 4. GCS verbal subscore is 4. GCS motor subscore is 6.   Unable to assess orientation   Skin: Skin is warm and dry. No rash noted. He is not diaphoretic. No erythema. No pallor.   Psychiatric: He has a normal mood and affect. His speech is normal and behavior is normal. Cognition and memory are impaired.   Nursing note and vitals reviewed.    Fluids    Intake/Output Summary (Last 24 hours) at 9/12/2019 1845  Last data filed at 9/12/2019 1800  Gross per 24 hour   Intake 1280 ml   Output 300 ml   Net 980 ml     Laboratory    Imaging  MR-BRAIN-W/O   Final Result      1.  No acute abnormality   2.  Moderate to severe cerebral volume loss. When compared with 2/13/2011 there has been Interval progression of the cerebral volume loss   3.  Mild chronic microvascular ischemic disease.      NM-QDXTCCP-3 VIEW   Final Result      1.  No evidence of bowel obstruction.      2.  Endovascular coil  within the right lower quadrant.      3.  Pelvic calcification.      4.  AICD present.      CT-HEAD W/O   Final Result         NO ACUTE ABNORMALITIES ARE NOTED ON CT SCAN OF THE HEAD.      Findings are consistent with atrophy.  Decreased attenuation in the periventricular white matter likely indicates microvascular ischemic disease.      DX-HIP-BILATERAL-WITH PELVIS-2 VIEWS   Final Result      No pelvic or proximal femoral fracture identified      EC-ECHOCARDIOGRAM COMPLETE W/O CONT   Final Result      DX-CHEST-PORTABLE (1 VIEW)   Final Result      No evidence of acute cardiopulmonary process.      CT-HEAD W/O   Final Result      No acute intracranial abnormality is identified.      There are periventricular and subcortical white matter changes present.  This finding is nonspecific and could be from previous small vessel ischemia, demyelination, or gliosis.      Atrophy      Paranasal sinus disease.            Assessment/Plan  * Cognitive impairment- (present on admission)  Assessment & Plan  Guardianship obtained 7/19/19. Transfer to group home scheduled for Monday.   Behavior is appropriate.   Ethics and palliative care previously evaluated the patient. He is now comfort care.     There was concern for possible underlying undiagnosed Parkinson's disease.  MRI brain showed moderate to severe cerebral volume loss consistent with advanced dementia.  Neurology evaluated patient and do not suspect Parkinson's.  Will not initiate additional medications at this time.     Age-related physical debility- (present on admission)  Assessment & Plan  Continue working with the nursing staff on mobilization.   Continue comfort care.     Cardiac pacemaker in situ- (present on admission)  Assessment & Plan  Placed secondary to SSS. Continue comfort care.     S/P TAVR (transcatheter aortic valve replacement)- (present on admission)  Assessment & Plan  Continue comfort care.     Essential hypertension- (present on  admission)  Assessment & Plan  Antihypertensive meds discontinued when he was made comfort care.     Dyslipidemia- (present on admission)  Assessment & Plan  Continue comfort care.     Comfort measures only status  Assessment & Plan  Has guardian. Previously transitioned to comfort care, which will be continued. Accurately reflected in the EMR.    Dysphagia- (present on admission)  Assessment & Plan  Diet as desired. Continue comfort care.     Hypothyroidism- (present on admission)  Assessment & Plan  Levothyroxine discontinued when the patient was made comfort care. Will no longer monitor TSH/T4.    Hx of CABG- (present on admission)  Assessment & Plan  ASA & statin discontinued.  Patient on comfort care.    Gout of multiple sites- (present on admission)  Assessment & Plan  Chronic & stable.   No acute flare.   All medications have been discontinued.  Patient on comfort care.    PVD (peripheral vascular disease) (CMS-HCC)- (present on admission)  Assessment & Plan  S/p hx of stent to LLE.   Continue comfort care.     History of CAD (coronary artery disease)- (present on admission)  Overview  Dr. Wu stopped plavix in 2016 due to falls.     Assessment & Plan  S/p 3-vessel CABG with LIMA to LAD, saphenous vein graft to obtuse marginal branch, and saphenous vein graft to the posterior descending artery by Dr. Coaets in March 2018.  Continue comfort care.     Paroxysmal atrial fibrillation (CMS-HCC)- (present on admission)  Assessment & Plan  RRR on exam. Meds discontinued. Continue comfort care.      VTE prophylaxis: Subcutaneous heparin discontinued when the patient was made comfort care.     -----------------------------------------------------------------------------------------------------------------------------------------------------  Electronically signed by:  Sydni Loyd, MSN, RN, APRN, ACNPC-AG, CCRN  Nurse Practitioner  [facility]  9/12/2019    6:45 PM

## 2019-09-13 NOTE — CARE PLAN
Problem: Safety  Goal: Will remain free from falls  Outcome: PROGRESSING AS EXPECTED   Fall precautions in place. Room next to nurses station. Treaded socks on pt. Appropriate signs on doorway. Bedrails up. Bed in lowest position and locked.  Call light and phone within reach. Patient educated on importance of calling nurses before getting out of bed. Bed alarm on.     Problem: Skin Integrity  Goal: Risk for impaired skin integrity will decrease  Outcome: PROGRESSING AS EXPECTED   Skin interventions in place.

## 2019-09-13 NOTE — PROGRESS NOTES
Report received by amrita RN. Assumed care of pt. Assessment complete. Spouse at bedside. Pt A&Ox1 to self, VSS. Pt in no apparent signs of distress, currently resting in bed. Plan of care discussed. Call light within reach, bed in lowest position, and pt has no further questions at this time. Hourly rounding in place.

## 2019-09-14 NOTE — PROGRESS NOTES
Pt sat up in chair for dinner. Pt in good spirits this evening, denies any pain. CNA assisted to bathroom with FWW. Now back in bed resting. Bed alarm on, call light within reach. Hourly rounding in place.

## 2019-09-14 NOTE — CARE PLAN
Problem: Safety  Goal: Will remain free from falls  Outcome: PROGRESSING AS EXPECTED   Fall precautions in place. Treaded socks on pt. Pt's room close to nursing station. Bedrails up. Bed in lowest position and locked.  Call light and phone within reach. Bed alarm on.     Problem: Skin Integrity  Goal: Risk for impaired skin integrity will decrease  Outcome: PROGRESSING AS EXPECTED   Skin interventions in place.

## 2019-09-15 NOTE — CARE PLAN
Problem: Safety  Goal: Will remain free from falls  Outcome: PROGRESSING AS EXPECTED   Fall precautions in place. Treaded socks on pt.  Pt's room up close to nurses station. Bedrails up. Bed in lowest position and locked.  Call light and phone within reach. Bed alarm on.     Problem: Skin Integrity  Goal: Risk for impaired skin integrity will decrease  Outcome: PROGRESSING AS EXPECTED   Skin interventions in place.

## 2019-09-15 NOTE — PROGRESS NOTES
Bedside shift report received and assumes care of patient at 0700; Pt is in bed resting; Call light within reach; Eats all meals sitting up in chair; Denies pain/discomfort;

## 2019-09-15 NOTE — PROGRESS NOTES
Bedside report received, pt care assumed. Pt up at chair eating evening meal next to wife. Then assisted pt to bed to rest. Pt denies any additional needs at this time, denies any pain. Bed in lowest position, bed alarm on, call light within reach.

## 2019-09-16 NOTE — PROGRESS NOTES
Assumed care of pt this am. Pt is A&O to self and place. Pt denies pain. Bed alarm in use. Hourly rounding in place.

## 2019-09-16 NOTE — CARE PLAN
Problem: Safety  Goal: Will remain free from falls  Outcome: PROGRESSING AS EXPECTED     Problem: Skin Integrity  Goal: Risk for impaired skin integrity will decrease  Outcome: PROGRESSING AS EXPECTED

## 2019-09-16 NOTE — DISCHARGE SUMMARY
Discharge Summary    CHIEF COMPLAINT ON ADMISSION  Chief Complaint   Patient presents with   • Malaise     Reason for Admission  Malaise    CODE STATUS  Comfort Care/DNR    HPI & HOSPITAL COURSE  Mr. Rafia Shaikh is an 86 y/o male who was admitted to the hospital on 3/19/19 under the [facility] service for malaise. A head CT was negative for acute abnormalities, his labs were suggestive of BETTY and hypovolemia, and his vital signs were unremarkable outside of having hypoxia that resolved with 0.5 L/min of O2 via nasal cannula. His home lisinopril was subsequently discontinued and he was given IV fluids. His medical issues eventually stabilized, but due to lack of involvement from family (outside of his wife who was admitted at the same time) and the inability to care for himself, guardianship was pursued and granted. He was transferred to the [facility] long-term service on 6/19/19 where he has remained relatively stable outside of a choking episode that resulted in vomiting on 8/12/19. He was transitioned to comfort care at that time but has actually started to improve in his mentation and strength.  A hospice referral was placed and he was accepted by [facility] hospice services.  He is being discharged today to [facility], along with his wife.   His vital signs have remained stable and he is medically clear for transfer there today.     Therefore, he is discharged in fair and stable condition to hospice.    The patient met 2-midnight criteria for an inpatient stay at the time of discharge.    FOLLOW UP ITEMS POST DISCHARGE  [facility]    DISCHARGE DIAGNOSES  Principal Problem:    Cognitive impairment POA: Yes  Active Problems:    Age-related physical debility POA: Yes    Dyslipidemia POA: Yes    Essential hypertension POA: Yes    S/P TAVR (transcatheter aortic valve replacement) POA: Yes    Cardiac pacemaker in situ POA: Yes    Comfort measures only status POA: No    Paroxysmal atrial fibrillation (CMS-[facility])  POA: Yes    History of CAD (coronary artery disease) POA: Yes      Overview: Dr. Wu stopped plavix in 2016 due to falls.     PVD (peripheral vascular disease) (CMS-[facility]) POA: Yes      Overview: Parra stent in LLE.    Gout of multiple sites POA: Yes    Hx of CABG POA: Yes    Hypothyroidism POA: Yes    Dysphagia POA: Yes  Resolved Problems:    SSS (sick sinus syndrome) ([facility]) POA: Yes    Multiple bruises POA: Clinically Undetermined    Fall POA: Yes    BETTY (acute kidney injury) ([facility]) POA: Yes    Hypovolemia POA: Yes    Headache POA: Clinically Undetermined    Other constipation POA: Yes    FOLLOW UP  No future appointments.  No follow-up provider specified.    MEDICATIONS ON DISCHARGE     Medication List      Stop taking these medications    allopurinol 300 MG Tabs  Commonly known as:  ZYLOPRIM     aspirin EC 81 MG Tbec  Commonly known as:  ECOTRIN     atorvastatin 20 MG Tabs  Commonly known as:  LIPITOR     cefdinir 300 MG Caps  Commonly known as:  OMNICEF     levothyroxine 50 MCG Tabs  Commonly known as:  SYNTHROID     lisinopril 5 MG Tabs  Commonly known as:  PRINIVIL     potassium chloride SA 20 MEQ Tbcr  Commonly known as:  Kdur          Allergies  No Known Allergies    DIET  Orders Placed This Encounter   Procedures   • Diet Order Regular     Standing Status:   Standing     Number of Occurrences:   1     Order Specific Question:   Diet:     Answer:   Regular [1]     Order Specific Question:   Texture/Fiber modifications:     Answer:   Dysphagia 1(Pureed)specify fluid consistency(question 6) [1]     Order Specific Question:   Consistency/Fluid modifications:     Answer:   Nectar Thick [2]     Order Specific Question:   Miscellaneous modifications:     Answer:   SLP - 1:1 Supervision by Nursing [21]     ACTIVITY  As tolerated.  Exercise encouraged.  Weight bearing as tolerated    LINES, DRAINS, AND WOUNDS  This is an automated list. Peripheral IVs will be removed prior to discharge.       Wound 07/29/19 Other  (comment) Penis (Active)   Wound Image   7/29/2019  2:07 AM   Site Assessment Clean;Dry;Pink 9/15/2019  8:00 PM   Ana Cristina-wound Assessment Clean;Dry;Intact 9/15/2019  8:00 PM   Closure Open to air 9/15/2019  8:00 PM   Drainage Amount None 9/15/2019  8:00 PM   Treatments Cleansed 9/8/2019  7:15 PM   Periwound Protectant Barrier Paste 9/15/2019  8:00 PM   Dressing Options Open to Air 9/15/2019  8:00 PM       Surgical Incision  Incision Bilateral Groin (Active)       Surgical Incision  Right Anterior Chest (Active)       Surgical Incision  Incision Left Anterior Chest (Active)       Wound Open Surgical (Complicated) Thigh Right (Active)                MENTAL STATUS ON TRANSFER  Level of Consciousness: Alert  Orientation : Disoriented to Event, Disoriented to Place, Disoriented to Time  Speech: Expressive Aphasia    CONSULTATIONS  Plan of care  Neurology  Gastroenterology  General surgery    PROCEDURES  Midline catheter placement on 3/10/2019  Ultrasound-guided IV on 3/11/2019    LABORATORY  Lab Results   Component Value Date    SODIUM 135 07/25/2019    POTASSIUM 4.2 07/25/2019    CHLORIDE 102 07/25/2019    CO2 26 07/25/2019    GLUCOSE 118 (H) 07/25/2019    BUN 30 (H) 07/25/2019    CREATININE 1.13 07/25/2019    CREATININE 1.80 (H) 04/05/2013      Lab Results   Component Value Date    WBC 8.7 06/28/2019    WBC 7.2 04/05/2013    HEMOGLOBIN 13.1 (L) 06/28/2019    HEMATOCRIT 41.7 (L) 06/28/2019    PLATELETCT 163 (L) 06/28/2019      Assessment and plan:  * Cognitive impairment- (present on admission)  Assessment & Plan  Guardianship obtained 7/19/19.  Behavior is appropriate.   Ethics and palliative care previously evaluated the patient. He is now comfort care.     There was concern for possible underlying undiagnosed Parkinson's disease.  MRI brain showed moderate to severe cerebral volume loss consistent with advanced dementia.  Neurology evaluated patient and do not suspect Parkinson's.  Will not initiate additional  medications at this time.     Age-related physical debility- (present on admission)  Assessment & Plan  Continue working with the nursing staff on mobilization.   Continue comfort care.     Cardiac pacemaker in situ- (present on admission)  Assessment & Plan  Placed secondary to SSS.  Site is uncomplicated.  There is no current indication for having the device interrogated.  Continue comfort care.     S/P TAVR (transcatheter aortic valve replacement)- (present on admission)  Assessment & Plan  Continue comfort care.     Essential hypertension- (present on admission)  Assessment & Plan  Antihypertensive meds discontinued when he was made comfort care.     Dyslipidemia- (present on admission)  Assessment & Plan  Statin discontinued when he was made comfort care.    Continue comfort care.     Comfort measures only status  Assessment & Plan  Has guardian. Previously transitioned to comfort care, which will be continued. Accurately reflected in the EMR.    Dysphagia- (present on admission)  Assessment & Plan  Diet as desired. Continue comfort care.     Hypothyroidism- (present on admission)  Assessment & Plan  Levothyroxine discontinued when the patient was made comfort care. Will no longer monitor TSH/T4.    Hx of CABG- (present on admission)  Assessment & Plan  ASA & statin discontinued.  Patient on comfort care.    Gout of multiple sites- (present on admission)  Assessment & Plan  Chronic & stable.   No acute flare.   All medications have been discontinued.  Patient on comfort care.    PVD (peripheral vascular disease) (CMS-HCC)- (present on admission)  Assessment & Plan  S/p hx of stent to LLE.   Continue comfort care.     History of CAD (coronary artery disease)- (present on admission)  Overview  Dr. Wu stopped plavix in 2016 due to falls.     Assessment & Plan  S/p 3-vessel CABG with LIMA to LAD, saphenous vein graft to obtuse marginal branch, and saphenous vein graft to the posterior descending artery by   Jaxon in March 2018.  Aspirin and statin discontinued when he became comfort care status.  Continue comfort care.     Paroxysmal atrial fibrillation (CMS-HCC)- (present on admission)  Assessment & Plan  RRR on exam. Meds discontinued when comfort care was initiated.    Total time of the discharge process exceeds 32 minutes.    -----------------------------------------------------------------------------------------------------------------------------------------------------  Electronically signed by:  Sydni Loyd, MSN, RN, APRN, ACNPC-AG, CCRN  Nurse Practitioner  [facility]  9/16/2019    9:53 AM

## 2019-09-16 NOTE — PROGRESS NOTES
Bedside report received, pt care assumed. Pt up in chair for meal, sitting next to spouse. Then assisted to bathroom with FWW, and then to bed. Pt denies any additional needs at this time, denies any pain. Bed in lowest position, bed alarm on, call light within reach. Hourly rounding in place.

## 2019-09-16 NOTE — PROGRESS NOTES
Pt discharged to [facility] with spouse. Safe keeping belongings sent home with pts spouse. All belongings collected and sent home. Discharge paperwork given to spouse. Signed copy placed in the chart.

## 2019-09-16 NOTE — PROGRESS NOTES
Patient seen at bedside today.  Is currently sitting in a chair having breakfast.  No distress noted.  Appears to be swallowing without difficulty or evidence of aspiration.  Is smiling and interactive.  Discharging today to [facility] under [facility] care. Transport arranged for 13:00.  No change to previous review of systems or physical exam, see my previous note for details.

## 2019-11-06 NOTE — ED TRIAGE NOTES
"Pt BIB [facility] to triage w/ c/o head pain & cuts to forehead secondary to a GLF while shoveling snow today.  Neg loc.  Pt is not on thinners.  Bleeding controlled at this time.  Pt has a slow speech pattern when answering my questions, wife states this is normal for him.  \"he's fine except for that cut\".   " normal latch/lips widely flanged

## 2019-11-23 NOTE — PROGRESS NOTES
Monitor summary: New @1821 Paced/SR 60-64, IL 0.20, QRS 0.14, QT 0.40, with 1.9 sec pause and 2.0 sec pause and HR down to 30 per strip from monitor room.     205

## 2020-06-11 NOTE — PROGRESS NOTES
Assumed care of pt at shift change. A/Ox1, discussed plan of care. Pt on room air.  Pt was up on chair drawing picture and ate breakfast. Denied pain.        All needs met at this time. Bed in lowest position, treaded socks on, personal belongings and call light within reach, instructed to call for any assistance, hourly rounding in place.   No

## 2020-07-03 NOTE — CARE PLAN
Problem: Safety  Goal: Will remain free from injury    Intervention: Provide assistance with mobility  Pt provided with assistance during mobilization.       Problem: Infection  Goal: Will remain free from infection    Intervention: Implement standard precautions and perform hand washing before and after patient contact  Standard precautions implemented before and after pt contact.          Discharged

## 2020-08-18 NOTE — CARE PLAN
----- Message from Aron Johnson MD sent at 8/17/2020  8:31 PM CDT -----  nml direct bilirubin level  indicating a benign elevation of bilirubin   Problem: Safety  Goal: Will remain free from injury  Outcome: PROGRESSING SLOWER THAN EXPECTED  Bed alarm on for safety and assistance out of bed    Problem: Discharge Barriers/Planning  Goal: Patient's continuum of care needs will be met  Outcome: PROGRESSING SLOWER THAN EXPECTED  Guardianship scheduled July 19th

## 2020-08-26 NOTE — DISCHARGE PLANNING
Medical SW    SW attempted to get choice for SNF from pt son Lopez. Phone number listed is unable to leave VM for Lopez. SW will try to call other family members to get SNF choice.    Plan: This SW can be reached at [phone].   Star Wedge Flap Text: The defect edges were debeveled with a #15 scalpel blade.  Given the location of the defect, shape of the defect and the proximity to free margins a star wedge flap was deemed most appropriate.  Using a sterile surgical marker, an appropriate rotation flap was drawn incorporating the defect and placing the expected incisions within the relaxed skin tension lines where possible. The area thus outlined was incised deep to adipose tissue with a #15 scalpel blade.  The skin margins were undermined to an appropriate distance in all directions utilizing iris scissors.

## 2020-10-07 NOTE — CARE PLAN
Problem: Communication  Goal: The ability to communicate needs accurately and effectively will improve  Outcome: PROGRESSING AS EXPECTED      Problem: Infection  Goal: Will remain free from infection  Outcome: PROGRESSING AS EXPECTED         Reason for Call:  Medication or medication refill:    Do you use a [facility]?  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 YES    Phone number patient can be reached at: Home number on file [phone] (home)    Best Time: any    Call taken on 10/7/2020 at 7:30 AM by Neda Hubbard
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
## 2020-12-23 NOTE — CARE PLAN
PATIENT is independent , ambulate with precaution    Problem: Communication  Goal: The ability to communicate needs accurately and effectively will improve    Intervention: Educate patient and significant other/support system about the plan of care, procedures, treatments, medications and allow for questions  Pt and spouse aware of plan of care.       Problem: Infection  Goal: Will remain free from infection    Intervention: Implement standard precautions and perform hand washing before and after patient contact  Standard precautions in place

## 2021-05-29 NOTE — CARE PLAN
ANTICOAGULATION  MANAGEMENT    Assessment     Today's INR result of 2.4 is Therapeutic (goal INR of 2.0-3.0)        Warfarin taken as previously instructed    No new diet changes affecting INR    Stopping Sertraline may decrease  risk of bleeding.    Continues to tolerate warfarin with no reported s/s of bleeding or thromboembolism     Previous INR was Therapeutic    Plan:     Spoke with Myron regarding INR result and instructed:     Warfarin Dosing Instructions:  Continue current warfarin dose    2 mg every Tue, Fri; 4 mg all other days        (0 % change)    Instructed patient to follow up no later than: 4 weeks. Appointment made.    Education provided: importance of therapeutic range, target INR goal and significance of current INR result and importance of notifying clinic for changes in medications    Myron verbalizes understanding and agrees to warfarin dosing plan.    Instructed to call the [facility] for any changes, questions or concerns. (#[phone])   ?   Natasha Grijalva RN    Subjective/Objective:      Myron Sunshine, a 88 y.o. male is on warfarin.     Myron reports:     Home warfarin dose: as updated on anticoagulation calendar per template     Missed doses: No     Medication changes:  Yes: stopped taking sertraline 2 weeks ago     S/S of bleeding or thromboembolism:  No     New Injury or illness:  No     Changes in diet or alcohol consumption:  No     Upcoming surgery, procedure or cardioversion:  No    Anticoagulation Episode Summary     Current INR goal:   2.0-3.0   TTR:   84.6 % (4.2 y)   Next INR check:   7/17/2019   INR from last check:   2.40 (6/19/2019)   Weekly max warfarin dose:      Target end date:      INR check location:      Preferred lab:      Send INR reminders to:   [facility]    Indications    Persistent atrial fibrillation (H) [I48.1]           Comments:   new order INR 2/5/19         Anticoagulation Care Providers     Provider Role Specialty Phone number     Problem: Communication  Goal: The ability to communicate needs accurately and effectively will improve  Outcome: PROGRESSING AS EXPECTED      Problem: Safety  Goal: Will remain free from falls  Outcome: PROGRESSING AS EXPECTED      Problem: Skin Integrity  Goal: Risk for impaired skin integrity will decrease  Outcome: PROGRESSING AS EXPECTED         Donna Thakkar MD Referring Cardiology [phone]

## 2021-09-23 NOTE — PROGRESS NOTES
Patient on bed resting. Denies pain. On Comfort Care. Frequent incontinent checks in place. Waffle overlay mattress in place.     Needs attended. No additional needs at this time. Call light and personal belongings within reach. Left on bed resting comfortably. Bed alarm in place and functioning. Room near nursing station. Hourly rounding in place.   Patient baseline mental status

## 2022-01-09 NOTE — PROGRESS NOTES
Patient AAOx2, in room close to nursing station with strip alarm on as well as built in bed alarm.  Spouse is also a patient in same room  Pt will not call before attempting ambulation, alarm will go off and nursing staff gets in fairly quickly as to avoid falls.  Call light in reach yet does not get used, patient SO helpful with ADLs hourly rounding in place.   [facility]   [address] [facility]  phone number is [phone]  .  Appointment is the  1?/10/22  Between 9:30 to 1:00pm  AVS faxed on 1/9/22 at  10:53 AM

## 2022-05-16 NOTE — DISCHARGE INSTRUCTIONS
Discharge Instructions    Discharged to other by [facility] with escort. Discharged via wheelchair, hospital escort: Yes.  Special equipment needed: Not Applicable    Be sure to schedule a follow-up appointment with your primary care doctor or any specialists as instructed.     Discharge Plan:   Diet Plan: Discussed  Activity Level: Discussed  Confirmed Symptoms Management: Discussed  Medication Reconciliation Updated: Yes  Influenza Vaccine Indication: Not indicated: Previously immunized this influenza season and > 8 years of age    I understand that a diet low in cholesterol, fat, and sodium is recommended for good health. Unless I have been given specific instructions below for another diet, I accept this instruction as my diet prescription.   Other diet: Cardiac dysphagia 2 thin liquids    Special Instructions: None    · Is patient discharged on Warfarin / Coumadin?   No       Dysphagia Diet Level 2, Mechanically Altered  The dysphagia level 2 diet includes foods that are blended, chopped, ground, or mashed so they are easier to chew and swallow. The foods are soft, moist, and can be chopped into ¼-inch chunks (such as pancakes, pasta, and bananas).  In order to be on this diet, you must be able to chew. This diet helps you transition between the pureed textures of the dysphagia level 1 diet to more solid textures. This diet is helpful for people with mild to moderate swallowing difficulties. It reduces the risk of food getting caught in the windpipe, trachea, or lungs.   You may need help or supervision during meals while following this diet so that you eat safely. You will be on this diet until your health care provider advances the texture of your diet.   WHAT DO I NEED TO KNOW ABOUT THIS DIET?  Foods  You may eat foods that are soft and moist.  You may need to use a , whisk, or masher to soften some of your foods.  You can moisten foods with gravies, sauces, vegetable or fruit juice, milk, half and  Marsha Dr Larson Dr Larson half, or water when blending, mashing, or grinding your foods to the right consistency.  If you were on the dysphagia level 1 diet, you may still eat any of the foods included in that diet.  Avoid foods that are dry, hard, sticky, chewy, coarse, and crunchy. Also avoid large cuts of food.  Take small bites. Each bite should contain ¼ inch or less of food.  Liquids  Avoid liquids with seeds and chunks.  Thicken liquids, if instructed by your health care provider. Your health care provider will tell you the consistency to which you should thicken your liquids for safe swallowing. To thicken a liquid, use a commercial thickener or a thickening food (such as rice cereal or potato flakes). Ask your health care provider for specific recommendations on thickeners.  See your dietitian or health care provider regularly for help with your dietary changes.  WHAT FOODS CAN I EAT?  Grains  Store-bought soft breads that do not have nuts or seeds. Pancakes, sweet rolls, Iranian pastries, and Albanian toast that have been moistened with syrup or sauce to form a slurry when blended. Well-cooked pasta, noodles, and bread dressing. Well-cooked noodles and pasta in sauce. Moist macaroni and cheese. Soft dumplings or spaetzle with gravy or butter. Cooked cereals (including oatmeal). Low-texture dry cereals, such as rice puff, corn, or wheat-flake cereals, with milk (if thin liquids are not allowed, make sure all of the milk is absorbed by the cereal before eating it).  Vegetables  Very soft, well-cooked vegetables in pieces less than ½ inch in size. Cooked potatoes that are moist, not crispy, and with sauce.  Fruits  Canned or cooked fruits that are soft or moist and do not have skin or seeds. Fresh, soft bananas. Fruit juices with a small amount of pulp (if thin liquids are allowed). Gelatin or plain gelatin with canned fruit, except pineapple.  Meat and Other Protein Sources  Tender, moist meats, poultry, or fish cooked with gravy or  Dr horan Dr. Larson sauce and cubed to ¼-inch bites or smaller. Ground meat. Moist meatball or meatloaf. Fish without bones. Moist casseroles without rice. Tuna, egg, or meat salad without chunks or hard-to-chew vegetables, such as celery and onions. Smooth quiche without large chunks. Scrambled, poached, or soft-cooked eggs with butter, margarine, sauce, or gravy. Tofu. Well-cooked, moistened and mashed beans, peas, baked beans, and other legumes. Casseroles without rice (such as tuna noodle casserole or soft moist meat lasagna).  Dairy  Cream cheese. Yogurt. Cottage cheese. Ask your health care provider if milk is allowed.  Sweets/Desserts  Pudding. Custard. Soft fruit pies with crust on the bottom only. Crisps and cobblers without seeds or nuts and with soft crusts. Soft, moist cakes. Icing. Pre-gelled cookies. Soft, moist cookies dunked in milk, coffee, or another liquid. Jelly. Soft, smooth chocolate bars that are easily chewed. Jams and preserves without seeds. Ask your health care provider whether you can have frozen desserts.  Fats and Oils  Butter. Margarine. Cream for cereal, depending on liquid consistency allowed. Gravy. Cream sauces. Mayonnaise. Salad dressings. Cream cheese. Cheese spreads, plain or with soft fruits or vegetables added. Sour cream. Sour cream dips with soft fruits or vegetables added. Whipped toppings.  Other  Sauces and salsas that have soft chunks that are about ½ inch or smaller.  The items listed above may not be a complete list of recommended foods or beverages. Contact your dietitian for more options.  WHAT FOODS ARE NOT RECOMMENDED?  Grains  All breads not listed in the recommended list. Breads that are hard or have nuts or seeds. Coarse cereals. Cereals that have nuts, seeds, dried fruits, or coconut. Rice. Corn.  Vegetables  Whole, raw, frozen, or dried vegetables. Tough, fibrous, chewy, or stringy cooked vegetables, such as celery, peas, broccoli, cabbage, [place] sprouts, and asparagus. Potato  skins. Potato and other vegetable chips. Fried or Persian-fried potatoes. Cooked corn and peas.  Fruits  Whole raw, frozen, or dried fruits, including coconut. Pineapple. Fruits with seeds.  Meat and Other Protein Sources  Dry, tough meats, such as chambers, sausage, and hot dogs. Cheese slices and cubes. Peanut butter. Hard boiled or fried eggs. Nuts. Seeds. Pizza. Sandwiches. Dry casseroles or casseroles with rice or large chunks.  Dairy  Yogurt with nuts, seeds, or large chunks.  Sweets/Desserts  Coarse, hard, chewy, or sticky desserts. Any dessert with nuts, seeds, coconut, pineapple, or dried fruit. Ask your health care provider whether you can have frozen desserts.  Fats and Oils  Avoid fats with chunky, large textures, such as those with nuts or fruits.  Other  Soups and casseroles with large chunks.  The items listed above may not be a complete list of foods and beverages to avoid. Contact your dietitian for more information.     This information is not intended to replace advice given to you by your health care provider. Make sure you discuss any questions you have with your health care provider.     Document Released: 12/18/2006 Document Revised: 01/08/2016 Document Reviewed: 12/01/2014  [employer] Interactive Patient Education ©2016 [employer]  Bradycardia, Adult  Bradycardia is a slower-than-normal heartbeat. A normal resting heart rate for an adult ranges from 60 to 100 beats per minute. With bradycardia, the resting heart rate is less than 60 beats per minute.  Bradycardia can prevent enough oxygen from reaching certain areas of your body when you are active. It can be serious if it keeps enough oxygen from reaching your brain and other parts of your body. Bradycardia is not a problem for everyone. For some healthy adults, a slow resting heart rate is normal.  What are the causes?  This condition may be caused by:  A problem with the heart, including:  A problem with the heart's electrical system, such as  a heart block.  A problem with the heart's natural pacemaker (sinus node).  Heart disease.  A heart attack.  Heart damage.  A heart infection.  A heart condition that is present at birth (congenital heart defect).  Certain medicines that treat heart conditions.  Certain conditions, such as hypothyroidism and obstructive sleep apnea.  Problems with the balance of chemicals and other substances, like potassium, in the blood.  What increases the risk?  This condition is more likely to develop in adults who:  Are age 65 or older.  Have high blood pressure (hypertension), high cholesterol (hyperlipidemia), or diabetes.  Drink heavily, use tobacco or nicotine products, or use drugs.  Are stressed.  What are the signs or symptoms?  Symptoms of this condition include:  Light-headedness.  Feeling faint or fainting.  Fatigue and weakness.  Shortness of breath.  Chest pain (angina).  Drowsiness.  Confusion.  Dizziness.  How is this diagnosed?  This condition may be diagnosed based on:  Your symptoms.  Your medical history.  A physical exam.  During the exam, your health care provider will listen to your heartbeat and check your pulse. To confirm the diagnosis, your health care provider may order tests, such as:  Blood tests.  An electrocardiogram (ECG). This test records the heart's electrical activity. The test can show how fast your heart is beating and whether the heartbeat is steady.  A test in which you wear a portable device (event recorder or Holter monitor) to record your heart's electrical activity while you go about your day.  An exercise test.  How is this treated?  Treatment for this condition depends on the cause of the condition and how severe your symptoms are. Treatment may involve:  Treatment of the underlying condition.  Changing your medicines or how much medicine you take.  Having a small, battery-operated device called a pacemaker implanted under the skin. When bradycardia occurs, this device can be used  to increase your heart rate and help your heart to beat in a regular rhythm.  Follow these instructions at home:  Lifestyle  Manage any health conditions that contribute to bradycardia as told by your health care provider.  Follow a heart-healthy diet. A nutrition specialist (dietitian) can help to educate you about healthy food options and changes.  Follow an exercise program that is approved by your health care provider.  Maintain a healthy weight.  Try to reduce or manage your stress, such as with yoga or meditation. If you need help reducing stress, ask your health care provider.  Do not use use any products that contain nicotine or tobacco, such as cigarettes and e-cigarettes. If you need help quitting, ask your health care provider.  Do not use illegal drugs.  Limit alcohol intake to no more than 1 drink per day for nonpregnant women and 2 drinks per day for men. One drink equals 12 oz of beer, 5 oz of wine, or 1½ oz of hard liquor.  General instructions  Take over-the-counter and prescription medicines only as told by your health care provider.  Keep all follow-up visits as directed by your health care provider. This is important.  How is this prevented?  In some cases, bradycardia may be prevented by:  Treating underlying medical problems.  Stopping behaviors or medicines that can trigger the condition.  Contact a health care provider if:  You feel light-headed or dizzy.  You almost faint.  You feel weak or are easily fatigued during physical activity.  You experience confusion or have memory problems.  Get help right away if:  You faint.  You have an irregular heartbeat (palpitations).  You have chest pain.  You have trouble breathing.  This information is not intended to replace advice given to you by your health care provider. Make sure you discuss any questions you have with your health care provider.  Document Released: 09/09/2003 Document Revised: 08/15/2017 Document Reviewed: 06/08/2017  [employer]  Interactive Patient Education © 2017 [employer]        Sick Sinus Syndrome  Sick sinus syndrome is a group of conditions that affect heart rhythm and how quickly the heart beats (heart rate). When you have sick sinus syndrome, your heart rate may be too fast (tachycardia) or too slow (bradycardia), or it may switch between fast and slow.  What are the causes?  Your heartbeat is controlled by a structure in the heart called the sinoatrial (SA) node. Sick sinus syndrome happens when the SA node does not work properly (SA node dysfunction). It is not known what causes this.  What increases the risk?  This syndrome is more likely to develop in people who:  · Are age 65 or older.  · Have a family history of the syndrome.  · Have had a heart attack or heart surgery.  · Have sleep apnea.  · Have coronary artery disease.  · Have inflammation of the heart muscle (myocarditis) or the sac that surrounds the heart (pericarditis).  · Use medicines such as beta blockers, some calcium channel blockers, or digoxin.  · Have an abnormal level of thyroid hormone or electrolytes.  · Have high blood pressure (hypertension).  · Have had infections that affect the heart, like rheumatic fever or diphtheria.  · Are born with heart defects (congenital heart disease).  What are the signs or symptoms?  Symptom of this syndrome include:  · Fainting or feeling like you are going to faint.  · Chest pain.  · Shortness of breath.  · Dizziness.  · Feeling like your heart skips beats.  · Feeling like your heart beats very quickly.  · Tiredness.  · Fatigue.  · Confusion.  Some people with this condition do not have any symptoms. Most people have few symptoms or very mild symptoms.  How is this diagnosed?  This syndrome may be diagnosed with tests, such as:  · A test that measures electrical activity in the heart (electrocardiogram, or ECG).  · A test in which you wear a device called a Holter monitor for 1-2 days. The device will record your  heart’s electrical signals.  · Placing a device to record the electrical activity of your heart over a longer period of time (implantable loop recorder).  · An test to look at the electrical system of your heart (electrophysiology study).  · Blood tests.  How is this treated?  Treatment for this syndrome may include:  · Surgery to put a small, electrical device in your chest to correct your heartbeat (pacemaker).  · Medicines to keep your heart from beating too quickly.  · Stopping taking certain medicines as told by your health care provider.  · Treatment of the underlying condition.  If the syndrome is not causing any symptoms, you may not need treatment.  Follow these instructions at home:  · Take over-the-counter and prescription medicines only as told by your health care provider.  · Stay active and stay at a healthy weight. Ask your health care provider what level of activity is safe for you.  · Eat a heart-healthy diet that includes fruits, vegetables, whole grains, low-fat dairy products, and lean proteins like poultry and eggs. Ask your health care provider if you should:  ¨ Avoid any foods or drinks.  ¨ Limit your salt (sodium) intake.  · Limit alcohol intake to no more than 1 drink a day for nonpregnant women and 2 drinks a day for men. One drink equals 12 oz of beer, 5 oz of wine, or 1½ oz of hard liquor.  · Do not use any products that contain nicotine or tobacco, such as cigarettes and e-cigarettes. If you need help quitting, ask your health care provider.  · Keep all follow-up visits as told by your health care provider. This is important.  Contact a health care provider if:  · You have a cough that does not go away.  · You have swelling in your feet or ankles.  · You feel short of breath with activity.  · You feel fatigued.  Get help right away if:  · You have chest pain or difficulty breathing.  · You suddenly have weakness, numbness, or loss of movement on one side of your body.  · You suddenly  become very confused.  · You suddenly lose the ability to speak or understand speech.  · You feel like your heart is skipping beats.  · You feel like your heart is beating very quickly.  · You faint or feel like you are going to faint.  These symptoms may represent a serious problem that is an emergency. Do not wait to see if the symptoms will go away. Get medical help right away. Call your local emergency services (911 in the [place] Do not drive yourself to the hospital.   Summary  · When you have sick sinus syndrome, your heart rate may be too fast (tachycardia) or too slow (bradycardia), or it may switch between fast and slow.  · Treatment for this syndrome may include taking medicines and having a pacemaker placed in your chest.  · If you have chest pain or difficulty breathing, get help right away. Do not drive yourself to the hospital.  This information is not intended to replace advice given to you by your health care provider. Make sure you discuss any questions you have with your health care provider.  Document Released: 12/06/2010 Document Revised: 08/24/2017 Document Reviewed: 08/15/2017  [employer] Interactive Patient Education © 2017 [employer]      Depression / Suicide Risk    As you are discharged from this [facility] facility, it is important to learn how to keep safe from harming yourself.    Recognize the warning signs:  · Abrupt changes in personality, positive or negative- including increase in energy   · Giving away possessions  · Change in eating patterns- significant weight changes-  positive or negative  · Change in sleeping patterns- unable to sleep or sleeping all the time   · Unwillingness or inability to communicate  · Depression  · Unusual sadness, discouragement and loneliness  · Talk of wanting to die  · Neglect of personal appearance   · Rebelliousness- reckless behavior  · Withdrawal from people/activities they love  · Confusion- inability to concentrate     If you or a loved one  observes any of these behaviors or has concerns about self-harm, here's what you can do:  · Talk about it- your feelings and reasons for harming yourself  · Remove any means that you might use to hurt yourself (examples: pills, rope, extension cords, firearm)  · Get professional help from the community (Mental Health, Substance Abuse, psychological counseling)  · Do not be alone:Call your Safe Contact- someone whom you trust who will be there for you.  · Call your local CRISIS HOTLINE 043-5182 or 722-831-2550  · Call your local Children's Mobile Crisis Response Team [place] (206) 273-6486 or www.[employer] [facility]  · Call the toll free National Suicide Prevention Hotlines   · National Suicide Prevention Lifeline 282-552-KWTB (4756)  · National Hope Line Network 800-SUICIDE (003-6093)       Dr SHANITA Larson

## 2022-07-06 NOTE — CARE PLAN
Problem: Safety  Goal: Will remain free from injury  Outcome: PROGRESSING AS EXPECTED    Bed in locked and lowest position. Bed alarm on. Nonskid socks in place. Will continue to monitor.      Problem: Pain Management  Goal: Pain level will decrease to patient's comfort goal  Outcome: PROGRESSING AS EXPECTED     No c/o pain or discomfort. Pt resting in bed at this time. Will continue to monitor.    Problem: Pain  Goal: #Acceptable pain level achieved/maintained at rest using NRS/Faces  Description: This goal is used for patients who can self-report. Acceptable means the level is at or below the identified comfort/function goal.  Outcome: Outcome Met, Continue evaluating goal progress toward completion     Problem: At Risk for Falls  Goal: # Patient does not fall  Outcome: Outcome Met, Continue evaluating goal progress toward completion     Problem: VTE, Risk for  Goal: # No s/s of VTE  Outcome: Outcome Met, Continue evaluating goal progress toward completion     Problem: Pain  Goal: # Acceptable pain level achieved/maintained at rest using NRS/Faces without oversedation (opioid naive or PCA/Epidural infusion)  Description: This goal is used if Opioid-naÃ¯ve or on PCA/Epidural Infusion. Outcome: Outcome Not Met, Continue to Monitor  Goal: # Acceptable pain level achieved/maintained with activity using NRS/Faces  Description: This goal is used for patients who can self-report and are not achieving acceptable pain control during activity. Outcome: Outcome Not Met, Continue to Monitor  Goal: # Verbalizes understanding of pain management  Description: Documented in Patient Education Activity  Outcome: Outcome Not Met, Continue to Monitor     Problem: At Risk for Falls  Goal: # Takes action to control fall-related risks  Outcome: Outcome Not Met, Continue to Monitor  Goal: # Verbalizes understanding of fall risk/precautions  Description: Document education using the patient education activity  Outcome: Outcome Not Met, Continue to Monitor     Problem: VTE, Risk for  Goal: # Verbalizes understanding of VTE risk factors and prevention  Description: Document education using the patient education activity.    Outcome: Outcome Not Met, Continue to Monitor  Goal: Demonstrates ability to administer injectable anticoagulants if ordered for d/c  Description: Document education using the patient education activity.   Outcome: Outcome Not Met, Continue to Monitor

## 2023-05-18 NOTE — CARE PLAN
"Problem: Safety  Goal: Will remain free from falls  Outcome: PROGRESSING SLOWER THAN EXPECTED  Pt has had multiple falls in the hospital. Pt is educated and re-educated multiple times a shift to use the call light for assistance ambulating. Pt will attempt to reach the bed alarm box on the wall to turn it off so he can get out of bed; this is dangerous as the pt will have his upper body almost completley out of bed reaching. Sometimes the pt will attempt to slither out of bed without setting off the alarm at all.     Problem: Psychosocial Needs:  Goal: Level of anxiety will decrease  Outcome: PROGRESSING AS EXPECTED  This RN was the charge nurse the two previous nights. The two previous nights, pt has been awake and \"jumping\" out of bed. To maintain saftey, pt was brought to the nurse's station to color to ease his insomnia/ anxiety. PRN trazadone given per MAR in an attempt to prevent these episodes and allow the pt to sleep      " PROVIDER:[TOKEN:[[id number]]]

## 2023-08-16 NOTE — PROGRESS NOTES
· 2 RN skin check complete with RANDY Lawson.  · Devices in place none  · Skin assessed under devices n/a  · Confirmed pressure ulcers found on n/a  · New potential pressure ulcers noted on n/a  · The following interventions in place ,waffle foam in placed,every 2 hours turn,checked every 2 hours d/t patient is incontinent.    Bilateral upper extremities few bruises,bilateral heels red and blanching,coccyx,red and blanching,applied aurora cream.   Secondary Intention Text (Leave Blank If You Do Not Want): The defect will heal with secondary intention.

## 2023-11-04 NOTE — PROGRESS NOTES
Goal Outcome Evaluation:   POD 4. A&O. VSS. Minimal complaints of pain, lidocaine patch in place. Ax1 to the bathroom. Urine sample sent, results noted. Plans for Shamika palomo for MARIO. KHANH.                       Received report and assumed patient care. Patient is AOx1 to self only. Complaints of penile pain. Area is red and tender. Will update MD. Assessment complete on RA. All needs met at this time. Safety precautions and hourly rounding in place.

## 2023-12-28 NOTE — OR NURSING
1045 patient arrived from cath lab s/p lt and rt heart cath bilateral groin dressing clean dry intact, patient denies pain, s.o.b, plan of care discussed with family agreeable  1136 patient tolerating oral fluids, bilateral groin dressing clean  1240 bilateral groin dressing clean no bleeding no hematoma  1338 bilateral groin dressing clean dry intact vss  1440 discharge instructions given to patient and family both patient and family verbalize understanding of the orders, iv discontinued tip intact, current vital signs stable, no c/o cp, s.o.b  1445 patient ambulated from bed to bathroom via stand by assist tolerated well.  1450 patient escorted via w/c with all his personal belongings.   Wt Readings from Last 3 Encounters:   12/30/23 45.4 kg (100 lb)   12/27/23 47.8 kg (105 lb 6.1 oz)   12/18/23 52.6 kg (116 lb)     Echo from 12/19/23: EF of 20%, new from May. Suspected to be multifactorial but likely non-ischemic per HF.    Patient initially presented with concerns for hypovolemic shock. Now s/p epi/levo in ED and two fluid boluses with improvement in BP. Restarted metoprolol and Jardiance 12/28 and patient appears to have tolerated this well. Does appear to be slightly volume up on exam with approximately -410 cc net output (-1.1 L in totality).    Plan  Continue metoprolol/Jardiance  Continue Lasix therapy (SOT 12/30) with close BP monitoring to ensure tolerability  Monitor I/O, weights  Fluid restriction/low-salt diet

## 2024-05-12 NOTE — TELEPHONE ENCOUNTER
Do to language barrier and patient having had a stroke per [facility] RN it would be better for us to schedule angio when patient comes in to see Dr. Waters on 12-29-17. Pt will need to be scheduled for a Pre TAVR R&L hrt with poss.  
Pt in office to discuss getting earlier appointment with Dr. Waters. Educated pt that the first available is 12/29. Reassured pt that the TAVR process takes time and that both Mercedez PADILLA and Dr. Waters are working hard to speed up process. Pt has difficult language barrier, but states verbal understanding. Reassured that next week is OK. Number provided for pt to call to discuss issues. He denies further needs and will FU with [facility] 12/29. Devin ZAZUETA notified as well.     POWER FAULKENR and Mercedez PADILLA      
Patient has no objection to blood transfusions.

## 2024-05-26 NOTE — PROGRESS NOTES
12 hour chart check completed     Discharge instructions reviewed with patient at bedside  Please take all medications as prescribed by your doctor  Please attend all follow up appointments with Dr. Gordilol  Please call and make follow up appointment with primary doctor      THANK YOU FOR CHOOSING [facility] FOR YOUR CARE!!  GOOD SEEING YOU AGAIN!!

## 2025-01-02 NOTE — PROGRESS NOTES
[facility]  [address] [facility] [address]  Dept: [phone]  Dept Fax: [phone]  Loc: [phone]     Visit type: Established patient  Reason for Visit: Cold Symptoms (Fever, cough, vomiting and Sob. Started 4 days ago. )    Assessment/Plan   1. Acute cough  -     amoxicillin-clavulanate (AUGMENTIN) 875-125 MG per tablet; Take 1 tablet by mouth 2 times daily for 5 days, Disp-10 tablet, R-0Normal  -     XR CHEST STANDARD (2 VW); Future  -     benzonatate (TESSALON PERLES) 100 MG capsule; Take 1-2 caps by mouth TID prn cough, Disp-60 capsule, R-0Normal    Acute uncomplicated illness   CXR to r/o PNA   Given that symptoms have occurred for 5 days, and patient felt improved on Monday then worsened, will treat w/ antibiotics    Discussed risks/benefits/side effects/alternatives of medication. Using shared decision making patient (or caregiver) elects to pursue treatment.       Health Maintenance Due   Topic    HIV screen     Hepatitis C screen     Hepatitis B vaccine (1 of 3 - 19+ 3-dose series)    DTaP/Tdap/Td vaccine (1 - Tdap)    Cervical cancer screen     Lipids     Breast cancer screen     Colorectal Cancer Screen     Shingles vaccine (1 of 2)    Flu vaccine (1)    COVID-19 Vaccine (1 - 2023-24 season)           No follow-ups on file.  Subjective   Cold Symptoms   Associated symptoms include congestion, coughing and a sore throat. Pertinent negatives include no abdominal pain, chest pain, diarrhea, nausea or vomiting.      Woke up Saturday w/ fever (tmax 102.1) and vomiting   Monday felt a little better but started coughing  Now she's having a hard time breathing   Denies sick contacts   Taking mucinex, nothing else   Took covid test at home, was negative       No results found for this visit on 01/02/25.    Review of Systems   Constitutional:  Negative for activity change, appetite change, fatigue and fever.   HENT:  Positive for congestion  Pt trying to get out of bed throughout shift. No c/o pain. Fall precautions in place. Call bell and bedside table within reach. Hourly rounding completed. Will continue to monitor.

## (undated) DEVICE — ELECTRODE RADIOLUCNT SOLID GEL DEFIB PADS (12EA/CA)

## (undated) DEVICE — DRESSING TRANSPARENT FILM TEGADERM 4 X 4.75" (50EA/BX)"

## (undated) DEVICE — TUBING, IRRIGATION TORRENT

## (undated) DEVICE — MASK ANESTHESIA ADULT  - (100/CA)

## (undated) DEVICE — CHLORAPREP 26 ML APPLICATOR - ORANGE TINT(25/CA)

## (undated) DEVICE — SUCTION INSTRUMENT YANKAUER BULBOUS TIP W/O VENT (50EA/CA)

## (undated) DEVICE — SYRINGE SAFETY 3 ML 18 GA X 1 1/2 BLUNT LL (100/BX 8BX/CA)

## (undated) DEVICE — INTRODUCER CATHETER  DILATOR PROTRUDING 8FR 2.5CM (10EA/BX)

## (undated) DEVICE — GUIDEWIRE STARTER STRAIGHT FIXED CORE .035 150CM 4 STRAIGHT PTFE/HEPARIN COATED (10/BX)

## (undated) DEVICE — TUBE CONNECT SUCTION CLEAR 120 X 1/4" (50EA/CA)"

## (undated) DEVICE — WIRE GUIDE LUNDQST.035X180 - TSMG-35-180-4-LES ORDER BY BOX (5EA/BX)

## (undated) DEVICE — DERMABOND ADVANCED - (12EA/BX)

## (undated) DEVICE — ELECTRODE 850 FOAM ADHESIVE - HYDROGEL RADIOTRNSPRNT (50/PK)

## (undated) DEVICE — GLOVE, LITE (PAIR)

## (undated) DEVICE — BLADE SURGICAL #11 - (50/BX)

## (undated) DEVICE — MICRODRIP PRIMARY VENTED 60 (48EA/CA) THIS WAS PART #2C8428 WHICH WAS DISCONTINUED

## (undated) DEVICE — Device

## (undated) DEVICE — TUBING CLEARLINK DUO-VENT - C-FLO (48EA/CA)

## (undated) DEVICE — SET FLUID WARMING STANDARD FLOW - (10/CA)

## (undated) DEVICE — DRAPE MAYO STAND - (30/CA)

## (undated) DEVICE — SET EXTENSION WITH 2 PORTS (48EA/CA) ***PART #2C8610 IS A SUBSTITUTE*****

## (undated) DEVICE — TRANSDUCER BIFURCATED MONITORING KIT (10EA/CA)

## (undated) DEVICE — CANNULA W/ SUPPLY TUBING O2 - (50/CA)

## (undated) DEVICE — GLOVE BIOGEL SZ 7 SURGICAL PF LTX - (50PR/BX 4BX/CA)

## (undated) DEVICE — CATHETER IV SAFETY 22 GA X 1 (50EA/BX)

## (undated) DEVICE — BASIN EMESIS DISP. - (250/CA)

## (undated) DEVICE — STOPCOCK 3-WAY W/SWIVEL LEVER LOCK (50EA/CA)

## (undated) DEVICE — HEAD HOLDER JUNIOR/ADULT

## (undated) DEVICE — SUTURE 0 ETHIBOND MO6 C/R - (12/BX) 8-18 INCH ETHICON

## (undated) DEVICE — GOWN SURGEONS X-LARGE - DISP. (30/CA)

## (undated) DEVICE — CANISTER SUCTION 3000ML MECHANICAL FILTER AUTO SHUTOFF MEDI-VAC NONSTERILE LF DISP  (40EA/CA)

## (undated) DEVICE — GLOVE BIOGEL SZ 7.5 SURGICAL PF LTX - (50PR/BX 4BX/CA)

## (undated) DEVICE — GLOVE SURGICAL LATEX POWDER FREE STIRLE SZ 8 ENCORE MICROPTIC (200PR/CA)

## (undated) DEVICE — TUBING PRSS MNTR 72IN M/ M LL - (25/BX) MONIT. LINE W/MALE L/L

## (undated) DEVICE — COVER LIGHT HANDLE FLEXIBLE - SOFT (2EA/PK 80PK/CA)

## (undated) DEVICE — KIT RADIAL ARTERY 20GA W/MAX BARRIER AND BIOPATCH  (5EA/CA) #10740 IS FOR THE SET RADIAL ARTERIAL

## (undated) DEVICE — STOPCOCK IV 400 PSI 3W ROT (50EA/BX)

## (undated) DEVICE — KIT ANESTHESIA W/CIRCUIT & 3/LT BAG W/FILTER (20EA/CA)

## (undated) DEVICE — GLOVE BIOGEL PI ORTHO SZ 7.5 PF LF (40PR/BX)

## (undated) DEVICE — CATHETER PIGTAIL 6FR 145 (5EA/BX)

## (undated) DEVICE — KIT ROOM DECONTAMINATION

## (undated) DEVICE — DEVICE CLOSER PERCLOSE - (10/BX) PROGLIDE SYSTEM

## (undated) DEVICE — SPONGE GAUZE NON-STERILE 4X4 - (2000/CA 10PK/CA)

## (undated) DEVICE — TUBE CONNECTING SUCTION - CLEAR PLASTIC STERILE 72 IN (50EA/CA)

## (undated) DEVICE — CANISTER SUCTION RIGID RED 1500CC (40EA/CA)

## (undated) DEVICE — SENSOR SPO2 NEO LNCS ADHESIVE (20/BX) SEE USER NOTES

## (undated) DEVICE — SUTURE 4-0 VICRYL PLUS SH - UNDYED 27 INCH (36PK/BX)

## (undated) DEVICE — SODIUM CHL. INJ. 0.9% 500ML (24EA/CA 50CA/PF)

## (undated) DEVICE — CATHETER 6FR AL1 100CM (5/BX)

## (undated) DEVICE — NEPTUNE 4 PORT MANIFOLD - (20/PK)

## (undated) DEVICE — PACK TAVR (3EA/CA)

## (undated) DEVICE — D-5-LACTATED RINGERS INJ. - 1000 ML (14EA/CA)

## (undated) DEVICE — GLOVE SZ 7 BIOGEL PI MICRO - PF LF (50PR/BX 4BX/CA)

## (undated) DEVICE — SUTURE OHS

## (undated) DEVICE — LACTATED RINGERS INJ 1000 ML - (14EA/CA 60CA/PF)

## (undated) DEVICE — NEEDLE PERCUTANEOUS THINWALL 7CM 18GA BSDN 18-7.0

## (undated) DEVICE — SENSOR SPO2 ADULT LNCS ADTX (20/BX) ORDER ITEM #19593

## (undated) DEVICE — ELECTRODE DUAL RETURN W/ CORD - (50/PK)

## (undated) DEVICE — FORCEP RADIAL JAW 4 STANDARD CAPACITY W/NEEDLE 240CM (40EA/BX)

## (undated) DEVICE — KIT  I.V. START (100EA/CA)

## (undated) DEVICE — SYRINGE DISP. 50CC LS - (40/BX)

## (undated) DEVICE — ARMBOARD  SMALL IV 9 INLONG - (25EA/CA)

## (undated) DEVICE — IV TUBING HI-FLO RATE W/CLAMP (50/CA)

## (undated) DEVICE — SUCTION INSTRUMENT YANKAUER OPEN TIP W/O VENT (50EA/CA)

## (undated) DEVICE — SYRINGE SAFETY 5 ML 18 GA X 1-1/2 BLUNT LL (100/BX 4BX/CA)

## (undated) DEVICE — BLANKET UNDERBODY FULL ACCES - (5/CA)

## (undated) DEVICE — PROTECTOR ULNA NERVE - (36PR/CA)

## (undated) DEVICE — BAG DECANTER (50EA/CS)

## (undated) DEVICE — BAG RESUSCITATION DISPOSABLE - WITH MASK (10 EA/CA)

## (undated) DEVICE — STERI STRIP COMPOUND BENZOIN - TINCTURE 0.6ML WITH APPLICATOR (40EA/BX)

## (undated) DEVICE — FORCEP RADIAL JAW 4 STANDARD CAPACITY W/NEEDLE 240CM

## (undated) DEVICE — GLOVE BIOGEL INDICATOR SZ 8 SURGICAL PF LTX - (50/BX 4BX/CA)

## (undated) DEVICE — SET LEADWIRE 5 LEAD BEDSIDE DISPOSABLE ECG (1SET OF 5/EA)

## (undated) DEVICE — GLOVE SZ 8 BIOGEL PI MICRO - PF LF (50PR/BX)

## (undated) DEVICE — DEVICE HEMOSTATIC CLIPPING RESOLUTION 360 DEGREES (20EA/BX)

## (undated) DEVICE — SET BIFURCATED BLOOD - (48EA/CS)

## (undated) DEVICE — IV SET, NON-VENT PLUMB PUMP

## (undated) DEVICE — SOD. CHL. INJ. 0.9% 1000 ML - (14EA/CA 60CA/PF)

## (undated) DEVICE — CABLE TEMPORARY PACING

## (undated) DEVICE — GUIDEWIRE STARTER J CURVED FIXED CORE .035 260CM 10 3MM PTFE/HEPARIN COATED (5EA/BX)

## (undated) DEVICE — GLOVE SZ 6.5 BIOGEL PI MICRO - PF LF (50PR/BX)

## (undated) DEVICE — INTRODUCER SHEATH 6FR 2.5CM - DILATOR PROTRUDING (10/BX)

## (undated) DEVICE — SYS DLV COST CLS RM TEMP - INJECTATE (CO-SET II) (10EA/CA)

## (undated) DEVICE — PAD PREP 24 X 48 CUFFED - (100/CA)

## (undated) DEVICE — GOWN WARMING STANDARD FLEX - (30/CA)